# Patient Record
Sex: MALE | Race: BLACK OR AFRICAN AMERICAN | Employment: OTHER | URBAN - METROPOLITAN AREA
[De-identification: names, ages, dates, MRNs, and addresses within clinical notes are randomized per-mention and may not be internally consistent; named-entity substitution may affect disease eponyms.]

---

## 2017-07-18 ENCOUNTER — APPOINTMENT (OUTPATIENT)
Dept: ULTRASOUND IMAGING | Age: 82
DRG: 690 | End: 2017-07-18
Attending: PHYSICIAN ASSISTANT
Payer: MEDICARE

## 2017-07-18 ENCOUNTER — APPOINTMENT (OUTPATIENT)
Dept: CT IMAGING | Age: 82
DRG: 690 | End: 2017-07-18
Attending: PHYSICIAN ASSISTANT
Payer: MEDICARE

## 2017-07-18 ENCOUNTER — HOSPITAL ENCOUNTER (INPATIENT)
Age: 82
LOS: 3 days | Discharge: HOME | DRG: 690 | End: 2017-07-21
Attending: EMERGENCY MEDICINE | Admitting: HOSPITALIST
Payer: MEDICARE

## 2017-07-18 DIAGNOSIS — N10 ACUTE PYELONEPHRITIS: Primary | ICD-10-CM

## 2017-07-18 DIAGNOSIS — E27.8 ADRENAL MASS (HCC): ICD-10-CM

## 2017-07-18 LAB
ALBUMIN SERPL BCP-MCNC: 3 G/DL (ref 3.5–5)
ALBUMIN/GLOB SERPL: 0.6 {RATIO} (ref 1.1–2.2)
ALP SERPL-CCNC: 73 U/L (ref 45–117)
ALT SERPL-CCNC: 20 U/L (ref 12–78)
ANION GAP BLD CALC-SCNC: 5 MMOL/L (ref 5–15)
APPEARANCE UR: ABNORMAL
AST SERPL W P-5'-P-CCNC: 15 U/L (ref 15–37)
BACTERIA URNS QL MICRO: ABNORMAL /HPF
BASOPHILS # BLD AUTO: 0 K/UL (ref 0–0.1)
BASOPHILS # BLD: 0 % (ref 0–1)
BILIRUB SERPL-MCNC: 0.6 MG/DL (ref 0.2–1)
BILIRUB UR QL: NEGATIVE
BUN SERPL-MCNC: 26 MG/DL (ref 6–20)
BUN/CREAT SERPL: 13 (ref 12–20)
CALCIUM SERPL-MCNC: 9.5 MG/DL (ref 8.5–10.1)
CHLORIDE SERPL-SCNC: 98 MMOL/L (ref 97–108)
CO2 SERPL-SCNC: 32 MMOL/L (ref 21–32)
COLOR UR: ABNORMAL
CREAT SERPL-MCNC: 2.06 MG/DL (ref 0.7–1.3)
EOSINOPHIL # BLD: 0 K/UL (ref 0–0.4)
EOSINOPHIL NFR BLD: 0 % (ref 0–7)
EPITH CASTS URNS QL MICRO: ABNORMAL /LPF
ERYTHROCYTE [DISTWIDTH] IN BLOOD BY AUTOMATED COUNT: 13.5 % (ref 11.5–14.5)
GLOBULIN SER CALC-MCNC: 4.8 G/DL (ref 2–4)
GLUCOSE SERPL-MCNC: 116 MG/DL (ref 65–100)
GLUCOSE UR STRIP.AUTO-MCNC: NEGATIVE MG/DL
HCT VFR BLD AUTO: 42.1 % (ref 36.6–50.3)
HGB BLD-MCNC: 15 G/DL (ref 12.1–17)
HGB UR QL STRIP: ABNORMAL
KETONES UR QL STRIP.AUTO: NEGATIVE MG/DL
LEUKOCYTE ESTERASE UR QL STRIP.AUTO: ABNORMAL
LYMPHOCYTES # BLD AUTO: 14 % (ref 12–49)
LYMPHOCYTES # BLD: 1.9 K/UL (ref 0.8–3.5)
MCH RBC QN AUTO: 32.9 PG (ref 26–34)
MCHC RBC AUTO-ENTMCNC: 35.6 G/DL (ref 30–36.5)
MCV RBC AUTO: 92.3 FL (ref 80–99)
MONOCYTES # BLD: 1.7 K/UL (ref 0–1)
MONOCYTES NFR BLD AUTO: 13 % (ref 5–13)
NEUTS SEG # BLD: 9.6 K/UL (ref 1.8–8)
NEUTS SEG NFR BLD AUTO: 73 % (ref 32–75)
NITRITE UR QL STRIP.AUTO: POSITIVE
PH UR STRIP: 6.5 [PH] (ref 5–8)
PLATELET # BLD AUTO: 171 K/UL (ref 150–400)
POTASSIUM SERPL-SCNC: 3.6 MMOL/L (ref 3.5–5.1)
PROT SERPL-MCNC: 7.8 G/DL (ref 6.4–8.2)
PROT UR STRIP-MCNC: 100 MG/DL
RBC # BLD AUTO: 4.56 M/UL (ref 4.1–5.7)
RBC #/AREA URNS HPF: ABNORMAL /HPF (ref 0–5)
SODIUM SERPL-SCNC: 135 MMOL/L (ref 136–145)
SP GR UR REFRACTOMETRY: 1.02 (ref 1–1.03)
UROBILINOGEN UR QL STRIP.AUTO: 1 EU/DL (ref 0.2–1)
WBC # BLD AUTO: 13.2 K/UL (ref 4.1–11.1)
WBC URNS QL MICRO: >100 /HPF (ref 0–4)

## 2017-07-18 PROCEDURE — 74176 CT ABD & PELVIS W/O CONTRAST: CPT

## 2017-07-18 PROCEDURE — 87077 CULTURE AEROBIC IDENTIFY: CPT | Performed by: PHYSICIAN ASSISTANT

## 2017-07-18 PROCEDURE — 65270000032 HC RM SEMIPRIVATE

## 2017-07-18 PROCEDURE — 77030027138 HC INCENT SPIROMETER -A

## 2017-07-18 PROCEDURE — 85025 COMPLETE CBC W/AUTO DIFF WBC: CPT | Performed by: EMERGENCY MEDICINE

## 2017-07-18 PROCEDURE — 74011000258 HC RX REV CODE- 258: Performed by: PHYSICIAN ASSISTANT

## 2017-07-18 PROCEDURE — 93005 ELECTROCARDIOGRAM TRACING: CPT

## 2017-07-18 PROCEDURE — 96360 HYDRATION IV INFUSION INIT: CPT

## 2017-07-18 PROCEDURE — 74011250637 HC RX REV CODE- 250/637: Performed by: HOSPITALIST

## 2017-07-18 PROCEDURE — 81001 URINALYSIS AUTO W/SCOPE: CPT | Performed by: PHYSICIAN ASSISTANT

## 2017-07-18 PROCEDURE — 87186 SC STD MICRODIL/AGAR DIL: CPT | Performed by: PHYSICIAN ASSISTANT

## 2017-07-18 PROCEDURE — 74011250636 HC RX REV CODE- 250/636: Performed by: HOSPITALIST

## 2017-07-18 PROCEDURE — 80053 COMPREHEN METABOLIC PANEL: CPT | Performed by: EMERGENCY MEDICINE

## 2017-07-18 PROCEDURE — 87086 URINE CULTURE/COLONY COUNT: CPT | Performed by: PHYSICIAN ASSISTANT

## 2017-07-18 PROCEDURE — 36415 COLL VENOUS BLD VENIPUNCTURE: CPT | Performed by: EMERGENCY MEDICINE

## 2017-07-18 PROCEDURE — 99285 EMERGENCY DEPT VISIT HI MDM: CPT

## 2017-07-18 PROCEDURE — 74011250636 HC RX REV CODE- 250/636: Performed by: PHYSICIAN ASSISTANT

## 2017-07-18 PROCEDURE — 76770 US EXAM ABDO BACK WALL COMP: CPT

## 2017-07-18 RX ORDER — DOCUSATE SODIUM 100 MG/1
100 CAPSULE, LIQUID FILLED ORAL 2 TIMES DAILY
Status: DISCONTINUED | OUTPATIENT
Start: 2017-07-18 | End: 2017-07-21 | Stop reason: HOSPADM

## 2017-07-18 RX ORDER — AMLODIPINE BESYLATE 5 MG/1
5 TABLET ORAL DAILY
COMMUNITY
End: 2019-03-19

## 2017-07-18 RX ORDER — LOSARTAN POTASSIUM 100 MG/1
100 TABLET ORAL DAILY
COMMUNITY
End: 2017-07-21

## 2017-07-18 RX ORDER — MELATONIN
1000 DAILY
Status: DISCONTINUED | OUTPATIENT
Start: 2017-07-19 | End: 2017-07-21 | Stop reason: HOSPADM

## 2017-07-18 RX ORDER — CLONIDINE HYDROCHLORIDE 0.2 MG/1
0.2 TABLET ORAL ONCE
Status: COMPLETED | OUTPATIENT
Start: 2017-07-18 | End: 2017-07-18

## 2017-07-18 RX ORDER — SODIUM CHLORIDE 0.9 % (FLUSH) 0.9 %
5-10 SYRINGE (ML) INJECTION AS NEEDED
Status: DISCONTINUED | OUTPATIENT
Start: 2017-07-18 | End: 2017-07-21 | Stop reason: HOSPADM

## 2017-07-18 RX ORDER — SODIUM CHLORIDE 0.9 % (FLUSH) 0.9 %
5-10 SYRINGE (ML) INJECTION EVERY 8 HOURS
Status: DISCONTINUED | OUTPATIENT
Start: 2017-07-18 | End: 2017-07-21 | Stop reason: HOSPADM

## 2017-07-18 RX ORDER — SODIUM CHLORIDE 9 MG/ML
100 INJECTION, SOLUTION INTRAVENOUS CONTINUOUS
Status: DISCONTINUED | OUTPATIENT
Start: 2017-07-18 | End: 2017-07-21 | Stop reason: HOSPADM

## 2017-07-18 RX ORDER — PROBENECID AND COLCHICINE 500; .5 MG/1; MG/1
1 TABLET ORAL 2 TIMES DAILY
COMMUNITY
End: 2018-12-30

## 2017-07-18 RX ORDER — BISACODYL 5 MG
5 TABLET, DELAYED RELEASE (ENTERIC COATED) ORAL
Status: DISCONTINUED | OUTPATIENT
Start: 2017-07-18 | End: 2017-07-21 | Stop reason: HOSPADM

## 2017-07-18 RX ORDER — POTASSIUM CHLORIDE 750 MG/1
20 TABLET, FILM COATED, EXTENDED RELEASE ORAL DAILY
COMMUNITY
End: 2018-12-30

## 2017-07-18 RX ORDER — MELATONIN
1000 DAILY
COMMUNITY
End: 2018-12-30

## 2017-07-18 RX ORDER — HEPARIN SODIUM 5000 [USP'U]/ML
5000 INJECTION, SOLUTION INTRAVENOUS; SUBCUTANEOUS EVERY 8 HOURS
Status: DISCONTINUED | OUTPATIENT
Start: 2017-07-18 | End: 2017-07-21 | Stop reason: HOSPADM

## 2017-07-18 RX ORDER — AMLODIPINE BESYLATE 5 MG/1
5 TABLET ORAL DAILY
Status: DISCONTINUED | OUTPATIENT
Start: 2017-07-19 | End: 2017-07-21 | Stop reason: HOSPADM

## 2017-07-18 RX ORDER — BISACODYL 5 MG
5 TABLET, DELAYED RELEASE (ENTERIC COATED) ORAL
COMMUNITY
End: 2019-03-19

## 2017-07-18 RX ORDER — HYDROCHLOROTHIAZIDE 25 MG/1
25 TABLET ORAL DAILY
COMMUNITY
End: 2017-07-21

## 2017-07-18 RX ORDER — ACETAMINOPHEN 325 MG/1
650 TABLET ORAL
Status: DISCONTINUED | OUTPATIENT
Start: 2017-07-18 | End: 2017-07-21 | Stop reason: HOSPADM

## 2017-07-18 RX ORDER — ONDANSETRON 2 MG/ML
4 INJECTION INTRAMUSCULAR; INTRAVENOUS
Status: DISCONTINUED | OUTPATIENT
Start: 2017-07-18 | End: 2017-07-21 | Stop reason: HOSPADM

## 2017-07-18 RX ORDER — METOPROLOL TARTRATE 25 MG/1
25 TABLET, FILM COATED ORAL 2 TIMES DAILY
Status: DISCONTINUED | OUTPATIENT
Start: 2017-07-18 | End: 2017-07-21 | Stop reason: HOSPADM

## 2017-07-18 RX ADMIN — BISACODYL 5 MG: 5 TABLET, COATED ORAL at 18:55

## 2017-07-18 RX ADMIN — SODIUM CHLORIDE 1000 ML: 900 INJECTION, SOLUTION INTRAVENOUS at 14:59

## 2017-07-18 RX ADMIN — SODIUM CHLORIDE 100 ML/HR: 900 INJECTION, SOLUTION INTRAVENOUS at 18:56

## 2017-07-18 RX ADMIN — Medication 10 ML: at 19:00

## 2017-07-18 RX ADMIN — CEFTRIAXONE 1 G: 1 INJECTION, POWDER, FOR SOLUTION INTRAMUSCULAR; INTRAVENOUS at 16:10

## 2017-07-18 RX ADMIN — Medication 10 ML: at 22:37

## 2017-07-18 RX ADMIN — METOPROLOL TARTRATE 25 MG: 25 TABLET ORAL at 19:09

## 2017-07-18 RX ADMIN — HEPARIN SODIUM 5000 UNITS: 5000 INJECTION, SOLUTION INTRAVENOUS; SUBCUTANEOUS at 18:55

## 2017-07-18 RX ADMIN — DOCUSATE SODIUM 100 MG: 100 CAPSULE, LIQUID FILLED ORAL at 19:10

## 2017-07-18 RX ADMIN — CLONIDINE HYDROCHLORIDE 0.2 MG: 0.2 TABLET ORAL at 19:10

## 2017-07-18 NOTE — ED PROVIDER NOTES
HPI Comments: 80year old male presenting for multiple complaints. Pt reports \"I wanted to get my heart checked because I wasn't sure if anything was wrong with it or not. Betha Lute \"  Also reports right sided low back/flank pain for a couple of weeks that has been very intermittent, unable to characterize further. Pt notes that his father has heart problems and would like to get it checked. Pt states that he never has chest pain he just wanted a check up. No SOB or leg swelling. Denies fever. Pt had an operation for prostate CA in 1964 and notes that recently he has had issues with urinary incontinence and leakage for a few months.  + dysuria. No hematuria. No abdominal pain, vomiting, or diarrhea. Pt notes that he has not seen his primary care for a couple of months. PMHx: HTN, high cholesterol, \"kidney problems,\" prostate CA in the 1960's  PSx: prostatectomy  Social: non-smoker    Pt somewhat poor historian related to age. Cannot articulate where he has received medical care in the past.  Notes that he has not been to a hospital since the 1960's. Full hx, PE, ROS difficult to obtain    Patient is a 80 y.o. male presenting with chest pain and frequency. The history is provided by the patient. Chest Pain (Angina)    Pertinent negatives include no fever, no shortness of breath and no vomiting. Urinary Frequency    Associated symptoms include frequency and flank pain. Pertinent negatives include no vomiting. Past Medical History:   Diagnosis Date    Hyperlipemia     Hypertension        Past Surgical History:   Procedure Laterality Date    HX UROLOGICAL           No family history on file. Social History     Social History    Marital status:      Spouse name: N/A    Number of children: N/A    Years of education: N/A     Occupational History    Not on file.      Social History Main Topics    Smoking status: Never Smoker    Smokeless tobacco: Never Used    Alcohol use No    Drug use: No    Sexual activity: Not on file     Other Topics Concern    Not on file     Social History Narrative    No narrative on file         ALLERGIES: Review of patient's allergies indicates no known allergies. Review of Systems   Unable to perform ROS: Age   Constitutional: Negative for fever. HENT: Negative for congestion. Eyes: Negative for discharge. Respiratory: Negative for shortness of breath. Cardiovascular: Negative for chest pain and leg swelling. Gastrointestinal: Negative for vomiting. Genitourinary: Positive for dysuria, flank pain and frequency. Vitals:    07/18/17 1217   BP: 165/73   Pulse: 79   Resp: 16   Temp: 98.2 °F (36.8 °C)   SpO2: 98%   Weight: 80 kg (176 lb 4.8 oz)   Height: 5' 10\" (1.778 m)            Physical Exam   Constitutional: He appears well-developed and well-nourished. No distress. Talkative, elderly, pleasant AA male   HENT:   Head: Normocephalic and atraumatic. Right Ear: External ear normal.   Left Ear: External ear normal.   Eyes: Conjunctivae are normal. No scleral icterus. Neck: Neck supple. No tracheal deviation present. Cardiovascular: Normal rate, regular rhythm and normal heart sounds. Exam reveals no gallop and no friction rub. No murmur heard. Pulmonary/Chest: Effort normal and breath sounds normal. No stridor. No respiratory distress. He has no wheezes. Abdominal: Soft. He exhibits no distension. + right CVAT   Musculoskeletal: Normal range of motion. Neurological: He is alert. Skin: Skin is warm and dry. Psychiatric: He has a normal mood and affect. His behavior is normal.   Nursing note and vitals reviewed. MDM  Number of Diagnoses or Management Options  Diagnosis management comments: 80year old male presenting to the ED for a \"check up\" of his heart (asymptomatic) and right flank pain with urinary symptoms. + CVAT, WBC 13.2, UA c/f cystitis (cx pending).   Initial US c/f renal mass, CT showing adrenal mass with stranding c/f pyelo. Also with creatinine 2.06 of unclear acuity. Pt admitted to medicine for pyelo, new diagnosis of adrenal mass. Amount and/or Complexity of Data Reviewed  Clinical lab tests: ordered and reviewed  Tests in the radiology section of CPT®: ordered and reviewed  Discuss the patient with other providers: yes (Dr. aKde Padilla, ED attending. Dr. Jacqueline Lyles, hospitalist.)  Independent visualization of images, tracings, or specimens: yes (US)      ED Course       Procedures         Pt in decon for bed bugs. Unable to assess at this time. BECKI Edwards  12:47 PM    Discussed with Dr. Jacqueline Lyles, will admit.   BECKI Edwards  3:46 PM

## 2017-07-18 NOTE — IP AVS SNAPSHOT
2700 [facility] [address] [place] [address] 
[phone] Patient: Manish Stevens MRN: [id number] You are allergic to the following No active allergies Recent Documentation Height Weight BMI Smoking Status 1.778 m 80 kg 25.3 kg/m2 Never Smoker Unresulted Labs Order Current Status ALDOSTERONE In process CORTISOL, URINE FREE 24 HR In process METANEPHRINES FRACTIONATED, URINE 24 HR In process Emergency Contacts Name Discharge Info Relation Home Work Mobile Orlin Gee  Daughter [21]   [phone] About your hospitalization You were admitted on:  July 18, 2017 You last received care in the:  [id number] You were discharged on:  July 21, 2017 Unit phone number:  [phone] Why you were hospitalized Your primary diagnosis was:  Not on File Your diagnoses also included:  Acute Pyelonephritis, Adrenal Mass (Hcc) Providers Seen During Your Hospitalizations Provider Role Specialty Primary office phone Arnold Davis MD Attending Provider Emergency Medicine [phone] Dewain Simmonds, MD Attending Provider Internal Medicine [phone] Melanie Lee MD Attending Provider Internal Medicine [phone] Your Primary Care Physician (PCP) Primary Care Physician Office Phone Office Fax UNKNOWN, PROVIDER ** None ** ** None ** Follow-up Information Follow up With Details Comments Contact Info Felix Oconnell MD In 2 weeks (General Surgery) [address] [place] [address] Nam  [address] [place] [address] 
[phone] Dale Singletary MD In 2 weeks (Medical Oncology) [address] [place] [address] [address] [place] [address] 
[phone] [facility] Area Office on [address] In 1 day [address] [facility] [facility] Yefri Frances MD On 7/24/2017 Hospital Follow Up Appointment: Monday at 1:30PM for lab work (BNP check). [address] [place] [address] 
[phone] Current Discharge Medication List  
  
START taking these medications Dose & Instructions Dispensing Information Comments Morning Noon Evening Bedtime  
 cefUROXime 250 mg tablet Commonly known as:  CEFTIN Your last dose was: Your next dose is:    
   
   
 Dose:  250 mg Take 1 Tab by mouth two (2) times a day for 5 days. Quantity:  10 Tab Refills:  0 CONTINUE these medications which have NOT CHANGED Dose & Instructions Dispensing Information Comments Morning Noon Evening Bedtime  
 amLODIPine 5 mg tablet Commonly known as:  Sampson Hatfield Your last dose was: Your next dose is:    
   
   
 Dose:  5 mg Take 5 mg by mouth daily. Refills:  0 DULCOLAX (BISACODYL) 5 mg EC tablet Generic drug:  bisacodyl Your last dose was: Your next dose is:    
   
   
 Dose:  5 mg Take 5 mg by mouth every fourty-eight (48) hours. Refills:  0  
     
   
   
   
  
 OTHER(NON-FORMULARY) Your last dose was: Your next dose is:    
   
   
 Dose:  1 Tab Take 1 Tab by mouth daily. Patient states he takes an additional supplement daily. Name unknown. Refills:  0  
     
   
   
   
  
 potassium chloride SR 10 mEq tablet Commonly known as:  KLOR-CON 10 Your last dose was: Your next dose is:    
   
   
 Dose:  20 mEq Take 20 mEq by mouth daily. Last fill per Rx Query 4/25/17 for #30 tablets. Refills:  0  
     
   
   
   
  
 probenecid-colchicine 500-0.5 mg per tablet Commonly known as:  ColBenemid Your last dose was: Your next dose is:    
   
   
 Dose:  1 Tab Take 1 Tab by mouth two (2) times a day. Refills:  0 VITAMIN D3 1,000 unit tablet Generic drug:  cholecalciferol Your last dose was: Your next dose is:    
   
   
 Dose:  1000 Units Take 1,000 Units by mouth daily. Patient believes he takes 1000 units daily. Refills:  0 STOP taking these medications   
 hydroCHLOROthiazide 25 mg tablet Commonly known as:  HYDRODIURIL  
   
  
 losartan 100 mg tablet Commonly known as:  COZAAR Where to Get Your Medications Information on where to get these meds will be given to you by the nurse or doctor. ! Ask your nurse or doctor about these medications  
  cefUROXime 250 mg tablet Discharge Instructions Discharge Instructions PATIENT ID: Ashlee Hicks MRN: [id number] YOB: 1933 DATE OF ADMISSION: 7/18/2017 12:24 PM   
DATE OF DISCHARGE: 7/21/2017 PRIMARY CARE PROVIDER: PROVIDER UNKNOWN  
 
ATTENDING PHYSICIAN: Geetha Cm MD 
DISCHARGING PROVIDER: Geetha Cm MD   
To contact this individual call [phone] and ask the  to page. If unavailable ask to be transferred the Adult Hospitalist Department. DISCHARGE DIAGNOSES ADRENAL MASS W/U DONE MAY BE BENIGN 
 
CONSULTATIONS: IP CONSULT TO ONCOLOGY 
IP CONSULT TO GENERAL SURGERY 
 
PROCEDURES/SURGERIES: * No surgery found * PENDING TEST RESULTS:  
At the time of discharge the following test results are still pending: w/u for adrenal tumor FOLLOW UP APPOINTMENTS:  
Follow-up Information Follow up With Details Comments Contact Info Provider Unknown  need follow up with PCP Patient not available to ask Shahnaz Singh MD In 2 weeks  [address] [facility] Nam  [address] [place] [address] 
[phone] Hilton Moraes MD In 2 weeks  [address] [facility] [address] [place] [address] 
[phone] ADDITIONAL CARE RECOMMENDATIONS:  
 
DIET: Regular Diet and Cardiac Diet ACTIVITY: Activity as tolerated WOUND CARE:  
 EQUIPMENT needed:  
 
 
  
 SNF/Inpatient Rehab/LTAC Independent/assisted living Hospice Other: CDMP Checked:  
Yes x PROBLEM LIST Updated: 
Yes x Signed:  
Lionel Sheth MD 
7/21/2017 
8:59 AM 
 
Discharge Orders None MyUnfold Announcement We are excited to announce that we are making your provider's discharge notes available to you in MyUnfold. You will see these notes when they are completed and signed by the physician that discharged you from your recent hospital stay. If you have any questions or concerns about any information you see in MyUnfold, please call the Health Information Department where you were seen or reach out to your Primary Care Provider for more information about your plan of care. Introducing [facility]! Mary Up introduces MyUnfold patient portal. Now you can access parts of your medical record, email your doctor's office, and request medication refills online. 1. In your internet browser, go to [email or web address] 2. Click on the First Time User? Click Here link in the Sign In box. You will see the New Member Sign Up page. 3. Enter your [employer] Access Code exactly as it appears below. You will not need to use this code after youve completed the sign-up process. If you do not sign up before the expiration date, you must request a new code. · [employer]o Access Code: [email or web address] Expires: 10/17/2017 10:20 AM 
 
4. Enter the last four digits of your Social Security Number (xxxx) and Date of Birth (mm/dd/yyyy) as indicated and click Submit. You will be taken to the next sign-up page. 5. Create a Humouno ID. This will be your Humouno login ID and cannot be changed, so think of one that is secure and easy to remember. 6. Create a Humouno password. You can change your password at any time. 7. Enter your Password Reset Question and Answer. This can be used at a later time if you forget your password. 8. Enter your e-mail address. You will receive e-mail notification when new information is available in [address] 9. Click Sign Up. You can now view and download portions of your medical record. 10. Click the Download Summary menu link to download a portable copy of your medical information. If you have questions, please visit the Frequently Asked Questions section of the [employer]o website. Remember, [employer]o is NOT to be used for urgent needs. For medical emergencies, dial 911. Now available from your iPhone and Android! General Information Please provide this summary of care documentation to your next provider. Patient Signature:  ____________________________________________________________ Date:  ____________________________________________________________  
  
Mallorie Macamento Provider Signature:  ____________________________________________________________ Date:  ____________________________________________________________

## 2017-07-18 NOTE — IP AVS SNAPSHOT
2700 [facility] [address] [place] [address] 
[phone] Patient: Shabnam Cortes MRN: [id number] VZO:5/2/9001 Current Discharge Medication List  
  
START taking these medications Dose & Instructions Dispensing Information Comments Morning Noon Evening Bedtime  
 cefUROXime 250 mg tablet Commonly known as:  CEFTIN Your last dose was: Your next dose is:    
   
   
 Dose:  250 mg Take 1 Tab by mouth two (2) times a day for 5 days. Quantity:  10 Tab Refills:  0 CONTINUE these medications which have NOT CHANGED Dose & Instructions Dispensing Information Comments Morning Noon Evening Bedtime  
 amLODIPine 5 mg tablet Commonly known as:  Sampson Alysia Your last dose was: Your next dose is:    
   
   
 Dose:  5 mg Take 5 mg by mouth daily. Refills:  0 DULCOLAX (BISACODYL) 5 mg EC tablet Generic drug:  bisacodyl Your last dose was: Your next dose is:    
   
   
 Dose:  5 mg Take 5 mg by mouth every fourty-eight (48) hours. Refills:  0  
     
   
   
   
  
 OTHER(NON-FORMULARY) Your last dose was: Your next dose is:    
   
   
 Dose:  1 Tab Take 1 Tab by mouth daily. Patient states he takes an additional supplement daily. Name unknown. Refills:  0  
     
   
   
   
  
 potassium chloride SR 10 mEq tablet Commonly known as:  KLOR-CON 10 Your last dose was: Your next dose is:    
   
   
 Dose:  20 mEq Take 20 mEq by mouth daily. Last fill per Rx Query 4/25/17 for #30 tablets. Refills:  0  
     
   
   
   
  
 probenecid-colchicine 500-0.5 mg per tablet Commonly known as:  ColBenemid Your last dose was: Your next dose is:    
   
   
 Dose:  1 Tab Take 1 Tab by mouth two (2) times a day. Refills:  0  
     
   
   
   
  
 VITAMIN D3 1,000 unit tablet Generic drug:  cholecalciferol Your last dose was: Your next dose is:    
   
   
 Dose:  1000 Units Take 1,000 Units by mouth daily. Patient believes he takes 1000 units daily. Refills:  0 STOP taking these medications   
 hydroCHLOROthiazide 25 mg tablet Commonly known as:  HYDRODIURIL  
   
  
 losartan 100 mg tablet Commonly known as:  COZAAR Where to Get Your Medications Information on where to get these meds will be given to you by the nurse or doctor. ! Ask your nurse or doctor about these medications  
  cefUROXime 250 mg tablet

## 2017-07-18 NOTE — H&P
History & Physical    Date of admission: 2017    Patient name: Breann Awad  MRN: [id number]  YOB: 1933  Age: 80 y.o. Primary care provider:  PROVIDER UNKNOWN     Source of Information: patient, medical records                              Chief complain: Rt Flank pain    History of present illness  Breann Awad is a 80 y.o. male who presents with PMHx Prostate ca s/p prostatectomy, HTN, HLD, Gout, admitted for above complain. Pt initially states that he just came to check his heart. When asking why he felt something was wrong with it, patient states: \"Just wanted to check it out\". Pt has PCP who was referring him to a cardiologist but states he never heard from them. On Additional questioning pt also c/o on/off rt flank pain. Pt also complains of increased urinary frequency but no discomfort  Or hematuria. Pt denies any chest pain, sob, n/v, diarrhea, fever, chills, hx of MI, CVA. Pt states his father had heart condition and . Pt unsure about his mother's history. IN ER, UA and US done. UA + for infection. US showed b/l renal masses which prompted CT abd which showed B/l Adrenal pass and perinephritic stranding R>L. Pt also found to have Bedbugs in ER and was decontaminated. Past Medical History:   Diagnosis Date    Hyperlipemia     Hypertension       Past Surgical History:   Procedure Laterality Date    HX UROLOGICAL       Prior to Admission medications    Medication Sig Start Date End Date Taking? Authorizing Provider   amLODIPine (NORVASC) 5 mg tablet Take 5 mg by mouth daily. Yes Historical Provider   hydroCHLOROthiazide (HYDRODIURIL) 25 mg tablet Take 25 mg by mouth daily. Yes Historical Provider   potassium chloride SR (KLOR-CON 10) 10 mEq tablet Take 20 mEq by mouth daily. Last fill per Rx Query 17 for #30 tablets.    Yes Historical Provider   probenecid-colchicine ([address]) 500-0.5 mg per tablet Take 1 Tab by mouth two (2) times a day. Yes Historical Provider   losartan (COZAAR) 100 mg tablet Take 100 mg by mouth daily. Last fill per Rx Query 2/13/17 for #90 tablets. Yes Historical Provider   cholecalciferol (VITAMIN D3) 1,000 unit tablet Take 1,000 Units by mouth daily. Patient believes he takes 1000 units daily. Yes Historical Provider   OTHER,NON-FORMULARY, Take 1 Tab by mouth daily. Patient states he takes an additional supplement daily. Name unknown. Yes Historical Provider   bisacodyl (DULCOLAX, BISACODYL,) 5 mg EC tablet Take 5 mg by mouth every fourty-eight (48) hours. Yes Historical Provider     No Known Allergies   No family history on file. Family history reviewed and non-contributory. Social history  Patient resides  X  Independently      With family care      Assisted living      SNF    Ambulates  X  Independently      With cane       Assisted walker         Alcohol history   X  None     Social     Chronic   Smoking history  X  None     Former smoker     Current smoker     History   Smoking Status    Never Smoker   Smokeless Tobacco    Never Used       Code status  X  Full code     DNR/DNI        Code status discussed with the patient/caregivers. No Order    Review of systems  The patient denies any fever, chills, chest pain, cough, congestion, recent illness, palpitations, or dysuria. A comprehensive review of systems was negative except for that written in the History of Present Illness. The remainder of the review of systems was reviewed and is noncontributory. Physical Examination   Visit Vitals    /79    Pulse 83    Temp 98.2 °F (36.8 °C)    Resp 27    Ht 5' 10\" (1.778 m)    Wt 80 kg (176 lb 4.8 oz)    SpO2 97%    BMI 25.3 kg/m2          O2 Device: Room air    General:  Alert, cooperative, no distress   Head:  Normocephalic, without obvious abnormality, atraumatic   Eyes:  Conjunctivae/corneas clear.  PERRL, EOMs intact E/N/M/T: Nares normal. Septum midline. No nasal drainage or sinus tenderness  Lips, mucosa, and tongue normal   Teeth and gums normal  Clear oropharynx   Neck: Normal appearance and movements, symmetrical, trachea midline  No palpable adenopathy  No thyroid enlargement, tenderness or nodules  No carotid bruit   Normal JVP   Lungs:   Symmetrical chest expansion and respiratory effort  Clear to auscultation bilaterally   Heart:  Regular rhythm   Sounds normal; no murmur, click, rub or gallop   Abdomen:   Soft, no tenderness, + Rt flank discomfort   Bowel sounds normal     Extremities: Extremities normal, atraumatic  No cyanosis or edema  No DVT signs   Pulses 2+ and symmetric all extremities   Skin: No rashes or ulcers   Psych: Alert, oriented x3  Normal affect, judgement and insight   Geniturinary: deferred     Data Review      24 Hour Results:  Recent Results (from the past 24 hour(s))   EKG, 12 LEAD, INITIAL    Collection Time: 07/18/17 12:24 PM   Result Value Ref Range    Ventricular Rate 79 BPM    Atrial Rate 79 BPM    P-R Interval 176 ms    QRS Duration 94 ms    Q-T Interval 348 ms    QTC Calculation (Bezet) 399 ms    Calculated P Axis 49 degrees    Calculated R Axis 24 degrees    Calculated T Axis -16 degrees    Diagnosis       Normal sinus rhythm  Left ventricular hypertrophy with repolarization abnormality  Anterior infarct , age undetermined  No previous ECGs available     CBC WITH AUTOMATED DIFF    Collection Time: 07/18/17  1:30 PM   Result Value Ref Range    WBC 13.2 (H) 4.1 - 11.1 K/uL    RBC 4.56 4.10 - 5.70 M/uL    HGB 15.0 12.1 - 17.0 g/dL    HCT 42.1 36.6 - 50.3 %    MCV 92.3 80.0 - 99.0 FL    MCH 32.9 26.0 - 34.0 PG    MCHC 35.6 30.0 - 36.5 g/dL    RDW 13.5 11.5 - 14.5 %    PLATELET 608 570 - 976 K/uL    NEUTROPHILS 73 32 - 75 %    LYMPHOCYTES 14 12 - 49 %    MONOCYTES 13 5 - 13 %    EOSINOPHILS 0 0 - 7 %    BASOPHILS 0 0 - 1 %    ABS. NEUTROPHILS 9.6 (H) 1.8 - 8.0 K/UL    ABS.  LYMPHOCYTES 1.9 0.8 - 3.5 K/UL    ABS. MONOCYTES 1.7 (H) 0.0 - 1.0 K/UL    ABS. EOSINOPHILS 0.0 0.0 - 0.4 K/UL    ABS. BASOPHILS 0.0 0.0 - 0.1 K/UL   METABOLIC PANEL, COMPREHENSIVE    Collection Time: 07/18/17  1:30 PM   Result Value Ref Range    Sodium 135 (L) 136 - 145 mmol/L    Potassium 3.6 3.5 - 5.1 mmol/L    Chloride 98 97 - 108 mmol/L    CO2 32 21 - 32 mmol/L    Anion gap 5 5 - 15 mmol/L    Glucose 116 (H) 65 - 100 mg/dL    BUN 26 (H) 6 - 20 MG/DL    Creatinine 2.06 (H) 0.70 - 1.30 MG/DL    BUN/Creatinine ratio 13 12 - 20      GFR est AA 37 (L) >60 ml/min/1.73m2    GFR est non-AA 31 (L) >60 ml/min/1.73m2    Calcium 9.5 8.5 - 10.1 MG/DL    Bilirubin, total 0.6 0.2 - 1.0 MG/DL    ALT (SGPT) 20 12 - 78 U/L    AST (SGOT) 15 15 - 37 U/L    Alk. phosphatase 73 45 - 117 U/L    Protein, total 7.8 6.4 - 8.2 g/dL    Albumin 3.0 (L) 3.5 - 5.0 g/dL    Globulin 4.8 (H) 2.0 - 4.0 g/dL    A-G Ratio 0.6 (L) 1.1 - 2.2     URINALYSIS W/MICROSCOPIC    Collection Time: 07/18/17  2:56 PM   Result Value Ref Range    Color YELLOW/STRAW      Appearance CLOUDY (A) CLEAR      Specific gravity 1.017 1.003 - 1.030      pH (UA) 6.5 5.0 - 8.0      Protein 100 (A) NEG mg/dL    Glucose NEGATIVE  NEG mg/dL    Ketone NEGATIVE  NEG mg/dL    Bilirubin NEGATIVE  NEG      Blood LARGE (A) NEG      Urobilinogen 1.0 0.2 - 1.0 EU/dL    Nitrites POSITIVE (A) NEG      Leukocyte Esterase LARGE (A) NEG      WBC >100 (H) 0 - 4 /hpf    RBC  0 - 5 /hpf    Epithelial cells MODERATE (A) FEW /lpf    Bacteria 3+ (A) NEG /hpf     Recent Labs      07/18/17   1330   WBC  13.2*   HGB  15.0   HCT  42.1   PLT  171     Recent Labs      07/18/17   1330   NA  135*   K  3.6   CL  98   CO2  32   GLU  116*   BUN  26*   CREA  2.06*   CA  9.5   ALB  3.0*   TBILI  0.6   SGOT  15   ALT  20       Imaging  CT abd/pel:  MPRESSION  IMPRESSION:   1. No definite solid renal mass is identified within the limits of noncontrast  examination.  A right renal mass on recent ultrasound is actually a right adrenal  mass. This is a completely characterize but suspicious for malignancy given  atypical features such is heterogeneity and size of 4.9 cm.     2. A smaller right adrenal nodule is too small to characterize. There is also  diffuse nodularity of the left adrenal gland.      3. A left renal mass on recent ultrasound demonstrates fluid density compatible  with a simple cyst.      4. There is bilateral perinephric fluid and stranding, right greater than left,  which is nonspecific but can be seen with infection. Correlation with urinalysis is recommended. Assessment and Plan   Active Problems:    Acute pyelonephritis (7/18/2017)      Adrenal mass ([address] (7/18/2017)    1. Acute Pyelonephritis  - start Rocephin  - IVF  - Follow cultures    2. Adrenal Mass   - onc eval  - CT abd/pel: possibly primary vs mets    3. SOUELYMANE vs CKD 3, no previous baseline  - will give IVF  - monitor Cr  - US retroperitoneum done    4. HTN   - c/w Amlodipine  - hold ARBs and Hydrochlorothiazide      Diet: cardiac  Activity: as tolerated  DVT prophylaxis: hep sq  Isolation precautions: none  Consultations:  Onc  Anticipated disposition: 2 days       Signed by: Melody Casas MD    July 18, 2017 at 4:38 PM

## 2017-07-18 NOTE — PROGRESS NOTES
Admission Medication Reconciliation:    Information obtained from: Patient and Rx Query    Significant PMH/Disease States:   Past Medical History:   Diagnosis Date    Hyperlipemia     Hypertension        Chief Complaint for this Admission:    Chief Complaint   Patient presents with    Chest Pain    Urinary Frequency       Allergies:  Review of patient's allergies indicates no known allergies. Prior to Admission Medications:   Prior to Admission Medications   Prescriptions Last Dose Informant Patient Reported? Taking? OTHER,NON-FORMULARY,   Yes Yes   Sig: Take 1 Tab by mouth daily. Patient states he takes an additional supplement daily. Name unknown. amLODIPine (NORVASC) 5 mg tablet 7/17/2017 at Unknown time  Yes Yes   Sig: Take 5 mg by mouth daily. bisacodyl (DULCOLAX, BISACODYL,) 5 mg EC tablet   Yes Yes   Sig: Take 5 mg by mouth every fourty-eight (48) hours. cholecalciferol (VITAMIN D3) 1,000 unit tablet 7/17/2017 at Unknown time  Yes Yes   Sig: Take 1,000 Units by mouth daily. Patient believes he takes 1000 units daily. hydroCHLOROthiazide (HYDRODIURIL) 25 mg tablet 7/17/2017 at Unknown time  Yes Yes   Sig: Take 25 mg by mouth daily. losartan (COZAAR) 100 mg tablet 7/17/2017 at Unknown time  Yes Yes   Sig: Take 100 mg by mouth daily. Last fill per Rx Query 2/13/17 for #90 tablets. potassium chloride SR (KLOR-CON 10) 10 mEq tablet 7/17/2017 at Unknown time  Yes Yes   Sig: Take 20 mEq by mouth daily. Last fill per Rx Query 4/25/17 for #30 tablets. probenecid-colchicine (COLBENEMID) 500-0.5 mg per tablet 7/17/2017 at 1 dose  Yes Yes   Sig: Take 1 Tab by mouth two (2) times a day. Facility-Administered Medications: None         Comments/Recommendations:     Spoke with patient regarding allergies and PTA medications. Patient appeared to be an ok historian. Supplemented information from interview with information listed in Rx Query. 1) Confirmed NKDA.     2) Updated PTA medication list. Added medications listed above. Regarding losartan and potassium, last fills per Rx Query suggest patient may have ran out of medications. Both medications confirmed as taking during interview. Last fill for losartan was 2/13/17 for #90, and last fill for potassium was 4/25/17 for #30. In addition to Rx and OTC medications, the patient reports he takes an additional supplement that he does not recall the name of. Listed above as \"other non formulary. \"    Last doses listed above. The patient reports he has not taken his Dulcolax in a few days.       Cristela Phalen, PharmD

## 2017-07-18 NOTE — ED NOTES
1649 Attempted report, RN will call back  1720 Attempted report, RN dealing with patient situation, will call back  1730 TRANSFER - OUT REPORT:    Verbal report given to Hays(name) on Ambrosio Powell  being transferred to [facility](unit) for routine progression of care       Report consisted of patients Situation, Background, Assessment and   Recommendations(SBAR). Information from the following report(s) SBAR, Kardex, ED Summary, [facility] and Recent Results was reviewed with the receiving nurse. Lines:   Peripheral IV 07/18/17 Antecubital (Active)   Site Assessment Clean, dry, & intact 7/18/2017  1:31 PM   Phlebitis Assessment 0 7/18/2017  1:31 PM   Infiltration Assessment 0 7/18/2017  1:31 PM   Dressing Status Clean, dry, & intact 7/18/2017  1:31 PM        Opportunity for questions and clarification was provided.       Patient transported with:

## 2017-07-19 ENCOUNTER — APPOINTMENT (OUTPATIENT)
Dept: CT IMAGING | Age: 82
DRG: 690 | End: 2017-07-19
Attending: NURSE PRACTITIONER
Payer: MEDICARE

## 2017-07-19 LAB
ANION GAP BLD CALC-SCNC: 6 MMOL/L (ref 5–15)
ATRIAL RATE: 79 BPM
BASOPHILS # BLD AUTO: 0 K/UL (ref 0–0.1)
BASOPHILS # BLD: 0 % (ref 0–1)
BUN SERPL-MCNC: 29 MG/DL (ref 6–20)
BUN/CREAT SERPL: 16 (ref 12–20)
CALCIUM SERPL-MCNC: 8.8 MG/DL (ref 8.5–10.1)
CALCULATED P AXIS, ECG09: 49 DEGREES
CALCULATED R AXIS, ECG10: 24 DEGREES
CALCULATED T AXIS, ECG11: -16 DEGREES
CHLORIDE SERPL-SCNC: 101 MMOL/L (ref 97–108)
CO2 SERPL-SCNC: 29 MMOL/L (ref 21–32)
CREAT SERPL-MCNC: 1.84 MG/DL (ref 0.7–1.3)
DIAGNOSIS, 93000: NORMAL
EOSINOPHIL # BLD: 0 K/UL (ref 0–0.4)
EOSINOPHIL NFR BLD: 0 % (ref 0–7)
ERYTHROCYTE [DISTWIDTH] IN BLOOD BY AUTOMATED COUNT: 13.5 % (ref 11.5–14.5)
GLUCOSE SERPL-MCNC: 136 MG/DL (ref 65–100)
HCT VFR BLD AUTO: 37 % (ref 36.6–50.3)
HGB BLD-MCNC: 12.8 G/DL (ref 12.1–17)
LYMPHOCYTES # BLD AUTO: 16 % (ref 12–49)
LYMPHOCYTES # BLD: 1.7 K/UL (ref 0.8–3.5)
MCH RBC QN AUTO: 32.2 PG (ref 26–34)
MCHC RBC AUTO-ENTMCNC: 34.6 G/DL (ref 30–36.5)
MCV RBC AUTO: 93.2 FL (ref 80–99)
MONOCYTES # BLD: 1.6 K/UL (ref 0–1)
MONOCYTES NFR BLD AUTO: 15 % (ref 5–13)
NEUTS SEG # BLD: 7.5 K/UL (ref 1.8–8)
NEUTS SEG NFR BLD AUTO: 69 % (ref 32–75)
P-R INTERVAL, ECG05: 176 MS
PLATELET # BLD AUTO: 157 K/UL (ref 150–400)
POTASSIUM SERPL-SCNC: 3.3 MMOL/L (ref 3.5–5.1)
Q-T INTERVAL, ECG07: 348 MS
QRS DURATION, ECG06: 94 MS
QTC CALCULATION (BEZET), ECG08: 399 MS
RBC # BLD AUTO: 3.97 M/UL (ref 4.1–5.7)
SODIUM SERPL-SCNC: 136 MMOL/L (ref 136–145)
VENTRICULAR RATE, ECG03: 79 BPM
WBC # BLD AUTO: 10.8 K/UL (ref 4.1–11.1)

## 2017-07-19 PROCEDURE — 83835 ASSAY OF METANEPHRINES: CPT | Performed by: INTERNAL MEDICINE

## 2017-07-19 PROCEDURE — 82530 CORTISOL FREE: CPT | Performed by: INTERNAL MEDICINE

## 2017-07-19 PROCEDURE — 71250 CT THORAX DX C-: CPT

## 2017-07-19 PROCEDURE — 65270000032 HC RM SEMIPRIVATE

## 2017-07-19 PROCEDURE — 82088 ASSAY OF ALDOSTERONE: CPT | Performed by: INTERNAL MEDICINE

## 2017-07-19 PROCEDURE — 85025 COMPLETE CBC W/AUTO DIFF WBC: CPT | Performed by: HOSPITALIST

## 2017-07-19 PROCEDURE — 74011000258 HC RX REV CODE- 258: Performed by: HOSPITALIST

## 2017-07-19 PROCEDURE — 74011250636 HC RX REV CODE- 250/636: Performed by: HOSPITALIST

## 2017-07-19 PROCEDURE — 36415 COLL VENOUS BLD VENIPUNCTURE: CPT | Performed by: HOSPITALIST

## 2017-07-19 PROCEDURE — 80048 BASIC METABOLIC PNL TOTAL CA: CPT | Performed by: HOSPITALIST

## 2017-07-19 PROCEDURE — 74011250637 HC RX REV CODE- 250/637: Performed by: HOSPITALIST

## 2017-07-19 RX ADMIN — AMLODIPINE BESYLATE 5 MG: 5 TABLET ORAL at 10:05

## 2017-07-19 RX ADMIN — Medication 10 ML: at 21:30

## 2017-07-19 RX ADMIN — VITAMIN D, TAB 1000IU (100/BT) 1000 UNITS: 25 TAB at 10:05

## 2017-07-19 RX ADMIN — METOPROLOL TARTRATE 25 MG: 25 TABLET ORAL at 10:05

## 2017-07-19 RX ADMIN — SODIUM CHLORIDE 100 ML/HR: 900 INJECTION, SOLUTION INTRAVENOUS at 16:02

## 2017-07-19 RX ADMIN — CEFTRIAXONE 1 G: 1 INJECTION, POWDER, FOR SOLUTION INTRAMUSCULAR; INTRAVENOUS at 15:45

## 2017-07-19 RX ADMIN — DOCUSATE SODIUM 100 MG: 100 CAPSULE, LIQUID FILLED ORAL at 10:05

## 2017-07-19 RX ADMIN — HEPARIN SODIUM 5000 UNITS: 5000 INJECTION, SOLUTION INTRAVENOUS; SUBCUTANEOUS at 02:30

## 2017-07-19 RX ADMIN — METOPROLOL TARTRATE 25 MG: 25 TABLET ORAL at 18:16

## 2017-07-19 RX ADMIN — DOCUSATE SODIUM 100 MG: 100 CAPSULE, LIQUID FILLED ORAL at 18:16

## 2017-07-19 RX ADMIN — HEPARIN SODIUM 5000 UNITS: 5000 INJECTION, SOLUTION INTRAVENOUS; SUBCUTANEOUS at 10:07

## 2017-07-19 RX ADMIN — HEPARIN SODIUM 5000 UNITS: 5000 INJECTION, SOLUTION INTRAVENOUS; SUBCUTANEOUS at 18:15

## 2017-07-19 RX ADMIN — SODIUM CHLORIDE 100 ML/HR: 900 INJECTION, SOLUTION INTRAVENOUS at 05:24

## 2017-07-19 RX ADMIN — Medication 10 ML: at 05:25

## 2017-07-19 NOTE — PROGRESS NOTES
Spiritual Care Partner Volunteer visited patient in [address] [place] [address] on 7/19/17. Documented by:  Cora Marti M.Div.    [facility] [phone])

## 2017-07-19 NOTE — PROGRESS NOTES
Hospitalist Progress Note  Capri Vasquez MD  Office: [phone]        Date of Service:  2017  NAME:  Shabnam Cortes  :  3/9/1933  MRN:  [id number]      Admission Summary:   Shabnam Cortes is a 80 y.o. male who presents with PMHx Prostate ca s/p prostatectomy, HTN, HLD, Gout, admitted for above complain. Pt initially states that he just came to check his heart. When asking why he felt something was wrong with it, patient states: \"Just wanted to check it out\". Pt has PCP who was referring him to a cardiologist but states he never heard from them. On Additional questioning pt also c/o on/off rt flank pain. Pt also complains of increased urinary frequency but no discomfort  Or hematuria. Pt denies any chest pain, sob, n/v, diarrhea, fever, chills, hx of MI, CVA. Pt states his father had heart condition and . Pt unsure about his mother's history. Interval history / Subjective:   No complains      Assessment & Plan: Active Problems:    Acute pyelonephritis (2017)       Adrenal mass ([facility] [address] (2017)     1. Acute Pyelonephritis  - start Rocephin  - IVF  - Follow cultures NGTD     2. Adrenal Mass   - Oncology seen pt , need Onc / surgery evaluation awaiting   - CT abd/pel: possibly primary vs mets     3. SOULEYMANE vs CKD 3, no previous baseline  - will give IVF  - monitor Cr  - US retroperitoneum - Solid bilateral renal masses or suspicious for neoplasms. no hydro     4.  HTN   - c/w Amlodipine  - hold ARBs and Hydrochlorothiazide    Code status: Full  DVT prophylaxis: [address] [place] [address] discussed with: Patient/Family and Nurse  Disposition: Home w/Family and TBD     Hospital Problems  Never Reviewed          Codes Class Noted POA    Acute pyelonephritis ICD-10-CM: N10  ICD-9-CM: 590.10  2017 Unknown        Adrenal mass ([facility] [address] ICD-10-CM: E27.9  ICD-9-CM: 255.9  2017 Unknown                Review of Systems:   A comprehensive review of systems was negative. Vital Signs:    Last 24hrs VS reviewed since prior progress note. Most recent are:  Visit Vitals    /70 (BP 1 Location: Left arm, BP Patient Position: At rest)    Pulse 76    Temp 98.9 °F (37.2 °C)    Resp 16    Ht 5' 10\" (1.778 m)    Wt 80 kg (176 lb 4.8 oz)    SpO2 96%    BMI 25.3 kg/m2         Intake/Output Summary (Last 24 hours) at 07/19/17 0936  Last data filed at 07/19/17 0524   Gross per 24 hour   Intake                0 ml   Output              950 ml   Net             -950 ml        Physical Examination:             Constitutional:  No acute distress, cooperative, pleasant    ENT:  Oral mucous moist, oropharynx benign. Neck supple,    Resp:  CTA bilaterally. No wheezing/rhonchi/rales. No accessory muscle use   CV:  Regular rhythm, normal rate, no murmurs, gallops, rubs    GI:  Soft, non distended, non tender. normoactive bowel sounds, no hepatosplenomegaly     Musculoskeletal:  No edema, warm, 2+ pulses throughout    Neurologic:  Moves all extremities. AAOx3, CN II-XII reviewed     Psych:  Good insight, Not anxious nor agitated. Data Review:    I personally reviewed  Image and LABS      Labs:     Recent Labs      07/19/17   0248  07/18/17   1330   WBC  10.8  13.2*   HGB  12.8  15.0   HCT  37.0  42.1   PLT  157  171     Recent Labs      07/19/17   0248  07/18/17   1330   NA  136  135*   K  3.3*  3.6   CL  101  98   CO2  29  32   BUN  29*  26*   CREA  1.84*  2.06*   GLU  136*  116*   CA  8.8  9.5     Recent Labs      07/18/17   1330   SGOT  15   ALT  20   AP  73   TBILI  0.6   TP  7.8   ALB  3.0*   GLOB  4.8*     No results for input(s): INR, PTP, APTT in the last 72 hours. No lab exists for component: INREXT   No results for input(s): FE, TIBC, PSAT, FERR in the last 72 hours. No results found for: FOL, RBCF   No results for input(s): PH, PCO2, PO2 in the last 72 hours. No results for input(s): CPK, CKNDX, TROIQ in the last 72 hours.     No lab exists for component: CPKMB  No results found for: CHOL, CHOLX, CHLST, CHOLV, HDL, LDL, LDLC, DLDLP, TGLX, TRIGL, TRIGP, CHHD, CHHDX  No results found for: [facility]  Lab Results   Component Value Date/Time    Color YELLOW/STRAW 07/18/2017 02:56 PM    Appearance CLOUDY 07/18/2017 02:56 PM    Specific gravity 1.017 07/18/2017 02:56 PM    pH (UA) 6.5 07/18/2017 02:56 PM    Protein 100 07/18/2017 02:56 PM    Glucose NEGATIVE  07/18/2017 02:56 PM    Ketone NEGATIVE  07/18/2017 02:56 PM    Bilirubin NEGATIVE  07/18/2017 02:56 PM    Urobilinogen 1.0 07/18/2017 02:56 PM    Nitrites POSITIVE 07/18/2017 02:56 PM    Leukocyte Esterase LARGE 07/18/2017 02:56 PM    Epithelial cells MODERATE 07/18/2017 02:56 PM    Bacteria 3+ 07/18/2017 02:56 PM    WBC >100 07/18/2017 02:56 PM    RBC  07/18/2017 02:56 PM         Medications Reviewed:     Current Facility-Administered Medications   Medication Dose Route Frequency    amLODIPine (NORVASC) tablet 5 mg  5 mg Oral DAILY    bisacodyl (DULCOLAX) tablet 5 mg  5 mg Oral Q48H    cholecalciferol (VITAMIN D3) tablet 1,000 Units  1,000 Units Oral DAILY    sodium chloride (NS) flush 5-10 mL  5-10 mL IntraVENous Q8H    sodium chloride (NS) flush 5-10 mL  5-10 mL IntraVENous PRN    0.9% sodium chloride infusion  100 mL/hr IntraVENous CONTINUOUS    cefTRIAXone (ROCEPHIN) 1 g in 0.9% sodium chloride (MBP/ADV) 50 mL  1 g IntraVENous Q24H    acetaminophen (TYLENOL) tablet 650 mg  650 mg Oral Q4H PRN    ondansetron (ZOFRAN) injection 4 mg  4 mg IntraVENous Q4H PRN    docusate sodium (COLACE) capsule 100 mg  100 mg Oral BID    heparin (porcine) injection 5,000 Units  5,000 Units SubCUTAneous Q8H    metoprolol tartrate (LOPRESSOR) tablet 25 mg  25 mg Oral BID     ______________________________________________________________________  EXPECTED LENGTH OF STAY: 3d 2h  ACTUAL LENGTH OF STAY:          1                 Omer Amaya MD

## 2017-07-19 NOTE — PROGRESS NOTES
Consult received     Metabolic work up added to labs for adrenal mass evaluation    Please consult Surgical Oncology-Dr Jones in am , may need MRI for further evaluation as deemed appropriate by surgery.     Full consult to follow

## 2017-07-19 NOTE — CONSULTS
General Surgery In-Patient Consultation    Admit Date: 7/18/2017  Reason for Consultation: adrenal mass - R    HPI:  Glen Jane is a 80 y.o. male w/ hx HTN, prostate Ca s/p prostatectomy whom we are asked to see in consultation by Dr. Zacarias Willingham for the above complaint. Pt presented to the ED yesterday b/c he wanted someone to check out his heart and has been having R lower back/flank pain. The pain has been going on for about 6 weeks. Intermittent. He has been having some urinary urgency but denies dark or odorous urine; no hematuria. No c/o cp or SOB. Worried about his heart b/c of his age and his father has heart dz. U/s done, then CT- results below. Also found to have elevated Cr and has no baseline. Hematology has seen pt and has ordered urine and blood studies    CT scan  1. No definite solid renal mass is identified within the limits of noncontrast  examination. A right renal mass on recent ultrasound is actually a right adrenal  mass. This is a completely characterize but suspicious for malignancy given  atypical features such is heterogeneity and size of 4.9 cm.     2. A smaller right adrenal nodule is too small to characterize. There is also  diffuse nodularity of the left adrenal gland.      3. A left renal mass on recent ultrasound demonstrates fluid density compatible  with a simple cyst.      4. There is bilateral perinephric fluid and stranding, right greater than left,  which is nonspecific but can be seen with infection.  Correlation with urinalysis  is recommended.          Patient Active Problem List    Diagnosis Date Noted    Acute pyelonephritis 07/18/2017    Adrenal mass ([address] 07/18/2017     Past Medical History:   Diagnosis Date    Hyperlipemia     Hypertension       Past Surgical History:   Procedure Laterality Date    HX UROLOGICAL        Social History   Substance Use Topics    Smoking status: Never Smoker    Smokeless tobacco: Never Used    Alcohol use No      No family history on file. Prior to Admission medications    Medication Sig Start Date End Date Taking? Authorizing Provider   amLODIPine (NORVASC) 5 mg tablet Take 5 mg by mouth daily. Yes Historical Provider   hydroCHLOROthiazide (HYDRODIURIL) 25 mg tablet Take 25 mg by mouth daily. Yes Historical Provider   potassium chloride SR (KLOR-CON 10) 10 mEq tablet Take 20 mEq by mouth daily. Last fill per Rx Query 4/25/17 for #30 tablets. Yes Historical Provider   probenecid-colchicine (COLBENEMID) 500-0.5 mg per tablet Take 1 Tab by mouth two (2) times a day. Yes Historical Provider   losartan (COZAAR) 100 mg tablet Take 100 mg by mouth daily. Last fill per Rx Query 2/13/17 for #90 tablets. Yes Historical Provider   cholecalciferol (VITAMIN D3) 1,000 unit tablet Take 1,000 Units by mouth daily. Patient believes he takes 1000 units daily. Yes Historical Provider   OTHER,NON-FORMULARY, Take 1 Tab by mouth daily. Patient states he takes an additional supplement daily. Name unknown. Yes Historical Provider   bisacodyl (DULCOLAX, BISACODYL,) 5 mg EC tablet Take 5 mg by mouth every fourty-eight (48) hours.    Yes Historical Provider     Current Facility-Administered Medications   Medication Dose Route Frequency    amLODIPine (NORVASC) tablet 5 mg  5 mg Oral DAILY    bisacodyl (DULCOLAX) tablet 5 mg  5 mg Oral Q48H    cholecalciferol (VITAMIN D3) tablet 1,000 Units  1,000 Units Oral DAILY    sodium chloride (NS) flush 5-10 mL  5-10 mL IntraVENous Q8H    sodium chloride (NS) flush 5-10 mL  5-10 mL IntraVENous PRN    0.9% sodium chloride infusion  100 mL/hr IntraVENous CONTINUOUS    cefTRIAXone (ROCEPHIN) 1 g in 0.9% sodium chloride (MBP/ADV) 50 mL  1 g IntraVENous Q24H    acetaminophen (TYLENOL) tablet 650 mg  650 mg Oral Q4H PRN    ondansetron (ZOFRAN) injection 4 mg  4 mg IntraVENous Q4H PRN    docusate sodium (COLACE) capsule 100 mg  100 mg Oral BID    heparin (porcine) injection 5,000 Units  5,000 Units SubCUTAneous Q8H    metoprolol tartrate (LOPRESSOR) tablet 25 mg  25 mg Oral BID     No Known Allergies       Subjective:     Review of Systems:    A comprehensive review of systems was negative except for that written in the History of Present Illness. Objective:     Blood pressure 122/70, pulse 76, temperature 98.9 °F (37.2 °C), resp. rate 16, height 5' 10\" (1.778 m), weight 176 lb 4.8 oz (80 kg), SpO2 96 %. Temp (24hrs), Av °F (36.7 °C), Min:96.2 °F (35.7 °C), Max:98.9 °F (37.2 °C)      Recent Labs      17   0248  17   1330   WBC  10.8  13.2*   HGB  12.8  15.0   HCT  37.0  42.1   PLT  157  171     Recent Labs      17   0248  17   1330   NA  136  135*   K  3.3*  3.6   CL  101  98   CO2  29  32   GLU  136*  116*   BUN  29*  26*   CREA  1.84*  2.06*   CA  8.8  9.5   ALB   --   3.0*   TBILI   --   0.6   SGOT   --   15   ALT   --   20     No results for input(s): AML, LPSE in the last 72 hours. Intake/Output Summary (Last 24 hours) at 17 0988  Last data filed at 17 0524   Gross per 24 hour   Intake                0 ml   Output              950 ml   Net             -950 ml        _____________________  Physical Exam:     General:  Alert, cooperative, no distress, appears stated age. Eyes:   Sclera clear. Throat: Lips, mucosa, and tongue normal.   Neck: Supple, symmetrical, trachea midline. Lungs:   Clear to auscultation bilaterally. Minimal R CVAT   Heart:  Regular rate and rhythm w/ 3/6 garcia   Abdomen:   Normal BS, mildly obese, Soft, non-tender. No masses,  No organomegaly. Extremities: Extremities normal, atraumatic, no cyanosis or edema. Skin: Skin color, texture, turgor normal. No rashes or lesions. Assessment:   Active Problems:    Acute pyelonephritis (2017)      Adrenal mass ([address] (2017)            Plan:   Cont w/u  Dr Emilio Ovalle to see    Thank you for allowing us to participate in the care of this patient.      Total time spent with patient: 25 minutes. Signed By: Amelie Gillette NP     July 19, 2017            I have independently examined the patient and have reviewed the chart. I agree with the above plan. Patient with several week history of vague, intermittent, aching abdominal pain in the RUQ. No association with foods or bowel function. He has HTN but denies palpitations, severe HTN or recent increase in BP meds. No unintentional weight loss or changes in appetite. Abd soft, no palpable masses. CT (non-contrast) imaging shows a suspicious appearing 4.9 cm adrenal mass on the right as well as some smaller left adrenal nodularity. I agree with plans by Dr. Maryellen Denis of Oncology to start with functional workup to rule out a functional adrenal tumor. I will discuss with Radiology but he should also either have MRI or 4 phase CT to better characterize this adrenal mass. Chest imaging will also need to be included for staging. He has elevation in his Cr but this is improving with hydration. Will wait to see what this is tomorrow before ordering additional scans. He is also getting treatment for a UTI with cultures pending. This tumor will likely require surgical resection as biopsy is not recommended for adrenal tumors, but I would anticipate doing this after his UTI is treated and the previously mentioned workup is completed.       Aidan Graham MD  7/19/2017  11:30 AM

## 2017-07-19 NOTE — CONSULTS
Hematology/Oncology Consult    REASON FOR CONSULT: Adrenal Mass  REQUESTED BY: Dr. Kira Perales: Mr. Salvador Duarte is a 80 y.o. male who presented to the Emergency Department for a \"routine heart check up\". He denies that he was having any chest pain, chest tightness, or heart palpitations. Once in the ED, he endorsed some flank pain. UA/UC positive for gram negative rods. He also had US completed which revealed solid bilateral renal massess and CT abdomen/pelvis then performed which revealed renal mass on US was actually a 4.9cm adrenal mass. Concern for malignancy for which we are consulted. History significant for hyperlipidemia, hypertension, and prostate cancer s/p prostatectomy. Mr. Salvador Duarte states he feels he is in good health. Denies headaches, dizziness, vision changes. Denies excessive sweating, progressive weakness, panic attacks, heart palpitations, chest pain. Denies facial swelling or new swelling. Denies bruising easily. Denies any bleeding to include nose bleeds, hematochezia, hematuria, or melena. Denies abdominal pain or flank pain today during my visit. He lives alone in [place]. He lost his wife about two years ago. He has three adult children that live in the area.       Past Medical History:   Diagnosis Date    Hyperlipemia     Hypertension        Past Surgical History:   Procedure Laterality Date    HX UROLOGICAL         No Known Allergies    Current Facility-Administered Medications   Medication Dose Route Frequency Provider Last Rate Last Dose    amLODIPine (NORVASC) tablet 5 mg  5 mg Oral DAILY Roberto Brandt MD   5 mg at 07/19/17 1005    bisacodyl (DULCOLAX) tablet 5 mg  5 mg Oral Q48H Roberto Brandt MD   5 mg at 07/18/17 1855    cholecalciferol (VITAMIN D3) tablet 1,000 Units  1,000 Units Oral DAILY Roberto Brandt MD   1,000 Units at 07/19/17 1005    sodium chloride (NS) flush 5-10 mL  5-10 mL IntraVENous Holly Lantigua MD 10 mL at 07/19/17 2130    sodium chloride (NS) flush 5-10 mL  5-10 mL IntraVENous PRN Jozef Jacques MD        0.9% sodium chloride infusion  100 mL/hr IntraVENous CONTINUOUS Jozef Jacques  mL/hr at 07/19/17 1602 100 mL/hr at 07/19/17 1602    cefTRIAXone (ROCEPHIN) 1 g in 0.9% sodium chloride (MBP/ADV) 50 mL  1 g IntraVENous Q24H Jozef Jacques  mL/hr at 07/19/17 1545 1 g at 07/19/17 1545    acetaminophen (TYLENOL) tablet 650 mg  650 mg Oral Q4H PRN Jozef Jacques MD        ondansetron (ZOFRAN) injection 4 mg  4 mg IntraVENous Q4H PRN Jozef Jacques MD        docusate sodium (COLACE) capsule 100 mg  100 mg Oral BID Jozef Jacques MD   100 mg at 07/19/17 1816    heparin (porcine) injection 5,000 Units  5,000 Units SubCUTAneous Winston Meredith MD   5,000 Units at 07/19/17 1815    metoprolol tartrate (LOPRESSOR) tablet 25 mg  25 mg Oral BID Jozef Jacques MD   25 mg at 07/19/17 1816       Social History     Social History    Marital status:      Spouse name: N/A    Number of children: N/A    Years of education: N/A     Social History Main Topics    Smoking status: Never Smoker    Smokeless tobacco: Never Used    Alcohol use No    Drug use: No    Sexual activity: Not Asked     Other Topics Concern    None     Social History Narrative       History reviewed. No pertinent family history. ROS  As per the HPI, otherwise a comprehensive ROS is negative.     Physical Examination:   Visit Vitals    /75 (BP 1 Location: Left arm, BP Patient Position: At rest)    Pulse 64    Temp 98.1 °F (36.7 °C)    Resp 18    Ht 5' 10\" (1.778 m)    Wt 176 lb 4.8 oz (80 kg)    SpO2 98%    BMI 25.3 kg/m2     General appearance - alert, elderly male, no distress  Mental status - oriented to person, place, and time  Mouth - mucous membranes moist  Neck - supple  Lymphatics - no palpable lymphadenopathy, no hepatosplenomegaly  Chest - clear to auscultation, no wheezes, rales or rhonchi, symmetric air entry  Heart - normal rate, regular rhythm, normal S1, S2, no murmurs, rubs, clicks or gallops  Abdomen - soft, nontender, nondistended, no masses or organomegaly, bowel sounds present  Neurological - normal speech, unable to assess gait, no focal findings or movement disorder noted  Extremities - peripheral pulses normal, no pedal edema  Skin - warm, dry, intact    LABS  Lab Results   Component Value Date/Time    WBC 10.8 07/19/2017 02:48 AM    HGB 12.8 07/19/2017 02:48 AM    HCT 37.0 07/19/2017 02:48 AM    PLATELET 719 50/09/3561 02:48 AM    MCV 93.2 07/19/2017 02:48 AM    ABS. NEUTROPHILS 7.5 07/19/2017 02:48 AM     Lab Results   Component Value Date/Time    Sodium 136 07/19/2017 02:48 AM    Potassium 3.3 07/19/2017 02:48 AM    Chloride 101 07/19/2017 02:48 AM    CO2 29 07/19/2017 02:48 AM    Glucose 136 07/19/2017 02:48 AM    BUN 29 07/19/2017 02:48 AM    Creatinine 1.84 07/19/2017 02:48 AM    GFR est AA 43 07/19/2017 02:48 AM    GFR est non-AA 35 07/19/2017 02:48 AM    Calcium 8.8 07/19/2017 02:48 AM     Lab Results   Component Value Date/Time    AST (SGOT) 15 07/18/2017 01:30 PM    Alk. phosphatase 73 07/18/2017 01:30 PM    Protein, total 7.8 07/18/2017 01:30 PM    Albumin 3.0 07/18/2017 01:30 PM    Globulin 4.8 07/18/2017 01:30 PM    A-G Ratio 0.6 07/18/2017 01:30 PM       IMAGING  CT Abdomen/Pelvis 7/18/17  1. No definite solid renal mass is identified within the limits of noncontrast  examination. A right renal mass on recent ultrasound is actually a right adrenal  mass. This is a completely characterize but suspicious for malignancy given  atypical features such is heterogeneity and size of 4.9 cm.     2. A smaller right adrenal nodule is too small to characterize. There is also  diffuse nodularity of the left adrenal gland.      3. A left renal mass on recent ultrasound demonstrates fluid density compatible  with a simple cyst.      4.  There is bilateral perinephric fluid and stranding, right greater than left,  which is nonspecific but can be seen with infection. Correlation with urinalysis  is recommended. Retroperitoneal US 7/18/17  1. Solid bilateral renal masses or suspicious for neoplasms.     2. No hydronephrosis. Normal urinary bladder.     ASSESSMENT  Mr. Rashaun Lira is a 80 y.o. male admitted to the hospital for further evaluation of adrenal mass noted on imaging obtained during ED work up. He has been asymptomatic. DISCUSSION/PLAN  1. Adrenal mass. Large mass noted on CT. Per ROS, he is asymptomatic as above. Discussed results of imaging with him today and concern for malignancy. Lab work up initiated overnight per Dr. Elsi Sherman which includes serum aldosterone, 24 hr urine free cortisol, and 24 hr urine metanephrines fractionated. All of these are still pending. Will need CT chest to complete staging. Would prefer CT with contrast, however, creatinine today 1.84. Will need to improve prior to imaging. Dr. Jessie Pendleton with Surgery has been consulted and pending. Appreciate input. 2. Pyelonephritis. IV antibiotics on board. Appreciate Hospitalist management. 3. SOULEYMANE. Trending down with IVF. Monitor. Appreciate Hospitalist management. Plan  -Await lab results  -Surgical Oncology consultation  -CT chest    Appreciate consultation and care of Mr. Rashaun Lira. Please call with any questions. Seen in conjunction with Bob Mosley NP.     Devang Limon MD

## 2017-07-19 NOTE — PROGRESS NOTES
Reviewed medical chart; met with the patient at the bedside. Note that the patient had a decontamination shower in the ER after \"a bug\" was found on his belongings. Patient seemed to have some difficulty recalling information. Patient lives alone in a two story home. He states that he does not use any DME to ambulate. He drives himself on errands. Patient cannot recall the name of his primary care physician. He [facility] his prescriptions [facility]. Patient states that he has three adult children who live in the [facility] area. This  asked the patient for an emergency contact number, as he did not have anyone listed. Patient had this  look up his daughter's phone number in his cell phone. Called his daughter, LUCIANO Fountain#[phone], and left a voicemail message; awaiting a call back. Note that the patient had a decontamination shower in the ER after \"a bug\" was found on his belongings. Care Management will continue to follow his disposition. MICHELET Crabtree     Care Management Interventions  PCP Verified by CM:  Yes  Palliative Care Consult (Criteria: CHF and RRAT>21): No  Mode of Transport at Discharge:  (Patient will travel via car at discharge.  )  MyChart Signup: No  Discharge Durable Medical Equipment: No  Physical Therapy Consult: No  Occupational Therapy Consult: No  Speech Therapy Consult: No  Current Support Network: Lives Alone, Family Lives Nearby  Confirm Follow Up Transport:  (Patient will travel via car at discharge.  )  Plan discussed with Pt/Family/Caregiver: Yes  Freedom of Choice Offered: Yes  Discharge Location  Discharge Placement: Home

## 2017-07-20 ENCOUNTER — APPOINTMENT (OUTPATIENT)
Dept: MRI IMAGING | Age: 82
DRG: 690 | End: 2017-07-20
Attending: SURGERY
Payer: MEDICARE

## 2017-07-20 LAB
ANION GAP BLD CALC-SCNC: 9 MMOL/L (ref 5–15)
BACTERIA SPEC CULT: ABNORMAL
BUN SERPL-MCNC: 29 MG/DL (ref 6–20)
BUN/CREAT SERPL: 17 (ref 12–20)
CALCIUM SERPL-MCNC: 8.6 MG/DL (ref 8.5–10.1)
CC UR VC: ABNORMAL
CHLORIDE SERPL-SCNC: 102 MMOL/L (ref 97–108)
CO2 SERPL-SCNC: 26 MMOL/L (ref 21–32)
CREAT SERPL-MCNC: 1.71 MG/DL (ref 0.7–1.3)
GLUCOSE SERPL-MCNC: 85 MG/DL (ref 65–100)
POTASSIUM SERPL-SCNC: 3.8 MMOL/L (ref 3.5–5.1)
SERVICE CMNT-IMP: ABNORMAL
SODIUM SERPL-SCNC: 137 MMOL/L (ref 136–145)

## 2017-07-20 PROCEDURE — 65270000032 HC RM SEMIPRIVATE

## 2017-07-20 PROCEDURE — A9585 GADOBUTROL INJECTION: HCPCS | Performed by: HOSPITALIST

## 2017-07-20 PROCEDURE — 74011250636 HC RX REV CODE- 250/636: Performed by: HOSPITALIST

## 2017-07-20 PROCEDURE — 74011000258 HC RX REV CODE- 258: Performed by: HOSPITALIST

## 2017-07-20 PROCEDURE — 74011250637 HC RX REV CODE- 250/637: Performed by: HOSPITALIST

## 2017-07-20 PROCEDURE — 36415 COLL VENOUS BLD VENIPUNCTURE: CPT | Performed by: HOSPITALIST

## 2017-07-20 PROCEDURE — 77030021566 MRI ABD W WO CONT

## 2017-07-20 PROCEDURE — 80048 BASIC METABOLIC PNL TOTAL CA: CPT | Performed by: HOSPITALIST

## 2017-07-20 RX ORDER — SODIUM CHLORIDE 9 MG/ML
100 INJECTION, SOLUTION INTRAVENOUS
Status: COMPLETED | OUTPATIENT
Start: 2017-07-20 | End: 2017-07-20

## 2017-07-20 RX ADMIN — DOCUSATE SODIUM 100 MG: 100 CAPSULE, LIQUID FILLED ORAL at 08:57

## 2017-07-20 RX ADMIN — HEPARIN SODIUM 5000 UNITS: 5000 INJECTION, SOLUTION INTRAVENOUS; SUBCUTANEOUS at 17:57

## 2017-07-20 RX ADMIN — CEFTRIAXONE 1 G: 1 INJECTION, POWDER, FOR SOLUTION INTRAMUSCULAR; INTRAVENOUS at 16:03

## 2017-07-20 RX ADMIN — Medication 10 ML: at 05:35

## 2017-07-20 RX ADMIN — VITAMIN D, TAB 1000IU (100/BT) 1000 UNITS: 25 TAB at 08:57

## 2017-07-20 RX ADMIN — HEPARIN SODIUM 5000 UNITS: 5000 INJECTION, SOLUTION INTRAVENOUS; SUBCUTANEOUS at 01:47

## 2017-07-20 RX ADMIN — METOPROLOL TARTRATE 25 MG: 25 TABLET ORAL at 08:57

## 2017-07-20 RX ADMIN — AMLODIPINE BESYLATE 5 MG: 5 TABLET ORAL at 08:57

## 2017-07-20 RX ADMIN — SODIUM CHLORIDE 100 ML/HR: 900 INJECTION, SOLUTION INTRAVENOUS at 05:34

## 2017-07-20 RX ADMIN — BISACODYL 5 MG: 5 TABLET, COATED ORAL at 17:56

## 2017-07-20 RX ADMIN — Medication 10 ML: at 22:00

## 2017-07-20 RX ADMIN — SODIUM CHLORIDE 100 ML/HR: 900 INJECTION, SOLUTION INTRAVENOUS at 11:48

## 2017-07-20 RX ADMIN — HEPARIN SODIUM 5000 UNITS: 5000 INJECTION, SOLUTION INTRAVENOUS; SUBCUTANEOUS at 09:00

## 2017-07-20 RX ADMIN — METOPROLOL TARTRATE 25 MG: 25 TABLET ORAL at 17:57

## 2017-07-20 RX ADMIN — DOCUSATE SODIUM 100 MG: 100 CAPSULE, LIQUID FILLED ORAL at 17:56

## 2017-07-20 RX ADMIN — GADOBUTROL 7.5 ML: 604.72 INJECTION INTRAVENOUS at 11:47

## 2017-07-20 NOTE — PROGRESS NOTES
Bedside shift change report given to Timi Michelle RN (oncoming nurse) by Miranda Knott RN (offgoing nurse). Report included the following information SBAR, Kardex, Intake/Output, MAR and Recent Results.

## 2017-07-20 NOTE — PROGRESS NOTES
Hematology/Oncology follow up    REASON FOR CONSULT: Adrenal Mass  REQUESTED BY: Dr. Zoila Card: Mr. Shimon Callejas is a 80 y.o. male who presented to the Emergency Department with flank pain. Found to have adrenal masses. Interval history  He has minor flank pain. MRI done today. No fevers, chills, hematuria, HA, rashes, has controlled HTN, no dizziness.  Creatinine improving    Past Medical History:   Diagnosis Date    Hyperlipemia     Hypertension        Past Surgical History:   Procedure Laterality Date    HX UROLOGICAL         No Known Allergies    Current Facility-Administered Medications   Medication Dose Route Frequency Provider Last Rate Last Dose    amLODIPine (NORVASC) tablet 5 mg  5 mg Oral DAILY Annalee Rodriguez MD   5 mg at 07/20/17 0857    bisacodyl (DULCOLAX) tablet 5 mg  5 mg Oral Q48H Annlaee Rodriguez MD   5 mg at 07/18/17 1855    cholecalciferol (VITAMIN D3) tablet 1,000 Units  1,000 Units Oral DAILY Annalee Rodriguez MD   1,000 Units at 07/20/17 0857    sodium chloride (NS) flush 5-10 mL  5-10 mL IntraVENous Q8H Annalee Rodriguez MD   10 mL at 07/20/17 0535    sodium chloride (NS) flush 5-10 mL  5-10 mL IntraVENous PRN Annalee Rodriguez MD        0.9% sodium chloride infusion  100 mL/hr IntraVENous CONTINUOUS Annalee Rodriguez  mL/hr at 07/20/17 0534 100 mL/hr at 07/20/17 0534    cefTRIAXone (ROCEPHIN) 1 g in 0.9% sodium chloride (MBP/ADV) 50 mL  1 g IntraVENous Q24H Annalee Rodriguez  mL/hr at 07/20/17 1603 1 g at 07/20/17 1603    acetaminophen (TYLENOL) tablet 650 mg  650 mg Oral Q4H PRN Annalee Rodriguez MD        ondansetron (ZOFRAN) injection 4 mg  4 mg IntraVENous Q4H PRN Annalee Rodriguez MD        docusate sodium (COLACE) capsule 100 mg  100 mg Oral BID Annalee Rodriguez MD   100 mg at 07/20/17 0857    heparin (porcine) injection 5,000 Units  5,000 Units SubCUTAneous Lizbeth Patel MD   5,000 Units at 07/20/17 0900    metoprolol tartrate (LOPRESSOR) tablet 25 mg  25 mg Oral BID Andrew Price MD   25 mg at 07/20/17 0211       Social History     Social History    Marital status:      Spouse name: N/A    Number of children: N/A    Years of education: N/A     Social History Main Topics    Smoking status: Never Smoker    Smokeless tobacco: Never Used    Alcohol use No    Drug use: No    Sexual activity: Not Asked     Other Topics Concern    None     Social History Narrative       History reviewed. No pertinent family history. ROS  As per the HPI, otherwise a comprehensive ROS is negative. Physical Examination:   Visit Vitals    /71 (BP 1 Location: Right arm, BP Patient Position: At rest)    Pulse 63    Temp 98.2 °F (36.8 °C)    Resp 18    Ht 5' 10\" (1.778 m)    Wt 176 lb 4.8 oz (80 kg)    SpO2 93%    BMI 25.3 kg/m2     General appearance - alert, elderly male, no distress  Mental status - oriented to person, place, and time  Mouth - mucous membranes moist  Neck - supple  Lymphatics - no palpable lymphadenopathy, no hepatosplenomegaly  Chest - clear to auscultation, no wheezes, rales or rhonchi, symmetric air entry  Heart - normal rate, regular rhythm, normal S1, S2, no murmurs, rubs, clicks or gallops  Abdomen - soft, nontender, nondistended, no masses or organomegaly, bowel sounds present    LABS  Lab Results   Component Value Date/Time    WBC 10.8 07/19/2017 02:48 AM    HGB 12.8 07/19/2017 02:48 AM    HCT 37.0 07/19/2017 02:48 AM    PLATELET 660 19/65/0164 02:48 AM    MCV 93.2 07/19/2017 02:48 AM    ABS.  NEUTROPHILS 7.5 07/19/2017 02:48 AM     Lab Results   Component Value Date/Time    Sodium 137 07/20/2017 05:14 AM    Potassium 3.8 07/20/2017 05:14 AM    Chloride 102 07/20/2017 05:14 AM    CO2 26 07/20/2017 05:14 AM    Glucose 85 07/20/2017 05:14 AM    BUN 29 07/20/2017 05:14 AM    Creatinine 1.71 07/20/2017 05:14 AM    GFR est AA 46 07/20/2017 05:14 AM    GFR est non-AA 38 07/20/2017 05:14 AM    Calcium 8.6 07/20/2017 05:14 AM     Lab Results   Component Value Date/Time    AST (SGOT) 15 07/18/2017 01:30 PM    Alk. phosphatase 73 07/18/2017 01:30 PM    Protein, total 7.8 07/18/2017 01:30 PM    Albumin 3.0 07/18/2017 01:30 PM    Globulin 4.8 07/18/2017 01:30 PM    A-G Ratio 0.6 07/18/2017 01:30 PM       IMAGING  CT Abdomen/Pelvis 7/18/17  1. No definite solid renal mass is identified within the limits of noncontrast  examination. A right renal mass on recent ultrasound is actually a right adrenal  mass. This is a completely characterize but suspicious for malignancy given  atypical features such is heterogeneity and size of 4.9 cm.     2. A smaller right adrenal nodule is too small to characterize. There is also  diffuse nodularity of the left adrenal gland.      3. A left renal mass on recent ultrasound demonstrates fluid density compatible  with a simple cyst.      4. There is bilateral perinephric fluid and stranding, right greater than left,  which is nonspecific but can be seen with infection. Correlation with urinalysis  is recommended. Retroperitoneal US 7/18/17  1. Solid bilateral renal masses or suspicious for neoplasms.     2. No hydronephrosis. Normal urinary bladder.     ASSESSMENT  Mr. Maribel Adame is a 80 y.o. male admitted to the hospital for further evaluation of adrenal mass noted on imaging obtained during ED work up. He has been asymptomatic. DISCUSSION/PLAN  1. Adrenal mass. 4.9 cm R adrenal mass. Discussed results of imaging with him today and concern for malignancy. Workup to r/o a functional tumor is pending: serum aldosterone, 24 hr urine free cortisol, and 24 hr urine metanephrines fractionated. CT Chest unremarkable and unlikely to represent metastasis  Appreciate Dr. Delfina Ashraf input, MRI today to further characterize the adrenal mass    He will see Dr. Alvarado Conrad as outpatient with regards to plan for surgery.  We will set up a follow up depending on results of MRI and surgery    2. Pyelonephritis. IV antibiotics on board. Appreciate Hospitalist management. 3. SOULEYMANE. Trending down with IVF. Monitor. Appreciate Hospitalist management. Appreciate consultation and care of Mr. Ilan Willis. Please call with any questions.       Kim Hayes MD

## 2017-07-20 NOTE — PROGRESS NOTES
Bedside shift change report given to [address] [place] [address] [address] (oncoming nurse) by Nay Olguin (offgoing nurse). Report included the following information SBAR and Kardex.

## 2017-07-20 NOTE — PROGRESS NOTES
[facility] General Surgery    Subjective     No acute changes overnight. Denies pain today. Tolerating breakfast.      Objective     Patient Vitals for the past 24 hrs:   Temp Pulse Resp BP SpO2   07/20/17 0503 98 °F (36.7 °C) (!) 59 18 143/82 98 %   07/20/17 0032 98.3 °F (36.8 °C) (!) 59 18 135/80 98 %   07/19/17 2146 98.1 °F (36.7 °C) 64 18 140/75 98 %   07/19/17 1814 - 63 - 143/73 -   07/19/17 1530 98.4 °F (36.9 °C) 61 18 115/66 98 %   07/19/17 1146 97.6 °F (36.4 °C) (!) 58 17 130/64 99 %   07/19/17 1003 98.4 °F (36.9 °C) 68 18 140/68 99 %         Date 07/19/17 0700 - 07/20/17 0659 07/20/17 0700 - 07/21/17 0659   Shift 3786-7536 5639-0922 24 Hour Total 5043-8256 5564-7411 24 Hour Total   I  N  T  A  K  E   I.V.  (mL/kg/hr) 1961.7  (2)  1961.7  (1)         Volume (0.9% sodium chloride infusion) 1961.7  1961.7       Shift Total  (mL/kg) 1961.7  (24.5)  1961.7  (24.5)      O  U  T  P  U  T   Urine  (mL/kg/hr) 650  (0.7) 1125  (1.2) 1775  (0.9)         Urine Voided 650 1125 1775         Urine Occurrence(s)  1 x 1 x       Shift Total  (mL/kg) 650  (8.1) 1125  (14.1) 1775  (22.2)      NET 1311.7 -1125 186.7      Weight (kg) 80 80 80 80 80 80       PE  GEN - Awake, alert, communicating appropriately. NAD    Labs  Recent Results (from the past 24 hour(s))   METABOLIC PANEL, BASIC    Collection Time: 07/20/17  5:14 AM   Result Value Ref Range    Sodium 137 136 - 145 mmol/L    Potassium 3.8 3.5 - 5.1 mmol/L    Chloride 102 97 - 108 mmol/L    CO2 26 21 - 32 mmol/L    Anion gap 9 5 - 15 mmol/L    Glucose 85 65 - 100 mg/dL    BUN 29 (H) 6 - 20 MG/DL    Creatinine 1.71 (H) 0.70 - 1.30 MG/DL    BUN/Creatinine ratio 17 12 - 20      GFR est AA 46 (L) >60 ml/min/1.73m2    GFR est non-AA 38 (L) >60 ml/min/1.73m2    Calcium 8.6 8.5 - 10.1 MG/DL       Assessment     Zehra Goodwin is a 80 y. o.yr old male with a large right adrenal mass worrisome for malignancy.   Also with some nodularity to the left gland. Plan     -Chest CT without evidence of metastatic disease or primary tumor.    -Will get MRI abd today as Cr is continuing to improve to better assess bilateral adrenals and evaluate for evidence of metastatic disease.    -Functional workup for adrenal tumor still pending.    -Will discuss for surgical resection of right adrenal as outpatient once UTI resolved.       Negra Figueredo MD  7/20/2017  8:50 AM

## 2017-07-20 NOTE — PROGRESS NOTES
Hospitalist Progress Note  Roberto Ortega MD  Office: [phone]        Date of Service:  2017  NAME:  Keyon Sanz  :  3/9/1933  MRN:  [id number]      Admission Summary:   Keyno Sanz is a 80 y.o. male who presents with PMHx Prostate ca s/p prostatectomy, HTN, HLD, Gout, admitted for above complain. Pt initially states that he just came to check his heart. When asking why he felt something was wrong with it, patient states: \"Just wanted to check it out\". Pt has PCP who was referring him to a cardiologist but states he never heard from them. On Additional questioning pt also c/o on/off rt flank pain. Pt also complains of increased urinary frequency but no discomfort  Or hematuria. Pt denies any chest pain, sob, n/v, diarrhea, fever, chills, hx of MI, CVA. Pt states his father had heart condition and . Pt unsure about his mother's history. Interval history / Subjective:   No complains , for MRI today , verbalized understanding the process of investigations     Assessment & Plan: Active Problems:    Acute pyelonephritis (2017)       Adrenal mass ([address] (2017)     1. Acute Pyelonephritis  - start Rocephin GNR on cultures  - IVF  - Follow cultures NGTD     2. Adrenal Mass   - Oncology seen pt , need Onc / surgery evaluation awaiting   - CT abd/pel: possibly primary vs mets  - renin / jesu metanephrine send   - MRI for better visualization of mass today     3. SOULEYMANE vs CKD 3, no previous baseline  - will give IVF  - monitor Cr improving   - US retroperitoneum - Solid bilateral renal masses or suspicious for neoplasms. no hydro     4.  HTN   - c/w Amlodipine  - hold ARBs and Hydrochlorothiazide    Code status: Full  DVT prophylaxis: [address] discussed with: Patient/Family and Nurse  Disposition: Home w/Family and TBD     Hospital Problems  Never Reviewed          Codes Class Noted POA    Acute pyelonephritis ICD-10-CM: N10  ICD-9-CM: 590.10  7/18/2017 Unknown        Adrenal mass ([facility] [address] ICD-10-CM: E27.9  ICD-9-CM: 255.9  7/18/2017 Unknown                Review of Systems:   A comprehensive review of systems was negative. Vital Signs:    Last 24hrs VS reviewed since prior progress note. Most recent are:  Visit Vitals    /75 (BP 1 Location: Left arm, BP Patient Position: At rest)    Pulse 66    Temp 98.5 °F (36.9 °C)    Resp 18    Ht 5' 10\" (1.778 m)    Wt 80 kg (176 lb 4.8 oz)    SpO2 100%    BMI 25.3 kg/m2         Intake/Output Summary (Last 24 hours) at 07/20/17 1015  Last data filed at 07/20/17 0504   Gross per 24 hour   Intake          1961.67 ml   Output             1675 ml   Net           286.67 ml        Physical Examination:             Constitutional:  No acute distress, cooperative, pleasant    ENT:  Oral mucous moist, oropharynx benign. Neck supple,    Resp:  CTA bilaterally. No wheezing/rhonchi/rales. No accessory muscle use   CV:  Regular rhythm, normal rate, no murmurs, gallops, rubs    GI:  Soft, non distended, non tender. normoactive bowel sounds, no hepatosplenomegaly     Musculoskeletal:  No edema, warm, 2+ pulses throughout    Neurologic:  Moves all extremities. AAOx3, CN II-XII reviewed     Psych:  Good insight, Not anxious nor agitated. Data Review:    I personally reviewed  Image and LABS      Labs:     Recent Labs      07/19/17   0248  07/18/17   1330   WBC  10.8  13.2*   HGB  12.8  15.0   HCT  37.0  42.1   PLT  157  171     Recent Labs      07/20/17   0514  07/19/17   0248  07/18/17   1330   NA  137  136  135*   K  3.8  3.3*  3.6   CL  102  101  98   CO2  26  29  32   BUN  29*  29*  26*   CREA  1.71*  1.84*  2.06*   GLU  85  136*  116*   CA  8.6  8.8  9.5     Recent Labs      07/18/17   1330   SGOT  15   ALT  20   AP  73   TBILI  0.6   TP  7.8   ALB  3.0*   GLOB  4.8*     No results for input(s): INR, PTP, APTT in the last 72 hours.     No lab exists for component: INREXT, INREXT   No results for input(s): FE, TIBC, PSAT, FERR in the last 72 hours. No results found for: FOL, RBCF   No results for input(s): PH, PCO2, PO2 in the last 72 hours. No results for input(s): CPK, CKNDX, TROIQ in the last 72 hours.     No lab exists for component: CPKMB  No results found for: CHOL, CHOLX, CHLST, CHOLV, HDL, LDL, LDLC, DLDLP, TGLX, TRIGL, TRIGP, CHHD, CHHDX  No results found for: [facility]  Lab Results   Component Value Date/Time    Color YELLOW/STRAW 07/18/2017 02:56 PM    Appearance CLOUDY 07/18/2017 02:56 PM    Specific gravity 1.017 07/18/2017 02:56 PM    pH (UA) 6.5 07/18/2017 02:56 PM    Protein 100 07/18/2017 02:56 PM    Glucose NEGATIVE  07/18/2017 02:56 PM    Ketone NEGATIVE  07/18/2017 02:56 PM    Bilirubin NEGATIVE  07/18/2017 02:56 PM    Urobilinogen 1.0 07/18/2017 02:56 PM    Nitrites POSITIVE 07/18/2017 02:56 PM    Leukocyte Esterase LARGE 07/18/2017 02:56 PM    Epithelial cells MODERATE 07/18/2017 02:56 PM    Bacteria 3+ 07/18/2017 02:56 PM    WBC >100 07/18/2017 02:56 PM    RBC  07/18/2017 02:56 PM         Medications Reviewed:     Current Facility-Administered Medications   Medication Dose Route Frequency    amLODIPine (NORVASC) tablet 5 mg  5 mg Oral DAILY    bisacodyl (DULCOLAX) tablet 5 mg  5 mg Oral Q48H    cholecalciferol (VITAMIN D3) tablet 1,000 Units  1,000 Units Oral DAILY    sodium chloride (NS) flush 5-10 mL  5-10 mL IntraVENous Q8H    sodium chloride (NS) flush 5-10 mL  5-10 mL IntraVENous PRN    0.9% sodium chloride infusion  100 mL/hr IntraVENous CONTINUOUS    cefTRIAXone (ROCEPHIN) 1 g in 0.9% sodium chloride (MBP/ADV) 50 mL  1 g IntraVENous Q24H    acetaminophen (TYLENOL) tablet 650 mg  650 mg Oral Q4H PRN    ondansetron (ZOFRAN) injection 4 mg  4 mg IntraVENous Q4H PRN    docusate sodium (COLACE) capsule 100 mg  100 mg Oral BID    heparin (porcine) injection 5,000 Units  5,000 Units SubCUTAneous Q8H    metoprolol tartrate (LOPRESSOR) tablet 25 mg  25 mg Oral BID     ______________________________________________________________________  EXPECTED LENGTH OF STAY: 3d 2h  ACTUAL LENGTH OF STAY:          2                 Stormy Mruphy MD

## 2017-07-21 VITALS
HEART RATE: 74 BPM | TEMPERATURE: 98.1 F | DIASTOLIC BLOOD PRESSURE: 74 MMHG | OXYGEN SATURATION: 97 % | WEIGHT: 176.3 LBS | SYSTOLIC BLOOD PRESSURE: 163 MMHG | HEIGHT: 70 IN | RESPIRATION RATE: 18 BRPM | BODY MASS INDEX: 25.24 KG/M2

## 2017-07-21 PROBLEM — N10 ACUTE PYELONEPHRITIS: Status: RESOLVED | Noted: 2017-07-18 | Resolved: 2017-07-21

## 2017-07-21 LAB
ANION GAP BLD CALC-SCNC: 5 MMOL/L (ref 5–15)
BUN SERPL-MCNC: 26 MG/DL (ref 6–20)
BUN/CREAT SERPL: 16 (ref 12–20)
CALCIUM SERPL-MCNC: 8.6 MG/DL (ref 8.5–10.1)
CHLORIDE SERPL-SCNC: 104 MMOL/L (ref 97–108)
CO2 SERPL-SCNC: 30 MMOL/L (ref 21–32)
CREAT SERPL-MCNC: 1.61 MG/DL (ref 0.7–1.3)
GLUCOSE SERPL-MCNC: 89 MG/DL (ref 65–100)
POTASSIUM SERPL-SCNC: 3.5 MMOL/L (ref 3.5–5.1)
SODIUM SERPL-SCNC: 139 MMOL/L (ref 136–145)

## 2017-07-21 PROCEDURE — 80048 BASIC METABOLIC PNL TOTAL CA: CPT | Performed by: HOSPITALIST

## 2017-07-21 PROCEDURE — 74011250637 HC RX REV CODE- 250/637: Performed by: HOSPITALIST

## 2017-07-21 PROCEDURE — 74011250636 HC RX REV CODE- 250/636: Performed by: HOSPITALIST

## 2017-07-21 PROCEDURE — 36415 COLL VENOUS BLD VENIPUNCTURE: CPT | Performed by: HOSPITALIST

## 2017-07-21 RX ORDER — CEFUROXIME AXETIL 250 MG/1
250 TABLET ORAL 2 TIMES DAILY
Qty: 10 TAB | Refills: 0 | Status: SHIPPED | OUTPATIENT
Start: 2017-07-21 | End: 2017-07-26

## 2017-07-21 RX ADMIN — VITAMIN D, TAB 1000IU (100/BT) 1000 UNITS: 25 TAB at 11:25

## 2017-07-21 RX ADMIN — HEPARIN SODIUM 5000 UNITS: 5000 INJECTION, SOLUTION INTRAVENOUS; SUBCUTANEOUS at 11:29

## 2017-07-21 RX ADMIN — AMLODIPINE BESYLATE 5 MG: 5 TABLET ORAL at 11:25

## 2017-07-21 RX ADMIN — METOPROLOL TARTRATE 25 MG: 25 TABLET ORAL at 11:31

## 2017-07-21 RX ADMIN — HEPARIN SODIUM 5000 UNITS: 5000 INJECTION, SOLUTION INTRAVENOUS; SUBCUTANEOUS at 02:08

## 2017-07-21 NOTE — PROGRESS NOTES
[facility] General Surgery    Subjective     No acute changes overnight. Denies pain today. Objective     Patient Vitals for the past 24 hrs:   Temp Pulse Resp BP SpO2   07/20/17 2326 98.1 °F (36.7 °C) 61 18 160/77 93 %   07/20/17 1456 98.2 °F (36.8 °C) 63 18 136/71 93 %         Date 07/20/17 0700 - 07/21/17 0659 07/21/17 0700 - 07/22/17 0659   Shift 6506-5063 4890-4279 24 Hour Total 8936-9388 8107-2116 24 Hour Total   I  N  T  A  K  E   P.O. 260  260         P.O. 260  260       Shift Total  (mL/kg) 260  (3.3)  260  (3.3)      O  U  T  P  U  T   Urine  (mL/kg/hr)  1600  (1.7) 1600  (0.8)         Urine Voided  1600 1600       Shift Total  (mL/kg)  1600  (20) 1600  (20)       -1600 -1340      Weight (kg) 80 80 80 80 80 80       PE  GEN - Awake, alert, communicating appropriately. NAD  Abd - soft, NT, ND. Labs  Recent Results (from the past 24 hour(s))   METABOLIC PANEL, BASIC    Collection Time: 07/21/17  2:14 AM   Result Value Ref Range    Sodium 139 136 - 145 mmol/L    Potassium 3.5 3.5 - 5.1 mmol/L    Chloride 104 97 - 108 mmol/L    CO2 30 21 - 32 mmol/L    Anion gap 5 5 - 15 mmol/L    Glucose 89 65 - 100 mg/dL    BUN 26 (H) 6 - 20 MG/DL    Creatinine 1.61 (H) 0.70 - 1.30 MG/DL    BUN/Creatinine ratio 16 12 - 20      GFR est AA 50 (L) >60 ml/min/1.73m2    GFR est non-AA 41 (L) >60 ml/min/1.73m2    Calcium 8.6 8.5 - 10.1 MG/DL       Holden Sanz is a 80 y. o.yr old male with a large right adrenal mass. Also with some nodularity to the left gland. Plan     -MRI performed yesterday. I discussed findings with Radiology who felt this had a benign appearance and was extremely unlikely to be a malignant process.   He recommended interval surveillance imaging in 3-6 months unless functional workup ends up being positive.    -Chest CT without evidence of metastatic disease or primary tumor.    -Functional workup for adrenal tumor still pending.    -Will plan to see as an outpatient to discuss management of this lesion with the patient.   I have added him on for discussion at tumor board as well.    -Dispo today as per primary team.     Felix Oconnell MD  7/21/2017  9:09 AM

## 2017-07-21 NOTE — PROGRESS NOTES
Bedside shift change report given to Megan Nguyễn RN (oncoming nurse) by Joceline Lee RN (offgoing nurse). Report included the following information SBAR, Kardex, Intake/Output, MAR and Recent Results.

## 2017-07-21 NOTE — PROGRESS NOTES
Bedside shift change report given to Laurent Lewis RN  (oncoming nurse) by Kaia Cao RN  (offgoing nurse). Report included the following information SBAR and MAR.

## 2017-07-21 NOTE — PROGRESS NOTES
MRI findings noted      Benign mass suspected. Discussed with Dr. Aura Muñoz who would kindly see him in clinic with a follow up scan. We will get involved if there are additional concerns.     Will sign off    Ellyn Vazquez MD, [address] [facility]

## 2017-07-21 NOTE — DISCHARGE INSTRUCTIONS
Discharge Instructions       PATIENT ID: Breann Awad  MRN: [id number]   YOB: 1933    DATE OF ADMISSION: 7/18/2017 12:24 PM    DATE OF DISCHARGE: 7/21/2017    PRIMARY CARE PROVIDER: PROVIDER UNKNOWN     ATTENDING PHYSICIAN: Lyn Robert MD  DISCHARGING PROVIDER: Lyn Robert MD    To contact this individual call [phone] and ask the  to page. If unavailable ask to be transferred the Adult Hospitalist Department. DISCHARGE DIAGNOSES ADRENAL MASS W/U DONE MAY BE BENIGN    CONSULTATIONS: IP CONSULT TO ONCOLOGY  IP CONSULT TO GENERAL SURGERY    PROCEDURES/SURGERIES: * No surgery found *    PENDING TEST RESULTS:   At the time of discharge the following test results are still pending: w/u for adrenal tumor     FOLLOW UP APPOINTMENTS:   Follow-up Information     Follow up With Details Comments Contact Info    Provider Unknown  need follow up with PCP Patient not available to ask      Negra Figueredo MD In 2 weeks  [address] [place] [address]      May Brooks MD In 2 weeks  [address] [place] [address]  [phone]             ADDITIONAL CARE RECOMMENDATIONS:     DIET: Regular Diet and Cardiac Diet      ACTIVITY: Activity as tolerated    WOUND CARE:     EQUIPMENT needed:       DISCHARGE MEDICATIONS:   See Medication Reconciliation Form    · It is important that you take the medication exactly as they are prescribed. · Keep your medication in the bottles provided by the pharmacist and keep a list of the medication names, dosages, and times to be taken in your wallet. · Do not take other medications without consulting your doctor. NOTIFY YOUR PHYSICIAN FOR ANY OF THE FOLLOWING:   Fever over 101 degrees for 24 hours. Chest pain, shortness of breath, fever, chills, nausea, vomiting, diarrhea, change in mentation, falling, weakness, bleeding. Severe pain or pain not relieved by medications.   Or, any other signs or symptoms that you may have questions about.       DISPOSITION:   x Home With:   OT  PT  HH  RN       SNF/Inpatient Rehab/LTAC    Independent/assisted living    Hospice    Other:     CDMP Checked:   Yes x     PROBLEM LIST Updated:  Yes x       Signed:   Sarah Llanes MD  7/21/2017  8:59 AM

## 2017-07-21 NOTE — DISCHARGE SUMMARY
Discharge Summary       PATIENT ID: Willy Jimenez  MRN: [id number]   YOB: 1933    DATE OF ADMISSION: 2017 12:24 PM    DATE OF DISCHARGE: 17   PRIMARY CARE PROVIDER: PROVIDER UNKNOWN     ATTENDING PHYSICIAN: Cruzito Dickerson  DISCHARGING PROVIDER: Taryn Sharma MD    To contact this individual call [phone] and ask the  to page. If unavailable ask to be transferred the Adult Hospitalist Department. CONSULTATIONS: IP CONSULT TO ONCOLOGY  IP CONSULT TO GENERAL SURGERY    PROCEDURES/SURGERIES: * No surgery found *    ADMITTING DIAGNOSES & HOSPITAL COURSE:   Marya Gastelum a 80 y. o. male who presents with PMHx Prostate ca s/p prostatectomy, HTN, HLD, Gout, admitted for above complain. Pt initially states that he just came to check his heart. When asking why he felt something was wrong with it, patient states: \"Just wanted to check it out\". Pt has PCP who was referring him to a cardiologist but states he never heard from them. On Additional questioning pt also c/o on/off rt flank pain. Pt also complains of increased urinary frequency but no discomfort  Or hematuria. Pt denies any chest pain, sob, n/v, diarrhea, fever, chills, hx of MI, CVA. Pt states his father had heart condition and . Pt unsure about his mother's history.          Assessment & Plan:      Active Problems:    Acute pyelonephritis (2017)        Adrenal mass ([address] (2017)      1. Acute Pyelonephritis  - start Rocephin GNR on cultures   - d/c on Ceftin per c/s for 5 more days         2. Adrenal Mass   - Oncology seen pt , need Onc / surgery evaluated  - CT abd/pel: possibly primary vs mets  - renin / jesu metanephrine send   - MRI - d/w Dr. Márquez Narrow may be benign   - plan for out pt follow up at office with all labs and images when available       3.  SOULEYMANE vs CKD 3, no previous baseline  - will give IVF  - monitor Cr improving   - US retroperitoneum - Solid bilateral renal masses or suspicious for neoplasms. no hydro      4. HTN   - c/w Amlodipine  - hold ACE/ARB  Need follow up with PCP for BMP in next week and decide if able to restart       PENDING TEST RESULTS:   At the time of discharge the following test results are still pending:     FOLLOW UP APPOINTMENTS:    Follow-up Information     Follow up With Details Comments Contact Info    Provider Unknown  need follow up with PCP Patient not available to ask      Shahnaz Tsang MD In 2 weeks  [address] [address] [facility]      Dino Sommer MD In 2 weeks  [address]  [phone]             ADDITIONAL CARE RECOMMENDATIONS:     DIET: Regular Diet and Cardiac Diet    ACTIVITY: Activity as tolerated    WOUND CARE:     EQUIPMENT needed:       DISCHARGE MEDICATIONS:  Current Discharge Medication List      START taking these medications    Details   cefUROXime (CEFTIN) 250 mg tablet Take 1 Tab by mouth two (2) times a day for 5 days. Qty: 10 Tab, Refills: 0         CONTINUE these medications which have NOT CHANGED    Details   amLODIPine (NORVASC) 5 mg tablet Take 5 mg by mouth daily. hydroCHLOROthiazide (HYDRODIURIL) 25 mg tablet Take 25 mg by mouth daily. ( on hold for ARF)      potassium chloride SR (KLOR-CON 10) 10 mEq tablet Take 20 mEq by mouth daily. Last fill per Rx Query 4/25/17 for #30 tablets. probenecid-colchicine (COLBENEMID) 500-0.5 mg per tablet Take 1 Tab by mouth two (2) times a day. losartan (COZAAR) 100 mg tablet Take 100 mg by mouth daily. Last fill per Rx Query 2/13/17 for #90 tablets. Hold for ARF      cholecalciferol (VITAMIN D3) 1,000 unit tablet Take 1,000 Units by mouth daily. Patient believes he takes 1000 units daily. OTHER,NON-FORMULARY, Take 1 Tab by mouth daily. Patient states he takes an additional supplement daily. Name unknown. bisacodyl (DULCOLAX, BISACODYL,) 5 mg EC tablet Take 5 mg by mouth every fourty-eight (48) hours. NOTIFY YOUR PHYSICIAN FOR ANY OF THE FOLLOWING:   Fever over 101 degrees for 24 hours. Chest pain, shortness of breath, fever, chills, nausea, vomiting, diarrhea, change in mentation, falling, weakness, bleeding. Severe pain or pain not relieved by medications. Or, any other signs or symptoms that you may have questions about. DISPOSITION:   x Home With:   OT  PT x HH  RN       Long term SNF/Inpatient Rehab    Independent/assisted living    Hospice    Other:       PATIENT CONDITION AT DISCHARGE:     Functional status    Poor    x Deconditioned     Independent      Cognition    x Lucid     Forgetful     Dementia      Catheters/lines (plus indication)    Avalos     PICC     PEG    x None      Code status   x  Full code     DNR      PHYSICAL EXAMINATION AT DISCHARGE:     Constitutional:  No acute distress, cooperative, pleasant    ENT:  Oral mucous moist, oropharynx benign. Neck supple,    Resp:  CTA bilaterally. No wheezing/rhonchi/rales. No accessory muscle use   CV:  Regular rhythm, normal rate, no murmurs, gallops, rubs    GI:  Soft, non distended, non tender. normoactive bowel sounds, no hepatosplenomegaly     Musculoskeletal:  No edema, warm, 2+ pulses throughout    Neurologic:  Moves all extremities.   AAOx3, CN II-XII reviewed                                                   CHRONIC MEDICAL DIAGNOSES:  Problem List as of 7/21/2017  Date Reviewed: 7/21/2017          Codes Class Noted - Resolved    Adrenal mass ([facility] ICD-10-CM: E27.9  ICD-9-CM: 255.9  7/18/2017 - Present        RESOLVED: Acute pyelonephritis ICD-10-CM: N10  ICD-9-CM: 590.10  7/18/2017 - 7/21/2017              Greater than 30  minutes were spent with the patient on counseling and coordination of care    Signed:   Micheal Morales MD  7/21/2017  9:00 AM

## 2017-07-21 NOTE — PROGRESS NOTES
Discharge instructions discussed with patient and son. Verbalized understanding. New prescribed medications discussed. Opportunity to ask questions given. IV removed. Signature obtained. Copies given.

## 2017-07-21 NOTE — PROGRESS NOTES
Reviewed medical chart; met with the patient at the bedside. Patient will discharge home today. Spoke with his daughter, Shanika Chun, R#[phone]. Informed her of the \"bug\" found on his belongings in the ER and explained that they may want to check his home for bed bugs. She plans to have her brother (the patient's son) drive him home. She confirmed that he does have a PCP - Dr. Piero Art. Called and spoke with [address] [place] [address] at this office to obtain a hospital follow up appointment for Monday, 7/24/17 at 1:30PM for BNP check (lab work) per the MD's request.  Referral was sent to [facility] so that he can receive a home visit to assess eligibility for further programs and to ensure that he understands his discharge instructions. No other discharge needs identified at this time.    MICHELET Beatty

## 2017-07-23 LAB
COLLECT DURATION TIME UR: 24 HR
METANEPH 24H UR-MRATE: 144 UG/24 HR (ref 45–290)
METANEPHS 24H UR-MCNC: 78 UG/L
NORMETANEPHRINE 24H UR-MCNC: 169 UG/L
NORMETANEPHRINE 24H UR-MRATE: 313 UG/24 HR (ref 82–500)
SPECIMEN VOL ?TM UR: 1850 ML

## 2017-07-24 LAB — ALDOST SERPL-MCNC: 3.1 NG/DL (ref 0–30)

## 2017-07-25 LAB
COLLECT DURATION TIME UR: 24 HR
CORTIS F 24H UR-MRATE: 28 UG/24 HR (ref 0–50)
CORTIS F UR-MCNC: 15 UG/L
SPECIMEN VOL ?TM UR: 1850 ML

## 2017-08-08 ENCOUNTER — OFFICE VISIT (OUTPATIENT)
Dept: SURGERY | Age: 82
End: 2017-08-08

## 2017-08-08 VITALS
SYSTOLIC BLOOD PRESSURE: 140 MMHG | TEMPERATURE: 97.9 F | HEIGHT: 70 IN | HEART RATE: 68 BPM | OXYGEN SATURATION: 98 % | DIASTOLIC BLOOD PRESSURE: 78 MMHG | WEIGHT: 170 LBS | BODY MASS INDEX: 24.34 KG/M2 | RESPIRATION RATE: 18 BRPM

## 2017-08-08 DIAGNOSIS — R30.0 DYSURIA: ICD-10-CM

## 2017-08-08 DIAGNOSIS — E27.8 ADRENAL MASS, RIGHT (HCC): Primary | ICD-10-CM

## 2017-08-08 DIAGNOSIS — R63.4 WEIGHT LOSS: ICD-10-CM

## 2017-08-08 NOTE — PROGRESS NOTES
[facility] History and Physical    Chief Complaint: right adrenal mass    History of Present Illness:      Joe Bishop is a 80 y.o. male who was seen in follow up after initial consultation in the hospital.  The patient had an incidentally identified right adrenal mass noted on CT scan while undergoing workup for a UTI and some flank pain. The patient had a functional workup ordered while in the hospital to rule out a functional adrenal mass as well as an MRI to further characterize the tumor. The MRI was suggestive of a benign process without malignant features and functional workup was negative. The patient states he has lost a lot of weight so far this year but after making some dietary changes and decreasing his intake intentionally. He is unsure if this is significant or not. He denies any abdominal pain currently. He reports having a normal colonoscopy about 5 yrs ago and was told he didn't need any future colonoscopies based upon his age. He does still describe some dysuria or burning with urination. He has completed a course of cefuroxime for a UTI. Past Medical History:   Diagnosis Date    Hyperlipemia     Hypertension        Past Surgical History:   Procedure Laterality Date    HX UROLOGICAL         Social History     Social History    Marital status:      Spouse name: N/A    Number of children: N/A    Years of education: N/A     Occupational History    Not on file. Social History Main Topics    Smoking status: Never Smoker    Smokeless tobacco: Never Used    Alcohol use No    Drug use: No    Sexual activity: Not on file     Other Topics Concern    Not on file     Social History Narrative       No family history on file. Current Outpatient Prescriptions:     potassium chloride SR (KLOR-CON 10) 10 mEq tablet, Take 20 mEq by mouth daily. Last fill per Rx Query 4/25/17 for #30 tablets. , Disp: , Rfl:     probenecid-colchicine (COLBENEMID) 500-0.5 mg per tablet, Take 1 Tab by mouth two (2) times a day., Disp: , Rfl:     cholecalciferol (VITAMIN D3) 1,000 unit tablet, Take 1,000 Units by mouth daily. Patient believes he takes 1000 units daily. , Disp: , Rfl:     bisacodyl (DULCOLAX, BISACODYL,) 5 mg EC tablet, Take 5 mg by mouth every fourty-eight (48) hours. , Disp: , Rfl:     amLODIPine (NORVASC) 5 mg tablet, Take 5 mg by mouth daily. , Disp: , Rfl:     OTHER,NON-FORMULARY,, Take 1 Tab by mouth daily. Patient states he takes an additional supplement daily. Name unknown., Disp: , Rfl:     No Known Allergies    ROS   Constitutional: + weight loss (somewhat intentional)  Ears, Nose, Mouth, Throat, and Face: negative  Respiratory: negative  Cardiovascular: negative  Gastrointestinal: See HPI  Genitourinary:dysuria, recent UTI, ? pyelonephritis  Integument/Breast: negative  Hematologic/Lymphatic: negative  Behavioral/Psychiatric: negative  Allergic/Immunologic: negative      Physical Exam:     Visit Vitals    /78 (BP 1 Location: Left arm, BP Patient Position: Sitting)    Pulse 68    Temp 97.9 °F (36.6 °C) (Oral)    Resp 18    Ht 5' 10\" (1.778 m)    Wt 170 lb (77.1 kg)    SpO2 98%    BMI 24.39 kg/m2       General - alert and oriented, no apparent distress  HEENT - NC/AT. No scleral icterus  Pulm - CTAB, normal inspiratory effort  CV - RRR, no M/R/G  Abd - soft, NT, ND. No palpable masses or organomegaly. No CVA angle tenderness. Ext - warm, well perfused, no edema  Skin - supple, no rashes  Psychiatric - normal affect, good mood    Labs  24 hr urine cortisol: 28  24 hr urine metanephrines: 144  24 hr urine normetanephrines: 313  Aldosterone: 3.1    Imaging  7/20/17 MRI abd: IMPRESSION: 1. Right adrenal mass: A heterogeneous lipid rich adenoma is favored  over a lipid poor myelolipoma. Microscopic fat and morphology make metastases,  pheochromocytoma, paraganglioma, and adrenal cortical carcinoma much less  likely.  Follow-up adrenal protocol CT or MRI should be considered in 3-6 months. 2. Bilateral adrenal gland thickening and nodularity, suggesting hyperplasia and  small adrenal adenomas. I have reviewed and agree with all of the pertinent images    Assessment:     Amos Denney is a 80 y.o. male with a right adrenal mass that is non-functional and appears benign by imaging. He has dysuria. Recommendations:     1. I will plan to have him follow up with a repeat MRI in 6 months to assess interval change. 2.   I will send him for a repeat UA/Culture due to his continue dysuria to ensure his UTI has cleared. I will plan to call him with the results of this. 3.   I have advised him to continue to track his weight and if he continues to have weight loss, he may require additional workup to ensure there is not an underlying malignant process. 30 mins of time was spent with the patient of which > 50% of the time involved face-to-face counseling of the patient regarding the proposed treatment plan.       Johnathan Cordova MD  8/8/2017    CC: Jimenez Cates MD

## 2017-08-08 NOTE — PROGRESS NOTES
1. Have you been to the ER, urgent care clinic since your last visit? Hospitalized since your last visit? Yes, [facility] 7/18/17    2. Have you seen or consulted any other health care providers outside of the [address] [place] [address] since your last visit? Include any pap smears or colon screening.  No

## 2017-08-08 NOTE — MR AVS SNAPSHOT
Visit Information Date & Time Provider Department Dept. Phone Encounter #  
 8/8/2017  1:45 PM Aidan Graham MD [address] [phone] [id number] Follow-up Instructions Return in about 6 months (around 2/8/2018). Upcoming Health Maintenance Date Due DTaP/Tdap/Td series (1 - Tdap) 3/9/1954 ZOSTER VACCINE AGE 60> 1/9/1993 GLAUCOMA SCREENING Q2Y 3/9/1998 Pneumococcal 65+ Low/Medium Risk (1 of 2 - PCV13) 3/9/1998 MEDICARE YEARLY EXAM 3/9/1998 INFLUENZA AGE 9 TO ADULT 8/1/2017 Allergies as of 8/8/2017  Review Complete On: 8/8/2017 By: Aidan Graham MD  
 No Known Allergies Current Immunizations  Never Reviewed No immunizations on file. Not reviewed this visit You Were Diagnosed With   
  
 Codes Comments Adrenal mass, right ([address]    -  Primary ICD-10-CM: E27.9 ICD-9-CM: 255.9 Dysuria     ICD-10-CM: R30.0 ICD-9-CM: 172. 1 Weight loss     ICD-10-CM: R63.4 ICD-9-CM: 783.21 Vitals BP Pulse Temp Resp Height(growth percentile) Weight(growth percentile) 140/78 (BP 1 Location: Left arm, BP Patient Position: Sitting) 68 97.9 °F (36.6 °C) (Oral) 18 5' 10\" (1.778 m) 170 lb (77.1 kg) SpO2 BMI Smoking Status 98% 24.39 kg/m2 Never Smoker Vitals History BMI and BSA Data Body Mass Index Body Surface Area  
 24.39 kg/m 2 1.95 m 2 Preferred Pharmacy Pharmacy Name Phone [facility], [address] [place] [address] AT [facility] [phone] Your Updated Medication List  
  
   
This list is accurate as of: 8/8/17  6:08 PM.  Always use your most recent med list. amLODIPine 5 mg tablet Commonly known as:  Jose Lute Take 5 mg by mouth daily. DULCOLAX (BISACODYL) 5 mg EC tablet Generic drug:  bisacodyl Take 5 mg by mouth every fourty-eight (48) hours. OTHER(NON-FORMULARY) Take 1 Tab by mouth daily. Patient states he takes an additional supplement daily. Name unknown. potassium chloride SR 10 mEq tablet Commonly known as:  KLOR-CON 10 Take 20 mEq by mouth daily. Last fill per Rx Query 4/25/17 for #30 tablets. probenecid-colchicine 500-0.5 mg per tablet Commonly known as:  ColBenemid Take 1 Tab by mouth two (2) times a day. VITAMIN D3 1,000 unit tablet Generic drug:  cholecalciferol Take 1,000 Units by mouth daily. Patient believes he takes 1000 units daily. We Performed the Following CULTURE, URINE [id number] CPT(R)] URINALYSIS W/ RFLX MICROSCOPIC [23424 CPT(R)] Follow-up Instructions Return in about 6 months (around 2/8/2018). To-Do List   
 02/08/2018 Imaging:  MRI ABD W CONT Introducing [facility] SERVICES! Dear Meagan Serra: 
Thank you for requesting a [employer] account. Our records indicate that you already have an active [employer] account. You can access your account anytime at [email or web address] Did you know that you can access your hospital and ER discharge instructions at any time in [employer]? You can also review all of your test results from your hospital stay or ER visit. Additional Information If you have questions, please visit the Frequently Asked Questions section of the [employer] website at [email or web address] Remember, [employer] is NOT to be used for urgent needs. For medical emergencies, dial 911. Now available from your iPhone and Android! Please provide this summary of care documentation to your next provider. Your primary care clinician is listed as Donna Sams. If you have any questions after today's visit, please call [phone].

## 2017-08-10 LAB
APPEARANCE UR: CLEAR
BACTERIA #/AREA URNS HPF: ABNORMAL /[HPF]
BILIRUB UR QL STRIP: NEGATIVE
CASTS URNS MICRO: ABNORMAL
CASTS URNS QL MICRO: PRESENT /LPF
COLOR UR: YELLOW
EPI CELLS #/AREA URNS HPF: ABNORMAL /HPF
GLUCOSE UR QL: NEGATIVE
HGB UR QL STRIP: ABNORMAL
KETONES UR QL STRIP: NEGATIVE
LEUKOCYTE ESTERASE UR QL STRIP: ABNORMAL
MICRO URNS: ABNORMAL
MUCOUS THREADS URNS QL MICRO: PRESENT
NITRITE UR QL STRIP: POSITIVE
PH UR STRIP: 6.5 [PH] (ref 5–7.5)
PROT UR QL STRIP: NEGATIVE
RBC #/AREA URNS HPF: ABNORMAL /HPF
SP GR UR: 1.01 (ref 1–1.03)
UROBILINOGEN UR STRIP-MCNC: 0.2 MG/DL (ref 0.2–1)
WBC #/AREA URNS HPF: ABNORMAL /HPF

## 2017-08-11 DIAGNOSIS — N30.00 ACUTE CYSTITIS WITHOUT HEMATURIA: Primary | ICD-10-CM

## 2017-08-11 RX ORDER — SULFAMETHOXAZOLE AND TRIMETHOPRIM 800; 160 MG/1; MG/1
1 TABLET ORAL 2 TIMES DAILY
Qty: 20 TAB | Refills: 0 | Status: SHIPPED | OUTPATIENT
Start: 2017-08-11 | End: 2017-08-21

## 2017-08-12 LAB — BACTERIA UR CULT: ABNORMAL

## 2017-09-21 ENCOUNTER — HOSPITAL ENCOUNTER (EMERGENCY)
Age: 82
Discharge: STILL ADMITTED OR ADMINISTRATIVE | End: 2017-09-21
Attending: EMERGENCY MEDICINE

## 2018-02-01 ENCOUNTER — TELEPHONE (OUTPATIENT)
Dept: SURGERY | Age: 83
End: 2018-02-01

## 2018-02-01 NOTE — TELEPHONE ENCOUNTER
Called pt 2 identifiers used. Advised pt Dr. Robin Chappell has an order set up for him to have a CT scan done and someone from our office will give him a call to set that up as well as an appointment to see Dr. Robin Chappell. Pt expressed understanding and had no further questions or concerns.

## 2018-07-07 ENCOUNTER — HOSPITAL ENCOUNTER (EMERGENCY)
Age: 83
Discharge: HOME | End: 2018-07-07
Attending: EMERGENCY MEDICINE
Payer: MEDICARE

## 2018-07-07 VITALS
SYSTOLIC BLOOD PRESSURE: 138 MMHG | DIASTOLIC BLOOD PRESSURE: 77 MMHG | BODY MASS INDEX: 26.54 KG/M2 | HEART RATE: 89 BPM | WEIGHT: 185.4 LBS | TEMPERATURE: 99 F | HEIGHT: 70 IN | RESPIRATION RATE: 20 BRPM | OXYGEN SATURATION: 96 %

## 2018-07-07 DIAGNOSIS — E27.8 ADRENAL MASS (HCC): Primary | ICD-10-CM

## 2018-07-07 DIAGNOSIS — M10.9 ACUTE GOUT OF LEFT KNEE, UNSPECIFIED CAUSE: ICD-10-CM

## 2018-07-07 PROCEDURE — 99283 EMERGENCY DEPT VISIT LOW MDM: CPT

## 2018-07-07 PROCEDURE — A9270 NON-COVERED ITEM OR SERVICE: HCPCS | Performed by: EMERGENCY MEDICINE

## 2018-07-07 PROCEDURE — 74011636637 HC RX REV CODE- 636/637: Performed by: EMERGENCY MEDICINE

## 2018-07-07 PROCEDURE — 74011250637 HC RX REV CODE- 250/637: Performed by: EMERGENCY MEDICINE

## 2018-07-07 RX ORDER — ACETAMINOPHEN 325 MG/1
975 TABLET ORAL
Status: COMPLETED | OUTPATIENT
Start: 2018-07-07 | End: 2018-07-07

## 2018-07-07 RX ORDER — PREDNISONE 20 MG/1
60 TABLET ORAL
Status: COMPLETED | OUTPATIENT
Start: 2018-07-07 | End: 2018-07-07

## 2018-07-07 RX ORDER — PREDNISONE 50 MG/1
50 TABLET ORAL DAILY
Qty: 4 TAB | Refills: 0 | Status: SHIPPED | OUTPATIENT
Start: 2018-07-07 | End: 2018-07-11

## 2018-07-07 RX ADMIN — ACETAMINOPHEN 975 MG: 325 TABLET ORAL at 17:41

## 2018-07-07 RX ADMIN — PREDNISONE 60 MG: 20 TABLET ORAL at 17:42

## 2018-07-07 NOTE — ED TRIAGE NOTES
Pt states that he got his Losartan refilled but is concerned because it looks different. Per family member pt is here for complaints of neck pain, lower back pain and L knee/leg pain. Pt thinks there is a connection.

## 2018-07-07 NOTE — DISCHARGE INSTRUCTIONS
Gout: Care Instructions  Your Care Instructions    Gout is a form of arthritis caused by a buildup of uric acid crystals in a joint. It causes sudden attacks of pain, swelling, redness, and stiffness, usually in one joint, especially the big toe. Gout usually comes on without a cause. But it can be brought on by drinking alcohol (especially beer) or eating seafood and red meat. Taking certain medicines, such as diuretics or aspirin, also can bring on an attack of gout. Taking your medicines as prescribed and following up with your doctor regularly can help you avoid gout attacks in the future. Follow-up care is a key part of your treatment and safety. Be sure to make and go to all appointments, and call your doctor if you are having problems. It's also a good idea to know your test results and keep a list of the medicines you take. How can you care for yourself at home? · If the joint is swollen, put ice or a cold pack on the area for 10 to 20 minutes at a time. Put a thin cloth between the ice and your skin. · Prop up the sore limb on a pillow when you ice it or anytime you sit or lie down during the next 3 days. Try to keep it above the level of your heart. This will help reduce swelling. · Rest sore joints. Avoid activities that put weight or strain on the joints for a few days. Take short rest breaks from your regular activities during the day. · Take your medicines exactly as prescribed. Call your doctor if you think you are having a problem with your medicine. · Take pain medicines exactly as directed. ¨ If the doctor gave you a prescription medicine for pain, take it as prescribed. ¨ If you are not taking a prescription pain medicine, ask your doctor if you can take an over-the-counter medicine. · Eat less seafood and red meat. · Check with your doctor before drinking alcohol. · Losing weight, if you are overweight, may help reduce attacks of gout. But do not go on a [employer]. \" Losing a lot of weight in a short amount of time can cause a gout attack. When should you call for help? Call your doctor now or seek immediate medical care if:  ? · You have a fever. ? · The joint is so painful you cannot use it. ? · You have sudden, unexplained swelling, redness, warmth, or severe pain in one or more joints. ? Watch closely for changes in your health, and be sure to contact your doctor if:  ? · You have joint pain. ? · Your symptoms get worse or are not improving after 2 or 3 days. Where can you learn more? Go to http://erin-nikole.info/. Enter N413 in the search box to learn more about \"Gout: Care Instructions. \"  Current as of: October 31, 2016  Content Version: 11.4  © 7258-4906 [employer]. Care instructions adapted under license by [employer] (which disclaims liability or warranty for this information). If you have questions about a medical condition or this instruction, always ask your healthcare professional. [place] [address] any warranty or liability for your use of this information. Purine-Restricted Diet: Care Instructions  Your Care Instructions    Purines are substances that are found in some foods. Your body turns purines into uric acid. High levels of uric acid can cause gout, which is a form of arthritis that causes pain and inflammation in joints. You may be able to help control the amount of uric acid in your body by limiting high-purine foods in your diet. Follow-up care is a key part of your treatment and safety. Be sure to make and go to all appointments, and call your doctor if you are having problems. It's also a good idea to know your test results and keep a list of the medicines you take. How can you care for yourself at home? · Plan your meals and snacks around foods that are low in purines and are safe for you to eat.  These foods include:  ¨ Green vegetables and tomatoes. ¨ Fruits. ¨ Whole-grain breads, rice, and cereals. ¨ Eggs, peanut butter, and nuts. ¨ Low-fat milk, cheese, and other milk products. ¨ Popcorn. ¨ Gelatin desserts, chocolate, cocoa, and cakes and sweets, in small amounts. · You can eat certain foods that are medium-high in purines, but eat them only once in a while. These foods include:  ¨ Legumes, such as dried beans and dried peas. You can have 1 cup cooked legumes each day. ¨ Asparagus, cauliflower, spinach, mushrooms, and green peas. ¨ Fish and seafood (other than very high-purine seafood). ¨ Oatmeal, wheat bran, and wheat germ. · Limit very high-purine foods, including:  ¨ Organ meats, such as liver, kidneys, sweetbreads, and brains. ¨ Meats, including john, beef, pork, and lamb. ¨ Game meats and any other meats in large amounts. ¨ Anchovies, sardines, herring, mackerel, and scallops. ¨ Gravy. ¨ Beer. Where can you learn more? Go to http://erin-nikole.info/. Enter F448 in the search box to learn more about \"Purine-Restricted Diet: Care Instructions. \"  Current as of: May 12, 2017  Content Version: 11.4  © 9813-3701 [employer]. Care instructions adapted under license by [employer] (which disclaims liability or warranty for this information). If you have questions about a medical condition or this instruction, always ask your healthcare professional. [place] [address] any warranty or liability for your use of this information.

## 2018-07-07 NOTE — ED PROVIDER NOTES
HPI Comments: This patient comes in with bilateral neck pain, acute on chronic, as well as bilateral knee pain, left more than right. The neck pain has been chronic over the past couple days has gotten worse. The right knee started hurting a few days ago, then it got better, and now the left knee is hurting. Both knees are swollen. No injury. No fever or chills. Appetite is normal. No chest or abdominal pain. No shortness of breath. No other leg swelling. He has a history of gout in the past. Within the past week or 2, he ran out of his gout medicine but got it refilled 2 days ago. During this time that he was out, his joint pain flared up. He is a nonsmoker. He also is unsure whether he is getting the right losartan pill. He had the most recent prescription filled 4 days ago. His daughter tells me that the  of the pill changed. He is hesitant to take the new losartan pill because of this reason. It looks different to him. Also he believes that he is supposed to be on losartan twice a day and not once. He also wants to know about his hospitalization last year. He had something enlarged in his abdomen and he is unsure about following up. No one ever called him and he did not call for follow up either. Old chart reviewed - pt admitted for 3 days last July for pyelo and adrenal mass. MRI showed likely benign process of adrenal.  Had an appt with Dr Luisito Abdi from surgery last August.  Plan was another MRI in February. Patient is a 80 y.o. male presenting with neck pain, back pain, leg pain, and other event. Neck Pain Associated symptoms include leg pain. Back Pain Associated symptoms include leg pain. Leg Pain Associated symptoms include back pain and neck pain. Other Past Medical History:  
Diagnosis Date  Cancer [facility])   
 prostate  Constipation  Gout  Hyperlipemia  Hypertension Past Surgical History:  
Procedure Laterality Date  HX UROLOGICAL prostate removed History reviewed. No pertinent family history. Social History Social History  Marital status:  Spouse name: N/A  
 Number of children: N/A  
 Years of education: N/A Occupational History  Not on file. Social History Main Topics  Smoking status: Never Smoker  Smokeless tobacco: Never Used  Alcohol use No  
 Drug use: No  
 Sexual activity: Not on file Other Topics Concern  Not on file Social History Narrative ALLERGIES: Review of patient's allergies indicates no known allergies. Review of Systems Musculoskeletal: Positive for back pain and neck pain. All other systems reviewed and are negative. Vitals:  
 07/07/18 1625 BP: 138/77 Pulse: 89 Resp: 20 Temp: 99 °F (37.2 °C) SpO2: 96% Weight: 84.1 kg (185 lb 6.4 oz) Height: 5' 10\" (1.778 m) Physical Exam  
Constitutional: He appears well-developed and well-nourished. No distress. HENT:  
Head: Normocephalic. Nose: Nose normal.  
Mouth/Throat: Oropharynx is clear and moist.  
Eyes: Conjunctivae are normal. Pupils are equal, round, and reactive to light. Neck: No tracheal deviation present. Cardiovascular: Normal rate, regular rhythm, normal heart sounds and intact distal pulses. Pulmonary/Chest: Effort normal and breath sounds normal.  
Abdominal: Soft. He exhibits no distension. There is no tenderness. There is no rebound and no guarding. Musculoskeletal:  
bilat knee effusions. Warm. No cellulitis. Good ROM of left knee with some pain. Not a lot. Neurological: He is alert. Skin: Skin is warm and dry. He is not diaphoretic. Psychiatric: He has a normal mood and affect. MDM 
 
 
ED Course Procedures 
 
 
 
 likely gout flare Prednisone burst 
 
Pt and daughter will go to pharmacy today to talk with them about losartan, frequency of administration and where pt's gout med is.   It's not with him here today. Pt to f/u with Dr Linda Dominguez in surgical clinic. I have emailed him about this visit.

## 2018-07-07 NOTE — ED NOTES
Discharge instructions given. Pt able to restate dc instructions. All questions answered. Pt stable for discharge. Pt escorted off unit in wheelchair.

## 2018-07-09 NOTE — CALL BACK NOTE
[facility] Emergency Department Follow Up Call Record    Discharged to : Home/Family Home/Home Health/Skilled Facility/Rehab/Assisted Living/Other_Home______  1) Did you receive your discharge instructions? Patient unsure if he received his Discharge Instructions, he will check on it. [email or web address]  2) Do you understand them? Reviewed the  Discharge Instructions with Adilene Jones. 3) Are you able to follow them? NO. Patient states he received dose of prednisone in the ED, but has not filled any prescription since he left ED. Mr. Alpesh Dunaway states his knee pain improved . If NO, what can I clarify for you? 4) Do you understand your diagnosis? Yes         5) Do you know which symptoms should prompt you to call the doctor? Yes     6) Were you able to fill and  any medications that were prescribed? No     7) You were prescribed ___________for ____________________. Common side effects of this medication are____________________. This is not a complete list so please review the forms given from the pharmacy for a complete list.      8) Are there any questions about your medications? Yes            Have you scheduled any recommended doctors appointments (specialty, PCP) Encouraged patient to follow up with his PCP. If NO, what barriers are you encountering (transportation/lost contact info/cost/  didnt think necessary/no PCP  9) If discharged with Home Health, has the agency contacted you to schedule visit? Not applicable . Is there anyone available to help you at home (meals, errands, transportation    monitoring) (adult children, neighbors, private duty companions) Daughter   8) Are you on a special diet? No         If YES, do you understand the requirements for this diet? Education provided? 11) If presented with cough, bronchitis, COPD, asthma, is it ok to ask that the   respiratory disease management educator call you?  Not applicable      12)  A) If presented with fall, were you issued an assistive device in the ED    Are you using? Not applicable  B) If given RX for device, have you obtained? Not applicable       If NO, barriers? C) Therapist recommended:   Are you able to implement the suggestions? Not applicable        If NO, barriers to implementation? D) Are you having any difficulties with mobility inside your home?     (steps, bed, tub)No   If YES, ask if the [facility] PT can contact patient and good time and number?  13)  At the end of your discharge instructions, there is information about accessing [facility], have you had a chance to review those? No         Do you have any questions about signing up for this service? We encourage our patients to be active participants in their healthcare and this site is one of the ways to do that. It will allow you to access parts of your medical record, email your doctors office, schedule appointments, and request medications refills . 14) Are there any other questions that I can answer for you regarding    your Emergency department visit?  NO             Estimated Call Time:___2:45 PM  ________________ Date/Time:_______________

## 2018-07-26 ENCOUNTER — OFFICE VISIT (OUTPATIENT)
Dept: SURGERY | Age: 83
End: 2018-07-26

## 2018-07-26 VITALS
BODY MASS INDEX: 23.48 KG/M2 | DIASTOLIC BLOOD PRESSURE: 76 MMHG | WEIGHT: 164 LBS | TEMPERATURE: 98.6 F | SYSTOLIC BLOOD PRESSURE: 130 MMHG | RESPIRATION RATE: 16 BRPM | HEIGHT: 70 IN | OXYGEN SATURATION: 98 % | HEART RATE: 72 BPM

## 2018-07-26 DIAGNOSIS — E27.8 ADRENAL MASS, RIGHT (HCC): Primary | ICD-10-CM

## 2018-07-26 DIAGNOSIS — R93.5 ABNORMAL FINDINGS ON DIAGNOSTIC IMAGING OF OTHER ABDOMINAL REGIONS, INCLUDING RETROPERITONEUM: ICD-10-CM

## 2018-07-26 NOTE — MR AVS SNAPSHOT
[address] [facility] [address] [id number] [phone] Patient: Sheree Pedro MRN: [id number] Visit Information Date & Time Provider Department Dept. Phone Encounter #  
 7/26/2018 10:40 AM Yoselyn Coronado, [address] [facility] [phone] [id number] Follow-up Instructions Return if symptoms worsen or fail to improve. Follow-up and Disposition History Upcoming Health Maintenance Date Due DTaP/Tdap/Td series (1 - Tdap) 3/9/1954 ZOSTER VACCINE AGE 60> 1/9/1993 GLAUCOMA SCREENING Q2Y 3/9/1998 Pneumococcal 65+ Low/Medium Risk (1 of 2 - PCV13) 3/9/1998 Influenza Age 5 to Adult 8/1/2018 Allergies as of 7/26/2018  Review Complete On: 7/26/2018 By: Benjamin Kauffman LPN No Known Allergies Current Immunizations  Never Reviewed No immunizations on file. Not reviewed this visit You Were Diagnosed With   
  
 Codes Comments Adrenal mass, right ([facility] [address]    -  Primary ICD-10-CM: E27.9 ICD-9-CM: 255.9 Abnormal findings on diagnostic imaging of other abdominal regions, including retroperitoneum     ICD-10-CM: R93.5 ICD-9-CM: 793.6 Vitals BP Pulse Temp Resp Height(growth percentile) Weight(growth percentile) 130/76 (BP 1 Location: Right arm, BP Patient Position: Sitting) 72 98.6 °F (37 °C) (Oral) 16 5' 10\" (1.778 m) 164 lb (74.4 kg) SpO2 BMI Smoking Status 98% 23.53 kg/m2 Never Smoker Vitals History BMI and BSA Data Body Mass Index Body Surface Area  
 23.53 kg/m 2 1.92 m 2 Preferred Pharmacy Pharmacy Name Phone [facility], [address] [place] [address] AT [address] [place] [address] Drive [phone] Your Updated Medication List  
  
   
This list is accurate as of 7/26/18 11:59 PM.  Always use your most recent med list. amLODIPine 5 mg tablet Commonly known as:  Sam Alstrom Take 5 mg by mouth daily. DULCOLAX (BISACODYL) 5 mg EC tablet Generic drug:  bisacodyl Take 5 mg by mouth every fourty-eight (48) hours. OTHER(NON-FORMULARY) Take 1 Tab by mouth daily. Patient states he takes an additional supplement daily. Name unknown. potassium chloride SR 10 mEq tablet Commonly known as:  KLOR-CON 10 Take 20 mEq by mouth daily. Last fill per Rx Query 4/25/17 for #30 tablets. probenecid-colchicine 500-0.5 mg per tablet Commonly known as:  ColBenemid Take 1 Tab by mouth two (2) times a day. VITAMIN D3 1,000 unit tablet Generic drug:  cholecalciferol Take 1,000 Units by mouth daily. Patient believes he takes 1000 units daily. Follow-up Instructions Return if symptoms worsen or fail to improve. Introducing [facility]! Dear Juancarlos: 
Thank you for requesting a Sitefly account. Our records indicate that you already have an active Sitefly account. You can access your account anytime at [email or web address] Did you know that you can access your hospital and ER discharge instructions at any time in Sitefly? You can also review all of your test results from your hospital stay or ER visit. Additional Information If you have questions, please visit the Frequently Asked Questions section of the Sitefly website at [email or web address] Remember, Sitefly is NOT to be used for urgent needs. For medical emergencies, dial 911. Now available from your iPhone and Android! Please provide this summary of care documentation to your next provider. Your primary care clinician is listed as Max Valladares. If you have any questions after today's visit, please call [phone].

## 2018-07-31 ENCOUNTER — HOSPITAL ENCOUNTER (OUTPATIENT)
Dept: CT IMAGING | Age: 83
Discharge: HOME | End: 2018-07-31
Attending: SURGERY
Payer: MEDICARE

## 2018-07-31 DIAGNOSIS — R93.5 ABNORMAL FINDINGS ON DIAGNOSTIC IMAGING OF OTHER ABDOMINAL REGIONS, INCLUDING RETROPERITONEUM: ICD-10-CM

## 2018-07-31 DIAGNOSIS — E27.8 ADRENAL MASS, RIGHT (HCC): ICD-10-CM

## 2018-07-31 LAB — CREAT BLD-MCNC: 1.7 MG/DL (ref 0.6–1.3)

## 2018-07-31 PROCEDURE — 82565 ASSAY OF CREATININE: CPT

## 2018-07-31 PROCEDURE — 74150 CT ABDOMEN W/O CONTRAST: CPT

## 2018-07-31 RX ORDER — SODIUM CHLORIDE 0.9 % (FLUSH) 0.9 %
10 SYRINGE (ML) INJECTION
Status: DISCONTINUED | OUTPATIENT
Start: 2018-07-31 | End: 2018-07-31

## 2018-08-09 NOTE — PROGRESS NOTES
[address] [facility]      Clinic Note - Follow up    Subjective     Leandro Samuel returns for follow up today. He is known to me for a large right adrenal mass. 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 Additionally, he had a functional work-up for this that was negative. I had recommended a follow up CT due to the size of this lesion to evaluate for interval growth of the lesion at 6 months, however the patient was lost to follow up. He presented to the ER for complaints related to his gout recently and was referred back to me at that time. He states he is doing well today with no complaints of abdominal pain. He is eating well and has appropriate energy levels. He has lost some weight, approximately 15-20 lbs he thinks in the past year. He does have issues with constipation that are chronic but unchanged. He has no new medical problems or surgeries since he was last seen by me. Objective     Visit Vitals    /76 (BP 1 Location: Right arm, BP Patient Position: Sitting)    Pulse 72    Temp 98.6 °F (37 °C) (Oral)    Resp 16    Ht 5' 10\" (1.778 m)    Wt 164 lb (74.4 kg)    SpO2 98%    BMI 23.53 kg/m2         PE  GEN - Awake, alert, communicating appropriately. NAD  Pulm - CTAB  CV - RRR  Abd - soft, NT, ND. No palpable masses or organomegaly. Ext - warm, well perfused. Labs  None    Assessment     Leandro Samuel is a 80 y. o.yr old male with a large right adrenal mass that appears consistent with an adenoma. This is non-functional.     Plan     I will order a CT A/P with adrenal protocol to follow up on his adrenal mass. If this shows no change, I do not think he needs any further workup or follow up of this. It has had benign appearing imaging characteristics. I will plan to call him after the CT results.       20 mins of time was spent with the patient of which > 50% of the time involved face-to-face counseling of the patient regarding the proposed treatment plan.         Chapito Rushing MD  7/26/18     CC: Max Valladares MD

## 2018-10-15 ENCOUNTER — HOSPITAL ENCOUNTER (EMERGENCY)
Age: 83
Discharge: HOME | End: 2018-10-15
Attending: EMERGENCY MEDICINE | Admitting: EMERGENCY MEDICINE
Payer: MEDICARE

## 2018-10-15 ENCOUNTER — APPOINTMENT (OUTPATIENT)
Dept: GENERAL RADIOLOGY | Age: 83
End: 2018-10-15
Attending: EMERGENCY MEDICINE
Payer: MEDICARE

## 2018-10-15 ENCOUNTER — APPOINTMENT (OUTPATIENT)
Dept: CT IMAGING | Age: 83
End: 2018-10-15
Attending: EMERGENCY MEDICINE
Payer: MEDICARE

## 2018-10-15 VITALS
HEART RATE: 68 BPM | OXYGEN SATURATION: 96 % | BODY MASS INDEX: 27.94 KG/M2 | DIASTOLIC BLOOD PRESSURE: 106 MMHG | WEIGHT: 195.2 LBS | HEIGHT: 70 IN | TEMPERATURE: 97.5 F | SYSTOLIC BLOOD PRESSURE: 218 MMHG | RESPIRATION RATE: 18 BRPM

## 2018-10-15 DIAGNOSIS — M25.512 LEFT SHOULDER PAIN, UNSPECIFIED CHRONICITY: Primary | ICD-10-CM

## 2018-10-15 DIAGNOSIS — M54.2 NECK PAIN: ICD-10-CM

## 2018-10-15 PROCEDURE — 73030 X-RAY EXAM OF SHOULDER: CPT

## 2018-10-15 PROCEDURE — 74011636637 HC RX REV CODE- 636/637: Performed by: EMERGENCY MEDICINE

## 2018-10-15 PROCEDURE — 72125 CT NECK SPINE W/O DYE: CPT

## 2018-10-15 PROCEDURE — 99283 EMERGENCY DEPT VISIT LOW MDM: CPT

## 2018-10-15 PROCEDURE — A9270 NON-COVERED ITEM OR SERVICE: HCPCS | Performed by: EMERGENCY MEDICINE

## 2018-10-15 RX ORDER — PREDNISONE 20 MG/1
60 TABLET ORAL DAILY
Qty: 12 TAB | Refills: 0 | Status: SHIPPED | OUTPATIENT
Start: 2018-10-15 | End: 2018-10-19

## 2018-10-15 RX ORDER — PREDNISONE 20 MG/1
60 TABLET ORAL
Status: COMPLETED | OUTPATIENT
Start: 2018-10-15 | End: 2018-10-15

## 2018-10-15 RX ADMIN — PREDNISONE 60 MG: 20 TABLET ORAL at 22:52

## 2018-10-16 NOTE — ED TRIAGE NOTES
Patient c/o LEFT shoulder pain starting Thursday, denies injury, states there has been \"a bump or something there for a few years\". Pain is worse with movement. This am also crossed over back into RIGHT shoulder. Denies CP/SOB.

## 2018-10-16 NOTE — ED NOTES
Dr. Marvin Recio notified of pt's elevated BP. Pt to follow up with PCP in the morning. Pt has no symptoms.

## 2018-10-16 NOTE — ED PROVIDER NOTES
HPI  
 
  80y M here with L shoulder pain. Has been going on for weeks with a knot he can feel but worse in the past few days. No new injury or trauma. Pain is not present when at rest. If he moves he experiences pain from the neck down to the shoulder. Also with pain that shoots across to the right side. No weakness or numbness in the arms. No meds taken for pain prior to arrival. No fever. No chest pain. No trouble breathing. No weakness in the legs. No gait problems. No speech problems. Past Medical History:  
Diagnosis Date  Cancer [facility])   
 prostate  Constipation  Gout  Hyperlipemia  Hypertension Past Surgical History:  
Procedure Laterality Date  HX UROLOGICAL    
 prostate removed History reviewed. No pertinent family history. Social History Social History  Marital status:  Spouse name: N/A  
 Number of children: N/A  
 Years of education: N/A Occupational History  Not on file. Social History Main Topics  Smoking status: Never Smoker  Smokeless tobacco: Never Used  Alcohol use No  
 Drug use: No  
 Sexual activity: Not on file Other Topics Concern  Not on file Social History Narrative ALLERGIES: Review of patient's allergies indicates no known allergies. Review of Systems Review of Systems Constitutional: (-) weight loss. HEENT: (-) stiff neck Eyes: (-) discharge. Respiratory: (-) cough. Cardiovascular: (-) syncope. Gastrointestinal: (-) blood in stool. Genitourinary: (-) hematuria. Musculoskeletal: (-) myalgias. Neurological: (-) seizure. Skin: (-) petechiae Lymph/Immunologic: (-) enlarged lymph nodes All other systems reviewed and are negative. Vitals:  
 10/15/18 2014 BP: 175/86 Pulse: 76 Resp: 18 Temp: 98.6 °F (37 °C) SpO2: 100% Weight: 88.5 kg (195 lb 3.2 oz) Height: 5' 10\" (1.778 m) Physical Exam Nursing note and vitals reviewed. Constitutional: oriented to person, place, and time. appears well-developed and well-nourished. No distress. Head: Normocephalic and atraumatic. Sclera anicteric Nose: No rhinorrhea Mouth/Throat: Oropharynx is clear and moist. Pharynx normal 
Eyes: Conjunctivae are normal. Pupils are equal, round, and reactive to light. Right eye exhibits no discharge. Left eye exhibits no discharge. Neck: Painless normal range of motion. Neck supple. No LAD. Pain to palpation in the region of the L trapezius. Cardiovascular: Normal rate, regular rhythm, normal heart sounds and intact distal pulses. Exam reveals no gallop and no friction rub. No murmur heard. Pulmonary/Chest:  No respiratory distress. No wheezes. No rales. No rhonchi. No increased work of breathing. No accessory muscle use. Good air exchange throughout. Abdominal: soft, non-tender, no rebound or guarding. No hepatosplenomegaly. Normal bowel sounds throughout. Back: no tenderness to palpation, no deformities, no CVA tenderness Extremities/Musculoskeletal: Normal range of motion. no tenderness. No edema. Distal extremities are neurovasc intact. Lymphadenopathy:   No adenopathy. Neurological:  Alert and oriented to person, place, and time. Coordination normal. CN 2-12 intact. Motor and sensory function intact. Skin: Skin is warm and dry. No rash noted. No pallor. MDM 80y M here with L shoulder pain. Sounds like it is coming from the neck and he is also having pain in the trapezius. Will check imaging. If ok, will try a short course of steroids. ED Course Procedures 11:07 PM 
Bad arthritis on xrays. Pt not taking his BP meds \"for a while\" because his BP machine broke at home and he didn't want to take meds without being able to check BP. Advised to start taking again and will need to check BP tomorrow (at pharmacy, clinic, etc) and call his PMD to arrange close follow-up. Return precautions discussed.

## 2018-12-17 PROBLEM — K59.09 CHRONIC CONSTIPATION: Status: ACTIVE | Noted: 2018-12-17

## 2018-12-17 PROBLEM — E78.00 PURE HYPERCHOLESTEROLEMIA: Status: ACTIVE | Noted: 2018-12-17

## 2018-12-17 PROBLEM — I10 ESSENTIAL HYPERTENSION: Status: ACTIVE | Noted: 2018-12-17

## 2018-12-17 PROBLEM — M10.9 GOUT: Status: ACTIVE | Noted: 2018-12-17

## 2018-12-17 PROBLEM — Z85.46 HISTORY OF PROSTATE CANCER: Status: ACTIVE | Noted: 2018-12-17

## 2018-12-18 ENCOUNTER — OFFICE VISIT (OUTPATIENT)
Dept: FAMILY MEDICINE CLINIC | Age: 83
End: 2018-12-18

## 2018-12-18 VITALS
SYSTOLIC BLOOD PRESSURE: 136 MMHG | HEART RATE: 87 BPM | TEMPERATURE: 98 F | BODY MASS INDEX: 27.77 KG/M2 | HEIGHT: 70 IN | RESPIRATION RATE: 18 BRPM | OXYGEN SATURATION: 98 % | WEIGHT: 194 LBS | DIASTOLIC BLOOD PRESSURE: 85 MMHG

## 2018-12-18 DIAGNOSIS — K59.09 CHRONIC CONSTIPATION: ICD-10-CM

## 2018-12-18 DIAGNOSIS — R93.5 ABNORMAL ABDOMINAL X-RAY: ICD-10-CM

## 2018-12-18 DIAGNOSIS — M19.91 PRIMARY OSTEOARTHRITIS, UNSPECIFIED SITE: ICD-10-CM

## 2018-12-18 DIAGNOSIS — M54.2 NECK PAIN: ICD-10-CM

## 2018-12-18 DIAGNOSIS — E66.3 OVERWEIGHT (BMI 25.0-29.9): ICD-10-CM

## 2018-12-18 DIAGNOSIS — Z23 NEED FOR SHINGLES VACCINE: ICD-10-CM

## 2018-12-18 DIAGNOSIS — Z76.89 ESTABLISHING CARE WITH NEW DOCTOR, ENCOUNTER FOR: Primary | ICD-10-CM

## 2018-12-18 DIAGNOSIS — M10.9 GOUT, UNSPECIFIED CAUSE, UNSPECIFIED CHRONICITY, UNSPECIFIED SITE: ICD-10-CM

## 2018-12-18 DIAGNOSIS — R01.1 SYSTOLIC MURMUR: ICD-10-CM

## 2018-12-18 DIAGNOSIS — I10 ESSENTIAL HYPERTENSION: ICD-10-CM

## 2018-12-18 DIAGNOSIS — K59.00 CONSTIPATION, UNSPECIFIED CONSTIPATION TYPE: ICD-10-CM

## 2018-12-18 RX ORDER — LOSARTAN POTASSIUM 100 MG/1
TABLET ORAL
Refills: 1 | COMMUNITY
Start: 2018-10-16 | End: 2019-03-19

## 2018-12-18 RX ORDER — IBUPROFEN 600 MG/1
600 TABLET ORAL
Qty: 90 TAB | Refills: 0 | Status: SHIPPED | OUTPATIENT
Start: 2018-12-18 | End: 2019-03-15 | Stop reason: SINTOL

## 2018-12-18 RX ORDER — ALLOPURINOL 100 MG/1
TABLET ORAL
Refills: 0 | COMMUNITY
Start: 2018-12-07 | End: 2019-03-11 | Stop reason: SDUPTHER

## 2018-12-18 RX ORDER — SIMVASTATIN 20 MG/1
20 TABLET, FILM COATED ORAL DAILY
COMMUNITY
Start: 2018-10-03 | End: 2020-06-04 | Stop reason: SDUPTHER

## 2018-12-18 RX ORDER — METOPROLOL SUCCINATE 25 MG/1
TABLET, EXTENDED RELEASE ORAL
Refills: 0 | COMMUNITY
Start: 2018-11-30 | End: 2019-03-11 | Stop reason: SDUPTHER

## 2018-12-18 RX ORDER — ASPIRIN 81 MG/1
TABLET ORAL
Refills: 1 | COMMUNITY
Start: 2018-10-03 | End: 2019-03-19

## 2018-12-18 NOTE — PROGRESS NOTES
Chief Complaint   Patient presents with   [facility] Patient     Patient here to establish care with provider. 1. Have you been to the ER, urgent care clinic since your last visit? Hospitalized since your last visit? NO    2. Have you seen or consulted any other health care providers outside of the [address] [place] [address] since your last visit? Include any pap smears or colon screening.  No

## 2018-12-18 NOTE — PATIENT INSTRUCTIONS
DASH Diet: Care Instructions  Your Care Instructions    The DASH diet is an eating plan that can help lower your blood pressure. DASH stands for Dietary Approaches to Stop Hypertension. Hypertension is high blood pressure. The DASH diet focuses on eating foods that are high in calcium, potassium, and magnesium. These nutrients can lower blood pressure. The foods that are highest in these nutrients are fruits, vegetables, low-fat dairy products, nuts, seeds, and legumes. But taking calcium, potassium, and magnesium supplements instead of eating foods that are high in those nutrients does not have the same effect. The DASH diet also includes whole grains, fish, and poultry. The DASH diet is one of several lifestyle changes your doctor may recommend to lower your high blood pressure. Your doctor may also want you to decrease the amount of sodium in your diet. Lowering sodium while following the DASH diet can lower blood pressure even further than just the DASH diet alone. Follow-up care is a key part of your treatment and safety. Be sure to make and go to all appointments, and call your doctor if you are having problems. It's also a good idea to know your test results and keep a list of the medicines you take. How can you care for yourself at home? Following the DASH diet  · Eat 4 to 5 servings of fruit each day. A serving is 1 medium-sized piece of fruit, ½ cup chopped or canned fruit, 1/4 cup dried fruit, or 4 ounces (½ cup) of fruit juice. Choose fruit more often than fruit juice. · Eat 4 to 5 servings of vegetables each day. A serving is 1 cup of lettuce or raw leafy vegetables, ½ cup of chopped or cooked vegetables, or 4 ounces (½ cup) of vegetable juice. Choose vegetables more often than vegetable juice. · Get 2 to 3 servings of low-fat and fat-free dairy each day. A serving is 8 ounces of milk, 1 cup of yogurt, or 1 ½ ounces of cheese. · Eat 6 to 8 servings of grains each day.  A serving is 1 slice of bread, 1 ounce of dry cereal, or ½ cup of cooked rice, pasta, or cooked cereal. Try to choose whole-grain products as much as possible. · Limit lean meat, poultry, and fish to 2 servings each day. A serving is 3 ounces, about the size of a deck of cards. · Eat 4 to 5 servings of nuts, seeds, and legumes (cooked dried beans, lentils, and split peas) each week. A serving is 1/3 cup of nuts, 2 tablespoons of seeds, or ½ cup of cooked beans or peas. · Limit fats and oils to 2 to 3 servings each day. A serving is 1 teaspoon of vegetable oil or 2 tablespoons of salad dressing. · Limit sweets and added sugars to 5 servings or less a week. A serving is 1 tablespoon jelly or jam, ½ cup sorbet, or 1 cup of lemonade. · Eat less than 2,300 milligrams (mg) of sodium a day. If you limit your sodium to 1,500 mg a day, you can lower your blood pressure even more. Tips for success  · Start small. Do not try to make dramatic changes to your diet all at once. You might feel that you are missing out on your favorite foods and then be more likely to not follow the plan. Make small changes, and stick with them. Once those changes become habit, add a few more changes. · Try some of the following:  ? Make it a goal to eat a fruit or vegetable at every meal and at snacks. This will make it easy to get the recommended amount of fruits and vegetables each day. ? Try yogurt topped with fruit and nuts for a snack or healthy dessert. ? Add lettuce, tomato, cucumber, and onion to sandwiches. ? Combine a ready-made pizza crust with low-fat mozzarella cheese and lots of vegetable toppings. Try using tomatoes, squash, spinach, broccoli, carrots, cauliflower, and onions. ? Have a variety of cut-up vegetables with a low-fat dip as an appetizer instead of chips and dip. ? Sprinkle sunflower seeds or chopped almonds over salads. Or try adding chopped walnuts or almonds to cooked vegetables.   ? Try some vegetarian meals using beans and peas. Add garbanzo or kidney beans to salads. Make burritos and tacos with mashed murillo beans or black beans. Where can you learn more? Go to http://erin-nikole.info/. Enter M717 in the search box to learn more about \"DASH Diet: Care Instructions. \"  Current as of: December 6, 2017  Content Version: 11.8  © 8042-9384 [employer]. Care instructions adapted under license by [employer] (which disclaims liability or warranty for this information). If you have questions about a medical condition or this instruction, always ask your healthcare professional. [address] any warranty or liability for your use of this information. Neck Arthritis: Exercises  Your Care Instructions  Here are some examples of typical rehabilitation exercises for your condition. Start each exercise slowly. Ease off the exercise if you start to have pain. Your doctor or physical therapist will tell you when you can start these exercises and which ones will work best for you. How to do the exercises  Neck stretches to the side    1. This stretch works best if you keep your shoulder down as you lean away from it. To help you remember to do this, start by relaxing your shoulders and lightly holding on to your thighs or your chair. 2. Tilt your head toward your shoulder and hold for 15 to 30 seconds. Let the weight of your head stretch your muscles. 3. Repeat 2 to 4 times toward each shoulder. Chin tuck    1. Lie on the floor with a rolled-up towel under your neck. Your head should be touching the floor. 2. Slowly bring your chin toward your chest.  3. Hold for a count of 6, and then relax for up to 10 seconds. 4. Repeat 8 to 12 times. Active cervical rotation    1. Sit in a firm chair, or stand up straight. 2. Keeping your chin level, turn your head to the right, and hold for 15 to 30 seconds.   3. Turn your head to the left and hold for 15 to 30 seconds. 4. Repeat 2 to 4 times to each side. Shoulder blade squeeze    1. While standing, squeeze your shoulder blades together. 2. Do not raise your shoulders up as you are squeezing. 3. Hold for 6 seconds. 4. Repeat 8 to 12 times. Shoulder rolls    1. Sit comfortably with your feet shoulder-width apart. You can also do this exercise standing up. 2. Roll your shoulders up, then back, and then down in a smooth, circular motion. 3. Repeat 2 to 4 times. Follow-up care is a key part of your treatment and safety. Be sure to make and go to all appointments, and call your doctor if you are having problems. It's also a good idea to know your test results and keep a list of the medicines you take. Where can you learn more? Go to http://erin-nikole.info/. Enter O475 in the search box to learn more about \"Neck Arthritis: Exercises. \"  Current as of: November 29, 2017  Content Version: 11.8  © 9526-8542 [employer]. Care instructions adapted under license by [employer] (which disclaims liability or warranty for this information). If you have questions about a medical condition or this instruction, always ask your healthcare professional. [address] any warranty or liability for your use of this information.

## 2018-12-18 NOTE — PROGRESS NOTES
[facility] Note      Subjective:     Chief Complaint   Patient presents with    New Patient     Patient here to establish care with provider. Houston Cabral is a 80y.o. year old male who presents for evaluation of the following:    Establishment of Care:  Previous PCP: [address]. Team:   Dentist- Name Unknown  Optho- Name Unknown      PMH:   Hypertension: Tx: metoprolol XL  25 + losartan 100mg + ASA 81  Previous Tx: amlodipine, losartan  Denies headache, dizziness    Hyperlipidemia:   No cholesterol value on file  Tx: simvastatin 30mg    Gout:   Tx: allopurinol  Previous Tx: probenecid  Unsure of where this has affected him in the past    Constipation:  Last BM last night  Tx: dulcolax    Prostate Cancer:   Charted in [address] but patient denies history of this    Previous Vitamin D supplementation, Not currently taking    Acute Concerns:  Neck Pain:   Onset 2-3 months ago  Cannot lift arms overhead, chronic  No radiation of pain, fever, fall, weakness      Social:   Works, [employer]  Lives alone. Wife  2 year ag          Review of Systems   Pertinent positives and negative per HPI. All other systems  reviewed are negative for a Comprehensive ROS (10+).        Past Medical History:   Diagnosis Date    Cancer [facility])     prostate    Constipation     Gout     Hyperlipemia     Hypertension         Social History     Socioeconomic History    Marital status:      Spouse name: Not on file    Number of children: Not on file    Years of education: Not on file    Highest education level: Not on file   Social Needs    Financial resource strain: Not on file    Food insecurity - worry: Not on file    Food insecurity - inability: Not on file   [employer] needs - medical: Not on file   [employer] needs - non-medical: Not on file   Occupational History    Not on file   Tobacco Use    Smoking status: Never Smoker    Smokeless tobacco: Never Used Substance and Sexual Activity    Alcohol use: No    Drug use: No    Sexual activity: Not on file   Other Topics Concern    Not on file   Social History Narrative    Not on file       Current Outpatient Medications   Medication Sig    simvastatin (ZOCOR) 20 mg tablet     losartan (COZAAR) 100 mg tablet TK 1 T PO D    metoprolol succinate (TOPROL-XL) 25 mg XL tablet TK 1 T PO QPM    allopurinol (ZYLOPRIM) 100 mg tablet TK 1 T PO D    aspirin delayed-release 81 mg tablet TK 1 T PO QD    varicella-zoster recombinant, PF, (SHINGRIX, PF,) 50 mcg/0.5 mL susr injection 0.5 mL by IntraMUSCular route once for 1 dose.  OTHER,NON-FORMULARY, Take 1 Tab by mouth daily. Patient states he takes an additional supplement daily. Name unknown.  amLODIPine (NORVASC) 5 mg tablet Take 5 mg by mouth daily.  potassium chloride SR (KLOR-CON 10) 10 mEq tablet Take 20 mEq by mouth daily. Last fill per Rx Query 4/25/17 for #30 tablets.  probenecid-colchicine (COLBENEMID) 500-0.5 mg per tablet Take 1 Tab by mouth two (2) times a day.  cholecalciferol (VITAMIN D3) 1,000 unit tablet Take 1,000 Units by mouth daily. Patient believes he takes 1000 units daily.  bisacodyl (DULCOLAX, BISACODYL,) 5 mg EC tablet Take 5 mg by mouth every fourty-eight (48) hours. No current facility-administered medications for this visit. Objective:     Vitals:    12/18/18 1426   BP: 136/85   Pulse: 87   Resp: 18   Temp: 98 °F (36.7 °C)   TempSrc: Oral   SpO2: 98%   Weight: 194 lb (88 kg)   Height: 5' 10\" (1.778 m)       Physical Examination:  General: Alert, cooperative, no distress, appears stated age. Obese  Eyes: Conjunctivae clear. PERRL, EOMs intact. Ears: Normal external ear canals both ears. Nose: Nares normal. Septum midline. Mucosa normal. No drainage or sinus tenderness. Mouth/Throat: Lips, mucosa, and tongue normal.   Neck: Supple, symmetrical, trachea midline, no adenopathy.  No thyroid enlargement/tenderness/nodules  - Limited flexion and rotation  Back: Symmetric, no curvature. Lungs: Clear to auscultation bilaterally. Normal inspiratory and expiratory ratio. Heart: Regular rate and rhythm, III/VI systolic murmur  Abdomen: Soft, non-tender. Bowel sounds normal. No masses or organomegaly. Extremities: Extremities normal, atraumatic, no cyanosis or edema. MSK: Bitaleral frozen shoulder, unable to abduct beyond 90 degrees  Pulses: 2+ and symmetric all extremities. Skin: Skin color, texture, turgor normal. No rashes or lesions on exposed skin. Lymph nodes: Cervical, supraclavicular nodes normal.  Neurologic: CNII-XII intact. No visits with results within 3 Month(s) from this visit. Latest known visit with results is:   Hospital Outpatient Visit on 07/31/2018   Component Date Value Ref Range Status    Creatinine (POC) 07/31/2018 1.7* 0.6 - 1.3 mg/dL Final    GFRAA, POC 07/31/2018 47* >60 ml/min/1.73m2 Final    GFRNA, POC 07/31/2018 39* >60 ml/min/1.73m2 Final    Comment: Estimated GFR is calculated using the IDMS-traceable Modification of Diet in Renal Disease (MDRD) Study equation, reported for both  Americans (GFRAA) and non- Americans (GFRNA), and normalized to 1.73m2 body surface area. The physician must decide which value applies to the patient. The MDRD study equation should only be used in individuals age 25 or older. It has not been validated for the following: pregnant women, patients with serious comorbid conditions, or on certain medications, or persons with extremes of body size, muscle mass, or nutritional status. Assessment/ Plan:   Diagnoses and all orders for this visit:    1. Establishing care with new doctor, encounter for  -     METABOLIC PANEL, COMPREHENSIVE  -     CBC WITH AUTOMATED DIFF  -     LIPID PANEL    2.  Essential hypertension  -     METABOLIC PANEL, COMPREHENSIVE  -     CBC WITH AUTOMATED DIFF  -     LIPID PANEL    3. Primary osteoarthritis, unspecified site  -     REFERRAL TO ORTHOPEDIC SURGERY    4. Gout, unspecified cause, unspecified chronicity, unspecified site    5. Constipation, unspecified constipation type  -     polyethylene glycol (MIRALAX) 17 gram packet; Take 1 Packet by mouth daily. 6. Chronic constipation  -     XR ABD (KUB); Future    7. Neck pain  -     ibuprofen (MOTRIN) 600 mg tablet; Take 1 Tab by mouth every eight (8) hours as needed for Pain.  -     REFERRAL TO ORTHOPEDIC SURGERY    8. Need for shingles vaccine  -     varicella-zoster recombinant, PF, (SHINGRIX, PF,) 50 mcg/0.5 mL susr injection; 0.5 mL by IntraMUSCular route once for 1 dose. 9. Overweight (BMI 25.0-29.9)    10. Abnormal abdominal x-ray    11. Systolic murmur      Establishing care regarding. Past medical history reviewed. Patient is poor historian with poor recollection of medical history. Need previous PCP records to verify, records requested. Blood pressure metoprolol + losartan. Neck and shoulder pain likely from primary osteoarthritis. Noted history of gout on allopurinol. X-ray to evaluate. Start ibuprofen for pain. Referred to orthopedic surgeon. Chronic constipation. Continue add MiraLAX. Shingles vaccine prescription provided. Diet and lifestyle modification encouraged for weight loss. Systolic murmur noted. No current symptoms of CHF. Need MWV. I have discussed the diagnosis with the patient and the intended plan as seen in the above orders. The patient has been offered or received an after-visit summary and questions were answered concerning future plans. I have discussed medication side effects and warnings with the patient as well. Follow-up Disposition:  Return for Physical exam, MWV.       Signed,    Caroline Love MD  12/18/2018

## 2018-12-21 RX ORDER — POLYETHYLENE GLYCOL 3350 17 G/17G
17 POWDER, FOR SOLUTION ORAL DAILY
Qty: 90 PACKET | Refills: 2 | Status: SHIPPED | OUTPATIENT
Start: 2018-12-21 | End: 2020-06-30

## 2018-12-30 PROBLEM — R01.1 SYSTOLIC MURMUR: Status: ACTIVE | Noted: 2018-12-30

## 2019-01-07 ENCOUNTER — OFFICE VISIT (OUTPATIENT)
Dept: FAMILY MEDICINE CLINIC | Age: 84
End: 2019-01-07

## 2019-01-07 ENCOUNTER — HOSPITAL ENCOUNTER (OUTPATIENT)
Dept: LAB | Age: 84
Discharge: HOME | End: 2019-01-07
Payer: MEDICARE

## 2019-01-07 VITALS
RESPIRATION RATE: 18 BRPM | TEMPERATURE: 98.1 F | BODY MASS INDEX: 27.92 KG/M2 | HEART RATE: 76 BPM | SYSTOLIC BLOOD PRESSURE: 135 MMHG | HEIGHT: 70 IN | DIASTOLIC BLOOD PRESSURE: 86 MMHG | OXYGEN SATURATION: 98 % | WEIGHT: 195 LBS

## 2019-01-07 DIAGNOSIS — R30.0 DYSURIA: ICD-10-CM

## 2019-01-07 DIAGNOSIS — Z85.46 HISTORY OF PROSTATE CANCER: ICD-10-CM

## 2019-01-07 DIAGNOSIS — Z00.00 MEDICARE ANNUAL WELLNESS VISIT, SUBSEQUENT: Primary | ICD-10-CM

## 2019-01-07 DIAGNOSIS — R41.3 MEMORY DEFICIT: ICD-10-CM

## 2019-01-07 DIAGNOSIS — R41.89 COGNITIVE IMPAIRMENT: ICD-10-CM

## 2019-01-07 DIAGNOSIS — E27.8 ADRENAL MASS, RIGHT (HCC): ICD-10-CM

## 2019-01-07 LAB
BILIRUB UR QL STRIP: NEGATIVE
GLUCOSE UR-MCNC: NEGATIVE MG/DL
KETONES P FAST UR STRIP-MCNC: NEGATIVE MG/DL
PH UR STRIP: 7 [PH] (ref 4.6–8)
PROT UR QL STRIP: NORMAL
SP GR UR STRIP: 1.01 (ref 1–1.03)
UA UROBILINOGEN AMB POC: NORMAL (ref 0.2–1)
URINALYSIS CLARITY POC: CLEAR
URINALYSIS COLOR POC: YELLOW
URINE BLOOD POC: NORMAL
URINE LEUKOCYTES POC: NEGATIVE
URINE NITRITES POC: NEGATIVE

## 2019-01-07 PROCEDURE — 87086 URINE CULTURE/COLONY COUNT: CPT

## 2019-01-07 PROCEDURE — 82607 VITAMIN B-12: CPT

## 2019-01-07 RX ORDER — ACETAMINOPHEN 500 MG
TABLET ORAL
COMMUNITY
End: 2020-06-30

## 2019-01-07 NOTE — PATIENT INSTRUCTIONS
Well Visit, Over 72: Care Instructions Your Care Instructions Physical exams can help you stay healthy. Your doctor has checked your overall health and may have suggested ways to take good care of yourself. He or she also may have recommended tests. At home, you can help prevent illness with healthy eating, regular exercise, and other steps. Follow-up care is a key part of your treatment and safety. Be sure to make and go to all appointments, and call your doctor if you are having problems. It's also a good idea to know your test results and keep a list of the medicines you take. How can you care for yourself at home? · Reach and stay at a healthy weight. This will lower your risk for many problems, such as obesity, diabetes, heart disease, and high blood pressure. · Get at least 30 minutes of exercise on most days of the week. Walking is a good choice. You also may want to do other activities, such as running, swimming, cycling, or playing tennis or team sports. · Do not smoke. Smoking can make health problems worse. If you need help quitting, talk to your doctor about stop-smoking programs and medicines. These can increase your chances of quitting for good. · Protect your skin from too much sun. When you're outdoors from 10 a.m. to 4 p.m., stay in the shade or cover up with clothing and a hat with a wide brim. Wear sunglasses that block UV rays. Even when it's cloudy, put broad-spectrum sunscreen (SPF 30 or higher) on any exposed skin. · See a dentist one or two times a year for checkups and to have your teeth cleaned. · Wear a seat belt in the car. · Limit alcohol to 2 drinks a day for men and 1 drink a day for women. Too much alcohol can cause health problems. Follow your doctor's advice about when to have certain tests. These tests can spot problems early. For men and women · Cholesterol.  Your doctor will tell you how often to have this done based on your overall health and other things that can increase your risk for heart attack and stroke. · Blood pressure. Have your blood pressure checked during a routine doctor visit. Your doctor will tell you how often to check your blood pressure based on your age, your blood pressure results, and other factors. · Diabetes. Ask your doctor whether you should have tests for diabetes. · Vision. Experts recommend that you have yearly exams for glaucoma and other age-related eye problems. · Hearing. Tell your doctor if you notice any change in your hearing. You can have tests to find out how well you hear. · Colon cancer tests. Keep having colon cancer tests as your doctor recommends. You can have one of several types of tests. · Heart attack and stroke risk. At least every 4 to 6 years, you should have your risk for heart attack and stroke assessed. Your doctor uses factors such as your age, blood pressure, cholesterol, and whether you smoke or have diabetes to show what your risk for a heart attack or stroke is over the next 10 years. · Osteoporosis. Talk to your doctor about whether you should have a bone density test to find out whether you have thinning bones. Also ask your doctor about whether you should take calcium and vitamin D supplements. For women · Pap test and pelvic exam. You may no longer need a Pap test. Talk with your doctor about whether to stop or continue to have Pap tests. · Breast exam and mammogram. Ask how often you should have a mammogram, which is an X-ray of your breasts. A mammogram can spot breast cancer before it can be felt and when it is easiest to treat. · Thyroid disease. Talk to your doctor about whether to have your thyroid checked as part of a regular physical exam. Women have an increased chance of a thyroid problem. For men · Prostate exam. Talk to your doctor about whether you should have a blood test (called a PSA test) for prostate cancer.  Experts disagree on whether men should have this test. Some experts recommend that you discuss the benefits and risks of the test with your doctor. · Abdominal aortic aneurysm. Ask your doctor whether you should have a test to check for an aneurysm. You may need a test if you ever smoked or if your parent, brother, sister, or child has had an aneurysm. When should you call for help? Watch closely for changes in your health, and be sure to contact your doctor if you have any problems or symptoms that concern you. Where can you learn more? Go to http://erin-nikole.info/. Enter B006 in the search box to learn more about \"Well Visit, Over 65: Care Instructions. \" Current as of: March 29, 2018 Content Version: 11.8 © 3460-5990 [employer]. Care instructions adapted under license by [employer] (which disclaims liability or warranty for this information). If you have questions about a medical condition or this instruction, always ask your healthcare professional. [place] [address] any warranty or liability for your use of this information.

## 2019-01-07 NOTE — PROGRESS NOTES
Chief Complaint Patient presents with [facility] Annual Wellness Visit 1. Have you been to the ER, urgent care clinic since your last visit? Hospitalized since your last visit? No 
 
2. Have you seen or consulted any other health care providers outside of the [facility] since your last visit? Include any pap smears or colon screening.  No

## 2019-01-07 NOTE — PROGRESS NOTES
[facility] Note This is the Subsequent Medicare Annual Wellness Exam, performed 12 months or more after the Initial AWV or the last Subsequent AWV Subjective: Chief Complaint Patient presents with Kael Gandhi Annual Wellness Visit Francisco Johnson is a 80y.o. year old male who presents for evaluation of the following: 
 
Acute Concerns:  
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 No mets mentionine read of on CT scan spine in 10/2018 [facility] OF THE [facility] 8/30 PMH: 
Adrenal mass Normal metanephrines and aldosteron in 2017 No hotflashes, sweats, BP change, weight change, headache Dysuira: Onset for past counple weeks, intermittend No hematuria Has had prostatectomy in the past for prostate cancer Health Maintenance:  
Diet: unrestricted Activity: None TDaP: Advised patient request from pharmacy Influenza: Not due Pneumovax: Not due Prevnar @65: Advised patient request from pharmacy Shingles @60: Advised patient request from pharmacy. Smoker? No 
 
 
Review of Systems Pertinent positives and negative per HPI. All other systems  reviewed are negative for a Comprehensive ROS (10+). Past Medical History:  
Diagnosis Date  Cancer [facility])   
 prostate  Constipation  Gout  Hyperlipemia  Hypertension Social History Socioeconomic History  Marital status:  Spouse name: Not on file  Number of children: Not on file  Years of education: Not on file  Highest education level: Not on file Social Needs  Financial resource strain: Not on file  Food insecurity - worry: Not on file  Food insecurity - inability: Not on file  Transportation needs - medical: Not on file  Transportation needs - non-medical: Not on file Occupational History  Not on file Tobacco Use  Smoking status: Never Smoker  Smokeless tobacco: Never Used Substance and Sexual Activity  Alcohol use: No  
 Drug use: No  
 Sexual activity: Not on file Other Topics Concern  Not on file Social History Narrative  Not on file No family history on file. Current Outpatient Medications Medication Sig  
 acetaminophen (TYLENOL EXTRA STRENGTH) 500 mg tablet Take  by mouth every six (6) hours as needed for Pain.  simvastatin (ZOCOR) 20 mg tablet  losartan (COZAAR) 100 mg tablet TK 1 T PO D  
 metoprolol succinate (TOPROL-XL) 25 mg XL tablet TK 1 T PO QPM  
 allopurinol (ZYLOPRIM) 100 mg tablet TK 1 T PO D  
 aspirin delayed-release 81 mg tablet TK 1 T PO QD  
 ibuprofen (MOTRIN) 600 mg tablet Take 1 Tab by mouth every eight (8) hours as needed for Pain.  bisacodyl (DULCOLAX, BISACODYL,) 5 mg EC tablet Take 5 mg by mouth every fourty-eight (48) hours.  polyethylene glycol (MIRALAX) 17 gram packet Take 1 Packet by mouth daily.  amLODIPine (NORVASC) 5 mg tablet Take 5 mg by mouth daily.  OTHER,NON-FORMULARY, Take 1 Tab by mouth daily. Patient states he takes an additional supplement daily. Name unknown. No current facility-administered medications for this visit. Depression Risk Factor Screening: PHQ over the last two weeks 12/18/2018 Little interest or pleasure in doing things Not at all Feeling down, depressed, irritable, or hopeless Not at all Total Score PHQ 2 0 Alcohol Risk Factor Screening: You do not drink alcohol or very rarely. Functional Ability and Level of Safety:  
Hearing Loss The patient needs further evaluation. Has been tested but has not decided if he want hearing aid yetr Activities of Daily Living Lives alone in a 2 story house The home contains: grab bars  In shower, haindrails on stairs ADL Assessment 1/7/2019 Feeding yourself No Help Needed Getting from bed to chair No Help Needed Getting dressed No Help Needed Bathing or showering No Help Needed Walk across the room (includes cane/walker) No Help Needed Using the telphone No Help Needed Taking your medications No Help Needed Preparing meals No Help Needed Managing money (expenses/bills) No Help Needed Moderately strenuous housework (laundry) No Help Needed Shopping for personal items (toiletries/medicines) No Help Needed Shopping for groceries No Help Needed Driving No Help Needed Climbing a flight of stairs No Help Needed Getting to places beyond walking distances No Help Needed Fall Risk Fall Risk Assessment, last 12 mths 12/18/2018 Able to walk? Yes Fall in past 12 months? No  
 
 
Abuse Screen Patient is not abused Cognitive Screening Evaluation of Cognitive Function: 
Has your family/caregiver stated any concerns about your memory: yes Normal, Abnormal  
MOCA 8/30 Advanced Care Planning Patient was offered the opportunity to discuss advance care planning:  yes Does patient have an Advance Directive:  no If no, did you provide information on [address] with Nurse Navigators? yes DaughterKaylin. [phone] Patient gave verbal consent to discuss care with his daughter Ana Calvo Spoke with becky via telephone following visit She has noticed some problems with patient;s long term memory. States he does well with short term memory. Children visit his home a couple times per week Daughter agreed with agreed with ADL assessment of total independence She noted he is not going to [employer] as much Patient Care Team  
Patient Care Team: 
Marlen Brooks MD as PCP - [facility]) Objective:  
 
Vitals:  
 01/07/19 0340 BP: 135/86 Pulse: 76 Resp: 18 Temp: 98.1 °F (36.7 °C) TempSrc: Oral  
SpO2: 98% Weight: 195 lb (88.5 kg) Height: 5' 10\" (1.778 m) Physical Examination: 
General: Alert, cooperative, no distress, appears stated age. Obese Eyes: Conjunctivae clear. PERRL, EOMs intact. Ears: Normal external ear canals both ears. Nose: Nares normal. Septum midline. Mucosa normal. No drainage or sinus tenderness. Mouth/Throat: Lips, mucosa, and tongue normal.  
Neck: Supple, symmetrical, trachea midline, no adenopathy. No thyroid enlargement/tenderness/nodules - Limited flexion and rotation Back: Symmetric, no curvature. Lungs: Clear to auscultation bilaterally. Normal inspiratory and expiratory ratio. Heart: Regular rate and rhythm, III/VI systolic murmur Abdomen: Soft, non-tender. Bowel sounds normal. No masses or organomegaly. Extremities: Extremities normal, atraumatic, no cyanosis or edema. MSK: Bitaleral frozen shoulder, unable to abduct beyond 90 degrees Pulses: 2+ and symmetric all extremities. Skin: Skin color, texture, turgor normal. No rashes or lesions on exposed skin. Lymph nodes: Cervical, supraclavicular nodes normal. 
Neurologic: CNII-XII intact. Gait steady and unassisted. Strength and sensation intact grossly. No visits with results within 3 Month(s) from this visit. Latest known visit with results is:  
Hospital Outpatient Visit on 07/31/2018 Component Date Value Ref Range Status  Creatinine (POC) 07/31/2018 1.7* 0.6 - 1.3 mg/dL Final  
 GFRAA, POC 07/31/2018 47* >60 ml/min/1.73m2 Final  
 GFRNA, POC 07/31/2018 39* >60 ml/min/1.73m2 Final  
 Comment: Estimated GFR is calculated using the IDMS-traceable Modification of Diet in Renal Disease (MDRD) Study equation, reported for both  Americans (GFRAA) and non- Americans (GFRNA), and normalized to 1.73m2 body surface area. The physician must decide which value applies to the patient.  
The MDRD study equation should only be used in individuals age 25 or older. It has not been validated for the following: pregnant women, patients with serious comorbid conditions, or on certain medications, or persons with extremes of body size, muscle mass, or nutritional status. Assessment/ Plan:  
Diagnoses and all orders for this visit: 
 
1. Medicare annual wellness visit, subsequent 2. Cognitive impairment 3. Memory deficit 
-     REFERRAL TO NEUROLOGY -     VITAMIN B12 & FOLATE 4. Dysuria -     AMB POC URINALYSIS DIP STICK AUTO W/O MICRO 
-     CULTURE, URINE 5. Adrenal mass, right ([address] 6. History of prostate cancer -     PSA, DIAGNOSTIC (PROSTATE SPECIFIC AG) Medicare wellness visit completed. Physical function intact but cognitive impairment apparent. Referral to neurologist for confirmation of diagnosis and discussion of treatment options. Labs to rule out secondary causes. Education and counseling provided: 
Are appropriate based on today's review and evaluation Need Shingles, TDaP and PCV from pharmacy. Dysuria present. Subacute. Urine dip equivocal. Urine culture to confirm before treatment given may be chronic complication of prostatectomy. Noted history of asymptomatic adrenal mass. Annual PSA to monitor after prostatectomy for prostate cancer. Health Maintenance Due Topic Date Due  
 DTaP/Tdap/Td series (1 - Tdap) 03/09/1954  Shingrix Vaccine Age 50> (1 of 2) 03/09/1983  GLAUCOMA SCREENING Q2Y  03/09/1998  Pneumococcal 65+ Low/Medium Risk (1 of 2 - PCV13) 03/09/1998  MEDICARE YEARLY EXAM  10/29/2018 I have discussed the diagnosis with the patient and the intended plan as seen in the above orders. The patient has received an after-visit summary and questions were answered concerning future plans. I have discussed medication side effects and warnings with the patient as well. Follow-up Disposition: 
Return in about 3 months (around 4/7/2019) for Follow Up.  
 
 
Signed, 
 
 Jae Jeffers MD 
1/7/2019

## 2019-01-08 PROBLEM — R41.89 COGNITIVE IMPAIRMENT: Status: ACTIVE | Noted: 2019-01-08

## 2019-01-08 LAB
BACTERIA UR CULT: NO GROWTH
FOLATE SERPL-MCNC: 12.2 NG/ML
VIT B12 SERPL-MCNC: 865 PG/ML (ref 232–1245)

## 2019-01-08 NOTE — ACP (ADVANCE CARE PLANNING)
Advance Care Planning (ACP) Provider Conversation Snapshot    Date of ACP Conversation: 01/08/19  Persons included in Conversation:  patient  Length of ACP Conversation in minutes:  <16 minutes (Non-Billable)    Authorized Decision Maker (if patient is incapable of making informed decisions): This person is: Other Legally Authorized Decision Maker (e.g. Next of Kin)  Evan Yoon. [phone]        For Patients with Decision Making Capacity:   Patient declined conversation of specific goals today.      Conversation Outcomes / Follow-Up Plan:   Advised to discuss further with daughter and nurse navigators.     -Michelle San MD

## 2019-01-31 ENCOUNTER — OFFICE VISIT (OUTPATIENT)
Dept: NEUROLOGY | Age: 84
End: 2019-01-31

## 2019-01-31 VITALS
RESPIRATION RATE: 16 BRPM | OXYGEN SATURATION: 97 % | SYSTOLIC BLOOD PRESSURE: 150 MMHG | HEART RATE: 90 BPM | WEIGHT: 188 LBS | DIASTOLIC BLOOD PRESSURE: 100 MMHG | BODY MASS INDEX: 26.92 KG/M2 | HEIGHT: 70 IN

## 2019-01-31 DIAGNOSIS — F03.90 DEMENTIA WITHOUT BEHAVIORAL DISTURBANCE, UNSPECIFIED DEMENTIA TYPE: ICD-10-CM

## 2019-01-31 DIAGNOSIS — R41.3 MEMORY LOSS: Primary | ICD-10-CM

## 2019-01-31 RX ORDER — DONEPEZIL HYDROCHLORIDE 5 MG/1
5 TABLET, FILM COATED ORAL
Qty: 30 TAB | Refills: 0 | Status: SHIPPED | OUTPATIENT
Start: 2019-01-31 | End: 2019-03-11

## 2019-01-31 NOTE — PATIENT INSTRUCTIONS
A Healthy Lifestyle: Care Instructions Your Care Instructions A healthy lifestyle can help you feel good, stay at a healthy weight, and have plenty of energy for both work and play. A healthy lifestyle is something you can share with your whole family. A healthy lifestyle also can lower your risk for serious health problems, such as high blood pressure, heart disease, and diabetes. You can follow a few steps listed below to improve your health and the health of your family. Follow-up care is a key part of your treatment and safety. Be sure to make and go to all appointments, and call your doctor if you are having problems. It's also a good idea to know your test results and keep a list of the medicines you take. How can you care for yourself at home? · Do not eat too much sugar, fat, or fast foods. You can still have dessert and treats now and then. The goal is moderation. · Start small to improve your eating habits. Pay attention to portion sizes, drink less juice and soda pop, and eat more fruits and vegetables. ? Eat a healthy amount of food. A 3-ounce serving of meat, for example, is about the size of a deck of cards. Fill the rest of your plate with vegetables and whole grains. ? Limit the amount of soda and sports drinks you have every day. Drink more water when you are thirsty. ? Eat at least 5 servings of fruits and vegetables every day. It may seem like a lot, but it is not hard to reach this goal. A serving or helping is 1 piece of fruit, 1 cup of vegetables, or 2 cups of leafy, raw vegetables. Have an apple or some carrot sticks as an afternoon snack instead of a candy bar. Try to have fruits and/or vegetables at every meal. 
· Make exercise part of your daily routine. You may want to start with simple activities, such as walking, bicycling, or slow swimming. Try to be active 30 to 60 minutes every day.  You do not need to do all 30 to 60 minutes all at once. For example, you can exercise 3 times a day for 10 or 20 minutes. Moderate exercise is safe for most people, but it is always a good idea to talk to your doctor before starting an exercise program. 
· Keep moving. Evita Francisco the lawn, work in the garden, or [employer]. Take the stairs instead of the elevator at work. · If you smoke, quit. People who smoke have an increased risk for heart attack, stroke, cancer, and other lung illnesses. Quitting is hard, but there are ways to boost your chance of quitting tobacco for good. ? Use nicotine gum, patches, or lozenges. ? Ask your doctor about stop-smoking programs and medicines. ? Keep trying. In addition to reducing your risk of diseases in the future, you will notice some benefits soon after you stop using tobacco. If you have shortness of breath or asthma symptoms, they will likely get better within a few weeks after you quit. · Limit how much alcohol you drink. Moderate amounts of alcohol (up to 2 drinks a day for men, 1 drink a day for women) are okay. But drinking too much can lead to liver problems, high blood pressure, and other health problems. Family health If you have a family, there are many things you can do together to improve your health. · Eat meals together as a family as often as possible. · Eat healthy foods. This includes fruits, vegetables, lean meats and dairy, and whole grains. · Include your family in your fitness plan. Most people think of activities such as jogging or tennis as the way to fitness, but there are many ways you and your family can be more active. Anything that makes you breathe hard and gets your heart pumping is exercise. Here are some tips: 
? Walk to do errands or to take your child to school or the bus. 
? Go for a family bike ride after dinner instead of watching TV. Where can you learn more? Go to http://erin-nikole.info/. Enter M623 in the search box to learn more about \"A Healthy Lifestyle: Care Instructions. \" Current as of: September 11, 2018 Content Version: 11.9 © 8096-2283 [employer]. Care instructions adapted under license by [employer] (which disclaims liability or warranty for this information). If you have questions about a medical condition or this instruction, always ask your healthcare professional. [address] any warranty or liability for your use of this information. PRESCRIPTION REFILL POLICY [facility] Statement to Patients April 1, 2014 In an effort to ensure the large volume of patient prescription refills is processed in the most efficient and expeditious manner, we are asking our patients to assist us by calling your Pharmacy for all prescription refills, this will include also your  Mail Order Pharmacy. The pharmacy will contact our office electronically to continue the refill process. Please do not wait until the last minute to call your pharmacy. We need at least 48 hours (2days) to fill prescriptions. We also encourage you to call your pharmacy before going to  your prescription to make sure it is ready. With regard to controlled substance prescription refill requests (narcotic refills) that need to be picked up at our office, we ask your cooperation by providing us with at least 72 hours (3days) notice that you will need a refill. We will not refill narcotic prescription refill requests after 4:00pm on any weekday, Monday through Thursday, or after 2:00pm on Fridays, or on the weekends. We encourage everyone to explore another way of getting your prescription refill request processed using [employer], our patient web portal through our electronic medical record system.  iPositiont is an efficient and effective way to communicate your medication request directly to the office and downloadable as an aristeo on your smart phone . [employer] also features a review functionality that allows you to view your medication list as well as leave messages for your physician. Are you ready to get connected? If so please review the attatched instructions or speak to any of our staff to get you set up right away! Thank you so much for your cooperation. Should you have any questions please contact our Practice Administrator. The Physicians and Staff,  [facility]

## 2019-01-31 NOTE — PROGRESS NOTES
Chief Complaint Patient presents with  Memory Loss HISTORY OF PRESENT ILLNESS Prema Nuñez is a 80 y.o. male who came in for neurological consultation requested by Dr. Rayshawn Tejeda. He came along with his daughter. He has been having memory issues and limitations with his daily activities for the past year or so per daughter. He lives independently in his own home. She has noticed that he has no interest in doing activities that he has always done and mostly sits around during the day. He still drives and takes care of himself. His wife passed away about 2 years ago after which he started having more difficulties. 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 He and his daughter only report difficulties with long-term memory and that he appears confused at times about how to do things. He does not interact and socialize much. His daughter helps with paying bills etc. but he can do simple money transactions. Denies any difficulties with vision, swallowing, gait. No recent falls. No changes in bladder or bowel control. Past Medical History:  
Diagnosis Date  Cancer [facility])   
 prostate  Constipation  Gout  Hyperlipemia  Hypertension Current Outpatient Medications Medication Sig  
 donepezil (ARICEPT) 5 mg tablet Take 1 Tab by mouth nightly.  polyethylene glycol (MIRALAX) 17 gram packet Take 1 Packet by mouth daily.  simvastatin (ZOCOR) 20 mg tablet  losartan (COZAAR) 100 mg tablet TK 1 T PO D  
 metoprolol succinate (TOPROL-XL) 25 mg XL tablet TK 1 T PO QPM  
 allopurinol (ZYLOPRIM) 100 mg tablet TK 1 T PO D  
 aspirin delayed-release 81 mg tablet TK 1 T PO QD  
 ibuprofen (MOTRIN) 600 mg tablet Take 1 Tab by mouth every eight (8) hours as needed for Pain.  amLODIPine (NORVASC) 5 mg tablet Take 5 mg by mouth daily.  acetaminophen (TYLENOL EXTRA STRENGTH) 500 mg tablet Take  by mouth every six (6) hours as needed for Pain.  OTHER,NON-FORMULARY, Take 1 Tab by mouth daily. Patient states he takes an additional supplement daily. Name unknown.  bisacodyl (DULCOLAX, BISACODYL,) 5 mg EC tablet Take 5 mg by mouth every fourty-eight (48) hours. No current facility-administered medications for this visit. No Known Allergies History reviewed. No pertinent family history. Social History Tobacco Use  Smoking status: Never Smoker  Smokeless tobacco: Never Used Substance Use Topics  Alcohol use: No  
 Drug use: No  
 
Past Surgical History:  
Procedure Laterality Date  HX PROSTATECTOMY  HX UROLOGICAL    
 prostate removed REVIEW OF SYSTEMS Review of Systems - History obtained from the patient Psychological ROS: positive for - sadness ENT ROS: negative Hematological and Lymphatic ROS: negative Endocrine ROS: negative Respiratory ROS: no cough, shortness of breath, or wheezing Cardiovascular ROS: no chest pain or dyspnea on exertion Gastrointestinal ROS: no abdominal pain, change in bowel habits, or black or bloody stools Genito-Urinary ROS: no dysuria, trouble voiding, or hematuria Musculoskeletal ROS: negative Dermatological ROS: negative PHYSICAL EXAMINATION:   
Visit Vitals BP (!) 150/100 Pulse 90 Resp 16 Ht 5' 10\" (1.778 m) Wt 85.3 kg (188 lb) SpO2 97% BMI 26.98 kg/m² General:  Well defined, nourished, and groomed individual in no acute distress. Neck: Supple, nontender, no bruits, no pain with resistance to active range of motion. Heart: Regular rate and rhythm, no murmurs, rub, or gallop. Normal S1S2. Lungs:  Clear to auscultation bilaterally with equal chest expansion, no cough, no wheeze Musculoskeletal:  Extremities revealed no edema and had full range of motion of joints. Psych:  Good mood and bright affect NEUROLOGICAL EXAMINATION:    
Mental Status:   Alert and oriented to person only.   He scored 9/30 on Chris cognitive assessment. He has significant difficulties with visuospatial, executive function, naming, attention, calculations, abstraction and short-term/delayed recall Cranial Nerves:   
II, III, IV, VI:  Visual acuity grossly intact. Visual fields are normal.   
Pupils are equal, round, and reactive to light and accommodation. Extra-ocular movements are full and fluid. Fundoscopic exam was benign, no ptosis or nystagmus. V-XII: Hearing is grossly intact. Facial features are symmetric, with normal sensation and strength. The palate rises symmetrically and the tongue protrudes midline. Sternocleidomastoids 5/5. Motor Examination: Normal tone, bulk, and strength. 5/5 muscle strength throughout. No cogwheel rigidity or clonus present. Sensory exam:  Normal throughout to pinprick, temperature, and vibration sense. Normal proprioception. Coordination:  Heel-to-shin was smooth and symmetrical bilaterally. Finger to nose and rapid arm movement testing was normal.   No resting or intention tremor Gait and Station:  Steady while walking on toes, heels, and with tandem walking. Normal arm swing. No Rhomberg or pronator drift. No muscle wasting or fasiculations noted. Reflexes:  DTRs 2+ throughout. Toes downgoing. Jerral Hones / IMAGING Lab Results Component Value Date/Time Vitamin B12 865 01/07/2019 11:29 AM  
 Folate 12.2 01/07/2019 11:29 AM  
 
Lab Results Component Value Date/Time WBC 10.8 07/19/2017 02:48 AM  
 HGB 12.8 07/19/2017 02:48 AM  
 HCT 37.0 07/19/2017 02:48 AM  
 PLATELET 625 29/44/7171 02:48 AM  
 MCV 93.2 07/19/2017 02:48 AM  
 
Lab Results Component Value Date/Time  Sodium 139 07/21/2017 02:14 AM  
 Potassium 3.5 07/21/2017 02:14 AM  
 Chloride 104 07/21/2017 02:14 AM  
 CO2 30 07/21/2017 02:14 AM  
 Anion gap 5 07/21/2017 02:14 AM  
 Glucose 89 07/21/2017 02:14 AM  
 BUN 26 (H) 07/21/2017 02:14 AM  
 Creatinine 1.61 (H) 07/21/2017 02:14 AM  
 BUN/Creatinine ratio 16 07/21/2017 02:14 AM  
 GFR est AA 50 (L) 07/21/2017 02:14 AM  
 GFR est non-AA 41 (L) 07/21/2017 02:14 AM  
 Calcium 8.6 07/21/2017 02:14 AM  
 Bilirubin, total 0.6 07/18/2017 01:30 PM  
 AST (SGOT) 15 07/18/2017 01:30 PM  
 Alk. phosphatase 73 07/18/2017 01:30 PM  
 Protein, total 7.8 07/18/2017 01:30 PM  
 Albumin 3.0 (L) 07/18/2017 01:30 PM  
 Globulin 4.8 (H) 07/18/2017 01:30 PM  
 A-G Ratio 0.6 (L) 07/18/2017 01:30 PM  
 ALT (SGPT) 20 07/18/2017 01:30 PM  
 
 
ASSESSMENT 
  ICD-10-CM ICD-9-CM 1. Memory loss R41.3 780.93 CT HEAD WO CONT  
   TSH 3RD GENERATION  
   donepezil (ARICEPT) 5 mg tablet 2. Dementia without behavioral disturbance, unspecified dementia type F03.90 294.20 CT HEAD WO CONT  
   TSH 3RD GENERATION  
   donepezil (ARICEPT) 5 mg tablet DISCUSSION Mr. 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  
He has significant limitation in several cognitive domains. He is currently living independently by himself and even drives. I had a lengthy discussion with the daughter that it would be unsafe for him to live alone and he should quit driving. Daughter will discuss with her to brothers in this regard. Will check CT brain and TSH Start donepezil 5 mg daily and if he tolerates well will increase it to 10 mg daily in 4 weeks He should be encouraged to increase physical activity, mental activity and social interactions Will arrange for a counseling session with local [facility] [employer] Will follow him periodically Thank you for allowing me to participate in the care of Mr. Bennie Lei. Please feel free to contact me if you have any questions. I will be happy to follow to follow him along with you. I spent greater than 60 minutes with the patient and more than 50% of the time was spent in counseling and coordination of care. Amie Burgos MD 
Diplomate, American Board of Psychiatry & Neurology (Neurology) [place] Board of Psychiatry & Neurology (Clinical Neurophysiology) Diplomate, [facility] Board of Electrodiagnostic Medicine This note will not be viewable in [address]

## 2019-02-01 LAB — TSH SERPL DL<=0.005 MIU/L-ACNC: 1.52 UIU/ML (ref 0.45–4.5)

## 2019-02-06 ENCOUNTER — HOSPITAL ENCOUNTER (OUTPATIENT)
Dept: CT IMAGING | Age: 84
Discharge: HOME | End: 2019-02-06
Attending: PSYCHIATRY & NEUROLOGY
Payer: MEDICARE

## 2019-02-06 DIAGNOSIS — R41.3 MEMORY LOSS: ICD-10-CM

## 2019-02-06 DIAGNOSIS — F03.90 DEMENTIA WITHOUT BEHAVIORAL DISTURBANCE, UNSPECIFIED DEMENTIA TYPE: ICD-10-CM

## 2019-02-06 PROCEDURE — 70450 CT HEAD/BRAIN W/O DYE: CPT

## 2019-03-11 ENCOUNTER — HOSPITAL ENCOUNTER (OUTPATIENT)
Dept: LAB | Age: 84
Discharge: HOME | End: 2019-03-11
Payer: MEDICARE

## 2019-03-11 ENCOUNTER — OFFICE VISIT (OUTPATIENT)
Dept: FAMILY MEDICINE CLINIC | Age: 84
End: 2019-03-11

## 2019-03-11 VITALS
TEMPERATURE: 97.9 F | WEIGHT: 202.2 LBS | HEART RATE: 40 BPM | RESPIRATION RATE: 17 BRPM | OXYGEN SATURATION: 95 % | HEIGHT: 70 IN | DIASTOLIC BLOOD PRESSURE: 81 MMHG | BODY MASS INDEX: 28.95 KG/M2 | SYSTOLIC BLOOD PRESSURE: 189 MMHG

## 2019-03-11 DIAGNOSIS — I10 ESSENTIAL HYPERTENSION: Primary | ICD-10-CM

## 2019-03-11 DIAGNOSIS — K59.09 CHRONIC CONSTIPATION: ICD-10-CM

## 2019-03-11 DIAGNOSIS — R01.1 SYSTOLIC MURMUR: ICD-10-CM

## 2019-03-11 DIAGNOSIS — N18.30 STAGE 3 CHRONIC KIDNEY DISEASE (HCC): ICD-10-CM

## 2019-03-11 DIAGNOSIS — M12.811 ROTATOR CUFF ARTHROPATHY OF BOTH SHOULDERS: ICD-10-CM

## 2019-03-11 DIAGNOSIS — R39.9 LOWER URINARY TRACT SYMPTOMS (LUTS): ICD-10-CM

## 2019-03-11 DIAGNOSIS — Z79.899 MEDICATION MANAGEMENT: ICD-10-CM

## 2019-03-11 DIAGNOSIS — E78.00 PURE HYPERCHOLESTEROLEMIA: ICD-10-CM

## 2019-03-11 DIAGNOSIS — M10.9 GOUT, UNSPECIFIED CAUSE, UNSPECIFIED CHRONICITY, UNSPECIFIED SITE: ICD-10-CM

## 2019-03-11 DIAGNOSIS — M12.812 ROTATOR CUFF ARTHROPATHY OF BOTH SHOULDERS: ICD-10-CM

## 2019-03-11 DIAGNOSIS — Z85.46 HISTORY OF PROSTATE CANCER: ICD-10-CM

## 2019-03-11 PROCEDURE — 83036 HEMOGLOBIN GLYCOSYLATED A1C: CPT

## 2019-03-11 PROCEDURE — 84550 ASSAY OF BLOOD/URIC ACID: CPT

## 2019-03-11 PROCEDURE — 80053 COMPREHEN METABOLIC PANEL: CPT

## 2019-03-11 PROCEDURE — 84153 ASSAY OF PSA TOTAL: CPT

## 2019-03-11 PROCEDURE — 85025 COMPLETE CBC W/AUTO DIFF WBC: CPT

## 2019-03-11 PROCEDURE — 36415 COLL VENOUS BLD VENIPUNCTURE: CPT

## 2019-03-11 PROCEDURE — 80061 LIPID PANEL: CPT

## 2019-03-11 RX ORDER — PROBENECID AND COLCHICINE 500; .5 MG/1; MG/1
1 TABLET ORAL 2 TIMES DAILY
COMMUNITY
End: 2019-03-19

## 2019-03-11 RX ORDER — METOPROLOL SUCCINATE 25 MG/1
25 TABLET, EXTENDED RELEASE ORAL DAILY
Qty: 90 TAB | Refills: 1 | Status: SHIPPED | OUTPATIENT
Start: 2019-03-11 | End: 2019-07-23

## 2019-03-11 RX ORDER — ALLOPURINOL 100 MG/1
100 TABLET ORAL DAILY
Qty: 90 TAB | Refills: 1 | Status: SHIPPED | OUTPATIENT
Start: 2019-03-11 | End: 2020-10-01 | Stop reason: SDUPTHER

## 2019-03-11 NOTE — PATIENT INSTRUCTIONS
DASH Diet: Care Instructions  Your Care Instructions    The DASH diet is an eating plan that can help lower your blood pressure. DASH stands for Dietary Approaches to Stop Hypertension. Hypertension is high blood pressure. The DASH diet focuses on eating foods that are high in calcium, potassium, and magnesium. These nutrients can lower blood pressure. The foods that are highest in these nutrients are fruits, vegetables, low-fat dairy products, nuts, seeds, and legumes. But taking calcium, potassium, and magnesium supplements instead of eating foods that are high in those nutrients does not have the same effect. The DASH diet also includes whole grains, fish, and poultry. The DASH diet is one of several lifestyle changes your doctor may recommend to lower your high blood pressure. Your doctor may also want you to decrease the amount of sodium in your diet. Lowering sodium while following the DASH diet can lower blood pressure even further than just the DASH diet alone. Follow-up care is a key part of your treatment and safety. Be sure to make and go to all appointments, and call your doctor if you are having problems. It's also a good idea to know your test results and keep a list of the medicines you take. How can you care for yourself at home? Following the DASH diet  · Eat 4 to 5 servings of fruit each day. A serving is 1 medium-sized piece of fruit, ½ cup chopped or canned fruit, 1/4 cup dried fruit, or 4 ounces (½ cup) of fruit juice. Choose fruit more often than fruit juice. · Eat 4 to 5 servings of vegetables each day. A serving is 1 cup of lettuce or raw leafy vegetables, ½ cup of chopped or cooked vegetables, or 4 ounces (½ cup) of vegetable juice. Choose vegetables more often than vegetable juice. · Get 2 to 3 servings of low-fat and fat-free dairy each day. A serving is 8 ounces of milk, 1 cup of yogurt, or 1 ½ ounces of cheese. · Eat 6 to 8 servings of grains each day.  A serving is 1 slice of bread, 1 ounce of dry cereal, or ½ cup of cooked rice, pasta, or cooked cereal. Try to choose whole-grain products as much as possible. · Limit lean meat, poultry, and fish to 2 servings each day. A serving is 3 ounces, about the size of a deck of cards. · Eat 4 to 5 servings of nuts, seeds, and legumes (cooked dried beans, lentils, and split peas) each week. A serving is 1/3 cup of nuts, 2 tablespoons of seeds, or ½ cup of cooked beans or peas. · Limit fats and oils to 2 to 3 servings each day. A serving is 1 teaspoon of vegetable oil or 2 tablespoons of salad dressing. · Limit sweets and added sugars to 5 servings or less a week. A serving is 1 tablespoon jelly or jam, ½ cup sorbet, or 1 cup of lemonade. · Eat less than 2,300 milligrams (mg) of sodium a day. If you limit your sodium to 1,500 mg a day, you can lower your blood pressure even more. Tips for success  · Start small. Do not try to make dramatic changes to your diet all at once. You might feel that you are missing out on your favorite foods and then be more likely to not follow the plan. Make small changes, and stick with them. Once those changes become habit, add a few more changes. · Try some of the following:  ? Make it a goal to eat a fruit or vegetable at every meal and at snacks. This will make it easy to get the recommended amount of fruits and vegetables each day. ? Try yogurt topped with fruit and nuts for a snack or healthy dessert. ? Add lettuce, tomato, cucumber, and onion to sandwiches. ? Combine a ready-made pizza crust with low-fat mozzarella cheese and lots of vegetable toppings. Try using tomatoes, squash, spinach, broccoli, carrots, cauliflower, and onions. ? Have a variety of cut-up vegetables with a low-fat dip as an appetizer instead of chips and dip. ? Sprinkle sunflower seeds or chopped almonds over salads. Or try adding chopped walnuts or almonds to cooked vegetables.   ? Try some vegetarian meals using beans and peas. Add garbanzo or kidney beans to salads. Make burritos and tacos with mashed murillo beans or black beans. Where can you learn more? Go to http://erin-nikole.info/. Enter V310 in the search box to learn more about \"DASH Diet: Care Instructions. \"  Current as of: July 22, 2018  Content Version: 11.9  © 6711-5973 [employer]. Care instructions adapted under license by [employer] (which disclaims liability or warranty for this information). If you have questions about a medical condition or this instruction, always ask your healthcare professional. [address] any warranty or liability for your use of this information. Gout: Care Instructions  Your Care Instructions    Gout is a form of arthritis caused by a buildup of uric acid crystals in a joint. It causes sudden attacks of pain, swelling, redness, and stiffness, usually in one joint, especially the big toe. Gout usually comes on without a cause. But it can be brought on by drinking alcohol (especially beer) or eating seafood and red meat. Taking certain medicines, such as diuretics or aspirin, also can bring on an attack of gout. Taking your medicines as prescribed and following up with your doctor regularly can help you avoid gout attacks in the future. Follow-up care is a key part of your treatment and safety. Be sure to make and go to all appointments, and call your doctor if you are having problems. It's also a good idea to know your test results and keep a list of the medicines you take. How can you care for yourself at home? · If the joint is swollen, put ice or a cold pack on the area for 10 to 20 minutes at a time. Put a thin cloth between the ice and your skin. · Prop up the sore limb on a pillow when you ice it or anytime you sit or lie down during the next 3 days. Try to keep it above the level of your heart. This will help reduce swelling. · Rest sore joints.  Avoid activities that put weight or strain on the joints for a few days. Take short rest breaks from your regular activities during the day. · Take your medicines exactly as prescribed. Call your doctor if you think you are having a problem with your medicine. · Take pain medicines exactly as directed. ? If the doctor gave you a prescription medicine for pain, take it as prescribed. ? If you are not taking a prescription pain medicine, ask your doctor if you can take an over-the-counter medicine. · Eat less seafood and red meat. · Check with your doctor before drinking alcohol. · Losing weight, if you are overweight, may help reduce attacks of gout. But do not go on a [employer]. \" Losing a lot of weight in a short amount of time can cause a gout attack. When should you call for help? Call your doctor now or seek immediate medical care if:    · You have a fever.     · The joint is so painful you cannot use it.     · You have sudden, unexplained swelling, redness, warmth, or severe pain in one or more joints.    Watch closely for changes in your health, and be sure to contact your doctor if:    · You have joint pain.     · Your symptoms get worse or are not improving after 2 or 3 days. Where can you learn more? Go to http://erin-nikole.info/. Enter Z812 in the search box to learn more about \"Gout: Care Instructions. \"  Current as of: Myrna 10, 2018  Content Version: 11.9  © 0103-2679 [employer]. Care instructions adapted under license by [employer] (which disclaims liability or warranty for this information). If you have questions about a medical condition or this instruction, always ask your healthcare professional. [address] any warranty or liability for your use of this information.

## 2019-03-11 NOTE — PROGRESS NOTES
Chief Complaint   Patient presents with    Blood Pressure Check     follow up, medication need     1. Have you been to the ER, urgent care clinic since your last visit? Hospitalized since your last visit? [facility]    2. Have you seen or consulted any other health care providers outside of the [address] [place] [address] since your last visit? Include any pap smears or colon screening.  No

## 2019-03-11 NOTE — PROGRESS NOTES
[facility] Note      Subjective:     Chief Complaint   Patient presents with    Blood Pressure Check     follow up, medication need     Bob Manzano is a 80y.o. year old male who presents for evaluation of the following:      Urinary Concerns:   Sx: urne dribbling for past 2-3 months. History of prostate cancer with reported prostatectomy  Feels his last physical exam was incomplete since no prostate exam was performed. - Noted patient did not recall history of prostate cancer when originally asked at [facility] visit, rather found on chart review and patient could not confirm. PSA ordered previous visit but not collected or resulted. Denies dysuria, hematuria, fever, back pain, abdominal pain    Dementia:   Daughter noticed problems with slow to word finding and slower actions \"recently\" but when asked does not given time frame. Diagnosed and managed by neurology  Tx: None  - completed provided 30 day supply of aricept  - daughter does not want him to continue since she has not seen any improvement  Lives at home alone- daughter states she is developing a plan to provide him more in person support but no plan in place yet. Hypertension: Tx: metoprolol XL  25 + losartan 100mg + ASA 81  - requests refill of metoprolol  Previous Tx: amlodipine, losartan  Denies headache, dizziness    Hyperlipidemia:   No cholesterol value on file  Tx: simvastatin 30mg    Gout:   Tx: allopurinol  - requests refill  Previous Tx: probenecid colchicine  No uric acid on file    CKD III  Tx: none  Comorbid HTN    Rotator Cuff Tendinopathy:   Seen by orthopedist with plan to start therapy    Constipation:  Chronic intermittent   Tx: dulcolax and other otc agents prn    Overweight:   Diet: unrestricted  Patient with previous concern of weight loss.    Wt up from last visit  Vitals 3/11/2019 1/31/2019 1/7/2019 12/18/2018 10/15/2018   Weight 202 lb 3.2 oz 188 lb 195 lb 194 lb      Vitals 10/15/2018 2018 2018 2017   Weight 195 lb 3.2 oz 164 lb 185 lb 6.4 oz 170 lb         Medication Reconciliation   Patient is managing his own medicine with some assistance form his daughter  Daughter states some of him medicine have been changed but she is not sure when. She did not meet his previous PCP. 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 Wife  2 year ago      Care Team:   Dentist- Name Unknown  Optho- Name Unknown    In office with daughter Romie Severe, preferred , phone [phone]       Review of Systems   Pertinent positives and negative per HPI. All other systems  reviewed are negative for a Comprehensive ROS (10+). Past Medical History:   Diagnosis Date    Cancer [facility])     prostate    Constipation     Gout     Hyperlipemia     Hypertension         Social History     Socioeconomic History    Marital status:      Spouse name: Not on file    Number of children: Not on file    Years of education: Not on file    Highest education level: Not on file   Social Needs    Financial resource strain: Not on file    Food insecurity - worry: Not on file    Food insecurity - inability: Not on file   [employer] needs - medical: Not on file   [employer] needs - non-medical: Not on file   Occupational History    Not on file   Tobacco Use    Smoking status: Never Smoker    Smokeless tobacco: Never Used   Substance and Sexual Activity    Alcohol use: No    Drug use: No    Sexual activity: No   Other Topics Concern    Not on file   Social History Narrative    Not on file       Current Outpatient Medications   Medication Sig    probenecid-colchicine (COLBENEMID) 500-0.5 mg per tablet Take 1 Tab by mouth two (2) times a day.  acetaminophen (TYLENOL EXTRA STRENGTH) 500 mg tablet Take  by mouth every six (6) hours as needed for Pain.     polyethylene glycol (MIRALAX) 17 gram packet Take 1 Packet by mouth daily.    simvastatin (ZOCOR) 20 mg tablet     losartan (COZAAR) 100 mg tablet TK 1 T PO D    metoprolol succinate (TOPROL-XL) 25 mg XL tablet TK 1 T PO QPM    allopurinol (ZYLOPRIM) 100 mg tablet TK 1 T PO D    aspirin delayed-release 81 mg tablet TK 1 T PO QD    ibuprofen (MOTRIN) 600 mg tablet Take 1 Tab by mouth every eight (8) hours as needed for Pain.  amLODIPine (NORVASC) 5 mg tablet Take 5 mg by mouth daily.  donepezil (ARICEPT) 5 mg tablet Take 1 Tab by mouth nightly.  OTHER,NON-FORMULARY, Take 1 Tab by mouth daily. Patient states he takes an additional supplement daily. Name unknown.  bisacodyl (DULCOLAX, BISACODYL,) 5 mg EC tablet Take 5 mg by mouth every fourty-eight (48) hours. No current facility-administered medications for this visit. Objective:     Vitals:    03/11/19 1552   BP: 189/81   Pulse: (!) 40   Resp: 17   Temp: 97.9 °F (36.6 °C)   TempSrc: Oral   SpO2: 95%   Weight: 202 lb 3.2 oz (91.7 kg)   Height: 5' 10\" (1.778 m)       Physical Examination:  General: Alert, cooperative, no distress, appears stated age. Overweight  Eyes: Conjunctivae clear. PERRL, EOMs intact. Ears: Normal external ear canals both ears. Nose: Nares normal. Septum midline. Mucosa normal. No drainage or sinus tenderness. Mouth/Throat: Lips, mucosa, and tongue normal.   Neck: Supple, symmetrical, trachea midline, no adenopathy. No thyroid enlargement/tenderness/nodules  - Limited flexion and rotation  Back: Symmetric, no curvature. Lungs: Clear to auscultation bilaterally. Normal inspiratory and expiratory ratio. Heart: Regular rate and rhythm, III/VI systolic murmur  Abdomen: Soft, non-tender. Bowel sounds normal. No masses or organomegaly. Extremities: Extremities normal, atraumatic, no cyanosis or edema. MSK: Bilateral frozen shoulder, unable to abduct beyond 90 degrees  Pulses: 2+ and symmetric all extremities.   Skin: Skin color, texture, turgor normal. No rashes or lesions on exposed skin. Lymph nodes: Cervical, supraclavicular nodes normal.  Neurologic: CNII-XII intact. Office Visit on 01/31/2019   Component Date Value Ref Range Status    TSH 01/31/2019 1.520  0.450 - 4.500 uIU/mL Final   Office Visit on 01/07/2019   Component Date Value Ref Range Status    Color (UA POC) 01/07/2019 Yellow   Final    Clarity (UA POC) 01/07/2019 Clear   Final    Glucose (UA POC) 01/07/2019 Negative  Negative Final    Bilirubin (UA POC) 01/07/2019 Negative  Negative Final    Ketones (UA POC) 01/07/2019 Negative  Negative Final    Specific gravity (UA POC) 01/07/2019 1.015  1.001 - 1.035 Final    Blood (UA POC) 01/07/2019 1+  Negative Final    pH (UA POC) 01/07/2019 7.0  4.6 - 8.0 Final    Protein (UA POC) 01/07/2019 2+  Negative Final    Urobilinogen (UA POC) 01/07/2019 0.2 mg/dL  0.2 - 1 Final    Nitrites (UA POC) 01/07/2019 Negative  Negative Final    Leukocyte esterase (UA POC) 01/07/2019 Negative  Negative Final    Urine Culture, Routine 01/07/2019 No growth   Final    Vitamin B12 01/07/2019 865  232 - 1,245 pg/mL Final    Folate 01/07/2019 12.2  >3.0 ng/mL Final    Comment: A serum folate concentration of less than 3.1 ng/mL is  considered to represent clinical deficiency. Assessment/ Plan:   Diagnoses and all orders for this visit:    1. Essential hypertension  -     metoprolol succinate (TOPROL-XL) 25 mg XL tablet; Take 1 Tab by mouth daily.  -     CBC WITH AUTOMATED DIFF  -     METABOLIC PANEL, COMPREHENSIVE  -     HEMOGLOBIN A1C WITH EAG  -     LIPID PANEL    2. History of prostate cancer  -     REFERRAL TO UROLOGY    3. Lower urinary tract symptoms (LUTS)  -     REFERRAL TO UROLOGY    4. Gout, unspecified cause, unspecified chronicity, unspecified site  -     allopurinol (ZYLOPRIM) 100 mg tablet; Take 1 Tab by mouth daily.  -     URIC ACID    5. Medication management    6. Chronic constipation    7. Pure hypercholesterolemia    8.  Systolic murmur    Other orders  -     CVD REPORT  -     CKD REPORT  -     SPECIMEN STATUS REPORT       Medications Discontinued During This Encounter   Medication Reason    donepezil (ARICEPT) 5 mg tablet Not A Current Medication    OTHER,NON-FORMULARY, Not A Current Medication    allopurinol (ZYLOPRIM) 100 mg tablet Reorder    metoprolol succinate (TOPROL-XL) 25 mg XL tablet Reorder       Select PMH reviewed. Meds refilled for chronic conditions per orders. Labs to eval end organ function and etiology of chronic/acute concerns. BP uncontrolled. Metoprolol increased. Suspect medication non compliance/ poor medication management. Advised daughter be advocate for [facility] health as he is unable to do so in dementia. Provided contact information for nurse navigators for assistance securing plan for in home care for patient. In home aid during awake hours to assist with medication management and ADLs would be appropriate for now vs 2 hour assistance via group home or live in with family members. Need discussion with neurology about stage of dementia and ability to continue driving. Noted patient's daughter's concerns about labs not yet resulted. Labs order and status active/collected but not resulted. Reordered labs today and offered an apology for breakdown lab result/ communication of this. She appeared reassured by end of visit. Total encounter time was 55 minutes; > 50 of time spent counseling and coordinating care regarding prostate cancer with current urinary issues, medication management, and dementia. I have discussed the diagnosis with the patient and the intended plan as seen in the above orders. The patient has been offered or received an after-visit summary and questions were answered concerning future plans. I have discussed medication side effects and warnings with the patient as well. Follow-up Disposition:  Return in about 3 months (around 6/11/2019) for Follow Up.       Azul Huang Funmilayo Cunha MD  3/11/2019

## 2019-03-12 LAB
ALBUMIN SERPL-MCNC: 3.8 G/DL (ref 3.5–4.7)
ALBUMIN/GLOB SERPL: 1.3 {RATIO} (ref 1.2–2.2)
ALP SERPL-CCNC: 118 IU/L (ref 39–117)
ALT SERPL-CCNC: 31 IU/L (ref 0–44)
AST SERPL-CCNC: 30 IU/L (ref 0–40)
BASOPHILS # BLD AUTO: 0 X10E3/UL (ref 0–0.2)
BASOPHILS NFR BLD AUTO: 0 %
BILIRUB SERPL-MCNC: 0.6 MG/DL (ref 0–1.2)
BUN SERPL-MCNC: 22 MG/DL (ref 8–27)
BUN/CREAT SERPL: 10 (ref 10–24)
CALCIUM SERPL-MCNC: 9.5 MG/DL (ref 8.6–10.2)
CHLORIDE SERPL-SCNC: 95 MMOL/L (ref 96–106)
CHOLEST SERPL-MCNC: 122 MG/DL (ref 100–199)
CO2 SERPL-SCNC: 25 MMOL/L (ref 20–29)
CREAT SERPL-MCNC: 2.3 MG/DL (ref 0.76–1.27)
EOSINOPHIL # BLD AUTO: 0.1 X10E3/UL (ref 0–0.4)
EOSINOPHIL NFR BLD AUTO: 2 %
ERYTHROCYTE [DISTWIDTH] IN BLOOD BY AUTOMATED COUNT: 15.5 % (ref 12.3–15.4)
EST. AVERAGE GLUCOSE BLD GHB EST-MCNC: 134 MG/DL
GLOBULIN SER CALC-MCNC: 2.9 G/DL (ref 1.5–4.5)
GLUCOSE SERPL-MCNC: 91 MG/DL (ref 65–99)
HBA1C MFR BLD: 6.3 % (ref 4.8–5.6)
HCT VFR BLD AUTO: 38.6 % (ref 37.5–51)
HDLC SERPL-MCNC: 51 MG/DL
HGB BLD-MCNC: 13.1 G/DL (ref 13–17.7)
IMM GRANULOCYTES # BLD AUTO: 0 X10E3/UL (ref 0–0.1)
IMM GRANULOCYTES NFR BLD AUTO: 0 %
INTERPRETATION, 910389: NORMAL
INTERPRETATION: NORMAL
LDLC SERPL CALC-MCNC: 60 MG/DL (ref 0–99)
LYMPHOCYTES # BLD AUTO: 1.8 X10E3/UL (ref 0.7–3.1)
LYMPHOCYTES NFR BLD AUTO: 34 %
MCH RBC QN AUTO: 29.8 PG (ref 26.6–33)
MCHC RBC AUTO-ENTMCNC: 33.9 G/DL (ref 31.5–35.7)
MCV RBC AUTO: 88 FL (ref 79–97)
MONOCYTES # BLD AUTO: 0.6 X10E3/UL (ref 0.1–0.9)
MONOCYTES NFR BLD AUTO: 11 %
NEUTROPHILS # BLD AUTO: 2.9 X10E3/UL (ref 1.4–7)
NEUTROPHILS NFR BLD AUTO: 53 %
PDF IMAGE, 910387: NORMAL
PLATELET # BLD AUTO: 106 X10E3/UL (ref 150–379)
POTASSIUM SERPL-SCNC: 3.6 MMOL/L (ref 3.5–5.2)
PROT SERPL-MCNC: 6.7 G/DL (ref 6–8.5)
RBC # BLD AUTO: 4.39 X10E6/UL (ref 4.14–5.8)
SODIUM SERPL-SCNC: 135 MMOL/L (ref 134–144)
SPECIMEN STATUS REPORT, ROLRST: NORMAL
TRIGL SERPL-MCNC: 54 MG/DL (ref 0–149)
URATE SERPL-MCNC: 6 MG/DL (ref 3.7–8.6)
VLDLC SERPL CALC-MCNC: 11 MG/DL (ref 5–40)
WBC # BLD AUTO: 5.5 X10E3/UL (ref 3.4–10.8)

## 2019-03-13 LAB
PSA SERPL-MCNC: <0.1 NG/ML (ref 0–4)
SPECIMEN STATUS REPORT, ROLRST: NORMAL

## 2019-03-14 NOTE — PROGRESS NOTES
Called daughter to given test results. Most results normal.   CKD worsening. Stop ibuprofen due to CKD. Will switch to Lidocaine patches. She requests letter  Expressed concern to daughter about medication management and poor medication compliance. She reports he does not use pill box since he did not want to use it. Daughter willing to start using pillbox. He currently lives alone and has three adult children who alternate checking on him daily. States urinary symptoms not resolved but still taking antibiotic from urgent care. Advised complete full course and may need extension.

## 2019-03-15 PROBLEM — M12.811 ROTATOR CUFF ARTHROPATHY OF BOTH SHOULDERS: Status: ACTIVE | Noted: 2019-03-15

## 2019-03-15 PROBLEM — M12.812 ROTATOR CUFF ARTHROPATHY OF BOTH SHOULDERS: Status: ACTIVE | Noted: 2019-03-15

## 2019-03-15 PROBLEM — N18.30 STAGE 3 CHRONIC KIDNEY DISEASE (HCC): Status: ACTIVE | Noted: 2019-03-15

## 2019-03-15 RX ORDER — LIDOCAINE 50 MG/G
PATCH TOPICAL
Qty: 30 EACH | Refills: 0 | Status: SHIPPED | OUTPATIENT
Start: 2019-03-15 | End: 2019-03-19

## 2019-03-19 ENCOUNTER — HOSPITAL ENCOUNTER (INPATIENT)
Age: 84
LOS: 7 days | Discharge: NURSING, REHAB OR LONG-TERM CARE | DRG: 243 | End: 2019-03-26
Attending: EMERGENCY MEDICINE | Admitting: FAMILY MEDICINE
Payer: MEDICARE

## 2019-03-19 ENCOUNTER — APPOINTMENT (OUTPATIENT)
Dept: GENERAL RADIOLOGY | Age: 84
DRG: 243 | End: 2019-03-19
Attending: EMERGENCY MEDICINE
Payer: MEDICARE

## 2019-03-19 ENCOUNTER — DOCUMENTATION ONLY (OUTPATIENT)
Dept: FAMILY MEDICINE CLINIC | Age: 84
End: 2019-03-19

## 2019-03-19 DIAGNOSIS — I44.1 SECOND DEGREE HEART BLOCK: Primary | ICD-10-CM

## 2019-03-19 DIAGNOSIS — I10 ESSENTIAL HYPERTENSION: ICD-10-CM

## 2019-03-19 DIAGNOSIS — N17.9 ACUTE RENAL FAILURE, UNSPECIFIED ACUTE RENAL FAILURE TYPE (HCC): ICD-10-CM

## 2019-03-19 DIAGNOSIS — I44.1 SECOND DEGREE AV BLOCK, MOBITZ TYPE I: ICD-10-CM

## 2019-03-19 DIAGNOSIS — R00.1 BRADYCARDIA: ICD-10-CM

## 2019-03-19 PROBLEM — R77.8 ELEVATED TROPONIN: Status: ACTIVE | Noted: 2019-03-19

## 2019-03-19 PROBLEM — I16.0 HYPERTENSIVE URGENCY: Status: ACTIVE | Noted: 2019-03-19

## 2019-03-19 PROBLEM — D69.6 THROMBOCYTOPENIA (HCC): Status: ACTIVE | Noted: 2019-03-19

## 2019-03-19 LAB
ALBUMIN SERPL-MCNC: 3.4 G/DL (ref 3.5–5)
ALBUMIN/GLOB SERPL: 0.9 {RATIO} (ref 1.1–2.2)
ALP SERPL-CCNC: 128 U/L (ref 45–117)
ALT SERPL-CCNC: 40 U/L (ref 12–78)
ANION GAP SERPL CALC-SCNC: 5 MMOL/L (ref 5–15)
AST SERPL-CCNC: 36 U/L (ref 15–37)
BILIRUB SERPL-MCNC: 0.5 MG/DL (ref 0.2–1)
BUN SERPL-MCNC: 24 MG/DL (ref 6–20)
BUN/CREAT SERPL: 10 (ref 12–20)
CALCIUM SERPL-MCNC: 9.1 MG/DL (ref 8.5–10.1)
CHLORIDE SERPL-SCNC: 99 MMOL/L (ref 97–108)
CO2 SERPL-SCNC: 29 MMOL/L (ref 21–32)
COMMENT, HOLDF: NORMAL
CREAT SERPL-MCNC: 2.49 MG/DL (ref 0.7–1.3)
GLOBULIN SER CALC-MCNC: 3.9 G/DL (ref 2–4)
GLUCOSE SERPL-MCNC: 87 MG/DL (ref 65–100)
MAGNESIUM SERPL-MCNC: 2.5 MG/DL (ref 1.6–2.4)
POTASSIUM SERPL-SCNC: 4.3 MMOL/L (ref 3.5–5.1)
PROT SERPL-MCNC: 7.3 G/DL (ref 6.4–8.2)
SAMPLES BEING HELD,HOLD: NORMAL
SODIUM SERPL-SCNC: 133 MMOL/L (ref 136–145)
TROPONIN I SERPL-MCNC: 0.08 NG/ML
TSH SERPL DL<=0.05 MIU/L-ACNC: 2.83 UIU/ML (ref 0.36–3.74)

## 2019-03-19 PROCEDURE — 84443 ASSAY THYROID STIM HORMONE: CPT

## 2019-03-19 PROCEDURE — 80053 COMPREHEN METABOLIC PANEL: CPT

## 2019-03-19 PROCEDURE — 74011250637 HC RX REV CODE- 250/637: Performed by: EMERGENCY MEDICINE

## 2019-03-19 PROCEDURE — 85025 COMPLETE CBC W/AUTO DIFF WBC: CPT

## 2019-03-19 PROCEDURE — 36415 COLL VENOUS BLD VENIPUNCTURE: CPT

## 2019-03-19 PROCEDURE — 71045 X-RAY EXAM CHEST 1 VIEW: CPT

## 2019-03-19 PROCEDURE — 83735 ASSAY OF MAGNESIUM: CPT

## 2019-03-19 PROCEDURE — 74011250637 HC RX REV CODE- 250/637: Performed by: FAMILY MEDICINE

## 2019-03-19 PROCEDURE — 93005 ELECTROCARDIOGRAM TRACING: CPT

## 2019-03-19 PROCEDURE — 65620000000 HC RM CCU GENERAL

## 2019-03-19 PROCEDURE — 84484 ASSAY OF TROPONIN QUANT: CPT

## 2019-03-19 PROCEDURE — 74011250636 HC RX REV CODE- 250/636: Performed by: INTERNAL MEDICINE

## 2019-03-19 PROCEDURE — 99284 EMERGENCY DEPT VISIT MOD MDM: CPT

## 2019-03-19 RX ORDER — DOPAMINE HYDROCHLORIDE 320 MG/100ML
0-20 INJECTION, SOLUTION INTRAVENOUS CONTINUOUS
Status: DISCONTINUED | OUTPATIENT
Start: 2019-03-20 | End: 2019-03-20

## 2019-03-19 RX ORDER — ASPIRIN 81 MG/1
81 TABLET ORAL DAILY
COMMUNITY
End: 2020-07-13 | Stop reason: SDUPTHER

## 2019-03-19 RX ORDER — DICLOFENAC SODIUM 50 MG/1
50 TABLET, DELAYED RELEASE ORAL 2 TIMES DAILY
COMMUNITY
End: 2019-03-26

## 2019-03-19 RX ORDER — POLYETHYLENE GLYCOL 3350 17 G/17G
17 POWDER, FOR SOLUTION ORAL DAILY
Status: DISCONTINUED | OUTPATIENT
Start: 2019-03-20 | End: 2019-03-26 | Stop reason: HOSPADM

## 2019-03-19 RX ORDER — LOSARTAN POTASSIUM 50 MG/1
100 TABLET ORAL DAILY
Status: DISCONTINUED | OUTPATIENT
Start: 2019-03-20 | End: 2019-03-20

## 2019-03-19 RX ORDER — HYDRALAZINE HYDROCHLORIDE 20 MG/ML
10 INJECTION INTRAMUSCULAR; INTRAVENOUS EVERY 6 HOURS
Status: DISCONTINUED | OUTPATIENT
Start: 2019-03-20 | End: 2019-03-20

## 2019-03-19 RX ORDER — SIMVASTATIN 20 MG/1
20 TABLET, FILM COATED ORAL
Status: DISCONTINUED | OUTPATIENT
Start: 2019-03-20 | End: 2019-03-26 | Stop reason: HOSPADM

## 2019-03-19 RX ORDER — SODIUM CHLORIDE 0.9 % (FLUSH) 0.9 %
5-40 SYRINGE (ML) INJECTION EVERY 8 HOURS
Status: DISCONTINUED | OUTPATIENT
Start: 2019-03-19 | End: 2019-03-26 | Stop reason: HOSPADM

## 2019-03-19 RX ORDER — LOSARTAN POTASSIUM 100 MG/1
100 TABLET ORAL DAILY
COMMUNITY
End: 2019-03-26

## 2019-03-19 RX ORDER — SODIUM CHLORIDE 0.9 % (FLUSH) 0.9 %
5-40 SYRINGE (ML) INJECTION AS NEEDED
Status: DISCONTINUED | OUTPATIENT
Start: 2019-03-19 | End: 2019-03-26 | Stop reason: HOSPADM

## 2019-03-19 RX ADMIN — SIMVASTATIN 20 MG: 20 TABLET, FILM COATED ORAL at 23:52

## 2019-03-19 RX ADMIN — Medication 10 ML: at 23:52

## 2019-03-19 RX ADMIN — HYDRALAZINE HYDROCHLORIDE 10 MG: 20 INJECTION INTRAMUSCULAR; INTRAVENOUS at 23:51

## 2019-03-19 RX ADMIN — NITROGLYCERIN 0.5 INCH: 20 OINTMENT TOPICAL at 21:36

## 2019-03-19 NOTE — Clinical Note
A Bovie was used. Blend setting: blend. Mode: bipolar. Coagulation Settin.  Cut Settin. Site (pad location): upper leg. Laterality: left.

## 2019-03-19 NOTE — Clinical Note
Patient transported with a Registered Nurse. Patient transported to: Holding area.   
Report to be given at bedside in the holding area

## 2019-03-20 ENCOUNTER — APPOINTMENT (OUTPATIENT)
Dept: NON INVASIVE DIAGNOSTICS | Age: 84
DRG: 243 | End: 2019-03-20
Attending: INTERNAL MEDICINE
Payer: MEDICARE

## 2019-03-20 LAB
ANION GAP SERPL CALC-SCNC: 6 MMOL/L (ref 5–15)
ATRIAL RATE: 39 BPM
BASOPHILS # BLD: 0.1 K/UL (ref 0–0.1)
BASOPHILS # BLD: 0.1 K/UL (ref 0–0.1)
BASOPHILS NFR BLD: 1 % (ref 0–1)
BASOPHILS NFR BLD: 1 % (ref 0–1)
BNP SERPL-MCNC: 5876 PG/ML
BUN SERPL-MCNC: 22 MG/DL (ref 6–20)
BUN/CREAT SERPL: 10 (ref 12–20)
CALCIUM SERPL-MCNC: 8.5 MG/DL (ref 8.5–10.1)
CALCULATED P AXIS, ECG09: 38 DEGREES
CALCULATED R AXIS, ECG10: 17 DEGREES
CALCULATED T AXIS, ECG11: 105 DEGREES
CHLORIDE SERPL-SCNC: 104 MMOL/L (ref 97–108)
CHOLEST SERPL-MCNC: 98 MG/DL
CO2 SERPL-SCNC: 24 MMOL/L (ref 21–32)
CREAT SERPL-MCNC: 2.17 MG/DL (ref 0.7–1.3)
DIAGNOSIS, 93000: NORMAL
DIFFERENTIAL METHOD BLD: ABNORMAL
DIFFERENTIAL METHOD BLD: ABNORMAL
EOSINOPHIL # BLD: 0.2 K/UL (ref 0–0.4)
EOSINOPHIL # BLD: 0.2 K/UL (ref 0–0.4)
EOSINOPHIL NFR BLD: 4 % (ref 0–7)
EOSINOPHIL NFR BLD: 4 % (ref 0–7)
ERYTHROCYTE [DISTWIDTH] IN BLOOD BY AUTOMATED COUNT: 14.8 % (ref 11.5–14.5)
ERYTHROCYTE [DISTWIDTH] IN BLOOD BY AUTOMATED COUNT: 15.1 % (ref 11.5–14.5)
GLUCOSE SERPL-MCNC: 111 MG/DL (ref 65–100)
HCT VFR BLD AUTO: 38.4 % (ref 36.6–50.3)
HCT VFR BLD AUTO: 40.8 % (ref 36.6–50.3)
HDLC SERPL-MCNC: 48 MG/DL
HDLC SERPL: 2 {RATIO} (ref 0–5)
HGB BLD-MCNC: 12.6 G/DL (ref 12.1–17)
HGB BLD-MCNC: 12.6 G/DL (ref 12.1–17)
IMM GRANULOCYTES # BLD AUTO: 0 K/UL (ref 0–0.04)
IMM GRANULOCYTES # BLD AUTO: 0.1 K/UL (ref 0–0.04)
IMM GRANULOCYTES NFR BLD AUTO: 0 % (ref 0–0.5)
IMM GRANULOCYTES NFR BLD AUTO: 1 % (ref 0–0.5)
LDLC SERPL CALC-MCNC: 39.6 MG/DL (ref 0–100)
LIPID PROFILE,FLP: NORMAL
LYMPHOCYTES # BLD: 1.7 K/UL (ref 0.8–3.5)
LYMPHOCYTES # BLD: 1.9 K/UL (ref 0.8–3.5)
LYMPHOCYTES NFR BLD: 35 % (ref 12–49)
LYMPHOCYTES NFR BLD: 36 % (ref 12–49)
MCH RBC QN AUTO: 30.1 PG (ref 26–34)
MCH RBC QN AUTO: 30.4 PG (ref 26–34)
MCHC RBC AUTO-ENTMCNC: 30.9 G/DL (ref 30–36.5)
MCHC RBC AUTO-ENTMCNC: 32.8 G/DL (ref 30–36.5)
MCV RBC AUTO: 91.9 FL (ref 80–99)
MCV RBC AUTO: 98.6 FL (ref 80–99)
MONOCYTES # BLD: 0.5 K/UL (ref 0–1)
MONOCYTES # BLD: 0.6 K/UL (ref 0–1)
MONOCYTES NFR BLD: 11 % (ref 5–13)
MONOCYTES NFR BLD: 11 % (ref 5–13)
NEUTS SEG # BLD: 2.3 K/UL (ref 1.8–8)
NEUTS SEG # BLD: 2.5 K/UL (ref 1.8–8)
NEUTS SEG NFR BLD: 48 % (ref 32–75)
NEUTS SEG NFR BLD: 48 % (ref 32–75)
NRBC # BLD: 0 K/UL (ref 0–0.01)
NRBC # BLD: 0 K/UL (ref 0–0.01)
NRBC BLD-RTO: 0 PER 100 WBC
NRBC BLD-RTO: 0 PER 100 WBC
P-R INTERVAL, ECG05: 300 MS
PLATELET # BLD AUTO: 43 K/UL (ref 150–400)
PLATELET # BLD AUTO: 75 K/UL (ref 150–400)
PMV BLD AUTO: 12.8 FL (ref 8.9–12.9)
PMV BLD AUTO: 13.7 FL (ref 8.9–12.9)
POTASSIUM SERPL-SCNC: 4 MMOL/L (ref 3.5–5.1)
Q-T INTERVAL, ECG07: 470 MS
QRS DURATION, ECG06: 98 MS
QTC CALCULATION (BEZET), ECG08: 378 MS
RBC # BLD AUTO: 4.14 M/UL (ref 4.1–5.7)
RBC # BLD AUTO: 4.18 M/UL (ref 4.1–5.7)
RBC MORPH BLD: ABNORMAL
SODIUM SERPL-SCNC: 134 MMOL/L (ref 136–145)
TRIGL SERPL-MCNC: 52 MG/DL (ref ?–150)
TROPONIN I SERPL-MCNC: 0.06 NG/ML
TROPONIN I SERPL-MCNC: 0.09 NG/ML
VENTRICULAR RATE, ECG03: 39 BPM
VLDLC SERPL CALC-MCNC: 10.4 MG/DL
WBC # BLD AUTO: 4.9 K/UL (ref 4.1–11.1)
WBC # BLD AUTO: 5.3 K/UL (ref 4.1–11.1)

## 2019-03-20 PROCEDURE — 85025 COMPLETE CBC W/AUTO DIFF WBC: CPT

## 2019-03-20 PROCEDURE — 65620000000 HC RM CCU GENERAL

## 2019-03-20 PROCEDURE — 36415 COLL VENOUS BLD VENIPUNCTURE: CPT

## 2019-03-20 PROCEDURE — 93306 TTE W/DOPPLER COMPLETE: CPT

## 2019-03-20 PROCEDURE — 74011250637 HC RX REV CODE- 250/637: Performed by: FAMILY MEDICINE

## 2019-03-20 PROCEDURE — 84484 ASSAY OF TROPONIN QUANT: CPT

## 2019-03-20 PROCEDURE — 83880 ASSAY OF NATRIURETIC PEPTIDE: CPT

## 2019-03-20 PROCEDURE — 80061 LIPID PANEL: CPT

## 2019-03-20 PROCEDURE — 74011250636 HC RX REV CODE- 250/636: Performed by: INTERNAL MEDICINE

## 2019-03-20 PROCEDURE — 74011250637 HC RX REV CODE- 250/637: Performed by: NURSE PRACTITIONER

## 2019-03-20 PROCEDURE — 80048 BASIC METABOLIC PNL TOTAL CA: CPT

## 2019-03-20 RX ORDER — DOPAMINE HYDROCHLORIDE 320 MG/100ML
2.5 INJECTION, SOLUTION INTRAVENOUS CONTINUOUS
Status: DISCONTINUED | OUTPATIENT
Start: 2019-03-20 | End: 2019-03-23

## 2019-03-20 RX ORDER — AMLODIPINE BESYLATE 5 MG/1
5 TABLET ORAL DAILY
Status: DISCONTINUED | OUTPATIENT
Start: 2019-03-20 | End: 2019-03-26 | Stop reason: HOSPADM

## 2019-03-20 RX ORDER — HYDRALAZINE HYDROCHLORIDE 20 MG/ML
10 INJECTION INTRAMUSCULAR; INTRAVENOUS
Status: DISCONTINUED | OUTPATIENT
Start: 2019-03-20 | End: 2019-03-26 | Stop reason: HOSPADM

## 2019-03-20 RX ADMIN — SIMVASTATIN 20 MG: 20 TABLET, FILM COATED ORAL at 21:21

## 2019-03-20 RX ADMIN — Medication 10 ML: at 05:22

## 2019-03-20 RX ADMIN — POLYETHYLENE GLYCOL 3350 17 G: 17 POWDER, FOR SOLUTION ORAL at 08:45

## 2019-03-20 RX ADMIN — HYDRALAZINE HYDROCHLORIDE 10 MG: 20 INJECTION INTRAMUSCULAR; INTRAVENOUS at 05:22

## 2019-03-20 RX ADMIN — HYDRALAZINE HYDROCHLORIDE 10 MG: 20 INJECTION INTRAMUSCULAR; INTRAVENOUS at 16:52

## 2019-03-20 RX ADMIN — Medication 10 ML: at 15:52

## 2019-03-20 RX ADMIN — AMLODIPINE BESYLATE 5 MG: 5 TABLET ORAL at 10:24

## 2019-03-20 RX ADMIN — Medication 10 ML: at 21:21

## 2019-03-20 NOTE — ED PROVIDER NOTES
80 y.o. male with past medical history significant for HTN, Hyperlipidemia, Gout, Constipation, and Prostate CA who presents from home via private vehicle for further evaluation of elevated blood pressure. Pt's family member reports pt's BP was \"high\" this evening and states, \"it was 192/100 and when I rechecked it, it was 221/108\". Pt's family member reports he takes Metoprolol, Amlodipine, and losartan. Pt was started on Metoprolol on 3/11/19. When asked about current symptoms, pt continues to ramble about his prostate. Pt has history of dementia. Pt denies any chest pain, syncope, fever, vomiting or HA. Currently, pt denies any pain. There are no other acute medical concerns at this time. Social hx: Non smoker; No EtOH 
 
PCP: Marisabel Harirngton MD 
 
Note written by Aneudy Barrow, as dictated by Ning Preciado MD 8:55 PM 
 
 
 
The history is provided by the patient and a relative. No  was used. Past Medical History:  
Diagnosis Date  Cancer [facility])   
 prostate  Constipation  Gout  Hyperlipemia  Hypertension Past Surgical History:  
Procedure Laterality Date  HX PROSTATECTOMY  HX UROLOGICAL    
 prostate removed Family History:  
Problem Relation Age of Onset  No Known Problems Mother  No Known Problems Father Social History Socioeconomic History  Marital status:  Spouse name: Not on file  Number of children: Not on file  Years of education: Not on file  Highest education level: Not on file Social Needs  Financial resource strain: Not on file  Food insecurity - worry: Not on file  Food insecurity - inability: Not on file  Transportation needs - medical: Not on file  Transportation needs - non-medical: Not on file Occupational History  Not on file Tobacco Use  Smoking status: Never Smoker  Smokeless tobacco: Never Used Substance and Sexual Activity  Alcohol use: No  
 Drug use: No  
 Sexual activity: No  
Other Topics Concern  Not on file Social History Narrative  Not on file ALLERGIES: Patient has no known allergies. Review of Systems Constitutional: Negative for fever. Eyes: Negative for visual disturbance. Respiratory: Negative for cough, shortness of breath and wheezing. Cardiovascular: Negative for chest pain and leg swelling. Gastrointestinal: Negative for abdominal pain, diarrhea, nausea and vomiting. Genitourinary: Negative for dysuria. Musculoskeletal: Negative. Negative for back pain, myalgias and neck stiffness. Skin: Negative for rash. Neurological: Negative. Negative for syncope and headaches. Psychiatric/Behavioral: Negative for confusion. All other systems reviewed and are negative. Vitals:  
 03/19/19 2025 Pulse: (!) 44 SpO2: 96% Physical Exam  
Constitutional: He appears well-developed and well-nourished. No distress. HENT:  
Head: Normocephalic. Eyes: Pupils are equal, round, and reactive to light. Neck: Normal range of motion. Cardiovascular: Bradycardia present. Murmur heard. Systolic murmur is present with a grade of 2/6. Pulmonary/Chest: Effort normal and breath sounds normal.  
Abdominal: Soft. There is no tenderness. Musculoskeletal: Normal range of motion. Neurological: He is alert. Skin: Skin is warm and dry. Capillary refill takes less than 2 seconds. Psychiatric: He has a normal mood and affect. His behavior is normal.  
Nursing note and vitals reviewed. Note written by Aneudy Collier, as dictated by Stacey English MD 8:55 PM 
 
MDM Procedures ED EKG interpretation: 2101 Rhythm: 2nd degree AV block type 2 with 2 to 1 conduction and a ventricular rate of 39. Note written by Aneudy Collier, as dictated by Stacey English MD 9:01 PM 
 
CONSULT NOTE: 
9:21 PM Stacey English MD spoke with Dr. oLc Mckeon, Consult for Cardiology. Discussed available diagnostic tests and clinical findings.   Dr. Fletcher Lopez recommends giving nitroglycerin and admitting pt to hospitalist.

## 2019-03-20 NOTE — PROGRESS NOTES
[place]: Pippa Lennon 
[phone] Requesting/referring provider: Dr. Jayme Marcus Reason for Consult: heart block HPI: Eryn Booker, a 80y.o. year-old who presents for evaluation of 2:1 heart block He give 3 weeks history of increasing LE edema fatigue and CARREON with exertion. Also had a fall about 3 weeks ago related to being weak and off balance. He has had recurrent UTI and maybe prostatectomy and wants to talk about that. He denies chest pain or palpitations. In the bed he denies headache or dizziness. Hr is 38-40 BP uncontrolled. IN ARF with elevated crt, thought related to ibuprofen but not improving with cessation. New murmur. Assessment/Plan: 1. AS- presumed, echo to eval 
2. 2:1 heart block hold metoprolol, pacer eval 
3. Hyperlipidemia- on zocor 4. Malignant htn- hold losartan and metoprolol for arf and naveed 
-start ntp topically Soc no tob remote etoh Fhx no early cad, no pacer no naveed no valve diseae He  has a past medical history of Cancer ([address] Constipation, Gout, Hyperlipemia, and Hypertension. Cardiovascular ROS: positive for - dyspnea on exertion and edema Respiratory ROS: +CARREON Neurological ROS: no TIA or stroke symptoms All other systems negative except as above. PE 
Vitals:  
 03/19/19 2025 03/19/19 2055 03/19/19 2100 03/19/19 2200 BP:   (!) 203/78 194/89 Pulse: (!) 44 (!) 39 (!) 39 (!) 38 Resp:   25 24 Temp:  98.1 °F (36.7 °C) SpO2: 96%  95% 98% There is no height or weight on file to calculate BMI. General appearance -elderly thin nad Mental status - affect appropriate to mood Eyes - sclera anicteric, moist mucous membranes Neck - supple, no significant adenopathy Lymphatics - no  lymphadenopathy Chest - clear to auscultation, no wheezes, rales or rhonchi Heart - naveed, regular rhythm, normal S1, S2, 3/6 LUCIE Abdomen - soft, nontender, nondistended, no masses or organomegaly Back exam - full range of motion, no tenderness Neurological - cranial nerves II through XII grossly intact, no focal deficit Musculoskeletal - no muscular tenderness noted, normal strength Extremities - peripheral pulses normal, 2+ edema ble Skin - normal coloration  no rashes Recent Labs: 
Lab Results Component Value Date/Time Cholesterol, total 122 03/11/2019 05:08 PM  
 HDL Cholesterol 51 03/11/2019 05:08 PM  
 LDL, calculated 60 03/11/2019 05:08 PM  
 Triglyceride 54 03/11/2019 05:08 PM  
 
Lab Results Component Value Date/Time Creatinine (POC) 1.7 (H) 07/31/2018 02:19 PM  
 Creatinine 2.49 (H) 03/19/2019 09:11 PM  
 
Lab Results Component Value Date/Time BUN 24 (H) 03/19/2019 09:11 PM  
 
Lab Results Component Value Date/Time Potassium 4.3 03/19/2019 09:11 PM  
 
Lab Results Component Value Date/Time Hemoglobin A1c 6.3 (H) 03/11/2019 05:08 PM  
 
Lab Results Component Value Date/Time HGB 12.6 03/19/2019 09:11 PM  
 
Lab Results Component Value Date/Time PLATELET 43 (LL) 22/23/1475 09:11 PM  
 
 
Reviewed: 
Past Medical History:  
Diagnosis Date  Cancer [facility])   
 prostate  Constipation  Gout  Hyperlipemia  Hypertension Social History Tobacco Use Smoking Status Never Smoker Smokeless Tobacco Never Used Social History Substance and Sexual Activity Alcohol Use No  
 
No Known Allergies Current Facility-Administered Medications Medication Dose Route Frequency  sodium chloride (NS) flush 5-40 mL  5-40 mL IntraVENous Q8H  
 sodium chloride (NS) flush 5-40 mL  5-40 mL IntraVENous PRN Current Outpatient Medications Medication Sig  
 aspirin delayed-release 81 mg tablet Take 81 mg by mouth daily.  losartan (COZAAR) 100 mg tablet Take 100 mg by mouth daily.  diclofenac EC (VOLTAREN) 50 mg EC tablet Take 50 mg by mouth two (2) times a day.  allopurinol (ZYLOPRIM) 100 mg tablet Take 1 Tab by mouth daily.  metoprolol succinate (TOPROL-XL) 25 mg XL tablet Take 1 Tab by mouth daily.  acetaminophen (TYLENOL EXTRA STRENGTH) 500 mg tablet Take  by mouth every six (6) hours as needed for Pain.  polyethylene glycol (MIRALAX) 17 gram packet Take 1 Packet by mouth daily.  simvastatin (ZOCOR) 20 mg tablet Take 20 mg by mouth daily. Nomi Dsouza MD 
[facility] [place] [address], [address] [facility], [address] [place] [address]

## 2019-03-20 NOTE — PROGRESS NOTES
Patient is seen Full note to follow Second degree av block Mobitz I Hold beta blocker Discussed with him possible pacemaker if not resolving Will reach out to children later but does not need pacer today

## 2019-03-20 NOTE — PROGRESS NOTES
Admission Medication Reconciliation: 
 
Information obtained from: Daughter provided pill bottles Significant PMH/Disease States:  
Past Medical History:  
Diagnosis Date  Cancer [facility])   
 prostate  Constipation  Gout  Hyperlipemia  Hypertension Chief Complaint for this Admission:  Hypertension Allergies:  Patient has no known allergies. Prior to Admission Medications:  
Prior to Admission Medications Prescriptions Last Dose Informant Patient Reported? Taking?  
acetaminophen (TYLENOL EXTRA STRENGTH) 500 mg tablet   Yes Yes Sig: Take  by mouth every six (6) hours as needed for Pain. allopurinol (ZYLOPRIM) 100 mg tablet 3/19/2019 at Unknown time  No Yes Sig: Take 1 Tab by mouth daily. aspirin delayed-release 81 mg tablet 3/19/2019 at Unknown time  Yes Yes Sig: Take 81 mg by mouth daily. diclofenac EC (VOLTAREN) 50 mg EC tablet 3/19/2019 at Unknown time  Yes Yes Sig: Take 50 mg by mouth two (2) times a day. losartan (COZAAR) 100 mg tablet 3/19/2019 at Unknown time  Yes Yes Sig: Take 100 mg by mouth daily. metoprolol succinate (TOPROL-XL) 25 mg XL tablet 3/19/2019 at Unknown time  No Yes Sig: Take 1 Tab by mouth daily. polyethylene glycol (MIRALAX) 17 gram packet 3/19/2019 at Unknown time  No Yes Sig: Take 1 Packet by mouth daily. simvastatin (ZOCOR) 20 mg tablet 3/19/2019 at Unknown time  Yes Yes Sig: Take 20 mg by mouth daily. Facility-Administered Medications: None Comments/Recommendations: Daughter provided history as patient was taken NIK to procedure. Note that patient HR 39-45 on monitor, recommend discontinuation of beta blocker. Note: 1. Antibiotics: completed 10 day course of SMZ/-160 mg yesterday, treated for UTI 2. Pill box: implemented today Added: 1. Diclofenac Thank you for allowing me to participate in the care of your patient. Mac Leyden PharmD, RN #[id number]

## 2019-03-20 NOTE — PROGRESS NOTES
Hospitalist Progress Note Karen Montilla MD 
Answering service: [phone] OR [phone] from in house phone Date of Service:  3/20/2019 NAME:  Andrew Stephenson :  3/9/1933 MRN:  [id number] Admission Summary:  
59-year-old -American male, past medical history of hypertension, hyperlipidemia, gout, constipation, prostate cancer, presented to the emergency department from home via private transport with reported elevated blood pressure despite being on meds,increasing Lext edema,SOB. In the ER noted with /78, bradycardia, HR 44,EKG with HR 39 2nd degree AV block,Labs with SOULEYMANE Interval history / Subjective:  
BP now 176/72, HR 40s, now 65, overnight started on dopamine gtt Assessment & Plan:  
 
Bradycardia with second-degree AV block. -cardiology consulted, has 2:1 heart block,pacer eval 
-held PTA metoprolol,avoid alpha blockers 
-off dopamine gtt, HR stable Hypertensive urgency. -startedon nitroglycerin 2% ointment ,prn hydralazine,BP remained elevated 
-overnight dopamine gtt, now on norvasc, prn hydralazine 
-held PTA losartan d/t SOULEYMANE,metoprolol d/t bradycardia SOULEYMANE on CKD 3,baseline Cr 2.3 
-held PTA arb,volataren  
-Cr. improved Thrombocytopenia. -Repeat platelet count. improved 
-Hold on antiplatelets and anticoagulants Bilateral lower extremity edema.   
-Concern for congestive heart failure and chronic venous insufficiency.   
-Order 2D echo 
-strict inputs and outputs and daily weights Murmur ,presumed AS,per card 
-f/u echo,card  recc's Elevated troponin. -repeat serial troponin levels. Hyponatremia. Improving Hyperlipidemia. LDL 39 
-  Continue statin therapy Code status:FULL 
DVT prophylaxis: SCD Care Plan discussed with: pt/nurse Disposition: TBD Hospital Problems  Date Reviewed: 3/19/2019 Codes Class Noted POA * (Principal) Bradycardia ICD-10-CM: R00.1 ICD-9-CM: 427.89  3/19/2019 Unknown Thrombocytopenia ([address] ICD-10-CM: D69.6 ICD-9-CM: 287.5  3/19/2019 Unknown Elevated troponin ICD-10-CM: R74.8 ICD-9-CM: 790.6  3/19/2019 Unknown Hypertensive urgency ICD-10-CM: I16.0 ICD-9-CM: 401.9  3/19/2019 Unknown Review of Systems: A comprehensive review of systems was negative except for that written in the HPI. Vital Signs:  
 Last 24hrs VS reviewed since prior progress note. Most recent are: 
Visit Vitals /72 Pulse 65 Temp 97.7 °F (36.5 °C) Resp 19 Wt 89.5 kg (197 lb 5 oz) SpO2 97% BMI 28.31 kg/m² Intake/Output Summary (Last 24 hours) at 3/20/2019 2720 Last data filed at 3/20/2019 0700 Gross per 24 hour Intake  Output 150 ml Net -150 ml Physical Examination:  
 
 
     
Constitutional:  No acute distress, cooperative, pleasant   
ENT:  Oral mucous moist, oropharynx benign. Neck supple, Resp:  CTA bilaterally. No wheezing/rhonchi/rales. No accessory muscle use CV:  Regular rhythm, normal rate,+LUCIE , gallops, rubs GI:  Soft, non distended, non tender. normoactive bowel sounds, no hepatosplenomegaly Musculoskeletal:  No edema, warm, 2+ pulses throughout Neurologic:  Moves all extremities. occasionaly talks,follows davis GRETCHEN II-XII reviewed Psych:  AA oreinted x2 person & place only Skin:  warm & dry Data Review:  
 Review and/or order of clinical lab test 
 
 
Labs:  
 
Recent Labs  
  03/20/19 0303 03/19/19 2111 WBC 4.9 5.3 HGB 12.6 12.6 HCT 40.8 38.4 PLT 75* 43* Recent Labs  
  03/20/19 0303 03/19/19 2111 * 133* K 4.0 4.3  99 CO2 24 29 BUN 22* 24* CREA 2.17* 2.49* * 87  
CA 8.5 9.1 MG  --  2.5* Recent Labs  
  03/19/19 
2111 SGOT 36 ALT 40  
* TBILI 0.5 TP 7.3 ALB 3.4*  
GLOB 3.9 No results for input(s): INR, PTP, APTT in the last 72 hours. No lab exists for component: INREXT No results for input(s): FE, TIBC, PSAT, FERR in the last 72 hours. Lab Results Component Value Date/Time Folate 12.2 01/07/2019 11:29 AM  
  
No results for input(s): PH, PCO2, PO2 in the last 72 hours. Recent Labs  
  03/19/19 
2111 TROIQ 0.08* Lab Results Component Value Date/Time Cholesterol, total 98 03/20/2019 03:03 AM  
 HDL Cholesterol 48 03/20/2019 03:03 AM  
 LDL, calculated 39.6 03/20/2019 03:03 AM  
 Triglyceride 52 03/20/2019 03:03 AM  
 CHOL/HDL Ratio 2.0 03/20/2019 03:03 AM  
 
No results found for: Yadi Ceja Lab Results Component Value Date/Time Color Yellow 08/08/2017 04:09 PM  
 Appearance Clear 08/08/2017 04:09 PM  
 Specific gravity 1.017 07/18/2017 02:56 PM  
 pH (UA) 6.5 08/08/2017 04:09 PM  
 Protein 100 (A) 07/18/2017 02:56 PM  
 Glucose NEGATIVE  07/18/2017 02:56 PM  
 Ketone Negative 08/08/2017 04:09 PM  
 Bilirubin Negative 08/08/2017 04:09 PM  
 Urobilinogen 1.0 07/18/2017 02:56 PM  
 Nitrites Positive (A) 08/08/2017 04:09 PM  
 Leukocyte Esterase 1+ (A) 08/08/2017 04:09 PM  
 Epithelial cells MODERATE (A) 07/18/2017 02:56 PM  
 Bacteria Few 08/08/2017 04:09 PM  
 WBC 11-30 (A) 08/08/2017 04:09 PM  
 RBC 0-2 08/08/2017 04:09 PM  
 
 
 
Medications Reviewed:  
 
Current Facility-Administered Medications Medication Dose Route Frequency  DOPamine (INTROPIN) 800 mg in dextrose 5% 250 mL infusion  2.5 mcg/kg/min IntraVENous CONTINUOUS  
 hydrALAZINE (APRESOLINE) 20 mg/mL injection 10 mg  10 mg IntraVENous Q6H PRN  polyethylene glycol (MIRALAX) packet 17 g  17 g Oral DAILY  simvastatin (ZOCOR) tablet 20 mg  20 mg Oral QHS  losartan (COZAAR) tablet 100 mg  100 mg Oral DAILY  sodium chloride (NS) flush 5-40 mL  5-40 mL IntraVENous Q8H  
 sodium chloride (NS) flush 5-40 mL  5-40 mL IntraVENous PRN  
 
 ______________________________________________________________________ EXPECTED LENGTH OF STAY: - - - 
ACTUAL LENGTH OF STAY:          1 Aleksandr Church MD

## 2019-03-20 NOTE — PROGRESS NOTES
Problem: Falls - Risk of 
Goal: *Absence of Falls Document Jennifer Shows Fall Risk and appropriate interventions in the flowsheet. Outcome: Progressing Towards Goal 
Fall Risk Interventions: 
Mobility Interventions: Communicate number of staff needed for ambulation/transfer, Patient to call before getting OOB Medication Interventions: Evaluate medications/consider consulting pharmacy History of Falls Interventions: Evaluate medications/consider consulting pharmacy Problem: Impaired Skin Integrity/Pressure Injury Treatment Goal: *Prevention of pressure injury Document Torito Scale and appropriate interventions in the flowsheet. Outcome: Progressing Towards Goal 
Pressure Injury Interventions: 
Sensory Interventions: Keep linens dry and wrinkle-free, Maintain/enhance activity level, Monitor skin under medical devices, Minimize linen layers Activity Interventions: Increase time out of bed, PT/OT evaluation, Pressure redistribution bed/mattress(bed type) Nutrition Interventions: Document food/fluid/supplement intake

## 2019-03-20 NOTE — PROGRESS NOTES
[facility] [place]: Glendy Tran 
[phone] Requesting/referring provider: Dr. Darrall Kocher Reason for Consult: heart block HPI: Suzan Espinoza, a 80y.o. year-old who presents for evaluation of 2:1 heart block He give 3 weeks history of increasing LE edema fatigue and CARREON with exertion. Also had a fall about 3 weeks ago related to being weak and off balance. He has had recurrent UTI and maybe prostatectomy and wants to talk about that. He denies chest pain or palpitations. In the bed he denies headache or dizziness. Hr is 38-40 BP uncontrolled. IN ARF with elevated crt, thought related to ibuprofen but not improving with cessation. New murmur. Stable overnight, HR this am up to 60 just in the last few minutes. Before that continue 2:1 with rate 30s. Will proceed with Ep evaluation this am.  
 
Assessment/Plan: 1. AS- presumed, echo to eval 
2. 2:1 heart block hold metoprolol, pacer eval 
3. Hyperlipidemia- on zocor 4. Malignant htn- hold losartan and metoprolol for arf and naveed 
-start ntp topically 
-hydralazine prn 
5. ARF- crt is slightly improved this am 
6. Elevated troponin- mild, nonspecific 7. DM- A1C 6.3 no prior history of DM Soc no tob remote etoh Fhx no early cad, no pacer no naveed no valve diseae He  has a past medical history of Cancer ([facility] [address] Constipation, Gout, Hyperlipemia, and Hypertension. Cardiovascular ROS: positive for - dyspnea on exertion and edema Respiratory ROS: +CARREON Neurological ROS: no TIA or stroke symptoms All other systems negative except as above. PE 
Vitals:  
 03/20/19 0200 03/20/19 0300 03/20/19 0400 03/20/19 0500 BP: 147/54 177/65 175/59 184/69 Pulse: (!) 39 (!) 37 (!) 39 60 Resp: 23 19 24 20 Temp:   97.7 °F (36.5 °C) SpO2: 95% 96% 95% 96% Weight:   197 lb 5 oz (89.5 kg) Body mass index is 28.31 kg/m². General appearance -elderly thin nad Mental status - affect appropriate to mood Eyes - sclera anicteric, moist mucous membranes Neck - supple, no significant adenopathy Lymphatics - no  lymphadenopathy Chest - clear to auscultation, no wheezes, rales or rhonchi Heart - naveed, regular rhythm, normal S1, S2, 3/6 LUCIE Abdomen - soft, nontender, nondistended, no masses or organomegaly Back exam - full range of motion, no tenderness Neurological - cranial nerves II through XII grossly intact, no focal deficit Musculoskeletal - no muscular tenderness noted, normal strength Extremities - peripheral pulses normal, 2+ edema ble Skin - normal coloration  no rashes Recent Labs: 
Lab Results Component Value Date/Time Cholesterol, total 98 03/20/2019 03:03 AM  
 HDL Cholesterol 48 03/20/2019 03:03 AM  
 LDL, calculated 39.6 03/20/2019 03:03 AM  
 Triglyceride 52 03/20/2019 03:03 AM  
 CHOL/HDL Ratio 2.0 03/20/2019 03:03 AM  
 
Lab Results Component Value Date/Time Creatinine (POC) 1.7 (H) 07/31/2018 02:19 PM  
 Creatinine 2.17 (H) 03/20/2019 03:03 AM  
 
Lab Results Component Value Date/Time BUN 22 (H) 03/20/2019 03:03 AM  
 
Lab Results Component Value Date/Time Potassium 4.0 03/20/2019 03:03 AM  
 
Lab Results Component Value Date/Time Hemoglobin A1c 6.3 (H) 03/11/2019 05:08 PM  
 
Lab Results Component Value Date/Time HGB 12.6 03/20/2019 03:03 AM  
 
Lab Results Component Value Date/Time PLATELET 75 (L) 14/94/3458 03:03 AM  
 
 
Reviewed: 
Past Medical History:  
Diagnosis Date  Cancer [facility])   
 prostate  Constipation  Gout  Hyperlipemia  Hypertension Social History Tobacco Use Smoking Status Never Smoker Smokeless Tobacco Never Used Social History Substance and Sexual Activity Alcohol Use No  
 
No Known Allergies Current Facility-Administered Medications Medication Dose Route Frequency  DOPamine (INTROPIN) 800 mg in dextrose 5% 250 mL infusion  2.5 mcg/kg/min IntraVENous CONTINUOUS  
  hydrALAZINE (APRESOLINE) 20 mg/mL injection 10 mg  10 mg IntraVENous Q6H PRN  polyethylene glycol (MIRALAX) packet 17 g  17 g Oral DAILY  simvastatin (ZOCOR) tablet 20 mg  20 mg Oral QHS  losartan (COZAAR) tablet 100 mg  100 mg Oral DAILY  sodium chloride (NS) flush 5-40 mL  5-40 mL IntraVENous Q8H  
 sodium chloride (NS) flush 5-40 mL  5-40 mL IntraVENous PRN Justino Beatty MD 
[facility] [address], [address] [facility], [address]

## 2019-03-20 NOTE — INTERDISCIPLINARY ROUNDS
IDR/SLIDR Summary Patient: Matthew Bob MRN: [id number]    Age: 80 y.o. YOB: 1933 Room/Bed: [address] [place] [address] Admit Diagnosis: Bradycardia [R00.1] Hypertensive urgency [I16.0] Thrombocytopenia ([address] [D69.6] Elevated troponin [R74.8]  Principal Diagnosis: Bradycardia Goals: VSS, evaluate for PM 
Readmission: NO  Quality Measure: Not applicable VTE Prophylaxis: Mechanical 
Influenza Vaccine screening completed? YES Pneumococcal Vaccine screening completed? YES Mobility needs: Yes   Nutrition plan:Yes 
Consults:Case Management Financial concerns:Yes  Escalated to CM? YES 
RRAT Score: 14   Interventions:Home Health and [facility] Testing due for pt today? YES 
LOS: 1 days Expected length of stay ? days Discharge plan: tbd   PCP: Tahmina Mccord MD 
Transportation needs: Yes Days before discharge:two or more days before discharge Discharge disposition: tbd Signed:  
 
Chanel Richardson RN 
3/20/2019 
6:31 AM

## 2019-03-20 NOTE — H&P
[address] [facility] HISTORY AND PHYSICAL Name:  David Lomeli 
MR#:  [id number] :  1933 ACCOUNT #:  [de-identified] ADMIT DATE:  2019 CHIEF COMPLAINT:  The patient does not provide. HISTORY OF PRESENT ILLNESS:  An 80-year-old -American male, past medical history of hypertension, hyperlipidemia, gout, constipation, prostate cancer, presented to the emergency department from home via private transport with reported elevated blood pressure. The patient is a limited historian. He is accompanied by his daughter who reportedly had checked the patient's blood pressure at home and recorded a BP equals 221/108. Otherwise, the patient was asymptomatic. Notes today was a normal day. He notably has been on antihypertensive medications including amlodipine, metoprolol, losartan. Notably had been started on metoprolol on 2019. On arrival to the emergency department, initial recorded vitals signs were heart rate of 44, the blood pressure was not recorded. O2 saturations were 96% on room air. The patient has no reports of chest pain, shortness of breath, dizziness, lightheadedness, focal weakness, numbness, paresthesias, slurred speech, facial droop, headache, neck pain, back pain, palpitations, abdominal pain, nausea, vomiting, diarrhea, melena, dysuria, hematuria, calf pain, swelling, edema, fever, chills, or rash. He had elevated troponin at 0.08. He had elevated BUN at 24, creatinine 2.49 (compared to last creatinine of 2.30 on 2019). His daughter notes the patient had been evaluated for the same and is due for followup. Platelets equal 43. Note, he is on aspirin 81 mg daily at home. A 12-lead EKG reportedly had shown second degree AV block, 39 beats a minute. Cardiologist was consulted per ED MD with recommendations to administer nitroglycerin with noted elevated blood pressure.   The patient is now seen for admission to the hospitalist service for continued evaluation and treatments. PAST MEDICAL HISTORY: 1.  Prostate cancer. 2.  Constipation. 3.  Gout. 4.  Hypertension. 5.  Hyperlipidemia. PAST SURGICAL HISTORY:  Prostatectomy. ALLERGIES:  NO KNOWN DRUG ALLERGIES. CURRENT MEDICATIONS:  Medication list reviewed and noted on chart records. SOCIAL HISTORY:  No reports of smoking, alcohol or illicit drugs. Ambulates unassisted. . Lives alone. FAMILY HISTORY:  Unknown with regards to heart attack or strokes. REVIEW OF SYSTEMS:  Pertinent positives were as noted in HPI. All other systems are reviewed and negative. PHYSICAL EXAMINATION: 
VITAL SIGNS:  Temperature is 98.1 degrees Fahrenheit, blood pressure 194/89, heart rate of 39, respiratory rate of 20, O2 saturation of 96% on room air. GENERAL:  Elderly male, in no acute respiratory distress. PSYCH:  The patient is awake, alert, and oriented x2 to person and place only. Flat affect. NEUROLOGIC:  GCS of 14 (E4, V4, M6) best response, spontaneous opening, occasional inappropriate verbal response, confused to year, disoriented, follows commands. Moves extremities x4 with generalized weakness. Sensation is grossly decreased in the plantar surfaces of both feet without slurred speech or facial droop. HEENT:  Normocephalic, atraumatic. PERRLA, EOMs intact. Sclerae anicteric. Conjunctivae clear. Nares are patent. Oropharynx is clear. Tongue is midline, not edematous. NECK:  Supple without lymphadenopathy, JVD, carotid bruits, or thyromegaly. LYMPH:  Negative for cervical or supraclavicular adenopathy. RESPIRATORY:  Lungs are clear to auscultation bilaterally. CVS/HEART:  Bradycardic, regular rhythm without murmurs, rubs or gallops. GI:  Abdomen is soft, nontender, and nondistended. Normoactive bowel sounds. No rebound, guarding, or rigidity. No auscultated abdominal bruits. No palpable abdominal mass. BACK:  No CVA tenderness. No step-off deformity. MUSCULOSKELETAL:  There is an old scar present on the lateral aspect of the right distal arm and proximal forearm, partially healed. Negative for calf tenderness. VASCULAR:  A 2+ radial, 1+ dorsalis pedis pulses without cyanosis or clubbing. The patient has 3+ pitting edema bilateral lower extremities with chronic venous stasis discoloration of both legs. SKIN:  Warm and dry. LABORATORY DATA:  Reviewed as follows:  Sodium 133, potassium 4.3, chloride 99, CO2 of 29, BUN of 24, creatinine 2.49, glucose of 87, anion gap of 5, calcium is 9.1, magnesium 2.5, GFR 30, total bilirubin 0.5, total protein 7.3, albumin is 3.4, ALT 40, AST 36, and alkaline phosphatase 128. Troponin I of 0.08. WBC 5.3, hemoglobin 12.6, hematocrit 38.4, platelets of 43, neutrophils of 48%. Chest x-ray portable showed bilateral pleural effusions in the left greater than right with associated atelectasis. IMPRESSION AND PLAN: 
1. Bradycardia with second-degree AV block. Case was discussed with cardiologist, Dr. Elaina Cope. Place the patient on telemetry monitoring unit. Avoid all beta and alpha blockers. 2.  Hypertensive urgency. The patient has been started on nitroglycerin 2% ointment 1 inch per emergency department. If unresolved or worsens, then consider for Cardene titrated IV infusion, albeit with caution because I suspect the patient has had chronically elevated blood pressure readings. 3.  Thrombocytopenia. Repeat platelet count. Hold on antiplatelets and anticoagulant therapies to prevent worsening of the same. Repeat the CBC in a.m. 
4.  Acute superimposed on chronic kidney disease stage III. Repeat the renal panel in the a.m. 
5.  Bilateral lower extremity edema. Concern for congestive heart failure and chronic venous insufficiency. Order 2D echocardiogram in a.m. Place on strict inputs and outputs and daily weights. Monitor fluid status closely. 6.  Elevated troponin. Rule out acute myocardial infarction, repeat serial troponin levels. 7.  Hyponatremia. Repeat sodium levels. 8.  Hyperlipidemia. Continue statin therapy. 9.  Venous thromboembolic prophylaxis. Sequential compression devices to lower extremities. Vito Rivera MD 
 
 
[id number] 
D:  03/19/2019 22:38 
T:  03/20/2019 0:19 
JOB #:  [id number]

## 2019-03-20 NOTE — PROGRESS NOTES
Uncontrolled HTN Second degree heart block Acute renal failure(last weeks labs) Dementia/AMS. Admit for mgmt. Stop bblocker. Labs pending. No indication for temp pacer given high BP Troponin, echo. Eval for pacer pending tests

## 2019-03-20 NOTE — CONSULTS
Cardiac Electrophysiology Hospital Consultation Note     Subjective:      Amos Denney is a 80 y.o. patient who is seen for evaluation of 2:1 AV block & bradycardia. EP consult was requested by Dr. Elaina Cope. He was admitted on 03/19/2019 with progressive lower extremity edema, fatigue, & CARREON x 3 weeks per family. Also reported fall 3 weeks ago due to weakness & poor balance. Noted to have 2:1 AV block, sinus bradycardia 30s-40s. Toprol XL held. BP has not been well controlled. ARF; Cr 2.17 today, losartan held. ARF thought to be due to ibuprofen. Nitropaste & prn hydralazine ordered. New murmur noted as well, suspect AS. Echo pending. No prior echo noted in chart. NT pro-BNP 5876. Borderline hyponatremia. Plt 75, Hgb WNL. TSH WNL. Troponin 0.08. Continues with intermittent sinus bradycardia (HR 30s-60s), intermittent 2:1 AV block, rare PVCs. /63 this morning. Patient is poor historian. He states he wanted to be admitted to have his \"whole body checked out\", does not report any recent specific symptoms at time of exam.      Previous:  History HTN, on losartan 100 mg po daily & Toprol XL 25 mg po daily PTA. Denies family history of early CAD, arrhythmia, or valve disease. Recent UTI. Hx prostate CA. Some medication noncompliance. Diagnosed with Alzheimer's dementia January 2019. Currently living independently, was advised by Dr. Candy Smith (neuro) that this is unsafe.       Problem List  Date Reviewed: 3/19/2019          Codes Class Noted    * (Principal) Bradycardia ICD-10-CM: R00.1  ICD-9-CM: 427.89  3/19/2019        Thrombocytopenia (HCC) ICD-10-CM: D69.6  ICD-9-CM: 287.5  3/19/2019        Elevated troponin ICD-10-CM: R74.8  ICD-9-CM: 790.6  3/19/2019        Hypertensive urgency ICD-10-CM: I16.0  ICD-9-CM: 401.9  3/19/2019        Stage 3 chronic kidney disease ([facility] [address] ICD-10-CM: N18.3  ICD-9-CM: 585.3  3/15/2019        Rotator cuff arthropathy of both shoulders ICD-10-CM: M12.811, M12.812  ICD-9-CM: 716.81  3/15/2019        Cognitive impairment ICD-10-CM: R41.89  ICD-9-CM: 294.9  7/5/6530        Systolic murmur STEVAN-61-GR: R01.1  ICD-9-CM: 785.2  12/30/2018        Essential hypertension ICD-10-CM: I10  ICD-9-CM: 401.9  12/17/2018        Gout ICD-10-CM: M10.9  ICD-9-CM: 274.9  12/17/2018        Pure hypercholesterolemia ICD-10-CM: E78.00  ICD-9-CM: 272.0  12/17/2018        History of prostate cancer ICD-10-CM: Z85.46  ICD-9-CM: V10.46  12/17/2018        Chronic constipation ICD-10-CM: K59.09  ICD-9-CM: 564.00  12/17/2018        Dysuria ICD-10-CM: R30.0  ICD-9-CM: 788.1  8/8/2017        Weight loss ICD-10-CM: R63.4  ICD-9-CM: 783.21  8/8/2017        Adrenal mass ([facility] [address] ICD-10-CM: E27.9  ICD-9-CM: 255.9  7/18/2017    Overview Addendum 12/17/2018  9:57 AM by Emily Devlin MD     Stable/non-functioning adenoma on imaging                   Current Facility-Administered Medications   Medication Dose Route Frequency Provider Last Rate Last Dose    DOPamine (INTROPIN) 800 mg in dextrose 5% 250 mL infusion  2.5 mcg/kg/min IntraVENous CONTINUOUS Moose Prado MD   Stopped at 03/20/19 0100    hydrALAZINE (APRESOLINE) 20 mg/mL injection 10 mg  10 mg IntraVENous Q6H PRN Moose Prado MD   10 mg at 03/20/19 0522    amLODIPine (NORVASC) tablet 5 mg  5 mg Oral DAILY Cassandra Brown NP        polyethylene glycol (MIRALAX) packet 17 g  17 g Oral DAILY Melissa Nascimento MD   17 g at 03/20/19 0845    simvastatin (ZOCOR) tablet 20 mg  20 mg Oral QHS Melissa Nascimento MD   20 mg at 03/19/19 2352    sodium chloride (NS) flush 5-40 mL  5-40 mL IntraVENous Q8H Harpal, Kenneth CABRERA MD   10 mL at 03/20/19 0522    sodium chloride (NS) flush 5-40 mL  5-40 mL IntraVENous PRN Melissa Nascimento MD         No Known Allergies  Past Medical History:   Diagnosis Date    Cancer ([facility] [address]     prostate    Constipation     Gout     Hyperlipemia     Hypertension Past Surgical History:   Procedure Laterality Date    HX PROSTATECTOMY      HX UROLOGICAL      prostate removed     Family History   Problem Relation Age of Onset    No Known Problems Mother     No Known Problems Father      Social History     Tobacco Use    Smoking status: Never Smoker    Smokeless tobacco: Never Used   Substance Use Topics    Alcohol use: No        Review of Systems:   Constitutional: Negative for fever, chills, weight loss, + malaise/fatigue, + fall 3 weeks ago. HEENT: Negative for nosebleeds, vision changes. Respiratory: Negative for cough, hemoptysis  Cardiovascular: Negative for chest pain, palpitations, orthopnea, claudication, + leg swelling, no syncope, and PND. + CARREON  Gastrointestinal: Negative for nausea, vomiting, diarrhea, blood in stool and melena. Genitourinary: Negative for dysuria, and hematuria. Musculoskeletal: Negative for myalgias, arthralgia. Skin: Negative for rash. Heme: Does not bleed or bruise easily. Neurological: Negative for speech change and focal weakness     Objective:     Visit Vitals  /63 (BP 1 Location: Left arm, BP Patient Position: At rest)   Pulse (!) 55   Temp 97.5 °F (36.4 °C)   Resp 23   Wt 197 lb 5 oz (89.5 kg)   SpO2 96%   BMI 28.31 kg/m²      Physical Exam:   Constitutional: Thin, elderly male. No respiratory distress. Head: Normocephalic and atraumatic. Eyes: Pupils are equal, round  ENT: Hearing grossly normal  Neck: supple. No JVD present. Cardiovascular: Regular rate at time of exam, irregular rhythm. Exam reveals no gallop and no friction rub. 3/6 LUCIE. Pulmonary/Chest: Effort normal and breath sounds normal. No wheezes. Abdominal: Soft, no tenderness. Musculoskeletal: 2+ edema bilateral lower extremities. No tenderness to palpation. Neurological: Alert,oriented. Skin: Skin is warm and dry. Psychiatric: Normal mood and affect. Has difficulty verbalizing why he's in the hospital.    EKG (03/19/2019):  Sinus bradycardia (39 bpm). 2:1 AV block. Assessment/Plan:     Mr. Amee Xiong has bradycardia into the 30s, continues with intermittent 2:1 AV block despite holding Toprol XL. Recently symptomatic with fatigue, CARREON, & lower extremity edema per family. Patient is poor historian. Bradycardia & AV block now only intermittent during exam.  Toprol XL held since time of admission. At this point, unsure why patient had been prescribed Toprol XL PTA. New murmur, suspect AS. Echo pending for further evaluation of valves & LVEF. If there is no reason for Toprol XL other than BP control & AV block continues to improve, patient would not need pacemaker. However, if beta blocker is indicated for problem such as CHF, would then consider pacemaker. Will wait for results of echo for further plan. SHANNON Hurtado  [facility] [place]  03/20/19    Addendum from EP attending:   I have seen, examined patient, and discussed with nurse practitioner, registered nurse, reviewed, updated note and agree with the assessment and plan    I have talked to him this am. He could not tell me what is going on  Echo was pending this am  Vital signs are stable  HR and BP were normal but he had intermittent bradycardia with second degree av block Mobitz I  Exam shows irregular rhythm and 2/6 systolic murmur  Lungs clear  Legs edema  Assessment and Plan:  Continue to hold beta blocker and check echo LVEF  I will talk to his children if he needs pacemaker    Thank you for involving me in this patient's care and please call with further concerns or questions. Mary Noe M.D.   Electrophysiology/Cardiology  [facility] and [facility] [place]  [address] [facility] [address]  [address]  [phone]

## 2019-03-20 NOTE — PROGRESS NOTES
9801 TRANSFER - IN REPORT: 
 
Verbal report received from [facility] RN(name) on Aissatou Carlson  being received from ED(unit) for routine progression of care Report consisted of patients Situation, Background, Assessment and  
Recommendations(SBAR). Information from the following report(s) SBAR was reviewed with the receiving nurse. Opportunity for questions and clarification was provided. Assessment completed upon patients arrival to unit and care assumed. Patient: Aissatou Carlson MRN: [id number]    Age: 80 y.o. YOB: 1933 Room/Bed: [address] [place] [address] Consent for video monitoring obtained after the below has been explained to the patient/family on 3/20/2019: 
1. They are being monitored continuously in an effort to promote their safety. 2. That there may be times when the camera will be discontinued to provide care to me to ensure my dignity, such as during bathing or any activity that risks me being exposed. 3. They have the right to opt out of having this surveillance monitoring at any time. 4. It has been explained to the patient/family and they understand that the hospital does not maintain any recording of this surveillance monitoring. Boris Olson, RN 
 
 
3136 Negra Brain MD paged about pt elevated BP. Orders obtained for PRn hydralazine 10mg q6hr and for a prn dopamine gtt for hr in the 20s/3 second pauses. 0300 Labs drawn Rosendo Telles MD at bedside. Echo ordered. 0730 Bedside and Verbal shift change report given to Xuan RN (oncoming nurse) by Ashley RN (offgoing nurse). Report included the following information SBAR.

## 2019-03-20 NOTE — PROGRESS NOTES
0730 Bedside and Verbal shift change report given to AUGUSTO Govea (oncoming nurse) by AUGUSTO Ramirez (offgoing nurse). Report included the following information SBAR, Kardex, Intake/Output, MAR, Recent Results and Cardiac Rhythm 2nd degree Type II AVB. 1000 Unable to keep a condom cath on pt - will try urinal & frequent incontinence checks 5001 N Juanita w/ Meng Reed, echo tech, asked when she they planned to be by for pts echo that was put in this morning at 0530 - states she will be by soon 1930 Bedside and Verbal shift change report given to Ashley RN (oncoming nurse) by Kavon Lou RN (offgoing nurse). Report included the following information SBAR, Kardex, Intake/Output, MAR, Recent Results and Cardiac Rhythm 2nd degree Type II AVB.

## 2019-03-20 NOTE — PROGRESS NOTES
Problem: Falls - Risk of 
Goal: *Absence of Falls Description Document Jose Muniz Fall Risk and appropriate interventions in the flowsheet. 3/20/2019 1639 by Pepper Schuster Outcome: Progressing Towards Goal 
Note:  
Fall Risk Interventions: 
Mobility Interventions: Communicate number of staff needed for ambulation/transfer Medication Interventions: Evaluate medications/consider consulting pharmacy History of Falls Interventions: Evaluate medications/consider consulting pharmacy 3/20/2019 1635 by Pepper Schuster Outcome: Progressing Towards Goal 
  
Problem: Patient Education: Go to Patient Education Activity Goal: Patient/Family Education 3/20/2019 1639 by Arabellar Landen Outcome: Progressing Towards Goal 
3/20/2019 1635 by Arabellar Landen Outcome: Progressing Towards Goal 
  
Problem: Hypertension Goal: *Blood pressure within specified parameters 3/20/2019 1639 by Arabellar Landen Outcome: Progressing Towards Goal 
3/20/2019 1635 by Arabellar Landen Outcome: Progressing Towards Goal 
Goal: *Fluid volume balance 3/20/2019 1639 by Arabellar Landen Outcome: Progressing Towards Goal 
3/20/2019 1635 by Arabellar Landen Outcome: Progressing Towards Goal 
Goal: *Labs within defined limits 3/20/2019 1639 by Arabellar Landen Outcome: Progressing Towards Goal 
3/20/2019 1635 by Alver Landen Outcome: Progressing Towards Goal 
  
Problem: Impaired Skin Integrity/Pressure Injury Treatment Goal: *Improvement of Existing Pressure Injury 3/20/2019 1639 by Arabellar Landen Outcome: Progressing Towards Goal 
3/20/2019 1635 by Arabellar Landen Outcome: Progressing Towards Goal 
Goal: *Prevention of pressure injury Description Document Torito Scale and appropriate interventions in the flowsheet. 3/20/2019 1639 by Pepper Shcuster Outcome: Progressing Towards Goal 
3/20/2019 1635 by Arabellar Landen Outcome: Progressing Towards Goal 
  
Problem: Pain - Acute Goal: *Control of acute pain 3/20/2019 1639 by Pepper Schuster Outcome: Progressing Towards Goal 
3/20/2019 1635 by Palmira Jimenez Outcome: Progressing Towards Goal 
  
Problem: Pressure Injury - Risk of 
Goal: *Prevention of pressure injury Description Document Torito Scale and appropriate interventions in the flowsheet. 3/20/2019 1639 by Palmira Jimenez Outcome: Progressing Towards Goal 
Note:  
Pressure Injury Interventions: 
Sensory Interventions: Keep linens dry and wrinkle-free Moisture Interventions: Absorbent underpads Activity Interventions: Increase time out of bed Mobility Interventions: PT/OT evaluation Nutrition Interventions: Document food/fluid/supplement intake Friction and Shear Interventions: Minimize layers 3/20/2019 1635 by Palmira Jimenez Outcome: Progressing Towards Goal 
  
Problem: Patient Education: Go to Patient Education Activity Goal: Patient/Family Education 3/20/2019 1639 by Palmira Jimenez Outcome: Progressing Towards Goal 
3/20/2019 1635 by Palmira Jimenez Outcome: Progressing Towards Goal

## 2019-03-20 NOTE — PROGRESS NOTES
1930 Bedside and Verbal shift change report given to Ashley RN (oncoming nurse) by Guillermo Urbano RN (offgoing nurse). Report included the following information SBAR.  
 
2300 Complete CHG bath and linen change performed. Pt tolerated well 
 
0421 Labs drawn 0730 Bedside and Verbal shift change report given to Xuan RN (oncoming nurse) by Ashley RN (offgoing nurse). Report included the following information SBAR.

## 2019-03-21 PROBLEM — I44.1 SECOND DEGREE AV BLOCK, MOBITZ TYPE I: Status: ACTIVE | Noted: 2019-03-19

## 2019-03-21 LAB
ANION GAP SERPL CALC-SCNC: 7 MMOL/L (ref 5–15)
BUN SERPL-MCNC: 20 MG/DL (ref 6–20)
BUN/CREAT SERPL: 10 (ref 12–20)
CALCIUM SERPL-MCNC: 9.4 MG/DL (ref 8.5–10.1)
CHLORIDE SERPL-SCNC: 107 MMOL/L (ref 97–108)
CO2 SERPL-SCNC: 24 MMOL/L (ref 21–32)
CREAT SERPL-MCNC: 1.95 MG/DL (ref 0.7–1.3)
ECHO PULMONARY ARTERY SYSTOLIC PRESSURE (PASP): 70 MMHG
ERYTHROCYTE [DISTWIDTH] IN BLOOD BY AUTOMATED COUNT: 14.6 % (ref 11.5–14.5)
GLUCOSE SERPL-MCNC: 117 MG/DL (ref 65–100)
HCT VFR BLD AUTO: 41.9 % (ref 36.6–50.3)
HGB BLD-MCNC: 14.1 G/DL (ref 12.1–17)
MAGNESIUM SERPL-MCNC: 2.2 MG/DL (ref 1.6–2.4)
MCH RBC QN AUTO: 30.6 PG (ref 26–34)
MCHC RBC AUTO-ENTMCNC: 33.7 G/DL (ref 30–36.5)
MCV RBC AUTO: 90.9 FL (ref 80–99)
NRBC # BLD: 0 K/UL (ref 0–0.01)
NRBC BLD-RTO: 0 PER 100 WBC
PLATELET # BLD AUTO: 75 K/UL (ref 150–400)
PMV BLD AUTO: 11.8 FL (ref 8.9–12.9)
POTASSIUM SERPL-SCNC: 3.7 MMOL/L (ref 3.5–5.1)
RBC # BLD AUTO: 4.61 M/UL (ref 4.1–5.7)
SODIUM SERPL-SCNC: 138 MMOL/L (ref 136–145)
WBC # BLD AUTO: 6.1 K/UL (ref 4.1–11.1)

## 2019-03-21 PROCEDURE — 85027 COMPLETE CBC AUTOMATED: CPT

## 2019-03-21 PROCEDURE — 74011250637 HC RX REV CODE- 250/637: Performed by: FAMILY MEDICINE

## 2019-03-21 PROCEDURE — 80048 BASIC METABOLIC PNL TOTAL CA: CPT

## 2019-03-21 PROCEDURE — 36415 COLL VENOUS BLD VENIPUNCTURE: CPT

## 2019-03-21 PROCEDURE — 74011250637 HC RX REV CODE- 250/637: Performed by: NURSE PRACTITIONER

## 2019-03-21 PROCEDURE — 74011250636 HC RX REV CODE- 250/636: Performed by: INTERNAL MEDICINE

## 2019-03-21 PROCEDURE — 65660000000 HC RM CCU STEPDOWN

## 2019-03-21 PROCEDURE — 83735 ASSAY OF MAGNESIUM: CPT

## 2019-03-21 RX ORDER — SODIUM CHLORIDE 0.9 % (FLUSH) 0.9 %
5-40 SYRINGE (ML) INJECTION EVERY 8 HOURS
Status: DISCONTINUED | OUTPATIENT
Start: 2019-03-21 | End: 2019-03-22 | Stop reason: HOSPADM

## 2019-03-21 RX ORDER — CEFAZOLIN SODIUM/WATER 2 G/20 ML
2 SYRINGE (ML) INTRAVENOUS ONCE
Status: COMPLETED | OUTPATIENT
Start: 2019-03-22 | End: 2019-03-22

## 2019-03-21 RX ORDER — SODIUM CHLORIDE 0.9 % (FLUSH) 0.9 %
5-40 SYRINGE (ML) INJECTION AS NEEDED
Status: DISCONTINUED | OUTPATIENT
Start: 2019-03-21 | End: 2019-03-22 | Stop reason: HOSPADM

## 2019-03-21 RX ADMIN — Medication 10 ML: at 05:21

## 2019-03-21 RX ADMIN — AMLODIPINE BESYLATE 5 MG: 5 TABLET ORAL at 09:11

## 2019-03-21 RX ADMIN — Medication 10 ML: at 22:31

## 2019-03-21 RX ADMIN — POLYETHYLENE GLYCOL 3350 17 G: 17 POWDER, FOR SOLUTION ORAL at 09:11

## 2019-03-21 RX ADMIN — HYDRALAZINE HYDROCHLORIDE 10 MG: 20 INJECTION INTRAMUSCULAR; INTRAVENOUS at 09:37

## 2019-03-21 RX ADMIN — SIMVASTATIN 20 MG: 20 TABLET, FILM COATED ORAL at 22:22

## 2019-03-21 RX ADMIN — Medication 10 ML: at 17:50

## 2019-03-21 RX ADMIN — HYDRALAZINE HYDROCHLORIDE 10 MG: 20 INJECTION INTRAMUSCULAR; INTRAVENOUS at 17:54

## 2019-03-21 NOTE — PROGRESS NOTES
Cardiac Electrophysiology Hospital Progress Note Subjective:  
  
Andrew Stephenson is a 80 y.o. patient who is seen for evaluation of 2:1 AV block & bradycardia. EP consult was requested by Dr. Manolo Sands. He was admitted on 03/19/2019 with progressive lower extremity edema, fatigue, & CARREON x 3 weeks per family. Also reported fall 3 weeks ago due to weakness & poor balance. Noted to have 2:1 AV block, sinus bradycardia 30s-40s. Toprol XL held. BP has not been well controlled. ARF; Cr 2.17 today, losartan held. ARF thought to be due to ibuprofen. Nitropaste & prn hydralazine ordered. New murmur noted as well. Preliminary echo findings show LVEF 56-60%, mild to mod AS, MR, & TR.   
 
NT pro-BNP 5876 on 03/20/2019. Plt 75, Hgb WNL. TSH WNL. Troponin 0.09, has been relatively flat. Continues with intermittent sinus bradycardia (HR 40s-70s), intermittent 2:1 AV block & Mobitz 1 AVB, rare PVCs. BP improved, 154/90 this morning. Patient is poor historian. He has not reported specific symptoms at time of exams. He does not recall our conversation yesterday regarding possible pacemaker. Echo (03/20/2019): LVEF 56-60%, no RWMA. Mild to mod MR. Mild to mod AS (mean grad 20.7 mmHg, peak grad 47.33 mmHg), trace AR. Mild to mod TR. Large left pleural effusion. CXR (03/19/2019): Pleural effusion, L>R. Previous: 
History HTN, on losartan 100 mg po daily & Toprol XL 25 mg po daily PTA. Denies family history of early CAD, arrhythmia, or valve disease. Recent UTI. Hx prostate CA. Some medication noncompliance. Diagnosed with Alzheimer's dementia January 2019. Currently living independently, was advised by Dr. Jose Gastelum (neuro) that this is unsafe. Problem List  Date Reviewed: 3/19/2019 Codes Class Noted * (Principal) Bradycardia ICD-10-CM: R00.1 ICD-9-CM: 427.89  3/19/2019 Thrombocytopenia ([facility] [address] ICD-10-CM: D69.6 ICD-9-CM: 287.5  3/19/2019 Elevated troponin ICD-10-CM: R74.8 ICD-9-CM: 790.6  3/19/2019 Hypertensive urgency ICD-10-CM: I16.0 ICD-9-CM: 401.9  3/19/2019 Stage 3 chronic kidney disease (HCC) ICD-10-CM: N18.3 ICD-9-CM: 585.3  3/15/2019 Rotator cuff arthropathy of both shoulders ICD-10-CM: M12.811, N71.479 ICD-9-CM: 716.81  3/15/2019 Cognitive impairment ICD-10-CM: R41.89 ICD-9-CM: 294.9  1/8/2019 Systolic murmur ZZG-04-JA: R01.1 ICD-9-CM: 785.2  12/30/2018 Essential hypertension ICD-10-CM: I10 
ICD-9-CM: 401.9  12/17/2018 Gout ICD-10-CM: M10.9 ICD-9-CM: 274.9  12/17/2018 Pure hypercholesterolemia ICD-10-CM: E78.00 ICD-9-CM: 272.0  12/17/2018 History of prostate cancer ICD-10-CM: Z85.46 
ICD-9-CM: V10.46  12/17/2018 Chronic constipation ICD-10-CM: K59.09 
ICD-9-CM: 564.00  12/17/2018 Dysuria ICD-10-CM: R30.0 ICD-9-CM: 788.1  8/8/2017 Weight loss ICD-10-CM: R63.4 ICD-9-CM: 783.21  8/8/2017 Adrenal mass ([facility] [address] ICD-10-CM: E27.9 ICD-9-CM: 255.9  7/18/2017 Overview Addendum 12/17/2018  9:57 AM by Candelario Martinez MD  
  Stable/non-functioning adenoma on imaging Current Facility-Administered Medications Medication Dose Route Frequency Provider Last Rate Last Dose  DOPamine (INTROPIN) 800 mg in dextrose 5% 250 mL infusion  2.5 mcg/kg/min IntraVENous CONTINUOUS Chon Mueller MD   Stopped at 03/20/19 0100  hydrALAZINE (APRESOLINE) 20 mg/mL injection 10 mg  10 mg IntraVENous Q6H PRN Chon Mueller MD   10 mg at 03/20/19 1652  amLODIPine (NORVASC) tablet 5 mg  5 mg Oral DAILY Nehal Brown NP   5 mg at 03/20/19 1024  polyethylene glycol (MIRALAX) packet 17 g  17 g Oral DAILY Elvia Avila MD   17 g at 03/20/19 0845  simvastatin (ZOCOR) tablet 20 mg  20 mg Oral QHS Elvia Avila MD   20 mg at 03/20/19 2121  
 sodium chloride (NS) flush 5-40 mL  5-40 mL IntraVENous Q8H Mazama, Enrique Barnes MD   10 mL at 03/21/19 0521  
 sodium chloride (NS) flush 5-40 mL  5-40 mL IntraVENous PRN Chad Valencia MD      
 
No Known Allergies Past Medical History:  
Diagnosis Date  Cancer [facility])   
 prostate  Constipation  Gout  Hyperlipemia  Hypertension Past Surgical History:  
Procedure Laterality Date  HX PROSTATECTOMY  HX UROLOGICAL    
 prostate removed Family History Problem Relation Age of Onset  No Known Problems Mother  No Known Problems Father Social History Tobacco Use  Smoking status: Never Smoker  Smokeless tobacco: Never Used Substance Use Topics  Alcohol use: No  
  
 
Review of Systems:  
Constitutional: Negative for fever, chills, weight loss, + malaise/fatigue, + fall 3 weeks ago. HEENT: Negative for nosebleeds, vision changes. Respiratory: Negative for cough, hemoptysis Cardiovascular: Negative for chest pain, palpitations, orthopnea, claudication, + leg swelling, no syncope, and PND. + CARREON Gastrointestinal: Negative for nausea, vomiting, diarrhea, blood in stool and melena. Genitourinary: Negative for dysuria, and hematuria. Musculoskeletal: Negative for myalgias, arthralgia. Skin: Negative for rash. Heme: Does not bleed or bruise easily. Neurological: Negative for speech change and focal weakness Objective:  
 
Visit Vitals /90 Pulse (!) 41 Temp 98.7 °F (37.1 °C) Resp 18 Ht 5' 10\" (1.778 m) Wt 184 lb 8.4 oz (83.7 kg) SpO2 94% BMI 26.48 kg/m² Physical Exam:  
Constitutional: Thin, elderly male. No respiratory distress. Head: Normocephalic and atraumatic. Eyes: Pupils are equal, round ENT: Hearing grossly normal 
Neck: supple. No JVD present. Cardiovascular:  irregular rhythm. Exam reveals no gallop and no friction rub. Systolic ejection murmur. Pulmonary/Chest: Effort normal and breath sounds normal. No wheezes. Abdominal: Soft, no tenderness. Musculoskeletal: 2+ edema bilateral lower extremities. No tenderness to palpation. Neurological: Alert,oriented. Skin: Skin is warm and dry. Psychiatric: Normal mood and affect. Has difficulty verbalizing why he's in the hospital, does not recall content of yesterday's conversation regarding pacemaker. EKG (03/19/2019) bradycardia (39 bpm). 2:1 AV block. Assessment/Plan:  
 
Mr. Matthew Bob has bradycardia into the 30s, continues with intermittent 2:1 AV block & Wenckebach despite holding Toprol XL. Recently symptomatic with fatigue, CARREON, & lower extremity edema per family. Patient is poor historian, does not report any symptoms. Bradycardia & AV block now only intermittent. Toprol XL held since time of admission. Will continue to monitor, hold beta blocker. If he continues with bradycardia & AV block tomorrow, would call patient's family member to discuss pacemaker. LVEF 55-60% on echo, has mild to mod AS, MR, & TR. LALI OrtaP-C 9 [facility] 
03/21/19 Addendum from EP attending: 
 I have seen, examined patient, and discussed with nurse practitioner, registered nurse, reviewed, updated note and agree with the assessment and plan I have talked to him but he did not recall our conversation details His nurse in CCU said he tends to be forgetful at night Vital signs are stable with ongoing bradycardia Exam shows regular rhythm 2/6 systolic murmur 
pleasant Assessment and Plan: He likely will need dual chamber pacemaker tomorrow I will discuss with his children since I am not sure he is capable to consent today Thank you for involving me in this patient's care and please call with further concerns or questions. Steve Viera M.D. Electrophysiology/Cardiology [facility] and [facility] [place] [address] [place] [address] [place], [address] [place] [address] [facility] 
[phone]

## 2019-03-21 NOTE — PROGRESS NOTES
I spoke to his daughter Elena Slaughter about pacemaker She wants to talk to her siblings If they agree, I plan to implant 10 am tomorrow She can sign consent for him and nurse can be witnesses

## 2019-03-21 NOTE — INTERDISCIPLINARY ROUNDS
IDR/SLIDR Summary Patient: Andrew Stephenson MRN: [id number]    Age: 80 y.o. YOB: 1933 Room/Bed: [address] [place] [address] Admit Diagnosis: Bradycardia [R00.1] Hypertensive urgency [I16.0] Thrombocytopenia ([address] [D69.6] Elevated troponin [R74.8]  Principal Diagnosis: Bradycardia Goals: control BP, monitor HR and rhythm Readmission: YES  Quality Measure: Not applicable VTE Prophylaxis: Mechanical 
Influenza Vaccine screening completed? YES Pneumococcal Vaccine screening completed? YES Mobility needs: Yes   Nutrition plan:Yes 
Consults:P.T, O.T. and Case Management Financial concerns:Yes  Escalated to CM? YES 
RRAT Score: 14   Interventions:H2H Testing due for pt today? YES 
LOS: 1 days Expected length of stay ? days Discharge plan: tbd   PCP: Chris Strong MD 
Transportation needs: Yes Days before discharge:two or more days before discharge Discharge disposition: TBD Signed:  
 
Nathalie Parsons RN 
3/20/2019 
10:39 PM

## 2019-03-21 NOTE — PROGRESS NOTES
Hospitalist Progress Note Martin Marlow MD 
Answering service: [phone] OR [phone] from in house phone Date of Service:  3/21/2019 NAME:  Kassidy Hu :  3/9/1933 MRN:  [id number] Admission Summary:  
72-year-old -American male, past medical history of hypertension, hyperlipidemia, gout, constipation, prostate cancer, presented to the emergency department from home via private transport with reported elevated blood pressure despite being on meds,increasing Lext edema,SOB. In the ER noted with /78, bradycardia, HR 44,EKG with HR 39 2nd degree AV block,Labs with SOULEYMANE Interval history / Subjective:  
  
Confused ,forgetful (at baseline per daughter)continues with bradycardia, BP control improving Assessment & Plan:  
 
Sinus Bradycardia with second-degree AV block.   
-Prior to admit toprol xl held 
-echo with LVEF 55-60%,mild to mod AS,MR &TR 
-TSH normal  
-pt continues to have intermittent bradycardia 
-cardiology & EP following, has 2:1 heart block 
-EP advice pacemaker:  if continues to have naveed & AV block by tomorrow, 
-called daughter and informed  Reg tentative pacemaker placement Hypertensive urgency. 
-Bp improving now   
- nitroglycerin 2% ointment ,prn hydralazine,BP remained elevated 
-s/p dopamine gtt 
-currently on norvasc, prn hydralazine Cardiomyopathy, 
-b/l lext edema,elevated proBNP,cxr with pleural effusion 
-mildly elevated trop,in setting of SOULEYMANE 
-strict ins 7 outs 
-no ace/arb or diuretics d/t SOULEYMANE SOULEYMANE on CKD 3,baseline Cr 2.3 
-held PTA losartan,volataren  
-Cr. improved 1.9 today Thrombocytopenia. -Repeat platelet count. improved 
-Hold on antiplatelets and anticoagulants Murmur ,presumed AS,per card 
-echo with mild to mod AS,MR,TR  EF55-60% Hyponatremia. resolved Hyperlipidemia. LDL 39 
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 
-supportive care Functional status: lives alone functionally independent per daughter Code status:FULL 
DVT prophylaxis: SCD Care Plan discussed with: pt/nurse, daughter Mandy Waldrop # [phone] 
-called daughter and informed  Reg tentative pacemaker placement tomorrow, she  
 will come in the morning to discuss it further Disposition: down grade to [facility] Problems  Date Reviewed: 3/19/2019 Codes Class Noted POA * (Principal) Bradycardia ICD-10-CM: R00.1 ICD-9-CM: 427.89  3/19/2019 Unknown Thrombocytopenia ([address] ICD-10-CM: D69.6 ICD-9-CM: 287.5  3/19/2019 Unknown Elevated troponin ICD-10-CM: R74.8 ICD-9-CM: 790.6  3/19/2019 Unknown Hypertensive urgency ICD-10-CM: I16.0 ICD-9-CM: 401.9  3/19/2019 Unknown Review of Systems: A comprehensive review of systems was negative except for that written in the HPI. Vital Signs:  
 Last 24hrs VS reviewed since prior progress note. Most recent are: 
Visit Vitals /67 Pulse (!) 47 Temp 97.8 °F (36.6 °C) Resp 21 Ht 5' 10\" (1.778 m) Wt 83.7 kg (184 lb 8.4 oz) SpO2 96% BMI 26.48 kg/m² Intake/Output Summary (Last 24 hours) at 3/21/2019 1443 Last data filed at 3/21/2019 1200 Gross per 24 hour Intake  Output 2275 ml Net -2275 ml Physical Examination:  
 
 
     
Constitutional:  No acute distress, cooperative, pleasant   
ENT:  Oral mucous moist, oropharynx benign. Neck supple, Resp:  CTA bilaterally. No wheezing/rhonchi/rales. No accessory muscle use CV:  Regular rhythm, normal rate,+LUCIE , gallops, rubs GI:  Soft, non distended, non tender. normoactive bowel sounds, no hepatosplenomegaly Musculoskeletal:  No edema, warm, 2+ pulses throughout Neurologic:  Moves all extremities. occasionaly talks,follows commnands CN II-XII reviewed Psych:  AA oreinted x2 person & place only Skin:  warm & dry Data Review:  
 Review and/or order of clinical lab test 
 
 
Labs:  
 
Recent Labs  
  03/21/19 
0421 03/20/19 
0303 WBC 6.1 4.9 HGB 14.1 12.6 HCT 41.9 40.8 PLT 75* 75* Recent Labs  
  03/21/19 
0421 03/20/19 
0303 03/19/19 2111  134* 133* K 3.7 4.0 4.3  104 99 CO2 24 24 29 BUN 20 22* 24* CREA 1.95* 2.17* 2.49* * 111* 87  
CA 9.4 8.5 9.1 MG 2.2  --  2.5* Recent Labs  
  03/19/19 2111 SGOT 36 ALT 40  
* TBILI 0.5 TP 7.3 ALB 3.4*  
GLOB 3.9 No results for input(s): INR, PTP, APTT in the last 72 hours. No lab exists for component: INREXT, INREXT No results for input(s): FE, TIBC, PSAT, FERR in the last 72 hours. Lab Results Component Value Date/Time Folate 12.2 01/07/2019 11:29 AM  
  
No results for input(s): PH, PCO2, PO2 in the last 72 hours. Recent Labs  
  03/20/19 
1650 03/20/19 
0854 03/19/19 2111 TROIQ 0.09* 0.06* 0.08* Lab Results Component Value Date/Time Cholesterol, total 98 03/20/2019 03:03 AM  
 HDL Cholesterol 48 03/20/2019 03:03 AM  
 LDL, calculated 39.6 03/20/2019 03:03 AM  
 Triglyceride 52 03/20/2019 03:03 AM  
 CHOL/HDL Ratio 2.0 03/20/2019 03:03 AM  
 
No results found for: Barbiemaria eugenia Hope Lab Results Component Value Date/Time Color Yellow 08/08/2017 04:09 PM  
 Appearance Clear 08/08/2017 04:09 PM  
 Specific gravity 1.017 07/18/2017 02:56 PM  
 pH (UA) 6.5 08/08/2017 04:09 PM  
 Protein 100 (A) 07/18/2017 02:56 PM  
 Glucose NEGATIVE  07/18/2017 02:56 PM  
 Ketone Negative 08/08/2017 04:09 PM  
 Bilirubin Negative 08/08/2017 04:09 PM  
 Urobilinogen 1.0 07/18/2017 02:56 PM  
 Nitrites Positive (A) 08/08/2017 04:09 PM  
 Leukocyte Esterase 1+ (A) 08/08/2017 04:09 PM  
 Epithelial cells MODERATE (A) 07/18/2017 02:56 PM  
 Bacteria Few 08/08/2017 04:09 PM  
 WBC 11-30 (A) 08/08/2017 04:09 PM  
 RBC 0-2 08/08/2017 04:09 PM  
 
 
 
Medications Reviewed: Current Facility-Administered Medications Medication Dose Route Frequency  DOPamine (INTROPIN) 800 mg in dextrose 5% 250 mL infusion  2.5 mcg/kg/min IntraVENous CONTINUOUS  
 hydrALAZINE (APRESOLINE) 20 mg/mL injection 10 mg  10 mg IntraVENous Q6H PRN  
 amLODIPine (NORVASC) tablet 5 mg  5 mg Oral DAILY  polyethylene glycol (MIRALAX) packet 17 g  17 g Oral DAILY  simvastatin (ZOCOR) tablet 20 mg  20 mg Oral QHS  sodium chloride (NS) flush 5-40 mL  5-40 mL IntraVENous Q8H  
 sodium chloride (NS) flush 5-40 mL  5-40 mL IntraVENous PRN  
 
______________________________________________________________________ EXPECTED LENGTH OF STAY: 2d 12h ACTUAL LENGTH OF STAY:          2 Kirk Burgess MD

## 2019-03-21 NOTE — PROGRESS NOTES
[place]: Alexa Claros 
[phone] Requesting/referring provider: Dr. Sonam Krause Reason for Consult: heart block HPI: Bob Manzano, a 80y.o. year-old who presents for evaluation of 2:1 heart block He give 3 weeks history of increasing LE edema fatigue and CARREON with exertion. Also had a fall about 3 weeks ago related to being weak and off balance. He has had recurrent UTI and maybe prostatectomy and wants to talk about that. He denies chest pain or palpitations. In the bed he denies headache or dizziness. Hr is 38-40 BP uncontrolled. IN ARF with elevated crt, thought related to ibuprofen but not improving with cessation. New murmur. Continues to have bradycardia with 2:1 block today He is tired today but no specific complaints. Final decision on pacemaker in  am.  
 
Assessment/Plan: 1. AS- presumed, echo to eval 
2. 2:1 heart block holding metoprolol, pacer eval 
3. Hyperlipidemia- on zocor 4. Malignant htn- better today running 150s on amlodipine 
-hydralazine prn 
5. ARF- crt improving 6. Elevated troponin- mild, nonspecific 7. DM- A1C 6.3 no prior history of DM Soc no tob remote etoh Fhx no early cad, no pacer no naveed no valve diseae He  has a past medical history of Cancer ([address] Constipation, Gout, Hyperlipemia, and Hypertension. Cardiovascular ROS: positive for - dyspnea on exertion and edema Respiratory ROS: +CARREON Neurological ROS: no TIA or stroke symptoms All other systems negative except as above. PE 
Vitals:  
 03/21/19 1300 03/21/19 1400 03/21/19 1500 03/21/19 1600 BP: 141/72 126/67 146/59 156/59 Pulse: (!) 59 (!) 47 62 61 Resp: 22 21 26 18 Temp:    97.9 °F (36.6 °C) SpO2: 93% 96% 96% 95% Weight:      
Height:      
 Body mass index is 26.48 kg/m². General appearance -elderly thin nad Mental status - affect appropriate to mood Eyes - sclera anicteric, moist mucous membranes Neck - supple, no significant adenopathy Lymphatics - no  lymphadenopathy Chest - clear to auscultation, no wheezes, rales or rhonchi Heart - naveed, regular rhythm, normal S1, S2, 3/6 LUCIE Abdomen - soft, nontender, nondistended, no masses or organomegaly Back exam - full range of motion, no tenderness Neurological - cranial nerves II through XII grossly intact, no focal deficit Musculoskeletal - no muscular tenderness noted, normal strength Extremities - peripheral pulses normal, 2+ edema ble Skin - normal coloration  no rashes Recent Labs: 
Lab Results Component Value Date/Time Cholesterol, total 98 03/20/2019 03:03 AM  
 HDL Cholesterol 48 03/20/2019 03:03 AM  
 LDL, calculated 39.6 03/20/2019 03:03 AM  
 Triglyceride 52 03/20/2019 03:03 AM  
 CHOL/HDL Ratio 2.0 03/20/2019 03:03 AM  
 
Lab Results Component Value Date/Time Creatinine (POC) 1.7 (H) 07/31/2018 02:19 PM  
 Creatinine 1.95 (H) 03/21/2019 04:21 AM  
 
Lab Results Component Value Date/Time BUN 20 03/21/2019 04:21 AM  
 
Lab Results Component Value Date/Time Potassium 3.7 03/21/2019 04:21 AM  
 
Lab Results Component Value Date/Time Hemoglobin A1c 6.3 (H) 03/11/2019 05:08 PM  
 
Lab Results Component Value Date/Time HGB 14.1 03/21/2019 04:21 AM  
 
Lab Results Component Value Date/Time PLATELET 75 (L) 64/69/7057 04:21 AM  
 
 
Reviewed: 
Past Medical History:  
Diagnosis Date  Cancer [facility])   
 prostate  Constipation  Gout  Hyperlipemia  Hypertension Social History Tobacco Use Smoking Status Never Smoker Smokeless Tobacco Never Used Social History Substance and Sexual Activity Alcohol Use No  
 
No Known Allergies Current Facility-Administered Medications Medication Dose Route Frequency  DOPamine (INTROPIN) 800 mg in dextrose 5% 250 mL infusion  2.5 mcg/kg/min IntraVENous CONTINUOUS  
 hydrALAZINE (APRESOLINE) 20 mg/mL injection 10 mg  10 mg IntraVENous Q6H PRN  
  amLODIPine (NORVASC) tablet 5 mg  5 mg Oral DAILY  polyethylene glycol (MIRALAX) packet 17 g  17 g Oral DAILY  simvastatin (ZOCOR) tablet 20 mg  20 mg Oral QHS  sodium chloride (NS) flush 5-40 mL  5-40 mL IntraVENous Q8H  
 sodium chloride (NS) flush 5-40 mL  5-40 mL IntraVENous PRN Linnea Chaidez MD 
[facility] [address], [address]

## 2019-03-21 NOTE — PROGRESS NOTES
0730 Bedside and Verbal shift change report given to Xuan RN (oncoming nurse) by Ashley RN (offgoing nurse). Report included the following information SBAR, Kardex, Procedure Summary, Intake/Output, MAR, Recent Results and Cardiac Rhythm 2AVB Type II.  
1700 TRANSFER - OUT REPORT: 
 
Verbal report given to [facility] RN(name) on Nury Harris  being transferred to CVSU(unit) for routine progression of care Report consisted of patients Situation, Background, Assessment and  
Recommendations(SBAR). Information from the following report(s) SBAR, Kardex, Intake/Output, MAR, Recent Results and Cardiac Rhythm 2nd AVB Type II was reviewed with the receiving nurse. Lines:  
Peripheral IV 03/19/19 Left Antecubital (Active) Site Assessment Clean, dry, & intact 3/21/2019  4:00 PM  
Phlebitis Assessment 0 3/21/2019  4:00 PM  
Infiltration Assessment 0 3/21/2019  4:00 PM  
Dressing Status Clean, dry, & intact 3/21/2019  4:00 PM  
Dressing Type Transparent 3/21/2019  4:00 PM  
Hub Color/Line Status Pink;Flushed;Capped 3/21/2019  4:00 PM  
Action Taken Open ports on tubing capped 3/21/2019  4:00 PM  
Alcohol Cap Used Yes 3/21/2019  4:00 PM  
  
 
Opportunity for questions and clarification was provided. Patient transported with: 
 Monitor Registered Nurse Tech

## 2019-03-22 ENCOUNTER — APPOINTMENT (OUTPATIENT)
Dept: GENERAL RADIOLOGY | Age: 84
DRG: 243 | End: 2019-03-22
Attending: NURSE PRACTITIONER
Payer: MEDICARE

## 2019-03-22 PROBLEM — Z95.0 PACEMAKER: Status: ACTIVE | Noted: 2019-03-22

## 2019-03-22 LAB
ANION GAP SERPL CALC-SCNC: 4 MMOL/L (ref 5–15)
BUN SERPL-MCNC: 23 MG/DL (ref 6–20)
BUN/CREAT SERPL: 12 (ref 12–20)
CALCIUM SERPL-MCNC: 9.5 MG/DL (ref 8.5–10.1)
CHLORIDE SERPL-SCNC: 106 MMOL/L (ref 97–108)
CO2 SERPL-SCNC: 25 MMOL/L (ref 21–32)
CREAT SERPL-MCNC: 2 MG/DL (ref 0.7–1.3)
GLUCOSE SERPL-MCNC: 110 MG/DL (ref 65–100)
MAGNESIUM SERPL-MCNC: 2.2 MG/DL (ref 1.6–2.4)
POTASSIUM SERPL-SCNC: 3.3 MMOL/L (ref 3.5–5.1)
POTASSIUM SERPL-SCNC: 5.7 MMOL/L (ref 3.5–5.1)
SODIUM SERPL-SCNC: 135 MMOL/L (ref 136–145)

## 2019-03-22 PROCEDURE — 74011250636 HC RX REV CODE- 250/636: Performed by: INTERNAL MEDICINE

## 2019-03-22 PROCEDURE — C1898 LEAD, PMKR, OTHER THAN TRANS: HCPCS | Performed by: INTERNAL MEDICINE

## 2019-03-22 PROCEDURE — 80048 BASIC METABOLIC PNL TOTAL CA: CPT

## 2019-03-22 PROCEDURE — 74011250637 HC RX REV CODE- 250/637: Performed by: INTERNAL MEDICINE

## 2019-03-22 PROCEDURE — 02H63JZ INSERTION OF PACEMAKER LEAD INTO RIGHT ATRIUM, PERCUTANEOUS APPROACH: ICD-10-PCS | Performed by: INTERNAL MEDICINE

## 2019-03-22 PROCEDURE — 77030018547 HC SUT ETHBND1 J&J -B: Performed by: INTERNAL MEDICINE

## 2019-03-22 PROCEDURE — 99153 MOD SED SAME PHYS/QHP EA: CPT | Performed by: INTERNAL MEDICINE

## 2019-03-22 PROCEDURE — 83735 ASSAY OF MAGNESIUM: CPT

## 2019-03-22 PROCEDURE — 71045 X-RAY EXAM CHEST 1 VIEW: CPT

## 2019-03-22 PROCEDURE — 99152 MOD SED SAME PHYS/QHP 5/>YRS: CPT | Performed by: INTERNAL MEDICINE

## 2019-03-22 PROCEDURE — 02HK3JZ INSERTION OF PACEMAKER LEAD INTO RIGHT VENTRICLE, PERCUTANEOUS APPROACH: ICD-10-PCS | Performed by: INTERNAL MEDICINE

## 2019-03-22 PROCEDURE — C1785 PMKR, DUAL, RATE-RESP: HCPCS | Performed by: INTERNAL MEDICINE

## 2019-03-22 PROCEDURE — C1893 INTRO/SHEATH, FIXED,NON-PEEL: HCPCS | Performed by: INTERNAL MEDICINE

## 2019-03-22 PROCEDURE — A4565 SLINGS: HCPCS | Performed by: INTERNAL MEDICINE

## 2019-03-22 PROCEDURE — 74011250637 HC RX REV CODE- 250/637: Performed by: NURSE PRACTITIONER

## 2019-03-22 PROCEDURE — 77030031139 HC SUT VCRL2 J&J -A: Performed by: INTERNAL MEDICINE

## 2019-03-22 PROCEDURE — 65660000000 HC RM CCU STEPDOWN

## 2019-03-22 PROCEDURE — 74011000250 HC RX REV CODE- 250: Performed by: INTERNAL MEDICINE

## 2019-03-22 PROCEDURE — 74011250637 HC RX REV CODE- 250/637: Performed by: FAMILY MEDICINE

## 2019-03-22 PROCEDURE — 51798 US URINE CAPACITY MEASURE: CPT

## 2019-03-22 PROCEDURE — 84132 ASSAY OF SERUM POTASSIUM: CPT

## 2019-03-22 PROCEDURE — 74011250636 HC RX REV CODE- 250/636

## 2019-03-22 PROCEDURE — 77030018673: Performed by: INTERNAL MEDICINE

## 2019-03-22 PROCEDURE — 33208 INSRT HEART PM ATRIAL & VENT: CPT | Performed by: INTERNAL MEDICINE

## 2019-03-22 PROCEDURE — 74011636320 HC RX REV CODE- 636/320: Performed by: INTERNAL MEDICINE

## 2019-03-22 PROCEDURE — 0JH606Z INSERTION OF PACEMAKER, DUAL CHAMBER INTO CHEST SUBCUTANEOUS TISSUE AND FASCIA, OPEN APPROACH: ICD-10-PCS | Performed by: INTERNAL MEDICINE

## 2019-03-22 PROCEDURE — 77030019580 HC CBL PACE MEDT -B: Performed by: INTERNAL MEDICINE

## 2019-03-22 PROCEDURE — 77030039046 HC PAD DEFIB RADIOTRNSPNT CNMD -B: Performed by: INTERNAL MEDICINE

## 2019-03-22 PROCEDURE — 36415 COLL VENOUS BLD VENIPUNCTURE: CPT

## 2019-03-22 PROCEDURE — 74011000272 HC RX REV CODE- 272: Performed by: INTERNAL MEDICINE

## 2019-03-22 PROCEDURE — 77030010545

## 2019-03-22 DEVICE — LEAD PACE BPLR 6 FRX45 CM STR TIP IS-1 CAPSUR FIX NOVUS
Type: IMPLANTABLE DEVICE | Site: HEART | Status: NON-FUNCTIONAL
Removed: 2019-10-18

## 2019-03-22 DEVICE — LEAD PCMKR CAPSUR FIX NOVUS 52 --: Type: IMPLANTABLE DEVICE | Site: HEART | Status: FUNCTIONAL

## 2019-03-22 DEVICE — IPG W3DR01 AZURE S DR MRI WL USA
Type: IMPLANTABLE DEVICE | Site: HEART | Status: FUNCTIONAL
Brand: AZURE™ S DR MRI SURESCAN™

## 2019-03-22 RX ORDER — CEFAZOLIN SODIUM/WATER 2 G/20 ML
2 SYRINGE (ML) INTRAVENOUS ONCE
Status: COMPLETED | OUTPATIENT
Start: 2019-03-22 | End: 2019-03-22

## 2019-03-22 RX ORDER — HYDROCODONE BITARTRATE AND ACETAMINOPHEN 5; 325 MG/1; MG/1
1 TABLET ORAL
Status: DISCONTINUED | OUTPATIENT
Start: 2019-03-22 | End: 2019-03-26 | Stop reason: HOSPADM

## 2019-03-22 RX ORDER — VANCOMYCIN HYDROCHLORIDE 1 G/20ML
1 INJECTION, POWDER, LYOPHILIZED, FOR SOLUTION INTRAVENOUS ONCE
Status: ACTIVE | OUTPATIENT
Start: 2019-03-22 | End: 2019-03-22

## 2019-03-22 RX ORDER — METOPROLOL SUCCINATE 50 MG/1
50 TABLET, EXTENDED RELEASE ORAL DAILY
Status: DISCONTINUED | OUTPATIENT
Start: 2019-03-22 | End: 2019-03-26 | Stop reason: HOSPADM

## 2019-03-22 RX ORDER — LIDOCAINE HYDROCHLORIDE 10 MG/ML
INJECTION INFILTRATION; PERINEURAL AS NEEDED
Status: DISCONTINUED | OUTPATIENT
Start: 2019-03-22 | End: 2019-03-22 | Stop reason: HOSPADM

## 2019-03-22 RX ORDER — SODIUM CHLORIDE 0.9 % (FLUSH) 0.9 %
5-40 SYRINGE (ML) INJECTION AS NEEDED
Status: DISCONTINUED | OUTPATIENT
Start: 2019-03-22 | End: 2019-03-26 | Stop reason: HOSPADM

## 2019-03-22 RX ORDER — ONDANSETRON 2 MG/ML
4 INJECTION INTRAMUSCULAR; INTRAVENOUS
Status: DISCONTINUED | OUTPATIENT
Start: 2019-03-22 | End: 2019-03-26 | Stop reason: HOSPADM

## 2019-03-22 RX ORDER — CEFAZOLIN SODIUM/WATER 2 G/20 ML
2 SYRINGE (ML) INTRAVENOUS
Status: ACTIVE | OUTPATIENT
Start: 2019-03-22 | End: 2019-03-23

## 2019-03-22 RX ORDER — CEPHALEXIN 250 MG/1
250 CAPSULE ORAL 3 TIMES DAILY
Status: DISCONTINUED | OUTPATIENT
Start: 2019-03-23 | End: 2019-03-26 | Stop reason: HOSPADM

## 2019-03-22 RX ORDER — ACETAMINOPHEN 325 MG/1
650 TABLET ORAL
Status: DISCONTINUED | OUTPATIENT
Start: 2019-03-22 | End: 2019-03-26 | Stop reason: HOSPADM

## 2019-03-22 RX ORDER — MIDAZOLAM HYDROCHLORIDE 1 MG/ML
INJECTION, SOLUTION INTRAMUSCULAR; INTRAVENOUS AS NEEDED
Status: DISCONTINUED | OUTPATIENT
Start: 2019-03-22 | End: 2019-03-22 | Stop reason: HOSPADM

## 2019-03-22 RX ORDER — FENTANYL CITRATE 50 UG/ML
INJECTION, SOLUTION INTRAMUSCULAR; INTRAVENOUS AS NEEDED
Status: DISCONTINUED | OUTPATIENT
Start: 2019-03-22 | End: 2019-03-22 | Stop reason: HOSPADM

## 2019-03-22 RX ORDER — HYDRALAZINE HYDROCHLORIDE 25 MG/1
25 TABLET, FILM COATED ORAL 2 TIMES DAILY
Status: DISCONTINUED | OUTPATIENT
Start: 2019-03-22 | End: 2019-03-24

## 2019-03-22 RX ORDER — SODIUM CHLORIDE 0.9 % (FLUSH) 0.9 %
5-40 SYRINGE (ML) INJECTION EVERY 8 HOURS
Status: DISCONTINUED | OUTPATIENT
Start: 2019-03-22 | End: 2019-03-26 | Stop reason: HOSPADM

## 2019-03-22 RX ADMIN — Medication 10 ML: at 14:00

## 2019-03-22 RX ADMIN — NITROGLYCERIN 1 INCH: 20 OINTMENT TOPICAL at 22:01

## 2019-03-22 RX ADMIN — Medication 10 ML: at 20:07

## 2019-03-22 RX ADMIN — SIMVASTATIN 20 MG: 20 TABLET, FILM COATED ORAL at 20:07

## 2019-03-22 RX ADMIN — HYDRALAZINE HYDROCHLORIDE 10 MG: 20 INJECTION INTRAMUSCULAR; INTRAVENOUS at 18:29

## 2019-03-22 RX ADMIN — Medication 10 ML: at 07:08

## 2019-03-22 RX ADMIN — HYDRALAZINE HYDROCHLORIDE 25 MG: 25 TABLET, FILM COATED ORAL at 13:14

## 2019-03-22 RX ADMIN — POLYETHYLENE GLYCOL 3350 17 G: 17 POWDER, FOR SOLUTION ORAL at 13:14

## 2019-03-22 RX ADMIN — Medication 10 ML: at 07:07

## 2019-03-22 RX ADMIN — AMLODIPINE BESYLATE 5 MG: 5 TABLET ORAL at 09:06

## 2019-03-22 RX ADMIN — HYDRALAZINE HYDROCHLORIDE 25 MG: 25 TABLET, FILM COATED ORAL at 20:07

## 2019-03-22 RX ADMIN — Medication 2 G: at 10:30

## 2019-03-22 RX ADMIN — Medication 2 G: at 09:06

## 2019-03-22 RX ADMIN — METOPROLOL SUCCINATE 50 MG: 50 TABLET, EXTENDED RELEASE ORAL at 18:26

## 2019-03-22 NOTE — PROGRESS NOTES
TRANSFER - OUT REPORT: 
 
Verbal report given to Juliann Cardona RN on Stefanie Dyer being transferred to cath lab recovery for routine progression of care Report consisted of patients Situation, Background, Assessment and  
Recommendations(SBAR). Information from the following report(s) Procedure Summary and MAR was reviewed with the receiving nurse. Opportunity for questions and clarification was provided. Cardiac Cath Lab Procedure Area Arrival Note: 
 
Stefanie Dyer arrived to Cardiac Cath Lab, Procedure Area. Patient identifiers verified with NAME and DATE OF BIRTH. Procedure verified with patient. Consent forms verified. Allergies verified. Patient informed of procedure and plan of care. Questions answered with review. Patient voiced understanding of procedure and plan of care. Patient on cardiac monitor, non-invasive blood pressure, SPO2 monitor. On  or O2 @ 2 lpm via nasal canula. IV of 0.9% NS on pump at 20 ml/hr. Patient status doing well without problems. Patient is A&Ox 4. Patient reports no pian. Patient medicated during procedure with orders obtained and verified by Dr. Noe Frausto. Refer to patients Cardiac Cath Lab PROCEDURE REPORT for vital signs, assessment, status, and response during procedure, printed at end of case. Printed report on chart or scanned into chart.

## 2019-03-22 NOTE — PROGRESS NOTES
Patient is seen with daughter and son in the room Consent obtained for pacemaker Daughter said he is functional and independent at home Wants right sided pacer Full note to follow

## 2019-03-22 NOTE — PROGRESS NOTES
Spiritual Care Partner Volunteer visited patient in Rm [phone] on 3/22/19. Documented by: Chaplain Edwards MDiv, MACE 
[phone])

## 2019-03-22 NOTE — PROGRESS NOTES
CM reviewed chart and participated in IDR's - patient admitted and will be getting a pacemaker today - has supportive son and daughter - CM attempted to see patient in room and he is currently in the cath lab at this time - will await orders from MD for any case management/transitional care needs. MICHELET Chino CM spoke with daughter at bedside - patient lives alone and daughter and son work - patient has some dementia and daughter concerned about patient going home and is asking for help with placement - discussed IPR and SNF - Dr. Armenta Shown reccommends SNF rehab- daughter has listing of SNF to review and asked to provide CM staff with top 3 choices once family decides - if patient is ready for discharge over the weekend please page weekend CM staff for SNF placement [phone]. MICHELET Chino Patient coordination rounds with provider, , and nurse performed in patient's room. Reason for Admission:   Bradycardia and Pacemaker placement RRAT Score:     14 Do you (patient/family) have any concerns for transition/discharge? Daughter wants SNF short term rehab placement Plan for utilizing home health:     Plan is SNF Likelihood of readmission?   mod Transition of Care Plan:      SNF with PCP/cardiology follow-up

## 2019-03-22 NOTE — PROGRESS NOTES
TRANSFER - IN REPORT: 
 
Verbal report received from [facility] on Amos Denney  being received from cath lab procedure area  for routine progression of care. Report consisted of patients Situation, Background, Assessment and Recommendations(SBAR). Information from the following report(s) Kardex and Procedure Summary was reviewed with the receiving clinician. Opportunity for questions and clarification was provided. Assessment completed upon patients arrival to [address] [place] [address] and care assumed. Cardiac Cath Lab Recovery Arrival Note: 
 
Amos Denney arrived to [facility] recovery area. Patient procedure= PPI. Patient on cardiac monitor, non-invasive blood pressure, SPO2 monitor. On RA or O2 @ 2 lpm via NC. Patient status doing well without problems. Patient is A&Ox 3. Patient reports no pain. PROCEDURE SITE CHECK: 
 
Procedure site:without any bleeding and no hematoma, No pain/discomfort reported at procedure site. No change in patient status. Continue to monitor patient and status.

## 2019-03-22 NOTE — PROGRESS NOTES
Cardiac Electrophysiology Hospital Progress Note Subjective:  
  
Uli Peterson is a 80 y.o. patient who is seen for evaluation of 2:1 AV block & bradycardia. EP consult was requested by Dr. Marvin Llamas. He was admitted on 03/19/2019 with progressive lower extremity edema, fatigue, & CARREON x 3 weeks per family. Also reported fall 3 weeks ago due to weakness & poor balance. Noted to have 2:1 AV block, sinus bradycardia 30s-40s. Toprol XL held. BP has not been well controlled. ARF; Cr 2 today, losartan held. ARF thought to be due to ibuprofen. Nitropaste & prn hydralazine ordered. New murmur noted as well this admission. Preliminary echo findings show LVEF 56-60%, mild to mod AS, MR, & TR.   
 
NT pro-BNP 5876 on 03/20/2019. Plt 75, Hgb WNL. TSH WNL. Troponin 0.09, has been relatively flat. Continues with more frequent 2:1 AVB, bradycardia (40s). BP elevated, 164/85 this morning. Patient is poor historian. He has not reported specific symptoms at time of exams. He does not recall our previous conversations regarding possible pacemaker. Echo (03/20/2019): LVEF 56-60%, no RWMA. Mild to mod MR. Mild to mod AS (mean grad 20.7 mmHg, peak grad 47.33 mmHg), trace AR. Mild to mod TR. Large left pleural effusion. CXR (03/19/2019): Pleural effusion, L>R. Previous: 
History HTN, on losartan 100 mg po daily & Toprol XL 25 mg po daily PTA. Denies family history of early CAD, arrhythmia, or valve disease. Recent UTI. Hx prostate CA. Some medication noncompliance. Diagnosed with Alzheimer's dementia January 2019. Currently living independently, was advised by Dr. Darek Cohen (neuro) that this is unsafe. Problem List  Date Reviewed: 3/19/2019 Codes Class Noted * (Principal) Bradycardia ICD-10-CM: R00.1 ICD-9-CM: 427.89  3/19/2019 Thrombocytopenia ([facility] [address] ICD-10-CM: D69.6 ICD-9-CM: 287.5  3/19/2019 Elevated troponin ICD-10-CM: R74.8 ICD-9-CM: 790.6  3/19/2019 Hypertensive urgency ICD-10-CM: I16.0 ICD-9-CM: 401.9  3/19/2019 Second degree AV block, Mobitz type I ICD-10-CM: I44.1 ICD-9-CM: 426.13  3/19/2019 Overview Signed 3/21/2019  6:14 PM by Jai Noble MD  
  Added automatically from request for surgery [id number] Stage 3 chronic kidney disease (HCC) ICD-10-CM: N18.3 ICD-9-CM: 585.3  3/15/2019 Rotator cuff arthropathy of both shoulders ICD-10-CM: M12.811, K79.315 ICD-9-CM: 716.81  3/15/2019 Cognitive impairment ICD-10-CM: R41.89 ICD-9-CM: 294.9  1/8/2019 Systolic murmur [facility]-87-PE: R01.1 ICD-9-CM: 785.2  12/30/2018 Essential hypertension ICD-10-CM: I10 
ICD-9-CM: 401.9  12/17/2018 Gout ICD-10-CM: M10.9 ICD-9-CM: 274.9  12/17/2018 Pure hypercholesterolemia ICD-10-CM: E78.00 ICD-9-CM: 272.0  12/17/2018 History of prostate cancer ICD-10-CM: Z85.46 
ICD-9-CM: V10.46  12/17/2018 Chronic constipation ICD-10-CM: K59.09 
ICD-9-CM: 564.00  12/17/2018 Dysuria ICD-10-CM: R30.0 ICD-9-CM: 788.1  8/8/2017 Weight loss ICD-10-CM: R63.4 ICD-9-CM: 783.21  8/8/2017 Adrenal mass ([address] ICD-10-CM: E27.9 ICD-9-CM: 255.9  7/18/2017 Overview Addendum 12/17/2018  9:57 AM by Jeffrey Espinosa MD  
  Stable/non-functioning adenoma on imaging Current Facility-Administered Medications Medication Dose Route Frequency Provider Last Rate Last Dose  hydrALAZINE (APRESOLINE) tablet 25 mg  25 mg Oral BID Maira Davidson NP   25 mg at 03/22/19 1314  
 vancomycin (VANCOCIN) injection 1,000 mg  1 g Topical Wing Simon MD      
 sodium chloride (NS) flush 5-40 mL  5-40 mL IntraVENous Q8H Cecy PIZANO NP      
 sodium chloride (NS) flush 5-40 mL  5-40 mL IntraVENous PRN Gabriele Ruiz NP      
 acetaminophen (TYLENOL) tablet 650 mg  650 mg Oral Q4H PRN Gabriele Ruiz NP      
  HYDROcodone-acetaminophen (NORCO) 5-325 mg per tablet 1 Tab  1 Tab Oral Q4H PRN Rodney Cleveland NP      
 ondansetron [facility]) injection 4 mg  4 mg IntraVENous Q4H PRN Rodney Cleveland NP      
 ceFAZolin (ANCEF) 2 g/20 mL in sterile water IV syringe  2 g IntraVENous ON CALL TO OR Rodney Cleveland NP      
 [START ON 3/23/2019] cephALEXin (KEFLEX) capsule 250 mg  250 mg Oral TID Rodney Cleveland NP      
 DOPamine (INTROPIN) 800 mg in dextrose 5% 250 mL infusion  2.5 mcg/kg/min IntraVENous CONTINUOUS Kinza Ramon MD   Stopped at 03/20/19 0100  hydrALAZINE (APRESOLINE) 20 mg/mL injection 10 mg  10 mg IntraVENous Q6H PRN Kinza Ramon MD   10 mg at 03/21/19 1754  amLODIPine (NORVASC) tablet 5 mg  5 mg Oral DAILY [facility], AMANDA   5 mg at 03/22/19 1269  polyethylene glycol (MIRALAX) packet 17 g  17 g Oral DAILY Sindi Cervantes MD   17 g at 03/22/19 1314  simvastatin (ZOCOR) tablet 20 mg  20 mg Oral QHS Sindi Cervantes MD   20 mg at 03/21/19 2222  sodium chloride (NS) flush 5-40 mL  5-40 mL IntraVENous Q8H Sindi Cervantes MD   10 mL at 03/22/19 4373  sodium chloride (NS) flush 5-40 mL  5-40 mL IntraVENous PRN Harpalram, Cay Spurling, MD      
 
No Known Allergies Past Medical History:  
Diagnosis Date  Cancer [facility])   
 prostate  Constipation  Gout  Hyperlipemia  Hypertension Past Surgical History:  
Procedure Laterality Date  HX PROSTATECTOMY  HX UROLOGICAL    
 prostate removed Family History Problem Relation Age of Onset  No Known Problems Mother  No Known Problems Father Social History Tobacco Use  Smoking status: Never Smoker  Smokeless tobacco: Never Used Substance Use Topics  Alcohol use: No  
  
 
Review of Systems:  
Constitutional: Negative for fever, chills, weight loss, + malaise/fatigue, + fall 3 weeks ago. HEENT: Negative for nosebleeds, vision changes. Respiratory: Negative for cough, hemoptysis. Cardiovascular: Negative for chest pain, palpitations, orthopnea, claudication, + leg swelling, no syncope, and PND. + CARREON Gastrointestinal: Negative for nausea, vomiting, diarrhea, blood in stool and melena. Genitourinary: Negative for dysuria, and hematuria. Musculoskeletal: Negative for myalgias, arthralgia. Skin: Negative for rash. Heme: Does not bleed or bruise easily. Neurological: Negative for speech change and focal weakness Objective:  
 
Visit Vitals BP (!) 194/95 (BP Patient Position: At rest) Pulse 88 Temp 98.1 °F (36.7 °C) Resp 16 Ht 5' 10\" (1.778 m) Wt 186 lb 1.1 oz (84.4 kg) SpO2 97% BMI 26.70 kg/m² Physical Exam:  
Constitutional: Thin, elderly male. No respiratory distress. Head: Normocephalic and atraumatic. Eyes: Pupils are equal, round ENT: Hearing grossly normal 
Neck: supple. No JVD present. Cardiovascular:  irregular rhythm. Exam reveals no gallop and no friction rub. Systolic ejection murmur. Pulmonary/Chest: Effort normal and breath sounds normal. No wheezes. Abdominal: Soft, no tenderness. Musculoskeletal: 2+ edema bilateral lower extremities. No tenderness to palpation. Neurological: Alert,oriented. Skin: Skin is warm and dry. Psychiatric: Normal mood and affect. Has difficulty verbalizing why he's in the hospital, does not recall content of yesterday's conversation regarding pacemaker. EKG (03/19/2019) bradycardia (39 bpm). 2:1 AV block. Assessment/Plan:  
 
Mr. Nury Harris has bradycardia into the 30s, continues with intermittent 2:1 AV block & Wenckebach despite holding Toprol XL. Recently symptomatic with fatigue, CARREON, & lower extremity edema per family. Patient is poor historian, does not report any symptoms. LVEF 55-60% on echo, has mild to mod AS, MR, & TR.   
 
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 Discussed risks/benefits of dual chamber pacemaker implant with patient & family members. They agreed to proceed with dual chamber pacemaker implant today. He tolerated implant of [employer]onic dual chamber pacemaker well, programmed at DDDR, LRL 60 bpm, upper tracking rate 130 bpm.  Device will be rechecked again tomorrow morning. Follow up in EP clinic as noted below for wound & device check. Future Appointments Date Time Provider Jorje Ceron 3/28/2019  2:40 PM Reji Yanez MD [facility] [place] SCHED  
3/29/2019 10:15 AM PACEMAKER3, [address]  
3/29/2019 10:30 AM Wound check, [address]  
4/16/2019  3:30 PM Radha Kennedy MD [address] [place], [place] [address] LALI MontoyaP-BEE [address] [place] [address] 
03/22/19 Addendum from EP attending: 
 I have seen, examined patient, and discussed with nurse practitioner, registered nurse, reviewed, updated note and agree with the assessment and plan I have talked to him this am.  
I talked to his daughter as well as son about indication for pacemaker Vital signs are stable Exam shows regular rhythm but slow rate Lungs clear Assessment and Plan: 
dual chamber pacemaker and resume toprol They agree to proceed this am 
 
 
Thank you for involving me in this patient's care and please call with further concerns or questions. Ayden Zuñiga M.D. Electrophysiology/Cardiology [address] [place] [address] Vascular [place] [facility], [facility] [address] [place] [address] 
[phone]

## 2019-03-22 NOTE — DISCHARGE INSTRUCTIONS
PATIENT INSTRUCTIONS POST-PACEMAKER IMPLANT    1. No heavy lifting or exercises with the right arm for 4 weeks. This includes the following:  Do not raise arm above the shoulder level to comb hair, pull on clothes, etc... Do not use the affected arm to pull up or push up from a seated or laying  down position. Do not allow anyone else to pull on the affected arm. 2.  Dressing should remain in place x approx 1 week. It is typically removed in clinic at follow up. Keep site clean & dry. No showers until after follow up. 3.  Do not drive for 3 days    4. Call Dr. Dianna Ashley at [phone] if you experience any of the following symptoms:  1. Redness at the pacemaker site  2. Swelling at or around the pacemaker or in the right arm  3. Pain around the pacemaker  4. Dizziness, lightheadedness, fainting spells  5. Lack of energy  6. Shortness of breath  7. Rapid heart rate  8. Chest or muscle twitches    5. Follow-up with Dr. Chilo Mock office as scheduled below  Future Appointments   Date Time Provider Jorje Ceron   3/28/2019  2:40 PM Renee Licona MD [address]   3/29/2019 10:15 AM PACEMAKER3, [address]   3/29/2019 10:30 AM Wound check, [address]   4/16/2019  3:30 PM Gabriele Naqvi MD [address] [place] [address]     6. You may use pain medication and ice pack for pain relief as needed. You may wear the sling as a reminder to keep your arm below the your shoulder. Daniela Roach M.D.  [facility]  Electrophysiology/Cardiology  [facility] and Vascular [place]  [address], [address]  [facility], [address]                             [address] [place] [address]  [phone]

## 2019-03-22 NOTE — PROGRESS NOTES
[place]: Colette Willis 
[phone] Requesting/referring provider:  [facility] OF [facility] Reason for Consult: heart block HPI: Yisel Schultz, a 80y.o. year-old who presented for evaluation of 2:1 heart block He gave a 3 week history of increasing LE edema, fatigue and CARREON with exertion. Also had a fall about 3 weeks ago related to being weak and off balance. He has had recurrent UTIs and maybe prostatectomy. He continues with 2:1 AV block today, family considering pacemaker today with Dr. Reynaldo Robertson He denies chest pain or palpitations, no shortness of breath No dizziness or syncope BP remains uncontrolled, K 5.7 this AM and ordered a recheck, SOULEYMANE persists - initially thought this was related to ibuprofen use but not improving with cessation. Assessment/Plan: 1. Mild to mod AS by TTE  
2. 2:1 heart block - holding metoprolol, EP recommending pacemaker, family to decide today 3. Hyperlipidemia- on zocor, LDL 39 
4. Malignant HTN - elevated, continue amlodipine and will begin hydralazine 25mg BID today, has IV hydralazine ordered PRN 5. ARF- creatinine 2.3 on admission, creatinine 2.0 today, off ibuprofen, losartan, management per primary team  
6. Elevated troponin - peaked at 0.09, non-specific in the setting of SOULEYMANE, heart block, HTN, TTE ok, no further testing needed at this time 7. DM- A1C 6.3%, no prior history of DM, management per primary team 
8. Hyperkalemia - K 5.7, ordered stat repeat K to reassess Echo 3/19 - LVEF 55-60%, mild cLVH, mild to mod AS, mild to mod MR, mild to mod TR, severe pulmonary HTN (70mmHg), large left pleural effusion Soc no tob remote etoh Fhx no early cad, no pacer no naveed no valve diseae He  has a past medical history of Cancer ([address] Constipation, Gout, Hyperlipemia, and Hypertension. Cardiovascular ROS: no chest pain or dyspnea, positive for edema Respiratory ROS: no cough Neurological ROS: no TIA or stroke symptoms All other systems negative except as above. PE 
Vitals:  
 03/22/19 9089 03/22/19 1577 03/22/19 0482 03/22/19 0901 BP: 164/78  189/56 Pulse:   (!) 51 (!) 47 Resp:   16 Temp:   99.2 °F (37.3 °C) SpO2:   97% Weight:  186 lb 1.1 oz (84.4 kg) Height:      
 Body mass index is 26.7 kg/m². General appearance -elderly, thin, NAD Mental status - affect appropriate to mood Eyes - sclera anicteric, moist mucous membranes Neck - supple, no carotid bruits Lymphatics - not assessed Chest - clear to auscultation, no wheezes, rales or rhonchi Heart - bradycardic, regular rhythm, normal S1, S2, 3/6 LUCIE Abdomen - soft, nontender, nondistended Back exam - full range of motion, no tenderness Neurological - cranial nerves II through XII grossly intact, no focal deficit Musculoskeletal - no muscular tenderness noted, normal strength Extremities - peripheral pulses normal, 2+ LE edema Skin - normal coloration  no rashes Recent Labs: 
Lab Results Component Value Date/Time Cholesterol, total 98 03/20/2019 03:03 AM  
 HDL Cholesterol 48 03/20/2019 03:03 AM  
 LDL, calculated 39.6 03/20/2019 03:03 AM  
 Triglyceride 52 03/20/2019 03:03 AM  
 CHOL/HDL Ratio 2.0 03/20/2019 03:03 AM  
 
Lab Results Component Value Date/Time Creatinine (POC) 1.7 (H) 07/31/2018 02:19 PM  
 Creatinine 2.00 (H) 03/22/2019 04:16 AM  
 
Lab Results Component Value Date/Time BUN 23 (H) 03/22/2019 04:16 AM  
 
Lab Results Component Value Date/Time Potassium 5.7 (H) 03/22/2019 04:16 AM  
 
Lab Results Component Value Date/Time Hemoglobin A1c 6.3 (H) 03/11/2019 05:08 PM  
 
Lab Results Component Value Date/Time HGB 14.1 03/21/2019 04:21 AM  
 
Lab Results Component Value Date/Time PLATELET 75 (L) 77/16/4919 04:21 AM  
 
 
Reviewed: 
Past Medical History:  
Diagnosis Date  Cancer [facility])   
 prostate  Constipation  Gout  Hyperlipemia  Hypertension Social History Tobacco Use Smoking Status Never Smoker Smokeless Tobacco Never Used Social History Substance and Sexual Activity Alcohol Use No  
 
No Known Allergies Current Facility-Administered Medications Medication Dose Route Frequency  hydrALAZINE (APRESOLINE) tablet 25 mg  25 mg Oral BID  
 OTHER(NON-FORMULARY) 1 g  1 g Topical ONCE  
 bacitracin 50,000 Units in sodium chloride irrigation 0.9 % 500 mL irrigation solution  50,000 Units Irrigation ONCE  
 sodium chloride (NS) flush 5-40 mL  5-40 mL IntraVENous Q8H  
 sodium chloride (NS) flush 5-40 mL  5-40 mL IntraVENous PRN  
 DOPamine (INTROPIN) 800 mg in dextrose 5% 250 mL infusion  2.5 mcg/kg/min IntraVENous CONTINUOUS  
 hydrALAZINE (APRESOLINE) 20 mg/mL injection 10 mg  10 mg IntraVENous Q6H PRN  
 amLODIPine (NORVASC) tablet 5 mg  5 mg Oral DAILY  polyethylene glycol (MIRALAX) packet 17 g  17 g Oral DAILY  simvastatin (ZOCOR) tablet 20 mg  20 mg Oral QHS  sodium chloride (NS) flush 5-40 mL  5-40 mL IntraVENous Q8H  
 sodium chloride (NS) flush 5-40 mL  5-40 mL IntraVENous PRN Linda Koenig MD 
[facility] heart and Vascular [place] [address], [address] Cross[place]: Charly Leung 
[phone] Requesting/referring provider: Dr. Tamera Hui Reason for Consult: heart block HPI: teofilo Raymundo 80y.o. year-old who presents for evaluation of 2:1 heart block He give 3 weeks history of increasing LE edema fatigue and CARREON with exertion. Also had a fall about 3 weeks ago related to being weak and off balance. He has had recurrent UTI and maybe prostatectomy and wants to talk about that. He denies chest pain or palpitations. In the bed he denies headache or dizziness. Hr is 38-40 BP uncontrolled. IN ARF with elevated crt, thought related to ibuprofen but not improving with cessation. New murmur. Continues to have bradycardia with 2:1 block today He is tired today but no specific complaints. Final decision on pacemaker in  am.  
 
Assessment/Plan: 1. AS- presumed, echo to eval 
2. 2:1 heart block holding metoprolol, pacer eval 
3. Hyperlipidemia- on zocor 4. Malignant htn- better today running 150s on amlodipine 
-hydralazine prn 
5. ARF- crt improving 6. Elevated troponin- mild, nonspecific 7. DM- A1C 6.3 no prior history of DM Soc no tob remote etoh Fhx no early cad, no pacer no naveed no valve diseae He  has a past medical history of Cancer ([facility] [address] Constipation, Gout, Hyperlipemia, and Hypertension. Cardiovascular ROS: positive for - dyspnea on exertion and edema Respiratory ROS: +CARREON Neurological ROS: no TIA or stroke symptoms All other systems negative except as above. PE 
Vitals:  
 03/22/19 9066 03/22/19 0740 03/22/19 8183 03/22/19 0901 BP: 164/78  189/56 Pulse:   (!) 51 (!) 47 Resp:   16 Temp:   99.2 °F (37.3 °C) SpO2:   97% Weight:  186 lb 1.1 oz (84.4 kg) Height:      
 Body mass index is 26.7 kg/m². General appearance -elderly thin nad Mental status - affect appropriate to mood Eyes - sclera anicteric, moist mucous membranes Neck - supple, no significant adenopathy Lymphatics - no  lymphadenopathy Chest - clear to auscultation, no wheezes, rales or rhonchi Heart - naveed, regular rhythm, normal S1, S2, 3/6 LUCIE Abdomen - soft, nontender, nondistended, no masses or organomegaly Back exam - full range of motion, no tenderness Neurological - cranial nerves II through XII grossly intact, no focal deficit Musculoskeletal - no muscular tenderness noted, normal strength Extremities - peripheral pulses normal, 2+ edema ble Skin - normal coloration  no rashes Recent Labs: 
Lab Results Component Value Date/Time  Cholesterol, total 98 03/20/2019 03:03 AM  
 HDL Cholesterol 48 03/20/2019 03:03 AM  
 LDL, calculated 39.6 03/20/2019 03:03 AM  
 Triglyceride 52 03/20/2019 03:03 AM  
 CHOL/HDL Ratio 2.0 03/20/2019 03:03 AM  
 
Lab Results Component Value Date/Time Creatinine (POC) 1.7 (H) 07/31/2018 02:19 PM  
 Creatinine 2.00 (H) 03/22/2019 04:16 AM  
 
Lab Results Component Value Date/Time BUN 23 (H) 03/22/2019 04:16 AM  
 
Lab Results Component Value Date/Time Potassium 5.7 (H) 03/22/2019 04:16 AM  
 
Lab Results Component Value Date/Time Hemoglobin A1c 6.3 (H) 03/11/2019 05:08 PM  
 
Lab Results Component Value Date/Time HGB 14.1 03/21/2019 04:21 AM  
 
Lab Results Component Value Date/Time PLATELET 75 (L) 69/35/4655 04:21 AM  
 
 
Reviewed: 
Past Medical History:  
Diagnosis Date  Cancer [facility])   
 prostate  Constipation  Gout  Hyperlipemia  Hypertension Social History Tobacco Use Smoking Status Never Smoker Smokeless Tobacco Never Used Social History Substance and Sexual Activity Alcohol Use No  
 
No Known Allergies Current Facility-Administered Medications Medication Dose Route Frequency  hydrALAZINE (APRESOLINE) tablet 25 mg  25 mg Oral BID  
 OTHER(NON-FORMULARY) 1 g  1 g Topical ONCE  
 bacitracin 50,000 Units in sodium chloride irrigation 0.9 % 500 mL irrigation solution  50,000 Units Irrigation ONCE  
 sodium chloride (NS) flush 5-40 mL  5-40 mL IntraVENous Q8H  
 sodium chloride (NS) flush 5-40 mL  5-40 mL IntraVENous PRN  
 DOPamine (INTROPIN) 800 mg in dextrose 5% 250 mL infusion  2.5 mcg/kg/min IntraVENous CONTINUOUS  
 hydrALAZINE (APRESOLINE) 20 mg/mL injection 10 mg  10 mg IntraVENous Q6H PRN  
 amLODIPine (NORVASC) tablet 5 mg  5 mg Oral DAILY  polyethylene glycol (MIRALAX) packet 17 g  17 g Oral DAILY  simvastatin (ZOCOR) tablet 20 mg  20 mg Oral QHS  sodium chloride (NS) flush 5-40 mL  5-40 mL IntraVENous Q8H  
 sodium chloride (NS) flush 5-40 mL  5-40 mL IntraVENous PRN Piotr Harvey MD 
[facility] heart and Vascular [place] [address] [place], [address] [facility] [address]

## 2019-03-22 NOTE — PROGRESS NOTES
Physical Therapy 3/22/2019 Order received, chart reviewed. Patient to go for pacemaker this date. F/u tomorrow for evaluation. Thank you.  
 
Nilsa Rosales, PT, DPT

## 2019-03-22 NOTE — PROGRESS NOTES
TRANSFER - OUT REPORT: 
 
Verbal report given to [address] on Aissatou Carlson being transferred to [facility] for routine progression of care Report consisted of patients Situation, Background, Assessment and  
Recommendations(SBAR). Information from the following report(s) Kardex and Procedure Summary was reviewed with the receiving nurse. Opportunity for questions and clarification was provided.

## 2019-03-22 NOTE — PROGRESS NOTES
09:17 Dr. John Zhu paged for K of 5.7. SCD pump requested for patient. 09:32 TRANSFER - OUT REPORT: 
 
Verbal report given to [facility] RN(name) on Prema Nuñez  being transferred to cath lab(unit) for ordered procedure Report consisted of patients Situation, Background, Assessment and  
Recommendations(SBAR). Information from the following report(s) SBAR was reviewed with the receiving nurse. Lines:  
Peripheral IV 03/21/19 Anterior;Right Forearm (Active) Site Assessment Clean, dry, & intact 3/22/2019  4:08 AM  
Phlebitis Assessment 0 3/22/2019  4:08 AM  
Infiltration Assessment 0 3/22/2019  4:08 AM  
Dressing Status Clean, dry, & intact 3/22/2019  4:08 AM  
Dressing Type Transparent;Tape 3/22/2019  4:08 AM  
Hub Color/Line Status Pink;Capped 3/22/2019  4:08 AM  
Action Taken Open ports on tubing capped 3/22/2019  4:08 AM  
Alcohol Cap Used Yes 3/22/2019  4:08 AM  
  
 
Opportunity for questions and clarification was provided. Patient transported with: 
 Registered Nurse 09:50 Dr. Ramon Jay at bedside discussing pacemaker insertion procedure with patient and family. Pt and family communicate understanding. Consent signed by pt and placed on chart. Cath lab RN picking up pt now; states he will draw stat potassium and send. Will follow up on this. 11:20 Spoke with [facility] RN in cath lab; Potassium was drawn and sent to lab. 
 
12:33 TRANSFER - IN REPORT: 
 
Verbal report received from [facility] on Prema Nuñez  being received from Cath Lab(unit) for routine progression of care Report consisted of patients Situation, Background, Assessment and  
Recommendations(SBAR). Information from the following report(s) SBAR was reviewed with the receiving nurse. Opportunity for questions and clarification was provided. Assessment to be completed upon patients arrival to unit and care assumed. 13:05 Pt arrived back to unit with cath lab RN. VS taken and pt assessed. Pacer site is clean, dry and intact, located in R upper chest. 
 
13:30 Requested order for home med allopurinol from Dr. Kris Baldwin. 14:03 External catheter reapplied after coming off in cath lab. Pt had unmeasurable output in bed. 20:10 Paged hospitalist for persistently high blood pressures and pt trembling. Scheduled PO hydralazine given.

## 2019-03-22 NOTE — PROGRESS NOTES
1930: Bedside and Verbal shift change report given to [facility], RN (oncoming nurse) by [facility], RN (offgoing nurse). Report included the following information SBAR, Kardex, ED Summary, Intake/Output, MAR, Recent Results and Cardiac Rhythm Sinus naveed & Second degree heart block.

## 2019-03-22 NOTE — PROGRESS NOTES
Hospitalist Progress Note Kirk Burgess MD 
Answering service: [phone] OR [phone] from in house phone Date of Service:  3/22/2019 NAME:  Sheila Roach :  3/9/1933 MRN:  [id number] Admission Summary:  
29-year-old -American male, past medical history of hypertension, hyperlipidemia, gout, constipation, prostate cancer, presented to the emergency department from home via private transport with reported elevated blood pressure despite being on meds,increasing Lext edema,SOB. In the ER noted with /78, bradycardia, HR 44,EKG with HR 39 2nd degree AV block,Labs with SOULEYMANE Interval history / Subjective: S/p pacemaker, HR improved,siting up Confused ,forgetful (at baseline per daughter) Assessment & Plan:  
 
Sinus Bradycardia with second-degree AV block. - toprol xl held 
-echo with LVEF 55-60%,mild to mod AS,MR &TR 
-TSH normal  
-pt continued to have persistent  bradycardia ,off BB 
-s/p Dual chamber Pacemaker 
-card /EP following Hypertensive urgency. - nitroglycerin 2% ointment ,prn hydralazine,BP remained elevated 
-s/p dopamine gtt 
-currently on norvasc, prn hydralazine, added po hydralazine per card Cardiomyopathy, 
-b/l lext edema,elevated proBNP,cxr with pleural effusion 
-mildly elevated trop,in setting of SOULEYMANE 
-strict ins 7 outs 
-no ace/arb or diuretics d/t SOULEYMANE SOULEYMANE on CKD 3,baseline Cr 2.3 
-held PTA losartan,volataren  
-Cr. improved before, to 1.9   ,uptrend to 2 
-monitor kidney fucntions 
-avoid renotoxic meds Hyperkalemia 
-resolved Thrombocytopenia. -Repeat platelet count. improved 
-Hold on antiplatelets and anticoagulants Murmur ,presumed AS,per card 
-echo with mild to mod AS,MR,TR  EF55-60% Hyponatremia. resolved Hyperlipidemia. LDL 39 
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 
-supportive care  
-PT/OT Functional status: lives alone functionally independent per daughter Code status:FULL 
DVT prophylaxis: SCD Care Plan discussed with: pt/nurse, 
 daughter Suezanne Hamman , recommend SNF Disposition: PT eval awaited, f/u card recc's  Prior to discharge Hospital Problems  Date Reviewed: 3/19/2019 Codes Class Noted POA * (Principal) Bradycardia ICD-10-CM: R00.1 ICD-9-CM: 427.89  3/19/2019 Unknown Thrombocytopenia ([address] ICD-10-CM: D69.6 ICD-9-CM: 287.5  3/19/2019 Unknown Elevated troponin ICD-10-CM: R74.8 ICD-9-CM: 790.6  3/19/2019 Unknown Hypertensive urgency ICD-10-CM: I16.0 ICD-9-CM: 401.9  3/19/2019 Unknown Second degree AV block, Mobitz type I ICD-10-CM: I44.1 ICD-9-CM: 426.13  3/19/2019 Overview Signed 3/21/2019  6:14 PM by Tere Espinoza MD  
  Added automatically from request for surgery [id number] Review of Systems: A comprehensive review of systems was negative except for that written in the HPI. Vital Signs:  
 Last 24hrs VS reviewed since prior progress note. Most recent are: 
Visit Vitals /85 (BP Patient Position: Sitting) Pulse 93 Temp 98.1 °F (36.7 °C) Resp 16 Ht 5' 10\" (1.778 m) Wt 84.4 kg (186 lb 1.1 oz) SpO2 97% BMI 26.70 kg/m² Intake/Output Summary (Last 24 hours) at 3/22/2019 1410 Last data filed at 3/22/2019 8622 Gross per 24 hour Intake 260 ml Output 700 ml Net -440 ml Physical Examination:  
 
 
     
Constitutional:  No acute distress, cooperative, pleasant   
ENT:  Oral mucous moist, oropharynx benign. Neck supple, Resp:  CTA bilaterally. No wheezing/rhonchi/rales. No accessory muscle use CV:  Regular rhythm, normal rate,+LUCIE , gallops, rubs GI:  Soft, non distended, non tender. normoactive bowel sounds, no hepatosplenomegaly Musculoskeletal:  No edema, warm, 2+ pulses throughout Neurologic:  Moves all extremities. occasionaly talks,follows commnands CN II-XII reviewed Psych:  AA oreinted x2 person & place only Skin:  warm & dry SKIN;s/p PP,Lt upper chest dressing in place Data Review:  
 Review and/or order of clinical lab test 
 
 
Labs:  
 
Recent Labs  
  03/21/19 0421 03/20/19 
0303 WBC 6.1 4.9 HGB 14.1 12.6 HCT 41.9 40.8 PLT 75* 75* Recent Labs  
  03/22/19 
1100 03/22/19 
0416 03/21/19 
0421 03/20/19 
0303 03/19/19 2111 NA  --  135* 138 134* 133* K 3.3* 5.7* 3.7 4.0 4.3 CL  --  106 107 104 99 CO2  --  25 24 24 29 BUN  --  23* 20 22* 24* CREA  --  2.00* 1.95* 2.17* 2.49* GLU  --  110* 117* 111* 87  
CA  --  9.5 9.4 8.5 9.1 MG  --  2.2 2.2  --  2.5* Recent Labs  
  03/19/19 2111 SGOT 36 ALT 40  
* TBILI 0.5 TP 7.3 ALB 3.4*  
GLOB 3.9 No results for input(s): INR, PTP, APTT in the last 72 hours. No lab exists for component: INREXT, INREXT No results for input(s): FE, TIBC, PSAT, FERR in the last 72 hours. Lab Results Component Value Date/Time Folate 12.2 01/07/2019 11:29 AM  
  
No results for input(s): PH, PCO2, PO2 in the last 72 hours. Recent Labs  
  03/20/19 
1650 03/20/19 
0854 03/19/19 2111 TROIQ 0.09* 0.06* 0.08* Lab Results Component Value Date/Time Cholesterol, total 98 03/20/2019 03:03 AM  
 HDL Cholesterol 48 03/20/2019 03:03 AM  
 LDL, calculated 39.6 03/20/2019 03:03 AM  
 Triglyceride 52 03/20/2019 03:03 AM  
 CHOL/HDL Ratio 2.0 03/20/2019 03:03 AM  
 
No results found for: [place] Lab Results Component Value Date/Time  Color Yellow 08/08/2017 04:09 PM  
 Appearance Clear 08/08/2017 04:09 PM  
 Specific gravity 1.017 07/18/2017 02:56 PM  
 pH (UA) 6.5 08/08/2017 04:09 PM  
 Protein 100 (A) 07/18/2017 02:56 PM  
 Glucose NEGATIVE  07/18/2017 02:56 PM  
 Ketone Negative 08/08/2017 04:09 PM  
 Bilirubin Negative 08/08/2017 04:09 PM  
 Urobilinogen 1.0 07/18/2017 02:56 PM  
 Nitrites Positive (A) 08/08/2017 04:09 PM  
 Leukocyte Esterase 1+ (A) 08/08/2017 04:09 PM  
 Epithelial cells MODERATE (A) 07/18/2017 02:56 PM  
 Bacteria Few 08/08/2017 04:09 PM  
 WBC 11-30 (A) 08/08/2017 04:09 PM  
 RBC 0-2 08/08/2017 04:09 PM  
 
 
 
Medications Reviewed:  
 
Current Facility-Administered Medications Medication Dose Route Frequency  hydrALAZINE (APRESOLINE) tablet 25 mg  25 mg Oral BID  vancomycin (VANCOCIN) injection 1,000 mg  1 g Topical ONCE  
 sodium chloride (NS) flush 5-40 mL  5-40 mL IntraVENous Q8H  
 sodium chloride (NS) flush 5-40 mL  5-40 mL IntraVENous PRN  
 acetaminophen (TYLENOL) tablet 650 mg  650 mg Oral Q4H PRN  
 HYDROcodone-acetaminophen (NORCO) 5-325 mg per tablet 1 Tab  1 Tab Oral Q4H PRN  
 ondansetron (ZOFRAN) injection 4 mg  4 mg IntraVENous Q4H PRN  
 ceFAZolin (ANCEF) 2 g/20 mL in sterile water IV syringe  2 g IntraVENous ON CALL TO OR  
 [START ON 3/23/2019] cephALEXin (KEFLEX) capsule 250 mg  250 mg Oral TID  DOPamine (INTROPIN) 800 mg in dextrose 5% 250 mL infusion  2.5 mcg/kg/min IntraVENous CONTINUOUS  
 hydrALAZINE (APRESOLINE) 20 mg/mL injection 10 mg  10 mg IntraVENous Q6H PRN  
 amLODIPine (NORVASC) tablet 5 mg  5 mg Oral DAILY  polyethylene glycol (MIRALAX) packet 17 g  17 g Oral DAILY  simvastatin (ZOCOR) tablet 20 mg  20 mg Oral QHS  sodium chloride (NS) flush 5-40 mL  5-40 mL IntraVENous Q8H  
 sodium chloride (NS) flush 5-40 mL  5-40 mL IntraVENous PRN  
 
______________________________________________________________________ EXPECTED LENGTH OF STAY: 2d 12h ACTUAL LENGTH OF STAY:          3 Lorie Weller MD

## 2019-03-23 LAB
ANION GAP SERPL CALC-SCNC: 5 MMOL/L (ref 5–15)
BUN SERPL-MCNC: 28 MG/DL (ref 6–20)
BUN/CREAT SERPL: 16 (ref 12–20)
CALCIUM SERPL-MCNC: 9.2 MG/DL (ref 8.5–10.1)
CHLORIDE SERPL-SCNC: 105 MMOL/L (ref 97–108)
CO2 SERPL-SCNC: 25 MMOL/L (ref 21–32)
CREAT SERPL-MCNC: 1.77 MG/DL (ref 0.7–1.3)
ERYTHROCYTE [DISTWIDTH] IN BLOOD BY AUTOMATED COUNT: 15.1 % (ref 11.5–14.5)
GLUCOSE SERPL-MCNC: 119 MG/DL (ref 65–100)
HCT VFR BLD AUTO: 37.8 % (ref 36.6–50.3)
HGB BLD-MCNC: 12.4 G/DL (ref 12.1–17)
MAGNESIUM SERPL-MCNC: 2 MG/DL (ref 1.6–2.4)
MCH RBC QN AUTO: 29.7 PG (ref 26–34)
MCHC RBC AUTO-ENTMCNC: 32.8 G/DL (ref 30–36.5)
MCV RBC AUTO: 90.6 FL (ref 80–99)
NRBC # BLD: 0 K/UL (ref 0–0.01)
NRBC BLD-RTO: 0 PER 100 WBC
PLATELET # BLD AUTO: 132 K/UL (ref 150–400)
PMV BLD AUTO: 10.6 FL (ref 8.9–12.9)
POTASSIUM SERPL-SCNC: 4.1 MMOL/L (ref 3.5–5.1)
RBC # BLD AUTO: 4.17 M/UL (ref 4.1–5.7)
SODIUM SERPL-SCNC: 135 MMOL/L (ref 136–145)
WBC # BLD AUTO: 9 K/UL (ref 4.1–11.1)

## 2019-03-23 PROCEDURE — 74011250636 HC RX REV CODE- 250/636: Performed by: HOSPITALIST

## 2019-03-23 PROCEDURE — 85027 COMPLETE CBC AUTOMATED: CPT

## 2019-03-23 PROCEDURE — 74011250637 HC RX REV CODE- 250/637: Performed by: NURSE PRACTITIONER

## 2019-03-23 PROCEDURE — 36415 COLL VENOUS BLD VENIPUNCTURE: CPT

## 2019-03-23 PROCEDURE — 65660000000 HC RM CCU STEPDOWN

## 2019-03-23 PROCEDURE — 74011250637 HC RX REV CODE- 250/637: Performed by: INTERNAL MEDICINE

## 2019-03-23 PROCEDURE — 83735 ASSAY OF MAGNESIUM: CPT

## 2019-03-23 PROCEDURE — 80048 BASIC METABOLIC PNL TOTAL CA: CPT

## 2019-03-23 PROCEDURE — 97530 THERAPEUTIC ACTIVITIES: CPT

## 2019-03-23 PROCEDURE — 97161 PT EVAL LOW COMPLEX 20 MIN: CPT

## 2019-03-23 PROCEDURE — 74011250637 HC RX REV CODE- 250/637: Performed by: FAMILY MEDICINE

## 2019-03-23 RX ORDER — FUROSEMIDE 10 MG/ML
40 INJECTION INTRAMUSCULAR; INTRAVENOUS DAILY
Status: DISCONTINUED | OUTPATIENT
Start: 2019-03-23 | End: 2019-03-25

## 2019-03-23 RX ADMIN — METOPROLOL SUCCINATE 50 MG: 50 TABLET, EXTENDED RELEASE ORAL at 08:00

## 2019-03-23 RX ADMIN — HYDRALAZINE HYDROCHLORIDE 25 MG: 25 TABLET, FILM COATED ORAL at 08:00

## 2019-03-23 RX ADMIN — AMLODIPINE BESYLATE 5 MG: 5 TABLET ORAL at 08:00

## 2019-03-23 RX ADMIN — Medication 10 ML: at 20:47

## 2019-03-23 RX ADMIN — Medication 10 ML: at 05:29

## 2019-03-23 RX ADMIN — CEPHALEXIN 250 MG: 250 CAPSULE ORAL at 08:00

## 2019-03-23 RX ADMIN — Medication 10 ML: at 20:48

## 2019-03-23 RX ADMIN — CEPHALEXIN 250 MG: 250 CAPSULE ORAL at 20:47

## 2019-03-23 RX ADMIN — SIMVASTATIN 20 MG: 20 TABLET, FILM COATED ORAL at 20:47

## 2019-03-23 RX ADMIN — HYDRALAZINE HYDROCHLORIDE 25 MG: 25 TABLET, FILM COATED ORAL at 17:13

## 2019-03-23 RX ADMIN — FUROSEMIDE 40 MG: 10 INJECTION, SOLUTION INTRAMUSCULAR; INTRAVENOUS at 11:13

## 2019-03-23 RX ADMIN — CEPHALEXIN 250 MG: 250 CAPSULE ORAL at 17:14

## 2019-03-23 RX ADMIN — POLYETHYLENE GLYCOL 3350 17 G: 17 POWDER, FOR SOLUTION ORAL at 08:00

## 2019-03-23 RX ADMIN — Medication 10 ML: at 08:02

## 2019-03-23 RX ADMIN — Medication 10 ML: at 08:03

## 2019-03-23 NOTE — PROGRESS NOTES
Hospitalist Progress Note Mike Villavicencio MD 
Answering service: [phone] from in house phone Date of Service:  3/23/2019 NAME:  Amee Xiong :  3/9/1933 MRN:  [id number] Admission Summary:  
68-year-old -American male, past medical history of hypertension, hyperlipidemia, gout, constipation, prostate cancer, presented to the emergency department from home via private transport with reported elevated blood pressure despite being on meds,increasing Lext edema,SOB. In the ER noted with /78, bradycardia, HR 44,EKG with HR 39 2nd degree AV block,Labs with SOULEYMANE Interval history / Subjective: S/p pacemaker, HR improved,since then Confused ,forgetful (at baseline per daughter) Patient has leg edema and abdominal distention not clear if this is new. Add Lasix. Assessment & Plan:  
 
Sinus Bradycardia with second-degree AV block. Resolved with PM 
-echo with LVEF 55-60%,mild to mod AS,MR &TR 
-TSH normal  
-s/p Dual chamber Pacemaker 
-card /EP following Hypertensive urgency. - nitroglycerin 2% ointment ,prn hydralazine,BP remained elevated 
-Add Lasix 
-currently on norvasc, prn hydralazine, added po hydralazine per card Cardiomyopathy, 
-b/l lext edema,elevated proBNP,cxr with pleural effusion 
-mildly elevated trop,in setting of SOULEYMANE 
-strict ins 7 outs 
-no ace/arb or diuretics d/t SOULEYMANE SOULEYMANE on CKD 3,baseline Cr 2.3 
-held PTA losartan,volataren  
-Cr. improved before, to 1.9   ,uptrend to 2 
-monitor kidney fucntions 
-avoid renotoxic meds Hyperkalemia 
-resolved Thrombocytopenia. -Repeat platelet count. improved 
-Hold on antiplatelets and anticoagulants Murmur ,presumed AS,per card 
-echo with mild to mod AS,MR,TR  EF55-60% Hyponatremia. resolved Hyperlipidemia. LDL 39 
-  Continue statin therapy Dementia,early stages by pcp undergoing evaluation per daughter -supportive care  
-PT/OT Functional status: lives alone functionally independent per daughter Code status:FULL 
DVT prophylaxis: SCD Care Plan discussed with: pt/nurse, 
 daughter Monik Smoke , recommend SNF Disposition: PT eval awaited, f/u card recc's  Prior to discharge Hospital Problems  Date Reviewed: 3/19/2019 Codes Class Noted POA Pacemaker ICD-10-CM: Z95.0 ICD-9-CM: V45.01  3/22/2019 Unknown Overview Signed 3/22/2019  5:08 PM by Benjamín Ochoa MD  
  3/22/2019 dual chamber Medtronic pacer * (Principal) Bradycardia ICD-10-CM: R00.1 ICD-9-CM: 427.89  3/19/2019 Unknown Thrombocytopenia ([address] ICD-10-CM: D69.6 ICD-9-CM: 287.5  3/19/2019 Unknown Elevated troponin ICD-10-CM: R74.8 ICD-9-CM: 790.6  3/19/2019 Unknown Hypertensive urgency ICD-10-CM: I16.0 ICD-9-CM: 401.9  3/19/2019 Unknown Second degree AV block, Mobitz type I ICD-10-CM: I44.1 ICD-9-CM: 426.13  3/19/2019 Overview Signed 3/21/2019  6:14 PM by Benjamín Ochoa MD  
  Added automatically from request for surgery [id number] Review of Systems: A comprehensive review of systems was negative except for that written in the HPI. Vital Signs:  
 Last 24hrs VS reviewed since prior progress note. Most recent are: 
Visit Vitals /72 Pulse 84 Temp 98.4 °F (36.9 °C) Resp 17 Ht 5' 10\" (1.778 m) Wt 84.6 kg (186 lb 8.2 oz) SpO2 98% BMI 26.76 kg/m² Intake/Output Summary (Last 24 hours) at 3/23/2019 1028 Last data filed at 3/23/2019 0800 Gross per 24 hour Intake 1140 ml Output 475 ml Net 665 ml Physical Examination:  
 
 
     
Constitutional:  No acute distress, cooperative, pleasant   
ENT:  Oral mucous moist, oropharynx benign. Neck supple, Resp:  CTA bilaterally. No wheezing/rhonchi/rales. No accessory muscle use CV:  Regular rhythm, normal rate,+LUCIE , gallops, rubs GI:  Soft, non distended, non tender. normoactive bowel sounds, no hepatosplenomegaly Musculoskeletal:  No edema, warm, 2+ pulses throughout Neurologic:  Moves all extremities. occasionaly talks,follows az GODINEZ II-XII reviewed Psych:  AA oreinted x2 person & place only Skin:  warm & dry SKIN;s/p PP,Lt upper chest dressing in place Data Review:  
 Review and/or order of clinical lab test 
 
 
Labs:  
 
Recent Labs  
  03/23/19 
0310 03/21/19 
0421 WBC 9.0 6.1 HGB 12.4 14.1 HCT 37.8 41.9 * 75* Recent Labs  
  03/23/19 
0310 03/22/19 
1100 03/22/19 
0416 03/21/19 
0421 *  --  135* 138  
K 4.1 3.3* 5.7* 3.7   --  106 107 CO2 25  --  25 24 BUN 28*  --  23* 20  
CREA 1.77*  --  2.00* 1.95* *  --  110* 117* CA 9.2  --  9.5 9.4 MG 2.0  --  2.2 2.2 No results for input(s): SGOT, GPT, ALT, AP, TBIL, TBILI, TP, ALB, GLOB, GGT, AML, LPSE in the last 72 hours. No lab exists for component: AMYP, HLPSE No results for input(s): INR, PTP, APTT in the last 72 hours. No lab exists for component: INREXT, INREXT No results for input(s): FE, TIBC, PSAT, FERR in the last 72 hours. Lab Results Component Value Date/Time Folate 12.2 01/07/2019 11:29 AM  
  
No results for input(s): PH, PCO2, PO2 in the last 72 hours. Recent Labs  
  03/20/19 
1650 TROIQ 0.09* Lab Results Component Value Date/Time Cholesterol, total 98 03/20/2019 03:03 AM  
 HDL Cholesterol 48 03/20/2019 03:03 AM  
 LDL, calculated 39.6 03/20/2019 03:03 AM  
 Triglyceride 52 03/20/2019 03:03 AM  
 CHOL/HDL Ratio 2.0 03/20/2019 03:03 AM  
 
No results found for: 4295  [facility] Lab Results Component Value Date/Time  Color Yellow 08/08/2017 04:09 PM  
 Appearance Clear 08/08/2017 04:09 PM  
 Specific gravity 1.017 07/18/2017 02:56 PM  
 pH (UA) 6.5 08/08/2017 04:09 PM  
 Protein 100 (A) 07/18/2017 02:56 PM  
 Glucose NEGATIVE  07/18/2017 02:56 PM  
 Ketone Negative 08/08/2017 04:09 PM  
 Bilirubin Negative 08/08/2017 04:09 PM  
 Urobilinogen 1.0 07/18/2017 02:56 PM  
 Nitrites Positive (A) 08/08/2017 04:09 PM  
 Leukocyte Esterase 1+ (A) 08/08/2017 04:09 PM  
 Epithelial cells MODERATE (A) 07/18/2017 02:56 PM  
 Bacteria Few 08/08/2017 04:09 PM  
 WBC 11-30 (A) 08/08/2017 04:09 PM  
 RBC 0-2 08/08/2017 04:09 PM  
 
 
 
Medications Reviewed:  
 
Current Facility-Administered Medications Medication Dose Route Frequency  furosemide (LASIX) injection 40 mg  40 mg IntraVENous DAILY  hydrALAZINE (APRESOLINE) tablet 25 mg  25 mg Oral BID  sodium chloride (NS) flush 5-40 mL  5-40 mL IntraVENous Q8H  
 sodium chloride (NS) flush 5-40 mL  5-40 mL IntraVENous PRN  
 acetaminophen (TYLENOL) tablet 650 mg  650 mg Oral Q4H PRN  
 HYDROcodone-acetaminophen (NORCO) 5-325 mg per tablet 1 Tab  1 Tab Oral Q4H PRN  
 ondansetron (ZOFRAN) injection 4 mg  4 mg IntraVENous Q4H PRN  
 ceFAZolin (ANCEF) 2 g/20 mL in sterile water IV syringe  2 g IntraVENous ON CALL TO OR  
 cephALEXin (KEFLEX) capsule 250 mg  250 mg Oral TID  metoprolol succinate (TOPROL-XL) XL tablet 50 mg  50 mg Oral DAILY  DOPamine (INTROPIN) 800 mg in dextrose 5% 250 mL infusion  2.5 mcg/kg/min IntraVENous CONTINUOUS  
 hydrALAZINE (APRESOLINE) 20 mg/mL injection 10 mg  10 mg IntraVENous Q6H PRN  
 amLODIPine (NORVASC) tablet 5 mg  5 mg Oral DAILY  polyethylene glycol (MIRALAX) packet 17 g  17 g Oral DAILY  simvastatin (ZOCOR) tablet 20 mg  20 mg Oral QHS  sodium chloride (NS) flush 5-40 mL  5-40 mL IntraVENous Q8H  
 sodium chloride (NS) flush 5-40 mL  5-40 mL IntraVENous PRN  
 
______________________________________________________________________ EXPECTED LENGTH OF STAY: 2d 12h ACTUAL LENGTH OF STAY:          4 Joe Pina MD

## 2019-03-23 NOTE — PROGRESS NOTES
PHYSICAL THERAPY EVALUATION Patient: Liz Christina (82 y.o. male) Date: 3/23/2019 Primary Diagnosis: Bradycardia [R00.1] Hypertensive urgency [I16.0] Thrombocytopenia ([facility] [address] [D69.6] Elevated troponin [R74.8] Procedure(s) (LRB): 
INSERT PPM DUAL (Right) 1 Day Post-Op Precautions: pacemaker insertion ASSESSMENT : 
Based on the objective data described below, the patient presents with overall decreased mobility post pacemaker insertion. He presents with some confusion (oriented to self ) decreased mobility and having difficulty following precautions pacemaker. Requires minimal assist to contact guard for mobility. . 
Patient primarily limited by decreased cognitive status and concern for following precautions for pacemaker and could benefit from SNF to address mobility and reinforcement of precautions. Patient will benefit from skilled intervention to address the above impairments. Patient?s rehabilitation potential is considered to be Good Factors which may influence rehabilitation potential include:  
? None noted ? Mental ability/status ? Medical condition ? Home/family situation and support systems ? Safety awareness 
? Pain tolerance/management 
? Other: PLAN : 
Recommendations and Planned Interventions: 
?           Bed Mobility Training             ? Neuromuscular Re-Education ? Transfer Training                   ? Orthotic/Prosthetic Training 
? Gait Training                         ? Modalities ? Therapeutic Exercises           ? Edema Management/Control ? Therapeutic Activities            ? Patient and Family Training/Education ? Other (comment): Frequency/Duration: Patient will be followed by physical therapy  5x/week to address goals. Discharge Recommendations: Scooby Kirk and To Be Determined Further Equipment Recommendations for Discharge: none SUBJECTIVE:  
Patient stated ? I'm good. ? OBJECTIVE DATA SUMMARY:  
HISTORY:   
Past Medical History:  
Diagnosis Date Cancer [facility])   
 prostate Constipation Gout Hyperlipemia Hypertension Past Surgical History:  
Procedure Laterality Date HX PROSTATECTOMY HX UROLOGICAL    
 prostate removed IN INS NEW/RPLCMT PRM PM W/TRANSV ELTRD ATRIAL&VENT Right 3/22/2019 INSERT PPM DUAL performed by Carmencita Alvarado MD at [address] CATH LAB Prior Level of Function/Home Situation: independent Personal factors and/or comorbidities impacting plan of care:  
 
Home Situation Home Environment: Private residence # Steps to Enter: 3 Rails to Enter: Yes One/Two Story Residence: Two story Living Alone: Yes Support Systems: Child(kin), Family member(s) Patient Expects to be Discharged to[de-identified] Private residence Current DME Used/Available at Home: None EXAMINATION/PRESENTATION/DECISION MAKING:  
Critical Behavior: 
Neurologic State: Alert, Eyes open spontaneously, Confused Orientation Level: Oriented to person, Oriented to place, Disoriented to situation, Disoriented to time Cognition: Memory loss, Appropriate decision making, Follows commands Hearing: Auditory Auditory Impairment: None Range Of Motion: 
AROM: Within functional limits PROM: Within functional limits Strength:   
Strength: Within functional limits Tone & Sensation:  
Tone: Normal 
  
  
  
  
Sensation: Intact Coordination: 
Coordination: Within functional limits Vision:  
  
Functional Mobility: 
Bed Mobility: 
  
Supine to Sit: Stand-by assistance Scooting: Modified independent Transfers: 
Sit to Stand: Contact guard assistance;Minimum assistance Stand to Sit: Stand-by assistance Balance:  
Sitting: Intact Standing: Impaired; Without support Standing - Static: Good Standing - Dynamic : Fair Ambulation/Gait Training: 
Distance (ft): 120 Feet (ft) Assistive Device: Gait belt Ambulation - Level of Assistance: Contact guard assistance Gait Description (WDL): Exceptions to [facility] Gait Abnormalities: Decreased step clearance;Trunk sway increased Base of Support: Widened Speed/Linh: Slow Functional Measure: 
Timed up and go:unable without assist 
   
  
Activity Tolerance:  
Please refer to the flowsheet for vital signs taken during this treatment. After treatment:  
?         Patient left in no apparent distress sitting up in chair ? Patient left in no apparent distress in bed 
? Call bell left within reach ? Nursing notified ? Caregiver present ? Bed alarm activated COMMUNICATION/EDUCATION:  
The patient?s plan of care was discussed with: Registered Nurse. ?         Fall prevention education was provided and the patient/caregiver indicated understanding. ? Patient/family have participated as able in goal setting and plan of care. ?         Patient/family agree to work toward stated goals and plan of care. ?         Patient understands intent and goals of therapy, but is neutral about his/her participation. ? Patient is unable to participate in goal setting and plan of care. Thank you for this referral. 
Florencia Gallego, PT Time Calculation: 18 mins

## 2019-03-23 NOTE — PROGRESS NOTES
2000: Bedside shift change report received by Maura Thapa (offgoing nurse). Report included the following information SBAR, Kardex, Procedure Summary, Intake/Output, MAR, Recent Results and Cardiac Rhythm Paced . Pt's daughter verbalizing concerns over BP. Pt medicated with scheduled po hydralazine per MAR. Maura Thapa paged hospitalist.  
 
2042: MD responded to page. Updated MD to above situation. MD verbalized understanding. States that he will enter orders in the computer for nitro paste. Will medicate per [facility] once verified by pharmacy. 2053: SBP now 156. Pt's daughter wanting to hold off on nitro \"for another hour\". 0730: Bedside shift change report given to [facility] (oncoming nurse). Report included the following information SBAR, Kardex, Procedure Summary, Intake/Output, MAR, Recent Results and Cardiac Rhythm Paced. Care Plan 2034 Problem: Falls - Risk of 
Goal: *Absence of Falls Description Document Fozia Soto Fall Risk and appropriate interventions in the flowsheet. Outcome: Progressing Towards Goal 
  
Problem: Hypertension Goal: *Blood pressure within specified parameters Outcome: Progressing Towards Goal 
  
Problem: Pressure Injury - Risk of 
Goal: *Prevention of pressure injury Description Document Torito Scale and appropriate interventions in the flowsheet.  
Outcome: Progressing Towards Goal

## 2019-03-23 NOTE — PROGRESS NOTES
Physical Therapy Orders received, chart reviewed and patient evaluated by physical therapy. Recommend patient to discharge to SNF pending progress with skilled acute care physical therapy. Recommend with nursing patient to complete as able in order to maintain strength, endurance and independence: OOB to chair 3x/day with supervision  and ambulating with contact guard. Thank you for your assistance. Full evaluation to follow.

## 2019-03-23 NOTE — PROGRESS NOTES
Bedside and Verbal shift change report given to [facility] (oncoming nurse) by Celsa Jeffers RN (offgoing nurse). Report included the following information SBAR, Kardex, ED Summary, Procedure Summary, Intake/Output, MAR, Recent Results and Cardiac Rhythm PACED. Bedside and Verbal shift change report given to [facility] Fiona Chavis (oncoming nurse) by Kunal Mcwilliams RN (offgoing nurse). Report included the following information SBAR, Kardex, Procedure Summary, Intake/Output, Recent Results and Cardiac Rhythm PACED. Problem: Falls - Risk of 
Goal: *Absence of Falls Description Document Lester Dye Fall Risk and appropriate interventions in the flowsheet. Outcome: Progressing Towards Goal 
  
Problem: Hypertension Goal: *Blood pressure within specified parameters Outcome: Progressing Towards Goal 
  
Problem: Impaired Skin Integrity/Pressure Injury Treatment Goal: *Improvement of Existing Pressure Injury Outcome: Progressing Towards Goal 
  
Problem: Pressure Injury - Risk of 
Goal: *Prevention of pressure injury Description Document Torito Scale and appropriate interventions in the flowsheet.  
Outcome: Progressing Towards Goal

## 2019-03-24 LAB
ANION GAP SERPL CALC-SCNC: 6 MMOL/L (ref 5–15)
BUN SERPL-MCNC: 30 MG/DL (ref 6–20)
BUN/CREAT SERPL: 17 (ref 12–20)
CALCIUM SERPL-MCNC: 9.1 MG/DL (ref 8.5–10.1)
CHLORIDE SERPL-SCNC: 101 MMOL/L (ref 97–108)
CO2 SERPL-SCNC: 26 MMOL/L (ref 21–32)
CREAT SERPL-MCNC: 1.76 MG/DL (ref 0.7–1.3)
ERYTHROCYTE [DISTWIDTH] IN BLOOD BY AUTOMATED COUNT: 14.8 % (ref 11.5–14.5)
GLUCOSE SERPL-MCNC: 98 MG/DL (ref 65–100)
HCT VFR BLD AUTO: 40.4 % (ref 36.6–50.3)
HGB BLD-MCNC: 13.4 G/DL (ref 12.1–17)
MCH RBC QN AUTO: 30.2 PG (ref 26–34)
MCHC RBC AUTO-ENTMCNC: 33.2 G/DL (ref 30–36.5)
MCV RBC AUTO: 91 FL (ref 80–99)
NRBC # BLD: 0 K/UL (ref 0–0.01)
NRBC BLD-RTO: 0 PER 100 WBC
PLATELET # BLD AUTO: 143 K/UL (ref 150–400)
PMV BLD AUTO: 10.1 FL (ref 8.9–12.9)
POTASSIUM SERPL-SCNC: 3.7 MMOL/L (ref 3.5–5.1)
RBC # BLD AUTO: 4.44 M/UL (ref 4.1–5.7)
SODIUM SERPL-SCNC: 133 MMOL/L (ref 136–145)
WBC # BLD AUTO: 9.1 K/UL (ref 4.1–11.1)

## 2019-03-24 PROCEDURE — 80048 BASIC METABOLIC PNL TOTAL CA: CPT

## 2019-03-24 PROCEDURE — 85027 COMPLETE CBC AUTOMATED: CPT

## 2019-03-24 PROCEDURE — 74011250637 HC RX REV CODE- 250/637: Performed by: NURSE PRACTITIONER

## 2019-03-24 PROCEDURE — 74011250637 HC RX REV CODE- 250/637: Performed by: INTERNAL MEDICINE

## 2019-03-24 PROCEDURE — 74011250636 HC RX REV CODE- 250/636: Performed by: HOSPITALIST

## 2019-03-24 PROCEDURE — 97535 SELF CARE MNGMENT TRAINING: CPT

## 2019-03-24 PROCEDURE — 74011250637 HC RX REV CODE- 250/637: Performed by: FAMILY MEDICINE

## 2019-03-24 PROCEDURE — 65660000000 HC RM CCU STEPDOWN

## 2019-03-24 PROCEDURE — 36415 COLL VENOUS BLD VENIPUNCTURE: CPT

## 2019-03-24 PROCEDURE — 97165 OT EVAL LOW COMPLEX 30 MIN: CPT

## 2019-03-24 PROCEDURE — 74011250637 HC RX REV CODE- 250/637: Performed by: HOSPITALIST

## 2019-03-24 RX ORDER — HYDRALAZINE HYDROCHLORIDE 25 MG/1
25 TABLET, FILM COATED ORAL 3 TIMES DAILY
Status: DISCONTINUED | OUTPATIENT
Start: 2019-03-24 | End: 2019-03-26 | Stop reason: HOSPADM

## 2019-03-24 RX ADMIN — SIMVASTATIN 20 MG: 20 TABLET, FILM COATED ORAL at 21:12

## 2019-03-24 RX ADMIN — Medication 10 ML: at 08:09

## 2019-03-24 RX ADMIN — Medication 10 ML: at 21:12

## 2019-03-24 RX ADMIN — CEPHALEXIN 250 MG: 250 CAPSULE ORAL at 21:12

## 2019-03-24 RX ADMIN — AMLODIPINE BESYLATE 5 MG: 5 TABLET ORAL at 08:08

## 2019-03-24 RX ADMIN — POLYETHYLENE GLYCOL 3350 17 G: 17 POWDER, FOR SOLUTION ORAL at 08:08

## 2019-03-24 RX ADMIN — FUROSEMIDE 40 MG: 10 INJECTION, SOLUTION INTRAMUSCULAR; INTRAVENOUS at 08:08

## 2019-03-24 RX ADMIN — CEPHALEXIN 250 MG: 250 CAPSULE ORAL at 08:08

## 2019-03-24 RX ADMIN — HYDRALAZINE HYDROCHLORIDE 25 MG: 25 TABLET, FILM COATED ORAL at 08:08

## 2019-03-24 RX ADMIN — CEPHALEXIN 250 MG: 250 CAPSULE ORAL at 16:47

## 2019-03-24 RX ADMIN — Medication 10 ML: at 08:10

## 2019-03-24 RX ADMIN — METOPROLOL SUCCINATE 50 MG: 50 TABLET, EXTENDED RELEASE ORAL at 08:08

## 2019-03-24 RX ADMIN — HYDRALAZINE HYDROCHLORIDE 25 MG: 25 TABLET, FILM COATED ORAL at 16:47

## 2019-03-24 RX ADMIN — HYDRALAZINE HYDROCHLORIDE 25 MG: 25 TABLET, FILM COATED ORAL at 21:12

## 2019-03-24 NOTE — PROGRESS NOTES
Hospitalist Progress Note Chato Acosta MD 
Answering service: [phone] from in house phone Date of Service:  3/24/2019 NAME:  Nury Harris :  3/9/1933 MRN:  [id number] Admission Summary:  
71-year-old -American male, past medical history of hypertension, hyperlipidemia, gout, constipation, prostate cancer, presented to the emergency department from home via private transport with reported elevated blood pressure despite being on meds,increasing Lext edema,SOB. In the ER noted with /78, bradycardia, HR 44,EKG with HR 39 2nd degree AV block,Labs with SOULEYMANE Interval history / Subjective: S/p pacemaker, HR improved,since then Confused ,forgetful (at baseline per daughter) Patient has leg edema and abdominal distention not clear if this is new. Add Lasix. 3/24: 
Resting in chair. Heart rate stable. BP is still on higher side will increase Hydralazine dose to TID. Lasix was added yesterday. Waiting for SNF placement. Assessment & Plan:  
 
Sinus Bradycardia with second-degree AV block. Resolved with PM 
-echo with LVEF 55-60%,mild to mod AS,MR &TR 
-TSH normal  
-s/p Dual chamber Pacemaker 
-card /EP following Hypertensive urgency. - nitroglycerin 2% ointment ,prn hydralazine,BP remained elevated 
-Add Lasix 
-currently on norvasc, prn hydralazine, added po hydralazine per card Cardiomyopathy, 
-b/l lext edema,elevated proBNP,cxr with pleural effusion 
-mildly elevated trop,in setting of SOULEYMANE 
-strict ins 7 outs 
-no ace/arb or diuretics d/t SOULEYMANE SOULEYMANE on CKD 3,baseline Cr 2.3 
-held PTA losartan,volataren  
-Cr. improved before, to 1.9   ,uptrend to 2 
-monitor kidney fucntions 
-avoid renotoxic meds Hyperkalemia 
-resolved Thrombocytopenia. -Repeat platelet count. improved 
-Hold on antiplatelets and anticoagulants Murmur ,presumed AS,per card -echo with mild to mod AS,MR,TR  EF55-60% Hyponatremia. resolved Hyperlipidemia. LDL 39 
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 
-supportive care  
-PT/OT Functional status: lives alone functionally independent per daughter Code status:FULL 
DVT prophylaxis: SCD Care Plan discussed with: pt/nurse, 
 daughter Suezanne Hamman , recommend SNF Disposition: PT eval awaited, f/u card recc's  Prior to discharge Hospital Problems  Date Reviewed: 3/19/2019 Codes Class Noted POA Pacemaker ICD-10-CM: Z95.0 ICD-9-CM: V45.01  3/22/2019 Unknown Overview Signed 3/22/2019  5:08 PM by Tere Espinoza MD  
  3/22/2019 dual chamber Medtronic pacer * (Principal) Bradycardia ICD-10-CM: R00.1 ICD-9-CM: 427.89  3/19/2019 Unknown Thrombocytopenia ([address] ICD-10-CM: D69.6 ICD-9-CM: 287.5  3/19/2019 Unknown Elevated troponin ICD-10-CM: R74.8 ICD-9-CM: 790.6  3/19/2019 Unknown Hypertensive urgency ICD-10-CM: I16.0 ICD-9-CM: 401.9  3/19/2019 Unknown Second degree AV block, Mobitz type I ICD-10-CM: I44.1 ICD-9-CM: 426.13  3/19/2019 Overview Signed 3/21/2019  6:14 PM by Teer Espinoza MD  
  Added automatically from request for surgery [id number] Review of Systems: A comprehensive review of systems was negative except for that written in the HPI. Vital Signs:  
 Last 24hrs VS reviewed since prior progress note. Most recent are: 
Visit Vitals /89 (BP 1 Location: Right arm, BP Patient Position: Sitting) Pulse 80 Temp 98.1 °F (36.7 °C) Resp 16 Ht 5' 10\" (1.778 m) Wt 83.1 kg (183 lb 3.2 oz) SpO2 96% BMI 26.29 kg/m² Intake/Output Summary (Last 24 hours) at 3/24/2019 1053 Last data filed at 3/24/2019 7445 Gross per 24 hour Intake 600 ml Output 1775 ml Net -1175 ml Physical Examination: Constitutional:  No acute distress, cooperative, pleasant   
ENT:  Oral mucous moist, oropharynx benign. Neck supple, Resp:  CTA bilaterally. No wheezing/rhonchi/rales. No accessory muscle use CV:  Regular rhythm, normal rate,+LUCIE , gallops, rubs GI:  Soft, non distended, non tender. normoactive bowel sounds, no hepatosplenomegaly Musculoskeletal:  No edema, warm, 2+ pulses throughout Neurologic:  Moves all extremities. occasionaly talks,follows az GODINEZ II-XII reviewed Psych:  AA oreinted x2 person & place only Skin:  warm & dry SKIN;s/p PP,Lt upper chest dressing in place Data Review:  
 Review and/or order of clinical lab test 
 
 
Labs:  
 
Recent Labs  
  03/24/19 
0346 03/23/19 
0310 WBC 9.1 9.0 HGB 13.4 12.4 HCT 40.4 37.8 * 132* Recent Labs  
  03/24/19 
0346 03/23/19 
0310 03/22/19 
1100 03/22/19 
0416 * 135*  --  135* K 3.7 4.1 3.3* 5.7*  
 105  --  106 CO2 26 25  --  25 BUN 30* 28*  --  23* CREA 1.76* 1.77*  --  2.00* GLU 98 119*  --  110* CA 9.1 9.2  --  9.5 MG  --  2.0  --  2.2 No results for input(s): SGOT, GPT, ALT, AP, TBIL, TBILI, TP, ALB, GLOB, GGT, AML, LPSE in the last 72 hours. No lab exists for component: AMYP, HLPSE No results for input(s): INR, PTP, APTT in the last 72 hours. No lab exists for component: INREXT, INREXT No results for input(s): FE, TIBC, PSAT, FERR in the last 72 hours. Lab Results Component Value Date/Time Folate 12.2 01/07/2019 11:29 AM  
  
No results for input(s): PH, PCO2, PO2 in the last 72 hours. No results for input(s): CPK, CKNDX, TROIQ in the last 72 hours. No lab exists for component: CPKMB Lab Results Component Value Date/Time  Cholesterol, total 98 03/20/2019 03:03 AM  
 HDL Cholesterol 48 03/20/2019 03:03 AM  
 LDL, calculated 39.6 03/20/2019 03:03 AM  
 Triglyceride 52 03/20/2019 03:03 AM  
 CHOL/HDL Ratio 2.0 03/20/2019 03:03 AM  
 
 No results found for: Volodymyr Neff Lab Results Component Value Date/Time Color Yellow 08/08/2017 04:09 PM  
 Appearance Clear 08/08/2017 04:09 PM  
 Specific gravity 1.017 07/18/2017 02:56 PM  
 pH (UA) 6.5 08/08/2017 04:09 PM  
 Protein 100 (A) 07/18/2017 02:56 PM  
 Glucose NEGATIVE  07/18/2017 02:56 PM  
 Ketone Negative 08/08/2017 04:09 PM  
 Bilirubin Negative 08/08/2017 04:09 PM  
 Urobilinogen 1.0 07/18/2017 02:56 PM  
 Nitrites Positive (A) 08/08/2017 04:09 PM  
 Leukocyte Esterase 1+ (A) 08/08/2017 04:09 PM  
 Epithelial cells MODERATE (A) 07/18/2017 02:56 PM  
 Bacteria Few 08/08/2017 04:09 PM  
 WBC 11-30 (A) 08/08/2017 04:09 PM  
 RBC 0-2 08/08/2017 04:09 PM  
 
 
 
Medications Reviewed:  
 
Current Facility-Administered Medications Medication Dose Route Frequency  hydrALAZINE (APRESOLINE) tablet 25 mg  25 mg Oral TID  furosemide (LASIX) injection 40 mg  40 mg IntraVENous DAILY  sodium chloride (NS) flush 5-40 mL  5-40 mL IntraVENous Q8H  
 sodium chloride (NS) flush 5-40 mL  5-40 mL IntraVENous PRN  
 acetaminophen (TYLENOL) tablet 650 mg  650 mg Oral Q4H PRN  
 HYDROcodone-acetaminophen (NORCO) 5-325 mg per tablet 1 Tab  1 Tab Oral Q4H PRN  
 ondansetron (ZOFRAN) injection 4 mg  4 mg IntraVENous Q4H PRN  
 cephALEXin (KEFLEX) capsule 250 mg  250 mg Oral TID  metoprolol succinate (TOPROL-XL) XL tablet 50 mg  50 mg Oral DAILY  hydrALAZINE (APRESOLINE) 20 mg/mL injection 10 mg  10 mg IntraVENous Q6H PRN  
 amLODIPine (NORVASC) tablet 5 mg  5 mg Oral DAILY  polyethylene glycol (MIRALAX) packet 17 g  17 g Oral DAILY  simvastatin (ZOCOR) tablet 20 mg  20 mg Oral QHS  sodium chloride (NS) flush 5-40 mL  5-40 mL IntraVENous Q8H  
 sodium chloride (NS) flush 5-40 mL  5-40 mL IntraVENous PRN  
 
______________________________________________________________________ EXPECTED LENGTH OF STAY: 2d 12h ACTUAL LENGTH OF STAY:          5 Zoila Still MD

## 2019-03-24 NOTE — PROGRESS NOTES
0000: Bedside and Verbal shift change report given to Tiffany Norton RN (oncoming nurse) by Jennifer Dumas RN (offgoing nurse). Report included the following information SBAR, Kardex, ED Summary, Intake/Output, MAR, Recent Results and Med Rec Status. 0730: Bedside and Verbal shift change report given to AUGUSTO Corado (oncoming nurse) by Tiffany Norton RN (offgoing nurse). Report included the following information SBAR, Kardex, Procedure Summary, Intake/Output, Recent Results and Med Rec Status.

## 2019-03-24 NOTE — PROGRESS NOTES
Bedside and Verbal shift change report given to [facility] (oncoming nurse) by Octavio Vasquez RN (offgoing nurse). Report included the following information SBAR, Kardex, Intake/Output, MAR, Recent Results and Cardiac Rhythm PACED. Bedside and Verbal shift change report given to [address] [place] [address] [place] [employer] (oncoming nurse) by Yee Barnett RN (offgoing nurse). Report included the following information SBAR, Kardex, Intake/Output, MAR, Recent Results and Cardiac Rhythm PACED Problem: Falls - Risk of 
Goal: *Absence of Falls Description Document Koreen Masters Fall Risk and appropriate interventions in the flowsheet. Outcome: Progressing Towards Goal 
  
Problem: Hypertension Goal: *Blood pressure within specified parameters Outcome: Progressing Towards Goal 
  
Problem: Impaired Skin Integrity/Pressure Injury Treatment Goal: *Improvement of Existing Pressure Injury Outcome: Progressing Towards Goal 
  
Problem: Pressure Injury - Risk of 
Goal: *Prevention of pressure injury Description Document Torito Scale and appropriate interventions in the flowsheet.  
Outcome: Progressing Towards Goal

## 2019-03-24 NOTE — PROGRESS NOTES
OCCUPATIONAL THERAPY EVALUATION Patient: Kallie Bob (89 y.o. male) Date: 3/24/2019 Primary Diagnosis: Bradycardia [R00.1] Hypertensive urgency [I16.0] Thrombocytopenia ([facility] [address] [D69.6] Elevated troponin [R74.8] Procedure(s) (LRB): 
INSERT PPM DUAL (Right) 2 Days Post-Op Precautions: RUE PPM, fall ASSESSMENT : 
Based on the objective data described below, the patient presents with Setup to Moderate Assistance upper body ADLs, Contact guard assistance to Moderate Assistance lower body ADLs, and Contact guard assistance assist functional mobility. The following are barriers to independence while in acute care:  
- Cognitive and/or behavioral: Poor retention and compliance with PPM precautions, orientation, attention to task, safety awareness, insight into deficits and short term memory loss. Patient's son present and reports patient has had progressive cognitive decline for at least 2 months, demonstrated by short-term memory loss, confusion, and difficulty reading.  
- Medical condition: ROM, strength, functional reach and standing balance Patient will benefit from skilled acute intervention to address the above impairments. Patient?s rehabilitation potential is considered to be Fair Discharge recommendations: Rehab at skilled nursing facility (SNF) If above is not an option then recommend: Home health (to increase independence and safety) 24 supervision 
physical assist during all functional mobility Barriers to discharging home, in addition to above listed impairments: lives alone 
family availability to assist 
history of falls. Patient's son reports family has had concerns about patient living alone and family cannot provide adequate supervision/ assistance.   
   
 
PLAN : 
Recommendations and Planned Interventions: self care training, functional mobility training, therapeutic exercise, balance training, therapeutic activities, endurance activities, patient education, home safety training and family training/education Frequency/Duration: Patient will be followed by occupational therapy 3 times a week to address goals. SUBJECTIVE:  
Patient stated ? I've been having trouble with my thinking. ? OBJECTIVE DATA SUMMARY:  
HISTORY:  
Past Medical History:  
Diagnosis Date Cancer [facility])   
 prostate Constipation Gout Hyperlipemia Hypertension Past Surgical History:  
Procedure Laterality Date HX PROSTATECTOMY HX UROLOGICAL    
 prostate removed MA INS NEW/RPLCMT PRM PM W/TRANSV ELTRD ATRIAL&VENT Right 3/22/2019 INSERT PPM DUAL performed by Chao Mosley MD at [place] [address], [facility]  CATH LAB Prior Level of Function/Environment/Context: Patient was living alone in [address] [place] [address] (confirmed by son). Patient reports he was independent with ADLs and mobility and ambulatory with no AD. Endorses 1 fall ~5 days PTA while ascending stairs, reports no injuries. Expanded or extensive additional review of patient history:  
 
Home Situation Home Environment: Private residence # Steps to Enter: 3 Rails to Enter: Yes One/Two Story Residence: Two story Living Alone: Yes Support Systems: Child(kin), Family member(s) Patient Expects to be Discharged to[de-identified] Private residence Current DME Used/Available at Home: None Hand dominance: Left EXAMINATION OF PERFORMANCE DEFICITS: 
Cognitive/Behavioral Status: 
Neurologic State: Alert;Confused Orientation Level: Oriented to person;Disoriented to place; Disoriented to situation;Disoriented to time(corrected/ reoriented, still disoriented after ~10 min) Cognition: Decreased attention/concentration; Follows commands;Poor safety awareness;Memory loss Perception: Appears intact Perseveration: No perseveration noted Safety/Judgement: Decreased awareness of environment;Decreased insight into deficits; Decreased awareness of need for safety;Decreased awareness of need for assistance Skin: visible skin appears intact Edema: none noted Hearing: Auditory Auditory Impairment: None Vision/Perceptual:   
Tracking: Able to track stimulus in all quadrants w/o difficulty; Requires cues, head turns, or add eye shifts to track Visual Fields: (able to detect stimuli in all fields) Acuity: (reports actuity intact but difficulty reading clock and text) Range of Motion: 
AROM: (RUE limited to PPM, LUE generally decreased/ functional). Patient demonstrates very little L shoulder AROM in all planes. Patient confused at to LUE deficits/ history. Son reports patient has history of L rotator cuff injury Strength: 
Strength: Generally decreased, functional 
  
  
  
  
Coordination: 
Coordination: Generally decreased, functional 
Fine Motor Skills-Upper: Left Intact; Right Intact Gross Motor Skills-Upper: Left Intact; Right Intact Tone & Sensation: 
Tone: Normal 
Sensation: Intact Balance: 
Sitting: Intact Standing: Impaired Standing - Static: Good Standing - Dynamic : Fair Functional Mobility and Transfers for ADLs: 
Bed Mobility: 
Supine to Sit: Stand-by assistance Transfers: 
Sit to Stand: Contact guard assistance Toilet Transfer : Contact guard assistance(inferred) ADL Assessment: 
Feeding: Independent(inferred) Oral Facial Hygiene/Grooming: Contact guard assistance(brushed teeth standing at sink) Bathing: Minimum assistance(inferred for impaired LB access) Upper Body Dressing: Moderate assistance(inferred due to R PPM and impaired L shoulder AROM) Lower Body Dressing: Moderate assistance(impaired LB access, can doff socks, unable to don) Toileting: Contact guard assistance(inferred) ADL Intervention and task modifications: 
  
Cognitive Retraining Safety/Judgement: Decreased awareness of environment;Decreased insight into deficits; Decreased awareness of need for safety;Decreased awareness of need for assistance Functional Measure: 
Barthel Index: 
Bathin Bladder: 5 Bowels: 5 Groomin Dressin Feeding: 10 Mobility: 0 Stairs: 0 Toilet Use: 5 Transfer (Bed to Chair and Back): 10 Total: 40/100 Percentage of impairment  
0% 1-19% 20-39% 40-59% 60-79% 80-99% 100% Barthel Score 0-100 100 99-80 79-60 59-40 20-39 1-19 
 0 The Barthel ADL Index: Guidelines 1. The index should be used as a record of what a patient does, not as a record of what a patient could do. 2. The main aim is to establish degree of independence from any help, physical or verbal, however minor and for whatever reason. 3. The need for supervision renders the patient not independent. 4. A patient's performance should be established using the best available evidence. Asking the patient, friends/relatives and nurses are the usual sources, but direct observation and common sense are also important. However direct testing is not needed. 5. Usually the patient's performance over the preceding 24-48 hours is important, but occasionally longer periods will be relevant. 6. Middle categories imply that the patient supplies over 50 per cent of the effort. 7. Use of aids to be independent is allowed. Kira Esquivel, Barthel, D.W. (6730). Functional evaluation: the Barthel Index. [address] [facility] (14)2. GRECIA Ty, Vinny Davis, [address] (). Measuring the change indisability after inpatient rehabilitation; comparison of the responsiveness of the Barthel Index and Functional [place] Measure. Journal of Neurology, Neurosurgery, and Psychiatry, 66(4), 321-885. Jeni Wynn, N.J.A, IKER Contreras, & Jennifer Miller M.A. (2004.) Assessment of post-stroke quality of life in cost-effectiveness studies: The usefulness of the Barthel Index and the EuroQoL-5D.  Quality of Life Research, 13, 858-58 Occupational Therapy Evaluation Charge Determination History Examination Decision-Making LOW Complexity : Brief history review  MEDIUM Complexity : 3-5 performance deficits relating to physical, cognitive , or psychosocial skils that result in activity limitations and / or participation restrictions MEDIUM Complexity : Patient may present with comorbidities that affect occupational performnce. Miniml to moderate modification of tasks or assistance (eg, physical or verbal ) with assesment(s) is necessary to enable patient to complete evaluation Based on the above components, the patient evaluation is determined to be of the following complexity level: LOW Pain: 
Patient does not report pain Activity Tolerance:  
VSS, HR 77 After treatment patient left:  
Supine in bed Bed alarm/tab alert on Call light within reach RN notified Family at bedside COMMUNICATION/EDUCATION:  
The patient?s plan of care was discussed with: Registered Nurse. Home safety education was provided and the patient/caregiver indicated understanding., Patient/family have participated as able in goal setting and plan of care. and Patient/family agree to work toward stated goals and plan of care. This patient?s plan of care is appropriate for delegation to [facility]. Thank you for this referral. 
Chivo Masters OT Time Calculation: 37 mins

## 2019-03-24 NOTE — PROGRESS NOTES
1930: Bedside shift change report received by [facility] (offgoing nurse). Report included the following information SBAR, Kardex, Procedure Summary, Intake/Output, MAR, Recent Results and Cardiac Rhythm Paced . 0030: Bedside shift change report given to Tamra (oncoming nurse). Report included the following information SBAR, Kardex, Procedure Summary, Intake/Output, MAR, Recent Results and Cardiac Rhythm Paced. Care Plan 2020 Problem: Falls - Risk of 
Goal: *Absence of Falls Description Document Anu Sharma Fall Risk and appropriate interventions in the flowsheet. Outcome: Progressing Towards Goal 
Note:  
Fall Risk Interventions: 
Mobility Interventions: Communicate number of staff needed for ambulation/transfer, Patient to call before getting OOB, OT consult for ADLs, Bed/chair exit alarm Mentation Interventions: Bed/chair exit alarm, Door open when patient unattended, Family/sitter at bedside, Increase mobility, More frequent rounding, Reorient patient, Room close to nurse's station, Update white board, Adequate sleep, hydration, pain control Medication Interventions: Evaluate medications/consider consulting pharmacy, Teach patient to arise slowly, Patient to call before getting OOB, Utilize gait belt for transfers/ambulation, Bed/chair exit alarm Elimination Interventions: Call light in reach, Bed/chair exit alarm, Patient to call for help with toileting needs History of Falls Interventions: Bed/chair exit alarm, Door open when patient unattended, Consult care management for discharge planning, Evaluate medications/consider consulting pharmacy, Room close to nurse's station, Utilize gait belt for transfer/ambulation Problem: Pressure Injury - Risk of 
Goal: *Prevention of pressure injury Description Document Torito Scale and appropriate interventions in the flowsheet. Outcome: Progressing Towards Goal 
Note:  
Pressure Injury Interventions: Sensory Interventions: Assess changes in LOC Moisture Interventions: Absorbent underpads Activity Interventions: Increase time out of bed, Pressure redistribution bed/mattress(bed type), PT/OT evaluation Mobility Interventions: HOB 30 degrees or less, Pressure redistribution bed/mattress (bed type), Turn and reposition approx. every two hours(pillow and wedges), PT/OT evaluation Nutrition Interventions: Document food/fluid/supplement intake Friction and Shear Interventions: Lift sheet

## 2019-03-25 PROCEDURE — 74011250637 HC RX REV CODE- 250/637: Performed by: NURSE PRACTITIONER

## 2019-03-25 PROCEDURE — 65660000000 HC RM CCU STEPDOWN

## 2019-03-25 PROCEDURE — 74011250636 HC RX REV CODE- 250/636: Performed by: NURSE PRACTITIONER

## 2019-03-25 PROCEDURE — 74011250637 HC RX REV CODE- 250/637: Performed by: INTERNAL MEDICINE

## 2019-03-25 PROCEDURE — 74011250637 HC RX REV CODE- 250/637: Performed by: FAMILY MEDICINE

## 2019-03-25 PROCEDURE — 74011250637 HC RX REV CODE- 250/637: Performed by: HOSPITALIST

## 2019-03-25 RX ORDER — FUROSEMIDE 40 MG/1
40 TABLET ORAL DAILY
Status: DISCONTINUED | OUTPATIENT
Start: 2019-03-25 | End: 2019-03-25

## 2019-03-25 RX ORDER — FUROSEMIDE 10 MG/ML
40 INJECTION INTRAMUSCULAR; INTRAVENOUS DAILY
Status: DISCONTINUED | OUTPATIENT
Start: 2019-03-25 | End: 2019-03-26 | Stop reason: HOSPADM

## 2019-03-25 RX ADMIN — CEPHALEXIN 250 MG: 250 CAPSULE ORAL at 17:57

## 2019-03-25 RX ADMIN — HYDRALAZINE HYDROCHLORIDE 25 MG: 25 TABLET, FILM COATED ORAL at 17:57

## 2019-03-25 RX ADMIN — CEPHALEXIN 250 MG: 250 CAPSULE ORAL at 08:49

## 2019-03-25 RX ADMIN — CEPHALEXIN 250 MG: 250 CAPSULE ORAL at 22:50

## 2019-03-25 RX ADMIN — Medication 10 ML: at 17:58

## 2019-03-25 RX ADMIN — FUROSEMIDE 40 MG: 10 INJECTION, SOLUTION INTRAMUSCULAR; INTRAVENOUS at 08:49

## 2019-03-25 RX ADMIN — AMLODIPINE BESYLATE 5 MG: 5 TABLET ORAL at 08:50

## 2019-03-25 RX ADMIN — Medication 10 ML: at 07:26

## 2019-03-25 RX ADMIN — METOPROLOL SUCCINATE 50 MG: 50 TABLET, EXTENDED RELEASE ORAL at 08:49

## 2019-03-25 RX ADMIN — SIMVASTATIN 20 MG: 20 TABLET, FILM COATED ORAL at 22:50

## 2019-03-25 RX ADMIN — Medication 10 ML: at 22:50

## 2019-03-25 RX ADMIN — Medication 10 ML: at 08:49

## 2019-03-25 RX ADMIN — HYDRALAZINE HYDROCHLORIDE 25 MG: 25 TABLET, FILM COATED ORAL at 08:50

## 2019-03-25 RX ADMIN — Medication 10 ML: at 22:51

## 2019-03-25 RX ADMIN — POLYETHYLENE GLYCOL 3350 17 G: 17 POWDER, FOR SOLUTION ORAL at 08:49

## 2019-03-25 NOTE — PROGRESS NOTES
1930: Bedside shift change report received by Mak (offgoing nurse). Report included the following information SBAR, Kardex, Intake/Output, MAR, Recent Results and Cardiac Rhythm Paced . 0000: Bedside shift change report given to Kit Lua (oncoming nurse). Report included the following information SBAR, Kardex, Procedure Summary, Intake/Output, MAR, Recent Results and Cardiac Rhythm Paced. Problem: Falls - Risk of 
Goal: *Absence of Falls Description Document Jennifer Shows Fall Risk and appropriate interventions in the flowsheet. Outcome: Progressing Towards Goal 
Note:  
Fall Risk Interventions: 
Mobility Interventions: Communicate number of staff needed for ambulation/transfer, OT consult for ADLs, Patient to call before getting OOB, Bed/chair exit alarm Mentation Interventions: Adequate sleep, hydration, pain control, Bed/chair exit alarm, Door open when patient unattended, Family/sitter at bedside, Increase mobility, More frequent rounding, Reorient patient, Room close to nurse's station, Toileting rounds, Update white board Medication Interventions: Evaluate medications/consider consulting pharmacy, Patient to call before getting OOB, Teach patient to arise slowly, Utilize gait belt for transfers/ambulation, Bed/chair exit alarm Elimination Interventions: Bed/chair exit alarm, Call light in reach, Patient to call for help with toileting needs, Toileting schedule/hourly rounds, Urinal in reach History of Falls Interventions: Bed/chair exit alarm, Consult care management for discharge planning, Door open when patient unattended, Room close to nurse's station, Utilize gait belt for transfer/ambulation, Evaluate medications/consider consulting pharmacy Problem: Pressure Injury - Risk of 
Goal: *Prevention of pressure injury Description Document Torito Scale and appropriate interventions in the flowsheet. Outcome: Progressing Towards Goal 
Note:  
Pressure Injury Interventions: Sensory Interventions: Assess changes in LOC Moisture Interventions: Minimize layers, Moisture barrier Activity Interventions: Increase time out of bed, Pressure redistribution bed/mattress(bed type), PT/OT evaluation Mobility Interventions: HOB 30 degrees or less, Pressure redistribution bed/mattress (bed type), PT/OT evaluation, Turn and reposition approx. every two hours(pillow and wedges) Nutrition Interventions: Document food/fluid/supplement intake Friction and Shear Interventions: Lift sheet, HOB 30 degrees or less

## 2019-03-25 NOTE — PROGRESS NOTES
Bedside shift change report given to SUE Valle (oncoming nurse) by ALMAZ Redding (offgoing nurse). Report included the following information SBAR, Procedure Summary, Intake/Output, MAR and Recent Results.

## 2019-03-25 NOTE — PROGRESS NOTES
[place]: Sammy Araya 
[phone] HPI: Joe Bishop, a 80y.o. year-old who presented for evaluation of 2:1 heart block He gave a 3 week history of increasing LE edema, fatigue and CARREON with exertion. Also had a fall about 3 weeks ago related to being weak and off balance. He has had recurrent UTIs and maybe prostatectomy. He underwent placement of medtronic dual chamber pacemaker with Dr. Adrian Montano on 3/22/19 He denies chest pain or palpitations, no shortness of breath No dizziness or syncope No pain at pacemaker site BP labile Assessment/Plan: 1. Mild to mod AS, MR, TR and severe pulmonary HTN by TTE - will arrange follow up in our office for a 6 month follow up 2. 2:1 heart block - s/p placement of medtronic dual chamber pacemaker with Dr. Adrian Montano on 3/22/19, follow up with Dr. Adrian Montano scheduled as OP   
-awaiting SNF placement for discharge 3. Hyperlipidemia- on zocor, LDL 39 
4. Malignant HTN - elevated, continue amlodipine, hydralazine 25mg TID, Toprol XL 
-has IV hydralazine ordered PRN 5. Acute on chronic renal insufficiency - creatinine 1.6 in 2017, creatinine 2.3 on admission, creatinine down to 1.7 yesterday, off ibuprofen, losartan, management per primary team  
6. Elevated troponin - peaked at 0.09, non-specific in the setting of SOULEYMANE, heart block, HTN, TTE ok, no further testing needed at this time 7. DM- A1C 6.3%, no prior history of DM, management per primary team 
8. LE edema - improved, will transition IV lasix to PO lasix tomorrow, will check pro-BNP in AM if still admitted S/p placement of Medtronic dual chamber pacemaker 3/22/19 Echo 3/19 - LVEF 55-60%, mild cLVH, mild to mod AS, mild to mod MR, mild to mod TR, severe pulmonary HTN (70mmHg), large left pleural effusion Soc no tob remote etoh Fhx no early cad, no pacer no naveed no valve diseae He  has a past medical history of Cancer ([address] Constipation, Gout, Hyperlipemia, and Hypertension. Cardiovascular ROS: no chest pain or dyspnea at rest  
Respiratory ROS: no cough Neurological ROS: no TIA or stroke symptoms All other systems negative except as above. PE 
Vitals:  
 03/24/19 2109 03/25/19 0018 03/25/19 0500 03/25/19 8638 BP: 124/60 148/85 159/75 169/81 Pulse: 71 79 75 74 Resp: 15 18 18 18 Temp: 98.5 °F (36.9 °C) 98.5 °F (36.9 °C) 97.7 °F (36.5 °C) 98.3 °F (36.8 °C) SpO2: 96% 99% 100% 99% Weight:   185 lb 13.6 oz (84.3 kg) Height:      
 Body mass index is 26.67 kg/m². General appearance -elderly, thin, NAD Mental status - affect appropriate to mood Eyes - sclera anicteric, moist mucous membranes Neck - supple, no carotid bruits Lymphatics - not assessed Chest - clear to auscultation, no wheezes, rales or rhonchi Heart - regular reate and rhythm, normal S1, S2, 2/6 LUCIE Abdomen - soft, nontender, nondistended Back exam - full range of motion, no tenderness Neurological - cranial nerves II through XII grossly intact, no focal deficit Musculoskeletal - no muscular tenderness noted, normal strength Extremities - peripheral pulses normal, trace LE edema Skin - normal coloration  no rashes Telemetry: Vpaced Recent Labs: 
Lab Results Component Value Date/Time Cholesterol, total 98 03/20/2019 03:03 AM  
 HDL Cholesterol 48 03/20/2019 03:03 AM  
 LDL, calculated 39.6 03/20/2019 03:03 AM  
 Triglyceride 52 03/20/2019 03:03 AM  
 CHOL/HDL Ratio 2.0 03/20/2019 03:03 AM  
 
Lab Results Component Value Date/Time Creatinine (POC) 1.7 (H) 07/31/2018 02:19 PM  
 Creatinine 1.76 (H) 03/24/2019 03:46 AM  
 
Lab Results Component Value Date/Time BUN 30 (H) 03/24/2019 03:46 AM  
 
Lab Results Component Value Date/Time Potassium 3.7 03/24/2019 03:46 AM  
 
Lab Results Component Value Date/Time Hemoglobin A1c 6.3 (H) 03/11/2019 05:08 PM  
 
Lab Results Component Value Date/Time HGB 13.4 03/24/2019 03:46 AM  
 
Lab Results Component Value Date/Time PLATELET 696 (L) 79/05/7705 03:46 AM  
 
 
Reviewed: 
Past Medical History:  
Diagnosis Date  Cancer [facility])   
 prostate  Constipation  Gout  Hyperlipemia  Hypertension Social History Tobacco Use Smoking Status Never Smoker Smokeless Tobacco Never Used Social History Substance and Sexual Activity Alcohol Use No  
 
No Known Allergies Current Facility-Administered Medications Medication Dose Route Frequency  furosemide (LASIX) injection 40 mg  40 mg IntraVENous DAILY  hydrALAZINE (APRESOLINE) tablet 25 mg  25 mg Oral TID  sodium chloride (NS) flush 5-40 mL  5-40 mL IntraVENous Q8H  
 sodium chloride (NS) flush 5-40 mL  5-40 mL IntraVENous PRN  
 acetaminophen (TYLENOL) tablet 650 mg  650 mg Oral Q4H PRN  
 HYDROcodone-acetaminophen (NORCO) 5-325 mg per tablet 1 Tab  1 Tab Oral Q4H PRN  
 ondansetron (ZOFRAN) injection 4 mg  4 mg IntraVENous Q4H PRN  
 cephALEXin (KEFLEX) capsule 250 mg  250 mg Oral TID  metoprolol succinate (TOPROL-XL) XL tablet 50 mg  50 mg Oral DAILY  hydrALAZINE (APRESOLINE) 20 mg/mL injection 10 mg  10 mg IntraVENous Q6H PRN  
 amLODIPine (NORVASC) tablet 5 mg  5 mg Oral DAILY  polyethylene glycol (MIRALAX) packet 17 g  17 g Oral DAILY  simvastatin (ZOCOR) tablet 20 mg  20 mg Oral QHS  sodium chloride (NS) flush 5-40 mL  5-40 mL IntraVENous Q8H  
 sodium chloride (NS) flush 5-40 mL  5-40 mL IntraVENous PRN Mariaa Pimentel MD 
[facility] heart and Vascular [place] [facility] Crossing: Eboni Metzger 
[phone] Requesting/referring provider: Dr. Devon Ramirez Reason for Consult: heart block HPI: Jalil Molly, a 80y.o. year-old who presents for evaluation of 2:1 heart block He give 3 weeks history of increasing LE edema fatigue and CARREON with exertion.  Also had a fall about 3 weeks ago related to being weak and off balance. He has had recurrent UTI and maybe prostatectomy and wants to talk about that. He denies chest pain or palpitations. In the bed he denies headache or dizziness. Hr is 38-40 BP uncontrolled. IN ARF with elevated crt, thought related to ibuprofen but not improving with cessation. New murmur. Continues to have bradycardia with 2:1 block today He is tired today but no specific complaints. Final decision on pacemaker in  am.  
 
Assessment/Plan: 1. AS- presumed, echo to eval 
2. 2:1 heart block holding metoprolol, pacer eval 
3. Hyperlipidemia- on zocor 4. Malignant htn- better today running 150s on amlodipine 
-hydralazine prn 
5. ARF- crt improving 6. Elevated troponin- mild, nonspecific 7. DM- A1C 6.3 no prior history of DM Soc no tob remote etoh Fhx no early cad, no pacer no naveed no valve diseae He  has a past medical history of Cancer ([address] Constipation, Gout, Hyperlipemia, and Hypertension. Cardiovascular ROS: positive for - dyspnea on exertion and edema Respiratory ROS: +CARREON Neurological ROS: no TIA or stroke symptoms All other systems negative except as above. PE 
Vitals:  
 03/24/19 2109 03/25/19 0018 03/25/19 0500 03/25/19 7290 BP: 124/60 148/85 159/75 169/81 Pulse: 71 79 75 74 Resp: 15 18 18 18 Temp: 98.5 °F (36.9 °C) 98.5 °F (36.9 °C) 97.7 °F (36.5 °C) 98.3 °F (36.8 °C) SpO2: 96% 99% 100% 99% Weight:   185 lb 13.6 oz (84.3 kg) Height:      
 Body mass index is 26.67 kg/m². General appearance -elderly thin nad Mental status - affect appropriate to mood Eyes - sclera anicteric, moist mucous membranes Neck - supple, no significant adenopathy Lymphatics - no  lymphadenopathy Chest - clear to auscultation, no wheezes, rales or rhonchi Heart - naveed, regular rhythm, normal S1, S2, 3/6 LUCIE Abdomen - soft, nontender, nondistended, no masses or organomegaly Back exam - full range of motion, no tenderness Neurological - cranial nerves II through XII grossly intact, no focal deficit Musculoskeletal - no muscular tenderness noted, normal strength Extremities - peripheral pulses normal, 2+ edema ble Skin - normal coloration  no rashes Recent Labs: 
Lab Results Component Value Date/Time Cholesterol, total 98 03/20/2019 03:03 AM  
 HDL Cholesterol 48 03/20/2019 03:03 AM  
 LDL, calculated 39.6 03/20/2019 03:03 AM  
 Triglyceride 52 03/20/2019 03:03 AM  
 CHOL/HDL Ratio 2.0 03/20/2019 03:03 AM  
 
Lab Results Component Value Date/Time Creatinine (POC) 1.7 (H) 07/31/2018 02:19 PM  
 Creatinine 1.76 (H) 03/24/2019 03:46 AM  
 
Lab Results Component Value Date/Time BUN 30 (H) 03/24/2019 03:46 AM  
 
Lab Results Component Value Date/Time Potassium 3.7 03/24/2019 03:46 AM  
 
Lab Results Component Value Date/Time Hemoglobin A1c 6.3 (H) 03/11/2019 05:08 PM  
 
Lab Results Component Value Date/Time HGB 13.4 03/24/2019 03:46 AM  
 
Lab Results Component Value Date/Time PLATELET 544 (L) 39/03/3919 03:46 AM  
 
 
Reviewed: 
Past Medical History:  
Diagnosis Date  Cancer [facility])   
 prostate  Constipation  Gout  Hyperlipemia  Hypertension Social History Tobacco Use Smoking Status Never Smoker Smokeless Tobacco Never Used Social History Substance and Sexual Activity Alcohol Use No  
 
No Known Allergies Current Facility-Administered Medications Medication Dose Route Frequency  furosemide (LASIX) injection 40 mg  40 mg IntraVENous DAILY  hydrALAZINE (APRESOLINE) tablet 25 mg  25 mg Oral TID  sodium chloride (NS) flush 5-40 mL  5-40 mL IntraVENous Q8H  
 sodium chloride (NS) flush 5-40 mL  5-40 mL IntraVENous PRN  
 acetaminophen (TYLENOL) tablet 650 mg  650 mg Oral Q4H PRN  
 HYDROcodone-acetaminophen (NORCO) 5-325 mg per tablet 1 Tab  1 Tab Oral Q4H PRN  
 ondansetron (ZOFRAN) injection 4 mg  4 mg IntraVENous Q4H PRN  
  cephALEXin (KEFLEX) capsule 250 mg  250 mg Oral TID  metoprolol succinate (TOPROL-XL) XL tablet 50 mg  50 mg Oral DAILY  hydrALAZINE (APRESOLINE) 20 mg/mL injection 10 mg  10 mg IntraVENous Q6H PRN  
 amLODIPine (NORVASC) tablet 5 mg  5 mg Oral DAILY  polyethylene glycol (MIRALAX) packet 17 g  17 g Oral DAILY  simvastatin (ZOCOR) tablet 20 mg  20 mg Oral QHS  sodium chloride (NS) flush 5-40 mL  5-40 mL IntraVENous Q8H  
 sodium chloride (NS) flush 5-40 mL  5-40 mL IntraVENous PRN Angeline De Santiago MD 
[facility] heart and Vascular [place] [address]

## 2019-03-25 NOTE — PROGRESS NOTES
Hospitalist Progress Note Zoila Card MD 
Answering service: [phone] OR [phone] from in house phone Date of Service:  3/25/2019 NAME:  Uli Peterson :  3/9/1933 MRN:  [id number] Admission Summary:  
66-year-old -American male, past medical history of hypertension, hyperlipidemia, gout, constipation, prostate cancer, presented to the emergency department from home via private transport with reported elevated blood pressure despite being on meds,increasing Lext edema,SOB. In the ER noted with /78, bradycardia, HR 44,EKG with HR 39 2nd degree AV block,Labs with SOULEYMANE Interval history / Subjective: S/p pacemaker, HR improved,since then Confused ,forgetful (at baseline per daughter) Patient has leg edema and abdominal distention not clear if this is new. Add Lasix. 3/24: 
Resting in chair. Heart rate stable. BP is still on higher side will increase Hydralazine dose to TID. Lasix was added yesterday. Waiting for SNF placement. Assessment & Plan:  
 
Sinus Bradycardia with second-degree AV block. Resolved with PM 
-echo with LVEF 55-60%,mild to mod AS,MR &TR 
-TSH normal  
-s/p Dual chamber Pacemaker 
-card /EP following;cleared patient for discharge Hypertensive urgency. - nitroglycerin 2% ointment ,prn hydralazine,BP remained elevated 
-Add Lasix 
-currently on norvasc, prn hydralazine, added po hydralazine per card 
-BP improved Cardiomyopathy, 
-b/l lext edema,elevated proBNP,cxr with pleural effusion 
-mildly elevated trop,in setting of SOULEYMANE 
-strict ins 7 outs 
-no ace/arb or diuretics d/t SOULEYMANE SOULEYMANE on CKD 3,baseline Cr 2.3 
-held PTA losartan,volataren  
-Cr. improved before, to 1.9   ,uptrend to 2 
-monitor kidney fucntions 
-avoid renotoxic meds Hyperkalemia 
-resolved Thrombocytopenia. -Repeat platelet count. improved 
-Hold on antiplatelets and anticoagulants Murmur ,presumed AS,per card 
-echo with mild to mod AS,MR,TR  EF55-60% Hyponatremia. resolved Hyperlipidemia. LDL 39 
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 
-supportive care  
-PT/OT Functional status: lives alone functionally independent per daughter Code status:FULL 
DVT prophylaxis: SCD Care Plan discussed with: pt/nurse, 
 daughter Quintin Butterfield , recommend SNF Disposition:Waiting for SNF placement Hospital Problems  Date Reviewed: 3/19/2019 Codes Class Noted POA Pacemaker ICD-10-CM: Z95.0 ICD-9-CM: V45.01  3/22/2019 Unknown Overview Signed 3/22/2019  5:08 PM by Radha Lim MD  
  3/22/2019 dual chamber Medtronic pacer * (Principal) Bradycardia ICD-10-CM: R00.1 ICD-9-CM: 427.89  3/19/2019 Unknown Thrombocytopenia ([facility] [address] ICD-10-CM: D69.6 ICD-9-CM: 287.5  3/19/2019 Unknown Elevated troponin ICD-10-CM: R74.8 ICD-9-CM: 790.6  3/19/2019 Unknown Hypertensive urgency ICD-10-CM: I16.0 ICD-9-CM: 401.9  3/19/2019 Unknown Second degree AV block, Mobitz type I ICD-10-CM: I44.1 ICD-9-CM: 426.13  3/19/2019 Overview Signed 3/21/2019  6:14 PM by Radha Lim MD  
  Added automatically from request for surgery [id number] Review of Systems: A comprehensive review of systems was negative except for that written in the HPI. Vital Signs:  
 Last 24hrs VS reviewed since prior progress note. Most recent are: 
Visit Vitals /62 (BP 1 Location: Left arm, BP Patient Position: At rest) Pulse 60 Temp 98.2 °F (36.8 °C) Resp 18 Ht 5' 10\" (1.778 m) Wt 84.3 kg (185 lb 13.6 oz) SpO2 94% BMI 26.67 kg/m² Intake/Output Summary (Last 24 hours) at 3/25/2019 1327 Last data filed at 3/25/2019 7534 Gross per 24 hour Intake 400 ml Output 600 ml Net -200 ml Physical Examination: Constitutional:  No acute distress, cooperative, pleasant. Eating lunch. ENT:  Oral mucous moist, oropharynx benign. Neck supple, Resp:  CTA bilaterally. No wheezing/rhonchi/rales. No accessory muscle use CV:  Regular rhythm, normal rate,+LUCIE , gallops, rubs GI:  Soft, non distended, non tender. normoactive bowel sounds, no hepatosplenomegaly Musculoskeletal:  No edema, warm, 2+ pulses throughout Neurologic:  Moves all extremities. occasionaly talks,follows sherylnands GRETCHEN II-XII reviewed Psych:  AA oreinted x2 person & place only Skin:  warm & dry SKIN;s/p PP,Lt upper chest dressing in place Data Review:  
 Review and/or order of clinical lab test 
 
 
Labs:  
 
Recent Labs  
  03/24/19 0346 03/23/19 0310 WBC 9.1 9.0 HGB 13.4 12.4 HCT 40.4 37.8 * 132* Recent Labs  
  03/24/19 0346 03/23/19 0310 * 135* K 3.7 4.1  105 CO2 26 25 BUN 30* 28* CREA 1.76* 1.77* GLU 98 119* CA 9.1 9.2 MG  --  2.0 No results for input(s): SGOT, GPT, ALT, AP, TBIL, TBILI, TP, ALB, GLOB, GGT, AML, LPSE in the last 72 hours. No lab exists for component: AMYP, HLPSE No results for input(s): INR, PTP, APTT in the last 72 hours. No lab exists for component: INREXT, INREXT No results for input(s): FE, TIBC, PSAT, FERR in the last 72 hours. Lab Results Component Value Date/Time Folate 12.2 01/07/2019 11:29 AM  
  
No results for input(s): PH, PCO2, PO2 in the last 72 hours. No results for input(s): CPK, CKNDX, TROIQ in the last 72 hours. No lab exists for component: CPKMB Lab Results Component Value Date/Time Cholesterol, total 98 03/20/2019 03:03 AM  
 HDL Cholesterol 48 03/20/2019 03:03 AM  
 LDL, calculated 39.6 03/20/2019 03:03 AM  
 Triglyceride 52 03/20/2019 03:03 AM  
 CHOL/HDL Ratio 2.0 03/20/2019 03:03 AM  
 
No results found for: [facility] Lab Results Component Value Date/Time  Color Yellow 08/08/2017 04:09 PM  
 Appearance Clear 08/08/2017 04:09 PM  
 Specific gravity 1.017 07/18/2017 02:56 PM  
 pH (UA) 6.5 08/08/2017 04:09 PM  
 Protein 100 (A) 07/18/2017 02:56 PM  
 Glucose NEGATIVE  07/18/2017 02:56 PM  
 Ketone Negative 08/08/2017 04:09 PM  
 Bilirubin Negative 08/08/2017 04:09 PM  
 Urobilinogen 1.0 07/18/2017 02:56 PM  
 Nitrites Positive (A) 08/08/2017 04:09 PM  
 Leukocyte Esterase 1+ (A) 08/08/2017 04:09 PM  
 Epithelial cells MODERATE (A) 07/18/2017 02:56 PM  
 Bacteria Few 08/08/2017 04:09 PM  
 WBC 11-30 (A) 08/08/2017 04:09 PM  
 RBC 0-2 08/08/2017 04:09 PM  
 
 
 
Medications Reviewed:  
 
Current Facility-Administered Medications Medication Dose Route Frequency  furosemide (LASIX) injection 40 mg  40 mg IntraVENous DAILY  hydrALAZINE (APRESOLINE) tablet 25 mg  25 mg Oral TID  sodium chloride (NS) flush 5-40 mL  5-40 mL IntraVENous Q8H  
 sodium chloride (NS) flush 5-40 mL  5-40 mL IntraVENous PRN  
 acetaminophen (TYLENOL) tablet 650 mg  650 mg Oral Q4H PRN  
 HYDROcodone-acetaminophen (NORCO) 5-325 mg per tablet 1 Tab  1 Tab Oral Q4H PRN  
 ondansetron (ZOFRAN) injection 4 mg  4 mg IntraVENous Q4H PRN  
 cephALEXin (KEFLEX) capsule 250 mg  250 mg Oral TID  metoprolol succinate (TOPROL-XL) XL tablet 50 mg  50 mg Oral DAILY  hydrALAZINE (APRESOLINE) 20 mg/mL injection 10 mg  10 mg IntraVENous Q6H PRN  
 amLODIPine (NORVASC) tablet 5 mg  5 mg Oral DAILY  polyethylene glycol (MIRALAX) packet 17 g  17 g Oral DAILY  simvastatin (ZOCOR) tablet 20 mg  20 mg Oral QHS  sodium chloride (NS) flush 5-40 mL  5-40 mL IntraVENous Q8H  
 sodium chloride (NS) flush 5-40 mL  5-40 mL IntraVENous PRN  
 
______________________________________________________________________ EXPECTED LENGTH OF STAY: 2d 12h ACTUAL LENGTH OF STAY:          6 Toña Oconnell MD

## 2019-03-25 NOTE — PROGRESS NOTES
CM reviewed chart and noted SNF consult. Spoke with patients daughter Jian Darin [phone] discussed SNF options per she was given a SNF list to make a selection. Daughter Alejandro Trujillo advised [facility]st option is [facility] and 2nd choice is [facility]. CM sent referral to both facilities via cclink. Called [facility] left message for Camila Jiménez about referral. Awaiting a response. CM to follow patient for updates. Iveth Li, CAMMY/CRM

## 2019-03-26 VITALS
WEIGHT: 181.22 LBS | DIASTOLIC BLOOD PRESSURE: 79 MMHG | BODY MASS INDEX: 25.94 KG/M2 | HEART RATE: 75 BPM | HEIGHT: 70 IN | TEMPERATURE: 98.1 F | OXYGEN SATURATION: 95 % | RESPIRATION RATE: 18 BRPM | SYSTOLIC BLOOD PRESSURE: 148 MMHG

## 2019-03-26 LAB
ANION GAP SERPL CALC-SCNC: 6 MMOL/L (ref 5–15)
BNP SERPL-MCNC: 5406 PG/ML
BUN SERPL-MCNC: 37 MG/DL (ref 6–20)
BUN/CREAT SERPL: 22 (ref 12–20)
CALCIUM SERPL-MCNC: 9.3 MG/DL (ref 8.5–10.1)
CHLORIDE SERPL-SCNC: 100 MMOL/L (ref 97–108)
CO2 SERPL-SCNC: 27 MMOL/L (ref 21–32)
CREAT SERPL-MCNC: 1.68 MG/DL (ref 0.7–1.3)
GLUCOSE SERPL-MCNC: 108 MG/DL (ref 65–100)
MAGNESIUM SERPL-MCNC: 2.2 MG/DL (ref 1.6–2.4)
POTASSIUM SERPL-SCNC: 3.9 MMOL/L (ref 3.5–5.1)
SODIUM SERPL-SCNC: 133 MMOL/L (ref 136–145)

## 2019-03-26 PROCEDURE — 74011250637 HC RX REV CODE- 250/637: Performed by: NURSE PRACTITIONER

## 2019-03-26 PROCEDURE — 74011250636 HC RX REV CODE- 250/636: Performed by: NURSE PRACTITIONER

## 2019-03-26 PROCEDURE — 74011250637 HC RX REV CODE- 250/637: Performed by: INTERNAL MEDICINE

## 2019-03-26 PROCEDURE — 83735 ASSAY OF MAGNESIUM: CPT

## 2019-03-26 PROCEDURE — 97116 GAIT TRAINING THERAPY: CPT

## 2019-03-26 PROCEDURE — 97530 THERAPEUTIC ACTIVITIES: CPT

## 2019-03-26 PROCEDURE — 83880 ASSAY OF NATRIURETIC PEPTIDE: CPT

## 2019-03-26 PROCEDURE — 80048 BASIC METABOLIC PNL TOTAL CA: CPT

## 2019-03-26 PROCEDURE — 36415 COLL VENOUS BLD VENIPUNCTURE: CPT

## 2019-03-26 PROCEDURE — 51798 US URINE CAPACITY MEASURE: CPT

## 2019-03-26 PROCEDURE — 74011250637 HC RX REV CODE- 250/637: Performed by: HOSPITALIST

## 2019-03-26 RX ORDER — FUROSEMIDE 20 MG/1
20 TABLET ORAL DAILY
Qty: 60 TAB | Refills: 1 | Status: SHIPPED | OUTPATIENT
Start: 2019-03-26 | End: 2019-05-06 | Stop reason: SDUPTHER

## 2019-03-26 RX ORDER — HYDRALAZINE HYDROCHLORIDE 25 MG/1
25 TABLET, FILM COATED ORAL 3 TIMES DAILY
Qty: 90 TAB | Refills: 1 | Status: SHIPPED | OUTPATIENT
Start: 2019-03-26 | End: 2019-08-14 | Stop reason: SDUPTHER

## 2019-03-26 RX ORDER — AMLODIPINE BESYLATE 5 MG/1
5 TABLET ORAL DAILY
Qty: 30 TAB | Refills: 1 | Status: SHIPPED | OUTPATIENT
Start: 2019-03-27 | End: 2020-10-01 | Stop reason: SDUPTHER

## 2019-03-26 RX ORDER — CEPHALEXIN 250 MG/1
250 CAPSULE ORAL 3 TIMES DAILY
Qty: 3 CAP | Refills: 0 | Status: SHIPPED | OUTPATIENT
Start: 2019-03-26 | End: 2019-03-27

## 2019-03-26 RX ADMIN — HYDRALAZINE HYDROCHLORIDE 25 MG: 25 TABLET, FILM COATED ORAL at 15:35

## 2019-03-26 RX ADMIN — METOPROLOL SUCCINATE 50 MG: 50 TABLET, EXTENDED RELEASE ORAL at 08:27

## 2019-03-26 RX ADMIN — Medication 10 ML: at 07:15

## 2019-03-26 RX ADMIN — CEPHALEXIN 250 MG: 250 CAPSULE ORAL at 15:35

## 2019-03-26 RX ADMIN — HYDRALAZINE HYDROCHLORIDE 25 MG: 25 TABLET, FILM COATED ORAL at 00:46

## 2019-03-26 RX ADMIN — HYDRALAZINE HYDROCHLORIDE 25 MG: 25 TABLET, FILM COATED ORAL at 08:27

## 2019-03-26 RX ADMIN — AMLODIPINE BESYLATE 5 MG: 5 TABLET ORAL at 08:27

## 2019-03-26 RX ADMIN — CEPHALEXIN 250 MG: 250 CAPSULE ORAL at 08:27

## 2019-03-26 RX ADMIN — FUROSEMIDE 40 MG: 10 INJECTION, SOLUTION INTRAMUSCULAR; INTRAVENOUS at 08:27

## 2019-03-26 NOTE — PROGRESS NOTES
0730: Bedside shift change report given to Alfred Clemons (oncoming nurse) by Cheryle Fried (offgoing nurse). Report included the following information SBAR and Kardex. 1500: patient being discharged to SNF. Report given to nurse at [facility]. Going to room 205 on wing 2. [facility] will arrive at 4pm to transport patient. 1630: [facility] arrived to transport patient to [facility]. I have reviewed discharge instructions with the patient and caregiver. The patient and caregiver verbalized understanding. Current Discharge Medication List  
  
START taking these medications Details  
amLODIPine (NORVASC) 5 mg tablet Take 1 Tab by mouth daily. Qty: 30 Tab, Refills: 1  
  
cephALEXin (KEFLEX) 250 mg capsule Take 1 Cap by mouth three (3) times daily for 1 day. Qty: 3 Cap, Refills: 0  
  
hydrALAZINE (APRESOLINE) 25 mg tablet Take 1 Tab by mouth three (3) times daily. Qty: 90 Tab, Refills: 1  
  
furosemide (LASIX) 20 mg tablet Take 1 Tab by mouth daily. Qty: 60 Tab, Refills: 1 CONTINUE these medications which have NOT CHANGED Details  
aspirin delayed-release 81 mg tablet Take 81 mg by mouth daily. allopurinol (ZYLOPRIM) 100 mg tablet Take 1 Tab by mouth daily. Qty: 90 Tab, Refills: 1 Associated Diagnoses: Gout, unspecified cause, unspecified chronicity, unspecified site  
  
metoprolol succinate (TOPROL-XL) 25 mg XL tablet Take 1 Tab by mouth daily. Qty: 90 Tab, Refills: 1 Associated Diagnoses: Essential hypertension  
  
acetaminophen (TYLENOL EXTRA STRENGTH) 500 mg tablet Take  by mouth every six (6) hours as needed for Pain.  
  
polyethylene glycol (MIRALAX) 17 gram packet Take 1 Packet by mouth daily. Qty: 90 Packet, Refills: 2 Associated Diagnoses: Constipation, unspecified constipation type  
  
simvastatin (ZOCOR) 20 mg tablet Take 20 mg by mouth daily. STOP taking these medications  
  
 losartan (COZAAR) 100 mg tablet Comments:  
Reason for Stopping: diclofenac EC (VOLTAREN) 50 mg EC tablet Comments:  
Reason for Stopping:   
   
  
 
  
Problem: Falls - Risk of 
Goal: *Absence of Falls Description Document Pink Rank Fall Risk and appropriate interventions in the flowsheet. Outcome: Progressing Towards Goal 
  
Problem: Pressure Injury - Risk of 
Goal: *Prevention of pressure injury Description Document Torito Scale and appropriate interventions in the flowsheet.  
Outcome: Progressing Towards Goal

## 2019-03-26 NOTE — PROGRESS NOTES
Problem: Mobility Impaired (Adult and Pediatric) Goal: *Acute Goals and Plan of Care (Insert Text) Description Physical Therapy Goals Initiated 3/23/2019 1. Patient will move from supine to sit and sit to supine  and roll side to side in bed with modified independence within 7 day(s). 2.  Patient will transfer from bed to chair and chair to bed with modified independence using the least restrictive device within 7 day(s). 3.  Patient will perform sit to stand with modified independence within 7 day(s). 4.  Patient will ambulate with modified independence for 300 feet with the least restrictive device within 7 day(s). 5.  Patient will ascend/descend 14 stairs with 1 handrail(s) with supervision/set-up within 7 day(s). Outcome: Progressing Towards Goal 
  
PHYSICAL THERAPY TREATMENT Patient: Kassidy Hu (77 y.o. male) Date: 3/26/2019 Diagnosis: Bradycardia [R00.1] Hypertensive urgency [I16.0] Thrombocytopenia ([facility] [address] [D69.6] Elevated troponin [R74.8] Bradycardia Procedure(s) (LRB): 
INSERT PPM DUAL (Right) 4 Days Post-Op Precautions:   
Chart, physical therapy assessment, plan of care and goals were reviewed. ASSESSMENT: 
Pt is agreeable to therapy. Pt was able to increase gait tolerance requiring CGA to Min A due to decrease balance. Pt has decrease processing and sequencing requiring increase time. Pt has decrease adherence to pacer precautions. Pt may benefit from SNF to improve mobility prior to returning home alone. Progression toward goals: 
?    Improving appropriately and progressing toward goals ? Improving slowly and progressing toward goals ? Not making progress toward goals and plan of care will be adjusted PLAN: 
Patient continues to benefit from skilled intervention to address the above impairments. Continue treatment per established plan of care. Discharge Recommendations:  [facility] Kirk If unable then HHPT with 24/7 assistance Further Equipment Recommendations for Discharge:  TBD SUBJECTIVE:  
Patient stated ? I can go further . ? OBJECTIVE DATA SUMMARY:  
Critical Behavior: 
Neurologic State: Alert Orientation Level: Oriented to person, Oriented to place Cognition: Follows commands, Memory loss Safety/Judgement: Decreased awareness of environment, Decreased insight into deficits, Decreased awareness of need for safety, Decreased awareness of need for assistance Functional Mobility Training: 
Bed Mobility: 
  
Supine to Sit: Stand-by assistance Transfers: 
Sit to Stand: Minimum assistance Stand to Sit: Minimum assistance Assisted  pt to the bathroom. Pt was able to assist with self care but required v.c for sequencing and processing Balance: 
Sitting: Intact Standing: Impaired Standing - Static: Good Standing - Dynamic : Fair Ambulation/Gait Training: 
Distance (ft): 350 Feet (ft) Assistive Device: Gait belt Ambulation - Level of Assistance: Contact guard assistance;Minimal assistance Gait Abnormalities: Decreased step clearance; Path deviations Base of Support: Widened Speed/Linh: Pace decreased (<100 feet/min); Slow Stairs: Therapeutic Exercises:  
 
Pain: 
  
  
  
  
  
  
Activity Tolerance:  
Please refer to the flowsheet for vital signs taken during this treatment. After treatment:  
?    Patient left in no apparent distress sitting up in chair ? Patient left in no apparent distress in bed 
? Call bell left within reach ? Nursing notified ? Caregiver present ? Bed alarm activated COMMUNICATION/COLLABORATION:  
The patient?s plan of care was discussed with: Registered Nurse Flaquita Flores PTA Time Calculation: 26 mins

## 2019-03-26 NOTE — PROGRESS NOTES
CM received discharge orders. Patient is ready for discharge today. [facility] [place] set up on will call. Patient's daughter is not in agreement with patient discharging today. CM paged attending hospitalist for assistance. Hospitalist advised she will talk to the daughter. CM to follow patient for updates. Andres Prabhakarr, BS/CRM

## 2019-03-26 NOTE — DISCHARGE SUMMARY
Discharge Summary       PATIENT ID: Prema Nuñez  MRN: [id number]   YOB: 1933    DATE OF ADMISSION: 3/19/2019  8:28 PM    DATE OF DISCHARGE: 03/26/19  PRIMARY CARE PROVIDER: Edin Lima MD       DISCHARGING PROVIDER: Stefano Dao MD    To contact this individual call [phone] and ask the  to page. If unavailable ask to be transferred the Adult Hospitalist Department. CONSULTATIONS: IP CONSULT TO CARDIOLOGY  IP CONSULT TO HOSPITALIST  IP CONSULT TO ELECTROPHYSIOLOGY      PROCEDURES/SURGERIES: Procedure(s):  INSERT PPM DUAL    IMAGING  Xr Chest Port    Result Date: 3/22/2019  IMPRESSION: No evidence of pneumothorax status post right-sided pacemaker placement. Xr Chest Port    Result Date: 3/19/2019  IMPRESSION: Pleural effusions. [address] COURSE:   # Sinus Bradycardia with second-degree AV block s/p Dual chamber Pacemaker placement   - Resolved with PM  - echo with LVEF 55-60%,mild to mod AS,MR &TR  -TSH normal   - card /EP following;cleared patient for discharge     # Hypertensive urgency   - nitroglycerin 2% ointment   -currently on norvasc, po hydralazine and Lasix per card with IV PRN hydralazine   -BP improved     # Cardiomyopathy  -b/l lext edema,elevated proBNP,cxr with pleural effusion  -mildly elevated trop,in setting of SOULEYMANE  -strict ins 7 outs  -no ace/arb or diuretics d/t SOULEYMANE     # SOULEYMANE on CKD 3,baseline Cr 2.3  -held PTA losartan,volataren   -Cr. improved before, to 1.9   ,uptrend to 2  -monitor kidney fucntions  -avoid renotoxic meds     # Hyperkalemia  -resolved      # Thrombocytopenia   -Repeat platelet count. improved  -Hold on antiplatelets and anticoagulants     Murmur ,presumed AS,per card  -echo with mild to mod AS,MR,TR  EF55-60%     # Hyponatremia. resolved      # Hyperlipidemia. 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   # Cardiomyopathy  # SOULEYMANE on CKD 3,baseline Cr 2.3  # Hyperkalemia  # Thrombocytopenia.    # Hyponatremia. resolved   # Hyperlipidemia  # Dementia,early stages     Patient Active Problem List   Diagnosis Code    Adrenal mass ([facility] [facility] E27.9    Dysuria R30.0    Weight loss R63.4    Essential hypertension I10    Gout M10.9    Pure hypercholesterolemia E78.00    History of prostate cancer Z85.46    Chronic constipation Q72.22    Systolic murmur Y19.8    Cognitive impairment R41.89    Stage 3 chronic kidney disease (HCC) N18.3    Rotator cuff arthropathy of both shoulders M12.811, M12.812    Bradycardia R00.1    Thrombocytopenia (HCC) D69.6    Elevated troponin R74.8    Hypertensive urgency I16.0    Second degree AV block, Mobitz type I I44.1    Pacemaker Z95.0               PENDING TEST RESULTS:   At the time of discharge the following test results are still pending: None     FOLLOW UP APPOINTMENTS:    Follow-up Information     Follow up With Specialties Details Why Contact Info    Hemal Troy MD Cardiology On 9/25/2019 10:40 AM archie   [address]      Meghan Butler MD Family Practice Schedule an appointment as soon as possible for a visit in 1 week Post hospital discharge follow up  [facility] [address] [place] [address]      Bakari Love MD Cardiology Schedule an appointment as soon as possible for a visit in 2 weeks Follow up  archie   [facility]  [phone]             ADDITIONAL CARE RECOMMENDATIONS: Per discharge instruction     DIET: Healthy cardiac diet     ACTIVITY: as tolerated WOUND CARE: None     EQUIPMENT needed: None       DISCHARGE MEDICATIONS:  Current Discharge Medication List      START taking these medications    Details   amLODIPine (NORVASC) 5 mg tablet Take 1 Tab by mouth daily. Qty: 30 Tab, Refills: 1      cephALEXin (KEFLEX) 250 mg capsule Take 1 Cap by mouth three (3) times daily for 1 day. Qty: 3 Cap, Refills: 0      hydrALAZINE (APRESOLINE) 25 mg tablet Take 1 Tab by mouth three (3) times daily. Qty: 90 Tab, Refills: 1      furosemide (LASIX) 20 mg tablet Take 1 Tab by mouth daily. Qty: 60 Tab, Refills: 1         CONTINUE these medications which have NOT CHANGED    Details   aspirin delayed-release 81 mg tablet Take 81 mg by mouth daily. allopurinol (ZYLOPRIM) 100 mg tablet Take 1 Tab by mouth daily. Qty: 90 Tab, Refills: 1    Associated Diagnoses: Gout, unspecified cause, unspecified chronicity, unspecified site      metoprolol succinate (TOPROL-XL) 25 mg XL tablet Take 1 Tab by mouth daily. Qty: 90 Tab, Refills: 1    Associated Diagnoses: Essential hypertension      acetaminophen (TYLENOL EXTRA STRENGTH) 500 mg tablet Take  by mouth every six (6) hours as needed for Pain.      polyethylene glycol (MIRALAX) 17 gram packet Take 1 Packet by mouth daily. Qty: 90 Packet, Refills: 2    Associated Diagnoses: Constipation, unspecified constipation type      simvastatin (ZOCOR) 20 mg tablet Take 20 mg by mouth daily.          STOP taking these medications       losartan (COZAAR) 100 mg tablet Comments:   Reason for Stopping:         diclofenac EC (VOLTAREN) 50 mg EC tablet Comments:   Reason for Stopping:               All Micro Results     Procedure Component Value Units Date/Time    CULTURE, URINE [[id number]] Collected:  03/19/19 2145    Order Status:  Canceled Specimen:  Urine from [facility] Specimen           Recent Results (from the past 24 hour(s))   METABOLIC PANEL, BASIC    Collection Time: 03/26/19  3:31 AM   Result Value Ref Range    Sodium 133 (L) 136 - 145 mmol/L    Potassium 3.9 3.5 - 5.1 mmol/L    Chloride 100 97 - 108 mmol/L    CO2 27 21 - 32 mmol/L    Anion gap 6 5 - 15 mmol/L    Glucose 108 (H) 65 - 100 mg/dL    BUN 37 (H) 6 - 20 MG/DL    Creatinine 1.68 (H) 0.70 - 1.30 MG/DL    BUN/Creatinine ratio 22 (H) 12 - 20      GFR est AA 47 (L) >60 ml/min/1.73m2    GFR est non-AA 39 (L) >60 ml/min/1.73m2    Calcium 9.3 8.5 - 10.1 MG/DL   MAGNESIUM    Collection Time: 03/26/19  3:31 AM   Result Value Ref Range    Magnesium 2.2 1.6 - 2.4 mg/dL   NT-PRO BNP    Collection Time: 03/26/19  3:31 AM   Result Value Ref Range    NT pro-BNP 5,406 (H) <450 PG/ML           NOTIFY YOUR PHYSICIAN FOR ANY OF THE FOLLOWING:   Fever over 101 degrees for 24 hours. Chest pain, shortness of breath, fever, chills, nausea, vomiting, diarrhea, change in mentation, falling, weakness, bleeding. Severe pain or pain not relieved by medications. Or, any other signs or symptoms that you may have questions about. DISPOSITION:    Home With:   OT  PT  HH  RN      x Long term SNF/Inpatient Rehab    Independent/assisted living    Hospice    Other:       PATIENT CONDITION AT DISCHARGE:     Functional status    Poor    x Deconditioned     Independent      Cognition     Lucid     Forgetful    x Dementia      Catheters/lines (plus indication)    Avalos     PICC     PEG    x None      Code status   x  Full code     DNR      PHYSICAL EXAMINATION AT DISCHARGE:  Constitutional:  No acute distress, cooperative, pleasant. Eating lunch. ENT:  Oral mucous moist, oropharynx benign. Neck supple,    Resp:  CTA bilaterally. No wheezing/rhonchi/rales. No accessory muscle use   CV:  Regular rhythm, normal rate,+LUCIE , gallops, rubs    GI:  Soft, non distended, non tender. normoactive bowel sounds, no hepatosplenomegaly     Musculoskeletal:  right sided +2 pitting edema up to mid shin     Neurologic:  Moves all extremities. occasionaly talks,follows az GODINEZ II-XII reviewed                         Psych:  SAVANNAH oreinted x2 person & place only  Skin:  warm & dry s/p PP,Lt upper chest dressing in place                CHRONIC MEDICAL DIAGNOSES:  Problem List as of 3/26/2019 Date Reviewed: 3/19/2019          Codes Class Noted - Resolved    Pacemaker ICD-10-CM: Z95.0  ICD-9-CM: V45.01  3/22/2019 - Present    Overview Signed 3/22/2019  5:08 PM by Manuel Parisi MD     3/22/2019 dual chamber Medtronic pacer             * (Principal) Bradycardia ICD-10-CM: R00.1  ICD-9-CM: 427.89  3/19/2019 - Present        Thrombocytopenia (HCC) ICD-10-CM: D69.6  ICD-9-CM: 287.5  3/19/2019 - Present        Elevated troponin ICD-10-CM: R74.8  ICD-9-CM: 790.6  3/19/2019 - Present        Hypertensive urgency ICD-10-CM: I16.0  ICD-9-CM: 401.9  3/19/2019 - Present        Second degree AV block, Mobitz type I ICD-10-CM: I44.1  ICD-9-CM: 426.13  3/19/2019 - Present    Overview Signed 3/21/2019  6:14 PM by Manuel Parisi MD     Added automatically from request for surgery [id number]             Stage 3 chronic kidney disease ([facility] ICD-10-CM: N18.3  ICD-9-CM: 585.3  3/15/2019 - Present        Rotator cuff arthropathy of both shoulders ICD-10-CM: M12.811, M12.812  ICD-9-CM: 716.81  3/15/2019 - Present        Cognitive impairment ICD-10-CM: R41.89  ICD-9-CM: 294.9  1/8/2019 - Present        Systolic murmur ZCL-59-IE: R01.1  ICD-9-CM: 785.2  12/30/2018 - Present        Essential hypertension ICD-10-CM: I10  ICD-9-CM: 401.9  12/17/2018 - Present        Gout ICD-10-CM: M10.9  ICD-9-CM: 274.9  12/17/2018 - Present        Pure hypercholesterolemia ICD-10-CM: E78.00  ICD-9-CM: 272.0  12/17/2018 - Present        History of prostate cancer ICD-10-CM: Z85.46  ICD-9-CM: V10.46  12/17/2018 - Present        Chronic constipation ICD-10-CM: K59.09  ICD-9-CM: 564.00  12/17/2018 - Present        Dysuria ICD-10-CM: R30.0  ICD-9-CM: 788.1  8/8/2017 - Present        Weight loss ICD-10-CM: R63.4  ICD-9-CM: 783.21  8/8/2017 - Present        Adrenal mass ([facility] [address] ICD-10-CM: E27.9  ICD-9-CM: 255.9  7/18/2017 - Present    Overview Addendum 12/17/2018  9:57 AM by Candelario Martinez MD     Stable/non-functioning adenoma on imaging             RESOLVED: Acute pyelonephritis ICD-10-CM: N10  ICD-9-CM: 590.10  7/18/2017 - 7/21/2017                Discussed with patient and family. Explained the importance of following up, Compliance with medications and recommendations on discharge,Side effect profile of medications were explained. Safety precautions at home and while taking pain medications also explained. All questions answered to the satisfaction of the patient/family. Discussed with consultant(s) who are agreeable to the discharge. Verbal and written instructions on discharge given. Explained about Discharge medications and side effect profile. Advised patient/family to followup with their pcp for medication refills and preauthorization of medications, Home health orders. checkups,screenign programs as appropriate for age.        Thank you Amie Fraire MD for taking care of your patient, Please call with any questions.       Greater than 35 minutes were spent with the patient on counseling and coordination of care    Signed:   Rea Plunkett MD  3/26/2019  9:53 AM

## 2019-03-27 ENCOUNTER — OFFICE VISIT (OUTPATIENT)
Dept: CARDIOLOGY CLINIC | Age: 84
End: 2019-03-27

## 2019-03-27 ENCOUNTER — PATIENT OUTREACH (OUTPATIENT)
Dept: FAMILY MEDICINE CLINIC | Age: 84
End: 2019-03-27

## 2019-03-27 DIAGNOSIS — Z95.0 CARDIAC PACEMAKER IN SITU: Primary | ICD-10-CM

## 2019-03-27 NOTE — PROGRESS NOTES
. 
Transition of Care Coordination/Hospital to Post Acute Facility: 
  
Date/Time:  3/27/2019 9:11 AM 
 
Patient was admitted to [address] [place] [address]  on 3/19/19 for treatment of Bradycardia. Patient was discharged 3/26/19 to [facility] for continuation of care. Inpatient RRAT score: 17 Top Challenges reviewed [address] [place] [address] admit for Bradycardia 3/19-3/26/19-with second-degree AV Block s/p Dual chamber pacemaker placement · No ace/arb or diuretics d/t SOULEYMANE while admitted-monitor kidney functions · Repeat platelet count-hold on antiplatelets and anticoagulants · PT/OT Lab:Results for Michelle Damico (MRN [id number]) as of 3/27/2019 09:26 
  3/22/2019 04:16 3/22/2019 11:00 3/23/2019 03:10 3/24/2019 03:46 3/26/2019 03:31 Sodium  135 (L)  135 (L) 133 (L) 133 (L) Potassium  5.7 (H) 3.3 (L) 4.1 3.7 3.9 Chloride  106  105 101 100 CO2  25  25 26 27 Anion gap  4 (L)  5 6 6 Glucose  110 (H)  119 (H) 98 108 (H) BUN  23 (H)  28 (H) 30 (H) 37 (H) Creatinine  2.00 (H)  1.77 (H) 1.76 (H) 1.68 (H) BUN/Creatinine ratio  12  16 17 22 (H) Calcium  9.5  9.2 9.1 9.3 Magnesium  2.2  2.0  2.2 GFR est non-AA  32 (L)  37 (L) 37 (L) 39 (L) GFR est AA  39 (L)  44 (L) 45 (L) 47 (L)  
NT pro-BNP      5,406 (H) Method of communication with care team :staff message, phone Nurse Navigator(NN) spoke with Laisha (staff nurse) to provide introduction to self and explanation of the Nurse Navigator Role. Verified name and  as patient identifiers. Discussed and reviewed  Discharge instructions and medications ACP:  
Does the patient have a current ACP (including DDNR):  no 
Does the post acute facility have a copy of the patients ACP:  no 
 
Medication(s):  
New Medications at Discharge: Norvasc, Keflex, Apresoline, lasix Changed Medications at Discharge: none Discontinued Medications at Discharge: Cozaar, Voltaren Current Outpatient Medications:   amLODIPine (NORVASC) 5 mg tablet, Take 1 Tab by mouth daily. , Disp: 30 Tab, Rfl: 1   cephALEXin (KEFLEX) 250 mg capsule, Take 1 Cap by mouth three (3) times daily for 1 day., Disp: 3 Cap, Rfl: 0 
  hydrALAZINE (APRESOLINE) 25 mg tablet, Take 1 Tab by mouth three (3) times daily. , Disp: 90 Tab, Rfl: 1 
  furosemide (LASIX) 20 mg tablet, Take 1 Tab by mouth daily. , Disp: 60 Tab, Rfl: 1 
  aspirin delayed-release 81 mg tablet, Take 81 mg by mouth daily. , Disp: , Rfl:  
  allopurinol (ZYLOPRIM) 100 mg tablet, Take 1 Tab by mouth daily. , Disp: 90 Tab, Rfl: 1 
  metoprolol succinate (TOPROL-XL) 25 mg XL tablet, Take 1 Tab by mouth daily. , Disp: 90 Tab, Rfl: 1 
  acetaminophen (TYLENOL EXTRA STRENGTH) 500 mg tablet, Take  by mouth every six (6) hours as needed for Pain., Disp: , Rfl:  
  polyethylene glycol (MIRALAX) 17 gram packet, Take 1 Packet by mouth daily. , Disp: 90 Packet, Rfl: 2 
  simvastatin (ZOCOR) 20 mg tablet, Take 20 mg by mouth daily. , Disp: , Rfl:  
No current facility-administered medications for this visit. PCP/Specialist follow up:  
Future Appointments Date Time Provider Jorje Jie 3/29/2019 10:30 AM [facility] [address] [facility]  
4/16/2019  3:30 PM Chris Strong  Jackie Bullock  
7/19/2019  3:40 PM Yoel Yanez MD C/ Puneet Fish  
9/25/2019 10:40 AM Krupa Melendez  [address] Opportunity to ask questions was provided. Contact information was provided for future reference or further questions. Will continue to monitor.

## 2019-03-28 ENCOUNTER — DOCUMENTATION ONLY (OUTPATIENT)
Dept: FAMILY MEDICINE CLINIC | Age: 84
End: 2019-03-28

## 2019-03-29 ENCOUNTER — HOSPITAL ENCOUNTER (OUTPATIENT)
Dept: ULTRASOUND IMAGING | Age: 84
Discharge: HOME | End: 2019-03-29
Attending: NURSE PRACTITIONER
Payer: MEDICARE

## 2019-03-29 ENCOUNTER — CLINICAL SUPPORT (OUTPATIENT)
Dept: CARDIOLOGY CLINIC | Age: 84
End: 2019-03-29

## 2019-03-29 ENCOUNTER — DOCUMENTATION ONLY (OUTPATIENT)
Dept: CARDIOLOGY CLINIC | Age: 84
End: 2019-03-29

## 2019-03-29 DIAGNOSIS — Z51.89 VISIT FOR WOUND CHECK: Primary | ICD-10-CM

## 2019-03-29 DIAGNOSIS — R60.9 EDEMA, UNSPECIFIED TYPE: ICD-10-CM

## 2019-03-29 DIAGNOSIS — R60.9 EDEMA: ICD-10-CM

## 2019-03-29 DIAGNOSIS — Z95.0 CARDIAC PACEMAKER IN SITU: ICD-10-CM

## 2019-03-29 PROCEDURE — 93971 EXTREMITY STUDY: CPT

## 2019-03-29 NOTE — PROGRESS NOTES
Cardiac Electrophysiology Wound Check Note      Wound Check   Patient is here for wound check, is s/p Medtronic dual chamber pacemaker implant on 03/22/2019 for irreversible symptomatic bradycardia from 2nd degree AVB. No fever or drainage noted. Daughter does state that she started to note right upper extremity edema approx 3 days ago. She states it has decreased distally, but still present. Physical Exam   Constitutional: well-developed and well-nourished. Skin: Right side pocket without drainage, erythema, or hematoma. Small 0.25 cm area near lateral end of incision not completely approximated. Extremities: Right upper extremity with edema from shoulder to hand. Radial pulses 2+ bilaterally, similar temperatures of bilateral upper extremities. ASSESSMENT and PLAN     ICD-10-CM ICD-9-CM    1. Visit for wound check Z51.89 V58.89    2. Edema, unspecified type R60.9 782.3 DUPLEX UPPER EXT ARTERY RIGHT   3. Cardiac pacemaker in situ Z95.0 V45.01      Mr. Andrew Stephenson is s/p Medtronic dual chamber pacemaker implant on 03/22/2019. Remote device check on 03/26/2019, which showed proper lead & generator function. >11 years generator longevity. RA 26% paced & RV 98% paced. Site today is without swelling or drainage. Small 0.25 cm area that has not completely closed near lateral end of incision. Right upper extremity diffusely edematous. Similar temperature bilaterally, radial pulses 2+ bilaterally. Will obtain venous doppler to evaluate for possible DVT. Reports compliance with arm ROM restrictions, will continue x total 4 weeks post implant. Antibiotic therapy is complete. Follow up for wound recheck in 1 week. Further plan pending results of venous doppler.       Future Appointments   Date Time Provider Jorje Ceron   3/29/2019  2:30 PM LYNN US [address] [place] SLOAN   4/5/2019 10:00 AM HOLTER, [address] [facility]   4/16/2019  3:30 PM Chris Strong MD [address] Darnell   7/19/2019  3:40 PM María Yanez  [facility]   9/25/2019 10:40 AM Chon Mueller MD [address] [place] [address] [facility] [place]  03/29/19

## 2019-03-29 NOTE — PROGRESS NOTES
Preliminary review of today's upper extremity venous doppler shows no DVT. No change in therapy at this time. Please call if right arm swelling worsens or if hand becomes cold compared to left hand. Wear sling as provided. Follow up as previously scheduled. Please call to inform patient and/or his daughter.

## 2019-04-01 NOTE — PROGRESS NOTES
Called patient daughter and informed her of doppler results. She verbalized understanding and denies any further questions at this time.

## 2019-04-02 ENCOUNTER — PATIENT OUTREACH (OUTPATIENT)
Dept: CASE MANAGEMENT | Age: 84
End: 2019-04-02

## 2019-04-02 NOTE — PROGRESS NOTES
Community Care Team documentation for patient in [facility] Initial Follow Up Patient was discharged to [facility], [facility]. Information included in this progress note has been provided to SNF. Hospital Admission and Diagnosis: [facility] 3/19-3/26 Sinus Bradycardia with second-degree AV block s/p Dual chamber pacemaker placement RRAT Score:  17 Advance Care Planning:  Not  on file PCP : Yuliet Harris MD 
Nurse Navigator in PCP office: Lance Lugo Note routed to Nurse Navigator team. 
 
Spoke with SNF team. Ensured patient arrived to SNF safely with admission packet in order. Provided needed hospital follow up appointments:  Everett Valadez MD Cardiology in 2 weeks; Talisha Arias MD Cardiology On 9/25/2019 10:40 AM. PT/OT providing skilled therapy. Currently ambulating 200ft with RW. No new orders following ortho follow up 3/29. L arm and hand swelling, dopler negative, xray results pending. 24hr supervision recommended. PTA pt lived alone. Pt's daughter exploring alternative options. Community Care Team will follow up weekly with [facility] until discharge. Medications were not reconciled and general patient assessment was not completed during this [facility] outreach.

## 2019-04-05 ENCOUNTER — CLINICAL SUPPORT (OUTPATIENT)
Dept: CARDIOLOGY CLINIC | Age: 84
End: 2019-04-05

## 2019-04-05 DIAGNOSIS — Z51.89 VISIT FOR WOUND CHECK: Primary | ICD-10-CM

## 2019-04-05 DIAGNOSIS — Z95.0 CARDIAC PACEMAKER IN SITU: ICD-10-CM

## 2019-04-05 NOTE — PROGRESS NOTES
Cardiac Electrophysiology Wound Check Note      Wound Check   Patient is here for wound check from MDT pacemaker. No fever, drainage   Physical Exam   Constitutional: well-developed and well-nourished. Skin: Right side pocket is healing without redness, drainage, hematoma. The wound is intact and edges approximated. Dressing and steri strips removed. ASSESSMENT and PLAN   The incision is healing without redness, drainage, hematoma. Site intact. The patient has finished their anti-biotic and been compliant with arm restrictions. They will continue arms restrictions for 3 more weeks. current treatment plan is effective, no change in therapy   Device check shows proper lead and generator functions    Follow-up Disposition:  Return 3 months I will check via device clinic or remote monitoring in the future    Pt stated his arm is much better. No swelling in right arm or pain.

## 2019-04-09 ENCOUNTER — PATIENT OUTREACH (OUTPATIENT)
Dept: CASE MANAGEMENT | Age: 84
End: 2019-04-09

## 2019-04-09 NOTE — PROGRESS NOTES
[facility] Follow up [facility] See previous [facility] Team notes. PCP : Cherry Lei MD 
Nurse Navigator in PCP office:Saarh Caldwell Spoke with SNF team.PT/OT continue. Currently ambulating 120ft with platform walker and mod assist. Barrier: Mild confusion. Weight 184lbs. Plan to discharge home alone with hired 24hr caregivers. Daughter involved. Agency unknown. HH TBD. Plan to discharge 4/24. Medications were not reconciled and general patient assessment was not completed during this skilled nursing facility outreach.

## 2019-04-13 ENCOUNTER — HOSPITAL ENCOUNTER (EMERGENCY)
Age: 84
Discharge: NURSING, REHAB OR LONG-TERM CARE | End: 2019-04-14
Attending: EMERGENCY MEDICINE | Admitting: EMERGENCY MEDICINE
Payer: MEDICARE

## 2019-04-13 VITALS
DIASTOLIC BLOOD PRESSURE: 71 MMHG | TEMPERATURE: 98.7 F | OXYGEN SATURATION: 99 % | RESPIRATION RATE: 21 BRPM | SYSTOLIC BLOOD PRESSURE: 150 MMHG | HEART RATE: 76 BPM

## 2019-04-13 DIAGNOSIS — R60.1 GENERALIZED EDEMA: ICD-10-CM

## 2019-04-13 DIAGNOSIS — R10.84 ABDOMINAL PAIN, GENERALIZED: Primary | ICD-10-CM

## 2019-04-13 LAB
ALBUMIN SERPL-MCNC: 2.3 G/DL (ref 3.5–5)
ALBUMIN/GLOB SERPL: 0.5 {RATIO} (ref 1.1–2.2)
ALP SERPL-CCNC: 108 U/L (ref 45–117)
ALT SERPL-CCNC: 56 U/L (ref 12–78)
ANION GAP SERPL CALC-SCNC: 8 MMOL/L (ref 5–15)
AST SERPL-CCNC: 52 U/L (ref 15–37)
BASOPHILS # BLD: 0 K/UL (ref 0–0.1)
BASOPHILS NFR BLD: 0 % (ref 0–1)
BILIRUB SERPL-MCNC: 0.3 MG/DL (ref 0.2–1)
BUN SERPL-MCNC: 24 MG/DL (ref 6–20)
BUN/CREAT SERPL: 17 (ref 12–20)
CALCIUM SERPL-MCNC: 9.5 MG/DL (ref 8.5–10.1)
CHLORIDE SERPL-SCNC: 102 MMOL/L (ref 97–108)
CO2 SERPL-SCNC: 26 MMOL/L (ref 21–32)
COMMENT, HOLDF: NORMAL
CREAT SERPL-MCNC: 1.42 MG/DL (ref 0.7–1.3)
DIFFERENTIAL METHOD BLD: ABNORMAL
EOSINOPHIL # BLD: 0.3 K/UL (ref 0–0.4)
EOSINOPHIL NFR BLD: 3 % (ref 0–7)
ERYTHROCYTE [DISTWIDTH] IN BLOOD BY AUTOMATED COUNT: 14.4 % (ref 11.5–14.5)
GLOBULIN SER CALC-MCNC: 4.8 G/DL (ref 2–4)
GLUCOSE SERPL-MCNC: 140 MG/DL (ref 65–100)
HCT VFR BLD AUTO: 37 % (ref 36.6–50.3)
HEMOCCULT STL QL: NEGATIVE
HGB BLD-MCNC: 12.1 G/DL (ref 12.1–17)
IMM GRANULOCYTES # BLD AUTO: 0.1 K/UL (ref 0–0.04)
IMM GRANULOCYTES NFR BLD AUTO: 1 % (ref 0–0.5)
LYMPHOCYTES # BLD: 2.4 K/UL (ref 0.8–3.5)
LYMPHOCYTES NFR BLD: 23 % (ref 12–49)
MCH RBC QN AUTO: 29.5 PG (ref 26–34)
MCHC RBC AUTO-ENTMCNC: 32.7 G/DL (ref 30–36.5)
MCV RBC AUTO: 90.2 FL (ref 80–99)
MONOCYTES # BLD: 1 K/UL (ref 0–1)
MONOCYTES NFR BLD: 10 % (ref 5–13)
NEUTS SEG # BLD: 6.5 K/UL (ref 1.8–8)
NEUTS SEG NFR BLD: 63 % (ref 32–75)
NRBC # BLD: 0 K/UL (ref 0–0.01)
NRBC BLD-RTO: 0 PER 100 WBC
PLATELET # BLD AUTO: 247 K/UL (ref 150–400)
PMV BLD AUTO: 9.9 FL (ref 8.9–12.9)
POTASSIUM SERPL-SCNC: 3.7 MMOL/L (ref 3.5–5.1)
PROT SERPL-MCNC: 7.1 G/DL (ref 6.4–8.2)
RBC # BLD AUTO: 4.1 M/UL (ref 4.1–5.7)
SAMPLES BEING HELD,HOLD: NORMAL
SODIUM SERPL-SCNC: 136 MMOL/L (ref 136–145)
WBC # BLD AUTO: 10.1 K/UL (ref 4.1–11.1)

## 2019-04-13 PROCEDURE — 99284 EMERGENCY DEPT VISIT MOD MDM: CPT

## 2019-04-13 PROCEDURE — 85025 COMPLETE CBC W/AUTO DIFF WBC: CPT

## 2019-04-13 PROCEDURE — 80053 COMPREHEN METABOLIC PANEL: CPT

## 2019-04-13 PROCEDURE — 82272 OCCULT BLD FECES 1-3 TESTS: CPT

## 2019-04-13 NOTE — ED PROVIDER NOTES
80 y.o. male with past medical history significant for HTN, hyperlipidemia, gout, constipation, and prostate cancer who presents from [facility] via EMS with chief complaint of pain. EMS reports Pt's facility sent Pt to the ED today d/t concern about Pt's complaints of occasional generalized pain for 1-2 days. Per EMS, Pt's pain is worsened w/ passing BM's, but he hasn't been able to describe where his pain is located. Pt denies current pain. Per Pt's family member, Pt had a pacemaker placed 3 weeks ago and he has an indwelling epps catheter d/t hx of fluid retention. Per family, Pt's leg edema has improved since his last admission, but his hands have been swelling for 1-2 weeks. There are no other acute medical concerns at this time. PCP: Gómez Kc MD 
 
Note written by Aneudy Caro, as dictated by Jordan Ortega MD 6:42 PM 
 
 
  
 
Past Medical History:  
Diagnosis Date  Cancer [facility])   
 prostate  Constipation  Gout  Hyperlipemia  Hypertension Past Surgical History:  
Procedure Laterality Date  HX PROSTATECTOMY  HX UROLOGICAL    
 prostate removed  CA INS NEW/RPLCMT PRM PM W/TRANSV ELTRD ATRIAL&VENT Right 3/22/2019 INSERT PPM DUAL performed by Shay Boyd MD at [address] CATH LAB Family History:  
Problem Relation Age of Onset  No Known Problems Mother  No Known Problems Father Social History Socioeconomic History  Marital status:  Spouse name: Not on file  Number of children: Not on file  Years of education: Not on file  Highest education level: Not on file Occupational History  Not on file Social Needs  Financial resource strain: Not on file  Food insecurity:  
  Worry: Not on file Inability: Not on file  Transportation needs:  
  Medical: Not on file Non-medical: Not on file Tobacco Use  Smoking status: Never Smoker  Smokeless tobacco: Never Used Substance and Sexual Activity  Alcohol use: No  
 Drug use: No  
 Sexual activity: Never Lifestyle  Physical activity:  
  Days per week: Not on file Minutes per session: Not on file  Stress: Not on file Relationships  Social connections:  
  Talks on phone: Not on file Gets together: Not on file Attends Mandaeism service: Not on file Active member of club or organization: Not on file Attends meetings of clubs or organizations: Not on file Relationship status: Not on file  Intimate partner violence:  
  Fear of current or ex partner: Not on file Emotionally abused: Not on file Physically abused: Not on file Forced sexual activity: Not on file Other Topics Concern  Not on file Social History Narrative  Not on file ALLERGIES: Patient has no known allergies. Review of Systems Constitutional: Negative for appetite change, chills and fever. HENT: Negative for rhinorrhea, sore throat and trouble swallowing. Eyes: Negative for photophobia. Respiratory: Negative for cough and shortness of breath. Cardiovascular: Negative for chest pain and palpitations. Gastrointestinal: Negative for abdominal pain, nausea and vomiting. Genitourinary: Negative for dysuria, frequency and hematuria. Musculoskeletal: Positive for myalgias. Negative for arthralgias. Neurological: Negative for dizziness, syncope and weakness. Psychiatric/Behavioral: Negative for behavioral problems. The patient is not nervous/anxious. All other systems reviewed and are negative. Vitals:  
 04/13/19 1845 Temp: 97.9 °F (36.6 °C) Physical Exam  
Constitutional: He appears well-developed and well-nourished. HENT:  
Head: Normocephalic and atraumatic. Mouth/Throat: Oropharynx is clear and moist.  
Eyes: Pupils are equal, round, and reactive to light. EOM are normal.  
Neck: Normal range of motion. Neck supple. Cardiovascular: Normal rate, regular rhythm, normal heart sounds and intact distal pulses. Exam reveals no gallop and no friction rub. No murmur heard. Pulmonary/Chest: Effort normal. No respiratory distress. He has no wheezes. He has no rales. Pacemaker in place. Abdominal: Soft. There is no tenderness. There is no rebound. Genitourinary:  
Genitourinary Comments: Rectal Exam: Pt is incontinent of soft stool. No fecal impaction. Musculoskeletal: Normal range of motion. He exhibits edema. He exhibits no tenderness. 2/4 pitting edema over LE's. Neurological: He is alert. No cranial nerve deficit. Motor; symmetric Skin: No erythema. Psychiatric: He has a normal mood and affect. His behavior is normal.  
Nursing note and vitals reviewed. Note written by Aneudy Sanchez, as dictated by Joceline Reeves MD 6:42 PM 
 
MDM Procedures

## 2019-04-13 NOTE — ED TRIAGE NOTES
Arrived via EMS from home for complaint of generalised abdominal pain sometimes accompanied by bowel incontinence. Patient reports the pain is intermittent. Was recently seen at [facility] for HTN and had a pacemaker placed, and a epps placed. Unable to explain why the catheter was placed.

## 2019-04-13 NOTE — ED NOTES
Patient incontinent of bowel, brief changed. Patient educated on need for stat lock for epps, noted slight bleeding and tension on epps resulting in enlarged meatus. Stat lock placed on epps.

## 2019-04-14 NOTE — DISCHARGE INSTRUCTIONS
Patient Education      Hold Miralax and  stool softeners  Abdominal Pain: Care Instructions  Your Care Instructions    Abdominal pain has many possible causes. Some aren't serious and get better on their own in a few days. Others need more testing and treatment. If your pain continues or gets worse, you need to be rechecked and may need more tests to find out what is wrong. You may need surgery to correct the problem. Don't ignore new symptoms, such as fever, nausea and vomiting, urination problems, pain that gets worse, and dizziness. These may be signs of a more serious problem. Your doctor may have recommended a follow-up visit in the next 8 to 12 hours. If you are not getting better, you may need more tests or treatment. The doctor has checked you carefully, but problems can develop later. If you notice any problems or new symptoms, get medical treatment right away. Follow-up care is a key part of your treatment and safety. Be sure to make and go to all appointments, and call your doctor if you are having problems. It's also a good idea to know your test results and keep a list of the medicines you take. How can you care for yourself at home? · Rest until you feel better. · To prevent dehydration, drink plenty of fluids, enough so that your urine is light yellow or clear like water. Choose water and other caffeine-free clear liquids until you feel better. If you have kidney, heart, or liver disease and have to limit fluids, talk with your doctor before you increase the amount of fluids you drink. · If your stomach is upset, eat mild foods, such as rice, dry toast or crackers, bananas, and applesauce. Try eating several small meals instead of two or three large ones. · Wait until 48 hours after all symptoms have gone away before you have spicy foods, alcohol, and drinks that contain caffeine. · Do not eat foods that are high in fat.   · Avoid anti-inflammatory medicines such as aspirin, ibuprofen (Advil, Motrin), and naproxen (Aleve). These can cause stomach upset. Talk to your doctor if you take daily aspirin for another health problem. When should you call for help? Call 911 anytime you think you may need emergency care. For example, call if:    · You passed out (lost consciousness).     · You pass maroon or very bloody stools.     · You vomit blood or what looks like coffee grounds.     · You have new, severe belly pain.    Call your doctor now or seek immediate medical care if:    · Your pain gets worse, especially if it becomes focused in one area of your belly.     · You have a new or higher fever.     · Your stools are black and look like tar, or they have streaks of blood.     · You have unexpected vaginal bleeding.     · You have symptoms of a urinary tract infection. These may include:  ? Pain when you urinate. ? Urinating more often than usual.  ? Blood in your urine.     · You are dizzy or lightheaded, or you feel like you may faint.    Watch closely for changes in your health, and be sure to contact your doctor if:    · You are not getting better after 1 day (24 hours). Where can you learn more? Go to http://erin-nikole.info/. Enter Z105 in the search box to learn more about \"Abdominal Pain: Care Instructions. \"  Current as of: September 23, 2018  Content Version: 11.9  © 2064-3255 [employer]. Care instructions adapted under license by [employer] (which disclaims liability or warranty for this information). If you have questions about a medical condition or this instruction, always ask your healthcare professional. [place] [address] any warranty or liability for your use of this information.        Hold Miralax and any other stool softeners

## 2019-04-16 ENCOUNTER — PATIENT OUTREACH (OUTPATIENT)
Dept: CASE MANAGEMENT | Age: 84
End: 2019-04-16

## 2019-04-16 NOTE — PROGRESS NOTES
Community Care Team Documentation for Patient in [facility] Subsequent Follow up Patient remains at [facility] ([facility]). See previous [facility] Team notes. PCP : Kasandra De Oliveira MD 
Nurse Navigator in PCP office: Karla Garland Spoke with SNF team. PT/OT continue. Currently extensive assist with transfers. Min assist with sit to stand. Ambulating 200ft with SPC. Increased unsteadiness reported. . Therapy to continue through 4/22 with a potential discharge date of 4/23. Anticipate an extended LOS to continue to work on balance. Plan to discharge home alone. Daughter involved and hiring 24hr personal caregivers. SNF SW [facility]. PCP FU scheduled for 4/26 at 2:00 PM. Medications were not reconciled and general patient assessment was not completed during this skilled nursing facility outreach.

## 2019-04-22 ENCOUNTER — HOSPITAL ENCOUNTER (OUTPATIENT)
Dept: CT IMAGING | Age: 84
Discharge: HOME | End: 2019-04-22
Attending: NURSE PRACTITIONER
Payer: MEDICARE

## 2019-04-22 DIAGNOSIS — R26.89 DECREASED FUNCTIONAL MOBILITY: ICD-10-CM

## 2019-04-22 PROCEDURE — 70450 CT HEAD/BRAIN W/O DYE: CPT

## 2019-04-23 ENCOUNTER — PATIENT OUTREACH (OUTPATIENT)
Dept: CASE MANAGEMENT | Age: 84
End: 2019-04-23

## 2019-04-23 NOTE — PROGRESS NOTES
Community Care Team Documentation for Patient in [facility] Subsequent Follow up Patient remains at [facility] ([facility]). See previous [facility] Team notes. PCP : Ulices Trent MD 
Nurse Navigator in PCP office: Trentviolettacheryl Pena Spoke with SNF team.  PT discharging 4/29. OT continues. Currently ambulating 120 ft with single point cane. Mod assist with ADLs. KUB done due to mucous from rectum. Patient with constipation. Bowel regimin started. Plan to discharge home alone. Daughter involved and hiring 24hr personal caregivers. SN[facility] SW [facility]. PCP follow up rescheduled for 4/30 at 10:15 AM. Medications were not reconciled and general patient assessment was not completed during this skilled nursing facility outreach. Alfredo Cheung, MSN, RN, ACNS-BC, [facility] Nurse Navigator, [address] [place] [address] [phone]

## 2019-04-30 ENCOUNTER — PATIENT OUTREACH (OUTPATIENT)
Dept: CASE MANAGEMENT | Age: 84
End: 2019-04-30

## 2019-04-30 ENCOUNTER — PATIENT OUTREACH (OUTPATIENT)
Dept: FAMILY MEDICINE CLINIC | Age: 84
End: 2019-04-30

## 2019-04-30 NOTE — PROGRESS NOTES
Community Care Team Documentation for Patient in [facility] Subsequent Follow up Patient remains at [facility] ([facility]). See previous [facility] Team notes. PCP : Amie Habermann, MD 
Nurse Navigator in PCP office: Lorena Todd Spoke with SNF team.  PT/OT last therapy treat day today. Currently min assist with ADLs and cues for technique. Ambulating 240ft with SPC. Plan to discharge tomorrow 5/1 to [facility] with [facility]. Medications were not reconciled and general patient assessment was not completed during this skilled nursing facility outreach.

## 2019-04-30 NOTE — PROGRESS NOTES
ARIELLE Follow-up assessment: 
Outbound call made to patient today to complete ARIELLE follow up assessment. Patient verified by 3 identifiers. Call to [facility] to complete follow up. Verified patient as still being at facility wit plan to discharge on tomorrow. NN asked if discharge summary could be sent to PCP and follow-up scheduled. Follow-up appointments reviewed, and medication reconciliation completed. NN contact information given for questions and concerns. Case management Plan: NN will continue to attempt contacts with patient by telephone or during office visit within next 7-10 days. Will continue to follow as necessary for the remaining 30 days post visit and will reassess for needs before discharge Goals Addressed None Pt has nurse navigator's contact information for any further questions, concerns, or needs. Patients upcoming visits:   
Future Appointments Date Time Provider Jorje Ceron 5/6/2019  2:30 PM [facility]  
7/19/2019  3:40 PM Dayton Oretga MD C/ Puneet Fish  
7/23/2019  1:45 PM [address] [place] [address], [address] [facility]  
7/23/2019  2:00 PM Rommel Acevedo  [address]  
9/25/2019 10:40 AM Robert Chou  [address]

## 2019-04-30 NOTE — Clinical Note
PT/OT last therapy treat day today. Currently min assist with ADLs and cues for technique. Ambulating 240ft with SPC. Plan to discharge tomorrow 5/1 to [facility] with [facility].

## 2019-05-01 ENCOUNTER — TELEPHONE (OUTPATIENT)
Dept: FAMILY MEDICINE CLINIC | Age: 84
End: 2019-05-01

## 2019-05-01 NOTE — TELEPHONE ENCOUNTER
Call placed to [facility] at [facility].  Advised that provider will sign off of home health papers

## 2019-05-01 NOTE — TELEPHONE ENCOUNTER
----- Message from Nahed Patel sent at 5/1/2019 10:02 AM EDT -----  Regarding: Dr. Shania Oh from [facility] would like to know if the provider will sign form for home health services ? Pt was release from a rehab today and assigned home health services.   Callback: [phone]

## 2019-05-03 ENCOUNTER — PATIENT OUTREACH (OUTPATIENT)
Dept: FAMILY MEDICINE CLINIC | Age: 84
End: 2019-05-03

## 2019-05-06 ENCOUNTER — OFFICE VISIT (OUTPATIENT)
Dept: FAMILY MEDICINE CLINIC | Age: 84
End: 2019-05-06

## 2019-05-06 ENCOUNTER — HOSPITAL ENCOUNTER (OUTPATIENT)
Dept: LAB | Age: 84
Discharge: HOME | End: 2019-05-06
Payer: MEDICARE

## 2019-05-06 VITALS
RESPIRATION RATE: 17 BRPM | OXYGEN SATURATION: 98 % | DIASTOLIC BLOOD PRESSURE: 68 MMHG | HEART RATE: 60 BPM | BODY MASS INDEX: 26 KG/M2 | TEMPERATURE: 98.1 F | HEIGHT: 70 IN | SYSTOLIC BLOOD PRESSURE: 110 MMHG

## 2019-05-06 DIAGNOSIS — I44.1 SECOND DEGREE AV BLOCK, MOBITZ TYPE I: ICD-10-CM

## 2019-05-06 DIAGNOSIS — F03.90 DEMENTIA WITHOUT BEHAVIORAL DISTURBANCE, UNSPECIFIED DEMENTIA TYPE: ICD-10-CM

## 2019-05-06 DIAGNOSIS — R41.89 COGNITIVE IMPAIRMENT: ICD-10-CM

## 2019-05-06 DIAGNOSIS — R60.0 EXTREMITY EDEMA: ICD-10-CM

## 2019-05-06 DIAGNOSIS — N18.30 STAGE 3 CHRONIC KIDNEY DISEASE (HCC): ICD-10-CM

## 2019-05-06 DIAGNOSIS — I16.0 HYPERTENSIVE URGENCY: ICD-10-CM

## 2019-05-06 DIAGNOSIS — R00.1 BRADYCARDIA: ICD-10-CM

## 2019-05-06 DIAGNOSIS — Z09 HOSPITAL DISCHARGE FOLLOW-UP: Primary | ICD-10-CM

## 2019-05-06 DIAGNOSIS — I10 ESSENTIAL HYPERTENSION: ICD-10-CM

## 2019-05-06 DIAGNOSIS — Z95.0 PACEMAKER: ICD-10-CM

## 2019-05-06 DIAGNOSIS — R01.1 SYSTOLIC MURMUR: ICD-10-CM

## 2019-05-06 DIAGNOSIS — L84 CALLUS OF FOOT: ICD-10-CM

## 2019-05-06 PROCEDURE — 80048 BASIC METABOLIC PNL TOTAL CA: CPT

## 2019-05-06 PROCEDURE — 36415 COLL VENOUS BLD VENIPUNCTURE: CPT

## 2019-05-06 RX ORDER — CEFUROXIME AXETIL 250 MG/1
250 TABLET ORAL 2 TIMES DAILY
COMMUNITY
End: 2019-10-11

## 2019-05-06 RX ORDER — FUROSEMIDE 20 MG/1
TABLET ORAL
Qty: 1 TAB | Refills: 0 | Status: SHIPPED | OUTPATIENT
Start: 2019-05-06 | End: 2020-07-09

## 2019-05-06 RX ORDER — FUROSEMIDE 20 MG/1
TABLET ORAL
Qty: 1 TAB | Refills: 0 | Status: SHIPPED | OUTPATIENT
Start: 2019-05-06 | End: 2019-05-06 | Stop reason: SDUPTHER

## 2019-05-06 NOTE — PATIENT INSTRUCTIONS
DASH Diet: Care Instructions Your Care Instructions The DASH diet is an eating plan that can help lower your blood pressure. DASH stands for Dietary Approaches to Stop Hypertension. Hypertension is high blood pressure. The DASH diet focuses on eating foods that are high in calcium, potassium, and magnesium. These nutrients can lower blood pressure. The foods that are highest in these nutrients are fruits, vegetables, low-fat dairy products, nuts, seeds, and legumes. But taking calcium, potassium, and magnesium supplements instead of eating foods that are high in those nutrients does not have the same effect. The DASH diet also includes whole grains, fish, and poultry. The DASH diet is one of several lifestyle changes your doctor may recommend to lower your high blood pressure. Your doctor may also want you to decrease the amount of sodium in your diet. Lowering sodium while following the DASH diet can lower blood pressure even further than just the DASH diet alone. Follow-up care is a key part of your treatment and safety. Be sure to make and go to all appointments, and call your doctor if you are having problems. It's also a good idea to know your test results and keep a list of the medicines you take. How can you care for yourself at home? Following the DASH diet · Eat 4 to 5 servings of fruit each day. A serving is 1 medium-sized piece of fruit, ½ cup chopped or canned fruit, 1/4 cup dried fruit, or 4 ounces (½ cup) of fruit juice. Choose fruit more often than fruit juice. · Eat 4 to 5 servings of vegetables each day. A serving is 1 cup of lettuce or raw leafy vegetables, ½ cup of chopped or cooked vegetables, or 4 ounces (½ cup) of vegetable juice. Choose vegetables more often than vegetable juice. · Get 2 to 3 servings of low-fat and fat-free dairy each day. A serving is 8 ounces of milk, 1 cup of yogurt, or 1 ½ ounces of cheese. · Eat 6 to 8 servings of grains each day. A serving is 1 slice of bread, 1 ounce of dry cereal, or ½ cup of cooked rice, pasta, or cooked cereal. Try to choose whole-grain products as much as possible. · Limit lean meat, poultry, and fish to 2 servings each day. A serving is 3 ounces, about the size of a deck of cards. · Eat 4 to 5 servings of nuts, seeds, and legumes (cooked dried beans, lentils, and split peas) each week. A serving is 1/3 cup of nuts, 2 tablespoons of seeds, or ½ cup of cooked beans or peas. · Limit fats and oils to 2 to 3 servings each day. A serving is 1 teaspoon of vegetable oil or 2 tablespoons of salad dressing. · Limit sweets and added sugars to 5 servings or less a week. A serving is 1 tablespoon jelly or jam, ½ cup sorbet, or 1 cup of lemonade. · Eat less than 2,300 milligrams (mg) of sodium a day. If you limit your sodium to 1,500 mg a day, you can lower your blood pressure even more. Tips for success · Start small. Do not try to make dramatic changes to your diet all at once. You might feel that you are missing out on your favorite foods and then be more likely to not follow the plan. Make small changes, and stick with them. Once those changes become habit, add a few more changes. · Try some of the following: ? Make it a goal to eat a fruit or vegetable at every meal and at snacks. This will make it easy to get the recommended amount of fruits and vegetables each day. ? Try yogurt topped with fruit and nuts for a snack or healthy dessert. ? Add lettuce, tomato, cucumber, and onion to sandwiches. ? Combine a ready-made pizza crust with low-fat mozzarella cheese and lots of vegetable toppings. Try using tomatoes, squash, spinach, broccoli, carrots, cauliflower, and onions. ? Have a variety of cut-up vegetables with a low-fat dip as an appetizer instead of chips and dip. ? Sprinkle sunflower seeds or chopped almonds over salads.  Or try adding chopped walnuts or almonds to cooked vegetables. ? Try some vegetarian meals using beans and peas. Add garbanzo or kidney beans to salads. Make burritos and tacos with mashed murillo beans or black beans. Where can you learn more? Go to http://erin-nikole.info/. Enter E268 in the search box to learn more about \"DASH Diet: Care Instructions. \" Current as of: July 22, 2018 Content Version: 11.9 © 3388-0970 [employer]. Care instructions adapted under license by [employer] (which disclaims liability or warranty for this information). If you have questions about a medical condition or this instruction, always ask your healthcare professional. [address] any warranty or liability for your use of this information.

## 2019-05-06 NOTE — PROGRESS NOTES
Chief Complaint Patient presents with  
[facility] Follow Up [facility] march [facility]th for high blood pressure and low heart rate. 1. Have you been to the ER, urgent care clinic since your last visit? Hospitalized since your last visit? [facility] march 18th for high blood pressure and low heart rate. Then transported to [facility] for 5 weeks 
currenlty staying at [facility]. (admission date may 1st) 2. Have you seen or consulted any other health care providers outside of the [address] [place] [address] since your last visit? Include any pap smears or colon screening.  No

## 2019-05-06 NOTE — PROGRESS NOTES
[facility] Note Subjective: Chief Complaint Patient presents with  
[facility] Up [facility] march 18th for high blood pressure and low heart rate. Devyn Sauceda is a 80y.o. year old male who presents for evaluation of the following: 
 
 
Hospital follow-up: 
[facility] 3/19-3/26/19 Reason for admission: Sinus Bradycardia, Hypertensive Urgency Imaging, labs, provider notes, discharge summary reviewed. Pertinent findings: 
Sinus Bradycardia with second-degree AV block s/p Dual chamber Pacemaker placement - Resolved with PM 
- echo with LVEF 55-60%,mild to mod AS,MR &TR 
-TSH normal  
- card /EP following;cleared patient for discharge 
  
Hypertensive urgency  
- nitroglycerin 2% ointment  
-currently on norvasc, po hydralazine and Lasix per card with IV PRN hydralazine  
-BP improved 
  
# Cardiomyopathy 
-b/l lext edema,elevated proBNP,cxr with pleural effusion 
-mildly elevated trop,in setting of SOULEYMANE 
-strict ins 7 outs 
-no ace/arb or diuretics d/t SOULEYMANE 
  
# SOULEYMANE on CKD 3,baseline Cr 2.3 
-held PTA losartan,volataren  
-Cr. improved before, to 1.9   ,uptrend to 2 
-monitor kidney fucntions 
-avoid renotoxic meds 
  
# Hyperkalemia 
-resolved  
  
# Thrombocytopenia  
-Repeat platelet count. improved 
-Hold on antiplatelets and anticoagulants 
  
Murmur ,presumed AS,per card 
-echo with mild to mod AS,MR,TR  EF55-60% 
  
# Hyponatremia. resolved  
  
# Hyperlipidemia. LDL 39 -  Continue statin therapy 
  
# Dementia,early stages by pcp undergoing evaluation per daughter 
- supportive care  
- PT/OT 
- Patient will be discharged to SNF for rehab, he lives alone and per family his functional status has declined Interval Update: 
Sinus Bradycardia with second-degree AV block s/p Dual chamber Pacemaker placement - Resolved with [facility] maker - Still taking beta blocker on discharge due to HTN as below - card /EP following, has had normal pacemaker check since discharge Denies chest pain 
  
Hypertensive Urgency Per daughter at [facility] SBP 150s Compliant with home metoprolol 25 + amlodipine 5 + hydralazine 25 
  
Cardiomyopathy Mild to mod AS, MR, TR and severe pulmonary HTN, EF55-60% by TTE Follow up with cards in 7/2019 Had some arm and leg swelling since discharge and daughter reports was seen by cardiology (no provider note, exam or vitals seen on chart review) with negative arm venous doppler Tx: lasix + BB  
  
SOULEYMANE on CKD 3, baseline Cr 2.3 Cr Trend 1.68 (discharge) > 1.42 (4/2019) 
  
Hyperkalemia 
-resolved on discharge 
  Thrombocytopenia  
Improved Platelet Trend 666 > 43 > 143 (discharge) > 247 (4/2019) 
  
Hyponatremia 
-resolved on discharge  
  
Hyperlipidemia. LDL 39 
- Compliant with statin therapy 
  
Dementia Neurology following Living at [facility] now Stable per daughter. Lives at [facility]. Daughter is planning to get a family member to live with ADL Assessment 1/7/2019 Feeding yourself No Help Needed Getting from bed to chair No Help Needed Getting dressed No Help Needed Bathing or showering No Help Needed Walk across the room (includes cane/walker) No Help Needed Using the telphone No Help Needed Taking your medications No Help Needed Preparing meals No Help Needed Managing money (expenses/bills) No Help Needed Moderately strenuous housework (laundry) No Help Needed Shopping for personal items (toiletries/medicines) No Help Needed Shopping for groceries No Help Needed Driving No Help Needed Climbing a flight of stairs No Help Needed Getting to places beyond walking distances No Help Needed Constipation: 
Has constipation that ED provider assessed was 2/2 medication - stool softener and miralax Was getting stool regimen at [facility] and daughter does not know the name of the medication. Urinary Retention:  
Daughter reports he had bladder scan Seen by urologist, Dr. Andrew Paget, with a mucle relaxant, name unknown Chronic OA Multiple Joints/ Gout. [facility] and PT Ambulates with a walker with stand by assist currently. Daughter requests podiatry referral for general footcare. Review of Systems Pertinent positives and negative per HPI. All other systems  reviewed are negative for a Comprehensive ROS (10+). Past Medical History:  
Diagnosis Date  Cancer [facility])   
 prostate  Constipation  Gout  Hyperlipemia  Hypertension  Pacemaker 2019 Social History Socioeconomic History  Marital status:  Spouse name: Not on file  Number of children: Not on file  Years of education: Not on file  Highest education level: Not on file Occupational History  Not on file Social Needs  Financial resource strain: Not on file  Food insecurity:  
  Worry: Not on file Inability: Not on file  Transportation needs:  
  Medical: Not on file Non-medical: Not on file Tobacco Use  Smoking status: Never Smoker  Smokeless tobacco: Never Used Substance and Sexual Activity  Alcohol use: No  
 Drug use: No  
 Sexual activity: Never Lifestyle  Physical activity:  
  Days per week: Not on file Minutes per session: Not on file  Stress: Not on file Relationships  Social connections:  
  Talks on phone: Not on file Gets together: Not on file Attends Faith service: Not on file Active member of club or organization: Not on file Attends meetings of clubs or organizations: Not on file Relationship status: Not on file  Intimate partner violence:  
  Fear of current or ex partner: Not on file Emotionally abused: Not on file Physically abused: Not on file Forced sexual activity: Not on file Other Topics Concern  Not on file Social History Narrative  Not on file Family History Problem Relation Age of Onset  No Known Problems Mother  No Known Problems Father Current Outpatient Medications Medication Sig  cefUROXime (CEFTIN) 250 mg tablet Take 250 mg by mouth two (2) times a day.  amLODIPine (NORVASC) 5 mg tablet Take 1 Tab by mouth daily.  hydrALAZINE (APRESOLINE) 25 mg tablet Take 1 Tab by mouth three (3) times daily.  furosemide (LASIX) 20 mg tablet Take 1 Tab by mouth daily.  aspirin delayed-release 81 mg tablet Take 81 mg by mouth daily.  allopurinol (ZYLOPRIM) 100 mg tablet Take 1 Tab by mouth daily.  metoprolol succinate (TOPROL-XL) 25 mg XL tablet Take 1 Tab by mouth daily.  acetaminophen (TYLENOL EXTRA STRENGTH) 500 mg tablet Take  by mouth every six (6) hours as needed for Pain.  polyethylene glycol (MIRALAX) 17 gram packet Take 1 Packet by mouth daily.  simvastatin (ZOCOR) 20 mg tablet Take 20 mg by mouth daily. No current facility-administered medications for this visit. Objective:  
 
Vitals:  
 05/06/19 1456 BP: 110/68 Pulse: 60 Resp: 17 Temp: 98.1 °F (36.7 °C) TempSrc: Oral  
SpO2: 98% Height: 5' 10\" (1.778 m) Physical Examination: 
General: Alert, cooperative, no distress, appears stated age. Eyes: Conjunctivae clear. PERRL, EOMs intact. Ears: Normal external ear canals both ears. Nose: Nares normal.  
Mouth/Throat: Lips, mucosa, and tongue normal.  
Neck: Supple, symmetrical, trachea midline, no adenopathy. No thyroid enlargement/tenderness/nodules Respiratory: Breathing comfortably, in no acute respiratory distress. Clear to auscultation bilaterally. Normal inspiratory and expiratory ratio. Cardiovascular: Regular rate and rhythm, systolic murmur, click, rub or gallop. Pulses 2+ and symmetric radial and DP 
1+ pitting edema of bilateral foot and leg to knees. Trace pitting edema of blater dorsal hands. Abdomen: Soft, non-tender, non distended. Bowel sounds normal. No masses or organomegaly. MSK: Extremities normal, atraumatic, no effusion. Gait steady and unassisted. Limited bilateral shoulder ROM Skin: Dry skin of feel, Long thickened toenails. Callus of bilateral plantar surfaces Lymph nodes: Cervical, supraclavicular nodes normal. 
Neurologic: CNII-XII intact. Sensation and reflexes intact Strength - Biceps 4./5 Triceps 5/5 Knee flexion 5/5 Knee extension 5/5 Orientated to person only. Date \"I don't know, Location \"hospital\", Year \"34 something\" In wheelchair Psychiatric: Affect blunted. Mood euthymic. Thoughts logical. Paucity of speech. Smiles appropriately. Admission on 04/13/2019, Discharged on 04/14/2019 Component Date Value Ref Range Status  SAMPLES BEING HELD 04/13/2019 1PST, 1RED, 1LAV, VLADIMIR   Final  
 COMMENT 04/13/2019 Add-on orders for these samples will be processed based on acceptable specimen integrity and analyte stability, which may vary by analyte. Final  
 Occult blood, stool 04/13/2019 NEGATIVE   NEG   Final  
 WBC 04/13/2019 10.1  4.1 - 11.1 K/uL Final  
 RBC 04/13/2019 4.10  4. 10 - 5.70 M/uL Final  
 HGB 04/13/2019 12.1  12.1 - 17.0 g/dL Final  
 HCT 04/13/2019 37.0  36.6 - 50.3 % Final  
 MCV 04/13/2019 90.2  80.0 - 99.0 FL Final  
 MCH 04/13/2019 29.5  26.0 - 34.0 PG Final  
 MCHC 04/13/2019 32.7  30.0 - 36.5 g/dL Final  
 RDW 04/13/2019 14.4  11.5 - 14.5 % Final  
 PLATELET 09/88/1855 342  150 - 400 K/uL Final  
 MPV 04/13/2019 9.9  8.9 - 12.9 FL Final  
 NRBC 04/13/2019 0.0  0  WBC Final  
 ABSOLUTE NRBC 04/13/2019 0.00  0.00 - 0.01 K/uL Final  
 NEUTROPHILS 04/13/2019 63  32 - 75 % Final  
 LYMPHOCYTES 04/13/2019 23  12 - 49 % Final  
 MONOCYTES 04/13/2019 10  5 - 13 % Final  
 EOSINOPHILS 04/13/2019 3  0 - 7 % Final  
 BASOPHILS 04/13/2019 0  0 - 1 % Final  
 IMMATURE GRANULOCYTES 04/13/2019 1* 0.0 - 0.5 % Final  
 ABS.  NEUTROPHILS 04/13/2019 6.5  1.8 - 8.0 K/UL Final  
  ABS. LYMPHOCYTES 04/13/2019 2.4  0.8 - 3.5 K/UL Final  
 ABS. MONOCYTES 04/13/2019 1.0  0.0 - 1.0 K/UL Final  
 ABS. EOSINOPHILS 04/13/2019 0.3  0.0 - 0.4 K/UL Final  
 ABS. BASOPHILS 04/13/2019 0.0  0.0 - 0.1 K/UL Final  
 ABS. IMM. GRANS. 04/13/2019 0.1* 0.00 - 0.04 K/UL Final  
 DF 04/13/2019 AUTOMATED    Final  
 Sodium 04/13/2019 136  136 - 145 mmol/L Final  
 Potassium 04/13/2019 3.7  3.5 - 5.1 mmol/L Final  
 Chloride 04/13/2019 102  97 - 108 mmol/L Final  
 CO2 04/13/2019 26  21 - 32 mmol/L Final  
 Anion gap 04/13/2019 8  5 - 15 mmol/L Final  
 Glucose 04/13/2019 140* 65 - 100 mg/dL Final  
 BUN 04/13/2019 24* 6 - 20 MG/DL Final  
 Creatinine 04/13/2019 1.42* 0.70 - 1.30 MG/DL Final  
 BUN/Creatinine ratio 04/13/2019 17  12 - 20   Final  
 GFR est AA 04/13/2019 57* >60 ml/min/1.73m2 Final  
 GFR est non-AA 04/13/2019 47* >60 ml/min/1.73m2 Final  
 Comment: Estimated GFR is calculated using the IDMS-traceable Modification of Diet in Renal Disease (MDRD) Study equation, reported for both  Americans (GFRAA) and non- Americans (GFRNA), and normalized to 1.73m2 body surface area. The physician must decide which value applies to the patient. The MDRD study equation should only be used in individuals age 25 or older. It has not been validated for the following: pregnant women, patients with serious comorbid conditions, or on certain medications, or persons with extremes of body size, muscle mass, or nutritional status.  Calcium 04/13/2019 9.5  8.5 - 10.1 MG/DL Final  
 Bilirubin, total 04/13/2019 0.3  0.2 - 1.0 MG/DL Final  
 ALT (SGPT) 04/13/2019 56  12 - 78 U/L Final  
 AST (SGOT) 04/13/2019 52* 15 - 37 U/L Final  
 Alk.  phosphatase 04/13/2019 108  45 - 117 U/L Final  
 Protein, total 04/13/2019 7.1  6.4 - 8.2 g/dL Final  
 Albumin 04/13/2019 2.3* 3.5 - 5.0 g/dL Final  
 Globulin 04/13/2019 4.8* 2.0 - 4.0 g/dL Final  
 A-G Ratio 04/13/2019 0.5* 1.1 - 2.2   Final  
 Admission on 03/19/2019, Discharged on 03/26/2019 No results displayed because visit has over 200 results. Office Visit on 03/11/2019 Component Date Value Ref Range Status  WBC 03/11/2019 5.5  3.4 - 10.8 x10E3/uL Final  
 RBC 03/11/2019 4.39  4.14 - 5.80 x10E6/uL Final  
 HGB 03/11/2019 13.1  13.0 - 17.7 g/dL Final  
 HCT 03/11/2019 38.6  37.5 - 51.0 % Final  
 MCV 03/11/2019 88  79 - 97 fL Final  
 MCH 03/11/2019 29.8  26.6 - 33.0 pg Final  
 MCHC 03/11/2019 33.9  31.5 - 35.7 g/dL Final  
 RDW 03/11/2019 15.5* 12.3 - 15.4 % Final  
 PLATELET 40/04/5491 491* 150 - 379 x10E3/uL Final  
 NEUTROPHILS 03/11/2019 53  Not Estab. % Final  
 Lymphocytes 03/11/2019 34  Not Estab. % Final  
 MONOCYTES 03/11/2019 11  Not Estab. % Final  
 EOSINOPHILS 03/11/2019 2  Not Estab. % Final  
 BASOPHILS 03/11/2019 0  Not Estab. % Final  
 ABS. NEUTROPHILS 03/11/2019 2.9  1.4 - 7.0 x10E3/uL Final  
 Abs Lymphocytes 03/11/2019 1.8  0.7 - 3.1 x10E3/uL Final  
 ABS. MONOCYTES 03/11/2019 0.6  0.1 - 0.9 x10E3/uL Final  
 ABS. EOSINOPHILS 03/11/2019 0.1  0.0 - 0.4 x10E3/uL Final  
 ABS. BASOPHILS 03/11/2019 0.0  0.0 - 0.2 x10E3/uL Final  
 IMMATURE GRANULOCYTES 03/11/2019 0  Not Estab. % Final  
 ABS. IMM.  GRANS. 03/11/2019 0.0  0.0 - 0.1 x10E3/uL Final  
 Glucose 03/11/2019 91  65 - 99 mg/dL Final  
 BUN 03/11/2019 22  8 - 27 mg/dL Final  
 Creatinine 03/11/2019 2.30* 0.76 - 1.27 mg/dL Final  
 GFR est non-AA 03/11/2019 25* >59 mL/min/1.73 Final  
 GFR est AA 03/11/2019 29* >59 mL/min/1.73 Final  
 BUN/Creatinine ratio 03/11/2019 10  10 - 24 Final  
 Sodium 03/11/2019 135  134 - 144 mmol/L Final  
 Potassium 03/11/2019 3.6  3.5 - 5.2 mmol/L Final  
 Chloride 03/11/2019 95* 96 - 106 mmol/L Final  
 CO2 03/11/2019 25  20 - 29 mmol/L Final  
 Calcium 03/11/2019 9.5  8.6 - 10.2 mg/dL Final  
 Protein, total 03/11/2019 6.7  6.0 - 8.5 g/dL Final  
 Albumin 03/11/2019 3.8  3.5 - 4.7 g/dL Final  
  GLOBULIN, TOTAL 03/11/2019 2.9  1.5 - 4.5 g/dL Final  
 A-G Ratio 03/11/2019 1.3  1.2 - 2.2 Final  
 Bilirubin, total 03/11/2019 0.6  0.0 - 1.2 mg/dL Final  
 Alk. phosphatase 03/11/2019 118* 39 - 117 IU/L Final  
 AST (SGOT) 03/11/2019 30  0 - 40 IU/L Final  
 ALT (SGPT) 03/11/2019 31  0 - 44 IU/L Final  
 Hemoglobin A1c 03/11/2019 6.3* 4.8 - 5.6 % Final  
 Comment:          Prediabetes: 5.7 - 6.4 Diabetes: >6.4 Glycemic control for adults with diabetes: <7.0  Estimated average glucose 03/11/2019 134  mg/dL Final  
 Cholesterol, total 03/11/2019 122  100 - 199 mg/dL Final  
 Triglyceride 03/11/2019 54  0 - 149 mg/dL Final  
 HDL Cholesterol 03/11/2019 51  >39 mg/dL Final  
 VLDL, calculated 03/11/2019 11  5 - 40 mg/dL Final  
 LDL, calculated 03/11/2019 60  0 - 99 mg/dL Final  
 Uric acid 03/11/2019 6.0  3.7 - 8.6 mg/dL Final  
            Therapeutic target for gout patients: <6.0  
 INTERPRETATION 03/11/2019 Note   Final  
 Supplemental report is available.  PDF IMAGE 03/39/2642 Not applicable   Final  
 Interpretation 03/11/2019 Note   Final  
 Comment: ------------------------------- 
CHRONIC KIDNEY DISEASE: 
EGFR, BLOOD PRESSURE, AND PROTEINURIA ASSESSMENT Estimated GFR is falling significantly with time; annualized 
rate of decline (all values) is -6.3 mL/min/year, p = 0.02. Rapid progression of CKD (greater than 5 
mL/min/1.73m 
and mortality. Evaluate for reversible causes and consider 
nephrology referral. Current eGFR is 25 mL/min/1.73mE2 
corresponding to CKD stage 4. Multiply eGFR by 1.159 if 
patient is . Estimated interval to CKD 5 
(using most recent eGFR values) is 27.2 months (67% CI = 
14.7 - 49.2 months). Consider referral to a nephrologist and 
appropriate patient education concerning renal replacement 
therapies. Suggest hepatitis B vaccination and confirmation 
of immunity with serologic testing.  Potassium is within goal 
 and has risen, was 3.4 and now is 3.6 mmol/L. Hemoglobin A1C 
is 6.3 %; diabetic status is unknown. Refer to ADA 
guidelines for clinical practice recommendations. EGFR, BLOOD [place]  
                        ESSURE, AND PROTEINURIA TREATMENT SUGGESTIONS 
- The current eGFR has fallen by more than 30 percent since 
last measurement and should be followed closely. Renal 
artery stenosis, obstruction, volume depletion, NSAIDs, ACEI 
or ARB, or other medications and contrast agents are common 
causes of a fall in eGFR. The current eGFR is significantly 
below the expected value and has decreased, was 38 and now 
is 25 mL/min/1.73mE2. Consider decreasing ACEI or ARB, if in 
use. Given patient's advanced age, we are unable to provide 
specific blood pressure treatment suggestions. Individualize 
blood pressure goals based on the patient's comorbidities 
and to avoid orthostatic hypotension and other medication 
side effects. Assessment of albuminuria (urine 
albumin:creatinine ratio or urine protein:creatinine ratio 
preferred) is recommended at least annually in CKD patients 
for staging and disease prognosis. EGFR, BLOOD PRESSURE, AND PROTEINURIA FOLLOW-UP 
- 
fasting Renal Panel within 1 we  
                        ek; Spot Urine Panel is 
recommended by KDOQI guidelines, at least yearly; 
- 
BONE and MINERAL ASSESSMENT Calcium is within goal and has decreased, was 10.0 and now 
is 9.5 mg/dL. Carbon Dioxide is within goal and has 
decreased, was 29 and now is 25 mmol/L. Guidelines recommend 
the measurement of 25-hydroxy vitamin D in patients with 
CKD. BONE and MINERAL TREATMENT SUGGESTIONS 
- Interpretations require simultaneous measurements of serum 
calcium and phosphorus.  
BONE and MINERAL FOLLOW-UP 
- 
fasting PTH with Renal Panel and 25-Hydroxy Vitamin D are 
recommended by KDOQI guidelines, at least yearly; 
- 
LIPIDS ASSESSMENT 
LDL-C is optimal and has decreased, was 100 and now is 60 
 mg/dL. Triglyceride is normal and has not changed 
significantly, was 59 and now is 54 mg/dL. Non-HDL Cholesterol is optimal and has decreased, was 112 and now is 
71 mg/dL. HDL-C is normal and has not changed significantly, 
was 47 and now is 51 mg/dL. LIPIDS TREATMENT SUGGESTIONS 
- Therapeutic li  
                        festyle changes are always valuable to 
maintain optimal blood lipid status (diet, exercise, weight 
management). Continue statin if in use. Consider measurement 
of LDL particle number or Apo B to adjudicate need for 
further LDL lowering therapy. If statin cannot be tolerated 
or increased, alternatives include use of an intestinal 
agent (ezetimibe or bile acid sequestrant) or niacin. LIPIDS FOLLOW-UP 
- 
fasting Lipid Panel within 12 months; 
- 
ANEMIA ASSESSMENT Hemoglobin is normal and has decreased, was 14.3 and now is 
13.1 g/dL. Hemoglobin target assumes ELI is not in use. ANEMIA TREATMENT SUGGESTIONS 
- No specific change of treatment is indicated at this time. ANEMIA FOLLOW-UP 
- 
CBC within 6 months; 
------------------------------- DISCLAIMER These assessments and treatment suggestions are provided as 
a convenience in support of the physician-patient 
relationship and are not intended to replace the physician's 
clinical judgment. They are derived from Sejal Landon 
in addition to other evidence and expert opinion. The 
clinician should consider this information within the 
context of clinical opinion and the individual patient. SEE GUIDANCE FOR CHRONIC KIDNEY DISEASE PROGRAM: Kidney Disease Improving Global Outcomes (KDIGO) clinical practice 
guidelines are at http://kdigo. org/home/guidelines/. 
[facility] Kidney Disease Outcomes Quality Initiative (KDOQI (TM)), with its limitations and 
disclaimers, are at www.kidney. org/professionals/KDOQI. This 
program is intended for patients who have been diagnosed with stages 3, 4, or pre-dialysis 5 CKD. It is not intended 
for children, pregnant patients, or transplant patients.  SPECIMEN STATUS REPORT 03/11/2019 COMMENT   Final  
 Comment: Verbal Order See below: 
Comment: 
Please provide requested information and fax to [phone] or 
[phone]. The [place] Code of [employer] requires a written and 
signed request be forwarded to a laboratory following a verbal order 
of a laboratory test.  Please assist us to meet this requirement and 
to complete our records. Date:______________________________ ICD-9/10 Diagnosis Code(s):___________________________________________ Physician or Authorized Designee:_____________________________________ Please Print Physician or Authorized Designee Signature: 
______________________________________________________________________ Your Signature Confirms Your Order Of The Test(s) Listed Additional Test(s) Requested Comment: 
Test(s) added per DR Patricia Hargrove at account 03- Logged by Shabbir Altman Test# [id number] Prostate-Specific Ag, Serum Diagnosis Codes Provided Z85.46     I10        M10.9  Prostate Specific Ag 03/11/2019 <0.1  0.0 - 4.0 ng/mL Final  
 Comment: Roche ECLIA methodology. According to the American Urological Association, Serum PSA should 
decrease and remain at undetectable levels after radical 
prostatectomy. The AUA defines biochemical recurrence as an initial 
PSA value 0.2 ng/mL or greater followed by a subsequent confirmatory PSA value 0.2 ng/mL or greater. Values obtained with different assay methods or kits cannot be used 
interchangeably. Results cannot be interpreted as absolute evidence 
of the presence or absence of malignant disease.  SPECIMEN STATUS REPORT 03/11/2019 COMMENT   Final  
 Comment: Written Authorization Written Authorization Written Authorization Received. Authorization received from [facility] 03- Logged by Newton Carrasco Assessment/ Plan:  
Diagnoses and all orders for this visit: 1. Hospital discharge follow-up 2. Second degree AV block, Mobitz type I 
 
3. Hypertensive urgency 4. Bradycardia 5. Pacemaker 6. Systolic murmur 7. Essential hypertension 
-     BASIC METABOLIC PANEL (7) 
-     OTHER; Check blood pressure level daily for 2 weeks and send resullt by phone or mail to Dr. Jay Rhodes 8. Stage 3 chronic kidney disease ([facility] [address] 9. Callus of foot 
-     REFERRAL TO PODIATRY 10. Extremity edema 
-     furosemide (LASIX) 20 mg tablet; Given one 20mg tab today as single additional dose to furosemide 20mg daily. -     Comp Stocking,Knee,Regular,Med misc; Wear daily for leg swelling. 11. Cognitive impairment 12. Dementia without behavioral disturbance, unspecified dementia type Improved overall since discharge. BP and HTN technically WNL. Goal BP <130/80 and HR . Continue current regimen but needs close monitoring at home. Called daughter to recommend decrease metoprolol after further chart review with significant change in BP since ER visit in 4/2019. Daughter asked to keep at current dose due to elevated values at [facility] recently (not on file). Plan for [facility] to check daily and send log to me in 2 weeks. Will determine if follow up visit needed sooner based on this. Monitor CKD with lab. Extremity edema, likely chronic related to vascular insufficieny vs cardiomyopathy vs CKD. Trial 1 additional dose of Lasix today and add compression stocking. Educated daughter this may be chronic or slow to resolve given inactivity. Dementia is more clearly established this visit. Need asstance with most ADLs and all iADLs. I recommend continued living in [facility] vs other 24 hour care arrangment. Callus and toenail abnormality. Referred to podiatry as requested by daughter. Educated patient on red flag symptoms to warrant return to clinic or emergency room visit. I have discussed the diagnosis with the patient and the intended plan as seen in the above orders. The patient has been offered or received an after-visit summary and questions were answered concerning future plans. I have discussed medication side effects and warnings with the patient as well. Total face to face encounter time 60 minutes; >50 of time spent counseling and coordinating care regarding hospital follow up, AV block, HTN, bradycardia, dementia, CKD. Follow-up and Dispositions · Return in about 3 months (around 8/6/2019) for Follow Up chronic diseases. Signed, Maggie Bonilla MD 
5/6/2019

## 2019-05-07 ENCOUNTER — PATIENT OUTREACH (OUTPATIENT)
Dept: CASE MANAGEMENT | Age: 84
End: 2019-05-07

## 2019-05-07 LAB
BUN SERPL-MCNC: 20 MG/DL (ref 8–27)
BUN/CREAT SERPL: 16 (ref 10–24)
CHLORIDE SERPL-SCNC: 94 MMOL/L (ref 96–106)
CO2 SERPL-SCNC: 27 MMOL/L (ref 20–29)
CREAT SERPL-MCNC: 1.28 MG/DL (ref 0.76–1.27)
GLUCOSE SERPL-MCNC: 114 MG/DL (ref 65–99)
INTERPRETATION: NORMAL
POTASSIUM SERPL-SCNC: 4 MMOL/L (ref 3.5–5.2)
SODIUM SERPL-SCNC: 134 MMOL/L (ref 134–144)

## 2019-05-07 NOTE — PROGRESS NOTES
Community Care Team Documentation for Patient in [facility] Patient was discharged from [facility] ([facility]). See previous [facility] Team notes. PCP : Caty Ruvalcaba MD 
Nurse Navigator in PCP office: Joseph Russell Spoke with SNF team.  Confirmed patient was discharged 5/1 to [employer] [facility] with [facility]. PCP follow up scheduled for 5/6 at 2:30 PM. CCT will sign off at this time. Medications were not reconciled and general patient assessment was not completed during this skilled nursing facility outreach. June Cheung, MSN, RN, ACNS-BC, [facility] Nurse Navigator, [address] [place] [address] [phone]

## 2019-05-13 NOTE — PROGRESS NOTES
Call placed to patients daughter who is on PHI. Patients name and  verified. Reviewed recent lbs with daughter she verbalized understanding.

## 2019-05-15 ENCOUNTER — TELEPHONE (OUTPATIENT)
Dept: CARDIOLOGY CLINIC | Age: 84
End: 2019-05-15

## 2019-05-15 NOTE — TELEPHONE ENCOUNTER
Patient's daughter would like a call regarding the patient's pacemaker. She would like to know if everything is going ok with it. Please advise.     Phone #: [phone]  Thanks

## 2019-05-15 NOTE — TELEPHONE ENCOUNTER
Returned call; confirmed [facility] was connected but I was not able to see any data on her dad since his last remote check done on 3-26-19. She thought I was receiving data every night. I told her once dad comes into clinic in July, I will program \"alerts\" on for his device. She will be sending a remote transmission once she is with her father to make her feel more comfortable with the process. His initial visit was a remote, not seen by the device clinic. I told Mario Alberto Case we would talk more in July.

## 2019-05-28 ENCOUNTER — TELEPHONE (OUTPATIENT)
Dept: FAMILY MEDICINE CLINIC | Age: 84
End: 2019-05-28

## 2019-05-28 NOTE — TELEPHONE ENCOUNTER
Call placed to patients daughter Juliann Wells at [phone]. Patients name and  verified.  Advised forms were completed and placed for

## 2019-07-23 ENCOUNTER — OFFICE VISIT (OUTPATIENT)
Dept: CARDIOLOGY CLINIC | Age: 84
End: 2019-07-23

## 2019-07-23 ENCOUNTER — HOSPITAL ENCOUNTER (OUTPATIENT)
Dept: GENERAL RADIOLOGY | Age: 84
Discharge: HOME | End: 2019-07-23
Attending: INTERNAL MEDICINE
Payer: MEDICARE

## 2019-07-23 ENCOUNTER — CLINICAL SUPPORT (OUTPATIENT)
Dept: CARDIOLOGY CLINIC | Age: 84
End: 2019-07-23

## 2019-07-23 VITALS
DIASTOLIC BLOOD PRESSURE: 72 MMHG | BODY MASS INDEX: 25.77 KG/M2 | HEIGHT: 70 IN | HEART RATE: 61 BPM | SYSTOLIC BLOOD PRESSURE: 160 MMHG | WEIGHT: 180 LBS

## 2019-07-23 DIAGNOSIS — I10 ESSENTIAL HYPERTENSION: ICD-10-CM

## 2019-07-23 DIAGNOSIS — Z01.812 PRE-PROCEDURE LAB EXAM: ICD-10-CM

## 2019-07-23 DIAGNOSIS — Z95.0 CARDIAC PACEMAKER IN SITU: ICD-10-CM

## 2019-07-23 DIAGNOSIS — I44.1 SECOND DEGREE AV BLOCK: ICD-10-CM

## 2019-07-23 DIAGNOSIS — R00.1 BRADYCARDIA: ICD-10-CM

## 2019-07-23 DIAGNOSIS — Z95.0 CARDIAC PACEMAKER IN SITU: Primary | ICD-10-CM

## 2019-07-23 PROCEDURE — 71046 X-RAY EXAM CHEST 2 VIEWS: CPT

## 2019-07-23 RX ORDER — CHLORHEXIDINE GLUCONATE 4 G/100ML
SOLUTION TOPICAL
Qty: 1 BOTTLE | Refills: 0 | Status: SHIPPED | OUTPATIENT
Start: 2019-07-23 | End: 2019-10-11

## 2019-07-23 RX ORDER — METOPROLOL SUCCINATE 50 MG/1
50 TABLET, EXTENDED RELEASE ORAL DAILY
Qty: 90 TAB | Refills: 1 | Status: SHIPPED | OUTPATIENT
Start: 2019-07-23 | End: 2019-08-14 | Stop reason: SDUPTHER

## 2019-07-23 RX ORDER — TAMSULOSIN HYDROCHLORIDE 0.4 MG/1
0.4 CAPSULE ORAL DAILY
COMMUNITY
End: 2020-06-30

## 2019-07-23 NOTE — PROGRESS NOTES
Cardiac Electrophysiology OFFICE Note     Subjective:      Tori Deras is a 80 y.o. patient who is seen for evaluation of  Pacemaker  His RA lead appears to be fallen down to RV  Marlon Becker is a 80 y.o. patient who had been seen for evaluation of 2:1 AV block & bradycardia. He was admitted on 03/19/2019 with progressive lower extremity edema, fatigue, & CARREON x 3 weeks per family. Also reported fall 3 weeks ago due to weakness & poor balance.     Noted to have 2:1 AV block, sinus bradycardia 30s-40s. Toprol XL held. .     Patient is poor historian. His daughter is with him        Previous:  History HTN, on losartan 100 mg po daily & Toprol XL 25 mg po daily PTA.     Denies family history of early CAD, arrhythmia, or valve disease.     Recent UTI.     Hx prostate CA.     Some medication noncompliance.     Diagnosed with Alzheimer's dementia January 2019. Currently living independently, was advised by Dr. Yas Rodrigues (neuro) that this is unsafe. Patient Active Problem List   Diagnosis Code    Adrenal mass ([facility] [address] E27.9    Dysuria R30.0    Weight loss R63.4    Essential hypertension I10    Gout M10.9    Pure hypercholesterolemia E78.00    History of prostate cancer Z85.46    Chronic constipation X61.41    Systolic murmur B47.9    Cognitive impairment R41.89    Stage 3 chronic kidney disease (HCC) N18.3    Rotator cuff arthropathy of both shoulders M12.811, M12.812    Bradycardia R00.1    Thrombocytopenia (HCC) D69.6    Elevated troponin R74.8    Hypertensive urgency I16.0    Second degree AV block, Mobitz type I I44.1    Pacemaker Z95.0     Current Outpatient Medications   Medication Sig Dispense Refill    tamsulosin (FLOMAX) 0.4 mg capsule Take 0.4 mg by mouth daily.  metoprolol succinate (TOPROL-XL) 50 mg XL tablet Take 1 Tab by mouth daily.  90 Tab 1    chlorhexidine (HIBICLENS) 4 % liquid Apply to the upper chest area from shoulder/neck to mid line of chest and to below the nipple every day, 5 days prior to the procedure. 1 Bottle 0    cefUROXime (CEFTIN) 250 mg tablet Take 250 mg by mouth two (2) times a day.  OTHER Check blood pressure level daily for 2 weeks and send resullt by phone or mail to Dr. August Alaniz 1 Each 0    amLODIPine (NORVASC) 5 mg tablet Take 1 Tab by mouth daily. 30 Tab 1    hydrALAZINE (APRESOLINE) 25 mg tablet Take 1 Tab by mouth three (3) times daily. 90 Tab 1    allopurinol (ZYLOPRIM) 100 mg tablet Take 1 Tab by mouth daily. 90 Tab 1    acetaminophen (TYLENOL EXTRA STRENGTH) 500 mg tablet Take  by mouth every six (6) hours as needed for Pain.  polyethylene glycol (MIRALAX) 17 gram packet Take 1 Packet by mouth daily. 90 Packet 2    simvastatin (ZOCOR) 20 mg tablet Take 20 mg by mouth daily.  furosemide (LASIX) 20 mg tablet Given one 20mg tab today as single additional dose to furosemide 20mg daily. 1 Tab 0    Comp Stocking,Knee,Regular,Med misc Wear daily for leg swelling. 2 Each 1    aspirin delayed-release 81 mg tablet Take 81 mg by mouth daily. No Known Allergies  Past Medical History:   Diagnosis Date    Cancer ([address]     prostate    Constipation     Gout     Hyperlipemia     Hypertension     Pacemaker 2019     Past Surgical History:   Procedure Laterality Date    HX PROSTATECTOMY      HX UROLOGICAL      prostate removed    IA INS NEW/RPLCMT PRM PM W/TRANSV ELTRD ATRIAL&VENT Right 3/22/2019    INSERT PPM DUAL performed by Daniela Duggan MD at [address]     Family History   Problem Relation Age of Onset    No Known Problems Mother     No Known Problems Father      Social History     Tobacco Use    Smoking status: Never Smoker    Smokeless tobacco: Never Used   Substance Use Topics    Alcohol use: No        Review of Systems:   Constitutional: Negative for fever, chills, weight loss, malaise/fatigue. HEENT: Negative for nosebleeds, vision changes.    Respiratory: Negative for cough, hemoptysis  Cardiovascular: Negative for chest pain, palpitations, orthopnea, claudication, leg swelling, syncope, and PND. Gastrointestinal: Negative for nausea, vomiting, diarrhea, blood in stool and melena. Genitourinary: Negative for dysuria, and hematuria. Musculoskeletal: Negative for myalgias, arthralgia. Skin: Negative for rash. Heme: Does not bleed or bruise easily. Neurological: Negative for speech change and focal weakness walk with a walker     Objective:     Visit Vitals  /72   Pulse 61   Ht 5' 10\" (1.778 m)   Wt 180 lb (81.6 kg)   BMI 25.83 kg/m²      Physical Exam:   Constitutional: well-developed and well-nourished. No respiratory distress. Head: Normocephalic and atraumatic. Eyes: Pupils are equal, round  ENT: hearing normal  Neck: supple. No JVD present. Cardiovascular: Normal rate, regular rhythm. Exam reveals no gallop and no friction rub. No murmur heard. Pulmonary/Chest: Effort normal and breath sounds normal. No wheezes. Abdominal: Soft, no tenderness. Musculoskeletal: no edema. Neurological: alert,oriented. Skin: Skin is warm and dry healed left sided pacer  Psychiatric: normal mood and affect. Behavior is normal. Judgment and thought content normal.         Assessment/Plan:       ICD-10-CM ICD-9-CM    1. Cardiac pacemaker in situ Z95.0 V45.01 XR CHEST PA LAT      metoprolol succinate (TOPROL-XL) 50 mg XL tablet      CBC WITH AUTOMATED DIFF      METABOLIC PANEL, BASIC      chlorhexidine (HIBICLENS) 4 % liquid   2. Essential hypertension I10 401.9 XR CHEST PA LAT      metoprolol succinate (TOPROL-XL) 50 mg XL tablet      CBC WITH AUTOMATED DIFF      METABOLIC PANEL, BASIC      chlorhexidine (HIBICLENS) 4 % liquid   3. Bradycardia R00.1 427.89 XR CHEST PA LAT      metoprolol succinate (TOPROL-XL) 50 mg XL tablet      CBC WITH AUTOMATED DIFF      METABOLIC PANEL, BASIC      chlorhexidine (HIBICLENS) 4 % liquid   4.  Second degree AV block I44.1 426.13 XR CHEST PA LAT      metoprolol succinate (TOPROL-XL) 50 mg XL tablet      CBC WITH AUTOMATED DIFF      METABOLIC PANEL, BASIC      chlorhexidine (HIBICLENS) 4 % liquid   5. Pre-procedure lab exam Z01.812 V72.63 metoprolol succinate (TOPROL-XL) 50 mg XL tablet      CBC WITH AUTOMATED DIFF      METABOLIC PANEL, BASIC      chlorhexidine (HIBICLENS) 4 % liquid     reviewed diet, exercise and weight control  reviewed medications and side effects in detail   Increase toprol to 50 mg every day  He has difficulty with getting back 1 week for BP check  I gave him xray order to confirm RA lead displacement  NSVT vs PAT on pacer, A lead has been displaced  81% RA pacing and 87% RV pacing  His daughter agreed with scheduling for RA lead reposition and if they change their mind they will cancel    Thank you for involving me in this patient's care and please call with further concerns or questions. Jame Joy M.D.   Electrophysiology/Cardiology  [facility]  [address] [place] [address]                             [phone]

## 2019-07-30 NOTE — PROGRESS NOTES
Atrial lead fell down to tricuspid valve area on my review  Lead reposition is planned and discussed in office with him and daughter    Future Appointments  8/14/2019  3:30 PM    Candelario Davidson MD      [address]  9/25/2019  10:40 AM   MD [address]  9/27/2019  9:45 AM    [address], [address] [place]  9/27/2019  10:00 AM   [place], [email or web address]  11/20/2019 1:15 PM    REMOTE1, [email or web address]  2/26/2020  10:00 AM   [facility]  6/3/2020   8:15 AM    REMOTE1, [email or web address]  9/8/2020   1:45 PM    [address], [email or web address]  9/8/2020   2:00 PM    Katheen Pallas, MD [address]

## 2019-08-14 ENCOUNTER — OFFICE VISIT (OUTPATIENT)
Dept: FAMILY MEDICINE CLINIC | Age: 84
End: 2019-08-14

## 2019-08-14 ENCOUNTER — HOSPITAL ENCOUNTER (OUTPATIENT)
Dept: LAB | Age: 84
Discharge: HOME | End: 2019-08-14
Payer: MEDICARE

## 2019-08-14 VITALS
HEIGHT: 70 IN | WEIGHT: 183.2 LBS | DIASTOLIC BLOOD PRESSURE: 70 MMHG | SYSTOLIC BLOOD PRESSURE: 146 MMHG | RESPIRATION RATE: 17 BRPM | TEMPERATURE: 97.7 F | OXYGEN SATURATION: 97 % | HEART RATE: 61 BPM | BODY MASS INDEX: 26.23 KG/M2

## 2019-08-14 DIAGNOSIS — L85.3 DRY SKIN: ICD-10-CM

## 2019-08-14 DIAGNOSIS — R53.82 CHRONIC FATIGUE: ICD-10-CM

## 2019-08-14 DIAGNOSIS — N18.30 STAGE 3 CHRONIC KIDNEY DISEASE (HCC): ICD-10-CM

## 2019-08-14 DIAGNOSIS — F03.90 DEMENTIA WITHOUT BEHAVIORAL DISTURBANCE, UNSPECIFIED DEMENTIA TYPE: ICD-10-CM

## 2019-08-14 DIAGNOSIS — I44.1 SECOND DEGREE AV BLOCK: ICD-10-CM

## 2019-08-14 DIAGNOSIS — R68.89 COLD INTOLERANCE: ICD-10-CM

## 2019-08-14 DIAGNOSIS — R00.1 BRADYCARDIA: ICD-10-CM

## 2019-08-14 DIAGNOSIS — R68.89 COLD SENSITIVITY: ICD-10-CM

## 2019-08-14 DIAGNOSIS — I10 ESSENTIAL HYPERTENSION: Primary | ICD-10-CM

## 2019-08-14 DIAGNOSIS — R20.9 SENSATION OF COLD IN LOWER EXTREMITY: ICD-10-CM

## 2019-08-14 DIAGNOSIS — K59.09 CHRONIC CONSTIPATION: ICD-10-CM

## 2019-08-14 DIAGNOSIS — Z95.0 CARDIAC PACEMAKER IN SITU: ICD-10-CM

## 2019-08-14 PROCEDURE — 82728 ASSAY OF FERRITIN: CPT

## 2019-08-14 PROCEDURE — 36415 COLL VENOUS BLD VENIPUNCTURE: CPT

## 2019-08-14 PROCEDURE — 80053 COMPREHEN METABOLIC PANEL: CPT

## 2019-08-14 PROCEDURE — 83550 IRON BINDING TEST: CPT

## 2019-08-14 RX ORDER — HYDRALAZINE HYDROCHLORIDE 50 MG/1
50 TABLET, FILM COATED ORAL 3 TIMES DAILY
Qty: 270 TAB | Refills: 1 | Status: SHIPPED | OUTPATIENT
Start: 2019-08-14 | End: 2019-10-11 | Stop reason: SDUPTHER

## 2019-08-14 RX ORDER — METOPROLOL SUCCINATE 25 MG/1
25 TABLET, EXTENDED RELEASE ORAL DAILY
Qty: 90 TAB | Refills: 1 | Status: SHIPPED | OUTPATIENT
Start: 2019-08-14 | End: 2020-10-01 | Stop reason: SDUPTHER

## 2019-08-14 NOTE — PROGRESS NOTES
[facility] Note      Subjective:     Chief Complaint   Patient presents with    Hypertension     follow up      Myriam Junior is a 80y.o. year old male who presents for evaluation of the following:      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 Told pacemake wire is unattached. Daughter states she is considering getting them surgery versus not but has not decided.     Hypertenson  Home -180s/70-90s  Compliant with home metoprolol 25 + amlodipine 5 + hydralazine 25 tid  -Daughter states cardiologist recently increased metoprolol to 50 mg for treatment of hypertension but patient has not received this dose or started taking it. BP Readings from Last 3 Encounters:   08/14/19 146/70   07/23/19 160/72   05/06/19 110/68        Cardiomyopathy  Mild to mod AS, MR, TR and severe pulmonary HTN, EF55-60% by TTE   Tx: lasix + BB   Managed by cardiologist     CKD 3, baseline Cr 2.3  Cr Trend 1.68 (discharge) > 1.42 (4/2019)  Lab Results   Component Value Date/Time    Creatinine (POC) 1.7 (H) 07/31/2018 02:19 PM    Creatinine 1.28 (H) 05/06/2019 04:16 PM         Hyperlipidemia. Tx: simvastatin  Lab Results   Component Value Date/Time    Cholesterol, total 98 03/20/2019 03:03 AM    HDL Cholesterol 48 03/20/2019 03:03 AM    LDL, calculated 39.6 03/20/2019 03:03 AM    VLDL, calculated 10.4 03/20/2019 03:03 AM    Triglyceride 52 03/20/2019 03:03 AM    CHOL/HDL Ratio 2.0 03/20/2019 03:03 AM       Chronic OA Multiple Joints/ Gout. Ambulates with a walker with stand by assist currently. No acute concerns regarding this.     Dementia  Neurology following  Living at [facility] now  Associated fatigue/low movements. Stable per daughter. Lives at [facility].   ADL Assessment 5/6/2019   Feeding yourself No Help Needed   Getting from bed to chair Help Needed   Getting dressed Help Needed   Bathing or showering Help Needed   Walk across the room (includes cane/walker) Help Needed   Using the telphone Help Needed   Taking your medications Help Needed   Preparing meals Help Needed   Managing money (expenses/bills) Help Needed   Moderately strenuous housework (laundry) Help Needed   Shopping for personal items (toiletries/medicines) Help Needed   Shopping for groceries Help Needed   Driving Help Needed   Climbing a flight of stairs Help Needed   Getting to places beyond walking distances Help Needed       Data requests testing for iron level. Review of Systems   Pertinent positives and negative per HPI. All other systems  reviewed are negative for a Comprehensive ROS (10+).        Past Medical History:   Diagnosis Date    Cancer [facility])     prostate    Constipation     Gout     Hyperlipemia     Hypertension     Pacemaker 2019        Social History     Socioeconomic History    Marital status:      Spouse name: Not on file    Number of children: Not on file    Years of education: Not on file    Highest education level: Not on file   Occupational History    Not on file   Social Needs    Financial resource strain: Not on file    Food insecurity:     Worry: Not on file     Inability: Not on file    Transportation needs:     Medical: Not on file     Non-medical: Not on file   Tobacco Use    Smoking status: Never Smoker    Smokeless tobacco: Never Used   Substance and Sexual Activity    Alcohol use: No    Drug use: No    Sexual activity: Never   Lifestyle    Physical activity:     Days per week: Not on file     Minutes per session: Not on file    Stress: Not on file   Relationships    Social connections:     Talks on phone: Not on file     Gets together: Not on file     Attends Gnosticism service: Not on file     Active member of club or organization: Not on file     Attends meetings of clubs or organizations: Not on file     Relationship status: Not on file    Intimate partner violence:     Fear of current or ex partner: Not on file     Emotionally abused: Not on file     Physically abused: Not on file     Forced sexual activity: Not on file   Other Topics Concern    Not on file   Social History Narrative    Not on file       Family History   Problem Relation Age of Onset    No Known Problems Mother     No Known Problems Father        Current Outpatient Medications   Medication Sig    tamsulosin (FLOMAX) 0.4 mg capsule Take 0.4 mg by mouth daily.  metoprolol succinate (TOPROL-XL) 50 mg XL tablet Take 1 Tab by mouth daily.  OTHER Check blood pressure level daily for 2 weeks and send resullt by phone or mail to Dr. Laurie Booth furosemide (LASIX) 20 mg tablet Given one 20mg tab today as single additional dose to furosemide 20mg daily.  amLODIPine (NORVASC) 5 mg tablet Take 1 Tab by mouth daily.  hydrALAZINE (APRESOLINE) 25 mg tablet Take 1 Tab by mouth three (3) times daily.  aspirin delayed-release 81 mg tablet Take 81 mg by mouth daily.  allopurinol (ZYLOPRIM) 100 mg tablet Take 1 Tab by mouth daily.  acetaminophen (TYLENOL EXTRA STRENGTH) 500 mg tablet Take  by mouth every six (6) hours as needed for Pain.  polyethylene glycol (MIRALAX) 17 gram packet Take 1 Packet by mouth daily.  simvastatin (ZOCOR) 20 mg tablet Take 20 mg by mouth daily.  chlorhexidine (HIBICLENS) 4 % liquid Apply to the upper chest area from shoulder/neck to mid line of chest and to below the nipple every day, 5 days prior to the procedure.  cefUROXime (CEFTIN) 250 mg tablet Take 250 mg by mouth two (2) times a day.  Comp Stocking,Knee,Regular,Med misc Wear daily for leg swelling. No current facility-administered medications for this visit.               Objective:     Vitals:    08/14/19 1601 08/14/19 1640   BP: 147/74 146/70   Pulse: 61    Resp: 17    Temp: 97.7 °F (36.5 °C)    TempSrc: Oral    SpO2: 97%    Weight: 183 lb 3.2 oz (83.1 kg)    Height: 5' 10\" (1.778 m)        Physical Examination:  General: Alert, cooperative, no distress, appears stated age. Eyes: Conjunctivae clear. PERRL, EOMs intact. Ears: Normal external ear canals both ears. Nose: Nares normal.   Mouth/Throat: Lips, mucosa, and tongue normal.   Neck: Supple, symmetrical, trachea midline, no adenopathy. No thyroid enlargement/tenderness/nodules  Respiratory: Breathing comfortably, in no acute respiratory distress. Clear to auscultation bilaterally. Normal inspiratory and expiratory ratio. Cardiovascular: Regular rate and rhythm, systolic murmur, click, rub or gallop. Pulses 2+ and symmetric radial and DP  Trace pitting edema of bilateral foot and leg  Abdomen: Soft, non-tender, non distended. Bowel sounds normal. No masses or organomegaly. MSK: Extremities normal, atraumatic, no effusion. Gait steady and unassisted. Limited bilateral shoulder ROM  Skin: Dry thickened skin of bilateral shins. Lymph nodes: Cervical, supraclavicular nodes normal.  Neurologic: CNII-XII intact. Sensation and reflexes intact  Strength  - Biceps 4./5  Triceps 5/5  Knee flexion 5/5  Knee extension 5/5  [place] to person only. Psychiatric: Affect blunted. Mood euthymic. Paucity of speech. Smiles appropriately. No visits with results within 3 Month(s) from this visit.    Latest known visit with results is:   Office Visit on 05/06/2019   Component Date Value Ref Range Status    Glucose 05/06/2019 114* 65 - 99 mg/dL Final    BUN 05/06/2019 20  8 - 27 mg/dL Final    Creatinine 05/06/2019 1.28* 0.76 - 1.27 mg/dL Final    GFR est non-AA 05/06/2019 50* >59 mL/min/1.73 Final    GFR est AA 05/06/2019 58* >59 mL/min/1.73 Final    BUN/Creatinine ratio 05/06/2019 16  10 - 24 Final    Sodium 05/06/2019 134  134 - 144 mmol/L Final    Potassium 05/06/2019 4.0  3.5 - 5.2 mmol/L Final    Chloride 05/06/2019 94* 96 - 106 mmol/L Final    CO2 05/06/2019 27  20 - 29 mmol/L Final    Interpretation 05/06/2019 Note   Final    Comment: -------------------------------  CHRONIC KIDNEY DISEASE:  EGFR, BLOOD PRESSURE, AND PROTEINURIA ASSESSMENT  Potassium is within goal and has risen, was 3.6 and now is  4.0 mmol/L. Hemoglobin A1C is 6.3 % (measured on 3/11/2019);  diabetic status is unknown. Refer to ADA guidelines for  clinical practice recommendations. EGFR, BLOOD PRESSURE, AND PROTEINURIA TREATMENT SUGGESTIONS  -  eGFR is no longer below the expected value and has risen,  was 25 and now is 50 mL/min/1.73mE2. Given patient's  advanced age, we are unable to provide specific blood  pressure treatment suggestions. Individualize blood pressure  goals based on the patient's comorbidities and to avoid  orthostatic hypotension and other medication side effects. Assessment of albuminuria (urine albumin:creatinine ratio or  urine protein:creatinine ratio preferred) is recommended at  least annually in CKD patients for staging and disease  prognosis. EGFR, BLOOD PRESSURE, AND PROTEINURIA FOLLOW-UP  -  fasting Renal Panel is                            due; Spot Urine Panel is recommended  by KDOQI guidelines, at least yearly;  -  BONE and MINERAL ASSESSMENT  Carbon Dioxide is within goal and has not changed  significantly, was 25 and now is 27 mmol/L. Guidelines  recommend the measurement of 25-hydroxy vitamin D in  patients with CKD. BONE and MINERAL TREATMENT SUGGESTIONS  -  Interpretations require simultaneous measurements of serum  calcium and phosphorus. BONE and MINERAL FOLLOW-UP  -  fasting PTH with Renal Panel and 25-Hydroxy Vitamin D are  recommended by KDOQI guidelines, at least yearly;  -  LIPIDS ASSESSMENT  Most recent order does not include a fasting Lipid Panel. LIPIDS FOLLOW-UP  -  fasting Lipid Panel within 10 months;  -  ANEMIA ASSESSMENT  Most recent order does not include a CBC Panel or iron  studies.   ANEMIA FOLLOW-UP  -  CBC within 4 months;  -------------------------------  DISCLAIMER  These assessments and treatment suggestions are provided as  a convenience in support of the physician-patient  relationship                            and are not intended to replace the physician's  clinical judgment. They are derived from national guidelines  in addition to other evidence and expert opinion. The  clinician should consider this information within the  context of clinical opinion and the individual patient. SEE GUIDANCE FOR CHRONIC KIDNEY DISEASE PROGRAM: Kidney  Disease Improving Global Outcomes (KDIGO) clinical practice  guidelines are at http://kdigo. org/home/guidelines/.  [address] [facility] Kidney Disease Outcomes Quality  Initiative (KDOQI (TM)), with its limitations and  disclaimers, are at www.kidney. org/professionals/KDOQI. This  program is intended for patients who have been diagnosed  with stages 3, 4, or pre-dialysis 5 CKD. It is not intended  for children, pregnant patients, or transplant patients. Assessment/ Plan:   Diagnoses and all orders for this visit:    1. Essential hypertension  -     metoprolol succinate (TOPROL-XL) 25 mg XL tablet; Take 1 Tab by mouth daily. -     hydrALAZINE (APRESOLINE) 50 mg tablet; Take 1 Tab by mouth three (3) times daily.  -     METABOLIC PANEL, COMPREHENSIVE    2. Cardiac pacemaker in situ  -     metoprolol succinate (TOPROL-XL) 25 mg XL tablet; Take 1 Tab by mouth daily. 3. Bradycardia  -     metoprolol succinate (TOPROL-XL) 25 mg XL tablet; Take 1 Tab by mouth daily. 4. Second degree AV block  -     metoprolol succinate (TOPROL-XL) 25 mg XL tablet; Take 1 Tab by mouth daily. 5. Cold sensitivity  -     METABOLIC PANEL, COMPREHENSIVE    6. Cold intolerance    7. Stage 3 chronic kidney disease ([address]    8. Dry skin    9. Sensation of cold in lower extremity  -     FERRITIN  -     IRON PROFILE    10. Chronic constipation  -     FERRITIN  -     IRON PROFILE    11.  Dementia without behavioral disturbance, unspecified dementia type  -     FERRITIN  -     IRON PROFILE    12. Chronic fatigue  -     FERRITIN  -     IRON PROFILE    Other orders  -     CKD REPORT      Past medical history and medications refilled per orders. Blood pressure uncontrolled. Advised patient continue current metoprolol dose 25 mg and increase hydralazine to 50 mg 3 times daily for blood pressure management given mild bradycardia and risk for worsening bradycardia in setting of pacemaker abnormality. Goal BP <130/80 and HR . Continue home monitoring and call with blood pressure in 2 weeks and return to clinic office visit and recheck in 4 weeks. -LPN called to pharmacy and BRENDEN to clarify new blood pressure orders and need for new medication sent to patient's home. Dry skin likely reaction from recent edema after hospitalization. Edema improved. Advised topical emollient to improve skin. Iron level ordered for dry skin chronic constipation, and CKD. Educated patient on red flag symptoms to warrant return to clinic or emergency room visit. I have discussed the diagnosis with the patient and the intended plan as seen in the above orders. The patient has been offered or received an after-visit summary and questions were answered concerning future plans. I have discussed medication side effects and warnings with the patient as well. Total face to face encounter time 60 minutes; >50 of time spent counseling and coordinating care regarding hospital follow up, AV block, HTN, bradycardia, dementia, CKD. Follow-up and Dispositions    · Return in about 1 month (around 9/11/2019) for Follow Up blood pressure.            Signed,    Yosvany Jin MD  8/14/2019

## 2019-08-14 NOTE — PROGRESS NOTES
Chief Complaint   Patient presents with    Hypertension     follow up      1. Have you been to the ER, urgent care clinic since your last visit? Hospitalized since your last visit? No    2. Have you seen or consulted any other health care providers outside of the [address] [place] [address] since your last visit? Include any pap smears or colon screening.  No

## 2019-08-14 NOTE — PATIENT INSTRUCTIONS
Continue metoprolol 25mg daily Increase to hydralazine 50mg three times daily DASH Diet: Care Instructions Your Care Instructions The DASH diet is an eating plan that can help lower your blood pressure. DASH stands for Dietary Approaches to Stop Hypertension. Hypertension is high blood pressure. The DASH diet focuses on eating foods that are high in calcium, potassium, and magnesium. These nutrients can lower blood pressure. The foods that are highest in these nutrients are fruits, vegetables, low-fat dairy products, nuts, seeds, and legumes. But taking calcium, potassium, and magnesium supplements instead of eating foods that are high in those nutrients does not have the same effect. The DASH diet also includes whole grains, fish, and poultry. The DASH diet is one of several lifestyle changes your doctor may recommend to lower your high blood pressure. Your doctor may also want you to decrease the amount of sodium in your diet. Lowering sodium while following the DASH diet can lower blood pressure even further than just the DASH diet alone. Follow-up care is a key part of your treatment and safety. Be sure to make and go to all appointments, and call your doctor if you are having problems. It's also a good idea to know your test results and keep a list of the medicines you take. How can you care for yourself at home? Following the DASH diet · Eat 4 to 5 servings of fruit each day. A serving is 1 medium-sized piece of fruit, ½ cup chopped or canned fruit, 1/4 cup dried fruit, or 4 ounces (½ cup) of fruit juice. Choose fruit more often than fruit juice. · Eat 4 to 5 servings of vegetables each day. A serving is 1 cup of lettuce or raw leafy vegetables, ½ cup of chopped or cooked vegetables, or 4 ounces (½ cup) of vegetable juice. Choose vegetables more often than vegetable juice. · Get 2 to 3 servings of low-fat and fat-free dairy each day.  A serving is 8 ounces of milk, 1 cup of yogurt, or 1 ½ ounces of cheese. · Eat 6 to 8 servings of grains each day. A serving is 1 slice of bread, 1 ounce of dry cereal, or ½ cup of cooked rice, pasta, or cooked cereal. Try to choose whole-grain products as much as possible. · Limit lean meat, poultry, and fish to 2 servings each day. A serving is 3 ounces, about the size of a deck of cards. · Eat 4 to 5 servings of nuts, seeds, and legumes (cooked dried beans, lentils, and split peas) each week. A serving is 1/3 cup of nuts, 2 tablespoons of seeds, or ½ cup of cooked beans or peas. · Limit fats and oils to 2 to 3 servings each day. A serving is 1 teaspoon of vegetable oil or 2 tablespoons of salad dressing. · Limit sweets and added sugars to 5 servings or less a week. A serving is 1 tablespoon jelly or jam, ½ cup sorbet, or 1 cup of lemonade. · Eat less than 2,300 milligrams (mg) of sodium a day. If you limit your sodium to 1,500 mg a day, you can lower your blood pressure even more. Tips for success · Start small. Do not try to make dramatic changes to your diet all at once. You might feel that you are missing out on your favorite foods and then be more likely to not follow the plan. Make small changes, and stick with them. Once those changes become habit, add a few more changes. · Try some of the following: ? Make it a goal to eat a fruit or vegetable at every meal and at snacks. This will make it easy to get the recommended amount of fruits and vegetables each day. ? Try yogurt topped with fruit and nuts for a snack or healthy dessert. ? Add lettuce, tomato, cucumber, and onion to sandwiches. ? Combine a ready-made pizza crust with low-fat mozzarella cheese and lots of vegetable toppings. Try using tomatoes, squash, spinach, broccoli, carrots, cauliflower, and onions. ? Have a variety of cut-up vegetables with a low-fat dip as an appetizer instead of chips and dip. ? Sprinkle sunflower seeds or chopped almonds over salads. Or try adding chopped walnuts or almonds to cooked vegetables. ? Try some vegetarian meals using beans and peas. Add garbanzo or kidney beans to salads. Make burritos and tacos with mashed murillo beans or black beans. Where can you learn more? Go to http://erin-nikole.info/. Enter R123 in the search box to learn more about \"DASH Diet: Care Instructions. \" Current as of: July 22, 2018 Content Version: 12.1 © 4430-0883 Mobile System 7. Care instructions adapted under license by [employer] (which disclaims liability or warranty for this information). If you have questions about a medical condition or this instruction, always ask your healthcare professional. [address] any warranty or liability for your use of this information.

## 2019-08-15 LAB
ALBUMIN SERPL-MCNC: 3.5 G/DL (ref 3.5–4.7)
ALBUMIN/GLOB SERPL: 0.9 {RATIO} (ref 1.2–2.2)
ALP SERPL-CCNC: 98 IU/L (ref 39–117)
ALT SERPL-CCNC: 25 IU/L (ref 0–44)
AST SERPL-CCNC: 17 IU/L (ref 0–40)
BILIRUB SERPL-MCNC: 0.2 MG/DL (ref 0–1.2)
BUN SERPL-MCNC: 22 MG/DL (ref 8–27)
BUN/CREAT SERPL: 16 (ref 10–24)
CALCIUM SERPL-MCNC: 9.3 MG/DL (ref 8.6–10.2)
CHLORIDE SERPL-SCNC: 97 MMOL/L (ref 96–106)
CO2 SERPL-SCNC: 26 MMOL/L (ref 20–29)
CREAT SERPL-MCNC: 1.38 MG/DL (ref 0.76–1.27)
FERRITIN SERPL-MCNC: 212 NG/ML (ref 30–400)
GLOBULIN SER CALC-MCNC: 3.9 G/DL (ref 1.5–4.5)
GLUCOSE SERPL-MCNC: 109 MG/DL (ref 65–99)
INTERPRETATION: NORMAL
IRON SATN MFR SERPL: 13 % (ref 15–55)
IRON SERPL-MCNC: 24 UG/DL (ref 38–169)
POTASSIUM SERPL-SCNC: 3.8 MMOL/L (ref 3.5–5.2)
PROT SERPL-MCNC: 7.4 G/DL (ref 6–8.5)
SODIUM SERPL-SCNC: 137 MMOL/L (ref 134–144)
TIBC SERPL-MCNC: 189 UG/DL (ref 250–450)
UIBC SERPL-MCNC: 165 UG/DL (ref 111–343)

## 2019-08-19 NOTE — PROGRESS NOTES
Notify Patient:  Kidney function remains in chronic kidney disease stage III range. Your circulating iron level is low but your iron stores are normal.  No further medication is needed at this time. If you wanted to trial an over-the-counter multivitamin with iron in effort to improve energy, that would be fine but use with a stool softener to avoid constipation.

## 2019-08-21 NOTE — PROGRESS NOTES
Outbound call placed to patients daughter Mel Levine (who is on patients PHI). Patients name and  verified. Patients daughter reported understand and was appreciative of call. Letter sent of most recent lab results.

## 2019-08-30 ENCOUNTER — TELEPHONE (OUTPATIENT)
Dept: CARDIOLOGY CLINIC | Age: 84
End: 2019-08-30

## 2019-08-30 NOTE — TELEPHONE ENCOUNTER
Sooner date available for patient procedure. Verified patient with two types of identifiers. Spoke with patient's daughter verified on HIPAA. Daughter states she was actually going to call to move his procedure back. Patient rescheduled for 10/18/19 at 1:00 pm. Notified daughter I will send updated dates and times. Verified address. Patient verbalized understanding and will call with any other questions.

## 2019-08-30 NOTE — LETTER
8/30/2019 9:46 AM 
 
Mr. Arcelia Barraza [place] [id number] Your Right [facility] Lead Reposition procedure has been re-scheduled for 10/18/19 at 1:00 pm, at [facility]. 
 
Please report to Admitting Department by 11:00 am, or 2 hours prior to your scheduled procedure. Please bring a list of your current medications and medication bottles, if able, to the hospital on this day. You will be unable to drive after your procedure so please make sure to bring someone with you to your procedure. You will need to have nothing to eat or drink after 8:00 am the morning of your procedure. You may have small sips of water, if needed, to take with your medication. You will need labs drawn prior to your procedure. Please go to [facility] to have this done no sooner than 9/18/19 and no later than 10/12/19. You should not stop your medication prior to your scheduled procedure. After your procedure, you will need to follow up with Dr. Joey Abarca nurse for a wound and device check. Your follow-up appointment has been scheduled for 10/25/19 at 9:15 am.  
 
Hibiclens 4% topical solution has been ordered and sent into your pharmacy Patient it start Hibiclens application 5 days prior to procedure date Directions Hibiclens 4%: Start cleanse 5 days prior to procedure 1. Rinse area (upper chest and upper arms) with water. 2. Apply minimum amount necessary to scrub the upper chest area from shoulder/neck to mid line of chest and to below the nipple each of  5 nights before the day of the procedure 3. Let solution dry.   
 
 
 
 
Sincerely, 
 
 
James Merritt MD

## 2019-10-11 ENCOUNTER — HOSPITAL ENCOUNTER (OUTPATIENT)
Dept: LAB | Age: 84
Discharge: HOME | End: 2019-10-11
Payer: MEDICARE

## 2019-10-11 ENCOUNTER — OFFICE VISIT (OUTPATIENT)
Dept: FAMILY MEDICINE CLINIC | Age: 84
End: 2019-10-11

## 2019-10-11 VITALS
OXYGEN SATURATION: 100 % | HEART RATE: 60 BPM | RESPIRATION RATE: 16 BRPM | SYSTOLIC BLOOD PRESSURE: 133 MMHG | WEIGHT: 186.4 LBS | BODY MASS INDEX: 26.69 KG/M2 | DIASTOLIC BLOOD PRESSURE: 65 MMHG | HEIGHT: 70 IN | TEMPERATURE: 98.3 F

## 2019-10-11 DIAGNOSIS — Z95.0 PACEMAKER: ICD-10-CM

## 2019-10-11 DIAGNOSIS — R00.1 BRADYCARDIA: ICD-10-CM

## 2019-10-11 DIAGNOSIS — F03.90 DEMENTIA WITHOUT BEHAVIORAL DISTURBANCE, UNSPECIFIED DEMENTIA TYPE: ICD-10-CM

## 2019-10-11 DIAGNOSIS — I10 ESSENTIAL HYPERTENSION: Primary | ICD-10-CM

## 2019-10-11 DIAGNOSIS — I42.9 CARDIOMYOPATHY, UNSPECIFIED TYPE (HCC): ICD-10-CM

## 2019-10-11 DIAGNOSIS — I10 ESSENTIAL HYPERTENSION: ICD-10-CM

## 2019-10-11 DIAGNOSIS — R41.89 COGNITIVE IMPAIRMENT: ICD-10-CM

## 2019-10-11 PROCEDURE — 80048 BASIC METABOLIC PNL TOTAL CA: CPT

## 2019-10-11 PROCEDURE — 36415 COLL VENOUS BLD VENIPUNCTURE: CPT

## 2019-10-11 PROCEDURE — 85025 COMPLETE CBC W/AUTO DIFF WBC: CPT

## 2019-10-11 RX ORDER — HYDRALAZINE HYDROCHLORIDE 25 MG/1
25 TABLET, FILM COATED ORAL 3 TIMES DAILY
Qty: 90 TAB | Refills: 0
Start: 2019-10-11 | End: 2019-10-11 | Stop reason: SDUPTHER

## 2019-10-11 RX ORDER — SODIUM CHLORIDE 0.9 % (FLUSH) 0.9 %
5-40 SYRINGE (ML) INJECTION AS NEEDED
Status: CANCELLED | OUTPATIENT
Start: 2019-10-11

## 2019-10-11 RX ORDER — SODIUM CHLORIDE 0.9 % (FLUSH) 0.9 %
5-40 SYRINGE (ML) INJECTION EVERY 8 HOURS
Status: CANCELLED | OUTPATIENT
Start: 2019-10-11

## 2019-10-11 RX ORDER — CEFAZOLIN SODIUM/WATER 2 G/20 ML
2 SYRINGE (ML) INTRAVENOUS ONCE
Status: CANCELLED | OUTPATIENT
Start: 2019-10-11 | End: 2019-10-12

## 2019-10-11 NOTE — PROGRESS NOTES
[address] [facility] Note      Subjective:     Chief Complaint   Patient presents with    Hypertension     follow up     Kaylin Hernandez is a 80y.o. 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 Plan for pacemaker wire repair surgery  Pulse Readings from Last 3 Encounters:   10/11/19 60   08/14/19 61   07/23/19 61       Hypertenson  Home -160s/70-90s  Compliant with home metoprolol 25 + amlodipine 5 + hydralazine 25 tid  - last visit prescibed increase to hydralazine 50 tid but Rx not received. BP Readings from Last 3 Encounters:   10/11/19 133/65   08/14/19 146/70   07/23/19 160/72        Cardiomyopathy  Mild to mod AS, MR, TR and severe pulmonary HTN, EF 55-60% by TTE   Tx: lasix + BB   Managed by cardiologist  Leg swelling improved. Not using compression stockings  Denies chest pain     CKD 3, baseline Cr 2.3  Cr Trend 1.68 (discharge) > 1.42 (4/2019)  Lab Results   Component Value Date/Time    Creatinine (POC) 1.7 (H) 07/31/2018 02:19 PM    Creatinine 1.38 (H) 08/14/2019 05:05 PM         Hyperlipidemia. Tx: simvastatin  Lab Results   Component Value Date/Time    Cholesterol, total 98 03/20/2019 03:03 AM    HDL Cholesterol 48 03/20/2019 03:03 AM    LDL, calculated 39.6 03/20/2019 03:03 AM    VLDL, calculated 10.4 03/20/2019 03:03 AM    Triglyceride 52 03/20/2019 03:03 AM    CHOL/HDL Ratio 2.0 03/20/2019 03:03 AM        Dementia  Neurology following  Living at [facility] now  Associated fatigue/low movements. Stable per daughter.    ADL Assessment 5/6/2019   Feeding yourself No Help Needed   Getting from bed to chair Help Needed   Getting dressed Help Needed   Bathing or showering Help Needed   Walk across the room (includes cane/walker) Help Needed   Using the telphone Help Needed   Taking your medications Help Needed   Preparing meals Help Needed   Managing money (expenses/bills) Help Needed   Moderately strenuous housework (laundry) Help Needed   Shopping for personal items (toiletries/medicines) Help Needed   Shopping for groceries Help Needed   Driving Help Needed   Climbing a flight of stairs Help Needed   Getting to places beyond walking distances Help Needed       Review of Systems   Pertinent positives and negative per HPI. All other systems  reviewed are negative for a Comprehensive ROS (10+).        Past Medical History:   Diagnosis Date    Cancer [facility])     prostate    Constipation     Gout     Hyperlipemia     Hypertension     Pacemaker 2019        Social History     Socioeconomic History    Marital status:      Spouse name: Not on file    Number of children: Not on file    Years of education: Not on file    Highest education level: Not on file   Occupational History    Not on file   Social Needs    Financial resource strain: Not on file    Food insecurity:     Worry: Not on file     Inability: Not on file    Transportation needs:     Medical: Not on file     Non-medical: Not on file   Tobacco Use    Smoking status: Never Smoker    Smokeless tobacco: Never Used   Substance and Sexual Activity    Alcohol use: No    Drug use: No    Sexual activity: Never   Lifestyle    Physical activity:     Days per week: Not on file     Minutes per session: Not on file    Stress: Not on file   Relationships    Social connections:     Talks on phone: Not on file     Gets together: Not on file     Attends Holiness service: Not on file     Active member of club or organization: Not on file     Attends meetings of clubs or organizations: Not on file     Relationship status: Not on file    Intimate partner violence:     Fear of current or ex partner: Not on file     Emotionally abused: Not on file     Physically abused: Not on file     Forced sexual activity: Not on file   Other Topics Concern    Not on file   Social History Narrative    Not on file       Family History   Problem Relation Age of Onset    No Known Problems Mother     No Known Problems Father        Current Outpatient Medications   Medication Sig    metoprolol succinate (TOPROL-XL) 25 mg XL tablet Take 1 Tab by mouth daily.  hydrALAZINE (APRESOLINE) 50 mg tablet Take 1 Tab by mouth three (3) times daily.  tamsulosin (FLOMAX) 0.4 mg capsule Take 0.4 mg by mouth daily.  OTHER Check blood pressure level daily for 2 weeks and send resullt by phone or mail to Dr. Monico Langley furosemide (LASIX) 20 mg tablet Given one 20mg tab today as single additional dose to furosemide 20mg daily.  amLODIPine (NORVASC) 5 mg tablet Take 1 Tab by mouth daily.  aspirin delayed-release 81 mg tablet Take 81 mg by mouth daily.  allopurinol (ZYLOPRIM) 100 mg tablet Take 1 Tab by mouth daily.  acetaminophen (TYLENOL EXTRA STRENGTH) 500 mg tablet Take  by mouth every six (6) hours as needed for Pain.  polyethylene glycol (MIRALAX) 17 gram packet Take 1 Packet by mouth daily.  simvastatin (ZOCOR) 20 mg tablet Take 20 mg by mouth daily.  chlorhexidine (HIBICLENS) 4 % liquid Apply to the upper chest area from shoulder/neck to mid line of chest and to below the nipple every day, 5 days prior to the procedure.  cefUROXime (CEFTIN) 250 mg tablet Take 250 mg by mouth two (2) times a day.  Comp Stocking,Knee,Regular,Med misc Wear daily for leg swelling. No current facility-administered medications for this visit. Objective:     Vitals:    10/11/19 1540   BP: 133/65   Pulse: 60   Resp: 16   Temp: 98.3 °F (36.8 °C)   TempSrc: Oral   SpO2: 100%   Weight: 186 lb 6.4 oz (84.6 kg)   Height: 5' 10\" (1.778 m)       Physical Examination:  General: Alert, cooperative, no distress, appears stated age. Eyes: Conjunctivae clear. PERRL, EOMs intact. Ears: Normal external ear canals both ears. Nose: Nares normal.   Mouth/Throat: Lips, mucosa, and tongue normal.   Neck: Supple, symmetrical, trachea midline, no adenopathy. No thyroid enlargement/tenderness/nodules  Respiratory: Breathing comfortably, in no acute respiratory distress. Clear to auscultation bilaterally. Normal inspiratory and expiratory ratio. Cardiovascular: Regular rate and rhythm, systolic murmur, click, rub or gallop. Pulses 2+ and symmetric radial and DP  Trace pitting edema of bilateral foot and leg  Abdomen: Soft, non-tender, non distended. Bowel sounds normal. No masses or organomegaly. MSK: Extremities normal, atraumatic, no effusion. Gait steady and unassisted. Limited bilateral shoulder ROM  Skin: Dry thickened skin of bilateral shins. Lymph nodes: Cervical, supraclavicular nodes normal.  Neurologic: CNII-XII intact. Sensation and reflexes intact  Strength  - Biceps 4./5  Triceps 5/5  Knee flexion 5/5  Knee extension 5/5  Oriented to person only. Year \"the 7\"  Psychiatric: Affect blunted. Mood euthymic. Fluent speech, perseverating on an accident he had that \"blew his brains out\". Smiles appropriately.        Office Visit on 08/14/2019   Component Date Value Ref Range Status    Glucose 08/14/2019 109* 65 - 99 mg/dL Final    BUN 08/14/2019 22  8 - 27 mg/dL Final    Creatinine 08/14/2019 1.38* 0.76 - 1.27 mg/dL Final    GFR est non-AA 08/14/2019 46* >59 mL/min/1.73 Final    GFR est AA 08/14/2019 53* >59 mL/min/1.73 Final    BUN/Creatinine ratio 08/14/2019 16  10 - 24 Final    Sodium 08/14/2019 137  134 - 144 mmol/L Final    Potassium 08/14/2019 3.8  3.5 - 5.2 mmol/L Final    Chloride 08/14/2019 97  96 - 106 mmol/L Final    CO2 08/14/2019 26  20 - 29 mmol/L Final    Calcium 08/14/2019 9.3  8.6 - 10.2 mg/dL Final    Protein, total 08/14/2019 7.4  6.0 - 8.5 g/dL Final    Albumin 08/14/2019 3.5  3.5 - 4.7 g/dL Final    GLOBULIN, TOTAL 08/14/2019 3.9  1.5 - 4.5 g/dL Final    A-G Ratio 08/14/2019 0.9* 1.2 - 2.2 Final  Bilirubin, total 08/14/2019 0.2  0.0 - 1.2 mg/dL Final    Alk. phosphatase 08/14/2019 98  39 - 117 IU/L Final    AST (SGOT) 08/14/2019 17  0 - 40 IU/L Final    ALT (SGPT) 08/14/2019 25  0 - 44 IU/L Final    Ferritin 08/14/2019 212  30 - 400 ng/mL Final    TIBC 08/14/2019 189* 250 - 450 ug/dL Final    UIBC 08/14/2019 165  111 - 343 ug/dL Final    Iron 08/14/2019 24* 38 - 169 ug/dL Final    Iron % saturation 08/14/2019 13* 15 - 55 % Final    Interpretation 08/14/2019 Note   Final    Comment: -------------------------------  CHRONIC KIDNEY DISEASE:  EGFR, BLOOD PRESSURE, AND PROTEINURIA ASSESSMENT  The overall regression of eGFR with time is not  statistically significant. Current eGFR is 46 mL/min/1.73mE2  corresponding to CKD stage 3a. Multiply eGFR by 1.159 if  patient is . Potassium is within goal and  has not changed significantly, was 4.0 and now is 3.8  mmol/L. EGFR, BLOOD PRESSURE, AND PROTEINURIA TREATMENT SUGGESTIONS  -  Given patient's advanced age, we are unable to provide  specific blood pressure treatment suggestions. Individualize  blood pressure goals based on the patient's comorbidities  and to avoid orthostatic hypotension and other medication  side effects. Assessment of albuminuria (urine  albumin:creatinine ratio or urine protein:creatinine ratio  preferred) is recommended at least annually in CKD patients  for staging and disease prognosis. EGFR, BLOOD PRESSURE, AND PROTEINURIA FOLLOW-UP  -  fasting Renal Panel within 12 months; Spot                            Urine Panel is  recommended by KDOQI guidelines, at least yearly;  -  BONE and MINERAL ASSESSMENT  Calcium is within goal and has not changed significantly,  was 9.5 and now is 9.3 mg/dL. Carbon Dioxide is within goal  and has not changed significantly, was 27 and now is 26  mmol/L. Guidelines recommend the measurement of 25-hydroxy  vitamin D in patients with CKD.   BONE and MINERAL TREATMENT SUGGESTIONS  -  Interpretations require simultaneous measurements of serum  calcium and phosphorus. BONE and MINERAL FOLLOW-UP  -  fasting PTH with Renal Panel and 25-Hydroxy Vitamin D are  recommended by KDOQI guidelines, at least yearly;  -  LIPIDS ASSESSMENT  Most recent order does not include a fasting Lipid Panel. LIPIDS FOLLOW-UP  -  fasting Lipid Panel within 7 months;  -  ANEMIA ASSESSMENT  TSAT is below goal, 13.0 %. Ferritin is within goal, 212  ng/mL. ANEMIA TREATMENT SUGGESTIONS  -  Iron deficiency may be present, evaluate clinically. In  absence of a current Hemoglobin, we are unab                           le to provide  current treatment suggestions. ANEMIA FOLLOW-UP  -  CBC within 3 weeks;  -------------------------------  DISCLAIMER  These assessments and treatment suggestions are provided as  a convenience in support of the physician-patient  relationship and are not intended to replace the physician's  clinical judgment. They are derived from national guidelines  in addition to other evidence and expert opinion. The  clinician should consider this information within the  context of clinical opinion and the individual patient. SEE GUIDANCE FOR CHRONIC KIDNEY DISEASE PROGRAM: Kidney  Disease Improving Global Outcomes (KDIGO) clinical practice  guidelines are at http://kdigo. org/home/guidelines/.  [address] [facility] Kidney Disease Outcomes Quality  Initiative (KDOQI (TM)), with its limitations and  disclaimers, are at www.kidney. org/professionals/KDOQI. This  program is intended for patients who have been diagnosed  with stages 3, 4, or pre-dialysis 5 CKD. It is not intended                             for children, pregnant patients, or transplant patients. Assessment/ Plan:   Diagnoses and all orders for this visit:    1. Essential hypertension  -     OTHER; Check blood pressure daily in the morning 20-60 minutes after blood pressure medications are taken.  Fax, mail or call in results to PCP, Dr. Sana Beatty. [phone]    2. Bradycardia    3. Pacemaker    4. Cardiomyopathy, unspecified type ([facility] [address]    5. Dementia without behavioral disturbance, unspecified dementia type ([facility] [address]    6. Cognitive impairment      Past medical history reviewed and medications refilled per orders. Blood pressure well controlled in office but not at home. Advised patient continue current metoprolol dose 25 mg and hydralazine to 23 mg 3 times daily for blood pressure management. Heart rate well controlled on current regimen. Dry skin likely reaction from recent edema after hospitalization. Start emollients 1-3 times daily as needed. Edema improved. Advised topical emollient to improve skin. Dementia stable. Reviewed prognosis with patient and daughter. Follow up with specialists per routine- cardiologist, neurologist    Educated patient on red flag symptoms to warrant return to clinic or emergency room visit. I have discussed the diagnosis with the patient and the intended plan as seen in the above orders. The patient has been offered or received an after-visit summary and questions were answered concerning future plans. I have discussed medication side effects and warnings with the patient as well. Total face to face encounter time 45 minutes; >50 of time spent counseling and coordinating care regarding management of HTN, bradycardia, dementia. Follow-up and Dispositions    · Return in about 3 months (around 1/11/2020) for Follow Up blood check.          Signed,    Moraima Rangel MD  10/11/2019

## 2019-10-11 NOTE — PATIENT INSTRUCTIONS
DASH Diet: Care Instructions Your Care Instructions The DASH diet is an eating plan that can help lower your blood pressure. DASH stands for Dietary Approaches to Stop Hypertension. Hypertension is high blood pressure. The DASH diet focuses on eating foods that are high in calcium, potassium, and magnesium. These nutrients can lower blood pressure. The foods that are highest in these nutrients are fruits, vegetables, low-fat dairy products, nuts, seeds, and legumes. But taking calcium, potassium, and magnesium supplements instead of eating foods that are high in those nutrients does not have the same effect. The DASH diet also includes whole grains, fish, and poultry. The DASH diet is one of several lifestyle changes your doctor may recommend to lower your high blood pressure. Your doctor may also want you to decrease the amount of sodium in your diet. Lowering sodium while following the DASH diet can lower blood pressure even further than just the DASH diet alone. Follow-up care is a key part of your treatment and safety. Be sure to make and go to all appointments, and call your doctor if you are having problems. It's also a good idea to know your test results and keep a list of the medicines you take. How can you care for yourself at home? Following the DASH diet · Eat 4 to 5 servings of fruit each day. A serving is 1 medium-sized piece of fruit, ½ cup chopped or canned fruit, 1/4 cup dried fruit, or 4 ounces (½ cup) of fruit juice. Choose fruit more often than fruit juice. · Eat 4 to 5 servings of vegetables each day. A serving is 1 cup of lettuce or raw leafy vegetables, ½ cup of chopped or cooked vegetables, or 4 ounces (½ cup) of vegetable juice. Choose vegetables more often than vegetable juice. · Get 2 to 3 servings of low-fat and fat-free dairy each day. A serving is 8 ounces of milk, 1 cup of yogurt, or 1 ½ ounces of cheese. · Eat 6 to 8 servings of grains each day. A serving is 1 slice of bread, 1 ounce of dry cereal, or ½ cup of cooked rice, pasta, or cooked cereal. Try to choose whole-grain products as much as possible. · Limit lean meat, poultry, and fish to 2 servings each day. A serving is 3 ounces, about the size of a deck of cards. · Eat 4 to 5 servings of nuts, seeds, and legumes (cooked dried beans, lentils, and split peas) each week. A serving is 1/3 cup of nuts, 2 tablespoons of seeds, or ½ cup of cooked beans or peas. · Limit fats and oils to 2 to 3 servings each day. A serving is 1 teaspoon of vegetable oil or 2 tablespoons of salad dressing. · Limit sweets and added sugars to 5 servings or less a week. A serving is 1 tablespoon jelly or jam, ½ cup sorbet, or 1 cup of lemonade. · Eat less than 2,300 milligrams (mg) of sodium a day. If you limit your sodium to 1,500 mg a day, you can lower your blood pressure even more. Tips for success · Start small. Do not try to make dramatic changes to your diet all at once. You might feel that you are missing out on your favorite foods and then be more likely to not follow the plan. Make small changes, and stick with them. Once those changes become habit, add a few more changes. · Try some of the following: ? Make it a goal to eat a fruit or vegetable at every meal and at snacks. This will make it easy to get the recommended amount of fruits and vegetables each day. ? Try yogurt topped with fruit and nuts for a snack or healthy dessert. ? Add lettuce, tomato, cucumber, and onion to sandwiches. ? Combine a ready-made pizza crust with low-fat mozzarella cheese and lots of vegetable toppings. Try using tomatoes, squash, spinach, broccoli, carrots, cauliflower, and onions. ? Have a variety of cut-up vegetables with a low-fat dip as an appetizer instead of chips and dip. ? Sprinkle sunflower seeds or chopped almonds over salads.  Or try adding chopped walnuts or almonds to cooked vegetables. ? Try some vegetarian meals using beans and peas. Add garbanzo or kidney beans to salads. Make burritos and tacos with mashed murillo beans or black beans. Where can you learn more? Go to http://erin-nikole.info/. Enter F934 in the search box to learn more about \"DASH Diet: Care Instructions. \" Current as of: April 9, 2019 Content Version: 12.2 © 0007-5939 [employer]. Care instructions adapted under license by [employer] (which disclaims liability or warranty for this information). If you have questions about a medical condition or this instruction, always ask your healthcare professional. [address] any warranty or liability for your use of this information.

## 2019-10-11 NOTE — PROGRESS NOTES
Chief Complaint   Patient presents with    Hypertension     follow up     1. Have you been to the ER, urgent care clinic since your last visit? Hospitalized since your last visit? No    2. Have you seen or consulted any other health care providers outside of the [address] [place] [address] since your last visit? Include any pap smears or colon screening.  No

## 2019-10-11 NOTE — TELEPHONE ENCOUNTER
Called Marva owner of house that patient lives. They use mail order pharmacy which is updated in chart. Which is why original RX was not picked up. Can you please send the script to updated pharmacy medication is pended.

## 2019-10-12 LAB
BASOPHILS # BLD AUTO: 0 X10E3/UL (ref 0–0.2)
BASOPHILS NFR BLD AUTO: 1 %
BUN SERPL-MCNC: 19 MG/DL (ref 8–27)
BUN/CREAT SERPL: 14 (ref 10–24)
CALCIUM SERPL-MCNC: 9.2 MG/DL (ref 8.6–10.2)
CHLORIDE SERPL-SCNC: 93 MMOL/L (ref 96–106)
CO2 SERPL-SCNC: 26 MMOL/L (ref 20–29)
CREAT SERPL-MCNC: 1.37 MG/DL (ref 0.76–1.27)
EOSINOPHIL # BLD AUTO: 0.3 X10E3/UL (ref 0–0.4)
EOSINOPHIL NFR BLD AUTO: 4 %
ERYTHROCYTE [DISTWIDTH] IN BLOOD BY AUTOMATED COUNT: 15 % (ref 12.3–15.4)
GLUCOSE SERPL-MCNC: 135 MG/DL (ref 65–99)
HCT VFR BLD AUTO: 32.2 % (ref 37.5–51)
HGB BLD-MCNC: 11 G/DL (ref 13–17.7)
IMM GRANULOCYTES # BLD AUTO: 0 X10E3/UL (ref 0–0.1)
IMM GRANULOCYTES NFR BLD AUTO: 0 %
INTERPRETATION: NORMAL
LYMPHOCYTES # BLD AUTO: 2.1 X10E3/UL (ref 0.7–3.1)
LYMPHOCYTES NFR BLD AUTO: 31 %
MCH RBC QN AUTO: 28.9 PG (ref 26.6–33)
MCHC RBC AUTO-ENTMCNC: 34.2 G/DL (ref 31.5–35.7)
MCV RBC AUTO: 85 FL (ref 79–97)
MONOCYTES # BLD AUTO: 0.7 X10E3/UL (ref 0.1–0.9)
MONOCYTES NFR BLD AUTO: 10 %
NEUTROPHILS # BLD AUTO: 3.7 X10E3/UL (ref 1.4–7)
NEUTROPHILS NFR BLD AUTO: 54 %
PLATELET # BLD AUTO: 205 X10E3/UL (ref 150–450)
POTASSIUM SERPL-SCNC: 3.9 MMOL/L (ref 3.5–5.2)
RBC # BLD AUTO: 3.81 X10E6/UL (ref 4.14–5.8)
SODIUM SERPL-SCNC: 134 MMOL/L (ref 134–144)
WBC # BLD AUTO: 6.7 X10E3/UL (ref 3.4–10.8)

## 2019-10-13 RX ORDER — HYDRALAZINE HYDROCHLORIDE 25 MG/1
25 TABLET, FILM COATED ORAL 3 TIMES DAILY
Qty: 270 TAB | Refills: 0 | Status: SHIPPED | OUTPATIENT
Start: 2019-10-13 | End: 2020-10-01 | Stop reason: SDUPTHER

## 2019-10-15 ENCOUNTER — TELEPHONE (OUTPATIENT)
Dept: CARDIOLOGY CLINIC | Age: 84
End: 2019-10-15

## 2019-10-15 NOTE — TELEPHONE ENCOUNTER
Due to Dr. Asif Estimable procedure schedule patient procedure time for 10/18/19 needs to be pushed back to 3:00 pm start time. Patient may arrive around 1:30/2:00 pm to admitting department. Attempted to reach patient by telephone. A message was left for return call.

## 2019-10-15 NOTE — TELEPHONE ENCOUNTER
Verified patient with two types of identifiers. Spoke with patient's daughter and notified of change in timing. Patient's daughter verbalized understanding and will call with any other questions.

## 2019-10-15 NOTE — PROGRESS NOTES
Mild anemia, slightly decreased compared to 6 months ago. Cr elevated, but at or below baseline. Glucose 135. OK to proceed with upcoming procedure as planned.

## 2019-10-18 ENCOUNTER — HOSPITAL ENCOUNTER (OUTPATIENT)
Age: 84
Setting detail: OUTPATIENT SURGERY
Discharge: HOME | End: 2019-10-18
Attending: INTERNAL MEDICINE | Admitting: INTERNAL MEDICINE
Payer: MEDICARE

## 2019-10-18 ENCOUNTER — APPOINTMENT (OUTPATIENT)
Dept: GENERAL RADIOLOGY | Age: 84
End: 2019-10-18
Attending: NURSE PRACTITIONER
Payer: MEDICARE

## 2019-10-18 VITALS
TEMPERATURE: 97.7 F | HEIGHT: 66 IN | DIASTOLIC BLOOD PRESSURE: 78 MMHG | OXYGEN SATURATION: 99 % | HEART RATE: 72 BPM | RESPIRATION RATE: 16 BRPM | SYSTOLIC BLOOD PRESSURE: 179 MMHG | WEIGHT: 182 LBS | BODY MASS INDEX: 29.25 KG/M2

## 2019-10-18 DIAGNOSIS — Z95.0 S/P CARDIAC PACEMAKER PROCEDURE: Primary | ICD-10-CM

## 2019-10-18 DIAGNOSIS — I44.1 MOBITZ TYPE II ATRIOVENTRICULAR BLOCK: ICD-10-CM

## 2019-10-18 PROBLEM — T82.120A ATRIAL PACEMAKER LEAD DISPLACEMENT: Status: ACTIVE | Noted: 2019-10-18

## 2019-10-18 PROCEDURE — C1894 INTRO/SHEATH, NON-LASER: HCPCS | Performed by: INTERNAL MEDICINE

## 2019-10-18 PROCEDURE — 33235 REMOVAL PACEMAKER ELECTRODE: CPT | Performed by: INTERNAL MEDICINE

## 2019-10-18 PROCEDURE — 99152 MOD SED SAME PHYS/QHP 5/>YRS: CPT | Performed by: INTERNAL MEDICINE

## 2019-10-18 PROCEDURE — 33234 REMOVAL OF PACEMAKER SYSTEM: CPT | Performed by: INTERNAL MEDICINE

## 2019-10-18 PROCEDURE — C1893 INTRO/SHEATH, FIXED,NON-PEEL: HCPCS | Performed by: INTERNAL MEDICINE

## 2019-10-18 PROCEDURE — 77030031139 HC SUT VCRL2 J&J -A: Performed by: INTERNAL MEDICINE

## 2019-10-18 PROCEDURE — 99153 MOD SED SAME PHYS/QHP EA: CPT | Performed by: INTERNAL MEDICINE

## 2019-10-18 PROCEDURE — C1892 INTRO/SHEATH,FIXED,PEEL-AWAY: HCPCS | Performed by: INTERNAL MEDICINE

## 2019-10-18 PROCEDURE — 74011636320 HC RX REV CODE- 636/320: Performed by: INTERNAL MEDICINE

## 2019-10-18 PROCEDURE — C1769 GUIDE WIRE: HCPCS | Performed by: INTERNAL MEDICINE

## 2019-10-18 PROCEDURE — C1898 LEAD, PMKR, OTHER THAN TRANS: HCPCS | Performed by: INTERNAL MEDICINE

## 2019-10-18 PROCEDURE — L0628 LSO FLEX NO RI STAYS PRE OTS: HCPCS | Performed by: INTERNAL MEDICINE

## 2019-10-18 PROCEDURE — 33206 INSERT HEART PM ATRIAL: CPT | Performed by: INTERNAL MEDICINE

## 2019-10-18 PROCEDURE — 77030018673: Performed by: INTERNAL MEDICINE

## 2019-10-18 PROCEDURE — 77030039046 HC PAD DEFIB RADIOTRNSPNT CNMD -B: Performed by: INTERNAL MEDICINE

## 2019-10-18 PROCEDURE — 74011250636 HC RX REV CODE- 250/636: Performed by: INTERNAL MEDICINE

## 2019-10-18 PROCEDURE — 71045 X-RAY EXAM CHEST 1 VIEW: CPT

## 2019-10-18 PROCEDURE — 33216 INSERT 1 ELECTRODE PM-DEFIB: CPT | Performed by: INTERNAL MEDICINE

## 2019-10-18 PROCEDURE — 77030028700 HC BLD TISS PLSM MEDT -E: Performed by: INTERNAL MEDICINE

## 2019-10-18 PROCEDURE — 77030032060 HC PWDR HEMSTAT ARISTA ASRB 3GM BARD -C: Performed by: INTERNAL MEDICINE

## 2019-10-18 PROCEDURE — 74011000250 HC RX REV CODE- 250: Performed by: INTERNAL MEDICINE

## 2019-10-18 PROCEDURE — 77030018547 HC SUT ETHBND1 J&J -B: Performed by: INTERNAL MEDICINE

## 2019-10-18 DEVICE — LEAD PACE BPLR 6 FRX45 CM STR TIP IS-1 CAPSUR FIX NOVUS: Type: IMPLANTABLE DEVICE | Status: FUNCTIONAL

## 2019-10-18 RX ORDER — CEFAZOLIN SODIUM/WATER 2 G/20 ML
2 SYRINGE (ML) INTRAVENOUS ONCE
Status: COMPLETED | OUTPATIENT
Start: 2019-10-18 | End: 2019-10-18

## 2019-10-18 RX ORDER — SODIUM CHLORIDE 0.9 % (FLUSH) 0.9 %
5-40 SYRINGE (ML) INJECTION AS NEEDED
Status: DISCONTINUED | OUTPATIENT
Start: 2019-10-18 | End: 2019-10-18 | Stop reason: HOSPADM

## 2019-10-18 RX ORDER — LIDOCAINE HYDROCHLORIDE AND EPINEPHRINE 10; 10 MG/ML; UG/ML
INJECTION, SOLUTION INFILTRATION; PERINEURAL AS NEEDED
Status: DISCONTINUED | OUTPATIENT
Start: 2019-10-18 | End: 2019-10-18 | Stop reason: HOSPADM

## 2019-10-18 RX ORDER — FENTANYL CITRATE 50 UG/ML
INJECTION, SOLUTION INTRAMUSCULAR; INTRAVENOUS AS NEEDED
Status: DISCONTINUED | OUTPATIENT
Start: 2019-10-18 | End: 2019-10-18 | Stop reason: HOSPADM

## 2019-10-18 RX ORDER — ONDANSETRON 2 MG/ML
4 INJECTION INTRAMUSCULAR; INTRAVENOUS
Status: DISCONTINUED | OUTPATIENT
Start: 2019-10-18 | End: 2019-10-18 | Stop reason: HOSPADM

## 2019-10-18 RX ORDER — SODIUM CHLORIDE 0.9 % (FLUSH) 0.9 %
5-40 SYRINGE (ML) INJECTION EVERY 8 HOURS
Status: DISCONTINUED | OUTPATIENT
Start: 2019-10-18 | End: 2019-10-18 | Stop reason: HOSPADM

## 2019-10-18 RX ORDER — VANCOMYCIN HYDROCHLORIDE 1 G/20ML
1 INJECTION, POWDER, LYOPHILIZED, FOR SOLUTION INTRAVENOUS ONCE
Status: COMPLETED | OUTPATIENT
Start: 2019-10-18 | End: 2019-10-18

## 2019-10-18 RX ORDER — CEFAZOLIN SODIUM/WATER 2 G/20 ML
2 SYRINGE (ML) INTRAVENOUS ONCE
Status: DISCONTINUED | OUTPATIENT
Start: 2019-10-18 | End: 2019-10-18 | Stop reason: SDUPTHER

## 2019-10-18 RX ORDER — MIDAZOLAM HYDROCHLORIDE 1 MG/ML
INJECTION, SOLUTION INTRAMUSCULAR; INTRAVENOUS AS NEEDED
Status: DISCONTINUED | OUTPATIENT
Start: 2019-10-18 | End: 2019-10-18 | Stop reason: HOSPADM

## 2019-10-18 RX ORDER — CEPHALEXIN 250 MG/1
250 CAPSULE ORAL 3 TIMES DAILY
Qty: 15 CAP | Refills: 0 | Status: SHIPPED | OUTPATIENT
Start: 2019-10-18 | End: 2019-10-23

## 2019-10-18 RX ORDER — ACETAMINOPHEN 325 MG/1
650 TABLET ORAL
Status: DISCONTINUED | OUTPATIENT
Start: 2019-10-18 | End: 2019-10-18 | Stop reason: HOSPADM

## 2019-10-18 RX ORDER — LIDOCAINE HYDROCHLORIDE 10 MG/ML
INJECTION INFILTRATION; PERINEURAL AS NEEDED
Status: DISCONTINUED | OUTPATIENT
Start: 2019-10-18 | End: 2019-10-18 | Stop reason: HOSPADM

## 2019-10-18 RX ORDER — HYDROCODONE BITARTRATE AND ACETAMINOPHEN 5; 325 MG/1; MG/1
1 TABLET ORAL
Status: DISCONTINUED | OUTPATIENT
Start: 2019-10-18 | End: 2019-10-18 | Stop reason: HOSPADM

## 2019-10-18 RX ADMIN — VANCOMYCIN HYDROCHLORIDE 1000 MG: 1 INJECTION, POWDER, LYOPHILIZED, FOR SOLUTION INTRAVENOUS at 15:49

## 2019-10-18 NOTE — Clinical Note
Dr Benson Fall ordered to only have steristrips placed over incision. No telfa or tegeaderm.  Per MD

## 2019-10-18 NOTE — PROCEDURES
Cardiac Procedure Note   Patient: Camryn Cope  MRN: [id number]  SSN: [id number]   YOB: 1933 Age: 80 y.o.   Sex: male    Date of Procedure: 10/18/2019   Pre-procedure Diagnosis: RA lead dislodgement, dual chamber pacer  Post-procedure Diagnosis:  same  Procedure: dislodged RA lead removal  Implantation of a new pacemaker RA lead  Venogram showed partial right subclavian vein stenosis  :  Dr. Meagan Echeverria MD  Assistant(s):  None  Anesthesia: Moderate Sedation   Estimated Blood Loss: Less than 30 mL   Specimens Removed: None  Findings: RA old lead does not stay despite several positioning  New RA lead placed in RA lateral wall  Complications: None   Implants new RA pacing lead  Signed by:  Meagan Echeverria MD  10/18/2019  4:18 PM

## 2019-10-18 NOTE — Clinical Note
A Bovie was used. Mode: bipolar. Coagulation Settin. Cut Settin. Site (pad location): lateral thigh. Laterality: left.

## 2019-10-18 NOTE — DISCHARGE INSTRUCTIONS
PATIENT INSTRUCTIONS POST-PACEMAKER LEAD REPOSITION    1. No heavy lifting or exercises with the left arm for 4 weeks. This includes the following:  Do not raise arm above the shoulder level to comb hair, pull on clothes, etc..  Do not use the affected arm to pull up or push up from a seated or laying  down position. Do not allow anyone else to pull on the affected arm. 2.  Steri strips should remain in place for approximately 1 week. They are typically removed in clinic at follow up. Keep site clean & dry. No showers until after follow up. 3.  Do not drive for 3 days. 4.  Call Dr. Mabel Dooley at [phone] if you experience any of the following symptoms:  1. Redness at the pacemaker site  2. Swelling at or around the pacemaker or in the left arm  3. Pain around the pacemaker  4. Dizziness, lightheadedness, fainting spells  5. Lack of energy  6. Shortness of breath  7. Rapid heart rate  8. Chest or muscle twitches    5. Follow-up with Dr. Mabel Dooley as scheduled. Future Appointments   Date Time Provider Jorje Ceron   10/25/2019  9:15 AM PACEMAKER3, [address]   10/25/2019  9:30 AM WOUND CHECK, [place] CAVLYNN GRAYSON SCHED   11/20/2019  1:15 PM REMOTE1, [address]   1/20/2020  3:30 PM Stef Thomson  [facility]   2/26/2020 10:00 AM CAT [place] ANDIE GRAYSON SCHED   6/3/2020  8:15 AM REMOTE1, [place] CAVREY GRAYSON SCHED   9/8/2020  1:45 PM PACEMAKER3, [address]   9/8/2020  2:00 PM Suzanna Morton  [address]     6. You may use pain medication and ice pack for pain relief as needed. You may wear the sling as a reminder to keep your arm below the your shoulder. 7.  A prescription for antibiotics was sent electronically to your pharmacy on record. Please pick it up & take as directed. Praveen Ashley M.D.  [facility]  Electrophysiology/Cardiology  [facility] [place]  [address], [place] [facility] [place] [facility] [address] [facility] Veronika Singh, [address] [place] [address]  [phone]

## 2019-10-18 NOTE — PROGRESS NOTES
TRANSFER - OUT REPORT:    Verbal report given to Hardik Ramirez RN on Samuel Kaplan being transferred to cath lab recovery for routine progression of care       Report consisted of patients Situation, Background, Assessment and   Recommendations(SBAR). Information from the following report(s) Procedure Summary was reviewed with the receiving nurse. Opportunity for questions and clarification was provided. Cardiac Cath Lab Procedure Area Arrival Note:    Samuel Showmahsa arrived to Cardiac Cath Lab, Procedure Area. Patient identifiers verified with NAME and DATE OF BIRTH. Procedure verified with patient. Consent forms verified. Allergies verified. Patient informed of procedure and plan of care. Questions answered with review. Patient voiced understanding of procedure and plan of care. Patient on cardiac monitor, non-invasive blood pressure, SPO2 monitor. On  or O2 @ 2 lpm via nasal canula. IV of 0.9% NS on pump at 25 ml/hr. Patient status doing well without problems. Patient is A&Ox 4. Patient reports no pain. Patient medicated during procedure with orders obtained and verified by Dr. Sanya Beckham. Refer to patients Cardiac Cath Lab PROCEDURE REPORT for vital signs, assessment, status, and response during procedure, printed at end of case. Printed report on chart or scanned into chart.

## 2019-10-18 NOTE — PROGRESS NOTES
Cardiac Cath Lab Recovery Arrival Note:      Conchis Madison arrived to Cardiac Cath Lab, Recovery Area. Staff introduced to patient. Patient identifiers verified with NAME and DATE OF BIRTH. Procedure verified with patient. Consent forms reviewed and signed by patient or authorized representative and verified. Allergies verified. Patient and family oriented to department. Patient and family informed of procedure and plan of care. Questions answered with review. Patient prepped for procedure, per orders from physician, prior to arrival.    Patient on cardiac monitor, non-invasive blood pressure, SPO2 monitor. On Room Air. Patient is A&Ox 1. Patient reports No pain. Patient in stretcher, in low position, with side rails up, call bell within reach, patient instructed to call if assistance as needed. Patient prep in: [address], [place] [address]. Family in: Waiting Room.    Prep by: Kate Hammond RN

## 2019-10-18 NOTE — Clinical Note
A venogram was performed on the right subclavian. Injected with single hand injection. Injection volume  = 10 mL.

## 2019-10-18 NOTE — H&P
Cardiac Electrophysiology H&P Note     Subjective:      Candy Bose is a 80 y.o. patient who presents today for planned atrial lead reposition of Medtronic dual chamber pacemaker (DOI 03/22/2019). He was last seen in clinic on 07/23/2019, RA lead impedance was high. AP 80.9%, RVP 87.1%. Estimated 3.6 years generator longevity. 2 episodes NSVT vs PAT, longest 1 second. CXR showed that atrial lead had fallen to the tricuspid area. He denies significant changes in medical history or hospitalizations since his visit on 07/23/2019.       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 Admitted on 03/19/2019 with progressive lower extremity edema, fatigue, & CARREON x 3 weeks per family. Hira Newsome reported fall 3 weeks prior to weakness & poor balance. History HTN, on losartan 100 mg po daily & Toprol XL 25 mg po daily PTA.     Denies family history of early CAD, arrhythmia, or valve disease.     Recent UTI.     Hx prostate CA.     Some medication noncompliance.     Diagnosed with Alzheimer's dementia January 2019.  Currently living independently, was advised by Dr. Cristela Coburn (neuro) that this is unsafe.           Problem List  Date Reviewed: 10/11/2019          Codes Class Noted    Pacemaker ICD-10-CM: Z95.0  ICD-9-CM: V45.01  3/22/2019    Overview Signed 3/22/2019  5:08 PM by Jacquie Arzate MD     3/22/2019 dual chamber Medtronic pacer             Bradycardia ICD-10-CM: R00.1  ICD-9-CM: 427.89  3/19/2019        Thrombocytopenia (HCC) ICD-10-CM: D69.6  ICD-9-CM: 287.5  3/19/2019        Elevated troponin ICD-10-CM: R79.89  ICD-9-CM: 790.6  3/19/2019        Hypertensive urgency ICD-10-CM: I16.0  ICD-9-CM: 401.9  3/19/2019        Second degree AV block, Mobitz type I ICD-10-CM: I44.1  ICD-9-CM: 426.13  3/19/2019    Overview Signed 3/21/2019  6:14 PM by Jacquie Arzate MD     Added automatically from request for surgery [id number]             Stage 3 chronic kidney disease [facility]) ICD-10-CM: N18.3  ICD-9-CM: 585.3  3/15/2019        Rotator cuff arthropathy of both shoulders ICD-10-CM: M12.811, M12.812  ICD-9-CM: 716.81  3/15/2019        Cognitive impairment ICD-10-CM: R41.89  ICD-9-CM: 294.9  7/7/7819        Systolic murmur [address]: R01.1  ICD-9-CM: 785.2  12/30/2018        Essential hypertension ICD-10-CM: I10  ICD-9-CM: 401.9  12/17/2018        Gout ICD-10-CM: M10.9  ICD-9-CM: 274.9  12/17/2018        Pure hypercholesterolemia ICD-10-CM: E78.00  ICD-9-CM: 272.0  12/17/2018        History of prostate cancer ICD-10-CM: Z85.46  ICD-9-CM: V10.46  12/17/2018        Chronic constipation ICD-10-CM: K59.09  ICD-9-CM: 564.00  12/17/2018        Dysuria ICD-10-CM: R30.0  ICD-9-CM: 788.1  8/8/2017        Weight loss ICD-10-CM: R63.4  ICD-9-CM: 783.21  8/8/2017        Adrenal mass ([facility] [address] ICD-10-CM: E27.8  ICD-9-CM: 255.8  7/18/2017    Overview Addendum 12/17/2018  9:57 AM by Erna Aleman MD     Stable/non-functioning adenoma on imaging                     Current Facility-Administered Medications   Medication Dose Route Frequency Provider Last Rate Last Dose    bacitracin 50,000 Units in sodium chloride irrigation 0.9 % 500 mL irrigation solution  50,000 Units Irrigation Alli Tavarez MD        ceFAZolin (ANCEF) 2 g/20 mL in sterile water IV syringe  2 g IntraVENous Alli Tavarez MD         No Known Allergies  Past Medical History:   Diagnosis Date    Cancer ([facility] [address]     prostate    Constipation     Gout     Hyperlipemia     Hypertension     Pacemaker 2019     Past Surgical History:   Procedure Laterality Date    HX PROSTATECTOMY      HX UROLOGICAL      prostate removed    ND INS NEW/RPLCMT PRM PM W/TRANSV ELTRD ATRIAL&VENT Right 3/22/2019    [facility]  CATH LAB     Family History   Problem Relation Age of Onset    No Known Problems Mother     No Known Problems Father      Social History     Tobacco Use    Smoking status: Never Smoker    Smokeless tobacco: Never Used   Substance Use Topics    Alcohol use: No        Review of Systems:   Constitutional: Negative for fever, chills, weight loss, malaise/fatigue. HEENT: Negative for nosebleeds, vision changes. Respiratory: Negative for cough, hemoptysis  Cardiovascular: Negative for chest pain, palpitations, orthopnea, claudication, leg swelling, syncope, and PND. Gastrointestinal: Negative for nausea, vomiting, diarrhea, blood in stool and melena. Genitourinary: Negative for dysuria, and hematuria. Musculoskeletal: Negative for myalgias, arthralgia. Skin: Negative for rash. Heme: Does not bleed or bruise easily. Neurological: Negative for speech change and focal weakness. Ambulates with walker. Psychological: Mild dementia. Objective:     Visit Vitals  Ht 5' 6\" (1.676 m)   Wt 182 lb (82.6 kg)   BMI 29.38 kg/m²      Physical Exam:   Constitutional: Well-developed and well-nourished. No respiratory distress. Head: Normocephalic and atraumatic. Eyes: Pupils are equal, round  ENT: Hearing normal  Neck: Supple. No JVD present. Cardiovascular: Normal rate, regular rhythm. Exam reveals no gallop and no friction rub. No murmur heard. Pulmonary/Chest: Effort normal and breath sounds normal. No wheezes. Abdominal: Soft, no tenderness. Musculoskeletal: No edema. Neurological: Alert,oriented. Skin: Skin is warm and dry. Left chest pacer site well healed. Psychiatric: Normal mood and affect. Behavior is normal. Judgment and thought content normal.        Assessment/Plan:   Mr. Sofy Sanchez has Medtronic dual chamber pacemaker; CXR confirmed RA lead displacement. Risks/benefits of atrial lead repositioning reviewed, & patient would like to proceed as scheduled.     SHANNON Hebert [address] [facility] [place]  10/18/19    Addendum from EP attending:   I have seen, examined patient, and discussed with nurse practitioner, registered nurse, reviewed, updated note and agree with the assessment and plan    I have talked to him and his daughter  No new symptoms  RA lead is known to be dislodged  Vital signs are stable  Exam shows regular rhythm and no rub  Chest wall without swelling in pacer pocket. There is no redness  Assessment and Plan:  Dual chamber pacer in March 2019 and RA lead is dislodged  He agrees to reposition or new RA lead implant if the active helix no longer works      Thank you for involving me in this patient's care and please call with further concerns or questions. Zeina Lemus M.D.   Electrophysiology/Cardiology  [facility] and [facility] [place]  [address], [address] [place] [address] 91  [address] [facility], [address] [place] [address]  [phone]

## 2019-10-25 ENCOUNTER — CLINICAL SUPPORT (OUTPATIENT)
Dept: CARDIOLOGY CLINIC | Age: 84
End: 2019-10-25

## 2019-10-25 DIAGNOSIS — Z95.0 CARDIAC PACEMAKER IN SITU: Primary | ICD-10-CM

## 2019-10-28 ENCOUNTER — CLINICAL SUPPORT (OUTPATIENT)
Dept: CARDIOLOGY CLINIC | Age: 84
End: 2019-10-28

## 2019-10-28 DIAGNOSIS — Z95.0 CARDIAC PACEMAKER IN SITU: Primary | ICD-10-CM

## 2019-10-28 NOTE — PROGRESS NOTES
Cardiac Electrophysiology Wound Check Note      Wound Check   Patient is here for wound check from . No fever, drainage   Physical Exam   Constitutional: well-developed and well-nourished. Skin: Left side pocket is healing without redness, drainage, hematoma. The wound is intact and edges approximated. Dressing and steri strips removed. ASSESSMENT and PLAN   The incision is healing without redness, drainage, hematoma. Site intact. The patient has finished their anti-biotic and been compliant with arm restrictions. They will continue arms restrictions for 3 more weeks.   current treatment plan is effective, no change in therapy   Device check shows proper lead and generator functions    Follow-up Disposition:  Return 3 months I will check via device clinic or remote monitoring in the future

## 2019-10-29 ENCOUNTER — OFFICE VISIT (OUTPATIENT)
Dept: CARDIOLOGY CLINIC | Age: 84
End: 2019-10-29

## 2019-10-29 ENCOUNTER — TELEPHONE (OUTPATIENT)
Dept: CARDIOLOGY CLINIC | Age: 84
End: 2019-10-29

## 2019-10-29 DIAGNOSIS — Z95.0 CARDIAC PACEMAKER IN SITU: Primary | ICD-10-CM

## 2019-10-29 NOTE — TELEPHONE ENCOUNTER
Patient's daughter called back again per Nohemy Mar. Notified Keron Contreras to inform daughter that if site is still draining to please have patient come into the office this morning for MD assessment. Carlita to convey message to daughter.

## 2019-10-29 NOTE — TELEPHONE ENCOUNTER
Patient's daughter stated that she got a call from her father's facility that there is blood on the patient's site. Please advise.     Phone #: [phone]  Thanks

## 2019-12-27 ENCOUNTER — TELEPHONE (OUTPATIENT)
Dept: FAMILY MEDICINE CLINIC | Age: 84
End: 2019-12-27

## 2019-12-27 NOTE — TELEPHONE ENCOUNTER
----- Message from Galo Carlton sent at 12/27/2019  2:35 PM EST -----  Regarding: Dr. Lopez Lobe: [phone]  Caller's first and last name: Erick Reyes, daughter   Reason for call: Mandy Quang required yes/no and why: Yes, Agnes Graham wants to know if Dr. Shilpi Byrd nurse is able to assist with filling out paperwork for long term care for the pt.   Best contact number(s): [phone]  Details to clarify the request:

## 2019-12-30 NOTE — TELEPHONE ENCOUNTER
----- Message from Sanjana Kim sent at 12/30/2019  2:34 PM EST -----  Regarding: Dr. Iron Wallace telephone  Patient return call    Caller's first and last name and relationship (if not the patient): Elaina Pantoja daughter       Best contact number(s):[phone]      Whose call is being returned: Chao Bianchi,       Details to clarify the request:      Sanjana Kim

## 2019-12-30 NOTE — TELEPHONE ENCOUNTER
Call placed to patients daughter. Patients name and  verified. She stated that she needs forms completed for father to go to LTC. Forms are basic with provider information. She stated she would drop them off at the office. Notified that an appt may be needed.

## 2020-01-30 ENCOUNTER — OFFICE VISIT (OUTPATIENT)
Dept: CARDIOLOGY CLINIC | Age: 85
End: 2020-01-30

## 2020-01-30 ENCOUNTER — CLINICAL SUPPORT (OUTPATIENT)
Dept: CARDIOLOGY CLINIC | Age: 85
End: 2020-01-30

## 2020-01-30 VITALS
BODY MASS INDEX: 27.8 KG/M2 | SYSTOLIC BLOOD PRESSURE: 120 MMHG | DIASTOLIC BLOOD PRESSURE: 76 MMHG | WEIGHT: 173 LBS | HEIGHT: 66 IN | HEART RATE: 83 BPM

## 2020-01-30 DIAGNOSIS — I44.1 MOBITZ TYPE II ATRIOVENTRICULAR BLOCK: ICD-10-CM

## 2020-01-30 DIAGNOSIS — Z95.0 CARDIAC PACEMAKER IN SITU: Primary | ICD-10-CM

## 2020-01-30 DIAGNOSIS — N18.30 STAGE 3 CHRONIC KIDNEY DISEASE (HCC): ICD-10-CM

## 2020-01-30 DIAGNOSIS — I10 ESSENTIAL HYPERTENSION: ICD-10-CM

## 2020-01-31 NOTE — PROGRESS NOTES
Cardiac Electrophysiology H&P Note     Subjective:      Mattie Mortensen is a 80 y.o. patient who presents today for follow up on dual chamber pacemaker with his daughter  7/2019 he had planned atrial lead replacement of Medtronic dual chamber pacemaker (DOI 03/22/2019) due to lead dislodgement. CXR showed that atrial lead had fallen to the tricuspid area. He denies significant changes in symptoms  No chest pain, orthopnea, PND, dizziness or syncope.       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 Admitted on 03/19/2019 with progressive lower extremity edema, fatigue, & CARREON x 3 weeks per family. Anoop Cornell reported fall 3 weeks prior to weakness & poor balance. History HTN, on losartan 100 mg po daily & Toprol XL 25 mg po daily PTA.     Denies family history of early CAD, arrhythmia, or valve disease.     Recent UTI.     Hx prostate CA.     Some medication noncompliance.     Diagnosed with Alzheimer's dementia January 2019.  Currently living independently, was advised by Dr. Flori Toure (neuro) that this is unsafe. 03/19/19   ECHO ADULT COMPLETE 03/21/2019 3/21/2019    Narrative · Estimated left ventricular ejection fraction is 56 - 60%. Left   ventricular mild concentric hypertrophy. · Mild to moderate aortic valve stenosis is present. . Aortic valve leaflet   calcification present. · Mild to moderate mitral valve regurgitation. · Mild to moderate tricuspid valve regurgitation is present. Severe   pulmonary hypertension is present. · There is a large left pleural effusion.      YOAN 1.4 cm2 by VTI   Mean gradient 20 mm Hg       Signed by: Nelsy Lyles MD         Problem List  Date Reviewed: 1/30/2020          Codes Class Noted    Atrial pacemaker lead displacement ICD-10-CM: T82.120A  ICD-9-CM: 996.01  10/18/2019        Pacemaker ICD-10-CM: Z95.0  ICD-9-CM: V45.01  3/22/2019    Overview Signed 3/22/2019  5:08 PM by Gisele Grover MD     3/22/2019 dual chamber Medtronic pacer             Bradycardia ICD-10-CM: R00.1  ICD-9-CM: 427.89  3/19/2019        Thrombocytopenia (HCC) ICD-10-CM: D69.6  ICD-9-CM: 287.5  3/19/2019        Elevated troponin ICD-10-CM: R79.89  ICD-9-CM: 790.6  3/19/2019        Hypertensive urgency ICD-10-CM: I16.0  ICD-9-CM: 401.9  3/19/2019        Second degree AV block, Mobitz type I ICD-10-CM: I44.1  ICD-9-CM: 426.13  3/19/2019    Overview Signed 3/21/2019  6:14 PM by Migdalia Jaffe MD     Added automatically from request for surgery [id number]             Stage 3 chronic kidney disease ([facility] [address] ICD-10-CM: N18.3  ICD-9-CM: 585.3  3/15/2019        Rotator cuff arthropathy of both shoulders ICD-10-CM: M12.811, M12.812  ICD-9-CM: 716.81  3/15/2019        Cognitive impairment ICD-10-CM: R41.89  ICD-9-CM: 294.9  3/7/9981        Systolic murmur [facility]-17-JT: R01.1  ICD-9-CM: 785.2  12/30/2018        Essential hypertension ICD-10-CM: I10  ICD-9-CM: 401.9  12/17/2018        Gout ICD-10-CM: M10.9  ICD-9-CM: 274.9  12/17/2018        Pure hypercholesterolemia ICD-10-CM: E78.00  ICD-9-CM: 272.0  12/17/2018        History of prostate cancer ICD-10-CM: Z85.46  ICD-9-CM: V10.46  12/17/2018        Chronic constipation ICD-10-CM: K59.09  ICD-9-CM: 564.00  12/17/2018        Dysuria ICD-10-CM: R30.0  ICD-9-CM: 788.1  8/8/2017        Weight loss ICD-10-CM: R63.4  ICD-9-CM: 783.21  8/8/2017        Adrenal mass ([facility] ICD-10-CM: E27.8  ICD-9-CM: 255.8  7/18/2017    Overview Addendum 12/17/2018  9:57 AM by Aden Hanson MD     Stable/non-functioning adenoma on imaging                     Current Outpatient Medications   Medication Sig Dispense Refill    hydrALAZINE (APRESOLINE) 25 mg tablet Take 1 Tab by mouth three (3) times daily. 270 Tab 0    OTHER Check blood pressure daily in the morning 20-60 minutes after blood pressure medications are taken. Fax, mail or call in results to PCP, Dr. Betina Yung.  [phone] 1 Each 0    metoprolol succinate (TOPROL-XL) 25 mg XL tablet Take 1 Tab by mouth daily. 90 Tab 1    tamsulosin (FLOMAX) 0.4 mg capsule Take 0.4 mg by mouth daily.  OTHER Check blood pressure level daily for 2 weeks and send resullt by phone or mail to Dr. Marcelina Gross 1 Each 0    furosemide (LASIX) 20 mg tablet Given one 20mg tab today as single additional dose to furosemide 20mg daily. 1 Tab 0    amLODIPine (NORVASC) 5 mg tablet Take 1 Tab by mouth daily. 30 Tab 1    aspirin delayed-release 81 mg tablet Take 81 mg by mouth daily.  allopurinol (ZYLOPRIM) 100 mg tablet Take 1 Tab by mouth daily. 90 Tab 1    acetaminophen (TYLENOL EXTRA STRENGTH) 500 mg tablet Take  by mouth every six (6) hours as needed for Pain.  polyethylene glycol (MIRALAX) 17 gram packet Take 1 Packet by mouth daily. 90 Packet 2    simvastatin (ZOCOR) 20 mg tablet Take 20 mg by mouth daily. No Known Allergies  Past Medical History:   Diagnosis Date    Cancer ([facility] [address]     prostate    Constipation     Gout     Hyperlipemia     Hypertension     Pacemaker 2019     Past Surgical History:   Procedure Laterality Date    HX PROSTATECTOMY      HX UROLOGICAL      prostate removed    RI INS NEW/RPLCMT PRM PACEMAKR W/TRANS ELTRD ATRIAL N/A 10/18/2019    Insert Ppm Single Atrial performed by Jason Schuler MD at [address] CATH LAB    RI INS NEW/RPLCMT PRM PM W/TRANSV ELTRD ATRIAL&VENT Right 3/22/2019    INSERT PPM DUAL performed by Jason Schuler MD at [address]  CATH LAB     Family History   Problem Relation Age of Onset    No Known Problems Mother     No Known Problems Father      Social History     Tobacco Use    Smoking status: Never Smoker    Smokeless tobacco: Never Used   Substance Use Topics    Alcohol use: No        Review of Systems:   Constitutional: Negative for fever, chills, weight loss, malaise/fatigue. HEENT: Negative for nosebleeds, vision changes.    Respiratory: Negative for cough, hemoptysis  Cardiovascular: Negative for chest pain, palpitations, orthopnea, claudication, leg swelling, syncope, and PND. Gastrointestinal: Negative for nausea, vomiting, diarrhea, blood in stool and melena. Genitourinary: Negative for dysuria, and hematuria. Musculoskeletal: Negative for myalgias, arthralgia. Skin: Negative for rash. Heme: Does not bleed or bruise easily. Neurological: Negative for speech change and focal weakness. Ambulates with walker. Psychological: Mild dementia. Objective:     Visit Vitals  /76 (BP 1 Location: Left arm, BP Patient Position: Sitting)   Pulse 83   Ht 5' 6\" (1.676 m)   Wt 173 lb (78.5 kg)   BMI 27.92 kg/m²      Physical Exam:   Constitutional: Well-developed and well-nourished. No respiratory distress. Head: Normocephalic and atraumatic. Eyes: Pupils are equal, round  ENT: Hearing normal  Neck: Supple. No JVD present. Cardiovascular: Normal rate, regular rhythm. Exam reveals no gallop and no friction rub. No murmur heard. Pulmonary/Chest: Effort normal and breath sounds normal. No wheezes. Abdominal: Soft, no tenderness. Musculoskeletal: No edema. Neurological: Alert,oriented. Skin: Skin is warm and dry. Left chest pacer site well healed. Psychiatric: Normal mood and affect. Behavior is normal. Judgment and thought content normal.        Assessment/Plan:       ICD-10-CM ICD-9-CM    1. Cardiac pacemaker in situ Z95.0 V45.01    2. Mobitz type II atrioventricular block I44.1 426.12    3. Essential hypertension I10 401.9    4.  Stage 3 chronic kidney disease (HCC) N18.3 585.3      I have talked to him and his daughter  No new symptoms  BP is controlled with current medications  Pacemaker is checked and showed proper functions:  11 year battery  99% RV pacing   15% RA pacing  7-11 beat NSVT, 5 episodes  Echo with normal LVEF  Continue to monitor AS MR TR  If longer VT or chest pain then consider nuclear stress test    Future Appointments   Date Time Provider Jorje Ceron   2/17/2020  3:00 PM Emelina Castro Juancarlos Saab  Jackie Bishop Marzakiyatahira   5/6/2020  3:15 PM REMOTE1, [address]   8/10/2020  1:45 PM REMOTE1, [address]   11/11/2020  8:45 AM REMOTE1, [address]   2/18/2021  3:30 PM PACEMAKER3, [address]   2/18/2021  4:00 PM Trey Greene  [address]                Thank you for involving me in this patient's care and please call with further concerns or questions. Desmond Duarte M.D.   Electrophysiology/Cardiology  [facility]  [facility] [address] , [address]  [phone]

## 2020-03-01 NOTE — DISCHARGE INSTRUCTIONS
"INPATIENT PSYCHIATRIC PROGRESS NOTE    Name:  Jose Taylor  :  1963  MRN:  [id number]  Visit Number:  [id number]  Length of stay:  5    Behavioral Health Treatment Plan and Problem List: I have reviewed and approved the Behavioral Health Treatment Plan and Problem list.    SUBJECTIVE    CC/ Focus of exam: Paranoid schizophrenia    Patient's subjective status: \"Good, no voices or violent thoughts\"    INTERVAL HISTORY:   Patient having significant anxiety today.  He continues to pace his room and goes off on a tangent about being followed through multiple states by people from [place] that he upset.  He also exhibits significant paranoia about the night staff waking him up repeatedly throughout the night by slamming doors.    Previous history:  It seems that the patient probably will be compliant with oral medications alone, will shift to relying on Zyprexa as opposed to the Depo format aripiprazole.  Metabolic testing performed and reviewed.  Awaiting testosterone level.    Cardiology following due to patient's abnormal EKG.  He had an echo today which I reviewed and am awaiting cardiology recommendations.     Depression rating 3/10  Anxiety rating 5/10  Sleep: \"bad, got woke up a lot\"    Hepatitis panel negative, free T4 mildly decreased, TSH normal     Review of Systems   Constitutional: Negative.    Respiratory: Negative.    Cardiovascular: Negative.    Gastrointestinal: Negative.    Musculoskeletal: Negative.    Neurological: Negative.    Psychiatric/Behavioral: Positive for hallucinations. Negative for dysphoric mood and sleep disturbance. The patient is nervous/anxious.          OBJECTIVE    Temp:  [97 °F (36.1 °C)-97.6 °F (36.4 °C)] 97.6 °F (36.4 °C)  Heart Rate:  [87-93] 87  Resp:  [18] 18  BP: ()/(70-78) 112/72    MENTAL STATUS EXAM:      Appearance:Casually dressed, good hygeine.   Cooperation:Cooperative  Psychomotor: No psychomotor agitation/retardation, No EPS, No motor tics  Speech-normal " Neck Pain: Care Instructions  Your Care Instructions    You can have neck pain anywhere from the bottom of your head to the top of your shoulders. It can spread to the upper back or arms. Injuries, painting a ceiling, sleeping with your neck twisted, staying in one position for too long, and many other activities can cause neck pain. Most neck pain gets better with home care. Your doctor may recommend medicine to relieve pain or relax your muscles. He or she may suggest exercise and physical therapy to increase flexibility and relieve stress. You may need to wear a special (cervical) collar to support your neck for a day or two. Follow-up care is a key part of your treatment and safety. Be sure to make and go to all appointments, and call your doctor if you are having problems. It's also a good idea to know your test results and keep a list of the medicines you take. How can you care for yourself at home? · Try using a heating pad on a low or medium setting for 15 to 20 minutes every 2 or 3 hours. Try a warm shower in place of one session with the heating pad. · You can also try an ice pack for 10 to 15 minutes every 2 to 3 hours. Put a thin cloth between the ice and your skin. · Take pain medicines exactly as directed. ¨ If the doctor gave you a prescription medicine for pain, take it as prescribed. ¨ If you are not taking a prescription pain medicine, ask your doctor if you can take an over-the-counter medicine. · If your doctor recommends a cervical collar, wear it exactly as directed. When should you call for help? Call your doctor now or seek immediate medical care if:    · You have new or worsening numbness in your arms, buttocks or legs.     · You have new or worsening weakness in your arms or legs.  (This could make it hard to stand up.)     · You lose control of your bladder or bowels.    Watch closely for changes in your health, and be sure to contact your doctor if:    · Your neck pain is getting worse.     · You are not getting better after 1 week.     · You do not get better as expected. Where can you learn more? Go to http://erin-nikole.info/. Enter [id number] in the search box to learn more about \"Neck Pain: Care Instructions. \"  Current as of: November 29, 2017  Content Version: 11.8  © 8107-9553 [employer]. Care instructions adapted under license by [employer] (which disclaims liability or warranty for this information). If you have questions about a medical condition or this instruction, always ask your healthcare professional. [place] [address] any warranty or liability for your use of this information. rate, amount.   Mood/Affect:  Restricted  Thought Processes:  loose associations and disorganized  Thought Content:  paranoid, delusional   Hallucination(s): auditory but reports none for two days  Hopelessness: No  Optimistic:minimally  Suicidal Thoughts:   No  Suicidal Plan/Intent:  No  Homicidal Thoughts:  absent  Orientation: oriented x 3  Memory: Immediate, recent, recent remote, remote intact    Lab Results (last 24 hours)     ** No results found for the last 24 hours. **           Imaging Results (Last 24 Hours)     ** No results found for the last 24 hours. **           ECG/EMG Results (most recent)     Procedure Component Value Units Date/Time    ECG 12 Lead [[id number]] Collected:  02/26/20 1723     Updated:  02/28/20 0947    Narrative:       Test Reason : Baseline study  Blood Pressure : **/** mmHG  Vent. Rate : 093 BPM     Atrial Rate : 093 BPM     P-R Int : 220 ms          QRS Dur : 108 ms      QT Int : 364 ms       P-R-T Axes : 068 -26 043 degrees     QTc Int : 452 ms    Sinus rhythm with 1st degree AV block  Incomplete right bundle branch block  Cannot rule out Anterior infarct , age undetermined  Abnormal ECG  No previous ECGs available  Confirmed by Celina Lemos ([id number]) on 2/28/2020 9:47:08 AM    Referred By:  RADHA           Confirmed By:Celina Lemos    Adult Transthoracic Echo Complete W/ Cont if Necessary Per Protocol [[id number]] Collected:  02/29/20 0853     Updated:  02/29/20 1229     BSA 2.1 m^2      IVSd 1.6 cm      LVIDd 4.3 cm      LVIDs 3.2 cm      LVPWd 1.5 cm      IVS/LVPW 1.1     FS 26.0 %      EDV(Teich) 81.7 ml      ESV(Teich) 39.7 ml      EF(Teich) 51.4 %      EDV(cubed) 77.9 ml      ESV(cubed) 31.6 ml      EF(cubed) 59.5 %      LV mass(C)d 277.1 grams      LV mass(C)dI 130.1 grams/m^2      SV(Teich) 42.0 ml      SI(Teich) 19.7 ml/m^2      SV(cubed) 46.3 ml      SI(cubed) 21.7 ml/m^2      Ao root diam 4.1 cm      Ao root area 12.9 cm^2      ACS 2.5 cm      LA dimension 3.7  cm      LA/Ao 0.91     LVOT diam 2.6 cm      LVOT area 5.3 cm^2      LVOT area(traced) 5.3 cm^2      LVLd ap4 7.7 cm      EDV(MOD-sp4) 37.0 ml      LVLs ap4 7.7 cm      ESV(MOD-sp4) 15.3 ml      EF(MOD-sp4) 58.6 %      SV(MOD-sp4) 21.7 ml      SI(MOD-sp4) 10.2 ml/m^2      Ao root area (BSA corrected) 1.9     LV Gaston Vol (BSA corrected) 17.4 ml/m^2      LV Sys Vol (BSA corrected) 7.2 ml/m^2      MV E max madonna 57.8 cm/sec      MV A max madnona 66.0 cm/sec      MV E/A 0.88     Ao pk madonna 99.0 cm/sec      Ao max PG 3.9 mmHg      Ao V2 mean 76.5 cm/sec      Ao mean PG 2.5 mmHg      Ao V2 VTI 18.7 cm      SV(Ao) 240.3 ml      SI(Ao) 112.8 ml/m^2      PA acc time 0.11 sec      TR max madonna 182.0 cm/sec      RVSP(TR) 23.2 mmHg      RAP systole 10.0 mmHg      PA pr(Accel) 30.0 mmHg       CV ECHO TARAH - BZI_BMI 35.4 kilograms/m^2       CV ECHO TARAH - BSA(HAYCOCK) 2.2 m^2       CV ECHO TARAH - BZI_METRIC_WEIGHT 102.5 kg       CV ECHO TARAH - BZI_METRIC_HEIGHT 170.2 cm      Target HR (85%) 139 bpm      Max. Pred. HR (100%) 164 bpm     Narrative:       · Left ventricle not well visualized. Left ventricular systolic function   is normal. Estimated EF appears to be in the range of 56 - 60%  · Left ventricular diastolic dysfunction (grade I) consistent with   impaired relaxation.  · Left ventricular wall thickness is consistent with mild concentric   hypertrophy.  · There is no evidence of pericardial effusion.              ALLERGIES: Invega [paliperidone er] and Risperidone and related      Current Facility-Administered Medications:   •  aluminum-magnesium hydroxide-simethicone (MAALOX MAX) 400-400-40 MG/5ML suspension 15 mL, 15 mL, Oral, Q6H PRN, Bal Garcia MD  •  benzonatate (TESSALON) capsule 100 mg, 100 mg, Oral, TID PRN, Bal Garcia MD  •  famotidine (PEPCID) tablet 20 mg, 20 mg, Oral, BID PRN, Bal Garcia MD  •  ibuprofen (ADVIL,MOTRIN) tablet 400 mg, 400 mg, Oral, Q6H PRN, Bal Garcia MD  •   lisinopril (PRINIVIL,ZESTRIL) tablet 20 mg, 20 mg, Oral, Daily, Bal Garcia MD, 20 mg at 03/01/20 0839  •  loperamide (IMODIUM) capsule 2 mg, 2 mg, Oral, Q2H PRN, Bal Garcia MD  •  magnesium hydroxide (MILK OF MAGNESIA) suspension 2400 mg/10mL 10 mL, 10 mL, Oral, Daily PRN, Bal Garcia MD  •  metoprolol succinate XL (TOPROL-XL) 24 hr tablet 25 mg, 25 mg, Oral, Q24H, Subramaniyam, Wero MD Elder, 25 mg at 03/01/20 0839  •  nicotine (NICODERM CQ) 21 MG/24HR patch 1 patch, 1 patch, Transdermal, Q24H, Bal Garcia MD  •  OLANZapine zydis (zyPREXA) disintegrating tablet 15 mg, 15 mg, Oral, Nightly, Colin Matta MD, 15 mg at 02/29/20 2107  •  OLANZapine zydis (zyPREXA) disintegrating tablet 5 mg, 5 mg, Oral, TID PRN, Tye Garcia MD  •  ondansetron (ZOFRAN) tablet 4 mg, 4 mg, Oral, Q6H PRN, Bal Garcia MD  •  sodium chloride nasal spray 2 spray, 2 spray, Each Nare, PRN, Bal Garcia MD  •  traZODone (DESYREL) tablet 50 mg, 50 mg, Oral, Nightly PRN, Bal Garcia MD, 50 mg at 02/29/20 2107    First time seeing patient.  Chart, notes, vitals, labs and EKG personally reviewed.    ASSESSMENT & PLAN    • Schizophrenia Paranoid type,  (CMS/HCC)  Zyprexa , to provide for supportive psychotherapeutic effort. See HPI   F20.0       Schizophrenia  -Continue olanzapine to 15 mg nightly, may need increased dosing  -Metabolic labs reviewed.  Triglycerides and LDL mildly elevated.  Transaminases mildly elevated chronically  -Hepatitis panel negative  -TSH within normal limits, free T4 mildly low  -Patient continues to report resolution of auditory hallucinations though he exhibits significantly more anxiety today than yesterday.  He continues to be paranoid and delusional about various topics.    Hypertension  -Continue lisinopril    Abnormal EKG  -EKG showed sinus rhythm with first-degree AV block and incomplete right bundle branch block with possible anterior  infarct  -Cardiology consulted  -Echo performed and reviewed  -Cardiology recommends maintaining current medication regimen and following up in 4 weeks as outpatient      Special precautions: Special Precautions Level 3 (q15 min checks)     Behavioral Health Treatment Plan and Problem List: I have reviewed and approved the Behavioral Health Treatment Plan and Problem list.    Clinician:  Yobani Bah MD  03/01/20  1:28 PM    Dictated utilizing Dragon dictation

## 2020-03-23 ENCOUNTER — TELEPHONE (OUTPATIENT)
Dept: FAMILY MEDICINE CLINIC | Age: 85
End: 2020-03-23

## 2020-03-23 NOTE — TELEPHONE ENCOUNTER
Outbound call placed to patients daughter Apple Pedro who is on patients PHI. Patients name and  verified. Call placed to reschedule patients appointment due to the current COVID-19 virus. Patients daughter reported understanding.  Patient rescheduled for Friday, May 22, 2020 02:15 PM

## 2020-05-11 ENCOUNTER — TELEPHONE (OUTPATIENT)
Dept: FAMILY MEDICINE CLINIC | Age: 85
End: 2020-05-11

## 2020-05-11 NOTE — TELEPHONE ENCOUNTER
Outbound call placed to patients PHI Agnes. name and  verified.  Call placed to reschedule patients appointment due to COVID-19 pandemic. patient scheduled for 2020 03:00 PM

## 2020-06-04 NOTE — TELEPHONE ENCOUNTER
Last Visit: 10/11/19  MD Faisal Ray  Next Appointment: 7/8/20  MD Faisal Ray   Previous Refill Encounter(s): 10/3/18  Historical provider  (long-term care pharmacy)    Requested Prescriptions     Pending Prescriptions Disp Refills    simvastatin (ZOCOR) 20 mg tablet 31 Tab 5     Sig: Take 1 Tab by mouth daily.

## 2020-06-05 RX ORDER — SIMVASTATIN 20 MG/1
20 TABLET, FILM COATED ORAL DAILY
Qty: 31 TAB | Refills: 5 | Status: SHIPPED | OUTPATIENT
Start: 2020-06-05 | End: 2020-07-09

## 2020-06-29 ENCOUNTER — OFFICE VISIT (OUTPATIENT)
Dept: CARDIOLOGY CLINIC | Age: 85
End: 2020-06-29

## 2020-06-29 ENCOUNTER — TELEPHONE (OUTPATIENT)
Dept: CARDIOLOGY CLINIC | Age: 85
End: 2020-06-29

## 2020-06-29 DIAGNOSIS — Z95.0 CARDIAC PACEMAKER IN SITU: Primary | ICD-10-CM

## 2020-06-29 NOTE — TELEPHONE ENCOUNTER
Pt's daughter stated she sent a manual remote on yesterday, inquiring if the signal had been received.  Please advise      [facility]:[phone]

## 2020-06-30 ENCOUNTER — APPOINTMENT (OUTPATIENT)
Dept: CT IMAGING | Age: 85
DRG: 064 | End: 2020-06-30
Attending: EMERGENCY MEDICINE
Payer: MEDICARE

## 2020-06-30 ENCOUNTER — HOSPITAL ENCOUNTER (INPATIENT)
Age: 85
LOS: 9 days | Discharge: NURSING, REHAB OR LONG-TERM CARE | DRG: 064 | End: 2020-07-09
Attending: EMERGENCY MEDICINE | Admitting: FAMILY MEDICINE
Payer: MEDICARE

## 2020-06-30 DIAGNOSIS — I63.9 CEREBROVASCULAR ACCIDENT (CVA), UNSPECIFIED MECHANISM (HCC): Primary | ICD-10-CM

## 2020-06-30 DIAGNOSIS — I67.9 INTRACRANIAL VASCULAR STENOSIS: ICD-10-CM

## 2020-06-30 DIAGNOSIS — I63.532 ACUTE ISCHEMIC LEFT POSTERIOR CEREBRAL ARTERY (PCA) STROKE (HCC): ICD-10-CM

## 2020-06-30 LAB
ALBUMIN SERPL-MCNC: 3.5 G/DL (ref 3.5–5)
ALBUMIN/GLOB SERPL: 0.8 {RATIO} (ref 1.1–2.2)
ALP SERPL-CCNC: 106 U/L (ref 45–117)
ALT SERPL-CCNC: 26 U/L (ref 12–78)
ANION GAP SERPL CALC-SCNC: 7 MMOL/L (ref 5–15)
APTT PPP: 30.1 SEC (ref 22.1–32)
AST SERPL-CCNC: 23 U/L (ref 15–37)
ATRIAL RATE: 75 BPM
BASOPHILS # BLD: 0 K/UL (ref 0–0.1)
BASOPHILS NFR BLD: 1 % (ref 0–1)
BILIRUB SERPL-MCNC: 0.5 MG/DL (ref 0.2–1)
BUN SERPL-MCNC: 18 MG/DL (ref 6–20)
BUN/CREAT SERPL: 12 (ref 12–20)
CALCIUM SERPL-MCNC: 9.1 MG/DL (ref 8.5–10.1)
CALCULATED P AXIS, ECG09: 14 DEGREES
CALCULATED R AXIS, ECG10: -88 DEGREES
CALCULATED T AXIS, ECG11: 89 DEGREES
CHLORIDE SERPL-SCNC: 102 MMOL/L (ref 97–108)
CO2 SERPL-SCNC: 27 MMOL/L (ref 21–32)
COMMENT, HOLDF: NORMAL
CREAT SERPL-MCNC: 1.5 MG/DL (ref 0.7–1.3)
DIAGNOSIS, 93000: NORMAL
DIFFERENTIAL METHOD BLD: ABNORMAL
EOSINOPHIL # BLD: 0.2 K/UL (ref 0–0.4)
EOSINOPHIL NFR BLD: 2 % (ref 0–7)
ERYTHROCYTE [DISTWIDTH] IN BLOOD BY AUTOMATED COUNT: 14.9 % (ref 11.5–14.5)
GLOBULIN SER CALC-MCNC: 4.5 G/DL (ref 2–4)
GLUCOSE BLD STRIP.AUTO-MCNC: 109 MG/DL (ref 65–100)
GLUCOSE BLD STRIP.AUTO-MCNC: 113 MG/DL (ref 65–100)
GLUCOSE SERPL-MCNC: 116 MG/DL (ref 65–100)
HCT VFR BLD AUTO: 41.3 % (ref 36.6–50.3)
HGB BLD-MCNC: 13.4 G/DL (ref 12.1–17)
IMM GRANULOCYTES # BLD AUTO: 0 K/UL (ref 0–0.04)
IMM GRANULOCYTES NFR BLD AUTO: 0 % (ref 0–0.5)
INR PPP: 1 (ref 0.9–1.1)
LYMPHOCYTES # BLD: 2.2 K/UL (ref 0.8–3.5)
LYMPHOCYTES NFR BLD: 26 % (ref 12–49)
MAGNESIUM SERPL-MCNC: 2.3 MG/DL (ref 1.6–2.4)
MCH RBC QN AUTO: 30.3 PG (ref 26–34)
MCHC RBC AUTO-ENTMCNC: 32.4 G/DL (ref 30–36.5)
MCV RBC AUTO: 93.4 FL (ref 80–99)
MONOCYTES # BLD: 0.7 K/UL (ref 0–1)
MONOCYTES NFR BLD: 9 % (ref 5–13)
NEUTS SEG # BLD: 5.3 K/UL (ref 1.8–8)
NEUTS SEG NFR BLD: 62 % (ref 32–75)
NRBC # BLD: 0 K/UL (ref 0–0.01)
NRBC BLD-RTO: 0 PER 100 WBC
P-R INTERVAL, ECG05: 166 MS
PLATELET # BLD AUTO: 184 K/UL (ref 150–400)
PMV BLD AUTO: 10.4 FL (ref 8.9–12.9)
POTASSIUM SERPL-SCNC: 3.7 MMOL/L (ref 3.5–5.1)
PROT SERPL-MCNC: 8 G/DL (ref 6.4–8.2)
PROTHROMBIN TIME: 10.8 SEC (ref 9–11.1)
Q-T INTERVAL, ECG07: 460 MS
QRS DURATION, ECG06: 202 MS
QTC CALCULATION (BEZET), ECG08: 513 MS
RBC # BLD AUTO: 4.42 M/UL (ref 4.1–5.7)
SAMPLES BEING HELD,HOLD: NORMAL
SERVICE CMNT-IMP: ABNORMAL
SERVICE CMNT-IMP: ABNORMAL
SODIUM SERPL-SCNC: 136 MMOL/L (ref 136–145)
THERAPEUTIC RANGE,PTTT: NORMAL SECS (ref 58–77)
TROPONIN I SERPL-MCNC: 0.06 NG/ML
VENTRICULAR RATE, ECG03: 75 BPM
WBC # BLD AUTO: 8.4 K/UL (ref 4.1–11.1)

## 2020-06-30 PROCEDURE — 82962 GLUCOSE BLOOD TEST: CPT

## 2020-06-30 PROCEDURE — 74011636320 HC RX REV CODE- 636/320: Performed by: RADIOLOGY

## 2020-06-30 PROCEDURE — 74011000258 HC RX REV CODE- 258: Performed by: RADIOLOGY

## 2020-06-30 PROCEDURE — 74011250637 HC RX REV CODE- 250/637: Performed by: EMERGENCY MEDICINE

## 2020-06-30 PROCEDURE — 74011000250 HC RX REV CODE- 250: Performed by: FAMILY MEDICINE

## 2020-06-30 PROCEDURE — 85730 THROMBOPLASTIN TIME PARTIAL: CPT

## 2020-06-30 PROCEDURE — 74011250636 HC RX REV CODE- 250/636: Performed by: FAMILY MEDICINE

## 2020-06-30 PROCEDURE — 85610 PROTHROMBIN TIME: CPT

## 2020-06-30 PROCEDURE — 70496 CT ANGIOGRAPHY HEAD: CPT

## 2020-06-30 PROCEDURE — 83735 ASSAY OF MAGNESIUM: CPT

## 2020-06-30 PROCEDURE — 65660000001 HC RM ICU INTERMED STEPDOWN

## 2020-06-30 PROCEDURE — 93005 ELECTROCARDIOGRAM TRACING: CPT

## 2020-06-30 PROCEDURE — 80053 COMPREHEN METABOLIC PANEL: CPT

## 2020-06-30 PROCEDURE — 74011250636 HC RX REV CODE- 250/636: Performed by: EMERGENCY MEDICINE

## 2020-06-30 PROCEDURE — 99285 EMERGENCY DEPT VISIT HI MDM: CPT

## 2020-06-30 PROCEDURE — 87635 SARS-COV-2 COVID-19 AMP PRB: CPT

## 2020-06-30 PROCEDURE — 0042T CT CODE NEURO PERF W CBF: CPT

## 2020-06-30 PROCEDURE — 74011250637 HC RX REV CODE- 250/637: Performed by: FAMILY MEDICINE

## 2020-06-30 PROCEDURE — 70450 CT HEAD/BRAIN W/O DYE: CPT

## 2020-06-30 PROCEDURE — 84484 ASSAY OF TROPONIN QUANT: CPT

## 2020-06-30 PROCEDURE — 85025 COMPLETE CBC W/AUTO DIFF WBC: CPT

## 2020-06-30 RX ORDER — ACETAMINOPHEN 325 MG/1
650 TABLET ORAL
Status: DISCONTINUED | OUTPATIENT
Start: 2020-06-30 | End: 2020-07-09 | Stop reason: HOSPADM

## 2020-06-30 RX ORDER — ATORVASTATIN CALCIUM 40 MG/1
40 TABLET, FILM COATED ORAL
Status: DISCONTINUED | OUTPATIENT
Start: 2020-06-30 | End: 2020-07-09 | Stop reason: HOSPADM

## 2020-06-30 RX ORDER — AMLODIPINE BESYLATE 5 MG/1
5 TABLET ORAL DAILY
Status: CANCELLED | OUTPATIENT
Start: 2020-07-01

## 2020-06-30 RX ORDER — ACETAMINOPHEN 325 MG/1
650 TABLET ORAL
Status: ON HOLD | COMMUNITY
End: 2021-09-08

## 2020-06-30 RX ORDER — HALOPERIDOL 5 MG/ML
2 INJECTION INTRAMUSCULAR ONCE
Status: COMPLETED | OUTPATIENT
Start: 2020-06-30 | End: 2020-06-30

## 2020-06-30 RX ORDER — SODIUM CHLORIDE 0.9 % (FLUSH) 0.9 %
10 SYRINGE (ML) INJECTION
Status: DISCONTINUED | OUTPATIENT
Start: 2020-06-30 | End: 2020-06-30 | Stop reason: SDUPTHER

## 2020-06-30 RX ORDER — ALLOPURINOL 100 MG/1
100 TABLET ORAL DAILY
Status: DISCONTINUED | OUTPATIENT
Start: 2020-07-01 | End: 2020-07-09 | Stop reason: HOSPADM

## 2020-06-30 RX ORDER — SODIUM CHLORIDE 0.9 % (FLUSH) 0.9 %
10 SYRINGE (ML) INJECTION
Status: COMPLETED | OUTPATIENT
Start: 2020-06-30 | End: 2020-06-30

## 2020-06-30 RX ORDER — METOPROLOL SUCCINATE 50 MG/1
25 TABLET, EXTENDED RELEASE ORAL DAILY
Status: CANCELLED | OUTPATIENT
Start: 2020-07-01

## 2020-06-30 RX ORDER — ACETAMINOPHEN 650 MG/1
650 SUPPOSITORY RECTAL
Status: DISCONTINUED | OUTPATIENT
Start: 2020-06-30 | End: 2020-07-09 | Stop reason: HOSPADM

## 2020-06-30 RX ORDER — SODIUM CHLORIDE 9 MG/ML
100 INJECTION, SOLUTION INTRAVENOUS CONTINUOUS
Status: DISPENSED | OUTPATIENT
Start: 2020-06-30 | End: 2020-07-01

## 2020-06-30 RX ORDER — ASPIRIN 300 MG/1
300 SUPPOSITORY RECTAL DAILY
Status: DISCONTINUED | OUTPATIENT
Start: 2020-07-01 | End: 2020-07-01

## 2020-06-30 RX ORDER — GUAIFENESIN 100 MG/5ML
243 LIQUID (ML) ORAL
Status: COMPLETED | OUTPATIENT
Start: 2020-06-30 | End: 2020-06-30

## 2020-06-30 RX ADMIN — ATORVASTATIN CALCIUM 40 MG: 40 TABLET, FILM COATED ORAL at 21:01

## 2020-06-30 RX ADMIN — FAMOTIDINE 20 MG: 10 INJECTION, SOLUTION INTRAVENOUS at 18:21

## 2020-06-30 RX ADMIN — IOPAMIDOL 20 ML: 755 INJECTION, SOLUTION INTRAVENOUS at 12:31

## 2020-06-30 RX ADMIN — IOPAMIDOL 100 ML: 755 INJECTION, SOLUTION INTRAVENOUS at 12:31

## 2020-06-30 RX ADMIN — Medication 10 ML: at 12:31

## 2020-06-30 RX ADMIN — SODIUM CHLORIDE 1000 ML: 900 INJECTION, SOLUTION INTRAVENOUS at 13:37

## 2020-06-30 RX ADMIN — ASPIRIN 81 MG 243 MG: 81 TABLET ORAL at 14:06

## 2020-06-30 RX ADMIN — SODIUM CHLORIDE 100 ML/HR: 900 INJECTION, SOLUTION INTRAVENOUS at 18:20

## 2020-06-30 RX ADMIN — SODIUM CHLORIDE 100 ML: 900 INJECTION, SOLUTION INTRAVENOUS at 12:31

## 2020-06-30 RX ADMIN — HALOPERIDOL LACTATE 2 MG: 5 INJECTION, SOLUTION INTRAMUSCULAR at 18:51

## 2020-06-30 NOTE — ED PROVIDER NOTES
HPI     Pt is a 80 y.o. M with PMH of HTN, HLD, prostate CA, TIA here with c/o altered mental status and ataxia. Pt was last seen by his sister on Sunday and was normal at that time. However, per care facility he was last normal last night. He became disoriented initially and then developed issues walking this AM and fell hitting his head but no LOC. No other known complaints at this time.       Past Medical History:   Diagnosis Date    Cancer [facility])     prostate    Constipation     Gout     Hyperlipemia     Hypertension     Pacemaker 2019    Stroke [facility])     TIA (transient ischemic attack) 2013       Past Surgical History:   Procedure Laterality Date    HX PROSTATECTOMY      HX UROLOGICAL      prostate removed    SC INS NEW/RPLCMT PRM PACEMAKR W/TRANS ELTRD ATRIAL N/A 10/18/2019    Insert Ppm Single Atrial performed by Gee Mckeon MD at [address], [facility] CATH LAB    SC INS NEW/RPLCMT PRM PM W/TRANSV ELTRD ATRIAL&VENT Right 3/22/2019    INSERT PPM DUAL performed by Gee Mckeon MD at [address] CATH LAB         Family History:   Problem Relation Age of Onset    No Known Problems Mother     No Known Problems Father        Social History     Socioeconomic History    Marital status:      Spouse name: Not on file    Number of children: Not on file    Years of education: Not on file    Highest education level: Not on file   Occupational History    Not on file   Social Needs    Financial resource strain: Not on file    Food insecurity     Worry: Not on file     Inability: Not on file    Transportation needs     Medical: Not on file     Non-medical: Not on file   Tobacco Use    Smoking status: Never Smoker    Smokeless tobacco: Never Used   Substance and Sexual Activity    Alcohol use: No    Drug use: No    Sexual activity: Never   Lifestyle    Physical activity     Days per week: Not on file     Minutes per session: Not on file    Stress: Not on file   Relationships    Social connections     Talks on phone: Not on file     Gets together: Not on file     Attends Bahai service: Not on file     Active member of club or organization: Not on file     Attends meetings of clubs or organizations: Not on file     Relationship status: Not on file    Intimate partner violence     Fear of current or ex partner: Not on file     Emotionally abused: Not on file     Physically abused: Not on file     Forced sexual activity: Not on file   Other Topics Concern    Not on file   Social History Narrative    Not on file         ALLERGIES: Patient has no known allergies. Review of Systems   Unable to perform ROS: Mental status change       Vitals:    06/30/20 1152   BP: 166/66   Pulse: 67   Resp: 16   Temp: 99.3 °F (37.4 °C)   SpO2: 98%   Weight: 77.1 kg (170 lb)   Height: 5' 7\" (1.702 m)            Physical Exam  Vitals signs and nursing note reviewed. HENT:      Head: Normocephalic. Contusion present. Eyes:      General: Visual field deficit present. Neck:      Musculoskeletal: Normal range of motion. Cardiovascular:      Rate and Rhythm: Normal rate and regular rhythm. Heart sounds: Normal heart sounds. Pulmonary:      Effort: Pulmonary effort is normal.      Breath sounds: Normal breath sounds. Abdominal:      Palpations: Abdomen is soft. Hernia: A hernia is present. Hernia is present in the ventral area (reducible). Skin:     Comments: Contusion to forehead   Neurological:      Mental Status: He is alert. Motor: Weakness present. Gait: Gait abnormal.      Comments: Oriented to self only  Visual field deficit  Arm drift  Unable to walk           MDM       Procedures    CONSULT NOTE:  12:16 PM Anat Cortés MD spoke with Dr. Bibi Dumas, Consult for tele neuro. Discussed available diagnostic tests and clinical findings. Dr. Chaitanya Hudson advises ASA, IVF, permissive HTN, admit, MRI.     Pt has pacemaker will hold on MRI at this time as unsure if it can be obtained with this.     ED EKG interpretation:  Atrial sensed ventricular paced rhtyhm. Rate 75, regular. No ST or T wave elevation or depression. EKG documented by Seymour Martin MD, scribe, as interpreted by Martin Briceno MD, ED MD.    Hospitalist Lucia Serve for Admission  1:52 PM    ED Room Number: ER09/09  Patient Name and age:  Willie Duarte 80 y.o.  male  Working Diagnosis:   1. Cerebrovascular accident (CVA), unspecified mechanism ([address]        COVID-19 Suspicion:  no    Code Status:  Full Code  Readmission: no  Isolation Requirements:  no  Recommended Level of Care:  telemetry neuro  Department:[facility] Adult ED - 21   Other:  PCA CVA, unable to ambulate and disoriented.  Tele neuro recs: ASA, IVF, permissive HTN, MRI, if possible (pt with pacemaker)    Seymour Martin MD

## 2020-06-30 NOTE — ED TRIAGE NOTES
Pt arrives from [facility] with daughter. The daughter reports that staff noticed that patient seemed disoriented this AM, first noticed late last night with conversation, he was making odd comments, this AM he wasn't walking and normally ambulates with cane. He fell while trying to get out of bed, hit his forehead. Unsure of LOC. Daughter saw him last on Sunday and he was normal.   He is oriented to self, is talkative, carries a conversation, and walks with a cane at baseline.

## 2020-06-30 NOTE — PROGRESS NOTES
Admission Medication Reconciliation:        PTA med list compiled from [place] [id number] facility transfer documents which were scanned into media. Medication changes (since last review):  Deleted:  Tamsulosin  Miralax    Thank you for allowing me to participate in the care of your patient. Chrissy Cedeno PharmD, RN # [phone]       [facility] pharmacy benefit data reflects medications filled and processed through the patient's insurance, however   this data does NOT capture whether the medication was picked up or is currently being taken by the patient. Allergies:  Patient has no known allergies. Significant PMH/Disease States:   Past Medical History:   Diagnosis Date    Cancer ([facility] [address]     prostate    Constipation     Gout     Hyperlipemia     Hypertension     Pacemaker 2019    Stroke [facility])     TIA (transient ischemic attack) 2013     Chief Complaint for this Admission:    Chief Complaint   Patient presents with    Altered mental status    Fall     Prior to Admission Medications:   Prior to Admission Medications   Prescriptions Last Dose Informant Taking?   acetaminophen (TYLENOL) 325 mg tablet   Yes   Sig: Take 650 mg by mouth every six (6) hours as needed for Pain. allopurinol (ZYLOPRIM) 100 mg tablet   Yes   Sig: Take 1 Tab by mouth daily. amLODIPine (NORVASC) 5 mg tablet   Yes   Sig: Take 1 Tab by mouth daily. aspirin delayed-release 81 mg tablet   Yes   Sig: Take 81 mg by mouth daily. furosemide (LASIX) 20 mg tablet   Yes   Sig: Given one 20mg tab today as single additional dose to furosemide 20mg daily. hydrALAZINE (APRESOLINE) 25 mg tablet   Yes   Sig: Take 1 Tab by mouth three (3) times daily. metoprolol succinate (TOPROL-XL) 25 mg XL tablet   Yes   Sig: Take 1 Tab by mouth daily. simvastatin (ZOCOR) 20 mg tablet   Yes   Sig: Take 1 Tab by mouth daily.       Facility-Administered Medications: None       Please contact the main inpatient pharmacy with any questions or concerns at [phone] and we will direct you to the clinical pharmacist covering this patient's care while in-house.    BRENTON Burris

## 2020-06-30 NOTE — ROUTINE PROCESS
TRANSFER - OUT REPORT: 
 
Verbal report given to Enrico Jimenez RN (name) on Cyndie Hurley  being transferred to [facility] (unit) for routine progression of care Report consisted of patients Situation, Background, Assessment and  
Recommendations(SBAR). Information from the following report(s) SBAR, ED Summary, Intake/Output, MAR, Recent Results and Cardiac Rhythm Paced was reviewed with the receiving nurse. Lines:  
Peripheral IV 06/30/20 Left Antecubital (Active) Site Assessment Clean, dry, & intact 6/30/2020 12:15 PM  
Phlebitis Assessment 0 6/30/2020 12:15 PM  
Infiltration Assessment 0 6/30/2020 12:15 PM  
Dressing Status Clean, dry, & intact 6/30/2020 12:15 PM  
  
 
Opportunity for questions and clarification was provided. Patient transported with: 
 Belongings Family member

## 2020-06-30 NOTE — ROUTINE PROCESS
TRANSFER - OUT REPORT: 
 
Verbal report given to Margaret Bernal RN (name) on Jassi Chaney  being transferred to [facility] (unit) for routine progression of care Report consisted of patients Situation, Background, Assessment and  
Recommendations(SBAR). Information from the following report(s) SBAR, ED Summary, MAR, Recent Results and Cardiac Rhythm Paced was reviewed with the receiving nurse. Lines:  
Peripheral IV 06/30/20 Left Antecubital (Active) Site Assessment Clean, dry, & intact 6/30/2020 12:15 PM  
Phlebitis Assessment 0 6/30/2020 12:15 PM  
Infiltration Assessment 0 6/30/2020 12:15 PM  
Dressing Status Clean, dry, & intact 6/30/2020 12:15 PM  
  
 
Opportunity for questions and clarification was provided. Patient transported with: 
Belongings Family member

## 2020-06-30 NOTE — ADVANCED PRACTICE NURSE
Neurocritical Care Code Stroke Documentation    Symptoms:   AMS, Fall with unknown LOC, Vision Loss    Last Known Well: 2020 in Evening    Medical hx: Active Problems:    * No active hospital problems. *     Anticoagulation: Aspirin    VAN:   Positive   NIHSS:   1a-LOC:0    1b-Month/Age:2    1c-Open/Close Hand:0    2-Best Gaze:0    3-Visual Fields:2    4-Facial Palsy:0    5a-Left Arm:0    5b-Right Arm:1    6a-Left Le    6b-Right Le    7-Limb Ataxia:2    8-Sensory:0    9-Best Language:0    10-Dysarthria:0    11-Extinction/Inattention:0  TOTAL SCORE:8   Imaging:   CT- Bilateral acute occipital infarcts without evidence of hemorrhage.     CTA    CTP   Plan:   TPA Candidate: NO    Mechanical thrombectomy Candidate: YES/NO     Discussed with:     Dr. Batsheva Sanchez - ED physician, ED RN     Total time spent - 26 minutes    Parvin Gil NP  Neurocritical Care Nurse Practitioner  [phone]

## 2020-06-30 NOTE — H&P
[address]  HISTORY AND PHYSICAL    Name:  Sally Hartmann  MR#:  [id number]  :  1933  ACCOUNT #:  [de-identified]  ADMIT DATE:  2020    CHIEF COMPLAINT:  Ataxia. HISTORY OF PRESENT ILLNESS:  The patient is an 40-year-old gentleman with past medical history of hypertension, prostate cancer, constipation, gout, hyperlipidemia, stroke, and TIA, who presents to the hospital with the above-mentioned symptom. History was primarily obtained from the patient's daughter, Mami Newell. The patient is confused and has some right-sided weakness with right-sided neglect. She reports that the patient was last seen normal 2 days back on . Per care facility, the patient was normal last night. He became disoriented initially and then developed issues with walking and fell hitting his head, but there was no loss of consciousness. She reports the patient lives at  and the patient was brought over here as a code stroke, was found to have occipital stroke. Neurointerventional Surgery was consulted and they felt that the stroke has been completed and the patient does not need any acute surgical intervention. The patient was requested to be admitted under the hospitalist service. Currently, the patient is resting in bed, does not appear to be distressed. No further history could be obtained from the patient or his daughter. PAST MEDICAL HISTORY:  See above. HOME MEDICATIONS:  Per chart. Currently, the patient is on Tylenol, simvastatin 20 mg daily, Toprol 25 mg daily, amlodipine 5 mg daily, aspirin 81 mg daily, allopurinol 100 mg daily, hydralazine 25 mg t.i.d., Lasix 20 mg as needed. SOCIAL HISTORY:  Denies tobacco abuse, alcohol use, or IV drug abuse. ALLERGIES:  NO KNOWN DRUG ALLERGIES. FAMILY HISTORY:  Mother and father had no known medical problems. REVIEW OF SYMPTOMS:  Could not be obtained from the patient due to his mental status.     PHYSICAL EXAMINATION:  VITAL SIGNS:  Temperature 99.3, pulse 72, respiratory rate 21, blood pressure 167/85, pulse oximetry 97% on room air. GENERAL:  Alert x0, awake, confused, right-sided hemineglect, elderly male, appears to be stated age. HEENT:  Pupils are equal and reactive to light. Dry mucous membranes. Tympanic membranes are clear. NECK:  Supple. No JVD. CHEST:  Decreased basilar breath sounds. CORONARY:  S1 and S2 are heard. ABDOMEN:  Soft, nontender, and nondistended. Bowel sounds are physiological.  EXTREMITIES:  No clubbing, no cyanosis, no edema. NEURO/PSYCH:  Limited exam.  DTRs 1+ x4.  3/5 strength in right upper and right lower extremity. 5/5 strength in left upper and left lower extremity. Cranial nerves II through XII could not be tested. Sensory appears to be grossly within normal limits. SKIN:  Warm. LABORATORY DATA:  White count 8.4, hemoglobin 13.4, hematocrit 41.3, platelets 968. INR 1. Sodium 136, potassium 3.7, chloride 102, bicarbonate 37, anion gap 7, glucose 116, BUN 18, creatinine 1.50, calcium 9.1. Bilirubin total 0.5, ALT 26, AST 23, alkaline phosphatase 106. Troponin 0.06. CT of the head and neck shows no evidence of large vessel occlusion; atherosclerotic disease; acute bilateral PCA territory infarction, left larger than right. CT perfusion shows acute ischemia on the left parieto-occipital lobe with quantitative decreased cerebral blood flow and blood volume in the region representing  volume of 4 mL. EKG shows ventricular paced rhythm . ASSESSMENT AND PLAN:  1. Acute parieto-occipital cerebrovascular accident:  The patient will be admitted on a telemetry bed. We will continue the patient on aspirin, statin, neurovascular checks, MRI of the brain, Neurology consult, physical therapy/occupational therapy/speech consult. Any changes in neurological status will mandate emergent intervention.   Echocardiogram.  Allow permissive hypertension and we will not treat blood pressure less than 220 for the next 24 hours. Further intervention per hospital course. Continue to closely monitor. Reassess as needed. 2.  Hypertension: We will allow permissive hypertension. Hold beta-blockers and Norvasc for now. Hydralazine p.r.n. Continue to monitor. Further intervention per hospital course. Reassess as needed. We will not treat blood pressure less than 220 to allow permissive hypertension for the next 24 hours. 3.  Hyperlipidemia:  Continue statin. 4.  Acute kidney injury on chronic kidney disease, likely prerenal:  Provide gentle IV hydration. Repeat labs in the morning. Continue to monitor. Avoid nephrotoxic medications and renally dose all other medications. 5.  Elevated troponin, unclear etiology:  Cycle troponins, telemetry monitoring, aspirin, echocardiogram.  May consider getting a Cardiology consult if elevation noted. Further intervention per hospital course. Continue to monitor. 6.  Gastrointestinal/deep venous thrombosis prophylaxis:  The patient will be on sequential compression devices.       Ernestina Sol MD    [id number]  D:  06/30/2020 15:28  T:  06/30/2020 18:49  JOB #:  [id number]

## 2020-07-01 LAB
CHOLEST SERPL-MCNC: 118 MG/DL
ERYTHROCYTE [DISTWIDTH] IN BLOOD BY AUTOMATED COUNT: 15.1 % (ref 11.5–14.5)
EST. AVERAGE GLUCOSE BLD GHB EST-MCNC: 117 MG/DL
GLUCOSE BLD STRIP.AUTO-MCNC: 72 MG/DL (ref 65–100)
HBA1C MFR BLD: 5.7 % (ref 4–5.6)
HCT VFR BLD AUTO: 48.7 % (ref 36.6–50.3)
HDLC SERPL-MCNC: 51 MG/DL
HDLC SERPL: 2.3 {RATIO} (ref 0–5)
HGB BLD-MCNC: 15.7 G/DL (ref 12.1–17)
LDLC SERPL CALC-MCNC: 56.6 MG/DL (ref 0–100)
LIPID PROFILE,FLP: NORMAL
MCH RBC QN AUTO: 31 PG (ref 26–34)
MCHC RBC AUTO-ENTMCNC: 32.2 G/DL (ref 30–36.5)
MCV RBC AUTO: 96.1 FL (ref 80–99)
NRBC # BLD: 0 K/UL (ref 0–0.01)
NRBC BLD-RTO: 0 PER 100 WBC
PLATELET # BLD AUTO: 144 K/UL (ref 150–400)
PMV BLD AUTO: 10.8 FL (ref 8.9–12.9)
RBC # BLD AUTO: 5.07 M/UL (ref 4.1–5.7)
SARS-COV-2, COV2: NOT DETECTED
SERVICE CMNT-IMP: NORMAL
SOURCE, COVRS: NORMAL
SPECIMEN SOURCE, FCOV2M: NORMAL
TRIGL SERPL-MCNC: 52 MG/DL (ref ?–150)
VLDLC SERPL CALC-MCNC: 10.4 MG/DL
WBC # BLD AUTO: 8.9 K/UL (ref 4.1–11.1)

## 2020-07-01 PROCEDURE — 74011250637 HC RX REV CODE- 250/637: Performed by: FAMILY MEDICINE

## 2020-07-01 PROCEDURE — 80061 LIPID PANEL: CPT

## 2020-07-01 PROCEDURE — 82962 GLUCOSE BLOOD TEST: CPT

## 2020-07-01 PROCEDURE — 36415 COLL VENOUS BLD VENIPUNCTURE: CPT

## 2020-07-01 PROCEDURE — 97535 SELF CARE MNGMENT TRAINING: CPT

## 2020-07-01 PROCEDURE — 97166 OT EVAL MOD COMPLEX 45 MIN: CPT

## 2020-07-01 PROCEDURE — 74011250637 HC RX REV CODE- 250/637: Performed by: HOSPITALIST

## 2020-07-01 PROCEDURE — 65660000000 HC RM CCU STEPDOWN

## 2020-07-01 PROCEDURE — 74011000250 HC RX REV CODE- 250: Performed by: FAMILY MEDICINE

## 2020-07-01 PROCEDURE — 74011250636 HC RX REV CODE- 250/636: Performed by: FAMILY MEDICINE

## 2020-07-01 PROCEDURE — 74011250636 HC RX REV CODE- 250/636: Performed by: HOSPITALIST

## 2020-07-01 PROCEDURE — 83036 HEMOGLOBIN GLYCOSYLATED A1C: CPT

## 2020-07-01 PROCEDURE — 85027 COMPLETE CBC AUTOMATED: CPT

## 2020-07-01 RX ORDER — SODIUM CHLORIDE 0.9 % (FLUSH) 0.9 %
SYRINGE (ML) INJECTION
Status: DISPENSED
Start: 2020-07-01 | End: 2020-07-02

## 2020-07-01 RX ORDER — CLOPIDOGREL BISULFATE 75 MG/1
75 TABLET ORAL DAILY
Status: DISCONTINUED | OUTPATIENT
Start: 2020-07-01 | End: 2020-07-09 | Stop reason: HOSPADM

## 2020-07-01 RX ORDER — GUAIFENESIN 100 MG/5ML
81 LIQUID (ML) ORAL DAILY
Status: DISCONTINUED | OUTPATIENT
Start: 2020-07-01 | End: 2020-07-09 | Stop reason: HOSPADM

## 2020-07-01 RX ORDER — BALSAM PERU/CASTOR OIL
OINTMENT (GRAM) TOPICAL 3 TIMES DAILY
Status: DISCONTINUED | OUTPATIENT
Start: 2020-07-01 | End: 2020-07-09 | Stop reason: HOSPADM

## 2020-07-01 RX ORDER — HYDRALAZINE HYDROCHLORIDE 20 MG/ML
20 INJECTION INTRAMUSCULAR; INTRAVENOUS
Status: DISCONTINUED | OUTPATIENT
Start: 2020-07-01 | End: 2020-07-09 | Stop reason: HOSPADM

## 2020-07-01 RX ADMIN — FAMOTIDINE 20 MG: 10 INJECTION, SOLUTION INTRAVENOUS at 18:20

## 2020-07-01 RX ADMIN — CASTOR OIL AND BALSAM, PERU: 788; 87 OINTMENT TOPICAL at 17:29

## 2020-07-01 RX ADMIN — CLOPIDOGREL BISULFATE 75 MG: 75 TABLET ORAL at 10:02

## 2020-07-01 RX ADMIN — ASPIRIN 81 MG CHEWABLE TABLET 81 MG: 81 TABLET CHEWABLE at 10:02

## 2020-07-01 RX ADMIN — CASTOR OIL AND BALSAM, PERU: 788; 87 OINTMENT TOPICAL at 22:43

## 2020-07-01 RX ADMIN — ALLOPURINOL 100 MG: 100 TABLET ORAL at 09:51

## 2020-07-01 RX ADMIN — CASTOR OIL AND BALSAM, PERU: 788; 87 OINTMENT TOPICAL at 10:15

## 2020-07-01 NOTE — ACP (ADVANCE CARE PLANNING)
[address] [facility] Adult  Hospitalist Group                                      Advance Care Planning Note    Name: Willie Duarte  YOB: 1933  MRN: [id number]  Admission Date: 6/30/2020 12:05 PM    Date of discussion: 7/1/2020    Active Diagnoses:    Hospital Problems  Date Reviewed: 1/30/2020          Codes Class Noted POA    Acute CVA (cerebrovascular accident) [facility]) ICD-10-CM: I63.9  ICD-9-CM: 434.91  6/30/2020 Unknown              These active diagnoses are of sufficient risk that focused discussion on advance care planning is indicated in order to allow the patient to thoughtfully consider personal goals of care, and if situations arise that prevent the ability to personally give input, to ensure appropriate representation of their personal desires for different levels and aggressiveness of care. Persons present and participating in discussion: Frannie Healy MD, patient daughter Dylan Bhatia    Discussion: I called patient daughter updated patient's condition and requested her as per any advanced directive of the patient she said if we are asked not expecting any deterioration she does not want to discuss this at this point remains at full code I further explained her that a stroke can be conversed into a hemorrhagic stroke and may require intubation CPR etc. and case of cardiac arrest and also if not passed speech and swallow may need a feeding tube at at some point patient remains in full code    Time Spent:     Total time spent face-to-face in education and discussion: 16 minutes.      Glenice Cushing, MD  Date of Service:  7/1/2020  9:58 AM

## 2020-07-01 NOTE — PROGRESS NOTES
1425: Bedside and Verbal shift change report given to [place] RN  (oncoming nurse) by Cristopher Gonzalez RN  (offgoing nurse). Report included the following information SBAR and Kardex. 1430: RN verified that MD wants to keep SBP less than 200. Patient has no IV. 3 RN's have tried. Charge RN to go in to start new IV.     1520: PICC team said they will try to get IV access on patient.

## 2020-07-01 NOTE — PROGRESS NOTES
Problem: Self Care Deficits Care Plan (Adult)  Goal: *Acute Goals and Plan of Care (Insert Text)  Description:   FUNCTIONAL STATUS PRIOR TO ADMISSION: Patient was modified independent using a RW for functional mobility. Patient was min A? for basic and total A instrumental ADLs. Noted flexor tone L hand and wrist, unclear prior level of function and deficits. HOME SUPPORT: The patient lived with nursing support. Occupational Therapy Goals  Initiated 7/1/2020  1. Patient will perform lower body dressing with moderate assistance  within 7 day(s). 2.  Patient will perform opening and closing ADL containers 5/5 with minimal assistance/contact guard assist within 7 day(s). 3.  Patient will perform self-feeding (once cleared from being NPO) with min A within 7 day(s). 4.  Patient will perform toilet transfers with minimal assistance/contact guard assist within 7 day(s). 5.  Patient will perform all aspects of toileting with moderate assistance  within 7 day(s). Outcome: Not Met  OCCUPATIONAL THERAPY EVALUATION  Patient: Nate Ireland (18 y.o. male)  Date: 7/1/2020  Primary Diagnosis: Acute CVA (cerebrovascular accident) [facility]) [I63.9]       Precautions: bed alarm  Bed Alarm, Fall    ASSESSMENT  Based on the objective data described below, the patient presents with decreased dysmetria, coordination, cardiac tolerance (HTN), ROM, strength, standing balance with right lateral lean, motor planning, cognition (disoriented x4, 50% basic command following without additional cues), R peripheral inattention (vision testing limited due to command following, midline to right not tracking during testing), word finding versus object recognition, perseverating during ADLs.      Current Level of Function Impacting Discharge (ADLs/self-care): moderate A upper body ADLs, max A lower body ADLs, modified independence bed mobility, moderate A functional steps requiring A due to LOB    Functional Outcome Measure: The patient scored Total A-D  Total A-D (Motor Function): 42/66 on the Fugl-Ahumada Assessment which is indicative of moderate impairment in upper extremity functional status. Other factors to consider for discharge: nursing staff at [address] [place] [address]     Patient will benefit from skilled therapy intervention to address the above noted impairments. PLAN :  Recommendations and Planned Interventions: self care training, functional mobility training, therapeutic exercise, balance training, visual/perceptual training, therapeutic activities, cognitive retraining, endurance activities, neuromuscular re-education, patient education, home safety training, and family training/education    Frequency/Duration: Patient will be followed by occupational therapy 5 times a week to address goals. Recommendation for discharge: (in order for the patient to meet his/her long term goals)  Therapy 3 hours per day 5-7 days per week. If this is not an option and patient desires to discharge to AL patient will need HHOT and physical A during all ADLs and functional mobility. This discharge recommendation:  Has not yet been discussed the attending provider and/or case management    IF patient discharges home will need the following DME: bedside commode, walker: rolling, and wheelchair       SUBJECTIVE:   Patient stated I am at home.     OBJECTIVE DATA SUMMARY:   HISTORY:   Past Medical History:   Diagnosis Date    Cancer ([facility] [address]     prostate    Constipation     Gout     Hyperlipemia     Hypertension     Pacemaker 2019    Stroke [facility])     TIA (transient ischemic attack) 2013     Past Surgical History:   Procedure Laterality Date    HX PROSTATECTOMY      HX UROLOGICAL      prostate removed    NC INS NEW/RPLCMT PRM PACEMAKR W/TRANS ELTRD ATRIAL N/A 10/18/2019    Insert Ppm Single Atrial performed by Cherri Kang MD at [place] [address], [facility] CATH LAB    NC INS NEW/RPLCMT PRM PM W/TRANSV ELTRD ATRIAL&VENT Right 3/22/2019    INSERT PPM DUAL performed by Radha Suh MD at [address], [facility] LEARY LAB     Expanded or extensive additional review of patient history:     Home Situation  [facility] [address] Name: Giuseppe Landon  One/Two Story Residence: One story  Current DME Used/Available at Home: Linda Border, rolling, Grab bars  Tub or Shower Type: Shower    Hand dominance: Right    EXAMINATION OF PERFORMANCE DEFICITS:  Cognitive/Behavioral Status:  Neurologic State: Confused  Orientation Level: Disoriented X4  Cognition: Impaired decision making;Decreased command following;Decreased attention/concentration;Memory loss  Perception: Appears intact  Perseveration: Perseverates during ADLS       Skin: intact    Edema: intact    Hearing:       Vision/Perceptual:    Tracking: Unable to hold eye position out of midline; Unable to track right of midline    Saccades: Additional eye shifts occurred during testing                 Acuity: Impaired near vision; Impaired far vision    Corrective Lenses: Reading glasses    Range of Motion:  Shoulder flexion 90* - states baseline  Shoulder IR slightly decreased L UE  Elbows WDL  L wrist and digits decreased flexion, increased time due to flexor tone  R wrist - digits WDL  AROM: Generally decreased, functional                         Strength:  -3/5 shoulder flexion +3/5 elbows  Wrist L -3/5  Wrist R 3/5  Digits L -3/5  Digits R 3/5  Strength: Generally decreased, functional                Coordination:  Coordination: Within functional limits  Fine Motor Skills-Upper: Left Intact; Right Intact    Gross Motor Skills-Upper: Left Intact; Right Intact    Tone & Sensation:    Tone: Normal  Sensation: Intact(per report during testing)                      Balance:  Sitting: Intact; Without support    Functional Mobility and Transfers for ADLs:  Bed Mobility:  Supine to Sit: Modified independent  Sit to Supine: Modified independent  Scooting: Modified independent    Transfers:  Sit to Stand: Minimum assistance  Stand to Sit: Minimum assistance  Bathroom Mobility: Moderate assistance(R lateral LOB)  Toilet Transfer : Moderate assistance(BSC, gait belt, stand pivot)  Shower Transfer: Total assistance(not safe at this time)    ADL Assessment:  Feeding: Total assistance(NPO; infer moderate A)    Oral Facial Hygiene/Grooming: Maximum assistance increased time to open and close item,     Bathing: Total assistance    Upper Body Dressing: Maximum assistance infer from basic ADL demo    Lower Body Dressing: Maximum assistance functional reach to knees, poor dynamic standing balance    Toileting: Total assistance poor balance and cognitive ability         Sitting EOB 10 mins supervision       ADL Intervention and task modifications:                                          Therapeutic Exercise:     Functional Measure:  Fugl-Ahumada Assessment of Motor Recovery after Stroke: L also limiting wrist. Both poor timed sequencing knee to nose. Patient first poor command following verbal cue, then visual and then tactile. Once touching knee to nose then perseverating on touching both knees and forehead. Reflex Activity  Flexors/Biceps/Fingers: Can be elicited  Extensors/Triceps: Can be elicited  Reflex Subtotal: 4    Volitional Movement Within Synergies  Shoulder Retraction: Full  Shoulder Elevation: Full  Shoulder Abduction (90 degrees): Full  Shoulder External Rotation: Full  Elbow Flexion: Full  Forearm Supination: Partial  Shoulder Adduction/Internal Rotation: Partial  Elbow Extension: Full  Forearm Pronation: Partial  Subtotal: 15    Volitional Movement Mixing Synergies  Hand to Lumbar Spine: Full  Shoulder Flexion (0-90 degrees): Full  Pronation-Supination: Full  Subtotal: 6    Volitional Movement With Little or No Synergy  Shoulder Abduction (0-90 degrees): Full  Shoulder Flexion ( degrees): None  Pronation/Supination: Full  Subtotal : 4    Normal Reflex Activity  Biceps, Triceps, Finger Flexors:  Full  Subtotal : 2    Upper Extremity Total   Upper Extremity Total: 31    Wrist  Stability at 15 Degree Dorsiflexion: None  Repeated Dorsiflexion/ Volar Flexion: None  Stability at 15 Degree Dorsiflexion: None  Repeated Dorsiflexion/ Volar Flexion: None  Circumduction: None  Wrist Total: 0    Hand  Mass Flexion: Full  Mass Extension: Full  Grasp A: Partial  Grasp B: Partial  Grasp C: Partial  Grasp D: Partial  Grasp E: Partial  Hand Total: 9    Coordination/Speed  Tremor: None  Dysmetria: Marked  Time: >5s  Coordination/Speed Total : 2    Total A-D  Total A-D (Motor Function): 42/66     This is a reliable/valid measure of arm function after a neurological event. It has established value to characterize functional status and for measuring spontaneous and therapy-induced recovery; tests proximal and distal motor functions. Fugl-Ahumada Assessment  UE scores recorded between five and 30 days post neurologic event can be used to predict UE recovery at six months post neurologic event. Severe = 0-21 points   Moderately Severe = 22-33 points   Moderate = 34-47 points   Mild = 48-66 points  MARYLIN Wilson, BRO Garcia, & NOMI Barcenas (1992). Measurement of motor recovery after stroke: Outcome assessment and sample size requirements.  Stroke, 23, pp. 4276-1626.   ------------------------------------------------------------------------------------------------------------------------------------------------------------------  MCID:  Stroke:   Srinivas Marti al, 2001; n = 171; mean age 79 (5) years; assessed within 16 (12) days of stroke, Acute Stroke)  FMA Motor Scores from Admission to Discharge    10 point increase in FMA Upper Extremity = 1.5 change in discharge FIM    10 point increase in FMA Lower Extremity = 1.9 change in discharge FIM  MDC:   Stroke:   [place] Schuster et al, 2008, n = 14, mean age = 59.9 (14.6) years, assessed on average 14 (6.5) months post stroke, Chronic Stroke)    FMA = 5.2 points for the Upper Extremity portion of the assessment     Pain Rating:  No pain    Activity Tolerance:   Poor and requires rest breaks  Vitals:    07/01/20 1104 07/01/20 1113 07/01/20 1121 07/01/20 1131   BP: (!) 190/92 (!) 187/95 (!) 201/96 (!) 204/90   BP 1 Location:       BP Patient Position: Supine Sitting Standing Supine   Pulse: 76 74 68 63   Resp: 18 18 16 19   Temp:       SpO2: 98% 99% 98% 100%   Weight:       Height:           Please refer to the flowsheet for vital signs taken during this treatment. After treatment patient left in no apparent distress:    Supine in bed, Call bell within reach, Bed / chair alarm activated, and Side rails x 3    COMMUNICATION/EDUCATION:   The patients plan of care was discussed with: Physical therapist and Registered nurse. Patient was educated regarding his deficit(s) of L parietal occipital as this relates to his diagnosis of CVA. He demonstrated Guarded understanding as evidenced by inability to repeat back. Home safety education was provided and the patient/caregiver indicated understanding., Patient/family have participated as able in goal setting and plan of care. , and Patient/family agree to work toward stated goals and plan of care. This patients plan of care is appropriate for delegation to [facility].     Thank you for this referral.  Alissa Shoulders  Time Calculation: 35 minsTotal time 35 mins

## 2020-07-01 NOTE — CONSULTS
INPATIENT NEUROLOGY CONSULTATION  7/1/2020     Consulted by: Jony Olguin MD        Patient ID:  Candy Bose  [id number]  80 y.o.  3/9/1933    CC: Stroke    HPI    Mr. Aga Quintana is an 44-year-old gentleman who was brought to the hospital he began to demonstrate right-sided weakness and confusion leading to a fall. Sounds like he was his baseline the night before presentation. It sounds like he lives in a nursing home or some type of assisted living facility. He is not a good historian. He cannot tell me why he is here. All he wants to do is leave. Initial imaging showing bilateral PCA infarct more on the left. Evidence of the left M1 stenosis and bilateral PCA stenosis. He is currently COVID suspect on isolation.       Review of Systems   Unable to perform ROS: Mental status change       Past Medical History:   Diagnosis Date    Cancer ([facility] [address]     prostate    Constipation     Gout     Hyperlipemia     Hypertension     Pacemaker 2019    Stroke ([facility] [address]     TIA (transient ischemic attack) 2013     Family History   Problem Relation Age of Onset    No Known Problems Mother     No Known Problems Father      Social History     Socioeconomic History    Marital status:      Spouse name: Not on file    Number of children: Not on file    Years of education: Not on file    Highest education level: Not on file   Occupational History    Not on file   Social Needs    Financial resource strain: Not on file    Food insecurity     Worry: Not on file     Inability: Not on file    Transportation needs     Medical: Not on file     Non-medical: Not on file   Tobacco Use    Smoking status: Never Smoker    Smokeless tobacco: Never Used   Substance and Sexual Activity    Alcohol use: No    Drug use: No    Sexual activity: Never   Lifestyle    Physical activity     Days per week: Not on file     Minutes per session: Not on file    Stress: Not on file   Relationships    Social connections     Talks on phone: Not on file     Gets together: Not on file     Attends Jain service: Not on file     Active member of club or organization: Not on file     Attends meetings of clubs or organizations: Not on file     Relationship status: Not on file    Intimate partner violence     Fear of current or ex partner: Not on file     Emotionally abused: Not on file     Physically abused: Not on file     Forced sexual activity: Not on file   Other Topics Concern    Not on file   Social History Narrative    Not on file     Current Facility-Administered Medications   Medication Dose Route Frequency    balsam peru-castor oiL (VENELEX) ointment   Topical TID    allopurinoL (ZYLOPRIM) tablet 100 mg  100 mg Oral DAILY    atorvastatin (LIPITOR) tablet 40 mg  40 mg Oral QHS    acetaminophen (TYLENOL) tablet 650 mg  650 mg Oral Q4H PRN    Or    acetaminophen (TYLENOL) solution 650 mg  650 mg Per NG tube Q4H PRN    Or    acetaminophen (TYLENOL) suppository 650 mg  650 mg Rectal Q4H PRN    0.9% sodium chloride infusion  100 mL/hr IntraVENous CONTINUOUS    aspirin (ASA) suppository 300 mg  300 mg Rectal DAILY    famotidine (PF) (PEPCID) 20 mg in 0.9% sodium chloride 10 mL injection  20 mg IntraVENous Q24H     No Known Allergies    Visit Vitals  BP (!) 179/102 (BP 1 Location: Right arm, BP Patient Position: Lying right side; At rest)   Pulse 68   Temp 98.7 °F (37.1 °C)   Resp 21   Ht 5' 7\" (1.702 m)   Wt 77 kg (169 lb 11.2 oz)   SpO2 100%   BMI 26.58 kg/m²     Physical Exam   Constitutional: He appears well-developed and well-nourished. Cardiovascular: Normal rate. Pulmonary/Chest: Effort normal.   Skin: Skin is warm and dry. Psychiatric:   Disinhibited   Vitals reviewed. Neurologic Exam     Mental Status   I asked him where he is he can only say he is\" 407\". He cannot tell me if he is not hospitalized at home. He cannot tell me why he is here. He has completely disrobed himself and removed his condom catheter.   Exam is limited  Cannot assess pupils however he is looking around the room in all directions with preference to the left. Speech is slurred, language not assessed  Face appears grossly symmetric during conversation  He is moving all extremities spontaneously arms and legs with some purpose  Gait def 2/2 condition            Lab Results   Component Value Date/Time    WBC 8.9 07/01/2020 04:04 AM    HGB 15.7 07/01/2020 04:04 AM    HCT 48.7 07/01/2020 04:04 AM    PLATELET 267 (L) 49/28/1897 04:04 AM    MCV 96.1 07/01/2020 04:04 AM     Lab Results   Component Value Date/Time    Hemoglobin A1c 5.7 (H) 07/01/2020 04:04 AM    Hemoglobin A1c 6.3 (H) 03/11/2019 05:08 PM    Glucose 116 (H) 06/30/2020 12:18 PM    Glucose (POC) 113 (H) 06/30/2020 08:58 PM    LDL, calculated 56.6 07/01/2020 04:04 AM    Creatinine (POC) 1.7 (H) 07/31/2018 02:19 PM    Creatinine 1.50 (H) 06/30/2020 12:18 PM      Lab Results   Component Value Date/Time    Cholesterol, total 118 07/01/2020 04:04 AM    HDL Cholesterol 51 07/01/2020 04:04 AM    LDL, calculated 56.6 07/01/2020 04:04 AM    Triglyceride 52 07/01/2020 04:04 AM    CHOL/HDL Ratio 2.3 07/01/2020 04:04 AM     Lab Results   Component Value Date/Time    ALT (SGPT) 26 06/30/2020 12:18 PM    Alk. phosphatase 106 06/30/2020 12:18 PM    Bilirubin, total 0.5 06/30/2020 12:18 PM    Albumin 3.5 06/30/2020 12:18 PM    Protein, total 8.0 06/30/2020 12:18 PM    INR 1.0 06/30/2020 12:18 PM    Prothrombin time 10.8 06/30/2020 12:18 PM    PLATELET 867 (L) 83/85/4150 04:04 AM        CT Results (maximum last 3): Results from [place] encounter on 06/30/20   CTA CODE NEURO HEAD AND NECK W CONT    Narrative EXAM:  CTA CODE NEURO HEAD AND NECK W CONT, CT CODE NEURO PERF W CBF    INDICATION:   ams difficulty walking    COMPARISON:  CT head 6/30/2020. CONTRAST:  120 mL of Isovue-370. TECHNIQUE:  Unenhanced  images were obtained to localize the volume for  acquisition.   Multislice helical axial CT angiography was performed from the  aortic arch to the top of the head during uneventful rapid bolus intravenous  contrast administration. Coronal and sagittal reformations and 3D post  processing was performed. CT dose reduction was achieved through use of a  standardized protocol tailored for this examination and automatic exposure  control for dose modulation. CT brain perfusion was performed with generation of hemodynamic maps of multiple  parameters, including cerebral blood flow, cerebral blood volume, and MTT (mean  transit time). CT dose reduction was achieved through use of a standardized  protocol tailored for this examination and automatic exposure control for dose  modulation. FINDINGS:    CTA Head:  There is no evidence of large vessel occlusion or flow-limiting stenosis of the  intracranial internal carotid, anterior cerebral, and middle cerebral arteries. Calcification the bilateral carotid siphons with mild stenosis. Moderate focal  stenosis of the left M1 segment, and mild stenosis of the right M1 segment. The  anterior communicating artery is diminutive but patent. There is no evidence of large vessel occlusion or flow-limiting stenosis of the  intracranial vertebral arteries, basilar artery, or posterior cerebral arteries. Mild to moderate stenoses of the bilateral P2 segments. The right vertebral  artery terminates in PICA. The posterior communicating arteries are not seen. There is no evidence of aneurysm or vascular malformation. The dural venous  sinuses and deep cerebral venous system are patent. No evidence of abnormal  enhancement on delayed phase images. Acute left PCA territory infarct involving  the left parieto-occipital lobe and medial temporal lobe, as well as an acute  infarct in the right occipital lobe. CTA NECK:  NASCET method was utilized for calculating stenosis. Moderate calcific atherosclerosis of the aortic arch.  The common carotid  arteries demonstrate no significant stenosis. Calcific atherosclerosis at the  right carotid bifurcation without significant stenosis. Calcific atherosclerosis  of the left carotid bifurcation without significant stenosis. There is a left dominant vertebrobasilar arterial system. Mild stenosis at the  origin of the left vertebral artery. There is enlargement of the left thyroid lobe with a 1.8 cm nodule. Remaining  visualized soft tissues of the neck are unremarkable. Partially visualized small  left pleural effusion. Right chest pacemaker noted. No acute fracture or  aggressive osseous lesion. Reversal of cervical lordosis with severe  degenerative disc disease throughout the cervical spine and multilevel severe  facet arthropathy. CT Brain Perfusion:  There is a regional area of elevated Tmax noted in the left parieto-occipital  lobe, with qualitatively decreased cerebral blood flow and blood volume in this  region. rCBF < 30% = 0 cc. Tmax > 6 seconds = 4 cc. Impression IMPRESSION:   CTA Head:  1. No evidence of large vessel occlusion or flow-limiting stenosis. 2. Atherosclerotic disease as above, including moderate stenosis of the left M1  segment, and mild to moderate stenoses of the bilateral posterior cerebral  arteries. 3. Acute bilateral PCA territory infarcts, left larger than right. CTA Neck:  1. No evidence of flow-limiting stenosis. 2. Partially visualized small left pleural effusion. CT Brain Perfusion:  1. Acute ischemia in the left parieto-occipital lobe, with qualitatively  decreased cerebral blood flow and blood volume in this region representing  completed infarct. Mismatch volume of 4 cc. The findings were called to ER on 6/30/2020 at 12:55 PM by Dr. Zelda Peabody. [id number]     CT CODE NEURO PERF W CBF    Narrative EXAM:  CTA CODE NEURO HEAD AND NECK W CONT, CT CODE NEURO PERF W CBF    INDICATION:   ams difficulty walking    COMPARISON:  CT head 6/30/2020.     CONTRAST:  120 mL of Isovue-370. TECHNIQUE:  Unenhanced  images were obtained to localize the volume for  acquisition. Multislice helical axial CT angiography was performed from the  aortic arch to the top of the head during uneventful rapid bolus intravenous  contrast administration. Coronal and sagittal reformations and 3D post  processing was performed. CT dose reduction was achieved through use of a  standardized protocol tailored for this examination and automatic exposure  control for dose modulation. CT brain perfusion was performed with generation of hemodynamic maps of multiple  parameters, including cerebral blood flow, cerebral blood volume, and MTT (mean  transit time). CT dose reduction was achieved through use of a standardized  protocol tailored for this examination and automatic exposure control for dose  modulation. FINDINGS:    CTA Head:  There is no evidence of large vessel occlusion or flow-limiting stenosis of the  intracranial internal carotid, anterior cerebral, and middle cerebral arteries. Calcification the bilateral carotid siphons with mild stenosis. Moderate focal  stenosis of the left M1 segment, and mild stenosis of the right M1 segment. The  anterior communicating artery is diminutive but patent. There is no evidence of large vessel occlusion or flow-limiting stenosis of the  intracranial vertebral arteries, basilar artery, or posterior cerebral arteries. Mild to moderate stenoses of the bilateral P2 segments. The right vertebral  artery terminates in PICA. The posterior communicating arteries are not seen. There is no evidence of aneurysm or vascular malformation. The dural venous  sinuses and deep cerebral venous system are patent. No evidence of abnormal  enhancement on delayed phase images. Acute left PCA territory infarct involving  the left parieto-occipital lobe and medial temporal lobe, as well as an acute  infarct in the right occipital lobe.     CTA NECK:  NASCET method was utilized for calculating stenosis. Moderate calcific atherosclerosis of the aortic arch. The common carotid  arteries demonstrate no significant stenosis. Calcific atherosclerosis at the  right carotid bifurcation without significant stenosis. Calcific atherosclerosis  of the left carotid bifurcation without significant stenosis. There is a left dominant vertebrobasilar arterial system. Mild stenosis at the  origin of the left vertebral artery. There is enlargement of the left thyroid lobe with a 1.8 cm nodule. Remaining  visualized soft tissues of the neck are unremarkable. Partially visualized small  left pleural effusion. Right chest pacemaker noted. No acute fracture or  aggressive osseous lesion. Reversal of cervical lordosis with severe  degenerative disc disease throughout the cervical spine and multilevel severe  facet arthropathy. CT Brain Perfusion:  There is a regional area of elevated Tmax noted in the left parieto-occipital  lobe, with qualitatively decreased cerebral blood flow and blood volume in this  region. rCBF < 30% = 0 cc. Tmax > 6 seconds = 4 cc. Impression IMPRESSION:   CTA Head:  1. No evidence of large vessel occlusion or flow-limiting stenosis. 2. Atherosclerotic disease as above, including moderate stenosis of the left M1  segment, and mild to moderate stenoses of the bilateral posterior cerebral  arteries. 3. Acute bilateral PCA territory infarcts, left larger than right. CTA Neck:  1. No evidence of flow-limiting stenosis. 2. Partially visualized small left pleural effusion. CT Brain Perfusion:  1. Acute ischemia in the left parieto-occipital lobe, with qualitatively  decreased cerebral blood flow and blood volume in this region representing  completed infarct. Mismatch volume of 4 cc. The findings were called to ER on 6/30/2020 at 12:55 PM by Dr. Marianne Lemus. [phone]         MRI Results (maximum last 3):   Results from [place] encounter on 07/18/17   MRI ABD W WO CONT    Narrative MRI ABDOMEN AND MRCP WITH AND WITHOUT CONTRAST. 7/20/2017 11:49 AM    INDICATION: Adrenal masses. COMPARISON: CT abdomen pelvis 7/18/2017, renal ultrasound 7/18/2017. TECHNIQUE: Multisequence and multiplanar MRI of the abdomen was performed after  the administration of 7.5 mL of Gadavist (gadobutrol)  IV contrast. Images were  obtained without contrast; and in early arterial/angiographic, portal venous,  and delayed phases after the administration of contrast. Subtraction images were  reconstructed. Heavily T2-weighted thick slab and thin slice images were obtained in the  oblique coronal plane through the biliary tree (MRCP). FINDINGS:   A circumscribed right adrenal mass measures approximately 40 x 66 x 38 mm. It is  isointense to liver on T2-weighted images and hypointense to liver on  T1-weighted images. On out of phase images, there is loss of signal, consistent  with microscopic fat content. There is no definite macroscopic fat content on  fat saturation images or on prior CT to confirm myelolipoma. Fluid density on CT  and microscopic fat on MRI are consistent with a lipid rich adenoma, though  there is more heterogeneity than typical for an adenoma. Lobulated,  circumscribed margins, with well-defined surrounding fat planes, is a  nonaggressive morphology. Microscopic fat and circumscribed margins would be  extremely atypical for an adrenal cortical carcinoma. Microscopic fat would also  be atypical for metastases. T2 isointensity and microscopic fat make  pheochromocytoma or paraganglioma less likely. On subtraction weighted imaging,  there are some enhancing components, with predominantly progressive kinetics  through the 3 minute delayed phase. On 10 minute delayed phase, there is  probably some washout. The enhancement kinetics do not add further  discrimination to the differential described above.     There is bilateral adrenal gland thickening, with additional, subcentimeter,  oval, circumscribed, bilateral adrenal nodules. The other nodules are too small  to characterize, though are statistically likely to represent adenomas. Bilateral thickening suggests adrenal hyperplasia. Incidental note is made of small bilateral renal cysts and subcentimeter T2  hyperintense hepatic lesions are too small to characterize, but likely also  represent cysts. The gallbladder is decompressed. There are trace bilateral  pleural effusions and trace right paracolic ascites. No pancreatic or biliary  ductal dilation. The visualized portions of the distal esophagus, stomach,  duodenum, liver, gallbladder, pancreas, spleen, kidneys, and small and large  bowel, are otherwise normal.      Impression IMPRESSION: 1. Right adrenal mass: A heterogeneous lipid rich adenoma is favored  over a lipid poor myelolipoma. Microscopic fat and morphology make metastases,  pheochromocytoma, paraganglioma, and adrenal cortical carcinoma much less  likely. Follow-up adrenal protocol CT or MRI should be considered in 3-6 months. 2. Bilateral adrenal gland thickening and nodularity, suggesting hyperplasia and  small adrenal adenomas. The findings were called to Dr. Aquiles Darden on 7/21/2017 8:25 AM by Dr. Lizette Holt. [phone]         VAS/US/Carotid Doppler Results (maximum last 3): No results found for this or any previous visit. PET Results (maximum last 3): No results found for this or any previous visit. Assessment and Plan        60-year-old gentleman who has had bilateral posterior infarcts based on CT imaging. Exam is very difficult to obtain as he is agitated and uncooperative. He should be on dual antiplatelets aspirin and Plavix. Check lipid panel. Stay on IV fluids to allow for good perfusion. He will need an MRI brain if cooperative. Given intracranial vascular stenosis. We will follow-up. Elderly gentleman in bed awake and alert.   A little agitated at home repetitively. This clinical note was dictated with an electronic dictation software that can make unintentional errors. If there are any questions, please contact me directly for clarification.       [address] [facility], DO  NEUROLOGIST  Diplomate CRYSTAL  7/1/2020

## 2020-07-01 NOTE — PROGRESS NOTES
[facility] Adult  Hospitalist Group                                                                                          Hospitalist Progress Note  Justine Cai MD  Answering service: [phone] from in house phone        Date of Service:  2020  NAME:  Camryn Cope  :  3/9/1933  MRN:  [id number]      Admission Summary:   The patient is an 78-year-old gentleman with past medical history of hypertension, prostate cancer, constipation, gout, hyperlipidemia, stroke, and TIA, who presents to the hospital with the above-mentioned symptom. History was primarily obtained from the patient's daughter, hBarathi Price. The patient is confused and has some right-sided weakness with right-sided neglect. She reports that the patient was last seen normal 2 days back on . Per care facility, the patient was normal last night. He became disoriented initially and then developed issues with walking and fell hitting his head, but there was no loss of consciousness. She reports the patient lives at -- and the patient was brought over here as a code stroke, was found to have occipital stroke. Neurointerventional Surgery was consulted and they felt that the stroke has been completed and the patient does not need any acute surgical intervention. The patient was requested to be admitted under the hospitalist service. Currently, the patient is resting in bed, does not appear to be distressed. No further history could be obtained from the patient or his daughter. Interval history / Subjective:   Patient sometimes get agitated and confused but he passed the speech and swallow restarted his medication and as per neurology started on aspirin Plavix advance his diet called her daughter and updated and discussed about the CODE STATUS     Assessment & Plan:     1.   Acute parieto-occipital cerebrovascular accident:    -Started patient on aspirin, Plavix statin, neurovascular checks,   - MRI of the brain, Neurology consult,   - physical therapy/occupational therapy/speech consult.    -Echocardiogram  -SARS-CoV-2 pending     2. Hypertension:    - We will allow permissive hypertension. Hold beta-blockers and Norvasc for now. Hydralazine p.r.n. Continue to monitor. 3.  Hyperlipidemia:  Continue statin. 4.  Acute kidney injury on chronic kidney disease, likely prerenal:    -Provide gentle IV hydration. Repeat labs in the morning. Continue to monitor. Avoid nephrotoxic medications and renally dose all other medications. 5.  Elevated troponin, unclear etiology:  Cycle troponins, telemetry monitoring, aspirin, echocardiogram.  May consider getting a Cardiology consult if continue to trend up      Code status: Full  DVT prophylaxis: Abraham Dove 73 discussed with: Patient/Family and Nurse  Anticipated Disposition: Home w/Family and SNF/LTC  Anticipated Discharge: Greater than 48 hours   Patient daughter and updated 7/1     Hospital Problems  Date Reviewed: 1/30/2020          Codes Class Noted POA    Acute CVA (cerebrovascular accident) [facility]) ICD-10-CM: I63.9  ICD-9-CM: 434.91  6/30/2020 Unknown                Review of Systems:   Review of systems not obtained due to patient factors. Vital Signs:    Last 24hrs VS reviewed since prior progress note. Most recent are:  Visit Vitals  BP (!) 187/96 (BP 1 Location: Right arm, BP Patient Position: At rest)   Pulse 67   Temp 98.4 °F (36.9 °C)   Resp 19   Ht 5' 7\" (1.702 m)   Wt 77 kg (169 lb 11.2 oz)   SpO2 97%   BMI 26.58 kg/m²         Intake/Output Summary (Last 24 hours) at 7/1/2020 1224  Last data filed at 7/1/2020 0359  Gross per 24 hour   Intake 1100 ml   Output 1400 ml   Net -300 ml        Physical Examination:             Constitutional:  No acute distress, cooperative, pleasant    ENT:  Oral mucosa moist, oropharynx benign. Resp:  CTA bilaterally. No wheezing/rhonchi/rales.  No accessory muscle use   CV:  Regular rhythm, normal rate, no murmurs, gallops, rubs    GI:  Soft, non distended, non tender. normoactive bowel sounds, no hepatosplenomegaly     Musculoskeletal:  No edema, warm, 2+ pulses throughout    Neurologic:  Moves all extremities. AAOx3, CN II-XII reviewed     Psych:  Good insight, Not anxious nor agitated. Data Review:    I personally reviewed  Image and labs      Labs:     Recent Labs     07/01/20  0404 06/30/20  1218   WBC 8.9 8.4   HGB 15.7 13.4   HCT 48.7 41.3   * 184     Recent Labs     06/30/20  1218      K 3.7      CO2 27   BUN 18   CREA 1.50*   *   CA 9.1   MG 2.3     Recent Labs     06/30/20  1218   ALT 26      TBILI 0.5   TP 8.0   ALB 3.5   GLOB 4.5*     Recent Labs     06/30/20  1218   INR 1.0   PTP 10.8   APTT 30.1      No results for input(s): FE, TIBC, PSAT, FERR in the last 72 hours. Lab Results   Component Value Date/Time    Folate 12.2 01/07/2019 11:29 AM      No results for input(s): PH, PCO2, PO2 in the last 72 hours.   Recent Labs     06/30/20  1218   TROIQ 0.06*     Lab Results   Component Value Date/Time    Cholesterol, total 118 07/01/2020 04:04 AM    HDL Cholesterol 51 07/01/2020 04:04 AM    LDL, calculated 56.6 07/01/2020 04:04 AM    Triglyceride 52 07/01/2020 04:04 AM    CHOL/HDL Ratio 2.3 07/01/2020 04:04 AM     Lab Results   Component Value Date/Time    Glucose (POC) 113 (H) 06/30/2020 08:58 PM    Glucose (POC) 109 (H) 06/30/2020 11:56 AM     Lab Results   Component Value Date/Time    Color Yellow 08/08/2017 04:09 PM    Appearance Clear 08/08/2017 04:09 PM    Specific gravity 1.017 07/18/2017 02:56 PM    pH (UA) 6.5 08/08/2017 04:09 PM    Protein 100 (A) 07/18/2017 02:56 PM    Glucose NEGATIVE  07/18/2017 02:56 PM    Ketone Negative 08/08/2017 04:09 PM    Bilirubin Negative 08/08/2017 04:09 PM    Urobilinogen 1.0 07/18/2017 02:56 PM    Nitrites Positive (A) 08/08/2017 04:09 PM    Leukocyte Esterase 1+ (A) 08/08/2017 04:09 PM    Epithelial cells MODERATE (A) 07/18/2017 02:56 PM    Bacteria Few 08/08/2017 04:09 PM    WBC 11-30 (A) 08/08/2017 04:09 PM    RBC 0-2 08/08/2017 04:09 PM         Medications Reviewed:     Current Facility-Administered Medications   Medication Dose Route Frequency    balsam peru-castor oiL (VENELEX) ointment   Topical TID    aspirin chewable tablet 81 mg  81 mg Oral DAILY    clopidogreL (PLAVIX) tablet 75 mg  75 mg Oral DAILY    hydrALAZINE (APRESOLINE) 20 mg/mL injection 20 mg  20 mg IntraVENous Q6H PRN    allopurinoL (ZYLOPRIM) tablet 100 mg  100 mg Oral DAILY    atorvastatin (LIPITOR) tablet 40 mg  40 mg Oral QHS    acetaminophen (TYLENOL) tablet 650 mg  650 mg Oral Q4H PRN    Or    acetaminophen (TYLENOL) solution 650 mg  650 mg Per NG tube Q4H PRN    Or    acetaminophen (TYLENOL) suppository 650 mg  650 mg Rectal Q4H PRN    0.9% sodium chloride infusion  100 mL/hr IntraVENous CONTINUOUS    famotidine (PF) (PEPCID) 20 mg in 0.9% sodium chloride 10 mL injection  20 mg IntraVENous Q24H     ______________________________________________________________________  EXPECTED LENGTH OF STAY: 3d 0h  ACTUAL LENGTH OF STAY:          1                 Rony Jones MD

## 2020-07-01 NOTE — PROGRESS NOTES
Problem: TIA/CVA Stroke: 0-24 hours  Goal: Nutrition/Diet  Outcome: Progressing Towards Goal  Goal: Treatments/Interventions/Procedures  Outcome: Progressing Towards Goal  Frequent neuro assessments and blood pressure checks. Pt remains NPO. Goal: *Dysphagia screen performed(Stroke Metric)  Outcome: Progressing Towards Goal  Pt tolerated swallowing applesauce, then water with a straw. Oral medication was given. No s/s of aspiration, coughing, choking or respiratory distress. Tolerated taking oral medication well with water. Problem: Falls - Risk of  Goal: *Absence of Falls  Description: Document Gutierrez Kauffman Fall Risk and appropriate interventions in the flowsheet. Outcome: Progressing Towards Goal  Note: Fall Risk Interventions:  Pt remains free of falls during admission. Call bell and frequently used items within reach. Bedside table within reach. Pt provided nonskid socks and instructed to call out for nurse when in need of assistance. Pt remains on bed rest.     Problem: Pressure Injury - Risk of  Goal: *Prevention of pressure injury  Description: Document Torito Scale and appropriate interventions in the flowsheet. Outcome: Progressing Towards Goal  Note: Pressure Injury Interventions:  Patient turned every two hours, moisture barrier applied, heels elevated, pillows and blankets used, pressure redistributing mattress , linens dry. Wound consult was ordered. Bedside shift change report given to Wayne(oncoming nurse) by Neymar Jenkins (offgoing nurse). Report included the following information SBAR, Intake/Output, MAR, Recent Results, and Cardiac Rhythm NSR .

## 2020-07-01 NOTE — PROGRESS NOTES
Orders received, chart reviewed and patient is currently under investigation for COVID-19. In attempts to have only essential personnel enter the room and conserve PPE, we will confer with nursing and/or the referring provider to determine the most appropriate timing of our therapy intervention. Until this time, we will follow the patient peripherally to support nursing staff on an appropriate care plan. Of note, patient passed STAND. Thank you for your assistance.    YVON Lackey.Ed, [id number] [facility]  Speech-Language Pathologist

## 2020-07-01 NOTE — WOUND CARE
WOCN Note:  
 
New consult placed for assessment of sacrum. Patient is on isolation to rule out for Covid. Did not go into room to reduce exposure and conserve PPE. Discussed with Wayne CASAS. Chart reviewed. Admitted DX:  Acute CVA Past Medical History: 
 
Assessment:  
Wayne CASAS reports blanching pink area to sacrum. Wound, Pressure Prevention & Skin Care Recommendations: 1. Minimize layers of linen/pads under patient to optimize support surface. 2.  Turn/reposition approximately every 2 hours and offload heels. 3.  Manage incontinence and moisture. 4.  Sacral/coccyx:  Venelex TID. Discussed above plan with Inocente Sales RN. Transition of Care: Plan to follow as needed while admitted to hospital. 
 
CAMMY RiojasN RN [facility] [place] [facility] Available on Perfect Serve Pager [phone] Office [phone]

## 2020-07-01 NOTE — PROGRESS NOTES
Orders received, chart reviewed and patient is currently under investigation for COVID-19. In attempts to have only essential personnel enter the room and conserve PPE, we will confer with nursing and/or the referring provider to determine the most appropriate timing of our therapy intervention. Pt has just participated with OT, currently with high BP and fatigued post intervention. Will defer until tomorrow. Please bring in a chair, chair alarm and RW on next session. Until this time, we will follow the patient peripherally to support nursing staff on an appropriate care plan. Thank you for your assistance.      Jeannette Denney, DPT, PT

## 2020-07-01 NOTE — PROGRESS NOTES
Bedside and Verbal shift change report given to [address] (oncoming nurse) by Kandi Marcelo (offgoing nurse). Report included the following information SBAR, Kardex, ED Summary, MAR, Accordion and Cardiac Rhythm A-Paced. 1830: Code [place] called. Patient became very agitated/confused and began shouting and claiming that he was not being cared for by anyone. Became aggressive physically when I attempted to calm him. He then threatened violence if I did not leave him alone,insisted on getting out of bed and attempted to rip out his IV line. Paged hospitalist, orders received for IM haldol. Also paged hospitalist about patient's elevated /89. No orders received. Primary Nurse Tomas Mendoza and Sajan Pendleton RN performed a dual skin assessment on this patient Impairment noted- see wound doc flow sheet  Torito score is 13      1800: TRANSFER - IN REPORT:    Verbal report received from Madi Urias RN(name) on Maribel Vyas  being received from ED(unit) for routine progression of care      Report consisted of patients Situation, Background, Assessment and   Recommendations(SBAR). Information from the following report(s) SBAR, Kardex, ED Summary, MAR, Accordion and Cardiac Rhythm A-Paced was reviewed with the receiving nurse. Opportunity for questions and clarification was provided. Assessment completed upon patients arrival to unit and care assumed.

## 2020-07-01 NOTE — PROGRESS NOTES
Problem: TIA/CVA Stroke: 0-24 hours  Goal: Nutrition/Diet  Outcome: Progressing Towards Goal  Pt started on Dysphagia/Soft diet, passed dysphagia screening, and tolerates meals well. No s/s of aspiration noted during feeding. Goal: Medications  Outcome: Progressing Towards Goal  Pt treated with aspirin. Goal: Treatments/Interventions/Procedures  Outcome: Progressing Towards Goal   Pt up with assistance during bath and repositioning. Plan for PT tomorrow. Verbal shift change report given to Cris Smith RN (oncoming nurse) by Rojelio Cogan, RN (offgoing nurse). Report included the following information SBAR, Kardex, ED Summary, Procedure Summary, Intake/Output, MAR, Accordion, Recent Results, Med Rec Status, Cardiac Rhythm AV-Paced, and Alarm Parameters .

## 2020-07-01 NOTE — PROGRESS NOTES
TRANSFER - IN REPORT:    Verbal report received from Meng Nuñez RN(name) on Betty Rodriguez  being received from [facility]) for routine progression of care      Report consisted of patients Situation, Background, Assessment and   Recommendations(SBAR). Information from the following report(s) SBAR, Kardex, MAR, Accordion, Recent Results, Cardiac Rhythm paced and Dual Neuro Assessment was reviewed with the receiving nurse. Opportunity for questions and clarification was provided. Assessment completed upon patients arrival to unit and care assumed. Bedside shift change report given to [address], RN (oncoming nurse) by Marianela Ivey RN (offgoing nurse). Report included the following information SBAR, Kardex, MAR, Accordion, Recent Results and Cardiac Rhythm Paced.

## 2020-07-02 ENCOUNTER — APPOINTMENT (OUTPATIENT)
Dept: NON INVASIVE DIAGNOSTICS | Age: 85
DRG: 064 | End: 2020-07-02
Attending: FAMILY MEDICINE
Payer: MEDICARE

## 2020-07-02 ENCOUNTER — TELEPHONE (OUTPATIENT)
Dept: CARDIOLOGY CLINIC | Age: 85
End: 2020-07-02

## 2020-07-02 LAB
ANION GAP SERPL CALC-SCNC: 7 MMOL/L (ref 5–15)
BUN SERPL-MCNC: 17 MG/DL (ref 6–20)
BUN/CREAT SERPL: 12 (ref 12–20)
CALCIUM SERPL-MCNC: 9.5 MG/DL (ref 8.5–10.1)
CHLORIDE SERPL-SCNC: 101 MMOL/L (ref 97–108)
CO2 SERPL-SCNC: 27 MMOL/L (ref 21–32)
COMMENT, HOLDF: NORMAL
CREAT SERPL-MCNC: 1.44 MG/DL (ref 0.7–1.3)
GLUCOSE BLD STRIP.AUTO-MCNC: 139 MG/DL (ref 65–100)
GLUCOSE SERPL-MCNC: 106 MG/DL (ref 65–100)
POTASSIUM SERPL-SCNC: 3.6 MMOL/L (ref 3.5–5.1)
SAMPLES BEING HELD,HOLD: NORMAL
SERVICE CMNT-IMP: ABNORMAL
SODIUM SERPL-SCNC: 135 MMOL/L (ref 136–145)
TROPONIN I SERPL-MCNC: 0.11 NG/ML

## 2020-07-02 PROCEDURE — 97112 NEUROMUSCULAR REEDUCATION: CPT | Performed by: OCCUPATIONAL THERAPIST

## 2020-07-02 PROCEDURE — 74011250636 HC RX REV CODE- 250/636: Performed by: FAMILY MEDICINE

## 2020-07-02 PROCEDURE — 74011250637 HC RX REV CODE- 250/637: Performed by: HOSPITALIST

## 2020-07-02 PROCEDURE — 92610 EVALUATE SWALLOWING FUNCTION: CPT

## 2020-07-02 PROCEDURE — 65660000000 HC RM CCU STEPDOWN

## 2020-07-02 PROCEDURE — 80048 BASIC METABOLIC PNL TOTAL CA: CPT

## 2020-07-02 PROCEDURE — 92523 SPEECH SOUND LANG COMPREHEN: CPT

## 2020-07-02 PROCEDURE — 74011250637 HC RX REV CODE- 250/637: Performed by: FAMILY MEDICINE

## 2020-07-02 PROCEDURE — 84484 ASSAY OF TROPONIN QUANT: CPT

## 2020-07-02 PROCEDURE — 74011000250 HC RX REV CODE- 250: Performed by: FAMILY MEDICINE

## 2020-07-02 PROCEDURE — 97530 THERAPEUTIC ACTIVITIES: CPT

## 2020-07-02 PROCEDURE — 36415 COLL VENOUS BLD VENIPUNCTURE: CPT

## 2020-07-02 PROCEDURE — 74011250637 HC RX REV CODE- 250/637: Performed by: INTERNAL MEDICINE

## 2020-07-02 PROCEDURE — 97535 SELF CARE MNGMENT TRAINING: CPT | Performed by: OCCUPATIONAL THERAPIST

## 2020-07-02 PROCEDURE — 97161 PT EVAL LOW COMPLEX 20 MIN: CPT

## 2020-07-02 PROCEDURE — 94760 N-INVAS EAR/PLS OXIMETRY 1: CPT

## 2020-07-02 PROCEDURE — 82962 GLUCOSE BLOOD TEST: CPT

## 2020-07-02 RX ORDER — METOPROLOL SUCCINATE 25 MG/1
25 TABLET, EXTENDED RELEASE ORAL EVERY 24 HOURS
Status: DISCONTINUED | OUTPATIENT
Start: 2020-07-02 | End: 2020-07-09 | Stop reason: HOSPADM

## 2020-07-02 RX ADMIN — CASTOR OIL AND BALSAM, PERU: 788; 87 OINTMENT TOPICAL at 21:56

## 2020-07-02 RX ADMIN — ATORVASTATIN CALCIUM 40 MG: 40 TABLET, FILM COATED ORAL at 21:56

## 2020-07-02 RX ADMIN — METOPROLOL SUCCINATE 25 MG: 25 TABLET, EXTENDED RELEASE ORAL at 21:56

## 2020-07-02 RX ADMIN — CASTOR OIL AND BALSAM, PERU: 788; 87 OINTMENT TOPICAL at 16:54

## 2020-07-02 RX ADMIN — FAMOTIDINE 20 MG: 10 INJECTION, SOLUTION INTRAVENOUS at 18:33

## 2020-07-02 RX ADMIN — ASPIRIN 81 MG CHEWABLE TABLET 81 MG: 81 TABLET CHEWABLE at 09:05

## 2020-07-02 RX ADMIN — CASTOR OIL AND BALSAM, PERU: 788; 87 OINTMENT TOPICAL at 09:06

## 2020-07-02 RX ADMIN — ALLOPURINOL 100 MG: 100 TABLET ORAL at 09:05

## 2020-07-02 RX ADMIN — CLOPIDOGREL BISULFATE 75 MG: 75 TABLET ORAL at 09:05

## 2020-07-02 NOTE — PROGRESS NOTES
Problem: Mobility Impaired (Adult and Pediatric)  Goal: *Acute Goals and Plan of Care (Insert Text)  Description:   FUNCTIONAL STATUS PRIOR TO ADMISSION: Patient was modified independent using a rolling walker for functional mobility. Patient required minimal assistance for basic and total A instrumental ADLs. HOME SUPPORT PRIOR TO ADMISSION: The patient lived snf with nursing care. [facility] Physical Therapy Goals  Initiated 7/2/2020  1. Patient will move from supine to sit and sit to supine , scoot up and down and roll side to side in bed with modified independence within 7 day(s). 2.  Patient will transfer from bed to chair and chair to bed with supervision/set-up using the least restrictive device within 7 day(s). 3.  Patient will perform sit to stand with supervision/set-up within 7 day(s). 4.  Patient will ambulate with supervision/set-up for 300 feet with the least restrictive device within 7 day(s). 5.  Patient will ascend/descend 2 stairs with ONE handrail(s) with supervision/set-up within 7 day(s). 6.  Patient will improve Yan Balance score by 7 points within 7 days. Outcome: Progressing Towards Goal   PHYSICAL THERAPY EVALUATION- NEURO POPULATION  Patient: Nate Ireland (89 y.o. male)  Date: 7/2/2020  Primary Diagnosis: Acute CVA (cerebrovascular accident) [facility]) [I63.9]       Precautions:   Bed Alarm, Fall      ASSESSMENT  Based on the objective data described below, the patient presents with decreased activity tolerance, balance deficits, vision impairments, and weakness. Pt required moderate cues for mobility. Pt prompted to stand from EOB using RW and with difficulty following command to push up from EOB with 1 UE as pt requiring both hands on walker. Pt presented with posterior trunk leaning while standing. Pt began to sidestep to chair requiring max cues as pt with difficulty visualizing chair due to vision impairments and possible R sided ?inattention.  Pt performed additional sit<>stand continuing to require min A. Current Level of Function Impacting Discharge (mobility/balance): min A for transfers    Functional Outcome Measure: The patient scored Total: 5/56 on the [facility] Assessment which is indicative of high fall risk. Other factors to consider for discharge: fall risk, visual deficits, weakness     Patient will benefit from skilled therapy intervention to address the above noted impairments. PLAN :  Recommendations and Planned Interventions: bed mobility training, transfer training, gait training, therapeutic exercises, neuromuscular re-education, patient and family training/education and therapeutic activities      Frequency/Duration: Patient will be followed by physical therapy:  5 times a week to address goals. Recommendation for discharge: (in order for the patient to meet his/her long term goals)  Therapy 3 hours per day 5-7 days per week   If pt were to d/c back to [facility], will require physical assistance for all transfers and HHPT    This discharge recommendation:  Has not yet been discussed the attending provider and/or case management    IF patient discharges home will need the following DME: wheelchair         SUBJECTIVE:   Patient stated What chair?     OBJECTIVE DATA SUMMARY:   HISTORY:    Past Medical History:   Diagnosis Date    Cancer ([facility] [address]     prostate    Constipation     Gout     Hyperlipemia     Hypertension     Pacemaker 2019    Stroke ([facility] [facility]     TIA (transient ischemic attack) 2013     Past Surgical History:   Procedure Laterality Date    HX PROSTATECTOMY      HX UROLOGICAL      prostate removed    NE INS NEW/RPLCMT PRM PACEMAKR W/TRANS ELTRD ATRIAL N/A 10/18/2019    Insert Ppm Single Atrial performed by Michael Elizabeth MD at [address] CATH LAB    NE INS NEW/RPLCMT PRM PM W/TRANSV ELTRD ATRIAL&VENT Right 3/22/2019    INSERT PPM DUAL performed by Michael Elizabeth MD at [address] CATH LAB       Personal factors and/or comorbidities impacting plan of care: [facility]    Home Situation  Home Environment: [address] [place] [address] Road Name: Giuseppe Landon  One/Two Story Residence: One story  Living Alone: No  Support Systems: Assisted living, Child(kin)  Patient Expects to be Discharged to[de-identified] Assisted living  Current DME Used/Available at Home: [employer] [facility] [employer], rolling  Tub or Shower Type: Shower    EXAMINATION/PRESENTATION/DECISION MAKING:   Critical Behavior:  Neurologic State: Alert  Orientation Level: Oriented to person, Disoriented to place, Disoriented to situation, Disoriented to time  Cognition: Decreased attention/concentration, Decreased command following  Safety/Judgement: Decreased awareness of environment, Decreased awareness of need for assistance, Decreased insight into deficits, Decreased awareness of need for safety, Fall prevention  Hearing: Auditory  Auditory Impairment: Hard of hearing, bilateral  Skin:  intact  Edema: none noted  Range Of Motion:  AROM: Generally decreased, functional           PROM: Generally decreased, functional           Strength:    Strength: Generally decreased, functional                    Tone & Sensation:   Tone: Normal              Sensation: Intact               Coordination:  Coordination: Generally decreased, functional  Vision:      Functional Mobility:  Bed Mobility:     Supine to Sit: Supervision  Sit to Supine: Supervision     Transfers:  Sit to Stand: Moderate assistance; Adaptive equipment; Additional time  Stand to Sit: Minimum assistance; Adaptive equipment; Additional time        Bed to Chair: Minimum assistance; Adaptive equipment; Additional time  Balance:   Sitting: Intact; With support  Standing: Impaired; With support  Standing - Static: Fair  Standing - Dynamic : Fair;Constant support      Functional Measure  Yan Balance Test:    Sitting to Standin  Standing Unsupported: 0  Sitting with Back Unsupported: 3  Standing to Sittin  Transfers: 1  Standing Unsupported with Eyes Closed: 0  Standing Unsupported with Feet Together: 0  Reach Forward with Outstretched Arm: 0   Object: 0  Turn to Look Over Shoulders: 0  Turn 360 Degrees: 0  Alternate Foot on Step/Stool: 0  Standing Unsupported One Foot in Front: 0  Stand on One Le  Total: 5/56         56=Maximum possible score;   0-20=High fall risk  21-40=Moderate fall risk   41-56=Low fall risk        Physical Therapy Evaluation Charge Determination   History Examination Presentation Decision-Making   MEDIUM  Complexity : 1-2 comorbidities / personal factors will impact the outcome/ POC  LOW Complexity : 1-2 Standardized tests and measures addressing body structure, function, activity limitation and / or participation in recreation  LOW Complexity : Stable, uncomplicated  LOW Complexity : FOTO score of       Based on the above components, the patient evaluation is determined to be of the following complexity level: LOW       Activity Tolerance:   Fair and requires rest breaks  Please refer to the flowsheet for vital signs taken during this treatment. After treatment patient left in no apparent distress:   Sitting in chair, Call bell within reach and Bed / chair alarm activated    COMMUNICATION/EDUCATION:   The patients plan of care was discussed with: Occupational therapist and Registered nurse. Patient was educated regarding his deficit(s) of balance and weakness as this relates to his diagnosis of CVA. He demonstrated Fair understanding as evidenced by head nodding. Patient and/or family was verbally educated on the BE FAST acronym for signs/symptoms of CVA and TIA. BE FAST was written on patient's communication board  for visual education and reinforcement. All questions answered with patient indicating fair understanding. Fall prevention education was provided and the patient/caregiver indicated understanding., Patient/family have participated as able in goal setting and plan of care.  and Patient/family agree to work toward stated goals and plan of care.     Thank you for this referral.  Fer Scott, PT, DPT   Time Calculation: 17 mins

## 2020-07-02 NOTE — PROGRESS NOTES
Bedside shift change report given to [address], RN (oncoming nurse) by Brady Schuler RN (offgoing nurse). Report included the following information SBAR, Kardex, MAR, Accordion, Recent Results, Cardiac Rhythm PACED and Dual Neuro Assessment.

## 2020-07-02 NOTE — PROGRESS NOTES
Problem: Dysphagia (Adult)  Goal: *Acute Goals and Plan of Care (Insert Text)  Description: Speech Therapy Goals  Initiated 7/2/2020    1. Patient will tolerate dysphagia advanced diet and thin liquids without adverse effects within 7 days. Outcome: Progressing Towards Goal     Problem: Communication Impaired (Adult)  Goal: *Acute Goals and Plan of Care (Insert Text)  Description: Speech Therapy Goals  Initiated 7/2/2020    1. Patient will complete simple sentence formulation tasks with min verbal cues with 70% accuracy within 7 days. 2. Patient will participate in semantic feature analysis tasks with mod verbal cues with 60% accuracy within 7 days. 3. Patient will participate in responsive naming tasks with min verbal cues with 70% accuracy within 7 days. 4. Patient will complete moderate auditory comprehension tasks (mod yes/no questions) with 60% accuracy independently within 7 days. 5. Patient will follow one-step commands with min visual cues with 60% accuracy within 7 days. Outcome: Progressing Towards Goal    SPEECH LANGUAGE PATHOLOGY BEDSIDE SWALLOW AND LANGUAGE EVALUATIONS  Patient: Gen Riding (36 y.o. male)  Date: 7/2/2020  Primary Diagnosis: Acute CVA (cerebrovascular accident) [facility]) [I63.9]        Precautions:   Bed Alarm, Fall    ASSESSMENT :  Based on the objective data described below, the patient presents with mild oral dysphagia characterized by inattention to task causing prolonged mastication with solid consistencies as well as minimal oral residue on lingual surface. Pt without notable pharyngeal deficits on this date, however, did present with a subtle throat clear after belching. This could be indicative of reflux. Given functioning at bedside, recommend pt continue diet as outlined below, with consideration of medical management of reflux. Suspect as neuro status improves, swallow function will also continue to improve.     Pt presents with moderate expressive-receptive aphasia on this date. Expressive deficits characterized by frequent anomia in conversational speech, difficulty with naming/word retrieval, poor sentence formulation particularly at the conversation level, and phonemic and verbal paraphasias throughout. Receptive deficits characterized by difficulty following simple, one-step commands independently, however, this improve with visual cues, as well as difficulty answering moderate yes/no questions. At this juncture, pt language deficits most consistent with a wernicke's aphasia on this date. Given lack of insight into deficits, prognosis for significant improvement in language function may be limited. Patient will benefit from skilled intervention to address the above impairments. Patients rehabilitation potential is considered to be Fair     PLAN :  Recommendations and Planned Interventions:  --dysphagia advanced diet/thin liquids  --upright  --small sips/bites  --alternate liquids/solids  --allow patient ample time to communicate his medical wants, needs, and opinions     Frequency/Duration: Patient will be followed by speech-language pathology 3 times a week to address goals. Discharge Recommendations: Inpatient Rehab     SUBJECTIVE:   Patient stated, \"I don't think I have that  when SLP explained his aphasia.     OBJECTIVE:     Past Medical History:   Diagnosis Date    Cancer ([address]     prostate    Constipation     Gout     Hyperlipemia     Hypertension     Pacemaker 2019    Stroke [facility])     TIA (transient ischemic attack) 2013     Past Surgical History:   Procedure Laterality Date    HX PROSTATECTOMY      HX UROLOGICAL      prostate removed    NM INS NEW/RPLCMT PRM PACEMAKR W/TRANS ELTRD ATRIAL N/A 10/18/2019    Insert Ppm Single Atrial performed by Gee Mckeon MD at [address] CATH LAB    NM INS NEW/RPLCMT PRM PM W/TRANSV ELTRD ATRIAL&VENT Right 3/22/2019    INSERT PPM DUAL performed by Gee Mckeon MD at [address] CATH LAB     Prior Level of Function/Home Situation:   Home Situation  Home Environment: [address] [place] [address] Road Name: Giuseppe Landon  One/Two Story Residence: One story  Current DME Used/Available at Home: Walker, rolling, Grab bars  Tub or Shower Type: Shower  Diet prior to admission: Presumed regular diet/thin liquids  Current Diet:  Dysphagia advanced diet/thin liquids  Cognitive and Communication Status:  Neurologic State: Alert  Orientation Level: Oriented to person, Disoriented to time, Disoriented to situation, Disoriented to place, Other (Comment)(language also could impact)  Cognition: Decreased command following  Perception: Appears intact  Perseveration: Perseverates during ADLS  Safety/Judgement: Lack of insight into deficits, Decreased awareness of need for assistance, Decreased awareness of need for safety  Swallowing Evaluation:   Oral Assessment:  Oral Assessment  Labial: No impairment  Dentition: Upper & lower dentures  Oral Hygiene: oral mucosa moist and clear of secretions  Lingual: No impairment  Velum: No impairment  Mandible: No impairment  P.O. Trials:  Patient Position: upright in bed  Vocal quality prior to P.O.: No impairment  Consistency Presented: Thin liquid;Mechanical soft  How Presented: SLP-fed/presented;Spoon;Straw;Successive swallows     Bolus Acceptance: No impairment  Bolus Formation/Control: Impaired  Type of Impairment: Delayed;Mastication  Propulsion: Delayed (# of seconds)  Oral Residue: Less than 10% of bolus; Lingual  Initiation of Swallow: No impairment  Laryngeal Elevation: Functional  Aspiration Signs/Symptoms: Other (comment)(throat clear following belching)  Pharyngeal Phase Characteristics: No impairment, issues, or problems         Comments: Throat clear after belching.   Could be consistent with reflux    Oral Phase Severity: Mild  Pharyngeal Phase Severity : No impairment    NOMS:   The NOMS functional outcome measure was used to quantify this patient's level of swallowing impairment. Based on the NOMS, the patient was determined to be at level 6 for swallow function       NOMS Swallowing Levels:  Level 1 (CN): NPO  Level 2 (CM): NPO but takes consistency in therapy  Level 3 (CL): Takes less than 50% of nutrition p.o. and continues with nonoral feedings; and/or safe with mod cues; and/or max diet restriction  Level 4 (CK): Safe swallow but needs mod cues; and/or mod diet restriction; and/or still requires some nonoral feeding/supplements  Level 5 (CJ): Safe swallow with min diet restriction; and/or needs min cues  Level 6 (CI): Independent with p.o.; rare cues; usually self cues; may need to avoid some foods or needs extra time  Level 7 ([address] [place] [address]): Independent for all p.o.  ERNESTO. (2003). National Outcomes Measurement System (NOMS): Adult Speech-Language Pathology User's Guide. Speech/Language Evaluation  Motor Speech:  Oral-Motor Structure/Motor Speech  Labial: No impairment  Dentition: Upper & lower dentures  Oral Hygiene: oral mucosa moist and clear of secretions  Lingual: No impairment  Velum: No impairment  Mandible: No impairment  Language Comprehension and Expression:  Auditory Comprehension  Auditory Impairment: Yes  Hearing Aid: None  Response to Basic Yes/No Questions (%): 70 %  Response to Moderately Complex Yes/No Questions (%): (Patient unable to answer in yes/no)  One-Step Basic Commands (%): (required visual cues to complete)  Follows Conversation: Impaired (%)  Interfering Components: Attention - sustained; Environmental distractions;Processing speed; Other (comment)(lack of insight )  Effective Techniques: Extra processing time;Pausing;Repetition; Slowed speech;Stressing words  Verbal Expression  Primary Mode of Expression: Verbal  Initiation: No impairment  Automatic Speech Task: Impaired (comment)  Automatic speech task cueing type: Phonemic;Visual;Multi modality  Automatic speech task cueing amount:  Mod-max  Repetition: No impairment  Naming: Impaired  Responsive (%): 100 %  Objects (%): 50 %  Pictures (%): (visual deficit likely 2/2 occipital lobe infarct)  Sentence Completion: Impaired  Word level (%): (required multimodality cues to complete)  Sentence Formulation: Impaired (%)  Conversation: Fluent  Speech Characteristics: Paraphasias;Perseveration; Word retrieval  Interfering Components: Impaired thought organization;Limited error awareness  Effective Techniques: Provide extra time  Overall Impairment: Moderate    NOMS:   The NOMS functional outcome measure was used to quantify this patient's level of expressive language impairment. Based on the NOMS, the patient was determined to be at level 4 for spoken language expression. NOMS Spoken Language Expression:  Level 1 (CN): Verbalizations not meaningful to anyone. Level 2 (CM): Few attempts accurate/appropriate. Max cues to elicit automatic/imitative words/phrases. Level 3 (CL): Communication partner responsible for communication; Mod cues for words/phrases meaningful in context  Level 4 (CK): Initiate during simple, routine activities w/familiar partner. Mod cues to produce simple sentences  Level 5 (Tor Dec): Initiates communication with familiar and unfamiliar partners. Min cues for complex sentences. Level 6 (CI): Communicates for most activities. Some limitations still present for vocational/social activities. Rarely cued for complex info  Level 7 [facility]): Independent communication. JUDY (2003). National Outcomes Measurement System (NOMS): Adult Speech-Language Pathology User's Guide. Pain:  Pain Scale 1: Numeric (0 - 10)  Pain Intensity 1: 0       After treatment:   Patient left in no apparent distress in bed, Call bell within reach, Nursing notified, and Bed / chair alarm activated    COMMUNICATION/EDUCATION:   Patient was educated regarding his deficit(s) of aphasia as this relates to his diagnosis of stroke. He demonstrated Poor  understanding as evidenced by receptive language deficits.     The patient's plan of care including recommendations, planned interventions, and recommended diet changes were discussed with: Registered nurse. Patient is unable to participate in goal setting and plan of care.     Thank you for this referral.  MOLLY Epps  Time Calculation: 18 mins

## 2020-07-02 NOTE — PROGRESS NOTES
Orders received, chart reviewed and patient evaluated by physical therapy. Pending progression with skilled acute physical therapy, recommend:  Therapy 3 hours per day 5-7 days per week   If pt were to d/c back to [facility], will require supervision and assistance with all mobility as pt is a fall risk and HHPT. Pt owns RW for use, may benefit from w/c for longer distances. Recommend with nursing patient to complete as able in order to maintain strength, endurance and independence: OOB to chair 3x/day with 1 person A. Thank you for your assistance. Full evaluation to follow.

## 2020-07-02 NOTE — TELEPHONE ENCOUNTER
Faxed MRI Clearance Form to [facility]  at fax number [phone]. Fax confirmation received.

## 2020-07-02 NOTE — PROGRESS NOTES
Problem: Falls - Risk of  Goal: *Absence of Falls  Description: Document Elmira Cole Fall Risk and appropriate interventions in the flowsheet. Outcome: Progressing Towards Goal  Note: Fall Risk Interventions:  Mobility Interventions: Bed/chair exit alarm, Communicate number of staff needed for ambulation/transfer    Mentation Interventions: Bed/chair exit alarm, Door open when patient unattended, Increase mobility    Medication Interventions: Bed/chair exit alarm, Teach patient to arise slowly    Elimination Interventions: Call light in reach, Bed/chair exit alarm    History of Falls Interventions: Bed/chair exit alarm, Door open when patient unattended         Problem: Pressure Injury - Risk of  Goal: *Prevention of pressure injury  Description: Document Torito Scale and appropriate interventions in the flowsheet.   Outcome: Progressing Towards Goal  Note: Pressure Injury Interventions:  Sensory Interventions: Float heels, Keep linens dry and wrinkle-free    Moisture Interventions: Absorbent underpads, Limit adult briefs, Minimize layers    Activity Interventions: Increase time out of bed    Mobility Interventions: Float heels, HOB 30 degrees or less    Nutrition Interventions: Offer support with meals,snacks and hydration, Document food/fluid/supplement intake    Friction and Shear Interventions: Lift sheet, HOB 30 degrees or less                Problem: Hypertension  Goal: *Blood pressure within specified parameters  Outcome: Progressing Towards Goal  Goal: *Fluid volume balance  Outcome: Progressing Towards Goal

## 2020-07-02 NOTE — CONSULTS
Neurology Progress Note    Patient ID:  Nain Fu  [id number]  80 y.o.  3/9/1933    Chief Complaint: Stroke    Subjective:     45-year-old gentleman who presented with mental status changes and weakness. Initial CT imaging showing bilateral PCA infarcts more on the left. MRI has not been done yet. Because he is COVID negative now on the stroke floor. He is more appropriate today following commands better than yesterday. Objective:       ROS:  Cannot obtain secondary to patient medical condition      Meds:  Current Facility-Administered Medications   Medication Dose Route Frequency    balsam peru-castor oiL (VENELEX) ointment   Topical TID    aspirin chewable tablet 81 mg  81 mg Oral DAILY    clopidogreL (PLAVIX) tablet 75 mg  75 mg Oral DAILY    hydrALAZINE (APRESOLINE) 20 mg/mL injection 20 mg  20 mg IntraVENous Q6H PRN    allopurinoL (ZYLOPRIM) tablet 100 mg  100 mg Oral DAILY    atorvastatin (LIPITOR) tablet 40 mg  40 mg Oral QHS    acetaminophen (TYLENOL) tablet 650 mg  650 mg Oral Q4H PRN    Or    acetaminophen (TYLENOL) solution 650 mg  650 mg Per NG tube Q4H PRN    Or    acetaminophen (TYLENOL) suppository 650 mg  650 mg Rectal Q4H PRN    famotidine (PF) (PEPCID) 20 mg in 0.9% sodium chloride 10 mL injection  20 mg IntraVENous Q24H       MRI Results (maximum last 3): Results from [place] encounter on 07/18/17   MRI ABD W WO CONT    Narrative MRI ABDOMEN AND MRCP WITH AND WITHOUT CONTRAST. 7/20/2017 11:49 AM    INDICATION: Adrenal masses. COMPARISON: CT abdomen pelvis 7/18/2017, renal ultrasound 7/18/2017. TECHNIQUE: Multisequence and multiplanar MRI of the abdomen was performed after  the administration of 7.5 mL of Gadavist (gadobutrol)  IV contrast. Images were  obtained without contrast; and in early arterial/angiographic, portal venous,  and delayed phases after the administration of contrast. Subtraction images were  reconstructed.     Heavily T2-weighted thick slab and thin slice images were obtained in the  oblique coronal plane through the biliary tree (MRCP). FINDINGS:   A circumscribed right adrenal mass measures approximately 40 x 66 x 38 mm. It is  isointense to liver on T2-weighted images and hypointense to liver on  T1-weighted images. On out of phase images, there is loss of signal, consistent  with microscopic fat content. There is no definite macroscopic fat content on  fat saturation images or on prior CT to confirm myelolipoma. Fluid density on CT  and microscopic fat on MRI are consistent with a lipid rich adenoma, though  there is more heterogeneity than typical for an adenoma. Lobulated,  circumscribed margins, with well-defined surrounding fat planes, is a  nonaggressive morphology. Microscopic fat and circumscribed margins would be  extremely atypical for an adrenal cortical carcinoma. Microscopic fat would also  be atypical for metastases. T2 isointensity and microscopic fat make  pheochromocytoma or paraganglioma less likely. On subtraction weighted imaging,  there are some enhancing components, with predominantly progressive kinetics  through the 3 minute delayed phase. On 10 minute delayed phase, there is  probably some washout. The enhancement kinetics do not add further  discrimination to the differential described above. There is bilateral adrenal gland thickening, with additional, subcentimeter,  oval, circumscribed, bilateral adrenal nodules. The other nodules are too small  to characterize, though are statistically likely to represent adenomas. Bilateral thickening suggests adrenal hyperplasia. Incidental note is made of small bilateral renal cysts and subcentimeter T2  hyperintense hepatic lesions are too small to characterize, but likely also  represent cysts. The gallbladder is decompressed. There are trace bilateral  pleural effusions and trace right paracolic ascites. No pancreatic or biliary  ductal dilation.  The visualized portions of the distal esophagus, stomach,  duodenum, liver, gallbladder, pancreas, spleen, kidneys, and small and large  bowel, are otherwise normal.      Impression IMPRESSION: 1. Right adrenal mass: A heterogeneous lipid rich adenoma is favored  over a lipid poor myelolipoma. Microscopic fat and morphology make metastases,  pheochromocytoma, paraganglioma, and adrenal cortical carcinoma much less  likely. Follow-up adrenal protocol CT or MRI should be considered in 3-6 months. 2. Bilateral adrenal gland thickening and nodularity, suggesting hyperplasia and  small adrenal adenomas. The findings were called to Dr. Stephania Clayton on 7/21/2017 8:25 AM by Dr. Jael Johnson. [phone]         Lab Review   Recent Results (from the past 24 hour(s))   GLUCOSE, POC    Collection Time: 07/01/20 12:36 PM   Result Value Ref Range    Glucose (POC) 72 65 - 100 mg/dL    Performed by Gurmeet Perdomo P    SAMPLES BEING HELD    Collection Time: 07/02/20  3:11 AM   Result Value Ref Range    SAMPLES BEING HELD 1lav,1pst     COMMENT        Add-on orders for these samples will be processed based on acceptable specimen integrity and analyte stability, which may vary by analyte. Additional comments:I reviewed the patient's new clinical lab test results. Patient Vitals for the past 8 hrs:   BP Temp Pulse Resp SpO2   07/02/20 1033 (!) 162/99       07/02/20 1024 (!) 132/118       07/02/20 1017 (!) 150/96 98.7 °F (37.1 °C) 74 13 98 %   07/02/20 0800   79     07/02/20 0600 156/88 99 °F (37.2 °C) 77 13 100 %       No intake/output data recorded.   06/30 1901 - 07/02 0700  In: 240 [P.O.:240]  Out: 1400 [Urine:1400]    Exam:  Visit Vitals  BP (!) 162/99 (BP 1 Location: Right arm, BP Patient Position: Sitting)   Pulse 74   Temp 98.7 °F (37.1 °C)   Resp 13   Ht 5' 7\" (1.702 m)   Wt 76.9 kg (169 lb 8.5 oz)   SpO2 98%   BMI 26.55 kg/m²     Gen: Well developed  CV: RRR  Lungs: non labored breathing  Abd: soft, non distended  Neuro: Awake, he can follow simple commands well. He does not know where he is. He cannot name the hospital.  He does not know the month or the year. He does not know the situation of his hospitalization. He is pleasant. CN II-XII: PERRL, poor visual acuity to finger wave bilaterally, face symmetric, tongue/palate midline  Motor:  4+ strength throughout maybe worse on the right  Sensory: grossly intact to LT  DTRs: symmetric  COOR: no limb/truncal ataxia  Gait: wide with assist    PROBLEM LIST:     Patient Active Problem List   Diagnosis Code    Adrenal mass ([facility]) E27.8    Dysuria R30.0    Weight loss R63.4    Essential hypertension I10    Gout M10.9    Pure hypercholesterolemia E78.00    History of prostate cancer Z85.46    Chronic constipation W88.78    Systolic murmur K74.8    Cognitive impairment R41.89    Stage 3 chronic kidney disease ([facility]) N18.3    Rotator cuff arthropathy of both shoulders M12.811, M12.812    Bradycardia R00.1    Thrombocytopenia ([facility]) D69.6    Elevated troponin R79.89    Hypertensive urgency I16.0    Second degree AV block, Mobitz type I I44.1    Pacemaker Z95.0    Atrial pacemaker lead displacement T82.120A    Acute CVA (cerebrovascular accident) ([facility] [address] [address] I63.9       Assessment/Plan:      70-year-old gentleman presenting with stroke. I suspect he has underlying cognitive impairment at baseline. His vision is severely impaired. Obtain MRI brain when possible to clarify the degree of ischemic burden we are contending with. Stay on aspirin and Plavix. This clinical note was dictated with an electronic dictation software that can make unintentional errors. If there are any questions, please contact me directly for clarification.       Signed:  Celia Griffith DO  7/2/2020  10:40 AM

## 2020-07-02 NOTE — PROGRESS NOTES
[facility] Adult  Hospitalist Group                                                                                          Hospitalist Progress Note  Minh Rojas MD  Answering service: [phone] or [phone] from in house phone        Date of Service:  2020  NAME:  Fredo Noe  :  3/9/1933  MRN:  [id number]      Admission Summary:   The patient is an 70-year-old gentleman with past medical history of hypertension, prostate cancer, constipation, gout, hyperlipidemia, stroke, and TIA, who presents to the hospital with the above-mentioned symptom. History was primarily obtained from the patient's daughter, Claire Ortega. The patient is confused and has some right-sided weakness with right-sided neglect. She reports that the patient was last seen normal 2 days back on . Per care facility, the patient was normal last night. He became disoriented initially and then developed issues with walking and fell hitting his head, but there was no loss of consciousness. She reports the patient lives at -- and the patient was brought over here as a code stroke, was found to have occipital stroke. Neurointerventional Surgery was consulted and they felt that the stroke has been completed and the patient does not need any acute surgical intervention. The patient was requested to be admitted under the hospitalist service. Currently, the patient is resting in bed, does not appear to be distressed. No further history could be obtained from the patient or his daughter. Interval history / Subjective:     Patient is seen and examined at bedside this morning. He is alert, cooperative and following simple commands. Oriented to self    Discussed with nursing- plan for MRI brain    Daughter at bedside in the afternoon - updated patient's status - possible dc tomorrow if MRI and echo done.    Assessment & Plan:     Acute parieto-occipital cerebrovascular accident  -CT head: Bilateral acute occipital infarcts without evidence of hemorrhage.  - CTA head: Acute bilateral PCA territory infarcts, left larger than right.  - CT perf:  Acute ischemia in the left parieto-occipital lobe, with qualitatively decreased cerebral blood flow and blood volume in this region representing completed infarct. - appreciate Neurology input  - LDL 56, A1c 5.7  - MRI brain pending  - Echo pending   - PT/OT/SLP  - c/w aspirin, plavix and statin     Hypertension:    - BP labile. Restarted metoprolol. Amlodipine and hydralazine on hold     Acute kidney injury on chronic kidney disease, likely prerenal:    -  Avoid nephrotoxic medications and renally dose all other medications. 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 Hx prostate ca     Code status: Full  DVT prophylaxis: SCDs  Care Plan discussed with: Patient/Family and Nurse  Anticipated Disposition:  SNF rehab   Anticipated Discharge: 24 hours to 48 hours        Hospital Problems  Date Reviewed: 1/30/2020          Codes Class Noted POA    Acute CVA (cerebrovascular accident) [facility]) ICD-10-CM: I63.9  ICD-9-CM: 434.91  6/30/2020 Unknown                Review of Systems:   Review of systems not obtained due to patient factors. Vital Signs:    Last 24hrs VS reviewed since prior progress note. Most recent are:  Visit Vitals  /84 (BP Patient Position: Sitting)   Pulse 79   Temp 99.1 °F (37.3 °C)   Resp 14   Ht 5' 7\" (1.702 m)   Wt 76.9 kg (169 lb 8.5 oz)   SpO2 97%   BMI 26.55 kg/m²       No intake or output data in the 24 hours ending 07/02/20 7309     Physical Examination:             Constitutional:  No acute distress, cooperative, pleasant, elderly    ENT:  Oral mucosa moist, oropharynx benign. Resp:  CTA bilaterally. No wheezing/rhonchi/rales.  No accessory muscle use   CV:  Regular rhythm, normal rate, no murmurs, gallops, rubs    GI:  Soft, non distended, non tender. normoactive bowel sounds, no hepatosplenomegaly     Musculoskeletal:  No edema, warm, 2+ pulses throughout    Neurologic:  alert, oriented to self. 4+ strength throughout maybe worse on the right            Data Review:    I personally reviewed  Image and labs      Labs:     Recent Labs     07/01/20  0404 06/30/20  1218   WBC 8.9 8.4   HGB 15.7 13.4   HCT 48.7 41.3   * 184     Recent Labs     06/30/20  1218      K 3.7      CO2 27   BUN 18   CREA 1.50*   *   CA 9.1   MG 2.3     Recent Labs     06/30/20  1218   ALT 26      TBILI 0.5   TP 8.0   ALB 3.5   GLOB 4.5*     Recent Labs     06/30/20  1218   INR 1.0   PTP 10.8   APTT 30.1      No results for input(s): FE, TIBC, PSAT, FERR in the last 72 hours. Lab Results   Component Value Date/Time    Folate 12.2 01/07/2019 11:29 AM      No results for input(s): PH, PCO2, PO2 in the last 72 hours.   Recent Labs     06/30/20  1218   TROIQ 0.06*     Lab Results   Component Value Date/Time    Cholesterol, total 118 07/01/2020 04:04 AM    HDL Cholesterol 51 07/01/2020 04:04 AM    LDL, calculated 56.6 07/01/2020 04:04 AM    Triglyceride 52 07/01/2020 04:04 AM    CHOL/HDL Ratio 2.3 07/01/2020 04:04 AM     Lab Results   Component Value Date/Time    Glucose (POC) 72 07/01/2020 12:36 PM    Glucose (POC) 113 (H) 06/30/2020 08:58 PM    Glucose (POC) 109 (H) 06/30/2020 11:56 AM     Lab Results   Component Value Date/Time    Color Yellow 08/08/2017 04:09 PM    Appearance Clear 08/08/2017 04:09 PM    Specific gravity 1.017 07/18/2017 02:56 PM    pH (UA) 6.5 08/08/2017 04:09 PM    Protein 100 (A) 07/18/2017 02:56 PM    Glucose NEGATIVE  07/18/2017 02:56 PM    Ketone Negative 08/08/2017 04:09 PM    Bilirubin Negative 08/08/2017 04:09 PM    Urobilinogen 1.0 07/18/2017 02:56 PM    Nitrites Positive (A) 08/08/2017 04:09 PM    Leukocyte Esterase 1+ (A) 08/08/2017 04:09 PM    Epithelial cells MODERATE (A) 07/18/2017 02:56 PM    Bacteria Few 08/08/2017 04:09 PM    WBC 11-30 (A) 08/08/2017 04:09 PM    RBC 0-2 08/08/2017 04:09 PM         Medications Reviewed:     Current Facility-Administered Medications   Medication Dose Route Frequency    balsam peru-castor oiL (VENELEX) ointment   Topical TID    aspirin chewable tablet 81 mg  81 mg Oral DAILY    clopidogreL (PLAVIX) tablet 75 mg  75 mg Oral DAILY    hydrALAZINE (APRESOLINE) 20 mg/mL injection 20 mg  20 mg IntraVENous Q6H PRN    allopurinoL (ZYLOPRIM) tablet 100 mg  100 mg Oral DAILY    atorvastatin (LIPITOR) tablet 40 mg  40 mg Oral QHS    acetaminophen (TYLENOL) tablet 650 mg  650 mg Oral Q4H PRN    Or    acetaminophen (TYLENOL) solution 650 mg  650 mg Per NG tube Q4H PRN    Or    acetaminophen (TYLENOL) suppository 650 mg  650 mg Rectal Q4H PRN    famotidine (PF) (PEPCID) 20 mg in 0.9% sodium chloride 10 mL injection  20 mg IntraVENous Q24H     ______________________________________________________________________  EXPECTED LENGTH OF STAY: 3d 0h  ACTUAL LENGTH OF STAY:          2                 Kb Love MD

## 2020-07-02 NOTE — PROGRESS NOTES
Problem: Self Care Deficits Care Plan (Adult)  Goal: *Acute Goals and Plan of Care (Insert Text)  Description:   FUNCTIONAL STATUS PRIOR TO ADMISSION: Patient was modified independent using a RW for functional mobility. Patient was min A? for basic and total A instrumental ADLs. HOME SUPPORT: The patient lived with nursing support. Occupational Therapy Goals  Initiated 7/1/2020  1. Patient will perform lower body dressing with moderate assistance  within 7 day(s). 2.  Patient will perform opening and closing ADL containers 5/5 with minimal assistance/contact guard assist within 7 day(s). 3.  Patient will perform self-feeding (once cleared from being NPO) with min A within 7 day(s). 4.  Patient will perform toilet transfers with minimal assistance/contact guard assist within 7 day(s). 5.  Patient will perform all aspects of toileting with moderate assistance  within 7 day(s). Outcome: Progressing Towards Goal     OCCUPATIONAL THERAPY TREATMENT  Patient: Roderick Smith (14 y.o. male)  Date: 7/2/2020  Diagnosis: Acute CVA (cerebrovascular accident) [facility]) [I63.9]   <principal problem not specified>       Precautions: Bed Alarm, Fall  Chart, occupational therapy assessment, plan of care, and goals were reviewed. ASSESSMENT  Patient continues with skilled OT services and is progressing towards goals. He remains limited by overall impaired vision with R side inattention, mild R hemiparesis, decreased coordination, endurance, strength, mobility and safety due to L parieto-occipital infarct. He required mod cues to trach head and eyes past midline to R.  Educated pt on lighthouse strategy and he was able to demonstrate with min cues. He continues to require cognitive and physical assist for ADLs and functional mobility. Recommend IP rehab at discharge.     Current Level of Function Impacting Discharge (ADLs): max cues and min A for LE ADLs, max A for toileting and mod-min A for functional mobility using RW to amb    Other factors to consider for discharge: see above         PLAN :  Patient continues to benefit from skilled intervention to address the above impairments. Continue treatment per established plan of care. to address goals. Recommend with staff: Recommend with nursing patient to complete as able in order to maintain strength, endurance and independence: ADLs with mod A/setup, OOB to chair 3x/day and mobilizing to the bathroom for toileting with mod assist. Thank you for your assistance. Recommend next OT session: attention to R using lighthouse strategy, standing balance and grooming    Recommendation for discharge: (in order for the patient to meet his/her long term goals)  Therapy 3 hours per day 5-7 days per week. If pt were denied and discharged back to [facility], will require supervision and assistance with all ADLs and mobility as pt is a fall risk and will benefit from [facility] OT. This discharge recommendation:  Has been made in collaboration with the attending provider and/or case management    IF patient discharges home will need the following DME: TBD       SUBJECTIVE:   Patient stated This is [place], [place].    OBJECTIVE DATA SUMMARY:   Cognitive/Behavioral Status:  Neurologic State: Alert  Orientation Level: Oriented to person;Disoriented to place; Disoriented to situation;Disoriented to time  Cognition: Decreased attention/concentration;Decreased command following  Perception: Cues to attend right visual field; Tactile;Verbal;Visual  Perseveration: No perseveration noted  Safety/Judgement: Decreased awareness of environment;Decreased awareness of need for assistance;Decreased insight into deficits; Decreased awareness of need for safety; Fall prevention    Functional Mobility and Transfers for ADLs:  Bed Mobility:  Supine to Sit: Supervision  Sit to Supine: Supervision    Transfers:  Sit to Stand: Moderate assistance; Adaptive equipment; Additional time     Bed to Chair: Minimum assistance; Adaptive equipment; Additional time    Balance:  Sitting: Intact; With support  Standing: Impaired; With support  Standing - Static: Fair  Standing - Dynamic : Fair;Constant support    ADL Intervention:  Feeding  Drink to Mouth: Set-up; Supervision  Cues: Verbal cues provided;Visual cues provided(to locate cup)    Lower Body Dressing Assistance  Socks: Stand-by assistance  Leg Crossed Method Used: No  Position Performed: Bending forward method;Seated in chair  Cues: Don;Doff;Verbal cues provided;Visual cues provided; Tactile cues provided(to locate slipper socks and maintain attention to task)    Cognitive Retraining  Orientation Retraining: Reorienting  Problem Solving: Awareness of environment; Identifying the task  Executive Functions: Executing cognitive plans  Organizing/Sequencing: Breaking task down  Attention to Task: Single task  Following Commands: Follows one step commands/directions; Awareness of environment  Safety/Judgement: Decreased awareness of environment;Decreased awareness of need for assistance;Decreased insight into deficits; Decreased awareness of need for safety; Fall prevention  Cues: Tactile cues provided;Verbal cues provided;Visual cues provided    Neuro Re-Education:  He required mod cues to trach head and eyes past midline to R.  Educated pt on lighthouse strategy and he was able to demonstrate with min cues. During ADL performance pt mainly uses LUE. Mod cues for BUE integration during ADL performance. Pain:  No c/o pain    Activity Tolerance:   Fair and requires rest breaks  Please refer to the flowsheet for vital signs taken during this treatment. After treatment patient left in no apparent distress:   Sitting in chair, Call bell within reach, and Bed / chair alarm activated    COMMUNICATION/COLLABORATION:   The patients plan of care was discussed with: Physical therapist and Registered nurse.      Patient was educated regarding His deficit(s) of R  impaired vision with R side inattention, mild R hemiparesis, decreased coordination, endurance, strength, mobility and safety as this relates to His diagnosis of L parieto-occipital infarct. He demonstrated Guarded understanding as evidenced by decreased awareness of situation. Patient and/or family was verbally educated on the BE FAST acronym for signs/symptoms of CVA and TIA. BE FAST was written on patient's communication board  for visual education and reinforcement. All questions answered with patient indicating fair-poor understanding.      Riri Wakefield OT  Time Calculation: 28 mins

## 2020-07-02 NOTE — TELEPHONE ENCOUNTER
Sera harper/ [facility] MRI department stated that she was advised that MRI orders were going to be faxed over to her this morning but she has not yet received them. She stated that she would also re fax the form now. Please advise.     Fax #: [phone]

## 2020-07-02 NOTE — PROGRESS NOTES
Problem: TIA/CVA Stroke: 0-24 hours  Goal: Medications  Outcome: Progressing Towards Goal  Goal: Treatments/Interventions/Procedures  Outcome: Progressing Towards Goal  Goal: Monitor for complications post-thrombolytic infusion  Outcome: Progressing Towards Goal  Goal: Psychosocial  Outcome: Progressing Towards Goal  Goal: *Absence of Signs of Aspiration on Current Diet  Outcome: Progressing Towards Goal

## 2020-07-02 NOTE — PROGRESS NOTES
Transition of Care Plan   RUR- 9 % Low Risks    DISPOSITION: Patient presents from [facility] and is expected to be going to an [address] per therapy recommendation; pending medical progression   IPR list left for POA/daughter Brenna Cartwright [phone]   Transport: [facility] phone [phone]/[facility] TBD   [address] Follow up: PCP/Specialist(s)     I called and spoke with patient's daughter Brenna Cartwright [phone]. I gave her updates and informed that patient has been recommended for inpatient rehab when medically ready. She would like to discuss the therapy recommendation with her Dad residential facility [facility] ([place] [phone]) to see if they can accommodate his needs. I have left a inpatient rehab facility list at bedside table with my contact information. I have advised her to call me back tomorrow once she discusses the plan with [facility]. CM continues to follow for safe disposition planning.      MICHELET Kong

## 2020-07-02 NOTE — PROGRESS NOTES
Spoke with MRI RN this afternoon. She stated that patient's pacemaker is MRI compatible but a [employer] rep & a RN would need to be present while the patient gets MRI.     1900 - Received call from MRI that [employer] rep will not be available until Monday to be present during MRI.

## 2020-07-03 ENCOUNTER — APPOINTMENT (OUTPATIENT)
Dept: NON INVASIVE DIAGNOSTICS | Age: 85
DRG: 064 | End: 2020-07-03
Attending: FAMILY MEDICINE
Payer: MEDICARE

## 2020-07-03 LAB
ANION GAP SERPL CALC-SCNC: 8 MMOL/L (ref 5–15)
BASOPHILS # BLD: 0.1 K/UL (ref 0–0.1)
BASOPHILS NFR BLD: 1 % (ref 0–1)
BUN SERPL-MCNC: 24 MG/DL (ref 6–20)
BUN/CREAT SERPL: 16 (ref 12–20)
CALCIUM SERPL-MCNC: 8.9 MG/DL (ref 8.5–10.1)
CHLORIDE SERPL-SCNC: 100 MMOL/L (ref 97–108)
CO2 SERPL-SCNC: 26 MMOL/L (ref 21–32)
CREAT SERPL-MCNC: 1.54 MG/DL (ref 0.7–1.3)
DIFFERENTIAL METHOD BLD: ABNORMAL
EOSINOPHIL # BLD: 0.3 K/UL (ref 0–0.4)
EOSINOPHIL NFR BLD: 4 % (ref 0–7)
ERYTHROCYTE [DISTWIDTH] IN BLOOD BY AUTOMATED COUNT: 14.6 % (ref 11.5–14.5)
GLUCOSE BLD STRIP.AUTO-MCNC: 116 MG/DL (ref 65–100)
GLUCOSE BLD STRIP.AUTO-MCNC: 82 MG/DL (ref 65–100)
GLUCOSE SERPL-MCNC: 99 MG/DL (ref 65–100)
HCT VFR BLD AUTO: 40.7 % (ref 36.6–50.3)
HGB BLD-MCNC: 13.7 G/DL (ref 12.1–17)
IMM GRANULOCYTES # BLD AUTO: 0 K/UL (ref 0–0.04)
IMM GRANULOCYTES NFR BLD AUTO: 0 % (ref 0–0.5)
LYMPHOCYTES # BLD: 2.6 K/UL (ref 0.8–3.5)
LYMPHOCYTES NFR BLD: 34 % (ref 12–49)
MCH RBC QN AUTO: 30.7 PG (ref 26–34)
MCHC RBC AUTO-ENTMCNC: 33.7 G/DL (ref 30–36.5)
MCV RBC AUTO: 91.3 FL (ref 80–99)
MONOCYTES # BLD: 1 K/UL (ref 0–1)
MONOCYTES NFR BLD: 13 % (ref 5–13)
NEUTS SEG # BLD: 3.7 K/UL (ref 1.8–8)
NEUTS SEG NFR BLD: 48 % (ref 32–75)
NRBC # BLD: 0 K/UL (ref 0–0.01)
NRBC BLD-RTO: 0 PER 100 WBC
PLATELET # BLD AUTO: 165 K/UL (ref 150–400)
PMV BLD AUTO: 10.2 FL (ref 8.9–12.9)
POTASSIUM SERPL-SCNC: 3.3 MMOL/L (ref 3.5–5.1)
RBC # BLD AUTO: 4.46 M/UL (ref 4.1–5.7)
RBC MORPH BLD: ABNORMAL
SERVICE CMNT-IMP: ABNORMAL
SERVICE CMNT-IMP: NORMAL
SODIUM SERPL-SCNC: 134 MMOL/L (ref 136–145)
TROPONIN I SERPL-MCNC: 0.06 NG/ML
TROPONIN I SERPL-MCNC: 0.06 NG/ML
WBC # BLD AUTO: 7.7 K/UL (ref 4.1–11.1)

## 2020-07-03 PROCEDURE — 65660000000 HC RM CCU STEPDOWN

## 2020-07-03 PROCEDURE — 36415 COLL VENOUS BLD VENIPUNCTURE: CPT

## 2020-07-03 PROCEDURE — 92526 ORAL FUNCTION THERAPY: CPT | Performed by: SPEECH-LANGUAGE PATHOLOGIST

## 2020-07-03 PROCEDURE — 80048 BASIC METABOLIC PNL TOTAL CA: CPT

## 2020-07-03 PROCEDURE — 85025 COMPLETE CBC W/AUTO DIFF WBC: CPT

## 2020-07-03 PROCEDURE — 82962 GLUCOSE BLOOD TEST: CPT

## 2020-07-03 PROCEDURE — 74011250637 HC RX REV CODE- 250/637: Performed by: FAMILY MEDICINE

## 2020-07-03 PROCEDURE — 74011250637 HC RX REV CODE- 250/637: Performed by: INTERNAL MEDICINE

## 2020-07-03 PROCEDURE — 74011250637 HC RX REV CODE- 250/637: Performed by: HOSPITALIST

## 2020-07-03 PROCEDURE — 84484 ASSAY OF TROPONIN QUANT: CPT

## 2020-07-03 RX ORDER — AMLODIPINE BESYLATE 5 MG/1
5 TABLET ORAL DAILY
Status: DISCONTINUED | OUTPATIENT
Start: 2020-07-03 | End: 2020-07-07

## 2020-07-03 RX ORDER — FAMOTIDINE 20 MG/1
20 TABLET, FILM COATED ORAL EVERY EVENING
Status: DISCONTINUED | OUTPATIENT
Start: 2020-07-03 | End: 2020-07-09 | Stop reason: HOSPADM

## 2020-07-03 RX ADMIN — METOPROLOL SUCCINATE 25 MG: 25 TABLET, EXTENDED RELEASE ORAL at 21:53

## 2020-07-03 RX ADMIN — CASTOR OIL AND BALSAM, PERU: 788; 87 OINTMENT TOPICAL at 10:51

## 2020-07-03 RX ADMIN — CASTOR OIL AND BALSAM, PERU: 788; 87 OINTMENT TOPICAL at 17:27

## 2020-07-03 RX ADMIN — ATORVASTATIN CALCIUM 40 MG: 40 TABLET, FILM COATED ORAL at 21:53

## 2020-07-03 RX ADMIN — ALLOPURINOL 100 MG: 100 TABLET ORAL at 08:57

## 2020-07-03 RX ADMIN — ASPIRIN 81 MG CHEWABLE TABLET 81 MG: 81 TABLET CHEWABLE at 08:57

## 2020-07-03 RX ADMIN — CASTOR OIL AND BALSAM, PERU: 788; 87 OINTMENT TOPICAL at 21:53

## 2020-07-03 RX ADMIN — POTASSIUM BICARBONATE 40 MEQ: 782 TABLET, EFFERVESCENT ORAL at 08:57

## 2020-07-03 RX ADMIN — AMLODIPINE BESYLATE 5 MG: 5 TABLET ORAL at 08:57

## 2020-07-03 RX ADMIN — CLOPIDOGREL BISULFATE 75 MG: 75 TABLET ORAL at 08:57

## 2020-07-03 RX ADMIN — FAMOTIDINE 20 MG: 20 TABLET ORAL at 17:27

## 2020-07-03 NOTE — PROGRESS NOTES
[facility] Adult  Hospitalist Group                                                                                          Hospitalist Progress Note  Yenni Vaughn MD  Answering service: [phone] from in house phone        Date of Service:  7/3/2020  NAME:  Jesus Whittington  :  3/9/1933  MRN:  [id number]      Admission Summary:   The patient is an 27-year-old gentleman with past medical history of hypertension, prostate cancer, constipation, gout, hyperlipidemia, stroke, and TIA, who presents to the hospital with the above-mentioned symptom. History was primarily obtained from the patient's daughter, Elodia Fontaine. The patient is confused and has some right-sided weakness with right-sided neglect. She reports that the patient was last seen normal 2 days back on . Per care facility, the patient was normal last night. He became disoriented initially and then developed issues with walking and fell hitting his head, but there was no loss of consciousness. She reports the patient lives at -- and the patient was brought over here as a code stroke, was found to have occipital stroke. Neurointerventional Surgery was consulted and they felt that the stroke has been completed and the patient does not need any acute surgical intervention. The patient was requested to be admitted under the hospitalist service. Currently, the patient is resting in bed, does not appear to be distressed. No further history could be obtained from the patient or his daughter. Interval history / Subjective:     Patient is seen and examined at bedside this morning. He is alert, cooperative and following simple commands.     Discussed with nursing- MRI can be done on Monday as [employer] rep needs to be present       Assessment & Plan:     Acute parieto-occipital cerebrovascular accident  -CT head: Bilateral acute occipital infarcts without evidence of hemorrhage.  - CTA head: Acute bilateral PCA territory infarcts, left larger than right.  - CT perf:  Acute ischemia in the left parieto-occipital lobe, with qualitatively decreased cerebral blood flow and blood volume in this region representing completed infarct. - appreciate Neurology input  - LDL 56, A1c 5.7  - MRI brain pending  - Echo pending   - PT/OT/SLP  - c/w aspirin, plavix and statin     Hypertension:    - BP elevated. C/w metoprolol. Restarted Amlodipine. hydralazine on hold     Acute kidney injury on chronic kidney disease stage II, likely prerenal:    -  Avoid nephrotoxic medications and renally dose all other medications. 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 Hx prostate ca     Code status: Full  DVT prophylaxis: SCDs  Care Plan discussed with: Patient/Family and Nurse  Anticipated Disposition:  SNF rehab   Anticipated Discharge: Greater than 48 hours        Hospital Problems  Date Reviewed: 1/30/2020          Codes Class Noted POA    Acute CVA (cerebrovascular accident) [facility]) ICD-10-CM: I63.9  ICD-9-CM: 434.91  6/30/2020 Unknown                Review of Systems:   Review of systems not obtained due to patient factors. Vital Signs:    Last 24hrs VS reviewed since prior progress note. Most recent are:  Visit Vitals  /75 (BP 1 Location: Right arm, BP Patient Position: At rest)   Pulse 66   Temp 98.6 °F (37 °C)   Resp 24   Ht 5' 7\" (1.702 m)   Wt 77 kg (169 lb 12.1 oz)   SpO2 97%   BMI 26.59 kg/m²       No intake or output data in the 24 hours ending 07/03/20 1415     Physical Examination:             Constitutional:  No acute distress, cooperative, pleasant, elderly    ENT:  Oral mucosa moist, oropharynx benign. Resp:  CTA bilaterally. No wheezing/rhonchi/rales. No accessory muscle use   CV:  Regular rhythm, normal rate, no murmurs, gallops, rubs    GI:  Soft, non distended, non tender.  normoactive bowel sounds, no hepatosplenomegaly     Musculoskeletal:  No edema, warm, 2+ pulses throughout    Neurologic:  alert, oriented to self. 4+ strength throughout maybe worse on the right            Data Review:    I personally reviewed  Image and labs      Labs:     Recent Labs     07/03/20  0503 07/01/20  0404   WBC 7.7 8.9   HGB 13.7 15.7   HCT 40.7 48.7    144*     Recent Labs     07/03/20  0503 07/02/20  0311   * 135*   K 3.3* 3.6    101   CO2 26 27   BUN 24* 17   CREA 1.54* 1.44*   GLU 99 106*   CA 8.9 9.5     No results for input(s): ALT, AP, TBIL, TBILI, TP, ALB, GLOB, GGT, AML, LPSE in the last 72 hours. No lab exists for component: SGOT, GPT, AMYP, HLPSE  No results for input(s): INR, PTP, APTT, INREXT, INREXT in the last 72 hours. No results for input(s): FE, TIBC, PSAT, FERR in the last 72 hours. Lab Results   Component Value Date/Time    Folate 12.2 01/07/2019 11:29 AM      No results for input(s): PH, PCO2, PO2 in the last 72 hours.   Recent Labs     07/03/20  0503 07/03/20  0400 07/02/20  0311   TROIQ 0.06* 0.06* 0.11*     Lab Results   Component Value Date/Time    Cholesterol, total 118 07/01/2020 04:04 AM    HDL Cholesterol 51 07/01/2020 04:04 AM    LDL, calculated 56.6 07/01/2020 04:04 AM    Triglyceride 52 07/01/2020 04:04 AM    CHOL/HDL Ratio 2.3 07/01/2020 04:04 AM     Lab Results   Component Value Date/Time    Glucose (POC) 82 07/03/2020 07:08 AM    Glucose (POC) 139 (H) 07/02/2020 09:20 PM    Glucose (POC) 72 07/01/2020 12:36 PM    Glucose (POC) 113 (H) 06/30/2020 08:58 PM    Glucose (POC) 109 (H) 06/30/2020 11:56 AM     Lab Results   Component Value Date/Time    Color Yellow 08/08/2017 04:09 PM    Appearance Clear 08/08/2017 04:09 PM    Specific gravity 1.017 07/18/2017 02:56 PM    pH (UA) 6.5 08/08/2017 04:09 PM    Protein 100 (A) 07/18/2017 02:56 PM    Glucose NEGATIVE  07/18/2017 02:56 PM    Ketone Negative 08/08/2017 04:09 PM    Bilirubin Negative 08/08/2017 04:09 PM    Urobilinogen 1.0 07/18/2017 02:56 PM    Nitrites Positive (A) 08/08/2017 04:09 PM    Leukocyte Esterase 1+ (A) 08/08/2017 04:09 PM    Epithelial cells MODERATE (A) 07/18/2017 02:56 PM    Bacteria Few 08/08/2017 04:09 PM    WBC 11-30 (A) 08/08/2017 04:09 PM    RBC 0-2 08/08/2017 04:09 PM         Medications Reviewed:     Current Facility-Administered Medications   Medication Dose Route Frequency    amLODIPine (NORVASC) tablet 5 mg  5 mg Oral DAILY    famotidine (PEPCID) tablet 20 mg  20 mg Oral QPM    metoprolol succinate (TOPROL-XL) XL tablet 25 mg  25 mg Oral Q24H    balsam peru-castor oiL (VENELEX) ointment   Topical TID    aspirin chewable tablet 81 mg  81 mg Oral DAILY    clopidogreL (PLAVIX) tablet 75 mg  75 mg Oral DAILY    hydrALAZINE (APRESOLINE) 20 mg/mL injection 20 mg  20 mg IntraVENous Q6H PRN    allopurinoL (ZYLOPRIM) tablet 100 mg  100 mg Oral DAILY    atorvastatin (LIPITOR) tablet 40 mg  40 mg Oral QHS    acetaminophen (TYLENOL) tablet 650 mg  650 mg Oral Q4H PRN    Or    acetaminophen (TYLENOL) solution 650 mg  650 mg Per NG tube Q4H PRN    Or    acetaminophen (TYLENOL) suppository 650 mg  650 mg Rectal Q4H PRN     ______________________________________________________________________  EXPECTED LENGTH OF STAY: 3d 0h  ACTUAL LENGTH OF STAY:          3                 Cydney Mojica MD

## 2020-07-03 NOTE — PROGRESS NOTES
Clinical Pharmacy Note: IV to PO Automatic Conversion  Please note: Corinne Huron Harveys medication (famotidine) has been changed from IV to PO based on the following critiera:    Patient is taking scheduled oral medications  Patient is tolerating tube feeds at goal rate or a full liquid, soft or regular diet    This IV to PO conversion is based on the [employer] approved automatic conversion policy for eligible patients. Please call with questions.

## 2020-07-03 NOTE — ROUTINE PROCESS
Bedside and Verbal shift change report given to [address] (oncoming nurse) by [facility], [facility] RN (offgoing nurse). Report included the following information SBAR, Kardex, Intake/Output, MAR, Recent Results, Cardiac Rhythm Paced RN and Dual Neuro Assessment.

## 2020-07-03 NOTE — PROGRESS NOTES
Problem: TIA/CVA Stroke: Day 2 Until Discharge  Goal: Activity/Safety  Outcome: Progressing Towards Goal  Goal: Diagnostic Test/Procedures  Outcome: Progressing Towards Goal  Goal: Nutrition/Diet  Outcome: Progressing Towards Goal  Goal: Discharge Planning  Outcome: Progressing Towards Goal  Goal: Medications  Outcome: Progressing Towards Goal  Goal: Respiratory  Outcome: Progressing Towards Goal  Goal: Treatments/Interventions/Procedures  Outcome: Progressing Towards Goal  Goal: Psychosocial  Outcome: Progressing Towards Goal  Goal: *Verbalizes anxiety and depression are reduced or absent  Outcome: Progressing Towards Goal

## 2020-07-03 NOTE — ROUTINE PROCESS
Bedside shift change report given to [address] [place] [address] (oncoming nurse) by Audery Diaz RN (offgoing nurse). Report included the following information SBAR, Kardex, ED Summary, Intake/Output, MAR, Cardiac Rhythm Paced and Dual Neuro Assessment.

## 2020-07-03 NOTE — PROGRESS NOTES
Problem: TIA/CVA Stroke: Day 2 Until Discharge  Goal: Activity/Safety  Outcome: Progressing Towards Goal  Goal: Diagnostic Test/Procedures  Outcome: Progressing Towards Goal  Goal: Nutrition/Diet  Outcome: Progressing Towards Goal  Goal: Discharge Planning  Outcome: Progressing Towards Goal  Goal: Respiratory  Outcome: Progressing Towards Goal

## 2020-07-03 NOTE — PROGRESS NOTES
Problem: Dysphagia (Adult)  Goal: *Acute Goals and Plan of Care (Insert Text)  Description: Speech Therapy Goals  Initiated 7/2/2020    1. Patient will tolerate dysphagia advanced diet and thin liquids without adverse effects within 7 days. Outcome: Progressing Towards Goal   SPEECH LANGUAGE PATHOLOGY DYSPHAGIA TREATMENT  Patient: Christopher Hutton (17 y.o. male)  Date: 7/3/2020  Diagnosis: Acute CVA (cerebrovascular accident) [facility]) [I63.9]   <principal problem not specified>       Precautions:   Bed Alarm, Fall    ASSESSMENT:  Patient awake and alert, agreeable to PO. Self-feeding and tolerating trials of all consistencies. Noted cough x 1 after completion of trials, though none in between. Per RN, he has been tolerating his diet fairly well. Will continue to follow. PLAN:  Recommendations and Planned Interventions:  Continue current diet  SLP following for tolerance as well as SLP treatment. Patient continues to benefit from skilled intervention to address the above impairments. Continue treatment per established plan of care. Discharge Recommendations: To Be Determined     SUBJECTIVE:   Patient stated I'm ok. OBJECTIVE:   Cognitive and Communication Status:  Neurologic State: Alert  Orientation Level: Oriented to person, Disoriented to time, Disoriented to situation, Disoriented to place  Cognition: Decreased command following, Decreased attention/concentration  Perception: Cues to attend right visual field, Tactile, Verbal, Visual  Perseveration: No perseveration noted  Safety/Judgement: Decreased awareness of environment, Decreased awareness of need for assistance, Decreased insight into deficits, Decreased awareness of need for safety, Fall prevention  Dysphagia Treatment:  Oral Assessment:  Oral Assessment  Labial: No impairment  Dentition: Upper dentures; Lower dentures  Oral Hygiene: moist, clean  Lingual: No impairment  Mandible: No impairment  P.O.  Trials:  Patient Position: up in bed  Vocal quality prior to P.O.: No impairment  Consistency Presented: Puree; Thin liquid; Solid  How Presented: Self-fed/presented;Straw;Spoon     Bolus Acceptance: No impairment  Bolus Formation/Control: No impairment     Propulsion: No impairment        Laryngeal Elevation: Functional  Aspiration Signs/Symptoms: (delayed cough after all trials completed)                                                                                                                                                    Pain:  Pain Scale 1: Numeric (0 - 10)  Pain Intensity 1: 0       After treatment:   Patient left in no apparent distress in bed and Call bell within reach    COMMUNICATION/EDUCATION:   Patient was educated regarding purpose of SLP visit. He agreed to participate. The patient's plan of care including recommendations, planned interventions, and recommended diet changes were discussed with: Registered nurse.      Melissa Grant SLP  Time Calculation: 15 mins

## 2020-07-03 NOTE — PROGRESS NOTES
Transition of Care Plan  · RUR- 9 % Low Risks   · DISPOSITION: Patient presents from [facility] and is expected to be going back there with home health; pending medical progression  · Daughter prefers [facility], CM will need order  · IPR list left for POA/daughter Malaika Brood [phone]  · Transport: [facility] phone [phone]/[place] TBD   · Nae 53   · Follow up: PCP/Specialist(s)      Recevied a call from patient's daughter Malaika Brood [phone]. She said that [facility] is willing to accept back with home health. The facility would prefer to use [facility]. CM will arrange home health when patient is medically ready for discharge.      MICHELET Lebron

## 2020-07-04 ENCOUNTER — APPOINTMENT (OUTPATIENT)
Dept: NON INVASIVE DIAGNOSTICS | Age: 85
DRG: 064 | End: 2020-07-04
Attending: FAMILY MEDICINE
Payer: MEDICARE

## 2020-07-04 LAB
ANION GAP SERPL CALC-SCNC: 5 MMOL/L (ref 5–15)
BUN SERPL-MCNC: 27 MG/DL (ref 6–20)
BUN/CREAT SERPL: 18 (ref 12–20)
CALCIUM SERPL-MCNC: 9 MG/DL (ref 8.5–10.1)
CHLORIDE SERPL-SCNC: 98 MMOL/L (ref 97–108)
CO2 SERPL-SCNC: 29 MMOL/L (ref 21–32)
CREAT SERPL-MCNC: 1.53 MG/DL (ref 0.7–1.3)
GLUCOSE BLD STRIP.AUTO-MCNC: 120 MG/DL (ref 65–100)
GLUCOSE BLD STRIP.AUTO-MCNC: 81 MG/DL (ref 65–100)
GLUCOSE BLD STRIP.AUTO-MCNC: 85 MG/DL (ref 65–100)
GLUCOSE BLD STRIP.AUTO-MCNC: 96 MG/DL (ref 65–100)
GLUCOSE SERPL-MCNC: 117 MG/DL (ref 65–100)
POTASSIUM SERPL-SCNC: 4.1 MMOL/L (ref 3.5–5.1)
SERVICE CMNT-IMP: ABNORMAL
SERVICE CMNT-IMP: NORMAL
SODIUM SERPL-SCNC: 132 MMOL/L (ref 136–145)

## 2020-07-04 PROCEDURE — 74011250637 HC RX REV CODE- 250/637: Performed by: INTERNAL MEDICINE

## 2020-07-04 PROCEDURE — 65660000000 HC RM CCU STEPDOWN

## 2020-07-04 PROCEDURE — 74011250636 HC RX REV CODE- 250/636: Performed by: INTERNAL MEDICINE

## 2020-07-04 PROCEDURE — 36415 COLL VENOUS BLD VENIPUNCTURE: CPT

## 2020-07-04 PROCEDURE — 80048 BASIC METABOLIC PNL TOTAL CA: CPT

## 2020-07-04 PROCEDURE — 82962 GLUCOSE BLOOD TEST: CPT

## 2020-07-04 PROCEDURE — 74011250637 HC RX REV CODE- 250/637: Performed by: HOSPITALIST

## 2020-07-04 PROCEDURE — 74011250637 HC RX REV CODE- 250/637: Performed by: FAMILY MEDICINE

## 2020-07-04 PROCEDURE — 94760 N-INVAS EAR/PLS OXIMETRY 1: CPT

## 2020-07-04 RX ORDER — SODIUM CHLORIDE 9 MG/ML
75 INJECTION, SOLUTION INTRAVENOUS CONTINUOUS
Status: DISPENSED | OUTPATIENT
Start: 2020-07-04 | End: 2020-07-05

## 2020-07-04 RX ADMIN — CASTOR OIL AND BALSAM, PERU: 788; 87 OINTMENT TOPICAL at 21:32

## 2020-07-04 RX ADMIN — FAMOTIDINE 20 MG: 20 TABLET ORAL at 17:45

## 2020-07-04 RX ADMIN — ALLOPURINOL 100 MG: 100 TABLET ORAL at 09:40

## 2020-07-04 RX ADMIN — AMLODIPINE BESYLATE 5 MG: 5 TABLET ORAL at 09:40

## 2020-07-04 RX ADMIN — CLOPIDOGREL BISULFATE 75 MG: 75 TABLET ORAL at 09:40

## 2020-07-04 RX ADMIN — ASPIRIN 81 MG CHEWABLE TABLET 81 MG: 81 TABLET CHEWABLE at 09:40

## 2020-07-04 RX ADMIN — ATORVASTATIN CALCIUM 40 MG: 40 TABLET, FILM COATED ORAL at 21:30

## 2020-07-04 RX ADMIN — METOPROLOL SUCCINATE 25 MG: 25 TABLET, EXTENDED RELEASE ORAL at 21:30

## 2020-07-04 RX ADMIN — SODIUM CHLORIDE 75 ML/HR: 900 INJECTION, SOLUTION INTRAVENOUS at 10:37

## 2020-07-04 RX ADMIN — CASTOR OIL AND BALSAM, PERU: 788; 87 OINTMENT TOPICAL at 16:00

## 2020-07-04 RX ADMIN — CASTOR OIL AND BALSAM, PERU: 788; 87 OINTMENT TOPICAL at 09:42

## 2020-07-04 NOTE — ROUTINE PROCESS
Bedside shift change report given to [address] (oncoming nurse) by Amee Mouse (offgoing nurse). Report included the following information SBAR, Intake/Output and Recent Results.

## 2020-07-04 NOTE — PROGRESS NOTES
Bedside shift change report given to Amanda Koenig RN (oncoming nurse) by Darci Palacios (offgoing nurse). Report included the following information SBAR, Kardex, Intake/Output, MAR, Accordion, Recent Results, Cardiac Rhythm Paced, Alarm Parameters  and Dual Neuro Assessment.

## 2020-07-04 NOTE — ROUTINE PROCESS
Bedside shift change report given to Teofilo Travis 44 (oncoming nurse) by Darius Whitten RN (offgoing nurse). Report included the following information SBAR, Kardex, ED Summary, Intake/Output, MAR, Cardiac Rhythm Paced and Dual Neuro Assessment.

## 2020-07-04 NOTE — PROGRESS NOTES
[facility] Adult  Hospitalist Group                                                                                          Hospitalist Progress Note  Sophia Tyler MD  Answering service: [phone] from in house phone        Date of Service:  2020  NAME:  Darryl Lujan  :  3/9/1933  MRN:  [id number]      Admission Summary:   The patient is an 31-year-old gentleman with past medical history of hypertension, prostate cancer, constipation, gout, hyperlipidemia, stroke, and TIA, who presents to the hospital with the above-mentioned symptom. History was primarily obtained from the patient's daughter, Kris King. The patient is confused and has some right-sided weakness with right-sided neglect. She reports that the patient was last seen normal 2 days back on . Per care facility, the patient was normal last night. He became disoriented initially and then developed issues with walking and fell hitting his head, but there was no loss of consciousness. She reports the patient lives at -- and the patient was brought over here as a code stroke, was found to have occipital stroke. Neurointerventional Surgery was consulted and they felt that the stroke has been completed and the patient does not need any acute surgical intervention. The patient was requested to be admitted under the hospitalist service. Currently, the patient is resting in bed, does not appear to be distressed. No further history could be obtained from the patient or his daughter. Interval history / Subjective:     Patient is seen and examined at bedside this morning. Alert, pleasant. Co=operative. No limitation in extremity movement. Denied any double vision, swallowing trouble, bowel-bladder disturbances. Following simple commands appropriately. Awaiting for MRI and Echo. No other concerns or overnight events.     Assessment & Plan:     Acute parieto-occipital cerebrovascular accident  -CT head: Bilateral acute occipital infarcts without evidence of hemorrhage.  - CTA head: Acute bilateral PCA territory infarcts, left larger than right.  - CT perf:  Acute ischemia in the left parieto-occipital lobe, with qualitatively decreased cerebral blood flow and blood volume in this region representing completed infarct. - appreciate Neurology input  - LDL 56, A1c 5.7  - MRI brain pending - likely Monday as presence of [employer] rep. Required for PPM presence  - Echo pending   - PT/OT/SLP recommendations noted. - c/w aspirin, plavix and statin     Hypertension:  better controlled  - C/w metoprolol, Amlodipine.   - Continue to hold Hydralazine 25mg TID-home med. Would consider to resume later today if BP persistently >150     Acute kidney injury on chronic kidney disease stage II, likely prerenal:    - Avoid nephrotoxic medications and renally dose all other medications. - Creat stable  - Will give gentle hydration for one day at 75ml/hr. Would back off if any SOB, edema, Pulm congestion or rales develop on exam  - Monitor renal function    Hyponatremia:  - Likely related to mild dehydration, less likely intracranial process driven  - IVF as noted above    Elevated troponin - flat; likely demand ischemia      S/p PPM for 2:1 AVB, sinus bradycardia. Hx prostate ca     Code status: Full  DVT prophylaxis: SCDs  Care Plan discussed with: Patient/Family and Nurse  Anticipated Disposition:  SNF rehab   Anticipated Discharge: Greater than 48 hours        Hospital Problems  Date Reviewed: 1/30/2020          Codes Class Noted POA    Acute CVA (cerebrovascular accident) [facility]) ICD-10-CM: I63.9  ICD-9-CM: 434.91  6/30/2020 Unknown                Review of Systems:   Review of systems not obtained due to patient factors. Vital Signs:    Last 24hrs VS reviewed since prior progress note.  Most recent are:  Visit Vitals  /81 (BP 1 Location: Right arm, BP Patient Position: At rest)   Pulse 62   Temp 97.8 °F (36.6 °C)   Resp 16 Ht 5' 7\" (1.702 m)   Wt 78 kg (172 lb)   SpO2 97%   BMI 26.94 kg/m²         Intake/Output Summary (Last 24 hours) at 7/4/2020 0206  Last data filed at 7/3/2020 2358  Gross per 24 hour   Intake 330 ml   Output    Net 330 ml        Physical Examination:             Constitutional:  No acute distress, cooperative, pleasant, elderly    ENT:  Oral mucosa moist, oropharynx benign. Resp:  CTA bilaterally. No wheezing/rhonchi/rales. No accessory muscle use   CV:  Regular rhythm, normal rate, no murmurs, gallops, rubs    GI:  Soft, non distended, non tender. normoactive bowel sounds, no hepatosplenomegaly     Musculoskeletal:  No edema, warm, 2+ pulses throughout    Neurologic:  alert, oriented to self. 4+ strength throughout maybe worse on the right            Data Review:    I personally reviewed  Image and labs    Cta Code Neuro Head And Neck W Cont    Result Date: 6/30/2020  IMPRESSION: CTA Head: 1. No evidence of large vessel occlusion or flow-limiting stenosis. 2. Atherosclerotic disease as above, including moderate stenosis of the left M1 segment, and mild to moderate stenoses of the bilateral posterior cerebral arteries. 3. Acute bilateral PCA territory infarcts, left larger than right. CTA Neck: 1. No evidence of flow-limiting stenosis. 2. Partially visualized small left pleural effusion. CT Brain Perfusion: 1. Acute ischemia in the left parieto-occipital lobe, with qualitatively decreased cerebral blood flow and blood volume in this region representing completed infarct. Mismatch volume of 4 cc. The findings were called to ER on 6/30/2020 at 12:55 PM by Dr. Cindi Golden [id number]     Ct Code Neuro Head Wo Contrast    Result Date: 6/30/2020  IMPRESSION: Bilateral acute occipital infarcts without evidence of hemorrhage. The findings were called to Dr. Janett Rosales on 6/30/2020 at 12:18 PM by myself. Pedro Christiansen 0875 Neuro Perf W Cbf    Result Date: 6/30/2020  IMPRESSION: CTA Head: 1.  No evidence of large vessel occlusion or flow-limiting stenosis. 2. Atherosclerotic disease as above, including moderate stenosis of the left M1 segment, and mild to moderate stenoses of the bilateral posterior cerebral arteries. 3. Acute bilateral PCA territory infarcts, left larger than right. CTA Neck: 1. No evidence of flow-limiting stenosis. 2. Partially visualized small left pleural effusion. CT Brain Perfusion: 1. Acute ischemia in the left parieto-occipital lobe, with qualitatively decreased cerebral blood flow and blood volume in this region representing completed infarct. Mismatch volume of 4 cc. The findings were called to ER on 6/30/2020 at 12:55 PM by Dr. Cruzito Oviedo. [id number]       Labs:     Recent Labs     07/03/20  0503   WBC 7.7   HGB 13.7   HCT 40.7        Recent Labs     07/04/20  0203 07/03/20  0503 07/02/20  0311   * 134* 135*   K 4.1 3.3* 3.6   CL 98 100 101   CO2 29 26 27   BUN 27* 24* 17   CREA 1.53* 1.54* 1.44*   * 99 106*   CA 9.0 8.9 9.5     No results for input(s): ALT, AP, TBIL, TBILI, TP, ALB, GLOB, GGT, AML, LPSE in the last 72 hours. No lab exists for component: SGOT, GPT, AMYP, HLPSE  No results for input(s): INR, PTP, APTT, INREXT, INREXT in the last 72 hours. No results for input(s): FE, TIBC, PSAT, FERR in the last 72 hours. Lab Results   Component Value Date/Time    Folate 12.2 01/07/2019 11:29 AM      No results for input(s): PH, PCO2, PO2 in the last 72 hours.   Recent Labs     07/03/20  0503 07/03/20  0400 07/02/20  0311   TROIQ 0.06* 0.06* 0.11*     Lab Results   Component Value Date/Time    Cholesterol, total 118 07/01/2020 04:04 AM    HDL Cholesterol 51 07/01/2020 04:04 AM    LDL, calculated 56.6 07/01/2020 04:04 AM    Triglyceride 52 07/01/2020 04:04 AM    CHOL/HDL Ratio 2.3 07/01/2020 04:04 AM     Lab Results   Component Value Date/Time    Glucose (POC) 81 07/04/2020 07:39 AM    Glucose (POC) 116 (H) 07/03/2020 05:25 PM    Glucose (POC) 82 07/03/2020 07:08 AM    Glucose (POC) 139 (H) 07/02/2020 09:20 PM    Glucose (POC) 72 07/01/2020 12:36 PM     Lab Results   Component Value Date/Time    Color Yellow 08/08/2017 04:09 PM    Appearance Clear 08/08/2017 04:09 PM    Specific gravity 1.017 07/18/2017 02:56 PM    pH (UA) 6.5 08/08/2017 04:09 PM    Protein 100 (A) 07/18/2017 02:56 PM    Glucose NEGATIVE  07/18/2017 02:56 PM    Ketone Negative 08/08/2017 04:09 PM    Bilirubin Negative 08/08/2017 04:09 PM    Urobilinogen 1.0 07/18/2017 02:56 PM    Nitrites Positive (A) 08/08/2017 04:09 PM    Leukocyte Esterase 1+ (A) 08/08/2017 04:09 PM    Epithelial cells MODERATE (A) 07/18/2017 02:56 PM    Bacteria Few 08/08/2017 04:09 PM    WBC 11-30 (A) 08/08/2017 04:09 PM    RBC 0-2 08/08/2017 04:09 PM         Medications Reviewed:     Current Facility-Administered Medications   Medication Dose Route Frequency    0.9% sodium chloride infusion  75 mL/hr IntraVENous CONTINUOUS    amLODIPine (NORVASC) tablet 5 mg  5 mg Oral DAILY    famotidine (PEPCID) tablet 20 mg  20 mg Oral QPM    metoprolol succinate (TOPROL-XL) XL tablet 25 mg  25 mg Oral Q24H    balsam peru-castor oiL (VENELEX) ointment   Topical TID    aspirin chewable tablet 81 mg  81 mg Oral DAILY    clopidogreL (PLAVIX) tablet 75 mg  75 mg Oral DAILY    hydrALAZINE (APRESOLINE) 20 mg/mL injection 20 mg  20 mg IntraVENous Q6H PRN    allopurinoL (ZYLOPRIM) tablet 100 mg  100 mg Oral DAILY    atorvastatin (LIPITOR) tablet 40 mg  40 mg Oral QHS    acetaminophen (TYLENOL) tablet 650 mg  650 mg Oral Q4H PRN    Or    acetaminophen (TYLENOL) solution 650 mg  650 mg Per NG tube Q4H PRN    Or    acetaminophen (TYLENOL) suppository 650 mg  650 mg Rectal Q4H PRN     ______________________________________________________________________  EXPECTED LENGTH OF STAY: 3d 0h  ACTUAL LENGTH OF STAY:          4                 Creston Cushing, MD

## 2020-07-04 NOTE — PROGRESS NOTES
Problem: Falls - Risk of  Goal: *Absence of Falls  Description: Document Armani Coffey Fall Risk and appropriate interventions in the flowsheet. Outcome: Progressing Towards Goal  Note: Fall Risk Interventions:  Mobility Interventions: Patient to call before getting OOB, OT consult for ADLs, PT Consult for mobility concerns, PT Consult for assist device competence    Mentation Interventions: Door open when patient unattended, Increase mobility, More frequent rounding, Reorient patient, Room close to nurse's station, Bed/chair exit alarm    Medication Interventions: Bed/chair exit alarm, Evaluate medications/consider consulting pharmacy, Patient to call before getting OOB, Teach patient to arise slowly    Elimination Interventions: Call light in reach, Bed/chair exit alarm, Patient to call for help with toileting needs, Stay With Me (per policy), Toilet paper/wipes in reach, Toileting schedule/hourly rounds    History of Falls Interventions: Consult care management for discharge planning, Bed/chair exit alarm, Door open when patient unattended, Evaluate medications/consider consulting pharmacy, Investigate reason for fall, Room close to nurse's station         Problem: Patient Education: Go to Patient Education Activity  Goal: Patient/Family Education  Outcome: Progressing Towards Goal     Problem: Pressure Injury - Risk of  Goal: *Prevention of pressure injury  Description: Document Torito Scale and appropriate interventions in the flowsheet. Outcome: Progressing Towards Goal  Note: Pressure Injury Interventions:  Sensory Interventions: Assess changes in LOC, Discuss PT/OT consult with provider, Float heels, Keep linens dry and wrinkle-free, Maintain/enhance activity level, Minimize linen layers, Turn and reposition approx.  every two hours (pillows and wedges if needed)    Moisture Interventions: Absorbent underpads, Apply protective barrier, creams and emollients, Limit adult briefs, Maintain skin hydration (lotion/cream), Minimize layers, Moisture barrier    Activity Interventions: Increase time out of bed, Pressure redistribution bed/mattress(bed type), PT/OT evaluation    Mobility Interventions: Float heels, HOB 30 degrees or less, Pressure redistribution bed/mattress (bed type), PT/OT evaluation    Nutrition Interventions: Document food/fluid/supplement intake    Friction and Shear Interventions: HOB 30 degrees or less, Lift sheet, Lift team/patient mobility team, Minimize layers                Problem: Patient Education: Go to Patient Education Activity  Goal: Patient/Family Education  Outcome: Progressing Towards Goal     Problem: Hypertension  Goal: *Blood pressure within specified parameters  Outcome: Progressing Towards Goal  Goal: *Fluid volume balance  Outcome: Progressing Towards Goal  Goal: *Labs within defined limits  Outcome: Progressing Towards Goal     Problem: TIA/CVA Stroke: Day 2 Until Discharge  Goal: Activity/Safety  Outcome: Progressing Towards Goal  Goal: Diagnostic Test/Procedures  Outcome: Progressing Towards Goal  Goal: Nutrition/Diet  Outcome: Progressing Towards Goal  Goal: Discharge Planning  Outcome: Progressing Towards Goal  Goal: Medications  Outcome: Progressing Towards Goal  Goal: Respiratory  Outcome: Progressing Towards Goal  Goal: Treatments/Interventions/Procedures  Outcome: Progressing Towards Goal  Goal: Psychosocial  Outcome: Progressing Towards Goal  Goal: *Verbalizes anxiety and depression are reduced or absent  Outcome: Progressing Towards Goal  Goal: *Absence of aspiration  Outcome: Progressing Towards Goal  Goal: *Optimal pain control at patient's stated goal  Outcome: Progressing Towards Goal  Goal: *Tolerating diet  Outcome: Progressing Towards Goal  Goal: *Ability to perform ADLs and demonstrates progressive mobility and function  Outcome: Progressing Towards Goal

## 2020-07-04 NOTE — PROGRESS NOTES
Problem: TIA/CVA Stroke: Day 2 Until Discharge  Goal: Activity/Safety  Outcome: Progressing Towards Goal  Goal: Diagnostic Test/Procedures  Outcome: Progressing Towards Goal  Goal: Nutrition/Diet  Outcome: Progressing Towards Goal  Goal: Discharge Planning  Outcome: Progressing Towards Goal  Goal: Medications  Outcome: Progressing Towards Goal  Goal: Respiratory  Outcome: Progressing Towards Goal  Goal: Treatments/Interventions/Procedures  Outcome: Progressing Towards Goal

## 2020-07-05 ENCOUNTER — APPOINTMENT (OUTPATIENT)
Dept: NON INVASIVE DIAGNOSTICS | Age: 85
DRG: 064 | End: 2020-07-05
Attending: FAMILY MEDICINE
Payer: MEDICARE

## 2020-07-05 LAB
GLUCOSE BLD STRIP.AUTO-MCNC: 83 MG/DL (ref 65–100)
GLUCOSE BLD STRIP.AUTO-MCNC: 98 MG/DL (ref 65–100)
SERVICE CMNT-IMP: NORMAL
SERVICE CMNT-IMP: NORMAL

## 2020-07-05 PROCEDURE — 74011250637 HC RX REV CODE- 250/637: Performed by: HOSPITALIST

## 2020-07-05 PROCEDURE — 65660000000 HC RM CCU STEPDOWN

## 2020-07-05 PROCEDURE — 94760 N-INVAS EAR/PLS OXIMETRY 1: CPT

## 2020-07-05 PROCEDURE — 74011250637 HC RX REV CODE- 250/637: Performed by: INTERNAL MEDICINE

## 2020-07-05 PROCEDURE — 93306 TTE W/DOPPLER COMPLETE: CPT

## 2020-07-05 PROCEDURE — 74011250636 HC RX REV CODE- 250/636: Performed by: HOSPITALIST

## 2020-07-05 PROCEDURE — 74011250637 HC RX REV CODE- 250/637: Performed by: FAMILY MEDICINE

## 2020-07-05 PROCEDURE — 82962 GLUCOSE BLOOD TEST: CPT

## 2020-07-05 RX ORDER — HYDRALAZINE HYDROCHLORIDE 25 MG/1
25 TABLET, FILM COATED ORAL 3 TIMES DAILY
Status: DISCONTINUED | OUTPATIENT
Start: 2020-07-05 | End: 2020-07-09 | Stop reason: HOSPADM

## 2020-07-05 RX ADMIN — HYDRALAZINE HYDROCHLORIDE 20 MG: 20 INJECTION INTRAMUSCULAR; INTRAVENOUS at 17:45

## 2020-07-05 RX ADMIN — CASTOR OIL AND BALSAM, PERU: 788; 87 OINTMENT TOPICAL at 15:45

## 2020-07-05 RX ADMIN — CLOPIDOGREL BISULFATE 75 MG: 75 TABLET ORAL at 08:25

## 2020-07-05 RX ADMIN — CASTOR OIL AND BALSAM, PERU: 788; 87 OINTMENT TOPICAL at 08:25

## 2020-07-05 RX ADMIN — ALLOPURINOL 100 MG: 100 TABLET ORAL at 08:25

## 2020-07-05 RX ADMIN — ATORVASTATIN CALCIUM 40 MG: 40 TABLET, FILM COATED ORAL at 21:52

## 2020-07-05 RX ADMIN — FAMOTIDINE 20 MG: 20 TABLET ORAL at 18:00

## 2020-07-05 RX ADMIN — METOPROLOL SUCCINATE 25 MG: 25 TABLET, EXTENDED RELEASE ORAL at 21:52

## 2020-07-05 RX ADMIN — HYDRALAZINE HYDROCHLORIDE 25 MG: 25 TABLET, FILM COATED ORAL at 15:45

## 2020-07-05 RX ADMIN — AMLODIPINE BESYLATE 5 MG: 5 TABLET ORAL at 08:25

## 2020-07-05 RX ADMIN — CASTOR OIL AND BALSAM, PERU: 788; 87 OINTMENT TOPICAL at 21:53

## 2020-07-05 RX ADMIN — ASPIRIN 81 MG CHEWABLE TABLET 81 MG: 81 TABLET CHEWABLE at 08:25

## 2020-07-05 NOTE — PROGRESS NOTES
Problem: Falls - Risk of  Goal: *Absence of Falls  Description: Document Veto O'Brien Fall Risk and appropriate interventions in the flowsheet. Outcome: Progressing Towards Goal  Note: Fall Risk Interventions:  Mobility Interventions: Communicate number of staff needed for ambulation/transfer, Bed/chair exit alarm    Mentation Interventions: Adequate sleep, hydration, pain control, Bed/chair exit alarm, Door open when patient unattended    Medication Interventions: Assess postural VS orthostatic hypotension, Bed/chair exit alarm    Elimination Interventions: Bed/chair exit alarm, Call light in reach, Toileting schedule/hourly rounds    History of Falls Interventions: Bed/chair exit alarm, Consult care management for discharge planning, Door open when patient unattended         Problem: Pressure Injury - Risk of  Goal: *Prevention of pressure injury  Description: Document Torito Scale and appropriate interventions in the flowsheet.   Outcome: Progressing Towards Goal  Note: Pressure Injury Interventions:  Sensory Interventions: Check visual cues for pain, Discuss PT/OT consult with provider, Float heels, Keep linens dry and wrinkle-free, Maintain/enhance activity level, Minimize linen layers, Monitor skin under medical devices, Pad between skin to skin, Pressure redistribution bed/mattress (bed type)    Moisture Interventions: Absorbent underpads, Apply protective barrier, creams and emollients, Limit adult briefs, Maintain skin hydration (lotion/cream), Minimize layers, Moisture barrier    Activity Interventions: Pressure redistribution bed/mattress(bed type), PT/OT evaluation    Mobility Interventions: Float heels, HOB 30 degrees or less, Pressure redistribution bed/mattress (bed type)    Nutrition Interventions: Document food/fluid/supplement intake    Friction and Shear Interventions: Apply protective barrier, creams and emollients, HOB 30 degrees or less, Lift sheet, Minimize layers                Problem: TIA/CVA Stroke: Day 2 Until Discharge  Goal: Activity/Safety  Outcome: Progressing Towards Goal  Goal: Diagnostic Test/Procedures  Outcome: Progressing Towards Goal  Goal: Nutrition/Diet  Outcome: Progressing Towards Goal  Goal: Discharge Planning  Outcome: Progressing Towards Goal  Goal: Medications  Outcome: Progressing Towards Goal  Goal: Respiratory  Outcome: Progressing Towards Goal  Goal: Treatments/Interventions/Procedures  Outcome: Progressing Towards Goal  Goal: Psychosocial  Outcome: Progressing Towards Goal  Goal: *Verbalizes anxiety and depression are reduced or absent  Outcome: Progressing Towards Goal  Goal: *Absence of aspiration  Outcome: Progressing Towards Goal  Goal: *Absence of deep venous thrombosis signs and symptoms(Stroke Metric)  Outcome: Progressing Towards Goal  Goal: *Optimal pain control at patient's stated goal  Outcome: Progressing Towards Goal  Goal: *Tolerating diet  Outcome: Progressing Towards Goal  Goal: *Ability to perform ADLs and demonstrates progressive mobility and function  Outcome: Progressing Towards Goal  Goal: *Stroke education continued(Stroke Metric)  Outcome: Progressing Towards Goal     Problem: Ischemic Stroke: Discharge Outcomes  Goal: *Verbalizes anxiety and depression are reduced or absent  Outcome: Progressing Towards Goal  Goal: *Verbalize understanding of risk factor modification(Stroke Metric)  Outcome: Progressing Towards Goal  Goal: *Hemodynamically stable  Outcome: Progressing Towards Goal  Goal: *Absence of aspiration pneumonia  Outcome: Progressing Towards Goal  Goal: *Aware of needed dietary changes  Outcome: Progressing Towards Goal  Goal: *Verbalize understanding of prescribed medications including anti-coagulants, anti-lipid, and/or anti-platelets(Stroke Metric)  Outcome: Progressing Towards Goal  Goal: *Tolerating diet  Outcome: Progressing Towards Goal  Goal: *Aware of follow-up diagnostics related to anticoagulants  Outcome: Progressing Towards Goal  Goal: *Ability to perform ADLs and demonstrates progressive mobility and function  Outcome: Progressing Towards Goal  Goal: *Absence of DVT(Stroke Metric)  Outcome: Progressing Towards Goal  Goal: *Absence of aspiration  Outcome: Progressing Towards Goal  Goal: *Optimal pain control at patient's stated goal  Outcome: Progressing Towards Goal  Goal: *Home safety concerns addressed  Outcome: Progressing Towards Goal  Goal: *Describes available resources and support systems  Outcome: Progressing Towards Goal  Goal: *Verbalizes understanding of activation of EMS(911) for stroke symptoms(Stroke Metric)  Outcome: Progressing Towards Goal  Goal: *Understands and describes signs and symptoms to report to providers(Stroke Metric)  Outcome: Progressing Towards Goal  Goal: *Neurolgocially stable (absence of additional neurological deficits)  Outcome: Progressing Towards Goal  Goal: *Verbalizes importance of follow-up with primary care physician(Stroke Metric)  Outcome: Progressing Towards Goal  Goal: *Depression screening completed(Stroke Metric)  Outcome: Progressing Towards Goal

## 2020-07-05 NOTE — PROGRESS NOTES
Problem: Falls - Risk of  Goal: *Absence of Falls  Description: Document Veto [place]ay Fall Risk and appropriate interventions in the flowsheet. Outcome: Progressing Towards Goal  Note: Fall Risk Interventions:  Mobility Interventions: Communicate number of staff needed for ambulation/transfer, OT consult for ADLs, Patient to call before getting OOB, PT Consult for assist device competence, PT Consult for mobility concerns, Strengthening exercises (ROM-active/passive), Utilize walker, cane, or other assistive device    Mentation Interventions: Adequate sleep, hydration, pain control, Bed/chair exit alarm, Door open when patient unattended, Increase mobility, More frequent rounding, Reorient patient, Room close to nurse's station, Toileting rounds, Update white board    Medication Interventions: Bed/chair exit alarm, Evaluate medications/consider consulting pharmacy, Teach patient to arise slowly, Patient to call before getting OOB    Elimination Interventions: Call light in reach, Bed/chair exit alarm, Patient to call for help with toileting needs    History of Falls Interventions: Bed/chair exit alarm, Door open when patient unattended, Investigate reason for fall, Room close to nurse's station, Vital signs minimum Q4HRs X 24 hrs (comment for end date)         Problem: Patient Education: Go to Patient Education Activity  Goal: Patient/Family Education  Outcome: Progressing Towards Goal     Problem: Pressure Injury - Risk of  Goal: *Prevention of pressure injury  Description: Document Torito Scale and appropriate interventions in the flowsheet.   Outcome: Progressing Towards Goal  Note: Pressure Injury Interventions:  Sensory Interventions: Assess changes in LOC, Float heels, Keep linens dry and wrinkle-free, Minimize linen layers, Pressure redistribution bed/mattress (bed type)    Moisture Interventions: Absorbent underpads, Maintain skin hydration (lotion/cream), Apply protective barrier, creams and emollients, Minimize layers    Activity Interventions: Increase time out of bed, Pressure redistribution bed/mattress(bed type), PT/OT evaluation    Mobility Interventions: Float heels, HOB 30 degrees or less, Pressure redistribution bed/mattress (bed type), PT/OT evaluation    Nutrition Interventions: Document food/fluid/supplement intake    Friction and Shear Interventions: Apply protective barrier, creams and emollients, HOB 30 degrees or less, Lift sheet, Minimize layers                Problem: Patient Education: Go to Patient Education Activity  Goal: Patient/Family Education  Outcome: Progressing Towards Goal     Problem: Hypertension  Goal: *Blood pressure within specified parameters  Outcome: Progressing Towards Goal  Goal: *Fluid volume balance  Outcome: Progressing Towards Goal  Goal: *Labs within defined limits  Outcome: Progressing Towards Goal     Problem: TIA/CVA Stroke: Day 2 Until Discharge  Goal: Activity/Safety  Outcome: Progressing Towards Goal  Goal: Diagnostic Test/Procedures  Outcome: Progressing Towards Goal  Goal: Nutrition/Diet  Outcome: Progressing Towards Goal  Goal: Discharge Planning  Outcome: Progressing Towards Goal  Goal: Medications  Outcome: Progressing Towards Goal  Goal: Respiratory  Outcome: Progressing Towards Goal  Goal: Treatments/Interventions/Procedures  Outcome: Progressing Towards Goal  Goal: Psychosocial  Outcome: Progressing Towards Goal  Goal: *Verbalizes anxiety and depression are reduced or absent  Outcome: Progressing Towards Goal  Goal: *Absence of aspiration  Outcome: Progressing Towards Goal  Goal: *Optimal pain control at patient's stated goal  Outcome: Progressing Towards Goal  Goal: *Tolerating diet  Outcome: Progressing Towards Goal  Goal: *Ability to perform ADLs and demonstrates progressive mobility and function  Outcome: Progressing Towards Goal

## 2020-07-05 NOTE — PROGRESS NOTES
[address] [facility] Adult  Hospitalist Group                                                                                          Hospitalist Progress Note  Toshia Mcnally MD  Answering service: [phone] from in house phone        Date of Service:  2020  NAME:  Claria Hatchet  :  3/9/1933  MRN:  [id number]      Admission Summary:   The patient is an 55-year-old gentleman with past medical history of hypertension, prostate cancer, constipation, gout, hyperlipidemia, stroke, and TIA, who presents to the hospital with the above-mentioned symptom. History was primarily obtained from the patient's daughter, Mariusz Gomez. The patient is confused and has some right-sided weakness with right-sided neglect. She reports that the patient was last seen normal 2 days back on . Per care facility, the patient was normal last night. He became disoriented initially and then developed issues with walking and fell hitting his head, but there was no loss of consciousness. She reports the patient lives at -- and the patient was brought over here as a code stroke, was found to have occipital stroke. Neurointerventional Surgery was consulted and they felt that the stroke has been completed and the patient does not need any acute surgical intervention. The patient was requested to be admitted under the hospitalist service. Currently, the patient is resting in bed, does not appear to be distressed. No further history could be obtained from the patient or his daughter. Interval history / Subjective:     Patient is seen and examined at bedside this morning. No new issues. No overnight events. MRI likely tomorrow with [employer] rep presence. Alert, pleasant. Co-operative. No limitation in extremity movement. Denied any double vision, swallowing trouble, bowel-bladder disturbances. Following simple commands appropriately. No other concerns.     Assessment & Plan:     Acute parieto-occipital cerebrovascular accident  -CT head: Bilateral acute occipital infarcts without evidence of hemorrhage.  - CTA head: Acute bilateral PCA territory infarcts, left larger than right.  - CT perf:  Acute ischemia in the left parieto-occipital lobe, with qualitatively decreased cerebral blood flow and blood volume in this region representing completed infarct. - appreciate Neurology input  - LDL 56, A1c 5.7  - MRI brain pending - likely Monday as presence of [employer] rep. Required for PPM presence  - Echo pending   - PT/OT/SLP recommendations noted. - c/w aspirin, plavix and statin     Hypertension:  less adequately controlled. In 150s  - C/w metoprolol, Amlodipine.   - Resume Hydralazine 25mg TID-home med as BP persistently >150  - Monitor closely     Acute kidney injury on chronic kidney disease stage II, likely prerenal:    - Avoid nephrotoxic medications and renally dose all other medications. - Creat stable  - Will give gentle hydration for one day at 75ml/hr. Would back off if any SOB, edema, Pulm congestion or rales develop on exam  - Monitor renal function    Hyponatremia:  - Likely related to mild dehydration, less likely intracranial process driven  - IVF as noted above    Elevated troponin - flat; likely demand ischemia      S/p PPM for 2:1 AVB, sinus bradycardia. Hx prostate ca     Code status: Full  DVT prophylaxis: SCDs  Care Plan discussed with: Patient/Family and Nurse  Anticipated Disposition:  SNF rehab   Anticipated Discharge: Greater than 48 hours        Hospital Problems  Date Reviewed: 1/30/2020          Codes Class Noted POA    Acute CVA (cerebrovascular accident) [facility]) ICD-10-CM: I63.9  ICD-9-CM: 434.91  6/30/2020 Unknown                Review of Systems:   Review of systems not obtained due to patient factors. Vital Signs:    Last 24hrs VS reviewed since prior progress note.  Most recent are:  Visit Vitals  /78 (BP 1 Location: Right arm)   Pulse 63   Temp 97.3 °F (36.3 °C)   Resp 13   Ht 5' 7\" (1.702 m)   Wt 81.3 kg (179 lb 3.2 oz)   SpO2 94%   BMI 28.07 kg/m²       No intake or output data in the 24 hours ending 07/05/20 1027     Physical Examination:             Constitutional:  No acute distress, cooperative, pleasant, elderly    ENT:  Oral mucosa moist, oropharynx benign. Resp:  CTA bilaterally. No wheezing/rhonchi/rales. No accessory muscle use   CV:  Regular rhythm, normal rate, no murmurs, gallops, rubs    GI:  Soft, non distended, non tender. normoactive bowel sounds, no hepatosplenomegaly     Musculoskeletal:  No edema, warm, 2+ pulses throughout    Neurologic:  alert, oriented to self. 4+ strength throughout maybe worse on the right            Data Review:    I personally reviewed  Image and labs    Cta Code Neuro Head And Neck W Cont    Result Date: 6/30/2020  IMPRESSION: CTA Head: 1. No evidence of large vessel occlusion or flow-limiting stenosis. 2. Atherosclerotic disease as above, including moderate stenosis of the left M1 segment, and mild to moderate stenoses of the bilateral posterior cerebral arteries. 3. Acute bilateral PCA territory infarcts, left larger than right. CTA Neck: 1. No evidence of flow-limiting stenosis. 2. Partially visualized small left pleural effusion. CT Brain Perfusion: 1. Acute ischemia in the left parieto-occipital lobe, with qualitatively decreased cerebral blood flow and blood volume in this region representing completed infarct. Mismatch volume of 4 cc. The findings were called to ER on 6/30/2020 at 12:55 PM by Dr. Lisa Skinner. [id number]     Ct Code Neuro Head Wo Contrast    Result Date: 6/30/2020  IMPRESSION: Bilateral acute occipital infarcts without evidence of hemorrhage. The findings were called to Dr. Lobito Hatch on 6/30/2020 at 12:18 PM by myself. Pedro Christiansen [id number] Neuro Perf W Cbf    Result Date: 6/30/2020  IMPRESSION: CTA Head: 1. No evidence of large vessel occlusion or flow-limiting stenosis.  2. Atherosclerotic disease as above, including moderate stenosis of the left M1 segment, and mild to moderate stenoses of the bilateral posterior cerebral arteries. 3. Acute bilateral PCA territory infarcts, left larger than right. CTA Neck: 1. No evidence of flow-limiting stenosis. 2. Partially visualized small left pleural effusion. CT Brain Perfusion: 1. Acute ischemia in the left parieto-occipital lobe, with qualitatively decreased cerebral blood flow and blood volume in this region representing completed infarct. Mismatch volume of 4 cc. The findings were called to ER on 6/30/2020 at 12:55 PM by Dr. Agustina Donahue. [id number]       Labs:     Recent Labs     07/03/20  0503   WBC 7.7   HGB 13.7   HCT 40.7        Recent Labs     07/04/20  0203 07/03/20  0503   * 134*   K 4.1 3.3*   CL 98 100   CO2 29 26   BUN 27* 24*   CREA 1.53* 1.54*   * 99   CA 9.0 8.9     No results for input(s): ALT, AP, TBIL, TBILI, TP, ALB, GLOB, GGT, AML, LPSE in the last 72 hours. No lab exists for component: SGOT, GPT, AMYP, HLPSE  No results for input(s): INR, PTP, APTT, INREXT, INREXT in the last 72 hours. No results for input(s): FE, TIBC, PSAT, FERR in the last 72 hours. Lab Results   Component Value Date/Time    Folate 12.2 01/07/2019 11:29 AM      No results for input(s): PH, PCO2, PO2 in the last 72 hours.   Recent Labs     07/03/20  0503 07/03/20  0400   TROIQ 0.06* 0.06*     Lab Results   Component Value Date/Time    Cholesterol, total 118 07/01/2020 04:04 AM    HDL Cholesterol 51 07/01/2020 04:04 AM    LDL, calculated 56.6 07/01/2020 04:04 AM    Triglyceride 52 07/01/2020 04:04 AM    CHOL/HDL Ratio 2.3 07/01/2020 04:04 AM     Lab Results   Component Value Date/Time    Glucose (POC) 83 07/05/2020 07:54 AM    Glucose (POC) 120 (H) 07/04/2020 08:47 PM    Glucose (POC) 85 07/04/2020 04:38 PM    Glucose (POC) 96 07/04/2020 11:21 AM    Glucose (POC) 81 07/04/2020 07:39 AM     Lab Results   Component Value Date/Time    Color Yellow 08/08/2017 04:09 PM Appearance Clear 08/08/2017 04:09 PM    Specific gravity 1.017 07/18/2017 02:56 PM    pH (UA) 6.5 08/08/2017 04:09 PM    Protein 100 (A) 07/18/2017 02:56 PM    Glucose NEGATIVE  07/18/2017 02:56 PM    Ketone Negative 08/08/2017 04:09 PM    Bilirubin Negative 08/08/2017 04:09 PM    Urobilinogen 1.0 07/18/2017 02:56 PM    Nitrites Positive (A) 08/08/2017 04:09 PM    Leukocyte Esterase 1+ (A) 08/08/2017 04:09 PM    Epithelial cells MODERATE (A) 07/18/2017 02:56 PM    Bacteria Few 08/08/2017 04:09 PM    WBC 11-30 (A) 08/08/2017 04:09 PM    RBC 0-2 08/08/2017 04:09 PM         Medications Reviewed:     Current Facility-Administered Medications   Medication Dose Route Frequency    amLODIPine (NORVASC) tablet 5 mg  5 mg Oral DAILY    famotidine (PEPCID) tablet 20 mg  20 mg Oral QPM    metoprolol succinate (TOPROL-XL) XL tablet 25 mg  25 mg Oral Q24H    balsam peru-castor oiL (VENELEX) ointment   Topical TID    aspirin chewable tablet 81 mg  81 mg Oral DAILY    clopidogreL (PLAVIX) tablet 75 mg  75 mg Oral DAILY    hydrALAZINE (APRESOLINE) 20 mg/mL injection 20 mg  20 mg IntraVENous Q6H PRN    allopurinoL (ZYLOPRIM) tablet 100 mg  100 mg Oral DAILY    atorvastatin (LIPITOR) tablet 40 mg  40 mg Oral QHS    acetaminophen (TYLENOL) tablet 650 mg  650 mg Oral Q4H PRN    Or    acetaminophen (TYLENOL) solution 650 mg  650 mg Per NG tube Q4H PRN    Or    acetaminophen (TYLENOL) suppository 650 mg  650 mg Rectal Q4H PRN     ______________________________________________________________________  EXPECTED LENGTH OF STAY: 3d 0h  ACTUAL LENGTH OF STAY:          5                 Daniela Lomeli MD

## 2020-07-06 LAB
ALBUMIN SERPL-MCNC: 2.6 G/DL (ref 3.5–5)
ALBUMIN/GLOB SERPL: 0.6 {RATIO} (ref 1.1–2.2)
ALP SERPL-CCNC: 100 U/L (ref 45–117)
ALT SERPL-CCNC: 36 U/L (ref 12–78)
ANION GAP SERPL CALC-SCNC: 6 MMOL/L (ref 5–15)
AST SERPL-CCNC: 23 U/L (ref 15–37)
BASOPHILS # BLD: 0.1 K/UL (ref 0–0.1)
BASOPHILS NFR BLD: 1 % (ref 0–1)
BILIRUB SERPL-MCNC: 0.3 MG/DL (ref 0.2–1)
BUN SERPL-MCNC: 25 MG/DL (ref 6–20)
BUN/CREAT SERPL: 19 (ref 12–20)
CALCIUM SERPL-MCNC: 8.8 MG/DL (ref 8.5–10.1)
CHLORIDE SERPL-SCNC: 101 MMOL/L (ref 97–108)
CO2 SERPL-SCNC: 27 MMOL/L (ref 21–32)
CREAT SERPL-MCNC: 1.34 MG/DL (ref 0.7–1.3)
DIFFERENTIAL METHOD BLD: ABNORMAL
ECHO AO ROOT DIAM: 3.19 CM
ECHO AR MAX VEL PISA: 391.42 CM/S
ECHO AV AREA PEAK VELOCITY: 1.24 CM2
ECHO AV AREA PEAK VELOCITY: 1.27 CM2
ECHO AV AREA VTI: 1.17 CM2
ECHO AV AREA/BSA VTI: 0.6 CM2/M2
ECHO AV MEAN GRADIENT: 13.7 MMHG
ECHO AV PEAK GRADIENT: 24.05 MMHG
ECHO AV PEAK GRADIENT: 25 MMHG
ECHO AV REGURGITANT PHT: 0.7 S
ECHO AV VTI: 49.39 CM
ECHO EST RA PRESSURE: 3 MMHG
ECHO LA AREA 4C: 21.62 CM2
ECHO LA MAJOR AXIS: 3.44 CM
ECHO LA MINOR AXIS: 1.78 CM
ECHO LA VOL 2C: 60.68 ML (ref 18–58)
ECHO LA VOL 4C: 62.24 ML (ref 18–58)
ECHO LA VOLUME INDEX A2C: 31.38 ML/M2 (ref 16–28)
ECHO LA VOLUME INDEX A4C: 32.19 ML/M2 (ref 16–28)
ECHO LV EDV A2C: 68.81 ML
ECHO LV EDV A4C: 80.92 ML
ECHO LV EDV BP: 78.64 ML (ref 67–155)
ECHO LV EDV INDEX A4C: 41.9 ML/M2
ECHO LV EDV INDEX BP: 40.7 ML/M2
ECHO LV EDV NDEX A2C: 35.6 ML/M2
ECHO LV EJECTION FRACTION A2C: 36 PERCENT
ECHO LV EJECTION FRACTION A4C: 48 PERCENT
ECHO LV EJECTION FRACTION BIPLANE: 43.7 PERCENT (ref 55–100)
ECHO LV ESV A2C: 43.85 ML
ECHO LV ESV A4C: 42.12 ML
ECHO LV ESV BP: 44.29 ML (ref 22–58)
ECHO LV ESV INDEX A2C: 22.7 ML/M2
ECHO LV ESV INDEX A4C: 21.8 ML/M2
ECHO LV ESV INDEX BP: 22.9 ML/M2
ECHO LV INTERNAL DIMENSION DIASTOLIC: 4.66 CM (ref 4.2–5.9)
ECHO LV INTERNAL DIMENSION SYSTOLIC: 3.34 CM
ECHO LV IVSD: 1.23 CM (ref 0.6–1)
ECHO LV MASS 2D: 261.8 G (ref 88–224)
ECHO LV MASS INDEX 2D: 135.4 G/M2 (ref 49–115)
ECHO LV POSTERIOR WALL DIASTOLIC: 1.57 CM (ref 0.6–1)
ECHO LVOT DIAM: 2 CM
ECHO LVOT PEAK GRADIENT: 3.93 MMHG
ECHO LVOT SV: 58 ML
ECHO LVOT VTI: 18.51 CM
ECHO MV A VELOCITY: 106.18 CM/S
ECHO MV AREA PHT: 2.9 CM2
ECHO MV E DECELERATION TIME (DT): 0.26 S
ECHO MV E VELOCITY: 82.6 CM/S
ECHO MV E/A RATIO: 0.78
ECHO MV PRESSURE HALF TIME (PHT): 0.08 S
ECHO PV PEAK INSTANTANEOUS GRADIENT SYSTOLIC: 2.89 MMHG
ECHO RIGHT VENTRICULAR SYSTOLIC PRESSURE (RVSP): 36.25 MMHG
ECHO RV TAPSE: 1.28 CM (ref 1.5–2)
ECHO TV REGURGITANT PEAK GRADIENT: 33.25 MMHG
EOSINOPHIL # BLD: 0.5 K/UL (ref 0–0.4)
EOSINOPHIL NFR BLD: 7 % (ref 0–7)
ERYTHROCYTE [DISTWIDTH] IN BLOOD BY AUTOMATED COUNT: 14.1 % (ref 11.5–14.5)
GLOBULIN SER CALC-MCNC: 4.6 G/DL (ref 2–4)
GLUCOSE BLD STRIP.AUTO-MCNC: 110 MG/DL (ref 65–100)
GLUCOSE SERPL-MCNC: 105 MG/DL (ref 65–100)
HCT VFR BLD AUTO: 44.1 % (ref 36.6–50.3)
HGB BLD-MCNC: 14.9 G/DL (ref 12.1–17)
IMM GRANULOCYTES # BLD AUTO: 0 K/UL (ref 0–0.04)
IMM GRANULOCYTES NFR BLD AUTO: 0 % (ref 0–0.5)
LYMPHOCYTES # BLD: 2.3 K/UL (ref 0.8–3.5)
LYMPHOCYTES NFR BLD: 31 % (ref 12–49)
MCH RBC QN AUTO: 30.8 PG (ref 26–34)
MCHC RBC AUTO-ENTMCNC: 33.8 G/DL (ref 30–36.5)
MCV RBC AUTO: 91.1 FL (ref 80–99)
MONOCYTES # BLD: 1 K/UL (ref 0–1)
MONOCYTES NFR BLD: 13 % (ref 5–13)
NEUTS SEG # BLD: 3.6 K/UL (ref 1.8–8)
NEUTS SEG NFR BLD: 48 % (ref 32–75)
NRBC # BLD: 0 K/UL (ref 0–0.01)
NRBC BLD-RTO: 0 PER 100 WBC
PLATELET # BLD AUTO: 185 K/UL (ref 150–400)
PMV BLD AUTO: 9.8 FL (ref 8.9–12.9)
POTASSIUM SERPL-SCNC: 3.9 MMOL/L (ref 3.5–5.1)
PROT SERPL-MCNC: 7.2 G/DL (ref 6.4–8.2)
RBC # BLD AUTO: 4.84 M/UL (ref 4.1–5.7)
SERVICE CMNT-IMP: ABNORMAL
SODIUM SERPL-SCNC: 134 MMOL/L (ref 136–145)
WBC # BLD AUTO: 7.5 K/UL (ref 4.1–11.1)

## 2020-07-06 PROCEDURE — 97116 GAIT TRAINING THERAPY: CPT

## 2020-07-06 PROCEDURE — 74011250637 HC RX REV CODE- 250/637: Performed by: INTERNAL MEDICINE

## 2020-07-06 PROCEDURE — 74011250637 HC RX REV CODE- 250/637: Performed by: HOSPITALIST

## 2020-07-06 PROCEDURE — 82962 GLUCOSE BLOOD TEST: CPT

## 2020-07-06 PROCEDURE — 74011250636 HC RX REV CODE- 250/636: Performed by: HOSPITALIST

## 2020-07-06 PROCEDURE — 97530 THERAPEUTIC ACTIVITIES: CPT

## 2020-07-06 PROCEDURE — 80053 COMPREHEN METABOLIC PANEL: CPT

## 2020-07-06 PROCEDURE — 65660000000 HC RM CCU STEPDOWN

## 2020-07-06 PROCEDURE — 85025 COMPLETE CBC W/AUTO DIFF WBC: CPT

## 2020-07-06 PROCEDURE — 36415 COLL VENOUS BLD VENIPUNCTURE: CPT

## 2020-07-06 PROCEDURE — 74011250637 HC RX REV CODE- 250/637: Performed by: FAMILY MEDICINE

## 2020-07-06 PROCEDURE — 94760 N-INVAS EAR/PLS OXIMETRY 1: CPT

## 2020-07-06 RX ADMIN — HYDRALAZINE HYDROCHLORIDE 20 MG: 20 INJECTION INTRAMUSCULAR; INTRAVENOUS at 18:53

## 2020-07-06 RX ADMIN — METOPROLOL SUCCINATE 25 MG: 25 TABLET, EXTENDED RELEASE ORAL at 22:10

## 2020-07-06 RX ADMIN — ASPIRIN 81 MG CHEWABLE TABLET 81 MG: 81 TABLET CHEWABLE at 09:19

## 2020-07-06 RX ADMIN — ATORVASTATIN CALCIUM 40 MG: 40 TABLET, FILM COATED ORAL at 22:11

## 2020-07-06 RX ADMIN — HYDRALAZINE HYDROCHLORIDE 25 MG: 25 TABLET, FILM COATED ORAL at 09:19

## 2020-07-06 RX ADMIN — CLOPIDOGREL BISULFATE 75 MG: 75 TABLET ORAL at 09:19

## 2020-07-06 RX ADMIN — CASTOR OIL AND BALSAM, PERU: 788; 87 OINTMENT TOPICAL at 22:00

## 2020-07-06 RX ADMIN — ALLOPURINOL 100 MG: 100 TABLET ORAL at 09:19

## 2020-07-06 RX ADMIN — HYDRALAZINE HYDROCHLORIDE 25 MG: 25 TABLET, FILM COATED ORAL at 17:52

## 2020-07-06 RX ADMIN — CASTOR OIL AND BALSAM, PERU: 788; 87 OINTMENT TOPICAL at 09:19

## 2020-07-06 RX ADMIN — AMLODIPINE BESYLATE 5 MG: 5 TABLET ORAL at 09:19

## 2020-07-06 RX ADMIN — FAMOTIDINE 20 MG: 20 TABLET ORAL at 17:52

## 2020-07-06 RX ADMIN — CASTOR OIL AND BALSAM, PERU: 788; 87 OINTMENT TOPICAL at 17:52

## 2020-07-06 NOTE — PROGRESS NOTES
[facility] [facility] Adult  Hospitalist Group                                                                                          Hospitalist Progress Note  Lisandra Real MD  Answering service: [phone] from in house phone        Date of Service:  2020  NAME:  Vidhi Soto  :  3/9/1933  MRN:  [id number]      Admission Summary:   The patient is an 59-year-old gentleman with past medical history of hypertension, prostate cancer, constipation, gout, hyperlipidemia, stroke, and TIA, who presents to the hospital with the above-mentioned symptom. History was primarily obtained from the patient's daughter, Mami Newell. The patient is confused and has some right-sided weakness with right-sided neglect. She reports that the patient was last seen normal 2 days back on . Per care facility, the patient was normal last night. He became disoriented initially and then developed issues with walking and fell hitting his head, but there was no loss of consciousness. She reports the patient lives at -- and the patient was brought over here as a code stroke, was found to have occipital stroke. Neurointerventional Surgery was consulted and they felt that the stroke has been completed and the patient does not need any acute surgical intervention. The patient was requested to be admitted under the hospitalist service. Currently, the patient is resting in bed, does not appear to be distressed. No further history could be obtained from the patient or his daughter. Interval history / Subjective:     Patient is seen and examined at bedside this morning. Sleepy. Discussed with nursing. No overnight events.  Plan for MRI today        Assessment & Plan:     Acute parieto-occipital cerebrovascular accident  -CT head: Bilateral acute occipital infarcts without evidence of hemorrhage.  - CTA head: Acute bilateral PCA territory infarcts, left larger than right.  - CT perf:  Acute ischemia in the left parieto-occipital lobe, with qualitatively decreased cerebral blood flow and blood volume in this region representing completed infarct. - appreciate Neurology input  - LDL 56, A1c 5.7  - MRI brain pending - likely today in presence of [employer] rep for PPM   - Echo report pending   - PT/OT/SLP recommendations noted. - c/w aspirin, plavix and statin     Hypertension: BP improving   - C/w metoprolol, Amlodipine, Hydralazine   - Monitor BP closely     Acute kidney injury on chronic kidney disease stage II, likely prerenal:  improving   - Avoid nephrotoxic medications and renally dose all other medications. 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 Hx prostate ca     Code status: Full  DVT prophylaxis: SCDs  Care Plan discussed with: Patient/Family and Nurse  Anticipated Disposition: [facility] with [facility]  Anticipated Discharge: 24 hours to 48 hours. Likely tomorrow. Hospital Problems  Date Reviewed: 1/30/2020          Codes Class Noted POA    Acute CVA (cerebrovascular accident) [facility]) ICD-10-CM: I63.9  ICD-9-CM: 434.91  6/30/2020 Unknown                Review of Systems:   Review of systems not obtained due to patient factors. Vital Signs:    Last 24hrs VS reviewed since prior progress note. Most recent are:  Visit Vitals  /86 (BP 1 Location: Right arm, BP Patient Position: At rest)   Pulse 64   Temp 97 °F (36.1 °C)   Resp 13   Ht 5' 7\" (1.702 m)   Wt 81.6 kg (180 lb)   SpO2 97%   BMI 28.19 kg/m²       No intake or output data in the 24 hours ending 07/06/20 1110     Physical Examination:             Constitutional:  No acute distress, elderly    ENT:  Oral mucosa moist, oropharynx benign. Resp:  CTA bilaterally. No wheezing/rhonchi/rales.  No accessory muscle use   CV:  Regular rhythm, normal rate, no murmurs, gallops, rubs    GI:  Soft, non distended, non tender. normoactive bowel sounds, no hepatosplenomegaly     Musculoskeletal:  No edema, warm, 2+ pulses throughout    Neurologic:  sleepy. 4+ strength, following simple commands             Data Review:    I personally reviewed  Image and labs    Cta Code Neuro Head And Neck W Cont    Result Date: 6/30/2020  IMPRESSION: CTA Head: 1. No evidence of large vessel occlusion or flow-limiting stenosis. 2. Atherosclerotic disease as above, including moderate stenosis of the left M1 segment, and mild to moderate stenoses of the bilateral posterior cerebral arteries. 3. Acute bilateral PCA territory infarcts, left larger than right. CTA Neck: 1. No evidence of flow-limiting stenosis. 2. Partially visualized small left pleural effusion. CT Brain Perfusion: 1. Acute ischemia in the left parieto-occipital lobe, with qualitatively decreased cerebral blood flow and blood volume in this region representing completed infarct. Mismatch volume of 4 cc. The findings were called to ER on 6/30/2020 at 12:55 PM by Dr. Ulysses Smith. [phone]     Ct Code Neuro Head Wo Contrast    Result Date: 6/30/2020  IMPRESSION: Bilateral acute occipital infarcts without evidence of hemorrhage. The findings were called to Dr. Barbosa Found on 6/30/2020 at 12:18 PM by myself. Pedro Christiansen [id number] Neuro Perf W Cbf    Result Date: 6/30/2020  IMPRESSION: CTA Head: 1. No evidence of large vessel occlusion or flow-limiting stenosis. 2. Atherosclerotic disease as above, including moderate stenosis of the left M1 segment, and mild to moderate stenoses of the bilateral posterior cerebral arteries. 3. Acute bilateral PCA territory infarcts, left larger than right. CTA Neck: 1. No evidence of flow-limiting stenosis. 2. Partially visualized small left pleural effusion. CT Brain Perfusion: 1.  Acute ischemia in the left parieto-occipital lobe, with qualitatively decreased cerebral blood flow and blood volume in this region representing completed infarct. Mismatch volume of 4 cc. The findings were called to ER on 6/30/2020 at 12:55 PM by Dr. Jessica Campa. [phone]       Labs:     Recent Labs     07/06/20  0530   WBC 7.5   HGB 14.9   HCT 44.1        Recent Labs     07/06/20  0530 07/04/20  0203   * 132*   K 3.9 4.1    98   CO2 27 29   BUN 25* 27*   CREA 1.34* 1.53*   * 117*   CA 8.8 9.0     Recent Labs     07/06/20  0530   ALT 36      TBILI 0.3   TP 7.2   ALB 2.6*   GLOB 4.6*     No results for input(s): INR, PTP, APTT, INREXT, INREXT in the last 72 hours. No results for input(s): FE, TIBC, PSAT, FERR in the last 72 hours. Lab Results   Component Value Date/Time    Folate 12.2 01/07/2019 11:29 AM      No results for input(s): PH, PCO2, PO2 in the last 72 hours. No results for input(s): CPK, CKNDX, TROIQ in the last 72 hours.     No lab exists for component: CPKMB  Lab Results   Component Value Date/Time    Cholesterol, total 118 07/01/2020 04:04 AM    HDL Cholesterol 51 07/01/2020 04:04 AM    LDL, calculated 56.6 07/01/2020 04:04 AM    Triglyceride 52 07/01/2020 04:04 AM    CHOL/HDL Ratio 2.3 07/01/2020 04:04 AM     Lab Results   Component Value Date/Time    Glucose (POC) 98 07/05/2020 11:56 AM    Glucose (POC) 83 07/05/2020 07:54 AM    Glucose (POC) 120 (H) 07/04/2020 08:47 PM    Glucose (POC) 85 07/04/2020 04:38 PM    Glucose (POC) 96 07/04/2020 11:21 AM     Lab Results   Component Value Date/Time    Color Yellow 08/08/2017 04:09 PM    Appearance Clear 08/08/2017 04:09 PM    Specific gravity 1.017 07/18/2017 02:56 PM    pH (UA) 6.5 08/08/2017 04:09 PM    Protein 100 (A) 07/18/2017 02:56 PM    Glucose NEGATIVE  07/18/2017 02:56 PM    Ketone Negative 08/08/2017 04:09 PM    Bilirubin Negative 08/08/2017 04:09 PM    Urobilinogen 1.0 07/18/2017 02:56 PM    Nitrites Positive (A) 08/08/2017 04:09 PM    Leukocyte Esterase 1+ (A) 08/08/2017 04:09 PM    Epithelial cells MODERATE (A) 07/18/2017 02:56 PM    Bacteria Few 08/08/2017 04:09 PM    WBC 11-30 (A) 08/08/2017 04:09 PM    RBC 0-2 08/08/2017 04:09 PM         Medications Reviewed:     Current Facility-Administered Medications   Medication Dose Route Frequency    hydrALAZINE (APRESOLINE) tablet 25 mg  25 mg Oral TID    amLODIPine (NORVASC) tablet 5 mg  5 mg Oral DAILY    famotidine (PEPCID) tablet 20 mg  20 mg Oral QPM    metoprolol succinate (TOPROL-XL) XL tablet 25 mg  25 mg Oral Q24H    balsam peru-castor oiL (VENELEX) ointment   Topical TID    aspirin chewable tablet 81 mg  81 mg Oral DAILY    clopidogreL (PLAVIX) tablet 75 mg  75 mg Oral DAILY    hydrALAZINE (APRESOLINE) 20 mg/mL injection 20 mg  20 mg IntraVENous Q6H PRN    allopurinoL (ZYLOPRIM) tablet 100 mg  100 mg Oral DAILY    atorvastatin (LIPITOR) tablet 40 mg  40 mg Oral QHS    acetaminophen (TYLENOL) tablet 650 mg  650 mg Oral Q4H PRN    Or    acetaminophen (TYLENOL) solution 650 mg  650 mg Per NG tube Q4H PRN    Or    acetaminophen (TYLENOL) suppository 650 mg  650 mg Rectal Q4H PRN     ______________________________________________________________________  EXPECTED LENGTH OF STAY: 3d 0h  ACTUAL LENGTH OF STAY:          6                 Gio Dean MD

## 2020-07-06 NOTE — PROGRESS NOTES
Problem: Ischemic Stroke: Discharge Outcomes  Goal: *Hemodynamically stable  Outcome: Not Progressing Towards Goal  Goal: *Absence of aspiration pneumonia  Outcome: Progressing Towards Goal  Goal: *Tolerating diet  Outcome: Progressing Towards Goal  Goal: *Ability to perform ADLs and demonstrates progressive mobility and function  Outcome: Progressing Towards Goal  Goal: *Absence of DVT(Stroke Metric)  Outcome: Progressing Towards Goal  Goal: *Absence of aspiration  Outcome: Progressing Towards Goal  Goal: *Optimal pain control at patient's stated goal  Outcome: Progressing Towards Goal

## 2020-07-06 NOTE — PROGRESS NOTES
Inquired attending MD regarding the status of MRI, per MRI staff, they will need the Cardiology information and their RN will call the [address] Per staff, MRI cannot be done after 3 pm.     Attendng RN notified Charge RN to make a follow-up with MRI department tomorrow morning.

## 2020-07-06 NOTE — PROGRESS NOTES
Problem: Mobility Impaired (Adult and Pediatric)  Goal: *Acute Goals and Plan of Care (Insert Text)  Description:   FUNCTIONAL STATUS PRIOR TO ADMISSION: Patient was modified independent using a rolling walker for functional mobility. Patient required minimal assistance for basic and total A instrumental ADLs. HOME SUPPORT PRIOR TO ADMISSION: The patient lived jail with nursing care. [facility] Physical Therapy Goals  Initiated 7/2/2020  1. Patient will move from supine to sit and sit to supine , scoot up and down and roll side to side in bed with modified independence within 7 day(s). 2.  Patient will transfer from bed to chair and chair to bed with supervision/set-up using the least restrictive device within 7 day(s). 3.  Patient will perform sit to stand with supervision/set-up within 7 day(s). 4.  Patient will ambulate with supervision/set-up for 300 feet with the least restrictive device within 7 day(s). 5.  Patient will ascend/descend 2 stairs with ONE handrail(s) with supervision/set-up within 7 day(s). 6.  Patient will improve Yan Balance score by 7 points within 7 days. Outcome: Progressing Towards Goal  PHYSICAL THERAPY TREATMENT  Patient: Samuel Kaplan (4 y.o. male)  Date: 7/6/2020  Diagnosis: Acute CVA (cerebrovascular accident) [facility]) [I63.9]   <principal problem not specified>       Precautions: Bed Alarm, Fall  Chart, physical therapy assessment, plan of care and goals were reviewed. ASSESSMENT  Patient continues with skilled PT services and is progressing towards goals. Continues to present with right side inattention as R side weakness may have improved (put using RUE to push off of bed when scooting to EOB). Demonstrated improved bed mobility requiring only Min Ax1 while using bed rail and HOB elevated. Had LOB x1 when tracking object/finger to Right and required Min A to recover. He was able to stand from bed via pull to stand w/ BUE's despite hand placement (Min Ax1).  He compelted short gait x10 Ft (x2) w/ RW and Min Ax1-2 for RW management and Moderate verbal cuing for R sight awareness d/t R side inattention/neglect. He returned to chair at bedside w/ chair alarm in place. Had drop in BP to 120's/60's from 150's/70's (pre-activity) but denied orthostatic symptoms. Completed chair exercises and pt 's/70's. RN aware of above. Pt positioned to comfort w/ LE's reclined. Instructed use of call bell for NSG assist when ready to return to bed. Current Level of Function Impacting Discharge (mobility/balance): Min Ax1-2 overall for functional transfers and short gait w/ RW. Other factors to consider for discharge: mobility below baseline         PLAN :  Patient continues to benefit from skilled intervention to address the above impairments. Continue treatment per established plan of care. to address goals. Recommendation for discharge: (in order for the patient to meet his/her long term goals)  To be determined: Inpatient Rehab vs Healthcare at [facility] prior to returning to [facility]      This discharge recommendation:  Has been made in collaboration with the attending provider and/or case management    IF patient discharges home will need the following DME: to be determined (TBD)       SUBJECTIVE:   Patient stated Yeah, I can get around fine.     OBJECTIVE DATA SUMMARY:   Critical Behavior:  Neurologic State: Alert, Confused  Orientation Level: Oriented to person  Cognition: Follows commands  Safety/Judgement: Decreased awareness of environment, Decreased awareness of need for assistance, Decreased insight into deficits, Decreased awareness of need for safety, Fall prevention  Functional Mobility Training:  Bed Mobility:     Supine to Sit: Minimum assistance;Assist x1  Sit to Supine: (remained OOB in chair)  Scooting: Modified independent        Transfers:  Sit to Stand: Minimum assistance;Assist x1(verbal cues for hand placement; demo's pull to stand)  Stand to Sit: Minimum assistance;Assist x1(tactile/manual cues for hand placement)        Bed to Chair: Minimum assistance; Adaptive equipment;Assist x1(RW; verbal/manual cuing )                    Balance:  Sitting: Impaired; With support  Sitting - Static: Fair (occasional)  Sitting - Dynamic: Poor (constant support)  Standing: Impaired; With support(RW)  Standing - Static: Constant support; Fair  Standing - Dynamic : Constant support;Poor  Ambulation/Gait Training:  Distance (ft): 10 Feet (ft)(x2)  Assistive Device: Gait belt;Walker, rolling  Ambulation - Level of Assistance: Minimal assistance;Assist x2(Moderate verbal/manual cuing for RW management )        Gait Abnormalities: Ataxic;Decreased step clearance; Path deviations; Step to gait        Base of Support: Shift to right;Widened  Stance: Weight shift  Speed/Linh: Pace decreased (<100 feet/min); Slow           Therapeutic Exercises:   Seated: LAQ's w/ SLR  Pain Rating:  No c/o pain    Activity Tolerance:   Fair; did have drop in BP (120's/60's) post short gait however asymptomatic and BP increased to 160's/70's after chair exercises   Please refer to the flowsheet for vital signs taken during this treatment. After treatment patient left in no apparent distress:   Sitting in chair, Call bell within reach, Bed / chair alarm activated, and LE's reclined     COMMUNICATION/COLLABORATION:   The patients plan of care was discussed with: Registered nurse.      Ritika Valadez PTA   Time Calculation: 29 mins

## 2020-07-06 NOTE — PROGRESS NOTES
Spiritual Care Assessment/Progress Note  [facility]      NAME: Sammy Ronquillo      MRN: [id number]  AGE: 80 y.o. SEX: male  Mosque Affiliation: Latter-day   Language: English     7/6/2020     Total Time (in minutes): 6     Spiritual Assessment begun in [address] through conversation with:         []Patient        [] Family    [] Friend(s)        Reason for Consult: Initial/Spiritual assessment, patient floor     Spiritual beliefs: (Please include comment if needed)     [] Identifies with a jing tradition:         [] Supported by a jing community:            [] Claims no spiritual orientation:           [] Seeking spiritual identity:                [] Adheres to an individual form of spirituality:           [x] Not able to assess:                           Identified resources for coping:      [] Prayer                               [] Music                  [] Guided Imagery     [] Family/friends                 [] Pet visits     [] Devotional reading                         [x] Unknown     [] Other:                                               Interventions offered during this visit: (See comments for more details)                Plan of Care:     [] Support spiritual and/or cultural needs    [] Support AMD and/or advance care planning process      [] Support grieving process   [] Coordinate Rites and/or Rituals    [] Coordination with community clergy   [] No spiritual needs identified at this time   [] Detailed Plan of Care below (See Comments)  [] Make referral to Music Therapy  [] Make referral to Pet Therapy     [] Make referral to Addiction services  [] Make referral to [facility]  [] Make referral to Spiritual Care Partner  [] No future visits requested        [x] Follow up visits as needed     Comments:  visit for initial spiritual assessment. Patient asleep in chair at bedside. Did not awaken or respond to knock at door, verbal greeting x 2, or presence in room. Will continue to follow up as needed and upon request as able. Visited by Rev. Carroll Dougherty MDiv, [facility], [facility] paging service: [phone])

## 2020-07-06 NOTE — PROGRESS NOTES
Neurology Progress Note    Patient ID:  Negra Britt  [id number]  80 y.o.  3/9/1933    Chief Complaint:CVA     Subjective:   26-year-old gentleman who presented with mental status changes and weakness. Initial CT imaging showing bilateral PCA infarcts more on the left. MRI of the brain, pending  Objective: All records in [facility] reviewed and noted    ROS:  Unable  To obtain     Meds:  Current Facility-Administered Medications   Medication Dose Route Frequency    hydrALAZINE (APRESOLINE) tablet 25 mg  25 mg Oral TID    amLODIPine (NORVASC) tablet 5 mg  5 mg Oral DAILY    famotidine (PEPCID) tablet 20 mg  20 mg Oral QPM    metoprolol succinate (TOPROL-XL) XL tablet 25 mg  25 mg Oral Q24H    balsam peru-castor oiL (VENELEX) ointment   Topical TID    aspirin chewable tablet 81 mg  81 mg Oral DAILY    clopidogreL (PLAVIX) tablet 75 mg  75 mg Oral DAILY    hydrALAZINE (APRESOLINE) 20 mg/mL injection 20 mg  20 mg IntraVENous Q6H PRN    allopurinoL (ZYLOPRIM) tablet 100 mg  100 mg Oral DAILY    atorvastatin (LIPITOR) tablet 40 mg  40 mg Oral QHS    acetaminophen (TYLENOL) tablet 650 mg  650 mg Oral Q4H PRN    Or    acetaminophen (TYLENOL) solution 650 mg  650 mg Per NG tube Q4H PRN    Or    acetaminophen (TYLENOL) suppository 650 mg  650 mg Rectal Q4H PRN       Imaging:  CT Results (most recent):  Results from Hospital Encounter encounter on 06/30/20   CTA CODE NEURO HEAD AND NECK W CONT    Narrative EXAM:  CTA CODE NEURO HEAD AND NECK W CONT, CT CODE NEURO PERF W CBF    INDICATION:   ams difficulty walking    COMPARISON:  CT head 6/30/2020. CONTRAST:  120 mL of Isovue-370. TECHNIQUE:  Unenhanced  images were obtained to localize the volume for  acquisition. Multislice helical axial CT angiography was performed from the  aortic arch to the top of the head during uneventful rapid bolus intravenous  contrast administration.    Coronal and sagittal reformations and 3D post  processing was performed. CT dose reduction was achieved through use of a  standardized protocol tailored for this examination and automatic exposure  control for dose modulation. CT brain perfusion was performed with generation of hemodynamic maps of multiple  parameters, including cerebral blood flow, cerebral blood volume, and MTT (mean  transit time). CT dose reduction was achieved through use of a standardized  protocol tailored for this examination and automatic exposure control for dose  modulation. FINDINGS:    CTA Head:  There is no evidence of large vessel occlusion or flow-limiting stenosis of the  intracranial internal carotid, anterior cerebral, and middle cerebral arteries. Calcification the bilateral carotid siphons with mild stenosis. Moderate focal  stenosis of the left M1 segment, and mild stenosis of the right M1 segment. The  anterior communicating artery is diminutive but patent. There is no evidence of large vessel occlusion or flow-limiting stenosis of the  intracranial vertebral arteries, basilar artery, or posterior cerebral arteries. Mild to moderate stenoses of the bilateral P2 segments. The right vertebral  artery terminates in PICA. The posterior communicating arteries are not seen. There is no evidence of aneurysm or vascular malformation. The dural venous  sinuses and deep cerebral venous system are patent. No evidence of abnormal  enhancement on delayed phase images. Acute left PCA territory infarct involving  the left parieto-occipital lobe and medial temporal lobe, as well as an acute  infarct in the right occipital lobe. CTA NECK:  NASCET method was utilized for calculating stenosis. Moderate calcific atherosclerosis of the aortic arch. The common carotid  arteries demonstrate no significant stenosis. Calcific atherosclerosis at the  right carotid bifurcation without significant stenosis.  Calcific atherosclerosis  of the left carotid bifurcation without significant stenosis. There is a left dominant vertebrobasilar arterial system. Mild stenosis at the  origin of the left vertebral artery. There is enlargement of the left thyroid lobe with a 1.8 cm nodule. Remaining  visualized soft tissues of the neck are unremarkable. Partially visualized small  left pleural effusion. Right chest pacemaker noted. No acute fracture or  aggressive osseous lesion. Reversal of cervical lordosis with severe  degenerative disc disease throughout the cervical spine and multilevel severe  facet arthropathy. CT Brain Perfusion:  There is a regional area of elevated Tmax noted in the left parieto-occipital  lobe, with qualitatively decreased cerebral blood flow and blood volume in this  region. rCBF < 30% = 0 cc. Tmax > 6 seconds = 4 cc. Impression IMPRESSION:   CTA Head:  1. No evidence of large vessel occlusion or flow-limiting stenosis. 2. Atherosclerotic disease as above, including moderate stenosis of the left M1  segment, and mild to moderate stenoses of the bilateral posterior cerebral  arteries. 3. Acute bilateral PCA territory infarcts, left larger than right. CTA Neck:  1. No evidence of flow-limiting stenosis. 2. Partially visualized small left pleural effusion. CT Brain Perfusion:  1. Acute ischemia in the left parieto-occipital lobe, with qualitatively  decreased cerebral blood flow and blood volume in this region representing  completed infarct. Mismatch volume of 4 cc. The findings were called to ER on 6/30/2020 at 12:55 PM by Dr. Ray Akers.    789         Lab Review   Recent Results (from the past 24 hour(s))   GLUCOSE, POC    Collection Time: 07/05/20 11:56 AM   Result Value Ref Range    Glucose (POC) 98 65 - 100 mg/dL    Performed by Belinda Oh    ECHO ADULT COMPLETE    Collection Time: 07/05/20  1:46 PM   Result Value Ref Range    IVSd 1.23 (A) 0.6 - 1.0 cm    LVIDd 4.66 4.2 - 5.9 cm    LVIDs 3.34 cm    LVOT d 2.00 cm    LVPWd 1.57 (A) 0.6 - 1.0 cm BP EF 43.7 (A) 55 - 100 percent    LV Ejection Fraction MOD 2C 36 percent    LV Ejection Fraction MOD 4C 48 percent    LV ED Vol A2C 68.81 mL    LV ED Vol A4C 80.92 mL    LV ED Vol BP 78.64 67 - 155 mL    LV ES Vol A2C 43.85 mL    LV ES Vol A4C 42.12 mL    LV ES Vol BP 44.29 22 - 58 mL    LVOT Peak Gradient 3.93 mmHg    LVOT SV 58.0 mL    LVOT VTI 18.51 cm    RVSP 36.25 mmHg    Left Atrium Major Axis 3.44 cm    LA Area 4C 21.62 cm2    LA Vol 2C 60.68 (A) 18 - 58 mL    LA Vol 4C 62.24 (A) 18 - 58 mL    Est. RA Pressure 3.00 mmHg    Aortic Valve Area by Continuity of Peak Velocity 1.24 cm2    Aortic Valve Area by Continuity of Peak Velocity 1.27 cm2    Aortic Valve Area by Continuity of VTI 1.17 cm2    Aortic Regurgitant Pressure Half-time 0.70 s    AR Max Jose 391.42 cm/s    AoV PG 25.00 mmHg    AoV PG 24.05 mmHg    Aortic Valve Systolic Mean Gradient 06.38 mmHg    AoV VTI 49.39 cm    MV A Jose 106.18 cm/s    Mitral Valve E Wave Deceleration Time 0.26 s    MV E Jose 82.60 cm/s    Mitral Valve Pressure Half-time 0.08 s    MVA (PHT) 2.90 cm2    Pulmonic Valve Systolic Peak Instantaneous Gradient 2.89 mmHg    Tapse 1.28 (A) 1.5 - 2.0 cm    Triscuspid Valve Regurgitation Peak Gradient 33.25 mmHg    Ao Root D 3.19 cm    MV E/A 0.78     LV Mass .8 88 - 224 g    LV Mass AL Index 135.9 49 - 115 g/m2    LVES Vol Index BP 23.0 mL/m2    LVED Vol Index BP 40.8 mL/m2    Left Atrium Minor Axis 1.79 cm    LA Vol Index 31.49 16 - 28 ml/m2    LA Vol Index 32.30 16 - 28 ml/m2    LVED Vol Index A4C 42.0 mL/m2    LVED Vol Index A2C 35.7 mL/m2    LVES Vol Index A4C 21.9 mL/m2    LVES Vol Index A2C 22.8 mL/m2    YOAN/BSA VTI 0.6 cm2/m2   CBC WITH AUTOMATED DIFF    Collection Time: 07/06/20  5:30 AM   Result Value Ref Range    WBC 7.5 4.1 - 11.1 K/uL    RBC 4.84 4.10 - 5.70 M/uL    HGB 14.9 12.1 - 17.0 g/dL    HCT 44.1 36.6 - 50.3 %    MCV 91.1 80.0 - 99.0 FL    MCH 30.8 26.0 - 34.0 PG    MCHC 33.8 30.0 - 36.5 g/dL    RDW 14.1 11.5 - 14.5 %    PLATELET 984 744 - 708 K/uL    MPV 9.8 8.9 - 12.9 FL    NRBC 0.0 0  WBC    ABSOLUTE NRBC 0.00 0.00 - 0.01 K/uL    NEUTROPHILS 48 32 - 75 %    LYMPHOCYTES 31 12 - 49 %    MONOCYTES 13 5 - 13 %    EOSINOPHILS 7 0 - 7 %    BASOPHILS 1 0 - 1 %    IMMATURE GRANULOCYTES 0 0.0 - 0.5 %    ABS. NEUTROPHILS 3.6 1.8 - 8.0 K/UL    ABS. LYMPHOCYTES 2.3 0.8 - 3.5 K/UL    ABS. MONOCYTES 1.0 0.0 - 1.0 K/UL    ABS. EOSINOPHILS 0.5 (H) 0.0 - 0.4 K/UL    ABS. BASOPHILS 0.1 0.0 - 0.1 K/UL    ABS. IMM. GRANS. 0.0 0.00 - 0.04 K/UL    DF AUTOMATED     METABOLIC PANEL, COMPREHENSIVE    Collection Time: 07/06/20  5:30 AM   Result Value Ref Range    Sodium 134 (L) 136 - 145 mmol/L    Potassium 3.9 3.5 - 5.1 mmol/L    Chloride 101 97 - 108 mmol/L    CO2 27 21 - 32 mmol/L    Anion gap 6 5 - 15 mmol/L    Glucose 105 (H) 65 - 100 mg/dL    BUN 25 (H) 6 - 20 MG/DL    Creatinine 1.34 (H) 0.70 - 1.30 MG/DL    BUN/Creatinine ratio 19 12 - 20      GFR est AA >60 >60 ml/min/1.73m2    GFR est non-AA 50 (L) >60 ml/min/1.73m2    Calcium 8.8 8.5 - 10.1 MG/DL    Bilirubin, total 0.3 0.2 - 1.0 MG/DL    ALT (SGPT) 36 12 - 78 U/L    AST (SGOT) 23 15 - 37 U/L    Alk. phosphatase 100 45 - 117 U/L    Protein, total 7.2 6.4 - 8.2 g/dL    Albumin 2.6 (L) 3.5 - 5.0 g/dL    Globulin 4.6 (H) 2.0 - 4.0 g/dL    A-G Ratio 0.6 (L) 1.1 - 2.2         Exam:  Visit Vitals  /86 (BP 1 Location: Right arm, BP Patient Position: At rest)   Pulse 64   Temp 97 °F (36.1 °C)   Resp 13   Ht 5' 7\" (1.702 m)   Wt 81.6 kg (180 lb)   SpO2 97%   BMI 28.19 kg/m²     Gen: Well developed  CV: RRR  Lungs: non labored breathing  Abd: non distending  Neuro: Alert to self;  no dysarthria or aphasia  CN II-XII: unable to track finger.   PERRL, face symmetric, tongue/palate midline  Motor:moving all extremities spontaneously    Gait: deferred     Assessment:     Patient Active Problem List   Diagnosis Code    Adrenal mass (HCC) E27.8    Dysuria R30.0    Weight loss R63.4    Essential hypertension I10    Gout M10.9    Pure hypercholesterolemia E78.00    History of prostate cancer Z85.46    Chronic constipation H46.69    Systolic murmur X74.1    Cognitive impairment R41.89    Stage 3 chronic kidney disease (HCC) N18.3    Rotator cuff arthropathy of both shoulders M12.811, M12.812    Bradycardia R00.1    Thrombocytopenia (HCC) D69.6    Elevated troponin R79.89    Hypertensive urgency I16.0    Second degree AV block, Mobitz type I I44.1    Pacemaker Z95.0    Atrial pacemaker lead displacement T82.120A    Acute CVA (cerebrovascular accident) ([facility] [address] I63.9       Plan:   1.) Bilateral PCA infarcts   -MRI of the brain, pending    -LDL 56.6, continue Statin   -a1c 5.7, at goal    -continue ASA 81mg and plavix 75mg    -PT/OT    -Echo, No shunt seen   Stroke education   2.) Cognitive impairment    - follow up outpatient for formal cognitive workup    - support care      Signed:  Iraida Adkins NP  7/6/2020  8:43 AM

## 2020-07-06 NOTE — PROGRESS NOTES
Transition of Care Plan  · RUR- 9 % Low Risks   · DISPOSITION: Patient presents from [facility] is expected to be going back there with home health; pending medical progression- pending MRI  · Daughter prefers [facility], CM will need order  · IPR list left for POA/daughter Brenna Cartwright [phone]  · Transport: [facility] ([facility] phone [phone]/MRLIEP HRV   · Continue Medical Care   · Follow up: PCP/Specialist(s)      Reviewed chart for transitions of care,and discussed in rounds. CM continues to monitor for medical progression. Recevied a call from patient's daughter Brenna Cartwright [phone] last Friday. She said that [facility] is willing to accept back with home health.  The facility would prefer to use [facility].      CM will arrange home health when patient is medically ready for discharge.        MICHELET Kong

## 2020-07-07 LAB
ANION GAP SERPL CALC-SCNC: 5 MMOL/L (ref 5–15)
BUN SERPL-MCNC: 27 MG/DL (ref 6–20)
BUN/CREAT SERPL: 19 (ref 12–20)
CALCIUM SERPL-MCNC: 9 MG/DL (ref 8.5–10.1)
CHLORIDE SERPL-SCNC: 100 MMOL/L (ref 97–108)
CO2 SERPL-SCNC: 27 MMOL/L (ref 21–32)
CREAT SERPL-MCNC: 1.39 MG/DL (ref 0.7–1.3)
GLUCOSE BLD STRIP.AUTO-MCNC: 120 MG/DL (ref 65–100)
GLUCOSE SERPL-MCNC: 116 MG/DL (ref 65–100)
POTASSIUM SERPL-SCNC: 4 MMOL/L (ref 3.5–5.1)
SERVICE CMNT-IMP: ABNORMAL
SODIUM SERPL-SCNC: 132 MMOL/L (ref 136–145)

## 2020-07-07 PROCEDURE — 94760 N-INVAS EAR/PLS OXIMETRY 1: CPT

## 2020-07-07 PROCEDURE — 92507 TX SP LANG VOICE COMM INDIV: CPT

## 2020-07-07 PROCEDURE — 65660000000 HC RM CCU STEPDOWN

## 2020-07-07 PROCEDURE — 92526 ORAL FUNCTION THERAPY: CPT

## 2020-07-07 PROCEDURE — 74011250637 HC RX REV CODE- 250/637: Performed by: FAMILY MEDICINE

## 2020-07-07 PROCEDURE — 74011250637 HC RX REV CODE- 250/637: Performed by: HOSPITALIST

## 2020-07-07 PROCEDURE — 80048 BASIC METABOLIC PNL TOTAL CA: CPT

## 2020-07-07 PROCEDURE — 97116 GAIT TRAINING THERAPY: CPT

## 2020-07-07 PROCEDURE — 82962 GLUCOSE BLOOD TEST: CPT

## 2020-07-07 PROCEDURE — 97535 SELF CARE MNGMENT TRAINING: CPT

## 2020-07-07 PROCEDURE — 74011250637 HC RX REV CODE- 250/637: Performed by: INTERNAL MEDICINE

## 2020-07-07 PROCEDURE — 36415 COLL VENOUS BLD VENIPUNCTURE: CPT

## 2020-07-07 RX ORDER — AMLODIPINE BESYLATE 5 MG/1
10 TABLET ORAL DAILY
Status: DISCONTINUED | OUTPATIENT
Start: 2020-07-07 | End: 2020-07-09 | Stop reason: HOSPADM

## 2020-07-07 RX ADMIN — CASTOR OIL AND BALSAM, PERU: 788; 87 OINTMENT TOPICAL at 21:42

## 2020-07-07 RX ADMIN — ASPIRIN 81 MG CHEWABLE TABLET 81 MG: 81 TABLET CHEWABLE at 08:35

## 2020-07-07 RX ADMIN — METOPROLOL SUCCINATE 25 MG: 25 TABLET, EXTENDED RELEASE ORAL at 21:37

## 2020-07-07 RX ADMIN — ALLOPURINOL 100 MG: 100 TABLET ORAL at 08:35

## 2020-07-07 RX ADMIN — CASTOR OIL AND BALSAM, PERU: 788; 87 OINTMENT TOPICAL at 08:35

## 2020-07-07 RX ADMIN — AMLODIPINE BESYLATE 10 MG: 5 TABLET ORAL at 08:35

## 2020-07-07 RX ADMIN — HYDRALAZINE HYDROCHLORIDE 25 MG: 25 TABLET, FILM COATED ORAL at 21:38

## 2020-07-07 RX ADMIN — FAMOTIDINE 20 MG: 20 TABLET ORAL at 17:09

## 2020-07-07 RX ADMIN — CASTOR OIL AND BALSAM, PERU: 788; 87 OINTMENT TOPICAL at 15:12

## 2020-07-07 RX ADMIN — HYDRALAZINE HYDROCHLORIDE 25 MG: 25 TABLET, FILM COATED ORAL at 15:11

## 2020-07-07 RX ADMIN — CLOPIDOGREL BISULFATE 75 MG: 75 TABLET ORAL at 08:35

## 2020-07-07 RX ADMIN — HYDRALAZINE HYDROCHLORIDE 25 MG: 25 TABLET, FILM COATED ORAL at 08:35

## 2020-07-07 RX ADMIN — ATORVASTATIN CALCIUM 40 MG: 40 TABLET, FILM COATED ORAL at 21:37

## 2020-07-07 NOTE — PROGRESS NOTES
[address] [facility]  Hospitalist Group                                                                                          Hospitalist Progress Note  Fazal Velasco MD  Answering service: [phone] from in house phone        Date of Service:  2020  NAME:  Kennedy Figueredo  :  3/9/1933  MRN:  [id number]      Admission Summary:   The patient is an 60-year-old gentleman with past medical history of hypertension, prostate cancer, constipation, gout, hyperlipidemia, stroke, and TIA, who presents to the hospital with the above-mentioned symptom. History was primarily obtained from the patient's daughter, Clemencia Cevallos. The patient is confused and has some right-sided weakness with right-sided neglect. She reports that the patient was last seen normal 2 days back on . Per care facility, the patient was normal last night. He became disoriented initially and then developed issues with walking and fell hitting his head, but there was no loss of consciousness. She reports the patient lives at -- and the patient was brought over here as a code stroke, was found to have occipital stroke. Neurointerventional Surgery was consulted and they felt that the stroke has been completed and the patient does not need any acute surgical intervention. The patient was requested to be admitted under the hospitalist service. Currently, the patient is resting in bed, does not appear to be distressed. No further history could be obtained from the patient or his daughter. Interval history / Subjective:     Patient is seen and examined at bedside this morning.   No overnight events or new complaints  Discussed with nursing - MRI unable to do today as [employer] rep needs >24 hr notice       Assessment & Plan:     Acute parieto-occipital cerebrovascular accident  -CT head: Bilateral acute occipital infarcts without evidence of hemorrhage.  - CTA head: Acute bilateral PCA territory infarcts, left larger than right.  - CT perf:  Acute ischemia in the left parieto-occipital lobe, with qualitatively decreased cerebral blood flow and blood volume in this region representing completed infarct. - appreciate Neurology input  - LDL 56, A1c 5.7  - MRI brain pending - likely tomorrow in presence of [employer] rep for PPM   - Echo: EF 40 - 45%.   - PT/OT/SLP recommendations noted. - c/w aspirin, plavix and statin     Hypertension: BP elevated  - C/w metoprolol, Hydralazine. Increased Amlodipine  - Monitor BP closely     Acute kidney injury on chronic kidney disease stage II, likely prerenal:  improving   - Avoid nephrotoxic medications and renally dose all other medications. 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 Dr. Mar Alvarez as outpt   Hx prostate ca     Code status: Full  DVT prophylaxis: SCDs  Care Plan discussed with: Patient/Family and Nurse  Anticipated Disposition: [facility] [address] [place] [address]  Anticipated Discharge: Less than 24 hours. Likely tomorrow if MRI brain stable        Hospital Problems  Date Reviewed: 1/30/2020          Codes Class Noted POA    Acute CVA (cerebrovascular accident) [facility]) ICD-10-CM: I63.9  ICD-9-CM: 434.91  6/30/2020 Unknown                Review of Systems:   Review of systems not obtained due to patient factors. Vital Signs:    Last 24hrs VS reviewed since prior progress note.  Most recent are:  Visit Vitals  /89 (BP 1 Location: Right arm, BP Patient Position: At rest;Sitting)   Pulse 90   Temp 97.6 °F (36.4 °C)   Resp 23   Ht 5' 7\" (1.702 m)   Wt 78.2 kg (172 lb 4.8 oz)   SpO2 96%   BMI 26.99 kg/m²         Intake/Output Summary (Last 24 hours) at 7/7/2020 1500  Last data filed at 7/6/2020 1811  Gross per 24 hour   Intake 240 ml   Output    Net 240 ml        Physical Examination:             Constitutional:  No acute distress, elderly    ENT:  Oral mucosa moist, oropharynx benign. Resp:  CTA bilaterally. No wheezing/rhonchi/rales. No accessory muscle use   CV:  Regular rhythm, normal rate, no murmurs, gallops, rubs    GI:  Soft, non distended, non tender. normoactive bowel sounds, no hepatosplenomegaly     Musculoskeletal:  No edema, warm, 2+ pulses throughout    Neurologic:  4+ strength, following simple commands             Data Review:    I personally reviewed  Image and labs    Cta Code Neuro Head And Neck W Cont    Result Date: 6/30/2020  IMPRESSION: CTA Head: 1. No evidence of large vessel occlusion or flow-limiting stenosis. 2. Atherosclerotic disease as above, including moderate stenosis of the left M1 segment, and mild to moderate stenoses of the bilateral posterior cerebral arteries. 3. Acute bilateral PCA territory infarcts, left larger than right. CTA Neck: 1. No evidence of flow-limiting stenosis. 2. Partially visualized small left pleural effusion. CT Brain Perfusion: 1. Acute ischemia in the left parieto-occipital lobe, with qualitatively decreased cerebral blood flow and blood volume in this region representing completed infarct. Mismatch volume of 4 cc. The findings were called to ER on 6/30/2020 at 12:55 PM by Dr. Lisa Skinner. 789     Ct Code Neuro Head Wo Contrast    Result Date: 6/30/2020  IMPRESSION: Bilateral acute occipital infarcts without evidence of hemorrhage. The findings were called to Dr. Lobito Hatch on 6/30/2020 at 12:18 PM by myself. Pedro Christiansen 7287 Neuro Perf W Cbf    Result Date: 6/30/2020  IMPRESSION: CTA Head: 1. No evidence of large vessel occlusion or flow-limiting stenosis. 2. Atherosclerotic disease as above, including moderate stenosis of the left M1 segment, and mild to moderate stenoses of the bilateral posterior cerebral arteries. 3. Acute bilateral PCA territory infarcts, left larger than right. CTA Neck: 1.  No evidence of flow-limiting stenosis. 2. Partially visualized small left pleural effusion. CT Brain Perfusion: 1. Acute ischemia in the left parieto-occipital lobe, with qualitatively decreased cerebral blood flow and blood volume in this region representing completed infarct. Mismatch volume of 4 cc. The findings were called to ER on 6/30/2020 at 12:55 PM by Dr. Eamon Campbell. 789       Labs:     Recent Labs     07/06/20  0530   WBC 7.5   HGB 14.9   HCT 44.1        Recent Labs     07/07/20  0350 07/06/20  0530   * 134*   K 4.0 3.9    101   CO2 27 27   BUN 27* 25*   CREA 1.39* 1.34*   * 105*   CA 9.0 8.8     Recent Labs     07/06/20  0530   ALT 36      TBILI 0.3   TP 7.2   ALB 2.6*   GLOB 4.6*     No results for input(s): INR, PTP, APTT, INREXT, INREXT in the last 72 hours. No results for input(s): FE, TIBC, PSAT, FERR in the last 72 hours. Lab Results   Component Value Date/Time    Folate 12.2 01/07/2019 11:29 AM      No results for input(s): PH, PCO2, PO2 in the last 72 hours. No results for input(s): CPK, CKNDX, TROIQ in the last 72 hours.     No lab exists for component: CPKMB  Lab Results   Component Value Date/Time    Cholesterol, total 118 07/01/2020 04:04 AM    HDL Cholesterol 51 07/01/2020 04:04 AM    LDL, calculated 56.6 07/01/2020 04:04 AM    Triglyceride 52 07/01/2020 04:04 AM    CHOL/HDL Ratio 2.3 07/01/2020 04:04 AM     Lab Results   Component Value Date/Time    Glucose (POC) 110 (H) 07/06/2020 09:23 PM    Glucose (POC) 98 07/05/2020 11:56 AM    Glucose (POC) 83 07/05/2020 07:54 AM    Glucose (POC) 120 (H) 07/04/2020 08:47 PM    Glucose (POC) 85 07/04/2020 04:38 PM     Lab Results   Component Value Date/Time    Color Yellow 08/08/2017 04:09 PM    Appearance Clear 08/08/2017 04:09 PM    Specific gravity 1.017 07/18/2017 02:56 PM    pH (UA) 6.5 08/08/2017 04:09 PM    Protein 100 (A) 07/18/2017 02:56 PM    Glucose NEGATIVE  07/18/2017 02:56 PM    Ketone Negative 08/08/2017 04:09 PM Bilirubin Negative 08/08/2017 04:09 PM    Urobilinogen 1.0 07/18/2017 02:56 PM    Nitrites Positive (A) 08/08/2017 04:09 PM    Leukocyte Esterase 1+ (A) 08/08/2017 04:09 PM    Epithelial cells MODERATE (A) 07/18/2017 02:56 PM    Bacteria Few 08/08/2017 04:09 PM    WBC 11-30 (A) 08/08/2017 04:09 PM    RBC 0-2 08/08/2017 04:09 PM         Medications Reviewed:     Current Facility-Administered Medications   Medication Dose Route Frequency    amLODIPine (NORVASC) tablet 10 mg  10 mg Oral DAILY    hydrALAZINE (APRESOLINE) tablet 25 mg  25 mg Oral TID    famotidine (PEPCID) tablet 20 mg  20 mg Oral QPM    metoprolol succinate (TOPROL-XL) XL tablet 25 mg  25 mg Oral Q24H    balsam peru-castor oiL (VENELEX) ointment   Topical TID    aspirin chewable tablet 81 mg  81 mg Oral DAILY    clopidogreL (PLAVIX) tablet 75 mg  75 mg Oral DAILY    hydrALAZINE (APRESOLINE) 20 mg/mL injection 20 mg  20 mg IntraVENous Q6H PRN    allopurinoL (ZYLOPRIM) tablet 100 mg  100 mg Oral DAILY    atorvastatin (LIPITOR) tablet 40 mg  40 mg Oral QHS    acetaminophen (TYLENOL) tablet 650 mg  650 mg Oral Q4H PRN    Or    acetaminophen (TYLENOL) solution 650 mg  650 mg Per NG tube Q4H PRN    Or    acetaminophen (TYLENOL) suppository 650 mg  650 mg Rectal Q4H PRN     ______________________________________________________________________  EXPECTED LENGTH OF STAY: 3d 0h  ACTUAL LENGTH OF STAY:          7                 Anisha Valencia MD

## 2020-07-07 NOTE — PROGRESS NOTES
Problem: Dysphagia (Adult)  Goal: *Acute Goals and Plan of Care (Insert Text)  Description: Speech Therapy Goals  Initiated 7/7/2020  1. Patient will tolerate regular diet and thin liquids without adverse effects within 7 days. Initiated 7/2/2020    1. Patient will tolerate dysphagia advanced diet and thin liquids without adverse effects within 7 days. MET 7/7/2020   Outcome: Progressing Towards Goal     Problem: Communication Impaired (Adult)  Goal: *Acute Goals and Plan of Care (Insert Text)  Description: Speech Therapy Goals  Initiated 7/2/2020    1. Patient will complete simple sentence formulation tasks with min verbal cues with 70% accuracy within 7 days. 2. Patient will participate in semantic feature analysis tasks with mod verbal cues with 60% accuracy within 7 days. 3. Patient will participate in responsive naming tasks with min verbal cues with 70% accuracy within 7 days. 4. Patient will complete moderate auditory comprehension tasks (mod yes/no questions) with 60% accuracy independently within 7 days. 5. Patient will follow one-step commands with min visual cues with 60% accuracy within 7 days. Outcome: Progressing Towards Goal   SPEECH LANGUAGE PATHOLOGY DYSPHAGIA AND SPEECH TREATMENT  Patient: Shirlene Gregory (16 y.o. male)  Date: 7/7/2020  Diagnosis: Acute CVA (cerebrovascular accident) [facility]) [I63.9]   <principal problem not specified>       Precautions:  Bed Alarm, Fall    ASSESSMENT:  Patient continues to tolerate dysphagia advanced diet and thin liquids without any overt s/s of aspiration. Pt also with improvement in oral transit and mastication of solids with only minimal oral residue. Therefore, feel pt safe to initiate baseline diet of regular and thins. Will follow up for diet tolerance x1. Patient continues to present with moderate-severe expressive-receptive aphasia characterized by difficulty with naming, perseveration, and circumlocutions.  Pt intermittently benefited from phonemic and semantic cues. Pt with lack of insight into deficits making prognosis for improvement somewhat guarded. PLAN:  Recommendations and Planned Interventions:  --Regular diet/thin liquids  --Upright   --Provide tactile cues for command following given aphasia and visual deficits  Patient continues to benefit from skilled intervention to address the above impairments. Continue treatment per established plan of care. Discharge Recommendations:  Home Health and [address] [place] [address]:   Patient was pleasant and cooperative. OBJECTIVE:   Cognitive and Communication Status:  Neurologic State: Alert  Orientation Level: Oriented to person, Oriented to place, Disoriented to situation, Disoriented to time  Cognition: Follows commands  Perception: Tactile, Verbal  Perseveration: No perseveration noted  Safety/Judgement: Decreased awareness of environment, Decreased insight into deficits    Dysphagia Treatment and Interventions:  Oral Assessment:  Oral Assessment  Labial: No impairment  Dentition: Natural;Intact  Oral Hygiene: oral mucosa moist and clear of secretions  Lingual: No impairment  Velum: No impairment  Mandible: No impairment  P.O. Trials:  Patient Position: upright in chair  Vocal quality prior to P.O.: No impairment  Consistency Presented:  Thin liquid;Mechanical soft  How Presented: Self-fed/presented;Spoon;Straw     Bolus Acceptance: No impairment  Bolus Formation/Control: No impairment  Type of Impairment: Other (comment)(mastication normal)  Propulsion: No impairment  Oral Residue: None  Initiation of Swallow: No impairment  Laryngeal Elevation: Functional  Aspiration Signs/Symptoms: None  Pharyngeal Phase Characteristics: No impairment, issues, or problems        Oral Phase Severity: No impairment  Pharyngeal Phase Severity : No impairment    Speech Treatment and Interventions:    Language Comprehension and Expression:  Auditory Comprehension   Hearing Aid: None  Verbal Expression  Verbal Expression  Primary Mode of Expression: Verbal  Initiation: No impairment  Repetition: No impairment  Naming: Impaired  Objects (%): 25 %    Pain:  Pain Scale 1: Numeric (0 - 10)  Pain Intensity 1: 0       After treatment:   Patient left in no apparent distress sitting up in chair, Call bell within reach, and Nursing notified    COMMUNICATION/EDUCATION:     The patient's plan of care including recommendations, planned interventions, and recommended diet changes were discussed with: Occupational therapy assistant and Registered nurse. Cheryl Minaya  Time Calculation: 22 mins     Regarding student involvement in patient care:  A student participated in this treatment session. Per CMS Medicare statements and ERNESTO guidelines I certify that the following was true:  1. I was present and directly observed the entire session. 2. I made all skilled judgments and clinical decisions regarding care. 3. I am the practitioner responsible for assessment, treatment, and documentation.   BRIANNA JimenezEd, [id number] [facility]  Speech-Language Pathologist

## 2020-07-07 NOTE — PROGRESS NOTES
NUTRITION COMPLETE ASSESSMENT    RECOMMENDATIONS:   1. Continue NDD3 per SLP  2. Continue ONS- Ensure enlive BID and Magic cup for dinner  3. Monitor plan of care, weight,      4. Add bowel regimen- no BM since admission (7 days). Interventions/Plan:   Food/Nutrient Delivery:  General/healthful diet Commercial supplement        Nutrition Education:     Coordination of Care:      Assessment:   Reason for Assessment: LOS    Diet: (NDD3)  Supplements: Ensure Enlive BID, Magic cup x 1 per day  Nutritionally Significant Medications: [x] Reviewed & Includes: lipitor, pepcid. Meal Intake:   Patient Vitals for the past 100 hrs:   % Diet Eaten   07/06/20 1811 100 %   07/06/20 1200 80 %   07/06/20 0822 65 %   07/03/20 1200 75 %       Pre-Hospitalization:  Usual Appetite: Good    Current Hospitalization:   Fluid Restriction:  none  Appetite: Good  PO Ability:   Average po intake:%  Average supplements intake:        Subjective: Assessment completed by chart review, Staff Interviewed  Appetite is good. PO is meeting needs. Objective:  80 yr old male admitted for CVA. Pt  has a past medical history of Cancer ([facility] [address] Constipation, Gout, Hyperlipemia, Hypertension, Pacemaker (2019), Stroke [facility]), and TIA (transient ischemic attack) (2013). Pt screened for LOS. Pt eating well. Intake >75% on average. Weight down 10 lbs over last 10 months, but mostly stable recently. Usual body weight ~180 lbs. Currently 172 lbs. On NDD3 diet per SLP. No reported n/v. Pt without BM in 7 days.      Wt Readings from Last 30 Encounters:   07/07/20 78.2 kg (172 lb 4.8 oz)   07/02/20 76.9 kg (169 lb 8.5 oz)   01/30/20 78.5 kg (173 lb)   10/18/19 82.6 kg (182 lb)   10/11/19 84.6 kg (186 lb 6.4 oz)   08/14/19 83.1 kg (183 lb 3.2 oz)   07/23/19 81.6 kg (180 lb)   03/26/19 82.2 kg (181 lb 3.5 oz)   03/11/19 91.7 kg (202 lb 3.2 oz)   01/31/19 85.3 kg (188 lb)   01/07/19 88.5 kg (195 lb)   12/18/18 88 kg (194 lb)   10/15/18 88.5 kg (195 lb 3.2 oz)   07/26/18 74.4 kg (164 lb)   07/07/18 84.1 kg (185 lb 6.4 oz)   08/08/17 77.1 kg (170 lb)   07/18/17 80 kg (176 lb 4.8 oz)   07/20/17 79.8 kg (176 lb)          Estimated Nutrition Needs:   Kcals/day: 4222 Kcals/day(BMR(1416x1.3))  Protein: 78 g  Fluid:  1850 mL  Based On: Donaldson St Jeor  Weight Used: Actual wt(78.2 kg)    Pt expected to meet estimated nutrient needs:  [x]   Yes     []  No [] Unable to predict at this time  Nutrition Diagnosis:   1. Swallowing difficulty related to impaired chew/swallow function as evidenced by SLP recs for soft diet    2.   related to   as evidenced by      3.   related to   as evidenced by      Goals:     Pt will continue to tolerate >50% of meals without issues within 5-7 days     Monitoring & Evaluation:    - Total energy intake   - Weight/weight change   -      Previous Nutrition Goals Met:   N/A  Previous Recommendations:    N/A    Education:   [x] None Identified  [] Identified and addressed    [x] Participated in care plan, discharge planning, and/or interdisciplinary rounds     Nutrition Discharge Plan:   [x] Too soon to determine  [] Other:        Cultural, Judaism and ethnic food preferences identified:      Skin Integrity: [x]Intact  []Other  Edema: [x]None []Other  Last BM: PTA  Food Allergies: [x]None []Other  Diet Restrictions: none    Anthropometrics:    Weight Loss Metrics 7/7/2020 6/30/2020 1/30/2020 10/18/2019 10/11/2019 8/14/2019 7/23/2019   Today's Wt 172 lb 4.8 oz - 173 lb 182 lb 186 lb 6.4 oz 183 lb 3.2 oz 180 lb   BMI - 26.99 kg/m2 27.92 kg/m2 29.38 kg/m2 26.75 kg/m2 26.29 kg/m2 25.83 kg/m2      Weight Source: Bed  Height: 5' 7\" (170.2 cm),    Body mass index is 26.99 kg/m².       ,     ,      Labs:    Lab Results   Component Value Date/Time    Sodium 132 (L) 07/07/2020 03:50 AM    Potassium 4.0 07/07/2020 03:50 AM    Chloride 100 07/07/2020 03:50 AM    CO2 27 07/07/2020 03:50 AM    Glucose 116 (H) 07/07/2020 03:50 AM    BUN 27 (H) 07/07/2020 03:50 AM    Creatinine 1.39 (H) 07/07/2020 03:50 AM    Calcium 9.0 07/07/2020 03:50 AM    Magnesium 2.3 06/30/2020 12:18 PM    Albumin 2.6 (L) 07/06/2020 05:30 AM     Lab Results   Component Value Date/Time    Hemoglobin A1c 5.7 (H) 07/01/2020 04:04 AM       Alethea Chao RD

## 2020-07-07 NOTE — PROGRESS NOTES
Bedside and Verbal shift change report given to Leonela Nash RN (oncoming nurse) by Chaz Valverde RN (offgoing nurse).  Report included the following information SBAR, Kardex, Intake/Output, MAR and Recent Results.

## 2020-07-07 NOTE — PROGRESS NOTES
Problem: Mobility Impaired (Adult and Pediatric)  Goal: *Acute Goals and Plan of Care (Insert Text)  Description:   FUNCTIONAL STATUS PRIOR TO ADMISSION: Patient was modified independent using a rolling walker for functional mobility. Patient required minimal assistance for basic and total A instrumental ADLs. HOME SUPPORT PRIOR TO ADMISSION: The patient lived snf with nursing care. [facility] Physical Therapy Goals  Initiated 7/2/2020  1. Patient will move from supine to sit and sit to supine , scoot up and down and roll side to side in bed with modified independence within 7 day(s). 2.  Patient will transfer from bed to chair and chair to bed with supervision/set-up using the least restrictive device within 7 day(s). 3.  Patient will perform sit to stand with supervision/set-up within 7 day(s). 4.  Patient will ambulate with supervision/set-up for 300 feet with the least restrictive device within 7 day(s). 5.  Patient will ascend/descend 2 stairs with ONE handrail(s) with supervision/set-up within 7 day(s). 6.  Patient will improve Yan Balance score by 7 points within 7 days. Outcome: Progressing Towards Goal  PHYSICAL THERAPY TREATMENT  Patient: Gen Pena (41 y.o. male)  Date: 7/7/2020  Diagnosis: Acute CVA (cerebrovascular accident) [facility]) [I63.9]   <principal problem not specified>       Precautions: Bed Alarm, Fall  Chart, physical therapy assessment, plan of care and goals were reviewed. ASSESSMENT  Patient continues with skilled PT services and is progressing towards goals. Pt received in chair, pleasantly confused, agreeable to work with therapy. Pt required tactile cuing and visual demonstration in order to follow commands. He was able to ambulate 15 ft with modA and assistance for RW management. Ended session seated in a chair with all needs met. Recommending SNF upon discharge.        Current Level of Function Impacting Discharge (mobility/balance): modA for ambulation     Other factors to consider for discharge: fall risk, confusion         PLAN :  Patient continues to benefit from skilled intervention to address the above impairments. Continue treatment per established plan of care. to address goals. Recommendation for discharge: (in order for the patient to meet his/her long term goals)  Therapy up to 5 days/week in SNF setting  If d/c home, will require physical assistance for all mobility     This discharge recommendation:  Has not yet been discussed the attending provider and/or case management    IF patient discharges home will need the following DME: patient owns DME required for discharge       SUBJECTIVE:   Patient stated I'm not sure about the hospital. pt oriented to self only     OBJECTIVE DATA SUMMARY:   Critical Behavior:  Neurologic State: Alert  Orientation Level: Oriented to person, Oriented to place, Disoriented to situation, Disoriented to time  Cognition: Follows commands  Safety/Judgement: Decreased awareness of environment, Decreased insight into deficits  Functional Mobility Training:  Bed Mobility:  Rolling: (received in chair)  Supine to Sit: (ended session in chair)    Transfers:  Sit to Stand: Moderate assistance;Assist x1;Adaptive equipment  Stand to Sit: Minimum assistance;Assist x1;Adaptive equipment  Bed to Chair: Moderate assistance;Assist x1    Balance:  Sitting: Impaired; Without support  Sitting - Static: Fair (occasional)  Sitting - Dynamic: Fair (occasional)  Standing: Impaired; With support  Standing - Static: Fair  Standing - Dynamic : Poor;Constant support    Ambulation/Gait Training:  Distance (ft): 15 Feet (ft)  Assistive Device: Gait belt;Walker, rolling  Ambulation - Level of Assistance: Moderate assistance;Assist x1(+ assistance for walker managment )  Gait Abnormalities: Ataxic;Decreased step clearance; Path deviations; Step to gait  Base of Support: Widened  Stance: Time  Speed/Linh: Slow;Shuffled  Step Length: Right shortened;Left shortened  Swing Pattern: Left asymmetrical;Right asymmetrical    Activity Tolerance:   Fair  Please refer to the flowsheet for vital signs taken during this treatment. After treatment patient left in no apparent distress:   Sitting in chair, Call bell within reach, and Bed / chair alarm activated    COMMUNICATION/COLLABORATION:   The patients plan of care was discussed with: Occupational therapist, Speech therapist, and Registered nurse.      Joey Beatty, PT, DPT   Time Calculation: 12 mins

## 2020-07-07 NOTE — PROGRESS NOTES
Bedside and Verbal shift change report given to AUGUSTO Stuart (oncoming nurse) by Magdalena Cueva RN (offgoing nurse). Report included the following information SBAR, Kardex, Intake/Output, MAR, Recent Results, Med Rec Status, Cardiac Rhythm V-Paced  and Dual Neuro Assessment.

## 2020-07-07 NOTE — PROGRESS NOTES
Problem: Falls - Risk of  Goal: *Absence of Falls  Description: Document Hazel Ruhenstroth Fall Risk and appropriate interventions in the flowsheet. Outcome: Progressing Towards Goal  Note: Fall Risk Interventions:  Mobility Interventions: Bed/chair exit alarm, Communicate number of staff needed for ambulation/transfer    Mentation Interventions: Bed/chair exit alarm, Evaluate medications/consider consulting pharmacy    Medication Interventions: Bed/chair exit alarm, Evaluate medications/consider consulting pharmacy    Elimination Interventions: Call light in reach, Patient to call for help with toileting needs    History of Falls Interventions: Bed/chair exit alarm, Evaluate medications/consider consulting pharmacy         Problem: Pressure Injury - Risk of  Goal: *Prevention of pressure injury  Description: Document Torito Scale and appropriate interventions in the flowsheet.   Outcome: Progressing Towards Goal  Note: Pressure Injury Interventions:  Sensory Interventions: Assess changes in LOC, Avoid rigorous massage over bony prominences, Discuss PT/OT consult with provider, Keep linens dry and wrinkle-free, Float heels, Minimize linen layers, Monitor skin under medical devices    Moisture Interventions: Absorbent underpads, Apply protective barrier, creams and emollients    Activity Interventions: Pressure redistribution bed/mattress(bed type), PT/OT evaluation    Mobility Interventions: Pressure redistribution bed/mattress (bed type), PT/OT evaluation, Float heels    Nutrition Interventions: Document food/fluid/supplement intake    Friction and Shear Interventions: Apply protective barrier, creams and emollients, Minimize layers, Lift team/patient mobility team                Problem: Hypertension  Goal: *Blood pressure within specified parameters  Outcome: Progressing Towards Goal     Problem: TIA/CVA Stroke: Day 2 Until Discharge  Goal: Activity/Safety  Outcome: Progressing Towards Goal  Goal: Diagnostic Test/Procedures  Outcome: Progressing Towards Goal  Goal: Medications  Outcome: Progressing Towards Goal  Goal: Treatments/Interventions/Procedures  Outcome: Progressing Towards Goal  Goal: *Absence of deep venous thrombosis signs and symptoms(Stroke Metric)  Outcome: Progressing Towards Goal  Goal: *Stroke education continued(Stroke Metric)  Outcome: Progressing Towards Goal

## 2020-07-07 NOTE — PROGRESS NOTES
Bedside and Verbal shift change report given to Brandt Tyler RN (oncoming nurse) by AUGUSTO Barclay (offgoing nurse). Report included the following information SBAR, Kardex, ED Summary, Intake/Output, Accordion, Recent Results, Cardiac Rhythm PACED and Dual Neuro Assessment.

## 2020-07-07 NOTE — PROGRESS NOTES
Problem: Self Care Deficits Care Plan (Adult)  Goal: *Acute Goals and Plan of Care (Insert Text)  Description:   FUNCTIONAL STATUS PRIOR TO ADMISSION: Patient was modified independent using a RW for functional mobility. Patient was min A? for basic and total A instrumental ADLs. HOME SUPPORT: The patient lived with nursing support. Occupational Therapy Goals  Initiated 7/1/2020  1. Patient will perform lower body dressing with moderate assistance  within 7 day(s). 2.  Patient will perform opening and closing ADL containers 5/5 with minimal assistance/contact guard assist within 7 day(s). 3.  Patient will perform self-feeding (once cleared from being NPO) with min A within 7 day(s). 4.  Patient will perform toilet transfers with minimal assistance/contact guard assist within 7 day(s). 5.  Patient will perform all aspects of toileting with moderate assistance  within 7 day(s). Outcome: Progressing Towards Goal     Problem: Patient Education: Go to Patient Education Activity  Goal: Patient/Family Education  Outcome: Progressing Towards Goal   OCCUPATIONAL THERAPY TREATMENT  Patient: Darryl Lujan (7 y.o. male)  Date: 7/7/2020  Diagnosis: Acute CVA (cerebrovascular accident) [facility]) [I63.9]   <principal problem not specified>       Precautions: Bed Alarm, Fall  Chart, occupational therapy assessment, plan of care, and goals were reviewed. ASSESSMENT  Patient continues with skilled OT services and is progressing towards goals. Participation impacted by impaired cognition (attention, command following, insight, orientation,safety awareness), impaired vision and visual perception, impaired bilateral UE coordination with right > left, impaired standing balance with anterior lean, impaired position in space, impaired reach to LEs and incontinence. Current Level of Function Impacting Discharge (ADLs):  Total assist for toileting, total assist for LE self care, functional transfers with min to mod assist of 2    Other factors to consider for discharge: Lived in assisted living premorbidly         PLAN :  Patient continues to benefit from skilled intervention to address the above impairments. Continue treatment per established plan of care. to address goals. Recommend with staff: Viera Speaker in chair for meals and self care, Max to total assist for self care, functional transfers with min to mod assist of 2 and RW    Recommend next OT session: self care seated in chair    Recommendation for discharge: (in order for the patient to meet his/her long term goals)  Therapy up to 5 days/week in SNF setting    This discharge recommendation:  Has been made in collaboration with the attending provider and/or case management    IF patient discharges home will need the following DME: walker: rolling       SUBJECTIVE:   Patient stated No. when asked if he knows where he is    OBJECTIVE DATA SUMMARY:   Cognitive/Behavioral Status:  Neurologic State: Alert  Orientation Level: Oriented to person;Disoriented to situation;Disoriented to place; Disoriented to time  Cognition: Decreased attention/concentration;Memory loss;Decreased command following  Perception: Cues to maintain midline in standing;Visual;Verbal;Tactile  Perseveration: No perseveration noted  Safety/Judgement: Decreased awareness of environment;Decreased insight into deficits    Functional Mobility and Transfers for ADLs:  Bed Mobility:  Rolling: Moderate assistance;Assist x1;Adaptive equipment  Supine to Sit: Moderate assistance;Assist x1;Adaptive equipment    Transfers:  Sit to Stand: Moderate assistance;Assist x1;Adaptive equipment     Bed to Chair: Assist x2;Adaptive equipment; Additional time; Moderate assistance;Minimum assistance    Balance:  Sitting: Impaired; Without support  Sitting - Static: Fair (occasional)  Sitting - Dynamic: Fair (occasional)  Standing: Impaired; With support  Standing - Static: Fair  Standing - Dynamic : Fair    ADL Intervention:  Feeding  Container Management: Total assistance (dependent)  Food to Mouth: Stand-by assistance(right hand)  Cues: Verbal cues provided    Lower Body Dressing Assistance  Protective Undergarmet: Total assistance (dependent)  Position Performed: Supine    Toileting  Toileting Assistance: Total assistance(dependent)  Bladder Hygiene: Total assistance (dependent)  Bowel Hygiene: Total assistance (dependent)  Clothing Management: Total assistance (dependent)  Cues: Verbal cues provided;Physical assistance for pants down;Physical assistance for pants up; Tactile cues provided  Adaptive Equipment: (bedrails)    Cognitive Retraining  Orientation Retraining: Reorienting;Place;Situation  Organizing/Sequencing: Breaking task down  Attention to Task: Distractibility  Following Commands: Follows one step commands/directions  Safety/Judgement: Decreased awareness of environment;Decreased insight into deficits  Cues: Verbal cues provided; Tactile cues provided;Visual cues provided    Activity Tolerance:   Fair  Please refer to the flowsheet for vital signs taken during this treatment. After treatment patient left in no apparent distress:   Sitting in chair, Call bell within reach, Bed / chair alarm activated, and Caregiver / family present (SLP)    COMMUNICATION/COLLABORATION:   The patients plan of care was discussed with: Speech therapist and Registered nurse.      DARIAN Hdez  Time Calculation: 45 mins

## 2020-07-07 NOTE — PROGRESS NOTES
1025: Primary RN called MRI department to check on status of MRI of Brain. MRI RN notified me that the order fell off of their list and was not planned for today (7/7). Primary RN asked to re-order MRI. MRI RN stated they would plan for MRI tomorrow (7/8) as Fl[facility] Rep cannot make same-day visitations. Primary RN notified that MRI staff already has screening sheet already completed and faxed to them. Hospitalist paged. 1100: Hospitalist notified. New MRI order and travel orders placed. 1230: Primary RN notified that MRI to be performed 9:45AM 7/8. Daughter, Kaelyn Richardson, notified via telephone of plan.      Problem: Ischemic Stroke: Discharge Outcomes  Goal: *Verbalizes anxiety and depression are reduced or absent  Outcome: Progressing Towards Goal  Goal: *Verbalize understanding of risk factor modification(Stroke Metric)  Outcome: Progressing Towards Goal  Goal: *Hemodynamically stable  Outcome: Progressing Towards Goal  Goal: *Absence of aspiration pneumonia  Outcome: Progressing Towards Goal  Goal: *Aware of needed dietary changes  Outcome: Progressing Towards Goal  Goal: *Verbalize understanding of prescribed medications including anti-coagulants, anti-lipid, and/or anti-platelets(Stroke Metric)  Outcome: Progressing Towards Goal  Goal: *Tolerating diet  Outcome: Progressing Towards Goal  Goal: *Aware of follow-up diagnostics related to anticoagulants  Outcome: Progressing Towards Goal  Goal: *Ability to perform ADLs and demonstrates progressive mobility and function  Outcome: Progressing Towards Goal  Goal: *Absence of DVT(Stroke Metric)  Outcome: Progressing Towards Goal  Goal: *Absence of aspiration  Outcome: Progressing Towards Goal  Goal: *Optimal pain control at patient's stated goal  Outcome: Progressing Towards Goal  Goal: *Home safety concerns addressed  Outcome: Progressing Towards Goal  Goal: *Describes available resources and support systems  Outcome: Progressing Towards Goal  Goal: *Verbalizes understanding of activation of [id number]) for stroke symptoms(Stroke Metric)  Outcome: Progressing Towards Goal  Goal: *Understands and describes signs and symptoms to report to providers(Stroke Metric)  Outcome: Progressing Towards Goal  Goal: *Neurolgocially stable (absence of additional neurological deficits)  Outcome: Progressing Towards Goal  Goal: *Verbalizes importance of follow-up with primary care physician(Stroke Metric)  Outcome: Progressing Towards Goal  Goal: *Smoking cessation discussed,if applicable(Stroke Metric)  Outcome: Progressing Towards Goal  Goal: *Depression screening completed(Stroke Metric)  Outcome: Progressing Towards Goal

## 2020-07-08 ENCOUNTER — APPOINTMENT (OUTPATIENT)
Dept: MRI IMAGING | Age: 85
DRG: 064 | End: 2020-07-08
Attending: INTERNAL MEDICINE
Payer: MEDICARE

## 2020-07-08 ENCOUNTER — APPOINTMENT (OUTPATIENT)
Dept: CT IMAGING | Age: 85
DRG: 064 | End: 2020-07-08
Attending: NURSE PRACTITIONER
Payer: MEDICARE

## 2020-07-08 ENCOUNTER — TELEPHONE (OUTPATIENT)
Dept: FAMILY MEDICINE CLINIC | Age: 85
End: 2020-07-08

## 2020-07-08 ENCOUNTER — VIRTUAL VISIT (OUTPATIENT)
Dept: FAMILY MEDICINE CLINIC | Age: 85
End: 2020-07-08

## 2020-07-08 LAB
ANION GAP SERPL CALC-SCNC: 5 MMOL/L (ref 5–15)
BUN SERPL-MCNC: 40 MG/DL (ref 6–20)
BUN/CREAT SERPL: 24 (ref 12–20)
CALCIUM SERPL-MCNC: 8.9 MG/DL (ref 8.5–10.1)
CHLORIDE SERPL-SCNC: 99 MMOL/L (ref 97–108)
CO2 SERPL-SCNC: 28 MMOL/L (ref 21–32)
COMMENT, HOLDF: NORMAL
CREAT SERPL-MCNC: 1.64 MG/DL (ref 0.7–1.3)
GLUCOSE SERPL-MCNC: 133 MG/DL (ref 65–100)
POTASSIUM SERPL-SCNC: 4.3 MMOL/L (ref 3.5–5.1)
SAMPLES BEING HELD,HOLD: NORMAL
SODIUM SERPL-SCNC: 132 MMOL/L (ref 136–145)

## 2020-07-08 PROCEDURE — 74011250637 HC RX REV CODE- 250/637: Performed by: INTERNAL MEDICINE

## 2020-07-08 PROCEDURE — 80048 BASIC METABOLIC PNL TOTAL CA: CPT

## 2020-07-08 PROCEDURE — 74011250637 HC RX REV CODE- 250/637: Performed by: FAMILY MEDICINE

## 2020-07-08 PROCEDURE — 94760 N-INVAS EAR/PLS OXIMETRY 1: CPT

## 2020-07-08 PROCEDURE — 65660000000 HC RM CCU STEPDOWN

## 2020-07-08 PROCEDURE — 36415 COLL VENOUS BLD VENIPUNCTURE: CPT

## 2020-07-08 PROCEDURE — 70551 MRI BRAIN STEM W/O DYE: CPT

## 2020-07-08 PROCEDURE — 70450 CT HEAD/BRAIN W/O DYE: CPT

## 2020-07-08 PROCEDURE — 74011250637 HC RX REV CODE- 250/637: Performed by: HOSPITALIST

## 2020-07-08 RX ADMIN — HYDRALAZINE HYDROCHLORIDE 25 MG: 25 TABLET, FILM COATED ORAL at 21:52

## 2020-07-08 RX ADMIN — ALLOPURINOL 100 MG: 100 TABLET ORAL at 08:18

## 2020-07-08 RX ADMIN — ASPIRIN 81 MG CHEWABLE TABLET 81 MG: 81 TABLET CHEWABLE at 08:18

## 2020-07-08 RX ADMIN — ATORVASTATIN CALCIUM 40 MG: 40 TABLET, FILM COATED ORAL at 21:54

## 2020-07-08 RX ADMIN — CASTOR OIL AND BALSAM, PERU: 788; 87 OINTMENT TOPICAL at 21:55

## 2020-07-08 RX ADMIN — CASTOR OIL AND BALSAM, PERU: 788; 87 OINTMENT TOPICAL at 08:17

## 2020-07-08 RX ADMIN — CASTOR OIL AND BALSAM, PERU: 788; 87 OINTMENT TOPICAL at 17:08

## 2020-07-08 RX ADMIN — FAMOTIDINE 20 MG: 20 TABLET ORAL at 17:08

## 2020-07-08 RX ADMIN — HYDRALAZINE HYDROCHLORIDE 25 MG: 25 TABLET, FILM COATED ORAL at 08:18

## 2020-07-08 RX ADMIN — CLOPIDOGREL BISULFATE 75 MG: 75 TABLET ORAL at 08:18

## 2020-07-08 RX ADMIN — AMLODIPINE BESYLATE 10 MG: 5 TABLET ORAL at 08:18

## 2020-07-08 RX ADMIN — HYDRALAZINE HYDROCHLORIDE 25 MG: 25 TABLET, FILM COATED ORAL at 17:08

## 2020-07-08 RX ADMIN — METOPROLOL SUCCINATE 25 MG: 25 TABLET, EXTENDED RELEASE ORAL at 21:54

## 2020-07-08 NOTE — CONSULTS
Full consult note to follow  Will check [employer] pacemaker for any new PAF  He did not have AFIB as of 6/28 check from office

## 2020-07-08 NOTE — PROGRESS NOTES
12:49 PM  Planned discharge for today cancelled as MRI indicated Possible CVA, Parieto-occipital infarction is suspected. [facility] on WILL CALL, home health notified of the change of plan.      Transition of Care Plan/Discharge Note   · RUR- 9 % Low Risks   · DISPOSITION: Patient presents from [facility]ather is  going back there with home health today after 3 PM   · Medical records sent in an envolope to [facility] per  Audrey Rushing's (ph # [phone]) request   · [id number]  [facility] to follow after discharge   · POA/daughter Kimberly Bro [phone]  · Transport: [facility]) phone [phone]/[email or web address] @ 3 PM today   · Follow up: PCP/Specialist(s)       Medicare pt has received, reviewed, and signed 2nd IM letter informing them of their right to appeal the discharge. Signed copied has been placed on pt bedside chart. CM met patient at bedside to discuss discahrge planning, agreeable. Spoke with Neena Contreras,  of [facility], at [phone], they can handle the care of Mr. Gala Griffith. She requested medical records to be sent with the patient, made a packet to go with the patient. Scooby Jones accepted the patient and is scheudled to see him tomorrow. Patient is being discharged to [facility] located at [address]. Care Management Interventions  PCP Verified by CM:  Yes  Last Visit to PCP: 10/11/19  Palliative Care Criteria Met (RRAT>21 & CHF Dx)?: No  Mode of Transport at Discharge: BLS([facility] )  Transition of Care Consult (CM Consult): Discharge Planning, [address]: No  [facility]: Patient already serviced by other home care/hospice agency([facility] [address] )  [address] Marques: Yes  Discharge Durable Medical Equipment: No  Health Maintenance Reviewed: Yes  Physical Therapy Consult: Yes  Occupational Therapy Consult: Yes  Speech Therapy Consult: Yes  Current Support Network: Assisted Living([facility] )  Confirm Follow Up Transport: Other (see comment)([facility] )  The Patient and/or Patient Representative was Provided with a Choice of Provider and Agrees with the Discharge Plan?: Yes  Freedom of Choice List was Provided with Basic Dialogue that Supports the Patient's Individualized Plan of Care/Goals, Treatment Preferences and Shares the Quality Data Associated with the Providers?: Yes  The [employer] Information Provided?: No  Discharge Location  Discharge Placement: Assisted Living(Assisted Living with home health @ [facility] [address] )        MICHELET Sanders

## 2020-07-08 NOTE — TELEPHONE ENCOUNTER
----- Message from [employer] sent at 7/8/2020 11:58 AM EDT -----  Regarding: Dr. Parth Reeves first and last name: Leland Dowd  Reason for call: John Bowens wants the  To sign off on home care  Callback required yes/no and why: Yes   Best contact number(s):  [phone]  Details to clarify the request:

## 2020-07-08 NOTE — PROGRESS NOTES
Problem: Falls - Risk of  Goal: *Absence of Falls  Description: Document Jamshid Vega Fall Risk and appropriate interventions in the flowsheet. Outcome: Progressing Towards Goal  Note: Fall Risk Interventions:  Mobility Interventions: Bed/chair exit alarm    Mentation Interventions: Bed/chair exit alarm    Medication Interventions: Patient to call before getting OOB, Bed/chair exit alarm    Elimination Interventions: Bed/chair exit alarm, Call light in reach    History of Falls Interventions: Bed/chair exit alarm         Problem: Pressure Injury - Risk of  Goal: *Prevention of pressure injury  Description: Document Torito Scale and appropriate interventions in the flowsheet.   Outcome: Progressing Towards Goal  Note: Pressure Injury Interventions:  Sensory Interventions: Assess changes in LOC, Assess need for specialty bed, Avoid rigorous massage over bony prominences    Moisture Interventions: Absorbent underpads, Apply protective barrier, creams and emollients, Assess need for specialty bed    Activity Interventions: Assess need for specialty bed    Mobility Interventions: Assess need for specialty bed    Nutrition Interventions: Document food/fluid/supplement intake    Friction and Shear Interventions: Apply protective barrier, creams and emollients                Problem: Hypertension  Goal: *Blood pressure within specified parameters  Outcome: Progressing Towards Goal  Goal: *Fluid volume balance  Outcome: Progressing Towards Goal  Goal: *Labs within defined limits  Outcome: Progressing Towards Goal     Problem: TIA/CVA Stroke: Day 2 Until Discharge  Goal: Activity/Safety  Outcome: Progressing Towards Goal  Goal: Diagnostic Test/Procedures  Outcome: Progressing Towards Goal  Goal: Nutrition/Diet  Outcome: Progressing Towards Goal  Goal: Discharge Planning  Outcome: Progressing Towards Goal  Goal: Medications  Outcome: Progressing Towards Goal  Goal: Respiratory  Outcome: Progressing Towards Goal  Goal: Treatments/Interventions/Procedures  Outcome: Progressing Towards Goal  Goal: Psychosocial  Outcome: Progressing Towards Goal  Goal: *Verbalizes anxiety and depression are reduced or absent  Outcome: Progressing Towards Goal  Goal: *Absence of aspiration  Outcome: Progressing Towards Goal  Goal: *Absence of deep venous thrombosis signs and symptoms(Stroke Metric)  Outcome: Progressing Towards Goal  Goal: *Optimal pain control at patient's stated goal  Outcome: Progressing Towards Goal  Goal: *Tolerating diet  Outcome: Progressing Towards Goal  Goal: *Ability to perform ADLs and demonstrates progressive mobility and function  Outcome: Progressing Towards Goal  Goal: *Stroke education continued(Stroke Metric)  Outcome: Progressing Towards Goal     Problem: Ischemic Stroke: Discharge Outcomes  Goal: *Verbalizes anxiety and depression are reduced or absent  Outcome: Progressing Towards Goal  Goal: *Verbalize understanding of risk factor modification(Stroke Metric)  Outcome: Progressing Towards Goal  Goal: *Hemodynamically stable  Outcome: Progressing Towards Goal  Goal: *Absence of aspiration pneumonia  Outcome: Progressing Towards Goal  Goal: *Aware of needed dietary changes  Outcome: Progressing Towards Goal  Goal: *Verbalize understanding of prescribed medications including anti-coagulants, anti-lipid, and/or anti-platelets(Stroke Metric)  Outcome: Progressing Towards Goal  Goal: *Tolerating diet  Outcome: Progressing Towards Goal  Goal: *Aware of follow-up diagnostics related to anticoagulants  Outcome: Progressing Towards Goal  Goal: *Ability to perform ADLs and demonstrates progressive mobility and function  Outcome: Progressing Towards Goal  Goal: *Absence of DVT(Stroke Metric)  Outcome: Progressing Towards Goal  Goal: *Absence of aspiration  Outcome: Progressing Towards Goal  Goal: *Optimal pain control at patient's stated goal  Outcome: Progressing Towards Goal  Goal: *Home safety concerns addressed  Outcome: Progressing Towards Goal  Goal: *Describes available resources and support systems  Outcome: Progressing Towards Goal  Goal: *Verbalizes understanding of activation of EMS(911) for stroke symptoms(Stroke Metric)  Outcome: Progressing Towards Goal  Goal: *Understands and describes signs and symptoms to report to providers(Stroke Metric)  Outcome: Progressing Towards Goal  Goal: *Neurolgocially stable (absence of additional neurological deficits)  Outcome: Progressing Towards Goal  Goal: *Verbalizes importance of follow-up with primary care physician(Stroke Metric)  Outcome: Progressing Towards Goal  Goal: *Smoking cessation discussed,if applicable(Stroke Metric)  Outcome: Progressing Towards Goal  Goal: *Depression screening completed(Stroke Metric)  Outcome: Progressing Towards Goal

## 2020-07-08 NOTE — PROGRESS NOTES
Occupational Therapy: Patient leaving floor at this time for an MRI. Will follow and see as able and appropriate for weekly reassessment.   DARIAN Sanchez/DEBORAH

## 2020-07-08 NOTE — PROGRESS NOTES
[facility]  Hospitalist Group                                                                                          Hospitalist Progress Note  Kb Love MD  Answering service: [phone] from in house phone        Date of Service:  2020  NAME:  Kaylin Hernandez  :  3/9/1933  MRN:  [id number]      Admission Summary:   The patient is an 26-year-old gentleman with past medical history of hypertension, prostate cancer, constipation, gout, hyperlipidemia, stroke, and TIA, who presents to the hospital with the above-mentioned symptom. History was primarily obtained from the patient's daughter, [place]. The patient is confused and has some right-sided weakness with right-sided neglect. She reports that the patient was last seen normal 2 days back on . Per care facility, the patient was normal last night. He became disoriented initially and then developed issues with walking and fell hitting his head, but there was no loss of consciousness. She reports the patient lives at -- and the patient was brought over here as a code stroke, was found to have occipital stroke. Neurointerventional Surgery was consulted and they felt that the stroke has been completed and the patient does not need any acute surgical intervention. The patient was requested to be admitted under the hospitalist service. Currently, the patient is resting in bed, does not appear to be distressed. No further history could be obtained from the patient or his daughter. Interval history / Subjective:     Patient is seen and examined at bedside this morning. Just had MRI done this morning. No overnight events or new complaints. Discussed the MRI brain report with daughter at bedside.         Assessment & Plan:     Acute parieto-occipital cerebrovascular accident  -CT head: Bilateral acute occipital infarcts without evidence of hemorrhage.  - CTA head: Acute bilateral PCA territory infarcts, left larger than right.  - CT perf:  Acute ischemia in the left parieto-occipital lobe, with qualitatively decreased cerebral blood flow and blood volume in this region representing completed infarct. - appreciate Neurology input  - LDL 56, A1c 5.7  - MRI brain: Moderate to large subacute infarction in the left occipital lobe and left basal  ganglia. Small to moderate subacute infarction in the right occipital lobe. Mild superimposed hemorrhage but no evidence of midline shift or mass effect. Punctate focus of acute infarction in the posterior superior right frontal lobe. - cardiology consulted. - rpt CT head 7/8: Trace parenchymal hemorrhage in the bilateral subacute occipital infarctions. - Echo: EF 40 - 45%.   - PT/OT/SLP recommendations noted. - c/w aspirin and statin. plavix discontinued per neurology recs due to trace hemorrhage. Hypertension:   - C/w metoprolol, Hydralazine, Amlodipine  - Monitor BP closely     Acute kidney injury on chronic kidney disease stage II, likely prerenal:  improving   - Avoid nephrotoxic medications and renally dose all other medications. 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 Hx prostate ca     Code status: Full  DVT prophylaxis: SCDs  Care Plan discussed with: Patient/Family and Nurse  [facility]ated Disposition: [facility] [address]  Anticipated Discharge: Less than 24 hours. Likely tomorrow if cleared by cardiology        Hospital Problems  Date Reviewed: 1/30/2020          Codes Class Noted POA    Acute CVA (cerebrovascular accident) [facility]) ICD-10-CM: I63.9  ICD-9-CM: 434.91  6/30/2020 Unknown                Review of Systems:   Review of systems not obtained due to patient factors. Vital Signs:    Last 24hrs VS reviewed since prior progress note.  Most recent are:  Visit Vitals  /63 (BP 1 Location: Right arm, BP Patient Position: At rest)   Pulse 74   Temp 97.9 °F (36.6 °C)   Resp 23   Ht 5' 7\" (1.702 m)   Wt 80.5 kg (177 lb 7.5 oz)   SpO2 98%   BMI 27.80 kg/m²       No intake or output data in the 24 hours ending 07/08/20 1602     Physical Examination:             Constitutional:  No acute distress, elderly    ENT:  Oral mucosa moist, oropharynx benign. Resp:  CTA bilaterally. No wheezing/rhonchi/rales. No accessory muscle use   CV:  Regular rhythm, normal rate, no murmurs, gallops, rubs    GI:  Soft, non distended, non tender. normoactive bowel sounds, no hepatosplenomegaly     Musculoskeletal:  No edema, warm, 2+ pulses throughout    Neurologic:  4+ strength, following simple commands             Data Review:    I personally reviewed  Image and labs    Mri Brain Wo Cont    Result Date: 7/8/2020  IMPRESSION: Moderate to large subacute infarction in the left occipital lobe and left basal ganglia. Small to moderate subacute infarction in the right occipital lobe. Mild superimposed hemorrhage but no evidence of midline shift or mass effect. Punctate focus of acute infarction in the posterior superior right frontal lobe. Moderate to severe chronic microvascular ischemic change and cerebral atrophy. Ct Head Wo Cont    Result Date: 7/8/2020  IMPRESSION: Trace parenchymal hemorrhage in the bilateral subacute occipital infarctions. Cta Code Neuro Head And Neck W Cont    Result Date: 6/30/2020  IMPRESSION: CTA Head: 1. No evidence of large vessel occlusion or flow-limiting stenosis. 2. Atherosclerotic disease as above, including moderate stenosis of the left M1 segment, and mild to moderate stenoses of the bilateral posterior cerebral arteries. 3. Acute bilateral PCA territory infarcts, left larger than right. CTA Neck: 1. No evidence of flow-limiting stenosis. 2. Partially visualized small left pleural effusion. CT Brain Perfusion: 1.  Acute ischemia in the left parieto-occipital lobe, with qualitatively decreased cerebral blood flow and blood volume in this region representing completed infarct. Mismatch volume of 4 cc. The findings were called to ER on 6/30/2020 at 12:55 PM by Dr. Zack Valdez. [id number]     Ct Code Neuro Head Wo Contrast    Result Date: 6/30/2020  IMPRESSION: Bilateral acute occipital infarcts without evidence of hemorrhage. The findings were called to Dr. Onelia Mckeon on 6/30/2020 at 12:18 PM by myself. Pedro De Sarah [id number] Neuro Perf W Cbf    Result Date: 6/30/2020  IMPRESSION: CTA Head: 1. No evidence of large vessel occlusion or flow-limiting stenosis. 2. Atherosclerotic disease as above, including moderate stenosis of the left M1 segment, and mild to moderate stenoses of the bilateral posterior cerebral arteries. 3. Acute bilateral PCA territory infarcts, left larger than right. CTA Neck: 1. No evidence of flow-limiting stenosis. 2. Partially visualized small left pleural effusion. CT Brain Perfusion: 1. Acute ischemia in the left parieto-occipital lobe, with qualitatively decreased cerebral blood flow and blood volume in this region representing completed infarct. Mismatch volume of 4 cc. The findings were called to ER on 6/30/2020 at 12:55 PM by Dr. Zack Valdez. [id number]       Labs:     Recent Labs     07/06/20  0530   WBC 7.5   HGB 14.9   HCT 44.1        Recent Labs     07/08/20  0059 07/07/20  0350 07/06/20  0530   * 132* 134*   K 4.3 4.0 3.9   CL 99 100 101   CO2 28 27 27   BUN 40* 27* 25*   CREA 1.64* 1.39* 1.34*   * 116* 105*   CA 8.9 9.0 8.8     Recent Labs     07/06/20  0530   ALT 36      TBILI 0.3   TP 7.2   ALB 2.6*   GLOB 4.6*     No results for input(s): INR, PTP, APTT, INREXT, INREXT in the last 72 hours. No results for input(s): FE, TIBC, PSAT, FERR in the last 72 hours. Lab Results   Component Value Date/Time    Folate 12.2 01/07/2019 11:29 AM      No results for input(s): PH, PCO2, PO2 in the last 72 hours.   No results for input(s): CPK, CKNDX, TROIQ in the last 72 hours.     No lab exists for component: CPKMB  Lab Results   Component Value Date/Time    Cholesterol, total 118 07/01/2020 04:04 AM    HDL Cholesterol 51 07/01/2020 04:04 AM    LDL, calculated 56.6 07/01/2020 04:04 AM    Triglyceride 52 07/01/2020 04:04 AM    CHOL/HDL Ratio 2.3 07/01/2020 04:04 AM     Lab Results   Component Value Date/Time    Glucose (POC) 120 (H) 07/07/2020 04:22 PM    Glucose (POC) 110 (H) 07/06/2020 09:23 PM    Glucose (POC) 98 07/05/2020 11:56 AM    Glucose (POC) 83 07/05/2020 07:54 AM    Glucose (POC) 120 (H) 07/04/2020 08:47 PM     Lab Results   Component Value Date/Time    Color Yellow 08/08/2017 04:09 PM    Appearance Clear 08/08/2017 04:09 PM    Specific gravity 1.017 07/18/2017 02:56 PM    pH (UA) 6.5 08/08/2017 04:09 PM    Protein 100 (A) 07/18/2017 02:56 PM    Glucose NEGATIVE  07/18/2017 02:56 PM    Ketone Negative 08/08/2017 04:09 PM    Bilirubin Negative 08/08/2017 04:09 PM    Urobilinogen 1.0 07/18/2017 02:56 PM    Nitrites Positive (A) 08/08/2017 04:09 PM    Leukocyte Esterase 1+ (A) 08/08/2017 04:09 PM    Epithelial cells MODERATE (A) 07/18/2017 02:56 PM    Bacteria Few 08/08/2017 04:09 PM    WBC 11-30 (A) 08/08/2017 04:09 PM    RBC 0-2 08/08/2017 04:09 PM         Medications Reviewed:     Current Facility-Administered Medications   Medication Dose Route Frequency    amLODIPine (NORVASC) tablet 10 mg  10 mg Oral DAILY    hydrALAZINE (APRESOLINE) tablet 25 mg  25 mg Oral TID    famotidine (PEPCID) tablet 20 mg  20 mg Oral QPM    metoprolol succinate (TOPROL-XL) XL tablet 25 mg  25 mg Oral Q24H    balsam peru-castor oiL (VENELEX) ointment   Topical TID    aspirin chewable tablet 81 mg  81 mg Oral DAILY    [Held by provider] clopidogreL (PLAVIX) tablet 75 mg  75 mg Oral DAILY    hydrALAZINE (APRESOLINE) 20 mg/mL injection 20 mg  20 mg IntraVENous Q6H PRN    allopurinoL (ZYLOPRIM) tablet 100 mg  100 mg Oral DAILY    atorvastatin (LIPITOR) tablet 40 mg  40 mg Oral QHS    acetaminophen (TYLENOL) tablet 650 mg  650 mg Oral Q4H PRN    Or    acetaminophen (TYLENOL) solution 650 mg  650 mg Per NG tube Q4H PRN    Or    acetaminophen (TYLENOL) suppository 650 mg  650 mg Rectal Q4H PRN     ______________________________________________________________________  EXPECTED LENGTH OF STAY: 3d 0h  ACTUAL LENGTH OF STAY:          8                 Ladonna Pro MD

## 2020-07-08 NOTE — PROGRESS NOTES
Bedside shift change report given to Dustin Sexton RN (oncoming nurse) by Casi Ruvalcaba RN (offgoing nurse). Report included the following information SBAR, Kardex, Intake/Output, MAR, Med Rec Status, Cardiac Rhythm paced , Quality Measures and Dual Neuro Assessment.

## 2020-07-08 NOTE — TELEPHONE ENCOUNTER
Discussed with daughter via telephone. She states she wanted to discuss chronic toenail issue that patient had prior to admission. He was previously assessed for this by podiatrist.  She states that the toenail is now very loose and was previously bleeding but is no longer bleeding. Advised that any outpatient visits even virtual must occur after his hospitalization. Advised that I would be happy to address this and other concerns in his transition of care appointment after hospitalization.

## 2020-07-08 NOTE — PROGRESS NOTES
Physical Therapy: Defer    Patient leaving floor at this time for an MRI. Will defer and follow up as able and appropriate.      Neyda Troy, PT, DPT

## 2020-07-08 NOTE — CONSULTS
Cardiac Electrophysiology Hospital Consultation Note   Consult requested by Dr Amina Villarreal and Ruthie Rodriguez NP  Subjective:      Vidhi Soto is a 80 y.o. patient who is seen for evaluation of pacemaker and possible PAF as cause stroke  I had seen him in Jan 2020 in office  His last pacemaker was checked 2 weeks ago and did not have PAF  He had hx of known 1-2 second NSVT    He is admitted with subacute stroke L> R      Brain MRI:   Small focus of acute infarction in the posterior superior right frontal lobe  precentral.   Subacute infarction left and right occipital lobes. Moderate to large left occipital infarction with posterior thalamic and pulmonary subacute infarction on the left. Small to moderate subacute infarction on the right. There is mild superimposed hemorrhage, no associated midline shift or mass effect. Chronic foci of hemosiderin deposition are most likely related to severe chronic  microvascular change. The brain architecture is normal. There is no evidence of midline shift or mass-effect    He has MRI Medtronic dual chamber pacemaker (DOI 03/22/2019) for 2:1 AVB, sinus bradycardia.     Admitted on 03/19/2019 with progressive lower extremity edema, fatigue, & CARREON x 3 weeks per family. Althea Luke reported fall 3 weeks prior to weakness & poor balance.     History HTN      Denies family history of early CAD, arrhythmia, or valve disease. His daughter is at bedside       Hx prostate CA.      Hx of abdominal and thoracic aortic atherosclerosis on CTs    Hx of right adrenal gland lipid rich adenoma 6.1 cm     Diagnosed with Alzheimer's dementia January 2019. He had seen Dr. Haris Devries (neuro) before       EKG: Atrial sensing pacing and ventricular pacing   Echocardiogram:   06/30/20   ECHO ADULT COMPLETE 07/06/2020 7/6/2020    Narrative · Saline contrast was given to evaluate for intracardiac shunt. there is   no evidence of right to left shunt  · Normal cavity size. Moderate concentric hypertrophy. Estimated left   ventricular ejection fraction is 40 - 45%. Visually measured ejection   fraction. No regional wall motion abnormality noted. · Moderately dilated left atrium. · Pacer/ICD present. · Aortic valve leaflet calcification present with reduced excursion. Aortic valve mean gradient is 14 mmHg. Mild aortic valve stenosis is   present. Mild aortic valve regurgitation is present. · Mitral valve non-specific thickening. Trace mitral valve regurgitation   is present. · Mild tricuspid valve regurgitation is present.         Signed by: Sarbjit Burden MD                 Patient Active Problem List   Diagnosis Code    Adrenal mass ([facility] [address] E27.8    Dysuria R30.0    Weight loss R63.4    Essential hypertension I10    Gout M10.9    Pure hypercholesterolemia E78.00    History of prostate cancer Z85.46    Chronic constipation Z80.30    Systolic murmur M77.0    Cognitive impairment R41.89    Stage 3 chronic kidney disease (HCC) N18.3    Rotator cuff arthropathy of both shoulders M12.811, M12.812    Bradycardia R00.1    Thrombocytopenia (HCC) D69.6    Elevated troponin R79.89    Hypertensive urgency I16.0    Second degree AV block, Mobitz type I I44.1    Pacemaker Z95.0    Atrial pacemaker lead displacement T82.120A    Acute CVA (cerebrovascular accident) ([facility] [address] I63.9     Current Facility-Administered Medications   Medication Dose Route Frequency Provider Last Rate Last Dose    amLODIPine (NORVASC) tablet 10 mg  10 mg Oral DAILY Madala, Sushma, MD   10 mg at 07/08/20 0818    hydrALAZINE (APRESOLINE) tablet 25 mg  25 mg Oral TID Veronica Garcia MD   25 mg at 07/08/20 0818    famotidine (PEPCID) tablet 20 mg  20 mg Oral QPM Madala, Sushma, MD   20 mg at 07/07/20 1709    metoprolol succinate (TOPROL-XL) XL tablet 25 mg  25 mg Oral Q24H Vi Heredia MD   25 mg at 07/07/20 2137    balsam peru-castor oiL (VENELEX) ointment   Topical TID Pranav Jackson MD        aspirin chewable tablet 81 mg  81 mg Oral DAILY Toney Leyva MD   81 mg at 07/08/20 0818    clopidogreL (PLAVIX) tablet 75 mg  75 mg Oral DAILY Toney Leyva MD   75 mg at 07/08/20 0818    hydrALAZINE (APRESOLINE) 20 mg/mL injection 20 mg  20 mg IntraVENous Q6H PRN Toney Leyva MD   20 mg at 07/06/20 1853    allopurinoL (ZYLOPRIM) tablet 100 mg  100 mg Oral DAILY Feliberto Watt MD   100 mg at 07/08/20 0818    atorvastatin (LIPITOR) tablet 40 mg  40 mg Oral QHS Feliberto Watt MD   40 mg at 07/07/20 2137    acetaminophen (TYLENOL) tablet 650 mg  650 mg Oral Q4H PRN Feliberto Watt MD        Or   Kathia Alarcon acetaminophen (TYLENOL) solution 650 mg  650 mg Per NG tube Q4H PRN Feliberto aWtt MD        Or   Kathia Alarcon acetaminophen (TYLENOL) suppository 650 mg  650 mg Rectal Q4H PRN Feliberto Watt MD         No Known Allergies  Past Medical History:   Diagnosis Date    Cancer ([address]     prostate    Constipation     Gout     Hyperlipemia     Hypertension     Pacemaker 2019    Stroke [facility])     TIA (transient ischemic attack) 2013     Past Surgical History:   Procedure Laterality Date    HX PROSTATECTOMY      HX UROLOGICAL      prostate removed    GA INS NEW/RPLCMT PRM PACEMAKR W/TRANS ELTRD ATRIAL N/A 10/18/2019    Insert Ppm Single Atrial performed by Fermin Bell MD at [facility] [address] CATH LAB    GA INS NEW/RPLCMT PRM PM W/TRANSV ELTRD ATRIAL&VENT Right 3/22/2019    INSERT PPM DUAL performed by Fermin Bell MD at [facility] [address] CATH LAB     Family History   Problem Relation Age of Onset    No Known Problems Mother     No Known Problems Father      Social History     Tobacco Use    Smoking status: Never Smoker    Smokeless tobacco: Never Used   Substance Use Topics    Alcohol use: No        Review of Systems:   Constitutional: Negative for fever, chills, + weight loss, + malaise/fatigue. HEENT: Negative for nosebleeds, + vision changes.    Respiratory: Negative for cough, hemoptysis  Cardiovascular: Negative for chest pain, palpitations, orthopnea, claudication, leg swelling, syncope, and PND. Gastrointestinal: Negative for nausea, vomiting, diarrhea, blood in stool and melena. Genitourinary: Negative for dysuria, and hematuria. Musculoskeletal: Negative for myalgias, arthralgia. Skin: Negative for rash. Heme: Does not bleed or bruise easily. Neurological: Negative for speech change and + focal weakness     Objective:     Visit Vitals  /63 (BP 1 Location: Right arm, BP Patient Position: At rest)   Pulse 74   Temp 97.9 °F (36.6 °C)   Resp 23   Ht 5' 7\" (1.702 m)   Wt 177 lb 7.5 oz (80.5 kg)   SpO2 98%   BMI 27.80 kg/m²      Physical Exam:   Constitutional: well-developed and well-nourished. No respiratory distress. Head: Normocephalic and atraumatic. Eyes: Pupils are equal, round  ENT: hearing normal  Neck: supple. No JVD present. Cardiovascular: Normal rate, regular rhythm. Exam reveals no gallop and no friction rub. 2/6 systolic murmur heard. Pulmonary/Chest: Effort normal and breath sounds normal. No wheezes. Abdominal: Soft, no tenderness. Musculoskeletal: no edema. Neurological: alert    Skin: warm and dry  Psychiatric: normal mood and affect.  Behavior is normal.      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, LVEF 40-45% which has been stable and not worsened  Short 1-2 second NSVTs not correlated with his stroke  Hx of abdominal and thoracic aortic atherosclerosis on CTs  Hx of right adrenal gland lipid rich adenoma 6.1 cm      Pacemaker check today did not show AFIB or atrial flutter  He had 1-2 second NSVT 4 times first 5 months of the year but not related to his recent stroke    There is no known cardiac source of embolism    I discussed 2 D echo result with his daughter, no PFO or ASD present on this study with agitated saline injection  I discussed MICHELE but for aortic arch atherosclerosis, it would not change treatment with antiplatelet and lowering lipid so we are not planning MICHELE for now. He had hx of abdominal and thoracic aortic atherosclerosis on previous CTs and Hx of right adrenal gland lipid rich adenoma 6.1 cm so I think the source of embolism could be fat but not sure what else can be done    Aspirin+/-plavix (plavix held due to small hemorrhage in area of stroke) and he is on lipitor already   His LDL is 56, so already at target. As for LVEF 40-45%, I had discussed upgrade of pacemaker ( chest right sided) to biventricular pacer but given his recent stroke and lack of acute CHF symptoms, this can wait for now    Thank you for involving me in this patient's care and please call with further concerns or questions. Vee Fontaine M.D.   Electrophysiology/Cardiology  [facility] [place]  [address] [place] [address]                                [phone]

## 2020-07-08 NOTE — PROGRESS NOTES
Problem: Falls - Risk of  Goal: *Absence of Falls  Description: Document Devere Alleene Fall Risk and appropriate interventions in the flowsheet. Outcome: Progressing Towards Goal  Note: Fall Risk Interventions:  Mobility Interventions: Bed/chair exit alarm    Mentation Interventions: Bed/chair exit alarm    Medication Interventions: Patient to call before getting OOB, Bed/chair exit alarm    Elimination Interventions: Bed/chair exit alarm, Call light in reach    History of Falls Interventions: Bed/chair exit alarm         Problem: Patient Education: Go to Patient Education Activity  Goal: Patient/Family Education  Outcome: Progressing Towards Goal     Problem: Pressure Injury - Risk of  Goal: *Prevention of pressure injury  Description: Document Torito Scale and appropriate interventions in the flowsheet.   Outcome: Progressing Towards Goal  Note: Pressure Injury Interventions:  Sensory Interventions: Assess changes in LOC, Assess need for specialty bed, Avoid rigorous massage over bony prominences    Moisture Interventions: Absorbent underpads, Apply protective barrier, creams and emollients, Assess need for specialty bed    Activity Interventions: Assess need for specialty bed    Mobility Interventions: Assess need for specialty bed    Nutrition Interventions: Document food/fluid/supplement intake    Friction and Shear Interventions: Apply protective barrier, creams and emollients                Problem: Patient Education: Go to Patient Education Activity  Goal: Patient/Family Education  Outcome: Progressing Towards Goal     Problem: Hypertension  Goal: *Blood pressure within specified parameters  Outcome: Progressing Towards Goal  Goal: *Fluid volume balance  Outcome: Progressing Towards Goal  Goal: *Labs within defined limits  Outcome: Progressing Towards Goal     Problem: TIA/CVA Stroke: Day 2 Until Discharge  Goal: Off Pathway (Use only if patient is Off Pathway)  Outcome: Progressing Towards Goal  Goal: Activity/Safety  Outcome: Progressing Towards Goal  Goal: Diagnostic Test/Procedures  Outcome: Progressing Towards Goal  Goal: Nutrition/Diet  Outcome: Progressing Towards Goal  Goal: Discharge Planning  Outcome: Progressing Towards Goal  Goal: Medications  Outcome: Progressing Towards Goal  Goal: Respiratory  Outcome: Progressing Towards Goal  Goal: Treatments/Interventions/Procedures  Outcome: Progressing Towards Goal  Goal: Psychosocial  Outcome: Progressing Towards Goal  Goal: *Verbalizes anxiety and depression are reduced or absent  Outcome: Progressing Towards Goal  Goal: *Absence of aspiration  Outcome: Progressing Towards Goal  Goal: *Absence of deep venous thrombosis signs and symptoms(Stroke Metric)  Outcome: Progressing Towards Goal  Goal: *Optimal pain control at patient's stated goal  Outcome: Progressing Towards Goal  Goal: *Tolerating diet  Outcome: Progressing Towards Goal  Goal: *Ability to perform ADLs and demonstrates progressive mobility and function  Outcome: Progressing Towards Goal  Goal: *Stroke education continued(Stroke Metric)  Outcome: Progressing Towards Goal     Problem: Patient Education: Go to Patient Education Activity  Goal: Patient/Family Education  Outcome: Progressing Towards Goal     Problem: Risk for Spread of Infection  Goal: Prevent transmission of infectious organism to others  Description: Prevent the transmission of infectious organisms to other patients, staff members, and visitors.   Outcome: Progressing Towards Goal     Problem: Patient Education:  Go to Education Activity  Goal: Patient/Family Education  Outcome: Progressing Towards Goal     Problem: Patient Education: Go to Patient Education Activity  Goal: Patient/Family Education  Outcome: Progressing Towards Goal     Problem: Patient Education: Go to Patient Education Activity  Goal: Patient/Family Education  Outcome: Progressing Towards Goal     Problem: Patient Education: Go to Patient Education Activity  Goal: Patient/Family Education  Outcome: Progressing Towards Goal     Problem: Ischemic Stroke: Discharge Outcomes  Goal: *Verbalizes anxiety and depression are reduced or absent  Outcome: Progressing Towards Goal  Goal: *Verbalize understanding of risk factor modification(Stroke Metric)  Outcome: Progressing Towards Goal  Goal: *Hemodynamically stable  Outcome: Progressing Towards Goal  Goal: *Absence of aspiration pneumonia  Outcome: Progressing Towards Goal  Goal: *Aware of needed dietary changes  Outcome: Progressing Towards Goal  Goal: *Verbalize understanding of prescribed medications including anti-coagulants, anti-lipid, and/or anti-platelets(Stroke Metric)  Outcome: Progressing Towards Goal  Goal: *Tolerating diet  Outcome: Progressing Towards Goal  Goal: *Aware of follow-up diagnostics related to anticoagulants  Outcome: Progressing Towards Goal  Goal: *Ability to perform ADLs and demonstrates progressive mobility and function  Outcome: Progressing Towards Goal  Goal: *Absence of DVT(Stroke Metric)  Outcome: Progressing Towards Goal  Goal: *Absence of aspiration  Outcome: Progressing Towards Goal  Goal: *Optimal pain control at patient's stated goal  Outcome: Progressing Towards Goal  Goal: *Home safety concerns addressed  Outcome: Progressing Towards Goal  Goal: *Describes available resources and support systems  Outcome: Progressing Towards Goal  Goal: *Verbalizes understanding of activation of EMS(911) for stroke symptoms(Stroke Metric)  Outcome: Progressing Towards Goal  Goal: *Understands and describes signs and symptoms to report to providers(Stroke Metric)  Outcome: Progressing Towards Goal  Goal: *Neurolgocially stable (absence of additional neurological deficits)  Outcome: Progressing Towards Goal  Goal: *Verbalizes importance of follow-up with primary care physician(Stroke Metric)  Outcome: Progressing Towards Goal  Goal: *Smoking cessation discussed,if applicable(Stroke Metric)  Outcome: Progressing Towards Goal  Goal: *Depression screening completed(Stroke Metric)  Outcome: Progressing Towards Goal     Problem: Nutrition Deficit  Goal: *Optimize nutritional status  Outcome: Progressing Towards Goal

## 2020-07-08 NOTE — PROGRESS NOTES
Neurology Progress Note    Patient ID:  Yumiko Nino  [id number]  80 y.o.  3/9/1933    Chief Complaint:CVA     Subjective:   19-year-old gentleman who presented with mental status changes and weakness. Initial CT imaging showing bilateral PCA infarcts more on the left. Daughter at the bedside. MRI of the brain small to moderate subacute infarction in the right occipital lobe. Mild superimposed hemorrhage but no evidence of midline shift or mass effect. Punctate focus of acute infarction in the posterior superior right frontal lobe. Moderate to severe chronic microvascular ischemic change and cerebral atrophy  Objective: All records in [facility] reviewed and noted    ROS:  Unable to obtain     Meds:  Current Facility-Administered Medications   Medication Dose Route Frequency    amLODIPine (NORVASC) tablet 10 mg  10 mg Oral DAILY    hydrALAZINE (APRESOLINE) tablet 25 mg  25 mg Oral TID    famotidine (PEPCID) tablet 20 mg  20 mg Oral QPM    metoprolol succinate (TOPROL-XL) XL tablet 25 mg  25 mg Oral Q24H    balsam peru-castor oiL (VENELEX) ointment   Topical TID    aspirin chewable tablet 81 mg  81 mg Oral DAILY    clopidogreL (PLAVIX) tablet 75 mg  75 mg Oral DAILY    hydrALAZINE (APRESOLINE) 20 mg/mL injection 20 mg  20 mg IntraVENous Q6H PRN    allopurinoL (ZYLOPRIM) tablet 100 mg  100 mg Oral DAILY    atorvastatin (LIPITOR) tablet 40 mg  40 mg Oral QHS    acetaminophen (TYLENOL) tablet 650 mg  650 mg Oral Q4H PRN    Or    acetaminophen (TYLENOL) solution 650 mg  650 mg Per NG tube Q4H PRN    Or    acetaminophen (TYLENOL) suppository 650 mg  650 mg Rectal Q4H PRN       Imaging:  CT Results (most recent):  Results from Hospital Encounter encounter on 06/30/20   CT HEAD WO CONT    Narrative HEAD CT WITHOUT CONTRAST: 7/8/2020 1:29 PM    INDICATION: evaluate Mild superimposed hemorrhage    COMPARISON: 6/30/2020, MRI brain 7/8/2020.     PROCEDURE: Axial images of the head were obtained without contrast. Coronal and  sagittal reformats were performed. CT dose reduction was achieved through use of  a standardized protocol tailored for this examination and automatic exposure  control for dose modulation. FINDINGS: There is trace parenchymal hemorrhage in the bilateral occipital  infarctions, probably stable given differences in technique from earlier today. Edema associated with a subacute infarctions causes local mass effect, with  effacement of the sulci. No ventricular mass effect or intracranial shift. There  is an older lacunar infarction in the left basal ganglia. There is bilateral  proptosis. Impression IMPRESSION: Trace parenchymal hemorrhage in the bilateral subacute occipital  infarctions. Lab Review   Recent Results (from the past 24 hour(s))   GLUCOSE, POC    Collection Time: 07/07/20  4:22 PM   Result Value Ref Range    Glucose (POC) 120 (H) 65 - 100 mg/dL    Performed by Belinda Oh    METABOLIC PANEL, BASIC    Collection Time: 07/08/20 12:59 AM   Result Value Ref Range    Sodium 132 (L) 136 - 145 mmol/L    Potassium 4.3 3.5 - 5.1 mmol/L    Chloride 99 97 - 108 mmol/L    CO2 28 21 - 32 mmol/L    Anion gap 5 5 - 15 mmol/L    Glucose 133 (H) 65 - 100 mg/dL    BUN 40 (H) 6 - 20 MG/DL    Creatinine 1.64 (H) 0.70 - 1.30 MG/DL    BUN/Creatinine ratio 24 (H) 12 - 20      GFR est AA 48 (L) >60 ml/min/1.73m2    GFR est non-AA 40 (L) >60 ml/min/1.73m2    Calcium 8.9 8.5 - 10.1 MG/DL   SAMPLES BEING HELD    Collection Time: 07/08/20 12:59 AM   Result Value Ref Range    SAMPLES BEING HELD 1lav     COMMENT        Add-on orders for these samples will be processed based on acceptable specimen integrity and analyte stability, which may vary by analyte.        Exam:  Visit Vitals  /82   Pulse 81   Temp 98.6 °F (37 °C)   Resp 16   Ht 5' 7\" (1.702 m)   Wt 80.5 kg (177 lb 7.5 oz)   SpO2 95%   BMI 27.80 kg/m²     Gen: Well developed  CV: RRR  Neuro: Alert to self;  no dysarthria or aphasia  CN II-XII: unable to track finger. PERRL, face symmetric, tongue/palate midline  Motor:moving all extremities spontaneously    Gait: deferred     Assessment:     Patient Active Problem List   Diagnosis Code    Adrenal mass (HCC) E27.8    Dysuria R30.0    Weight loss R63.4    Essential hypertension I10    Gout M10.9    Pure hypercholesterolemia E78.00    History of prostate cancer Z85.46    Chronic constipation D56.07    Systolic murmur H00.0    Cognitive impairment R41.89    Stage 3 chronic kidney disease (HCC) N18.3    Rotator cuff arthropathy of both shoulders M12.811, M12.812    Bradycardia R00.1    Thrombocytopenia ([facility]) D69.6    Elevated troponin R79.89    Hypertensive urgency I16.0    Second degree AV block, Mobitz type I I44.1    Pacemaker Z95.0    Atrial pacemaker lead displacement T82.120A    Acute CVA (cerebrovascular accident) ([facility] [address] I63.9       Plan:   1.) Small to moderate subacute infarction in the right occipital lobe.     -Embolic nature; consults cardiology    -MRI of the brainSmall to moderate subacute infarction in the right occipital lobe. Mild superimposed hemorrhage but no evidence of midline shift or mass effect. Punctate focus of acute infarction in the posterior superior right frontal lobe. Moderate to severe chronic microvascular ischemic change and cerebral atrophy.   -Will repeat CTH to evaluate hemorrhage   -LDL 56.6, continue Statin   -a1c 5.7, at goal    -continue ASA 81mg and plavix 75mg    -PT/OT    -Echo, No shunt seen   Stroke education    -discuss plan with daughter  2.) Cognitive impairment    - follow up outpatient for formal cognitive workup    - support care      Thank you for allowing the Neurology Service the pleasure of participating in the care of your patient. This patient will be discussed with my collaborating care team physician  and he may have further recommendations regarding this patient's care.       Signed:  Kadi Jennings NP  7/8/2020  8:43 AM

## 2020-07-08 NOTE — TELEPHONE ENCOUNTER
Patient daughter Linda Xiong ) is calling because Mr. Pipo Norris is in the hospital & the physician at the hospital has not observed that his toenail is bleeding & hard on the left foot. What type of treatment do you recommend for the patient.  Mr. Pipo Norris is scheduled to be discharged today from the hospital. Please call Robbie garibay  [phone]

## 2020-07-08 NOTE — WOUND CARE
WOCN Note:  
  
Follow up visit. Patient seen in 65. PPE:  Mask and gloves. 
  
Chart reviewed. Admitted DX:  Acute CVA Assessment:  
Patient is drowsy, communicative and requires assist with repositioning. Bed: foam mattress Patient wearing briefs for incontinence. Heels intact without erythema. 1.  Sacrum and buttocks intact without erythema. Wound, Pressure Prevention & Skin Care Recommendations: 1. Minimize layers of linen/pads under patient to optimize support surface. 2.  Turn/reposition approximately every 2 hours and offload heels. 3.  Manage incontinence and moisture. 4.  Sacral/coccyx:  Venelex TID. 
  
Transition of Care: Plan to follow as needed. 
  
ROD Ramírez RN [facility] [place] [facility] Available on [employer] Pager [phone] Office [phone]

## 2020-07-09 VITALS
DIASTOLIC BLOOD PRESSURE: 74 MMHG | HEIGHT: 67 IN | BODY MASS INDEX: 26.75 KG/M2 | HEART RATE: 71 BPM | RESPIRATION RATE: 22 BRPM | TEMPERATURE: 98 F | OXYGEN SATURATION: 97 % | WEIGHT: 170.42 LBS | SYSTOLIC BLOOD PRESSURE: 140 MMHG

## 2020-07-09 PROBLEM — I63.9 ACUTE CVA (CEREBROVASCULAR ACCIDENT) (HCC): Status: RESOLVED | Noted: 2020-06-30 | Resolved: 2020-07-09

## 2020-07-09 LAB
ANION GAP SERPL CALC-SCNC: 6 MMOL/L (ref 5–15)
BASOPHILS # BLD: 0 K/UL (ref 0–0.1)
BASOPHILS NFR BLD: 1 % (ref 0–1)
BUN SERPL-MCNC: 43 MG/DL (ref 6–20)
BUN/CREAT SERPL: 27 (ref 12–20)
CALCIUM SERPL-MCNC: 8.7 MG/DL (ref 8.5–10.1)
CHLORIDE SERPL-SCNC: 101 MMOL/L (ref 97–108)
CO2 SERPL-SCNC: 27 MMOL/L (ref 21–32)
CREAT SERPL-MCNC: 1.58 MG/DL (ref 0.7–1.3)
DIFFERENTIAL METHOD BLD: ABNORMAL
EOSINOPHIL # BLD: 0.3 K/UL (ref 0–0.4)
EOSINOPHIL NFR BLD: 4 % (ref 0–7)
ERYTHROCYTE [DISTWIDTH] IN BLOOD BY AUTOMATED COUNT: 14.5 % (ref 11.5–14.5)
GLUCOSE SERPL-MCNC: 147 MG/DL (ref 65–100)
HCT VFR BLD AUTO: 37.5 % (ref 36.6–50.3)
HGB BLD-MCNC: 12.5 G/DL (ref 12.1–17)
IMM GRANULOCYTES # BLD AUTO: 0 K/UL (ref 0–0.04)
IMM GRANULOCYTES NFR BLD AUTO: 0 % (ref 0–0.5)
LYMPHOCYTES # BLD: 2.1 K/UL (ref 0.8–3.5)
LYMPHOCYTES NFR BLD: 26 % (ref 12–49)
MCH RBC QN AUTO: 30.6 PG (ref 26–34)
MCHC RBC AUTO-ENTMCNC: 33.3 G/DL (ref 30–36.5)
MCV RBC AUTO: 91.7 FL (ref 80–99)
MONOCYTES # BLD: 1.1 K/UL (ref 0–1)
MONOCYTES NFR BLD: 14 % (ref 5–13)
NEUTS SEG # BLD: 4.5 K/UL (ref 1.8–8)
NEUTS SEG NFR BLD: 55 % (ref 32–75)
NRBC # BLD: 0 K/UL (ref 0–0.01)
NRBC BLD-RTO: 0 PER 100 WBC
PLATELET # BLD AUTO: 197 K/UL (ref 150–400)
PMV BLD AUTO: 9.4 FL (ref 8.9–12.9)
POTASSIUM SERPL-SCNC: 4.1 MMOL/L (ref 3.5–5.1)
RBC # BLD AUTO: 4.09 M/UL (ref 4.1–5.7)
SODIUM SERPL-SCNC: 134 MMOL/L (ref 136–145)
WBC # BLD AUTO: 8.1 K/UL (ref 4.1–11.1)

## 2020-07-09 PROCEDURE — 74011250637 HC RX REV CODE- 250/637: Performed by: INTERNAL MEDICINE

## 2020-07-09 PROCEDURE — 85025 COMPLETE CBC W/AUTO DIFF WBC: CPT

## 2020-07-09 PROCEDURE — 74011250637 HC RX REV CODE- 250/637: Performed by: HOSPITALIST

## 2020-07-09 PROCEDURE — 74011250637 HC RX REV CODE- 250/637: Performed by: FAMILY MEDICINE

## 2020-07-09 PROCEDURE — 80048 BASIC METABOLIC PNL TOTAL CA: CPT

## 2020-07-09 PROCEDURE — 36415 COLL VENOUS BLD VENIPUNCTURE: CPT

## 2020-07-09 RX ORDER — BALSAM PERU/CASTOR OIL
OINTMENT (GRAM) TOPICAL 3 TIMES DAILY
Qty: 1 TUBE | Refills: 0 | Status: SHIPPED
Start: 2020-07-09

## 2020-07-09 RX ORDER — FAMOTIDINE 20 MG/1
20 TABLET, FILM COATED ORAL EVERY EVENING
Qty: 30 TAB | Refills: 0 | Status: ON HOLD
Start: 2020-07-09 | End: 2021-09-08

## 2020-07-09 RX ORDER — ATORVASTATIN CALCIUM 40 MG/1
40 TABLET, FILM COATED ORAL
Qty: 30 TAB | Refills: 0 | Status: SHIPPED
Start: 2020-07-09 | End: 2020-07-13 | Stop reason: SDUPTHER

## 2020-07-09 RX ADMIN — ALLOPURINOL 100 MG: 100 TABLET ORAL at 09:54

## 2020-07-09 RX ADMIN — HYDRALAZINE HYDROCHLORIDE 25 MG: 25 TABLET, FILM COATED ORAL at 09:54

## 2020-07-09 RX ADMIN — AMLODIPINE BESYLATE 10 MG: 5 TABLET ORAL at 09:54

## 2020-07-09 RX ADMIN — ASPIRIN 81 MG CHEWABLE TABLET 81 MG: 81 TABLET CHEWABLE at 09:54

## 2020-07-09 RX ADMIN — CASTOR OIL AND BALSAM, PERU: 788; 87 OINTMENT TOPICAL at 09:54

## 2020-07-09 NOTE — PROGRESS NOTES
Problem: Falls - Risk of  Goal: *Absence of Falls  Description: Document Jamshid Vega Fall Risk and appropriate interventions in the flowsheet. Outcome: Progressing Towards Goal  Note: Fall Risk Interventions:  Mobility Interventions: Bed/chair exit alarm    Mentation Interventions: Bed/chair exit alarm    Medication Interventions: Bed/chair exit alarm    Elimination Interventions: Bed/chair exit alarm    History of Falls Interventions: Bed/chair exit alarm         Problem: Patient Education: Go to Patient Education Activity  Goal: Patient/Family Education  Outcome: Progressing Towards Goal     Problem: Pressure Injury - Risk of  Goal: *Prevention of pressure injury  Description: Document Torito Scale and appropriate interventions in the flowsheet.   Outcome: Progressing Towards Goal  Note: Pressure Injury Interventions:  Sensory Interventions: Assess changes in LOC, Assess need for specialty bed, Avoid rigorous massage over bony prominences    Moisture Interventions: Absorbent underpads, Apply protective barrier, creams and emollients, Assess need for specialty bed    Activity Interventions: Assess need for specialty bed, Increase time out of bed    Mobility Interventions: Assess need for specialty bed    Nutrition Interventions: Document food/fluid/supplement intake    Friction and Shear Interventions: Apply protective barrier, creams and emollients                Problem: Patient Education: Go to Patient Education Activity  Goal: Patient/Family Education  Outcome: Progressing Towards Goal     Problem: Hypertension  Goal: *Blood pressure within specified parameters  Outcome: Progressing Towards Goal  Goal: *Fluid volume balance  Outcome: Progressing Towards Goal  Goal: *Labs within defined limits  Outcome: Progressing Towards Goal     Problem: TIA/CVA Stroke: Day 2 Until Discharge  Goal: Off Pathway (Use only if patient is Off Pathway)  Outcome: Progressing Towards Goal  Goal: Activity/Safety  Outcome: Progressing Towards Goal  Goal: Diagnostic Test/Procedures  Outcome: Progressing Towards Goal  Goal: Nutrition/Diet  Outcome: Progressing Towards Goal  Goal: Discharge Planning  Outcome: Progressing Towards Goal  Goal: Medications  Outcome: Progressing Towards Goal  Goal: Respiratory  Outcome: Progressing Towards Goal  Goal: Treatments/Interventions/Procedures  Outcome: Progressing Towards Goal  Goal: Psychosocial  Outcome: Progressing Towards Goal  Goal: *Verbalizes anxiety and depression are reduced or absent  Outcome: Progressing Towards Goal  Goal: *Absence of aspiration  Outcome: Progressing Towards Goal  Goal: *Absence of deep venous thrombosis signs and symptoms(Stroke Metric)  Outcome: Progressing Towards Goal  Goal: *Optimal pain control at patient's stated goal  Outcome: Progressing Towards Goal  Goal: *Tolerating diet  Outcome: Progressing Towards Goal  Goal: *Ability to perform ADLs and demonstrates progressive mobility and function  Outcome: Progressing Towards Goal  Goal: *Stroke education continued(Stroke Metric)  Outcome: Progressing Towards Goal     Problem: Patient Education: Go to Patient Education Activity  Goal: Patient/Family Education  Outcome: Progressing Towards Goal     Problem: Risk for Spread of Infection  Goal: Prevent transmission of infectious organism to others  Description: Prevent the transmission of infectious organisms to other patients, staff members, and visitors.   Outcome: Progressing Towards Goal     Problem: Patient Education:  Go to Education Activity  Goal: Patient/Family Education  Outcome: Progressing Towards Goal     Problem: Patient Education: Go to Patient Education Activity  Goal: Patient/Family Education  Outcome: Progressing Towards Goal     Problem: Patient Education: Go to Patient Education Activity  Goal: Patient/Family Education  Outcome: Progressing Towards Goal     Problem: Patient Education: Go to Patient Education Activity  Goal: Patient/Family Education  Outcome: Progressing Towards Goal     Problem: Ischemic Stroke: Discharge Outcomes  Goal: *Verbalizes anxiety and depression are reduced or absent  Outcome: Progressing Towards Goal  Goal: *Verbalize understanding of risk factor modification(Stroke Metric)  Outcome: Progressing Towards Goal  Goal: *Hemodynamically stable  Outcome: Progressing Towards Goal  Goal: *Absence of aspiration pneumonia  Outcome: Progressing Towards Goal  Goal: *Aware of needed dietary changes  Outcome: Progressing Towards Goal  Goal: *Verbalize understanding of prescribed medications including anti-coagulants, anti-lipid, and/or anti-platelets(Stroke Metric)  Outcome: Progressing Towards Goal  Goal: *Tolerating diet  Outcome: Progressing Towards Goal  Goal: *Aware of follow-up diagnostics related to anticoagulants  Outcome: Progressing Towards Goal  Goal: *Ability to perform ADLs and demonstrates progressive mobility and function  Outcome: Progressing Towards Goal  Goal: *Absence of DVT(Stroke Metric)  Outcome: Progressing Towards Goal  Goal: *Absence of aspiration  Outcome: Progressing Towards Goal  Goal: *Optimal pain control at patient's stated goal  Outcome: Progressing Towards Goal  Goal: *Home safety concerns addressed  Outcome: Progressing Towards Goal  Goal: *Describes available resources and support systems  Outcome: Progressing Towards Goal  Goal: *Verbalizes understanding of activation of EMS(911) for stroke symptoms(Stroke Metric)  Outcome: Progressing Towards Goal  Goal: *Understands and describes signs and symptoms to report to providers(Stroke Metric)  Outcome: Progressing Towards Goal  Goal: *Neurolgocially stable (absence of additional neurological deficits)  Outcome: Progressing Towards Goal  Goal: *Verbalizes importance of follow-up with primary care physician(Stroke Metric)  Outcome: Progressing Towards Goal  Goal: *Smoking cessation discussed,if applicable(Stroke Metric)  Outcome: Progressing Towards Goal  Goal: *Depression screening completed(Stroke Metric)  Outcome: Progressing Towards Goal     Problem: Nutrition Deficit  Goal: *Optimize nutritional status  Outcome: Progressing Towards Goal

## 2020-07-09 NOTE — PROGRESS NOTES
Problem: Falls - Risk of  Goal: *Absence of Falls  Description: Document Ivar Du Fall Risk and appropriate interventions in the flowsheet.   Outcome: Progressing Towards Goal  Note: Fall Risk Interventions:  Mobility Interventions: Bed/chair exit alarm    Mentation Interventions: Bed/chair exit alarm    Medication Interventions: Bed/chair exit alarm    Elimination Interventions: Bed/chair exit alarm    History of Falls Interventions: Bed/chair exit alarm         Problem: Hypertension  Goal: *Blood pressure within specified parameters  Outcome: Progressing Towards Goal     Problem: Ischemic Stroke: Discharge Outcomes  Goal: *Hemodynamically stable  Outcome: Progressing Towards Goal  Goal: *Absence of aspiration pneumonia  Outcome: Progressing Towards Goal  Goal: *Tolerating diet  Outcome: Progressing Towards Goal

## 2020-07-09 NOTE — PROGRESS NOTES
Bedside shift change report given to Romayne Bickers, RN (oncoming nurse) by Olaf Garcia RN (offgoing nurse). Report included the following information SBAR, Kardex, Intake/Output, MAR, Med Rec Status, Cardiac Rhythm PAced and Dual Neuro Assessment.

## 2020-07-09 NOTE — PROGRESS NOTES
The documentation for this period is being entered following the guidelines as defined in the [facility] policy by Jaci Arguello.       [id number]

## 2020-07-09 NOTE — DISCHARGE SUMMARY
Discharge Summary       PATIENT ID: Fredo Noe  MRN: [id number]   YOB: 1933    DATE OF ADMISSION: 6/30/2020 12:05 PM    DATE OF DISCHARGE: 7/9/2020    PRIMARY CARE PROVIDER: Les Moeller MD     ATTENDING PHYSICIAN: Rea Jimenez MD   DISCHARGING PROVIDER: Rea Jimenez MD    To contact this individual call [phone] and ask the  to page. If unavailable ask to be transferred the Adult Hospitalist Department. CONSULTATIONS: IP CONSULT TO HOSPITALIST  IP CONSULT TO NEUROLOGY  IP CONSULT TO CARDIOLOGY    PROCEDURES/SURGERIES: * No surgery found *    ADMITTING [address] [place] [address] COURSE:        Admission Summary:   The patient is an 59-year-old gentleman with past medical history of hypertension, prostate cancer, constipation, gout, hyperlipidemia, stroke, and TIA, who presents to the hospital with the above-mentioned symptom.  History was primarily obtained from the patient's daughter, Josse Cabello patient is confused and has some right-sided weakness with right-sided neglect.  She reports that the patient was last seen normal 2 days back on Sunday.  Per care facility, the patient was normal last night. Radha Galeas became disoriented initially and then developed issues with walking and fell hitting his head, but there was no loss of consciousness.  She reports the patient lives at -- and the patient was brought over here as a code stroke, was found to have occipital stroke.  Neurointerventional Surgery was consulted and they felt that the stroke has been completed and the patient does not need any acute surgical intervention.  The patient was requested to be admitted under the hospitalist service.  Currently, the patient is resting in bed, does not appear to be distressed.  No further history could be obtained from the patient or his daughter.     Assessment & Plan:      Acute parieto-occipital cerebrovascular accident  -CT head: Bilateral acute occipital infarcts without evidence of hemorrhage.  - CTA head: Acute bilateral PCA territory infarcts, left larger than right.  - CT perf:  Acute ischemia in the left parieto-occipital lobe, with qualitatively decreased cerebral blood flow and blood volume in this region representing completed infarct. - appreciate Neurology input  - LDL 56, A1c 5.7  - MRI brain: Moderate to large subacute infarction in the left occipital lobe and left basal  ganglia. Small to moderate subacute infarction in the right occipital lobe. Mild superimposed hemorrhage but no evidence of midline shift or mass effect. Punctate focus of acute infarction in the posterior superior right frontal lobe. - cardiology consulted. - rpt CT head 7/8: Trace parenchymal hemorrhage in the bilateral subacute occipital infarctions. - Echo: EF 40 - 45%.   - PT/OT/SLP recommendations noted. - c/w aspirin and statin. plavix discontinued per neurology recs due to trace hemorrhage.     Hypertension:   - C/w metoprolol, Hydralazine, Amlodipine  - Monitor BP closely      Acute kidney injury on chronic kidney disease stage II, likely prerenal:  improving   - Avoid nephrotoxic medications and renally dose all other medications. - Creat stable  - Monitor renal function     Hyponatremia: improving   - Likely related to mild dehydration, less likely intracranial process driven     Elevated troponin - flat; likely demand ischemia      S/p PPM for 2:1 AVB, sinus bradycardia. Hx prostate ca     Code status: Full  DVT prophylaxis: SCDs  Care Plan discussed with: Patient/Family and Nurse  Anticipated Disposition: [facility] [address]  Anticipated Discharge: Less than 24 hours.  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, LVEF 40-45% which has been stable and not worsened  Short 1-2 second NSVTs not correlated with his stroke  Hx of abdominal and thoracic aortic atherosclerosis on CTs  Hx of right adrenal gland lipid rich adenoma 6.1 cm        Pacemaker check today did not show AFIB or atrial flutter  He had 1-2 second NSVT 4 times first 5 months of the year but not related to his recent stroke     There is no known cardiac source of embolism     I discussed 2 D echo result with his daughter, no PFO or ASD present on this study with agitated saline injection  I discussed MICHELE but for aortic arch atherosclerosis, it would not change treatment with antiplatelet and lowering lipid so we are not planning MICHELE for now.       He had hx of abdominal and thoracic aortic atherosclerosis on previous CTs and Hx of right adrenal gland lipid rich adenoma 6.1 cm so I think the source of embolism could be fat but not sure what else can be done     Aspirin+/-plavix (plavix held due to small hemorrhage in area of stroke) and he is on lipitor already   His LDL is 56, so already at target.     As for LVEF 40-45%, I had discussed upgrade of pacemaker ( chest right sided) to biventricular pacer but given his recent stroke and lack of acute CHF symptoms, this can wait for now                  DISCHARGE DIAGNOSES / PLAN:      1.  as above      ADDITIONAL CARE RECOMMENDATIONS:         PENDING TEST RESULTS:   At the time of discharge the following test results are still pending:       FOLLOW UP APPOINTMENTS:    Follow-up Information     Follow up With Specialties Details Why [address]   Call today As needed; confirm home  health visits  [phone]    Miguel Forde MD [place] [address]  [phone]               DIET:  Reg, w magic cup dinner and ensure enlive breakfast and lunch   ACTIVITY: Activity as tolerated           DISCHARGE MEDICATIONS:  Current Discharge Medication List      START taking these medications    Details atorvastatin (LIPITOR) 40 mg tablet Take 1 Tab by mouth nightly. Qty: 30 Tab, Refills: 0      balsam peru-castor oiL (VENELEX) ointment Apply  to affected area three (3) times daily. Qty: 1 Tube, Refills: 0      famotidine (PEPCID) 20 mg tablet Take 1 Tab by mouth every evening. Qty: 30 Tab, Refills: 0         CONTINUE these medications which have NOT CHANGED    Details   acetaminophen (TYLENOL) 325 mg tablet Take 650 mg by mouth every six (6) hours as needed for Pain.      hydrALAZINE (APRESOLINE) 25 mg tablet Take 1 Tab by mouth three (3) times daily. Qty: 270 Tab, Refills: 0    Associated Diagnoses: Essential hypertension      metoprolol succinate (TOPROL-XL) 25 mg XL tablet Take 1 Tab by mouth daily. Qty: 90 Tab, Refills: 1    Associated Diagnoses: Essential hypertension; Cardiac pacemaker in situ; Bradycardia; Second degree AV block      amLODIPine (NORVASC) 5 mg tablet Take 1 Tab by mouth daily. Qty: 30 Tab, Refills: 1      aspirin delayed-release 81 mg tablet Take 81 mg by mouth daily. allopurinol (ZYLOPRIM) 100 mg tablet Take 1 Tab by mouth daily. Qty: 90 Tab, Refills: 1    Associated Diagnoses: Gout, unspecified cause, unspecified chronicity, unspecified site         STOP taking these medications       simvastatin (ZOCOR) 20 mg tablet Comments:   Reason for Stopping:         furosemide (LASIX) 20 mg tablet Comments:   Reason for Stopping:                 NOTIFY YOUR PHYSICIAN FOR ANY OF THE FOLLOWING:   Fever over 101 degrees for 24 hours. Chest pain, shortness of breath, fever, chills, nausea, vomiting, diarrhea, change in mentation, falling, weakness, bleeding. Severe pain or pain not relieved by medications. Or, any other signs or symptoms that you may have questions about.     DISPOSITION:    Home With:   OT  PT  HH  RN      x Long term SNF/Inpatient Rehab    Independent/assisted living    Hospice    Other:       PATIENT CONDITION AT DISCHARGE:     Functional status    Poor Deconditioned     Independent      Cognition     Lucid    x Forgetful     Dementia      Catheters/lines (plus indication)    Avalos     PICC     PEG    x None      Code status    x Full code     DNR      PHYSICAL EXAMINATION AT DISCHARGE:  Visit Vitals  /69 (BP 1 Location: Left arm, BP Patient Position: At rest)   Pulse 70   Temp 98.2 °F (36.8 °C)   Resp 21   Ht 5' 7\" (1.702 m)   Wt 77.3 kg (170 lb 6.7 oz)   SpO2 97%   BMI 26.69 kg/m²        CHRONIC MEDICAL DIAGNOSES:  Problem List as of 7/9/2020 Date Reviewed: 7/9/2020          Codes Class Noted - Resolved    Atrial pacemaker lead displacement ICD-10-CM: T82.120A  ICD-9-CM: 996.01  10/18/2019 - Present        Pacemaker ICD-10-CM: Z95.0  ICD-9-CM: V45.01  3/22/2019 - Present    Overview Signed 3/22/2019  5:08 PM by Octavio Hammans, MD     3/22/2019 dual chamber Medtronic pacer             Bradycardia ICD-10-CM: R00.1  ICD-9-CM: 427.89  3/19/2019 - Present        Thrombocytopenia ([facility] ICD-10-CM: D69.6  ICD-9-CM: 287.5  3/19/2019 - Present        Elevated troponin ICD-10-CM: R79.89  ICD-9-CM: 790.6  3/19/2019 - Present        Hypertensive urgency ICD-10-CM: I16.0  ICD-9-CM: 401.9  3/19/2019 - Present        Second degree AV block, Mobitz type I ICD-10-CM: I44.1  ICD-9-CM: 426.13  3/19/2019 - Present    Overview Signed 3/21/2019  6:14 PM by Octavio Hammans, MD     Added automatically from request for surgery [id number]             Stage 3 chronic kidney disease ([facility] ICD-10-CM: N18.3  ICD-9-CM: 585.3  3/15/2019 - Present        Rotator cuff arthropathy of both shoulders ICD-10-CM: M12.811, M12.812  ICD-9-CM: 716.81  3/15/2019 - Present        Cognitive impairment ICD-10-CM: R41.89  ICD-9-CM: 294.9  1/8/2019 - Present        Systolic murmur Y-56-: R01.1  ICD-9-CM: 785.2  12/30/2018 - Present        Essential hypertension ICD-10-CM: I10  ICD-9-CM: 401.9  12/17/2018 - Present        Gout ICD-10-CM: M10.9  ICD-9-CM: 274.9  12/17/2018 - Present        Pure hypercholesterolemia ICD-10-CM: E78.00  ICD-9-CM: 272.0  12/17/2018 - Present        History of prostate cancer ICD-10-CM: Z85.46  ICD-9-CM: V10.46  12/17/2018 - Present        Chronic constipation ICD-10-CM: K59.09  ICD-9-CM: 564.00  12/17/2018 - Present        Dysuria ICD-10-CM: R30.0  ICD-9-CM: 788.1  8/8/2017 - Present        Weight loss ICD-10-CM: R63.4  ICD-9-CM: 783.21  8/8/2017 - Present        Adrenal mass ([facility] ICD-10-CM: E27.8  ICD-9-CM: 255.8  7/18/2017 - Present    Overview Addendum 12/17/2018  9:57 AM by Ivory Vega MD     Stable/non-functioning adenoma on imaging             RESOLVED: Acute CVA (cerebrovascular accident) [facility]) ICD-10-CM: I63.9  ICD-9-CM: 434.91  6/30/2020 - 7/9/2020        RESOLVED: Acute pyelonephritis ICD-10-CM: N10  ICD-9-CM: 590.10  7/18/2017 - 7/21/2017              Greater than 20  minutes were spent with the patient on counseling and coordination of care    Signed:   Ragini Clark MD  7/9/2020  10:14 AM

## 2020-07-09 NOTE — PROGRESS NOTES
Transition of Care Plan/Discharge Note   · RUR- 12 % Low Risks   · DISPOSITION: Patient presents from [facility] [address] is  going back there with home health today after 2 PM   · Medical records sent in an envolope to [facility] per  Audrey Rushing's (ph # [phone]) request   · [id number]  [facility] to follow after discharge   · POA/daughter Erik Harris [phone]  · Transport: [facility] phone [phone]/TAZBYL @ 2 PM today   · Follow up: PCP/Specialist(s)        Care Management Interventions  PCP Verified by CM:  Yes  Last Visit to PCP: 10/11/19  Palliative Care Criteria Met (RRAT>21 & CHF Dx)?: No  Mode of Transport at Discharge: BLS([facility] )  Transition of Care Consult (CM Consult): Discharge Planning, [address]: No  Reason Outside [place]: Patient already serviced by other home care/hospice agency([facility] )  [address] [place] [place]: Yes  Discharge Durable Medical Equipment: No  Health Maintenance Reviewed: Yes  Physical Therapy Consult: Yes  Occupational Therapy Consult: Yes  Speech Therapy Consult: Yes  Current Support Network: Assisted Living([facility] )  Confirm Follow Up Transport: Other (see comment)([facility] )  The Patient and/or Patient Representative was Provided with a Choice of Provider and Agrees with the Discharge Plan?: Yes  Freedom of Choice List was Provided with Basic Dialogue that Supports the Patient's Individualized Plan of Care/Goals, Treatment Preferences and Shares the Quality Data Associated with the Providers?: Yes  The [employer] Information Provided?: No  Discharge Location  Discharge Placement: Assisted Living(Assisted Living with home health @ [facility] )    MICHELET Fisher

## 2020-07-09 NOTE — PROGRESS NOTES
Bedside and Verbal shift change report given to AUGUSTO Stuart (oncoming nurse) by Indio Mccracken RN (offgoing nurse). Report included the following information SBAR, Kardex, Intake/Output, MAR, Recent Results, Med Rec Status, Cardiac Rhythm V-Paced and Dual Neuro Assessment.

## 2020-07-10 NOTE — TELEPHONE ENCOUNTER
Call placed to [facility]. Patients name and  verified. Provided verbal that provider would follow patient for [facility].

## 2020-07-13 ENCOUNTER — OFFICE VISIT (OUTPATIENT)
Dept: FAMILY MEDICINE CLINIC | Age: 85
End: 2020-07-13

## 2020-07-13 VITALS
TEMPERATURE: 99 F | HEIGHT: 66 IN | RESPIRATION RATE: 14 BRPM | HEART RATE: 78 BPM | BODY MASS INDEX: 27.51 KG/M2 | DIASTOLIC BLOOD PRESSURE: 70 MMHG | OXYGEN SATURATION: 97 % | SYSTOLIC BLOOD PRESSURE: 138 MMHG

## 2020-07-13 DIAGNOSIS — Z11.1 TUBERCULOSIS SCREENING: ICD-10-CM

## 2020-07-13 DIAGNOSIS — E27.8 ADRENAL MASS, RIGHT (HCC): ICD-10-CM

## 2020-07-13 DIAGNOSIS — E78.00 PURE HYPERCHOLESTEROLEMIA: ICD-10-CM

## 2020-07-13 DIAGNOSIS — F03.90 DEMENTIA WITHOUT BEHAVIORAL DISTURBANCE, UNSPECIFIED DEMENTIA TYPE: ICD-10-CM

## 2020-07-13 DIAGNOSIS — L60.8 TOENAIL DEFORMITY: ICD-10-CM

## 2020-07-13 DIAGNOSIS — Z09 HOSPITAL DISCHARGE FOLLOW-UP: Primary | ICD-10-CM

## 2020-07-13 DIAGNOSIS — I42.9 CARDIOMYOPATHY, UNSPECIFIED TYPE (HCC): ICD-10-CM

## 2020-07-13 DIAGNOSIS — R53.81 PHYSICAL DECONDITIONING: ICD-10-CM

## 2020-07-13 DIAGNOSIS — H54.7 VISION IMPAIRMENT: ICD-10-CM

## 2020-07-13 DIAGNOSIS — K59.00 CONSTIPATION, UNSPECIFIED CONSTIPATION TYPE: ICD-10-CM

## 2020-07-13 DIAGNOSIS — R63.4 UNINTENDED WEIGHT LOSS: ICD-10-CM

## 2020-07-13 DIAGNOSIS — I10 ESSENTIAL HYPERTENSION: ICD-10-CM

## 2020-07-13 DIAGNOSIS — L89.152 PRESSURE ULCER OF SACRAL REGION, STAGE 2 (HCC): ICD-10-CM

## 2020-07-13 DIAGNOSIS — I63.9 CEREBROVASCULAR ACCIDENT (CVA), UNSPECIFIED MECHANISM (HCC): ICD-10-CM

## 2020-07-13 PROBLEM — D69.6 THROMBOCYTOPENIA (HCC): Status: RESOLVED | Noted: 2019-03-19 | Resolved: 2020-07-13

## 2020-07-13 RX ORDER — ATORVASTATIN CALCIUM 40 MG/1
40 TABLET, FILM COATED ORAL
Qty: 90 TAB | Refills: 3 | Status: SHIPPED | OUTPATIENT
Start: 2020-07-13 | End: 2021-01-12

## 2020-07-13 RX ORDER — ASPIRIN 81 MG/1
81 TABLET ORAL DAILY
Qty: 90 TAB | Refills: 3 | Status: ON HOLD | OUTPATIENT
Start: 2020-07-13 | End: 2021-09-08

## 2020-07-13 RX ORDER — POLYETHYLENE GLYCOL 3350 17 G/17G
17 POWDER, FOR SOLUTION ORAL
Qty: 235 G | Refills: 1 | Status: SHIPPED | OUTPATIENT
Start: 2020-07-13

## 2020-07-13 NOTE — PROGRESS NOTES
[facility] Note      Subjective:     Chief Complaint   Patient presents with   Major Hospital Follow Up     SCARLET BLEVINS from [facility] is a 80y.o. year old male who presents for evaluation of the following:        Hospital follow-up:  [facility] 6/30-7/0/2020  Reason for admission: CVA    Per discharge summary:  The patient is an 15-year-old gentleman with past medical history of hypertension, prostate cancer, constipation, gout, hyperlipidemia, stroke, and TIA, who presents to the hospital with the above-mentioned symptom.  History was primarily obtained from the patient's daughter, Raj Morin patient is confused and has some right-sided weakness with right-sided neglect.  She reports that the patient was last seen normal 2 days back on Sunday.  Per care facility, the patient was normal last night. [facility] became disoriented initially and then developed issues with walking and fell hitting his head, but there was no loss of consciousness.  She reports the patient lives at -- and the patient was brought over here as a code stroke, was found to have occipital stroke.  Neurointerventional Surgery was consulted and they felt that the stroke has been completed and the patient does not need any acute surgical intervention.  The patient was requested to be admitted under the hospitalist service.  Currently, the patient is resting in bed, does not appear to be distressed.  No further history could be obtained from the patient or his daughter. Acute parieto-occipital cerebrovascular accident  -CT head: Bilateral acute occipital infarcts without evidence of hemorrhage.  - CTA head: Acute bilateral PCA territory infarcts, left larger than right.  - CT perf:  Acute ischemia in the left parieto-occipital lobe, with qualitatively decreased cerebral blood flow and blood volume in this region representing completed infarct.   - appreciate Neurology input  - LDL 56, A1c 5.7  - MRI brain: Moderate to large subacute infarction in the left occipital lobe and left basal  ganglia. Small to moderate subacute infarction in the right occipital lobe.  Mild superimposed hemorrhage but no evidence of midline shift or mass effect. Punctate focus of acute infarction in the posterior superior right frontal lobe. - cardiology consulted. - rpt CT head 7/8: Trace parenchymal hemorrhage in the bilateral subacute occipital infarctions. - Echo: EF 40 - 45%.   - PT/OT/SLP recommendations noted. - c/w aspirin and statin. plavix discontinued per neurology recs due to trace hemorrhage. Hypertension:   - C/w metoprolol, Hydralazine, Amlodipine  - Monitor BP closely   Acute kidney injury on chronic kidney disease stage II, likely prerenal:  improving   - Avoid nephrotoxic medications and renally dose all other medications. - Creat stable  - Monitor renal function  Hyponatremia: improving   - Likely related to mild dehydration, less likely intracranial process driven  Elevated troponin - flat; likely demand ischemia   S/p PPM for 2:1 AVB, sinus bradycardia. Hx prostate ca    Imaging, labs, provider notes, discharge summary reviewed. Pertinent findings:  MRI Results (most recent):  Results from [place] encounter on 06/30/20   MRI BRAIN WO CONT    Narrative CLINICAL HISTORY: Possible CVA. INDICATION: Possible CVA. Parieto-occipital infarction is suspected. COMPARISON: CT and CTA 6/30/2020    TECHNIQUE: MR examination of the brain includes axial and sagittal T1, coronal  T2, axial T2, axial FLAIR, axial gradient echo, axial DWI. CONTRAST: None    FINDINGS:   Small focus of acute infarction in the posterior superior right frontal lobe  precentral.   Subacute infarction left and right occipital lobes. Moderate to large left  occipital infarction with posterior thalamic and pulmonary subacute infarction  on the left. Small to moderate subacute infarction on the right.  There is mild  superimposed hemorrhage, no associated midline shift or mass effect. Chronic  foci of hemosiderin deposition are most likely related to severe chronic  microvascular change. The brain architecture is normal. There is no evidence of  midline shift or mass-effect. There are no extra-axial fluid collections. There  is no Chiari or syrinx. The pituitary and infundibulum are grossly unremarkable. There is no skull base mass. Cerebellopontine angles are grossly unremarkable. The major intracranial vascular flow-voids are unremarkable. The cavernous  sinuses are symmetric. Optic chiasm and infundibulum grossly unremarkable. Orbits are grossly symmetric. Dural venous sinuses are grossly patent. The mastoid air cells are well pneumatized and clear. Impression IMPRESSION:  Moderate to large subacute infarction in the left occipital lobe and left basal  ganglia. Small to moderate subacute infarction in the right occipital lobe. Mild superimposed hemorrhage but no evidence of midline shift or mass effect. Punctate focus of acute infarction in the posterior superior right frontal lobe. Moderate to severe chronic microvascular ischemic change and cerebral atrophy. Medication changes: Add atorvastatin, famotidine,     Interval events:  CVA, occipital  Embolic with hemorrhage  Physical deficient: vision impairment, muscle conditioning, generalized wekness  Has started home PT at [facility], plan for OT  Continue ASA ans statin  ASA was listed Prior to admission as a otc medication but daughter could not confirm if taking ASA prior to admission  No cardiologic source found    Unintentional Weight Loss:    Onset  6month ago  Eating all of meals at hostial   [facility] with no report of change in appetite  Endorse chronic constipation  Denies diarrhea, blood in urine  Last Weight Metrics:  Weight Loss Metrics 7/13/2020 7/9/2020 6/30/2020 1/30/2020 10/18/2019 10/11/2019 8/14/2019   Today's Wt - 170 lb 6.7 oz - 173 lb 182 lb 186 lb 6.4 oz 183 lb 3.2 oz   BMI 27.51 kg/m2 - 26.69 kg/m2 27.92 kg/m2 29.38 kg/m2 26.75 kg/m2 26.29 kg/m2     Toenail Issue:   Left Toes  Toenail lifting and bleeding when in hopsital  Managed by podiatrist    Multiple Adrenal Masses:   2017. Stabe on repeat Ct  Manged by surgery Dr. Annie De La Cruz  CT Abdomen 7/2018   IMPRESSION:  1. No significant change in size of dominant right adrenal nodule measuring up  to 6.1 cm, with density consistent with a lipid rich adenoma. Smaller right  adrenal nodules are also unchanged, and likely also represent small adenomas. 2. Unchanged diffuse left adrenal enlargement and nodularity, with density and  previously seen MRI findings consistent with adrenal hyperplasia with adenomas. Urinary Dribbling/History of Prostate adenomacarcinoma:  Tx: tamsulosin  Managed by urologist     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 Plan for pacemaker wire repair surgery  Pulse Readings from Last 3 Encounters:   07/13/20 78   07/09/20 71   01/30/20 83     Hypertenson  Home -160s/70-90s  Compliant with home metoprolol 25 + amlodipine 5 + hydralazine 25 tid  - last visit prescibed increase to hydralazine 50 tid but Rx not received. BP Readings from Last 3 Encounters:   07/13/20 138/70   07/09/20 140/74   01/30/20 120/76     Cardiomyopathy  Mild to mod AS, MR, TR and severe pulmonary HTN, EF 55-60% by TTE   Tx: lasix + BB   Managed by cardiologist  Leg swelling improved. Not using compression stockings  Denies chest pain     CKD 3, baseline Cr 2.3  Tx: none  Lab Results   Component Value Date/Time    Creatinine (POC) 1.7 (H) 07/31/2018 02:19 PM    Creatinine 1.58 (H) 07/09/2020 03:16 AM      Hyperlipidemia.   Tx: simvastatin  Lab Results   Component Value Date/Time    Cholesterol, total 118 07/01/2020 04:04 AM    HDL Cholesterol 51 07/01/2020 04:04 AM    LDL, calculated 56.6 07/01/2020 04:04 AM    VLDL, calculated 10.4 07/01/2020 04:04 AM    Triglyceride 52 07/01/2020 04:04 AM    CHOL/HDL Ratio 2.3 07/01/2020 04:04 AM     Dementia  Neurology following  Living at [facility] now  Associated fatigue/low movements. Stable per daughter. ADL Assessment 5/6/2019   Feeding yourself No Help Needed   Getting from bed to chair Help Needed   Getting dressed Help Needed   Bathing or showering Help Needed   Walk across the room (includes cane/walker) Help Needed   Using the telphone Help Needed   Taking your medications Help Needed   Preparing meals Help Needed   Managing money (expenses/bills) Help Needed   Moderately strenuous housework (laundry) Help Needed   Shopping for personal items (toiletries/medicines) Help Needed   Shopping for groceries Help Needed   Driving Help Needed   Climbing a flight of stairs Help Needed   Getting to places beyond walking distances Help Needed       Review of Systems   Pertinent positives and negative per HPI. All other systems  reviewed are negative for a Comprehensive ROS (10+).        Past Medical History:   Diagnosis Date    Cancer [facility])     prostate    Constipation     Gout     Hyperlipemia     Hypertension     Pacemaker 2019    Stroke [facility])     TIA (transient ischemic attack) 2013        Social History     Socioeconomic History    Marital status:      Spouse name: Not on file    Number of children: Not on file    Years of education: Not on file    Highest education level: Not on file   Occupational History    Not on file   Social Needs    Financial resource strain: Not on file    Food insecurity     Worry: Not on file     Inability: Not on file    Transportation needs     Medical: Not on file     Non-medical: Not on file   Tobacco Use    Smoking status: Never Smoker    Smokeless tobacco: Never Used   Substance and Sexual Activity    Alcohol use: No    Drug use: No    Sexual activity: Never   Lifestyle    Physical activity     Days per week: Not on file     Minutes per session: Not on file    Stress: Not on file   Relationships    Social connections     Talks on phone: Not on file     Gets together: Not on file     Attends Hinduism service: Not on file     Active member of club or organization: Not on file     Attends meetings of clubs or organizations: Not on file     Relationship status: Not on file    Intimate partner violence     Fear of current or ex partner: Not on file     Emotionally abused: Not on file     Physically abused: Not on file     Forced sexual activity: Not on file   Other Topics Concern    Not on file   Social History Narrative    Not on file       Family History   Problem Relation Age of Onset    No Known Problems Mother     No Known Problems Father        Current Outpatient Medications   Medication Sig    atorvastatin (LIPITOR) 40 mg tablet Take 1 Tab by mouth nightly.  balsam peru-castor oiL (VENELEX) ointment Apply  to affected area three (3) times daily.  famotidine (PEPCID) 20 mg tablet Take 1 Tab by mouth every evening.  acetaminophen (TYLENOL) 325 mg tablet Take 650 mg by mouth every six (6) hours as needed for Pain.  hydrALAZINE (APRESOLINE) 25 mg tablet Take 1 Tab by mouth three (3) times daily.  metoprolol succinate (TOPROL-XL) 25 mg XL tablet Take 1 Tab by mouth daily.  amLODIPine (NORVASC) 5 mg tablet Take 1 Tab by mouth daily.  aspirin delayed-release 81 mg tablet Take 81 mg by mouth daily.  allopurinol (ZYLOPRIM) 100 mg tablet Take 1 Tab by mouth daily. No current facility-administered medications for this visit. Objective:     Vitals:    07/13/20 0853   BP: 138/70   Pulse: 78   Resp: 14   Temp: 99 °F (37.2 °C)   TempSrc: Oral   SpO2: 97%   Weight: 169 lb (76.7 kg)   Height: 5' 6\" (1.676 m)       Physical Examination:  General: Alert, cooperative, no distress, appears stated age. Sitting in wheelchiar  Eyes: Conjunctivae clear.  PERRL, EOMs intact. - tracks fingers to left and right  Ears: Normal external ear canals both ears. Nose: Nares normal.   Mouth/Throat: Lips, mucosa, and tongue normal.   Neck: Supple, symmetrical, trachea midline, no adenopathy. No thyroid enlargement/tenderness/nodules  Respiratory: Breathing comfortably, in no acute respiratory distress. Clear to auscultation bilaterally. Normal inspiratory and expiratory ratio. Cardiovascular: Regular rate and rhythm, systolic murmur, click, rub or gallop. Pulses 2+ and symmetric radial and DP  Trace pitting edema of bilateral foot and leg  Abdomen: Soft, non-tender, non distended. Bowel sounds normal. No masses or organomegaly. MSK: Extremities normal, atraumatic, no effusion.   - unable to bear weight. Limited bilateral shoulder ROM  Skin: Less than 1 cm superficial sacral ulcer, dry not bleeding , no discharge   - All toenails thickened. Left 4th toenail s/p removal. Left great toe with dried blood and proximal avulsion when palpated . Lymph nodes: Cervical, supraclavicular nodes normal.  Neurologic: CNII-XII intact. Sensation and reflexes intact  Strength  Biceps 4./5  Triceps 5/5  Knee flexion 5/5  Knee extension 5/5  Psychiatric: Affect blunted. Mood euthymic. Paucity of speech    Lab Results   Component Value Date/Time    Sodium 134 (L) 07/09/2020 03:16 AM    Potassium 4.1 07/09/2020 03:16 AM    Chloride 101 07/09/2020 03:16 AM    CO2 27 07/09/2020 03:16 AM    Anion gap 6 07/09/2020 03:16 AM    Glucose 147 (H) 07/09/2020 03:16 AM    BUN 43 (H) 07/09/2020 03:16 AM    Creatinine 1.58 (H) 07/09/2020 03:16 AM    BUN/Creatinine ratio 27 (H) 07/09/2020 03:16 AM    GFR est AA 51 (L) 07/09/2020 03:16 AM    GFR est non-AA 42 (L) 07/09/2020 03:16 AM    Calcium 8.7 07/09/2020 03:16 AM    Bilirubin, total 0.3 07/06/2020 05:30 AM    Alk.  phosphatase 100 07/06/2020 05:30 AM    Protein, total 7.2 07/06/2020 05:30 AM    Albumin 2.6 (L) 07/06/2020 05:30 AM    Globulin 4.6 (H) 07/06/2020 05:30 AM    A-G Ratio 0.6 (L) 07/06/2020 05:30 AM    ALT (SGPT) 36 07/06/2020 05:30 AM    AST (SGOT) 23 07/06/2020 05:30 AM     Lab Results   Component Value Date/Time    WBC 8.1 07/09/2020 03:16 AM    HGB 12.5 07/09/2020 03:16 AM    HCT 37.5 07/09/2020 03:16 AM    PLATELET 393 62/75/1309 03:16 AM    MCV 91.7 07/09/2020 03:16 AM     Lab Results   Component Value Date/Time    Hemoglobin A1c 5.7 (H) 07/01/2020 04:04 AM     Lab Results   Component Value Date/Time    Cholesterol, total 118 07/01/2020 04:04 AM    HDL Cholesterol 51 07/01/2020 04:04 AM    LDL, calculated 56.6 07/01/2020 04:04 AM    VLDL, calculated 10.4 07/01/2020 04:04 AM    Triglyceride 52 07/01/2020 04:04 AM    CHOL/HDL Ratio 2.3 07/01/2020 04:04 AM           Assessment/ Plan:   Diagnoses and all orders for this visit:    1. Hospital discharge follow-up    2. Cerebrovascular accident (CVA), unspecified mechanism ([address]  -     aspirin delayed-release 81 mg tablet; Take 1 Tab by mouth daily. -     atorvastatin (LIPITOR) 40 mg tablet; Take 1 Tab by mouth nightly. 3. Vision impairment    4. Physical deconditioning  -     CT ABD W CONT AND PELVIS W WO CONT; Future    5. Essential hypertension  -     atorvastatin (LIPITOR) 40 mg tablet; Take 1 Tab by mouth nightly. 6. Pure hypercholesterolemia    7. Cardiomyopathy, unspecified type ([address]    8. Dementia without behavioral disturbance, unspecified dementia type ([address]    9. Adrenal mass, right (HCC)  -     CT ABD W CONT AND PELVIS W WO CONT; Future    10. Toenail deformity    11. Unintended weight loss  -     CT ABD W CONT AND PELVIS W WO CONT; Future  -     URINALYSIS W/ RFLX MICROSCOPIC  -     TSH REFLEX TO T4  -     HCV AB W/RFLX TO PETTY  -     HIV 1/2 AG/AB, 4TH GENERATION,W RFLX CONFIRM  -     REFERRAL TO OPHTHALMOLOGY    12. Pressure ulcer of sacral region, stage 2 ([address]    13.  Constipation, unspecified constipation type  -     CT ABD W CONT AND PELVIS W WO CONT; Future  -     polyethylene glycol (MIRALAX) 17 gram/dose powder; Take 17 g by mouth daily as needed for Constipation. Past medical history reviewed and medications refilled per orders. Doing well overall since discharge fro CVA with residual visional impairment per daughter's report. Patient does not cooperate with visual field or snellen chart testing.   - Continue ASA and atorvastatin. - Continue PT, will benefit form OT for deconditioning   - Opthalmology referral to confirm current vision and degree of impairment.   - Sacral ulcer unclear chronicity. Mild and very superficial. Barrier protection with ointment provided on discharge. Consider add wound care if not improved in 2 weeks. Unintentional weight loss. Unclear etiology but suspect aging and improved edema. Noted history of adrenal mass and recent stroke. Will order labs and repeat CT abdomen and pelvis to evaluate etiology. HTN with HLD well controlled on current regimen, continue. Heart rate well controlled on current regimen. Dementia stable. Thickened toenails, deformity. Follow up with podiatry for toenail shaving or removal. No signs of infection currently. Add miralax for constipation  BRENDEN form completed with requested TB screen test.   Follow up with specialists per routine- cardiologist, neurologist    Educated patient on red flag symptoms to warrant return to clinic or emergency room visit. I have discussed the diagnosis with the patient and the intended plan as seen in the above orders. The patient has been offered or received an after-visit summary and questions were answered concerning future plans. I have discussed medication side effects and warnings with the patient as well. Total face to face encounter time 50 minutes; >50 of time spent counseling and coordinating care regarding management of HTN, bradycardia, dementia. Follow-up and Dispositions    · Return in about 4 weeks (around 8/10/2020) for Follow Up chronic conditions. Signed,    Dina Cazares MD  7/13/2020

## 2020-07-13 NOTE — PROGRESS NOTES
Chief Complaint   Patient presents with   [facility] Follow Up     ARIELLE from [facility]         1. Have you been to the ER, urgent care clinic since your last visit? Hospitalized since your last visit? Yes ER at [facility] , stroke    2. Have you seen or consulted any other health care providers outside of the [address] [place] [address] since your last visit? Include any pap smears or colon screening.   no

## 2020-07-14 NOTE — PATIENT INSTRUCTIONS
Learning About Stroke Rehabilitation  What is stroke rehabilitation? Stroke rehabilitation (rehab) is training and therapy to help you relearn to do everyday things you have not been able to do since your stroke. The focus will depend on how the stroke has affected your ability to do the things you want and need to do. Rehab begins in the hospital. It starts as soon as you are able. You will have a team of doctors, nurses, and therapists to help you relearn daily activities. This can include eating, bathing, and dressing. You also may need help to learn how to walk or talk again. If the stroke damaged your memory, you will learn ways to improve it. You will get better faster if you begin rehab soon after the stroke. But even with rehab, you may not be able to do all the things you could before the stroke. You may recover the most in the first few weeks or months after your stroke. But you can keep getting better for years. It just may happen more slowly. And it may take a long time and a lot of hard work. Don't give up hope. After the hospital, you may go to a rehab facility or a nursing home for a while. Or you may go home. Wherever you go, keep working on your rehab and do a little every day. It's going to be important for you to get the support you need. Let your loved ones help you. Involve them in your treatment. Talk to others who have had a stroke, and find out how they handled ups and downs. How can stroke rehab specialists help? Your stroke rehab team will include doctors and nurses who specialize in stroke rehab, as well as other professionals. Each team member will help you in specific ways. The team may include the following professionals. Rehab doctor    A rehab doctor is a specialist in charge of your rehab program. He or she may also work on special problems, such as muscle cramps and spasms. Rehab nurses    Rehab nurses can help you relearn basic activities of daily life.  For example, they can help you learn how to: Take care of your health, including a schedule for medicine. Get from your bed to a wheelchair. Bathe. Control your bowels or bladder. Physical therapist    A stroke often takes away a person's ability to move in certain ways. A physical therapist helps you get back as much movement, balance, and coordination as possible. Physical therapy usually includes exercises. The exercises can help you get back your ability to walk and move as much as possible. It's important to practice these exercises over and over again. Your therapist may also help you learn to use a wheelchair or walker. And he or she may teach you how to use stairs safely. Occupational therapist    An occupational therapist helps you practice daily tasks like eating, bathing, dressing, and writing. For example, he or she may help you learn how to:  Prepare meals and clean your house. Drive your car. Use tools and devices that can help if you no longer have full use of both hands. For example, velcro can replace buttons on clothing. Get grab bars for your bathroom. Make your home safe if you have strength, balance, or vision problems. Speech therapist    A speech therapist can help you relearn how to talk or find new ways to express yourself. Swallowing is sometimes a problem after a stroke. This therapist can help you improve your ability to swallow. A speech therapist can also help you work on reading and writing skills. Psychologist or counselor    Emotions like fear, anxiety, anger, sadness, frustration, and grief are common after a stroke. A psychologist or counselor can help you deal with your emotions. They can also help you get treatment if you have depression. Vocational counselor    Stroke can leave you with disabilities that make it hard to do your job. A vocational counselor can help you return to your job or find a new one.  He or she can help you:  Identify your current skills and prepare a new resume. Search for a job. Understand the laws that protect disabled workers. Recreational therapist    A recreational therapist helps you return to doing things you enjoy. This may include the arts, hobbies, sports, or leisure activities. Follow-up care is a key part of your treatment and safety. Be sure to make and go to all appointments, and call your doctor if you are having problems. It's also a good idea to know your test results and keep a list of the medicines you take. Where can you learn more? Go to http://www.gray.com/  Enter A834 in the search box to learn more about \"Learning About Stroke Rehabilitation. \"  Current as of: March 4, 2020               Content Version: 12.5  © 2006-2020 Healthwise, [employer]. Care instructions adapted under license by [employer] (which disclaims liability or warranty for this information). If you have questions about a medical condition or this instruction, always ask your healthcare professional. [place] [address] any warranty or liability for your use of this information. Abnormal Weight Loss: Care Instructions  Your Care Instructions     There are two types of weight lossnormal and abnormal. The normal kind happens when you are trying to lose weight by exercising more or eating less. The abnormal kind happens when you are not trying to lose weight. Many medical problems can cause abnormal weight loss. These include problems with your thyroid gland, long-term infections, mouth or throat problems that make it hard to eat, and digestive problems. They also include depression and cancer. Some medicines also may cause you to lose weight. You can work with your doctor to find the cause of your weight loss. You will probably need tests to do this. Follow-up care is a key part of your treatment and safety.  Be sure to make and go to all appointments, and call your doctor if you are having problems. It's also a good idea to know your test results and keep a list of the medicines you take. How can you care for yourself at home? · Weigh yourself at the same time every day. It's best to do it first thing in the morning after you empty your bladder. Be sure to always wear the same amount of clothing. · Write down any changes in your weight and the possible causes. Discuss these with your doctor. · Your doctor may want you to change your diet. Do your best to follow his or her advice. · Ask your doctor if you should see a dietitian. This is a person who can help you plan meals that work best for your lifestyle. · Note any changes in bowel habits. These may include changes in how often you have a bowel movement. Other changes include the color and size of your stools and how solid they are. · If you are prescribed medicines, take them exactly as prescribed. Call your doctor if you think you are having a problem with your medicine. You will get more details on the specific medicines your doctor prescribes. When should you call for help? Watch closely for changes in your health, and be sure to contact your doctor if:  · You do not get better as expected. · You continue to lose weight. Where can you learn more? Go to http://erin-nikole.info/  Enter A790 in the search box to learn more about \"Abnormal Weight Loss: Care Instructions. \"  Current as of: December 11, 2019               Content Version: 12.5  © 9273-6628 Healthwise, [employer]. Care instructions adapted under license by [employer] (which disclaims liability or warranty for this information). If you have questions about a medical condition or this instruction, always ask your healthcare professional. [address] any warranty or liability for your use of this information.          DASH Diet: Care Instructions  Your Care Instructions     The DASH diet is an eating plan that can help lower your blood pressure. DASH stands for Dietary Approaches to Stop Hypertension. Hypertension is high blood pressure. The DASH diet focuses on eating foods that are high in calcium, potassium, and magnesium. These nutrients can lower blood pressure. The foods that are highest in these nutrients are fruits, vegetables, low-fat dairy products, nuts, seeds, and legumes. But taking calcium, potassium, and magnesium supplements instead of eating foods that are high in those nutrients does not have the same effect. The DASH diet also includes whole grains, fish, and poultry. The DASH diet is one of several lifestyle changes your doctor may recommend to lower your high blood pressure. Your doctor may also want you to decrease the amount of sodium in your diet. Lowering sodium while following the DASH diet can lower blood pressure even further than just the DASH diet alone. Follow-up care is a key part of your treatment and safety. Be sure to make and go to all appointments, and call your doctor if you are having problems. It's also a good idea to know your test results and keep a list of the medicines you take. How can you care for yourself at home? Following the DASH diet  · Eat 4 to 5 servings of fruit each day. A serving is 1 medium-sized piece of fruit, ½ cup chopped or canned fruit, 1/4 cup dried fruit, or 4 ounces (½ cup) of fruit juice. Choose fruit more often than fruit juice. · Eat 4 to 5 servings of vegetables each day. A serving is 1 cup of lettuce or raw leafy vegetables, ½ cup of chopped or cooked vegetables, or 4 ounces (½ cup) of vegetable juice. Choose vegetables more often than vegetable juice. · Get 2 to 3 servings of low-fat and fat-free dairy each day. A serving is 8 ounces of milk, 1 cup of yogurt, or 1 ½ ounces of cheese. · Eat 6 to 8 servings of grains each day.  A serving is 1 slice of bread, 1 ounce of dry cereal, or ½ cup of cooked rice, pasta, or cooked cereal. Try to choose whole-grain products as much as possible. · Limit lean meat, poultry, and fish to 2 servings each day. A serving is 3 ounces, about the size of a deck of cards. · Eat 4 to 5 servings of nuts, seeds, and legumes (cooked dried beans, lentils, and split peas) each week. A serving is 1/3 cup of nuts, 2 tablespoons of seeds, or ½ cup of cooked beans or peas. · Limit fats and oils to 2 to 3 servings each day. A serving is 1 teaspoon of vegetable oil or 2 tablespoons of salad dressing. · Limit sweets and added sugars to 5 servings or less a week. A serving is 1 tablespoon jelly or jam, ½ cup sorbet, or 1 cup of lemonade. · Eat less than 2,300 milligrams (mg) of sodium a day. If you limit your sodium to 1,500 mg a day, you can lower your blood pressure even more. Tips for success  · Start small. Do not try to make dramatic changes to your diet all at once. You might feel that you are missing out on your favorite foods and then be more likely to not follow the plan. Make small changes, and stick with them. Once those changes become habit, add a few more changes. · Try some of the following:  ? Make it a goal to eat a fruit or vegetable at every meal and at snacks. This will make it easy to get the recommended amount of fruits and vegetables each day. ? Try yogurt topped with fruit and nuts for a snack or healthy dessert. ? Add lettuce, tomato, cucumber, and onion to sandwiches. ? Combine a ready-made pizza crust with low-fat mozzarella cheese and lots of vegetable toppings. Try using tomatoes, squash, spinach, broccoli, carrots, cauliflower, and onions. ? Have a variety of cut-up vegetables with a low-fat dip as an appetizer instead of chips and dip. ? Sprinkle sunflower seeds or chopped almonds over salads. Or try adding chopped walnuts or almonds to cooked vegetables. ? Try some vegetarian meals using beans and peas. Add garbanzo or kidney beans to salads.  Make burritos and tacos with mashed murillo beans or black beans.  Where can you learn more? Go to http://www.Iddiction.com/  Enter H967 in the search box to learn more about \"DASH Diet: Care Instructions. \"  Current as of: December 16, 2019               Content Version: 12.5  © 4155-5396 Healthwise, Incorporated. Care instructions adapted under license by [employer] (which disclaims liability or warranty for this information). If you have questions about a medical condition or this instruction, always ask your healthcare professional. [address] any warranty or liability for your use of this information.

## 2020-08-12 ENCOUNTER — HOSPITAL ENCOUNTER (OUTPATIENT)
Dept: CT IMAGING | Age: 85
Discharge: HOME | End: 2020-08-12
Payer: MEDICARE

## 2020-08-12 ENCOUNTER — HOSPITAL ENCOUNTER (OUTPATIENT)
Dept: LAB | Age: 85
Discharge: HOME | End: 2020-08-12
Payer: MEDICARE

## 2020-08-12 DIAGNOSIS — R63.4 UNINTENDED WEIGHT LOSS: ICD-10-CM

## 2020-08-12 DIAGNOSIS — E27.8 ADRENAL MASS, RIGHT (HCC): ICD-10-CM

## 2020-08-12 DIAGNOSIS — R53.81 PHYSICAL DECONDITIONING: ICD-10-CM

## 2020-08-12 DIAGNOSIS — K59.00 CONSTIPATION, UNSPECIFIED CONSTIPATION TYPE: ICD-10-CM

## 2020-08-12 PROCEDURE — 74011000258 HC RX REV CODE- 258: Performed by: RADIOLOGY

## 2020-08-12 PROCEDURE — 86803 HEPATITIS C AB TEST: CPT

## 2020-08-12 PROCEDURE — 36415 COLL VENOUS BLD VENIPUNCTURE: CPT

## 2020-08-12 PROCEDURE — 84443 ASSAY THYROID STIM HORMONE: CPT

## 2020-08-12 PROCEDURE — 87389 HIV-1 AG W/HIV-1&-2 AB AG IA: CPT

## 2020-08-12 PROCEDURE — 81003 URINALYSIS AUTO W/O SCOPE: CPT

## 2020-08-12 PROCEDURE — 86480 TB TEST CELL IMMUN MEASURE: CPT

## 2020-08-12 PROCEDURE — 74178 CT ABD&PLV WO CNTR FLWD CNTR: CPT

## 2020-08-12 PROCEDURE — 74011636320 HC RX REV CODE- 636/320: Performed by: RADIOLOGY

## 2020-08-12 RX ORDER — SODIUM CHLORIDE 0.9 % (FLUSH) 0.9 %
10 SYRINGE (ML) INJECTION
Status: COMPLETED | OUTPATIENT
Start: 2020-08-12 | End: 2020-08-12

## 2020-08-12 RX ORDER — SODIUM CHLORIDE 0.9 % (FLUSH) 0.9 %
10 SYRINGE (ML) INJECTION
Status: DISCONTINUED | OUTPATIENT
Start: 2020-08-12 | End: 2020-08-13 | Stop reason: HOSPADM

## 2020-08-12 RX ADMIN — SODIUM CHLORIDE 100 ML: 900 INJECTION, SOLUTION INTRAVENOUS at 15:00

## 2020-08-12 RX ADMIN — IOPAMIDOL 100 ML: 755 INJECTION, SOLUTION INTRAVENOUS at 15:00

## 2020-08-12 RX ADMIN — Medication 10 ML: at 15:00

## 2020-08-15 LAB
GAMMA INTERFERON BACKGROUND BLD IA-ACNC: 1.15 IU/ML
GLUCOSE UR QL: NORMAL
HCV AB S/CO SERPL IA: <0.1 S/CO RATIO (ref 0–0.9)
HCV AB SERPL QL IA: NORMAL
HIV 1+2 AB+HIV1 P24 AG SERPL QL IA: NON REACTIVE
KETONES UR QL STRIP: NORMAL
M TB IFN-G BLD-IMP: POSITIVE
M TB IFN-G CD4+ BCKGRND COR BLD-ACNC: 3.48 IU/ML
MITOGEN IGNF BLD-ACNC: >10 IU/ML
PH UR STRIP: NORMAL [PH]
PROT UR QL STRIP: NORMAL
QUANTIFERON INCUBATION, QF1T: ABNORMAL
QUANTIFERON TB2 AG: 3.14 IU/ML
SERVICE CMNT-IMP: ABNORMAL
SP GR UR: NORMAL
TSH SERPL DL<=0.005 MIU/L-ACNC: 0.71 UIU/ML (ref 0.45–4.5)

## 2020-08-18 DIAGNOSIS — R76.12 POSITIVE QUANTIFERON-TB GOLD TEST: Primary | ICD-10-CM

## 2020-08-18 DIAGNOSIS — Z79.899 ON ISONIAZID THERAPY: ICD-10-CM

## 2020-08-18 DIAGNOSIS — Z22.7 LATENT TUBERCULOSIS: ICD-10-CM

## 2020-08-18 NOTE — PROGRESS NOTES
Your CT scan was reassuring and looks good overall. There are no findings to suggest cancer and the adrenal nodules have not changed in size.

## 2020-08-18 NOTE — PROGRESS NOTES
Attempted call to all numbers on file, no response. Left voicemail for call back. - Positive Quantiferon Gold test likely represents latent TB. Will need review of symptoms and Chest xray to rule out active disease.

## 2020-08-19 ENCOUNTER — HOSPITAL ENCOUNTER (OUTPATIENT)
Dept: GENERAL RADIOLOGY | Age: 85
Discharge: HOME | End: 2020-08-19
Payer: MEDICARE

## 2020-08-19 DIAGNOSIS — R76.12 POSITIVE QUANTIFERON-TB GOLD TEST: ICD-10-CM

## 2020-08-19 PROCEDURE — 71046 X-RAY EXAM CHEST 2 VIEWS: CPT

## 2020-08-19 NOTE — PROGRESS NOTES
As discussed via telephone with patient's daughter Tala Mccord, most results are normal but the tuberculosis test was positive. This suggest latent tuberculosis which can be cleared with antibiotics. We need to confirm is latent and not active by getting a chest xray. If this is clear then we can start antibiotics to clear this infection.

## 2020-08-20 RX ORDER — ISONIAZID 300 MG/1
300 TABLET ORAL DAILY
Qty: 90 TAB | Refills: 2 | Status: SHIPPED | OUTPATIENT
Start: 2020-08-20 | End: 2020-08-20 | Stop reason: SDUPTHER

## 2020-08-20 RX ORDER — ISONIAZID 300 MG/1
300 TABLET ORAL DAILY
Qty: 90 TAB | Refills: 2
Start: 2020-08-20 | End: 2020-12-10

## 2020-08-20 RX ORDER — ISONIAZID 300 MG/1
300 TABLET ORAL DAILY
Qty: 14 TAB | Refills: 0 | Status: SHIPPED | OUTPATIENT
Start: 2020-08-20 | End: 2020-08-20 | Stop reason: SDUPTHER

## 2020-08-20 NOTE — PROGRESS NOTES
As discussed via telephone, the chest xray did not show any signs of active tuberculosis. We will start Isoniazid for 9 months. to treat latent tuberculoses. In 1 month and every month while he is taking this please schedule a lab visit to have liver enzymes tested.

## 2020-10-01 DIAGNOSIS — I44.1 SECOND DEGREE AV BLOCK: ICD-10-CM

## 2020-10-01 DIAGNOSIS — Z95.0 CARDIAC PACEMAKER IN SITU: ICD-10-CM

## 2020-10-01 DIAGNOSIS — M10.9 GOUT, UNSPECIFIED CAUSE, UNSPECIFIED CHRONICITY, UNSPECIFIED SITE: ICD-10-CM

## 2020-10-01 DIAGNOSIS — I10 ESSENTIAL HYPERTENSION: ICD-10-CM

## 2020-10-01 DIAGNOSIS — R00.1 BRADYCARDIA: ICD-10-CM

## 2020-10-01 RX ORDER — AMLODIPINE BESYLATE 5 MG/1
5 TABLET ORAL DAILY
Qty: 90 TAB | Refills: 1 | Status: SHIPPED | OUTPATIENT
Start: 2020-10-01 | End: 2020-12-10

## 2020-10-01 RX ORDER — ALLOPURINOL 100 MG/1
100 TABLET ORAL DAILY
Qty: 90 TAB | Refills: 1 | Status: SHIPPED | OUTPATIENT
Start: 2020-10-01 | End: 2021-01-12

## 2020-10-01 RX ORDER — HYDRALAZINE HYDROCHLORIDE 25 MG/1
25 TABLET, FILM COATED ORAL 3 TIMES DAILY
Qty: 270 TAB | Refills: 1 | Status: SHIPPED | OUTPATIENT
Start: 2020-10-01 | End: 2020-12-10

## 2020-10-01 RX ORDER — METOPROLOL SUCCINATE 25 MG/1
25 TABLET, EXTENDED RELEASE ORAL DAILY
Qty: 90 TAB | Refills: 1 | Status: SHIPPED | OUTPATIENT
Start: 2020-10-01 | End: 2020-12-10

## 2020-10-01 NOTE — TELEPHONE ENCOUNTER
MD Cathy Harding,    Request from [facility] for new 90 d/s Rx's for [id number] [facility]. Thanks, Annmarie Arciniega    Last Visit: 7/13/20  MD Cathy Harding  Next Appointment: Not scheduled  Previous Refill Encounter(s):   Allopurinol 3/11/19  90 + 1  Amlodipine 3/27/19  30 + 1  Hydralazine 10/13/19  270  Metoprolol 8/14/19  90 + 1      Requested Prescriptions     Pending Prescriptions Disp Refills    allopurinoL (ZYLOPRIM) 100 mg tablet 90 Tab 1     Sig: Take 1 Tab by mouth daily.  amLODIPine (NORVASC) 5 mg tablet 90 Tab 1     Sig: Take 1 Tab by mouth daily.  hydrALAZINE (APRESOLINE) 25 mg tablet 270 Tab 1     Sig: Take 1 Tab by mouth three (3) times daily.  metoprolol succinate (TOPROL-XL) 25 mg XL tablet 90 Tab 1     Sig: Take 1 Tab by mouth daily.

## 2020-10-05 ENCOUNTER — OFFICE VISIT (OUTPATIENT)
Dept: CARDIOLOGY CLINIC | Age: 85
End: 2020-10-05
Payer: MEDICARE

## 2020-10-05 DIAGNOSIS — Z95.0 CARDIAC PACEMAKER IN SITU: Primary | ICD-10-CM

## 2020-10-05 PROCEDURE — 93294 REM INTERROG EVL PM/LDLS PM: CPT | Performed by: INTERNAL MEDICINE

## 2020-10-05 PROCEDURE — 93296 REM INTERROG EVL PM/IDS: CPT | Performed by: INTERNAL MEDICINE

## 2020-10-09 ENCOUNTER — DOCUMENTATION ONLY (OUTPATIENT)
Dept: CARDIOLOGY CLINIC | Age: 85
End: 2020-10-09

## 2020-10-09 NOTE — PROGRESS NOTES
Device pacemaker check showed 4 episodes of nonsustained ventricular tachycardia, 6-21 beats    He had a history of stroke and nonsustained ventricular tachycardia in the past    Diagnosed with Alzheimer's dementia January 2019.  He had seen Dr. Stephanie Meng (neuro) before    In the past I had discussed with his daughter about upgrade of the pacemaker because the patient had second-degree AV block and left ventricular ejection fraction on echocardiogram 40 to 45%    He needs to have [address] [place] [address] stress test and office visit  Future Appointments   Date Time Provider Jorje eCron   2/18/2021  3:30 PM Jose GONZALES   2/18/2021  4:00 PM MD ANDIE Reina AMB

## 2020-10-12 ENCOUNTER — TELEPHONE (OUTPATIENT)
Dept: CARDIOLOGY CLINIC | Age: 85
End: 2020-10-12

## 2020-10-12 NOTE — TELEPHONE ENCOUNTER
----- Message from Tristen Hutchinson MD sent at 10/9/2020 12:14 PM EDT -----  NSVT on pacer  He needs to have Lexiscan Cardiolite stress test and office visit before BIV pacer upgrade  His daughter usually with him  I had talked to her before in clinic

## 2020-10-12 NOTE — LETTER
10/12/2020 12:35 PM 
 
Mr. Delmy Tangh [id number] You have been scheduled for nuclear stress testing on 11/6/2020 @12:00 pm 
 
Wear comfortable clothing (shorts or pants with a shirt or blouse- no underwire bras) and walking or athletic shoes. Do not eat or drink anything, except water, for at least 2 hours prior to your appointment. Avoid tobacco products for at least 6 hours prior to your test. 
 
Do not eat or drink anything containing caffeine, including but not limited to the following: chocolate, regular and decaffeinated coffee, soft drinks, or tea for at least 24 hours prior to your test. 
 
Do not hold your scheduled medications prior to your test. If you are a diabetic, please ask your physician how to adjust your food and insulin prior to your test. Please bring all medications you are currently taking. You will need to inform our office if you are pregnant, nursing, or think you may be pregnant. Your test will be performed on a 1 day protocol. This is determined by your height, weight, and other risk factors. For a 2 day test, please allow for 2 hours in the office each day. For a 1 day test, please allow for 4 hours in the office that day. The radioactive isotope used for your testing is different from any of the dyes that are commonly used in x-ray procedures, and is ordered specially for your test. Please call to cancel or reschedule your appointment at least 24 hours prior to your scheduled appointment to avoid being billed for the expensive isotope.  
 
 
 
 
Sincerely, 
 
 
Faith Alexis MD

## 2020-10-12 NOTE — TELEPHONE ENCOUNTER
Called pt daughter Anitha Guzman two patient identifiers confirmed. Notified Anitha Guzman about Pacemaker findings and Dr. Anshul Manrique recommendations. Went over instructions for Lexiscan stress test. Scheduled pt for 11/6/2020 @ 12:00 pm and will mail pt instructions as well. Anitha Guzman verbalized understanding of information discussed w/ no further questions at this time.

## 2020-11-05 ENCOUNTER — TELEPHONE (OUTPATIENT)
Dept: CARDIOLOGY CLINIC | Age: 85
End: 2020-11-05

## 2020-11-05 NOTE — TELEPHONE ENCOUNTER
Called patient to perform telephone COVID prescreening. No answer.  Left a message asking patient to call the office prior to his apt tomorrow if he has recently been sick, running a fever, been around anyone with COVID or if he has recently been tested for COVID

## 2020-11-06 ENCOUNTER — TELEPHONE (OUTPATIENT)
Dept: CARDIOLOGY CLINIC | Age: 85
End: 2020-11-06

## 2020-11-06 NOTE — TELEPHONE ENCOUNTER
Patients daughter would like to schedule an appointment with Dr. Dalila Reich on behalf of the patient. Please advise.      Phone: [phone]

## 2020-11-06 NOTE — TELEPHONE ENCOUNTER
Returned patient daughters call. Verified patient Id [id number]. Patient daughter stated she wanted to reschedule her mothers Philip Client from 11/6/2020 to 11/25/2020 which she stated she already did with PSR when she first called.     Future Appointments   Date Time Provider Jorje Ceron   11/25/2020  9:00 AM NUCLEAR, CAT CH[facility]LA [facility]   2/18/2021  3:30 PM [email or web address], CAT CH[facility]LA [facility]   2/18/2021  4:00 PM MD ANDIE Linn AMB

## 2020-11-17 ENCOUNTER — HOSPITAL ENCOUNTER (EMERGENCY)
Age: 85
Discharge: STILL ADMITTED OR ADMINISTRATIVE | End: 2020-11-17
Attending: EMERGENCY MEDICINE

## 2020-11-17 ENCOUNTER — HOSPITAL ENCOUNTER (INPATIENT)
Age: 85
LOS: 22 days | Discharge: NURSING, REHAB OR LONG-TERM CARE | DRG: 057 | End: 2020-12-10
Attending: EMERGENCY MEDICINE | Admitting: INTERNAL MEDICINE
Payer: MEDICARE

## 2020-11-17 ENCOUNTER — APPOINTMENT (OUTPATIENT)
Dept: CT IMAGING | Age: 85
DRG: 057 | End: 2020-11-17
Attending: EMERGENCY MEDICINE
Payer: MEDICARE

## 2020-11-17 ENCOUNTER — APPOINTMENT (OUTPATIENT)
Dept: GENERAL RADIOLOGY | Age: 85
DRG: 057 | End: 2020-11-17
Attending: EMERGENCY MEDICINE
Payer: MEDICARE

## 2020-11-17 DIAGNOSIS — R41.82 ALTERED MENTAL STATUS, UNSPECIFIED ALTERED MENTAL STATUS TYPE: Primary | ICD-10-CM

## 2020-11-17 DIAGNOSIS — I50.43 ACUTE ON CHRONIC COMBINED SYSTOLIC AND DIASTOLIC CONGESTIVE HEART FAILURE (HCC): ICD-10-CM

## 2020-11-17 DIAGNOSIS — R56.9 POST-ICTAL STATE (HCC): ICD-10-CM

## 2020-11-17 DIAGNOSIS — Z71.89 DNR (DO NOT RESUSCITATE) DISCUSSION: ICD-10-CM

## 2020-11-17 DIAGNOSIS — G93.40 ENCEPHALOPATHY: ICD-10-CM

## 2020-11-17 DIAGNOSIS — Z71.89 GOALS OF CARE, COUNSELING/DISCUSSION: ICD-10-CM

## 2020-11-17 DIAGNOSIS — R56.9 SEIZURE (HCC): ICD-10-CM

## 2020-11-17 DIAGNOSIS — F01.518 VASCULAR DEMENTIA WITH BEHAVIOR DISTURBANCE: ICD-10-CM

## 2020-11-17 LAB
ALBUMIN SERPL-MCNC: 3.3 G/DL (ref 3.5–5)
ALBUMIN SERPL-MCNC: 3.7 G/DL (ref 3.5–5)
ALBUMIN/GLOB SERPL: 0.8 {RATIO} (ref 1.1–2.2)
ALBUMIN/GLOB SERPL: 0.8 {RATIO} (ref 1.1–2.2)
ALP SERPL-CCNC: 124 U/L (ref 45–117)
ALP SERPL-CCNC: 140 U/L (ref 45–117)
ALT SERPL-CCNC: 26 U/L (ref 12–78)
ALT SERPL-CCNC: 28 U/L (ref 12–78)
ANION GAP SERPL CALC-SCNC: 3 MMOL/L (ref 5–15)
ANION GAP SERPL CALC-SCNC: 5 MMOL/L (ref 5–15)
APPEARANCE UR: CLEAR
AST SERPL-CCNC: 19 U/L (ref 15–37)
AST SERPL-CCNC: 21 U/L (ref 15–37)
BACTERIA URNS QL MICRO: NEGATIVE /HPF
BASOPHILS # BLD: 0 K/UL (ref 0–0.1)
BASOPHILS # BLD: 0 K/UL (ref 0–0.1)
BASOPHILS NFR BLD: 0 % (ref 0–1)
BASOPHILS NFR BLD: 1 % (ref 0–1)
BILIRUB SERPL-MCNC: 0.4 MG/DL (ref 0.2–1)
BILIRUB SERPL-MCNC: 0.5 MG/DL (ref 0.2–1)
BILIRUB UR QL: NEGATIVE
BUN SERPL-MCNC: 19 MG/DL (ref 6–20)
BUN SERPL-MCNC: 19 MG/DL (ref 6–20)
BUN/CREAT SERPL: 12 (ref 12–20)
BUN/CREAT SERPL: 13 (ref 12–20)
CALCIUM SERPL-MCNC: 9.1 MG/DL (ref 8.5–10.1)
CALCIUM SERPL-MCNC: 9.3 MG/DL (ref 8.5–10.1)
CHLORIDE SERPL-SCNC: 100 MMOL/L (ref 97–108)
CHLORIDE SERPL-SCNC: 99 MMOL/L (ref 97–108)
CK SERPL-CCNC: 75 U/L (ref 39–308)
CO2 SERPL-SCNC: 32 MMOL/L (ref 21–32)
CO2 SERPL-SCNC: 32 MMOL/L (ref 21–32)
COLOR UR: ABNORMAL
COMMENT, HOLDF: NORMAL
COMMENT, HOLDF: NORMAL
CREAT SERPL-MCNC: 1.43 MG/DL (ref 0.7–1.3)
CREAT SERPL-MCNC: 1.55 MG/DL (ref 0.7–1.3)
DIFFERENTIAL METHOD BLD: ABNORMAL
DIFFERENTIAL METHOD BLD: NORMAL
EOSINOPHIL # BLD: 0.3 K/UL (ref 0–0.4)
EOSINOPHIL # BLD: 0.3 K/UL (ref 0–0.4)
EOSINOPHIL NFR BLD: 3 % (ref 0–7)
EOSINOPHIL NFR BLD: 3 % (ref 0–7)
EPITH CASTS URNS QL MICRO: ABNORMAL /LPF
ERYTHROCYTE [DISTWIDTH] IN BLOOD BY AUTOMATED COUNT: 13.6 % (ref 11.5–14.5)
ERYTHROCYTE [DISTWIDTH] IN BLOOD BY AUTOMATED COUNT: 13.7 % (ref 11.5–14.5)
ETHANOL SERPL-MCNC: <10 MG/DL
GLOBULIN SER CALC-MCNC: 4.4 G/DL (ref 2–4)
GLOBULIN SER CALC-MCNC: 4.9 G/DL (ref 2–4)
GLUCOSE SERPL-MCNC: 106 MG/DL (ref 65–100)
GLUCOSE SERPL-MCNC: 109 MG/DL (ref 65–100)
GLUCOSE UR STRIP.AUTO-MCNC: NEGATIVE MG/DL
HCT VFR BLD AUTO: 36.2 % (ref 36.6–50.3)
HCT VFR BLD AUTO: 39.4 % (ref 36.6–50.3)
HGB BLD-MCNC: 12.4 G/DL (ref 12.1–17)
HGB BLD-MCNC: 13.3 G/DL (ref 12.1–17)
HGB UR QL STRIP: ABNORMAL
HYALINE CASTS URNS QL MICRO: ABNORMAL /LPF (ref 0–5)
IMM GRANULOCYTES # BLD AUTO: 0 K/UL (ref 0–0.04)
IMM GRANULOCYTES # BLD AUTO: 0 K/UL (ref 0–0.04)
IMM GRANULOCYTES NFR BLD AUTO: 0 % (ref 0–0.5)
IMM GRANULOCYTES NFR BLD AUTO: 0 % (ref 0–0.5)
INR PPP: 1 (ref 0.9–1.1)
KETONES UR QL STRIP.AUTO: NEGATIVE MG/DL
LACTATE SERPL-SCNC: 0.9 MMOL/L (ref 0.4–2)
LACTATE SERPL-SCNC: 2.7 MMOL/L (ref 0.4–2)
LEUKOCYTE ESTERASE UR QL STRIP.AUTO: NEGATIVE
LYMPHOCYTES # BLD: 2.8 K/UL (ref 0.8–3.5)
LYMPHOCYTES # BLD: 3.5 K/UL (ref 0.8–3.5)
LYMPHOCYTES NFR BLD: 33 % (ref 12–49)
LYMPHOCYTES NFR BLD: 39 % (ref 12–49)
MAGNESIUM SERPL-MCNC: 1.8 MG/DL (ref 1.6–2.4)
MAGNESIUM SERPL-MCNC: 1.8 MG/DL (ref 1.6–2.4)
MCH RBC QN AUTO: 31 PG (ref 26–34)
MCH RBC QN AUTO: 31 PG (ref 26–34)
MCHC RBC AUTO-ENTMCNC: 33.8 G/DL (ref 30–36.5)
MCHC RBC AUTO-ENTMCNC: 34.3 G/DL (ref 30–36.5)
MCV RBC AUTO: 90.5 FL (ref 80–99)
MCV RBC AUTO: 91.8 FL (ref 80–99)
MONOCYTES # BLD: 0.6 K/UL (ref 0–1)
MONOCYTES # BLD: 0.7 K/UL (ref 0–1)
MONOCYTES NFR BLD: 7 % (ref 5–13)
MONOCYTES NFR BLD: 8 % (ref 5–13)
NEUTS SEG # BLD: 4.4 K/UL (ref 1.8–8)
NEUTS SEG # BLD: 4.9 K/UL (ref 1.8–8)
NEUTS SEG NFR BLD: 50 % (ref 32–75)
NEUTS SEG NFR BLD: 56 % (ref 32–75)
NITRITE UR QL STRIP.AUTO: NEGATIVE
NRBC # BLD: 0 K/UL (ref 0–0.01)
NRBC # BLD: 0 K/UL (ref 0–0.01)
NRBC BLD-RTO: 0 PER 100 WBC
NRBC BLD-RTO: 0 PER 100 WBC
PH UR STRIP: 7.5 [PH] (ref 5–8)
PLATELET # BLD AUTO: 162 K/UL (ref 150–400)
PLATELET # BLD AUTO: 166 K/UL (ref 150–400)
PMV BLD AUTO: 9.8 FL (ref 8.9–12.9)
PMV BLD AUTO: 9.9 FL (ref 8.9–12.9)
POTASSIUM SERPL-SCNC: 3.3 MMOL/L (ref 3.5–5.1)
POTASSIUM SERPL-SCNC: 3.6 MMOL/L (ref 3.5–5.1)
PROT SERPL-MCNC: 7.7 G/DL (ref 6.4–8.2)
PROT SERPL-MCNC: 8.6 G/DL (ref 6.4–8.2)
PROT UR STRIP-MCNC: NEGATIVE MG/DL
PROTHROMBIN TIME: 10.9 SEC (ref 9–11.1)
RBC # BLD AUTO: 4 M/UL (ref 4.1–5.7)
RBC # BLD AUTO: 4.29 M/UL (ref 4.1–5.7)
RBC #/AREA URNS HPF: ABNORMAL /HPF (ref 0–5)
SAMPLES BEING HELD,HOLD: NORMAL
SAMPLES BEING HELD,HOLD: NORMAL
SODIUM SERPL-SCNC: 135 MMOL/L (ref 136–145)
SODIUM SERPL-SCNC: 136 MMOL/L (ref 136–145)
SP GR UR REFRACTOMETRY: 1.01 (ref 1–1.03)
TROPONIN I SERPL-MCNC: 0.06 NG/ML
UROBILINOGEN UR QL STRIP.AUTO: 1 EU/DL (ref 0.2–1)
WBC # BLD AUTO: 8.6 K/UL (ref 4.1–11.1)
WBC # BLD AUTO: 9 K/UL (ref 4.1–11.1)
WBC URNS QL MICRO: ABNORMAL /HPF (ref 0–4)

## 2020-11-17 PROCEDURE — 82550 ASSAY OF CK (CPK): CPT

## 2020-11-17 PROCEDURE — 74011250636 HC RX REV CODE- 250/636: Performed by: EMERGENCY MEDICINE

## 2020-11-17 PROCEDURE — 96375 TX/PRO/DX INJ NEW DRUG ADDON: CPT

## 2020-11-17 PROCEDURE — 36415 COLL VENOUS BLD VENIPUNCTURE: CPT

## 2020-11-17 PROCEDURE — 85610 PROTHROMBIN TIME: CPT

## 2020-11-17 PROCEDURE — 80307 DRUG TEST PRSMV CHEM ANLYZR: CPT

## 2020-11-17 PROCEDURE — 83605 ASSAY OF LACTIC ACID: CPT

## 2020-11-17 PROCEDURE — 86592 SYPHILIS TEST NON-TREP QUAL: CPT

## 2020-11-17 PROCEDURE — 95816 EEG AWAKE AND DROWSY: CPT | Performed by: INTERNAL MEDICINE

## 2020-11-17 PROCEDURE — 81001 URINALYSIS AUTO W/SCOPE: CPT

## 2020-11-17 PROCEDURE — 99285 EMERGENCY DEPT VISIT HI MDM: CPT

## 2020-11-17 PROCEDURE — 83735 ASSAY OF MAGNESIUM: CPT

## 2020-11-17 PROCEDURE — 70450 CT HEAD/BRAIN W/O DYE: CPT

## 2020-11-17 PROCEDURE — 84484 ASSAY OF TROPONIN QUANT: CPT

## 2020-11-17 PROCEDURE — 85025 COMPLETE CBC W/AUTO DIFF WBC: CPT

## 2020-11-17 PROCEDURE — 99218 HC RM OBSERVATION: CPT

## 2020-11-17 PROCEDURE — 80053 COMPREHEN METABOLIC PANEL: CPT

## 2020-11-17 PROCEDURE — 71045 X-RAY EXAM CHEST 1 VIEW: CPT

## 2020-11-17 PROCEDURE — 96365 THER/PROPH/DIAG IV INF INIT: CPT

## 2020-11-17 PROCEDURE — 87040 BLOOD CULTURE FOR BACTERIA: CPT

## 2020-11-17 RX ORDER — ACETAMINOPHEN 325 MG/1
650 TABLET ORAL
Status: DISCONTINUED | OUTPATIENT
Start: 2020-11-17 | End: 2020-12-11 | Stop reason: HOSPADM

## 2020-11-17 RX ORDER — POTASSIUM CHLORIDE 7.45 MG/ML
10 INJECTION INTRAVENOUS
Status: COMPLETED | OUTPATIENT
Start: 2020-11-17 | End: 2020-11-17

## 2020-11-17 RX ORDER — HYDRALAZINE HYDROCHLORIDE 25 MG/1
25 TABLET, FILM COATED ORAL 3 TIMES DAILY
Status: DISCONTINUED | OUTPATIENT
Start: 2020-11-17 | End: 2020-12-01

## 2020-11-17 RX ORDER — LORAZEPAM 2 MG/ML
INJECTION INTRAMUSCULAR
Status: DISPENSED
Start: 2020-11-17 | End: 2020-11-18

## 2020-11-17 RX ORDER — POLYETHYLENE GLYCOL 3350 17 G/17G
17 POWDER, FOR SOLUTION ORAL DAILY PRN
Status: DISCONTINUED | OUTPATIENT
Start: 2020-11-17 | End: 2020-12-11 | Stop reason: HOSPADM

## 2020-11-17 RX ORDER — LEVETIRACETAM 250 MG/1
250 TABLET ORAL 2 TIMES DAILY
Status: DISCONTINUED | OUTPATIENT
Start: 2020-11-17 | End: 2020-11-18

## 2020-11-17 RX ORDER — LORAZEPAM 2 MG/ML
1 INJECTION INTRAMUSCULAR
Status: COMPLETED | OUTPATIENT
Start: 2020-11-17 | End: 2020-11-17

## 2020-11-17 RX ORDER — SODIUM CHLORIDE 0.9 % (FLUSH) 0.9 %
5-40 SYRINGE (ML) INJECTION AS NEEDED
Status: DISCONTINUED | OUTPATIENT
Start: 2020-11-17 | End: 2020-12-11 | Stop reason: HOSPADM

## 2020-11-17 RX ORDER — IBUPROFEN 200 MG
1 TABLET ORAL
Status: DISCONTINUED | OUTPATIENT
Start: 2020-11-17 | End: 2020-12-11 | Stop reason: HOSPADM

## 2020-11-17 RX ORDER — ALLOPURINOL 100 MG/1
100 TABLET ORAL DAILY
Status: DISCONTINUED | OUTPATIENT
Start: 2020-11-18 | End: 2020-12-03

## 2020-11-17 RX ORDER — ATORVASTATIN CALCIUM 40 MG/1
40 TABLET, FILM COATED ORAL
Status: DISCONTINUED | OUTPATIENT
Start: 2020-11-17 | End: 2020-12-11 | Stop reason: HOSPADM

## 2020-11-17 RX ORDER — METOPROLOL SUCCINATE 25 MG/1
25 TABLET, EXTENDED RELEASE ORAL DAILY
Status: DISCONTINUED | OUTPATIENT
Start: 2020-11-18 | End: 2020-12-03

## 2020-11-17 RX ORDER — ACETAMINOPHEN 650 MG/1
650 SUPPOSITORY RECTAL
Status: DISCONTINUED | OUTPATIENT
Start: 2020-11-17 | End: 2020-12-11 | Stop reason: HOSPADM

## 2020-11-17 RX ORDER — AMLODIPINE BESYLATE 5 MG/1
5 TABLET ORAL DAILY
Status: DISCONTINUED | OUTPATIENT
Start: 2020-11-18 | End: 2020-12-02

## 2020-11-17 RX ORDER — LORAZEPAM 2 MG/ML
1 INJECTION INTRAMUSCULAR ONCE
Status: COMPLETED | OUTPATIENT
Start: 2020-11-17 | End: 2020-11-17

## 2020-11-17 RX ORDER — ENOXAPARIN SODIUM 100 MG/ML
40 INJECTION SUBCUTANEOUS DAILY
Status: DISCONTINUED | OUTPATIENT
Start: 2020-11-18 | End: 2020-12-11 | Stop reason: HOSPADM

## 2020-11-17 RX ORDER — FAMOTIDINE 20 MG/1
20 TABLET, FILM COATED ORAL 2 TIMES DAILY
Status: DISCONTINUED | OUTPATIENT
Start: 2020-11-17 | End: 2020-11-29

## 2020-11-17 RX ORDER — ASPIRIN 81 MG/1
81 TABLET ORAL DAILY
Status: DISCONTINUED | OUTPATIENT
Start: 2020-11-18 | End: 2020-11-18

## 2020-11-17 RX ORDER — LORAZEPAM 2 MG/ML
1 INJECTION INTRAMUSCULAR ONCE
Status: DISCONTINUED | OUTPATIENT
Start: 2020-11-17 | End: 2020-11-17 | Stop reason: HOSPADM

## 2020-11-17 RX ORDER — SODIUM CHLORIDE 0.9 % (FLUSH) 0.9 %
5-40 SYRINGE (ML) INJECTION EVERY 8 HOURS
Status: DISCONTINUED | OUTPATIENT
Start: 2020-11-17 | End: 2020-12-11 | Stop reason: HOSPADM

## 2020-11-17 RX ORDER — LEVETIRACETAM 10 MG/ML
1000 INJECTION INTRAVASCULAR ONCE
Status: COMPLETED | OUTPATIENT
Start: 2020-11-17 | End: 2020-11-17

## 2020-11-17 RX ADMIN — Medication 10 ML: at 21:53

## 2020-11-17 RX ADMIN — LEVETIRACETAM 1000 MG: 10 INJECTION INTRAVENOUS at 19:48

## 2020-11-17 RX ADMIN — POTASSIUM CHLORIDE 10 MEQ: 10 INJECTION, SOLUTION INTRAVENOUS at 18:14

## 2020-11-17 RX ADMIN — LORAZEPAM 1 MG: 2 INJECTION INTRAMUSCULAR; INTRAVENOUS at 16:26

## 2020-11-17 RX ADMIN — LORAZEPAM 1 MG: 2 INJECTION INTRAMUSCULAR; INTRAVENOUS at 16:22

## 2020-11-17 NOTE — ED PROVIDER NOTES
EMERGENCY DEPARTMENT HISTORY AND PHYSICAL EXAM      Date: 11/17/2020  Patient Name: Jacek Maradiaga    History of Presenting Illness     Chief Complaint   Patient presents with    Seizure       History Provided By: Patient's Daughter and EMS    HPI: Jacek Maradiaga, 80 y.o. male presents to the ED with cc of seizure. EMS was dispatched to the [facility], where the caregiver stated that the patient had a seizure lasting approximately 5 minutes. Patient was found sitting in a chair without medical complaints. He has a history of prior stroke, so he is unable to ambulate on his own. He denies chest pain or shortness of breath when EMS evaluated him. En route, he developed seizure like activity. He continues to have generalized tonic-clonic seizure in ER, which stopped with 2 mg of Ativan. Patient did not sustain any trauma during the episode. His daughter states he has no prior history of seizures. There are no other complaints, changes, or physical findings at this time. PCP: Ethan Solorzano MD    No current facility-administered medications on file prior to encounter. Current Outpatient Medications on File Prior to Encounter   Medication Sig Dispense Refill    allopurinoL (ZYLOPRIM) 100 mg tablet Take 1 Tab by mouth daily. 90 Tab 1    amLODIPine (NORVASC) 5 mg tablet Take 1 Tab by mouth daily. 90 Tab 1    hydrALAZINE (APRESOLINE) 25 mg tablet Take 1 Tab by mouth three (3) times daily. 270 Tab 1    metoprolol succinate (TOPROL-XL) 25 mg XL tablet Take 1 Tab by mouth daily. 90 Tab 1    isoniazid (NYDRAZID) 300 mg tablet Take 1 Tab by mouth daily. 90 Tab 2    aspirin delayed-release 81 mg tablet Take 1 Tab by mouth daily. 90 Tab 3    atorvastatin (LIPITOR) 40 mg tablet Take 1 Tab by mouth nightly. 90 Tab 3    polyethylene glycol (MIRALAX) 17 gram/dose powder Take 17 g by mouth daily as needed for Constipation.  235 g 1    balsam peru-castor oiL (VENELEX) ointment Apply  to affected area three (3) times daily. 1 Tube 0    famotidine (PEPCID) 20 mg tablet Take 1 Tab by mouth every evening. 30 Tab 0    acetaminophen (TYLENOL) 325 mg tablet Take 650 mg by mouth every six (6) hours as needed for Pain. Past History     Past Medical History:  Past Medical History:   Diagnosis Date    Cancer ([facility] [address]     prostate    Constipation     Gout     Hyperlipemia     Hypertension     Pacemaker 2019    Stroke ([facility] [address]     Thrombocytopenia ([facility] [address] 3/19/2019    TIA (transient ischemic attack) 2013       Past Surgical History:  Past Surgical History:   Procedure Laterality Date    HX PROSTATECTOMY      HX UROLOGICAL      prostate removed    [place] NEW/RPLCMT PRM PACEMAKR W/TRANS ELTRD ATRIAL N/A 10/18/2019    Insert Ppm Single Atrial performed by George Lakhani MD at [address], [facility] CATH LAB    [place] NEW/RPLCM[place] PRM PM W/TRANSV ELTRD ATRIAL&VENT Right 3/22/2019    INSERT PPM DUAL performed by George Lakhani MD at [address] CATH LAB       Family History:  Family History   Problem Relation Age of Onset    No Known Problems Mother     No Known Problems Father        Social History:  Social History     Tobacco Use    Smoking status: Never Smoker    Smokeless tobacco: Never Used   Substance Use Topics    Alcohol use: No    Drug use: No       Allergies:  No Known Allergies      Review of Systems   Review of Systems   Unable to perform ROS: Mental status change   Respiratory: Negative for shortness of breath. Allergic/Immunologic: Negative for immunocompromised state. Psychiatric/Behavioral: Negative. All other systems reviewed and are negative. Physical Exam   Physical Exam  Vitals signs and nursing note reviewed. Constitutional:       General: He is not in acute distress. Appearance: He is well-developed. Comments: Post ictal   HENT:      Head: Normocephalic and atraumatic. Eyes:      Pupils: Pupils are equal, round, and reactive to light.    Neck:      Musculoskeletal: Normal range of motion. Cardiovascular:      Rate and Rhythm: Normal rate and regular rhythm. Pulses: Normal pulses. Heart sounds: Normal heart sounds. Pulmonary:      Effort: Pulmonary effort is normal.      Breath sounds: Normal breath sounds. Abdominal:      General: Bowel sounds are normal.      Palpations: Abdomen is soft. Musculoskeletal: Normal range of motion. Skin:     General: Skin is warm and dry. Neurological:      Coordination: Coordination normal.      Comments:   Right-sided weakness, patient now responding to physical stimuli, continues to be nonverbal after Ativan. Psychiatric:         Mood and Affect: Mood normal.         Behavior: Behavior normal.         Diagnostic Study Results     Labs -     Recent Results (from the past 12 hour(s))   SAMPLES BEING HELD    Collection Time: 11/17/20  4:23 PM   Result Value Ref Range    SAMPLES BEING HELD  DRK GRN, VLADIMIR, SST, RED     COMMENT        Add-on orders for these samples will be processed based on acceptable specimen integrity and analyte stability, which may vary by analyte. ETHYL ALCOHOL    Collection Time: 11/17/20  4:23 PM   Result Value Ref Range    ALCOHOL(ETHYL),SERUM <10 <10 MG/DL   PROTHROMBIN TIME + INR    Collection Time: 11/17/20  4:23 PM   Result Value Ref Range    INR 1.0 0.9 - 1.1      Prothrombin time 10.9 9.0 - 11.1 sec   CBC WITH AUTOMATED DIFF    Collection Time: 11/17/20  4:24 PM   Result Value Ref Range    WBC 9.0 4.1 - 11.1 K/uL    RBC 4.29 4. 10 - 5.70 M/uL    HGB 13.3 12.1 - 17.0 g/dL    HCT 39.4 36.6 - 50.3 %    MCV 91.8 80.0 - 99.0 FL    MCH 31.0 26.0 - 34.0 PG    MCHC 33.8 30.0 - 36.5 g/dL    RDW 13.7 11.5 - 14.5 %    PLATELET 249 718 - 267 K/uL    MPV 9.8 8.9 - 12.9 FL    NRBC 0.0 0  WBC    ABSOLUTE NRBC 0.00 0.00 - 0.01 K/uL    NEUTROPHILS 50 32 - 75 %    LYMPHOCYTES 39 12 - 49 %    MONOCYTES 8 5 - 13 %    EOSINOPHILS 3 0 - 7 %    BASOPHILS 0 0 - 1 %    IMMATURE GRANULOCYTES 0 0.0 - 0.5 %    ABS.  NEUTROPHILS 4. 4 1.8 - 8.0 K/UL    ABS. LYMPHOCYTES 3.5 0.8 - 3.5 K/UL    ABS. MONOCYTES 0.7 0.0 - 1.0 K/UL    ABS. EOSINOPHILS 0.3 0.0 - 0.4 K/UL    ABS. BASOPHILS 0.0 0.0 - 0.1 K/UL    ABS. IMM. GRANS. 0.0 0.00 - 0.04 K/UL    DF AUTOMATED     METABOLIC PANEL, COMPREHENSIVE    Collection Time: 11/17/20  4:24 PM   Result Value Ref Range    Sodium 136 136 - 145 mmol/L    Potassium 3.3 (L) 3.5 - 5.1 mmol/L    Chloride 99 97 - 108 mmol/L    CO2 32 21 - 32 mmol/L    Anion gap 5 5 - 15 mmol/L    Glucose 109 (H) 65 - 100 mg/dL    BUN 19 6 - 20 MG/DL    Creatinine 1.55 (H) 0.70 - 1.30 MG/DL    BUN/Creatinine ratio 12 12 - 20      GFR est AA 52 (L) >60 ml/min/1.73m2    GFR est non-AA 43 (L) >60 ml/min/1.73m2    Calcium 9.3 8.5 - 10.1 MG/DL    Bilirubin, total 0.5 0.2 - 1.0 MG/DL    ALT (SGPT) 28 12 - 78 U/L    AST (SGOT) 21 15 - 37 U/L    Alk. phosphatase 140 (H) 45 - 117 U/L    Protein, total 8.6 (H) 6.4 - 8.2 g/dL    Albumin 3.7 3.5 - 5.0 g/dL    Globulin 4.9 (H) 2.0 - 4.0 g/dL    A-G Ratio 0.8 (L) 1.1 - 2.2     LACTIC ACID    Collection Time: 11/17/20  4:24 PM   Result Value Ref Range    Lactic acid 2.7 (HH) 0.4 - 2.0 MMOL/L   MAGNESIUM    Collection Time: 11/17/20  4:24 PM   Result Value Ref Range    Magnesium 1.8 1.6 - 2.4 mg/dL   TROPONIN I    Collection Time: 11/17/20  4:24 PM   Result Value Ref Range    Troponin-I, Qt. 0.06 (H) <0.05 ng/mL   CK    Collection Time: 11/17/20  4:24 PM   Result Value Ref Range    CK 75 39 - 308 U/L       Radiologic Studies -   CT HEAD WO CONT   Final Result   IMPRESSION:    No acute intracranial abnormality identified. 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 XR CHEST PORT    (Results Pending)   MRI BRAIN WO CONT    (Results Pending)     CT Results  (Last 48 hours)               11/17/20 1802  CT HEAD WO CONT Final result    Impression:  IMPRESSION:    No acute intracranial abnormality identified.  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 Narrative:  EXAM: CT HEAD WO CONT       INDICATION: New onset seizure       COMPARISON: 2020. CONTRAST: None. TECHNIQUE: Unenhanced CT of the head was performed using 5 mm images. Brain and   bone windows were generated. Coronal and sagittal reformats. CT dose reduction   was achieved through use of a standardized protocol tailored for this   examination and automatic exposure control for dose modulation. FINDINGS:   There is prominence of the ventricles and sulci diffusely. There are changes   small vessel disease periventricular white matter. There are old lacunar   infarcts left basal ganglia. There are bilateral occipital lobe infarctions are   noted left greater than right. No acute hemorrhage or mass or infarct identified       The bone windows demonstrate no abnormalities. The visualized portions of the   paranasal sinuses and mastoid air cells are clear. CXR Results  (Last 48 hours)    None          Medical Decision Making   I am the first provider for this patient. I reviewed the vital signs, available nursing notes, past medical history, past surgical history, family history and social history. Vital Signs-Reviewed the patient's vital signs. Patient Vitals for the past 12 hrs:   Pulse Resp BP SpO2   11/17/20 1730 80 17 117/74 100 %   11/17/20 1700 97 20 (!) 140/89 95 %   11/17/20 1645 92 23 (!) 161/98 99 %   11/17/20 1631 (!) 101 25 (!) 169/102    11/17/20 1623 (!) 107 16           Records Reviewed: Nursing Notes, Old Medical Records and Ambulance Run Sheet    Provider Notes (Medical Decision Making):   New onset seizure, tumor, intracranial hemorrhage, electrolyte abnormality, dehydration    ED Course:   Initial assessment performed.  The patients presenting problems have been discussed, and they are in agreement with the care plan formulated and outlined with them. I have encouraged them to ask questions as they arise throughout their visit. Consult note:      [place] case with Dr. Gregg Gutierrez, teleneurology. He recommends 1000 mg of Keppra now then, 250 mg twice a day, MRI, EEG, and rule out infectious causes via a chest x-ray and a urinalysis. Consult note:    Patient is being admitted by Dr. Mine Tolbert, hospitalist        Critical Care Time:   0    Disposition:  admit    DISCHARGE PLAN:  1. Current Discharge Medication List        2. Follow-up Information    None       3. Return to ED if worse     Diagnosis     Clinical Impression:   1. Altered mental status, unspecified altered mental status type    2. Seizure ([facility] [address]    3. Post-ictal state [facility])        Attestations:    Roberto Edwards MD    Please note that this dictation was completed with [employer], the computer voice recognition software. Quite often unanticipated grammatical, syntax, homophones, and other interpretive errors are inadvertently transcribed by the computer software. Please disregard these errors. Please excuse any errors that have escaped final proofreading. Thank you.

## 2020-11-17 NOTE — ED NOTES
Pt's daughter has arrived. She states pt's baseline is to be up walking, feeding himself, and he has dementia - which is why he lives at the group home. Dr. Ewing Cap notified of daughter's report.

## 2020-11-17 NOTE — ED NOTES
26 - Pt comes to ED via EMS from [facility] for reported seizure. Pt presents with full body shaking, eyes are with a left gaze. Pt presents non-verbal. Per EMS, pt does not have a hx of seizures. 46- Dr Alberta Alcala to bedside. 1622 - 1mg IV ativan given and shaking/seizure activity stopped. Verbal orders to repeat 1mg Ativan IV if pt starts to seize again. 5 - Pt with repeat seizing and ativan administered as ordered. Seizure like activity stopped. [address] with Audrey [phone])at pt's facility. She states pt does not have a hx of seizures, but has a hx of strokes. She also states she notified pt's daughter ( [phone]) of pt transport to ED and she is on her way here.    26- Dr. Alberta Alcala at bedside and updated on daughter's expected arrival.

## 2020-11-18 ENCOUNTER — DOCUMENTATION ONLY (OUTPATIENT)
Dept: CARDIOLOGY CLINIC | Age: 85
End: 2020-11-18

## 2020-11-18 ENCOUNTER — APPOINTMENT (OUTPATIENT)
Dept: GENERAL RADIOLOGY | Age: 85
DRG: 057 | End: 2020-11-18
Attending: INTERNAL MEDICINE
Payer: MEDICARE

## 2020-11-18 PROBLEM — R41.82 ALTERED MENTAL STATUS: Status: ACTIVE | Noted: 2020-11-18

## 2020-11-18 PROBLEM — R56.9 POST-ICTAL STATE (HCC): Status: ACTIVE | Noted: 2020-11-18

## 2020-11-18 LAB
ALBUMIN SERPL-MCNC: 3.2 G/DL (ref 3.5–5)
ALBUMIN/GLOB SERPL: 0.7 {RATIO} (ref 1.1–2.2)
ALP SERPL-CCNC: 125 U/L (ref 45–117)
ALT SERPL-CCNC: 24 U/L (ref 12–78)
ANION GAP SERPL CALC-SCNC: 3 MMOL/L (ref 5–15)
APPEARANCE UR: ABNORMAL
AST SERPL-CCNC: 19 U/L (ref 15–37)
BACTERIA URNS QL MICRO: NEGATIVE /HPF
BASOPHILS # BLD: 0 K/UL (ref 0–0.1)
BASOPHILS NFR BLD: 1 % (ref 0–1)
BILIRUB SERPL-MCNC: 0.7 MG/DL (ref 0.2–1)
BILIRUB UR QL: NEGATIVE
BUN SERPL-MCNC: 16 MG/DL (ref 6–20)
BUN/CREAT SERPL: 12 (ref 12–20)
CALCIUM SERPL-MCNC: 9.3 MG/DL (ref 8.5–10.1)
CHLORIDE SERPL-SCNC: 102 MMOL/L (ref 97–108)
CO2 SERPL-SCNC: 33 MMOL/L (ref 21–32)
COLOR UR: ABNORMAL
CREAT SERPL-MCNC: 1.33 MG/DL (ref 0.7–1.3)
DIFFERENTIAL METHOD BLD: NORMAL
EOSINOPHIL # BLD: 0.3 K/UL (ref 0–0.4)
EOSINOPHIL NFR BLD: 4 % (ref 0–7)
EPITH CASTS URNS QL MICRO: ABNORMAL /LPF
ERYTHROCYTE [DISTWIDTH] IN BLOOD BY AUTOMATED COUNT: 13.4 % (ref 11.5–14.5)
GLOBULIN SER CALC-MCNC: 4.5 G/DL (ref 2–4)
GLUCOSE SERPL-MCNC: 93 MG/DL (ref 65–100)
GLUCOSE UR STRIP.AUTO-MCNC: NEGATIVE MG/DL
HCT VFR BLD AUTO: 40.4 % (ref 36.6–50.3)
HGB BLD-MCNC: 13.4 G/DL (ref 12.1–17)
HGB UR QL STRIP: ABNORMAL
HYALINE CASTS URNS QL MICRO: ABNORMAL /LPF (ref 0–5)
IMM GRANULOCYTES # BLD AUTO: 0 K/UL (ref 0–0.04)
IMM GRANULOCYTES NFR BLD AUTO: 0 % (ref 0–0.5)
KETONES UR QL STRIP.AUTO: ABNORMAL MG/DL
LEUKOCYTE ESTERASE UR QL STRIP.AUTO: ABNORMAL
LYMPHOCYTES # BLD: 2.7 K/UL (ref 0.8–3.5)
LYMPHOCYTES NFR BLD: 36 % (ref 12–49)
MAGNESIUM SERPL-MCNC: 1.9 MG/DL (ref 1.6–2.4)
MCH RBC QN AUTO: 30.3 PG (ref 26–34)
MCHC RBC AUTO-ENTMCNC: 33.2 G/DL (ref 30–36.5)
MCV RBC AUTO: 91.4 FL (ref 80–99)
MONOCYTES # BLD: 0.8 K/UL (ref 0–1)
MONOCYTES NFR BLD: 10 % (ref 5–13)
NEUTS SEG # BLD: 3.7 K/UL (ref 1.8–8)
NEUTS SEG NFR BLD: 49 % (ref 32–75)
NITRITE UR QL STRIP.AUTO: NEGATIVE
NRBC # BLD: 0 K/UL (ref 0–0.01)
NRBC BLD-RTO: 0 PER 100 WBC
PH UR STRIP: 7 [PH] (ref 5–8)
PLATELET # BLD AUTO: 154 K/UL (ref 150–400)
PMV BLD AUTO: 9.4 FL (ref 8.9–12.9)
POTASSIUM SERPL-SCNC: 3.9 MMOL/L (ref 3.5–5.1)
PROT SERPL-MCNC: 7.7 G/DL (ref 6.4–8.2)
PROT UR STRIP-MCNC: 30 MG/DL
RBC # BLD AUTO: 4.42 M/UL (ref 4.1–5.7)
RBC #/AREA URNS HPF: >100 /HPF (ref 0–5)
RPR SER QL: NONREACTIVE
SODIUM SERPL-SCNC: 138 MMOL/L (ref 136–145)
SP GR UR REFRACTOMETRY: 1.01 (ref 1–1.03)
UA: UC IF INDICATED,UAUC: ABNORMAL
UROBILINOGEN UR QL STRIP.AUTO: 1 EU/DL (ref 0.2–1)
WBC # BLD AUTO: 7.6 K/UL (ref 4.1–11.1)
WBC URNS QL MICRO: ABNORMAL /HPF (ref 0–4)

## 2020-11-18 PROCEDURE — 96375 TX/PRO/DX INJ NEW DRUG ADDON: CPT

## 2020-11-18 PROCEDURE — 99218 HC RM OBSERVATION: CPT

## 2020-11-18 PROCEDURE — 74011000258 HC RX REV CODE- 258: Performed by: INTERNAL MEDICINE

## 2020-11-18 PROCEDURE — 96372 THER/PROPH/DIAG INJ SC/IM: CPT

## 2020-11-18 PROCEDURE — 74011250637 HC RX REV CODE- 250/637: Performed by: INTERNAL MEDICINE

## 2020-11-18 PROCEDURE — 51798 US URINE CAPACITY MEASURE: CPT

## 2020-11-18 PROCEDURE — 83735 ASSAY OF MAGNESIUM: CPT

## 2020-11-18 PROCEDURE — 80053 COMPREHEN METABOLIC PANEL: CPT

## 2020-11-18 PROCEDURE — 85025 COMPLETE CBC W/AUTO DIFF WBC: CPT

## 2020-11-18 PROCEDURE — 74011000250 HC RX REV CODE- 250: Performed by: INTERNAL MEDICINE

## 2020-11-18 PROCEDURE — 87086 URINE CULTURE/COLONY COUNT: CPT

## 2020-11-18 PROCEDURE — 81001 URINALYSIS AUTO W/SCOPE: CPT

## 2020-11-18 PROCEDURE — 74018 RADEX ABDOMEN 1 VIEW: CPT

## 2020-11-18 PROCEDURE — 99223 1ST HOSP IP/OBS HIGH 75: CPT | Performed by: PSYCHIATRY & NEUROLOGY

## 2020-11-18 PROCEDURE — 74011250636 HC RX REV CODE- 250/636: Performed by: INTERNAL MEDICINE

## 2020-11-18 PROCEDURE — 36415 COLL VENOUS BLD VENIPUNCTURE: CPT

## 2020-11-18 PROCEDURE — 95816 EEG AWAKE AND DROWSY: CPT | Performed by: PSYCHIATRY & NEUROLOGY

## 2020-11-18 PROCEDURE — 65660000000 HC RM CCU STEPDOWN

## 2020-11-18 PROCEDURE — 96376 TX/PRO/DX INJ SAME DRUG ADON: CPT

## 2020-11-18 RX ORDER — HYDRALAZINE HYDROCHLORIDE 20 MG/ML
5 INJECTION INTRAMUSCULAR; INTRAVENOUS
Status: DISCONTINUED | OUTPATIENT
Start: 2020-11-18 | End: 2020-12-11 | Stop reason: HOSPADM

## 2020-11-18 RX ORDER — LEVETIRACETAM 250 MG/1
250 TABLET ORAL ONCE
Status: ACTIVE | OUTPATIENT
Start: 2020-11-18 | End: 2020-11-18

## 2020-11-18 RX ORDER — FACIAL-BODY WIPES
10 EACH TOPICAL DAILY PRN
Status: DISCONTINUED | OUTPATIENT
Start: 2020-11-18 | End: 2020-12-11 | Stop reason: HOSPADM

## 2020-11-18 RX ORDER — DEXTROSE MONOHYDRATE AND SODIUM CHLORIDE 5; .9 G/100ML; G/100ML
75 INJECTION, SOLUTION INTRAVENOUS CONTINUOUS
Status: DISCONTINUED | OUTPATIENT
Start: 2020-11-18 | End: 2020-11-19

## 2020-11-18 RX ORDER — LEVETIRACETAM 5 MG/ML
500 INJECTION INTRAVASCULAR EVERY 12 HOURS
Status: DISCONTINUED | OUTPATIENT
Start: 2020-11-18 | End: 2020-11-23

## 2020-11-18 RX ORDER — SODIUM CHLORIDE 9 MG/ML
75 INJECTION, SOLUTION INTRAVENOUS CONTINUOUS
Status: DISCONTINUED | OUTPATIENT
Start: 2020-11-18 | End: 2020-11-18

## 2020-11-18 RX ORDER — ASPIRIN 300 MG/1
150 SUPPOSITORY RECTAL DAILY
Status: DISCONTINUED | OUTPATIENT
Start: 2020-11-18 | End: 2020-12-06

## 2020-11-18 RX ORDER — METOPROLOL TARTRATE 5 MG/5ML
2.5 INJECTION INTRAVENOUS EVERY 6 HOURS
Status: DISCONTINUED | OUTPATIENT
Start: 2020-11-18 | End: 2020-12-01

## 2020-11-18 RX ORDER — LEVETIRACETAM 500 MG/1
500 TABLET ORAL 2 TIMES DAILY
Status: DISCONTINUED | OUTPATIENT
Start: 2020-11-18 | End: 2020-12-01

## 2020-11-18 RX ADMIN — DEXTROSE MONOHYDRATE AND SODIUM CHLORIDE 75 ML/HR: 5; .9 INJECTION, SOLUTION INTRAVENOUS at 17:32

## 2020-11-18 RX ADMIN — METOPROLOL TARTRATE 2.5 MG: 1 INJECTION, SOLUTION INTRAVENOUS at 17:34

## 2020-11-18 RX ADMIN — LEVETIRACETAM 500 MG: 5 INJECTION INTRAVENOUS at 13:58

## 2020-11-18 RX ADMIN — Medication 10 ML: at 17:37

## 2020-11-18 RX ADMIN — ASPIRIN 150 MG: 300 SUPPOSITORY RECTAL at 13:30

## 2020-11-18 RX ADMIN — ACETAMINOPHEN 650 MG: 650 SUPPOSITORY RECTAL at 15:57

## 2020-11-18 RX ADMIN — METOPROLOL TARTRATE 2.5 MG: 1 INJECTION, SOLUTION INTRAVENOUS at 13:35

## 2020-11-18 RX ADMIN — Medication 10 ML: at 06:26

## 2020-11-18 RX ADMIN — ENOXAPARIN SODIUM 40 MG: 40 INJECTION SUBCUTANEOUS at 10:28

## 2020-11-18 NOTE — ED NOTES
Bedside and Verbal shift change report given to Gavi Gaona RN (oncoming nurse) by Sarah Ramirez RN (offgoing nurse). Report included the following information SBAR, ED Summary, MAR, Recent Results and Cardiac Rhythm paced.

## 2020-11-18 NOTE — PROGRESS NOTES
Speech pathology  Orders received, chart reviewed. RN reports patient too lethargic for evaluation at this time. Patient also getting EEG done. Will follow up later today to determine appropriateness for eval.    Thanks,    Patrick Interiano M.S., CCC-SLP    13:50 re-attempted to see patient. RN cleared to attempt; however, patient drowsy, just pulled IV and made no attempt to accept applesauce via spoon or water via straw when brought to lips. Patient failed STAND on admission but a regular diet was initiated. Diet was just discontinued as patient too lethargic and confused for safe PO intake at this time. Will continue to follow and see when appropriate.   Thanks, Patrick Interiano M.S., CCC-SLP

## 2020-11-18 NOTE — PROGRESS NOTES
Faxed Device Management Form to [facility] MRI department  at fax number [phone]. Fax confirmation received.

## 2020-11-18 NOTE — PROGRESS NOTES
Pt not following commands, altered mental status, unable to complete swallow eval to administer PO medications. MD notified awaiting call back.

## 2020-11-18 NOTE — ED NOTES
TRANSFER - OUT REPORT:    Verbal report given to [facility] AT ISMAEL WALKER RN(name) on Cecy Vaughn  being transferred to [facility](unit) for routine progression of care       Report consisted of patients Situation, Background, Assessment and   Recommendations(SBAR). Information from the following report(s) SBAR, ED Summary, Procedure Summary, MAR, Recent Results and Cardiac Rhythm paced was reviewed with the receiving nurse. Lines:   Peripheral IV 11/17/20 Left Forearm (Active)   Site Assessment Clean, dry, & intact 11/17/20 1630   Phlebitis Assessment 0 11/17/20 1630   Infiltration Assessment 0 11/17/20 1630   Dressing Status Clean, dry, & intact 11/17/20 1630   Dressing Type Transparent 11/17/20 1630   Hub Color/Line Status Pink;Patent; Flushed 11/17/20 1630   Alcohol Cap Used No 11/17/20 1630       Peripheral IV 11/17/20 Left Antecubital (Active)   Site Assessment Clean, dry, & intact 11/17/20 1624   Phlebitis Assessment 0 11/17/20 1624   Infiltration Assessment 0 11/17/20 1624   Dressing Status Clean, dry, & intact 11/17/20 1624   Dressing Type Transparent 11/17/20 1624   Hub Color/Line Status Pink;Patent; Flushed 11/17/20 1624   Alcohol Cap Used No 11/17/20 1624        Opportunity for questions and clarification was provided.       Patient transported with:   to be transported by oncoming ED staff

## 2020-11-18 NOTE — PROCEDURES
Patient Name: Jayshree Sutherland  : 3/9/1933  Age: 80 y.o. Ordering physician: No ref. provider found  Date of EE2020  EEG procedure number: [id number]  Diagnosis: seizure  Interpreting physician: Dhruv Chaparro MD    ELECTROENCEPHALOGRAM REPORT     PROCEDURE: EEG. CLINICAL INDICATION: The patient is a 80 y.o. male with a history of possible seizures. EEG to rule out seizures, rule out stroke, rule out cortical abnormality. EEG CLASSIFICATION: Abnormal     DESCRIPTION OF THE RECORD:   The background of this recording contains a posteriorly-located occipital alpha rhythm of 5 Hz that attenuates with eye opening. Throughout the recording, there were no clear areas of focal slowing nor spike or spike-and-wave discharges seen. Hyperventilation was not performed. Photic stimulation produced no response. During the recording the patient did not achieve stage II sleep    INTERPRETATION:   Abnormal. Moderate diffuse cerebral dysfunction which is non specific for etiology but can be seen in toxic/metabolic states. No seizures. Clinical correlation recommended.       Dhruv Chaparro MD  Neurologist

## 2020-11-18 NOTE — CONSULTS
NEUROLOGY NOTE     Chief Complaint   Patient presents with    Seizure       Reason for Consult  I have been asked by Darrius Quijano MD to see the patient in neurological consultation to render advice and opinion regarding seizure    HPI  The patient is an 71-year-old man who presents to the hospital because of new onset seizure. He does have history of stroke and in July 2020 had bilateral occipital infarcts. The patient was sitting when he had an episode of unresponsiveness along with shaking. After that he was very tired and fatigued. He was brought to the hospital for evaluation of possible seizures. Mild in the ER, he had another episode for which Ativan was given which stopped the seizure. Loading dose of Keppra was given. After the episode was postictal and was not able to answer questions. Patient had CT scan of the head which showed bilateral occipital infarcts which are chronic left greater than right.     ROS  A ten system review of constitutional, cardiovascular, respiratory, musculoskeletal, endocrine, skin, SHEENT, genitourinary, psychiatric and neurologic systems was obtained and is unremarkable except as stated in HPI     PMH  Past Medical History:   Diagnosis Date    Cancer ([facility] [address]     prostate    Constipation     Gout     Hyperlipemia     Hypertension     Pacemaker 2019    Stroke ([facility] [address]     Thrombocytopenia ([facility] [address] 3/19/2019    TIA (transient ischemic attack) 2013         Family History   Problem Relation Age of Onset    No Known Problems Mother     No Known Problems Father        SH  Social History     Socioeconomic History    Marital status:      Spouse name: Not on file    Number of children: Not on file    Years of education: Not on file    Highest education level: Not on file   Tobacco Use    Smoking status: Never Smoker    Smokeless tobacco: Never Used   Substance and Sexual Activity    Alcohol use: No    Drug use: No    Sexual activity: Never ALLERGIES  No Known Allergies    PHYSICAL EXAMINATION:   Patient Vitals for the past 24 hrs:   Temp Pulse Resp BP SpO2   11/18/20 0758 97.9 °F (36.6 °C) 72 18 (!) 140/76 92 %   11/18/20 0345 97.6 °F (36.4 °C) 74 17 (!) 149/75 92 %   11/18/20 0209    (!) 140/83    11/17/20 2350 98.3 °F (36.8 °C) 72 20 (!) 174/83 95 %   11/17/20 2130 98 °F (36.7 °C) 69 18 135/78 100 %   11/17/20 1730  80 17 117/74 100 %   11/17/20 1700  97 20 (!) 140/89 95 %   11/17/20 1645  92 23 (!) 161/98 99 %   11/17/20 1631  (!) 101 25 (!) 169/102    11/17/20 1623  (!) 107 16          General:   General appearance: Pt is in no acute distress   Distal pulses are preserved  Fundoscopic exam: attempted    Neurological Examination:   Mental Status: Patient is awake but does not following any commands and is nonverbal to me. Cranial Nerves:  PERRL, Extraocular movements are full. Facial movement intact. Motor: Strength is 5/5 except left lower extremity 0 out of 5. Normal tone. No atrophy. Sensation: Responds to pain in all 4 extremities except left lower extremity    Coordination/Cerebellar: Unable to perform    Gait: Deferred secondary to fall risk    Skin: No significant bruising or lacerations. LAB DATA REVIEWED:    Recent Results (from the past 24 hour(s))   SAMPLES BEING HELD    Collection Time: 11/17/20  4:23 PM   Result Value Ref Range    SAMPLES BEING HELD  DRK GRN, VLADIMIR, SST, RED     COMMENT        Add-on orders for these samples will be processed based on acceptable specimen integrity and analyte stability, which may vary by analyte.    ETHYL ALCOHOL    Collection Time: 11/17/20  4:23 PM   Result Value Ref Range    ALCOHOL(ETHYL),SERUM <10 <10 MG/DL   PROTHROMBIN TIME + INR    Collection Time: 11/17/20  4:23 PM   Result Value Ref Range    INR 1.0 0.9 - 1.1      Prothrombin time 10.9 9.0 - 11.1 sec   CBC WITH AUTOMATED DIFF    Collection Time: 11/17/20  4:24 PM   Result Value Ref Range    WBC 9.0 4.1 - 11.1 K/uL    RBC 4.29 4. 10 - 5.70 M/uL    HGB 13.3 12.1 - 17.0 g/dL    HCT 39.4 36.6 - 50.3 %    MCV 91.8 80.0 - 99.0 FL    MCH 31.0 26.0 - 34.0 PG    MCHC 33.8 30.0 - 36.5 g/dL    RDW 13.7 11.5 - 14.5 %    PLATELET 957 551 - 813 K/uL    MPV 9.8 8.9 - 12.9 FL    NRBC 0.0 0  WBC    ABSOLUTE NRBC 0.00 0.00 - 0.01 K/uL    NEUTROPHILS 50 32 - 75 %    LYMPHOCYTES 39 12 - 49 %    MONOCYTES 8 5 - 13 %    EOSINOPHILS 3 0 - 7 %    BASOPHILS 0 0 - 1 %    IMMATURE GRANULOCYTES 0 0.0 - 0.5 %    ABS. NEUTROPHILS 4.4 1.8 - 8.0 K/UL    ABS. LYMPHOCYTES 3.5 0.8 - 3.5 K/UL    ABS. MONOCYTES 0.7 0.0 - 1.0 K/UL    ABS. EOSINOPHILS 0.3 0.0 - 0.4 K/UL    ABS. BASOPHILS 0.0 0.0 - 0.1 K/UL    ABS. IMM. GRANS. 0.0 0.00 - 0.04 K/UL    DF AUTOMATED     METABOLIC PANEL, COMPREHENSIVE    Collection Time: 11/17/20  4:24 PM   Result Value Ref Range    Sodium 136 136 - 145 mmol/L    Potassium 3.3 (L) 3.5 - 5.1 mmol/L    Chloride 99 97 - 108 mmol/L    CO2 32 21 - 32 mmol/L    Anion gap 5 5 - 15 mmol/L    Glucose 109 (H) 65 - 100 mg/dL    BUN 19 6 - 20 MG/DL    Creatinine 1.55 (H) 0.70 - 1.30 MG/DL    BUN/Creatinine ratio 12 12 - 20      GFR est AA 52 (L) >60 ml/min/1.73m2    GFR est non-AA 43 (L) >60 ml/min/1.73m2    Calcium 9.3 8.5 - 10.1 MG/DL    Bilirubin, total 0.5 0.2 - 1.0 MG/DL    ALT (SGPT) 28 12 - 78 U/L    AST (SGOT) 21 15 - 37 U/L    Alk.  phosphatase 140 (H) 45 - 117 U/L    Protein, total 8.6 (H) 6.4 - 8.2 g/dL    Albumin 3.7 3.5 - 5.0 g/dL    Globulin 4.9 (H) 2.0 - 4.0 g/dL    A-G Ratio 0.8 (L) 1.1 - 2.2     LACTIC ACID    Collection Time: 11/17/20  4:24 PM   Result Value Ref Range    Lactic acid 2.7 (HH) 0.4 - 2.0 MMOL/L   MAGNESIUM    Collection Time: 11/17/20  4:24 PM   Result Value Ref Range    Magnesium 1.8 1.6 - 2.4 mg/dL   TROPONIN I    Collection Time: 11/17/20  4:24 PM   Result Value Ref Range    Troponin-I, Qt. 0.06 (H) <0.05 ng/mL   CK    Collection Time: 11/17/20  4:24 PM   Result Value Ref Range    CK 75 39 - 308 U/L URINALYSIS W/ RFLX MICROSCOPIC    Collection Time: 11/17/20  7:47 PM   Result Value Ref Range    Color YELLOW/STRAW      Appearance CLEAR CLEAR      Specific gravity 1.008 1.003 - 1.030      pH (UA) 7.5 5.0 - 8.0      Protein Negative NEG mg/dL    Glucose Negative NEG mg/dL    Ketone Negative NEG mg/dL    Bilirubin Negative NEG      Blood SMALL (A) NEG      Urobilinogen 1.0 0.2 - 1.0 EU/dL    Nitrites Negative NEG      Leukocyte Esterase Negative NEG      WBC 0-4 0 - 4 /hpf    RBC 5-10 0 - 5 /hpf    Epithelial cells FEW FEW /lpf    Bacteria Negative NEG /hpf    Hyaline cast 0-2 0 - 5 /lpf   METABOLIC PANEL, COMPREHENSIVE    Collection Time: 11/17/20  7:47 PM   Result Value Ref Range    Sodium 135 (L) 136 - 145 mmol/L    Potassium 3.6 3.5 - 5.1 mmol/L    Chloride 100 97 - 108 mmol/L    CO2 32 21 - 32 mmol/L    Anion gap 3 (L) 5 - 15 mmol/L    Glucose 106 (H) 65 - 100 mg/dL    BUN 19 6 - 20 MG/DL    Creatinine 1.43 (H) 0.70 - 1.30 MG/DL    BUN/Creatinine ratio 13 12 - 20      GFR est AA 57 (L) >60 ml/min/1.73m2    GFR est non-AA 47 (L) >60 ml/min/1.73m2    Calcium 9.1 8.5 - 10.1 MG/DL    Bilirubin, total 0.4 0.2 - 1.0 MG/DL    ALT (SGPT) 26 12 - 78 U/L    AST (SGOT) 19 15 - 37 U/L    Alk.  phosphatase 124 (H) 45 - 117 U/L    Protein, total 7.7 6.4 - 8.2 g/dL    Albumin 3.3 (L) 3.5 - 5.0 g/dL    Globulin 4.4 (H) 2.0 - 4.0 g/dL    A-G Ratio 0.8 (L) 1.1 - 2.2     MAGNESIUM    Collection Time: 11/17/20  7:47 PM   Result Value Ref Range    Magnesium 1.8 1.6 - 2.4 mg/dL   CBC WITH AUTOMATED DIFF    Collection Time: 11/17/20  7:47 PM   Result Value Ref Range    WBC 8.6 4.1 - 11.1 K/uL    RBC 4.00 (L) 4.10 - 5.70 M/uL    HGB 12.4 12.1 - 17.0 g/dL    HCT 36.2 (L) 36.6 - 50.3 %    MCV 90.5 80.0 - 99.0 FL    MCH 31.0 26.0 - 34.0 PG    MCHC 34.3 30.0 - 36.5 g/dL    RDW 13.6 11.5 - 14.5 %    PLATELET 781 410 - 252 K/uL    MPV 9.9 8.9 - 12.9 FL    NRBC 0.0 0  WBC    ABSOLUTE NRBC 0.00 0.00 - 0.01 K/uL    NEUTROPHILS 56 32 - 75 %    LYMPHOCYTES 33 12 - 49 %    MONOCYTES 7 5 - 13 %    EOSINOPHILS 3 0 - 7 %    BASOPHILS 1 0 - 1 %    IMMATURE GRANULOCYTES 0 0.0 - 0.5 %    ABS. NEUTROPHILS 4.9 1.8 - 8.0 K/UL    ABS. LYMPHOCYTES 2.8 0.8 - 3.5 K/UL    ABS. MONOCYTES 0.6 0.0 - 1.0 K/UL    ABS. EOSINOPHILS 0.3 0.0 - 0.4 K/UL    ABS. BASOPHILS 0.0 0.0 - 0.1 K/UL    ABS. IMM. GRANS. 0.0 0.00 - 0.04 K/UL    DF AUTOMATED     CULTURE, BLOOD, PAIRED    Collection Time: 11/17/20  7:47 PM    Specimen: Blood   Result Value Ref Range    Special Requests: NO SPECIAL REQUESTS      Culture result: NO GROWTH AFTER 11 HOURS     LACTIC ACID    Collection Time: 11/17/20  7:47 PM   Result Value Ref Range    Lactic acid 0.9 0.4 - 2.0 MMOL/L   SAMPLES BEING HELD    Collection Time: 11/17/20  7:47 PM   Result Value Ref Range    SAMPLES BEING HELD RED     COMMENT        Add-on orders for these samples will be processed based on acceptable specimen integrity and analyte stability, which may vary by analyte. METABOLIC PANEL, COMPREHENSIVE    Collection Time: 11/18/20  3:38 AM   Result Value Ref Range    Sodium 138 136 - 145 mmol/L    Potassium 3.9 3.5 - 5.1 mmol/L    Chloride 102 97 - 108 mmol/L    CO2 33 (H) 21 - 32 mmol/L    Anion gap 3 (L) 5 - 15 mmol/L    Glucose 93 65 - 100 mg/dL    BUN 16 6 - 20 MG/DL    Creatinine 1.33 (H) 0.70 - 1.30 MG/DL    BUN/Creatinine ratio 12 12 - 20      GFR est AA >60 >60 ml/min/1.73m2    GFR est non-AA 51 (L) >60 ml/min/1.73m2    Calcium 9.3 8.5 - 10.1 MG/DL    Bilirubin, total 0.7 0.2 - 1.0 MG/DL    ALT (SGPT) 24 12 - 78 U/L    AST (SGOT) 19 15 - 37 U/L    Alk.  phosphatase 125 (H) 45 - 117 U/L    Protein, total 7.7 6.4 - 8.2 g/dL    Albumin 3.2 (L) 3.5 - 5.0 g/dL    Globulin 4.5 (H) 2.0 - 4.0 g/dL    A-G Ratio 0.7 (L) 1.1 - 2.2     MAGNESIUM    Collection Time: 11/18/20  3:38 AM   Result Value Ref Range    Magnesium 1.9 1.6 - 2.4 mg/dL   CBC WITH AUTOMATED DIFF    Collection Time: 11/18/20  3:38 AM   Result Value Ref Range    WBC 7.6 4.1 - 11.1 K/uL    RBC 4.42 4.10 - 5.70 M/uL    HGB 13.4 12.1 - 17.0 g/dL    HCT 40.4 36.6 - 50.3 %    MCV 91.4 80.0 - 99.0 FL    MCH 30.3 26.0 - 34.0 PG    MCHC 33.2 30.0 - 36.5 g/dL    RDW 13.4 11.5 - 14.5 %    PLATELET 345 295 - 054 K/uL    MPV 9.4 8.9 - 12.9 FL    NRBC 0.0 0  WBC    ABSOLUTE NRBC 0.00 0.00 - 0.01 K/uL    NEUTROPHILS 49 32 - 75 %    LYMPHOCYTES 36 12 - 49 %    MONOCYTES 10 5 - 13 %    EOSINOPHILS 4 0 - 7 %    BASOPHILS 1 0 - 1 %    IMMATURE GRANULOCYTES 0 0.0 - 0.5 %    ABS. NEUTROPHILS 3.7 1.8 - 8.0 K/UL    ABS. LYMPHOCYTES 2.7 0.8 - 3.5 K/UL    ABS. MONOCYTES 0.8 0.0 - 1.0 K/UL    ABS. EOSINOPHILS 0.3 0.0 - 0.4 K/UL    ABS. BASOPHILS 0.0 0.0 - 0.1 K/UL    ABS. IMM. GRANS. 0.0 0.00 - 0.04 K/UL    DF AUTOMATED          Imaging review:  CT head  No acute intracranial abnormality identified. There bilateral occipital infarctions which are chronic left greater than right and an old left basal   ganglia infarct. MRI brain  Pending    EEG  Pending      HOME MEDS  Prior to Admission Medications   Prescriptions Last Dose Informant Patient Reported? Taking?   acetaminophen (TYLENOL) 325 mg tablet   Yes No   Sig: Take 650 mg by mouth every six (6) hours as needed for Pain. allopurinoL (ZYLOPRIM) 100 mg tablet   No No   Sig: Take 1 Tab by mouth daily. amLODIPine (NORVASC) 5 mg tablet   No No   Sig: Take 1 Tab by mouth daily. aspirin delayed-release 81 mg tablet   No No   Sig: Take 1 Tab by mouth daily. atorvastatin (LIPITOR) 40 mg tablet   No No   Sig: Take 1 Tab by mouth nightly. balsam peru-castor oiL (VENELEX) ointment   No No   Sig: Apply  to affected area three (3) times daily. famotidine (PEPCID) 20 mg tablet   No No   Sig: Take 1 Tab by mouth every evening. hydrALAZINE (APRESOLINE) 25 mg tablet   No No   Sig: Take 1 Tab by mouth three (3) times daily. isoniazid (NYDRAZID) 300 mg tablet   No No   Sig: Take 1 Tab by mouth daily.    metoprolol succinate (TOPROL-XL) 25 mg XL tablet   No No   Sig: Take 1 Tab by mouth daily. polyethylene glycol (MIRALAX) 17 gram/dose powder   No No   Sig: Take 17 g by mouth daily as needed for Constipation. Facility-Administered Medications: None       CURRENT MEDS  Current Facility-Administered Medications   Medication Dose Route Frequency    sodium chloride (NS) flush 5-40 mL  5-40 mL IntraVENous Q8H    famotidine (PEPCID) tablet 20 mg  20 mg Oral BID    enoxaparin (LOVENOX) injection 40 mg  40 mg SubCUTAneous DAILY    levETIRAcetam (KEPPRA) tablet 250 mg  250 mg Oral BID    allopurinoL (ZYLOPRIM) tablet 100 mg  100 mg Oral DAILY    amLODIPine (NORVASC) tablet 5 mg  5 mg Oral DAILY    aspirin delayed-release tablet 81 mg  81 mg Oral DAILY    atorvastatin (LIPITOR) tablet 40 mg  40 mg Oral QHS    hydrALAZINE (APRESOLINE) tablet 25 mg  25 mg Oral TID    metoprolol succinate (TOPROL-XL) XL tablet 25 mg  25 mg Oral DAILY       IMPRESSION/RECOMMENDATIONS:  Janalee Angelucci is a 80 y.o. male who presents with new onset seizure with a prior history of bilateral occipital infarct. Since he has seizures and the prior history of stroke, there is high risk of recurrence. He will need to be on antiepileptic medication. MRI brain  EEG  Increase Keppra to 500 mg IV/p.o. twice daily  Seizure precautions      Thank you very much for this consultation.      Paula Avila MD  Neurologist

## 2020-11-18 NOTE — PROGRESS NOTES
Hospitalist Progress Note    NAME: Aldon Angelucci   :  3/9/1933   MRN:  [id number]       Assessment / Plan:  New onset seizures, POA  Acute encephalopathy, likely postictal, POA  History of multiple strokes, POA  Patient had a 5-minute seizure at nursing home, seized again in the ER and was given Ativan.  status post Keppra loading dose  C/w IV keprra 500mg bid     EEG ordered: no seizure   MRI ordered, nursing miscellaneous order placed for [employer] rep, pending   Seizure precautions  Npo as pt lethargic , switch meds to IV   Ativan as needed for seizures  TSH, B12, folate, RPR, CBC, CMP, and blood cultures ordered  Neurology consulted, we appreciate their assistance     History of strokes, hypertension, POA  Continue statin  Continue antihypertensives     SOULEYMANE on CKD  Also had in July  Repeat renal panel in morning  Renally dose medications  Creat 1.3-1.4     Sinus bradycardia with 2-1 block: POA  Status post pacemaker  Was able to have MRI with [employer] rep present and MRI in July at [facility] debility  Patient is able to walk with a walker  Has moderate dementia, but is able to hold conversation with family  Resides in a nursing home       updated daughter josselyn Forrester   Baseline : verbal but need help with adl   Code status: Full  Prophylaxis: Lovenox  Recommended Disposition: SNF      Subjective:     Chief Complaint / Reason for Physician Visit  FU seizures/ams   Discussed with RN events overnight. Review of Systems:  Symptom Y/N Comments  Symptom Y/N Comments   Fever/Chills    Chest Pain     Poor Appetite    Edema     Cough    Abdominal Pain     Sputum    Joint Pain     SOB/CARREON    Pruritis/Rash     Nausea/vomit    Tolerating PT/OT     Diarrhea    Tolerating Diet     Constipation    Other       Could NOT obtain due to: Non verbal      Objective:     VITALS:   Last 24hrs VS reviewed since prior progress note.  Most recent are:  Patient Vitals for the past 24 hrs:   Temp Pulse Resp BP SpO2   11/18/20 0758 97.9 °F (36.6 °C) 72 18 (!) 140/76 92 %   11/18/20 0345 97.6 °F (36.4 °C) 74 17 (!) 149/75 92 %   11/18/20 0209    (!) 140/83    11/17/20 2350 98.3 °F (36.8 °C) 72 20 (!) 174/83 95 %   11/17/20 2130 98 °F (36.7 °C) 69 18 135/78 100 %   11/17/20 1730  80 17 117/74 100 %   11/17/20 1700  97 20 (!) 140/89 95 %   11/17/20 1645  92 23 (!) 161/98 99 %   11/17/20 1631  (!) 101 25 (!) 169/102    11/17/20 1623  (!) 107 16         Intake/Output Summary (Last 24 hours) at 11/18/2020 0841  Last data filed at 11/17/2020 1914  Gross per 24 hour   Intake 100 ml   Output    Net 100 ml        I had a face to face encounter with this patient and independently examined this patient on 11/18/2020 as outlined below:  PHYSICAL EXAM:  General: WD, WN. Alertt , non verbal   EENT:  EOMI. Anicteric sclerae. MMM  Resp:  CTA bilaterally, no wheezing or rales. No accessory muscle use  CV:  Regular  rhythm,  No edema  GI:  Soft, Non distended, Non tender. +Bowel sounds  Neurologic:  Alert and oriented X 0, non verbal   Psych:   Unable to assess  Skin:  No rashes. No jaundice    Reviewed most current lab test results and cultures  YES  Reviewed most current radiology test results   YES  Review and summation of old records today    NO  Reviewed patient's current orders and MAR    YES  PMH/SH reviewed - no change compared to H&P  ________________________________________________________________________  Care Plan discussed with:    Comments   Patient     Family      RN x    Care Manager     Consultant                        Multidiciplinary team rounds were held today with , nursing, pharmacist and clinical coordinator. Patient's plan of care was discussed; medications were reviewed and discharge planning was addressed.      ________________________________________________________________________  Total NON critical care TIME: 25   Minutes    Total CRITICAL CARE TIME Spent:   Minutes non procedure based      Comments   >50% of visit spent in counseling and coordination of care     ________________________________________________________________________  Jordin Garcia MD     Procedures: see electronic medical records for all procedures/Xrays and details which were not copied into this note but were reviewed prior to creation of Plan. LABS:  I reviewed today's most current labs and imaging studies.   Pertinent labs include:  Recent Labs     11/18/20 0338 11/17/20 1947 11/17/20  1624   WBC 7.6 8.6 9.0   HGB 13.4 12.4 13.3   HCT 40.4 36.2* 39.4    162 166     Recent Labs     11/18/20 0338 11/17/20 1947 11/17/20  1624 11/17/20  1623    135* 136  --    K 3.9 3.6 3.3*  --     100 99  --    CO2 33* 32 32  --    GLU 93 106* 109*  --    BUN 16 19 19  --    CREA 1.33* 1.43* 1.55*  --    CA 9.3 9.1 9.3  --    MG 1.9 1.8 1.8  --    ALB 3.2* 3.3* 3.7  --    TBILI 0.7 0.4 0.5  --    ALT 24 26 28  --    INR  --   --   --  1.0       Signed: Jordin Garcia MD

## 2020-11-18 NOTE — PROGRESS NOTES
Talked with Whit Galdamez pt's RN. MRI with the pacemaker rep present is scheduled for 1300 on Thurs, 11/19. Patient to be in MRI @ 1245. Please call MRI if anyone has any questions.  katarzyna

## 2020-11-18 NOTE — PROGRESS NOTES
pt had blood in his urine, MD notified awaiting call back     @[id number] MD ordered UA and bladder scan    @[phone] sent down UA

## 2020-11-18 NOTE — PROGRESS NOTES
Reason for Admission:   Seizure                    RUR Score:  Observation Status                    Plan for utilizing home health:    Pt will work with therapist to determine his baseline       PCP: First and Last name:  Sheela Herron    Name of Practice:    Are you a current patient: Yes/No: Yes    Approximate date of last visit: Aug 2020   Can you participate in a virtual visit with your PCP: Yes, with assistance from family                     Current Advanced Directive/Advance Care Plan: FULL-Daughter: Agnes                          Transition of Care Plan:                      CM completed phone assessment with pt's daughter: Alessia Ley, via telephone. Pt is current resident at [facility] ([phone]). Pt is known to be resident at facility for over a year. Pt is known to be active with PCP: seen Aug 20th, and uses [employer]. Pt is known to need assistance with ADLs, and does not drive. Pt family can assist with transport, if pt is able to be transport by car, if not pt will need medical transport arrange. Pt is known to use DME at facility: walker. Pt has had HHC in the past and SNF. Pt's daughter: Alessia Ley is known to be medical decision maker when discussing ACP. Alessia Ley is aware that pt is currently in observation status with possibility of being upgraded to inpatient status. CM informed Agnes of observation and moon letter (verbal consent). Pt is currently working with therapy to assist with determining his baseline. CM will continue to follow    Care Management Interventions  PCP Verified by CM: Yes  Mode of Transport at Discharge:  Other (see comment)(family can assist with transport)  Transition of Care Consult (CM Consult): Assisted Living([facility] )  Discharge Durable Medical Equipment: No  Physical Therapy Consult: Yes  Occupational Therapy Consult: Yes  Speech Therapy Consult: Yes  Current Support Network: Assisted Living([facility] [facility] )  Confirm Follow Up Transport: Family  Discharge Location  Discharge Placement: Assisted Living([facility] )    [facility] [place], [address] [place] [address]

## 2020-11-18 NOTE — PROGRESS NOTES
End of Shift Note    Bedside shift change report given to  RN (oncoming nurse) by Richmond Cardenas (offgoing nurse).   Report included the following information SBAR, Kardex, MAR and Quality Measures    Shift worked: days   Shift summary and any significant changes:     pt lethargic, speech consult saw pt and stated pt is not safe for PO intake at this time, blood in urine UA ordered, pt pulled out IV and tele, wrist restraints were ordered       Concerns for physician to address:  switch PO meds to different route of administration    Zone phone for oncoming shift:   [phone]     Patient Information  Srinivasa Hope  80 y.o.  11/17/2020  4:20 PM by Emily Nance MD. Srinivasa Hope was admitted from [facility]    Problem List  Patient Active Problem List    Diagnosis Date Noted    Seizure [facility]) 11/17/2020    Atrial pacemaker lead displacement 10/18/2019    Pacemaker 03/22/2019    Bradycardia 03/19/2019    Elevated troponin 03/19/2019    Hypertensive urgency 03/19/2019    Second degree AV block, Mobitz type I 03/19/2019    Stage 3 chronic kidney disease 03/15/2019    Rotator cuff arthropathy of both shoulders 03/15/2019    Cognitive impairment 92/75/2735    Systolic murmur 85/51/3074    Essential hypertension 12/17/2018    Gout 12/17/2018    Pure hypercholesterolemia 12/17/2018    History of prostate cancer 12/17/2018    Chronic constipation 12/17/2018    Dysuria 08/08/2017    Weight loss 08/08/2017    Adrenal [facility] [address] 07/18/2017     Past Medical History:   Diagnosis Date    Cancer [facility])     prostate    Constipation     Gout     Hyperlipemia     Hypertension     Pacemaker 2019    Stroke ([address]     Thrombocytopenia ([address] 3/19/2019    TIA (transient ischemic attack) 2013       Core Measures:  CVA: No No  CHF:No No  PNA:No No    Activity:  Activity Level: Bed Rest  Number times ambulated in hallways past shift: 0  Number of times OOB to chair past shift: 0    Cardiac:   Cardiac Monitoring: Yes      Cardiac Rhythm: Paced    Access:   Current line(s): PIV   Central Line? No Placement date 0 Reason Medically Necessary 0    PICC LINE? No Placement date 0Reason Medically Necessary 0    Genitourinary:   Urinary status: incontinent  Urinary Catheter? No Placement Date 0 Reason Medically Necessary 0      Respiratory:      Chronic home O2 use?: NO  Incentive spirometer at bedside: NO       GI:  Last Bowel Movement Date: (unknown)  Current diet:  No diet orders on file  Passing flatus: YES  Tolerating current diet: Unknown       Pain Management:   Patient states pain is manageable on current regimen: N/A    Skin:  Torito Score: 14  Interventions: turn team and float heels    Patient Safety:  Fall Score:  Total Score: 3  Interventions: bed/chair alarm and gripper socks  High Fall Risk: Yes    DVT prophylaxis:  DVT prophylaxis Med-Yes  DVT prophylaxis SCD or BEV- No     Wounds: (If Applicable)  Wounds- No  Location 0    Active Consults:  IP CONSULT TO NEUROLOGY    Length of Stay:  Expected LOS: - - -  Actual LOS: 0  Discharge Plan: No TBD      Eufemia Page

## 2020-11-18 NOTE — PROGRESS NOTES
Patient pulled out IV and tele leads. MD notified and ordered non-violent soft restraints. Wrist restraints started and applied to the pt. Family at bedside educated about the use of restraints and pt family member agreed.

## 2020-11-18 NOTE — PROGRESS NOTES
End of Shift Note    Bedside shift change report given to Ton Pettit RN (oncoming nurse) by Catalina Cerda RN (offgoing nurse). Report included the following information SBAR, Kardex, MAR and Quality Measures    Shift worked:  7PM - 7AM   Shift summary and any significant changes:     New admit with new onset of Seizure       Concerns for physician to address:  Roel Dooley phone for oncoming shift:   [phone]     Patient Information  Jacek Maradiaga  80 y.o.  11/17/2020  4:20 PM by Jocelin Rubin MD. Jacek Maradiaga was admitted from SNF L[facility]    Problem List  Patient Active Problem List    Diagnosis Date Noted    Seizure [facility]) 11/17/2020    Atrial pacemaker lead displacement 10/18/2019    Pacemaker 03/22/2019    Bradycardia 03/19/2019    Elevated troponin 03/19/2019    Hypertensive urgency 03/19/2019    Second degree AV block, Mobitz type I 03/19/2019    Stage 3 chronic kidney disease 03/15/2019    Rotator cuff arthropathy of both shoulders 03/15/2019    Cognitive impairment 41/18/7700    Systolic murmur 92/01/8028    Essential hypertension 12/17/2018    Gout 12/17/2018    Pure hypercholesterolemia 12/17/2018    History of prostate cancer 12/17/2018    Chronic constipation 12/17/2018    Dysuria 08/08/2017    Weight loss 08/08/2017    Adrenal mass ([facility] [address] 07/18/2017     Past Medical History:   Diagnosis Date    Cancer [facility])     prostate    Constipation     Gout     Hyperlipemia     Hypertension     Pacemaker 2019    Stroke ([facility] [address]     Thrombocytopenia ([facility] [address] 3/19/2019    TIA (transient ischemic attack) 2013       Core Measures:  CVA: No No  CHF:No No  PNA:No No    Activity:  Activity Level: Bed Rest  Number times ambulated in hallways past shift: 0  Number of times OOB to chair past shift: 0    Cardiac:   Cardiac Monitoring: Yes      Cardiac Rhythm: Paced    Access:   Current line(s): PIV   Central Line? No Placement date 0 Reason Medically Necessary 0    PICC LINE?  No Placement date 0Reason Medically Necessary 0    Genitourinary:   Urinary status: incontinent  Urinary Catheter? No Placement Date 0 Reason Medically Necessary 0      Respiratory:      Chronic home O2 use?: NO  Incentive spirometer at bedside: NO       GI:  Last Bowel Movement Date: (unknown)  Current diet:  DIET REGULAR  Passing flatus: YES  Tolerating current diet: Unknown       Pain Management:   Patient states pain is manageable on current regimen: N/A    Skin:  Torito Score: 16  Interventions: turn team and float heels    Patient Safety:  Fall Score:  Total Score: 3  Interventions: bed/chair alarm and gripper socks  High Fall Risk: Yes    DVT prophylaxis:  DVT prophylaxis Med-Yes  DVT prophylaxis SCD or BEV- No     Wounds: (If Applicable)  Wounds- No  Location 0    Active Consults:  IP CONSULT TO NEUROLOGY    Length of Stay:  Expected LOS: - - -  Actual LOS: 0  Discharge Plan: No TBD      Lauren Hamm RN

## 2020-11-18 NOTE — ACP (ADVANCE CARE PLANNING)
Advance Care Planning     Advance Care Planning (ACP) Physician/NP/PA Conversation      Date of Conversation: 11/17/2020  Conducted with: Patient with Decision Making Capacity and Healthcare Decision Maker: Next of Kin by law (only applies in absence of a Healthcare Power of  or Legal Guardian) Shauna Monroy (Named POA)    Healthcare Decision Maker:   Patient's daughter:  Shauna Monroy, [phone]      Care Preferences:    Hospitalization: \"If your health worsens and it becomes clear that your chance of recovery is unlikely, what would be your preference regarding hospitalization? \"  The patient is unsure. Ms. Emilie Mcburney would like to discuss further at that time. She believes he would likely be made comfort, but it would be based of the specifics of the circumstances. At this point they want full treatment. Ventilation: \"If you were unable to breathe on your own and your chance of recovery was unlikely, what would be your preference about the use of a ventilator (breathing machine) if it was available to you? \"   The patient would desire the use of a ventilator. Resuscitation: \"In the event your heart stopped as a result of an underlying serious health condition, would you want attempts to be made to restart your heart, or would you prefer a natural death? \"   Yes, attempt to resuscitate. Additional topics discussed: treatment goals Had a discussion with patient's daughter regarding resuscitation. At this point she feels he is good enough health she would want all measures done. If his health declines, or he has a significant change, she would like to re-address his code status at that time. She is very involved in his care and actively visits/participates in his treatment at the nursing home.      Conversation Outcomes / Follow-Up Plan:   ACP complete - no further action today  Reviewed DNR/DNI and patient elects Full Code (Attempt Resuscitation)     Length of Voluntary ACP Conversation in minutes:  21 minutes    Radha Oneil MD

## 2020-11-18 NOTE — PROGRESS NOTES
TRANSFER - IN REPORT:    Verbal report received from Marium(name) on Yumiko Washburn  being received from ED(unit) for routine progression of care      Report consisted of patients Situation, Background, Assessment and   Recommendations(SBAR). Information from the following report(s) SBAR was reviewed with the receiving nurse. Opportunity for questions and clarification was provided. Assessment completed upon patients arrival to unit and care assumed.

## 2020-11-18 NOTE — PROGRESS NOTES
Problem: Falls - Risk of  Goal: *Absence of Falls  Description: Document Veatrice Marker Fall Risk and appropriate interventions in the flowsheet. 11/18/2020 0027 by Gary Bermudez RN  Outcome: Progressing Towards Goal  Note: Fall Risk Interventions:       Mentation Interventions: Bed/chair exit alarm, Adequate sleep, hydration, pain control    Medication Interventions: Bed/chair exit alarm    Elimination Interventions: Bed/chair exit alarm, Call light in reach           11/18/2020 0026 by Gary Bermudez RN  Note: Fall Risk Interventions:       Mentation Interventions: Bed/chair exit alarm, Adequate sleep, hydration, pain control    Medication Interventions: Bed/chair exit alarm    Elimination Interventions: Bed/chair exit alarm, Call light in reach              Problem: Patient Education: Go to Patient Education Activity  Goal: Patient/Family Education  Outcome: Progressing Towards Goal     Problem: Falls - Risk of  Goal: *Absence of Falls  Description: Document Veatrice Marker Fall Risk and appropriate interventions in the flowsheet.   11/18/2020 0027 by Gary Bermudez RN  Outcome: Progressing Towards Goal  Note: Fall Risk Interventions:       Mentation Interventions: Bed/chair exit alarm, Adequate sleep, hydration, pain control    Medication Interventions: Bed/chair exit alarm    Elimination Interventions: Bed/chair exit alarm, Call light in reach           11/18/2020 0026 by Gary Bermudez RN  Note: Fall Risk Interventions:       Mentation Interventions: Bed/chair exit alarm, Adequate sleep, hydration, pain control    Medication Interventions: Bed/chair exit alarm    Elimination Interventions: Bed/chair exit alarm, Call light in reach              Problem: Patient Education: Go to Patient Education Activity  Goal: Patient/Family Education  Outcome: Progressing Towards Goal     Problem: Pressure Injury - Risk of  Goal: *Prevention of pressure injury  Description: Document Torito Scale and appropriate interventions in the flowsheet.   Outcome: Progressing Towards Goal  Note: Pressure Injury Interventions:  Sensory Interventions: Assess changes in LOC    Moisture Interventions: Absorbent underpads, Apply protective barrier, creams and emollients    Activity Interventions: Pressure redistribution bed/mattress(bed type)    Mobility Interventions: Pressure redistribution bed/mattress (bed type)    Nutrition Interventions: Document food/fluid/supplement intake, Offer support with meals,snacks and hydration    Friction and Shear Interventions: Apply protective barrier, creams and emollients                Problem: Patient Education: Go to Patient Education Activity  Goal: Patient/Family Education  Outcome: Progressing Towards Goal     Problem: Seizure Disorder (Adult)  Goal: *STG: Remains free of seizure activity  Outcome: Progressing Towards Goal  Goal: *STG: Maintains lab values within therapeutic range  Outcome: Progressing Towards Goal  Goal: *STG/LTG: Complies with medication therapy  Outcome: Progressing Towards Goal  Goal: *STG: Remains free of injury during seizure activity  Outcome: Progressing Towards Goal  Goal: *STG: Remains safe in hospital  Outcome: Progressing Towards Goal  Goal: Interventions  Outcome: Progressing Towards Goal     Problem: Patient Education: Go to Patient Education Activity  Goal: Patient/Family Education  Outcome: Progressing Towards Goal

## 2020-11-18 NOTE — H&P
Hospitalist Admission Note    NAME: Jaspreet Griffith   :  3/9/1933   MRN:  [id number]     Date/Time:  2020 7:10 PM    Patient PCP: Maribel Braun MD  ______________________________________________________________________  Given the patient's current clinical presentation, I have a high level of concern for decompensation if discharged from the emergency department. Complex decision making was performed, which includes reviewing the patient's available past medical records, laboratory results, and x-ray films. My assessment of this patient's clinical condition and my plan of care is as follows. Assessment / Plan:  Patient is a very pleasant 66-year-old -American gentleman with a past medical history of stroke, status post admission for bilateral acute occipital infarcts in July at [facility], heart failure (EF 40-45%), hypertension, CKD 3, 2: 1 heart block status post PPM (able to have MRI at [facility] with [employer] rep in July) who is being admitted for new onset seizures    New onset seizures, POA  Acute encephalopathy, likely postictal, POA  History of multiple strokes, POA  Patient had a 5-minute seizure at nursing home, seized again in the ER and was given Ativan.   Now status post Keppra loading dose  Neurology agrees with Keppra, continue 250 mg twice daily  Admit to neuro telemetry  EEG ordered  MRI ordered, nursing miscellaneous order placed for [employer] rep  Seizure precautions  Advance diet as tolerated  Ativan as needed for seizures  TSH, B12, folate, RPR, CBC, CMP, and blood cultures ordered  Neurology consulted, we appreciate their assistance    History of strokes, hypertension, POA  Continue statin  Continue antihypertensives    SOULEYMANE on CKD  Also had in July  Repeat renal panel in morning  Renally dose medications    Sinus bradycardia with 2-1 block: POA  Status post pacemaker  Was able to have MRI with [employer] rep present and MRI in July at [facility]    Baseline debility  Patient is able to walk with a walker  Has moderate dementia, but is able to hold conversation with family  Resides in a nursing home        Code Status: Full  Surrogate Decision Maker:    DVT Prophylaxis: Lovenox  GI Prophylaxis: not indicated    Baseline: Resides in nursing home, walks with walker      Subjective:   CHIEF COMPLAINT: Seizure    HISTORY OF PRESENT ILLNESS:     Katie Bar is a 80 y.o. -American gentleman with a past medical history of stroke, status post admission for bilateral acute occipital infarcts in July at [facility], heart failure (EF 40-45%), hypertension, CKD 3, 2: 1 heart block status post PPM (able to have MRI at [facility] with [employer] rep in July) who is being admitted for new onset seizures    Patient was sitting in his nursing home when he had an episode of unresponsiveness with shaking. Subsequently he seemed very tired and slept. EMS was called and brought him to [facility] for evaluation of possible seizure. While in the ER at [facility] patient had another episode, and was given Ativan which caused it to stop. He was provided a loading dose of Keppra and neurology was contacted. Neurology agrees with continuing [address] and admitting for further evaluation. Patient has remained postictal and is unable to answer review of systems or any other questions. Patient daughter is present at bedside. We were asked to admit for work up and evaluation of the above problems.      Past Medical History:   Diagnosis Date    Cancer [facility])     prostate    Constipation     Gout     Hyperlipemia     Hypertension     Pacemaker 2019    Stroke ([facility] [address]     Thrombocytopenia ([facility] [address] 3/19/2019    TIA (transient ischemic attack) 2013        Past Surgical History:   Procedure Laterality Date    HX PROSTATECTOMY      HX UROLOGICAL      prostate removed    [place] INS NEW/RPLCMT PRM PACEMAKR W/TRANS ELTRD ATRIAL N/A 10/18/2019    Insert Ppm Single Atrial performed by Mariana Finch MD at [address] CATH LAB    [place] NEW/RPLCMT PRM PM W/TRANSV ELTRD ATRIAL&VENT Right 3/22/2019    INSERT PPM DUAL performed by Mariana Finch MD at [address] CATH LAB       Social History     Tobacco Use    Smoking status: Never Smoker    Smokeless tobacco: Never Used   Substance Use Topics    Alcohol use: No        Family History   Problem Relation Age of Onset    No Known Problems Mother     No Known Problems Father    None  No Known Allergies     Prior to Admission medications    Medication Sig Start Date End Date Taking? Authorizing Provider   allopurinoL (ZYLOPRIM) 100 mg tablet Take 1 Tab by mouth daily. 10/1/20   Kim Johnson MD   amLODIPine (NORVASC) 5 mg tablet Take 1 Tab by mouth daily. 10/1/20   Kim Johnson MD   hydrALAZINE (APRESOLINE) 25 mg tablet Take 1 Tab by mouth three (3) times daily. 10/1/20   Kim Johnson MD   metoprolol succinate (TOPROL-XL) 25 mg XL tablet Take 1 Tab by mouth daily. 10/1/20   Kim Johnson MD   isoniazid (NYDRAZID) 300 mg tablet Take 1 Tab by mouth daily. 8/20/20   Kim Johnson MD   aspirin delayed-release 81 mg tablet Take 1 Tab by mouth daily. 7/13/20   Kim Johnson MD   atorvastatin (LIPITOR) 40 mg tablet Take 1 Tab by mouth nightly. 7/13/20   Kim Johnson MD   polyethylene glycol [facility]) 17 gram/dose powder Take 17 g by mouth daily as needed for Constipation. 7/13/20   Alvarez Notice, MD   balsam peru-castor oiL (VENELEX) ointment Apply  to affected area three (3) times daily. 7/9/20   Jessie Emmanuel MD   famotidine (PEPCID) 20 mg tablet Take 1 Tab by mouth every evening. 7/9/20   Jessie Emmanuel MD   acetaminophen (TYLENOL) 325 mg tablet Take 650 mg by mouth every six (6) hours as needed for Pain. Provider, Historical       REVIEW OF SYSTEMS:     I am not able to complete the review of systems because:    The patient is intubated and sedated   x The patient has altered mental status due to his acute medical problems    The patient has baseline aphasia from prior stroke(s)   x The patient has baseline dementia and is not reliable historian    The patient is in acute medical distress and unable to provide information           Total of 12 systems reviewed as follows: Not able to obtain due to patient condition    Objective:   VITALS:    Visit Vitals  /74   Pulse 80   Resp 17   SpO2 100%       PHYSICAL EXAM:    General:    Alert, drowsy, does not follow directions, no distress, appears stated age. HEENT: Atraumatic, anicteric sclerae, pink conjunctivae     No oral ulcers, mucosa moist, throat clear, dentition fair  Neck:  Supple, symmetrical,  thyroid: non tender  Lungs:   Clear to auscultation bilaterally. No Wheezing or Rhonchi. No rales. Chest wall:  PPM palpated no tenderness  No Accessory muscle use. Heart:   Regular  rhythm,  No  murmur   No edema  Abdomen:   Soft, non-tender. Not distended. Bowel sounds normal  Extremities: No cyanosis. No clubbing,      Skin turgor normal, Capillary refill normal, Radial dial pulse 2+  Skin:     Not pale. Not Jaundiced  No rashes   Psych:  Not anxious or agitated. Neurologic: EOMs intact. No facial asymmetry. No aphasia or slurred speech. Symmetrical strength, Sensation grossly intact.  Alert and oriented X 4.     _______________________________________________________________________  Care Plan discussed with:    Comments   Patient     Family  x    RN     Care Manager                    Consultant:  x    _______________________________________________________________________  Expected  Disposition:   Home with Family    HH/PT/OT/RN    SNF/LTC x   HERMELINDA    ________________________________________________________________________  TOTAL TIME: 61 Minutes    Critical Care Provided     Minutes non procedure based      Comments    x Reviewed previous records   >50% of visit spent in counseling and coordination of care x Discussion with patient and/or family and questions answered       ________________________________________________________________________  Signed: Melinda Doherty MD    Procedures: see electronic medical records for all procedures/Xrays and details which were not copied into this note but were reviewed prior to creation of Plan. LAB DATA REVIEWED:    Recent Results (from the past 24 hour(s))   SAMPLES BEING HELD    Collection Time: 11/17/20  4:23 PM   Result Value Ref Range    SAMPLES BEING HELD  DRK GRN, VLADIMIR, SST, RED     COMMENT        Add-on orders for these samples will be processed based on acceptable specimen integrity and analyte stability, which may vary by analyte. ETHYL ALCOHOL    Collection Time: 11/17/20  4:23 PM   Result Value Ref Range    ALCOHOL(ETHYL),SERUM <10 <10 MG/DL   PROTHROMBIN TIME + INR    Collection Time: 11/17/20  4:23 PM   Result Value Ref Range    INR 1.0 0.9 - 1.1      Prothrombin time 10.9 9.0 - 11.1 sec   CBC WITH AUTOMATED DIFF    Collection Time: 11/17/20  4:24 PM   Result Value Ref Range    WBC 9.0 4.1 - 11.1 K/uL    RBC 4.29 4. 10 - 5.70 M/uL    HGB 13.3 12.1 - 17.0 g/dL    HCT 39.4 36.6 - 50.3 %    MCV 91.8 80.0 - 99.0 FL    MCH 31.0 26.0 - 34.0 PG    MCHC 33.8 30.0 - 36.5 g/dL    RDW 13.7 11.5 - 14.5 %    PLATELET 920 074 - 885 K/uL    MPV 9.8 8.9 - 12.9 FL    NRBC 0.0 0  WBC    ABSOLUTE NRBC 0.00 0.00 - 0.01 K/uL    NEUTROPHILS 50 32 - 75 %    LYMPHOCYTES 39 12 - 49 %    MONOCYTES 8 5 - 13 %    EOSINOPHILS 3 0 - 7 %    BASOPHILS 0 0 - 1 %    IMMATURE GRANULOCYTES 0 0.0 - 0.5 %    ABS. NEUTROPHILS 4.4 1.8 - 8.0 K/UL    ABS. LYMPHOCYTES 3.5 0.8 - 3.5 K/UL    ABS. MONOCYTES 0.7 0.0 - 1.0 K/UL    ABS. EOSINOPHILS 0.3 0.0 - 0.4 K/UL    ABS. BASOPHILS 0.0 0.0 - 0.1 K/UL    ABS. IMM.  GRANS. 0.0 0.00 - 0.04 K/UL    DF AUTOMATED     METABOLIC PANEL, COMPREHENSIVE    Collection Time: 11/17/20  4:24 PM   Result Value Ref Range    Sodium 136 136 - 145 mmol/L Potassium 3.3 (L) 3.5 - 5.1 mmol/L    Chloride 99 97 - 108 mmol/L    CO2 32 21 - 32 mmol/L    Anion gap 5 5 - 15 mmol/L    Glucose 109 (H) 65 - 100 mg/dL    BUN 19 6 - 20 MG/DL    Creatinine 1.55 (H) 0.70 - 1.30 MG/DL    BUN/Creatinine ratio 12 12 - 20      GFR est AA 52 (L) >60 ml/min/1.73m2    GFR est non-AA 43 (L) >60 ml/min/1.73m2    Calcium 9.3 8.5 - 10.1 MG/DL    Bilirubin, total 0.5 0.2 - 1.0 MG/DL    ALT (SGPT) 28 12 - 78 U/L    AST (SGOT) 21 15 - 37 U/L    Alk.  phosphatase 140 (H) 45 - 117 U/L    Protein, total 8.6 (H) 6.4 - 8.2 g/dL    Albumin 3.7 3.5 - 5.0 g/dL    Globulin 4.9 (H) 2.0 - 4.0 g/dL    A-G Ratio 0.8 (L) 1.1 - 2.2     LACTIC ACID    Collection Time: 11/17/20  4:24 PM   Result Value Ref Range    Lactic acid 2.7 (HH) 0.4 - 2.0 MMOL/L   MAGNESIUM    Collection Time: 11/17/20  4:24 PM   Result Value Ref Range    Magnesium 1.8 1.6 - 2.4 mg/dL   TROPONIN I    Collection Time: 11/17/20  4:24 PM   Result Value Ref Range    Troponin-I, Qt. 0.06 (H) <0.05 ng/mL   CK    Collection Time: 11/17/20  4:24 PM   Result Value Ref Range    CK 75 39 - 308 U/L

## 2020-11-18 NOTE — PROGRESS NOTES
Faxed down pacemaker information sent by family to MRI, called MRI and they received it, awaiting on cardiology.

## 2020-11-18 NOTE — PROGRESS NOTES
Occupational Therapy note:    Order acknowledged and chart reviewed. When speaking with RN, it was reported patient is not following commands and not appropriate for OT eval at this time. Will defer and continue to follow when appropriate.     Mirza Snow OTR/L

## 2020-11-18 NOTE — PROGRESS NOTES
Pt lethargic and drowsy, speech saw pt and informed me pt is not safe for PO intake at this time.  Informed MD

## 2020-11-18 NOTE — PROGRESS NOTES
Chart reviewed and dicussed with nursing. Pt not following commands and not appropriate for PT eval. Will defer and continue to follow.

## 2020-11-19 ENCOUNTER — APPOINTMENT (OUTPATIENT)
Dept: MRI IMAGING | Age: 85
DRG: 057 | End: 2020-11-19
Attending: INTERNAL MEDICINE
Payer: MEDICARE

## 2020-11-19 ENCOUNTER — TELEPHONE (OUTPATIENT)
Dept: FAMILY MEDICINE CLINIC | Age: 85
End: 2020-11-19

## 2020-11-19 ENCOUNTER — TELEPHONE (OUTPATIENT)
Dept: CARDIOLOGY CLINIC | Age: 85
End: 2020-11-19

## 2020-11-19 LAB
ALBUMIN SERPL-MCNC: 2.9 G/DL (ref 3.5–5)
ALBUMIN/GLOB SERPL: 0.8 {RATIO} (ref 1.1–2.2)
ALP SERPL-CCNC: 110 U/L (ref 45–117)
ALT SERPL-CCNC: 19 U/L (ref 12–78)
ANION GAP SERPL CALC-SCNC: 2 MMOL/L (ref 5–15)
AST SERPL-CCNC: 17 U/L (ref 15–37)
BACTERIA SPEC CULT: NORMAL
BASOPHILS # BLD: 0 K/UL (ref 0–0.1)
BASOPHILS NFR BLD: 1 % (ref 0–1)
BILIRUB SERPL-MCNC: 0.9 MG/DL (ref 0.2–1)
BUN SERPL-MCNC: 16 MG/DL (ref 6–20)
BUN/CREAT SERPL: 11 (ref 12–20)
CALCIUM SERPL-MCNC: 9 MG/DL (ref 8.5–10.1)
CHLORIDE SERPL-SCNC: 107 MMOL/L (ref 97–108)
CO2 SERPL-SCNC: 30 MMOL/L (ref 21–32)
CREAT SERPL-MCNC: 1.43 MG/DL (ref 0.7–1.3)
DIFFERENTIAL METHOD BLD: ABNORMAL
EOSINOPHIL # BLD: 0.2 K/UL (ref 0–0.4)
EOSINOPHIL NFR BLD: 3 % (ref 0–7)
ERYTHROCYTE [DISTWIDTH] IN BLOOD BY AUTOMATED COUNT: 13.6 % (ref 11.5–14.5)
FOLATE SERPL-MCNC: 15.9 NG/ML (ref 5–21)
GLOBULIN SER CALC-MCNC: 3.8 G/DL (ref 2–4)
GLUCOSE BLD STRIP.AUTO-MCNC: 107 MG/DL (ref 65–100)
GLUCOSE SERPL-MCNC: 87 MG/DL (ref 65–100)
HCT VFR BLD AUTO: 35.7 % (ref 36.6–50.3)
HGB BLD-MCNC: 11.9 G/DL (ref 12.1–17)
IMM GRANULOCYTES # BLD AUTO: 0 K/UL (ref 0–0.04)
IMM GRANULOCYTES NFR BLD AUTO: 0 % (ref 0–0.5)
LYMPHOCYTES # BLD: 2 K/UL (ref 0.8–3.5)
LYMPHOCYTES NFR BLD: 30 % (ref 12–49)
MAGNESIUM SERPL-MCNC: 2 MG/DL (ref 1.6–2.4)
MCH RBC QN AUTO: 30.4 PG (ref 26–34)
MCHC RBC AUTO-ENTMCNC: 33.3 G/DL (ref 30–36.5)
MCV RBC AUTO: 91.3 FL (ref 80–99)
MONOCYTES # BLD: 0.6 K/UL (ref 0–1)
MONOCYTES NFR BLD: 9 % (ref 5–13)
NEUTS SEG # BLD: 3.8 K/UL (ref 1.8–8)
NEUTS SEG NFR BLD: 57 % (ref 32–75)
NRBC # BLD: 0 K/UL (ref 0–0.01)
NRBC BLD-RTO: 0 PER 100 WBC
PLATELET # BLD AUTO: 147 K/UL (ref 150–400)
PMV BLD AUTO: 10.2 FL (ref 8.9–12.9)
POTASSIUM SERPL-SCNC: 3.3 MMOL/L (ref 3.5–5.1)
PROT SERPL-MCNC: 6.7 G/DL (ref 6.4–8.2)
RBC # BLD AUTO: 3.91 M/UL (ref 4.1–5.7)
SERVICE CMNT-IMP: ABNORMAL
SERVICE CMNT-IMP: NORMAL
SODIUM SERPL-SCNC: 139 MMOL/L (ref 136–145)
TSH SERPL DL<=0.05 MIU/L-ACNC: 1.2 UIU/ML (ref 0.36–3.74)
VIT B12 SERPL-MCNC: 298 PG/ML (ref 193–986)
WBC # BLD AUTO: 6.6 K/UL (ref 4.1–11.1)

## 2020-11-19 PROCEDURE — 82962 GLUCOSE BLOOD TEST: CPT

## 2020-11-19 PROCEDURE — 2709999900 HC NON-CHARGEABLE SUPPLY

## 2020-11-19 PROCEDURE — 36415 COLL VENOUS BLD VENIPUNCTURE: CPT

## 2020-11-19 PROCEDURE — 80053 COMPREHEN METABOLIC PANEL: CPT

## 2020-11-19 PROCEDURE — 74011250637 HC RX REV CODE- 250/637: Performed by: INTERNAL MEDICINE

## 2020-11-19 PROCEDURE — 65660000000 HC RM CCU STEPDOWN

## 2020-11-19 PROCEDURE — 74011250636 HC RX REV CODE- 250/636: Performed by: INTERNAL MEDICINE

## 2020-11-19 PROCEDURE — 99233 SBSQ HOSP IP/OBS HIGH 50: CPT | Performed by: PSYCHIATRY & NEUROLOGY

## 2020-11-19 PROCEDURE — 92610 EVALUATE SWALLOWING FUNCTION: CPT

## 2020-11-19 PROCEDURE — 84443 ASSAY THYROID STIM HORMONE: CPT

## 2020-11-19 PROCEDURE — 85025 COMPLETE CBC W/AUTO DIFF WBC: CPT

## 2020-11-19 PROCEDURE — 74011000250 HC RX REV CODE- 250: Performed by: INTERNAL MEDICINE

## 2020-11-19 PROCEDURE — 74011250636 HC RX REV CODE- 250/636: Performed by: NURSE PRACTITIONER

## 2020-11-19 PROCEDURE — 82746 ASSAY OF FOLIC ACID SERUM: CPT

## 2020-11-19 PROCEDURE — 82607 VITAMIN B-12: CPT

## 2020-11-19 PROCEDURE — 74011000258 HC RX REV CODE- 258: Performed by: INTERNAL MEDICINE

## 2020-11-19 PROCEDURE — 83735 ASSAY OF MAGNESIUM: CPT

## 2020-11-19 RX ORDER — LORAZEPAM 2 MG/ML
1 INJECTION INTRAMUSCULAR ONCE
Status: DISPENSED | OUTPATIENT
Start: 2020-11-20 | End: 2020-11-21

## 2020-11-19 RX ORDER — HYDRALAZINE HYDROCHLORIDE 20 MG/ML
5 INJECTION INTRAMUSCULAR; INTRAVENOUS EVERY 6 HOURS
Status: DISCONTINUED | OUTPATIENT
Start: 2020-11-19 | End: 2020-11-22

## 2020-11-19 RX ORDER — LORAZEPAM 2 MG/ML
1 INJECTION INTRAMUSCULAR
Status: DISCONTINUED | OUTPATIENT
Start: 2020-11-19 | End: 2020-12-11 | Stop reason: HOSPADM

## 2020-11-19 RX ORDER — LORAZEPAM 2 MG/ML
1 INJECTION INTRAMUSCULAR ONCE
Status: DISCONTINUED | OUTPATIENT
Start: 2020-11-19 | End: 2020-11-19

## 2020-11-19 RX ORDER — DEXTROSE, SODIUM CHLORIDE, AND POTASSIUM CHLORIDE 5; .9; .15 G/100ML; G/100ML; G/100ML
75 INJECTION INTRAVENOUS CONTINUOUS
Status: DISCONTINUED | OUTPATIENT
Start: 2020-11-19 | End: 2020-11-22

## 2020-11-19 RX ADMIN — LEVETIRACETAM 500 MG: 5 INJECTION INTRAVENOUS at 11:13

## 2020-11-19 RX ADMIN — HYDRALAZINE HYDROCHLORIDE 5 MG: 20 INJECTION INTRAMUSCULAR; INTRAVENOUS at 17:13

## 2020-11-19 RX ADMIN — ENOXAPARIN SODIUM 40 MG: 40 INJECTION SUBCUTANEOUS at 11:14

## 2020-11-19 RX ADMIN — METOPROLOL TARTRATE 2.5 MG: 1 INJECTION, SOLUTION INTRAVENOUS at 23:54

## 2020-11-19 RX ADMIN — LEVETIRACETAM 500 MG: 5 INJECTION INTRAVENOUS at 23:54

## 2020-11-19 RX ADMIN — ASPIRIN 150 MG: 300 SUPPOSITORY RECTAL at 11:14

## 2020-11-19 RX ADMIN — METOPROLOL TARTRATE 2.5 MG: 1 INJECTION, SOLUTION INTRAVENOUS at 17:12

## 2020-11-19 RX ADMIN — Medication 10 ML: at 23:55

## 2020-11-19 RX ADMIN — LORAZEPAM 1 MG: 2 INJECTION INTRAMUSCULAR; INTRAVENOUS at 04:33

## 2020-11-19 RX ADMIN — METOPROLOL TARTRATE 2.5 MG: 1 INJECTION, SOLUTION INTRAVENOUS at 05:22

## 2020-11-19 RX ADMIN — LORAZEPAM 1 MG: 2 INJECTION INTRAMUSCULAR; INTRAVENOUS at 11:12

## 2020-11-19 RX ADMIN — METOPROLOL TARTRATE 2.5 MG: 1 INJECTION, SOLUTION INTRAVENOUS at 11:13

## 2020-11-19 RX ADMIN — LEVETIRACETAM 500 MG: 5 INJECTION INTRAVENOUS at 00:27

## 2020-11-19 RX ADMIN — Medication 10 ML: at 00:27

## 2020-11-19 RX ADMIN — DEXTROSE MONOHYDRATE AND SODIUM CHLORIDE 75 ML/HR: 5; .9 INJECTION, SOLUTION INTRAVENOUS at 05:22

## 2020-11-19 RX ADMIN — METOPROLOL TARTRATE 2.5 MG: 1 INJECTION, SOLUTION INTRAVENOUS at 00:26

## 2020-11-19 NOTE — PROGRESS NOTES
Speech pathology  Chart reviewed, spoke with RN who reports patient remains highly inappropriate for PO at this time. He is agitated, hollering out repeatedly, not following commands. slp will continue to follow and evaluate swallowing when appropriate. Thanks,  Marcell Clifford M.S., CCC-SLP    14:18 Attempted to see but patient repeatedly hollering out and was not at all receptive to my presence in room and offering of water or applesauce. Patient remains inappropriate for PO. will follow up tomorrow.    Marcell Clifford M.S., CCC-SLP

## 2020-11-19 NOTE — ROUTINE PROCESS
1950-  [facility] I will assume care of pt. 
 
0005-  Pt yelling out and gaze focusing to the left . Does not follow commands. No reaction to pupils or occular movement. Pt then exhibited a \"jerking\" response intermittently. Padding placed between pt and bed railings. Will continue to monitor.

## 2020-11-19 NOTE — TELEPHONE ENCOUNTER
Pt's daughter states the pt is in the hosp b/c he had a seizure and inquiring if the stress test appt could be related to the pt having a seizure.  Please advise      [facility][phone]

## 2020-11-19 NOTE — TELEPHONE ENCOUNTER
Called pt daughter Nini Arnold two patient identifiers confirmed. Niin Arnold stated that pt had a very bad Seizure on 11/17/2020 around 3:30 pm. Nini Arnold stated that she would like to know if the the seizures would be coming from his heart and the stress test would have shown that. Notified Orest Hard   the seizures would not be coming from the  Patient heart. Notified Nini Arnold that the seizures are not likely coming from the heart and having stress test done would not have prevented anything. Nini Arnold would like to know if the should move forward with Stress test next week if they get out of the hospital by then.  Will ask MD/NP

## 2020-11-19 NOTE — PROGRESS NOTES
Physical Therapy  Chart reviewed for updates and noted patient remains inappropriate for PT evaluation at this time. Per RN, patient continues to follow commands and has been agitated. Will again defer evaluation and continue to follow.    Jhonatan Guillen, PT, DPT

## 2020-11-19 NOTE — PROGRESS NOTES
Hospitalist Progress Note    NAME: Ashish Adams   :  3/9/1933   MRN:  [id number]       Assessment / Plan:  New onset seizures, POA  Acute encephalopathy, likely postictal, POA  History of multiple strokes, POA  Agitation   Patient had a 5-minute seizure at nursing home, seized again in the ER and was given Ativan.  status post Keppra loading dose  C/w IV keprra 500mg bid     EEG ordered: no seizure   MRI ordered pending   Seizure precautions  Pt not following commands , yelling , more awake but agitated  , c/w  meds via IV   Ativan as needed for seizures and agitation  TSH wnl , B12 wnl , folate wnl , RPR neg , CBC, CMP wnl , and blood cultures NTD  Neurology consulted, we appreciate their assistance     History of strokes, hypertension, POA  Continue statin  Continue antihypertensives     SOULEYMANE on CKD  Hypokalemia   Also had in July  Renally dose medications  Creat 1.3-1.4  On IVF witth D5NS + Kcl      Sinus bradycardia with 2-1 block: POA  Status post pacemaker  Was able to have MRI with [employer] rep present and MRI in July at [facility]     Baseline debility  Patient is able to walk with a walker  Has moderate dementia, but is able to hold conversation with family  Resides in a nursing home       updated daughter josselyn Whitley on   Baseline : verbal but need help with adl   Code status: Full  Prophylaxis: Lovenox  Recommended Disposition: SNF      Subjective:     Chief Complaint / Reason for Physician Visit  FU seizures/ams , agitated this am , yelling . Discussed with RN events overnight.      Review of Systems:  Symptom Y/N Comments  Symptom Y/N Comments   Fever/Chills    Chest Pain     Poor Appetite    Edema     Cough    Abdominal Pain     Sputum    Joint Pain     SOB/CARREON    Pruritis/Rash     Nausea/vomit    Tolerating PT/OT     Diarrhea    Tolerating Diet     Constipation    Other       Could NOT obtain due to: Confused       Objective:     VITALS:   Last 24hrs VS reviewed since prior progress note. Most recent are:  Patient Vitals for the past 24 hrs:   Temp Pulse Resp BP SpO2   11/19/20 1450 98.4 °F (36.9 °C) 82 18 (!) 188/95 99 %   11/19/20 1155  87      11/19/20 1128 98.5 °F (36.9 °C) 87 20 (!) 180/76 99 %   11/19/20 0810 98.4 °F (36.9 °C) 62 18 (!) 187/84 98 %   11/19/20 0749  79      11/19/20 0400 98.9 °F (37.2 °C) 79 18 (!) 196/79 97 %   11/19/20 0005    (!) 153/68    11/18/20 2340 98.2 °F (36.8 °C) 99 16  97 %   11/18/20 1959 98.4 °F (36.9 °C) 75 16 (!) 154/76 95 %   11/18/20 1733  66  (!) 148/71    11/18/20 1600  70      11/18/20 1536 98 °F (36.7 °C) 74 17 (!) 141/80 95 %       Intake/Output Summary (Last 24 hours) at 11/19/2020 1507  Last data filed at 11/18/2020 1904  Gross per 24 hour   Intake 150 ml   Output 100 ml   Net 50 ml        I had a face to face encounter with this patient and independently examined this patient on 11/19/2020 as outlined below:  PHYSICAL EXAM:  General: WD, WN. Alertt , non verbal   EENT:  EOMI. Anicteric sclerae. MMM  Resp:  CTA bilaterally, no wheezing or rales. No accessory muscle use  CV:  Regular  rhythm,  No edema  GI:  Soft, Non distended, Non tender. +Bowel sounds  Neurologic:  Alert and oriented X 0, agitated  Psych:   Unable to assess  Skin:  No rashes. No jaundice    Reviewed most current lab test results and cultures  YES  Reviewed most current radiology test results   YES  Review and summation of old records today    NO  Reviewed patient's current orders and MAR    YES  PMH/SH reviewed - no change compared to H&P  ________________________________________________________________________  Care Plan discussed with:    Comments   Patient     Family      RN x    Care Manager     Consultant                       x Multidiciplinary team rounds were held today with , nursing, pharmacist and clinical coordinator.   Patient's plan of care was discussed; medications were reviewed and discharge planning was addressed. ________________________________________________________________________  Total NON critical care TIME: 25   Minutes    Total CRITICAL CARE TIME Spent:   Minutes non procedure based      Comments   >50% of visit spent in counseling and coordination of care     ________________________________________________________________________  Geoffrey Sen MD     Procedures: see electronic medical records for all procedures/Xrays and details which were not copied into this note but were reviewed prior to creation of Plan. LABS:  I reviewed today's most current labs and imaging studies. Pertinent labs include:  Recent Labs     11/19/20 0455 11/18/20 0338 11/17/20 1947   WBC 6.6 7.6 8.6   HGB 11.9* 13.4 12.4   HCT 35.7* 40.4 36.2*   * 154 162     Recent Labs     11/19/20 0455 11/18/20 0338 11/17/20 1947 11/17/20  1623    138 135*   < >  --    K 3.3* 3.9 3.6   < >  --     102 100   < >  --    CO2 30 33* 32   < >  --    GLU 87 93 106*   < >  --    BUN 16 16 19   < >  --    CREA 1.43* 1.33* 1.43*   < >  --    CA 9.0 9.3 9.1   < >  --    MG 2.0 1.9 1.8   < >  --    ALB 2.9* 3.2* 3.3*   < >  --    TBILI 0.9 0.7 0.4   < >  --    ALT 19 24 26   < >  --    INR  --   --   --   --  1.0    < > = values in this interval not displayed.        Signed: eGoffrey Sen MD

## 2020-11-19 NOTE — PROGRESS NOTES
Received message from patient's nurse Linnette Evans stating :    Patient is yelling and is not redirectable. Can a prn order for ativan be placed for patient? Discussion / orders:    Ordered ativan 1 mg iv q6h prn agitation, restlessness, anxiety. Please note that this note was dictated using Dragon computer voice recognition software. Quite often unanticipated grammatical, syntax, homophones, and other interpretive errors are inadvertently transcribed by the computer software. Please disregard these errors. Please excuse any errors that have escaped final proofreading.

## 2020-11-19 NOTE — PROGRESS NOTES
End of Shift Note    Bedside shift change report given to Tiffanie Shaver RN (oncoming nurse) by Reggie Feliz (offgoing nurse).   Report included the following information SBAR, Kardex, MAR and Quality Measures    Shift worked: days   Shift summary and any significant changes:     pt lethargic, speech consult saw pt and stated pt is not safe for PO intake at this time, pt yells all day     Concerns for physician to address:  switch PO meds to different route of administration    Zone phone for oncoming shift:   [id number]     Patient Information  Neeraj Uriarte  80 y.o.  11/17/2020  4:20 PM by Dontae Comer MD. Neeraj Uriarte was admitted from [facility]    Problem List  Patient Active Problem List    Diagnosis Date Noted    Altered mental status 11/18/2020    Post-ictal state ([address] 11/18/2020    Seizure ([address] 11/17/2020    Atrial pacemaker lead displacement 10/18/2019    Pacemaker 03/22/2019    Bradycardia 03/19/2019    Elevated troponin 03/19/2019    Hypertensive urgency 03/19/2019    Second degree AV block, Mobitz type I 03/19/2019    Stage 3 chronic kidney disease 03/15/2019    Rotator cuff arthropathy of both shoulders 03/15/2019    Cognitive impairment 67/10/7383    Systolic murmur 64/44/6630    Essential hypertension 12/17/2018    Gout 12/17/2018    Pure hypercholesterolemia 12/17/2018    History of prostate cancer 12/17/2018    Chronic constipation 12/17/2018    Dysuria 08/08/2017    Weight loss 08/08/2017    Adrenal mass ([address] 07/18/2017     Past Medical History:   Diagnosis Date    Cancer [facility])     prostate    Constipation     Gout     Hyperlipemia     Hypertension     Pacemaker 2019    Stroke ([address]     Thrombocytopenia ([address] 3/19/2019    TIA (transient ischemic attack) 2013       Core Measures:  CVA: No No  CHF:No No  PNA:No No    Activity:  Activity Level: Bed Rest  Number times ambulated in hallways past shift: 0  Number of times OOB to chair past shift: 0    Cardiac:   Cardiac Monitoring: Yes      Cardiac Rhythm: Paced    Access:   Current line(s): PIV   Central Line? No Placement date 0 Reason Medically Necessary 0    PICC LINE? No Placement date 0Reason Medically Necessary 0    Genitourinary:   Urinary status: incontinent  Urinary Catheter? No Placement Date 0 Reason Medically Necessary 0      Respiratory:   O2 Device: Room air  Chronic home O2 use?: NO  Incentive spirometer at bedside: NO       GI:  Last Bowel Movement Date: (unknown)  Current diet:  DIET NPO  Passing flatus: YES  Tolerating current diet: Unknown       Pain Management:   Patient states pain is manageable on current regimen: N/A    Skin:  Torito Score: 14  Interventions: turn team and float heels    Patient Safety:  Fall Score:  Total Score: 3  Interventions: bed/chair alarm and gripper socks  High Fall Risk: Yes    DVT prophylaxis:  DVT prophylaxis Med-Yes  DVT prophylaxis SCD or BEV- No     Wounds: (If Applicable)  Wounds- No  Location 0    Active Consults:  IP CONSULT TO NEUROLOGY    Length of Stay:  Expected LOS: 2d 16h  Actual LOS: 1  Discharge Plan: No TBD      Racquel Godwin

## 2020-11-19 NOTE — PROGRESS NOTES
Alananna Duran was unable to do the MRI due to severe agitation. MRI had a very limited window to get the MRI completed before the pacemaker rep had to leave. [facility], the patient's RN, attempted to get medication for Mr. Carla Harrison but it would have been outside the window we had to work with.  Please call MRI (@ [phone]) when Mr. Carla Harrison is able to comply with holding still for the MRI. [email or web address]

## 2020-11-19 NOTE — PROGRESS NOTES
Occupational Therapy note:    Chart reviewed and spoke with nursing. Per RN, patient continues to not follow commands and is not appropriate for OT eval at this time. Will defer and continue to follow when appropriate.     Markell Rater, OTR/L

## 2020-11-19 NOTE — TELEPHONE ENCOUNTER
Pt's daughter calling stating the pt is in the hospital and has some questions for Dr. Kaleb Booth    Pt's daughter would like a call back      [facility]# [phone]

## 2020-11-19 NOTE — PROGRESS NOTES
Spoke to pt daughter, gave general updates and completed MRI form.  Calling back now to discus flu shot and PTA med list

## 2020-11-19 NOTE — PROGRESS NOTES
NEUROLOGY NOTE     Chief Complaint   Patient presents with    Seizure       SUBJECTIVE:  Patient keeps on shouting \"hey\". Patient is awake and agitated. HPI  The patient is an 55-year-old man who presents to the hospital because of new onset seizure. He does have history of stroke and in July 2020 had bilateral occipital infarcts. The patient was sitting when he had an episode of unresponsiveness along with shaking. After that he was very tired and fatigued. He was brought to the hospital for evaluation of possible seizures. Mild in the ER, he had another episode for which Ativan was given which stopped the seizure. Loading dose of Keppra was given. After the episode was postictal and was not able to answer questions. Patient had CT scan of the head which showed bilateral occipital infarcts which are chronic left greater than right.     ROS  A ten system review of constitutional, cardiovascular, respiratory, musculoskeletal, endocrine, skin, SHEENT, genitourinary, psychiatric and neurologic systems was obtained and is unremarkable except as stated in HPI     PMH  Past Medical History:   Diagnosis Date    Cancer [facility]     prostate    Constipation     Gout     Hyperlipemia     Hypertension     Pacemaker 2019    Stroke [facility]     Thrombocytopenia ([facility] 3/19/2019    TIA (transient ischemic attack) 2013       FH  Family History   Problem Relation Age of Onset    No Known Problems Mother     No Known Problems Father        SH  Social History     Socioeconomic History    Marital status:      Spouse name: Not on file    Number of children: Not on file    Years of education: Not on file    Highest education level: Not on file   Tobacco Use    Smoking status: Never Smoker    Smokeless tobacco: Never Used   Substance and Sexual Activity    Alcohol use: No    Drug use: No    Sexual activity: Never       ALLERGIES  No Known Allergies    PHYSICAL EXAMINATION:   Patient Vitals for the past 24 hrs:   Temp Pulse Resp BP SpO2   11/19/20 0749  79      11/19/20 0400 98.9 °F (37.2 °C) 79 18 (!) 196/79 97 %   11/19/20 0005    (!) 153/68    11/18/20 2340 98.2 °F (36.8 °C) 99 16  97 %   11/18/20 1959 98.4 °F (36.9 °C) 75 16 (!) 154/76 95 %   11/18/20 1733  66  (!) 148/71    11/18/20 1600  70      11/18/20 1536 98 °F (36.7 °C) 74 17 (!) 141/80 95 %   11/18/20 1129 97.9 °F (36.6 °C) 74 18 (!) 149/83 95 %        General:   General appearance: Pt is in no acute distress   Distal pulses are preserved    Neurological Examination:   Mental Status: Patient is awake and agitated. Keeps on shouting \"hey\"  but does not following any commands     Cranial Nerves:  PERRL, Extraocular movements are full. Facial movement intact. Motor: Strength is 5/5 except left lower extremity 0 out of 5. Normal tone. No atrophy. Sensation: Responds to pain in all 4 extremities except left lower extremity    Coordination/Cerebellar: Unable to perform    Gait: Deferred secondary to fall risk    Skin: No significant bruising or lacerations.     LAB DATA REVIEWED:    Recent Results (from the past 24 hour(s))   URINALYSIS W/ REFLEX CULTURE    Collection Time: 11/18/20  6:48 PM    Specimen: Urine   Result Value Ref Range    Color RED      Appearance CLOUDY (A) CLEAR      Specific gravity 1.013 1.003 - 1.030      pH (UA) 7.0 5.0 - 8.0      Protein 30 (A) NEG mg/dL    Glucose Negative NEG mg/dL    Ketone TRACE (A) NEG mg/dL    Bilirubin Negative NEG      Blood LARGE (A) NEG      Urobilinogen 1.0 0.2 - 1.0 EU/dL    Nitrites Negative NEG      Leukocyte Esterase SMALL (A) NEG      UA:UC IF INDICATED URINE CULTURE ORDERED (A) CNI      WBC 10-20 0 - 4 /hpf    RBC >100 (H) 0 - 5 /hpf    Epithelial cells FEW FEW /lpf    Bacteria Negative NEG /hpf    Hyaline cast 2-5 0 - 5 /lpf   GLUCOSE, POC    Collection Time: 11/19/20 12:12 AM   Result Value Ref Range    Glucose (POC) 107 (H) 65 - 100 mg/dL    Performed by Chuyita Conklin METABOLIC PANEL, COMPREHENSIVE    Collection Time: 11/19/20  4:55 AM   Result Value Ref Range    Sodium 139 136 - 145 mmol/L    Potassium 3.3 (L) 3.5 - 5.1 mmol/L    Chloride 107 97 - 108 mmol/L    CO2 30 21 - 32 mmol/L    Anion gap 2 (L) 5 - 15 mmol/L    Glucose 87 65 - 100 mg/dL    BUN 16 6 - 20 MG/DL    Creatinine 1.43 (H) 0.70 - 1.30 MG/DL    BUN/Creatinine ratio 11 (L) 12 - 20      GFR est AA 57 (L) >60 ml/min/1.73m2    GFR est non-AA 47 (L) >60 ml/min/1.73m2    Calcium 9.0 8.5 - 10.1 MG/DL    Bilirubin, total 0.9 0.2 - 1.0 MG/DL    ALT (SGPT) 19 12 - 78 U/L    AST (SGOT) 17 15 - 37 U/L    Alk. phosphatase 110 45 - 117 U/L    Protein, total 6.7 6.4 - 8.2 g/dL    Albumin 2.9 (L) 3.5 - 5.0 g/dL    Globulin 3.8 2.0 - 4.0 g/dL    A-G Ratio 0.8 (L) 1.1 - 2.2     MAGNESIUM    Collection Time: 11/19/20  4:55 AM   Result Value Ref Range    Magnesium 2.0 1.6 - 2.4 mg/dL   CBC WITH AUTOMATED DIFF    Collection Time: 11/19/20  4:55 AM   Result Value Ref Range    WBC 6.6 4.1 - 11.1 K/uL    RBC 3.91 (L) 4.10 - 5.70 M/uL    HGB 11.9 (L) 12.1 - 17.0 g/dL    HCT 35.7 (L) 36.6 - 50.3 %    MCV 91.3 80.0 - 99.0 FL    MCH 30.4 26.0 - 34.0 PG    MCHC 33.3 30.0 - 36.5 g/dL    RDW 13.6 11.5 - 14.5 %    PLATELET 370 (L) 032 - 400 K/uL    MPV 10.2 8.9 - 12.9 FL    NRBC 0.0 0  WBC    ABSOLUTE NRBC 0.00 0.00 - 0.01 K/uL    NEUTROPHILS 57 32 - 75 %    LYMPHOCYTES 30 12 - 49 %    MONOCYTES 9 5 - 13 %    EOSINOPHILS 3 0 - 7 %    BASOPHILS 1 0 - 1 %    IMMATURE GRANULOCYTES 0 0.0 - 0.5 %    ABS. NEUTROPHILS 3.8 1.8 - 8.0 K/UL    ABS. LYMPHOCYTES 2.0 0.8 - 3.5 K/UL    ABS. MONOCYTES 0.6 0.0 - 1.0 K/UL    ABS. EOSINOPHILS 0.2 0.0 - 0.4 K/UL    ABS. BASOPHILS 0.0 0.0 - 0.1 K/UL    ABS. IMM.  GRANS. 0.0 0.00 - 0.04 K/UL    DF AUTOMATED     VITAMIN B12    Collection Time: 11/19/20  4:55 AM   Result Value Ref Range    Vitamin B12 298 193 - 986 pg/mL   FOLATE    Collection Time: 11/19/20  4:55 AM   Result Value Ref Range    Folate 15.9 5.0 - 21.0 ng/mL   TSH 3RD GENERATION    Collection Time: 11/19/20  4:55 AM   Result Value Ref Range    TSH 1.20 0.36 - 3.74 uIU/mL        Imaging review:  CT head  No acute intracranial abnormality identified. There bilateral occipital infarctions which are chronic left greater than right and an old left basal   ganglia infarct. EEG  Moderate diffuse encephalopathy    MRI brain  Pending      HOME MEDS  Prior to Admission Medications   Prescriptions Last Dose Informant Patient Reported? Taking?   acetaminophen (TYLENOL) 325 mg tablet   Yes No   Sig: Take 650 mg by mouth every six (6) hours as needed for Pain. allopurinoL (ZYLOPRIM) 100 mg tablet   No No   Sig: Take 1 Tab by mouth daily. amLODIPine (NORVASC) 5 mg tablet   No No   Sig: Take 1 Tab by mouth daily. aspirin delayed-release 81 mg tablet   No No   Sig: Take 1 Tab by mouth daily. atorvastatin (LIPITOR) 40 mg tablet   No No   Sig: Take 1 Tab by mouth nightly. balsam peru-castor oiL (VENELEX) ointment   No No   Sig: Apply  to affected area three (3) times daily. famotidine (PEPCID) 20 mg tablet   No No   Sig: Take 1 Tab by mouth every evening. hydrALAZINE (APRESOLINE) 25 mg tablet   No No   Sig: Take 1 Tab by mouth three (3) times daily. isoniazid (NYDRAZID) 300 mg tablet   No No   Sig: Take 1 Tab by mouth daily. metoprolol succinate (TOPROL-XL) 25 mg XL tablet   No No   Sig: Take 1 Tab by mouth daily. polyethylene glycol (MIRALAX) 17 gram/dose powder   No No   Sig: Take 17 g by mouth daily as needed for Constipation.       Facility-Administered Medications: None       CURRENT MEDS  Current Facility-Administered Medications   Medication Dose Route Frequency    [Held by provider] levETIRAcetam (KEPPRA) tablet 500 mg  500 mg Oral BID    levETIRAcetam (KEPPRA) 500 mg in saline (iso-osm) 100 mL IVPB (premix)  500 mg IntraVENous Q12H    metoprolol (LOPRESSOR) injection 2.5 mg  2.5 mg IntraVENous Q6H    aspirin (ASA) suppository 150 mg  150 mg Rectal DAILY    dextrose 5% and 0.9% NaCl infusion  75 mL/hr IntraVENous CONTINUOUS    sodium chloride (NS) flush 5-40 mL  5-40 mL IntraVENous Q8H    famotidine (PEPCID) tablet 20 mg  20 mg Oral BID    enoxaparin (LOVENOX) injection 40 mg  40 mg SubCUTAneous DAILY    allopurinoL (ZYLOPRIM) tablet 100 mg  100 mg Oral DAILY    amLODIPine (NORVASC) tablet 5 mg  5 mg Oral DAILY    atorvastatin (LIPITOR) tablet 40 mg  40 mg Oral QHS    [Held by provider] hydrALAZINE (APRESOLINE) tablet 25 mg  25 mg Oral TID    [Held by provider] metoprolol succinate (TOPROL-XL) XL tablet 25 mg  25 mg Oral DAILY       IMPRESSION/RECOMMENDATIONS:  Bertha Lockwood is a 80 y.o. male who presents with new onset seizure with a prior history of bilateral occipital infarct. Since he has seizures and the prior history of stroke, there is high risk of recurrence. Continue antiepileptic medication. MRI brain is pending.    - MRI brainpending  - Continue Keppra to 500 mg IV/p.o. twice daily  - Haldol as needed for agitation  - Seizure precautions      Thank you very much for this consultation.      Jessie López MD  Neurologist

## 2020-11-20 LAB
GLUCOSE BLD STRIP.AUTO-MCNC: 94 MG/DL (ref 65–100)
MAGNESIUM SERPL-MCNC: 1.7 MG/DL (ref 1.6–2.4)
SERVICE CMNT-IMP: NORMAL

## 2020-11-20 PROCEDURE — 82962 GLUCOSE BLOOD TEST: CPT

## 2020-11-20 PROCEDURE — 74011250636 HC RX REV CODE- 250/636: Performed by: INTERNAL MEDICINE

## 2020-11-20 PROCEDURE — 99233 SBSQ HOSP IP/OBS HIGH 50: CPT | Performed by: PSYCHIATRY & NEUROLOGY

## 2020-11-20 PROCEDURE — 65660000000 HC RM CCU STEPDOWN

## 2020-11-20 PROCEDURE — 83735 ASSAY OF MAGNESIUM: CPT

## 2020-11-20 PROCEDURE — 36415 COLL VENOUS BLD VENIPUNCTURE: CPT

## 2020-11-20 PROCEDURE — 74011250636 HC RX REV CODE- 250/636: Performed by: NURSE PRACTITIONER

## 2020-11-20 PROCEDURE — 74011250637 HC RX REV CODE- 250/637: Performed by: INTERNAL MEDICINE

## 2020-11-20 PROCEDURE — 74011000250 HC RX REV CODE- 250: Performed by: INTERNAL MEDICINE

## 2020-11-20 RX ORDER — LORAZEPAM 2 MG/ML
1 INJECTION INTRAMUSCULAR ONCE
Status: COMPLETED | OUTPATIENT
Start: 2020-11-20 | End: 2020-11-20

## 2020-11-20 RX ORDER — CYANOCOBALAMIN 1000 UG/ML
1000 INJECTION, SOLUTION INTRAMUSCULAR; SUBCUTANEOUS DAILY
Status: COMPLETED | OUTPATIENT
Start: 2020-11-21 | End: 2020-11-27

## 2020-11-20 RX ADMIN — POTASSIUM CHLORIDE, DEXTROSE MONOHYDRATE AND SODIUM CHLORIDE 75 ML/HR: 150; 5; 900 INJECTION, SOLUTION INTRAVENOUS at 00:50

## 2020-11-20 RX ADMIN — Medication 10 ML: at 21:36

## 2020-11-20 RX ADMIN — LEVETIRACETAM 500 MG: 5 INJECTION INTRAVENOUS at 12:32

## 2020-11-20 RX ADMIN — METOPROLOL TARTRATE 2.5 MG: 1 INJECTION, SOLUTION INTRAVENOUS at 07:22

## 2020-11-20 RX ADMIN — METOPROLOL TARTRATE 2.5 MG: 1 INJECTION, SOLUTION INTRAVENOUS at 23:20

## 2020-11-20 RX ADMIN — HYDRALAZINE HYDROCHLORIDE 5 MG: 20 INJECTION INTRAMUSCULAR; INTRAVENOUS at 12:12

## 2020-11-20 RX ADMIN — POTASSIUM CHLORIDE, DEXTROSE MONOHYDRATE AND SODIUM CHLORIDE 75 ML/HR: 150; 5; 900 INJECTION, SOLUTION INTRAVENOUS at 14:31

## 2020-11-20 RX ADMIN — HYDRALAZINE HYDROCHLORIDE 5 MG: 20 INJECTION INTRAMUSCULAR; INTRAVENOUS at 23:21

## 2020-11-20 RX ADMIN — LORAZEPAM 1 MG: 2 INJECTION INTRAMUSCULAR; INTRAVENOUS at 04:29

## 2020-11-20 RX ADMIN — LORAZEPAM 1 MG: 2 INJECTION INTRAMUSCULAR; INTRAVENOUS at 01:15

## 2020-11-20 RX ADMIN — HYDRALAZINE HYDROCHLORIDE 5 MG: 20 INJECTION INTRAMUSCULAR; INTRAVENOUS at 17:40

## 2020-11-20 RX ADMIN — LORAZEPAM 1 MG: 2 INJECTION INTRAMUSCULAR; INTRAVENOUS at 22:03

## 2020-11-20 RX ADMIN — LORAZEPAM 1 MG: 2 INJECTION INTRAMUSCULAR; INTRAVENOUS at 12:12

## 2020-11-20 RX ADMIN — POTASSIUM CHLORIDE, DEXTROSE MONOHYDRATE AND SODIUM CHLORIDE 75 ML/HR: 150; 5; 900 INJECTION, SOLUTION INTRAVENOUS at 23:57

## 2020-11-20 RX ADMIN — HYDRALAZINE HYDROCHLORIDE 5 MG: 20 INJECTION INTRAMUSCULAR; INTRAVENOUS at 10:33

## 2020-11-20 RX ADMIN — Medication 10 ML: at 07:02

## 2020-11-20 RX ADMIN — LEVETIRACETAM 500 MG: 5 INJECTION INTRAVENOUS at 23:22

## 2020-11-20 RX ADMIN — HYDRALAZINE HYDROCHLORIDE 5 MG: 20 INJECTION INTRAMUSCULAR; INTRAVENOUS at 00:41

## 2020-11-20 RX ADMIN — ASPIRIN 150 MG: 300 SUPPOSITORY RECTAL at 10:34

## 2020-11-20 RX ADMIN — METOPROLOL TARTRATE 2.5 MG: 1 INJECTION, SOLUTION INTRAVENOUS at 12:12

## 2020-11-20 RX ADMIN — METOPROLOL TARTRATE 2.5 MG: 1 INJECTION, SOLUTION INTRAVENOUS at 17:38

## 2020-11-20 RX ADMIN — ENOXAPARIN SODIUM 40 MG: 40 INJECTION SUBCUTANEOUS at 10:33

## 2020-11-20 NOTE — TELEPHONE ENCOUNTER
Patient is in the Hospital taken in on Tuesday Evening for seizure. Has been out of it since. Having hallucinations ,agitation, wonders if meds could have caused this ? ? Had CT it was Negative awaiting MRI but not done yet due to his agitation. All Test have come back negative so far.

## 2020-11-20 NOTE — PROGRESS NOTES
Occupational Therapy note:    Chart reviewed and spoke with nursing. Per RN, patient continues to not follow commands and no changes in mental status. Will complete order and sign off at this time. If there is a change in mental status, please re-consult.      Markell Rater, OTR/L

## 2020-11-20 NOTE — PROGRESS NOTES
Problem: Falls - Risk of  Goal: *Absence of Falls  Description: Document Oli Harris Fall Risk and appropriate interventions in the flowsheet. Outcome: Progressing Towards Goal  Note: Fall Risk Interventions:       Mentation Interventions: Adequate sleep, hydration, pain control, Bed/chair exit alarm, Increase mobility, More frequent rounding, Reorient patient, Room close to nurse's station, Toileting rounds, Update white board    Medication Interventions: Bed/chair exit alarm, Evaluate medications/consider consulting pharmacy, Patient to call before getting OOB, Assess postural VS orthostatic hypotension    Elimination Interventions: Bed/chair exit alarm, Call light in reach, Toileting schedule/hourly rounds              Problem: Patient Education: Go to Patient Education Activity  Goal: Patient/Family Education  Outcome: Progressing Towards Goal     Problem: Pressure Injury - Risk of  Goal: *Prevention of pressure injury  Description: Document Torito Scale and appropriate interventions in the flowsheet. Outcome: Progressing Towards Goal  Note: Pressure Injury Interventions:  Sensory Interventions: Assess changes in LOC    Moisture Interventions: Absorbent underpads    Activity Interventions: Assess need for specialty bed    Mobility Interventions: Assess need for specialty bed    Nutrition Interventions: Document food/fluid/supplement intake    Friction and Shear Interventions: Apply protective barrier, creams and emollients                Problem: Patient Education: Go to Patient Education Activity  Goal: Patient/Family Education  Outcome: Progressing Towards Goal     Problem: Falls - Risk of  Goal: *Absence of Falls  Description: Document Oli Harris Fall Risk and appropriate interventions in the flowsheet.   Outcome: Progressing Towards Goal  Note: Fall Risk Interventions:       Mentation Interventions: Adequate sleep, hydration, pain control, Bed/chair exit alarm, Increase mobility, More frequent rounding, Reorient patient, Room close to nurse's station, Toileting rounds, Update white board    Medication Interventions: Bed/chair exit alarm, Evaluate medications/consider consulting pharmacy, Patient to call before getting OOB, Assess postural VS orthostatic hypotension    Elimination Interventions: Bed/chair exit alarm, Call light in reach, Toileting schedule/hourly rounds              Problem: Patient Education: Go to Patient Education Activity  Goal: Patient/Family Education  Outcome: Progressing Towards Goal     Problem: Seizure Disorder (Adult)  Goal: *STG: Remains free of seizure activity  Outcome: Progressing Towards Goal  Goal: *STG: Maintains lab values within therapeutic range  Outcome: Progressing Towards Goal  Goal: *STG/LTG: Complies with medication therapy  Outcome: Progressing Towards Goal  Goal: *STG: Remains free of injury during seizure activity  Outcome: Progressing Towards Goal  Goal: *STG: Remains safe in hospital  Outcome: Progressing Towards Goal  Goal: Interventions  Outcome: Progressing Towards Goal     Problem: Patient Education: Go to Patient Education Activity  Goal: Patient/Family Education  Outcome: Progressing Towards Goal     Problem: Non-Violent Restraints  Goal: *Removal from restraints as soon as assessed to be safe  Outcome: Progressing Towards Goal  Goal: *No harm/injury to patient while restraints in use  Outcome: Progressing Towards Goal  Goal: *Patient's dignity will be maintained  Outcome: Progressing Towards Goal  Goal: *Patient Specific Goal (EDIT GOAL, INSERT TEXT)  Outcome: Progressing Towards Goal  Goal: Non-violent Restaints:Standard Interventions  Outcome: Progressing Towards Goal  Goal: Non-violent Restraints:Patient Interventions  Outcome: Progressing Towards Goal  Goal: Patient/Family Education  Outcome: Progressing Towards Goal

## 2020-11-20 NOTE — PROGRESS NOTES
Problem: Falls - Risk of  Goal: *Absence of Falls  Description: Document Starla Sukhdev Fall Risk and appropriate interventions in the flowsheet. Outcome: Progressing Towards Goal  Note: Fall Risk Interventions:       Mentation Interventions: Adequate sleep, hydration, pain control, Bed/chair exit alarm, Increase mobility, More frequent rounding, Reorient patient, Room close to nurse's station, Toileting rounds, Update white board    Medication Interventions: Bed/chair exit alarm, Evaluate medications/consider consulting pharmacy, Patient to call before getting OOB, Assess postural VS orthostatic hypotension    Elimination Interventions: Bed/chair exit alarm, Call light in reach, Toileting schedule/hourly rounds              Problem: Falls - Risk of  Goal: *Absence of Falls  Description: Document Starla Sukhdev Fall Risk and appropriate interventions in the flowsheet.   Outcome: Progressing Towards Goal  Note: Fall Risk Interventions:       Mentation Interventions: Adequate sleep, hydration, pain control, Bed/chair exit alarm, Increase mobility, More frequent rounding, Reorient patient, Room close to nurse's station, Toileting rounds, Update white board    Medication Interventions: Bed/chair exit alarm, Evaluate medications/consider consulting pharmacy, Patient to call before getting OOB, Assess postural VS orthostatic hypotension    Elimination Interventions: Bed/chair exit alarm, Call light in reach, Toileting schedule/hourly rounds

## 2020-11-20 NOTE — PROGRESS NOTES
Chart reviewed and discussed with nursing. No improvements in mental status and remains unable to follow commands. Will complete order. Please re-consult if there is a change in status.

## 2020-11-20 NOTE — PROGRESS NOTES
Speech path 0176  Patient is continuing to refuse po due to his confusion. When the spoon or straw is touched to his lips he pulls away. His daughter was present and reported he is not at baseline and is \"normal\". Please repeat STAND if he becomes more appropriate for eval over the weekend. Sari Li, SLP      Speech path 3300  Patient is not accepting po. He pulls away from the straw. He also pulled away from wiping his eyes which had yellow discharge from L eye. He is not able to cooperate due to agitation. We will follow up Monday. If he becomes more cooperative over the weekend, complete a STAND and if he passes start a diet.    Sari Li, SLP

## 2020-11-20 NOTE — PROGRESS NOTES
Received message from patient's nurse Kian Lei stating :    Patient is increasingly agitated and aggressive. Patient received 1 mg Ativan at 01 15 which helped him breast but it has worn off completely. But patient is currently shouting, thrashing, hallucinating and is not redirectable. Can you order a one-time med to help the patient calm down? Thank you       Discussion / orders:    Ordered one-time dose of extra Ativan 1 mg IV           Please note that this note was dictated using Dragon computer voice recognition software. Quite often unanticipated grammatical, syntax, homophones, and other interpretive errors are inadvertently transcribed by the computer software. Please disregard these errors. Please excuse any errors that have escaped final proofreading.

## 2020-11-20 NOTE — PROGRESS NOTES
NEUROLOGY NOTE     Chief Complaint   Patient presents with    Seizure       SUBJECTIVE:  No obvious seizure seen overnight. Patient remains agitated but is sleeping at this time. HPI  The patient is an 19-year-old man who presents to the hospital because of new onset seizure. He does have history of stroke and in July 2020 had bilateral occipital infarcts. The patient was sitting when he had an episode of unresponsiveness along with shaking. After that he was very tired and fatigued. He was brought to the hospital for evaluation of possible seizures. Mild in the ER, he had another episode for which Ativan was given which stopped the seizure. Loading dose of Keppra was given. After the episode was postictal and was not able to answer questions. Patient had CT scan of the head which showed bilateral occipital infarcts which are chronic left greater than right.     ROS  A ten system review of constitutional, cardiovascular, respiratory, musculoskeletal, endocrine, skin, SHEENT, genitourinary, psychiatric and neurologic systems was obtained and is unremarkable except as stated in HPI     PMH  Past Medical History:   Diagnosis Date    Cancer ([facility] [address]     prostate    Constipation     Gout     Hyperlipemia     Hypertension     Pacemaker 2019    Stroke ([address]     Thrombocytopenia ([facility] [address] 3/19/2019    TIA (transient ischemic attack) 2013         Family History   Problem Relation Age of Onset    No Known Problems Mother     No Known Problems Father        SH  Social History     Socioeconomic History    Marital status:      Spouse name: Not on file    Number of children: Not on file    Years of education: Not on file    Highest education level: Not on file   Tobacco Use    Smoking status: Never Smoker    Smokeless tobacco: Never Used   Substance and Sexual Activity    Alcohol use: No    Drug use: No    Sexual activity: Never       ALLERGIES  No Known Allergies    PHYSICAL EXAMINATION: Patient Vitals for the past 24 hrs:   Temp Pulse Resp BP SpO2   11/20/20 1216 98.2 °F (36.8 °C) 76 20 (!) 160/107 94 %   11/20/20 1215 98.7 °F (37.1 °C) 74  (!) 170/87 97 %   11/20/20 1200  70      11/20/20 0815 98.7 °F (37.1 °C) 73 20 (!) 142/73 98 %   11/20/20 0800  76      11/20/20 0702    133/83    11/20/20 0300 99.7 °F (37.6 °C) 86 22 (!) 158/70 100 %   11/20/20 0110    (!) 146/73    11/20/20 0045    (!) 150/87    11/20/20 0044    (!) 216/90    11/20/20 0013    (!) 196/88    11/19/20 2321    (!) 196/96    11/19/20 2319 100 °F (37.8 °C) 84 20  98 %   11/19/20 1935 99.4 °F (37.4 °C) 80 20 (!) 170/82 99 %   11/19/20 1547  82      11/19/20 1450 98.4 °F (36.9 °C) 82 18 (!) 188/95 99 %        General:   General appearance: Pt is in no acute distress   Distal pulses are preserved    Neurological Examination:   Mental Status: Patient is sleeping. He is hard to arouse. Cranial Nerves:  PERRL, Extraocular movements are full. Facial movement intact. Motor: Withdraws all 4 extremities to painful stimuli    Sensation: Intact to pain    Coordination/Cerebellar: Unable to perform    Gait: Deferred secondary to fall risk    Skin: No significant bruising or lacerations. LAB DATA REVIEWED:    Recent Results (from the past 24 hour(s))   MAGNESIUM    Collection Time: 11/20/20  4:30 AM   Result Value Ref Range    Magnesium 1.7 1.6 - 2.4 mg/dL        Imaging review:  CT head  No acute intracranial abnormality identified. There bilateral occipital infarctions which are chronic left greater than right and an old left basal   ganglia infarct. EEG  Moderate diffuse encephalopathy    MRI brain  Pending      HOME MEDS  Prior to Admission Medications   Prescriptions Last Dose Informant Patient Reported? Taking?   acetaminophen (TYLENOL) 325 mg tablet   Yes No   Sig: Take 650 mg by mouth every six (6) hours as needed for Pain.    allopurinoL (ZYLOPRIM) 100 mg tablet   No No   Sig: Take 1 Tab by mouth daily. amLODIPine (NORVASC) 5 mg tablet   No No   Sig: Take 1 Tab by mouth daily. aspirin delayed-release 81 mg tablet   No No   Sig: Take 1 Tab by mouth daily. atorvastatin (LIPITOR) 40 mg tablet   No No   Sig: Take 1 Tab by mouth nightly. balsam peru-castor oiL (VENELEX) ointment   No No   Sig: Apply  to affected area three (3) times daily. famotidine (PEPCID) 20 mg tablet   No No   Sig: Take 1 Tab by mouth every evening. hydrALAZINE (APRESOLINE) 25 mg tablet   No No   Sig: Take 1 Tab by mouth three (3) times daily. isoniazid (NYDRAZID) 300 mg tablet   No No   Sig: Take 1 Tab by mouth daily. metoprolol succinate (TOPROL-XL) 25 mg XL tablet   No No   Sig: Take 1 Tab by mouth daily. polyethylene glycol (MIRALAX) 17 gram/dose powder   No No   Sig: Take 17 g by mouth daily as needed for Constipation.       Facility-Administered Medications: None       CURRENT MEDS  Current Facility-Administered Medications   Medication Dose Route Frequency    influenza vaccine 2020-21 (6 mos+)(PF) (FLUARIX/FLULAVAL/FLUZONE QUAD) injection 0.5 mL  0.5 mL IntraMUSCular PRIOR TO DISCHARGE    LORazepam (ATIVAN) injection 1 mg  1 mg IntraVENous ONCE    hydrALAZINE (APRESOLINE) 20 mg/mL injection 5 mg  5 mg IntraVENous Q6H    dextrose 5% - 0.9% NaCl with KCl 20 mEq/L infusion  75 mL/hr IntraVENous CONTINUOUS    [Held by provider] levETIRAcetam (KEPPRA) tablet 500 mg  500 mg Oral BID    levETIRAcetam (KEPPRA) 500 mg in saline (iso-osm) 100 mL IVPB (premix)  500 mg IntraVENous Q12H    metoprolol (LOPRESSOR) injection 2.5 mg  2.5 mg IntraVENous Q6H    aspirin (ASA) suppository 150 mg  150 mg Rectal DAILY    sodium chloride (NS) flush 5-40 mL  5-40 mL IntraVENous Q8H    famotidine (PEPCID) tablet 20 mg  20 mg Oral BID    enoxaparin (LOVENOX) injection 40 mg  40 mg SubCUTAneous DAILY    allopurinoL (ZYLOPRIM) tablet 100 mg  100 mg Oral DAILY    amLODIPine (NORVASC) tablet 5 mg  5 mg Oral DAILY    atorvastatin (LIPITOR) tablet 40 mg  40 mg Oral QHS    [Held by provider] hydrALAZINE (APRESOLINE) tablet 25 mg  25 mg Oral TID    [Held by provider] metoprolol succinate (TOPROL-XL) XL tablet 25 mg  25 mg Oral DAILY       IMPRESSION/RECOMMENDATIONS:  Patient is an 80-year-old man who has history of stroke in July 2020. He did have bilateral occipital infarct. Patient did have a seizure at the nursing home and was brought in here. Patient has been started on Keppra and EEG shows moderate diffuse encephalopathy but no ongoing seizures. We will continue with Keppra 500 mg IV twice daily. Patient does also have baseline moderate dementia but is able to hold conversation with family. He is in the nursing home. Vitamin B12 is low normal.  We will start vitamin B12 supplementation 1000 mcg IM daily. MRI of the brain is pending. Patient is having continued altered mental status and agitation. We will also check ammonia level. Haldol or Seroquel to be used as needed for agitation.           Bhavna Zambrano MD  Neurologist

## 2020-11-20 NOTE — PROGRESS NOTES
1900: Received report from day shift nurse [address] Reviewed SBAR, Kardex, I/O, MAR, Procedural information and Recent results. HTN, Paced (rhythm), RA (oxygenation). A/O X 0  Pt resting in bed, hallucinating. Condom cath in place. Bilateral wrist restraints in place. 2000: Shift assessment complete. Torito score: 14. Pt turned, skin intact. Fall Risk: 3, high fall risk, bed alarm on. Adult nonverbal pain scale: 2. Pt repositioned, resting in bed. Q2hr restraint assessments. 0000: Pt hypertensive. Evening medications given. 0100: BP improved: 146/73. Pt aggrieved, PRN ativan. 0400: Perfect serve message sent to NP, Purveyor. Pt agitated and aggressive. Pt shouting in the room and thrashing in the bed. Pt has Q6 ativan ordered, but may need additional coverage for uncontrollable behavior. 0430: One time dose of Ativan given. 0500: Pt resting comfortably in the bed.     0700: BP: 133/83. Scheduled Metoprolol given. Hydralazine held at this time due to stable BP.     8:05 AM Bedside and Verbal shift change report given to day shift nurse Srinivas Ortega (oncoming nurse) by Sultana Gonzales RN (offgoing nurse). Report included the following information: SBAR, Kardex, Intake/Output, MAR, Procedural information, and Recent Results.

## 2020-11-20 NOTE — PROGRESS NOTES
Physician Progress Note      PATIENTRobyn Precise  CSN #:                  [id number]  :                       3/9/1933  ADMIT DATE:       2020 4:20 PM  [address] [place] DATE:  RESPONDING  PROVIDER #:        Yarelis Perea MD          QUERY TEXT:    Pt admitted with seizures and has CHF documented. If possible, please document in progress notes and discharge summary further specificity regarding the type and acuity of CHF:    The medical record reflects the following:  Risk Factors: htn, ckd hrt block post PPM  Clinical Indicators: H&P-heart failure (EF 40-45%)  Treatment: Ebenezer Lim RN/CDI   Options provided:  -- Chronic Systolic CHF/HFrEF  -- Chronic Diastolic CHF/HFpEF  -- Chronic Systolic and Diastolic CHF  -- Other - I will add my own diagnosis  -- Disagree - Not applicable / Not valid  -- Disagree - Clinically unable to determine / Unknown  -- Refer to Clinical Documentation Reviewer    PROVIDER RESPONSE TEXT:    This patient has chronic systolic CHF/HFrEF.     Query created by: Claudia Torres on 2020 9:22 AM      Electronically signed by:  Yarelis Perea MD 2020 5:46 PM

## 2020-11-20 NOTE — TELEPHONE ENCOUNTER
Pt daughter Nile Bumpers called back two patient identifiers confirmed. Notifeid Nile Bumpers that Dr. Evin Burgess would like to hold off on Stress test until pt gets regulated on medications for seizures. Notified pt to give us a call and when he is better an we will get him rescheduled. Nile Bumpers verbalized understanding of information discussed w/ no further questions at this time.

## 2020-11-20 NOTE — PROGRESS NOTES
Hospitalist Progress Note    NAME: Yasmeen Tabares   :  3/9/1933   MRN:  [id number]       Assessment / Plan:  New onset seizures, POA  Acute encephalopathy, likely postictal, POA vs worsening dementia   History of multiple strokes, POA  Agitation   Patient had a 5-minute seizure at nursing home, seized again in the ER and was given Ativan.  status post Keppra loading dose  C/w IV keprra 500mg bid     EEG ordered: no seizure   MRI ordered pending   Seizure precautions  Pt not following commands , yelling , more awake but agitated  , c/w  meds via IV   Ativan as needed for seizures and agitation  TSH wnl , B12 wnl , folate wnl , RPR neg , CBC, CMP wnl , and blood cultures NTD  Neurology consulted, we appreciate their assistance     History of strokes, hypertension, POA  Continue statin  Continue antihypertensives     SOULEYMANE on CKD  Hypokalemia   Also had in July  Renally dose medications  Creat 1.3-1.4  On IVF witth D5NS + Kcl      Sinus bradycardia 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  Discussed with RN events overnight.      Review of Systems:  Symptom Y/N Comments  Symptom Y/N Comments   Fever/Chills    Chest Pain     Poor Appetite    Edema     Cough    Abdominal Pain     Sputum    Joint Pain     SOB/CARREON    Pruritis/Rash     Nausea/vomit    Tolerating PT/OT     Diarrhea    Tolerating Diet     Constipation    Other       Could NOT obtain due to: Confused       Objective:     VITALS: Last 24hrs VS reviewed since prior progress note. Most recent are:  Patient Vitals for the past 24 hrs:   Temp Pulse Resp BP SpO2   11/20/20 0815 98.7 °F (37.1 °C) 73 20 (!) 142/73 98 %   11/20/20 0702    133/83    11/20/20 0300 99.7 °F (37.6 °C) 86 22 (!) 158/70 100 %   11/20/20 0110    (!) 146/73    11/20/20 0045    (!) 150/87    11/20/20 0044    (!) 216/90    11/20/20 0013    (!) 196/88    11/19/20 2321    (!) 196/96    11/19/20 2319 100 °F (37.8 °C) 84 20  98 %   11/19/20 1935 99.4 °F (37.4 °C) 80 20 (!) 170/82 99 %   11/19/20 1547  82      11/19/20 1450 98.4 °F (36.9 °C) 82 18 (!) 188/95 99 %   11/19/20 1155  87      11/19/20 1128 98.5 °F (36.9 °C) 87 20 (!) 180/76 99 %       Intake/Output Summary (Last 24 hours) at 11/20/2020 1009  Last data filed at 11/20/2020 0300  Gross per 24 hour   Intake    Output 900 ml   Net -900 ml        I had a face to face encounter with this patient and independently examined this patient on 11/20/2020 as outlined below:  PHYSICAL EXAM:  General: WD, WN. Alertt , non verbal   EENT:  EOMI. Anicteric sclerae. MMM  Resp:  CTA bilaterally, no wheezing or rales. No accessory muscle use  CV:  Regular  rhythm,  No edema  GI:  Soft, Non distended, Non tender. +Bowel sounds  Neurologic:  Alert and oriented X 0, agitated  Psych:   Unable to assess  Skin:  No rashes. No jaundice    Reviewed most current lab test results and cultures  YES  Reviewed most current radiology test results   YES  Review and summation of old records today    NO  Reviewed patient's current orders and MAR    YES  PMH/SH reviewed - no change compared to H&P  ________________________________________________________________________  Care Plan discussed with:    Comments   Patient     Family      RN x    Care Manager     Consultant                       x Multidiciplinary team rounds were held today with , nursing, pharmacist and clinical coordinator.   Patient's plan of care was discussed; medications were reviewed and discharge planning was addressed. ________________________________________________________________________  Total NON critical care TIME: 25   Minutes    Total CRITICAL CARE TIME Spent:   Minutes non procedure based      Comments   >50% of visit spent in counseling and coordination of care     ________________________________________________________________________  Annabel Tubbs MD     Procedures: see electronic medical records for all procedures/Xrays and details which were not copied into this note but were reviewed prior to creation of Plan. LABS:  I reviewed today's most current labs and imaging studies. Pertinent labs include:  Recent Labs     11/19/20  0455 11/18/20 0338 11/17/20 1947   WBC 6.6 7.6 8.6   HGB 11.9* 13.4 12.4   HCT 35.7* 40.4 36.2*   * 154 162     Recent Labs     11/20/20  0430 11/19/20  0455 11/18/20  0338 11/17/20 1947 11/17/20  1623   NA  --  139 138 135*   < >  --    K  --  3.3* 3.9 3.6   < >  --    CL  --  107 102 100   < >  --    CO2  --  30 33* 32   < >  --    GLU  --  87 93 106*   < >  --    BUN  --  16 16 19   < >  --    CREA  --  1.43* 1.33* 1.43*   < >  --    CA  --  9.0 9.3 9.1   < >  --    MG 1.7 2.0 1.9 1.8   < >  --    ALB  --  2.9* 3.2* 3.3*   < >  --    TBILI  --  0.9 0.7 0.4   < >  --    ALT  --  19 24 26   < >  --    INR  --   --   --   --   --  1.0    < > = values in this interval not displayed.        Signed: Annabel Tubbs MD

## 2020-11-21 LAB
ANION GAP SERPL CALC-SCNC: 5 MMOL/L (ref 5–15)
ANION GAP SERPL CALC-SCNC: 6 MMOL/L (ref 5–15)
BASOPHILS # BLD: 0.1 K/UL (ref 0–0.1)
BASOPHILS NFR BLD: 1 % (ref 0–1)
BUN SERPL-MCNC: 16 MG/DL (ref 6–20)
BUN SERPL-MCNC: 17 MG/DL (ref 6–20)
BUN/CREAT SERPL: 13 (ref 12–20)
BUN/CREAT SERPL: 14 (ref 12–20)
CALCIUM SERPL-MCNC: 9.3 MG/DL (ref 8.5–10.1)
CALCIUM SERPL-MCNC: 9.5 MG/DL (ref 8.5–10.1)
CHLORIDE SERPL-SCNC: 108 MMOL/L (ref 97–108)
CHLORIDE SERPL-SCNC: 109 MMOL/L (ref 97–108)
CO2 SERPL-SCNC: 27 MMOL/L (ref 21–32)
CO2 SERPL-SCNC: 28 MMOL/L (ref 21–32)
CREAT SERPL-MCNC: 1.25 MG/DL (ref 0.7–1.3)
CREAT SERPL-MCNC: 1.25 MG/DL (ref 0.7–1.3)
DIFFERENTIAL METHOD BLD: ABNORMAL
EOSINOPHIL # BLD: 0.1 K/UL (ref 0–0.4)
EOSINOPHIL NFR BLD: 1 % (ref 0–7)
ERYTHROCYTE [DISTWIDTH] IN BLOOD BY AUTOMATED COUNT: 13.9 % (ref 11.5–14.5)
GLUCOSE BLD STRIP.AUTO-MCNC: 74 MG/DL (ref 65–100)
GLUCOSE BLD STRIP.AUTO-MCNC: 92 MG/DL (ref 65–100)
GLUCOSE SERPL-MCNC: 90 MG/DL (ref 65–100)
GLUCOSE SERPL-MCNC: 91 MG/DL (ref 65–100)
HCT VFR BLD AUTO: 38.8 % (ref 36.6–50.3)
HGB BLD-MCNC: 13.2 G/DL (ref 12.1–17)
IMM GRANULOCYTES # BLD AUTO: 0 K/UL (ref 0–0.04)
IMM GRANULOCYTES NFR BLD AUTO: 0 % (ref 0–0.5)
LYMPHOCYTES # BLD: 2 K/UL (ref 0.8–3.5)
LYMPHOCYTES NFR BLD: 26 % (ref 12–49)
MAGNESIUM SERPL-MCNC: 1.9 MG/DL (ref 1.6–2.4)
MCH RBC QN AUTO: 31 PG (ref 26–34)
MCHC RBC AUTO-ENTMCNC: 34 G/DL (ref 30–36.5)
MCV RBC AUTO: 91.1 FL (ref 80–99)
MONOCYTES # BLD: 0.8 K/UL (ref 0–1)
MONOCYTES NFR BLD: 10 % (ref 5–13)
NEUTS SEG # BLD: 4.8 K/UL (ref 1.8–8)
NEUTS SEG NFR BLD: 62 % (ref 32–75)
NRBC # BLD: 0 K/UL (ref 0–0.01)
NRBC BLD-RTO: 0 PER 100 WBC
PLATELET # BLD AUTO: 147 K/UL (ref 150–400)
PMV BLD AUTO: 9.8 FL (ref 8.9–12.9)
POTASSIUM SERPL-SCNC: 3.2 MMOL/L (ref 3.5–5.1)
POTASSIUM SERPL-SCNC: 3.3 MMOL/L (ref 3.5–5.1)
RBC # BLD AUTO: 4.26 M/UL (ref 4.1–5.7)
SERVICE CMNT-IMP: NORMAL
SERVICE CMNT-IMP: NORMAL
SODIUM SERPL-SCNC: 141 MMOL/L (ref 136–145)
SODIUM SERPL-SCNC: 142 MMOL/L (ref 136–145)
WBC # BLD AUTO: 7.7 K/UL (ref 4.1–11.1)

## 2020-11-21 PROCEDURE — 80048 BASIC METABOLIC PNL TOTAL CA: CPT

## 2020-11-21 PROCEDURE — 85025 COMPLETE CBC W/AUTO DIFF WBC: CPT

## 2020-11-21 PROCEDURE — 74011250636 HC RX REV CODE- 250/636: Performed by: INTERNAL MEDICINE

## 2020-11-21 PROCEDURE — 83735 ASSAY OF MAGNESIUM: CPT

## 2020-11-21 PROCEDURE — 82962 GLUCOSE BLOOD TEST: CPT

## 2020-11-21 PROCEDURE — 36415 COLL VENOUS BLD VENIPUNCTURE: CPT

## 2020-11-21 PROCEDURE — 74011250637 HC RX REV CODE- 250/637: Performed by: INTERNAL MEDICINE

## 2020-11-21 PROCEDURE — 74011250636 HC RX REV CODE- 250/636: Performed by: PSYCHIATRY & NEUROLOGY

## 2020-11-21 PROCEDURE — 74011000250 HC RX REV CODE- 250: Performed by: INTERNAL MEDICINE

## 2020-11-21 PROCEDURE — 99233 SBSQ HOSP IP/OBS HIGH 50: CPT | Performed by: PSYCHIATRY & NEUROLOGY

## 2020-11-21 PROCEDURE — 65660000000 HC RM CCU STEPDOWN

## 2020-11-21 RX ADMIN — METOPROLOL TARTRATE 2.5 MG: 1 INJECTION, SOLUTION INTRAVENOUS at 11:52

## 2020-11-21 RX ADMIN — ASPIRIN 150 MG: 300 SUPPOSITORY RECTAL at 09:13

## 2020-11-21 RX ADMIN — POTASSIUM CHLORIDE, DEXTROSE MONOHYDRATE AND SODIUM CHLORIDE 75 ML/HR: 150; 5; 900 INJECTION, SOLUTION INTRAVENOUS at 22:25

## 2020-11-21 RX ADMIN — HYDRALAZINE HYDROCHLORIDE 5 MG: 20 INJECTION INTRAMUSCULAR; INTRAVENOUS at 17:13

## 2020-11-21 RX ADMIN — Medication 10 ML: at 17:13

## 2020-11-21 RX ADMIN — LEVETIRACETAM 500 MG: 5 INJECTION INTRAVENOUS at 11:53

## 2020-11-21 RX ADMIN — Medication 10 ML: at 22:03

## 2020-11-21 RX ADMIN — CYANOCOBALAMIN 1000 MCG: 1000 INJECTION, SOLUTION INTRAMUSCULAR; SUBCUTANEOUS at 09:12

## 2020-11-21 RX ADMIN — Medication 10 ML: at 06:13

## 2020-11-21 RX ADMIN — HYDRALAZINE HYDROCHLORIDE 5 MG: 20 INJECTION INTRAMUSCULAR; INTRAVENOUS at 11:52

## 2020-11-21 RX ADMIN — METOPROLOL TARTRATE 2.5 MG: 1 INJECTION, SOLUTION INTRAVENOUS at 17:13

## 2020-11-21 RX ADMIN — METOPROLOL TARTRATE 2.5 MG: 1 INJECTION, SOLUTION INTRAVENOUS at 06:13

## 2020-11-21 RX ADMIN — HYDRALAZINE HYDROCHLORIDE 5 MG: 20 INJECTION INTRAMUSCULAR; INTRAVENOUS at 04:15

## 2020-11-21 RX ADMIN — HYDRALAZINE HYDROCHLORIDE 5 MG: 20 INJECTION INTRAMUSCULAR; INTRAVENOUS at 06:13

## 2020-11-21 RX ADMIN — ENOXAPARIN SODIUM 40 MG: 40 INJECTION SUBCUTANEOUS at 09:12

## 2020-11-21 NOTE — PROGRESS NOTES
Neurology Note    Patient ID:  Yasmeen Tabares  [id number]  44 y.o.  3/9/1933      Date of Consultation:  November 21, 2020      Subjective: minimal verbal output       History of Present Illness:   Yasmeen Tabares is a 80 y.o. male who was admitted to the hospital on November 17, 2020 with a new onset seizure. He does have a history of a prior stroke. His EEG revealed diffuse cerebral slowing and no seizures. He has been followed by one of my colleagues, Dr. Lorin Adams since admission. He has been started on Keppra and there has been no reports of additional seizure activity. This a.m., the patient denies any pain. Past Medical History:   Diagnosis Date    Cancer [facility])     prostate    Constipation     Gout     Hyperlipemia     Hypertension     Pacemaker 2019    [facility] [address]     Thrombocytopenia ([facility] [address] 3/19/2019    TIA (transient ischemic attack) 2013        Past Surgical History:   Procedure Laterality Date    HX PROSTATECTOMY      HX UROLOGICAL      prostate removed    UT INS NEW/RPLCMT PRM PACEMAKR W/TRANS ELTRD ATRIAL N/A 10/18/2019    Insert Ppm Single Atrial performed by Julian Bateman MD at [address] [place] [address], [facility] CATH LAB    UT INS NEW/RPLCMT PRM PM W/TRANSV ELTRD ATRIAL&VENT Right 3/22/2019    INSERT PPM DUAL performed by Julian Bateman MD at [place] [address], [facility] CATH LAB        Family History   Problem Relation Age of Onset    No Known Problems Mother     No Known Problems Father         Social History     Tobacco Use    Smoking status: Never Smoker    Smokeless tobacco: Never Used   Substance Use Topics    Alcohol use: No        No Known Allergies     Prior to Admission medications    Medication Sig Start Date End Date Taking? Authorizing Provider   allopurinoL (ZYLOPRIM) 100 mg tablet Take 1 Tab by mouth daily. 10/1/20  Yes Byron Lewis MD   amLODIPine (NORVASC) 5 mg tablet Take 1 Tab by mouth daily.  10/1/20  Yes Byron Lewis MD   hydrALAZINE (APRESOLINE) 25 mg tablet Take 1 Tab by mouth three (3) times daily. 10/1/20  Yes María Gordon MD   metoprolol succinate (TOPROL-XL) 25 mg XL tablet Take 1 Tab by mouth daily. 10/1/20  Yes María Gordon MD   isoniazid (NYDRAZID) 300 mg tablet Take 1 Tab by mouth daily. 8/20/20  Yes María Gordon MD   aspirin delayed-release 81 mg tablet Take 1 Tab by mouth daily. 7/13/20  Yes María Gordon MD   balsam peru-castor oiL (VENELEX) ointment Apply  to affected area three (3) times daily. 7/9/20  Yes Maisha Roberto MD   famotidine (PEPCID) 20 mg tablet Take 1 Tab by mouth every evening. 7/9/20  Yes Maisha Roberto MD   atorvastatin (LIPITOR) 40 mg tablet Take 1 Tab by mouth nightly. 7/13/20   María Gordon MD   polyethylene glycol [facility]) 17 gram/dose powder Take 17 g by mouth daily as needed for Constipation. 7/13/20   María Gordon MD   acetaminophen (TYLENOL) 325 mg tablet Take 650 mg by mouth every six (6) hours as needed for Pain.     Provider, Historical     Current Facility-Administered Medications   Medication Dose Route Frequency    cyanocobalamin (VITAMIN B12) injection 1,000 mcg  1,000 mcg IntraMUSCular DAILY    LORazepam (ATIVAN) injection 1 mg  1 mg IntraVENous Q6H PRN    influenza vaccine 2020-21 (6 mos+)(PF) (FLUARIX/FLULAVAL/FLUZONE QUAD) injection 0.5 mL  0.5 mL IntraMUSCular PRIOR TO DISCHARGE    hydrALAZINE (APRESOLINE) 20 mg/mL injection 5 mg  5 mg IntraVENous Q6H    dextrose 5% - 0.9% NaCl with KCl 20 mEq/L infusion  75 mL/hr IntraVENous CONTINUOUS    [Held by provider] levETIRAcetam (KEPPRA) tablet 500 mg  500 mg Oral BID    levETIRAcetam (KEPPRA) 500 mg in saline (iso-osm) 100 mL IVPB (premix)  500 mg IntraVENous Q12H    metoprolol (LOPRESSOR) injection 2.5 mg  2.5 mg IntraVENous Q6H    hydrALAZINE (APRESOLINE) 20 mg/mL injection 5 mg  5 mg IntraVENous Q6H PRN    aspirin (ASA) suppository 150 mg  150 mg Rectal DAILY    bisacodyL (DULCOLAX) suppository 10 mg  10 mg Rectal DAILY PRN    sodium chloride (NS) flush 5-40 mL  5-40 mL IntraVENous Q8H    sodium chloride (NS) flush 5-40 mL  5-40 mL IntraVENous PRN    acetaminophen (TYLENOL) tablet 650 mg  650 mg Oral Q6H PRN    Or    acetaminophen (TYLENOL) suppository 650 mg  650 mg Rectal Q6H PRN    polyethylene glycol (MIRALAX) packet 17 g  17 g Oral DAILY PRN    famotidine (PEPCID) tablet 20 mg  20 mg Oral BID    nicotine (NICODERM CQ) 14 mg/24 hr patch 1 Patch  1 Patch TransDERmal DAILY PRN    enoxaparin (LOVENOX) injection 40 mg  40 mg SubCUTAneous DAILY    allopurinoL (ZYLOPRIM) tablet 100 mg  100 mg Oral DAILY    amLODIPine (NORVASC) tablet 5 mg  5 mg Oral DAILY    atorvastatin (LIPITOR) tablet 40 mg  40 mg Oral QHS    [Held by provider] hydrALAZINE (APRESOLINE) tablet 25 mg  25 mg Oral TID    [Held by provider] metoprolol succinate (TOPROL-XL) XL tablet 25 mg  25 mg Oral DAILY         Review of Systems:    [x]Unable to obtain  ROS due to  [x]mental status change  []sedated   []intubated    Objective:     Visit Vitals  BP (!) 174/88 (BP 1 Location: Right arm, BP Patient Position: At rest)   Pulse 71   Temp 98.4 °F (36.9 °C)   Resp 20   SpO2 96%       Physical Exam:      General:  appears well nourished in no acute distress  Neck: no carotid bruits  Lungs: clear to auscultation  Heart:  Murmurs noted  Lower extremity: peripheral pulses palpable and no edema  Skin: intact    Neurological exam:    The patient was sleeping but was arousable. He did tell me his name. He was a bit resistant and following commands but would answer simple questions. He did  with his hands bilaterally. He did stick out his tongue. He did not know his location or the year. Cranial nerves:   II-XII were tested    Perrrla  He does blink to visual threat  Eomi, no evidence of nystagmus  Facial sensation:  normal and symmetric  Facial motor: normal and symmetric  Hearing intact  Tongue: midline without fasciculations    Motor:    Tone normal.  He was in restraints in his upper extremity    No evidence of fasciculations    It was difficult to perform formal motor testing but there was no clear obvious asymmetry that was appreciated. Sensory:  Upper extremity: intact to pp,  Lower extremity: intact to pp    Reflexes:    Right Left  Biceps  2 2  Triceps 2 2  Brachiorad. 2 2  Patella  2 2  Achilles - -    Plantar response: Extensor bilaterally      Cerebellar testing:  no tremor apparent    Gait: This was not assessed this a.m. due to patient's mental status    Labs:     Lab Results   Component Value Date/Time    Hemoglobin A1c 5.7 (H) 07/01/2020 04:04 AM    Sodium 141 11/21/2020 12:42 AM    Sodium 142 11/21/2020 12:42 AM    Potassium 3.2 (L) 11/21/2020 12:42 AM    Potassium 3.3 (L) 11/21/2020 12:42 AM    Chloride 108 11/21/2020 12:42 AM    Chloride 109 (H) 11/21/2020 12:42 AM    Glucose 91 11/21/2020 12:42 AM    Glucose 90 11/21/2020 12:42 AM    BUN 16 11/21/2020 12:42 AM    BUN 17 11/21/2020 12:42 AM    Creatinine 1.25 11/21/2020 12:42 AM    Creatinine 1.25 11/21/2020 12:42 AM    Calcium 9.5 11/21/2020 12:42 AM    Calcium 9.3 11/21/2020 12:42 AM    WBC 7.7 11/21/2020 12:42 AM    HCT 38.8 11/21/2020 12:42 AM    HGB 13.2 11/21/2020 12:42 AM    PLATELET 620 (L) 46/18/8935 12:42 AM       Imaging:    Results from Hospital Encounter encounter on 06/30/20   MRI BRAIN WO CONT    Narrative CLINICAL HISTORY: Possible CVA. INDICATION: Possible CVA. Parieto-occipital infarction is suspected. COMPARISON: CT and CTA 6/30/2020    TECHNIQUE: MR examination of the brain includes axial and sagittal T1, coronal  T2, axial T2, axial FLAIR, axial gradient echo, axial DWI. CONTRAST: None    FINDINGS:   Small focus of acute infarction in the posterior superior right frontal lobe  precentral.   Subacute infarction left and right occipital lobes. Moderate to large left  occipital infarction with posterior thalamic and pulmonary subacute infarction  on the left.  Small to moderate subacute infarction on the right. There is mild  superimposed hemorrhage, no associated midline shift or mass effect. Chronic  foci of hemosiderin deposition are most likely related to severe chronic  microvascular change. The brain architecture is normal. There is no evidence of  midline shift or mass-effect. There are no extra-axial fluid collections. There  is no Chiari or syrinx. The pituitary and infundibulum are grossly unremarkable. There is no skull base mass. Cerebellopontine angles are grossly unremarkable. The major intracranial vascular flow-voids are unremarkable. The cavernous  sinuses are symmetric. Optic chiasm and infundibulum grossly unremarkable. Orbits are grossly symmetric. Dural venous sinuses are grossly patent. The mastoid air cells are well pneumatized and clear. Impression IMPRESSION:  Moderate to large subacute infarction in the left occipital lobe and left basal  ganglia. Small to moderate subacute infarction in the right occipital lobe. Mild superimposed hemorrhage but no evidence of midline shift or mass effect. Punctate focus of acute infarction in the posterior superior right frontal lobe. Moderate to severe chronic microvascular ischemic change and cerebral atrophy. Results from [place] encounter on 11/17/20   CT HEAD WO CONT    Narrative EXAM: CT HEAD WO CONT    INDICATION: New onset seizure    COMPARISON: 2020. CONTRAST: None. TECHNIQUE: Unenhanced CT of the head was performed using 5 mm images. Brain and  bone windows were generated. Coronal and sagittal reformats. CT dose reduction  was achieved through use of a standardized protocol tailored for this  examination and automatic exposure control for dose modulation. FINDINGS:  There is prominence of the ventricles and sulci diffusely. There are changes  small vessel disease periventricular white matter. There are old lacunar  infarcts left basal ganglia.  There are bilateral occipital lobe infarctions are  noted left greater than right. No acute hemorrhage or mass or infarct identified    The bone windows demonstrate no abnormalities. The visualized portions of the  paranasal sinuses and mastoid air cells are clear. Impression IMPRESSION:   No acute intracranial abnormality identified. There bilateral occipital  infarctions which are chronic left greater than right and an old left basal  ganglia infarct. I did independently review his head CT from admission on November 17, 2020. There was a significant amount of atrophy and periventricular microischemic changes that were noted. Multiple chronic old strokes were apparent. Assessment and Plan:    The patient is a pleasant 51-year-old gentleman with history of prior stroke and reportedly mild dementia who presents with new onset seizure. His neurological examination reveals a suppressed mental status with no clear focal weakness. New onset seizure:    He does have reasons to have developed seizures. He does have prior strokes and a significant amount of atrophy. I would recommend continuing Keppra at 500 mg twice a day  Given the new onset seizure, I would recommend he obtain a brain MRI. Strokes:  He does have a history of multiple strokes on neuro imaging. His risk factors for stroke do include hypertension and dyslipidemia  Hypertension: Continue aggressive control with goal systolic blood pressure under 140  Dyslipidemia: Continue statin therapy  Continue aspirin     Mental status: This very well may be multifactorial.  He does have an underlying history of a dementia. Given his neuro imaging, there is most likely a component of a vascular dementia and is very well may be a stepwise progression of the dementia. Sundowning may also need to be considered. Continue with nonpharmacological measures as able to prevent and minimize sundowning. He has mild electrolyte abnormalities.   Continue to replete potassium  Vitamin B12 supplementation  Please obtain brain MRI which can be helpful in this situation      Neurology will continue to follow along closely during the hospitalization. After discharge, the patient will need to have close follow-up in the neurology clinic with Dr. Addy Freitas.              Active Problems:    Seizure ([facility] [address] (11/17/2020)      Altered mental status (11/18/2020)      Post-ictal state ([facility] [address] (11/18/2020)                   Signed By:  Kirill Abdullahi DO FAAN    November 21, 2020

## 2020-11-21 NOTE — PROGRESS NOTES
PRN Ativan 1mg given at 2200 for patient because patient was becoming agitated and was yelling out. Attempted to approach patient calmly and ask what was wrong, however; patient continued to yell.    Will continue to monitor patient

## 2020-11-21 NOTE — PROGRESS NOTES
Received message from patient's nurse Brian Be  stating :    Patient needs a new order for bilateral wrist restraints, he continues to try to pull at lines. History of dementia and is in for acute encephalopathy. Discussion / orders:    Reordered bilateral wrist restraints. Please note that this note was dictated using Dragon computer voice recognition software. Quite often unanticipated grammatical, syntax, homophones, and other interpretive errors are inadvertently transcribed by the computer software. Please disregard these errors. Please excuse any errors that have escaped final proofreading.

## 2020-11-21 NOTE — PROGRESS NOTES
Hospitalist Progress Note    NAME: Kathryn Corrales   :  3/9/1933   MRN:  [id number]       Assessment / Plan:  New onset seizures, POA  Acute encephalopathy, likely postictal, POA vs worsening dementia   History of multiple strokes, POA  Agitation   Patient had a 5-minute seizure at nursing home, seized again in the ER and was given Ativan.  status post Keppra loading dose  C/w IV keprra 500mg bid     EEG ordered: no seizure   MRI ordered pending   Seizure precautions  Pt not following commands , yelling , more awake but agitated  , c/w  meds via IV   Ativan as needed for seizures and agitation  TSH wnl , B12 wnl , folate wnl , RPR neg , CBC, CMP wnl , and blood cultures NTD  Neurology consulted, we appreciate their assistance     History of strokes, hypertension, POA  Continue statin  Continue antihypertensives     SOULEYMANE on CKD  Hypokalemia   Also had in July  Renally dose medications  Creat 1.3-1.4  On IVF witth D5NS + Kcl      Sinus bradycardia with 2-1 block: POA  Status post pacemaker  Was able to have MRI with [employer] rep present and MRI in July at [facility]     Baseline debility  Patient is able to walk with a walker  Has moderate dementia, but is able to hold conversation with family  Resides in a nursing home       updated daughter josselyn Barrera on   Baseline : verbal but need help with adl   Code status: Full  Prophylaxis: Lovenox  Recommended Disposition: SNF      Subjective:     Chief Complaint / Reason for Physician Visit  FU seizures/ams , not communicative this am .  Discussed with RN events overnight.      Review of Systems:  Symptom Y/N Comments  Symptom Y/N Comments   Fever/Chills    Chest Pain     Poor Appetite    Edema     Cough    Abdominal Pain     Sputum    Joint Pain     SOB/CARREON    Pruritis/Rash     Nausea/vomit    Tolerating PT/OT     Diarrhea    Tolerating Diet     Constipation    Other       Could NOT obtain due to: Confused       Objective: VITALS:   Last 24hrs VS reviewed since prior progress note. Most recent are:  Patient Vitals for the past 24 hrs:   Temp Pulse Resp BP SpO2   11/21/20 0757 98.4 °F (36.9 °C) 71 20 (!) 174/88 96 %   11/21/20 0612  76  (!) 179/95    11/21/20 0410    (!) 178/92    11/21/20 0349 97.4 °F (36.3 °C) 89 18 (!) 184/85 98 %   11/20/20 2302 99.2 °F (37.3 °C) 84 18 (!) 144/78 98 %   11/20/20 1837 98.6 °F (37 °C) 88 20 (!) 164/84 94 %   11/20/20 1447 98.4 °F (36.9 °C) (!) 101 22 (!) 154/79 96 %   11/20/20 1216 98.2 °F (36.8 °C) 76 20 (!) 160/107 94 %   11/20/20 1215 98.7 °F (37.1 °C) 74  (!) 170/87 97 %   11/20/20 1200  70          Intake/Output Summary (Last 24 hours) at 11/21/2020 5738  Last data filed at 11/20/2020 1232  Gross per 24 hour   Intake 0 ml   Output    Net 0 ml        I had a face to face encounter with this patient and independently examined this patient on 11/21/2020 as outlined below:  PHYSICAL EXAM:  General: WD, WN. Alertt , non verbal   EENT:  EOMI. Anicteric sclerae. MMM  Resp:  CTA bilaterally, no wheezing or rales. No accessory muscle use  CV:  Regular  rhythm,  No edema  GI:  Soft, Non distended, Non tender. +Bowel sounds  Neurologic:  Alert and oriented X 0, agitated  Psych:   Unable to assess  Skin:  No rashes. No jaundice    Reviewed most current lab test results and cultures  YES  Reviewed most current radiology test results   YES  Review and summation of old records today    NO  Reviewed patient's current orders and MAR    YES  PMH/SH reviewed - no change compared to H&P  ________________________________________________________________________  Care Plan discussed with:    Comments   Patient     Family      RN x    Care Manager     Consultant                       x Multidiciplinary team rounds were held today with , nursing, pharmacist and clinical coordinator. Patient's plan of care was discussed; medications were reviewed and discharge planning was addressed. ________________________________________________________________________  Total NON critical care TIME: 25   Minutes    Total CRITICAL CARE TIME Spent:   Minutes non procedure based      Comments   >50% of visit spent in counseling and coordination of care     ________________________________________________________________________  Jazmyne Guadarrama MD     Procedures: see electronic medical records for all procedures/Xrays and details which were not copied into this note but were reviewed prior to creation of Plan. LABS:  I reviewed today's most current labs and imaging studies.   Pertinent labs include:  Recent Labs     11/21/20 0042 11/19/20 0455   WBC 7.7 6.6   HGB 13.2 11.9*   HCT 38.8 35.7*   * 147*     Recent Labs     11/21/20 0042 11/20/20 0430 11/19/20 0455     141  --  139   K 3.3*  3.2*  --  3.3*   *  108  --  107   CO2 27  28  --  30   GLU 90  91  --  87   BUN 17  16  --  16   CREA 1.25  1.25  --  1.43*   CA 9.3  9.5  --  9.0   MG 1.9 1.7 2.0   ALB  --   --  2.9*   TBILI  --   --  0.9   ALT  --   --  19       Signed: Jazmyne Guadarrama MD

## 2020-11-21 NOTE — PROGRESS NOTES
End of Shift Note    Bedside shift change report given to [facility] (oncoming nurse) by Jason Queen (offgoing nurse). Report included the following information SBAR    Shift worked:  1817   Shift summary and any significant changes:      At 2200 1mg PRN Ativan given because patient was yelling; patient still in bilateral wrist restraints; PRN Hydralazine given x1 for high BP      Concerns for physician to address:  None   Zone phone for oncoming shift:   1817     Patient Information  Brenda Perales  80 y.o.  11/17/2020  4:20 PM by Pedrito Tate MD. Brenda Perales was admitted from Assisted Living    Problem List  Patient Active Problem List    Diagnosis Date Noted    Altered mental status 11/18/2020    Post-ictal state ([address] 11/18/2020    Seizure ([address] 11/17/2020    Atrial pacemaker lead displacement 10/18/2019    Pacemaker 03/22/2019    Bradycardia 03/19/2019    Elevated troponin 03/19/2019    Hypertensive urgency 03/19/2019    Second degree AV block, Mobitz type I 03/19/2019    Stage 3 chronic kidney disease 03/15/2019    Rotator cuff arthropathy of both shoulders 03/15/2019    Cognitive impairment 05/45/8492    Systolic murmur 85/54/3356    Essential hypertension 12/17/2018    Gout 12/17/2018    Pure hypercholesterolemia 12/17/2018    History of prostate cancer 12/17/2018    Chronic constipation 12/17/2018    Dysuria 08/08/2017    Weight loss 08/08/2017    Adrenal mass ([address] 07/18/2017     Past Medical History:   Diagnosis Date    Cancer [facility])     prostate    Constipation     Gout     Hyperlipemia     Hypertension     Pacemaker 2019    Stroke ([address]     Thrombocytopenia ([address] 3/19/2019    TIA (transient ischemic attack) 2013       Core Measures:  CVA: No No  CHF:No No  PNA:No No    Activity:  Activity Level: Bed Rest  Number times ambulated in hallways past shift: 0  Number of times OOB to chair past shift: 0    Cardiac:   Cardiac Monitoring: Yes      Cardiac Rhythm: Paced    Access:   Current line(s): no access     Genitourinary:   Urinary status: incontinent  Urinary Catheter? No     Respiratory:   O2 Device: Room air  Chronic home O2 use?: N/A  Incentive spirometer at bedside: N/A       GI:  Last Bowel Movement Date: (unknown)  Current diet:  DIET NPO  Passing flatus: YES  Tolerating current diet: NO       Pain Management:   Patient states pain is manageable on current regimen: YES    Skin:  Torito Score: 13  Interventions: foam dressing    Patient Safety:  Fall Score:  Total Score: 3  Interventions: bed/chair alarm  High Fall Risk: Yes    DVT prophylaxis:  DVT prophylaxis Med- No  DVT prophylaxis SCD or BEV- No     Wounds: (If Applicable)  Wounds- No  Location     Active Consults:  IP CONSULT TO NEUROLOGY    Length of Stay:  Expected LOS: 2d 16h  Actual LOS: 3  Discharge Plan: No; CITLALLI Freeman

## 2020-11-22 LAB
ANION GAP SERPL CALC-SCNC: 3 MMOL/L (ref 5–15)
BACTERIA SPEC CULT: NORMAL
BASOPHILS # BLD: 0.1 K/UL (ref 0–0.1)
BASOPHILS NFR BLD: 1 % (ref 0–1)
BUN SERPL-MCNC: 18 MG/DL (ref 6–20)
BUN/CREAT SERPL: 14 (ref 12–20)
CALCIUM SERPL-MCNC: 9.8 MG/DL (ref 8.5–10.1)
CHLORIDE SERPL-SCNC: 112 MMOL/L (ref 97–108)
CO2 SERPL-SCNC: 28 MMOL/L (ref 21–32)
CREAT SERPL-MCNC: 1.26 MG/DL (ref 0.7–1.3)
DIFFERENTIAL METHOD BLD: ABNORMAL
EOSINOPHIL # BLD: 0.3 K/UL (ref 0–0.4)
EOSINOPHIL NFR BLD: 3 % (ref 0–7)
ERYTHROCYTE [DISTWIDTH] IN BLOOD BY AUTOMATED COUNT: 14.2 % (ref 11.5–14.5)
GLUCOSE BLD STRIP.AUTO-MCNC: 135 MG/DL (ref 65–100)
GLUCOSE BLD STRIP.AUTO-MCNC: 142 MG/DL (ref 65–100)
GLUCOSE BLD STRIP.AUTO-MCNC: 92 MG/DL (ref 65–100)
GLUCOSE SERPL-MCNC: 116 MG/DL (ref 65–100)
HCT VFR BLD AUTO: 44.5 % (ref 36.6–50.3)
HGB BLD-MCNC: 14.5 G/DL (ref 12.1–17)
IMM GRANULOCYTES # BLD AUTO: 0 K/UL (ref 0–0.04)
IMM GRANULOCYTES NFR BLD AUTO: 0 % (ref 0–0.5)
LYMPHOCYTES # BLD: 3.2 K/UL (ref 0.8–3.5)
LYMPHOCYTES NFR BLD: 35 % (ref 12–49)
MAGNESIUM SERPL-MCNC: 2.2 MG/DL (ref 1.6–2.4)
MCH RBC QN AUTO: 30.1 PG (ref 26–34)
MCHC RBC AUTO-ENTMCNC: 32.6 G/DL (ref 30–36.5)
MCV RBC AUTO: 92.5 FL (ref 80–99)
MONOCYTES # BLD: 1.3 K/UL (ref 0–1)
MONOCYTES NFR BLD: 14 % (ref 5–13)
NEUTS SEG # BLD: 4.3 K/UL (ref 1.8–8)
NEUTS SEG NFR BLD: 47 % (ref 32–75)
NRBC # BLD: 0 K/UL (ref 0–0.01)
NRBC BLD-RTO: 0 PER 100 WBC
PLATELET # BLD AUTO: 166 K/UL (ref 150–400)
PMV BLD AUTO: 9.9 FL (ref 8.9–12.9)
POTASSIUM SERPL-SCNC: 4.2 MMOL/L (ref 3.5–5.1)
RBC # BLD AUTO: 4.81 M/UL (ref 4.1–5.7)
SERVICE CMNT-IMP: ABNORMAL
SERVICE CMNT-IMP: ABNORMAL
SERVICE CMNT-IMP: NORMAL
SERVICE CMNT-IMP: NORMAL
SODIUM SERPL-SCNC: 143 MMOL/L (ref 136–145)
WBC # BLD AUTO: 9.1 K/UL (ref 4.1–11.1)

## 2020-11-22 PROCEDURE — 74011250636 HC RX REV CODE- 250/636: Performed by: INTERNAL MEDICINE

## 2020-11-22 PROCEDURE — 36415 COLL VENOUS BLD VENIPUNCTURE: CPT

## 2020-11-22 PROCEDURE — 74011000258 HC RX REV CODE- 258: Performed by: INTERNAL MEDICINE

## 2020-11-22 PROCEDURE — 74011250636 HC RX REV CODE- 250/636: Performed by: PSYCHIATRY & NEUROLOGY

## 2020-11-22 PROCEDURE — 99233 SBSQ HOSP IP/OBS HIGH 50: CPT | Performed by: PSYCHIATRY & NEUROLOGY

## 2020-11-22 PROCEDURE — 85025 COMPLETE CBC W/AUTO DIFF WBC: CPT

## 2020-11-22 PROCEDURE — 83735 ASSAY OF MAGNESIUM: CPT

## 2020-11-22 PROCEDURE — 74011000250 HC RX REV CODE- 250: Performed by: INTERNAL MEDICINE

## 2020-11-22 PROCEDURE — 65660000000 HC RM CCU STEPDOWN

## 2020-11-22 PROCEDURE — 74011250637 HC RX REV CODE- 250/637: Performed by: INTERNAL MEDICINE

## 2020-11-22 PROCEDURE — 82962 GLUCOSE BLOOD TEST: CPT

## 2020-11-22 PROCEDURE — 80048 BASIC METABOLIC PNL TOTAL CA: CPT

## 2020-11-22 PROCEDURE — 2709999900 HC NON-CHARGEABLE SUPPLY

## 2020-11-22 RX ORDER — DEXTROSE MONOHYDRATE AND SODIUM CHLORIDE 5; .9 G/100ML; G/100ML
75 INJECTION, SOLUTION INTRAVENOUS CONTINUOUS
Status: DISCONTINUED | OUTPATIENT
Start: 2020-11-22 | End: 2020-11-23

## 2020-11-22 RX ORDER — HYDRALAZINE HYDROCHLORIDE 20 MG/ML
10 INJECTION INTRAMUSCULAR; INTRAVENOUS EVERY 6 HOURS
Status: DISCONTINUED | OUTPATIENT
Start: 2020-11-22 | End: 2020-12-01

## 2020-11-22 RX ADMIN — METOPROLOL TARTRATE 2.5 MG: 1 INJECTION, SOLUTION INTRAVENOUS at 18:33

## 2020-11-22 RX ADMIN — METOPROLOL TARTRATE 2.5 MG: 1 INJECTION, SOLUTION INTRAVENOUS at 00:19

## 2020-11-22 RX ADMIN — Medication 10 ML: at 22:54

## 2020-11-22 RX ADMIN — ASPIRIN 150 MG: 300 SUPPOSITORY RECTAL at 08:53

## 2020-11-22 RX ADMIN — Medication 10 ML: at 06:08

## 2020-11-22 RX ADMIN — METOPROLOL TARTRATE 2.5 MG: 1 INJECTION, SOLUTION INTRAVENOUS at 12:42

## 2020-11-22 RX ADMIN — LEVETIRACETAM 500 MG: 5 INJECTION INTRAVENOUS at 12:42

## 2020-11-22 RX ADMIN — CYANOCOBALAMIN 1000 MCG: 1000 INJECTION, SOLUTION INTRAMUSCULAR; SUBCUTANEOUS at 08:53

## 2020-11-22 RX ADMIN — METOPROLOL TARTRATE 2.5 MG: 1 INJECTION, SOLUTION INTRAVENOUS at 06:08

## 2020-11-22 RX ADMIN — HYDRALAZINE HYDROCHLORIDE 5 MG: 20 INJECTION INTRAMUSCULAR; INTRAVENOUS at 08:57

## 2020-11-22 RX ADMIN — HYDRALAZINE HYDROCHLORIDE 10 MG: 20 INJECTION INTRAMUSCULAR; INTRAVENOUS at 12:41

## 2020-11-22 RX ADMIN — HYDRALAZINE HYDROCHLORIDE 10 MG: 20 INJECTION INTRAMUSCULAR; INTRAVENOUS at 18:32

## 2020-11-22 RX ADMIN — DEXTROSE MONOHYDRATE AND SODIUM CHLORIDE 75 ML/HR: 5; .9 INJECTION, SOLUTION INTRAVENOUS at 12:42

## 2020-11-22 RX ADMIN — HYDRALAZINE HYDROCHLORIDE 5 MG: 20 INJECTION INTRAMUSCULAR; INTRAVENOUS at 00:19

## 2020-11-22 RX ADMIN — LEVETIRACETAM 500 MG: 5 INJECTION INTRAVENOUS at 00:05

## 2020-11-22 RX ADMIN — ENOXAPARIN SODIUM 40 MG: 40 INJECTION SUBCUTANEOUS at 08:53

## 2020-11-22 RX ADMIN — HYDRALAZINE HYDROCHLORIDE 5 MG: 20 INJECTION INTRAMUSCULAR; INTRAVENOUS at 06:08

## 2020-11-22 NOTE — PROGRESS NOTES
End of Shift Note    Bedside shift change report given to Flaquita King (oncoming nurse) by Racquel Godwin (offgoing nurse). Report included the following information SBAR    Shift worked: days   Shift summary and any significant changes:     retraints renewed    Concerns for physician to address:  None   Zone phone for oncoming shift:   1817     Patient Information  Tiffany Thomas  80 y.o.  11/17/2020  4:20 PM by Malini Zhou MD. Tiffany Thomas was admitted from Assisted Living    Problem List  Patient Active Problem List    Diagnosis Date Noted    Altered mental status 11/18/2020    Post-ictal state ([address] 11/18/2020    Seizure ([address] 11/17/2020    Atrial pacemaker lead displacement 10/18/2019    Pacemaker 03/22/2019    Bradycardia 03/19/2019    Elevated troponin 03/19/2019    Hypertensive urgency 03/19/2019    Second degree AV block, Mobitz type I 03/19/2019    Stage 3 chronic kidney disease 03/15/2019    Rotator cuff arthropathy of both shoulders 03/15/2019    Cognitive impairment 48/03/1664    Systolic murmur 47/21/8006    Essential hypertension 12/17/2018    Gout 12/17/2018    Pure hypercholesterolemia 12/17/2018    History of prostate cancer 12/17/2018    Chronic constipation 12/17/2018    Dysuria 08/08/2017    Weight loss 08/08/2017    Adrenal mass ([address] 07/18/2017     Past Medical History:   Diagnosis Date    Cancer [facility])     prostate    Constipation     Gout     Hyperlipemia     Hypertension     Pacemaker 2019    Stroke ([address]     Thrombocytopenia ([address] 3/19/2019    TIA (transient ischemic attack) 2013       Core Measures:  CVA: No No  CHF:No No  PNA:No No    Activity:  Activity Level: Bed Rest  Number times ambulated in hallways past shift: 0  Number of times OOB to chair past shift: 0    Cardiac:   Cardiac Monitoring: Yes      Cardiac Rhythm: Paced    Access:   Current line(s): no access     Genitourinary:   Urinary status: incontinent  Urinary Catheter?  No     Respiratory: O2 Device: Room air  Chronic home O2 use?: N/A  Incentive spirometer at bedside: N/A       GI:  Last Bowel Movement Date: (unknown)  Current diet:  DIET NPO  Passing flatus: YES  Tolerating current diet: NO       Pain Management:   Patient states pain is manageable on current regimen: YES    Skin:  Torito Score: 13  Interventions: foam dressing    Patient Safety:  Fall Score:  Total Score: 3  Interventions: bed/chair alarm  High Fall Risk: Yes    DVT prophylaxis:  DVT prophylaxis Med- No  DVT prophylaxis SCD or BEV- No     Wounds: (If Applicable)  Wounds- No  Location     Active Consults:  IP CONSULT TO NEUROLOGY    Length of Stay:  Expected LOS: 2d 16h  Actual LOS: 4  Discharge Plan: No; CITLALLI Malone

## 2020-11-22 NOTE — PROGRESS NOTES
End of Shift Note    Bedside shift change report given to [facility] (oncoming nurse) by Elizabeth Ramirez (offgoing nurse). Report included the following information SBAR    Shift worked: [id number]   Shift summary and any significant changes:     retraints   Concerns for physician to address:  None   Zone phone for oncoming shift:   1817     Patient Information  Cele Bailey  80 y.o.  11/17/2020  4:20 PM by Dale Alcala MD. Cele Bailey was admitted from Assisted Living    Problem List  Patient Active Problem List    Diagnosis Date Noted    Altered mental status 11/18/2020    Post-ictal state ([address] 11/18/2020    Seizure ([address] 11/17/2020    Atrial pacemaker lead displacement 10/18/2019    Pacemaker 03/22/2019    Bradycardia 03/19/2019    Elevated troponin 03/19/2019    Hypertensive urgency 03/19/2019    Second degree AV block, Mobitz type I 03/19/2019    Stage 3 chronic kidney disease 03/15/2019    Rotator cuff arthropathy of both shoulders 03/15/2019    Cognitive impairment 05/53/3205    Systolic murmur 31/74/1959    Essential hypertension 12/17/2018    Gout 12/17/2018    Pure hypercholesterolemia 12/17/2018    History of prostate cancer 12/17/2018    Chronic constipation 12/17/2018    Dysuria 08/08/2017    Weight loss 08/08/2017    Adrenal mass ([address] 07/18/2017     Past Medical History:   Diagnosis Date    Cancer [facility])     prostate    Constipation     Gout     Hyperlipemia     Hypertension     Pacemaker 2019    Stroke ([address]     Thrombocytopenia ([address] 3/19/2019    TIA (transient ischemic attack) 2013       Core Measures:  CVA: No No  CHF:No No  PNA:No No    Activity:  Activity Level: Bed Rest  Number times ambulated in hallways past shift: 0  Number of times OOB to chair past shift: 0    Cardiac:   Cardiac Monitoring: Yes      Cardiac Rhythm: Paced    Access:   Current line(s): no access     Genitourinary:   Urinary status: incontinent  Urinary Catheter?  No     Respiratory: O2 Device: Room air  Chronic home O2 use?: N/A  Incentive spirometer at bedside: N/A       GI:  Last Bowel Movement Date: (unknown)  Current diet:  DIET NPO  Passing flatus: YES  Tolerating current diet: NO       Pain Management:   Patient states pain is manageable on current regimen: YES    Skin:  Torito Score: 13  Interventions: foam dressing    Patient Safety:  Fall Score:  Total Score: 3  Interventions: bed/chair alarm  High Fall Risk: Yes    DVT prophylaxis:  DVT prophylaxis Med- No  DVT prophylaxis SCD or BEV- No     Wounds: (If Applicable)  Wounds- No  Location     Active Consults:  IP CONSULT TO NEUROLOGY    Length of Stay:  Expected LOS: 2d 16h  Actual LOS: 4  Discharge Plan: No; CITLALLI Cabello

## 2020-11-22 NOTE — PROGRESS NOTES
Neurology Note    Patient ID:  Cecy Vaughn  [id number]  85 y.o.  3/9/1933      Date of Consultation:  November 22, 2020      Subjective: minimal verbal output       History of Present Illness:   Cecy Vaughn is a 80 y.o. male who was admitted to the hospital on November 17, 2020 with a new onset seizure. He does have a history of a prior stroke. His EEG revealed diffuse cerebral slowing and no seizures. He has been started on Keppra and there has been no reports of additional seizure activity. This a.m., the patient denies any pain. No events overnight. He has still been unable to obtain his MRI. Past Medical History:   Diagnosis Date    Cancer [facility])     prostate    Constipation     Gout     Hyperlipemia     Hypertension     Pacemaker 2019    St[facility]     Thrombocytopenia ([facility] 3/19/2019    TIA (transient ischemic attack) 2013        Past Surgical History:   Procedure Laterality Date    HX PROSTATECTOMY      HX UROLOGICAL      prostate removed    MI INS NEW/RPLCMT PRM PACEMAKR W/TRANS ELTRD ATRIAL N/A 10/18/2019    Insert Ppm Single Atrial performed by Nikko Cleveland MD at [address] [facility] CATH LAB    MI INS NEW/RPLCMT PRM PM W/TRANSV ELTRD ATRIAL&VENT Right 3/22/2019    INSERT PPM DUAL performed by Nikko Cleveland MD at [address] [address] CATH LAB        Family History   Problem Relation Age of Onset    No Known Problems Mother     No Known Problems Father         Social History     Tobacco Use    Smoking status: Never Smoker    Smokeless tobacco: Never Used   Substance Use Topics    Alcohol use: No        No Known Allergies     Prior to Admission medications    Medication Sig Start Date End Date Taking? Authorizing Provider   allopurinoL (ZYLOPRIM) 100 mg tablet Take 1 Tab by mouth daily. 10/1/20  Yes Vince Ambriz MD   amLODIPine (NORVASC) 5 mg tablet Take 1 Tab by mouth daily.  10/1/20  Yes Vince Ambriz MD   hydrALAZINE (APRESOLINE) 25 mg tablet Take 1 Tab by mouth three (3) times daily. 10/1/20  Yes Kim Johnson MD   metoprolol succinate (TOPROL-XL) 25 mg XL tablet Take 1 Tab by mouth daily. 10/1/20  Yes Kim Johnson MD   isoniazid (NYDRAZID) 300 mg tablet Take 1 Tab by mouth daily. 8/20/20  Yes Kim Johnson MD   aspirin delayed-release 81 mg tablet Take 1 Tab by mouth daily. 7/13/20  Yes Alvarez Notice, MD   balsam peru-castor oiL (VENELEX) ointment Apply  to affected area three (3) times daily. 7/9/20  Yes Jessie Emmanuel MD   famotidine (PEPCID) 20 mg tablet Take 1 Tab by mouth every evening. 7/9/20  Yes Jessie Emmanuel MD   atorvastatin (LIPITOR) 40 mg tablet Take 1 Tab by mouth nightly. 7/13/20   Kim Johnson MD   polyethylene glycol [facility]) 17 gram/dose powder Take 17 g by mouth daily as needed for Constipation. 7/13/20   Kim Johnson MD   acetaminophen (TYLENOL) 325 mg tablet Take 650 mg by mouth every six (6) hours as needed for Pain.     Provider, Historical     Current Facility-Administered Medications   Medication Dose Route Frequency    hydrALAZINE (APRESOLINE) 20 mg/mL injection 10 mg  10 mg IntraVENous Q6H    dextrose 5% and 0.9% NaCl infusion  75 mL/hr IntraVENous CONTINUOUS    cyanocobalamin (VITAMIN B12) injection 1,000 mcg  1,000 mcg IntraMUSCular DAILY    LORazepam (ATIVAN) injection 1 mg  1 mg IntraVENous Q6H PRN    influenza vaccine 2020-21 (6 mos+)(PF) (FLUARIX/FLULAVAL/FLUZONE QUAD) injection 0.5 mL  0.5 mL IntraMUSCular PRIOR TO DISCHARGE    [Held by provider] levETIRAcetam (KEPPRA) tablet 500 mg  500 mg Oral BID    levETIRAcetam (KEPPRA) 500 mg in saline (iso-osm) 100 mL IVPB (premix)  500 mg IntraVENous Q12H    metoprolol (LOPRESSOR) injection 2.5 mg  2.5 mg IntraVENous Q6H    hydrALAZINE (APRESOLINE) 20 mg/mL injection 5 mg  5 mg IntraVENous Q6H PRN    aspirin (ASA) suppository 150 mg  150 mg Rectal DAILY    bisacodyL (DULCOLAX) suppository 10 mg  10 mg Rectal DAILY PRN    sodium chloride (NS) flush 5-40 mL 5-40 mL IntraVENous Q8H    sodium chloride (NS) flush 5-40 mL  5-40 mL IntraVENous PRN    acetaminophen (TYLENOL) tablet 650 mg  650 mg Oral Q6H PRN    Or    acetaminophen (TYLENOL) suppository 650 mg  650 mg Rectal Q6H PRN    polyethylene glycol (MIRALAX) packet 17 g  17 g Oral DAILY PRN    famotidine (PEPCID) tablet 20 mg  20 mg Oral BID    nicotine (NICODERM CQ) 14 mg/24 hr patch 1 Patch  1 Patch TransDERmal DAILY PRN    enoxaparin (LOVENOX) injection 40 mg  40 mg SubCUTAneous DAILY    allopurinoL (ZYLOPRIM) tablet 100 mg  100 mg Oral DAILY    amLODIPine (NORVASC) tablet 5 mg  5 mg Oral DAILY    atorvastatin (LIPITOR) tablet 40 mg  40 mg Oral QHS    [Held by provider] hydrALAZINE (APRESOLINE) tablet 25 mg  25 mg Oral TID    [Held by provider] metoprolol succinate (TOPROL-XL) XL tablet 25 mg  25 mg Oral DAILY         Review of Systems:    [x]Unable to obtain  ROS due to  [x]mental status change  []sedated   []intubated    Objective:     Visit Vitals  BP (!) 169/104 (BP 1 Location: Right arm, BP Patient Position: At rest)   Pulse 69   Temp 98.2 °F (36.8 °C)   Resp 20   SpO2 97%       Physical Exam:      General:  appears well nourished in no acute distress  Neck: no carotid bruits  Lungs: clear to auscultation  Heart:  Murmurs noted  Lower extremity: peripheral pulses palpable and no edema  Skin: intact    Neurological exam:    The patient was sleeping but was arousable. He did tell me his name. He needs to be resistant and will only follow simple commands. He did stick out his tongue. He did not know his location or the year. Cranial nerves:   II-XII were tested    Perrrla  He does blink to visual threat  Eomi, no evidence of nystagmus  Facial sensation:  normal and symmetric  Facial motor: normal and symmetric  Hearing intact  Tongue: midline without fasciculations    Motor:    Tone normal.  He was in restraints in his upper extremity    No evidence of fasciculations    It was difficult to perform formal motor testing but there was no clear obvious asymmetry that was appreciated. He does grimace to painful stimulation and does withdrawal      Sensory:  Upper extremity: intact to pp,  Lower extremity: intact to pp    Reflexes:    Right Left  Biceps  2 2  Triceps 2 2  Brachiorad. 2 2  Patella  2 2  Achilles - -    Plantar response: Extensor bilaterally      Cerebellar testing:  no tremor apparent    Gait: This was not assessed this a.m. due to patient's mental status    Labs:     Lab Results   Component Value Date/Time    Hemoglobin A1c 5.7 (H) 07/01/2020 04:04 AM    Sodium 143 11/22/2020 03:45 AM    Potassium 4.2 11/22/2020 03:45 AM    Chloride 112 (H) 11/22/2020 03:45 AM    Glucose 116 (H) 11/22/2020 03:45 AM    BUN 18 11/22/2020 03:45 AM    Creatinine 1.26 11/22/2020 03:45 AM    Calcium 9.8 11/22/2020 03:45 AM    WBC 9.1 11/22/2020 03:45 AM    HCT 44.5 11/22/2020 03:45 AM    HGB 14.5 11/22/2020 03:45 AM    PLATELET 663 69/69/6248 03:45 AM       Imaging:    Results from Hospital Encounter encounter on 06/30/20   MRI BRAIN WO CONT    Narrative CLINICAL HISTORY: Possible CVA. INDICATION: Possible CVA. Parieto-occipital infarction is suspected. COMPARISON: CT and CTA 6/30/2020    TECHNIQUE: MR examination of the brain includes axial and sagittal T1, coronal  T2, axial T2, axial FLAIR, axial gradient echo, axial DWI. CONTRAST: None    FINDINGS:   Small focus of acute infarction in the posterior superior right frontal lobe  precentral.   Subacute infarction left and right occipital lobes. Moderate to large left  occipital infarction with posterior thalamic and pulmonary subacute infarction  on the left. Small to moderate subacute infarction on the right. There is mild  superimposed hemorrhage, no associated midline shift or mass effect. Chronic  foci of hemosiderin deposition are most likely related to severe chronic  microvascular change.  The brain architecture is normal. There is no evidence of  midline shift or mass-effect. There are no extra-axial fluid collections. There  is no Chiari or syrinx. The pituitary and infundibulum are grossly unremarkable. There is no skull base mass. Cerebellopontine angles are grossly unremarkable. The major intracranial vascular flow-voids are unremarkable. The cavernous  sinuses are symmetric. Optic chiasm and infundibulum grossly unremarkable. Orbits are grossly symmetric. Dural venous sinuses are grossly patent. The mastoid air cells are well pneumatized and clear. Impression IMPRESSION:  Moderate to large subacute infarction in the left occipital lobe and left basal  ganglia. Small to moderate subacute infarction in the right occipital lobe. Mild superimposed hemorrhage but no evidence of midline shift or mass effect. Punctate focus of acute infarction in the posterior superior right frontal lobe. Moderate to severe chronic microvascular ischemic change and cerebral atrophy. Results from [place] encounter on 11/17/20   CT HEAD WO CONT    Narrative EXAM: CT HEAD WO CONT    INDICATION: New onset seizure    COMPARISON: 2020. CONTRAST: None. TECHNIQUE: Unenhanced CT of the head was performed using 5 mm images. Brain and  bone windows were generated. Coronal and sagittal reformats. CT dose reduction  was achieved through use of a standardized protocol tailored for this  examination and automatic exposure control for dose modulation. FINDINGS:  There is prominence of the ventricles and sulci diffusely. There are changes  small vessel disease periventricular white matter. There are old lacunar  infarcts left basal ganglia. There are bilateral occipital lobe infarctions are  noted left greater than right. No acute hemorrhage or mass or infarct identified    The bone windows demonstrate no abnormalities. The visualized portions of the  paranasal sinuses and mastoid air cells are clear.       Impression IMPRESSION:   No acute intracranial abnormality identified. There bilateral occipital  infarctions which are chronic left greater than right and an old left basal  ganglia infarct. I did independently review his head CT from admission on November 17, 2020. There was a significant amount of atrophy and periventricular microischemic changes that were noted. Multiple chronic old strokes were apparent. Assessment and Plan:    The patient is a pleasant 59-year-old gentleman with history of prior stroke and reportedly mild dementia who presents with new onset seizure. His neurological examination reveals a suppressed mental status with no clear focal weakness. New onset seizure:    He does have reasons to have developed seizures. He does have prior strokes and a significant amount of atrophy. I would recommend continuing Keppra at 500 mg twice a day  Given the new onset seizure, I would recommend he obtain a brain MRI. Strokes:  He does have a history of multiple strokes on neuro imaging. His risk factors for stroke do include hypertension and dyslipidemia  Hypertension: Continue aggressive control with goal systolic blood pressure under 140  Dyslipidemia: Continue statin therapy  Continue aspirin     Mental status: This very well may be multifactorial.  He does have an underlying history of a dementia. Given his neuro imaging, there is most likely a component of a vascular dementia and very well may be a stepwise progression of the dementia. Sundowning may also need to be considered. Continue with nonpharmacological measures as able to prevent and minimize sundowning. He has mild electrolyte have been corrected          Neurology will continue to follow along closely during the hospitalization. After discharge, the patient will need to have close follow-up in the neurology clinic with Dr. Alpesh Poe.              Active Problems:    Seizure ([address] (11/17/2020)      Altered mental status (11/18/2020) Post[facility]al state ([facility] (11/18/2020)                   Signed By:  Ama Guillen DO FAAN    November 22, 2020

## 2020-11-22 NOTE — PROGRESS NOTES
Hospitalist Progress Note    NAME: Cecy Vaughn   :  3/9/1933   MRN:  [id number]       Assessment / Plan:  New onset seizures, POA  Acute encephalopathy, likely postictal, POA vs worsening dementia   History of multiple strokes, POA  Agitation   Patient had a 5-minute seizure at nursing home, seized again in the ER and was given Ativan.  status post Keppra loading dose  C/w IV keprra 500mg bid     EEG ordered: no seizure   MRI ordered pending , MRI rep not available   Seizure precautions  Pt not following commands , yelling , more awake but agitated  , c/w  meds via IV   Ativan as needed for seizures and agitation  TSH wnl , B12 wnl , folate wnl , RPR neg , CBC, CMP wnl , and blood cultures NTD  Neurology consulted, we appreciate their assistance     History of strokes, hypertension, POA  Continue statin  Continue antihypertensives     SOULEYMANE on CKD resolved   Hypokalemia resolved   Also had in July  Renally dose medications  Creat 1.3-1.4  Change ivf to d5ns w/o kcl      Sinus bradycardia with 2-1 block: POA  Status post pacemaker  Was able to have MRI with [employer] rep present and MRI in July at [facility]     Baseline debility  Patient is able to walk with a walker  Has moderate dementia, but is able to hold conversation with family  Resides in a nursing home       updated daughter josselyn Rajan on   Baseline : verbal but need help with adl   Code status: Full  Prophylaxis: Lovenox  Recommended Disposition: SNF      Subjective:     Chief Complaint / Reason for Physician Visit  FU seizures/ams , awaiting mri  . Discussed with RN events overnight.      Review of Systems:  Symptom Y/N Comments  Symptom Y/N Comments   Fever/Chills    Chest Pain     Poor Appetite    Edema     Cough    Abdominal Pain     Sputum    Joint Pain     SOB/CARREON    Pruritis/Rash     Nausea/vomit    Tolerating PT/OT     Diarrhea    Tolerating Diet     Constipation    Other       Could NOT obtain due to: Confused       Objective:     VITALS:   Last 24hrs VS reviewed since prior progress note. Most recent are:  Patient Vitals for the past 24 hrs:   Temp Pulse Resp BP SpO2   11/22/20 0731 (P) 98.2 °F (36.8 °C) (P) 69 (P) 20 (!) (P) 169/104 (P) 97 %   11/22/20 0622  73 20 (!) 155/88 100 %   11/22/20 0608  73  (!) 155/88    11/22/20 0318 97.7 °F (36.5 °C) 74 20 (!) 171/77 100 %   11/22/20 0017 97.6 °F (36.4 °C) 82 18 (!) 150/91 100 %   11/21/20 1846 98.6 °F (37 °C) 84 18 (!) 154/78 98 %   11/21/20 1538 98.4 °F (36.9 °C) 77 16 (!) 156/83 100 %   11/21/20 1252 98.6 °F (37 °C) 72 18 (!) 160/84 96 %     No intake or output data in the 24 hours ending 11/22/20 0935     I had a face to face encounter with this patient and independently examined this patient on 11/22/2020 as outlined below:  PHYSICAL EXAM:  General: WD, WN. Alertt , non verbal   EENT:  EOMI. Anicteric sclerae. MMM  Resp:  CTA bilaterally, no wheezing or rales. No accessory muscle use  CV:  Regular  rhythm,  No edema  GI:  Soft, Non distended, Non tender. +Bowel sounds  Neurologic:  Alert and oriented X 0, agitated  Psych:   Unable to assess  Skin:  No rashes. No jaundice    Reviewed most current lab test results and cultures  YES  Reviewed most current radiology test results   YES  Review and summation of old records today    NO  Reviewed patient's current orders and MAR    YES  PMH/SH reviewed - no change compared to H&P  ________________________________________________________________________  Care Plan discussed with:    Comments   Patient     Family      RN x    Care Manager     Consultant                       x Lulyiciplinary team rounds were held today with , nursing, pharmacist and clinical coordinator. Patient's plan of care was discussed; medications were reviewed and discharge planning was addressed.      ________________________________________________________________________  Total NON critical care TIME: 25 Minutes    Total CRITICAL CARE TIME Spent:   Minutes non procedure based      Comments   >50% of visit spent in counseling and coordination of care     ________________________________________________________________________  Jazmyne Guadarrama MD     Procedures: see electronic medical records for all procedures/Xrays and details which were not copied into this note but were reviewed prior to creation of Plan. LABS:  I reviewed today's most current labs and imaging studies.   Pertinent labs include:  Recent Labs     11/22/20 0345 11/21/20  0042   WBC 9.1 7.7   HGB 14.5 13.2   HCT 44.5 38.8    147*     Recent Labs     11/22/20 0345 11/21/20 0042 11/20/20  0430    142  141  --    K 4.2 3.3*  3.2*  --    * 109*  108  --    CO2 28 27  28  --    * 90  91  --    BUN 18 17  16  --    CREA 1.26 1.25  1.25  --    CA 9.8 9.3  9.5  --    MG 2.2 1.9 1.7       Signed: Jazmyne Guadarrama MD

## 2020-11-22 NOTE — PROGRESS NOTES
Problem: Falls - Risk of  Goal: *Absence of Falls  Description: Document Edithbenigno Castañeda Fall Risk and appropriate interventions in the flowsheet.   Outcome: Progressing Towards Goal  Note: Fall Risk Interventions:       Mentation Interventions: Adequate sleep, hydration, pain control    Medication Interventions: Bed/chair exit alarm    Elimination Interventions: Call light in reach, Bed/chair exit alarm

## 2020-11-23 ENCOUNTER — APPOINTMENT (OUTPATIENT)
Dept: CT IMAGING | Age: 85
DRG: 057 | End: 2020-11-23
Attending: INTERNAL MEDICINE
Payer: MEDICARE

## 2020-11-23 LAB
ANION GAP SERPL CALC-SCNC: 4 MMOL/L (ref 5–15)
BASOPHILS # BLD: 0 K/UL (ref 0–0.1)
BASOPHILS NFR BLD: 0 % (ref 0–1)
BUN SERPL-MCNC: 18 MG/DL (ref 6–20)
BUN/CREAT SERPL: 15 (ref 12–20)
CALCIUM SERPL-MCNC: 9.3 MG/DL (ref 8.5–10.1)
CHLORIDE SERPL-SCNC: 115 MMOL/L (ref 97–108)
CO2 SERPL-SCNC: 25 MMOL/L (ref 21–32)
CREAT SERPL-MCNC: 1.19 MG/DL (ref 0.7–1.3)
DIFFERENTIAL METHOD BLD: ABNORMAL
EOSINOPHIL # BLD: 0.1 K/UL (ref 0–0.4)
EOSINOPHIL NFR BLD: 1 % (ref 0–7)
ERYTHROCYTE [DISTWIDTH] IN BLOOD BY AUTOMATED COUNT: 14.1 % (ref 11.5–14.5)
GLUCOSE BLD STRIP.AUTO-MCNC: 121 MG/DL (ref 65–100)
GLUCOSE SERPL-MCNC: 131 MG/DL (ref 65–100)
HCT VFR BLD AUTO: 37.8 % (ref 36.6–50.3)
HGB BLD-MCNC: 12.7 G/DL (ref 12.1–17)
IMM GRANULOCYTES # BLD AUTO: 0 K/UL (ref 0–0.04)
IMM GRANULOCYTES NFR BLD AUTO: 1 % (ref 0–0.5)
LYMPHOCYTES # BLD: 1.4 K/UL (ref 0.8–3.5)
LYMPHOCYTES NFR BLD: 22 % (ref 12–49)
MAGNESIUM SERPL-MCNC: 1.6 MG/DL (ref 1.6–2.4)
MCH RBC QN AUTO: 30.8 PG (ref 26–34)
MCHC RBC AUTO-ENTMCNC: 33.6 G/DL (ref 30–36.5)
MCV RBC AUTO: 91.7 FL (ref 80–99)
MONOCYTES # BLD: 0.7 K/UL (ref 0–1)
MONOCYTES NFR BLD: 10 % (ref 5–13)
NEUTS SEG # BLD: 4.2 K/UL (ref 1.8–8)
NEUTS SEG NFR BLD: 66 % (ref 32–75)
NRBC # BLD: 0 K/UL (ref 0–0.01)
NRBC BLD-RTO: 0 PER 100 WBC
PLATELET # BLD AUTO: 157 K/UL (ref 150–400)
PMV BLD AUTO: 9.9 FL (ref 8.9–12.9)
POTASSIUM SERPL-SCNC: 3.4 MMOL/L (ref 3.5–5.1)
RBC # BLD AUTO: 4.12 M/UL (ref 4.1–5.7)
SERVICE CMNT-IMP: ABNORMAL
SODIUM SERPL-SCNC: 144 MMOL/L (ref 136–145)
WBC # BLD AUTO: 6.4 K/UL (ref 4.1–11.1)

## 2020-11-23 PROCEDURE — 80048 BASIC METABOLIC PNL TOTAL CA: CPT

## 2020-11-23 PROCEDURE — 74011250636 HC RX REV CODE- 250/636: Performed by: INTERNAL MEDICINE

## 2020-11-23 PROCEDURE — 74011250636 HC RX REV CODE- 250/636: Performed by: PSYCHIATRY & NEUROLOGY

## 2020-11-23 PROCEDURE — 85025 COMPLETE CBC W/AUTO DIFF WBC: CPT

## 2020-11-23 PROCEDURE — 36415 COLL VENOUS BLD VENIPUNCTURE: CPT

## 2020-11-23 PROCEDURE — 74011000258 HC RX REV CODE- 258: Performed by: INTERNAL MEDICINE

## 2020-11-23 PROCEDURE — 83735 ASSAY OF MAGNESIUM: CPT

## 2020-11-23 PROCEDURE — 70450 CT HEAD/BRAIN W/O DYE: CPT

## 2020-11-23 PROCEDURE — 95816 EEG AWAKE AND DROWSY: CPT | Performed by: PSYCHIATRY & NEUROLOGY

## 2020-11-23 PROCEDURE — 65660000000 HC RM CCU STEPDOWN

## 2020-11-23 PROCEDURE — 74011250637 HC RX REV CODE- 250/637: Performed by: INTERNAL MEDICINE

## 2020-11-23 PROCEDURE — 99233 SBSQ HOSP IP/OBS HIGH 50: CPT | Performed by: PSYCHIATRY & NEUROLOGY

## 2020-11-23 PROCEDURE — 74011000250 HC RX REV CODE- 250: Performed by: INTERNAL MEDICINE

## 2020-11-23 PROCEDURE — 82962 GLUCOSE BLOOD TEST: CPT

## 2020-11-23 RX ORDER — LEVETIRACETAM 10 MG/ML
1000 INJECTION INTRAVASCULAR EVERY 12 HOURS
Status: DISCONTINUED | OUTPATIENT
Start: 2020-11-24 | End: 2020-11-25

## 2020-11-23 RX ORDER — LORAZEPAM 2 MG/ML
2 INJECTION INTRAMUSCULAR ONCE
Status: COMPLETED | OUTPATIENT
Start: 2020-11-23 | End: 2020-11-23

## 2020-11-23 RX ORDER — ENALAPRILAT 1.25 MG/ML
1.25 INJECTION INTRAVENOUS EVERY 6 HOURS
Status: DISCONTINUED | OUTPATIENT
Start: 2020-11-23 | End: 2020-12-01

## 2020-11-23 RX ORDER — LEVETIRACETAM 5 MG/ML
500 INJECTION INTRAVASCULAR ONCE
Status: COMPLETED | OUTPATIENT
Start: 2020-11-23 | End: 2020-11-23

## 2020-11-23 RX ORDER — DEXTROSE, SODIUM CHLORIDE, AND POTASSIUM CHLORIDE 5; .9; .15 G/100ML; G/100ML; G/100ML
50 INJECTION INTRAVENOUS CONTINUOUS
Status: DISCONTINUED | OUTPATIENT
Start: 2020-11-23 | End: 2020-11-25

## 2020-11-23 RX ADMIN — METOPROLOL TARTRATE 2.5 MG: 1 INJECTION, SOLUTION INTRAVENOUS at 01:29

## 2020-11-23 RX ADMIN — Medication 10 ML: at 14:52

## 2020-11-23 RX ADMIN — ENALAPRILAT 1.25 MG: 2.5 INJECTION INTRAVENOUS at 18:23

## 2020-11-23 RX ADMIN — Medication 10 ML: at 05:19

## 2020-11-23 RX ADMIN — ENALAPRILAT 1.25 MG: 2.5 INJECTION INTRAVENOUS at 23:21

## 2020-11-23 RX ADMIN — LEVETIRACETAM 500 MG: 5 INJECTION INTRAVENOUS at 10:21

## 2020-11-23 RX ADMIN — LEVETIRACETAM 1000 MG: 10 INJECTION INTRAVENOUS at 23:28

## 2020-11-23 RX ADMIN — METOPROLOL TARTRATE 2.5 MG: 1 INJECTION, SOLUTION INTRAVENOUS at 23:21

## 2020-11-23 RX ADMIN — HYDRALAZINE HYDROCHLORIDE 10 MG: 20 INJECTION INTRAMUSCULAR; INTRAVENOUS at 23:21

## 2020-11-23 RX ADMIN — LEVETIRACETAM 500 MG: 5 INJECTION INTRAVENOUS at 14:48

## 2020-11-23 RX ADMIN — METOPROLOL TARTRATE 2.5 MG: 1 INJECTION, SOLUTION INTRAVENOUS at 05:19

## 2020-11-23 RX ADMIN — LORAZEPAM 2 MG: 2 INJECTION INTRAMUSCULAR; INTRAVENOUS at 11:00

## 2020-11-23 RX ADMIN — ASPIRIN 150 MG: 300 SUPPOSITORY RECTAL at 10:02

## 2020-11-23 RX ADMIN — METOPROLOL TARTRATE 2.5 MG: 1 INJECTION, SOLUTION INTRAVENOUS at 14:48

## 2020-11-23 RX ADMIN — POTASSIUM CHLORIDE, DEXTROSE MONOHYDRATE AND SODIUM CHLORIDE 75 ML/HR: 150; 5; 900 INJECTION, SOLUTION INTRAVENOUS at 19:15

## 2020-11-23 RX ADMIN — HYDRALAZINE HYDROCHLORIDE 10 MG: 20 INJECTION INTRAMUSCULAR; INTRAVENOUS at 14:48

## 2020-11-23 RX ADMIN — DEXTROSE MONOHYDRATE AND SODIUM CHLORIDE 75 ML/HR: 5; .9 INJECTION, SOLUTION INTRAVENOUS at 05:23

## 2020-11-23 RX ADMIN — LEVETIRACETAM 500 MG: 5 INJECTION INTRAVENOUS at 01:23

## 2020-11-23 RX ADMIN — ENALAPRILAT 1.25 MG: 2.5 INJECTION INTRAVENOUS at 14:48

## 2020-11-23 RX ADMIN — ENOXAPARIN SODIUM 40 MG: 40 INJECTION SUBCUTANEOUS at 10:02

## 2020-11-23 RX ADMIN — METOPROLOL TARTRATE 2.5 MG: 1 INJECTION, SOLUTION INTRAVENOUS at 18:24

## 2020-11-23 RX ADMIN — CYANOCOBALAMIN 1000 MCG: 1000 INJECTION, SOLUTION INTRAMUSCULAR; SUBCUTANEOUS at 10:02

## 2020-11-23 RX ADMIN — Medication 10 ML: at 23:24

## 2020-11-23 RX ADMIN — HYDRALAZINE HYDROCHLORIDE 10 MG: 20 INJECTION INTRAMUSCULAR; INTRAVENOUS at 05:22

## 2020-11-23 RX ADMIN — HYDRALAZINE HYDROCHLORIDE 10 MG: 20 INJECTION INTRAMUSCULAR; INTRAVENOUS at 18:24

## 2020-11-23 RX ADMIN — HYDRALAZINE HYDROCHLORIDE 10 MG: 20 INJECTION INTRAMUSCULAR; INTRAVENOUS at 01:30

## 2020-11-23 NOTE — TELEPHONE ENCOUNTER
Discussed with daughter via telephone that it is unknown if a medication has caused seizure but not likely. Advised follow up with hospitalist for evaluation of cause and if none found will continue investigation at follow up visit.

## 2020-11-23 NOTE — PROGRESS NOTES
End of Shift Note    Bedside shift change report given to [facility] (oncoming nurse) by Sabine Wilson (offgoing nurse). Report included the following information SBAR    Shift worked: night   Shift summary and any significant changes:     retraints renew requested   Concerns for physician to address:  None   Zone phone for oncoming shift:   1817     Patient Information  Jacek Maradiaga  80 y.o.  11/17/2020  4:20 PM by Geoffrey Sen MD. Jacek Maradiaga was admitted from Assisted Living    Problem List  Patient Active Problem List    Diagnosis Date Noted    Altered mental status 11/18/2020    Post-ictal state ([address] 11/18/2020    Seizure ([address] 11/17/2020    Atrial pacemaker lead displacement 10/18/2019    Pacemaker 03/22/2019    Bradycardia 03/19/2019    Elevated troponin 03/19/2019    Hypertensive urgency 03/19/2019    Second degree AV block, Mobitz type I 03/19/2019    Stage 3 chronic kidney disease 03/15/2019    Rotator cuff arthropathy of both shoulders 03/15/2019    Cognitive impairment 67/87/0508    Systolic murmur 66/26/4052    Essential hypertension 12/17/2018    Gout 12/17/2018    Pure hypercholesterolemia 12/17/2018    History of prostate cancer 12/17/2018    Chronic constipation 12/17/2018    Dysuria 08/08/2017    Weight loss 08/08/2017    Adrenal mass ([address] 07/18/2017     Past Medical History:   Diagnosis Date    Cancer [facility])     prostate    Constipation     Gout     Hyperlipemia     Hypertension     Pacemaker 2019    Stroke ([address]     Thrombocytopenia ([address] 3/19/2019    TIA (transient ischemic attack) 2013       Core Measures:  CVA: No No  CHF:No No  PNA:No No    Activity:  Activity Level: Bed Rest  Number times ambulated in hallways past shift: 0  Number of times OOB to chair past shift: 0    Cardiac:   Cardiac Monitoring: Yes      Cardiac Rhythm: Paced    Access:   Current line(s): no access     Genitourinary:   Urinary status: incontinent  Urinary Catheter?  No Respiratory:   O2 Device: Room air  Chronic home O2 use?: N/A  Incentive spirometer at bedside: N/A       GI:  Last Bowel Movement Date: (unknown)  Current diet:  DIET NPO  Passing flatus: YES  Tolerating current diet: NO       Pain Management:   Patient states pain is manageable on current regimen: YES    Skin:  Torito Score: 13  Interventions: foam dressing    Patient Safety:  Fall Score:  Total Score: 3  Interventions: bed/chair alarm  High Fall Risk: Yes    DVT prophylaxis:  DVT prophylaxis Med- No  DVT prophylaxis SCD or BEV- No     Wounds: (If Applicable)  Wounds- No  Location     Active Consults:  IP CONSULT TO NEUROLOGY    Length of Stay:  Expected LOS: 2d 16h  Actual LOS: 5  Discharge Plan: No; CITLALLI Hernandez

## 2020-11-23 NOTE — PROGRESS NOTES
End of Shift Note    Bedside shift change report given to patrizia (oncoming nurse) by Jaxson Thomason (offgoing nurse). Report included the following information SBAR    Shift worked: days   Shift summary and any significant changes:     retraints renewed    Concerns for physician to address:  None   Zone phone for oncoming shift:   1817     Patient Information  Lizzy Taylor  80 y.o.  11/17/2020  4:20 PM by Myra Tavarez MD. Lizzy Taylor was admitted from Assisted Living    Problem List  Patient Active Problem List    Diagnosis Date Noted    Altered mental status 11/18/2020    Post-ictal state ([address] 11/18/2020    Seizure ([address] 11/17/2020    Atrial pacemaker lead displacement 10/18/2019    Pacemaker 03/22/2019    Bradycardia 03/19/2019    Elevated troponin 03/19/2019    Hypertensive urgency 03/19/2019    Second degree AV block, Mobitz type I 03/19/2019    Stage 3 chronic kidney disease 03/15/2019    Rotator cuff arthropathy of both shoulders 03/15/2019    Cognitive impairment 18/08/5709    Systolic murmur 09/07/8851    Essential hypertension 12/17/2018    Gout 12/17/2018    Pure hypercholesterolemia 12/17/2018    History of prostate cancer 12/17/2018    Chronic constipation 12/17/2018    Dysuria 08/08/2017    Weight loss 08/08/2017    Adrenal mass ([address] 07/18/2017     Past Medical History:   Diagnosis Date    Cancer [facility])     prostate    Constipation     Gout     Hyperlipemia     Hypertension     Pacemaker 2019    Stroke ([address]     Thrombocytopenia ([address] 3/19/2019    TIA (transient ischemic attack) 2013       Core Measures:  CVA: No No  CHF:No No  PNA:No No    Activity:  Activity Level: Bed Rest  Number times ambulated in hallways past shift: 0  Number of times OOB to chair past shift: 0    Cardiac:   Cardiac Monitoring: Yes      Cardiac Rhythm: Paced    Access:   Current line(s): no access     Genitourinary:   Urinary status: incontinent  Urinary Catheter?  No Respiratory:   O2 Device: Room air  Chronic home O2 use?: N/A  Incentive spirometer at bedside: N/A       GI:  Last Bowel Movement Date: (unknown )  Current diet:  DIET NPO  Passing flatus: YES  Tolerating current diet: NO       Pain Management:   Patient states pain is manageable on current regimen: YES    Skin:  Torito Score: 13  Interventions: foam dressing    Patient Safety:  Fall Score:  Total Score: 3  Interventions: bed/chair alarm  High Fall Risk: Yes    DVT prophylaxis:  DVT prophylaxis Med- No  DVT prophylaxis SCD or BEV- No     Wounds: (If Applicable)  Wounds- No  Location     Active Consults:  IP CONSULT TO NEUROLOGY  IP CONSULT TO PALLIATIVE CARE - PROVIDER    Length of Stay:  Expected LOS: 2d 16h  Actual LOS: 5  Discharge Plan: No; CITLALLI Castillo

## 2020-11-23 NOTE — PALLIATIVE CARE
Consult received, because of high consult load , we will be able to see tomorrow. Thank you for including Palliative care in the care of this patient .

## 2020-11-23 NOTE — PROGRESS NOTES
Speech path   patient is not alert enough for p.o. at this time. He is seizing today.    Poncho Granger, SLP

## 2020-11-23 NOTE — PROGRESS NOTES
Speech pathology  Patient has not been appropriate for swallow evaluation since admission on 11/18. SLP will complete orders. Please re-consult should patient become appropriate for swallow evaluation.  Thanks, Haja Saenz M.S. CCC-SLP

## 2020-11-23 NOTE — PROGRESS NOTES
ARIELLE Plan:    * TBD - anticipate return to [facility] ([facility])     > Pt will need COVID-19 test completed within 72 hour window of d/c back to [facility]   > Palliative consulted 11/23/2020 to discuss goals of care  > CM to secure medical transport for d/c  > 2nd IM prior to d/c    Initial note: CM reviewed pt's chart and discussed updates in IDR prior to moving forward with d/c planning. CM contacted [facility] ([phone]) to discuss facility's COVID-19 protocol. CM spoke with the facility's owner, Ms. Lisa Henao, who confirmed pt will need a COVID-19 test completed within 72 hour time-frame of d/c. Ms. Lisa Henao requested for pt's COVID-19 test results and d/c summary to be faxed to facility upon d/c (f: [phone]); CM to complete request.    CM will continue to follow & remain accessible for d/c planning.     Loretta Crane MSW  Care Manager, [facility]

## 2020-11-23 NOTE — CONSULTS
Palliative Medicine Consult  [place]: [phone] ([phone])    Patient Name: Jaspreet Griffith  YOB: 1933    Date of Initial Consult: 11/23/20  Reason for Consult: care decisions  Requesting Provider: Lyubov Vergara   Primary Care Physician: Maribel Braun MD     SUMMARY:   Jaspreet Griffith is a 80 y.o. male  with a past history of multiple strokes, recent stroke bilateral acute occipital infarct in July 2020 at [facility] , HTN , CKD 3, heart  Block s/p PPM ,who was admitted on 11/17/2020 from nursing home  with a diagnosis of new onset acute onset seizures and acute encephalopathy and SOULEYMANE on CKD . HPI : Patient was sitting in his nursing home when he had an episode of unresponsiveness with shaking. EMS was called and brought him to [facility] for evaluation of possible seizure. While in the ER at [facility] patient had another episode, and was given Ativan which caused it to stop. He was provided a loading dose of Keppra and neurology was contacted. Neurology evaluated patient , he has prior strokes and significant amount of  atrophy of brain on neuro imaging, impression is there is  component of vascular dementia and may be a step wise progression of dementia. He is unable to have a swallowing evaluation because of altered mental status and seizures. Current medical issues leading to Palliative Medicine involvement include: care decisions       Social : he lives in long term care , able to walk with walker , able to feed himself , at base line able to have a conversation meaningfully and comprehend, he has peripheral visual field defects from his recent stroke. PALLIATIVE DIAGNOSES:   1. Encephalopathy   2. Progression of dementia   3. Seizures   4. DNR discussion       PLAN:   1. Prior to visit , I reviewed chart . 2. Met with patient daughter Marta Loagn . 3. Introduce palliative care and added layer of support .   4. dtr had spoken to neurology and hospitalist, and is well updated on patient current medical issues and treatment. 5. dtr notes she is MPOA , advised to bring a copy of MPOA documents . 6. dtr is very concerned , that the speech therapy are not able to reach him at a time when he is more alert to participate in swallowing evaluation , and he has been without nutrition for many days. 7. We talked about neurology is trying to adjust medication to find a balance between keeping him awake and seizure control. 8. DNR discussion : we talked about protecting him from CPR , if his heart stop or he stops breathing , because it is unlikely he is going to respond due to his advance age and multiple chronic medical issues , worse case scenario , he will be unresponsive on ventilator support . 5. Dtr wants to continue with full restorative care and full code status , and see how he progresses. .  10. dtr will call me tomorrow , when she is at bed side to go over the copy of MPOA documents . 11. Initial consult note routed to primary continuity provider and/or primary health care team members  12. Communicated plan of care with: Palliative Patrizia DUENAS [facility] Team     GOALS OF CARE / TREATMENT PREFERENCES:     GOALS OF CARE:  Patient/Health Care Proxy Stated Goals: Prolong life    TREATMENT PREFERENCES:   Code Status: Full Code    Advance Care Planning:  [x] [facility] Interdisciplinary Team has updated the ACP Navigator with Amilcar and Patient Capacity    Primary Decision Maker (Active): [place] - Daughter - [phone]    Secondary Decision Maker: Jesse Rodriguez, [address] Planning 11/18/2020   Patient's Healthcare Decision Maker is: -   Primary Decision Maker Name -   Confirm Advance Directive None   Patient Would Like to Complete Advance Directive -       Medical Interventions: Full interventions     Other Instructions:          Other:    As far as possible, the palliative care team has discussed with patient / health care proxy about goals of care / treatment preferences for patient. HISTORY:     History obtained from: chart , daughter . CHIEF COMPLAINT: altered mental status . HPI/SUBJECTIVE:    The patient is:     [] Non-participatory due to: due to above   He is not in distress. Clinical Pain Assessment (nonverbal scale for severity on nonverbal patients):   Clinical Pain Assessment  Severity: 0     Activity (Movement): Lying quietly, normal position    Duration: for how long has pt been experiencing pain (e.g., 2 days, 1 month, years)  Frequency: how often pain is an issue (e.g., several times per day, once every few days, constant)     FUNCTIONAL ASSESSMENT:     Palliative Performance Scale (PPS):  PPS: 30       PSYCHOSOCIAL/SPIRITUAL SCREENING:     Palliative IDT has assessed this patient for cultural preferences / practices and a referral made as appropriate to needs (Cultural Services, Patient Advocacy, Ethics, etc.)    Any spiritual / Sikh concerns:  [] Yes /  [x] No    Caregiver Burnout:  [] Yes /  [x] No /  [] No Caregiver Present      Anticipatory grief assessment:   [x] Normal  / [] Maladaptive       ESAS Anxiety:      ESAS Depression:       un   REVIEW OF SYSTEMS:     Positive and pertinent negative findings in ROS are noted above in HPI. The following systems were [] reviewed / [x] unable to be reviewed as noted in HPI  Other findings are noted below. Systems: constitutional, ears/nose/mouth/throat, respiratory, gastrointestinal, genitourinary, musculoskeletal, integumentary, neurologic, psychiatric, endocrine. Positive findings noted below. Modified ESAS Completed by: provider           Pain: 0     Nausea: 0     Dyspnea: 0                    PHYSICAL EXAM:     From RN flowsheet:  Wt Readings from Last 3 Encounters:   11/19/20 170 lb 6.7 oz (77.3 kg)   07/09/20 170 lb 6.7 oz (77.3 kg)   07/02/20 169 lb 8.5 oz (76.9 kg)     Blood pressure 114/85, pulse 88, temperature 98.4 °F (36.9 °C), resp.  rate 20, height 5' 6\" (1.676 m), SpO2 96 %. Pain Scale 1: Adult Nonverbal Pain Scale  Pain Intensity 1: 0              Pain Intervention(s) 1: Medication (see MAR)  Last bowel movement, if known:     Constitutional: confused, alert. Eyes: pupils equal, anicteric  ENMT: no nasal discharge, moist mucous membranes. Cardiovascular: regular rhythm, distal pulses intact  Respiratory: breathing not labored, symmetric  Gastrointestinal: soft non-tender, +bowel sounds  Musculoskeletal: no deformity, no tenderness to palpation  Skin: warm, dry  Neurologic: not following commands . Psychiatric: unable to evaluate because of patient factors. Other:       HISTORY:     Active Problems:    Seizure ([address] (11/17/2020)      Altered mental status (11/18/2020)      Post-ictal state ([address] (11/18/2020)      Past Medical History:   Diagnosis Date    [facility]     prostate    Constipation     Gout     Hyperlipemia     Hypertension     Pacemaker 2019    St[facility]     Thrombocytopenia ([facility] 3/19/2019    TIA (transient ischemic attack) 2013      Past Surgical History:   Procedure Laterality Date    HX PROSTATECTOMY      HX UROLOGICAL      prostate removed    [place] INS NEW/RPLCMT PRM PACEMAKR W/TRANS ELTRD ATRIAL N/A 10/18/2019    Insert Ppm Single Atrial performed by Justin Horta MD at [address], [facility]  CATH LAB    [place] INS NEW/RPLCMT PRM PM W/TRANSV ELTRD ATRIAL&VENT Right 3/22/2019    INSERT PPM DUAL performed by Justin Horta MD at [address]  CATH LAB      Family History   Problem Relation Age of Onset    No Known Problems Mother     No Known Problems Father       History reviewed, no pertinent family history.   Social History     Tobacco Use    Smoking status: Never Smoker    Smokeless tobacco: Never Used   Substance Use Topics    Alcohol use: No     No Known Allergies   Current Facility-Administered Medications   Medication Dose Route Frequency    nitroglycerin (NITROBID) 2 % ointment 1 Inch  1 Inch Topical Q6H PRN    enalaprilat (VASOTEC) injection 1.25 mg  1.25 mg IntraVENous Q6H    dextrose 5% - 0.9% NaCl with KCl 20 mEq/L infusion  75 mL/hr IntraVENous CONTINUOUS    levETIRAcetam in saline (iso-os) (KEPPRA) infusion 1,000 mg  1,000 mg IntraVENous Q12H    hydrALAZINE (APRESOLINE) 20 mg/mL injection 10 mg  10 mg IntraVENous Q6H    cyanocobalamin (VITAMIN B12) injection 1,000 mcg  1,000 mcg IntraMUSCular DAILY    LORazepam (ATIVAN) injection 1 mg  1 mg IntraVENous Q6H PRN    influenza vaccine 2020-21 (6 mos+)(PF) (FLUARIX/FLULAVAL/FLUZONE QUAD) injection 0.5 mL  0.5 mL IntraMUSCular PRIOR TO DISCHARGE    [Held by provider] levETIRAcetam (KEPPRA) tablet 500 mg  500 mg Oral BID    metoprolol (LOPRESSOR) injection 2.5 mg  2.5 mg IntraVENous Q6H    hydrALAZINE (APRESOLINE) 20 mg/mL injection 5 mg  5 mg IntraVENous Q6H PRN    aspirin (ASA) suppository 150 mg  150 mg Rectal DAILY    bisacodyL (DULCOLAX) suppository 10 mg  10 mg Rectal DAILY PRN    sodium chloride (NS) flush 5-40 mL  5-40 mL IntraVENous Q8H    sodium chloride (NS) flush 5-40 mL  5-40 mL IntraVENous PRN    acetaminophen (TYLENOL) tablet 650 mg  650 mg Oral Q6H PRN    Or    acetaminophen (TYLENOL) suppository 650 mg  650 mg Rectal Q6H PRN    polyethylene glycol (MIRALAX) packet 17 g  17 g Oral DAILY PRN    famotidine (PEPCID) tablet 20 mg  20 mg Oral BID    nicotine (NICODERM CQ) 14 mg/24 hr patch 1 Patch  1 Patch TransDERmal DAILY PRN    enoxaparin (LOVENOX) injection 40 mg  40 mg SubCUTAneous DAILY    allopurinoL (ZYLOPRIM) tablet 100 mg  100 mg Oral DAILY    amLODIPine (NORVASC) tablet 5 mg  5 mg Oral DAILY    atorvastatin (LIPITOR) tablet 40 mg  40 mg Oral QHS    [Held by provider] hydrALAZINE (APRESOLINE) tablet 25 mg  25 mg Oral TID    [Held by provider] metoprolol succinate (TOPROL-XL) XL tablet 25 mg  25 mg Oral DAILY          LAB AND IMAGING FINDINGS:     Lab Results   Component Value Date/Time    WBC 6.4 11/23/2020 02:32 AM    HGB 12.7 11/23/2020 02:32 AM    PLATELET 547 16/41/6100 02:32 AM     Lab Results   Component Value Date/Time    Sodium 144 11/23/2020 02:32 AM    Potassium 3.4 (L) 11/23/2020 02:32 AM    Chloride 115 (H) 11/23/2020 02:32 AM    CO2 25 11/23/2020 02:32 AM    BUN 18 11/23/2020 02:32 AM    Creatinine 1.19 11/23/2020 02:32 AM    Calcium 9.3 11/23/2020 02:32 AM    Magnesium 2.1 11/24/2020 04:32 AM      Lab Results   Component Value Date/Time    Alk. phosphatase 110 11/19/2020 04:55 AM    Protein, total 6.7 11/19/2020 04:55 AM    Albumin 2.9 (L) 11/19/2020 04:55 AM    Globulin 3.8 11/19/2020 04:55 AM     Lab Results   Component Value Date/Time    INR 1.0 11/17/2020 04:23 PM    Prothrombin time 10.9 11/17/2020 04:23 PM    aPTT 30.1 06/30/2020 12:18 PM      Lab Results   Component Value Date/Time    Iron 24 (L) 08/14/2019 05:05 PM    TIBC 189 (L) 08/14/2019 05:05 PM    Iron % saturation 13 (L) 08/14/2019 05:05 PM    Ferritin 212 08/14/2019 05:05 PM      No results found for: PH, PCO2, PO2  No components found for: [place]   Lab Results   Component Value Date/Time    CK 75 11/17/2020 04:24 PM                Total time:   Counseling / coordination time, spent as noted above:   > 50% counseling / coordination?:     Prolonged service was provided for  []30 min   []75 min in face to face time in the presence of the patient, spent as noted above. Time Start:   Time End:   Note: this can only be billed with 54921 (initial) or 38443 (follow up). If multiple start / stop times, list each separately.

## 2020-11-23 NOTE — PROGRESS NOTES
Neurology Note    Patient ID:  Lizzy Taylor  [id number]  45 y.o.  3/9/1933      Date of Consultation:  November 23, 2020      Subjective: Intermittent shaking minimal verbal output       History of Present Illness:   Lizzy Taylor is a 80 y.o. male who was admitted to the hospital on November 17, 2020 with a new onset seizure. He does have a history of a prior stroke. His EEG revealed diffuse cerebral slowing and no seizures. He has been started on Keppra and there has been no reports of additional seizure activity. No events overnight. He was unable to obtain the MRI and received a follow-up head CT    This a.m., he was noted to have a bout of shaking and was given a dose of Ativan. This afternoon he had additional shake this afternoon. I did evaluate him during this and he was able to be redirected and answered some questions. Past Medical History:   Diagnosis Date    Cancer [facility])     prostate    Constipation     Gout     Hyperlipemia     Hypertension     Pacemaker 2019    Stroke ([facility] [address]     Thrombocytopenia ([facility] [address] 3/19/2019    TIA (transient ischemic attack) 2013        Past Surgical History:   Procedure Laterality Date    HX PROSTATECTOMY      HX UROLOGICAL      prostate removed    NM INS NEW/RPLCMT PRM PACEMAKR W/TRANS ELTRD ATRIAL N/A 10/18/2019    Insert Ppm Single Atrial performed by Krista Everett MD at [address], [facility]  CATH LAB    NM INS NEW/RPLCMT PRM PM W/TRANSV ELTRD ATRIAL&VENT Right 3/22/2019    INSERT PPM DUAL performed by Krista Everett MD at [address]  CATH LAB        Family History   Problem Relation Age of Onset    No Known Problems Mother     No Known Problems Father         Social History     Tobacco Use    Smoking status: Never Smoker    Smokeless tobacco: Never Used   Substance Use Topics    Alcohol use: No        No Known Allergies     Prior to Admission medications    Medication Sig Start Date End Date Taking?  Authorizing Provider   allopurinoL (ZYLOPRIM) 100 mg tablet Take 1 Tab by mouth daily. 10/1/20  Yes Jennifer Morgan MD   amLODIPine (NORVASC) 5 mg tablet Take 1 Tab by mouth daily. 10/1/20  Yes Jennifer Morgan MD   hydrALAZINE (APRESOLINE) 25 mg tablet Take 1 Tab by mouth three (3) times daily. 10/1/20  Yes Jennifer Morgan MD   metoprolol succinate (TOPROL-XL) 25 mg XL tablet Take 1 Tab by mouth daily. 10/1/20  Yes Jennifer Morgan MD   isoniazid (NYDRAZID) 300 mg tablet Take 1 Tab by mouth daily. 8/20/20  Yes Jennifer Morgan MD   aspirin delayed-release 81 mg tablet Take 1 Tab by mouth daily. 7/13/20  Yes Jennifer Morgan MD   balsam peru-castor oiL (VENELEX) ointment Apply  to affected area three (3) times daily. 7/9/20  Yes Kyle Solis MD   famotidine (PEPCID) 20 mg tablet Take 1 Tab by mouth every evening. 7/9/20  Yes Kyle Solis MD   atorvastatin (LIPITOR) 40 mg tablet Take 1 Tab by mouth nightly. 7/13/20   Jennifer Morgan MD   polyethylene glycol [facility]) 17 gram/dose powder Take 17 g by mouth daily as needed for Constipation. 7/13/20   Jennifer Morgan MD   acetaminophen (TYLENOL) 325 mg tablet Take 650 mg by mouth every six (6) hours as needed for Pain.     Provider, Historical     Current Facility-Administered Medications   Medication Dose Route Frequency    enalaprilat (VASOTEC) injection 1.25 mg  1.25 mg IntraVENous Q6H    dextrose 5% - 0.9% NaCl with KCl 20 mEq/L infusion  75 mL/hr IntraVENous CONTINUOUS    [START ON 11/24/2020] levETIRAcetam in saline (iso-os) (KEPPRA) infusion 1,000 mg  1,000 mg IntraVENous Q12H    hydrALAZINE (APRESOLINE) 20 mg/mL injection 10 mg  10 mg IntraVENous Q6H    cyanocobalamin (VITAMIN B12) injection 1,000 mcg  1,000 mcg IntraMUSCular DAILY    LORazepam (ATIVAN) injection 1 mg  1 mg IntraVENous Q6H PRN    influenza vaccine 2020-21 (6 mos+)(PF) (FLUARIX/FLULAVAL/FLUZONE QUAD) injection 0.5 mL  0.5 mL IntraMUSCular PRIOR TO DISCHARGE    [Held by provider] levETIRAcetam (KEPPRA) tablet 500 mg 500 mg Oral BID    metoprolol (LOPRESSOR) injection 2.5 mg  2.5 mg IntraVENous Q6H    hydrALAZINE (APRESOLINE) 20 mg/mL injection 5 mg  5 mg IntraVENous Q6H PRN    aspirin (ASA) suppository 150 mg  150 mg Rectal DAILY    bisacodyL (DULCOLAX) suppository 10 mg  10 mg Rectal DAILY PRN    sodium chloride (NS) flush 5-40 mL  5-40 mL IntraVENous Q8H    sodium chloride (NS) flush 5-40 mL  5-40 mL IntraVENous PRN    acetaminophen (TYLENOL) tablet 650 mg  650 mg Oral Q6H PRN    Or    acetaminophen (TYLENOL) suppository 650 mg  650 mg Rectal Q6H PRN    polyethylene glycol (MIRALAX) packet 17 g  17 g Oral DAILY PRN    famotidine (PEPCID) tablet 20 mg  20 mg Oral BID    nicotine (NICODERM CQ) 14 mg/24 hr patch 1 Patch  1 Patch TransDERmal DAILY PRN    enoxaparin (LOVENOX) injection 40 mg  40 mg SubCUTAneous DAILY    allopurinoL (ZYLOPRIM) tablet 100 mg  100 mg Oral DAILY    amLODIPine (NORVASC) tablet 5 mg  5 mg Oral DAILY    atorvastatin (LIPITOR) tablet 40 mg  40 mg Oral QHS    [Held by provider] hydrALAZINE (APRESOLINE) tablet 25 mg  25 mg Oral TID    [Held by provider] metoprolol succinate (TOPROL-XL) XL tablet 25 mg  25 mg Oral DAILY         Review of Systems:    [x]Unable to obtain  ROS due to  [x]mental status change  []sedated   []intubated    Objective:     Visit Vitals  BP (!) 160/69   Pulse 96   Temp 99.5 °F (37.5 °C)   Resp 24   SpO2 96%       Physical Exam:      General:  appears well nourished in no acute distress  Neck: no carotid bruits  Lungs: clear to auscultation  Heart:  Murmurs noted  Lower extremity: peripheral pulses palpable and no edema  Skin: intact    Neurological exam:    The patient was sleeping but was arousable. He did tell me his name. He needs to be resistant and will only follow simple commands. He did stick out his tongue. He did not know his location or the year. He did have intermittent body myoclonic twitching.   Per nursing, this was different than what he was doing earlier where there was some shaking which appeared to be more rhythmic    Cranial nerves:   II-XII were tested    Perrrla  He does blink to visual threat  Eomi, no evidence of nystagmus  Facial sensation:  normal and symmetric  Facial motor: normal and symmetric  Hearing intact  Tongue: midline without fasciculations    Motor: Tone normal.  He was in restraints in his upper extremity    No evidence of fasciculations    It was difficult to perform formal motor testing but there was no clear obvious asymmetry that was appreciated. He does grimace to painful stimulation and does withdrawal      Sensory:  Upper extremity: intact to pp,  Lower extremity: intact to pp    Reflexes:    Right Left  Biceps  2 2  Triceps 2 2  Brachiorad. 2 2  Patella  2 2  Achilles - -    Plantar response: Extensor bilaterally      Cerebellar testing:  no tremor apparent    Gait: This was not assessed this a.m. due to patient's mental status    Labs:     Lab Results   Component Value Date/Time    Hemoglobin A1c 5.7 (H) 07/01/2020 04:04 AM    Sodium 144 11/23/2020 02:32 AM    Potassium 3.4 (L) 11/23/2020 02:32 AM    Chloride 115 (H) 11/23/2020 02:32 AM    Glucose 131 (H) 11/23/2020 02:32 AM    BUN 18 11/23/2020 02:32 AM    Creatinine 1.19 11/23/2020 02:32 AM    Calcium 9.3 11/23/2020 02:32 AM    WBC 6.4 11/23/2020 02:32 AM    HCT 37.8 11/23/2020 02:32 AM    HGB 12.7 11/23/2020 02:32 AM    PLATELET 477 61/80/0026 02:32 AM       Imaging:    Results from Hospital Encounter encounter on 06/30/20   MRI BRAIN WO CONT    Narrative CLINICAL HISTORY: Possible CVA. INDICATION: Possible CVA. Parieto-occipital infarction is suspected. COMPARISON: CT and CTA 6/30/2020    TECHNIQUE: MR examination of the brain includes axial and sagittal T1, coronal  T2, axial T2, axial FLAIR, axial gradient echo, axial DWI.   CONTRAST: None    FINDINGS:   Small focus of acute infarction in the posterior superior right frontal lobe  precentral.   Subacute infarction left and right occipital lobes. Moderate to large left  occipital infarction with posterior thalamic and pulmonary subacute infarction  on the left. Small to moderate subacute infarction on the right. There is mild  superimposed hemorrhage, no associated midline shift or mass effect. Chronic  foci of hemosiderin deposition are most likely related to severe chronic  microvascular change. The brain architecture is normal. There is no evidence of  midline shift or mass-effect. There are no extra-axial fluid collections. There  is no Chiari or syrinx. The pituitary and infundibulum are grossly unremarkable. There is no skull base mass. Cerebellopontine angles are grossly unremarkable. The major intracranial vascular flow-voids are unremarkable. The cavernous  sinuses are symmetric. Optic chiasm and infundibulum grossly unremarkable. Orbits are grossly symmetric. Dural venous sinuses are grossly patent. The mastoid air cells are well pneumatized and clear. Impression IMPRESSION:  Moderate to large subacute infarction in the left occipital lobe and left basal  ganglia. Small to moderate subacute infarction in the right occipital lobe. Mild superimposed hemorrhage but no evidence of midline shift or mass effect. Punctate focus of acute infarction in the posterior superior right frontal lobe. Moderate to severe chronic microvascular ischemic change and cerebral atrophy. Results from [place] encounter on 11/17/20   CT HEAD WO CONT    Narrative EXAM: CT HEAD WO CONT    INDICATION: stroke    COMPARISON: November 17, 2020. CONTRAST: None. TECHNIQUE: Unenhanced CT of the head was performed using 5 mm images. Brain and  bone windows were generated. Coronal and sagittal reformats. CT dose reduction  was achieved through use of a standardized protocol tailored for this  examination and automatic exposure control for dose modulation.       FINDINGS:  The ventricles and sulci are stable in size, shape and configuration. . There is  unchanged diffuse periventricular white matter disease. Old lacunar infarct in  the left basal ganglia is unchanged. Old bilateral occipital infarcts are  unchanged. There is no intracranial hemorrhage, extra-axial collection, or mass  effect. The basilar cisterns are open. No CT evidence of acute infarct. The bone windows demonstrate no abnormalities. The visualized portions of the  paranasal sinuses and mastoid air cells are clear. Impression IMPRESSION:   No acute intracranial process. No significant change from the prior study. I did independently review his head CT from admission on November 23, 2020. There was a significant amount of atrophy and periventricular microischemic changes that were noted. Multiple chronic old strokes were apparent. Assessment and Plan:    The patient is a pleasant 61-year-old gentleman with history of prior stroke and reportedly mild dementia who presents with new onset seizure. His neurological examination reveals a suppressed mental status with no clear focal weakness. New onset seizure:    He does have reasons to have developed seizures. He does have prior strokes and a significant amount of atrophy. The events from earlier today, I will increase his Keppra to 1000 mg twice a day. I will obtain a follow-up EEG. Strokes:  He does have a history of multiple strokes on neuro imaging. His risk factors for stroke do include hypertension and dyslipidemia  Hypertension: Continue aggressive control with goal systolic blood pressure under 140  Dyslipidemia: Continue statin therapy  Continue aspirin   It is unclear given the amount of atrophy and vascular disease on imaging whether a new stroke is present. Mental status: This very well may be multifactorial.  He does have an underlying history of a dementia.   Given his neuro imaging, there is most likely a component of a vascular dementia and very well may be a stepwise progression of the dementia. He has mild electrolyte have been corrected          Neurology will continue to follow along closely during the hospitalization. After discharge, the patient will need to have close follow-up in the neurology clinic with Dr. Joe Handy. Active Problems:    Seizure ([facility] [address] (11/17/2020)      Altered mental status (11/18/2020)      Post-ictal state ([facility] [address] (11/18/2020)        I spent  40   minutes providing care to this  acutely ill inpatient with > 50% of the time counseling and assisting in the coordination of care of the patient on the patient's hospital floor/unit.                   Signed By:  Juan Mace DO FAAN    November 23, 2020

## 2020-11-23 NOTE — PROGRESS NOTES
Spiritual Care Assessment/Progress Note  [facility]      NAME: Yari Christianson      MRN: [id number]  AGE: 80 y.o. SEX: male  Episcopal Affiliation: Yazidi   Language: English     11/23/2020     Total Time (in minutes): 9     Spiritual Assessment begun in [facility] through conversation with:         []Patient        [] Family    [] Friend(s)        Reason for Consult: Palliative Care, Initial/Spiritual Assessment     Spiritual beliefs: (Please include comment if needed)     [] Identifies with a jing tradition:         [] Supported by a jing community:            [] Claims no spiritual orientation:           [] Seeking spiritual identity:                [] Adheres to an individual form of spirituality:           [x] Not able to assess:                           Identified resources for coping:      [] Prayer                               [] Music                  [] Guided Imagery     [] Family/friends                 [] Pet visits     [] Devotional reading                         [x] Unknown     [] Other:                                            Interventions offered during this visit: (See comments for more details)    Patient Interventions: Initial visit           Plan of Care:     [x] Support spiritual and/or cultural needs    [] Support AMD and/or advance care planning process      [] Support grieving process   [] Coordinate Rites and/or Rituals    [] Coordination with community clergy   [] No spiritual needs identified at this time   [] Detailed Plan of Care below (See Comments)  [] Make referral to Music Therapy  [] Make referral to Pet Therapy     [] Make referral to Addiction services  [] Make referral to [facility]  [] Make referral to Spiritual Care Partner  [] No future visits requested        [] Follow up visits as needed     Comments:   Attempted Initial Spiritual Assessment in Neuro unit. Unable to assess patients needs.   No family present at this time.   Kevyn Espino will follow as able and/or as needed.          TANO Duncan, [facility], Staff [address]    [facility] Service  [phone]

## 2020-11-23 NOTE — PROGRESS NOTES
Hospitalist Progress Note    NAME: Bettie Guillermo   :  3/9/1933   MRN:  [id number]       Assessment / Plan:  New onset seizures, POA, recurrent episodes   Acute encephalopathy, likely postictal, POA vs worsening dementia   History of multiple strokes, POA  Agitation   Patient had a 5-minute seizure at nursing home, seized again in the ER and was given Ativan.  status post Keppra loading dose  C/w IV keprra 500mg bid , increased to 100mg bid for recurrent seizures in the hospital     EEG  On admission showed no seizure , repeat EEG   MRI ordered unable to be done because of pt being confsued+ pacemaker , unable to verbalize if issues with pacemaker arise while doing MRI   Repeat CT brain unchanged   Seizure precautions  Pt not following commands , c/w  meds via IV   Ativan as needed for seizures and agitation  TSH wnl , B12 wnl , folate wnl , RPR neg , CBC, CMP wnl , and blood cultures NTD  Neurology consulted, we appreciate their assistance     History of strokes, hypertension, POA  Continue statin  Continue antihypertensives     SOULEYMANE on CKD  Hypokalemia   Also had in July  Renally dose medications  Creat 1.3-1.4  On IVF witth D5NS + Kcl      Sinus bradycardia with 2-1 block: POA  Status post pacemaker  Was able to have MRI with [employer] rep present and MRI in July at [address] [place] [address] to get MRI brain here      Baseline debility  Patient is able to walk with a walker  Has moderate dementia, but is able to hold conversation with family  Resides in a nursing home       updated daughter gray Vinetta Dakins on ,   Baseline : verbal but need help with adl   Code status: Full  Prophylaxis: Lovenox  Recommended Disposition: SNF      Subjective:     Chief Complaint / Reason for Physician Visit  FU seizures/ams ,.  Discussed with RN events overnight.      Review of Systems:  Symptom Y/N Comments  Symptom Y/N Comments   Fever/Chills    Chest Pain     Poor Appetite    Edema Cough    Abdominal Pain     Sputum    Joint Pain     SOB/CARREON    Pruritis/Rash     Nausea/vomit    Tolerating PT/OT     Diarrhea    Tolerating Diet     Constipation    Other       Could NOT obtain due to: Confused       Objective:     VITALS:   Last 24hrs VS reviewed since prior progress note. Most recent are:  Patient Vitals for the past 24 hrs:   Temp Pulse Resp BP SpO2   11/23/20 0754 98.8 °F (37.1 °C) 91 20 (!) 173/90 98 %   11/23/20 0519  86  (!) 177/83    11/23/20 0313 98.5 °F (36.9 °C) 82 20 (!) 148/75 99 %   11/23/20 0129  89  (!) 175/73    11/23/20 0033 98 °F (36.7 °C) 91 20 109/60 99 %   11/22/20 1902 98.8 °F (37.1 °C) 96 20 (!) 178/86 96 %   11/22/20 1558 99.3 °F (37.4 °C) 87 18 (!) 173/92 97 %   11/22/20 1145 98.8 °F (37.1 °C) 76 20 (!) 164/88 96 %     No intake or output data in the 24 hours ending 11/23/20 0817     I had a face to face encounter with this patient and independently examined this patient on 11/23/2020 as outlined below:  PHYSICAL EXAM:  General: WD, WN. Alertt , non verbal   EENT:  EOMI. Anicteric sclerae. MMM  Resp:  CTA bilaterally, no wheezing or rales. No accessory muscle use  CV:  Regular  rhythm,  No edema  GI:  Soft, Non distended, Non tender. +Bowel sounds  Neurologic:  Alert and oriented X 0, agitated  Psych:   Unable to assess  Skin:  No rashes. No jaundice    Reviewed most current lab test results and cultures  YES  Reviewed most current radiology test results   YES  Review and summation of old records today    NO  Reviewed patient's current orders and MAR    YES  PMH/SH reviewed - no change compared to H&P  ________________________________________________________________________  Care Plan discussed with:    Comments   Patient     Family  x Daughter    RN x    Care Manager     Consultant                       x Multidiciplinary team rounds were held today with , nursing, pharmacist and clinical coordinator.   Patient's plan of care was discussed; medications were reviewed and discharge planning was addressed. ________________________________________________________________________  Total NON critical care TIME: 35   Minutes    Total CRITICAL CARE TIME Spent:   Minutes non procedure based      Comments   >50% of visit spent in counseling and coordination of care     ________________________________________________________________________  Karen Lewis MD     Procedures: see electronic medical records for all procedures/Xrays and details which were not copied into this note but were reviewed prior to creation of Plan. LABS:  I reviewed today's most current labs and imaging studies.   Pertinent labs include:  Recent Labs     11/23/20  0232 11/22/20  0345 11/21/20  0042   WBC 6.4 9.1 7.7   HGB 12.7 14.5 13.2   HCT 37.8 44.5 38.8    166 147*     Recent Labs     11/23/20  0232 11/22/20  0345 11/21/20  0042    143 142  141   K 3.4* 4.2 3.3*  3.2*   * 112* 109*  108   CO2 25 28 27  28   * 116* 90  91   BUN 18 18 17  16   CREA 1.19 1.26 1.25  1.25   CA 9.3 9.8 9.3  9.5   MG 1.6 2.2 1.9       Signed: Karen Lewis MD

## 2020-11-23 NOTE — PROGRESS NOTES
Pt began demonstrating slight seizure activity, received verbal orders for MD for 2mg of ativan. Jerking of the left leg and shoulder was observed.  Gave ativan and keppra

## 2020-11-23 NOTE — PROGRESS NOTES
Problem: Falls - Risk of  Goal: *Absence of Falls  Description: Document Edmonia Morning Fall Risk and appropriate interventions in the flowsheet. Outcome: Progressing Towards Goal  Note: Fall Risk Interventions:       Mentation Interventions: Adequate sleep, hydration, pain control, Bed/chair exit alarm    Medication Interventions: Bed/chair exit alarm    Elimination Interventions: Call light in reach, Bed/chair exit alarm              Problem: Pressure Injury - Risk of  Goal: *Prevention of pressure injury  Description: Document Torito Scale and appropriate interventions in the flowsheet.   Outcome: Progressing Towards Goal  Note: Pressure Injury Interventions:  Sensory Interventions: Assess changes in LOC    Moisture Interventions: Absorbent underpads    Activity Interventions: Assess need for specialty bed    Mobility Interventions: Assess need for specialty bed    Nutrition Interventions: Document food/fluid/supplement intake    Friction and Shear Interventions: Apply protective barrier, creams and emollients, Minimize layers

## 2020-11-23 NOTE — PROGRESS NOTES
Pt off unit to CT will continue restraint documentation upon arrival back to unit as well as med administration

## 2020-11-24 ENCOUNTER — TELEPHONE (OUTPATIENT)
Dept: CARDIOLOGY CLINIC | Age: 85
End: 2020-11-24

## 2020-11-24 LAB — MAGNESIUM SERPL-MCNC: 2.1 MG/DL (ref 1.6–2.4)

## 2020-11-24 PROCEDURE — 74011250637 HC RX REV CODE- 250/637: Performed by: INTERNAL MEDICINE

## 2020-11-24 PROCEDURE — 74011250636 HC RX REV CODE- 250/636: Performed by: PSYCHIATRY & NEUROLOGY

## 2020-11-24 PROCEDURE — 99223 1ST HOSP IP/OBS HIGH 75: CPT | Performed by: INTERNAL MEDICINE

## 2020-11-24 PROCEDURE — 74011250636 HC RX REV CODE- 250/636: Performed by: INTERNAL MEDICINE

## 2020-11-24 PROCEDURE — 99233 SBSQ HOSP IP/OBS HIGH 50: CPT | Performed by: PSYCHIATRY & NEUROLOGY

## 2020-11-24 PROCEDURE — 74011000250 HC RX REV CODE- 250: Performed by: INTERNAL MEDICINE

## 2020-11-24 PROCEDURE — 36415 COLL VENOUS BLD VENIPUNCTURE: CPT

## 2020-11-24 PROCEDURE — 95816 EEG AWAKE AND DROWSY: CPT | Performed by: PSYCHIATRY & NEUROLOGY

## 2020-11-24 PROCEDURE — 83735 ASSAY OF MAGNESIUM: CPT

## 2020-11-24 PROCEDURE — 65660000000 HC RM CCU STEPDOWN

## 2020-11-24 RX ADMIN — CYANOCOBALAMIN 1000 MCG: 1000 INJECTION, SOLUTION INTRAMUSCULAR; SUBCUTANEOUS at 09:02

## 2020-11-24 RX ADMIN — METOPROLOL TARTRATE 2.5 MG: 1 INJECTION, SOLUTION INTRAVENOUS at 17:53

## 2020-11-24 RX ADMIN — Medication 20 ML: at 22:00

## 2020-11-24 RX ADMIN — ENALAPRILAT 1.25 MG: 2.5 INJECTION INTRAVENOUS at 05:21

## 2020-11-24 RX ADMIN — METOPROLOL TARTRATE 2.5 MG: 1 INJECTION, SOLUTION INTRAVENOUS at 12:50

## 2020-11-24 RX ADMIN — ENALAPRILAT 1.25 MG: 2.5 INJECTION INTRAVENOUS at 12:46

## 2020-11-24 RX ADMIN — HYDRALAZINE HYDROCHLORIDE 10 MG: 20 INJECTION INTRAMUSCULAR; INTRAVENOUS at 05:21

## 2020-11-24 RX ADMIN — Medication 10 ML: at 17:50

## 2020-11-24 RX ADMIN — HYDRALAZINE HYDROCHLORIDE 5 MG: 20 INJECTION INTRAMUSCULAR; INTRAVENOUS at 09:04

## 2020-11-24 RX ADMIN — ENOXAPARIN SODIUM 40 MG: 40 INJECTION SUBCUTANEOUS at 09:00

## 2020-11-24 RX ADMIN — Medication 10 ML: at 05:23

## 2020-11-24 RX ADMIN — LEVETIRACETAM 1000 MG: 10 INJECTION INTRAVENOUS at 13:48

## 2020-11-24 RX ADMIN — ENALAPRILAT 1.25 MG: 2.5 INJECTION INTRAVENOUS at 17:56

## 2020-11-24 RX ADMIN — HYDRALAZINE HYDROCHLORIDE 10 MG: 20 INJECTION INTRAMUSCULAR; INTRAVENOUS at 17:50

## 2020-11-24 RX ADMIN — ASPIRIN 150 MG: 300 SUPPOSITORY RECTAL at 09:07

## 2020-11-24 RX ADMIN — METOPROLOL TARTRATE 2.5 MG: 1 INJECTION, SOLUTION INTRAVENOUS at 05:21

## 2020-11-24 NOTE — PALLIATIVE CARE
Patient is confused , left a message with his daughter Roxana Casillas to call me , to get more information on patient base line , to provide her with update and goals of care discussion .

## 2020-11-24 NOTE — TELEPHONE ENCOUNTER
Called and spoke with patient's daughter, identifiers x2. I called to confirm her father's nuclear stress test apt and to review testing instructions. She said her dad is in the hospital and will not able to come for the test. She will reschedule his stress test once he is better.

## 2020-11-24 NOTE — PROCEDURES
EEG REPORT    Patient Name: Jayshree Sutherland  : 3/9/1933  Age: 80 y.o. Ordering physician: Dr. Paulina Glasgow  Date of EE2020  7:12 - 7:33  Diagnosis: seizure  Interpreting physician: Katia Reeder D.O. FAAN    Procedure: EEG    CLINICAL INDICATION: The patient is a 80 y.o. male who is being evaluated for baseline electro cerebral activities and to rule out seizure focus.       Current Facility-Administered Medications   Medication Dose Route Frequency    enalaprilat (VASOTEC) injection 1.25 mg  1.25 mg IntraVENous Q6H    dextrose 5% - 0.9% NaCl with KCl 20 mEq/L infusion  75 mL/hr IntraVENous CONTINUOUS    levETIRAcetam in saline (iso-os) (KEPPRA) infusion 1,000 mg  1,000 mg IntraVENous Q12H    hydrALAZINE (APRESOLINE) 20 mg/mL injection 10 mg  10 mg IntraVENous Q6H    cyanocobalamin (VITAMIN B12) injection 1,000 mcg  1,000 mcg IntraMUSCular DAILY    LORazepam (ATIVAN) injection 1 mg  1 mg IntraVENous Q6H PRN    influenza vaccine  (6 mos+)(PF) (FLUARIX/FLULAVAL/FLUZONE QUAD) injection 0.5 mL  0.5 mL IntraMUSCular PRIOR TO DISCHARGE    [Held by provider] levETIRAcetam (KEPPRA) tablet 500 mg  500 mg Oral BID    metoprolol (LOPRESSOR) injection 2.5 mg  2.5 mg IntraVENous Q6H    hydrALAZINE (APRESOLINE) 20 mg/mL injection 5 mg  5 mg IntraVENous Q6H PRN    aspirin (ASA) suppository 150 mg  150 mg Rectal DAILY    bisacodyL (DULCOLAX) suppository 10 mg  10 mg Rectal DAILY PRN    sodium chloride (NS) flush 5-40 mL  5-40 mL IntraVENous Q8H    sodium chloride (NS) flush 5-40 mL  5-40 mL IntraVENous PRN    acetaminophen (TYLENOL) tablet 650 mg  650 mg Oral Q6H PRN    Or    acetaminophen (TYLENOL) suppository 650 mg  650 mg Rectal Q6H PRN    polyethylene glycol (MIRALAX) packet 17 g  17 g Oral DAILY PRN    famotidine (PEPCID) tablet 20 mg  20 mg Oral BID    nicotine (NICODERM CQ) 14 mg/24 hr patch 1 Patch  1 Patch TransDERmal DAILY PRN    enoxaparin (LOVENOX) injection 40 mg  40 mg SubCUTAneous DAILY    allopurinoL (ZYLOPRIM) tablet 100 mg  100 mg Oral DAILY    amLODIPine (NORVASC) tablet 5 mg  5 mg Oral DAILY    atorvastatin (LIPITOR) tablet 40 mg  40 mg Oral QHS    [Held by provider] hydrALAZINE (APRESOLINE) tablet 25 mg  25 mg Oral TID    [Held by provider] metoprolol succinate (TOPROL-XL) XL tablet 25 mg  25 mg Oral DAILY           DESCRIPTION OF PROCEDURE:     This is a digitally recorded electroencephalogram  Electrodes were applied in accordance with the international 10-20 system of electrode placement. 18 channels of scalp EEG are recorded  A channel was used for EoG  Another channel was used for ECG   The data is stored digitally and reviewed in reformatted montages for optimal display  EEG  was reviewed in both bipolar and referential montages    Description of Activity: The background of this recording contains no well-formed alpha activity seen. There was a mixtures of diffuse theta and delta activity identified throughout the study. Throughout the recording, there was intermittent spike-and-wave discharges seen in the right hemispheric which appear epileptogenic. There was no buildup into an electrographic seizure. .     Hyperventilation was not performed. Photic stimulation produced no response in the posterior head regions. Clinical Interpretation: This EEG is abnormal. There was mild generalized slowing as seen in encephalopathies with imposed periodic lateralized discharges  seen over the  right hemisphere. This can represent a potential seizure focus. Electrographic seizures recorded on this study. Clinical correlation is recommended             VINICIO Alex

## 2020-11-24 NOTE — PROGRESS NOTES
End of Shift Note    Bedside shift change report given to Ton Pettit (oncoming nurse) by Mark Yoder (offgoing nurse).   Report included the following information SBAR, Kardex and MAR    Shift worked:  7p-7a   Shift summary and any significant changes:     n/a       Concerns for physician to address:  n/a   Zone phone for oncoming shift:   [id number]     Patient Information  Jacek Maradiaga  80 y.o.  11/17/2020  4:20 PM by Geoffrey Sen MD. Jacek Maradiaga was admitted from Home    Problem List  Patient Active Problem List    Diagnosis Date Noted    Altered mental status 11/18/2020    Post-ictal state ([address] 11/18/2020    Seizure ([address] 11/17/2020    Atrial pacemaker lead displacement 10/18/2019    Pacemaker 03/22/2019    Bradycardia 03/19/2019    Elevated troponin 03/19/2019    Hypertensive urgency 03/19/2019    Second degree AV block, Mobitz type I 03/19/2019    Stage 3 chronic kidney disease 03/15/2019    Rotator cuff arthropathy of both shoulders 03/15/2019    Cognitive impairment 97/48/5648    Systolic murmur 19/75/3304    Essential hypertension 12/17/2018    Gout 12/17/2018    Pure hypercholesterolemia 12/17/2018    History of prostate cancer 12/17/2018    Chronic constipation 12/17/2018    Dysuria 08/08/2017    Weight loss 08/08/2017    Adrenal mass ([address] 07/18/2017     Past Medical History:   Diagnosis Date    Cancer [facility])     prostate    Constipation     Gout     Hyperlipemia     Hypertension     Pacemaker 2019    Stroke ([address]     Thrombocytopenia ([address] 3/19/2019    TIA (transient ischemic attack) 2013           Activity:  Activity Level: Bed Rest  Number times ambulated in hallways past shift: 0  Number of times OOB to chair past shift: 0    Cardiac:   Cardiac Monitoring: Yes      Cardiac Rhythm: Paced    Access:   Current line(s): PIV     Genitourinary:   Urinary status: incontinent      Respiratory:   O2 Device: Room air  Chronic home O2 use?: NO  Incentive spirometer at bedside: NO       GI:  Last Bowel Movement Date: (unknown)  Current diet:  DIET NPO  Passing flatus: YES  Tolerating current diet: NO       Pain Management:   Patient states pain is manageable on current regimen: N/A    Skin:  Torito Score: 13  Interventions: speciality bed, increase time out of bed and PT/OT consult    Patient Safety:  Fall Score:  Total Score: 3  Interventions: bed/chair alarm  High Fall Risk: Yes      Wounds: (If Applicable)  Wounds- No  Location n/a    Active Consults:  IP CONSULT TO NEUROLOGY  IP CONSULT TO PALLIATIVE CARE - PROVIDER    Length of Stay:  Expected LOS: 2d 16h  Actual LOS: 6  Discharge Plan: Yes CITLALLI Dent

## 2020-11-24 NOTE — PROGRESS NOTES
Neurology Note    Patient ID:  Lance Garcia  [id number]  87 y.o.  3/9/1933      Date of Consultation:  November 24, 2020      Subjective: no verbal output today       History of Present Illness:   Lance Garcia is a 80 y.o. male who was admitted to the hospital on November 17, 2020 with a new onset seizure. He does have a history of a prior stroke. His EEG revealed diffuse cerebral slowing and no seizures. He has been started on Keppra. He did receive a follow-up EEG earlier today. He had reports of possible seizure activity yesterday morning and given a dose of Ativan. His dose of Keppra was increased and there was no further seizure events. It appears he has not been able to get his brain MRI due to the pacemaker and needing outside vendor to be there for the image.     I spent considerable time at the bedside with the daughter today discussing the patient's care    Past Medical History:   Diagnosis Date    Cancer [facility])     prostate    Constipation     Gout     Hyperlipemia     Hypertension     Pacemaker 2019    [facility]     Thrombocytopenia ([facility] 3/19/2019    TIA (transient ischemic attack) 2013        Past Surgical History:   Procedure Laterality Date    HX PROSTATECTOMY      HX UROLOGICAL      prostate removed    RI INS NEW/RPLCMT PRM PACEMAKR W/TRANS ELTRD ATRIAL N/A 10/18/2019    Insert Ppm Single Atrial performed by Rakel Kirkpatrick MD at [address]  CATH LAB    RI INS NEW/RPLCMT PRM PM W/TRANSV ELTRD ATRIAL&VENT Right 3/22/2019    INSERT PPM DUAL performed by Rakel Kirkpatrick MD at [address]  CATH LAB        Family History   Problem Relation Age of Onset    No Known Problems Mother     No Known Problems Father         Social History     Tobacco Use    Smoking status: Never Smoker    Smokeless tobacco: Never Used   Substance Use Topics    Alcohol use: No        No Known Allergies       Current Facility-Administered Medications   Medication Dose Route Frequency    enalaprilat (VASOTEC) injection 1.25 mg  1.25 mg IntraVENous Q6H    dextrose 5% - 0.9% NaCl with KCl 20 mEq/L infusion  75 mL/hr IntraVENous CONTINUOUS    levETIRAcetam in saline (iso-os) (KEPPRA) infusion 1,000 mg  1,000 mg IntraVENous Q12H    hydrALAZINE (APRESOLINE) 20 mg/mL injection 10 mg  10 mg IntraVENous Q6H    cyanocobalamin (VITAMIN B12) injection 1,000 mcg  1,000 mcg IntraMUSCular DAILY    LORazepam (ATIVAN) injection 1 mg  1 mg IntraVENous Q6H PRN    influenza vaccine 2020-21 (6 mos+)(PF) (FLUARIX/FLULAVAL/FLUZONE QUAD) injection 0.5 mL  0.5 mL IntraMUSCular PRIOR TO DISCHARGE    [Held by provider] levETIRAcetam (KEPPRA) tablet 500 mg  500 mg Oral BID    metoprolol (LOPRESSOR) injection 2.5 mg  2.5 mg IntraVENous Q6H    hydrALAZINE (APRESOLINE) 20 mg/mL injection 5 mg  5 mg IntraVENous Q6H PRN    aspirin (ASA) suppository 150 mg  150 mg Rectal DAILY    bisacodyL (DULCOLAX) suppository 10 mg  10 mg Rectal DAILY PRN    sodium chloride (NS) flush 5-40 mL  5-40 mL IntraVENous Q8H    sodium chloride (NS) flush 5-40 mL  5-40 mL IntraVENous PRN    acetaminophen (TYLENOL) tablet 650 mg  650 mg Oral Q6H PRN    Or    acetaminophen (TYLENOL) suppository 650 mg  650 mg Rectal Q6H PRN    polyethylene glycol (MIRALAX) packet 17 g  17 g Oral DAILY PRN    famotidine (PEPCID) tablet 20 mg  20 mg Oral BID    nicotine (NICODERM CQ) 14 mg/24 hr patch 1 Patch  1 Patch TransDERmal DAILY PRN    enoxaparin (LOVENOX) injection 40 mg  40 mg SubCUTAneous DAILY    allopurinoL (ZYLOPRIM) tablet 100 mg  100 mg Oral DAILY    amLODIPine (NORVASC) tablet 5 mg  5 mg Oral DAILY    atorvastatin (LIPITOR) tablet 40 mg  40 mg Oral QHS    [Held by provider] hydrALAZINE (APRESOLINE) tablet 25 mg  25 mg Oral TID    [Held by provider] metoprolol succinate (TOPROL-XL) XL tablet 25 mg  25 mg Oral DAILY         Review of Systems:    [x]Unable to obtain  ROS due to  [x]mental status change  []sedated []intubated    Objective:     Visit Vitals  BP (!) 165/76   Pulse 77   Temp 98.4 °F (36.9 °C)   Resp 20   Ht 5' 6\" (1.676 m)   SpO2 96%   BMI 27.51 kg/m²       Physical Exam:      General:  appears well nourished in no acute distress  Neck: no carotid bruits  Lungs: clear to auscultation  Heart:  Murmurs noted  Lower extremity: peripheral pulses palpable and no edema  Skin: intact    Neurological exam:    The patient was sleeping but was arousable. He continues to be resistant. He did stick out his tongue for me. He was less interactive than yesterday. There was no myoclonic twitching when I was in the room today. Cranial nerves:   II-XII were tested    Perrrla  He does blink to visual threat  Eomi, no evidence of nystagmus  Facial sensation:  normal and symmetric  Facial motor: normal and symmetric  Hearing intact  Tongue: midline without fasciculations    Motor: Tone normal.      No evidence of fasciculations    It was difficult to perform formal motor testing but there was no clear obvious asymmetry that was appreciated. He does grimace to painful stimulation and does withdrawal      Sensory:  Upper extremity: intact to pp,  Lower extremity: intact to pp    Reflexes:    Right Left  Biceps  2 2  Triceps 2 2  Brachiorad.  2 2  Patella  2 2  Achilles - -    Plantar response: Extensor bilaterally      Cerebellar testing:  no tremor apparent    Gait: This was not assessed this a.m. due to patient's mental status    Labs:     Lab Results   Component Value Date/Time    Hemoglobin A1c 5.7 (H) 07/01/2020 04:04 AM    Sodium 144 11/23/2020 02:32 AM    Potassium 3.4 (L) 11/23/2020 02:32 AM    Chloride 115 (H) 11/23/2020 02:32 AM    Glucose 131 (H) 11/23/2020 02:32 AM    BUN 18 11/23/2020 02:32 AM    Creatinine 1.19 11/23/2020 02:32 AM    Calcium 9.3 11/23/2020 02:32 AM    WBC 6.4 11/23/2020 02:32 AM    HCT 37.8 11/23/2020 02:32 AM    HGB 12.7 11/23/2020 02:32 AM    PLATELET 676 93/56/0418 02:32 AM Imaging:    Results from Hospital Encounter encounter on 06/30/20   MRI BRAIN WO CONT    Narrative CLINICAL HISTORY: Possible CVA. INDICATION: Possible CVA. Parieto-occipital infarction is suspected. COMPARISON: CT and CTA 6/30/2020    TECHNIQUE: MR examination of the brain includes axial and sagittal T1, coronal  T2, axial T2, axial FLAIR, axial gradient echo, axial DWI. CONTRAST: None    FINDINGS:   Small focus of acute infarction in the posterior superior right frontal lobe  precentral.   Subacute infarction left and right occipital lobes. Moderate to large left  occipital infarction with posterior thalamic and pulmonary subacute infarction  on the left. Small to moderate subacute infarction on the right. There is mild  superimposed hemorrhage, no associated midline shift or mass effect. Chronic  foci of hemosiderin deposition are most likely related to severe chronic  microvascular change. The brain architecture is normal. There is no evidence of  midline shift or mass-effect. There are no extra-axial fluid collections. There  is no Chiari or syrinx. The pituitary and infundibulum are grossly unremarkable. There is no skull base mass. Cerebellopontine angles are grossly unremarkable. The major intracranial vascular flow-voids are unremarkable. The cavernous  sinuses are symmetric. Optic chiasm and infundibulum grossly unremarkable. Orbits are grossly symmetric. Dural venous sinuses are grossly patent. The mastoid air cells are well pneumatized and clear. Impression IMPRESSION:  Moderate to large subacute infarction in the left occipital lobe and left basal  ganglia. Small to moderate subacute infarction in the right occipital lobe. Mild superimposed hemorrhage but no evidence of midline shift or mass effect. Punctate focus of acute infarction in the posterior superior right frontal lobe. Moderate to severe chronic microvascular ischemic change and cerebral atrophy. Results from [facility] encounter on 11/17/20   CT HEAD WO CONT    Narrative EXAM: CT HEAD WO CONT    INDICATION: stroke    COMPARISON: November 17, 2020. CONTRAST: None. TECHNIQUE: Unenhanced CT of the head was performed using 5 mm images. Brain and  bone windows were generated. Coronal and sagittal reformats. CT dose reduction  was achieved through use of a standardized protocol tailored for this  examination and automatic exposure control for dose modulation. FINDINGS:  The ventricles and sulci are stable in size, shape and configuration. . There is  unchanged diffuse periventricular white matter disease. Old lacunar infarct in  the left basal ganglia is unchanged. Old bilateral occipital infarcts are  unchanged. There is no intracranial hemorrhage, extra-axial collection, or mass  effect. The basilar cisterns are open. No CT evidence of acute infarct. The bone windows demonstrate no abnormalities. The visualized portions of the  paranasal sinuses and mastoid air cells are clear. Impression IMPRESSION:   No acute intracranial process. No significant change from the prior study. I did independently review his head CT from admission on November 23, 2020. There was a significant amount of atrophy and periventricular microischemic changes that were noted. Multiple chronic old strokes were apparent. Assessment and Plan:    The patient is a pleasant 80-year-old gentleman with history of prior stroke and reportedly mild dementia who presents with new onset seizure. His neurological examination reveals a suppressed mental status with no clear focal weakness. New onset seizure:    He does have reasons to have developed seizures. He does have prior strokes and a significant amount of atrophy  He will continue on Keppra 1000 mg twice a day. His daughter is concerned about sedation.   If still sedated in the a.m., can look to slightly decrease the dose of his Keppra to try to make him more awake. I am concerned about lowering the dose of the medication increasing his seizures as he does have a notable abnormal EEG. Strokes:  He does have a history of multiple strokes on neuro imaging. He very well may have had a new stroke but an MRI cannot be completed and he does have a significant amount of chronic disease on his brain. His risk factors for stroke do include hypertension and dyslipidemia  Hypertension: Continue aggressive control with goal systolic blood pressure under 140  Dyslipidemia: Continue statin therapy      Continue aspirin       Mental status: This very well may be multifactorial.  He does have an underlying history of a dementia. Given his neuro imaging, there is most likely a component of a vascular dementia and a stepwise progression of the dementia is likely. I did discuss all of this with the daughter at the bedside today. Neurology will continue to follow along closely during the hospitalization. After discharge, the patient will need to have close follow-up in the neurology clinic with Dr. Kristina Newton. Active Problems:    Seizure ([facility] [address] (11/17/2020)      Altered mental status (11/18/2020)      Post-ictal state ([facility] (11/18/2020)        I spent  35   minutes providing care to this  acutely ill inpatient with > 50% of the time counseling and assisting in the coordination of care of the patient on the patient's hospital floor/unit.                   Signed By:  Anaya Beck DO FAAN    November 24, 2020

## 2020-11-24 NOTE — PROGRESS NOTES
Speech path   Patient was referred for reeval of swallowing. He has been having seizures and requiring frequent anticonvulsants and being too lethargic for po. Dr. Fransisco Franklin told his daughter that they are pulling back on anticonvulsants so he should be more alert in the future. Daughter wants to meet with us tomorrow at 56 when she is here. We will try to honor that. Page speech when she gets here and we will try to eval the patient then.    Srinath Mueller, [phone] pager

## 2020-11-24 NOTE — PROGRESS NOTES
Comprehensive Nutrition Assessment    Type and Reason for Visit: Initial, RD nutrition re-screen/LOS    Nutrition Recommendations/Plan:   · Further nutrition recommendations and interventions pending SLP evaluation. Hopefully, we can start an oral diet soon. · If unable to resume oral diet within the next 2-4 days, will need to consider enteral nutrition via NGT as pt has been NPO x 5 days. Nutrition Assessment:     11/24: Chart reviewed; med noted for seizure, AMS, hx of CVA. Pt has been NPO x 5 days. SLP continues to complete bedside evaluations to determine if safe to swallow but pt has not been alert enough or has been too agitated to participate. RD spoke with the pt's daughter who reports that prior to admission, the pt was able to tolerate most textures up until current admission. Pt is currently in soft wrist restraints. RD spoke with SLP who plans to re-visit pt today as pt less agitated today. Hopefully, we can start an oral diet, even if pureed. Then we can add in oral nutritional supplements. No data found. Last Weight Metric  Weight Loss Metrics 11/17/2020 7/13/2020 7/9/2020 6/30/2020 1/30/2020 10/18/2019 10/11/2019   Today's Wt - - 170 lb 6.7 oz - 173 lb 182 lb 186 lb 6.4 oz   BMI 27.51 kg/m2 27.51 kg/m2 - 26.69 kg/m2 27.92 kg/m2 29.38 kg/m2 26.75 kg/m2     Estimated Daily Nutrient Needs:  Energy (kcal): 1810 (BMR 1393 x 1. 3AF); Weight Used for Energy Requirements: Current  Protein (g): 93 (1.2 g/kg bw); Weight Used for Protein Requirements: Current  Fluid (ml/day): 1800 ml/day; Method Used for Fluid Requirements: 1 ml/kcal    Nutrition Related Findings:  BM: 11/24; Labs: mostly WNL, POC -142; Meds: Lipitor, B12; soft retraints; NPO x 5 days      Wounds:    (sacral/coccyx)       Current Nutrition Therapies:  DIET NPO    Anthropometric Measures:  · Height:  5' 6\" (167.6 cm)  · Current Body Wt:  77.3 kg (170 lb 6.7 oz)   · Ideal Body Wt:  142 lbs:  120 %   · BMI Category:   Overweight (BMI 25.0-29. 9)       Nutrition Diagnosis:   · Inadequate protein-energy intake related to (AMD, agitation) as evidenced by NPO or clear liquid status due to medical condition(SLP continues to evaluate for safe swallowing but unable to advance diet as pt unable to participate in evaluation)    Nutrition Interventions:   Food and/or Nutrient Delivery: (Advance diet pending SLP eval)  Nutrition Education and Counseling: No recommendations at this time  Coordination of Nutrition Care: Speech therapy, Swallow evaluation    Goals:  Resume oral diet vs TF when safely tolerated next 2-4 days       Nutrition Monitoring and Evaluation:   Behavioral-Environmental Outcomes: None identified  Food/Nutrient Intake Outcomes: Diet advancement/tolerance, Food and nutrient intake, Supplement intake  Physical Signs/Symptoms Outcomes: Biochemical data, Weight, Skin    Discharge Planning:     Too soon to determine     Electronically signed by Felisa Blount RD on 11/24/2020 at 11:38 AM

## 2020-11-24 NOTE — PROGRESS NOTES
Pt alert and resting in bed, pt demonstrating behavior that allows for discontinuation of restraints.  Will continue to monitor

## 2020-11-24 NOTE — PROGRESS NOTES
Hospitalist Progress Note    NAME: Aldon Angelucci   :  3/9/1933   MRN:  [id number]       Assessment / Plan:  New onset seizures, POA, recurrent episodes   Acute encephalopathy, likely postictal, POA vs worsening dementia   History of multiple strokes, POA  Agitation   Patient had a 5-minute seizure at nursing home, seized again in the ER and was given Ativan.  status post Keppra loading dose  C/w IV keprra 500mg bid , increased to 100mg bid for recurrent seizures in the hospital     EEG  On admission showed no seizure , repeat EEG  Showed a potential seizure focus on the R hemisphere   MRI ordered unable to be done because of pt being confsued+ pacemaker , unable to verbalize if issues with pacemaker arise while doing MRI   Repeat CT brain unchanged   Seizure precautions  Pt not following commands , c/w  meds via IV   Ativan as needed for seizures and agitation  TSH wnl , B12 wnl , folate wnl , RPR neg , CBC, CMP wnl , and blood cultures NTD  Neurology consulted, we appreciate their assistance  Discussed with daughter at bedside      History of strokes, hypertension, POA  Continue statin  Continue antihypertensives     SOULEYMANE on CKD  Hypokalemia   Also had in July  Renally dose medications  Creat 1.3-1.4  On IVF witth D5NS + Kcl      Sinus bradycardia with 2-1 block: POA  Status post pacemaker  Was able to have MRI with [employer] rep present and MRI in July at [address] [place] [address] to get MRI brain here      Baseline debility  Patient is able to walk with a walker  Has moderate dementia, but is able to hold conversation with family  Resides in a nursing home       updated daughter Mark Dalton on , ,   Baseline : verbal but need help with adl   Code status: Full  Prophylaxis: Lovenox  Recommended Disposition: SNF      Subjective:     Chief Complaint / Reason for Physician Visit  FU seizures/ams ,.  Discussed with RN events overnight.      Review of Systems:  Symptom Y/N Comments  Symptom Y/N Comments   Fever/Chills    Chest Pain     Poor Appetite    Edema     Cough    Abdominal Pain     Sputum    Joint Pain     SOB/CARREON    Pruritis/Rash     Nausea/vomit    Tolerating PT/OT     Diarrhea    Tolerating Diet     Constipation    Other       Could NOT obtain due to: Lethargic      Objective:     VITALS:   Last 24hrs VS reviewed since prior progress note. Most recent are:  Patient Vitals for the past 24 hrs:   Temp Pulse Resp BP SpO2   11/24/20 1519 98.4 °F (36.9 °C) 77 20 (!) 165/76 96 %   11/24/20 1244  71  (!) 163/75    11/24/20 1132 98.5 °F (36.9 °C) 96 20 (!) 169/81 96 %   11/24/20 0814 98.2 °F (36.8 °C) 87 20 (!) 183/94 98 %   11/24/20 0521  93  132/74    11/24/20 0411 98.9 °F (37.2 °C) 84 20 (!) 183/86 99 %   11/23/20 2319 99.4 °F (37.4 °C) 89 22 (!) 172/78 99 %   11/23/20 1943 99.4 °F (37.4 °C) 97 20 (!) 149/79 96 %       Intake/Output Summary (Last 24 hours) at 11/24/2020 1717  Last data filed at 11/23/2020 2352  Gross per 24 hour   Intake 346.25 ml   Output    Net 346.25 ml        I had a face to face encounter with this patient and independently examined this patient on 11/24/2020 as outlined below:  PHYSICAL EXAM:  General: WD, WN. Alertt , non verbal   EENT:  EOMI. Anicteric sclerae. MMM  Resp:  CTA bilaterally, no wheezing or rales. No accessory muscle use  CV:  Regular  rhythm,  No edema  GI:  Soft, Non distended, Non tender. +Bowel sounds  Neurologic:  Alert and oriented X 0, agitated  Psych:   Unable to assess  Skin:  No rashes.   No jaundice    Reviewed most current lab test results and cultures  YES  Reviewed most current radiology test results   YES  Review and summation of old records today    NO  Reviewed patient's current orders and MAR    YES  PMH/SH reviewed - no change compared to H&P  ________________________________________________________________________  Care Plan discussed with:    Comments   Patient     Family  x Daughter    RN x    Care Manager     Consultant  x neuro                    x Multidiciplinary team rounds were held today with , nursing, pharmacist and clinical coordinator. Patient's plan of care was discussed; medications were reviewed and discharge planning was addressed. ________________________________________________________________________  Total NON critical care TIME: 45   Minutes    Total CRITICAL CARE TIME Spent:   Minutes non procedure based      Comments   >50% of visit spent in counseling and coordination of care     ________________________________________________________________________  Emma Flower MD     Procedures: see electronic medical records for all procedures/Xrays and details which were not copied into this note but were reviewed prior to creation of Plan. LABS:  I reviewed today's most current labs and imaging studies.   Pertinent labs include:  Recent Labs     11/23/20  0232 11/22/20  0345   WBC 6.4 9.1   HGB 12.7 14.5   HCT 37.8 44.5    166     Recent Labs     11/24/20  0432 11/23/20  0232 11/22/20  0345   NA  --  144 143   K  --  3.4* 4.2   CL  --  115* 112*   CO2  --  25 28   GLU  --  131* 116*   BUN  --  18 18   CREA  --  1.19 1.26   CA  --  9.3 9.8   MG 2.1 1.6 2.2       Signed: Emma Flower MD

## 2020-11-25 LAB — MAGNESIUM SERPL-MCNC: 2.1 MG/DL (ref 1.6–2.4)

## 2020-11-25 PROCEDURE — 36415 COLL VENOUS BLD VENIPUNCTURE: CPT

## 2020-11-25 PROCEDURE — 74011250637 HC RX REV CODE- 250/637: Performed by: INTERNAL MEDICINE

## 2020-11-25 PROCEDURE — 74011250636 HC RX REV CODE- 250/636: Performed by: INTERNAL MEDICINE

## 2020-11-25 PROCEDURE — 74011250636 HC RX REV CODE- 250/636: Performed by: PSYCHIATRY & NEUROLOGY

## 2020-11-25 PROCEDURE — 65660000000 HC RM CCU STEPDOWN

## 2020-11-25 PROCEDURE — 99233 SBSQ HOSP IP/OBS HIGH 50: CPT | Performed by: PSYCHIATRY & NEUROLOGY

## 2020-11-25 PROCEDURE — 74011000250 HC RX REV CODE- 250: Performed by: INTERNAL MEDICINE

## 2020-11-25 PROCEDURE — 83735 ASSAY OF MAGNESIUM: CPT

## 2020-11-25 PROCEDURE — 74011000258 HC RX REV CODE- 258: Performed by: PSYCHIATRY & NEUROLOGY

## 2020-11-25 RX ORDER — DEXTROSE MONOHYDRATE 50 MG/ML
50 INJECTION, SOLUTION INTRAVENOUS CONTINUOUS
Status: DISCONTINUED | OUTPATIENT
Start: 2020-11-25 | End: 2020-12-01

## 2020-11-25 RX ADMIN — HYDRALAZINE HYDROCHLORIDE 10 MG: 20 INJECTION INTRAMUSCULAR; INTRAVENOUS at 23:09

## 2020-11-25 RX ADMIN — POTASSIUM CHLORIDE, DEXTROSE MONOHYDRATE AND SODIUM CHLORIDE 75 ML/HR: 150; 5; 900 INJECTION, SOLUTION INTRAVENOUS at 05:35

## 2020-11-25 RX ADMIN — ENALAPRILAT 1.25 MG: 2.5 INJECTION INTRAVENOUS at 00:43

## 2020-11-25 RX ADMIN — METOPROLOL TARTRATE 2.5 MG: 1 INJECTION, SOLUTION INTRAVENOUS at 17:20

## 2020-11-25 RX ADMIN — LEVETIRACETAM 750 MG: 100 INJECTION, SOLUTION INTRAVENOUS at 14:46

## 2020-11-25 RX ADMIN — HYDRALAZINE HYDROCHLORIDE 10 MG: 20 INJECTION INTRAMUSCULAR; INTRAVENOUS at 12:44

## 2020-11-25 RX ADMIN — ENALAPRILAT 1.25 MG: 2.5 INJECTION INTRAVENOUS at 05:29

## 2020-11-25 RX ADMIN — HYDRALAZINE HYDROCHLORIDE 5 MG: 20 INJECTION INTRAMUSCULAR; INTRAVENOUS at 04:27

## 2020-11-25 RX ADMIN — HYDRALAZINE HYDROCHLORIDE 10 MG: 20 INJECTION INTRAMUSCULAR; INTRAVENOUS at 17:25

## 2020-11-25 RX ADMIN — ENALAPRILAT 1.25 MG: 2.5 INJECTION INTRAVENOUS at 23:09

## 2020-11-25 RX ADMIN — Medication 10 ML: at 23:10

## 2020-11-25 RX ADMIN — ASPIRIN 150 MG: 300 SUPPOSITORY RECTAL at 09:50

## 2020-11-25 RX ADMIN — DEXTROSE MONOHYDRATE 50 ML/HR: 5 INJECTION, SOLUTION INTRAVENOUS at 10:40

## 2020-11-25 RX ADMIN — HYDRALAZINE HYDROCHLORIDE 10 MG: 20 INJECTION INTRAMUSCULAR; INTRAVENOUS at 00:42

## 2020-11-25 RX ADMIN — ENALAPRILAT 1.25 MG: 2.5 INJECTION INTRAVENOUS at 12:44

## 2020-11-25 RX ADMIN — Medication 20 ML: at 06:00

## 2020-11-25 RX ADMIN — METOPROLOL TARTRATE 2.5 MG: 1 INJECTION, SOLUTION INTRAVENOUS at 23:09

## 2020-11-25 RX ADMIN — ENOXAPARIN SODIUM 40 MG: 40 INJECTION SUBCUTANEOUS at 09:51

## 2020-11-25 RX ADMIN — ENALAPRILAT 1.25 MG: 2.5 INJECTION INTRAVENOUS at 17:27

## 2020-11-25 RX ADMIN — LEVETIRACETAM 1000 MG: 10 INJECTION INTRAVENOUS at 00:42

## 2020-11-25 RX ADMIN — METOPROLOL TARTRATE 2.5 MG: 1 INJECTION, SOLUTION INTRAVENOUS at 12:44

## 2020-11-25 RX ADMIN — METOPROLOL TARTRATE 2.5 MG: 1 INJECTION, SOLUTION INTRAVENOUS at 00:43

## 2020-11-25 RX ADMIN — METOPROLOL TARTRATE 2.5 MG: 1 INJECTION, SOLUTION INTRAVENOUS at 05:28

## 2020-11-25 RX ADMIN — CYANOCOBALAMIN 1000 MCG: 1000 INJECTION, SOLUTION INTRAMUSCULAR; SUBCUTANEOUS at 09:51

## 2020-11-25 NOTE — PROGRESS NOTES
11/25/20 0407   Vitals   Temp 99.4 °F (37.4 °C)   Temp Source Axillary   Pulse (Heart Rate) 62   Heart Rate Source Monitor   Resp Rate 18   O2 Sat (%) 97 %   Level of Consciousness Alert   BP (!) 173/81   MAP (Calculated) 112   MEWS Score 1       Gave PRN hydralazine for elevated blood pressure.

## 2020-11-25 NOTE — PROGRESS NOTES
End of Shift Note    Bedside shift change report given to Christina Box (oncoming nurse) by Amara Escalera (offgoing nurse).   Report included the following information SBAR, Kardex and MAR    Shift worked:  days   Shift summary and any significant changes:     EEG completed, speech consult        Concerns for physician to address:  n/a   [facility] phone for oncoming shift:   [phone]     Patient Information  Jayshree Sutherland  80 y.o.  11/17/2020  4:20 PM by Tj Denis MD. Jayshree Sutherland was admitted from Home    Problem List  Patient Active Problem List    Diagnosis Date Noted    Altered mental status 11/18/2020    Post-ictal state ([address] 11/18/2020    Seizure ([address] 11/17/2020    Atrial pacemaker lead displacement 10/18/2019    Pacemaker 03/22/2019    Bradycardia 03/19/2019    Elevated troponin 03/19/2019    Hypertensive urgency 03/19/2019    Second degree AV block, Mobitz type I 03/19/2019    Stage 3 chronic kidney disease 03/15/2019    Rotator cuff arthropathy of both shoulders 03/15/2019    Cognitive impairment 33/76/4837    Systolic murmur 30/18/8163    Essential hypertension 12/17/2018    Gout 12/17/2018    Pure hypercholesterolemia 12/17/2018    History of prostate cancer 12/17/2018    Chronic constipation 12/17/2018    Dysuria 08/08/2017    Weight loss 08/08/2017    Adrenal mass ([address] 07/18/2017     Past Medical History:   Diagnosis Date    [facility])     prostate    Constipation     Gout     Hyperlipemia     Hypertension     Pacemaker 2019    Stroke ([address]     Thrombocytopenia ([address] 3/19/2019    TIA (transient ischemic attack) 2013           Activity:  Activity Level: Bed Rest  Number times ambulated in hallways past shift: 0  Number of times OOB to chair past shift: 0    Cardiac:   Cardiac Monitoring: Yes      Cardiac Rhythm: Paced    Access:   Current line(s): PIV     Genitourinary:   Urinary status: incontinent      Respiratory:   O2 Device: Room air  Chronic home O2 use?: NO  Incentive spirometer at bedside: NO       GI:  Last Bowel Movement Date: (unknown)  Current diet:  DIET NPO  Passing flatus: YES  Tolerating current diet: NO       Pain Management:   Patient states pain is manageable on current regimen: N/A    Skin:  Torito Score: 12  Interventions: speciality bed, increase time out of bed and PT/OT consult    Patient Safety:  Fall Score:  Total Score: 3  Interventions: bed/chair alarm  High Fall Risk: Yes      Wounds: (If Applicable)  Wounds- No  Location n/a    Active Consults:  IP CONSULT TO NEUROLOGY  IP CONSULT TO PALLIATIVE CARE - PROVIDER    Length of Stay:  Expected LOS: 2d 16h  Actual LOS: 6  Discharge Plan: Yes [facility]ALLI Hendrickson

## 2020-11-25 NOTE — PROGRESS NOTES
Hospitalist Progress Note    NAME: Brenda Perales   :  3/9/1933   MRN:  [id number]       Assessment / Plan:  New onset seizures, POA, recurrent episodes   Acute encephalopathy, likely postictal, POA vs worsening dementia   History of multiple strokes, POA  Patient had a 5-minute seizure at nursing home, seized again in the ER and was given Ativan.  status post Keppra loading dose  -Was on Keppra 1 g checks twice daily but dose was decreased to 750 mg twice daily due to sedation  EEG  On admission showed no seizure , repeat EEG  Showed a potential seizure focus on the R hemisphere   MRI ordered unable to be done because of pt being confsued+ pacemaker , unable to verbalize if issues with pacemaker arise while doing MRI   Repeat CT brain unchanged   Seizure precautions  Ativan as needed for seizures and agitation  TSH wnl , B12 wnl , folate wnl , RPR neg , CBC, CMP wnl , and blood cultures NTD    -Discussed with neurologist Dr. Juan Jose Harvey who recommended that we will wait 1 to 2 days to see if his mental status would get better with medication adjustment and if not better, he suggested hospice  -Discussed with patient daughter about plan of care     History of CVA  Continue aspirin suppository. Not getting p.o. meds as he is n.p.o. Continue antihypertensives     SOULEYMANE on CKD  -Creatinine on  was 1. 19. Check BMP in a.m.     Hypokalemia   Check BMP in a.m.     Sinus bradycardia with 2-1 block: POA  Status post pacemaker  Was able to have MRI with [employer] rep present and MRI in July at [address] [place] [address] to get MRI brain here      Baseline debility  Patient is able to walk with a walker  Has moderate dementia, but is able to hold conversation with family  Resides in a nursing home     Dysphagia due to acute metabolic encephalopathy  -Discussed feeding options with patient daughter, she will discuss various feeding options including NG and also PEG tube with family and let us know    Code status: Full  Prophylaxis: Lovenox  Recommended Disposition: SNF      Subjective:     Chief Complaint / Reason for Physician Visit  Drowsy and responds minimally to commands  Daughter present at bedside and discussed plan of care    Review of Systems:  Symptom Y/N Comments  Symptom Y/N Comments   Fever/Chills    Chest Pain     Poor Appetite    Edema     Cough    Abdominal Pain     Sputum    Joint Pain     SOB/CARREON    Pruritis/Rash     Nausea/vomit    Tolerating PT/OT     Diarrhea    Tolerating Diet     Constipation    Other       Could NOT obtain due to: Lethargic      Objective:     VITALS:   Last 24hrs VS reviewed since prior progress note. Most recent are:  Patient Vitals for the past 24 hrs:   Temp Pulse Resp BP SpO2   11/25/20 1150 99.4 °F (37.4 °C) 72 17 (!) 165/85 100 %   11/25/20 0802 99.1 °F (37.3 °C) 76 20 (!) 162/99 99 %   11/25/20 0421  72  (!) 147/79    11/25/20 0407 99.4 °F (37.4 °C) 62 18 (!) 173/81 97 %   11/25/20 0106  81  (!) 166/89    11/25/20 0044  83  (!) 189/95    11/24/20 2319 99.8 °F (37.7 °C) 80 20 (!) 170/75 96 %   11/24/20 1944 99.3 °F (37.4 °C) 84 20 (!) 156/89 96 %   11/24/20 1750  88  114/85    11/24/20 1749    114/85    11/24/20 1519 98.4 °F (36.9 °C) 77 20 (!) 165/76 96 %       Intake/Output Summary (Last 24 hours) at 11/25/2020 1440  Last data filed at 11/25/2020 0421  Gross per 24 hour   Intake    Output 850 ml   Net -850 ml        I had a face to face encounter with this patient and independently examined this patient on 11/25/2020 as outlined below:  PHYSICAL EXAM:  General: Drowsy, noncooperative  EENT:  EOMI. Anicteric sclerae. MMM  Resp:  CTA bilaterally, no wheezing or rales. No accessory muscle use  CV:  Regular  rhythm,  No edema  GI:  Soft, Non distended, Non tender. +Bowel sounds  Neurologic:  Drowsy, not able to follow commands  Psych:   Confused assess  Skin:  No rashes.   No jaundice    Reviewed most current lab test results and cultures YES  Reviewed most current radiology test results   YES  Review and summation of old records today    NO  Reviewed patient's current orders and MAR    YES  PMH/SH reviewed - no change compared to H&P  ________________________________________________________________________  Care Plan discussed with:    Comments   Patient     Family  x Daughter    RN x    Care Manager     Consultant  x neuro                    x Multidiciplinary team rounds were held today with , nursing, pharmacist and clinical coordinator. Patient's plan of care was discussed; medications were reviewed and discharge planning was addressed. ________________________________________________________________________  Total NON critical care TIME: 45   Minutes    Total CRITICAL CARE TIME Spent:   Minutes non procedure based      Comments   >50% of visit spent in counseling and coordination of care     ________________________________________________________________________  Yanni Simental MD     Procedures: see electronic medical records for all procedures/Xrays and details which were not copied into this note but were reviewed prior to creation of Plan. LABS:  I reviewed today's most current labs and imaging studies.   Pertinent labs include:  Recent Labs     11/23/20  0232   WBC 6.4   HGB 12.7   HCT 37.8        Recent Labs     11/25/20  0420 11/24/20  0432 11/23/20  0232   NA  --   --  144   K  --   --  3.4*   CL  --   --  115*   CO2  --   --  25   GLU  --   --  131*   BUN  --   --  18   CREA  --   --  1.19   CA  --   --  9.3   MG 2.1 2.1 1.6       Signed: Yanni Simental MD

## 2020-11-25 NOTE — PALLIATIVE CARE
Met with daughter Emperatriz Martínez at bed side today to follow up on Advance directives. Reviewed, documents with her she , Dalila Maradiaga is primary MPOA , Patient son Sharona Thurman is secondary MPOA, Emperatriz Martínez did not wanted me to put the advance directives on the chart , she is not ready for any discussion with Palliative care today , I encouraged her to call our team when she is ready for support around goals of care conversation . We will follow peripherally . Thank you for allowing us to participate in the care of the patient .

## 2020-11-25 NOTE — PROGRESS NOTES
Neurology Note    Patient ID:  Bettie Guillermo  [id number]  43 y.o.  3/9/1933      Date of Consultation:  November 25, 2020      Subjective: minimal verbal output today       History of Present Illness:   Bettie Guillermo is a 80 y.o. male who was admitted to the hospital on November 17, 2020 with a new onset seizure. He does have a history of a prior stroke. His EEG revealed diffuse cerebral slowing and no seizures. He has been started on Keppra. He did receive a follow-up EEG earlier on 11/24/2020 which revealed a seizure focus but no ongoing seizures. His daughter was at the bedside today. The patient continues to be sedated and minimally responsive. There is no new events overnight.       Past Medical History:   Diagnosis Date    Cancer [facility])     prostate    Constipation     Gout     Hyperlipemia     Hypertension     Pacemaker 2019    Stroke [facility]     Thrombocytopenia ([facility] 3/19/2019    TIA (transient ischemic attack) 2013        Past Surgical History:   Procedure Laterality Date    HX PROSTATECTOMY      HX UROLOGICAL      prostate removed    MO INS NEW/RPLCMT PRM PACEMAKR W/TRANS ELTRD ATRIAL N/A 10/18/2019    Insert Ppm Single Atrial performed by Gina Buchanan MD at [facility] [address] CATH LAB    MO INS NEW/RPLCMT PRM PM W/TRANSV ELTRD ATRIAL&VENT Right 3/22/2019    INSERT PPM DUAL performed by Gina Buchanan MD at [place] [id number], [address] CATH LAB        Family History   Problem Relation Age of Onset    No Known Problems Mother     No Known Problems Father         Social History     Tobacco Use    Smoking status: Never Smoker    Smokeless tobacco: Never Used   Substance Use Topics    Alcohol use: No        No Known Allergies       Current Facility-Administered Medications   Medication Dose Route Frequency    dextrose 5% infusion  50 mL/hr IntraVENous CONTINUOUS    levETIRAcetam (KEPPRA) 750 mg in 0.9% sodium chloride 100 mL IVPB  750 mg IntraVENous Q12H    nitroglycerin (NITROBID) 2 % ointment 1 Inch  1 Inch Topical Q6H PRN    enalaprilat (VASOTEC) injection 1.25 mg  1.25 mg IntraVENous Q6H    hydrALAZINE (APRESOLINE) 20 mg/mL injection 10 mg  10 mg IntraVENous Q6H    cyanocobalamin (VITAMIN B12) injection 1,000 mcg  1,000 mcg IntraMUSCular DAILY    LORazepam (ATIVAN) injection 1 mg  1 mg IntraVENous Q6H PRN    influenza vaccine 2020-21 (6 mos+)(PF) (FLUARIX/FLULAVAL/FLUZONE QUAD) injection 0.5 mL  0.5 mL IntraMUSCular PRIOR TO DISCHARGE    [Held by provider] levETIRAcetam (KEPPRA) tablet 500 mg  500 mg Oral BID    metoprolol (LOPRESSOR) injection 2.5 mg  2.5 mg IntraVENous Q6H    hydrALAZINE (APRESOLINE) 20 mg/mL injection 5 mg  5 mg IntraVENous Q6H PRN    aspirin (ASA) suppository 150 mg  150 mg Rectal DAILY    bisacodyL (DULCOLAX) suppository 10 mg  10 mg Rectal DAILY PRN    sodium chloride (NS) flush 5-40 mL  5-40 mL IntraVENous Q8H    sodium chloride (NS) flush 5-40 mL  5-40 mL IntraVENous PRN    acetaminophen (TYLENOL) tablet 650 mg  650 mg Oral Q6H PRN    Or    acetaminophen (TYLENOL) suppository 650 mg  650 mg Rectal Q6H PRN    polyethylene glycol (MIRALAX) packet 17 g  17 g Oral DAILY PRN    famotidine (PEPCID) tablet 20 mg  20 mg Oral BID    nicotine (NICODERM CQ) 14 mg/24 hr patch 1 Patch  1 Patch TransDERmal DAILY PRN    enoxaparin (LOVENOX) injection 40 mg  40 mg SubCUTAneous DAILY    allopurinoL (ZYLOPRIM) tablet 100 mg  100 mg Oral DAILY    amLODIPine (NORVASC) tablet 5 mg  5 mg Oral DAILY    atorvastatin (LIPITOR) tablet 40 mg  40 mg Oral QHS    [Held by provider] hydrALAZINE (APRESOLINE) tablet 25 mg  25 mg Oral TID    [Held by provider] metoprolol succinate (TOPROL-XL) XL tablet 25 mg  25 mg Oral DAILY         Review of Systems:    [x]Unable to obtain  ROS due to  [x]mental status change  []sedated   []intubated    Objective:     Visit Vitals  BP (!) 165/85   Pulse 72   Temp 99.4 °F (37.4 °C)   Resp 17   Ht 5' 6\" (1.676 m)   Wt 163 lb 12.8 oz (74.3 kg)   SpO2 100% BMI 26.44 kg/m²       Physical Exam:      General:  appears well nourished in no acute distress  Neck: no carotid bruits  Lungs: clear to auscultation  Heart:  Murmurs noted  Lower extremity: peripheral pulses palpable and no edema  Skin: intact    Neurological exam:    The patient was sleeping but was arousable. He will speak simple words. He will occasionally follow commands. He does perseverate. There was no shaking noted. Cranial nerves:   II-XII were tested    Perrrla  He does blink to visual threat  Eomi, no evidence of nystagmus  Facial sensation:  normal and symmetric  Facial motor: normal and symmetric  Hearing intact  Tongue: midline without fasciculations    Motor: Tone normal.      No evidence of fasciculations    It was difficult to perform formal motor testing but there was no clear obvious asymmetry that was appreciated. He does grimace to painful stimulation and does withdrawal      Sensory:  Upper extremity: intact to pp,  Lower extremity: intact to pp    Reflexes:    Right Left  Biceps  2 2  Triceps 2 2  Brachiorad. 2 2  Patella  2 2  Achilles - -    Plantar response: Extensor bilaterally    Cerebellar testing:  no tremor apparent    Labs:     Lab Results   Component Value Date/Time    Hemoglobin A1c 5.7 (H) 07/01/2020 04:04 AM    Sodium 144 11/23/2020 02:32 AM    Potassium 3.4 (L) 11/23/2020 02:32 AM    Chloride 115 (H) 11/23/2020 02:32 AM    Glucose 131 (H) 11/23/2020 02:32 AM    BUN 18 11/23/2020 02:32 AM    Creatinine 1.19 11/23/2020 02:32 AM    Calcium 9.3 11/23/2020 02:32 AM    WBC 6.4 11/23/2020 02:32 AM    HCT 37.8 11/23/2020 02:32 AM    HGB 12.7 11/23/2020 02:32 AM    PLATELET 080 92/17/7444 02:32 AM       Imaging:    Results from Hospital Encounter encounter on 06/30/20   MRI BRAIN WO CONT    Narrative CLINICAL HISTORY: Possible CVA. INDICATION: Possible CVA. Parieto-occipital infarction is suspected.     COMPARISON: CT and CTA 6/30/2020    TECHNIQUE: MR examination of the brain includes axial and sagittal T1, coronal  T2, axial T2, axial FLAIR, axial gradient echo, axial DWI. CONTRAST: None    FINDINGS:   Small focus of acute infarction in the posterior superior right frontal lobe  precentral.   Subacute infarction left and right occipital lobes. Moderate to large left  occipital infarction with posterior thalamic and pulmonary subacute infarction  on the left. Small to moderate subacute infarction on the right. There is mild  superimposed hemorrhage, no associated midline shift or mass effect. Chronic  foci of hemosiderin deposition are most likely related to severe chronic  microvascular change. The brain architecture is normal. There is no evidence of  midline shift or mass-effect. There are no extra-axial fluid collections. There  is no Chiari or syrinx. The pituitary and infundibulum are grossly unremarkable. There is no skull base mass. Cerebellopontine angles are grossly unremarkable. The major intracranial vascular flow-voids are unremarkable. The cavernous  sinuses are symmetric. Optic chiasm and infundibulum grossly unremarkable. Orbits are grossly symmetric. Dural venous sinuses are grossly patent. The mastoid air cells are well pneumatized and clear. Impression IMPRESSION:  Moderate to large subacute infarction in the left occipital lobe and left basal  ganglia. Small to moderate subacute infarction in the right occipital lobe. Mild superimposed hemorrhage but no evidence of midline shift or mass effect. Punctate focus of acute infarction in the posterior superior right frontal lobe. Moderate to severe chronic microvascular ischemic change and cerebral atrophy. Results from [place] encounter on 11/17/20   CT HEAD WO CONT    Narrative EXAM: CT HEAD WO CONT    INDICATION: stroke    COMPARISON: November 17, 2020. CONTRAST: None. TECHNIQUE: Unenhanced CT of the head was performed using 5 mm images.  Brain and  bone windows were generated. Coronal and sagittal reformats. CT dose reduction  was achieved through use of a standardized protocol tailored for this  examination and automatic exposure control for dose modulation. FINDINGS:  The ventricles and sulci are stable in size, shape and configuration. . There is  unchanged diffuse periventricular white matter disease. Old lacunar infarct in  the left basal ganglia is unchanged. Old bilateral occipital infarcts are  unchanged. There is no intracranial hemorrhage, extra-axial collection, or mass  effect. The basilar cisterns are open. No CT evidence of acute infarct. The bone windows demonstrate no abnormalities. The visualized portions of the  paranasal sinuses and mastoid air cells are clear. Impression IMPRESSION:   No acute intracranial process. No significant change from the prior study. I did independently review his head CT from admission on November 23, 2020. There was a significant amount of atrophy and periventricular microischemic changes that were noted. Multiple chronic old strokes were apparent. Assessment and Plan:    The patient is a pleasant 59-year-old gentleman with history of prior stroke and reportedly mild dementia who presents with new onset seizure. His neurological examination reveals a suppressed mental status with no clear focal weakness. New onset seizure:    He does have reasons to have developed seizures. He does have prior strokes and a significant amount of atrophy  I will decrease the Keppra to 750 mg twice a day to hopefully continue to provide seizure control and minimize any sedating side effects. I did discuss this at length with the daughter today. If he does have seizures, will need to increase the dose of the medication. Strokes:  He does have a history of multiple strokes on neuro imaging.   He very well may have had a new stroke but an MRI cannot be completed and he does have a significant amount of chronic disease on his brain. His risk factors for stroke do include hypertension and dyslipidemia  Hypertension: Continue aggressive control with goal systolic blood pressure under 140  Dyslipidemia: Continue statin therapy    Continue aspirin       Mental status: This very well may be multifactorial.  He does have an underlying history of a dementia. Given his neuro imaging, there is most likely a component of a vascular dementia and a stepwise progression of the dementia is likely. This may be a new baseline in his cognitive status but will assess as we decrease the dosing of the Keppra. I did discuss all of this with the daughter at the bedside today. Neurology will continue to follow along closely during the hospitalization. After discharge, the patient will need to have close follow-up in the neurology clinic with Dr. Romel Acosta. Active Problems:    Seizure ([facility] [address] (11/17/2020)      Altered mental status (11/18/2020)      Post-ictal state ([address] (11/18/2020)        I spent  40   minutes providing care to this  acutely ill inpatient with > 50% of the time counseling and assisting in the coordination of care of the patient on the patient's hospital floor/unit.                   Signed By:  Bandar Lanier DO FAAN    November 25, 2020

## 2020-11-26 LAB
ANION GAP SERPL CALC-SCNC: 4 MMOL/L (ref 5–15)
BUN SERPL-MCNC: 30 MG/DL (ref 6–20)
BUN/CREAT SERPL: 22 (ref 12–20)
CALCIUM SERPL-MCNC: 9.7 MG/DL (ref 8.5–10.1)
CHLORIDE SERPL-SCNC: 115 MMOL/L (ref 97–108)
CO2 SERPL-SCNC: 27 MMOL/L (ref 21–32)
CREAT SERPL-MCNC: 1.34 MG/DL (ref 0.7–1.3)
ERYTHROCYTE [DISTWIDTH] IN BLOOD BY AUTOMATED COUNT: 14 % (ref 11.5–14.5)
GLUCOSE SERPL-MCNC: 108 MG/DL (ref 65–100)
HCT VFR BLD AUTO: 36.9 % (ref 36.6–50.3)
HGB BLD-MCNC: 12 G/DL (ref 12.1–17)
MCH RBC QN AUTO: 30.3 PG (ref 26–34)
MCHC RBC AUTO-ENTMCNC: 32.5 G/DL (ref 30–36.5)
MCV RBC AUTO: 93.2 FL (ref 80–99)
NRBC # BLD: 0 K/UL (ref 0–0.01)
NRBC BLD-RTO: 0 PER 100 WBC
PLATELET # BLD AUTO: 179 K/UL (ref 150–400)
PMV BLD AUTO: 10 FL (ref 8.9–12.9)
POTASSIUM SERPL-SCNC: 3.5 MMOL/L (ref 3.5–5.1)
RBC # BLD AUTO: 3.96 M/UL (ref 4.1–5.7)
SODIUM SERPL-SCNC: 146 MMOL/L (ref 136–145)
WBC # BLD AUTO: 7.6 K/UL (ref 4.1–11.1)

## 2020-11-26 PROCEDURE — 65660000000 HC RM CCU STEPDOWN

## 2020-11-26 PROCEDURE — 74011250636 HC RX REV CODE- 250/636: Performed by: INTERNAL MEDICINE

## 2020-11-26 PROCEDURE — 74011250636 HC RX REV CODE- 250/636: Performed by: PSYCHIATRY & NEUROLOGY

## 2020-11-26 PROCEDURE — 99233 SBSQ HOSP IP/OBS HIGH 50: CPT | Performed by: PSYCHIATRY & NEUROLOGY

## 2020-11-26 PROCEDURE — 74011250637 HC RX REV CODE- 250/637: Performed by: INTERNAL MEDICINE

## 2020-11-26 PROCEDURE — 74011000258 HC RX REV CODE- 258: Performed by: PSYCHIATRY & NEUROLOGY

## 2020-11-26 PROCEDURE — 74011000250 HC RX REV CODE- 250: Performed by: INTERNAL MEDICINE

## 2020-11-26 PROCEDURE — 36415 COLL VENOUS BLD VENIPUNCTURE: CPT

## 2020-11-26 PROCEDURE — 80048 BASIC METABOLIC PNL TOTAL CA: CPT

## 2020-11-26 PROCEDURE — 85027 COMPLETE CBC AUTOMATED: CPT

## 2020-11-26 RX ADMIN — Medication 10 ML: at 06:16

## 2020-11-26 RX ADMIN — ENALAPRILAT 1.25 MG: 2.5 INJECTION INTRAVENOUS at 23:40

## 2020-11-26 RX ADMIN — Medication 10 ML: at 14:00

## 2020-11-26 RX ADMIN — ENALAPRILAT 1.25 MG: 2.5 INJECTION INTRAVENOUS at 17:15

## 2020-11-26 RX ADMIN — ENOXAPARIN SODIUM 40 MG: 40 INJECTION SUBCUTANEOUS at 10:00

## 2020-11-26 RX ADMIN — ENALAPRILAT 1.25 MG: 2.5 INJECTION INTRAVENOUS at 12:35

## 2020-11-26 RX ADMIN — CYANOCOBALAMIN 1000 MCG: 1000 INJECTION, SOLUTION INTRAMUSCULAR; SUBCUTANEOUS at 09:59

## 2020-11-26 RX ADMIN — HYDRALAZINE HYDROCHLORIDE 10 MG: 20 INJECTION INTRAMUSCULAR; INTRAVENOUS at 17:15

## 2020-11-26 RX ADMIN — HYDRALAZINE HYDROCHLORIDE 10 MG: 20 INJECTION INTRAMUSCULAR; INTRAVENOUS at 12:33

## 2020-11-26 RX ADMIN — HYDRALAZINE HYDROCHLORIDE 10 MG: 20 INJECTION INTRAMUSCULAR; INTRAVENOUS at 23:43

## 2020-11-26 RX ADMIN — METOPROLOL TARTRATE 2.5 MG: 1 INJECTION, SOLUTION INTRAVENOUS at 17:14

## 2020-11-26 RX ADMIN — HYDRALAZINE HYDROCHLORIDE 10 MG: 20 INJECTION INTRAMUSCULAR; INTRAVENOUS at 06:14

## 2020-11-26 RX ADMIN — Medication 10 ML: at 21:08

## 2020-11-26 RX ADMIN — LEVETIRACETAM 750 MG: 100 INJECTION, SOLUTION INTRAVENOUS at 17:28

## 2020-11-26 RX ADMIN — LEVETIRACETAM 750 MG: 100 INJECTION, SOLUTION INTRAVENOUS at 02:57

## 2020-11-26 RX ADMIN — ENALAPRILAT 1.25 MG: 2.5 INJECTION INTRAVENOUS at 06:14

## 2020-11-26 RX ADMIN — HYDRALAZINE HYDROCHLORIDE 5 MG: 20 INJECTION INTRAMUSCULAR; INTRAVENOUS at 21:15

## 2020-11-26 RX ADMIN — METOPROLOL TARTRATE 2.5 MG: 1 INJECTION, SOLUTION INTRAVENOUS at 23:44

## 2020-11-26 RX ADMIN — DEXTROSE MONOHYDRATE 50 ML/HR: 5 INJECTION, SOLUTION INTRAVENOUS at 10:01

## 2020-11-26 RX ADMIN — ASPIRIN 150 MG: 300 SUPPOSITORY RECTAL at 10:00

## 2020-11-26 RX ADMIN — METOPROLOL TARTRATE 2.5 MG: 1 INJECTION, SOLUTION INTRAVENOUS at 06:14

## 2020-11-26 RX ADMIN — METOPROLOL TARTRATE 2.5 MG: 1 INJECTION, SOLUTION INTRAVENOUS at 12:33

## 2020-11-26 NOTE — PROGRESS NOTES
End of Shift Note    Bedside shift change report given to Louis (oncoming nurse) by Jake Diez (offgoing nurse).   Report included the following information SBAR, Kardex and MAR    Shift worked:  7p-7a   Shift summary and any significant changes:     none       Concerns for physician to address:  none   Zone phone for oncoming shift:   [id number]     Patient Information  Yasmeen Tabares  80 y.o.  11/17/2020  4:20 PM by Raheem Bird MD. Yasmeen Tabares was admitted from Home    Problem List  Patient Active Problem List    Diagnosis Date Noted    Altered mental status 11/18/2020    Post-ictal state ([address] 11/18/2020    Seizure ([address] 11/17/2020    Atrial pacemaker lead displacement 10/18/2019    Pacemaker 03/22/2019    Bradycardia 03/19/2019    Elevated troponin 03/19/2019    Hypertensive urgency 03/19/2019    Second degree AV block, Mobitz type I 03/19/2019    Stage 3 chronic kidney disease 03/15/2019    Rotator cuff arthropathy of both shoulders 03/15/2019    Cognitive impairment 81/74/6810    Systolic murmur 64/86/7234    Essential hypertension 12/17/2018    Gout 12/17/2018    Pure hypercholesterolemia 12/17/2018    History of prostate cancer 12/17/2018    Chronic constipation 12/17/2018    Dysuria 08/08/2017    Weight loss 08/08/2017    Adrenal mass ([address] 07/18/2017     Past Medical History:   Diagnosis Date    Cancer [facility])     prostate    Constipation     Gout     Hyperlipemia     Hypertension     Pacemaker 2019    Stroke ([address]     Thrombocytopenia ([address] 3/19/2019    TIA (transient ischemic attack) 2013           Activity:  Activity Level: Bed Rest  Number times ambulated in hallways past shift: 0  Number of times OOB to chair past shift: 0    Cardiac:   Cardiac Monitoring: Yes      Cardiac Rhythm: Paced    Access:   Current line(s): PIV     Genitourinary:   Urinary status: external catheter    Respiratory:   O2 Device: Room air  Chronic home O2 use?: NO  Incentive spirometer at bedside: NO       GI:  Last Bowel Movement Date: (unknown)  Current diet:  DIET NPO  Passing flatus: YES  Tolerating current diet: NO       Pain Management:   Patient states pain is manageable on current regimen: N/A    Skin:  Torito Score: 12  Interventions: speciality bed, increase time out of bed and PT/OT consult    Patient Safety:  Fall Score:  Total Score: 3  Interventions: bed/chair alarm  High Fall Risk: Yes      Wounds: (If Applicable)  Wounds- No  Location n/a    Active Consults:  IP CONSULT TO NEUROLOGY  IP CONSULT TO PALLIATIVE CARE - PROVIDER    Length of Stay:  Expected LOS: 2d 16h  Actual LOS: 8  Discharge Plan: Yes CITLALLI Eddy

## 2020-11-26 NOTE — PROGRESS NOTES
Neurology Note    Patient ID:  Kathryn Corrales  [id number]  39 y.o.  3/9/1933      Date of Consultation:  November 26, 2020      Subjective: Denies pain       History of Present Illness:   Kathryn Corrales is a 80 y.o. male who was admitted to the hospital on November 17, 2020 with a new onset seizure. He does have a history of a prior stroke. His EEG revealed diffuse cerebral slowing and no seizures. He has been started on Keppra. He did receive a follow-up EEG earlier on 11/24/2020 which revealed a seizure focus but no ongoing seizures. There were no events reported overnight last night. There was no further seizure activity.         Past Medical History:   Diagnosis Date    Cancer [facility])     prostate    Constipation     Gout     Hyperlipemia     Hypertension     Pacemaker 2019    Stroke ([facility]     Thrombocytopenia ([facility] 3/19/2019    TIA (transient ischemic attack) 2013        Past Surgical History:   Procedure Laterality Date    HX PROSTATECTOMY      HX UROLOGICAL      prostate removed    TN INS NEW/RPLCMT PRM PACEMAKR W/TRANS ELTRD ATRIAL N/A 10/18/2019    Insert Ppm Single Atrial performed by Stephane Garduno MD at [address] CATH LAB    TN INS NEW/RPLCMT PRM PM W/TRANSV ELTRD ATRIAL&VENT Right 3/22/2019    INSERT PPM DUAL performed by Stephane Garduno MD at [address] [id number] CATH LAB        Family History   Problem Relation Age of Onset    No Known Problems Mother     No Known Problems Father         Social History     Tobacco Use    Smoking status: Never Smoker    Smokeless tobacco: Never Used   Substance Use Topics    Alcohol use: No        No Known Allergies       Current Facility-Administered Medications   Medication Dose Route Frequency    dextrose 5% infusion  50 mL/hr IntraVENous CONTINUOUS    levETIRAcetam (KEPPRA) 750 mg in 0.9% sodium chloride 100 mL IVPB  750 mg IntraVENous Q12H    nitroglycerin (NITROBID) 2 % ointment 1 Inch  1 Inch Topical Q6H PRN    enalaprilat (VASOTEC) injection 1.25 mg  1.25 mg IntraVENous Q6H    hydrALAZINE (APRESOLINE) 20 mg/mL injection 10 mg  10 mg IntraVENous Q6H    cyanocobalamin (VITAMIN B12) injection 1,000 mcg  1,000 mcg IntraMUSCular DAILY    LORazepam (ATIVAN) injection 1 mg  1 mg IntraVENous Q6H PRN    influenza vaccine 2020-21 (6 mos+)(PF) (FLUARIX/FLULAVAL/FLUZONE QUAD) injection 0.5 mL  0.5 mL IntraMUSCular PRIOR TO DISCHARGE    [Held by provider] levETIRAcetam (KEPPRA) tablet 500 mg  500 mg Oral BID    metoprolol (LOPRESSOR) injection 2.5 mg  2.5 mg IntraVENous Q6H    hydrALAZINE (APRESOLINE) 20 mg/mL injection 5 mg  5 mg IntraVENous Q6H PRN    aspirin (ASA) suppository 150 mg  150 mg Rectal DAILY    bisacodyL (DULCOLAX) suppository 10 mg  10 mg Rectal DAILY PRN    sodium chloride (NS) flush 5-40 mL  5-40 mL IntraVENous Q8H    sodium chloride (NS) flush 5-40 mL  5-40 mL IntraVENous PRN    acetaminophen (TYLENOL) tablet 650 mg  650 mg Oral Q6H PRN    Or    acetaminophen (TYLENOL) suppository 650 mg  650 mg Rectal Q6H PRN    polyethylene glycol (MIRALAX) packet 17 g  17 g Oral DAILY PRN    famotidine (PEPCID) tablet 20 mg  20 mg Oral BID    nicotine (NICODERM CQ) 14 mg/24 hr patch 1 Patch  1 Patch TransDERmal DAILY PRN    enoxaparin (LOVENOX) injection 40 mg  40 mg SubCUTAneous DAILY    allopurinoL (ZYLOPRIM) tablet 100 mg  100 mg Oral DAILY    amLODIPine (NORVASC) tablet 5 mg  5 mg Oral DAILY    atorvastatin (LIPITOR) tablet 40 mg  40 mg Oral QHS    [Held by provider] hydrALAZINE (APRESOLINE) tablet 25 mg  25 mg Oral TID    [Held by provider] metoprolol succinate (TOPROL-XL) XL tablet 25 mg  25 mg Oral DAILY         Review of Systems:    [x]Unable to obtain  ROS due to  [x]mental status change  []sedated   []intubated    Objective:     Visit Vitals  BP (!) 150/83 (BP 1 Location: Right arm, BP Patient Position: At rest)   Pulse 88   Temp 98.6 °F (37 °C)   Resp 16   Ht 5' 6\" (1.676 m)   Wt 163 lb 12.8 oz (74.3 kg)   SpO2 100% BMI 26.44 kg/m²       Physical Exam:      General:  appears well nourished in no acute distress  Neck: no carotid bruits  Lungs: clear to auscultation  Heart:  Murmurs noted  Lower extremity: peripheral pulses palpable and no edema  Skin: intact    Neurological exam:    The patient was sleeping but was arousable. He will speak simple words. He was a bit more awake today. He did answer simple questions for me. He would not follow any commands. He was not perseverating like yesterday. Cranial nerves:   II-XII were tested    Perrrla  He does blink to visual threat  Eomi, no evidence of nystagmus  Facial sensation:  normal and symmetric  Facial motor: normal and symmetric  Hearing intact  Tongue: midline without fasciculations  Does lean to the left with a tilting of his neck to the left. Motor: Tone normal.      No evidence of fasciculations    It was difficult to perform formal motor testing. He does respond a bit more on the right and has generalized weakness but withdraws to painful stimulation      Sensory:  Upper extremity: intact to pp,  Lower extremity: intact to pp    Reflexes:    Right Left  Biceps  2 2  Triceps 2 2  Brachiorad. 2 2  Patella  2 2  Achilles - -    Plantar response: Extensor bilaterally    Cerebellar testing:  no tremor apparent    Labs:     Lab Results   Component Value Date/Time    Hemoglobin A1c 5.7 (H) 07/01/2020 04:04 AM    Sodium 146 (H) 11/26/2020 02:47 AM    Potassium 3.5 11/26/2020 02:47 AM    Chloride 115 (H) 11/26/2020 02:47 AM    Glucose 108 (H) 11/26/2020 02:47 AM    BUN 30 (H) 11/26/2020 02:47 AM    Creatinine 1.34 (H) 11/26/2020 02:47 AM    Calcium 9.7 11/26/2020 02:47 AM    WBC 7.6 11/26/2020 02:47 AM    HCT 36.9 11/26/2020 02:47 AM    HGB 12.0 (L) 11/26/2020 02:47 AM    PLATELET 155 85/14/6711 02:47 AM       Imaging:    Results from Hospital Encounter encounter on 06/30/20   MRI BRAIN WO CONT    Narrative CLINICAL HISTORY: Possible CVA.     INDICATION: Possible CVA.  Parieto-occipital infarction is suspected. COMPARISON: CT and CTA 6/30/2020    TECHNIQUE: MR examination of the brain includes axial and sagittal T1, coronal  T2, axial T2, axial FLAIR, axial gradient echo, axial DWI. CONTRAST: None    FINDINGS:   Small focus of acute infarction in the posterior superior right frontal lobe  precentral.   Subacute infarction left and right occipital lobes. Moderate to large left  occipital infarction with posterior thalamic and pulmonary subacute infarction  on the left. Small to moderate subacute infarction on the right. There is mild  superimposed hemorrhage, no associated midline shift or mass effect. Chronic  foci of hemosiderin deposition are most likely related to severe chronic  microvascular change. The brain architecture is normal. There is no evidence of  midline shift or mass-effect. There are no extra-axial fluid collections. There  is no Chiari or syrinx. The pituitary and infundibulum are grossly unremarkable. There is no skull base mass. Cerebellopontine angles are grossly unremarkable. The major intracranial vascular flow-voids are unremarkable. The cavernous  sinuses are symmetric. Optic chiasm and infundibulum grossly unremarkable. Orbits are grossly symmetric. Dural venous sinuses are grossly patent. The mastoid air cells are well pneumatized and clear. Impression IMPRESSION:  Moderate to large subacute infarction in the left occipital lobe and left basal  ganglia. Small to moderate subacute infarction in the right occipital lobe. Mild superimposed hemorrhage but no evidence of midline shift or mass effect. Punctate focus of acute infarction in the posterior superior right frontal lobe. Moderate to severe chronic microvascular ischemic change and cerebral atrophy.              Results from [place] encounter on 11/17/20   CT HEAD WO CONT    Narrative EXAM: CT HEAD WO CONT    INDICATION: stroke    COMPARISON: November 17, 2020.    CONTRAST: None. TECHNIQUE: Unenhanced CT of the head was performed using 5 mm images. Brain and  bone windows were generated. Coronal and sagittal reformats. CT dose reduction  was achieved through use of a standardized protocol tailored for this  examination and automatic exposure control for dose modulation. FINDINGS:  The ventricles and sulci are stable in size, shape and configuration. . There is  unchanged diffuse periventricular white matter disease. Old lacunar infarct in  the left basal ganglia is unchanged. Old bilateral occipital infarcts are  unchanged. There is no intracranial hemorrhage, extra-axial collection, or mass  effect. The basilar cisterns are open. No CT evidence of acute infarct. The bone windows demonstrate no abnormalities. The visualized portions of the  paranasal sinuses and mastoid air cells are clear. Impression IMPRESSION:   No acute intracranial process. No significant change from the prior study. I did independently review his head CT from admission on November 23, 2020. There was a significant amount of atrophy and periventricular microischemic changes that were noted. Multiple chronic old strokes were apparent. Assessment and Plan:    The patient is a pleasant 80-year-old gentleman with history of prior stroke and reportedly mild dementia who presents with new onset seizure. His neurological examination reveals a suppressed mental status with unclear focal weakness. He does withdraw the left but a slight bit less but is more arousable today    New onset seizure:    He does have reasons to have developed seizures. He does have prior strokes and a significant amount of atrophy  Continue Keppra to 750 mg twice a day to hopefully. He is a bit more awake today. Strokes:  He does have a history of multiple strokes on neuro imaging.   He very well may have had a new stroke but an MRI cannot be completed and he does have a significant amount of chronic disease on his brain. There is a slight asymmetry to his exam and the EEG does reveal slowing on the right. His risk factors for stroke do include hypertension and dyslipidemia  Hypertension: Continue aggressive control with goal systolic blood pressure under 140  Dyslipidemia: Continue statin therapy    Continue aspirin       Mental status: This very well may be multifactorial.  He does have an underlying history of a dementia. Given his neuro imaging, there is most likely a component of a vascular dementia and a stepwise progression of the dementia is likely. This may be a new baseline in his cognitive status but will assess as we decrease the dosing of the Keppra. I did discuss all of this with the daughter at the bedside today. Neurology will continue to follow along closely during the hospitalization. After discharge, the patient will need to have close follow-up in the neurology clinic with Dr. Ramin Jo. Active Problems:    Seizure ([facility] (11/17/2020)      Altered mental status (11/18/2020)      Post-ictal state ([facility] [address] (11/18/2020)        I spent  35   minutes providing care to this  acutely ill inpatient with > 50% of the time counseling and assisting in the coordination of care of the patient on the patient's hospital floor/unit.                   Signed By:  Amanda Hector DO FAAN    November 26, 2020

## 2020-11-26 NOTE — PROGRESS NOTES
End of Shift Note    Bedside shift change report given to [facility] (oncoming nurse) by Colleen Hastings (offgoing nurse).   Report included the following information SBAR, Kardex and MAR    Shift worked:  days   Shift summary and any significant changes:     speech consult, palliative consult, Dr Batista Factor decreased keppra dose, R arm swollen MD notified and Duplex ordered        Concerns for physician to address:  pt nutrition    Zone phone for oncoming shift:   [phone]     Patient Information  Yumiko Washburn  80 y.o.  11/17/2020  4:20 PM by Boris Ballard MD. Yumiko Washburn was admitted from Home    Problem List  Patient Active Problem List    Diagnosis Date Noted    Altered mental status 11/18/2020    Post-ictal state ([address] 11/18/2020    Seizure ([address] 11/17/2020    Atrial pacemaker lead displacement 10/18/2019    Pacemaker 03/22/2019    Bradycardia 03/19/2019    Elevated troponin 03/19/2019    Hypertensive urgency 03/19/2019    Second degree AV block, Mobitz type I 03/19/2019    Stage 3 chronic kidney disease 03/15/2019    Rotator cuff arthropathy of both shoulders 03/15/2019    Cognitive impairment 66/11/1511    Systolic murmur 36/61/7931    Essential hypertension 12/17/2018    Gout 12/17/2018    Pure hypercholesterolemia 12/17/2018    History of prostate cancer 12/17/2018    Chronic constipation 12/17/2018    Dysuria 08/08/2017    Weight loss 08/08/2017    Adrenal mass ([address] 07/18/2017     Past Medical History:   Diagnosis Date    Cancer [facility])     prostate    Constipation     Gout     Hyperlipemia     Hypertension     Pacemaker 2019    Stroke ([address]     Thrombocytopenia ([address] 3/19/2019    TIA (transient ischemic attack) 2013           Activity:  Activity Level: Bed Rest  Number times ambulated in hallways past shift: 0  Number of times OOB to chair past shift: 0    Cardiac:   Cardiac Monitoring: Yes      Cardiac Rhythm: Paced    Access:   Current line(s): PIV     Genitourinary: Urinary status: incontinent      Respiratory:   O2 Device: Room air  Chronic home O2 use?: NO  Incentive spirometer at bedside: NO       GI:  Last Bowel Movement Date: (unknown)  Current diet:  DIET NPO  Passing flatus: YES  Tolerating current diet: NO       Pain Management:   Patient states pain is manageable on current regimen: N/A    Skin:  Torito Score: 12  Interventions: speciality bed, increase time out of bed and PT/OT consult    Patient Safety:  Fall Score:  Total Score: 3  Interventions: bed/chair alarm  High Fall Risk: Yes      Wounds: (If Applicable)  Wounds- No  Location n/a    Active Consults:  IP CONSULT TO NEUROLOGY  IP CONSULT TO PALLIATIVE CARE - PROVIDER    Length of Stay:  Expected LOS: 2d 16h  Actual LOS: 7  Discharge Plan: Yes CITLALLI Hernandez

## 2020-11-27 ENCOUNTER — APPOINTMENT (OUTPATIENT)
Dept: VASCULAR SURGERY | Age: 85
DRG: 057 | End: 2020-11-27
Attending: INTERNAL MEDICINE
Payer: MEDICARE

## 2020-11-27 PROCEDURE — 74011250636 HC RX REV CODE- 250/636: Performed by: PSYCHIATRY & NEUROLOGY

## 2020-11-27 PROCEDURE — 74011000258 HC RX REV CODE- 258: Performed by: PSYCHIATRY & NEUROLOGY

## 2020-11-27 PROCEDURE — 92610 EVALUATE SWALLOWING FUNCTION: CPT

## 2020-11-27 PROCEDURE — 93971 EXTREMITY STUDY: CPT | Performed by: SURGERY

## 2020-11-27 PROCEDURE — 93971 EXTREMITY STUDY: CPT

## 2020-11-27 PROCEDURE — 74011250636 HC RX REV CODE- 250/636: Performed by: INTERNAL MEDICINE

## 2020-11-27 PROCEDURE — 74011250637 HC RX REV CODE- 250/637: Performed by: INTERNAL MEDICINE

## 2020-11-27 PROCEDURE — 99232 SBSQ HOSP IP/OBS MODERATE 35: CPT | Performed by: PSYCHIATRY & NEUROLOGY

## 2020-11-27 PROCEDURE — 74011000250 HC RX REV CODE- 250: Performed by: INTERNAL MEDICINE

## 2020-11-27 PROCEDURE — 65660000000 HC RM CCU STEPDOWN

## 2020-11-27 RX ADMIN — Medication 10 ML: at 05:47

## 2020-11-27 RX ADMIN — ENALAPRILAT 1.25 MG: 2.5 INJECTION INTRAVENOUS at 17:54

## 2020-11-27 RX ADMIN — LEVETIRACETAM 750 MG: 100 INJECTION, SOLUTION INTRAVENOUS at 13:48

## 2020-11-27 RX ADMIN — ENALAPRILAT 1.25 MG: 2.5 INJECTION INTRAVENOUS at 05:44

## 2020-11-27 RX ADMIN — DEXTROSE MONOHYDRATE 50 ML/HR: 5 INJECTION, SOLUTION INTRAVENOUS at 08:14

## 2020-11-27 RX ADMIN — METOPROLOL TARTRATE 2.5 MG: 1 INJECTION, SOLUTION INTRAVENOUS at 05:44

## 2020-11-27 RX ADMIN — CYANOCOBALAMIN 1000 MCG: 1000 INJECTION, SOLUTION INTRAMUSCULAR; SUBCUTANEOUS at 09:54

## 2020-11-27 RX ADMIN — HYDRALAZINE HYDROCHLORIDE 10 MG: 20 INJECTION INTRAMUSCULAR; INTRAVENOUS at 11:58

## 2020-11-27 RX ADMIN — ENOXAPARIN SODIUM 40 MG: 40 INJECTION SUBCUTANEOUS at 09:54

## 2020-11-27 RX ADMIN — ASPIRIN 150 MG: 300 SUPPOSITORY RECTAL at 09:54

## 2020-11-27 RX ADMIN — HYDRALAZINE HYDROCHLORIDE 10 MG: 20 INJECTION INTRAMUSCULAR; INTRAVENOUS at 17:46

## 2020-11-27 RX ADMIN — HYDRALAZINE HYDROCHLORIDE 10 MG: 20 INJECTION INTRAMUSCULAR; INTRAVENOUS at 05:44

## 2020-11-27 RX ADMIN — METOPROLOL TARTRATE 2.5 MG: 1 INJECTION, SOLUTION INTRAVENOUS at 17:50

## 2020-11-27 RX ADMIN — Medication 10 ML: at 15:54

## 2020-11-27 RX ADMIN — ENALAPRILAT 1.25 MG: 2.5 INJECTION INTRAVENOUS at 12:02

## 2020-11-27 RX ADMIN — LEVETIRACETAM 750 MG: 100 INJECTION, SOLUTION INTRAVENOUS at 02:54

## 2020-11-27 NOTE — PROGRESS NOTES
Neurology Note    Patient ID:  Anshul Hua  [id number]  13 y.o.  3/9/1933      Date of Consultation:  November 27, 2020      Subjective: Denies pain       History of Present Illness:   Anshul Hua is a 80 y.o. male who was admitted to the hospital on November 17, 2020 with a new onset seizure. He does have a history of a prior stroke. His EEG revealed diffuse cerebral slowing and no seizures. He has been started on Keppra. He did receive a follow-up EEG earlier on 11/24/2020 which revealed a seizure focus but no ongoing seizures. There were no events reported overnight last night. There was no further seizure activity. He is having a waxing and waning mental status.         Past Medical History:   Diagnosis Date    Cancer [facility])     prostate    Constipation     Gout     Hyperlipemia     Hypertension     Pacemaker 2019    St[facility] [address]     Thrombocytopenia ([facility] [address] 3/19/2019    TIA (transient ischemic attack) 2013        Past Surgical History:   Procedure Laterality Date    HX PROSTATECTOMY      HX UROLOGICAL      prostate removed    UT INS NEW/RPLCMT PRM PACEMAKR W/TRANS ELTRD ATRIAL N/A 10/18/2019    Insert Ppm Single Atrial performed by Ebenezer Brand MD at [address] CATH LAB    UT INS NEW/RPLCMT PRM PM W/TRANSV ELTRD ATRIAL&VENT Right 3/22/2019    INSERT PPM DUAL performed by Ebenezer Brand MD at [address] [facility] [address] CATH LAB        Family History   Problem Relation Age of Onset    No Known Problems Mother     No Known Problems Father         Social History     Tobacco Use    Smoking status: Never Smoker    Smokeless tobacco: Never Used   Substance Use Topics    Alcohol use: No        No Known Allergies       Current Facility-Administered Medications   Medication Dose Route Frequency    dextrose 5% infusion  50 mL/hr IntraVENous CONTINUOUS    levETIRAcetam (KEPPRA) 750 mg in 0.9% sodium chloride 100 mL IVPB  750 mg IntraVENous Q12H    nitroglycerin (NITROBID) 2 % ointment 1 Inch  1 Inch Topical Q6H PRN    enalaprilat (VASOTEC) injection 1.25 mg  1.25 mg IntraVENous Q6H    hydrALAZINE (APRESOLINE) 20 mg/mL injection 10 mg  10 mg IntraVENous Q6H    LORazepam (ATIVAN) injection 1 mg  1 mg IntraVENous Q6H PRN    influenza vaccine 2020-21 (6 mos+)(PF) (FLUARIX/FLULAVAL/FLUZONE QUAD) injection 0.5 mL  0.5 mL IntraMUSCular PRIOR TO DISCHARGE    [Held by provider] levETIRAcetam (KEPPRA) tablet 500 mg  500 mg Oral BID    metoprolol (LOPRESSOR) injection 2.5 mg  2.5 mg IntraVENous Q6H    hydrALAZINE (APRESOLINE) 20 mg/mL injection 5 mg  5 mg IntraVENous Q6H PRN    aspirin (ASA) suppository 150 mg  150 mg Rectal DAILY    bisacodyL (DULCOLAX) suppository 10 mg  10 mg Rectal DAILY PRN    sodium chloride (NS) flush 5-40 mL  5-40 mL IntraVENous Q8H    sodium chloride (NS) flush 5-40 mL  5-40 mL IntraVENous PRN    acetaminophen (TYLENOL) tablet 650 mg  650 mg Oral Q6H PRN    Or    acetaminophen (TYLENOL) suppository 650 mg  650 mg Rectal Q6H PRN    polyethylene glycol (MIRALAX) packet 17 g  17 g Oral DAILY PRN    famotidine (PEPCID) tablet 20 mg  20 mg Oral BID    nicotine (NICODERM CQ) 14 mg/24 hr patch 1 Patch  1 Patch TransDERmal DAILY PRN    enoxaparin (LOVENOX) injection 40 mg  40 mg SubCUTAneous DAILY    allopurinoL (ZYLOPRIM) tablet 100 mg  100 mg Oral DAILY    amLODIPine (NORVASC) tablet 5 mg  5 mg Oral DAILY    atorvastatin (LIPITOR) tablet 40 mg  40 mg Oral QHS    [Held by provider] hydrALAZINE (APRESOLINE) tablet 25 mg  25 mg Oral TID    [Held by provider] metoprolol succinate (TOPROL-XL) XL tablet 25 mg  25 mg Oral DAILY         Review of Systems:    [x]Unable to obtain  ROS due to  [x]mental status change  []sedated   []intubated    Objective:     Visit Vitals  /60   Pulse (!) 58   Temp 97.3 °F (36.3 °C)   Resp 20   Ht 5' 6\" (1.676 m)   Wt 163 lb 12.8 oz (74.3 kg)   SpO2 98%   BMI 26.44 kg/m²       Physical Exam:      General:  appears well nourished in no acute distress  Neck: no carotid bruits  Lungs: clear to auscultation  Heart:  Murmurs noted  Lower extremity: peripheral pulses palpable and no edema  Skin: intact    Neurological exam:    The patient was sleeping but was arousable. He will speak simple words. He was a bit more awake today. He would answer simple questions for me today. He denies pain. He did stick out his tongue for me. There was no perseveration. Cranial nerves:   II-XII were tested    Perrrla  He does blink to visual threat  Eomi, no evidence of nystagmus  Facial sensation:  normal and symmetric  Facial motor: normal and symmetric  Hearing intact  Tongue: midline without fasciculations  Does lean to the left with a tilting of his neck to the left. Motor: Tone normal.      No evidence of fasciculations    It was difficult to perform formal motor testing. He does respond a bit more on the right and has generalized weakness but withdraws to painful stimulation. Left upper extremity is clearly weaker. Sensory:  Upper extremity: intact to pp,  Lower extremity: intact to pp    Reflexes:    Right Left  Biceps  2 2  Triceps 2 2  Brachiorad. 2 2  Patella  2 2  Achilles - -    Plantar response: Extensor bilaterally    Cerebellar testing:  no tremor apparent    Labs:     Lab Results   Component Value Date/Time    Hemoglobin A1c 5.7 (H) 07/01/2020 04:04 AM    Sodium 146 (H) 11/26/2020 02:47 AM    Potassium 3.5 11/26/2020 02:47 AM    Chloride 115 (H) 11/26/2020 02:47 AM    Glucose 108 (H) 11/26/2020 02:47 AM    BUN 30 (H) 11/26/2020 02:47 AM    Creatinine 1.34 (H) 11/26/2020 02:47 AM    Calcium 9.7 11/26/2020 02:47 AM    WBC 7.6 11/26/2020 02:47 AM    HCT 36.9 11/26/2020 02:47 AM    HGB 12.0 (L) 11/26/2020 02:47 AM    PLATELET 033 28/33/2399 02:47 AM       Imaging:    Results from Hospital Encounter encounter on 06/30/20   MRI BRAIN WO CONT    Narrative CLINICAL HISTORY: Possible CVA. INDICATION: Possible CVA.   Parieto-occipital infarction is suspected. COMPARISON: CT and CTA 6/30/2020    TECHNIQUE: MR examination of the brain includes axial and sagittal T1, coronal  T2, axial T2, axial FLAIR, axial gradient echo, axial DWI. CONTRAST: None    FINDINGS:   Small focus of acute infarction in the posterior superior right frontal lobe  precentral.   Subacute infarction left and right occipital lobes. Moderate to large left  occipital infarction with posterior thalamic and pulmonary subacute infarction  on the left. Small to moderate subacute infarction on the right. There is mild  superimposed hemorrhage, no associated midline shift or mass effect. Chronic  foci of hemosiderin deposition are most likely related to severe chronic  microvascular change. The brain architecture is normal. There is no evidence of  midline shift or mass-effect. There are no extra-axial fluid collections. There  is no Chiari or syrinx. The pituitary and infundibulum are grossly unremarkable. There is no skull base mass. Cerebellopontine angles are grossly unremarkable. The major intracranial vascular flow-voids are unremarkable. The cavernous  sinuses are symmetric. Optic chiasm and infundibulum grossly unremarkable. Orbits are grossly symmetric. Dural venous sinuses are grossly patent. The mastoid air cells are well pneumatized and clear. Impression IMPRESSION:  Moderate to large subacute infarction in the left occipital lobe and left basal  ganglia. Small to moderate subacute infarction in the right occipital lobe. Mild superimposed hemorrhage but no evidence of midline shift or mass effect. Punctate focus of acute infarction in the posterior superior right frontal lobe. Moderate to severe chronic microvascular ischemic change and cerebral atrophy. Results from [place] encounter on 11/17/20   CT HEAD WO CONT    Narrative EXAM: CT HEAD WO CONT    INDICATION: stroke    COMPARISON: November 17, 2020.     CONTRAST: None.    TECHNIQUE: Unenhanced CT of the head was performed using 5 mm images. Brain and  bone windows were generated. Coronal and sagittal reformats. CT dose reduction  was achieved through use of a standardized protocol tailored for this  examination and automatic exposure control for dose modulation. FINDINGS:  The ventricles and sulci are stable in size, shape and configuration. . There is  unchanged diffuse periventricular white matter disease. Old lacunar infarct in  the left basal ganglia is unchanged. Old bilateral occipital infarcts are  unchanged. There is no intracranial hemorrhage, extra-axial collection, or mass  effect. The basilar cisterns are open. No CT evidence of acute infarct. The bone windows demonstrate no abnormalities. The visualized portions of the  paranasal sinuses and mastoid air cells are clear. Impression IMPRESSION:   No acute intracranial process. No significant change from the prior study. I did independently review his head CT from admission on November 23, 2020. There was a significant amount of atrophy and periventricular microischemic changes that were noted. Multiple chronic old strokes were apparent. Assessment and Plan:    The patient is a pleasant 79-year-old gentleman with history of prior stroke and dementia who presents with new onset seizure. His neurological examination reveals a ongoing dementia with left-sided weakness. New onset seizure:    He does have reasons to have developed seizures. He does have prior strokes and a significant amount of atrophy  Continue Keppra to 750 mg twice a day        Strokes:  He does have a history of multiple strokes on neuro imaging. He very well may have had a new stroke but an MRI cannot be completed and he does have a significant amount of chronic disease on his brain. There is a slight asymmetry to his exam and the EEG does reveal slowing on the right resting a possible stroke on the left.   His risk factors for stroke do include hypertension and dyslipidemia  Hypertension: Continue aggressive control with goal systolic blood pressure under 140  Dyslipidemia: Continue statin therapy    Continue aspirin       Dementia:   Given his neuro imaging, there is most likely a component of a vascular dementia and a stepwise progression of the dementia is likely. He will need to continue on low-dose Keppra to prevent seizures. This very well may be close to his new baseline. Neurology will continue to follow along closely during the hospitalization. After discharge, the patient will need to have close follow-up in the neurology clinic with Dr. Lizandro Epps.        Active Problems:    Seizure ([facility] [address] (11/17/2020)      Altered mental status (11/18/2020)      Post-ictal state ([facility] [address] (11/18/2020)                     Signed By:  Renetta Ng DO FAAN    November 27, 2020

## 2020-11-27 NOTE — PROGRESS NOTES
Problem: Dysphagia (Adult)  Goal: *Acute Goals and Plan of Care (Insert Text)  Description: Speech pathology goals  Initiated 11/27/2020  1. Patient will participate in re-evaluation of swallow function within 7 days  Outcome: Not Met     [address] EVALUATION  Patient: Candy Lawrence (68 y.o. male)  Date: 11/27/2020  Primary Diagnosis: Seizure ([facility] [address] [R56.9]  Altered mental status [R41.82]  Post-ictal state ([address] [R56.9]        Precautions: swallow       ASSESSMENT :  Based on the objective data described below, the patient presents with improved alertness however patient remains too confused for PO intake. SLP and daughter offered thin liquid via spoon, thin liquid via straw, and ice chips. Despite max verbal/tactile cues, patient with absent bolus acceptance. Patient intermittently closed his lips tightly and turned his head away when offered PO. Patient remains inappropriate for PO diet due to absent bolus acceptance related to AMS. Patient has now been NPO x9 days, and recommend alternative means of nutrition if in line with goals of care. Patient will benefit from skilled intervention to address the above impairments. Patients rehabilitation potential is considered to be Guarded     PLAN :  Recommendations and Planned Interventions:  -NPO due to poor alertness and confusion  -Oral care as patient will allows  -If alertness/mental status improve, recommend RN complete the STAND  -SLP to follow for swallowing re-evaluation  -Consider alternative means of nutrition if in line with goals of care as patient has been NPO x9 days  Frequency/Duration: Patient will be followed by speech-language pathology 3 times a week to address goals. Discharge Recommendations: To Be Determined     SUBJECTIVE:   Patient opened eyes and vocalized however no intelligible verbalizations.  .    OBJECTIVE:     Past Medical History:   Diagnosis Date    Cancer [facility])     prostate    Constipation  Gout     Hyperlipemia     Hypertension     Pacemaker 2019    [facility] ([facility] [address]     Thrombocytopenia ([facility] [address] 3/19/2019    TIA (transient ischemic attack) 2013     Past Surgical History:   Procedure Laterality Date    HX PROSTATECTOMY      HX UROLOGICAL      prostate removed    AR INS NEW/RPLCMT PRM PACEMAKR W/TRANS ELTRD ATRIAL N/A 10/18/2019    Insert Ppm Single Atrial performed by Kaylee Garland MD at [address] CATH LAB    AR INS NEW/RPLCMT PRM PM W/TRANSV ELTRD ATRIAL&VENT Right 3/22/2019    INSERT PPM DUAL performed by Kaylee Garland MD at [address] CATH LAB     Prior Level of Function/Home Situation:   Home Situation  Home Environment: Skilled nursing facility  One/Two Story Residence: One story  Living Alone: No  Support Systems: Skilled nursing facility  Patient Expects to be Discharged to[de-identified] Skilled nursing facility  Current DME Used/Available at Home: Walker, rolling  Diet prior to admission:   Current Diet:  NPO   Cognitive and Communication Status:  Neurologic State: Drowsy, Eyes open to voice  Orientation Level: Unable to verbalize  Cognition: Decreased command following, Decreased attention/concentration           Oral Assessment:     P.O. Trials:  Patient Position: upright in bed  Vocal quality prior to P.O.: Low volume  Consistency Presented: Ice chips; Thin liquid  How Presented: SLP-fed/presented; Other (comment); Spoon(daughter-fed)     Bolus Acceptance: Absent(despite max verbal/tactile cues from SLP and dtr)                                     Oral Phase Severity: Severe  Pharyngeal Phase Severity : Other (comment)(unable to assess)    NOMS:   The NOMS functional outcome measure was used to quantify this patient's level of swallowing impairment.   Based on the NOMS, the patient was determined to be at level 1 for swallow function       NOMS Swallowing Levels:  Level 1 (CN): NPO  Level 2 (CM): NPO but takes consistency in therapy  Level 3 (CL): Takes less than 50% of nutrition p.o. and continues with nonoral feedings; and/or safe with mod cues; and/or max diet restriction  Level 4 (CK): Safe swallow but needs mod cues; and/or mod diet restriction; and/or still requires some nonoral feeding/supplements  Level 5 (CJ): Safe swallow with min diet restriction; and/or needs min cues  Level 6 (CI): Independent with p.o.; rare cues; usually self cues; may need to avoid some foods or needs extra time  Level 7 ([address] [place] [address]): Independent for all p.o.  ERNESTO. (2003). National Outcomes Measurement System (NOMS): Adult Speech-Language Pathology User's Guide. Pain:  Pain Scale 1: Numeric (0 - 10)  Pain Intensity 1: 0       After treatment:   Patient left in no apparent distress in bed, Call bell within reach, Nursing notified and Caregiver / family present    COMMUNICATION/EDUCATION:   Patient was educated regarding his deficit(s) of dysphagia as this relates to his diagnosis of AMS. He demonstrated Guarded understanding as evidenced by AMS. Daughter verbalized understanding. The patient's plan of care including recommendations, planned interventions, and recommended diet changes were discussed with: Registered nurse. Patient/family have participated as able in goal setting and plan of care. Patient/family agree to work toward stated goals and plan of care.     Thank you for this referral.  Susan Sepulveda, MOLLY  Time Calculation: 15 mins

## 2020-11-27 NOTE — PROGRESS NOTES
Hospitalist Progress Note    NAME: Prince Comment   :  3/9/1933   MRN:  [id number]       Assessment / Plan:  New onset seizures, POA, recurrent episodes   Acute encephalopathy, likely postictal, POA vs worsening dementia   History of multiple strokes, POA  Patient had a 5-minute seizure at nursing home, seized again in the ER and was given Ativan.  status post Keppra loading dose  -Was on Keppra 1 g checks twice daily but dose was decreased to 750 mg twice daily due to sedation  EEG  On admission showed no seizure , repeat EEG  Showed a potential seizure focus on the R hemisphere   MRI ordered unable to be done because of pt being confsued+ pacemaker , unable to verbalize if issues with pacemaker arise while doing MRI   Repeat CT brain unchanged   Seizure precautions  Ativan as needed for seizures and agitation  TSH wnl , B12 wnl , folate wnl , RPR neg , CBC, CMP wnl , and blood cultures NTD    -Dr. Saima Alford who recommended that we will wait 1 to 2 days to see if his mental status would get better with medication adjustment and if not better, he suggested hospice  -Discussed with patient daughter about plan of care  -Continue Keppra at reduced dose of 750 mg twice daily and continue to monitor for seizures  -Patient's daughter will decide on feeding options in the next 2 days regarding NG tube vs PEG tube      History of CVA  Continue aspirin suppository. Not getting p.o. meds as he is n.p.o.       SOULEYMANE on CKD  -Creatinine on  was 1. 19. Check BMP in a.m.     Hypokalemia   Check BMP in a.m.     Sinus bradycardia with 2-1 block: POA  Status post pacemaker  Was able to have MRI with [employer] rep present and MRI in July at [address] [place] [address] to get MRI brain here      Baseline debility  Patient is able to walk with a walker  Has moderate dementia, but is able to hold conversation with family  Resides in a nursing home     Dysphagia due to acute metabolic encephalopathy  -Discussed feeding options with patient daughter, she will discuss various feeding options including NG and also PEG tube with family and let us know    Right arm swelling  -US doppler shows no DVT. Keep rt arm elevated and apply warm compresses. NSVT  -check K and Mg in am.     Dispo:  Discharged dependent upon families decision regarding feeding options    Code status: Full  Prophylaxis: Lovenox  Recommended Disposition: SNF      Subjective:     Chief Complaint / Reason for Physician Visit  No fever  Still remains drowsy and not able to follow commands    Review of Systems:  Symptom Y/N Comments  Symptom Y/N Comments   Fever/Chills    Chest Pain     Poor Appetite    Edema     Cough    Abdominal Pain     Sputum    Joint Pain     SOB/CARREON    Pruritis/Rash     Nausea/vomit    Tolerating PT/OT     Diarrhea    Tolerating Diet     Constipation    Other       Could NOT obtain due to: Lethargic      Objective:     VITALS:   Last 24hrs VS reviewed since prior progress note. Most recent are:  Patient Vitals for the past 24 hrs:   Temp Pulse Resp BP SpO2   11/27/20 0756 99.2 °F (37.3 °C) 69 20 (!) 156/66 98 %   11/27/20 0400 98.3 °F (36.8 °C) 77 16 (!) 166/74 99 %   11/27/20 0000 98.3 °F (36.8 °C) 77 16 (!) 144/68 100 %   11/26/20 2340  77  (!) 144/68    11/26/20 2110  76 16 (!) 165/74    11/26/20 2000 98.8 °F (37.1 °C) 75 16 (!) 170/82 100 %   11/26/20 1711 98.9 °F (37.2 °C) 75 16 (!) 178/66 96 %   11/26/20 1457 98.9 °F (37.2 °C) 78 16 (!) 165/71 96 %   11/26/20 1212 98.9 °F (37.2 °C) 71 20 (!) 175/81 98 %       Intake/Output Summary (Last 24 hours) at 11/27/2020 2372  Last data filed at 11/26/2020 1800  Gross per 24 hour   Intake    Output 425 ml   Net -425 ml        I had a face to face encounter with this patient and independently examined this patient on 11/27/2020 as outlined below:  PHYSICAL EXAM:  General: Drowsy, noncooperative  EENT:  EOMI. Anicteric sclerae. MMM  Resp:  CTA bilaterally, no wheezing or rales.   No accessory muscle use  CV:  Regular  rhythm,  No edema  GI:  Soft, Non distended, Non tender. +Bowel sounds  Neurologic:  Drowsy, not able to follow commands  Psych:   Confused assess  Skin:  No rashes. No jaundice    Reviewed most current lab test results and cultures  YES  Reviewed most current radiology test results   YES  Review and summation of old records today    NO  Reviewed patient's current orders and MAR    YES  PMH/SH reviewed - no change compared to H&P  ________________________________________________________________________  Care Plan discussed with:    Comments   Patient     Family  x    RN x    Care Manager     Consultant  x                     x Multidiciplinary team rounds were held today with , nursing, pharmacist and clinical coordinator. Patient's plan of care was discussed; medications were reviewed and discharge planning was addressed. ________________________________________________________________________  Total NON critical care TIME: 45   Minutes    Total CRITICAL CARE TIME Spent:   Minutes non procedure based      Comments   >50% of visit spent in counseling and coordination of care     ________________________________________________________________________  Yisel Mantilla MD     Procedures: see electronic medical records for all procedures/Xrays and details which were not copied into this note but were reviewed prior to creation of Plan. LABS:  I reviewed today's most current labs and imaging studies.   Pertinent labs include:  Recent Labs     11/26/20 0247   WBC 7.6   HGB 12.0*   HCT 36.9        Recent Labs     11/26/20 0247 11/25/20  0420   *  --    K 3.5  --    *  --    CO2 27  --    *  --    BUN 30*  --    CREA 1.34*  --    CA 9.7  --    MG  --  2.1       Signed: Yisel Mantilla MD

## 2020-11-27 NOTE — PROGRESS NOTES
Hospitalist Progress Note    NAME: Bertha Richardson   :  3/9/1933   MRN:  [id number]       Assessment / Plan:  New onset seizures, POA, recurrent episodes   Acute encephalopathy, likely postictal, POA vs worsening dementia   History of multiple strokes, POA  Patient had a 5-minute seizure at nursing home, seized again in the ER and was given Ativan.  status post Keppra loading dose  -Was on Keppra 1 g checks twice daily but dose was decreased to 750 mg twice daily due to sedation  EEG  On admission showed no seizure , repeat EEG  Showed a potential seizure focus on the R hemisphere   MRI ordered unable to be done because of pt being confsued+ pacemaker , unable to verbalize if issues with pacemaker arise while doing MRI   Repeat CT brain unchanged   Seizure precautions  Ativan as needed for seizures and agitation  TSH wnl , B12 wnl , folate wnl , RPR neg , CBC, CMP wnl , and blood cultures NTD    -Discussed with neurologist Dr. Oscar Garibay who recommended that we will wait 1 to 2 days to see if his mental status would get better with medication adjustment and if not better, he suggested hospice  -Discussed with patient daughter about plan of care  -Continue Keppra at reduced dose of 750 mg twice daily and continue to monitor for seizures  -Patient's daughter will decide on feeding options in the next 2 days     History of CVA  Continue aspirin suppository. Not getting p.o. meds as he is n.p.o. Continue antihypertensives     SOULEYMANE on CKD  -Creatinine on  was 1. 19. Check BMP in a.m.     Hypokalemia   Check BMP in a.m.     Sinus bradycardia with 2-1 block: POA  Status post pacemaker  Was able to have MRI with [employer] rep present and MRI in July at [address] [place] [address] to get MRI brain here      Baseline debility  Patient is able to walk with a walker  Has moderate dementia, but is able to hold conversation with family  Resides in a nursing home     Dysphagia due to acute metabolic encephalopathy  -Discussed feeding options with patient daughter, she will discuss various feeding options including NG and also PEG tube with family and let us know    Right arm swelling  -Pending ultrasound Doppler    Code status: Full  Prophylaxis: Lovenox  Recommended Disposition: SNF      Subjective:     Chief Complaint / Reason for Physician Visit  He is a bit more awake today  Has right arm swelling    Review of Systems:  Symptom Y/N Comments  Symptom Y/N Comments   Fever/Chills    Chest Pain     Poor Appetite    Edema     Cough    Abdominal Pain     Sputum    Joint Pain     SOB/CARREON    Pruritis/Rash     Nausea/vomit    Tolerating PT/OT     Diarrhea    Tolerating Diet     Constipation    Other       Could NOT obtain due to: Lethargic      Objective:     VITALS:   Last 24hrs VS reviewed since prior progress note. Most recent are:  Patient Vitals for the past 24 hrs:   Temp Pulse Resp BP SpO2   11/26/20 2110  76 16 (!) 165/74    11/26/20 2000 98.8 °F (37.1 °C) 75 16 (!) 170/82 100 %   11/26/20 1711 98.9 °F (37.2 °C) 75 16 (!) 178/66 96 %   11/26/20 1457 98.9 °F (37.2 °C) 78 16 (!) 165/71 96 %   11/26/20 1212 98.9 °F (37.2 °C) 71 20 (!) 175/81 98 %   11/26/20 0829 98.6 °F (37 °C) 88 16 (!) 150/83 100 %   11/26/20 0800     97 %   11/26/20 0614  72  (!) 142/88    11/26/20 0322 99.2 °F (37.3 °C) 68 16 (!) 177/88 98 %       Intake/Output Summary (Last 24 hours) at 11/26/2020 2225  Last data filed at 11/26/2020 1800  Gross per 24 hour   Intake    Output 1125 ml   Net -1125 ml        I had a face to face encounter with this patient and independently examined this patient on 11/26/2020 as outlined below:  PHYSICAL EXAM:  General: Drowsy, noncooperative  EENT:  EOMI. Anicteric sclerae. MMM  Resp:  CTA bilaterally, no wheezing or rales. No accessory muscle use  CV:  Regular  rhythm,  No edema  GI:  Soft, Non distended, Non tender.   +Bowel sounds  Neurologic:  Drowsy, not able to follow commands  Psych: Confused assess  Skin:  No rashes. No jaundice    Reviewed most current lab test results and cultures  YES  Reviewed most current radiology test results   YES  Review and summation of old records today    NO  Reviewed patient's current orders and MAR    YES  PMH/SH reviewed - no change compared to H&P  ________________________________________________________________________  Care Plan discussed with:    Comments   Patient     Family  x Daughter    RN x    Care Manager     Consultant  x neuro                    x Multidiciplinary team rounds were held today with , nursing, pharmacist and clinical coordinator. Patient's plan of care was discussed; medications were reviewed and discharge planning was addressed. ________________________________________________________________________  Total NON critical care TIME: 45   Minutes    Total CRITICAL CARE TIME Spent:   Minutes non procedure based      Comments   >50% of visit spent in counseling and coordination of care     ________________________________________________________________________  Coty Reid MD     Procedures: see electronic medical records for all procedures/Xrays and details which were not copied into this note but were reviewed prior to creation of Plan. LABS:  I reviewed today's most current labs and imaging studies.   Pertinent labs include:  Recent Labs     11/26/20 0247   WBC 7.6   HGB 12.0*   HCT 36.9        Recent Labs     11/26/20 0247 11/25/20  0420 11/24/20  0432   *  --   --    K 3.5  --   --    *  --   --    CO2 27  --   --    *  --   --    BUN 30*  --   --    CREA 1.34*  --   --    CA 9.7  --   --    MG  --  2.1 2.1       Signed: Coty Reid MD

## 2020-11-27 NOTE — PROGRESS NOTES
End of Shift Note    Bedside shift change report given to Ruperto Cortez  (oncoming nurse) by Arnold Phillips (offgoing nurse).   Report included the following information SBAR, Kardex and MAR    Shift worked:  days   Shift summary and any significant changes:     none       Concerns for physician to address: none   Zone phone for oncoming shift:   [phone]     Patient Information  Aldon Angelucci  80 y.o.  11/17/2020  4:20 PM by Candelario Mckeon MD. Aldon Angelucci was admitted from Home    Problem List  Patient Active Problem List    Diagnosis Date Noted    Altered mental status 11/18/2020    Post-ictal state ([address] 11/18/2020    Seizure ([address] 11/17/2020    Atrial pacemaker lead displacement 10/18/2019    Pacemaker 03/22/2019    Bradycardia 03/19/2019    Elevated troponin 03/19/2019    Hypertensive urgency 03/19/2019    Second degree AV block, Mobitz type I 03/19/2019    Stage 3 chronic kidney disease 03/15/2019    Rotator cuff arthropathy of both shoulders 03/15/2019    Cognitive impairment 57/06/3068    Systolic murmur 63/11/2322    Essential hypertension 12/17/2018    Gout 12/17/2018    Pure hypercholesterolemia 12/17/2018    History of prostate cancer 12/17/2018    Chronic constipation 12/17/2018    Dysuria 08/08/2017    Weight loss 08/08/2017    Adrenal mass ([address] 07/18/2017     Past Medical History:   Diagnosis Date    Cancer [facility])     prostate    Constipation     Gout     Hyperlipemia     Hypertension     Pacemaker 2019    Stroke ([address]     Thrombocytopenia ([address] 3/19/2019    TIA (transient ischemic attack) 2013           Activity:  Activity Level: Bed Rest  Number times ambulated in hallways past shift: 0  Number of times OOB to chair past shift: 0    Cardiac:   Cardiac Monitoring: Yes      Cardiac Rhythm: Paced    Access:   Current line(s): PIV     Genitourinary:   Urinary status: external catheter    Respiratory:   O2 Device: Room air  Chronic home O2 use?: NO  Incentive spirometer at bedside: NO       GI:  Last Bowel Movement Date: (unknown)  Current diet:  DIET NPO  Passing flatus: YES  Tolerating current diet: NO       Pain Management:   Patient states pain is manageable on current regimen: N/A    Skin:  Torito Score: 12  Interventions: speciality bed, increase time out of bed and PT/OT consult    Patient Safety:  Fall Score:  Total Score: 3  Interventions: bed/chair alarm  High Fall Risk: Yes      Wounds: (If Applicable)  Wounds- No  Location n/a    Active Consults:  IP CONSULT TO NEUROLOGY  IP CONSULT TO PALLIATIVE CARE - PROVIDER    Length of Stay:  Expected LOS: 2d 16h  Actual LOS: 9  Discharge Plan: Yes [facility] [place]

## 2020-11-27 NOTE — PROGRESS NOTES
Comprehensive Nutrition Assessment    Type and Reason for Visit: Reassess    Nutrition Recommendations/Plan:   · Consider NGT placement for short-term tube feedings as pt has been NPO x 9 days to determine if pt's overall strength and mental status improve. Pending pt's medical goals of care and improvement in mental status, long-term feeding plan (oral vs PEG) to be decided. · If family decides to proceed with NGT feedings, recommend start Jevity 1.5 rachelle at 20 ml/hr x 24 hr continuous infusion. Increase by 10 ml/hr q8h to goal rate of 50 ml/hr. · Flush with 150 ml free water q4h. · TF at goal + free water will provide daily approx. 1800 kcals/76.5 g protein/25.2 g fiber/258  g CHO/1812 ml free water. Nutrition Assessment:      11/27: Chart reviewed; RD visited with pt at bedside. Pt sleeping soundly, no family members present at bedside. Pt continues NPO x 9 days. SLP continues to visit with pt daily to re-assess but pt has not been alert enough to participate. Per MD note, family will decide regarding tube feedings within the next 2 days. Palliative following as well. Last Weight Metric  Weight Loss Metrics 11/25/2020 7/13/2020 7/9/2020 6/30/2020 1/30/2020 10/18/2019 10/11/2019   Today's Wt 163 lb 12.8 oz - 170 lb 6.7 oz - 173 lb 182 lb 186 lb 6.4 oz   BMI 26.44 kg/m2 27.51 kg/m2 - 26.69 kg/m2 27.92 kg/m2 29.38 kg/m2 26.75 kg/m2     11/24: Chart reviewed; med noted for seizure, AMS, hx of CVA. Pt has been NPO x 5 days. SLP continues to complete bedside evaluations to determine if safe to swallow but pt has not been alert enough or has been too agitated to participate. RD spoke with the pt's daughter who reports that prior to admission, the pt was able to tolerate most textures up until current admission. Pt is currently in soft wrist restraints. RD spoke with SLP who plans to re-visit pt today as pt less agitated today. Hopefully, we can start an oral diet, even if pureed.  Then we can add in oral nutritional supplements. Estimated Daily Nutrient Needs:  Energy (kcal): 1810 (BMR 1393 x 1. 3AF); Weight Used for Energy Requirements: Current  Protein (g): 93 (1.2 g/kg bw); Weight Used for Protein Requirements: Current  Fluid (ml/day): 1800 ml/day; Method Used for Fluid Requirements: 1 ml/kcal    Nutrition Related Findings:  BM: 11/27; Labs: Na+ 146; Meds: Lipitor, Lovenox      Wounds:    (sacral/coccyx)       Current Nutrition Therapies:  DIET NPO    Anthropometric Measures:  · Height:  5' 6\" (167.6 cm)  · Current Body Wt:  77.3 kg (170 lb 6.7 oz)   · Ideal Body Wt:  142 lbs:  120 %   · BMI Category: Overweight (BMI 25.0-29. 9)       Nutrition Diagnosis:   · Inadequate protein-energy intake related to (AMD, agitation) as evidenced by NPO or clear liquid status due to medical condition(SLP continues to evaluate for safe swallowing but unable to advance diet as pt unable to participate in evaluation)    Nutrition Interventions:   Food and/or Nutrient Delivery: Start tube feeding  Nutrition Education and Counseling: No recommendations at this time  Coordination of Nutrition Care: Speech therapy, Swallow evaluation    Goals:  Resume TFs vs comfort within 2-4 days       Nutrition Monitoring and Evaluation:   Behavioral-Environmental Outcomes: None identified  Food/Nutrient Intake Outcomes: Diet advancement/tolerance, Food and nutrient intake, Supplement intake  Physical Signs/Symptoms Outcomes: Biochemical data, Weight, Skin    Discharge Planning:     Too soon to determine     Electronically signed by Felisa Blount RD on 11/27/2020 at 11:12 AM

## 2020-11-27 NOTE — PROGRESS NOTES
ARIELLE Plan:     * TBD - anticipate return to long term ([facility])      > Pt will need COVID-19 test completed within 72 hour window of d/c back to [facility]   > Palliative consulted 11/23/2020 to discuss goals of care  > CM to secure medical transport for d/c  > 2nd IM prior to d/c     Initial note: CM reviewed pt's chart and noted updates prior to moving forward with d/c planning. Per chart review, CM noted pt's daughter/primary mPOA (Lora Garibay) reported that she wasn't ready to discuss goals of care with palliative MD on 11/25/2020. As of today (11/27/2020), pt has been NPO for x9 days. Per SLP note from today, recommendations suggest an alternative means of nutrition (if in line with goals of care). Per chart review, pt's daughter is aware of nutrition concerns & is actively considering alternative feeding options. CM entered delay in d/c to reflect current barriers preventing pt from transitioning out of [facility]. CM will continue to follow & remain accessible for d/c planning.      Usha Chou, MSW  Care Manager, [facility]

## 2020-11-28 LAB
ANION GAP SERPL CALC-SCNC: 3 MMOL/L (ref 5–15)
BUN SERPL-MCNC: 34 MG/DL (ref 6–20)
BUN/CREAT SERPL: 30 (ref 12–20)
CALCIUM SERPL-MCNC: 9.3 MG/DL (ref 8.5–10.1)
CHLORIDE SERPL-SCNC: 112 MMOL/L (ref 97–108)
CO2 SERPL-SCNC: 27 MMOL/L (ref 21–32)
CREAT SERPL-MCNC: 1.14 MG/DL (ref 0.7–1.3)
ERYTHROCYTE [DISTWIDTH] IN BLOOD BY AUTOMATED COUNT: 13.7 % (ref 11.5–14.5)
GLUCOSE SERPL-MCNC: 105 MG/DL (ref 65–100)
HCT VFR BLD AUTO: 36 % (ref 36.6–50.3)
HGB BLD-MCNC: 12.1 G/DL (ref 12.1–17)
MAGNESIUM SERPL-MCNC: 2.4 MG/DL (ref 1.6–2.4)
MCH RBC QN AUTO: 30.8 PG (ref 26–34)
MCHC RBC AUTO-ENTMCNC: 33.6 G/DL (ref 30–36.5)
MCV RBC AUTO: 91.6 FL (ref 80–99)
NRBC # BLD: 0 K/UL (ref 0–0.01)
NRBC BLD-RTO: 0 PER 100 WBC
PLATELET # BLD AUTO: 174 K/UL (ref 150–400)
PMV BLD AUTO: 10 FL (ref 8.9–12.9)
POTASSIUM SERPL-SCNC: 3.4 MMOL/L (ref 3.5–5.1)
RBC # BLD AUTO: 3.93 M/UL (ref 4.1–5.7)
SODIUM SERPL-SCNC: 142 MMOL/L (ref 136–145)
WBC # BLD AUTO: 6.2 K/UL (ref 4.1–11.1)

## 2020-11-28 PROCEDURE — 74011250637 HC RX REV CODE- 250/637: Performed by: INTERNAL MEDICINE

## 2020-11-28 PROCEDURE — 74011250636 HC RX REV CODE- 250/636: Performed by: INTERNAL MEDICINE

## 2020-11-28 PROCEDURE — 74011000250 HC RX REV CODE- 250: Performed by: INTERNAL MEDICINE

## 2020-11-28 PROCEDURE — 74011000258 HC RX REV CODE- 258: Performed by: PSYCHIATRY & NEUROLOGY

## 2020-11-28 PROCEDURE — 36415 COLL VENOUS BLD VENIPUNCTURE: CPT

## 2020-11-28 PROCEDURE — 65660000000 HC RM CCU STEPDOWN

## 2020-11-28 PROCEDURE — 74011250636 HC RX REV CODE- 250/636: Performed by: PSYCHIATRY & NEUROLOGY

## 2020-11-28 PROCEDURE — 83735 ASSAY OF MAGNESIUM: CPT

## 2020-11-28 PROCEDURE — 85027 COMPLETE CBC AUTOMATED: CPT

## 2020-11-28 PROCEDURE — 80048 BASIC METABOLIC PNL TOTAL CA: CPT

## 2020-11-28 RX ORDER — POTASSIUM CHLORIDE 7.45 MG/ML
10 INJECTION INTRAVENOUS
Status: COMPLETED | OUTPATIENT
Start: 2020-11-28 | End: 2020-11-28

## 2020-11-28 RX ADMIN — DEXTROSE MONOHYDRATE 50 ML/HR: 5 INJECTION, SOLUTION INTRAVENOUS at 06:19

## 2020-11-28 RX ADMIN — METOPROLOL TARTRATE 2.5 MG: 1 INJECTION, SOLUTION INTRAVENOUS at 00:35

## 2020-11-28 RX ADMIN — HYDRALAZINE HYDROCHLORIDE 10 MG: 20 INJECTION INTRAMUSCULAR; INTRAVENOUS at 17:33

## 2020-11-28 RX ADMIN — METOPROLOL TARTRATE 2.5 MG: 1 INJECTION, SOLUTION INTRAVENOUS at 17:33

## 2020-11-28 RX ADMIN — LEVETIRACETAM 750 MG: 100 INJECTION, SOLUTION INTRAVENOUS at 13:21

## 2020-11-28 RX ADMIN — POTASSIUM CHLORIDE 10 MEQ: 10 INJECTION, SOLUTION INTRAVENOUS at 10:09

## 2020-11-28 RX ADMIN — Medication 10 ML: at 13:23

## 2020-11-28 RX ADMIN — ENALAPRILAT 1.25 MG: 2.5 INJECTION INTRAVENOUS at 23:36

## 2020-11-28 RX ADMIN — HYDRALAZINE HYDROCHLORIDE 10 MG: 20 INJECTION INTRAMUSCULAR; INTRAVENOUS at 06:20

## 2020-11-28 RX ADMIN — METOPROLOL TARTRATE 2.5 MG: 1 INJECTION, SOLUTION INTRAVENOUS at 23:36

## 2020-11-28 RX ADMIN — DEXTROSE MONOHYDRATE 50 ML/HR: 5 INJECTION, SOLUTION INTRAVENOUS at 10:08

## 2020-11-28 RX ADMIN — POTASSIUM CHLORIDE 10 MEQ: 10 INJECTION, SOLUTION INTRAVENOUS at 14:07

## 2020-11-28 RX ADMIN — Medication 10 ML: at 21:51

## 2020-11-28 RX ADMIN — Medication 10 ML: at 00:36

## 2020-11-28 RX ADMIN — LEVETIRACETAM 750 MG: 100 INJECTION, SOLUTION INTRAVENOUS at 02:04

## 2020-11-28 RX ADMIN — Medication 10 ML: at 06:19

## 2020-11-28 RX ADMIN — ENOXAPARIN SODIUM 40 MG: 40 INJECTION SUBCUTANEOUS at 08:41

## 2020-11-28 RX ADMIN — ASPIRIN 150 MG: 300 SUPPOSITORY RECTAL at 08:41

## 2020-11-28 RX ADMIN — METOPROLOL TARTRATE 2.5 MG: 1 INJECTION, SOLUTION INTRAVENOUS at 11:59

## 2020-11-28 RX ADMIN — ENALAPRILAT 1.25 MG: 2.5 INJECTION INTRAVENOUS at 00:35

## 2020-11-28 RX ADMIN — ENALAPRILAT 1.25 MG: 2.5 INJECTION INTRAVENOUS at 17:32

## 2020-11-28 RX ADMIN — POTASSIUM CHLORIDE 10 MEQ: 10 INJECTION, SOLUTION INTRAVENOUS at 17:31

## 2020-11-28 RX ADMIN — HYDRALAZINE HYDROCHLORIDE 10 MG: 20 INJECTION INTRAMUSCULAR; INTRAVENOUS at 12:01

## 2020-11-28 RX ADMIN — ENALAPRILAT 1.25 MG: 2.5 INJECTION INTRAVENOUS at 12:00

## 2020-11-28 RX ADMIN — HYDRALAZINE HYDROCHLORIDE 10 MG: 20 INJECTION INTRAMUSCULAR; INTRAVENOUS at 00:35

## 2020-11-28 RX ADMIN — POTASSIUM CHLORIDE 10 MEQ: 10 INJECTION, SOLUTION INTRAVENOUS at 11:58

## 2020-11-28 RX ADMIN — ENALAPRILAT 1.25 MG: 2.5 INJECTION INTRAVENOUS at 06:20

## 2020-11-28 RX ADMIN — METOPROLOL TARTRATE 2.5 MG: 1 INJECTION, SOLUTION INTRAVENOUS at 06:19

## 2020-11-28 NOTE — PROGRESS NOTES
End of Shift Note    Bedside shift change report given to  (oncoming nurse) by Arnold Phillips (offgoing nurse).   Report included the following information SBAR, Kardex and MAR    Shift worked:  days   Shift summary and any significant changes:     none       Concerns for physician to address: none   Zone phone for oncoming shift:   1817     Patient Information  Aldon Angelucci  80 y.o.  11/17/2020  4:20 PM by Candelario Mckeon MD. Aldon Angelucci was admitted from Home    Problem List  Patient Active Problem List    Diagnosis Date Noted    Altered mental status 11/18/2020    Post-ictal state ([address] 11/18/2020    Seizure ([address] 11/17/2020    Atrial pacemaker lead displacement 10/18/2019    Pacemaker 03/22/2019    Bradycardia 03/19/2019    Elevated troponin 03/19/2019    Hypertensive urgency 03/19/2019    Second degree AV block, Mobitz type I 03/19/2019    Stage 3 chronic kidney disease 03/15/2019    Rotator cuff arthropathy of both shoulders 03/15/2019    Cognitive impairment 44/52/8407    Systolic murmur 98/33/2783    Essential hypertension 12/17/2018    Gout 12/17/2018    Pure hypercholesterolemia 12/17/2018    History of prostate cancer 12/17/2018    Chronic constipation 12/17/2018    Dysuria 08/08/2017    Weight loss 08/08/2017    Adrenal mass ([address] 07/18/2017     Past Medical History:   Diagnosis Date    Cancer [facility])     prostate    Constipation     Gout     Hyperlipemia     Hypertension     Pacemaker 2019    Stroke ([address]     Thrombocytopenia ([address] 3/19/2019    TIA (transient ischemic attack) 2013           Activity:  Activity Level: Bed Rest  Number times ambulated in hallways past shift: 0  Number of times OOB to chair past shift: 0    Cardiac:   Cardiac Monitoring: Yes      Cardiac Rhythm: Paced    Access:   Current line(s): PIV     Genitourinary:   Urinary status: external catheter    Respiratory:   O2 Device: Room air  Chronic home O2 use?: NO  Incentive spirometer at bedside: NO       GI:  Last Bowel Movement Date: (unknown)  Current diet:  DIET NPO  Passing flatus: YES  Tolerating current diet: NO       Pain Management:   Patient states pain is manageable on current regimen: N/A    Skin:  Torito Score: 12  Interventions: speciality bed, increase time out of bed and PT/OT consult    Patient Safety:  Fall Score:  Total Score: 4  Interventions: bed/chair alarm  High Fall Risk: Yes      Wounds: (If Applicable)  Wounds- No  Location n/a    Active Consults:  IP CONSULT TO NEUROLOGY  IP CONSULT TO PALLIATIVE CARE - PROVIDER    Length of Stay:  Expected LOS: 2d 16h  Actual LOS: 10  Discharge Plan: Yes [facility]ardenas

## 2020-11-29 PROCEDURE — 74011000250 HC RX REV CODE- 250: Performed by: INTERNAL MEDICINE

## 2020-11-29 PROCEDURE — 74011250636 HC RX REV CODE- 250/636: Performed by: PSYCHIATRY & NEUROLOGY

## 2020-11-29 PROCEDURE — 74011000258 HC RX REV CODE- 258: Performed by: PSYCHIATRY & NEUROLOGY

## 2020-11-29 PROCEDURE — 74011250636 HC RX REV CODE- 250/636: Performed by: INTERNAL MEDICINE

## 2020-11-29 PROCEDURE — 65660000000 HC RM CCU STEPDOWN

## 2020-11-29 PROCEDURE — 74011250637 HC RX REV CODE- 250/637: Performed by: INTERNAL MEDICINE

## 2020-11-29 RX ORDER — FAMOTIDINE 20 MG/1
20 TABLET, FILM COATED ORAL DAILY
Status: DISCONTINUED | OUTPATIENT
Start: 2020-11-30 | End: 2020-12-11 | Stop reason: HOSPADM

## 2020-11-29 RX ADMIN — Medication 10 ML: at 21:21

## 2020-11-29 RX ADMIN — ENALAPRILAT 1.25 MG: 2.5 INJECTION INTRAVENOUS at 18:24

## 2020-11-29 RX ADMIN — ENALAPRILAT 1.25 MG: 2.5 INJECTION INTRAVENOUS at 06:06

## 2020-11-29 RX ADMIN — LEVETIRACETAM 750 MG: 100 INJECTION, SOLUTION INTRAVENOUS at 02:49

## 2020-11-29 RX ADMIN — METOPROLOL TARTRATE 2.5 MG: 1 INJECTION, SOLUTION INTRAVENOUS at 18:21

## 2020-11-29 RX ADMIN — Medication 10 ML: at 06:11

## 2020-11-29 RX ADMIN — HYDRALAZINE HYDROCHLORIDE 10 MG: 20 INJECTION INTRAMUSCULAR; INTRAVENOUS at 12:27

## 2020-11-29 RX ADMIN — Medication 10 ML: at 14:00

## 2020-11-29 RX ADMIN — ENALAPRILAT 1.25 MG: 2.5 INJECTION INTRAVENOUS at 12:24

## 2020-11-29 RX ADMIN — ASPIRIN 150 MG: 300 SUPPOSITORY RECTAL at 09:56

## 2020-11-29 RX ADMIN — ENOXAPARIN SODIUM 40 MG: 40 INJECTION SUBCUTANEOUS at 09:56

## 2020-11-29 RX ADMIN — LEVETIRACETAM 750 MG: 100 INJECTION, SOLUTION INTRAVENOUS at 13:35

## 2020-11-29 RX ADMIN — HYDRALAZINE HYDROCHLORIDE 10 MG: 20 INJECTION INTRAMUSCULAR; INTRAVENOUS at 18:18

## 2020-11-29 RX ADMIN — DEXTROSE MONOHYDRATE 50 ML/HR: 5 INJECTION, SOLUTION INTRAVENOUS at 06:14

## 2020-11-29 RX ADMIN — METOPROLOL TARTRATE 2.5 MG: 1 INJECTION, SOLUTION INTRAVENOUS at 06:07

## 2020-11-29 NOTE — PROGRESS NOTES
Problem: Falls - Risk of  Goal: *Absence of Falls  Description: Document Starla Sukhdev Fall Risk and appropriate interventions in the flowsheet. Outcome: Progressing Towards Goal  Note: Fall Risk Interventions:       Mentation Interventions: Adequate sleep, hydration, pain control, Bed/chair exit alarm    Medication Interventions: Bed/chair exit alarm    Elimination Interventions: Bed/chair exit alarm, Call light in reach    History of Falls Interventions: Bed/chair exit alarm         Problem: Patient Education: Go to Patient Education Activity  Goal: Patient/Family Education  Outcome: Progressing Towards Goal     Problem: Falls - Risk of  Goal: *Absence of Falls  Description: Document Starla Sukhdev Fall Risk and appropriate interventions in the flowsheet. Outcome: Progressing Towards Goal  Note: Fall Risk Interventions:       Mentation Interventions: Adequate sleep, hydration, pain control, Bed/chair exit alarm    Medication Interventions: Bed/chair exit alarm    Elimination Interventions: Bed/chair exit alarm, Call light in reach    History of Falls Interventions: Bed/chair exit alarm         Problem: Patient Education: Go to Patient Education Activity  Goal: Patient/Family Education  Outcome: Progressing Towards Goal     Problem: Pressure Injury - Risk of  Goal: *Prevention of pressure injury  Description: Document Torito Scale and appropriate interventions in the flowsheet.   Outcome: Progressing Towards Goal  Note: Pressure Injury Interventions:  Sensory Interventions: Assess changes in LOC    Moisture Interventions: Absorbent underpads, Apply protective barrier, creams and emollients    Activity Interventions: Pressure redistribution bed/mattress(bed type)    Mobility Interventions: Pressure redistribution bed/mattress (bed type)    Nutrition Interventions: Document food/fluid/supplement intake, Offer support with meals,snacks and hydration    Friction and Shear Interventions: Apply protective barrier, creams and emollients                Problem: Patient Education: Go to Patient Education Activity  Goal: Patient/Family Education  Outcome: Progressing Towards Goal     Problem: Seizure Disorder (Adult)  Goal: *STG: Remains free of seizure activity  Outcome: Progressing Towards Goal  Goal: *STG: Maintains lab values within therapeutic range  Outcome: Progressing Towards Goal  Goal: *STG/LTG: Complies with medication therapy  Outcome: Progressing Towards Goal  Goal: *STG: Remains free of injury during seizure activity  Outcome: Progressing Towards Goal  Goal: *STG: Remains safe in hospital  Outcome: Progressing Towards Goal  Goal: Interventions  Outcome: Progressing Towards Goal     Problem: Patient Education: Go to Patient Education Activity  Goal: Patient/Family Education  Outcome: Progressing Towards Goal     Problem: Patient Education: Go to Patient Education Activity  Goal: Patient/Family Education  Outcome: Progressing Towards Goal

## 2020-11-29 NOTE — PROGRESS NOTES
Famotidine dose adjusted to 20mg daily for CrCl < 50 per protocol  Estimated Creatinine Clearance: 41.2 mL/min (based on SCr of 1.14 mg/dL).   Estimated Creatinine Clearance (using IBW):41.2 mL/min    Thank you,  Idania Rodrigues, [facility]

## 2020-11-29 NOTE — PROGRESS NOTES
Progress Note      Pt Name  Priyank Augustin   Date of Birth 3/9/1933   Medical Record Number  [id number]      Age  80 y.o. PCP Marisabel Garza MD   Admit date:  11/17/2020    Room Number  3113/01  @ [facility]   Date of Service  11/29/2020     Admission Diagnoses:  Seizure      Admission Summary:  \" Katie Bar is a 80 y.o. -American gentleman with a past medical history of stroke, status post admission for bilateral acute occipital infarcts in July at [facility], heart failure (EF 40-45%), hypertension, CKD 3, 2: 1 heart block status post PPM (able to have MRI at [facility] with [employer] rep in July) who is being admitted for new onset seizures     Patient was sitting in his nursing home when he had an episode of unresponsiveness with shaking. Subsequently he seemed very tired and slept. EMS was called and brought him to [facility] for evaluation of possible seizure. While in the ER at [facility] patient had another episode, and was given Ativan which caused it to stop. He was provided a loading dose of Keppra and neurology was contacted. Neurology agrees with continuing [address] and admitting for further evaluation. Patient has remained postictal and is unable to answer review of systems or any other questions. Patient daughter is present at bedside. \"     Assessment and plan:     New onset seizures, POA, recurrent episodes   Acute encephalopathy, likely postictal, POA vs worsening dementia   History of multiple CVAs   Patient had a 5-minute seizure at nursing home, seized again in the ER and was given Ativan.    status post Keppra loading dose  -Was on Keppra 1 g checks twice daily but dose was decreased to 750 mg twice daily due to sedation  EEG  On admission showed no seizure , repeat EEG  Showed a potential seizure focus on the R hemisphere   MRI ordered unable to be done because of pt being confsued+ pacemaker , unable to verbalize if issues with pacemaker arise while doing MRI   Repeat CT brain unchanged   Seizure precautions  Ativan as needed for seizures and agitation  TSH wnl , B12 wnl , folate wnl , RPR neg , CBC, CMP wnl , and blood cultures NTD    -Dr. Josse Pearson who recommended that we will wait 1 to 2 days to see if his mental status would get better with medication adjustment and if not better, he suggested hospice  -Continue Keppra at reduced dose of 750 mg twice daily and continue to monitor for seizures  -Patient's daughter will decide on feeding options tomorrow.     History of CVA  Continue aspirin suppository. Not getting p.o. meds as he is n.p.o.     SOULEYMANE on CKD  -Creatinine on 11/23 was 1. 19.       Hypokalemia   BMP tomorrow      Sinus bradycardia with 2-1 block: POA  Status post pacemaker  Was able to have MRI with [employer] rep present and MRI in July at [address] [place] [address] to get MRI brain here      Debility  Patient was able to walk with a walker  Has moderate dementia, but was able to hold conversation with family  Resides in a nursing home     Dysphagia due to acute metabolic encephalopathy  -After discussion with pt's son Manjula Guadalupe. (in person) and Agnes (pt's daughter on the phone) -  I learned that they researched option for PEG tube placement and wish to proceed with trial of PEG tube placement and possible meaningful recovery for this pt   -will ask GI doctor to see him tomorrow possible PEG tube      Right arm swelling  -US doppler shows no DVT. Keep rt arm elevated and apply warm compresses.      NSVT  -Potassium is low and replaced. Magnesium is normal.     Dispo:  Discharged dependent upon families decision regarding feeding options     PER DOCTOR VERÓNICA's NOTE from 11/28/20   \"I updated patient's daughter at bedside today. Patient's daughter is supposed to let us know regarding feeding options tomorrow. She is inclining towards a PEG tube at this time.   I informed the patient's daughter that he has been not eating for last 9 days and they will have to make a decision sooner. \"           CODE STATUS   Full     Functional Status  LTC resident     Surrogate decision maker:  Pt's daughter      Prophylaxis   Hep SQ   Discharge Plan:  SNF/LTC,      Misc   Benefit:  Payor: [facility] MEDICARE / Plan: VA MEDICARE PART A & B / Product Type: Medicare /    Isolation :  There are currently no Active Isolations   ADT status:  INPATIENT      Query   None noted today    Prognosis      Social issues  Date  Comment     11/29/20 .4:10 PM - met with pt's son and spoke with daughter over telephone about plan of care                   Subjective Data     \" \"  Review of Systems - History obtained from unobtainable from patient due to mental status    Objective Data       Comments  Encephalopathic elderly man lying in bed, who does respond with mumbling when I call his name   His son is in the room. Patient Vitals for the past 24 hrs:   BP   11/29/20 1513 (!) 152/82   11/29/20 1224 (!) 153/78   11/29/20 1134 (!) 146/83   11/29/20 0804 131/86   11/29/20 0549 (!) 153/69   11/29/20 0313 (!) 149/95   11/28/20 2331 (!) 163/67   11/28/20 2145 (!) 160/94      Patient Vitals for the past 24 hrs:   Pulse   11/29/20 1513 85   11/29/20 1224 (!) 59   11/29/20 1134 66   11/29/20 0804 76   11/29/20 0549 66   11/29/20 0313 70   11/28/20 2331 68   11/28/20 2145 76      Patient Vitals for the past 24 hrs:   Resp   11/29/20 1513 19   11/29/20 1134 18   11/29/20 0804 18   11/29/20 0313 16   11/28/20 2331 16   11/28/20 2145 18      Patient Vitals for the past 24 hrs:   Temp   11/29/20 1513 99.4 °F (37.4 °C)   11/29/20 1134 97.3 °F (36.3 °C)   11/29/20 0804 97.2 °F (36.2 °C)   11/29/20 0313 97.2 °F (36.2 °C)   11/28/20 2331 98.2 °F (36.8 °C)   11/28/20 2145 98.4 °F (36.9 °C)        SpO2 Readings from Last 6 Encounters:   11/29/20 93%   07/13/20 97%   07/09/20 97%   10/18/19 99%   10/11/19 100%   08/14/19 97%          O2 Device: Room air Body mass index is 26.44 kg/m².  -  Wt Readings from Last 10 Encounters:   11/25/20 74.3 kg (163 lb 12.8 oz)   11/19/20 77.3 kg (170 lb 6.7 oz)   07/09/20 77.3 kg (170 lb 6.7 oz)   07/02/20 76.9 kg (169 lb 8.5 oz)   01/30/20 78.5 kg (173 lb)   10/18/19 82.6 kg (182 lb)   10/11/19 84.6 kg (186 lb 6.4 oz)   08/14/19 83.1 kg (183 lb 3.2 oz)   07/23/19 81.6 kg (180 lb)   03/26/19 82.2 kg (181 lb 3.5 oz)        Physical Exam:             General:    well noursished,   well developed,   appears stated age    Ears/Eyes:  Hearing seems intact  Sclera anicteric. Pupils equal   Mouth/Throat:  Could not examine - pt does not follow command    Neck:     Lungs:  Trachea midline  Chest excursion symmetrical   Auscultation B/L Symmetrical with   Vesicular breath sounds          CVS:  Regular rate and rhythm   LLSB systolic  murmur,   No click, rub or gallop  S1 normal   S2 normal    Abdomen:    Soft, non-tender  Bowel sounds normal  No distension   Percussion note tympanitic   Extremities:    No cyanosis, jaundice  No edema noted   No sign of DVT/cord like lesion on palpation  No sign of acute trauma .     Skin:    Skin color, texture, turgor normal. no acute rash or lesions    Lymph nodes:     Musculoskeletal Muscle bulk B/L symmetrical   Neuro Face appears symmetrical   Unable to do neurological assessment    Psych:  Unable to assess           Medications reviewed     Current Facility-Administered Medications   Medication Dose Route Frequency    dextrose 5% infusion  50 mL/hr IntraVENous CONTINUOUS    levETIRAcetam (KEPPRA) 750 mg in 0.9% sodium chloride 100 mL IVPB  750 mg IntraVENous Q12H    nitroglycerin (NITROBID) 2 % ointment 1 Inch  1 Inch Topical Q6H PRN    enalaprilat (VASOTEC) injection 1.25 mg  1.25 mg IntraVENous Q6H    hydrALAZINE (APRESOLINE) 20 mg/mL injection 10 mg  10 mg IntraVENous Q6H    LORazepam (ATIVAN) injection 1 mg  1 mg IntraVENous Q6H PRN    influenza vaccine 2020-21 (6 mos+)(PF) (FLUARIX/FLULAVAL/FLUZONE QUAD) injection 0.5 mL  0.5 mL IntraMUSCular PRIOR TO DISCHARGE    [Held by provider] levETIRAcetam (KEPPRA) tablet 500 mg  500 mg Oral BID    metoprolol (LOPRESSOR) injection 2.5 mg  2.5 mg IntraVENous Q6H    hydrALAZINE (APRESOLINE) 20 mg/mL injection 5 mg  5 mg IntraVENous Q6H PRN    aspirin (ASA) suppository 150 mg  150 mg Rectal DAILY    bisacodyL (DULCOLAX) suppository 10 mg  10 mg Rectal DAILY PRN    sodium chloride (NS) flush 5-40 mL  5-40 mL IntraVENous Q8H    sodium chloride (NS) flush 5-40 mL  5-40 mL IntraVENous PRN    acetaminophen (TYLENOL) tablet 650 mg  650 mg Oral Q6H PRN    Or    acetaminophen (TYLENOL) suppository 650 mg  650 mg Rectal Q6H PRN    polyethylene glycol (MIRALAX) packet 17 g  17 g Oral DAILY PRN    famotidine (PEPCID) tablet 20 mg  20 mg Oral BID    nicotine (NICODERM CQ) 14 mg/24 hr patch 1 Patch  1 Patch TransDERmal DAILY PRN    enoxaparin (LOVENOX) injection 40 mg  40 mg SubCUTAneous DAILY    allopurinoL (ZYLOPRIM) tablet 100 mg  100 mg Oral DAILY    amLODIPine (NORVASC) tablet 5 mg  5 mg Oral DAILY    atorvastatin (LIPITOR) tablet 40 mg  40 mg Oral QHS    [Held by provider] hydrALAZINE (APRESOLINE) tablet 25 mg  25 mg Oral TID    [Held by provider] metoprolol succinate (TOPROL-XL) XL tablet 25 mg  25 mg Oral DAILY       Relevant other informations: Other medical conditions listed in [facility] problem list section; all of these and other pertinent data were taken into consideration when treatment plan is developed and customized to this patient's unique overall circumstances and needs. We have reviewed available old medical records within the constraints of this admission process.         Data Review:   Recent Days:  All Micro Results     Procedure Component Value Units Date/Time    CULTURE, BLOOD, PAIRED [[id number]] Collected:  11/17/20 1947    Order Status:  Completed Specimen:  Blood Updated:  11/22/20 0800     Special Requests: NO SPECIAL REQUESTS        Culture result: NO GROWTH 5 DAYS       CULTURE, URINE [[id number]] Collected:  11/18/20 1848    Order Status:  Completed Specimen:  Urine Updated:  11/19/20 1832     Special Requests: --        NO SPECIAL REQUESTS  Reflexed from [id number]       Culture result: No growth (<1,000 CFU/ML)       CULTURE, URINE [[id number]] Collected:  11/17/20 1947    Order Status:  Canceled Specimen:  Urine from Clean catch           Recent Labs     11/28/20  0157   WBC 6.2   HGB 12.1   HCT 36.0*        Recent Labs     11/28/20  0157      K 3.4*   *   CO2 27   *   BUN 34*   CREA 1.14   CA 9.3   MG 2.4      Lab Results   Component Value Date/Time    TSH 1.20 11/19/2020 04:55 AM            Care Plan discussed with:Patient/Family and Nurse   Other medical conditions are listed in the active hospital problem list section; these and other pertinent data were taken into consideration when the treatment plan was developed and customized to this patient's unique overall circumstances and needs. High complexity decision making was performed for this patient who is at high risk for decompensation with multiple organ involvement. Today total floor/unit time was 35 minutes while caring for this patient and greater than 50% of that time was spent with patient (and/or family) coordinating patients clinical issues; this includes time spent during multidisciplinary rounds.         Keith Nava MD MPH FACP    11/29/2020

## 2020-11-29 NOTE — PROGRESS NOTES
End of Shift Note    Bedside shift change report given to Fer Lewis RN (oncoming nurse) by Hallie Tamayo RN (offgoing nurse). Report included the following information SBAR, Kardex and Quality Measures    Shift worked:  7 PM - 7 AM   Shift summary and any significant changes:     Hydralazine held x 2 per MD order       Concerns for physician to address:  Review scheduled B/P meds   Zone phone for oncoming shift:   1817     Patient Information  Prince Comment  80 y.o.  11/17/2020  4:20 PM by Marybeth Garnett MD. Prince Comment was admitted from [facility]    Problem List  Patient Active Problem List    Diagnosis Date Noted    Altered mental status 11/18/2020    Post-ictal state ([address] 11/18/2020    Seizure ([address] 11/17/2020    Atrial pacemaker lead displacement 10/18/2019    Pacemaker 03/22/2019    Bradycardia 03/19/2019    Elevated troponin 03/19/2019    Hypertensive urgency 03/19/2019    Second degree AV block, Mobitz type I 03/19/2019    Stage 3 chronic kidney disease 03/15/2019    Rotator cuff arthropathy of both shoulders 03/15/2019    Cognitive impairment 04/64/0562    Systolic murmur 57/99/8643    Essential hypertension 12/17/2018    Gout 12/17/2018    Pure hypercholesterolemia 12/17/2018    History of prostate cancer 12/17/2018    Chronic constipation 12/17/2018    Dysuria 08/08/2017    Weight loss 08/08/2017    Adrenal mass ([address] 07/18/2017     Past Medical History:   Diagnosis Date    Cancer [facility])     prostate    Constipation     Gout     Hyperlipemia     Hypertension     Pacemaker 2019    Stroke ([address]     Thrombocytopenia ([address] 3/19/2019    TIA (transient ischemic attack) 2013       Core Measures:  CVA: No No  CHF:No No  PNA:No No    Activity:  Activity Level: Bed Rest  Number times ambulated in hallways past shift: 0  Number of times OOB to chair past shift: 0    Cardiac:   Cardiac Monitoring: Yes      Cardiac Rhythm: Paced    Access:   Current line(s): PIV   Central Line? No Placement date 0 Reason Medically Necessary 0  PICC LINE? No Placement date 0Reason Medically Necessary 0    Genitourinary:   Urinary status: voiding, incontinent and external catheter  Urinary Catheter? No Placement Date 0 Reason Medically Necessary 0    Respiratory:   O2 Device: Room air  Chronic home O2 use?: NO  Incentive spirometer at bedside: NO       GI:  Last Bowel Movement Date: (unknown)  Current diet:  DIET NPO  Passing flatus: YES  Tolerating current diet: YES       Pain Management:   Patient states pain is manageable on current regimen: N/A    Skin:  Torito Score: 12  Interventions: float heels and increase time out of bed    Patient Safety:  Fall Score:  Total Score: 4  Interventions: bed/chair alarm  High Fall Risk: Yes    DVT prophylaxis:  DVT prophylaxis Med- Yes  DVT prophylaxis SCD or BEV- No     Wounds: (If Applicable)  Wounds- No  Location 0    Active Consults:  IP CONSULT TO NEUROLOGY  IP CONSULT TO PALLIATIVE CARE - PROVIDER    Length of Stay:  Expected LOS: 2d 16h  Actual LOS: 11  Discharge Plan: No TBD      Lauren Hamm RN

## 2020-11-29 NOTE — PROGRESS NOTES
Hospitalist Progress Note    NAME: Kathryn Corrales   :  3/9/1933   MRN:  [id number]     This is a 80-year-old male with past medical history of multiple strokes presented with acute encephalopathy and also seizure and was noted to have seizures for which she was started on Keppra but developed acute encephalopathy and dose of Keppra was decreased to 750 mg. He is now having severe dysphagia due to his encephalopathy, strokes and also underlying dementia. Family is considering whether to put a feeding tube or not and have not made a decision. Assessment / Plan:  New onset seizures, POA, recurrent episodes   Acute encephalopathy, likely postictal, POA vs worsening dementia   History of multiple strokes, POA  Patient had a 5-minute seizure at nursing home, seized again in the ER and was given Ativan.  status post Keppra loading dose  -Was on Keppra 1 g checks twice daily but dose was decreased to 750 mg twice daily due to sedation  EEG  On admission showed no seizure , repeat EEG  Showed a potential seizure focus on the R hemisphere   MRI ordered unable to be done because of pt being confsued+ pacemaker , unable to verbalize if issues with pacemaker arise while doing MRI   Repeat CT brain unchanged   Seizure precautions  Ativan as needed for seizures and agitation  TSH wnl , B12 wnl , folate wnl , RPR neg , CBC, CMP wnl , and blood cultures NTD    -Dr. Martin Lou who recommended that we will wait 1 to 2 days to see if his mental status would get better with medication adjustment and if not better, he suggested hospice  -Discussed with patient daughter about plan of care  -Continue Keppra at reduced dose of 750 mg twice daily and continue to monitor for seizures  -Patient's daughter will decide on feeding options tomorrow.     History of CVA  Continue aspirin suppository. Not getting p.o. meds as he is n.p.o.       SOULEYMANE on CKD  -Creatinine on  was 1. 19. Check BMP in a.m.     Hypokalemia Check BMP in a.m.     Sinus bradycardia with 2-1 block: POA  Status post pacemaker  Was able to have MRI with [employer] rep present and MRI in July at [address] [place] [address] to get MRI brain here      Baseline debility  Patient is able to walk with a walker  Has moderate dementia, but is able to hold conversation with family  Resides in a nursing home     Dysphagia due to acute metabolic encephalopathy  -Discussed feeding options with patient daughter, she will discuss various feeding options including NG and also PEG tube with family and let us know    Right arm swelling  -US doppler shows no DVT. Keep rt arm elevated and apply warm compresses. NSVT  -Potassium is low and replaced. Magnesium is normal.    Dispo:  Discharged dependent upon families decision regarding feeding options    I updated patient's daughter at bedside today. Patient's daughter is supposed to let us know regarding feeding options tomorrow. She is inclining towards a PEG tube at this time. I informed the patient's daughter that he has been not eating for last 9 days and they will have to make a decision sooner. Code status: Full  Prophylaxis: Lovenox  Recommended Disposition: SNF      Subjective:     Chief Complaint / Reason for Physician Visit  He is slightly more alert and awake today but still not able to follow commands  No fever  Daughter present at bedside and discussed plan of care    Review of Systems:  Symptom Y/N Comments  Symptom Y/N Comments   Fever/Chills    Chest Pain     Poor Appetite    Edema     Cough    Abdominal Pain     Sputum    Joint Pain     SOB/CARREON    Pruritis/Rash     Nausea/vomit    Tolerating PT/OT     Diarrhea    Tolerating Diet     Constipation    Other       Could NOT obtain due to: Lethargic      Objective:     VITALS:   Last 24hrs VS reviewed since prior progress note.  Most recent are:  Patient Vitals for the past 24 hrs:   Temp Pulse Resp BP SpO2   11/28/20 1551 98.4 °F (36.9 °C) 66 18 (!) 160/98 99 %   11/28/20 1127 98.1 °F (36.7 °C) (!) 110 18 (!) 146/94 95 %   11/28/20 0739 98 °F (36.7 °C) 62 18 121/73 99 %   11/28/20 0409 98.1 °F (36.7 °C) 60 16 129/68 100 %   11/27/20 2358 99 °F (37.2 °C) 68 16 (!) 195/88 98 %       Intake/Output Summary (Last 24 hours) at 11/28/2020 2105  Last data filed at 11/28/2020 1209  Gross per 24 hour   Intake    Output 1800 ml   Net -1800 ml        I had a face to face encounter with this patient and independently examined this patient on 11/28/2020 as outlined below:  PHYSICAL EXAM:  General: Alert, noncooperative  EENT:  EOMI. Anicteric sclerae. MMM  Resp:  CTA bilaterally, no wheezing or rales. No accessory muscle use  CV:  Regular  rhythm,  No edema  GI:  Soft, Non distended, Non tender. +Bowel sounds  Neurologic:  More alert today  Psych:   Confused assess  Skin:  No rashes. No jaundice    Reviewed most current lab test results and cultures  YES  Reviewed most current radiology test results   YES  Review and summation of old records today    NO  Reviewed patient's current orders and MAR    YES  PMH/ reviewed - no change compared to H&P  ________________________________________________________________________  Care Plan discussed with:    Comments   Patient     Family  x    RN x    Care Manager     Consultant  x                     x Multidiciplinary team rounds were held today with , nursing, pharmacist and clinical coordinator. Patient's plan of care was discussed; medications were reviewed and discharge planning was addressed.      ________________________________________________________________________  Total NON critical care TIME: 45   Minutes    Total CRITICAL CARE TIME Spent:   Minutes non procedure based      Comments   >50% of visit spent in counseling and coordination of care     ________________________________________________________________________  Yanni Simental MD     Procedures: see electronic medical records for all procedures/Xrays and details which were not copied into this note but were reviewed prior to creation of Plan. LABS:  I reviewed today's most current labs and imaging studies.   Pertinent labs include:  Recent Labs     11/28/20 0157 11/26/20 0247   WBC 6.2 7.6   HGB 12.1 12.0*   HCT 36.0* 36.9    179     Recent Labs     11/28/20 0157 11/26/20 0247    146*   K 3.4* 3.5   * 115*   CO2 27 27   * 108*   BUN 34* 30*   CREA 1.14 1.34*   CA 9.3 9.7   MG 2.4  --        Signed: Tomas Barnes MD

## 2020-11-29 NOTE — PROGRESS NOTES
Patient has scheduled Lopressor, hydralazine and Vasotec at this time. B/P at this time is 153/69. Patient has PRN hydralazine for b/p greater than 160. Pls advise. Md Ely Lindsay called back with new order to hold hydralazine and give vasotec and lopressor at this time.

## 2020-11-29 NOTE — PROGRESS NOTES
Patient has scheduled Lopressor, hydralazine and Vasotec. B/P at this time is 163/67. Vasotec and Lopressor already administered. Worried about giving hydralazine. Please advise. Awaiting md reply at this time    Dr Jose Alfredo Quevedo called back with order to hold hydralazine at this time.

## 2020-11-30 ENCOUNTER — ANESTHESIA (OUTPATIENT)
Dept: ENDOSCOPY | Age: 85
DRG: 057 | End: 2020-11-30
Payer: MEDICARE

## 2020-11-30 ENCOUNTER — ANESTHESIA EVENT (OUTPATIENT)
Dept: ENDOSCOPY | Age: 85
DRG: 057 | End: 2020-11-30
Payer: MEDICARE

## 2020-11-30 PROCEDURE — 74011250636 HC RX REV CODE- 250/636: Performed by: INTERNAL MEDICINE

## 2020-11-30 PROCEDURE — 74011000250 HC RX REV CODE- 250: Performed by: ANESTHESIOLOGY

## 2020-11-30 PROCEDURE — 74011250636 HC RX REV CODE- 250/636: Performed by: ANESTHESIOLOGY

## 2020-11-30 PROCEDURE — 3E0G76Z INTRODUCTION OF NUTRITIONAL SUBSTANCE INTO UPPER GI, VIA NATURAL OR ARTIFICIAL OPENING: ICD-10-PCS | Performed by: STUDENT IN AN ORGANIZED HEALTH CARE EDUCATION/TRAINING PROGRAM

## 2020-11-30 PROCEDURE — 76040000019: Performed by: INTERNAL MEDICINE

## 2020-11-30 PROCEDURE — 74011250636 HC RX REV CODE- 250/636: Performed by: PSYCHIATRY & NEUROLOGY

## 2020-11-30 PROCEDURE — 65660000000 HC RM CCU STEPDOWN

## 2020-11-30 PROCEDURE — 76060000031 HC ANESTHESIA FIRST 0.5 HR: Performed by: INTERNAL MEDICINE

## 2020-11-30 PROCEDURE — 77030005122 HC CATH GASTMY PEG BSC -B: Performed by: INTERNAL MEDICINE

## 2020-11-30 PROCEDURE — 0DH63UZ INSERTION OF FEEDING DEVICE INTO STOMACH, PERCUTANEOUS APPROACH: ICD-10-PCS | Performed by: ORTHOPAEDIC SURGERY

## 2020-11-30 PROCEDURE — 74011000250 HC RX REV CODE- 250: Performed by: INTERNAL MEDICINE

## 2020-11-30 PROCEDURE — 74011000258 HC RX REV CODE- 258: Performed by: PSYCHIATRY & NEUROLOGY

## 2020-11-30 PROCEDURE — 2709999900 HC NON-CHARGEABLE SUPPLY: Performed by: INTERNAL MEDICINE

## 2020-11-30 RX ORDER — SODIUM CHLORIDE 9 MG/ML
25 INJECTION, SOLUTION INTRAVENOUS CONTINUOUS
Status: DISPENSED | OUTPATIENT
Start: 2020-11-30 | End: 2020-11-30

## 2020-11-30 RX ORDER — SODIUM CHLORIDE 0.9 % (FLUSH) 0.9 %
5-40 SYRINGE (ML) INJECTION EVERY 8 HOURS
Status: DISCONTINUED | OUTPATIENT
Start: 2020-11-30 | End: 2020-12-02 | Stop reason: SDUPTHER

## 2020-11-30 RX ORDER — LIDOCAINE HYDROCHLORIDE 20 MG/ML
INJECTION, SOLUTION EPIDURAL; INFILTRATION; INTRACAUDAL; PERINEURAL AS NEEDED
Status: DISCONTINUED | OUTPATIENT
Start: 2020-11-30 | End: 2020-11-30 | Stop reason: HOSPADM

## 2020-11-30 RX ORDER — EPINEPHRINE 0.1 MG/ML
1 INJECTION INTRACARDIAC; INTRAVENOUS
Status: DISCONTINUED | OUTPATIENT
Start: 2020-11-30 | End: 2020-11-30 | Stop reason: HOSPADM

## 2020-11-30 RX ORDER — FLUMAZENIL 0.1 MG/ML
0.2 INJECTION INTRAVENOUS
Status: DISCONTINUED | OUTPATIENT
Start: 2020-11-30 | End: 2020-11-30 | Stop reason: HOSPADM

## 2020-11-30 RX ORDER — SODIUM CHLORIDE 0.9 % (FLUSH) 0.9 %
5-40 SYRINGE (ML) INJECTION AS NEEDED
Status: DISCONTINUED | OUTPATIENT
Start: 2020-11-30 | End: 2020-12-11 | Stop reason: HOSPADM

## 2020-11-30 RX ORDER — DEXTROMETHORPHAN/PSEUDOEPHED 2.5-7.5/.8
1.2 DROPS ORAL
Status: DISCONTINUED | OUTPATIENT
Start: 2020-11-30 | End: 2020-11-30 | Stop reason: HOSPADM

## 2020-11-30 RX ORDER — NALOXONE HYDROCHLORIDE 0.4 MG/ML
0.4 INJECTION, SOLUTION INTRAMUSCULAR; INTRAVENOUS; SUBCUTANEOUS
Status: DISCONTINUED | OUTPATIENT
Start: 2020-11-30 | End: 2020-11-30 | Stop reason: HOSPADM

## 2020-11-30 RX ORDER — PROPOFOL 10 MG/ML
INJECTION, EMULSION INTRAVENOUS AS NEEDED
Status: DISCONTINUED | OUTPATIENT
Start: 2020-11-30 | End: 2020-11-30 | Stop reason: HOSPADM

## 2020-11-30 RX ORDER — ATROPINE SULFATE 0.1 MG/ML
0.5 INJECTION INTRAVENOUS
Status: DISCONTINUED | OUTPATIENT
Start: 2020-11-30 | End: 2020-11-30 | Stop reason: HOSPADM

## 2020-11-30 RX ADMIN — HYDRALAZINE HYDROCHLORIDE 10 MG: 20 INJECTION INTRAMUSCULAR; INTRAVENOUS at 18:05

## 2020-11-30 RX ADMIN — LEVETIRACETAM 750 MG: 100 INJECTION, SOLUTION INTRAVENOUS at 14:52

## 2020-11-30 RX ADMIN — WATER 2 G: 1 INJECTION INTRAMUSCULAR; INTRAVENOUS; SUBCUTANEOUS at 13:42

## 2020-11-30 RX ADMIN — METOPROLOL TARTRATE 2.5 MG: 1 INJECTION, SOLUTION INTRAVENOUS at 05:38

## 2020-11-30 RX ADMIN — METOPROLOL TARTRATE 2.5 MG: 1 INJECTION, SOLUTION INTRAVENOUS at 18:01

## 2020-11-30 RX ADMIN — Medication 10 ML: at 05:33

## 2020-11-30 RX ADMIN — DEXTROSE MONOHYDRATE 50 ML/HR: 5 INJECTION, SOLUTION INTRAVENOUS at 02:15

## 2020-11-30 RX ADMIN — PROPOFOL 120 MG: 10 INJECTION, EMULSION INTRAVENOUS at 14:04

## 2020-11-30 RX ADMIN — HYDRALAZINE HYDROCHLORIDE 10 MG: 20 INJECTION INTRAMUSCULAR; INTRAVENOUS at 12:07

## 2020-11-30 RX ADMIN — ENALAPRILAT 1.25 MG: 2.5 INJECTION INTRAVENOUS at 00:23

## 2020-11-30 RX ADMIN — Medication 10 ML: at 14:54

## 2020-11-30 RX ADMIN — ENALAPRILAT 1.25 MG: 2.5 INJECTION INTRAVENOUS at 12:08

## 2020-11-30 RX ADMIN — LIDOCAINE HYDROCHLORIDE 100 MG: 20 INJECTION, SOLUTION EPIDURAL; INFILTRATION; INTRACAUDAL; PERINEURAL at 13:51

## 2020-11-30 RX ADMIN — HYDRALAZINE HYDROCHLORIDE 10 MG: 20 INJECTION INTRAMUSCULAR; INTRAVENOUS at 05:38

## 2020-11-30 RX ADMIN — LEVETIRACETAM 750 MG: 100 INJECTION, SOLUTION INTRAVENOUS at 02:15

## 2020-11-30 RX ADMIN — ENALAPRILAT 1.25 MG: 2.5 INJECTION INTRAVENOUS at 05:38

## 2020-11-30 RX ADMIN — SODIUM CHLORIDE 25 ML/HR: 900 INJECTION, SOLUTION INTRAVENOUS at 13:37

## 2020-11-30 RX ADMIN — HYDRALAZINE HYDROCHLORIDE 10 MG: 20 INJECTION INTRAMUSCULAR; INTRAVENOUS at 00:24

## 2020-11-30 RX ADMIN — METOPROLOL TARTRATE 2.5 MG: 1 INJECTION, SOLUTION INTRAVENOUS at 00:24

## 2020-11-30 RX ADMIN — METOPROLOL TARTRATE 2.5 MG: 1 INJECTION, SOLUTION INTRAVENOUS at 12:06

## 2020-11-30 NOTE — H&P
Date of Surgery Update:  Cecy Vaughn was seen and examined. History and physical has been reviewed. The patient has been examined.  There have been no significant clinical changes since the completion of the originally dated History and Physical.    Signed By: Griselda Drought, MD     November 30, 2020 1:45 PM

## 2020-11-30 NOTE — PROGRESS NOTES
Hospitalist Progress Note    NAME: Unique Zaldivar   :  3/9/1933   MRN:  [id number]       Assessment / Plan:  New onset seizures, POA, recurrent episodes   Acute encephalopathy, likely postictal, POA vs worsening dementia   History of multiple CVAs   Patient had a 5-minute seizure at nursing home, seized again in the ER and was given Ativan.  status post Keppra loading dose  -Was on Keppra 1 g checks twice daily but dose was decreased to 750 mg twice daily due to sedation  EEG  On admission showed no seizure , repeat EEG  Showed a potential seizure focus on the R hemisphere   MRI ordered unable to be done because of pt being confsued+ pacemaker , unable to verbalize if issues with pacemaker arise while doing MRI   Repeat CT brain unchanged   Seizure precautions  Ativan as needed for seizures and agitation  TSH wnl , B12 wnl , folate wnl , RPR neg , CBC, CMP wnl , and blood cultures NTD    -Dr. Josse Pearson who recommended that we will wait 1 to 2 days to see if his mental status would get better with medication adjustment and if not better, he suggested hospice  -Continue Keppra at reduced dose of 750 mg twice daily and continue to monitor for seizures       HTN   Will switch IVmeds to PO via peg tomorrow   History of CVA  Continue aspirin suppository.  Not getting p.o. meds as he is n.p.o.     SOULEYMANE on CKD  -Creatinine on  was 1. 14.       Hypokalemia   replete  BMP tomorrow      Sinus bradycardia with 2-1 block: POA  Status post pacemaker  Was able to have MRI with [employer] rep present and MRI in July at [address] [place] [address] to get MRI brain here      Debility  Patient was able to walk with a walker  Has moderate dementia, but was able to hold conversation with family  Resides in a nursing home     Dysphagia due to acute metabolic encephalopathy  -PEG tube to be placed today   Feeding tube to be started ASAP   · Per diet recs : Once PEG placed, recommend Jevity 1.5 @ 20 mL/hr x 24 hours + 150 mL water flushes q 4 hours  · If tolerating TF x 24 hours, advance TF by 10 mL q 12 hours to goal rate of 50 mL/hr + 150 mL water flushes q 4 hours (1800 kcal, 76.5g protein, 1812 mL water)  Monitor K+, Mg, Phos daily and replete as needed. Right arm swelling  -US doppler shows no DVT. Keep rt arm elevated and apply warm compresses.      NSVT  -Potassium is low and replaced.  Magnesium is normal.       25.0 - 29.9 Overweight / Body mass index is 26.44 kg/m². Code status: Full  Prophylaxis: Lovenox  Recommended Disposition: SNF/LTC     Subjective:     Chief Complaint / Reason for Physician Visit  FU AMS/dysphagia  Discussed with RN events overnight. Review of Systems:  Symptom Y/N Comments  Symptom Y/N Comments   Fever/Chills    Chest Pain     Poor Appetite    Edema     Cough    Abdominal Pain     Sputum    Joint Pain     SOB/CARREON    Pruritis/Rash     Nausea/vomit    Tolerating PT/OT     Diarrhea    Tolerating Diet     Constipation    Other       Could NOT obtain due to: Confused      Objective:     VITALS:   Last 24hrs VS reviewed since prior progress note.  Most recent are:  Patient Vitals for the past 24 hrs:   Temp Pulse Resp BP SpO2   11/30/20 1741 98.7 °F (37.1 °C) 86 18 (!) 174/86    11/30/20 1455 97.4 °F (36.3 °C) 73 20 (!) 150/68 98 %   11/30/20 1430  67 15 104/61 97 %   11/30/20 1427  67 15 (!) 97/58 97 %   11/30/20 1424  69 19 (!) 98/59 97 %   11/30/20 1423 97.6 °F (36.4 °C) 62 16 (!) 101/56 97 %   11/30/20 1414  71 18 (!) 130/56 97 %   11/30/20 1410  71 16 114/71 100 %   11/30/20 1405  81 21 (!) 111/52 100 %   11/30/20 1234  70 17 (!) 151/74 100 %   11/30/20 1206 98.4 °F (36.9 °C) 79 20 (!) 152/83 98 %   11/30/20 0846 98.2 °F (36.8 °C) 70 20 (!) 152/64 98 %   11/30/20 0537  70  135/66    11/30/20 0310 98.5 °F (36.9 °C) 66 18 127/67 99 %   11/30/20 0022 98.5 °F (36.9 °C) 72 18 (!) 146/73 100 %   11/29/20 1932 99.1 °F (37.3 °C) 80 17 136/71 100 %   11/29/20 1818  71  (!) 149/101  Intake/Output Summary (Last 24 hours) at 11/30/2020 1746  Last data filed at 11/30/2020 1407  Gross per 24 hour   Intake 500 ml   Output    Net 500 ml        I had a face to face encounter and independently examined this patient on 11/30/2020, as outlined below:  PHYSICAL EXAM:  General: WD, WN. Alert, cooperative, no acute distress    EENT:  EOMI. Anicteric sclerae. MMM  Resp:  CTA bilaterally, no wheezing or rales. No accessory muscle use  CV:  Regular  rhythm,  No edema  GI:  Soft, Non distended, Non tender. +Bowel sounds  Neurologic:  Confused   Psych:   t. Not anxious nor agitated  Skin:  No rashes. No jaundice    Reviewed most current lab test results and cultures  YES  Reviewed most current radiology test results   YES  Review and summation of old records today    NO  Reviewed patient's current orders and MAR    YES  PMH/ reviewed - no change compared to H&P  ________________________________________________________________________  Care Plan discussed with:    Comments   Patient     Family      RN x    Care Manager     Consultant                       x Multidiciplinary team rounds were held today with , nursing, pharmacist and clinical coordinator. Patient's plan of care was discussed; medications were reviewed and discharge planning was addressed. ________________________________________________________________________  Total NON critical care TIME: 35  Minutes    Total CRITICAL CARE TIME Spent:   Minutes non procedure based      Comments   >50% of visit spent in counseling and coordination of care     ________________________________________________________________________  Annabel Tubbs MD     Procedures: see electronic medical records for all procedures/Xrays and details which were not copied into this note but were reviewed prior to creation of Plan. LABS:  I reviewed today's most current labs and imaging studies.   Pertinent labs include:  Recent Labs 11/28/20 0157   WBC 6.2   HGB 12.1   HCT 36.0*        Recent Labs     11/28/20 0157      K 3.4*   *   CO2 27   *   BUN 34*   CREA 1.14   CA 9.3   MG 2.4       Signed: Donnell Cassidy MD

## 2020-11-30 NOTE — CONSULTS
Palliative Medicine  [phone]    See my colleagues note from 11/25  Daughter not amenable to conversation at that time and was encouraged to call us if she wanted to talk  Have not heard from her since then    Will cancel the consult    James Salinas NP

## 2020-11-30 NOTE — PROGRESS NOTES
Speech pathology  Patient NIK for peg placement after being npo for 12 days. He has either been too lethargic and unarousable or alert and too confused without any attempt to accept PO. SLP will continue to follow.   Thanks,    Elena Marcelo M.S. CCC-SLP

## 2020-11-30 NOTE — PROGRESS NOTES
Comprehensive Nutrition Assessment    Type and Reason for Visit: Reassess    Nutrition Recommendations/Plan:  · Once PEG placed, recommend Jevity 1.5 @ 20 mL/hr x 24 hours + 150 mL water flushes q 4 hours  · If tolerating TF x 24 hours, advance TF by 10 mL q 12 hours to goal rate of 50 mL/hr + 150 mL water flushes q 4 hours (1800 kcal, 76.5g protein, 1812 mL water)  · Monitor K+, Mg, Phos daily and replete as needed. Nutrition Assessment:     Chart reviewed; patient NPO x ~12 days. Noted plans for PEG tube today. High risk for refeeding. TF recommendations provided above for slow TF advancement. Goal rate adequate to meet 100% of estimated kcal/protein needs. Monitor lytes daily and replete as needed. Estimated Daily Nutrient Needs:  Energy (kcal): 1765 kcal (BMR 1358 x 1. 3AF); Weight Used for Energy Requirements: Current  Protein (g): 74-89g (1.0-1.2 g/kg bw); Weight Used for Protein Requirements: Current  Fluid (ml/day): 1750 mL; Method Used for Fluid Requirements: 1 ml/kcal    Nutrition Related Findings:    No labs since 11/28  Medications: Norvasc, Atorvastatin, Vasotex, Famotidine, hydralazine, Keppra      Wounds:    None       Current Nutrition Therapies:  DIET NPO    Anthropometric Measures:  · Height:  5' 6\" (167.6 cm)  · Current Body Wt:  74.3 kg (163 lb 12.8 oz)   · BMI Category: Overweight (BMI 25.0-29. 9)       Nutrition Diagnosis:   · Inadequate protein-energy intake related to cognitive or neurological impairment, swallowing difficulty as evidenced by NPO or clear liquid status due to medical condition    Nutrition Interventions:   Food and/or Nutrient Delivery: Start tube feeding  Nutrition Education and Counseling: No recommendations at this time  Coordination of Nutrition Care: Speech therapy, Continue to monitor while inpatient    Goals:  TF started and advanced to goal rate + lytes WNL next 1-3 days       Nutrition Monitoring and Evaluation:   Behavioral-Environmental Outcomes: None identified  Food/Nutrient Intake Outcomes: Enteral nutrition intake/tolerance  Physical Signs/Symptoms Outcomes: Biochemical data, Weight    Discharge Planning:    Enteral nutrition     Electronically signed by Rafael Pineda RD on 11/30/2020 at 12:35 PM    Contact: ext [phone]

## 2020-11-30 NOTE — PROGRESS NOTES
End of Shift Note    Bedside shift change report given to [address] [place] [address] (oncoming nurse) by King Fallon (offgoing nurse). Report included the following information SBAR, Kardex and Quality Measures    Shift worked:  days   Shift summary and any significant changes:    none       Concerns for physician to address:  none   Zone phone for oncoming shift:   [phone]     Patient Information  Mandy Tate  80 y.o.  11/17/2020  4:20 PM by Emilio Quinones MD. Mandy Tate was admitted from [facility] L[facility]    Problem List  Patient Active Problem List    Diagnosis Date Noted    Altered mental status 11/18/2020    Post-ictal state ([address] 11/18/2020    Seizure ([address] 11/17/2020    Atrial pacemaker lead displacement 10/18/2019    Pacemaker 03/22/2019    Bradycardia 03/19/2019    Elevated troponin 03/19/2019    Hypertensive urgency 03/19/2019    Second degree AV block, Mobitz type I 03/19/2019    Stage 3 chronic kidney disease 03/15/2019    Rotator cuff arthropathy of both shoulders 03/15/2019    Cognitive impairment 14/09/4394    Systolic murmur 94/94/9199    Essential hypertension 12/17/2018    Gout 12/17/2018    Pure hypercholesterolemia 12/17/2018    History of prostate cancer 12/17/2018    Chronic constipation 12/17/2018    Dysuria 08/08/2017    Weight loss 08/08/2017    Adrenal mass ([address] 07/18/2017     Past Medical History:   Diagnosis Date    Cancer [facility])     prostate    Constipation     Gout     Hyperlipemia     Hypertension     Pacemaker 2019    Stroke ([address]     Thrombocytopenia ([address] 3/19/2019    TIA (transient ischemic attack) 2013       Core Measures:  CVA: No No  CHF:No No  PNA:No No    Activity:  Activity Level: Bed Rest  Number times ambulated in hallways past shift: 0  Number of times OOB to chair past shift: 0    Cardiac:   Cardiac Monitoring: Yes      Cardiac Rhythm: Paced    Access:   Current line(s): PIV   Central Line? No Placement date 0 Reason Medically Necessary 0  PICC LINE?  No Placement date 0Reason Medically Necessary 0    Genitourinary:   Urinary status: voiding, incontinent and external catheter  Urinary Catheter? No Placement Date 0 Reason Medically Necessary 0    Respiratory:   O2 Device: Room air  Chronic home O2 use?: NO  Incentive spirometer at bedside: NO       GI:  Last Bowel Movement Date: (unknown)  Current diet:  DIET NPO  Passing flatus: YES  Tolerating current diet: YES       Pain Management:   Patient states pain is manageable on current regimen: N/A    Skin:  Torito Score: 12  Interventions: float heels and increase time out of bed    Patient Safety:  Fall Score:  Total Score: 4  Interventions: bed/chair alarm  High Fall Risk: Yes    DVT prophylaxis:  DVT prophylaxis Med- Yes  DVT prophylaxis SCD or BEV- No     Wounds: (If Applicable)  Wounds- No  Location 0    Active Consults:  IP CONSULT TO NEUROLOGY  IP CONSULT TO PALLIATIVE CARE - PROVIDER  IP CONSULT TO GASTROENTEROLOGY    Length of Stay:  Expected LOS: 2d 16h  Actual LOS: 11  Discharge Plan: No TBD      Nasrin Prado

## 2020-11-30 NOTE — PROCEDURES
NAME:  Mohit Tenorio   :   3/9/1933   MRN:   [id number]     Date/Time:  2020 1:45 PM    Esophagogastroduodenoscopy (EGD) Procedure Note    Procedure: Esophagogastroduodenoscopy with placement of a percutaneous endoscopic gastrostomy tube    Indication: feeding difficulty, dysphagia  Pre-operative Diagnosis: see indication above  Post-operative Diagnosis: see findings below  :  Eli Childers MD  Referring Provider:   Luiza Alfaro MD    Exam:  Airway: clear, no airway problems anticipated  Heart: RRR, without gallops or rubs  Lungs: clear bilaterally without wheezes, crackles, or rhonchi  Abdomen: soft, nontender, nondistended, bowel sounds present  Mental Status: awake, alert and oriented to person, place and time     Anethesia/Sedation:  MAC anesthesia Propofol  Procedure Details   After informed consent was obtained for the procedure, with all risks and benefits of procedure explained the patient was taken to the endoscopy suite and placed in the left lateral decubitus position. Following sequential administration of sedation as per above, the [id number] gastroscope was inserted into the mouth and advanced under direct vision to third portion of the duodenum. A careful inspection was made as the gastroscope was withdrawn, including a retroflexed view of the proximal stomach; findings and interventions are described below. Findings:   OROPHARYNX: Cords and pyriform recesses normal.   ESOPHAGUS: The esophagus is normal. The proximal, mid, and distal portions are normal. The Z-Line is intact. STOMACH: The fundus on antegrade and retroflex views is normal. The body, antrum, and pylorus are normal.   DUODENUM: The bulb, second and third portions are normal.    Therapies: Site identified with one to one movement through the abdominal wall and transillumination. The skin was prepped and draped in usual sterile fashion and injected with lidocaine to form a wheel.  The trochar was inserted through the skin and into the gastric lumen under endoscopic visualization. The wire was inserted through the trochar, snared via the endoscope and withdrawn with the endoscope. The PEG tube was thread over the wire and into the gastric lumen, then one-sided pulled through the opening in the abdominal wall. It was secured in place at 5.5 cm at the external bumper,5.0 cm at the skin surface. Re-look endoscopy confirmed placement with the internal bumper within the gastric lumen and the endoscope was withdrawn. Specimens: none    EBL:  None. Complications:   None; patient tolerated the procedure well. Impression:  -- successful PEG tube placement    Recommendations:  -- -The PEG tube may be used for feedings as soon as bowel sounds are confirmed post-procedure. The head of the bed should be kept elevated to 30 degrees during tube feeds, and the tube should be flushed with 30 mL of water every 8 hours and after giving medications through the tube. Monitor the PEG site for bleeding and infection.     Discharge disposition:  Home in the company of  when able to ambulate    Bev Ghosh MD

## 2020-11-30 NOTE — PROGRESS NOTES
Dr. Nicole Contreras discussed PEG placement with Sully King [facility]) in full detail all questions asked. Verbal/ Telephone consent obtained with 2 nurses. Quincy Ku RN and FINN Ramires RN.

## 2020-11-30 NOTE — ANESTHESIA PREPROCEDURE EVALUATION
Relevant Problems   No relevant active problems       Anesthetic History   No history of anesthetic complications            Review of Systems / Medical History  Patient summary reviewed, nursing notes reviewed and pertinent labs reviewed    Pulmonary  Within defined limits                 Neuro/Psych     seizures  CVA  TIA    Comments:  Altered mental status   Hx Cognitive impairment    Cardiovascular    Hypertension        Dysrhythmias   Pacemaker and hyperlipidemia    Exercise tolerance: <4 METS  Comments: Hx Bradycardia   Second degree AV block, Mobitz type I   Pacemaker  Echo 7/5/20: Estimated left ventricular ejection fraction is 40 - 45%       GI/Hepatic/Renal  Within defined limits              Endo/Other        Cancer     Other Findings   Comments: Hx prostate ca  Hx Thrombocytopenia   Hx Gout   Dysuria   Weight loss         Physical Exam    Airway            Comments: Unable to assess airway as patient is uncooperative Cardiovascular  Regular rate and rhythm,  S1 and S2 normal,  no murmur, click, rub, or gallop             Dental      Comments: Unable to assess as patient is uncooperative   Pulmonary  Breath sounds clear to auscultation               Abdominal  GI exam deferred       Other Findings            Anesthetic Plan    ASA: 3  Anesthesia type: total IV anesthesia          Induction: Intravenous  Anesthetic plan and risks discussed with: Patient

## 2020-11-30 NOTE — ANESTHESIA POSTPROCEDURE EVALUATION
Procedure(s):  PERCUTANEOUS ENDOSCOPIC GASTROSTOMY TUBE INSERTION  ESOPHAGOGASTRODUODENOSCOPY (EGD). total IV anesthesia    Anesthesia Post Evaluation        Patient location during evaluation: PACU  Note status: Adequate. Level of consciousness: responsive to verbal stimuli and sleepy but conscious  Pain management: satisfactory to patient  Airway patency: patent  Anesthetic complications: no  Cardiovascular status: acceptable  Respiratory status: acceptable  Hydration status: acceptable  Comments: +Post-Anesthesia Evaluation and Assessment    Patient: Lizzy Taylor MRN: [id number]  SSN: [id number]   YOB: 1933  Age: 80 y.o. Sex: male      Cardiovascular Function/Vital Signs    BP (!) 150/68 (BP 1 Location: Left arm, BP Patient Position: At rest)   Pulse 73   Temp 36.3 °C (97.4 °F)   Resp 20   Ht 5' 6\" (1.676 m)   Wt 74.3 kg (163 lb 12.8 oz)   SpO2 98%   BMI 26.44 kg/m²     Patient is status post Procedure(s):  PERCUTANEOUS ENDOSCOPIC GASTROSTOMY TUBE INSERTION  ESOPHAGOGASTRODUODENOSCOPY (EGD). Nausea/Vomiting: Controlled. Postoperative hydration reviewed and adequate. Pain:  Pain Scale 1: Adult Nonverbal Pain Scale (11/30/20 1455)  Pain Intensity 1: 0 (11/30/20 1455)   Managed. Neurological Status: At baseline. Mental Status and Level of Consciousness: Arousable. Pulmonary Status:   O2 Device: Room air (11/30/20 1455)   Adequate oxygenation and airway patent. Complications related to anesthesia: None    Post-anesthesia assessment completed. No concerns.     Signed By: Rachel Garduno,     11/30/2020  Post anesthesia nausea and vomiting:  controlled      INITIAL Post-op Vital signs:   Vitals Value Taken Time   /52 11/30/2020  2:05 PM   Temp     Pulse 81 11/30/2020  2:05 PM   Resp 21 11/30/2020  2:05 PM   SpO2 100 % 11/30/2020  2:05 PM

## 2020-11-30 NOTE — PROGRESS NOTES
Problem: Falls - Risk of  Goal: *Absence of Falls  Description: Document Ana Tobias Fall Risk and appropriate interventions in the flowsheet. Outcome: Progressing Towards Goal  Note: Fall Risk Interventions:       Mentation Interventions: Adequate sleep, hydration, pain control, Bed/chair exit alarm    Medication Interventions: Bed/chair exit alarm, Evaluate medications/consider consulting pharmacy    Elimination Interventions: Bed/chair exit alarm, Call light in reach    History of Falls Interventions: Bed/chair exit alarm         Problem: Patient Education: Go to Patient Education Activity  Goal: Patient/Family Education  Outcome: Progressing Towards Goal     Problem: Falls - Risk of  Goal: *Absence of Falls  Description: Document Ana Tobias Fall Risk and appropriate interventions in the flowsheet. Outcome: Progressing Towards Goal  Note: Fall Risk Interventions:       Mentation Interventions: Adequate sleep, hydration, pain control, Bed/chair exit alarm    Medication Interventions: Bed/chair exit alarm, Evaluate medications/consider consulting pharmacy    Elimination Interventions: Bed/chair exit alarm, Call light in reach    History of Falls Interventions: Bed/chair exit alarm         Problem: Patient Education: Go to Patient Education Activity  Goal: Patient/Family Education  Outcome: Progressing Towards Goal     Problem: Pressure Injury - Risk of  Goal: *Prevention of pressure injury  Description: Document Torito Scale and appropriate interventions in the flowsheet.   Outcome: Progressing Towards Goal  Note: Pressure Injury Interventions:  Sensory Interventions: Assess changes in LOC    Moisture Interventions: Absorbent underpads, Apply protective barrier, creams and emollients    Activity Interventions: Pressure redistribution bed/mattress(bed type)    Mobility Interventions: Pressure redistribution bed/mattress (bed type)    Nutrition Interventions: Document food/fluid/supplement intake, Offer support with meals,snacks and hydration    Friction and Shear Interventions: Apply protective barrier, creams and emollients                Problem: Patient Education: Go to Patient Education Activity  Goal: Patient/Family Education  Outcome: Progressing Towards Goal     Problem: Seizure Disorder (Adult)  Goal: *STG: Remains free of seizure activity  Outcome: Progressing Towards Goal  Goal: *STG: Maintains lab values within therapeutic range  Outcome: Progressing Towards Goal  Goal: *STG/LTG: Complies with medication therapy  Outcome: Progressing Towards Goal  Goal: *STG: Remains free of injury during seizure activity  Outcome: Progressing Towards Goal  Goal: *STG: Remains safe in hospital  Outcome: Progressing Towards Goal  Goal: Interventions  Outcome: Progressing Towards Goal     Problem: Patient Education: Go to Patient Education Activity  Goal: Patient/Family Education  Outcome: Progressing Towards Goal     Problem: Patient Education: Go to Patient Education Activity  Goal: Patient/Family Education  Outcome: Progressing Towards Goal

## 2020-11-30 NOTE — PROGRESS NOTES
End of Shift Note    Bedside shift change report given to RN (oncoming nurse) by Mik Marks RN (offgoing nurse). Report included the following information SBAR, Kardex, MAR and Quality Measures    Shift worked:  7 PM - 7 AM   Shift summary and any significant changes:     None       Concerns for physician to address:  Bilateral arm swollen   Zone phone for oncoming shift:   [phone]     Patient Information  Bertha Lockwood  80 y.o.  11/17/2020  4:20 PM by Anthony Le MD. Bertha Lockwood was admitted from [facility]    Problem List  Patient Active Problem List    Diagnosis Date Noted    Altered mental status 11/18/2020    Post-ictal state ([address] 11/18/2020    Seizure ([address] 11/17/2020    Atrial pacemaker lead displacement 10/18/2019    Pacemaker 03/22/2019    Bradycardia 03/19/2019    Elevated troponin 03/19/2019    Hypertensive urgency 03/19/2019    Second degree AV block, Mobitz type I 03/19/2019    Stage 3 chronic kidney disease 03/15/2019    Rotator cuff arthropathy of both shoulders 03/15/2019    Cognitive impairment 72/62/5775    Systolic murmur 07/07/2259    Essential hypertension 12/17/2018    Gout 12/17/2018    Pure hypercholesterolemia 12/17/2018    History of prostate cancer 12/17/2018    Chronic constipation 12/17/2018    Dysuria 08/08/2017    Weight loss 08/08/2017    Adrenal mass ([address] 07/18/2017     Past Medical History:   Diagnosis Date    Cancer [facility])     prostate    Constipation     Gout     Hyperlipemia     Hypertension     Pacemaker 2019    Stroke ([address]     Thrombocytopenia ([address] 3/19/2019    TIA (transient ischemic attack) 2013       Core Measures:  CVA: No No  CHF:No No  PNA:No No    Activity:  Activity Level: Bed Rest  Number times ambulated in hallways past shift: 0  Number of times OOB to chair past shift: 0    Cardiac:   Cardiac Monitoring: Yes      Cardiac Rhythm: Paced    Access:   Current line(s): PIV   Central Line?  No Placement date 0 Reason Medically Necessary 0  PICC LINE? No Placement date 0Reason Medically Necessary 0    Genitourinary:   Urinary status: voiding and external catheter  Urinary Catheter? No Placement Date 0 Reason Medically Necessary 0    Respiratory:   O2 Device: Room air  Chronic home O2 use?: NO  Incentive spirometer at bedside: NO       GI:  Last Bowel Movement Date: (unknown)  Current diet:  DIET NPO  Passing flatus: YES  Tolerating current diet: YES       Pain Management:   Patient states pain is manageable on current regimen: N/A    Skin:  Torito Score: 12  Interventions: float heels, PT/OT consult and internal/external urinary devices    Patient Safety:  Fall Score:  Total Score: 4  Interventions: bed/chair alarm, assistive device (walker, cane, etc) and gripper socks  High Fall Risk: Yes    DVT prophylaxis:  DVT prophylaxis Med- Yes  DVT prophylaxis SCD or BEV- No     Wounds: (If Applicable)  Wounds- No  Location 0    Active Consults:  IP CONSULT TO NEUROLOGY  IP CONSULT TO PALLIATIVE CARE - PROVIDER  IP CONSULT TO GASTROENTEROLOGY    Length of Stay:  Expected LOS: 2d 16h  Actual LOS: 12  Discharge Plan: No TBD      Lauren Hamm RN

## 2020-11-30 NOTE — CONSULTS
Gastroenterology Outpatient History and Physical    Patient: [facility]    Physician: Minerva Gonzalez MD    Chief Complaint: feeding difficulty  History of Present Illness: 81 yo M with dypshagia 2/2 CVA, failure to thrive. Discussed with family, as patient is non-verbal, and they'd like to have a feeding tube placed. Reviewed risks, benefits, and alternatives with daughter and she's agreeable to proceed. Significant co-morbidities as noted below.      History:  Past Medical History:   Diagnosis Date    Cancer ([facility]     prostate    Constipation     Gout     Hyperlipemia     Hypertension     Pacemaker 2019    [facility]     Thrombocytopenia ([facility] 3/19/2019    TIA (transient ischemic attack) 2013      Past Surgical History:   Procedure Laterality Date    HX PROSTATECTOMY      HX UROLOGICAL      prostate removed    FL INS NEW/RPLCMT PRM PACEMAKR W/TRANS ELTRD ATRIAL N/A 10/18/2019    Insert Ppm Single Atrial performed by Piter Andrews MD at [address] CATH LAB    FL INS NEW/RPLCMT PRM PM W/TRANSV ELTRD ATRIAL&VENT Right 3/22/2019    INSERT PPM DUAL performed by Piter Andrews MD at [address] CATH LAB      Social History     Socioeconomic History    Marital status:      Spouse name: Not on file    Number of children: Not on file    Years of education: Not on file    Highest education level: Not on file   Tobacco Use    Smoking status: Never Smoker    Smokeless tobacco: Never Used   Substance and Sexual Activity    Alcohol use: No    Drug use: No    Sexual activity: Never      Family History   Problem Relation Age of Onset    No Known Problems Mother     No Known Problems Father       Patient Active Problem List   Diagnosis Code    Adrenal mass ([facility] E27.8    Dysuria R30.0    Weight loss R63.4    Essential hypertension I10    Gout M10.9    Pure hypercholesterolemia E78.00    History of prostate cancer Z85.46    Chronic constipation A28.86    Systolic murmur H35.6    Cognitive impairment R41.89    Stage 3 chronic kidney disease N18.30    Rotator cuff arthropathy of both shoulders M12.811, M12.812    Bradycardia R00.1    Elevated troponin R77.8    Hypertensive urgency I16.0    Second degree AV block, Mobitz type I I44.1    Pacemaker Z95.0    Atrial pacemaker lead displacement T82.120A    Seizure (HCC) R56.9    Altered mental status R41.82    [facility]al [facility] R56.9       Allergies: No Known Allergies  Medications:   Prior to Admission medications    Medication Sig Start Date End Date Taking? Authorizing Provider   allopurinoL (ZYLOPRIM) 100 mg tablet Take 1 Tab by mouth daily. 10/1/20  Yes Kim Johnson, MD   amLODIPine (NORVASC) 5 mg tablet Take 1 Tab by mouth daily. 10/1/20  Yes Kim Johnson MD   hydrALAZINE (APRESOLINE) 25 mg tablet Take 1 Tab by mouth three (3) times daily. 10/1/20  Yes Kim Johnson MD   metoprolol succinate (TOPROL-XL) 25 mg XL tablet Take 1 Tab by mouth daily. 10/1/20  Yes Kim Johnson MD   isoniazid (NYDRAZID) 300 mg tablet Take 1 Tab by mouth daily. 8/20/20  Yes Kim Johnson MD   aspirin delayed-release 81 mg tablet Take 1 Tab by mouth daily. 7/13/20  Yes Alvarez Notice, MD   balsam peru-castor oiL (VENELEX) ointment Apply  to affected area three (3) times daily. 7/9/20  Yes Jessie Emmanuel MD   famotidine (PEPCID) 20 mg tablet Take 1 Tab by mouth every evening. 7/9/20  Yes Jessie Emmanuel MD   atorvastatin (LIPITOR) 40 mg tablet Take 1 Tab by mouth nightly. 7/13/20   Kim Johnson MD   polyethylene glycol [facility]) 17 gram/dose powder Take 17 g by mouth daily as needed for Constipation. 7/13/20   Kim Johnson MD   acetaminophen (TYLENOL) 325 mg tablet Take 650 mg by mouth every six (6) hours as needed for Pain. Provider, Historical     Physical Exam:   Vital Signs: Blood pressure (!) 151/74, pulse 70, temperature 98.4 °F (36.9 °C), resp.  rate 17, height 5' 6\" (1.676 m), weight 74.3 kg (163 lb 12.8 oz), SpO2 100 %.   General: well developed, poorly nourished   HEENT: unremarkable   Heart: S1S2   Lungs: clear   Abdominal:  benign   Neurological: unremarkable   Extremities: no edema     Findings/Diagnosis: dysphagia, feeding difficulty  Plan of Care/Planned Procedure: EGD w/ PEG tube with conscious/deep sedation    Signed:  Nishant Child MD 11/30/2020

## 2020-11-30 NOTE — PROGRESS NOTES
46  Endoscope was pre-cleaned at bedside immediately following procedure by Deleta Doctor. 1410  Received report from anesthesia. Assumed pt care. VSS.

## 2020-11-30 NOTE — PROGRESS NOTES
PEG tube inserted by Dr Lynda Godoy and [facility] NP  [address] catalog # [id number]  Product # [id number]  Lot # [id number]   Expiration date 2021/06/30

## 2020-11-30 NOTE — PROGRESS NOTES
TRANSFER - OUT REPORT:    Verbal report given to Sydni(name) on Germaine Malone  being transferred to neuro(unit) for routine progression of care       Report consisted of patients Situation, Background, Assessment and   Recommendations(SBAR). Information from the following report(s) SBAR, Procedure Summary and Recent Results was reviewed with the receiving nurse. Lines:   Peripheral IV 11/17/20 Left Antecubital (Active)   Site Assessment Clean, dry, & intact 11/30/20 0800   Phlebitis Assessment 0 11/30/20 0800   Infiltration Assessment 0 11/30/20 0800   Dressing Status Clean, dry, & intact 11/30/20 0800   Dressing Type Tape;Transparent 11/30/20 0800   Hub Color/Line Status Pink;Capped 11/30/20 0800   Action Taken Open ports on tubing capped 11/27/20 2000   Alcohol Cap Used Yes 11/30/20 0153       Peripheral IV 11/25/20 Right Forearm (Active)   Site Assessment Clean, dry, & intact 11/30/20 0800   Phlebitis Assessment 0 11/30/20 0800   Infiltration Assessment 0 11/30/20 0800   Dressing Status Clean, dry, & intact 11/30/20 0800   Dressing Type Tape;Transparent 11/30/20 0800   Hub Color/Line Status Pink; Infusing 11/30/20 0800   Action Taken Open ports on tubing capped 11/27/20 2000   Alcohol Cap Used No 11/30/20 0153        Opportunity for questions and clarification was provided.

## 2020-11-30 NOTE — PROGRESS NOTES
Bedside shift change report given to Leah Shook RN by [facility]. Report included the following information SBAR, ED Summary, Intake/Output, MAR, Recent Results and Cardiac Rhythm Paced. 1200 Transport at bedside for patient to go to Endo for PEG placement. Called Endo to ask if they would like all 3 IV BP/HR scheduled meds given prior to transport, RN advised to give it. 1430 TRANSFER - IN REPORT:    Verbal report received from [facility] on Delmymarcelina Mccurdy  being received from Endo for ordered procedure      Report consisted of patients Situation, Background, Assessment and   Recommendations(SBAR). Information from the following report(s) Procedure Summary and Intake/Output was reviewed with the receiving nurse. Opportunity for questions and clarification was provided. Assessment completed upon patients arrival to unit and care assumed.

## 2020-11-30 NOTE — PROGRESS NOTES
TRANSFER - IN REPORT:    Verbal report received from Sommer Rose RN(name) on Srinivasa Hope  being received from Neuro(unit) for ordered procedure      Report consisted of patients Situation, Background, Assessment and   Recommendations(SBAR). Information from the following report(s) SBAR, Kardex, Procedure Summary and Recent Results was reviewed with the receiving nurse. Opportunity for questions and clarification was provided.

## 2020-11-30 NOTE — ROUTINE PROCESS
Lainey Cainyolanda 3/9/1933 
[id number] Situation: 
Verbal report received from: Naresh Stokes RN Procedure: Procedure(s): PERCUTANEOUS ENDOSCOPIC GASTROSTOMY TUBE INSERTION 
ESOPHAGOGASTRODUODENOSCOPY (EGD) Background: 
 
Preoperative diagnosis: PEG placement Postoperative diagnosis: dysphagia :  Dr. Tico Garcia Assistant(s): Endoscopy Technician-1: Sanjiv Reese Endoscopy RN-1: Grazyna Harris RN Specimens: * No specimens in log * H. Pylori  no Assessment: 
Intra-procedure medications Anesthesia gave intra-procedure sedation and medications, see anesthesia flow sheet yes Intravenous fluids: NS@ [facility] Vital signs stable Abdominal assessment: round and soft Recommendation: 
Discharge patient per MD order. Return to [id number] Permission to share finding with family or friend yes

## 2020-12-01 LAB
ANION GAP SERPL CALC-SCNC: 3 MMOL/L (ref 5–15)
BUN SERPL-MCNC: 30 MG/DL (ref 6–20)
BUN/CREAT SERPL: 22 (ref 12–20)
CALCIUM SERPL-MCNC: 8.7 MG/DL (ref 8.5–10.1)
CHLORIDE SERPL-SCNC: 108 MMOL/L (ref 97–108)
CO2 SERPL-SCNC: 25 MMOL/L (ref 21–32)
CREAT SERPL-MCNC: 1.35 MG/DL (ref 0.7–1.3)
GLUCOSE SERPL-MCNC: 99 MG/DL (ref 65–100)
MAGNESIUM SERPL-MCNC: 2.5 MG/DL (ref 1.6–2.4)
POTASSIUM SERPL-SCNC: 3.7 MMOL/L (ref 3.5–5.1)
SODIUM SERPL-SCNC: 136 MMOL/L (ref 136–145)

## 2020-12-01 PROCEDURE — 74011000258 HC RX REV CODE- 258: Performed by: PSYCHIATRY & NEUROLOGY

## 2020-12-01 PROCEDURE — 74011250636 HC RX REV CODE- 250/636: Performed by: INTERNAL MEDICINE

## 2020-12-01 PROCEDURE — 83735 ASSAY OF MAGNESIUM: CPT

## 2020-12-01 PROCEDURE — 80048 BASIC METABOLIC PNL TOTAL CA: CPT

## 2020-12-01 PROCEDURE — 2709999900 HC NON-CHARGEABLE SUPPLY

## 2020-12-01 PROCEDURE — 74011250636 HC RX REV CODE- 250/636: Performed by: PSYCHIATRY & NEUROLOGY

## 2020-12-01 PROCEDURE — 65660000000 HC RM CCU STEPDOWN

## 2020-12-01 PROCEDURE — 74011250637 HC RX REV CODE- 250/637: Performed by: INTERNAL MEDICINE

## 2020-12-01 PROCEDURE — 74011000250 HC RX REV CODE- 250: Performed by: INTERNAL MEDICINE

## 2020-12-01 PROCEDURE — 36415 COLL VENOUS BLD VENIPUNCTURE: CPT

## 2020-12-01 PROCEDURE — 77030018798 HC PMP KT ENTRL FED COVD -A

## 2020-12-01 RX ORDER — SODIUM CHLORIDE 9 MG/ML
75 INJECTION, SOLUTION INTRAVENOUS CONTINUOUS
Status: DISCONTINUED | OUTPATIENT
Start: 2020-12-01 | End: 2020-12-06

## 2020-12-01 RX ORDER — HYDRALAZINE HYDROCHLORIDE 25 MG/1
50 TABLET, FILM COATED ORAL 3 TIMES DAILY
Status: DISCONTINUED | OUTPATIENT
Start: 2020-12-01 | End: 2020-12-03

## 2020-12-01 RX ADMIN — ASPIRIN 150 MG: 300 SUPPOSITORY RECTAL at 10:20

## 2020-12-01 RX ADMIN — ENOXAPARIN SODIUM 40 MG: 40 INJECTION SUBCUTANEOUS at 10:20

## 2020-12-01 RX ADMIN — SODIUM CHLORIDE 75 ML/HR: 900 INJECTION, SOLUTION INTRAVENOUS at 10:20

## 2020-12-01 RX ADMIN — HYDRALAZINE HYDROCHLORIDE 25 MG: 25 TABLET, FILM COATED ORAL at 16:52

## 2020-12-01 RX ADMIN — FAMOTIDINE 20 MG: 20 TABLET, FILM COATED ORAL at 10:20

## 2020-12-01 RX ADMIN — Medication 10 ML: at 00:12

## 2020-12-01 RX ADMIN — ALLOPURINOL 100 MG: 100 TABLET ORAL at 10:20

## 2020-12-01 RX ADMIN — DEXTROSE MONOHYDRATE 50 ML/HR: 5 INJECTION, SOLUTION INTRAVENOUS at 00:20

## 2020-12-01 RX ADMIN — Medication 10 ML: at 22:10

## 2020-12-01 RX ADMIN — Medication 10 ML: at 06:40

## 2020-12-01 RX ADMIN — LEVETIRACETAM 750 MG: 100 INJECTION, SOLUTION INTRAVENOUS at 15:01

## 2020-12-01 RX ADMIN — ATORVASTATIN CALCIUM 40 MG: 40 TABLET, FILM COATED ORAL at 22:10

## 2020-12-01 RX ADMIN — HYDRALAZINE HYDROCHLORIDE 50 MG: 25 TABLET, FILM COATED ORAL at 22:10

## 2020-12-01 RX ADMIN — METOPROLOL TARTRATE 2.5 MG: 1 INJECTION, SOLUTION INTRAVENOUS at 00:14

## 2020-12-01 RX ADMIN — Medication 10 ML: at 15:01

## 2020-12-01 RX ADMIN — LEVETIRACETAM 750 MG: 100 INJECTION, SOLUTION INTRAVENOUS at 03:51

## 2020-12-01 RX ADMIN — METOPROLOL TARTRATE 2.5 MG: 1 INJECTION, SOLUTION INTRAVENOUS at 06:00

## 2020-12-01 RX ADMIN — AMLODIPINE BESYLATE 5 MG: 5 TABLET ORAL at 10:20

## 2020-12-01 NOTE — PROGRESS NOTES
Hospitalist Progress Note    NAME: Anisha Davis   :  3/9/1933   MRN:  [id number]       Assessment / Plan:  New onset seizures, POA, recurrent episodes   Acute encephalopathy, likely postictal, POA vs worsening dementia   History of multiple CVAs   Patient had a 5-minute seizure at nursing home, seized again in the ER and was given Ativan.  status post Keppra loading dose  -Was on Keppra 1 g checks twice daily but dose was decreased to 750 mg twice daily due to sedation  EEG  On admission showed no seizure , repeat EEG  Showed a potential seizure focus on the R hemisphere   MRI ordered unable to be done because of pt being confsued+ pacemaker , unable to verbalize if issues with pacemaker arise while doing MRI   Repeat CT brain unchanged   Seizure precautions  Ativan as needed for seizures and agitation  TSH wnl , B12 wnl , folate wnl , RPR neg , CBC, CMP wnl , and blood cultures NTD    -Dr. Kely Diaz who recommended that we will wait 1 to 2 days to see if his mental status would get better with medication adjustment and if not better, he suggested hospice  -Continue Keppra at reduced dose of 750 mg twice daily and continue to monitor for seizures       HTN   Will switch IVmeds to PO via peg    History of CVA  Continue aspirin suppository.  Not getting p.o. meds as he is n.p.o.     SOULEYMANE on CKD  -Creatinine on  was 1. 14.       Hypokalemia resolved   souleymane   Creat 1.35, IVF started   BMP tomorrow      Sinus bradycardia with 2-1 block: POA  Status post pacemaker  Was able to have MRI with [employer] rep present and MRI in July at [address] [place] [address] to get MRI brain here      Debility  Patient was able to walk with a walker  Has moderate dementia, but was able to hold conversation with family  Resides in a nursing home     Dysphagia due to acute metabolic encephalopathy  S/p PEG tube placement on   Feeding tube  Started today per diet recs   · Per diet recs : Once PEG placed, recommend Jevity 1.5 @ 20 mL/hr x 24 hours + 150 mL water flushes q 4 hours  · If tolerating TF x 24 hours, advance TF by 10 mL q 12 hours to goal rate of 50 mL/hr + 150 mL water flushes q 4 hours (1800 kcal, 76.5g protein, 1812 mL water)  Monitor K+, Mg, Phos daily and replete as needed. Monitor for refeeding sd     Right arm swelling  -US doppler shows no DVT. Keep rt arm elevated and apply warm compresses.      NSVT  -Potassium is low and replaced.  Magnesium is normal.  Updated daughter on 12/01       25.0 - 29.9 Overweight / Body mass index is 26.44 kg/m². Code status: Full  Prophylaxis: Lovenox  Recommended Disposition: SNF/LTC     Subjective:     Chief Complaint / Reason for Physician Visit  FU AMS/dysphagia  Discussed with RN events overnight. Review of Systems:  Symptom Y/N Comments  Symptom Y/N Comments   Fever/Chills    Chest Pain     Poor Appetite    Edema     Cough    Abdominal Pain     Sputum    Joint Pain     SOB/CARREON    Pruritis/Rash     Nausea/vomit    Tolerating PT/OT     Diarrhea    Tolerating Diet     Constipation    Other       Could NOT obtain due to: Confused      Objective:     VITALS:   Last 24hrs VS reviewed since prior progress note.  Most recent are:  Patient Vitals for the past 24 hrs:   Temp Pulse Resp BP SpO2   12/01/20 0835 98.8 °F (37.1 °C) 62 20 (!) 147/55 100 %   12/01/20 0636  64 16 124/60 98 %   12/01/20 0332 97.9 °F (36.6 °C) 72 16 139/70 96 %   12/01/20 0012 99.8 °F (37.7 °C)       11/30/20 2335 100.4 °F (38 °C) 79 18 128/76 99 %   11/30/20 2028 98.8 °F (37.1 °C) 85 17 (!) 150/76 93 %   11/30/20 1805  72  (!) 168/82    11/30/20 1741 98.7 °F (37.1 °C) 86 18 (!) 174/86    11/30/20 1455 97.4 °F (36.3 °C) 73 20 (!) 150/68 98 %   11/30/20 1430  67 15 104/61 97 %   11/30/20 1427  67 15 (!) 97/58 97 %   11/30/20 1424  69 19 (!) 98/59 97 %   11/30/20 1423 97.6 °F (36.4 °C) 62 16 (!) 101/56 97 %   11/30/20 1414  71 18 (!) 130/56 97 %   11/30/20 1410  71 16 114/71 100 %   11/30/20 1405  81 21 (!) 111/52 100 %   11/30/20 1234  70 17 (!) 151/74 100 %   11/30/20 1206 98.4 °F (36.9 °C) 79 20 (!) 152/83 98 %       Intake/Output Summary (Last 24 hours) at 12/1/2020 1021  Last data filed at 11/30/2020 1811  Gross per 24 hour   Intake 1100 ml   Output 300 ml   Net 800 ml        I had a face to face encounter and independently examined this patient on 12/1/2020, as outlined below:  PHYSICAL EXAM:  General: WD, WN. Alert, cooperative, no acute distress    EENT:  EOMI. Anicteric sclerae. MMM  Resp:  CTA bilaterally, no wheezing or rales. No accessory muscle use  CV:  Regular  rhythm,  No edema  GI:  Soft, Non distended, Non tender. +Bowel sounds  Neurologic:  Confused   Psych:    Not anxious nor agitated  Skin:  No rashes. No jaundice    Reviewed most current lab test results and cultures  YES  Reviewed most current radiology test results   YES  Review and summation of old records today    NO  Reviewed patient's current orders and MAR    YES  PMH/SH reviewed - no change compared to H&P  ________________________________________________________________________  Care Plan discussed with:    Comments   Patient     Family      RN x    Care Manager     Consultant                       x Multidiciplinary team rounds were held today with , nursing, pharmacist and clinical coordinator. Patient's plan of care was discussed; medications were reviewed and discharge planning was addressed.      ________________________________________________________________________  Total NON critical care TIME: 35  Minutes    Total CRITICAL CARE TIME Spent:   Minutes non procedure based      Comments   >50% of visit spent in counseling and coordination of care     ________________________________________________________________________  Jaime Ward MD     Procedures: see electronic medical records for all procedures/Xrays and details which were not copied into this note but were reviewed prior to creation of Plan. LABS:  I reviewed today's most current labs and imaging studies. Pertinent labs include:  No results for input(s): WBC, HGB, HCT, PLT, HGBEXT, HCTEXT, PLTEXT, HGBEXT, HCTEXT, PLTEXT in the last 72 hours.   Recent Labs     12/01/20  0331      K 3.7      CO2 25   GLU 99   BUN 30*   CREA 1.35*   CA 8.7   MG 2.5*       Signed: Cathy Favre, MD

## 2020-12-01 NOTE — PROGRESS NOTES
Received message from patient's nurse Ceci Adan stating :    Patient's BP is 128/76, HR is79, Temp. is 100.4. Patient has Metoprolol IV, Hydralazine IV, and Vasotec IV all due at 0000. Please advise. Patient also has a PEG placed earlier today. Should I five tylenol for the temp? Discussion / orders:    Informed patient's nurse to give metoprolol at this time only and continue to monitor BP. If BP elevated in 2 hours, then to administer hydralazine and Vasotec. May give tylenol tabs via peg. Please note that this note was dictated using Dragon computer voice recognition software. Quite often unanticipated grammatical, syntax, homophones, and other interpretive errors are inadvertently transcribed by the computer software. Please disregard these errors. Please excuse any errors that have escaped final proofreading.

## 2020-12-01 NOTE — PROGRESS NOTES
End of Shift Note     Bedside shift change report given to Della Red RN (oncoming nurse) by Izabella Nielsen RN (offgoing nurse). Report included the following information SBAR, Kardex and Quality Measures     Shift worked:  7P-7A   Shift summary and any significant changes:     None   Concerns for physician to address:  none   Zone phone for oncoming shift:   [phone]      Patient Information  Candy Lawrence  80 y.o.  11/17/2020  4:20 PM by Kamryn Graves MD. Candy Lawrence was admitted from [facility]     Problem List       Patient Active Problem List     Diagnosis Date Noted    Altered mental status 11/18/2020    Post-ictal state ([address] 11/18/2020    Seizure ([address] 11/17/2020    Atrial pacemaker lead displacement 10/18/2019    Pacemaker 03/22/2019    Bradycardia 03/19/2019    Elevated troponin 03/19/2019    Hypertensive urgency 03/19/2019    Second degree AV block, Mobitz type I 03/19/2019    Stage 3 chronic kidney disease 03/15/2019    Rotator cuff arthropathy of both shoulders 03/15/2019    Cognitive impairment 45/08/6939    Systolic murmur 95/08/5464    Essential hypertension 12/17/2018    Gout 12/17/2018    Pure hypercholesterolemia 12/17/2018    History of prostate cancer 12/17/2018    Chronic constipation 12/17/2018    Dysuria 08/08/2017    Weight loss 08/08/2017    Adrenal mass ([address] 07/18/2017           Past Medical History:   Diagnosis Date    Cancer [facility])       prostate    Constipation      Gout      Hyperlipemia      Hypertension      Pacemaker 2019    Stroke ([address]      Thrombocytopenia ([address] 3/19/2019    TIA (transient ischemic attack) 2013         Core Measures:  CVA: No No  CHF:No No  PNA:No No     Activity:  Activity Level: Bed Rest  Number times ambulated in hallways past shift: 0  Number of times OOB to chair past shift: 0     Cardiac:   Cardiac Monitoring: Yes      Cardiac Rhythm: Paced     Access:   Current line(s): PIV   Central Line?  No Placement date 0 Reason Medically Necessary 0  PICC LINE? No Placement date 0Reason Medically Necessary 0     Genitourinary:   Urinary status: voiding, incontinent and external catheter  Urinary Catheter? No Placement Date 0 Reason Medically Necessary 0     Respiratory:   O2 Device: Room air  Chronic home O2 use?: NO  Incentive spirometer at bedside: NO     GI:  Last Bowel Movement Date: (unknown)  Current diet:  DIET NPO  Passing flatus: no  Tolerating current diet: n/a     Pain Management:   Patient states pain is manageable on current regimen: N/A     Skin:  Torito Score: 12  Interventions: float heels and increase time out of bed  Dual skin assessment with Chris. Patient has tiny white scars on buttocks and extremties    Patient Safety:  Fall Score: Total Score: 4  Interventions: bed/chair alarm  High Fall Risk:  Yes     DVT prophylaxis:  DVT prophylaxis Med- Yes  DVT prophylaxis SCD or BEV- No      Wounds: (If Applicable)  Wounds- No  Location 0     Active Consults:  IP CONSULT TO NEUROLOGY  IP CONSULT TO PALLIATIVE CARE - PROVIDER  IP CONSULT TO GASTROENTEROLOGY     Length of Stay:  Expected LOS: 2d 16h  Actual LOS: 13  Discharge Plan: No TBD

## 2020-12-01 NOTE — PROGRESS NOTES
Occupational Therapy    Patient chart reviewed in prep for OT evaluation. Note patient with PEG placed on 11/30. Attempted evaluation - patient partially opening eyes with intermittent verbalizations (unclear). No volitional movement or command following noted despite max cues. Total assist required to clean around eyes - patient at times grimacing, but unable to verbalize presence or absence of pain. Recommend patient return to long-term care setting. Patient unable to participate in skilled therapy services at this time. Will follow-up as appropriate if any change in motor or cognitive status. Thank you.   Clarissa Mujica, OTR/L

## 2020-12-01 NOTE — PROGRESS NOTES
End of Shift Note    Bedside shift change report given to Sola Contreras (oncoming nurse) by Heidi Melgar RN (offgoing nurse). Report included the following information SBAR, Kardex and Quality Measures    Shift worked:  3p-7-   Shift summary and any significant changes:    MEWS of 3 at 1530 as pt had just recently returned form Peg placement. Patient more awake as shift went on with MEWS of 1       Concerns for physician to address:  none   Zone phone for oncoming shift:   [phone]     Patient Information  Kathryn Corrales  80 y.o.  11/17/2020  4:20 PM by Tania Lopez MD. Kathryn Corrales was admitted from [facility]TC    Problem List  Patient Active Problem List    Diagnosis Date Noted    Altered mental status 11/18/2020    Post-ictal state ([address] 11/18/2020    Seizure ([address] 11/17/2020    Atrial pacemaker lead displacement 10/18/2019    Pacemaker 03/22/2019    Bradycardia 03/19/2019    Elevated troponin 03/19/2019    Hypertensive urgency 03/19/2019    Second degree AV block, Mobitz type I 03/19/2019    Stage 3 chronic kidney disease 03/15/2019    Rotator cuff arthropathy of both shoulders 03/15/2019    Cognitive impairment 39/29/9092    Systolic murmur 63/28/3884    Essential hypertension 12/17/2018    Gout 12/17/2018    Pure hypercholesterolemia 12/17/2018    History of prostate cancer 12/17/2018    Chronic constipation 12/17/2018    Dysuria 08/08/2017    Weight loss 08/08/2017    Adrenal mass ([address] 07/18/2017     Past Medical History:   Diagnosis Date    Cancer [facility])     prostate    Constipation     Gout     Hyperlipemia     Hypertension     Pacemaker 2019    Stroke ([address]     Thrombocytopenia ([address] 3/19/2019    TIA (transient ischemic attack) 2013       Core Measures:  CVA: No No  CHF:No No  PNA:No No    Activity:  Activity Level: Bed Rest  Number times ambulated in hallways past shift: 0  Number of times OOB to chair past shift: 0    Cardiac:   Cardiac Monitoring:  Yes Cardiac Rhythm: Paced    Access:   Current line(s): PIV   Central Line? No Placement date 0 Reason Medically Necessary 0  PICC LINE? No Placement date 0Reason Medically Necessary 0    Genitourinary:   Urinary status: voiding, incontinent and external catheter  Urinary Catheter? No Placement Date 0 Reason Medically Necessary 0    Respiratory:   O2 Device: Room air  Chronic home O2 use?: NO  Incentive spirometer at bedside: NO       GI:  Last Bowel Movement Date: (unknown)  Current diet:  DIET NPO  Passing flatus: no  Tolerating current diet: n/a       Pain Management:   Patient states pain is manageable on current regimen: N/A    Skin:  Torito Score: 12  Interventions: float heels and increase time out of bed  Dual skin assessment with Chris. Patient has tiny white scars on buttocks and extremties    Patient Safety:  Fall Score:  Total Score: 4  Interventions: bed/chair alarm  High Fall Risk: Yes    DVT prophylaxis:  DVT prophylaxis Med- Yes  DVT prophylaxis SCD or BEV- No     Wounds: (If Applicable)  Wounds- No  Location 0    Active Consults:  IP CONSULT TO NEUROLOGY  IP CONSULT TO PALLIATIVE CARE - PROVIDER  IP CONSULT TO GASTROENTEROLOGY    Length of Stay:  Expected LOS: 2d 16h  Actual LOS: 12  Discharge Plan: No TBD      Minesh Bryant RN

## 2020-12-01 NOTE — PROGRESS NOTES
Order received and chart reviewed. Attempted to evaluate pt. Discussed with nursing and initiated evaluation. Pt continues to be inappropriate for PT services. He does not follow any commands and is only responding to pain. Will complete order again. Recommending LTC.

## 2020-12-01 NOTE — PROGRESS NOTES
Reached out to NP for further instructions on 6 AM medications-- \"Patient is due for all 3 BP medications again at 6 AM, patient's current BP is 139/70 and HR 72. Would you like me to give Metoprolol and hold Hydralazine and Vasotec? \"

## 2020-12-01 NOTE — PROGRESS NOTES
Problem: Falls - Risk of  Goal: *Absence of Falls  Description: Document Augustin Dash Fall Risk and appropriate interventions in the flowsheet. Outcome: Progressing Towards Goal  Note: Fall Risk Interventions:       Mentation Interventions: Bed/chair exit alarm, Adequate sleep, hydration, pain control    Medication Interventions: Bed/chair exit alarm    Elimination Interventions: Bed/chair exit alarm    History of Falls Interventions: Bed/chair exit alarm         Problem: Patient Education: Go to Patient Education Activity  Goal: Patient/Family Education  Outcome: Progressing Towards Goal     Problem: Pressure Injury - Risk of  Goal: *Prevention of pressure injury  Description: Document Torito Scale and appropriate interventions in the flowsheet. Outcome: Progressing Towards Goal  Note: Pressure Injury Interventions:  Sensory Interventions: Keep linens dry and wrinkle-free, Avoid rigorous massage over bony prominences, Assess changes in LOC, Turn and reposition approx. every two hours (pillows and wedges if needed)    Moisture Interventions: Apply protective barrier, creams and emollients, Internal/External urinary devices, Absorbent underpads    Activity Interventions: PT/OT evaluation    Mobility Interventions: PT/OT evaluation, Turn and reposition approx.  every two hours(pillow and wedges)    Nutrition Interventions: Document food/fluid/supplement intake    Friction and Shear Interventions: Apply protective barrier, creams and emollients                Problem: Seizure Disorder (Adult)  Goal: *STG: Remains free of seizure activity  Outcome: Progressing Towards Goal  Goal: *STG: Maintains lab values within therapeutic range  Outcome: Progressing Towards Goal  Goal: *STG/LTG: Complies with medication therapy  Outcome: Progressing Towards Goal  Goal: *STG: Remains free of injury during seizure activity  Outcome: Progressing Towards Goal  Goal: *STG: Remains safe in hospital  Outcome: Progressing Towards Goal  Goal: Interventions  Outcome: Progressing Towards Goal

## 2020-12-01 NOTE — PROGRESS NOTES
Speech pathology note  Attempted to see patient for swallowing re-evaluation. Patient received with eyes open, however no verbalizations or command following noted. Offered thin liquid and ice via spoon, however patient grimaced, looked away, and closed his lips tightly. Family not present to assist during session. Note PEG placed yesterday. Continue to recommend NPO as patient does not accept PO, and SLP will continue to follow for swallowing re-evaluation as medically appropriate. Thank you.     Dorcas Chester Officer., CCC-SLP

## 2020-12-01 NOTE — PROGRESS NOTES
Patient's BP is 128/76, HR is79, Temp. is 100.4. Patient has Metoprolol IV, Hydralazine IV, and Vasotec IV all due at 0000. Reached out to AMANDA Mandujano to make sure it's okay to administer all 3 medications at once. 0008- \"Give metoprolol at this time and monitor bp, if elevated in 2 hours, then give the other 2 .  May give Tylenol tablet per peg\"

## 2020-12-01 NOTE — PROGRESS NOTES
End of Shift Note    Bedside shift change report given to AUGUSTO Raphael  (oncoming nurse) by Adis Navas RN (offgoing nurse). Report included the following information SBAR, Kardex, MAR and Recent Results    Shift worked:  7am -7pm     Shift summary and any significant changes:    Tube Feedings Started     Concerns for physician to address: None     Zone phone for oncoming shift:   [phone]       Activity:  Activity Level: Bed Rest  Number times ambulated in hallways past shift: 0  Number of times OOB to chair past shift: 0    Cardiac:   Cardiac Monitoring: Yes      Cardiac Rhythm: Paced    Access:   Current line(s): PIV     Genitourinary:   Urinary status: external catheter    Respiratory:   O2 Device: Room air  Chronic home O2 use?: NO  Incentive spirometer at bedside: N/A     GI:  Last Bowel Movement Date: (unknown)  Current diet:  DIET NPO  DIET TUBE FEEDING The head of the bed should be kept elevated to 30 degrees during tube feeds, and the tube should be flushed with 30 mL of water every 8 hours and after giving medications through the tube. Monitor the PEG site for bleeding and i... DIET ONE TIME MESSAGE  Passing flatus: YES  Tolerating current diet: YES       Pain Management:   Patient states pain is manageable on current regimen: N/A    Skin:  Torito Score: 11  Interventions: turn team, float heels, foam dressing and internal/external urinary devices    Patient Safety:  Fall Score:  Total Score: 4  Interventions: bed/chair alarm  High Fall Risk: Yes    Length of Stay:  Expected LOS: 4d 7h  Actual LOS: [address], RN

## 2020-12-01 NOTE — PROGRESS NOTES
Problem: Falls - Risk of  Goal: *Absence of Falls  Description: Document Radha Solorio Fall Risk and appropriate interventions in the flowsheet. Outcome: Progressing Towards Goal  Note: Fall Risk Interventions:       Mentation Interventions: Evaluate medications/consider consulting pharmacy    Medication Interventions: Evaluate medications/consider consulting pharmacy    Elimination Interventions: Patient to call for help with toileting needs, Toileting schedule/hourly rounds    History of Falls Interventions: Evaluate medications/consider consulting pharmacy         Problem: Patient Education: Go to Patient Education Activity  Goal: Patient/Family Education  Outcome: Progressing Towards Goal     Problem: Pressure Injury - Risk of  Goal: *Prevention of pressure injury  Description: Document Torito Scale and appropriate interventions in the flowsheet. Outcome: Progressing Towards Goal  Note: Pressure Injury Interventions:  Sensory Interventions: Avoid rigorous massage over bony prominences, Check visual cues for pain    Moisture Interventions: Absorbent underpads    Activity Interventions: Pressure redistribution bed/mattress(bed type)    Mobility Interventions: Pressure redistribution bed/mattress (bed type)    Nutrition Interventions: Document food/fluid/supplement intake    Friction and Shear Interventions: Apply protective barrier, creams and emollients                Problem: Patient Education: Go to Patient Education Activity  Goal: Patient/Family Education  Outcome: Progressing Towards Goal     Problem: Falls - Risk of  Goal: *Absence of Falls  Description: Document Sole Fall Risk and appropriate interventions in the flowsheet.   Outcome: Progressing Towards Goal  Note: Fall Risk Interventions:       Mentation Interventions: Evaluate medications/consider consulting pharmacy    Medication Interventions: Evaluate medications/consider consulting pharmacy    Elimination Interventions: Patient to call for help with toileting needs, Toileting schedule/hourly rounds    History of Falls Interventions: Evaluate medications/consider consulting pharmacy         Problem: Patient Education: Go to Patient Education Activity  Goal: Patient/Family Education  Outcome: Progressing Towards Goal     Problem: Seizure Disorder (Adult)  Goal: *STG: Remains free of seizure activity  Outcome: Progressing Towards Goal  Goal: *STG: Maintains lab values within therapeutic range  Outcome: Progressing Towards Goal  Goal: *STG/LTG: Complies with medication therapy  Outcome: Progressing Towards Goal  Goal: *STG: Remains free of injury during seizure activity  Outcome: Progressing Towards Goal  Goal: *STG: Remains safe in hospital  Outcome: Progressing Towards Goal  Goal: Interventions  Outcome: Progressing Towards Goal     Problem: Patient Education: Go to Patient Education Activity  Goal: Patient/Family Education  Outcome: Progressing Towards Goal     Problem: Non-Violent Restraints  Goal: *Removal from restraints as soon as assessed to be safe  Outcome: Progressing Towards Goal  Goal: *No harm/injury to patient while restraints in use  Outcome: Progressing Towards Goal  Goal: *Patient's dignity will be maintained  Outcome: Progressing Towards Goal  Goal: *Patient Specific Goal (EDIT GOAL, INSERT TEXT)  Outcome: Progressing Towards Goal  Goal: Non-violent Restaints:Standard Interventions  Outcome: Progressing Towards Goal  Goal: Non-violent Restraints:Patient Interventions  Outcome: Progressing Towards Goal  Goal: Patient/Family Education  Outcome: Progressing Towards Goal     Problem: Patient Education: Go to Patient Education Activity  Goal: Patient/Family Education  Outcome: Progressing Towards Goal

## 2020-12-01 NOTE — PROGRESS NOTES
Occupational Therapy    Orders received, chart reviewed and patient evaluated by occupational therapy. Pending progression with skilled acute occupational therapy, recommend: return to LTC    Full evaluation to follow.      Clarissa Mujica OTR/L

## 2020-12-01 NOTE — PROGRESS NOTES
GI PROGRESS NOTE  Precious Alexandre, AMANDA  [phone] NP in-hospital cell phone M-F until 4:30  After 5pm or on weekends, please call  for physician on call    NAME:Randy Nelson :3/9/1933 EYF:[id number]   ATTG: Dr. Kemar Smith  PCP: Antolin Marion MD  Date/Time:  2020 2:03 PM     Primary GI: Dr. Corinne Sep    Reason for following: PEG placement     Assessment:     Dysphagia   · Secondary to CVA and failure to thrive. Patient is non-verbal.  Family requested PEG tube placement   · 2020: S/p PEG tube placement; PEG was secured in place at 5.5 cm at the external bumper and 5.0 cm at the skin surface      Plan:     · Successful PEG tube placement  · The PEG tube may be used for feedings as soon as bowel sounds are confirmed post-procedure. The head of the bed should be kept elevated to 30 degrees during tube feeds, and the tube should be flushed with 30 mL of water every 8 hours and after giving medications through the tube. Monitor the PEG site for bleeding and infection. · Nothing further to add from a GI standpoint. GI signing-off. Please call with any questions. Thank you for this consult. *Plan of care discussed with Dr. Corinne Sep      Subjective:   Discussed with RN events overnight. Review of Systems:  Symptom Y/N Comments  Symptom Y/N Comments   Fever/Chills    Chest Pain     Cough    Headaches     Sputum    Joint Pain     SOB/CARREON    Pruritis/Rash     Tolerating Diet    Other       Could NOT obtain due to: Non-verbal     Objective:   VITALS:   Last 24hrs VS reviewed since prior progress note.  Most recent are:  Visit Vitals  BP (!) 148/52 (BP 1 Location: Left arm, BP Patient Position: At rest)   Pulse 71   Temp 98.4 °F (36.9 °C)   Resp 20   Ht 5' 6\" (1.676 m)   Wt 74.3 kg (163 lb 12.8 oz)   SpO2 100%   BMI 26.44 kg/m²       Intake/Output Summary (Last 24 hours) at 2020 1403  Last data filed at 2020 1251  Gross per 24 hour   Intake 1250 ml   Output 750 ml   Net 500 ml PHYSICAL EXAM:  General: Elderly frail AA male. NAD    HEENT: NC, Atraumatic. Anicteric sclerae. Lungs:  CTA Bilaterally. No Wheezing/Rhonchi/Rales. Heart:  Regular  rhythm,  No murmur, No Rubs, No Gallops  Abdomen: Soft, Non distended, Non tender. +Bowel sounds, no HSM. + PEG tube   with no s/s of infection   Extremities: No c/c/e  Neurologic:  Alert and non-verbal   Psych:   Unable to assess     Lab and Radiology Data Reviewed: (see below)    Medications Reviewed: (see below)  PMH/SH reviewed - no change compared to H&P  ________________________________________________________________________  Total time spent with patient: 25 minutes ________________________________________________________________________  Care Plan discussed with:  Patient x   Family     RN x              Consultant:       Annalise Colorado NP     Procedures: see electronic medical records for all procedures/Xrays and details which were not copied into this note but were reviewed prior to creation of Plan. LABS:  No results for input(s): WBC, HGB, HCT, PLT, HGBEXT, HCTEXT, PLTEXT in the last 72 hours. Recent Labs     12/01/20  0331      K 3.7      CO2 25   BUN 30*   CREA 1.35*   GLU 99   CA 8.7   MG 2.5*     No results for input(s): AP, TBIL, TP, ALB, GLOB, GGT, AML, LPSE in the last 72 hours. No lab exists for component: SGOT, GPT, AMYP, HLPSE  No results for input(s): INR, PTP, APTT, INREXT in the last 72 hours. No results for input(s): FE, TIBC, PSAT, FERR in the last 72 hours. Lab Results   Component Value Date/Time    Folate 15.9 11/19/2020 04:55 AM     No results for input(s): PH, PCO2, PO2 in the last 72 hours. No results for input(s): CPK, CKMB in the last 72 hours.     No lab exists for component: TROPONINI  Lab Results   Component Value Date/Time    Color RED 11/18/2020 06:48 PM    Appearance CLOUDY (A) 11/18/2020 06:48 PM    Specific gravity 1.013 11/18/2020 06:48 PM    pH (UA) 7.0 11/18/2020 06:48 PM    Protein 30 (A) 11/18/2020 06:48 PM    Glucose Negative 11/18/2020 06:48 PM    Ketone TRACE (A) 11/18/2020 06:48 PM    Bilirubin Negative 11/18/2020 06:48 PM    Urobilinogen 1.0 11/18/2020 06:48 PM    Nitrites Negative 11/18/2020 06:48 PM    Leukocyte Esterase SMALL (A) 11/18/2020 06:48 PM    Epithelial cells FEW 11/18/2020 06:48 PM    Bacteria Negative 11/18/2020 06:48 PM    WBC 10-20 11/18/2020 06:48 PM    RBC >100 (H) 11/18/2020 06:48 PM       MEDICATIONS:  Current Facility-Administered Medications   Medication Dose Route Frequency    0.9% sodium chloride infusion  75 mL/hr IntraVENous CONTINUOUS    sodium chloride (NS) flush 5-40 mL  5-40 mL IntraVENous Q8H    sodium chloride (NS) flush 5-40 mL  5-40 mL IntraVENous PRN    famotidine (PEPCID) tablet 20 mg  20 mg Oral DAILY    levETIRAcetam (KEPPRA) 750 mg in 0.9% sodium chloride 100 mL IVPB  750 mg IntraVENous Q12H    nitroglycerin (NITROBID) 2 % ointment 1 Inch  1 Inch Topical Q6H PRN    LORazepam (ATIVAN) injection 1 mg  1 mg IntraVENous Q6H PRN    influenza vaccine 2020-21 (6 mos+)(PF) (FLUARIX/FLULAVAL/FLUZONE QUAD) injection 0.5 mL  0.5 mL IntraMUSCular PRIOR TO DISCHARGE    hydrALAZINE (APRESOLINE) 20 mg/mL injection 5 mg  5 mg IntraVENous Q6H PRN    aspirin (ASA) suppository 150 mg  150 mg Rectal DAILY    bisacodyL (DULCOLAX) suppository 10 mg  10 mg Rectal DAILY PRN    sodium chloride (NS) flush 5-40 mL  5-40 mL IntraVENous Q8H    sodium chloride (NS) flush 5-40 mL  5-40 mL IntraVENous PRN    acetaminophen (TYLENOL) tablet 650 mg  650 mg Oral Q6H PRN    Or    acetaminophen (TYLENOL) suppository 650 mg  650 mg Rectal Q6H PRN    polyethylene glycol (MIRALAX) packet 17 g  17 g Oral DAILY PRN    nicotine (NICODERM CQ) 14 mg/24 hr patch 1 Patch  1 Patch TransDERmal DAILY PRN    enoxaparin (LOVENOX) injection 40 mg  40 mg SubCUTAneous DAILY    allopurinoL (ZYLOPRIM) tablet 100 mg  100 mg Oral DAILY    amLODIPine (NORVASC) tablet 5 mg  5 mg Oral DAILY    atorvastatin (LIPITOR) tablet 40 mg  40 mg Oral QHS    hydrALAZINE (APRESOLINE) tablet 25 mg  25 mg Oral TID    metoprolol succinate (TOPROL-XL) XL tablet 25 mg  25 mg Oral DAILY

## 2020-12-02 LAB
ALBUMIN SERPL-MCNC: 2.2 G/DL (ref 3.5–5)
ALBUMIN/GLOB SERPL: 0.5 {RATIO} (ref 1.1–2.2)
ALP SERPL-CCNC: 83 U/L (ref 45–117)
ALT SERPL-CCNC: 13 U/L (ref 12–78)
ANION GAP SERPL CALC-SCNC: 5 MMOL/L (ref 5–15)
APPEARANCE UR: ABNORMAL
AST SERPL-CCNC: 14 U/L (ref 15–37)
BACTERIA URNS QL MICRO: ABNORMAL /HPF
BILIRUB SERPL-MCNC: 0.4 MG/DL (ref 0.2–1)
BILIRUB UR QL: NEGATIVE
BUN SERPL-MCNC: 32 MG/DL (ref 6–20)
BUN/CREAT SERPL: 27 (ref 12–20)
CALCIUM SERPL-MCNC: 8.9 MG/DL (ref 8.5–10.1)
CHLORIDE SERPL-SCNC: 107 MMOL/L (ref 97–108)
CO2 SERPL-SCNC: 25 MMOL/L (ref 21–32)
COLOR UR: ABNORMAL
CREAT SERPL-MCNC: 1.19 MG/DL (ref 0.7–1.3)
EPITH CASTS URNS QL MICRO: ABNORMAL /LPF
GLOBULIN SER CALC-MCNC: 4.2 G/DL (ref 2–4)
GLUCOSE BLD STRIP.AUTO-MCNC: 118 MG/DL (ref 65–100)
GLUCOSE BLD STRIP.AUTO-MCNC: 92 MG/DL (ref 65–100)
GLUCOSE BLD STRIP.AUTO-MCNC: 97 MG/DL (ref 65–100)
GLUCOSE SERPL-MCNC: 99 MG/DL (ref 65–100)
GLUCOSE UR STRIP.AUTO-MCNC: NEGATIVE MG/DL
HGB UR QL STRIP: NEGATIVE
KETONES UR QL STRIP.AUTO: NEGATIVE MG/DL
LEUKOCYTE ESTERASE UR QL STRIP.AUTO: NEGATIVE
MAGNESIUM SERPL-MCNC: 2.5 MG/DL (ref 1.6–2.4)
NITRITE UR QL STRIP.AUTO: NEGATIVE
PH UR STRIP: 7.5 [PH] (ref 5–8)
PHOSPHATE SERPL-MCNC: 2.7 MG/DL (ref 2.6–4.7)
POTASSIUM SERPL-SCNC: 3.6 MMOL/L (ref 3.5–5.1)
PROT SERPL-MCNC: 6.4 G/DL (ref 6.4–8.2)
PROT UR STRIP-MCNC: ABNORMAL MG/DL
RBC #/AREA URNS HPF: ABNORMAL /HPF (ref 0–5)
SERVICE CMNT-IMP: ABNORMAL
SERVICE CMNT-IMP: NORMAL
SERVICE CMNT-IMP: NORMAL
SODIUM SERPL-SCNC: 137 MMOL/L (ref 136–145)
SP GR UR REFRACTOMETRY: 1.02 (ref 1–1.03)
TRI-PHOS CRY URNS QL MICRO: ABNORMAL
UROBILINOGEN UR QL STRIP.AUTO: 1 EU/DL (ref 0.2–1)
WBC URNS QL MICRO: ABNORMAL /HPF (ref 0–4)

## 2020-12-02 PROCEDURE — 74011250637 HC RX REV CODE- 250/637: Performed by: INTERNAL MEDICINE

## 2020-12-02 PROCEDURE — 87186 SC STD MICRODIL/AGAR DIL: CPT

## 2020-12-02 PROCEDURE — 87077 CULTURE AEROBIC IDENTIFY: CPT

## 2020-12-02 PROCEDURE — 84100 ASSAY OF PHOSPHORUS: CPT

## 2020-12-02 PROCEDURE — 36415 COLL VENOUS BLD VENIPUNCTURE: CPT

## 2020-12-02 PROCEDURE — 74011000258 HC RX REV CODE- 258: Performed by: INTERNAL MEDICINE

## 2020-12-02 PROCEDURE — 82962 GLUCOSE BLOOD TEST: CPT

## 2020-12-02 PROCEDURE — 97165 OT EVAL LOW COMPLEX 30 MIN: CPT

## 2020-12-02 PROCEDURE — 74011250636 HC RX REV CODE- 250/636: Performed by: PSYCHIATRY & NEUROLOGY

## 2020-12-02 PROCEDURE — 74011250636 HC RX REV CODE- 250/636: Performed by: INTERNAL MEDICINE

## 2020-12-02 PROCEDURE — 83735 ASSAY OF MAGNESIUM: CPT

## 2020-12-02 PROCEDURE — 87086 URINE CULTURE/COLONY COUNT: CPT

## 2020-12-02 PROCEDURE — 2709999900 HC NON-CHARGEABLE SUPPLY

## 2020-12-02 PROCEDURE — 80053 COMPREHEN METABOLIC PANEL: CPT

## 2020-12-02 PROCEDURE — 65660000000 HC RM CCU STEPDOWN

## 2020-12-02 PROCEDURE — 81001 URINALYSIS AUTO W/SCOPE: CPT

## 2020-12-02 PROCEDURE — 97166 OT EVAL MOD COMPLEX 45 MIN: CPT

## 2020-12-02 PROCEDURE — 74011000258 HC RX REV CODE- 258: Performed by: PSYCHIATRY & NEUROLOGY

## 2020-12-02 RX ORDER — AMLODIPINE BESYLATE 5 MG/1
10 TABLET ORAL DAILY
Status: DISCONTINUED | OUTPATIENT
Start: 2020-12-03 | End: 2020-12-11 | Stop reason: HOSPADM

## 2020-12-02 RX ORDER — INSULIN LISPRO 100 [IU]/ML
INJECTION, SOLUTION INTRAVENOUS; SUBCUTANEOUS EVERY 6 HOURS
Status: DISCONTINUED | OUTPATIENT
Start: 2020-12-02 | End: 2020-12-11 | Stop reason: HOSPADM

## 2020-12-02 RX ORDER — DEXTROSE 50 % IN WATER (D50W) INTRAVENOUS SYRINGE
12.5-25 AS NEEDED
Status: DISCONTINUED | OUTPATIENT
Start: 2020-12-02 | End: 2020-12-11 | Stop reason: HOSPADM

## 2020-12-02 RX ORDER — MAGNESIUM SULFATE 100 %
4 CRYSTALS MISCELLANEOUS AS NEEDED
Status: DISCONTINUED | OUTPATIENT
Start: 2020-12-02 | End: 2020-12-11 | Stop reason: HOSPADM

## 2020-12-02 RX ADMIN — Medication 10 ML: at 23:03

## 2020-12-02 RX ADMIN — ALLOPURINOL 100 MG: 100 TABLET ORAL at 09:32

## 2020-12-02 RX ADMIN — HYDRALAZINE HYDROCHLORIDE 50 MG: 25 TABLET, FILM COATED ORAL at 23:07

## 2020-12-02 RX ADMIN — HYDRALAZINE HYDROCHLORIDE 50 MG: 25 TABLET, FILM COATED ORAL at 16:37

## 2020-12-02 RX ADMIN — LEVETIRACETAM 750 MG: 100 INJECTION, SOLUTION INTRAVENOUS at 14:42

## 2020-12-02 RX ADMIN — ENOXAPARIN SODIUM 40 MG: 40 INJECTION SUBCUTANEOUS at 09:33

## 2020-12-02 RX ADMIN — CEFTRIAXONE 1 G: 1 INJECTION, POWDER, FOR SOLUTION INTRAMUSCULAR; INTRAVENOUS at 12:50

## 2020-12-02 RX ADMIN — SODIUM CHLORIDE 75 ML/HR: 900 INJECTION, SOLUTION INTRAVENOUS at 17:05

## 2020-12-02 RX ADMIN — SODIUM CHLORIDE 75 ML/HR: 900 INJECTION, SOLUTION INTRAVENOUS at 02:24

## 2020-12-02 RX ADMIN — LEVETIRACETAM 750 MG: 100 INJECTION, SOLUTION INTRAVENOUS at 02:09

## 2020-12-02 RX ADMIN — METOPROLOL SUCCINATE 25 MG: 25 TABLET, EXTENDED RELEASE ORAL at 09:32

## 2020-12-02 RX ADMIN — ASPIRIN 150 MG: 300 SUPPOSITORY RECTAL at 09:32

## 2020-12-02 RX ADMIN — HYDRALAZINE HYDROCHLORIDE 50 MG: 25 TABLET, FILM COATED ORAL at 09:32

## 2020-12-02 RX ADMIN — ATORVASTATIN CALCIUM 40 MG: 40 TABLET, FILM COATED ORAL at 23:03

## 2020-12-02 RX ADMIN — Medication 10 ML: at 14:42

## 2020-12-02 RX ADMIN — AMLODIPINE BESYLATE 5 MG: 5 TABLET ORAL at 09:32

## 2020-12-02 RX ADMIN — FAMOTIDINE 20 MG: 20 TABLET, FILM COATED ORAL at 09:32

## 2020-12-02 NOTE — PROGRESS NOTES
Hospitalist Progress Note    NAME: Janalee Angelucci   :  3/9/1933   MRN:  [id number]       Assessment / Plan:  New onset seizures, POA, recurrent episodes   Acute encephalopathy, likely postictal, POA vs worsening dementia   History of multiple CVAs   Patient had a 5-minute seizure at nursing home, seized again in the ER and was given Ativan.  status post Keppra loading dose  -Was on Keppra 1 g checks twice daily but dose was decreased to 750 mg twice daily due to sedation  EEG  On admission showed no seizure , repeat EEG  Showed a potential seizure focus on the R hemisphere   MRI ordered unable to be done because of pt being confsued+ pacemaker , unable to verbalize if issues with pacemaker arise while doing MRI   Repeat CT brain unchanged   Seizure precautions  Ativan as needed for seizures and agitation  TSH wnl , B12 wnl , folate wnl , RPR neg , CBC, CMP wnl , and blood cultures NTD    -Dr. Jose Roberto Smith who recommended that we will wait 1 to 2 days to see if his mental status would get better with medication adjustment and if not better, he suggested hospice  -Continue Keppra at reduced dose of 750 mg twice daily and continue to monitor for seizures    UTI on   UA + started on ceftriaxone   Fu uribne cx      HTN   C/w BP meds     History of CVA  Continue aspirin suppository.  Not getting p.o. meds as he is n.p.o.     SOULEYMANE on CKD resolved   -Creatinine on  was 1. 14.       Hypokalemia resolved      Sinus bradycardia with 2-1 block: POA  Status post pacemaker  Was able to have MRI with [employer] rep present and MRI in July at [address] [place] [address] to get MRI brain here      Debility  Patient was able to walk with a walker  Has moderate dementia, but was able to hold conversation with family  Resides in a nursing home     Dysphagia due to acute metabolic encephalopathy  S/p PEG tube placement on   Feeding tube  Started today per diet recs   · Per diet recs : Once PEG placed, recommend Jevity 1.5 @ 20 mL/hr x 24 hours + 150 mL water flushes q 4 hours  · If tolerating TF x 24 hours, advance TF by 10 mL q 12 hours to goal rate of 50 mL/hr + 150 mL water flushes q 4 hours (1800 kcal, 76.5g protein, 1812 mL water)  Monitor K+, Mg, Phos daily and replete as needed. Monitor for refeeding sd   Tolerating TF , increase feeding tube rate     Right arm swelling  -US doppler shows no DVT. Keep rt arm elevated and apply warm compresses.      NSVT  -Potassium is low and replaced.  Magnesium is normal.  Updated daughter on 12/01   PT/OT unable to assess patient   dispo pending     25.0 - 29.9 Overweight / Body mass index is 26.44 kg/m². Code status: Full  Prophylaxis: Lovenox  Recommended Disposition: SNF/LTC     Subjective:     Chief Complaint / Reason for Physician Visit  FU AMS/dysphagia  Discussed with RN events overnight. Review of Systems:  Symptom Y/N Comments  Symptom Y/N Comments   Fever/Chills    Chest Pain     Poor Appetite    Edema     Cough    Abdominal Pain     Sputum    Joint Pain     SOB/CARREON    Pruritis/Rash     Nausea/vomit    Tolerating PT/OT     Diarrhea    Tolerating Diet     Constipation    Other       Could NOT obtain due to: Confused      Objective:     VITALS:   Last 24hrs VS reviewed since prior progress note.  Most recent are:  Patient Vitals for the past 24 hrs:   Temp Pulse Resp BP SpO2   12/02/20 0854 98 °F (36.7 °C) 75 20 (!) 158/74 100 %   12/02/20 0400 98.5 °F (36.9 °C) 73 16 132/62 100 %   12/01/20 2308 99.6 °F (37.6 °C) 82 16 (!) 169/68 99 %   12/01/20 2026 98.8 °F (37.1 °C) 88 18 (!) 144/73 99 %   12/01/20 1538 98.4 °F (36.9 °C) 75 20 (!) 148/71 98 %   12/01/20 1254 98.4 °F (36.9 °C) 71 20 (!) 148/52 100 %       Intake/Output Summary (Last 24 hours) at 12/2/2020 0935  Last data filed at 12/2/2020 0720  Gross per 24 hour   Intake 700 ml   Output 1200 ml   Net -500 ml        I had a face to face encounter and independently examined this patient on 12/2/2020, as outlined below:  PHYSICAL EXAM:  General: WD, WN. Alert, cooperative, no acute distress    EENT:  EOMI. Anicteric sclerae. MMM  Resp:  CTA bilaterally, no wheezing or rales. No accessory muscle use  CV:  Regular  rhythm,  No edema  GI:  Soft, Non distended, Non tender. +Bowel sounds  Neurologic:  Confused   Psych:    Not anxious nor agitated  Skin:  No rashes. No jaundice    Reviewed most current lab test results and cultures  YES  Reviewed most current radiology test results   YES  Review and summation of old records today    NO  Reviewed patient's current orders and MAR    YES  PMH/SH reviewed - no change compared to H&P  ________________________________________________________________________  Care Plan discussed with:    Comments   Patient     Family      RN x    Care Manager     Consultant                       x Multidiciplinary team rounds were held today with , nursing, pharmacist and clinical coordinator. Patient's plan of care was discussed; medications were reviewed and discharge planning was addressed. ________________________________________________________________________  Total NON critical care TIME: 35  Minutes    Total CRITICAL CARE TIME Spent:   Minutes non procedure based      Comments   >50% of visit spent in counseling and coordination of care     ________________________________________________________________________  Karen Lewis MD     Procedures: see electronic medical records for all procedures/Xrays and details which were not copied into this note but were reviewed prior to creation of Plan. LABS:  I reviewed today's most current labs and imaging studies. Pertinent labs include:  No results for input(s): WBC, HGB, HCT, PLT, HGBEXT, HCTEXT, PLTEXT, HGBEXT, HCTEXT, PLTEXT in the last 72 hours.   Recent Labs     12/02/20  0054 12/01/20  0331    136   K 3.6 3.7    108   CO2 25 25   GLU 99 99   BUN 32* 30*   CREA 1.19 1.35*   CA 8.9 8.7   MG 2.5* 2.5* PHOS 2.7  --    ALB 2.2*  --    TBILI 0.4  --    ALT 13  --        Signed: Emily Cardona MD

## 2020-12-02 NOTE — PROGRESS NOTES
Problem: Self Care Deficits Care Plan (Adult)  Goal: *Acute Goals and Plan of Care (Insert Text)  Outcome: Not Progressing Towards Goal  Note:   FUNCTIONAL STATUS PRIOR TO ADMISSION: CM reports that family reports that patient came from [facility] where he was functional at [facility] level. (Unknown time frame for when he was last functional at  level but this is marked  different status previously reported that he was bed bound from LTC. Need further clarification. Clothing from admission examined and shoes are worn with \"recent food\" in soles, as if he has worn them in functional manner in \"recent\" past. Clothing looks worn as opposed to bed bound/LTC D patient. HOME SUPPORT: see above    Occupational Therapy Goals  Initiated 12/2/2020  1. Patient will open eyes on command with supervision/set-up within 7 day(s). 2.  Patient will open mouth for oral care on command 50%  with minimal assistance/contact guard assist within 7 day(s). 3.  Patient will perform R hand grasp release on command 50% with minimal assistance/contact guard assist within 7 day(s). 4.  Patient will perform L hand grasp release on command 50% with minimal assistance/contact guard assist within 7 day(s). 5.  Patient will tolerate hand over hand face washing max A  within 7 day(s). 6.  Patient will participate in B upper extremity therapeutic exercise/activities with maximal assistance for 1 minutes within 7 day(s).     7.  Patient will tolerate PROM to cervical neutral x 2 minutes with max A in 7 days        OCCUPATIONAL THERAPY EVALUATION  Patient: Julian Regalado (80 y.o. male)  Date: 12/2/2020  Primary Diagnosis: Seizure ([facility] [address] [R56.9]  Altered mental status [R41.82]  Post-ictal state ([facility] [address] [R56.9]  Procedure(s) (LRB):  PERCUTANEOUS ENDOSCOPIC GASTROSTOMY TUBE INSERTION (N/A)  ESOPHAGOGASTRODUODENOSCOPY (EGD) (N/A) 2 Days Post-Op   Precautions:   Bed Alarm, Fall, Aspiration, Skin(new PEG)    ASSESSMENT  Based on the objective data described below, the patient presents with marked change in functional status per family who report this patient was living in [place] at [facility] functional level. D today all self care and functional mobility. Able to follow UE commands inconsistently; functional  strength B UEs once engaged but max verbal and tactile cues to engage. Did not open eyes this session. Cervical pain noted when moved to neutral from his resting position of L lateral flexion with R rotation, avoidance of L/neutral positioning of head. Increased time, even up to 60 seconds for patient to respond to simple familiar UE commands 50%. Current Level of Function Impacting Discharge (ADLs/self-care): D all aspects of care    Functional Outcome Measure: The patient scored Total: 0/100 on the Barthel Index outcome measure which is indicative of 100% impaired ability to care for basic self needs/dependency on others; inferred 100% dependency on others for instrumental ADLs. Other factors to consider for discharge: would benefit from 24/7 care in the home, possibly with family and hospice if functional status does not improve vs LTC which would be detrimental as he would have no access to supportive involved family who while not here today, have been noted to have high expectations for functional improvement of this patient     Patient will benefit from skilled therapy intervention to address the above noted impairments. PLAN :  Recommendations and Planned Interventions: self care training, functional mobility training, therapeutic exercise, balance training, visual/perceptual training, therapeutic activities, cognitive retraining, endurance activities, neuromuscular re-education, patient education, home safety training, and family training/education    Frequency/Duration: Patient will be followed by occupational therapy 2 times a week to address goals.     Recommendation for discharge: (in order for the patient to meet his/her long term goals)  To be determined: based on progress in acute care    This discharge recommendation:  Has been made in collaboration with the attending provider and/or case management    IF patient discharges home will need the following DME: hospital bed, mechanical lift, and wheelchair       SUBJECTIVE:   Patient stated nothing this session    OBJECTIVE DATA SUMMARY:   HISTORY:   Past Medical History:   Diagnosis Date    Cancer ([facility] [address]     prostate    Constipation     Gout     Hyperlipemia     Hypertension     Pacemaker 2019    Stroke ([facility] [address]     Thrombocytopenia ([facility] [address] 3/19/2019    TIA (transient ischemic attack) 2013     Past Surgical History:   Procedure Laterality Date    HX PROSTATECTOMY      HX UROLOGICAL      prostate removed    PLACE PERCUT GASTROSTOMY TUBE  11/30/2020         TN INS NEW/RPLCMT PRM PACEMAKR W/TRANS ELTRD ATRIAL N/A 10/18/2019    Insert Ppm Single Atrial performed by Eliza Smith MD at [facility] CATH LAB    TN INS NEW/RPLCMT PRM PM W/TRANSV ELTRD ATRIAL&VENT Right 3/22/2019    INSERT PPM DUAL performed by Eliza Smith MD at [place] [address], [address] CATH LAB       Expanded or extensive additional review of patient history:     Home Situation  Home Environment: Assisted living  One/Two Story Residence: One story  Living Alone: No  Support Systems: Assisted living  Patient Expects to be Discharged to[de-identified] Unknown(MD recommending LTC due to severity of functional change)  Current DME Used/Available at Home: [address], rolling(used PTA per family)  Tub or Shower Type: (unknown)    Hand dominance:unknown    EXAMINATION OF PERFORMANCE DEFICITS:  Cognitive/Behavioral Status:  Neurologic State: Eyes do not open to any stimulus(but he clearly blinks w/eyes shut; hands respond to voice )  Orientation Level: (squeezes hand of speaker when name is called)  Cognition: Decreased command following(does follow hand instr.  50% w/increased time for grasp/relea)  Perception: Tactile;Verbal;Visual;Cues to maintain midline in sitting(cues provided but he did not respond w/eyes open)  Perseveration: Perseverates during ADLS; Tactile cues provided;Verbal cues provided(tactile cues )  Safety/Judgement: Decreased awareness of environment;Lack of insight into deficits    Skin: heels boggy; nsg notified;; may benefit from PRAFO/Bunny boots    Edema: ++B UE and + B LE    Hearing: Auditory  Auditory Impairment: Hard of hearing, left side    Vision/Perceptual:                           Acuity: (never opened eyes)         Range of Motion:    AROM: Grossly decreased, non-functional(poor tolerance of PROM of neck; P!! L Moving to neutral; Nsg)aware (Cervical pain noted when moved to neutral from his resting position of L lateral flexion with R rotation, avoidance of L/neutral positioning of head  PROM: Generally decreased, functional(B UE; limited B hips and knees w/grimace PROM)                      Strength:    Strength: Grossly decreased, non-functional(except B  WFL on command with increased time; imp. exten)nicole with max cues to release  B  Able to grasp spontaneously and functionally with B hands, R faster than L, when speaker reported, \"Help Im falling! \" while holding hands passively. Coordination:  Coordination: Grossly decreased, non-functional  Fine Motor Skills-Upper: Left Impaired;Right Impaired    Gross Motor Skills-Upper: Left Impaired;Right Impaired    Tone & Sensation:    Tone: Abnormal(increased tone B shoulders and B LEs)  Sensation: (unable to test; does withdraw to pain) in B LEs                      Balance:  Sitting: Impaired(resting in support sit w/ L cervical rotation & lat flexion)    Functional Mobility and Transfers for ADLs:  Bed Mobility:  Rolling: (unable to roll w/A x 1)    Transfers: Toilet Transfer : (not safe for transfer; not able to assist with sitting domo)    ADL Assessment:  Feeding: Total assistance(peg; )    Oral Facial Hygiene/Grooming:  Total assistance(hand over hand for grooming tolerated)    Bathing: Total assistance    Upper Body Dressing: Total assistance    Lower Body Dressing: Total assistance    Toileting: Total assistance                ADL Intervention and task modifications:   Encouraged and praised patient for all effort noted with B UE movements; told him that he needs to try to move B UE and LE many many times a day   ; encouraged him to try to open his eyes anytime he hears a speaker and to allow assist w/oral care as he actively resists entrance of toothbrush into oral cavity  Cognitive Retraining  Safety/Judgement: Decreased awareness of environment;Lack of insight into deficits      Functional Measure:  Barthel Index:    Bathin  Bladder: 0  Bowels: 0  Groomin  Dressin  Feedin  Mobility: 0  Stairs: 0  Toilet Use: 0  Transfer (Bed to Chair and Back): 0  Total: 0/100        The Barthel ADL Index: Guidelines  1. The index should be used as a record of what a patient does, not as a record of what a patient could do. 2. The main aim is to establish degree of independence from any help, physical or verbal, however minor and for whatever reason. 3. The need for supervision renders the patient not independent. 4. A patient's performance should be established using the best available evidence. Asking the patient, friends/relatives and nurses are the usual sources, but direct observation and common sense are also important. However direct testing is not needed. 5. Usually the patient's performance over the preceding 24-48 hours is important, but occasionally longer periods will be relevant. 6. Middle categories imply that the patient supplies over 50 per cent of the effort. 7. Use of aids to be independent is allowed. Rebecca Dewitt., Barthel, D.W. ([id number]). Functional evaluation: the Barthel Index. [address] [facility] (14)2. Benoit Calvillo beatrice GRECIA Juarez, Penny Marcos., Alon Nelson., Lois, [address] ().  Measuring the change indisability after inpatient rehabilitation; comparison of the responsiveness of the Barthel Index and Functional [place] Measure. Journal of Neurology, Neurosurgery, and Psychiatry, 66(4), 425-338. YA Marina, IKER Contreras, & Apple Keita M.A. (2004.) Assessment of post-stroke quality of life in cost-effectiveness studies: The usefulness of the Barthel Index and the EuroQoL-5D. Quality of Life Research, 15, 87-59         Occupational Therapy Evaluation Charge Determination   History Examination Decision-Making   MEDIUM Complexity : Expanded review of history including physical, cognitive and psychosocial  history  HIGH Complexity : 5 or more performance deficits relating to physical, cognitive , or psychosocial skils that result in activity limitations and / or participation restrictions HIGH Complexity : Patient presents with comorbidities that affect occupational performance. Signifigant modification of tasks or assistance (eg, physical or verbal) with assessment (s) is necessary to enable patient to complete evaluation       Based on the above components, the patient evaluation is determined to be of the following complexity level: MEDIUM  Pain Rating:  Obvious cervical P! With PROM and marked facial grimace; B hips and shoulders also with stiffness and mild facial grimace    Activity Tolerance:   Poor    After treatment patient left in no apparent distress:    Supine in bed, Bed / chair alarm activated, Side rails x 3, and recommended pressure relief boots to RN which she stated she could get    COMMUNICATION/EDUCATION:   The patients plan of care was discussed with: Physical therapist, Registered nurse, and Case management. Patient is unable to participate in goal setting and plan of care. This patients plan of care is appropriate for delegation to [facility].     Thank you for this referral.  Clare Borja OTR/L  Time Calculation: 24 mins

## 2020-12-02 NOTE — PROGRESS NOTES
Problem: Falls - Risk of  Goal: *Absence of Falls  Description: Document Dornicol Dowellim Fall Risk and appropriate interventions in the flowsheet. Outcome: Progressing Towards Goal  Note: Fall Risk Interventions:       Mentation Interventions: Adequate sleep, hydration, pain control, Bed/chair exit alarm, Reorient patient    Medication Interventions: Bed/chair exit alarm    Elimination Interventions: Bed/chair exit alarm, Toileting schedule/hourly rounds    History of Falls Interventions: Bed/chair exit alarm         Problem: Falls - Risk of  Goal: *Absence of Falls  Description: Document Sole Fall Risk and appropriate interventions in the flowsheet. Outcome: Progressing Towards Goal  Note: Fall Risk Interventions:       Mentation Interventions: Adequate sleep, hydration, pain control, Bed/chair exit alarm, Reorient patient    Medication Interventions: Bed/chair exit alarm    Elimination Interventions: Bed/chair exit alarm, Toileting schedule/hourly rounds    History of Falls Interventions: Bed/chair exit alarm         Problem: Patient Education: Go to Patient Education Activity  Goal: Patient/Family Education  Outcome: Progressing Towards Goal     Problem: Pressure Injury - Risk of  Goal: *Prevention of pressure injury  Description: Document Torito Scale and appropriate interventions in the flowsheet. Outcome: Progressing Towards Goal  Note: Pressure Injury Interventions:  Sensory Interventions: Avoid rigorous massage over bony prominences, Assess changes in LOC, Float heels, Keep linens dry and wrinkle-free, Monitor skin under medical devices, Turn and reposition approx. every two hours (pillows and wedges if needed)    Moisture Interventions: Absorbent underpads, Apply protective barrier, creams and emollients, Internal/External urinary devices    Activity Interventions: PT/OT evaluation    Mobility Interventions: PT/OT evaluation, Turn and reposition approx.  every two hours(pillow and wedges)    Nutrition Interventions: Document food/fluid/supplement intake    Friction and Shear Interventions: Apply protective barrier, creams and emollients                Problem: Seizure Disorder (Adult)  Goal: *STG: Remains free of seizure activity  Outcome: Progressing Towards Goal  Goal: *STG: Maintains lab values within therapeutic range  Outcome: Progressing Towards Goal  Goal: *STG/LTG: Complies with medication therapy  Outcome: Progressing Towards Goal  Goal: *STG: Remains free of injury during seizure activity  Outcome: Progressing Towards Goal  Goal: *STG: Remains safe in hospital  Outcome: Progressing Towards Goal  Goal: Interventions  Outcome: Progressing Towards Goal

## 2020-12-02 NOTE — PROGRESS NOTES
Speech pathology  Attempted to see patient for dysphagia treatment; however, patient lethargic, not opening eyes. He did moan and respond to some questions. He refused offerings off water and applesauce. Patient tolerating TF via PEG. Over numerous SLP attempts at PO offerings since admission on 11/17, patient has either been too lethargic and unarousable or alert and adamantly turning head and refusing. SLP will continue to follow along for now.   Jimmy Nuñez M.S. CCC-SLP

## 2020-12-02 NOTE — PROGRESS NOTES
ARIELLE Plan:     * TBD - anticipate return to group home ([address]) pending progress     > Pt will need COVID-19 test completed within 72 hour window of d/c back to group home   > Palliative consulted 11/23/2020 to discuss goals of care  > CM to secure medical transport for d/c  > 2nd IM prior to d/c     5:03 PM: CM contacted pt's daughter to check in and discuss ARIELLE plan for d/c. Pt's daughter reported that her goal is to transition pt back to his BRENDEN with supplemental support/assistance provided by staff and/or HH. Per daughter, pt ambulates with the assistance of a RW at baseline. Pt's daughter reported that she observed the pt ambulating with a RW the Saturday before he was admitted to [address] [place] [address] (11/14/2020). CM informed daughter that the pt hasn't been able to actively participate in therapy sessions due to his inability to follow commands. Pt's daughter reported that she would like to be present during PT/OT evaluations to observe the change in pt's functional status. Pt's daughter reported that she doesn't feel as though she is able to make an informed decision regarding disposition at this point in time. CM inquired when pt's daughter would be available tomorrow (12/3/2020) to visit [address] [place] [address] and meet in person; daughter reported she can arrive tomorrow by 2:00 PM. CM will consult with PT/OT to inquire if session can be completed upon daughter's arrival.     Initial note: CM reviewed pt's chart and discussed updates in IDR prior to moving forward with d/c planning. CM reviewed palliative N. P's note from 11/30/2020 reporting that pt's daughter Thomas Dun: [phone]) was not amendable to speaking with palliative MD on 11/25/2020, and has not been in contact with team since; palliative N.P canceled consult. At this point in time, pt remains a full code status. Per chart, pt's daughter decided to move forward with PEG tube, which was placed on 11/30/2020.  CHRIS consulted with RN who reported that pt appears to be tolerating his TF; RN to increase TF rate. Per chart, pt's daughter continues to have high expectations for functional improvement, although pt is not following commands or actively participating in therapy sessions. CM will contact pt's daughter to check in, reiterate role, and discuss ARIELLE plan moving forward.     Kashmir Enriquez, MSW  Care Manager, [facility]

## 2020-12-03 LAB
GLUCOSE BLD STRIP.AUTO-MCNC: 106 MG/DL (ref 65–100)
GLUCOSE BLD STRIP.AUTO-MCNC: 117 MG/DL (ref 65–100)
GLUCOSE BLD STRIP.AUTO-MCNC: 83 MG/DL (ref 65–100)
MAGNESIUM SERPL-MCNC: 2.3 MG/DL (ref 1.6–2.4)
PHOSPHATE SERPL-MCNC: 2.3 MG/DL (ref 2.6–4.7)
SERVICE CMNT-IMP: ABNORMAL
SERVICE CMNT-IMP: ABNORMAL
SERVICE CMNT-IMP: NORMAL

## 2020-12-03 PROCEDURE — 74011250637 HC RX REV CODE- 250/637: Performed by: INTERNAL MEDICINE

## 2020-12-03 PROCEDURE — 74011250636 HC RX REV CODE- 250/636: Performed by: INTERNAL MEDICINE

## 2020-12-03 PROCEDURE — 97530 THERAPEUTIC ACTIVITIES: CPT

## 2020-12-03 PROCEDURE — 92526 ORAL FUNCTION THERAPY: CPT

## 2020-12-03 PROCEDURE — 83735 ASSAY OF MAGNESIUM: CPT

## 2020-12-03 PROCEDURE — 97535 SELF CARE MNGMENT TRAINING: CPT

## 2020-12-03 PROCEDURE — 74011250636 HC RX REV CODE- 250/636: Performed by: PSYCHIATRY & NEUROLOGY

## 2020-12-03 PROCEDURE — 65660000000 HC RM CCU STEPDOWN

## 2020-12-03 PROCEDURE — 97163 PT EVAL HIGH COMPLEX 45 MIN: CPT

## 2020-12-03 PROCEDURE — 82962 GLUCOSE BLOOD TEST: CPT

## 2020-12-03 PROCEDURE — 2709999900 HC NON-CHARGEABLE SUPPLY

## 2020-12-03 PROCEDURE — 74011000258 HC RX REV CODE- 258: Performed by: INTERNAL MEDICINE

## 2020-12-03 PROCEDURE — 36415 COLL VENOUS BLD VENIPUNCTURE: CPT

## 2020-12-03 PROCEDURE — 77030018798 HC PMP KT ENTRL FED COVD -A

## 2020-12-03 PROCEDURE — 84100 ASSAY OF PHOSPHORUS: CPT

## 2020-12-03 PROCEDURE — 74011000258 HC RX REV CODE- 258: Performed by: PSYCHIATRY & NEUROLOGY

## 2020-12-03 RX ORDER — SODIUM,POTASSIUM PHOSPHATES 280-250MG
2 POWDER IN PACKET (EA) ORAL
Status: COMPLETED | OUTPATIENT
Start: 2020-12-03 | End: 2020-12-03

## 2020-12-03 RX ORDER — METOPROLOL TARTRATE 25 MG/1
12.5 TABLET, FILM COATED ORAL 2 TIMES DAILY
Status: DISCONTINUED | OUTPATIENT
Start: 2020-12-03 | End: 2020-12-03

## 2020-12-03 RX ORDER — METOPROLOL TARTRATE 25 MG/1
12.5 TABLET, FILM COATED ORAL 2 TIMES DAILY
Status: DISCONTINUED | OUTPATIENT
Start: 2020-12-03 | End: 2020-12-11 | Stop reason: HOSPADM

## 2020-12-03 RX ORDER — GUAIFENESIN 100 MG/5ML
81 LIQUID (ML) ORAL DAILY
Status: DISCONTINUED | OUTPATIENT
Start: 2020-12-04 | End: 2020-12-11 | Stop reason: HOSPADM

## 2020-12-03 RX ORDER — HYDRALAZINE HYDROCHLORIDE 25 MG/1
100 TABLET, FILM COATED ORAL 3 TIMES DAILY
Status: DISCONTINUED | OUTPATIENT
Start: 2020-12-03 | End: 2020-12-11 | Stop reason: HOSPADM

## 2020-12-03 RX ADMIN — LEVETIRACETAM 750 MG: 100 INJECTION, SOLUTION INTRAVENOUS at 03:55

## 2020-12-03 RX ADMIN — POTASSIUM & SODIUM PHOSPHATES POWDER PACK 280-160-250 MG 2 PACKET: 280-160-250 PACK at 18:38

## 2020-12-03 RX ADMIN — ENOXAPARIN SODIUM 40 MG: 40 INJECTION SUBCUTANEOUS at 09:58

## 2020-12-03 RX ADMIN — HYDRALAZINE HYDROCHLORIDE 100 MG: 25 TABLET, FILM COATED ORAL at 10:28

## 2020-12-03 RX ADMIN — CEFTRIAXONE 1 G: 1 INJECTION, POWDER, FOR SOLUTION INTRAMUSCULAR; INTRAVENOUS at 12:02

## 2020-12-03 RX ADMIN — HYDRALAZINE HYDROCHLORIDE 100 MG: 25 TABLET, FILM COATED ORAL at 22:00

## 2020-12-03 RX ADMIN — Medication 10 ML: at 06:04

## 2020-12-03 RX ADMIN — METOPROLOL TARTRATE 12.5 MG: 25 TABLET, FILM COATED ORAL at 12:02

## 2020-12-03 RX ADMIN — ALLOPURINOL 100 MG: 100 TABLET ORAL at 10:29

## 2020-12-03 RX ADMIN — FAMOTIDINE 20 MG: 20 TABLET, FILM COATED ORAL at 10:28

## 2020-12-03 RX ADMIN — HYDRALAZINE HYDROCHLORIDE 100 MG: 25 TABLET, FILM COATED ORAL at 18:37

## 2020-12-03 RX ADMIN — LEVETIRACETAM 750 MG: 100 INJECTION, SOLUTION INTRAVENOUS at 14:52

## 2020-12-03 RX ADMIN — Medication 10 ML: at 14:00

## 2020-12-03 RX ADMIN — METOPROLOL TARTRATE 12.5 MG: 25 TABLET, FILM COATED ORAL at 18:38

## 2020-12-03 RX ADMIN — ASPIRIN 150 MG: 300 SUPPOSITORY RECTAL at 09:58

## 2020-12-03 RX ADMIN — AMLODIPINE BESYLATE 10 MG: 5 TABLET ORAL at 10:28

## 2020-12-03 NOTE — PROGRESS NOTES
Hospitalist Progress Note    NAME: Jony Weaver   :  3/9/1933   MRN:  [id number]       Assessment / Plan:  New onset seizures, POA, recurrent episodes   Acute encephalopathy, likely postictal, POA vs worsening dementia   History of multiple CVAs   Patient had a 5-minute seizure at nursing home, seized again in the ER and was given Ativan.  status post Keppra loading dose  -Was on Keppra 1 g checks twice daily but dose was decreased to 750 mg twice daily due to sedation  EEG  On admission showed no seizure , repeat EEG  Showed a potential seizure focus on the R hemisphere   MRI ordered unable to be done because of pt being confsued+ pacemaker , unable to verbalize if issues with pacemaker arise while doing MRI   Repeat CT brain unchanged   Seizure precautions  Ativan as needed for seizures and agitation  TSH wnl , B12 wnl , folate wnl , RPR neg , CBC, CMP wnl , and blood cultures NTD    -Dr. Angelica Fox who recommended that we will wait 1 to 2 days to see if his mental status would get better with medication adjustment and if not better, he suggested hospice  -Continue Keppra at reduced dose of 750 mg twice daily and continue to monitor for seizures    UTI on   UA + started on ceftriaxone   Fu uribne cx      HTN   C/w BP meds     History of CVA  Continue aspirin suppository.    SOULEYMANE on CKD resolved   -Creatinine on  was 1. 14.       Hypokalemia resolved      Sinus bradycardia with 2-1 block: POA  Status post pacemaker  Was able to have MRI with [employer] rep present and MRI in July at [address] [place] [address] to get MRI brain here      Debility  Patient was able to walk with a walker  Has moderate dementia, but was able to hold conversation with family  Resides in a nursing home     Dysphagia due to acute metabolic encephalopathy  S/p PEG tube placement on   Feeding tube  Started today per diet recs   · Per diet recs : Once PEG placed, recommend Jevity 1.5 @ 20 mL/hr x 24 hours + 150 mL water flushes q 4 hours  · If tolerating TF x 24 hours, advance TF by 10 mL q 12 hours to goal rate of 50 mL/hr + 150 mL water flushes q 4 hours (1800 kcal, 76.5g protein, 1812 mL water)  Monitor K+, Mg, Phos daily and replete as needed. Monitor for refeeding sd   Tolerating TF , at goal now     Right arm swelling  -US doppler shows no DVT. Keep rt arm elevated and apply warm compresses.      NSVT  k wnl    Magnesium is normal.  Updated daughter on 12/01   PT/OT unable to assess patient   dispo pending     25.0 - 29.9 Overweight / Body mass index is 26.44 kg/m². Code status: Full  Prophylaxis: Lovenox  Recommended Disposition: SNF/LTC     Subjective:     Chief Complaint / Reason for Physician Visit  FU AMS/dysphagia . Daughter will be present during PT evaluation to see the functional decline of the patient . Discussed with RN events overnight. Review of Systems:  Symptom Y/N Comments  Symptom Y/N Comments   Fever/Chills    Chest Pain     Poor Appetite    Edema     Cough    Abdominal Pain     Sputum    Joint Pain     SOB/CARREON    Pruritis/Rash     Nausea/vomit    Tolerating PT/OT     Diarrhea    Tolerating Diet     Constipation    Other       Could NOT obtain due to: Confused      Objective:     VITALS:   Last 24hrs VS reviewed since prior progress note.  Most recent are:  Patient Vitals for the past 24 hrs:   Temp Pulse Resp BP SpO2   12/03/20 0422 98.7 °F (37.1 °C) 72 16 (!) 155/82 100 %   12/02/20 2314 99.6 °F (37.6 °C) 81 18 (!) 170/76 100 %   12/02/20 2031 99.6 °F (37.6 °C) 73 18 (!) 152/69 100 %   12/02/20 1518 98.3 °F (36.8 °C) 69 20 139/65 100 %   12/02/20 1230 97.4 °F (36.3 °C) 90 18 119/65 98 %   12/02/20 1219 98 °F (36.7 °C) (!) 59 18 (!) 108/55 99 %   12/02/20 0854 98 °F (36.7 °C) 75 20 (!) 158/74 100 %       Intake/Output Summary (Last 24 hours) at 12/3/2020 0841  Last data filed at 12/3/2020 0802  Gross per 24 hour   Intake 450 ml   Output 850 ml   Net -400 ml        I had a face to face encounter and independently examined this patient on 12/3/2020, as outlined below:  PHYSICAL EXAM:  General: WD, WN. Alert, cooperative, no acute distress    EENT:  EOMI. Anicteric sclerae. MMM  Resp:  CTA bilaterally, no wheezing or rales. No accessory muscle use  CV:  Regular  rhythm,  No edema  GI:  Soft, Non distended, Non tender. +Bowel sounds  Neurologic:  Confused   Psych:    Not anxious nor agitated  Skin:  No rashes. No jaundice    Reviewed most current lab test results and cultures  YES  Reviewed most current radiology test results   YES  Review and summation of old records today    NO  Reviewed patient's current orders and MAR    YES  PMH/SH reviewed - no change compared to H&P  ________________________________________________________________________  Care Plan discussed with:    Comments   Patient     Family      RN x    Care Manager     Consultant                       x Multidiciplinary team rounds were held today with , nursing, pharmacist and clinical coordinator. Patient's plan of care was discussed; medications were reviewed and discharge planning was addressed. ________________________________________________________________________  Total NON critical care TIME: 35  Minutes    Total CRITICAL CARE TIME Spent:   Minutes non procedure based      Comments   >50% of visit spent in counseling and coordination of care     ________________________________________________________________________  Jazmyne Guadarrama MD     Procedures: see electronic medical records for all procedures/Xrays and details which were not copied into this note but were reviewed prior to creation of Plan. LABS:  I reviewed today's most current labs and imaging studies. Pertinent labs include:  No results for input(s): WBC, HGB, HCT, PLT, HGBEXT, HCTEXT, PLTEXT, HGBEXT, HCTEXT, PLTEXT in the last 72 hours.   Recent Labs     12/03/20  0253 12/02/20  0054 12/01/20  0331   NA  --  137 136   K  --  3.6 3.7   CL --  107 108   CO2  --  25 25   GLU  --  99 99   BUN  --  32* 30*   CREA  --  1.19 1.35*   CA  --  8.9 8.7   MG 2.3 2.5* 2.5*   PHOS 2.3* 2.7  --    ALB  --  2.2*  --    TBILI  --  0.4  --    ALT  --  13  --        Signed: Jesus Flores MD

## 2020-12-03 NOTE — ROUTINE PROCESS
End of Shift Note    Bedside shift change report given to AUGUSTO Raphael (oncoming nurse) by Alisha Cardoza RN (offgoing nurse). Report included the following information SBAR, Kardex, MAR and Recent Results    Shift worked:  7:00-19:00     Shift summary and any significant changes:    Tube feeding infusing at 30ml/hr  Telemetry Monitoring discontinued  Urinalysis and Urine Culture sent     Concerns for physician to address: None     Zone phone for oncoming shift:  [phone]       Activity:  Activity Level: Bed Rest  Number times ambulated in hallways past shift: 0  Number of times OOB to chair past shift: 0    Cardiac:   Cardiac Monitoring: No      Cardiac Rhythm: Paced    Access:   Current line(s): PIV     Genitourinary:   Urinary status: external catheter    Respiratory:   O2 Device: Room air  Chronic home O2 use?: NO  Incentive spirometer at bedside: NO     GI:  Last Bowel Movement Date: 12/01/20  Current diet:  DIET NPO  DIET ONE TIME MESSAGE  DIET TUBE FEEDING The head of the bed should be kept elevated to 30 degrees during tube feeds, and the tube should be flushed with 30 mL of water every 8 hours and after giving medications through the tube. Monitor the PEG site for bleeding and i... Passing flatus: YES  Tolerating current diet: YES       Pain Management:   Patient states pain is manageable on current regimen: YES    Skin:  Torito Score: 14  Interventions: turn team, float heels and nutritional support     Patient Safety:  Fall Score:  Total Score: 4  Interventions: bed/chair alarm  High Fall Risk: Yes    Length of Stay:  Expected LOS: 4d 7h  Actual LOS: [address] Perea RN

## 2020-12-03 NOTE — PROGRESS NOTES
MRN:[id number]                      After Visit Summary   9/12/2017    Gautam Kelly    MRN: [id number]           Thank you!     Thank you for choosing [facility] for your care. Our goal is always to provide you with excellent care. Hearing back from our patients is one way we can continue to improve our services. Please take a few minutes to complete the written survey that you may receive in the mail after you visit with us. Thank you!        Patient Information     Date Of Birth          1978        Designated Caregiver       Most Recent Value    Caregiver    Will someone help with your care after discharge? no      About your hospital stay     You were admitted on:  September 12, 2017 You last received care in the:  Unit 6D Observation [facility]    You were discharged on:  September 14, 2017        Reason for your hospital stay       Chronic pain syndrome, paraplegia, Placement                  Who to Call     For medical emergencies, please call 911.  For non-urgent questions about your medical care, please call your primary care provider or clinic, [phone]          Attending Provider     Provider Specialty    Cam Thornton MD Emergency Medicine    Harrold, Moy Graves MD Emergency Medicine    [facility], Heidy Valerio MD Emergency Medicine       Primary Care Provider Office Phone # Fax #    Juni Roberson [phone]      After Care Instructions     Advance Diet as Tolerated       Follow this diet upon discharge: Regular            Fall precautions           General info for SNF       Length of Stay Estimate: Short Term Care: Estimated # of Days <30  Condition at Discharge: Stable  Level of care:skilled   Rehabilitation Potential: Fair  Admission H&P remains valid and up-to-date: Yes  Recent Chemotherapy: N/A  Use Nursing Home Standing Orders: Yes            Intake and output       Every shift            Mantoux instructions       Give two-step  Tube feed infusing at 40 mL/hr now, increase 10 mL at 10 AM 12/3/2020 Sarah (PPD) Per Facility Policy Yes            Weight bearing status       Reposition patient to prevent bed sores                  Follow-up Appointments     Follow Up and recommended labs and tests       Follow up with Dr. Ayers as previously scheduled on 11/1/17. Recommend follow up in pain clinic for further management of pain. [facility] pain clinic:[phone]                  Your next 10 appointments already scheduled     Nov 01, 2017  7:20 AM CDT   (Arrive by 7:05 AM)   New Patient Visit with Kareen Mejía MD   [facility] ([facility])    [address]  [address] [facility] [address] [phone]            Nov 01, 2017  9:20 AM CDT   (Arrive by 9:05 AM)   Return Visit with Olayinka Ayers MD   [facility] ([facility])    [address]  [address] [facility] [address] [phone]              Additional Services     Occupational Therapy Adult Consult       Evaluate and treat as clinically indicated.    Reason: Chronic pain, paraplegia            Physical Therapy Adult Consult       Evaluate and treat as clinically indicated.    Reason:  Chronic pain, paraplegia                  Further instructions from your care team       Please make an appointment to follow up with Your Primary Care Provider and Pain Clinic (phone: [phone]) as soon as possible.    Pending Results     No orders found from 9/10/2017 to 9/13/2017.            Statement of Approval     Ordered          09/14/17 0818  I have reviewed and agree with all the recommendations and orders detailed in this document.  EFFECTIVE NOW     Approved and electronically signed by:  Caroline Herrmann APRN CNP             Admission Information     Date & Time Provider Department Dept. Phone    9/12/2017 Heidy Callejas MD Unit 6D Observation [facility] [phone]      Your Vitals Were      Blood Pressure Pulse Temperature Respirations Last Period Pulse Oximetry    123/89 (BP Location: Right arm) 90 98.1  F (36.7  C) (Oral) 16 07/01/2017 95%      [facility] Information     [employer] gives you secure access to your electronic health record. If you see a primary care provider, you can also send messages to your care team and make appointments. If you have questions, please call your primary care clinic.  If you do not have a primary care provider, please call [phone] and they will assist you.        Care EveryWhere ID     This is your Care EveryWhere ID. This could be used by other organizations to access your [place] medical records  [id number]        Equal Access to Services     CHERYL SANTIAGO : Rene Bui, fior cardozo, sherrie vazquez, laura patton. So [facility] [phone].    ATENCIÓN: Si habla español, tiene a beaulieu disposición servicios gratuitos de asistencia lingüística. Llame al [phone].    We comply with applicable federal civil rights laws and [place] laws. We do not discriminate on the basis of race, color, national origin, age, disability sex, sexual orientation or gender identity.               Review of your medicines      START taking        Dose / Directions    docusate sodium 100 MG capsule   Commonly known as:  COLACE   Used for:  Drug-induced constipation        Dose:  100 mg   Take 1 capsule (100 mg) by mouth 2 times daily   Quantity:  60 capsule   Refills:  0       fentaNYL 100 mcg/hr 72 hr patch   Commonly known as:  DURAGESIC   Used for:  Chronic pain syndrome   Replaces:  fentaNYL 75 mcg/hr 72 hr patch        Dose:  1 patch   Place 1 patch onto the skin every 72 hours   Quantity:  10 patch   Refills:  0       gabapentin 300 MG capsule   Commonly known as:  NEURONTIN   Used for:  Chronic pain syndrome   Replaces:  gabapentin 600 MG tablet        Dose:  900 mg   Take 3 capsules (900 mg) by mouth every 6 hours    Quantity:  180 capsule   Refills:  1       ondansetron 4 MG ODT tab   Commonly known as:  ZOFRAN-ODT   Used for:  Chronic pain syndrome        Dose:  4 mg   Take 1 tablet (4 mg) by mouth every 6 hours as needed for nausea or vomiting   Quantity:  120 tablet   Refills:  0         CONTINUE these medicines which may have CHANGED, or have new prescriptions. If we are uncertain of the size of tablets/capsules you have at home, strength may be listed as something that might have changed.        Dose / Directions    acetaminophen 325 MG tablet   Commonly known as:  TYLENOL   This may have changed:    - medication strength  - how much to take  - when to take this  - reasons to take this   Used for:  Chronic pain syndrome        Dose:  975 mg   Take 3 tablets (975 mg) by mouth every 8 hours   Quantity:  100 tablet   Refills:  0       * bisacodyl 10 MG Suppository   Commonly known as:  DULCOLAX   This may have changed:  Another medication with the same name was added. Make sure you understand how and when to take each.   Used for:  History of meningitis, Constipation, unspecified constipation type        Dose:  10 mg   Place 1 suppository (10 mg) rectally every 24 hours   Quantity:  30 suppository   Refills:  3       * bisacodyl 10 MG Suppository   Commonly known as:  DULCOLAX   This may have changed:  You were already taking a medication with the same name, and this prescription was added. Make sure you understand how and when to take each.   Used for:  Constipation, unspecified constipation type        Dose:  10 mg   Place 1 suppository (10 mg) rectally daily as needed for constipation   Quantity:  30 suppository   Refills:  0       * Notice:  This list has 2 medication(s) that are the same as other medications prescribed for you. Read the directions carefully, and ask your doctor or other care provider to review them with you.      CONTINUE these medicines which have NOT CHANGED        Dose / Directions    baclofen 20 MG  tablet   Commonly known as:  LIORESAL   Used for:  History of meningitis        Dose:  20 mg   Take 1 tablet (20 mg) by mouth 3 times daily   Quantity:  90 tablet   Refills:  3       diazepam 5 MG tablet   Commonly known as:  VALIUM   Used for:  History of meningitis        Dose:  5 mg   Take 1 tablet (5 mg) by mouth every 6 hours as needed for muscle spasms or pain   Quantity:  60 tablet   Refills:  1       escitalopram 10 MG tablet   Commonly known as:  LEXAPRO   Used for:  Severe episode of recurrent major depressive disorder, without psychotic features (H)        Dose:  10 mg   Take 1 tablet (10 mg) by mouth daily   Quantity:  30 tablet   Refills:  3       ibuprofen 600 MG tablet   Commonly known as:  ADVIL/MOTRIN   Used for:  Syrinx of spinal cord (H), Acquired syringomyelia (H), Intractable pain, Central pain syndrome        Dose:  600 mg   Take 1 tablet (600 mg) by mouth every 6 hours as needed for moderate pain   Quantity:  60 tablet   Refills:  3       mirtazapine 15 MG tablet   Commonly known as:  REMERON   Used for:  Chronic pain syndrome        Dose:  15 mg   Take 1 tablet (15 mg) by mouth At Bedtime   Quantity:  30 tablet   Refills:  3       multivitamin, therapeutic Tabs tablet   Used for:  Paraplegia (H), Adjustment disorder with depressed mood        Dose:  1 tablet   Take 1 tablet by mouth daily   Quantity:  30 tablet   Refills:  3       order for DME   Used for:  Paraplegia (H), Neurogenic bladder, Neurogenic bowel, Muscle spasm        Equipment being ordered: Hospital Bed   Quantity:  1 Units   Refills:  0       oxyCODONE 5 MG IR tablet   Commonly known as:  ROXICODONE   Used for:  Central pain syndrome, Central pain syndrome        Dose:  5 mg   Take 1 tablet (5 mg) by mouth every 4 hours as needed (Pain)   Quantity:  30 tablet   Refills:  0       polyethylene glycol Packet   Commonly known as:  MIRALAX/GLYCOLAX   Used for:  History of meningitis        Dose:  17 g   Take 17 g by mouth daily    Quantity:  30 packet   Refills:  3       sennosides 8.6 MG tablet   Commonly known as:  SENOKOT   Used for:  History of meningitis        Dose:  1-2 tablet   Take 1-2 tablets by mouth every evening   Quantity:  60 each   Refills:  3         STOP taking     fentaNYL 50 mcg/hr 72 hr patch   Commonly known as:  DURAGESIC           fentaNYL 75 mcg/hr 72 hr patch   Commonly known as:  DURAGESIC   Replaced by:  fentaNYL 100 mcg/hr 72 hr patch           gabapentin 600 MG tablet   Commonly known as:  NEURONTIN   Replaced by:  gabapentin 300 MG capsule                Where to get your medicines      Some of these will need a paper prescription and others can be bought over the counter. Ask your nurse if you have questions.     Bring a paper prescription for each of these medications     acetaminophen 325 MG tablet    baclofen 20 MG tablet    bisacodyl 10 MG Suppository    bisacodyl 10 MG Suppository    diazepam 5 MG tablet    docusate sodium 100 MG capsule    escitalopram 10 MG tablet    fentaNYL 100 mcg/hr 72 hr patch    gabapentin 300 MG capsule    ibuprofen 600 MG tablet    mirtazapine 15 MG tablet    multivitamin, therapeutic Tabs tablet    ondansetron 4 MG ODT tab    oxyCODONE 5 MG IR tablet    polyethylene glycol Packet    sennosides 8.6 MG tablet                Protect others around you: Learn how to safely use, store and throw away your medicines at www.disposemymeds.org.             Medication List: This is a list of all your medications and when to take them. Check marks below indicate your daily home schedule. Keep this list as a reference.      Medications           Morning Afternoon Evening Bedtime As Needed    acetaminophen 325 MG tablet   Commonly known as:  TYLENOL   Take 3 tablets (975 mg) by mouth every 8 hours   Last time this was given:  975 mg on 9/14/2017  3:44 AM                                baclofen 20 MG tablet   Commonly known as:  LIORESAL   Take 1 tablet (20 mg) by mouth 3 times daily   Last  time this was given:  20 mg on 9/14/2017  7:54 AM                                * bisacodyl 10 MG Suppository   Commonly known as:  DULCOLAX   Place 1 suppository (10 mg) rectally every 24 hours   Last time this was given:  10 mg on 9/13/2017 10:57 AM                                * bisacodyl 10 MG Suppository   Commonly known as:  DULCOLAX   Place 1 suppository (10 mg) rectally daily as needed for constipation   Last time this was given:  10 mg on 9/13/2017 10:57 AM                                diazepam 5 MG tablet   Commonly known as:  VALIUM   Take 1 tablet (5 mg) by mouth every 6 hours as needed for muscle spasms or pain   Last time this was given:  5 mg on 9/14/2017  9:10 AM                                docusate sodium 100 MG capsule   Commonly known as:  COLACE   Take 1 capsule (100 mg) by mouth 2 times daily   Last time this was given:  100 mg on 9/14/2017  7:54 AM                                escitalopram 10 MG tablet   Commonly known as:  LEXAPRO   Take 1 tablet (10 mg) by mouth daily   Last time this was given:  10 mg on 9/14/2017  7:54 AM                                fentaNYL 100 mcg/hr 72 hr patch   Commonly known as:  DURAGESIC   Place 1 patch onto the skin every 72 hours   Last time this was given:  1 patch on 9/13/2017 10:26 PM                                gabapentin 300 MG capsule   Commonly known as:  NEURONTIN   Take 3 capsules (900 mg) by mouth every 6 hours   Last time this was given:  900 mg on 9/14/2017  9:10 AM                                ibuprofen 600 MG tablet   Commonly known as:  ADVIL/MOTRIN   Take 1 tablet (600 mg) by mouth every 6 hours as needed for moderate pain                                mirtazapine 15 MG tablet   Commonly known as:  REMERON   Take 1 tablet (15 mg) by mouth At Bedtime   Last time this was given:  15 mg on 9/13/2017 10:07 PM                                multivitamin, therapeutic Tabs tablet   Take 1 tablet by mouth daily                                 ondansetron 4 MG ODT tab   Commonly known as:  ZOFRAN-ODT   Take 1 tablet (4 mg) by mouth every 6 hours as needed for nausea or vomiting   Last time this was given:  4 mg on 9/14/2017  4:16 AM                                order for DME   Equipment being ordered: Hospital Bed                                oxyCODONE 5 MG IR tablet   Commonly known as:  ROXICODONE   Take 1 tablet (5 mg) by mouth every 4 hours as needed (Pain)   Last time this was given:  5 mg on 9/14/2017  7:54 AM                                polyethylene glycol Packet   Commonly known as:  MIRALAX/GLYCOLAX   Take 17 g by mouth daily   Last time this was given:  17 g on 9/14/2017  7:54 AM                                sennosides 8.6 MG tablet   Commonly known as:  SENOKOT   Take 1-2 tablets by mouth every evening                                * Notice:  This list has 2 medication(s) that are the same as other medications prescribed for you. Read the directions carefully, and ask your doctor or other care provider to review them with you.              More Information        Chronic Pain  Pain serves an important role. It lets you know something is wrong that needs your attention. When the body heals, pain normally goes away.  When pain lasts longer than six months, it is called  chronic  pain. This is pain that is present even after the body has healed. Chronic pain can cause mood problems and get in the way of your relationships and your daily life.  A number of conditions can cause chronic pain. Some of the more common include:    Previous surgery    An old injury    Infection    Diseases such as diabetes    Nerve damage    Back injury    Arthritis    Migraine or other headaches    Fibromyalgia    Cancer  Depression and stress can make chronic pain symptoms worse. In some cases, a cause for the pain cannot be found.   Treatment  Treatment can greatly reduce pain. In many cases, pain can become less severe, occur less often, and interfere  less with your daily life. Chronic pain is often treated with a combination of medicines, therapies, and lifestyle changes. You will work closely with your healthcare provider to find a treatment plan that works best for you.    Ask your healthcare provider for a referral to a pain management specialty center. These can provide the most recent and proven pain management strategies, along with emotional support and comprehensive services.    Several different types of medicines may be prescribed for chronic pain. Work with your healthcare provider to develop a medicine plan that helps manage your pain.    Physical therapy can be very effective in helping reduce certain types of chronic pain.    Occupational therapy teaches you how to do routine tasks of daily living in ways that lessen your discomfort.    Psychological therapy can help you cope better with stress and pain.    Other therapies such as meditation, yoga, biofeedback, massage, and acupuncture can also help manage chronic pain.    Changing certain habits can help reduce chronic pain. They include:    Eating healthy    Developing an exercise routine    Getting enough sleep at night    Stopping smoking and limiting alcohol use    Losing excess weight  Follow-up care  Follow up with your healthcare provider as advised. Let your healthcare provider know if your current treatment plan is working or if changes are needed.  Resources  For more information, contact:    [employer] [place] for Head[employer] Society www.achenet.org    American Chronic Pain Association www.theacpa.org 055-741-8396  Date Last Reviewed: 7/26/2015 2000-2017 [employer]. [address] [place] [address], [place] [address]. All rights reserved. This information is not intended as a substitute for professional medical care. Always follow your healthcare professional's instructions.

## 2020-12-03 NOTE — PROGRESS NOTES
Occupational Therapy  Chart reviewed; new orders noted; Patient on caseload; will continue to treat per established tx plan.   Rei [facility] OTR/L

## 2020-12-03 NOTE — PROGRESS NOTES
Problem: Self Care Deficits Care Plan (Adult)  Goal: *Acute Goals and Plan of Care (Insert Text)  Description: Problem: Self Care Deficits Care Plan (Adult)  Goal: *Acute Goals and Plan of Care (Insert Text)  Outcome: Not Progressing Towards Goal  Note:   FUNCTIONAL STATUS PRIOR TO ADMISSION: CM reports that family reports that patient came from [facility] where he was functional at [facility] level. (Unknown time frame for when he was last functional at  level but this is marked  different status previously reported that he was bed bound from LTC. Need further clarification. Clothing from admission examined and shoes are worn with \"recent food\" in soles, as if he has worn them in functional manner in \"recent\" past. Clothing looks worn as opposed to bed bound/LTC D patient. HOME SUPPORT: see above     Occupational Therapy Goals  Initiated 12/2/2020  1. Patient will open eyes on command with supervision/set-up within 7 day(s). 2.  Patient will open mouth for oral care on command 50%  with minimal assistance/contact guard assist within 7 day(s). 3.  Patient will perform R hand grasp release on command 50% with minimal assistance/contact guard assist within 7 day(s). 4.  Patient will perform L hand grasp release on command 50% with minimal assistance/contact guard assist within 7 day(s). 5.  Patient will tolerate hand over hand face washing max A  within 7 day(s). 6.  Patient will participate in B upper extremity therapeutic exercise/activities with maximal assistance for 1 minutes within 7 day(s).     7.  Patient will tolerate PROM to cervical neutral x 2 minutes with max A in 7 days        12/3/2020 1614 by Carmelo Columbia  Outcome: Progressing Towards Goals   OCCUPATIONAL THERAPY TREATMENT  Patient: Germaine Malone (39 y.o. male)  Date: 12/3/2020  Diagnosis: Seizure ([facility] [address] [R56.9]  Altered mental status [R41.82]  Post-ictal state ([address] [R56.9]   <principal problem not specified>  Procedure(s) (LRB):  PERCUTANEOUS ENDOSCOPIC GASTROSTOMY TUBE INSERTION (N/A)  ESOPHAGOGASTRODUODENOSCOPY (EGD) (N/A) 3 Days Post-Op  Precautions: Bed Alarm, Fall, Aspiration, Skin(new PEG)  Chart, occupational therapy assessment, plan of care, and goals were reviewed. ASSESSMENT  Patient continues with skilled OT services and is slowly progressing towards goals. Did speak today which he did not do yesterday. Family training initiated with daughter regarding PROM, sensory stim, position, communication, oral care, cognition, theraputic use of touch and casual conversation with family members as tools to facilitate neuro re-ed in setting of profound dysfunction. Encouraged family to try to have as much 1:1 family daily as frequently as possible to provide stimulation to patient and to work closely with CM for effective discharge planning. Note ++ shiny edema B UEs. Current Level of Function Impacting Discharge (ADLs): D all aspects of care; did speak 1 word, a few times this session    Other factors to consider for discharge: would benefit from discharge to home with 24/7 physical family assist         PLAN :  Patient continues to benefit from skilled intervention to address the above impairments. Continue treatment per established plan of care. to address goals. Recommend with staff: repositioning for pressure relief, edema management B UEs    Recommend next OT session: faciliate positioning, cogntion, sensory stim with music, smells, temp etc.     Recommendation for discharge: (in order for the patient to meet his/her long term goals)  To be determined: 24/7 in the home with family and HH/family training or LTC if family unable to develop plan of 24/7 care/hired caregiver etc.     This discharge recommendation:  Has been made in collaboration with the attending provider and/or case management    IF patient discharges home will need the following DME: hospital bed, mechanical lift, and wheelchair       SUBJECTIVE:   Patient stated yes.  In response to \"can you work with us? \" with daughter present    OBJECTIVE DATA SUMMARY:   Cognitive/Behavioral Status:  Neurologic State: Drowsy; Eyes do not open to any stimulus(does speak occasionally, 25% intact 1 word answer; ~15 total)  Orientation Level: Oriented to person(did state yes; no; several unrecognizable sounds)  Cognition: Decreased command following;Decreased attention/concentration(increased time to initiate and to terminate tasks)  Perception: Cues to maintain midline in sitting; Tactile;Verbal(supported sitting at 40 degrees, loss of balance to L)  Perseveration: Perseverates during ADLS; Tactile cues provided;Verbal cues provided(needs increased time and tactile cues to follow stopcommands)  Safety/Judgement: Decreased awareness of environment;Decreased awareness of need for safety; Lack of insight into deficits(daughter present; training provided to increase sensory stim)    Functional Mobility and Transfers for ADLs:  Bed Mobility:  Rolling: Total assistance  Scooting: Total assistance    Transfers:      Not safe to attempt transfer       Balance:   Poor; loss of seated balance to L; posturing to L    ADL Intervention:  Feeding  Feeding Assistance: Total assistance (dependent)(PEG tube in place; family aware of need for >30 HOB)    Grooming  Grooming Assistance: Total assistance(dependent)(hand over Hand R UE use attempted but resistive in movements)                             Cognitive Retraining  Attention to Task: Single task;Distractibility  Maintains Attention For (Time): (5 seconds? ?? difficult to assess)  Safety/Judgement: Decreased awareness of environment;Decreased awareness of need for safety; Lack of insight into deficits(daughter present; training provided to increase sensory stim)      Pain:  Facial grimace noted with attempted to assist patient into neutral cervical position; repositioned; nsg aware of cervical discomfort    Activity Tolerance:   Poor and consistently poor ability to participate ; did not open eyes    After treatment patient left in no apparent distress:   Supine in bed, Heels elevated for pressure relief, Call bell within reach, Caregiver / family present, Side rails x 3, and head of bed 40 degrees w/tube feed in place    COMMUNICATION/COLLABORATION:   The patients plan of care was discussed with: Physical therapist, Registered nurse, and Case management.      Karina Santiago OTR/L  Time Calculation: 53 mins

## 2020-12-03 NOTE — PROGRESS NOTES
ARIELLE Plan:     * TBD - [facility] ([address]) with IV infusion for TF, HH, & f/u apts      > Pt will need COVID-19 test completed within 72 hour window of d/c back to [facility]   > Palliative consulted 11/23/2020 to discuss goals of care  > CM to secure medical transport for d/c  > 2nd IM prior to d/c    Addendum 3:45 PM: CM met with pt, pt's daughter, PT, & OT at bedside to observe therapy session and discuss disposition moving forward. Pt's daughter observed PT & OT's multiple attempts to engage pt in session with no success. At this point in time, pt is not following commands, nor is he able to functionally participate in skilled interventions. Although pt's daughter verbalized \"he's totally out of it - he's not doing anything,\" she still appears to struggle with the reality of his current functional status. In addition, she expressed her desire for PT/OT/SLP to \"spend more time\" trying to engage pt in therapy sessions, even if he's not able to purposefully participate. Both PT & OT explained the capacity of their roles within the acute care setting and reiterated that they will continue attempting to engage pt in sessions. CM underlined the difference between an acute hospital vs an acute rehab setting; daughter verbalized understanding. CM, PT, and OT explored several different interventions for d/c and the feasibility of each: SNF, LTC, and HH with 24/7 supervision/assistance. Pt's daughter remains hopeful that pt will be able to return to his [facility] with supplemental support/assistance provided by staff. Pt's daughter reported that she's going to talk to the staff at [address] [place] [address] to determine if they have the capacity to manage pt's care at his current level of function. Pt's daughter reported that she will also talk with her brothers to determine if pt is able to d/c to one of their residences with [facility], IV infusion for TF, 24/7 supervision/assistance, and DME.  CM discussed utilizing Medicaid for LTC assistance/support in the home; daughter reported pt will not qualify for Medicaid coverage due to being over-income. Pt's daughter made it very clear she is not ready to discuss hospice intervention at this point in time. Both PT & OT urged pt's daughter to visit as much as possible to assist in providing stimulation at bedside re: encouraging pt to open his eyes, communicate, move his hands/feet, etc.    CM will continue to follow & remain accessible for further coordination of care. Pt's daughter urged to contact CM with any updates, questions, concerns, or needs related to the d/c plan; daughter verbalized understanding. Initial note: CM reviewed pt's chart prior to moving forward with d/c planning. CM consulted with PTLUIS and OT HANNAH Kirkland regarding daughter's (Jared Howard: [phone]) request to observe therapy session today; both PT and OT confirmed they will be available to complete request at 2:00 PM. CM contacted pt's daughter to confirm meeting at 2:00 PM; daughter confirmed and reported she would contact CM upon her arrival to [facility].      Ashley Brennan, MSW  Care Manager, [facility]

## 2020-12-03 NOTE — PROGRESS NOTES
End of Shift Note     Bedside shift change report given to Neil Vega RN  (oncoming nurse) by Kaylie Corbett RN (offgoing nurse). Report included the following information SBAR, Kardex, MAR and Recent Results     Shift worked:  7am -7pm      Shift summary and any significant changes:     Tube Feedings advanced to 40 mL    @ 10 AM 12/3 TF advanced to 50 mL      Concerns for physician to address: None      Zone phone for oncoming shift:            Activity:  Activity Level: Bed Rest  Number times ambulated in hallways past shift: 0  Number of times OOB to chair past shift: 0     Cardiac:   Cardiac Monitoring: Yes      Cardiac Rhythm: Paced     Access:   Current line(s): PIV      Genitourinary:   Urinary status: external catheter     Respiratory:   O2 Device: Room air  Chronic home O2 use?: NO  Incentive spirometer at bedside: N/A  GI:  Last Bowel Movement Date: (unknown)  Current diet:  DIET NPO  DIET TUBE FEEDING The head of the bed should be kept elevated to 30 degrees during tube feeds, and the tube should be flushed with 30 mL of water every 8 hours and after giving medications through the tube. Monitor the PEG site for bleeding and i... DIET ONE TIME MESSAGE  Passing flatus: YES  Tolerating current diet: YES     Pain Management:   Patient states pain is manageable on current regimen: N/A     Skin:  Torito Score: 11  Interventions: turn team, float heels, foam dressing and internal/external urinary devices    Patient Safety:  Fall Score: Total Score: 4  Interventions: bed/chair alarm  High Fall Risk:  Yes     Length of Stay:  Expected LOS: 4d 7h  Actual LOS: 13

## 2020-12-03 NOTE — PROGRESS NOTES
Problem: Mobility Impaired (Adult and Pediatric)  Goal: *Acute Goals and Plan of Care (Insert Text)  Description: FUNCTIONAL STATUS PRIOR TO ADMISSION: Patient was modified independent using a rollator for functional mobility. HOME SUPPORT PRIOR TO ADMISSION: Lived at [facility] (small group home setting). Physical Therapy Goals  Initiated 12/3/2020  1. Patient will move from supine to sit and sit to supine  in bed with maximal assistance within 7 day(s). 2.  Patient will tolerate chair position for 10 minutes and maintain fairly upright position within 7 day(s). 3. Patient will be able to maintain eyes open and wiggle toes on command within 7 day(s). Outcome: Not Met    PHYSICAL THERAPY EVALUATION  Patient: Bertha Lockwood (14 y.o. male)  Date: 12/3/2020  Primary Diagnosis: Seizure ([address] [R56.9]  Altered mental status [R41.82]  Post-ictal state ([address] [R56.9]  Procedure(s) (LRB):  PERCUTANEOUS ENDOSCOPIC GASTROSTOMY TUBE INSERTION (N/A)  ESOPHAGOGASTRODUODENOSCOPY (EGD) (N/A) 3 Days Post-Op   Precautions:   Bed Alarm, Fall, Aspiration, Skin(new PEG)      ASSESSMENT  Based on the objective data described below, the patient presents with no or extremely inconsistent command following, increased rigidity/tone, resistive movement, and overall poor functional mobility. Pt was received in supine and cleared by nursing to mobilize. OT/PT and CM present during session with daughter at bedside. Attempted to educate daughter on the basis of \"skilled therapy\" and the ability of pt in order to be engaged/participatory. Pt maintained eyes closed during entire session. Pt did verbalize at times but often was not toward any verbal responses. Due to command following pt is total A for all mobility and positioning at this time. Extremely unsafe to mobilize to EOB due to poor alertness. Pt with significant lateral cervical flexion to the L and leaning to the L.  Positioned in midline with pillows placed to maintain upright position. Tube feed was held during session. At the end of the session [facility] positioned at 40 degrees of elevated and tube feed restarted. At this point pt is not appropriate for skillable rehab, will continue to reassess for changes/improvements. Current Level of Function Impacting Discharge (mobility/balance): total    Functional Outcome Measure: The patient scored Total: 0/100 on the Barthel Index which is indicative of 100% impaired ability to care for basic self needs/dependency on others. Other factors to consider for discharge: lethargic, no command following     Patient will benefit from skilled therapy intervention to address the above noted impairments. PLAN :  Recommendations and Planned Interventions: therapeutic exercises, patient and family training/education, and therapeutic activities      Frequency/Duration: Patient will be followed by physical therapy:  2 times a week to address goals. Recommendation for discharge: (in order for the patient to meet his/her long term goals)  To be determined: base on progress during acute hospital stay    This discharge recommendation:  Has been made in collaboration with the attending provider and/or case management    IF patient discharges home will need the following DME: hospital bed         SUBJECTIVE:   Patient stated Micah Dance.  perseveratively     OBJECTIVE DATA SUMMARY:   HISTORY:    Past Medical History:   Diagnosis Date    Cancer ([facility] [address]     prostate    Constipation     Gout     Hyperlipemia     Hypertension     Pacemaker 2019    Stroke ([facility] [address]     Thrombocytopenia ([facility] [address] 3/19/2019    TIA (transient ischemic attack) 2013     Past Surgical History:   Procedure Laterality Date    HX PROSTATECTOMY      HX UROLOGICAL      prostate removed    PLACE PERCUT GASTROSTOMY TUBE  11/30/2020         TN INS NEW/RPLCMT PRM PACEMAKR W/TRANS ELTRD ATRIAL N/A 10/18/2019    Insert Ppm Single Atrial performed by Gina Buchanan MD at [place] [address], [facility] [address] CATH LAB    TN INS NEW/RPLCMT PRM PM W/TRANSV ELTRD ATRIAL&VENT Right 3/22/2019    INSERT PPM DUAL performed by Nikko Cleveland MD at [address], [facility]  CATH LAB       Personal factors and/or comorbidities impacting plan of care:     Home Situation  Home Environment: Assisted living  One/Two Story Residence: One story  Living Alone: No  Support Systems: Assisted living  Patient Expects to be Discharged to[de-identified] Unknown(MD recommending LTC due to severity of functional change)  Current DME Used/Available at Home: simon Westfall(used PTA per family)  Tub or Shower Type: (unknown)    EXAMINATION/PRESENTATION/DECISION MAKING:   Critical Behavior:  Neurologic State: Sleeping  Orientation Level: Unable to verbalize  Cognition: Unable to assess (comment)  Safety/Judgement: Decreased awareness of environment, Lack of insight into deficits  Hearing: Auditory  Auditory Impairment: Hard of hearing, left side    Edema: UEs  Functional Mobility:  Bed Mobility:  Rolling: Total assistance        Scooting: Total assistance  Transfers:      Unsafe to attempt                     Functional Measure:  Barthel Index:    Bathin  Bladder: 0  Bowels: 0  Groomin  Dressin  Feedin  Mobility: 0  Stairs: 0  Toilet Use: 0  Transfer (Bed to Chair and Back): 0  Total: 0/100       The Barthel ADL Index: Guidelines  1. The index should be used as a record of what a patient does, not as a record of what a patient could do. 2. The main aim is to establish degree of independence from any help, physical or verbal, however minor and for whatever reason. 3. The need for supervision renders the patient not independent. 4. A patient's performance should be established using the best available evidence. Asking the patient, friends/relatives and nurses are the usual sources, but direct observation and common sense are also important. However direct testing is not needed.   5. Usually the patient's performance over the preceding 24-48 hours is important, but occasionally longer periods will be relevant. 6. Middle categories imply that the patient supplies over 50 per cent of the effort. 7. Use of aids to be independent is allowed. Kimberly Mcclure., Barthel, D.W. (9316). Functional evaluation: the Barthel Index. [facility] (14)2. GRECIA Lovell, Marisol Yoon., Kushal Garcia., Sarahi Paredes, [facility] (1999). Measuring the change indisability after inpatient rehabilitation; comparison of the responsiveness of the Barthel Index and Functional [place] Measure. Journal of Neurology, Neurosurgery, and Psychiatry, 66(4), 267-571. Louann Donnelly, N.J.A, IKER Contreras, & Vinnie Catherine MPANTERA. (2004.) Assessment of post-stroke quality of life in cost-effectiveness studies: The usefulness of the Barthel Index and the EuroQoL-5D. Quality of Life Research, 15, 471-05        Physical Therapy Evaluation Charge Determination   History Examination Presentation Decision-Making   HIGH Complexity :3+ comorbidities / personal factors will impact the outcome/ POC  HIGH Complexity : 4+ Standardized tests and measures addressing body structure, function, activity limitation and / or participation in recreation  HIGH Complexity : Unstable and unpredictable characteristics  Other outcome measures barthel  HIGH       Based on the above components, the patient evaluation is determined to be of the following complexity level: HIGH         Activity Tolerance:   Poor    After treatment patient left in no apparent distress:   Supine in bed and Call bell within reach    COMMUNICATION/EDUCATION:   The patients plan of care was discussed with: Occupational therapist, Registered nurse, and Case management. Patient is unable to participate in goal setting and plan of care.     Thank you for this referral.  Snow Avery, PT, DPT   Time Calculation: 48 mins

## 2020-12-03 NOTE — PROGRESS NOTES
Problem: Dysphagia (Adult)  Goal: *Acute Goals and Plan of Care (Insert Text)  Description: Speech pathology goals  Initiated 11/27/2020  1. Patient will participate in re-evaluation of swallow function within 7 days  12/3/2020 1258 by MOLLY Meza  Outcome: Not Progressing Towards Goal.  SPEECH LANGUAGE PATHOLOGY DYSPHAGIA TREATMENT: WEEKLY REASSESSMENT  Patient: Delmy Mccurdy (80 y.o. male)  Date: 12/3/2020  Diagnosis: Seizure ([facility] [address] [R56.9]  Altered mental status [R41.82]  Post-ictal state ([address] [R56.9]   <principal problem not specified>  Procedure(s) (LRB):  PERCUTANEOUS ENDOSCOPIC GASTROSTOMY TUBE INSERTION (N/A)  ESOPHAGOGASTRODUODENOSCOPY (EGD) (N/A) 3 Days Post-Op  Precautions: Bed Alarm, Fall, Aspiration, Skin(new PEG)    ASSESSMENT:  Patient sleeping on arrival in fetal position. Patient pulled up and repositioned in bed. Patient hollering out but never opened eyes. Attempted oral care on patient but he kept lips pursed and kept turning head away. Patient presented with ice chips and applesauce to lips via spoon. Patient with adamant refusal with purposeful head turning. He grabbed the spoon out of my hand to refuse. He made no attempt to bring it to his mouth himself. Patient's progression toward goals since last assessment: patient with no functional gains since admission on 11/17. SLP has tried numerous times; however, patient too lethargic and unarousable or alert with very purposeful refusal of all PO. Patient tolerating TF via peg. He does not follow commands and is unable to functionally participate. We have not been able to assess oral or pharyngeal dysphagia due to absent acceptance. PLAN:  Goals have been updated based on progression since last assessment. Patient continues to benefit from skilled intervention to address the above impairments. Continue to follow the patient 1 time a week to address goals.   Recommendations and Planned Interventions:  -- npo with tf via peg  Discharge Recommendations: None     SUBJECTIVE:   Patient stated hot dogs! OBJECTIVE:   Cognitive and Communication Status:  Neurologic State: Sleeping  Orientation Level: Unable to verbalize  Cognition: Unable to assess (comment)  Perception: Tactile, Verbal, Visual, Cues to maintain midline in sitting(cues provided but he did not respond w/eyes open)  Perseveration: Perseverates during ADLS, Tactile cues provided, Verbal cues provided(tactile cues )  Safety/Judgement: Decreased awareness of environment, Lack of insight into deficits  Dysphagia Treatment:    P.O. Trials:  Patient Position: (up in bed)  Vocal quality prior to P.O.: No impairment  Consistency Presented: Ice chips;Puree  How Presented: SLP-fed/presented;Spoon     Bolus Acceptance: Absent          Pain:  Pain Scale 1: Adult Nonverbal Pain Scale          After treatment patient left in no apparent distress:   Patient left in no apparent distress in bed, Call bell within reach, Nursing notified, and Bed / chair alarm activated    COMMUNICATION/EDUCATION:     The patients plan of care including recommendations, planned interventions, and recommended diet changes were discussed with: Registered nurse.      Shailesh Linton SLP  Time Calculation: 15 mins

## 2020-12-04 LAB
BACTERIA SPEC CULT: ABNORMAL
CC UR VC: ABNORMAL
GLUCOSE BLD STRIP.AUTO-MCNC: 108 MG/DL (ref 65–100)
GLUCOSE BLD STRIP.AUTO-MCNC: 167 MG/DL (ref 65–100)
GLUCOSE BLD STRIP.AUTO-MCNC: 71 MG/DL (ref 65–100)
GLUCOSE BLD STRIP.AUTO-MCNC: 88 MG/DL (ref 65–100)
MAGNESIUM SERPL-MCNC: 2.2 MG/DL (ref 1.6–2.4)
PHOSPHATE SERPL-MCNC: 2.1 MG/DL (ref 2.6–4.7)
SERVICE CMNT-IMP: ABNORMAL
SERVICE CMNT-IMP: NORMAL
SERVICE CMNT-IMP: NORMAL

## 2020-12-04 PROCEDURE — 84100 ASSAY OF PHOSPHORUS: CPT

## 2020-12-04 PROCEDURE — 36415 COLL VENOUS BLD VENIPUNCTURE: CPT

## 2020-12-04 PROCEDURE — 82962 GLUCOSE BLOOD TEST: CPT

## 2020-12-04 PROCEDURE — 74011000258 HC RX REV CODE- 258: Performed by: INTERNAL MEDICINE

## 2020-12-04 PROCEDURE — 74011250637 HC RX REV CODE- 250/637: Performed by: INTERNAL MEDICINE

## 2020-12-04 PROCEDURE — 65660000000 HC RM CCU STEPDOWN

## 2020-12-04 PROCEDURE — 74011250636 HC RX REV CODE- 250/636: Performed by: INTERNAL MEDICINE

## 2020-12-04 PROCEDURE — 97530 THERAPEUTIC ACTIVITIES: CPT

## 2020-12-04 PROCEDURE — 74011000258 HC RX REV CODE- 258: Performed by: PSYCHIATRY & NEUROLOGY

## 2020-12-04 PROCEDURE — 74011250636 HC RX REV CODE- 250/636: Performed by: PSYCHIATRY & NEUROLOGY

## 2020-12-04 PROCEDURE — 74011636637 HC RX REV CODE- 636/637: Performed by: INTERNAL MEDICINE

## 2020-12-04 PROCEDURE — 83735 ASSAY OF MAGNESIUM: CPT

## 2020-12-04 PROCEDURE — 2709999900 HC NON-CHARGEABLE SUPPLY

## 2020-12-04 RX ADMIN — METOPROLOL TARTRATE 12.5 MG: 25 TABLET, FILM COATED ORAL at 08:30

## 2020-12-04 RX ADMIN — AMLODIPINE BESYLATE 10 MG: 5 TABLET ORAL at 08:29

## 2020-12-04 RX ADMIN — HYDRALAZINE HYDROCHLORIDE 100 MG: 25 TABLET, FILM COATED ORAL at 08:29

## 2020-12-04 RX ADMIN — ENOXAPARIN SODIUM 40 MG: 40 INJECTION SUBCUTANEOUS at 08:29

## 2020-12-04 RX ADMIN — FAMOTIDINE 20 MG: 20 TABLET, FILM COATED ORAL at 08:29

## 2020-12-04 RX ADMIN — SODIUM CHLORIDE 75 ML/HR: 900 INJECTION, SOLUTION INTRAVENOUS at 11:20

## 2020-12-04 RX ADMIN — Medication 10 ML: at 15:45

## 2020-12-04 RX ADMIN — HYDRALAZINE HYDROCHLORIDE 100 MG: 25 TABLET, FILM COATED ORAL at 15:50

## 2020-12-04 RX ADMIN — ATORVASTATIN CALCIUM 40 MG: 40 TABLET, FILM COATED ORAL at 22:26

## 2020-12-04 RX ADMIN — LEVETIRACETAM 750 MG: 100 INJECTION, SOLUTION INTRAVENOUS at 15:44

## 2020-12-04 RX ADMIN — CEFTRIAXONE 1 G: 1 INJECTION, POWDER, FOR SOLUTION INTRAMUSCULAR; INTRAVENOUS at 12:11

## 2020-12-04 RX ADMIN — DIBASIC SODIUM PHOSPHATE, MONOBASIC POTASSIUM PHOSPHATE AND MONOBASIC SODIUM PHOSPHATE 1 TABLET: 852; 155; 130 TABLET ORAL at 22:26

## 2020-12-04 RX ADMIN — METOPROLOL TARTRATE 12.5 MG: 25 TABLET, FILM COATED ORAL at 19:23

## 2020-12-04 RX ADMIN — Medication 10 ML: at 06:10

## 2020-12-04 RX ADMIN — ASPIRIN 81 MG CHEWABLE TABLET 81 MG: 81 TABLET CHEWABLE at 08:29

## 2020-12-04 RX ADMIN — INSULIN LISPRO 2 UNITS: 100 INJECTION, SOLUTION INTRAVENOUS; SUBCUTANEOUS at 06:07

## 2020-12-04 RX ADMIN — LEVETIRACETAM 750 MG: 100 INJECTION, SOLUTION INTRAVENOUS at 06:34

## 2020-12-04 RX ADMIN — HYDRALAZINE HYDROCHLORIDE 100 MG: 25 TABLET, FILM COATED ORAL at 22:26

## 2020-12-04 RX ADMIN — DIBASIC SODIUM PHOSPHATE, MONOBASIC POTASSIUM PHOSPHATE AND MONOBASIC SODIUM PHOSPHATE 1 TABLET: 852; 155; 130 TABLET ORAL at 11:21

## 2020-12-04 NOTE — PROGRESS NOTES
Bedside shift change report given to Leydi Chauhan RN (oncoming nurse) by Angella Bee RN (offgoing nurse). Report included the following information SBAR, Kardex, Intake/Output, MAR and Recent Results.

## 2020-12-04 NOTE — PROGRESS NOTES
Problem: Mobility Impaired (Adult and Pediatric)  Goal: *Acute Goals and Plan of Care (Insert Text)  Description: FUNCTIONAL STATUS PRIOR TO ADMISSION: Patient was modified independent using a rollator for functional mobility. HOME SUPPORT PRIOR TO ADMISSION: Lived at [facility] (small group home setting). Physical Therapy Goals  Initiated 12/3/2020  1. Patient will move from supine to sit and sit to supine  in bed with maximal assistance within 7 day(s). 2.  Patient will tolerate chair position for 10 minutes and maintain fairly upright position within 7 day(s). 3. Patient will be able to maintain eyes open and wiggle toes on command within 7 day(s). Outcome: Progressing Towards Goal    PHYSICAL THERAPY TREATMENT  Patient: Aldon Angelucci (42 y.o. male)  Date: 12/4/2020  Diagnosis: Seizure ([address] [R56.9]  Altered mental status [R41.82]  Post-ictal state ([address] [R56.9]   <principal problem not specified>  Procedure(s) (LRB):  PERCUTANEOUS ENDOSCOPIC GASTROSTOMY TUBE INSERTION (N/A)  ESOPHAGOGASTRODUODENOSCOPY (EGD) (N/A) 4 Days Post-Op  Precautions: Bed Alarm, Fall, Aspiration, Skin(new PEG)  Chart, physical therapy assessment, plan of care and goals were reviewed. ASSESSMENT  Patient continues with skilled PT services and is progressing towards goals. Pt was received in supine and cleared by nursing to mobilize. He continues to be in the same lethargic state. Worked on mobilizing upper and lower extremities. Pt with significant amount of tone in all extremities. Pt grimaced during any type of mobility and not active engagement. He was repositioned and towel rolls were placed for positioning and to prevent skin breakdown. Current Level of Function Impacting Discharge (mobility/balance): total    Other factors to consider for discharge:          PLAN :  Patient continues to benefit from skilled intervention to address the above impairments. Continue treatment per established plan of care.   to address goals. Recommendation for discharge: (in order for the patient to meet his/her long term goals)  Therapy up to 5 days/week in SNF setting with transition to LTC    This discharge recommendation:  Has been made in collaboration with the attending provider and/or case management    IF patient discharges home will need the following DME: hospital bed       SUBJECTIVE:   Patient stated no no no no.  when placing towel roll in hand to stretch fingers and prevent nails from digging into palm    OBJECTIVE DATA SUMMARY:   Critical Behavior:  Neurologic State: Drowsy, Eyes open to stimulus  Orientation Level: Oriented to person  Cognition: Decreased command following, Impaired decision making  Safety/Judgement: Decreased awareness of environment, Decreased awareness of need for safety, Lack of insight into deficits(daughter present; training provided to increase sensory stim)  Functional Mobility Training:  Bed Mobility:  Rolling: Total assistance        Scooting: Total assistance       Activity Tolerance:   Poor    After treatment patient left in no apparent distress:   Supine in bed and Call bell within reach    COMMUNICATION/COLLABORATION:   The patients plan of care was discussed with: Registered nurse.      Delroy Khan PT, DPT   Time Calculation: 23 mins

## 2020-12-04 NOTE — PROGRESS NOTES
Hospitalist Progress Note    NAME: Priyank Augustin   :  3/9/1933   MRN:  [id number]       Assessment / Plan:  New onset seizures, POA, recurrent episodes   Acute encephalopathy, likely postictal, POA vs worsening dementia   History of multiple CVAs   Patient had a 5-minute seizure at nursing home, seized again in the ER and was given Ativan.  status post Keppra loading dose  -Was on Keppra 1 g checks twice daily but dose was decreased to 750 mg twice daily due to sedation  EEG  On admission showed no seizure , repeat EEG  Showed a potential seizure focus on the R hemisphere   MRI ordered unable to be done because of pt being confsued+ pacemaker , unable to verbalize if issues with pacemaker arise while doing MRI   Repeat CT brain unchanged   Seizure precautions  Ativan as needed for seizures and agitation  TSH wnl , B12 wnl , folate wnl , RPR neg , CBC, CMP wnl , and blood cultures NTD    -Dr. James Ayoub who recommended that we will wait 1 to 2 days to see if his mental status would get better with medication adjustment and if not better, he suggested hospice  -Continue Keppra at reduced dose of 750 mg twice daily and continue to monitor for seizures    UTI on : proteus mirabilis   UA + started on ceftriaxone   Fu uribne cx  Showed proteus      HTN   C/w BP meds     History of CVA  Continue aspirin suppository.    SOULEYMANE on CKD resolved   -Creatinine on  was 1. 14.       Hypokalemia resolved      Sinus bradycardia with 2-1 block: POA  Status post pacemaker  Was able to have MRI with [employer] rep present and MRI in July at [address] [place] [address] to get MRI brain here      Debility  Patient was able to walk with a walker  Has moderate dementia, but was able to hold conversation with family  Resides in a nursing home     Dysphagia due to acute metabolic encephalopathy  S/p PEG tube placement on   Feeding tube  Started today per diet recs   · Per diet recs : Once PEG placed, recommend Jevity 1.5 @ 20 mL/hr x 24 hours + 150 mL water flushes q 4 hours  · If tolerating TF x 24 hours, advance TF by 10 mL q 12 hours to goal rate of 50 mL/hr + 150 mL water flushes q 4 hours (1800 kcal, 76.5g protein, 1812 mL water)  Monitor K+, Mg, Phos daily and replete as needed. Monitor for refeeding sd   Tolerating TF , at goal now     Right arm swelling  -US doppler shows no DVT. Keep rt arm elevated and apply warm compresses.      NSVT  k wnl    Magnesium is normal.  Updated daughter on 12/01   PT/OT unable to assess patient   dispo pending , daughter to decide on home with [facility] VS long term VS back to assisted living with hospice     25.0 - 29.9 Overweight / Body mass index is 26.44 kg/m². Code status: Full  Prophylaxis: Lovenox  Recommended Disposition: SNF/LTC     Subjective:     Chief Complaint / Reason for Physician Visit  FU AMS/dysphagia . Discussed with RN events overnight. Review of Systems:  Symptom Y/N Comments  Symptom Y/N Comments   Fever/Chills    Chest Pain     Poor Appetite    Edema     Cough    Abdominal Pain     Sputum    Joint Pain     SOB/CARREON    Pruritis/Rash     Nausea/vomit    Tolerating PT/OT     Diarrhea    Tolerating Diet     Constipation    Other       Could NOT obtain due to: Confused      Objective:     VITALS:   Last 24hrs VS reviewed since prior progress note.  Most recent are:  Patient Vitals for the past 24 hrs:   Temp Pulse Resp BP SpO2   12/04/20 0819 98.7 °F (37.1 °C) 62 16 139/67 99 %   12/04/20 0539 96.8 °F (36 °C) 72 17 (!) 141/47 99 %   12/03/20 2302 98.1 °F (36.7 °C) 71 16 (!) 117/58 100 %   12/03/20 1838  77  (!) 150/68    12/03/20 1644 98.5 °F (36.9 °C) 79 20 (!) 155/72 95 %   12/03/20 1208 98.5 °F (36.9 °C) 70 18 (!) 125/56 100 %       Intake/Output Summary (Last 24 hours) at 12/4/2020 0908  Last data filed at 12/4/2020 6715  Gross per 24 hour   Intake 1954 ml   Output 875 ml   Net 1079 ml        I had a face to face encounter and independently examined this patient on 12/4/2020, as outlined below:  PHYSICAL EXAM:  General: WD, WN. Alert, cooperative, no acute distress    EENT:  EOMI. Anicteric sclerae. MMM  Resp:  CTA bilaterally, no wheezing or rales. No accessory muscle use  CV:  Regular  rhythm,  No edema  GI:  Soft, Non distended, Non tender. +Bowel sounds  Neurologic:  Confused   Psych:    Not anxious nor agitated  Skin:  No rashes. No jaundice    Reviewed most current lab test results and cultures  YES  Reviewed most current radiology test results   YES  Review and summation of old records today    NO  Reviewed patient's current orders and MAR    YES  PMH/SH reviewed - no change compared to H&P  ________________________________________________________________________  Care Plan discussed with:    Comments   Patient     Family      RN x    Care Manager     Consultant                       x Multidiciplinary team rounds were held today with , nursing, pharmacist and clinical coordinator. Patient's plan of care was discussed; medications were reviewed and discharge planning was addressed. ________________________________________________________________________  Total NON critical care TIME: 35  Minutes    Total CRITICAL CARE TIME Spent:   Minutes non procedure based      Comments   >50% of visit spent in counseling and coordination of care     ________________________________________________________________________  Annabel Tubbs MD     Procedures: see electronic medical records for all procedures/Xrays and details which were not copied into this note but were reviewed prior to creation of Plan. LABS:  I reviewed today's most current labs and imaging studies. Pertinent labs include:  No results for input(s): WBC, HGB, HCT, PLT, HGBEXT, HCTEXT, PLTEXT, HGBEXT, HCTEXT, PLTEXT in the last 72 hours.   Recent Labs     12/04/20  0528 12/03/20  0253 12/02/20  0054   NA  --   --  137   K  --   --  3.6   CL  --   --  107   CO2  -- --  25   GLU  --   --  99   BUN  --   --  32*   CREA  --   --  1.19   CA  --   --  8.9   MG 2.2 2.3 2.5*   PHOS 2.1* 2.3* 2.7   ALB  --   --  2.2*   TBILI  --   --  0.4   ALT  --   --  13       Signed: Jordin Garcia MD

## 2020-12-04 NOTE — ROUTINE PROCESS
End of Shift Note    Bedside shift change report given to [address] (oncoming nurse) by Henry Oppenheim (offgoing nurse). Report included the following information SBAR, Kardex, MAR and Recent Results    Shift worked:  days     Shift summary and any significant changes:    none     Concerns for physician to address: None     Zone phone for oncoming shift:  [phone]       Activity:  Activity Level: Bed Rest  Number times ambulated in hallways past shift: 0  Number of times OOB to chair past shift: 0    Cardiac:   Cardiac Monitoring: No      Cardiac Rhythm: Paced    Access:   Current line(s): PIV     Genitourinary:   Urinary status: external catheter    Respiratory:   O2 Device: Room air  Chronic home O2 use?: NO  Incentive spirometer at bedside: NO     GI:  Last Bowel Movement Date: 12/03/20  Current diet:  DIET NPO  DIET TUBE FEEDING  Passing flatus: YES  Tolerating current diet: YES       Pain Management:   Patient states pain is manageable on current regimen: YES    Skin:  Torito Score: 14  Interventions: turn team, float heels and nutritional support     Patient Safety:  Fall Score:  Total Score: 4  Interventions: bed/chair alarm  High Fall Risk: Yes    Length of Stay:  Expected LOS: 4d 7h  Actual LOS: [address] [facility]

## 2020-12-04 NOTE — PROGRESS NOTES
Speech Pathology:  Attempted to see patient for dysphagia treatment. Patient with eyes closed for duration. Patient did not follow commands or respond verbally. Patient pulling away in refusal to oral care, ice chips and thin liquid trials. Dysphagia treatment deferred.      Dell Arias, SLP

## 2020-12-05 LAB
ALBUMIN SERPL-MCNC: 2 G/DL (ref 3.5–5)
ALBUMIN/GLOB SERPL: 0.4 {RATIO} (ref 1.1–2.2)
ALP SERPL-CCNC: 88 U/L (ref 45–117)
ALT SERPL-CCNC: 19 U/L (ref 12–78)
ANION GAP SERPL CALC-SCNC: 3 MMOL/L (ref 5–15)
AST SERPL-CCNC: 19 U/L (ref 15–37)
BILIRUB SERPL-MCNC: 0.7 MG/DL (ref 0.2–1)
BUN SERPL-MCNC: 18 MG/DL (ref 6–20)
BUN/CREAT SERPL: 19 (ref 12–20)
CALCIUM SERPL-MCNC: 8.5 MG/DL (ref 8.5–10.1)
CHLORIDE SERPL-SCNC: 105 MMOL/L (ref 97–108)
CO2 SERPL-SCNC: 27 MMOL/L (ref 21–32)
CREAT SERPL-MCNC: 0.93 MG/DL (ref 0.7–1.3)
GLOBULIN SER CALC-MCNC: 4.5 G/DL (ref 2–4)
GLUCOSE BLD STRIP.AUTO-MCNC: 110 MG/DL (ref 65–100)
GLUCOSE BLD STRIP.AUTO-MCNC: 128 MG/DL (ref 65–100)
GLUCOSE BLD STRIP.AUTO-MCNC: 134 MG/DL (ref 65–100)
GLUCOSE BLD STRIP.AUTO-MCNC: 138 MG/DL (ref 65–100)
GLUCOSE BLD STRIP.AUTO-MCNC: 87 MG/DL (ref 65–100)
GLUCOSE SERPL-MCNC: 129 MG/DL (ref 65–100)
MAGNESIUM SERPL-MCNC: 2.2 MG/DL (ref 1.6–2.4)
PHOSPHATE SERPL-MCNC: 2.3 MG/DL (ref 2.6–4.7)
POTASSIUM SERPL-SCNC: 3.9 MMOL/L (ref 3.5–5.1)
PROT SERPL-MCNC: 6.5 G/DL (ref 6.4–8.2)
SERVICE CMNT-IMP: ABNORMAL
SERVICE CMNT-IMP: NORMAL
SODIUM SERPL-SCNC: 135 MMOL/L (ref 136–145)

## 2020-12-05 PROCEDURE — 36415 COLL VENOUS BLD VENIPUNCTURE: CPT

## 2020-12-05 PROCEDURE — 83735 ASSAY OF MAGNESIUM: CPT

## 2020-12-05 PROCEDURE — 74011250637 HC RX REV CODE- 250/637: Performed by: INTERNAL MEDICINE

## 2020-12-05 PROCEDURE — 77030018798 HC PMP KT ENTRL FED COVD -A

## 2020-12-05 PROCEDURE — 74011250636 HC RX REV CODE- 250/636: Performed by: INTERNAL MEDICINE

## 2020-12-05 PROCEDURE — 74011250636 HC RX REV CODE- 250/636: Performed by: PSYCHIATRY & NEUROLOGY

## 2020-12-05 PROCEDURE — 84100 ASSAY OF PHOSPHORUS: CPT

## 2020-12-05 PROCEDURE — 74011000258 HC RX REV CODE- 258: Performed by: INTERNAL MEDICINE

## 2020-12-05 PROCEDURE — 80053 COMPREHEN METABOLIC PANEL: CPT

## 2020-12-05 PROCEDURE — 82962 GLUCOSE BLOOD TEST: CPT

## 2020-12-05 PROCEDURE — 74011000258 HC RX REV CODE- 258: Performed by: PSYCHIATRY & NEUROLOGY

## 2020-12-05 PROCEDURE — 65660000000 HC RM CCU STEPDOWN

## 2020-12-05 PROCEDURE — 94760 N-INVAS EAR/PLS OXIMETRY 1: CPT

## 2020-12-05 RX ADMIN — METOPROLOL TARTRATE 12.5 MG: 25 TABLET, FILM COATED ORAL at 17:38

## 2020-12-05 RX ADMIN — AMLODIPINE BESYLATE 10 MG: 5 TABLET ORAL at 08:19

## 2020-12-05 RX ADMIN — HYDRALAZINE HYDROCHLORIDE 100 MG: 25 TABLET, FILM COATED ORAL at 17:27

## 2020-12-05 RX ADMIN — METOPROLOL TARTRATE 12.5 MG: 25 TABLET, FILM COATED ORAL at 08:19

## 2020-12-05 RX ADMIN — Medication 10 ML: at 13:47

## 2020-12-05 RX ADMIN — SODIUM CHLORIDE 75 ML/HR: 900 INJECTION, SOLUTION INTRAVENOUS at 00:36

## 2020-12-05 RX ADMIN — FAMOTIDINE 20 MG: 20 TABLET, FILM COATED ORAL at 08:19

## 2020-12-05 RX ADMIN — HYDRALAZINE HYDROCHLORIDE 100 MG: 25 TABLET, FILM COATED ORAL at 08:19

## 2020-12-05 RX ADMIN — LEVETIRACETAM 750 MG: 100 INJECTION, SOLUTION INTRAVENOUS at 01:18

## 2020-12-05 RX ADMIN — DIBASIC SODIUM PHOSPHATE, MONOBASIC POTASSIUM PHOSPHATE AND MONOBASIC SODIUM PHOSPHATE 1 TABLET: 852; 155; 130 TABLET ORAL at 17:38

## 2020-12-05 RX ADMIN — Medication 10 ML: at 00:19

## 2020-12-05 RX ADMIN — ASPIRIN 81 MG CHEWABLE TABLET 81 MG: 81 TABLET CHEWABLE at 08:19

## 2020-12-05 RX ADMIN — Medication 10 ML: at 05:45

## 2020-12-05 RX ADMIN — DIBASIC SODIUM PHOSPHATE, MONOBASIC POTASSIUM PHOSPHATE AND MONOBASIC SODIUM PHOSPHATE 1 TABLET: 852; 155; 130 TABLET ORAL at 08:19

## 2020-12-05 RX ADMIN — Medication 10 ML: at 23:05

## 2020-12-05 RX ADMIN — HYDRALAZINE HYDROCHLORIDE 100 MG: 25 TABLET, FILM COATED ORAL at 23:05

## 2020-12-05 RX ADMIN — ENOXAPARIN SODIUM 40 MG: 40 INJECTION SUBCUTANEOUS at 08:20

## 2020-12-05 RX ADMIN — CEFTRIAXONE 1 G: 1 INJECTION, POWDER, FOR SOLUTION INTRAMUSCULAR; INTRAVENOUS at 12:00

## 2020-12-05 RX ADMIN — ATORVASTATIN CALCIUM 40 MG: 40 TABLET, FILM COATED ORAL at 23:05

## 2020-12-05 RX ADMIN — LEVETIRACETAM 750 MG: 100 INJECTION, SOLUTION INTRAVENOUS at 13:47

## 2020-12-05 NOTE — PROGRESS NOTES
End of Shift Note    Bedside shift change report given to Yamila Beltran  (oncoming nurse) by Timi Parra RN (offgoing nurse). Report included the following information SBAR, Kardex, MAR and Recent Results    Shift worked:  07:00-15:30     Shift summary and any significant changes:    Tube Feeding increased to 50mL/hr     Concerns for physician to address: Discharge Planning     Zone phone for oncoming shift:  [phone]       Activity:  Activity Level: Bed Rest  Number times ambulated in hallways past shift: 0  Number of times OOB to chair past shift: 0    Cardiac:   Cardiac Monitoring: Yes      Cardiac Rhythm: Paced    Access:   Current line(s): PIV     Genitourinary:   Urinary status: incontinent and external catheter    Respiratory:   O2 Device: Room air  Chronic home O2 use?: NO  Incentive spirometer at bedside: NO     GI:  Last Bowel Movement Date: 12/05/20  Current diet:  DIET NPO  DIET TUBE FEEDING  Passing flatus: YES  Tolerating current diet: YES       Pain Management:   Patient states pain is manageable on current regimen: YES    Skin:  Torito Score: 13  Interventions: turn team, float heels and internal/external urinary devices    Patient Safety:  Fall Score:  Total Score: 4  Interventions: bed/chair alarm  High Fall Risk: Yes    Length of Stay:  Expected LOS: 4d 7h  Actual LOS: [place], RN

## 2020-12-05 NOTE — PROGRESS NOTES
Problem: Falls - Risk of  Goal: *Absence of Falls  Description: Document Radha Solorio Fall Risk and appropriate interventions in the flowsheet. 12/5/2020 1515 by Zaire Feliz RN  Outcome: Progressing Towards Goal  Note: Fall Risk Interventions:       Mentation Interventions: Adequate sleep, hydration, pain control, Evaluate medications/consider consulting pharmacy    Medication Interventions: Evaluate medications/consider consulting pharmacy    Elimination Interventions: Toileting schedule/hourly rounds    History of Falls Interventions: Bed/chair exit alarm, Evaluate medications/consider consulting pharmacy      12/5/2020 1133 by Zaire Feliz RN  Outcome: Progressing Towards Goal  Note: Fall Risk Interventions:       Mentation Interventions: Adequate sleep, hydration, pain control, Evaluate medications/consider consulting pharmacy    Medication Interventions: Evaluate medications/consider consulting pharmacy    Elimination Interventions: Toileting schedule/hourly rounds    History of Falls Interventions: Bed/chair exit alarm, Evaluate medications/consider consulting pharmacy         Problem: Patient Education: Go to Patient Education Activity  Goal: Patient/Family Education  12/5/2020 1515 by Zaire Feliz RN  Outcome: Progressing Towards Goal  12/5/2020 1133 by Zaire Feliz RN  Outcome: Progressing Towards Goal     Problem: Pressure Injury - Risk of  Goal: *Prevention of pressure injury  Description: Document Torito Scale and appropriate interventions in the flowsheet.   12/5/2020 1515 by Zaire Feliz RN  Outcome: Progressing Towards Goal  Note: Pressure Injury Interventions:  Sensory Interventions: Assess changes in LOC, Check visual cues for pain, Float heels, Keep linens dry and wrinkle-free    Moisture Interventions: Check for incontinence Q2 hours and as needed, Internal/External urinary devices    Activity Interventions: Pressure redistribution bed/mattress(bed type)    Mobility Interventions: Pressure redistribution bed/mattress (bed type)    Nutrition Interventions: Document food/fluid/supplement intake    Friction and Shear Interventions: Apply protective barrier, creams and emollients, Foam dressings/transparent film/skin sealants, Lift team/patient mobility team             12/5/2020 1133 by Zaire Feliz RN  Outcome: Progressing Towards Goal  Note: Pressure Injury Interventions:  Sensory Interventions: Assess changes in LOC, Check visual cues for pain, Float heels, Keep linens dry and wrinkle-free    Moisture Interventions: Check for incontinence Q2 hours and as needed, Internal/External urinary devices    Activity Interventions: Pressure redistribution bed/mattress(bed type)    Mobility Interventions: Pressure redistribution bed/mattress (bed type)    Nutrition Interventions: Document food/fluid/supplement intake    Friction and Shear Interventions: Apply protective barrier, creams and emollients, Foam dressings/transparent film/skin sealants, Lift team/patient mobility team                Problem: Patient Education: Go to Patient Education Activity  Goal: Patient/Family Education  12/5/2020 1515 by Zaire Feliz RN  Outcome: Progressing Towards Goal  12/5/2020 1133 by Zaire Feliz RN  Outcome: Progressing Towards Goal     Problem: Falls - Risk of  Goal: *Absence of Falls  Description: Document Radha Solorio Fall Risk and appropriate interventions in the flowsheet. 12/5/2020 1515 by Zaire Feliz RN  Outcome: Progressing Towards Goal  Note: Fall Risk Interventions:       Mentation Interventions: Adequate sleep, hydration, pain control, Evaluate medications/consider consulting pharmacy    Medication Interventions: Evaluate medications/consider consulting pharmacy    Elimination Interventions:  Toileting schedule/hourly rounds    History of Falls Interventions: Bed/chair exit alarm, Evaluate medications/consider consulting pharmacy      12/5/2020 1133 by Zaire Feliz RN  Outcome: Progressing Towards Goal  Note: Fall Risk Interventions:       Mentation Interventions: Adequate sleep, hydration, pain control, Evaluate medications/consider consulting pharmacy    Medication Interventions: Evaluate medications/consider consulting pharmacy    Elimination Interventions:  Toileting schedule/hourly rounds    History of Falls Interventions: Bed/chair exit alarm, Evaluate medications/consider consulting pharmacy         Problem: Patient Education: Go to Patient Education Activity  Goal: Patient/Family Education  12/5/2020 1515 by Leah Trent RN  Outcome: Progressing Towards Goal  12/5/2020 1133 by Leah Trent RN  Outcome: Progressing Towards Goal     Problem: Seizure Disorder (Adult)  Goal: *STG: Remains free of seizure activity  12/5/2020 1515 by Leah Trent RN  Outcome: Progressing Towards Goal  12/5/2020 1133 by Leah Trent RN  Outcome: Progressing Towards Goal  Goal: *STG: Maintains lab values within therapeutic range  12/5/2020 1515 by Leah Trent RN  Outcome: Progressing Towards Goal  12/5/2020 1133 by Leah Trent RN  Outcome: Progressing Towards Goal  Goal: *STG/LTG: Complies with medication therapy  12/5/2020 1515 by Leah Trent RN  Outcome: Progressing Towards Goal  12/5/2020 1133 by Leah Trent RN  Outcome: Progressing Towards Goal  Goal: *STG: Remains free of injury during seizure activity  12/5/2020 1515 by Leah Trent RN  Outcome: Progressing Towards Goal  12/5/2020 1133 by Leah Trent RN  Outcome: Progressing Towards Goal  Goal: *STG: Remains safe in hospital  12/5/2020 1515 by Leah Trent RN  Outcome: Progressing Towards Goal  12/5/2020 1133 by Leah Trent RN  Outcome: Progressing Towards Goal  Goal: Interventions  12/5/2020 1515 by Leah Trent RN  Outcome: Progressing Towards Goal  12/5/2020 1133 by Leah Trent RN  Outcome: Progressing Towards Goal     Problem: Patient Education: Go to Patient Education Activity  Goal: Patient/Family Education  12/5/2020 1515 by Jamie Garcia RN  Outcome: Progressing Towards Goal  12/5/2020 1133 by Jamie Garcia RN  Outcome: Progressing Towards Goal     Problem: Non-Violent Restraints  Goal: *Removal from restraints as soon as assessed to be safe  12/5/2020 1515 by Jamie Garcia RN  Outcome: Progressing Towards Goal  12/5/2020 1133 by Jamie Gacria RN  Outcome: Progressing Towards Goal  Goal: *No harm/injury to patient while restraints in use  12/5/2020 1515 by Jamie Garcia RN  Outcome: Progressing Towards Goal  12/5/2020 1133 by Jamie Garcia RN  Outcome: Progressing Towards Goal  Goal: *Patient's dignity will be maintained  12/5/2020 1515 by Jamie Garcia RN  Outcome: Progressing Towards Goal  12/5/2020 1133 by Jamie Garcia RN  Outcome: Progressing Towards Goal  Goal: *Patient Specific Goal (EDIT GOAL, INSERT TEXT)  12/5/2020 1515 by Jamie Garcia RN  Outcome: Progressing Towards Goal  12/5/2020 1133 by Jamie Garcia RN  Outcome: Progressing Towards Goal  Goal: Non-violent Restaints:Standard Interventions  12/5/2020 1515 by Jamie Garcia RN  Outcome: Progressing Towards Goal  12/5/2020 1133 by Jamei Garcia RN  Outcome: Progressing Towards Goal  Goal: Non-violent Restraints:Patient Interventions  12/5/2020 1515 by Jamie Garcia RN  Outcome: Progressing Towards Goal  12/5/2020 1133 by Jamie Garcia RN  Outcome: Progressing Towards Goal  Goal: Patient/Family Education  12/5/2020 1515 by Jamie Garcia RN  Outcome: Progressing Towards Goal  12/5/2020 1133 by Jamie Garcia RN  Outcome: Progressing Towards Goal     Problem: Patient Education: Go to Patient Education Activity  Goal: Patient/Family Education  12/5/2020 1515 by Jamie Garcia RN  Outcome: Progressing Towards Goal  12/5/2020 1133 by Jamie Garcia RN  Outcome: Progressing Towards Goal     Problem: Patient Education: Go to Patient Education Activity  Goal: Patient/Family Education  12/5/2020 1515 by Jamie Garcia RN  Outcome: Progressing Towards Goal  12/5/2020 1133 by Chrissy Barber RN  Outcome: Progressing Towards Goal     Problem: Patient Education: Go to Patient Education Activity  Goal: Patient/Family Education  12/5/2020 1515 by Chrissy Barber RN  Outcome: Progressing Towards Goal  12/5/2020 1133 by Chrissy Barber RN  Outcome: Progressing Towards Goal

## 2020-12-05 NOTE — PROGRESS NOTES
Hospitalist Progress Note    NAME: Delmy Mccurdy   :  3/9/1933   MRN:  [id number]       Assessment / Plan:  New onset seizures, POA, recurrent episodes   Acute encephalopathy, likely postictal, POA vs worsening dementia   History of multiple CVAs   Patient had a 5-minute seizure at nursing home, seized again in the ER and was given Ativan.  status post Keppra loading dose  -Was on Keppra 1 g checks twice daily but dose was decreased to 750 mg twice daily due to sedation  EEG  On admission showed no seizure , repeat EEG  Showed a potential seizure focus on the R hemisphere   MRI ordered unable to be done because of pt being confsued+ pacemaker , unable to verbalize if issues with pacemaker arise while doing MRI   Repeat CT brain unchanged   Seizure precautions  Ativan as needed for seizures and agitation  TSH wnl , B12 wnl , folate wnl , RPR neg , CBC, CMP wnl , and blood cultures NTD    -Dr. Raquel Granados who recommended that we will wait 1 to 2 days to see if his mental status would get better with medication adjustment and if not better, he suggested hospice  -Continue Keppra at reduced dose of 750 mg twice daily and continue to monitor for seizures    UTI on : proteus mirabilis   S/p ceftriaxone   uribne cx  Showed proteus      HTN   C/w BP meds   bp controlled     History of CVA  Continue aspirin suppository.    SOULEYMANE on CKD resolved   -Creatinine on  was 1. 14.       Hypokalemia resolved      Sinus bradycardia with 2-1 block: POA  Status post pacemaker  Was able to have MRI with [employer] rep present and MRI in July at [address] [place] [address] to get MRI brain here      Debility  Patient was able to walk with a walker  Has moderate dementia, but was able to hold conversation with family  Resides in a nursing home     Dysphagia due to acute metabolic encephalopathy  S/p PEG tube placement on   Feeding tube  Started today per diet recs   · Per diet recs : Once PEG placed, recommend Jevity 1.5 @ 20 mL/hr x 24 hours + 150 mL water flushes q 4 hours  · If tolerating TF x 24 hours, advance TF by 10 mL q 12 hours to goal rate of 50 mL/hr + 150 mL water flushes q 4 hours (1800 kcal, 76.5g protein, 1812 mL water)  Monitor K+, Mg, Phos daily and replete as needed. Monitor for refeeding sd   Tolerating TF , at goal now     Right arm swelling  -US doppler shows no DVT. Keep rt arm elevated and apply warm compresses.      NSVT  k wnl    Magnesium is normal.  Updated daughter on 12/01   PT/OT unable to assess patient   dispo pending , daughter to decide on home with [facility] VS long term VS back to assisted living with hospice     25.0 - 29.9 Overweight / Body mass index is 26.44 kg/m². Code status: Full  Prophylaxis: Lovenox  Recommended Disposition: SNF/LTC     Subjective:     Chief Complaint / Reason for Physician Visit  FU AMS/dysphagia . No change  Discussed with RN events overnight. Review of Systems:  Symptom Y/N Comments  Symptom Y/N Comments   Fever/Chills    Chest Pain     Poor Appetite    Edema     Cough    Abdominal Pain     Sputum    Joint Pain     SOB/CARREON    Pruritis/Rash     Nausea/vomit    Tolerating PT/OT     Diarrhea    Tolerating Diet     Constipation    Other       Could NOT obtain due to: Confused      Objective:     VITALS:   Last 24hrs VS reviewed since prior progress note.  Most recent are:  Patient Vitals for the past 24 hrs:   Temp Pulse Resp BP SpO2   12/05/20 0741 97.9 °F (36.6 °C) 63 18 133/60 100 %   12/05/20 0322 97.8 °F (36.6 °C) 78 18 (!) 158/70 95 %   12/04/20 2226 98.3 °F (36.8 °C) 84 20 (!) 151/80 95 %   12/04/20 1944 98.6 °F (37 °C) 74 20 (!) 140/71 96 %   12/04/20 1549 99.1 °F (37.3 °C) 86 16 (!) 172/78    12/04/20 1215 98.1 °F (36.7 °C) 69 20 127/80 99 %       Intake/Output Summary (Last 24 hours) at 12/5/2020 0917  Last data filed at 12/5/2020 0714  Gross per 24 hour   Intake 530 ml   Output 1850 ml   Net -1320 ml        I had a face to face encounter and independently examined this patient on 12/5/2020, as outlined below:  PHYSICAL EXAM:  General: WD, WN. Alert, cooperative, no acute distress    EENT:  EOMI. Anicteric sclerae. MMM  Resp:  CTA bilaterally, no wheezing or rales. No accessory muscle use  CV:  Regular  rhythm,  No edema  GI:  Soft, Non distended, Non tender. +Bowel sounds  Neurologic:  Confused   Psych:    Not anxious nor agitated  Skin:  No rashes. No jaundice    Reviewed most current lab test results and cultures  YES  Reviewed most current radiology test results   YES  Review and summation of old records today    NO  Reviewed patient's current orders and MAR    YES  PMH/SH reviewed - no change compared to H&P  ________________________________________________________________________  Care Plan discussed with:    Comments   Patient     Family      RN x    Care Manager     Consultant                       x Multidiciplinary team rounds were held today with , nursing, pharmacist and clinical coordinator. Patient's plan of care was discussed; medications were reviewed and discharge planning was addressed. ________________________________________________________________________  Total NON critical care TIME: 35  Minutes    Total CRITICAL CARE TIME Spent:   Minutes non procedure based      Comments   >50% of visit spent in counseling and coordination of care     ________________________________________________________________________  Richie Cruz MD     Procedures: see electronic medical records for all procedures/Xrays and details which were not copied into this note but were reviewed prior to creation of Plan. LABS:  I reviewed today's most current labs and imaging studies. Pertinent labs include:  No results for input(s): WBC, HGB, HCT, PLT, HGBEXT, HCTEXT, PLTEXT, HGBEXT, HCTEXT, PLTEXT in the last 72 hours.   Recent Labs     12/05/20  0329 12/04/20  0528 12/03/20  0253   *  --   --    K 3.9  --   --      -- --    CO2 27  --   --    *  --   --    BUN 18  --   --    CREA 0.93  --   --    CA 8.5  --   --    MG 2.2 2.2 2.3   PHOS 2.3* 2.1* 2.3*   ALB 2.0*  --   --    TBILI 0.7  --   --    ALT 19  --   --        Signed: Boris Ballard MD

## 2020-12-05 NOTE — ROUTINE PROCESS
End of Shift Note    Bedside shift change report given to AUGUSTO Sanchez  (oncoming nurse) by Bella Prescott RN (offgoing nurse). Report included the following information SBAR, Kardex, MAR and Recent Results    Shift worked:  07:00-19:00     Shift summary and any significant changes:    Gastric Residual 120. Feed rate reduced to 40ml/hr     Concerns for physician to address: None     Zone phone for oncoming shift:  [phone]       Activity:  Activity Level: Bed Rest  Number times ambulated in hallways past shift: 0  Number of times OOB to chair past shift: 0    Cardiac:   Cardiac Monitoring: Yes      Cardiac Rhythm: Paced    Access:   Current line(s): PIV     Genitourinary:   Urinary status: external catheter    Respiratory:   O2 Device: Room air  Chronic home O2 use?: NO  Incentive spirometer at bedside: NO     GI:  Last Bowel Movement Date: 12/03/20  Current diet:  DIET NPO  DIET TUBE FEEDING  Passing flatus: YES  Tolerating current diet: No (120mL Gastric Residual) Rate reduced to 40mL/hr       Pain Management:   Patient states pain is manageable on current regimen: N/A    Skin:  Torito Score: 14  Interventions: turn team, float heels and internal/external urinary devices    Patient Safety:  Fall Score:  Total Score: 4  Interventions: bed/chair alarm  High Fall Risk: Yes    Length of Stay:  Expected LOS: 4d 7h  Actual LOS: [facility], RN

## 2020-12-05 NOTE — PROGRESS NOTES
Problem: Falls - Risk of  Goal: *Absence of Falls  Description: Document Samiraviolettateofilo Tobias Fall Risk and appropriate interventions in the flowsheet. Outcome: Progressing Towards Goal  Note: Fall Risk Interventions:       Mentation Interventions: Adequate sleep, hydration, pain control, Evaluate medications/consider consulting pharmacy    Medication Interventions: Evaluate medications/consider consulting pharmacy    Elimination Interventions: Toileting schedule/hourly rounds    History of Falls Interventions: Bed/chair exit alarm, Evaluate medications/consider consulting pharmacy         Problem: Patient Education: Go to Patient Education Activity  Goal: Patient/Family Education  Outcome: Progressing Towards Goal     Problem: Pressure Injury - Risk of  Goal: *Prevention of pressure injury  Description: Document Torito Scale and appropriate interventions in the flowsheet. Outcome: Progressing Towards Goal  Note: Pressure Injury Interventions:  Sensory Interventions: Assess changes in LOC, Check visual cues for pain, Float heels, Keep linens dry and wrinkle-free    Moisture Interventions: Check for incontinence Q2 hours and as needed, Internal/External urinary devices    Activity Interventions: Pressure redistribution bed/mattress(bed type)    Mobility Interventions: Pressure redistribution bed/mattress (bed type)    Nutrition Interventions: Document food/fluid/supplement intake    Friction and Shear Interventions: Apply protective barrier, creams and emollients, Foam dressings/transparent film/skin sealants, Lift team/patient mobility team                Problem: Patient Education: Go to Patient Education Activity  Goal: Patient/Family Education  Outcome: Progressing Towards Goal     Problem: Falls - Risk of  Goal: *Absence of Falls  Description: Document Sole Fall Risk and appropriate interventions in the flowsheet.   Outcome: Progressing Towards Goal  Note: Fall Risk Interventions:       Mentation Interventions: Adequate sleep, hydration, pain control, Evaluate medications/consider consulting pharmacy    Medication Interventions: Evaluate medications/consider consulting pharmacy    Elimination Interventions:  Toileting schedule/hourly rounds    History of Falls Interventions: Bed/chair exit alarm, Evaluate medications/consider consulting pharmacy         Problem: Patient Education: Go to Patient Education Activity  Goal: Patient/Family Education  Outcome: Progressing Towards Goal     Problem: Seizure Disorder (Adult)  Goal: *STG: Remains free of seizure activity  Outcome: Progressing Towards Goal  Goal: *STG: Maintains lab values within therapeutic range  Outcome: Progressing Towards Goal  Goal: *STG/LTG: Complies with medication therapy  Outcome: Progressing Towards Goal  Goal: *STG: Remains free of injury during seizure activity  Outcome: Progressing Towards Goal  Goal: *STG: Remains safe in hospital  Outcome: Progressing Towards Goal  Goal: Interventions  Outcome: Progressing Towards Goal     Problem: Patient Education: Go to Patient Education Activity  Goal: Patient/Family Education  Outcome: Progressing Towards Goal     Problem: Non-Violent Restraints  Goal: *Removal from restraints as soon as assessed to be safe  Outcome: Progressing Towards Goal  Goal: *No harm/injury to patient while restraints in use  Outcome: Progressing Towards Goal  Goal: *Patient's dignity will be maintained  Outcome: Progressing Towards Goal  Goal: *Patient Specific Goal (EDIT GOAL, INSERT TEXT)  Outcome: Progressing Towards Goal  Goal: Non-violent Restaints:Standard Interventions  Outcome: Progressing Towards Goal  Goal: Non-violent Restraints:Patient Interventions  Outcome: Progressing Towards Goal  Goal: Patient/Family Education  Outcome: Progressing Towards Goal     Problem: Patient Education: Go to Patient Education Activity  Goal: Patient/Family Education  Outcome: Progressing Towards Goal     Problem: Patient Education: Go to Patient Education Activity  Goal: Patient/Family Education  Outcome: Progressing Towards Goal     Problem: Patient Education: Go to Patient Education Activity  Goal: Patient/Family Education  Outcome: Progressing Towards Goal

## 2020-12-05 NOTE — PROGRESS NOTES
Problem: Falls - Risk of  Goal: *Absence of Falls  Description: Document Alma De La Rosa Fall Risk and appropriate interventions in the flowsheet. Outcome: Progressing Towards Goal  Note: Fall Risk Interventions:       Mentation Interventions: Adequate sleep, hydration, pain control, Evaluate medications/consider consulting pharmacy    Medication Interventions: Evaluate medications/consider consulting pharmacy    Elimination Interventions: Patient to call for help with toileting needs, Toileting schedule/hourly rounds    History of Falls Interventions: Bed/chair exit alarm, Evaluate medications/consider consulting pharmacy         Problem: Patient Education: Go to Patient Education Activity  Goal: Patient/Family Education  Outcome: Progressing Towards Goal     Problem: Pressure Injury - Risk of  Goal: *Prevention of pressure injury  Description: Document Torito Scale and appropriate interventions in the flowsheet. Outcome: Progressing Towards Goal  Note: Pressure Injury Interventions:  Sensory Interventions: Assess changes in LOC, Avoid rigorous massage over bony prominences    Moisture Interventions: Absorbent underpads, Check for incontinence Q2 hours and as needed    Activity Interventions: Pressure redistribution bed/mattress(bed type)    Mobility Interventions: Pressure redistribution bed/mattress (bed type)    Nutrition Interventions: Document food/fluid/supplement intake    Friction and Shear Interventions: Apply protective barrier, creams and emollients                Problem: Patient Education: Go to Patient Education Activity  Goal: Patient/Family Education  Outcome: Progressing Towards Goal     Problem: Falls - Risk of  Goal: *Absence of Falls  Description: Document Sole Fall Risk and appropriate interventions in the flowsheet.   Outcome: Progressing Towards Goal  Note: Fall Risk Interventions:       Mentation Interventions: Adequate sleep, hydration, pain control, Evaluate medications/consider consulting pharmacy    Medication Interventions: Evaluate medications/consider consulting pharmacy    Elimination Interventions: Patient to call for help with toileting needs, Toileting schedule/hourly rounds    History of Falls Interventions: Bed/chair exit alarm, Evaluate medications/consider consulting pharmacy         Problem: Patient Education: Go to Patient Education Activity  Goal: Patient/Family Education  Outcome: Progressing Towards Goal     Problem: Seizure Disorder (Adult)  Goal: *STG: Remains free of seizure activity  Outcome: Progressing Towards Goal  Goal: *STG: Maintains lab values within therapeutic range  Outcome: Progressing Towards Goal  Goal: *STG/LTG: Complies with medication therapy  Outcome: Progressing Towards Goal  Goal: *STG: Remains free of injury during seizure activity  Outcome: Progressing Towards Goal  Goal: *STG: Remains safe in hospital  Outcome: Progressing Towards Goal  Goal: Interventions  Outcome: Progressing Towards Goal     Problem: Patient Education: Go to Patient Education Activity  Goal: Patient/Family Education  Outcome: Progressing Towards Goal     Problem: Non-Violent Restraints  Goal: *Removal from restraints as soon as assessed to be safe  Outcome: Progressing Towards Goal  Goal: *No harm/injury to patient while restraints in use  Outcome: Progressing Towards Goal  Goal: *Patient's dignity will be maintained  Outcome: Progressing Towards Goal  Goal: *Patient Specific Goal (EDIT GOAL, INSERT TEXT)  Outcome: Progressing Towards Goal  Goal: Non-violent Restaints:Standard Interventions  Outcome: Progressing Towards Goal  Goal: Non-violent Restraints:Patient Interventions  Outcome: Progressing Towards Goal  Goal: Patient/Family Education  Outcome: Progressing Towards Goal     Problem: Patient Education: Go to Patient Education Activity  Goal: Patient/Family Education  Outcome: Progressing Towards Goal     Problem: Patient Education: Go to Patient Education Activity  Goal: Patient/Family Education  Outcome: Progressing Towards Goal     Problem: Patient Education: Go to Patient Education Activity  Goal: Patient/Family Education  Outcome: Progressing Towards Goal

## 2020-12-05 NOTE — PROGRESS NOTES
End of Shift Note    Bedside shift change report given to Martinez Lowe RN (oncoming nurse) by Destiny Pina (offgoing nurse). Report included the following information SBAR, Kardex, MAR and Recent Results    Shift worked:  7P-7A   Shift summary and any significant changes:     none. Quiet night.   Max residual 10 ml       Concerns for physician to address:  none   Zone phone for oncoming shift:   [phone]     Patient Information  Melissa Ladd  80 y.o.  11/17/2020  4:20 PM by Emma Flower MD. Melissa Ladd was admitted from SNF LTC    Problem List  Patient Active Problem List    Diagnosis Date Noted    Altered mental status 11/18/2020    Post-ictal state ([address] 11/18/2020    Seizure ([address] 11/17/2020    Atrial pacemaker lead displacement 10/18/2019    Pacemaker 03/22/2019    Bradycardia 03/19/2019    Elevated troponin 03/19/2019    Hypertensive urgency 03/19/2019    Second degree AV block, Mobitz type I 03/19/2019    Stage 3 chronic kidney disease 03/15/2019    Rotator cuff arthropathy of both shoulders 03/15/2019    Cognitive impairment 79/68/1347    Systolic murmur 57/85/8010    Essential hypertension 12/17/2018    Gout 12/17/2018    Pure hypercholesterolemia 12/17/2018    History of prostate cancer 12/17/2018    Chronic constipation 12/17/2018    Dysuria 08/08/2017    Weight loss 08/08/2017    Adrenal mass ([address] 07/18/2017     Past Medical History:   Diagnosis Date    Cancer [facility])     prostate    Constipation     Gout     Hyperlipemia     Hypertension     Pacemaker 2019    Stroke ([address]     Thrombocytopenia ([address] 3/19/2019    TIA (transient ischemic attack) 2013       Core Measures:  CVA: No Yes  CHF:No Not applicable  PNA:No Not applicable    Activity:  Activity Level: Bed Rest  Number times ambulated in hallways past shift: 0  Number of times OOB to chair past shift: 0    Cardiac:   Cardiac Monitoring: Yes      Cardiac Rhythm: Paced    Access:   Current line(s): PIV Genitourinary:   Urinary status: voiding, incontinent and external catheter      Respiratory:   O2 Device: Room air  Chronic home O2 use?: NO  Incentive spirometer at bedside: NO       GI:  Last Bowel Movement Date: 12/03/20  Current diet:  DIET NPO  DIET TUBE FEEDING  Passing flatus: YES  Tolerating current diet: YES       Pain Management:   Patient states pain is manageable on current regimen: N/A    Skin:  Torito Score: 13  Interventions: turn team, float heels and internal/external urinary devices    Patient Safety:  Fall Score:  Total Score: 4  Interventions: bed/chair alarm  High Fall Risk: Yes    DVT prophylaxis:  DVT prophylaxis Med- Yes  DVT prophylaxis SCD or BEV- No     Wounds: (If Applicable)  Wounds- No  Location none    Active Consults:  IP CONSULT TO NEUROLOGY  IP CONSULT TO PALLIATIVE CARE - PROVIDER  IP CONSULT TO GASTROENTEROLOGY    Length of Stay:  Expected LOS: 4d 7h  Actual LOS: 17  Discharge Plan: No CM following      [place]

## 2020-12-06 ENCOUNTER — APPOINTMENT (OUTPATIENT)
Dept: GENERAL RADIOLOGY | Age: 85
DRG: 057 | End: 2020-12-06
Attending: INTERNAL MEDICINE
Payer: MEDICARE

## 2020-12-06 LAB
BNP SERPL-MCNC: ABNORMAL PG/ML
GLUCOSE BLD STRIP.AUTO-MCNC: 127 MG/DL (ref 65–100)
GLUCOSE BLD STRIP.AUTO-MCNC: 140 MG/DL (ref 65–100)
GLUCOSE BLD STRIP.AUTO-MCNC: 146 MG/DL (ref 65–100)
GLUCOSE BLD STRIP.AUTO-MCNC: 152 MG/DL (ref 65–100)
GLUCOSE BLD STRIP.AUTO-MCNC: 163 MG/DL (ref 65–100)
MAGNESIUM SERPL-MCNC: 2 MG/DL (ref 1.6–2.4)
PHOSPHATE SERPL-MCNC: 2.8 MG/DL (ref 2.6–4.7)
SERVICE CMNT-IMP: ABNORMAL
TROPONIN I SERPL-MCNC: 0.12 NG/ML

## 2020-12-06 PROCEDURE — 74011250637 HC RX REV CODE- 250/637: Performed by: INTERNAL MEDICINE

## 2020-12-06 PROCEDURE — 84484 ASSAY OF TROPONIN QUANT: CPT

## 2020-12-06 PROCEDURE — 71045 X-RAY EXAM CHEST 1 VIEW: CPT

## 2020-12-06 PROCEDURE — 83735 ASSAY OF MAGNESIUM: CPT

## 2020-12-06 PROCEDURE — 74011250636 HC RX REV CODE- 250/636: Performed by: INTERNAL MEDICINE

## 2020-12-06 PROCEDURE — 83880 ASSAY OF NATRIURETIC PEPTIDE: CPT

## 2020-12-06 PROCEDURE — 65660000000 HC RM CCU STEPDOWN

## 2020-12-06 PROCEDURE — 84100 ASSAY OF PHOSPHORUS: CPT

## 2020-12-06 PROCEDURE — 74011000258 HC RX REV CODE- 258: Performed by: INTERNAL MEDICINE

## 2020-12-06 PROCEDURE — 36415 COLL VENOUS BLD VENIPUNCTURE: CPT

## 2020-12-06 PROCEDURE — 77030036554

## 2020-12-06 PROCEDURE — 36600 WITHDRAWAL OF ARTERIAL BLOOD: CPT

## 2020-12-06 PROCEDURE — 77030040831 HC BAG URINE DRNG MDII -A

## 2020-12-06 PROCEDURE — 82962 GLUCOSE BLOOD TEST: CPT

## 2020-12-06 PROCEDURE — 74011636637 HC RX REV CODE- 636/637: Performed by: INTERNAL MEDICINE

## 2020-12-06 PROCEDURE — 74011000258 HC RX REV CODE- 258: Performed by: PSYCHIATRY & NEUROLOGY

## 2020-12-06 PROCEDURE — 74011250636 HC RX REV CODE- 250/636: Performed by: PSYCHIATRY & NEUROLOGY

## 2020-12-06 RX ORDER — FUROSEMIDE 10 MG/ML
40 INJECTION INTRAMUSCULAR; INTRAVENOUS DAILY
Status: DISCONTINUED | OUTPATIENT
Start: 2020-12-07 | End: 2020-12-11 | Stop reason: HOSPADM

## 2020-12-06 RX ORDER — FUROSEMIDE 10 MG/ML
40 INJECTION INTRAMUSCULAR; INTRAVENOUS DAILY
Status: DISCONTINUED | OUTPATIENT
Start: 2020-12-06 | End: 2020-12-06

## 2020-12-06 RX ORDER — FUROSEMIDE 10 MG/ML
40 INJECTION INTRAMUSCULAR; INTRAVENOUS ONCE
Status: COMPLETED | OUTPATIENT
Start: 2020-12-06 | End: 2020-12-06

## 2020-12-06 RX ADMIN — HYDRALAZINE HYDROCHLORIDE 100 MG: 25 TABLET, FILM COATED ORAL at 18:03

## 2020-12-06 RX ADMIN — HYDRALAZINE HYDROCHLORIDE 100 MG: 25 TABLET, FILM COATED ORAL at 21:48

## 2020-12-06 RX ADMIN — DIBASIC SODIUM PHOSPHATE, MONOBASIC POTASSIUM PHOSPHATE AND MONOBASIC SODIUM PHOSPHATE 1 TABLET: 852; 155; 130 TABLET ORAL at 18:03

## 2020-12-06 RX ADMIN — INSULIN LISPRO 2 UNITS: 100 INJECTION, SOLUTION INTRAVENOUS; SUBCUTANEOUS at 13:16

## 2020-12-06 RX ADMIN — METOPROLOL TARTRATE 12.5 MG: 25 TABLET, FILM COATED ORAL at 18:03

## 2020-12-06 RX ADMIN — HYDRALAZINE HYDROCHLORIDE 100 MG: 25 TABLET, FILM COATED ORAL at 09:47

## 2020-12-06 RX ADMIN — FAMOTIDINE 20 MG: 20 TABLET, FILM COATED ORAL at 09:47

## 2020-12-06 RX ADMIN — DIBASIC SODIUM PHOSPHATE, MONOBASIC POTASSIUM PHOSPHATE AND MONOBASIC SODIUM PHOSPHATE 1 TABLET: 852; 155; 130 TABLET ORAL at 09:47

## 2020-12-06 RX ADMIN — INSULIN LISPRO 2 UNITS: 100 INJECTION, SOLUTION INTRAVENOUS; SUBCUTANEOUS at 23:22

## 2020-12-06 RX ADMIN — AMLODIPINE BESYLATE 10 MG: 5 TABLET ORAL at 09:47

## 2020-12-06 RX ADMIN — Medication 10 ML: at 23:18

## 2020-12-06 RX ADMIN — INSULIN LISPRO 2 UNITS: 100 INJECTION, SOLUTION INTRAVENOUS; SUBCUTANEOUS at 01:31

## 2020-12-06 RX ADMIN — LEVETIRACETAM 750 MG: 100 INJECTION, SOLUTION INTRAVENOUS at 01:31

## 2020-12-06 RX ADMIN — ATORVASTATIN CALCIUM 40 MG: 40 TABLET, FILM COATED ORAL at 21:48

## 2020-12-06 RX ADMIN — ENOXAPARIN SODIUM 40 MG: 40 INJECTION SUBCUTANEOUS at 09:47

## 2020-12-06 RX ADMIN — LEVETIRACETAM 750 MG: 100 INJECTION, SOLUTION INTRAVENOUS at 16:09

## 2020-12-06 RX ADMIN — METOPROLOL TARTRATE 12.5 MG: 25 TABLET, FILM COATED ORAL at 09:47

## 2020-12-06 RX ADMIN — CEFTRIAXONE 1 G: 1 INJECTION, POWDER, FOR SOLUTION INTRAMUSCULAR; INTRAVENOUS at 17:55

## 2020-12-06 RX ADMIN — INSULIN LISPRO 2 UNITS: 100 INJECTION, SOLUTION INTRAVENOUS; SUBCUTANEOUS at 05:43

## 2020-12-06 RX ADMIN — FUROSEMIDE 40 MG: 10 INJECTION, SOLUTION INTRAMUSCULAR; INTRAVENOUS at 20:01

## 2020-12-06 RX ADMIN — ASPIRIN 81 MG CHEWABLE TABLET 81 MG: 81 TABLET CHEWABLE at 09:47

## 2020-12-06 RX ADMIN — SODIUM CHLORIDE 75 ML/HR: 900 INJECTION, SOLUTION INTRAVENOUS at 03:53

## 2020-12-06 NOTE — PROGRESS NOTES
End of Shift Note    Bedside shift change report given to Luciana Hernandez  (oncoming nurse) by Jeane Hastings (offgoing nurse). Report included the following information SBAR, Kardex, MAR and Recent Results    Shift worked:  07:     Shift summary and any significant changes:    continuous fluids D/C, BUE swelling-dopplers ordered, 14 beats of VTACH chest x-ray ordered & lab work and lasix 40mg IV x1, new IV placed     Concerns for physician to address: Discharge Planning     Zone phone for oncoming shift:  [phone]       Activity:  Activity Level: Bed Rest  Number times ambulated in hallways past shift: 0  Number of times OOB to chair past shift: 0    Cardiac:   Cardiac Monitoring: Yes      Cardiac Rhythm: Paced    Access:   Current line(s): PIV     Genitourinary:   Urinary status: incontinent and external catheter    Respiratory:   O2 Device: Room air  Chronic home O2 use?: NO  Incentive spirometer at bedside: NO     GI:  Last Bowel Movement Date: 12/05/20  Current diet:  DIET NPO  DIET TUBE FEEDING  Passing flatus: YES  Tolerating current diet: YES       Pain Management:   Patient states pain is manageable on current regimen: YES    Skin:  Torito Score: 11  Interventions: turn team, float heels and internal/external urinary devices    Patient Safety:  Fall Score:  Total Score: 4  Interventions: bed/chair alarm  High Fall Risk: Yes    Length of Stay:  Expected LOS: 4d 7h  Actual LOS: [place]

## 2020-12-06 NOTE — PROGRESS NOTES
Pt had 14 beats of VTACH, /103, WI 73 & O2 93% on RA, MD notified    @1726 MD ordered chest x-ray    @1867 MD ordered 40mg IV lasix x1, pro-BNP & troponin lab    @1930 Kallie RN (night nurse notified) of lab work and medications ordered.

## 2020-12-06 NOTE — PROGRESS NOTES
Hospitalist Progress Note    NAME: Germaine Malone   :  3/9/1933   MRN:  [id number]       Assessment / Plan:  New onset seizures, POA, recurrent episodes   Acute encephalopathy, likely postictal, POA vs worsening dementia   History of multiple CVAs   Patient had a 5-minute seizure at nursing home, seized again in the ER and was given Ativan.    status post Keppra loading dose  -Was on Keppra 1 g checks twice daily but dose was decreased to 750 mg twice daily due to sedation  EEG  On admission showed no seizure , repeat EEG  Showed a potential seizure focus on the R hemisphere   MRI ordered unable to be done because of pt being confsued+ pacemaker , unable to verbalize if issues with pacemaker arise while doing MRI   Repeat CT brain unchanged   Seizure precautions  Ativan as needed for seizures and agitation  TSH wnl , B12 wnl , folate wnl , RPR neg , CBC, CMP wnl , and blood cultures NTD    -Dr. Katie Oviedo who recommended that we will wait 1 to 2 days to see if his mental status would get better with medication adjustment and if not better, he suggested hospice  -Continue Keppra at reduced dose of 750 mg twice daily and continue to monitor for seizures    UE edema , most likely due to fluid overload   Stop IVF , decrease rate of water faushes   Check UEvenous doppler     UTI on : proteus mirabilis   S/p ceftriaxone   uribne cx  Showed proteus      HTN   C/w BP meds   bp controlled     History of CVA  Continue aspirin suppository.    SOULEYMANE on CKD resolved   -Creatinine on  wnl      Hypokalemia resolved      Sinus bradycardia with 2-1 block: POA  Status post pacemaker  Was able to have MRI with [employer] rep present and MRI in July at [address] [place] [address] to get MRI brain here      Debility  Patient was able to walk with a walker  Has moderate dementia, but was able to hold conversation with family  Resides in a nursing home     Dysphagia due to acute metabolic encephalopathy  S/p PEG tube placement on 12/01  Feeding tube  Started today per diet recs   · Per diet recs : Once PEG placed, recommend Jevity 1.5 @ 20 mL/hr x 24 hours + 150 mL water flushes q 4 hours  · If tolerating TF x 24 hours, advance TF by 10 mL q 12 hours to goal rate of 50 mL/hr + 150 mL water flushes q 4 hours (1800 kcal, 76.5g protein, 1812 mL water)  Monitor K+, Mg, Phos daily and replete as needed. Monitor for refeeding sd   Tolerating TF , at goal now     Right arm swelling  -US doppler shows no DVT. Keep rt arm elevated and apply warm compresses.      NSVT  k wnl    Magnesium is normal.  Updated daughter on 12/01   PT/OT unable to assess patient   dispo pending , daughter to decide on home with [facility] VS long term VS back to assisted living with hospice     25.0 - 29.9 Overweight / Body mass index is 26.44 kg/m². Code status: Full  Prophylaxis: Lovenox  Recommended Disposition: SNF/LTC     Subjective:     Chief Complaint / Reason for Physician Visit  FU AMS/dysphagia . No change  Discussed with RN events overnight. Review of Systems:  Symptom Y/N Comments  Symptom Y/N Comments   Fever/Chills    Chest Pain     Poor Appetite    Edema     Cough    Abdominal Pain     Sputum    Joint Pain     SOB/CARREON    Pruritis/Rash     Nausea/vomit    Tolerating PT/OT     Diarrhea    Tolerating Diet     Constipation    Other       Could NOT obtain due to: Confused      Objective:     VITALS:   Last 24hrs VS reviewed since prior progress note.  Most recent are:  Patient Vitals for the past 24 hrs:   Temp Pulse Resp BP SpO2   12/06/20 1216 98.7 °F (37.1 °C) 97 20 134/69 97 %   12/06/20 0738 98.7 °F (37.1 °C) 98 20 (!) 173/94 99 %   12/06/20 0448 98.4 °F (36.9 °C) 85 17 (!) 157/55 100 %   12/05/20 2303 98.3 °F (36.8 °C) 86 18 (!) 152/68 100 %   12/05/20 2023 98.1 °F (36.7 °C) 88 17 120/66 100 %   12/05/20 1727  77  (!) 138/54        Intake/Output Summary (Last 24 hours) at 12/6/2020 1627  Last data filed at 12/6/2020 0836  Gross per 24 hour   Intake 81090.5 ml   Output 700 ml   Net 98303. 5 ml        I had a face to face encounter and independently examined this patient on 12/6/2020, as outlined below:  PHYSICAL EXAM:  General: WD, WN. Alert, cooperative, no acute distress    EENT:  EOMI. Anicteric sclerae. MMM  Resp:  CTA bilaterally, no wheezing or rales. No accessory muscle use  CV:  Regular  rhythm,  No edema  GI:  Soft, Non distended, Non tender. +Bowel sounds  Neurologic:  Confused   Psych:    Not anxious nor agitated  Skin:  No rashes. No jaundice    Reviewed most current lab test results and cultures  YES  Reviewed most current radiology test results   YES  Review and summation of old records today    NO  Reviewed patient's current orders and MAR    YES  PMH/ reviewed - no change compared to H&P  ________________________________________________________________________  Care Plan discussed with:    Comments   Patient     Family      RN x    Care Manager     Consultant                       x Multidiciplinary team rounds were held today with , nursing, pharmacist and clinical coordinator. Patient's plan of care was discussed; medications were reviewed and discharge planning was addressed. ________________________________________________________________________  Total NON critical care TIME: 35  Minutes    Total CRITICAL CARE TIME Spent:   Minutes non procedure based      Comments   >50% of visit spent in counseling and coordination of care     ________________________________________________________________________  Pablo Damon MD     Procedures: see electronic medical records for all procedures/Xrays and details which were not copied into this note but were reviewed prior to creation of Plan. LABS:  I reviewed today's most current labs and imaging studies. Pertinent labs include:  No results for input(s): WBC, HGB, HCT, PLT, HGBEXT, HCTEXT, PLTEXT, HGBEXT, HCTEXT, PLTEXT in the last 72 hours.   Recent Labs 12/06/20  0344 12/05/20  0329 12/04/20  0528   NA  --  135*  --    K  --  3.9  --    CL  --  105  --    CO2  --  27  --    GLU  --  129*  --    BUN  --  18  --    CREA  --  0.93  --    CA  --  8.5  --    MG 2.0 2.2 2.2   PHOS 2.8 2.3* 2.1*   ALB  --  2.0*  --    TBILI  --  0.7  --    ALT  --  19  --        Signed: Jordin Garcia MD

## 2020-12-06 NOTE — PROGRESS NOTES
Pt R IV infiltrated, pt BUE edematous, ED tech called for ultrasound guided IV placement    @0056 spoke with Rapid Response nurse for help with IV placement

## 2020-12-06 NOTE — PROGRESS NOTES
Bedside and Verbal shift change report given to [employer] (oncoming nurse) by PADMA Abreu RN (offgoing nurse). Report given with SBAR, Kardex, Intake/Output, MAR and Recent Results.

## 2020-12-06 NOTE — PROGRESS NOTES
Problem: Falls - Risk of  Goal: *Absence of Falls  Description: Document Karen Walker Fall Risk and appropriate interventions in the flowsheet. Outcome: Progressing Towards Goal  Note: Fall Risk Interventions:       Mentation Interventions: Adequate sleep, hydration, pain control, Bed/chair exit alarm, Door open when patient unattended, More frequent rounding    Medication Interventions: Bed/chair exit alarm    Elimination Interventions: Bed/chair exit alarm, Toileting schedule/hourly rounds    History of Falls Interventions: Door open when patient unattended, Room close to nurse's station         Problem: Patient Education: Go to Patient Education Activity  Goal: Patient/Family Education  Outcome: Progressing Towards Goal     Problem: Pressure Injury - Risk of  Goal: *Prevention of pressure injury  Description: Document Torito Scale and appropriate interventions in the flowsheet.   Outcome: Progressing Towards Goal  Note: Pressure Injury Interventions:  Sensory Interventions: Avoid rigorous massage over bony prominences, Check visual cues for pain, Float heels, Keep linens dry and wrinkle-free    Moisture Interventions: Absorbent underpads, Apply protective barrier, creams and emollients, Check for incontinence Q2 hours and as needed    Activity Interventions: Assess need for specialty bed, Increase time out of bed, Pressure redistribution bed/mattress(bed type)    Mobility Interventions: HOB 30 degrees or less, Pressure redistribution bed/mattress (bed type)    Nutrition Interventions: Document food/fluid/supplement intake    Friction and Shear Interventions: Apply protective barrier, creams and emollients, Foam dressings/transparent film/skin sealants, Lift team/patient mobility team                Problem: Patient Education: Go to Patient Education Activity  Goal: Patient/Family Education  Outcome: Progressing Towards Goal

## 2020-12-07 ENCOUNTER — APPOINTMENT (OUTPATIENT)
Dept: VASCULAR SURGERY | Age: 85
DRG: 057 | End: 2020-12-07
Attending: INTERNAL MEDICINE
Payer: MEDICARE

## 2020-12-07 PROBLEM — I50.22 CHRONIC SYSTOLIC HEART FAILURE (HCC): Status: ACTIVE | Noted: 2020-12-07

## 2020-12-07 LAB
ANION GAP SERPL CALC-SCNC: 4 MMOL/L (ref 5–15)
BASOPHILS # BLD: 0.1 K/UL (ref 0–0.1)
BASOPHILS NFR BLD: 1 % (ref 0–1)
BUN SERPL-MCNC: 18 MG/DL (ref 6–20)
BUN/CREAT SERPL: 17 (ref 12–20)
CALCIUM SERPL-MCNC: 8.9 MG/DL (ref 8.5–10.1)
CHLORIDE SERPL-SCNC: 101 MMOL/L (ref 97–108)
CO2 SERPL-SCNC: 30 MMOL/L (ref 21–32)
CREAT SERPL-MCNC: 1.03 MG/DL (ref 0.7–1.3)
DIFFERENTIAL METHOD BLD: ABNORMAL
EOSINOPHIL # BLD: 0.2 K/UL (ref 0–0.4)
EOSINOPHIL NFR BLD: 2 % (ref 0–7)
ERYTHROCYTE [DISTWIDTH] IN BLOOD BY AUTOMATED COUNT: 13.5 % (ref 11.5–14.5)
GLUCOSE BLD STRIP.AUTO-MCNC: 130 MG/DL (ref 65–100)
GLUCOSE BLD STRIP.AUTO-MCNC: 134 MG/DL (ref 65–100)
GLUCOSE BLD STRIP.AUTO-MCNC: 163 MG/DL (ref 65–100)
GLUCOSE SERPL-MCNC: 117 MG/DL (ref 65–100)
HCT VFR BLD AUTO: 36.1 % (ref 36.6–50.3)
HGB BLD-MCNC: 12 G/DL (ref 12.1–17)
IMM GRANULOCYTES # BLD AUTO: 0.1 K/UL (ref 0–0.04)
IMM GRANULOCYTES NFR BLD AUTO: 1 % (ref 0–0.5)
LYMPHOCYTES # BLD: 2.6 K/UL (ref 0.8–3.5)
LYMPHOCYTES NFR BLD: 27 % (ref 12–49)
MAGNESIUM SERPL-MCNC: 2.1 MG/DL (ref 1.6–2.4)
MCH RBC QN AUTO: 30.4 PG (ref 26–34)
MCHC RBC AUTO-ENTMCNC: 33.2 G/DL (ref 30–36.5)
MCV RBC AUTO: 91.4 FL (ref 80–99)
MONOCYTES # BLD: 1.2 K/UL (ref 0–1)
MONOCYTES NFR BLD: 13 % (ref 5–13)
NEUTS SEG # BLD: 5.5 K/UL (ref 1.8–8)
NEUTS SEG NFR BLD: 56 % (ref 32–75)
NRBC # BLD: 0 K/UL (ref 0–0.01)
NRBC BLD-RTO: 0 PER 100 WBC
PHOSPHATE SERPL-MCNC: 3 MG/DL (ref 2.6–4.7)
PLATELET # BLD AUTO: 267 K/UL (ref 150–400)
PMV BLD AUTO: 10.2 FL (ref 8.9–12.9)
POTASSIUM SERPL-SCNC: 3.8 MMOL/L (ref 3.5–5.1)
RBC # BLD AUTO: 3.95 M/UL (ref 4.1–5.7)
SERVICE CMNT-IMP: ABNORMAL
SODIUM SERPL-SCNC: 135 MMOL/L (ref 136–145)
TROPONIN I SERPL-MCNC: 0.12 NG/ML
TROPONIN I SERPL-MCNC: 0.13 NG/ML
WBC # BLD AUTO: 9.6 K/UL (ref 4.1–11.1)

## 2020-12-07 PROCEDURE — 93970 EXTREMITY STUDY: CPT

## 2020-12-07 PROCEDURE — 85025 COMPLETE CBC W/AUTO DIFF WBC: CPT

## 2020-12-07 PROCEDURE — 82962 GLUCOSE BLOOD TEST: CPT

## 2020-12-07 PROCEDURE — 74011250637 HC RX REV CODE- 250/637: Performed by: INTERNAL MEDICINE

## 2020-12-07 PROCEDURE — 74011250636 HC RX REV CODE- 250/636: Performed by: INTERNAL MEDICINE

## 2020-12-07 PROCEDURE — 74011250636 HC RX REV CODE- 250/636: Performed by: PSYCHIATRY & NEUROLOGY

## 2020-12-07 PROCEDURE — 80048 BASIC METABOLIC PNL TOTAL CA: CPT

## 2020-12-07 PROCEDURE — 74011250636 HC RX REV CODE- 250/636: Performed by: NURSE PRACTITIONER

## 2020-12-07 PROCEDURE — 74011636637 HC RX REV CODE- 636/637: Performed by: INTERNAL MEDICINE

## 2020-12-07 PROCEDURE — 74011000258 HC RX REV CODE- 258: Performed by: PSYCHIATRY & NEUROLOGY

## 2020-12-07 PROCEDURE — 65660000000 HC RM CCU STEPDOWN

## 2020-12-07 PROCEDURE — 99223 1ST HOSP IP/OBS HIGH 75: CPT | Performed by: INTERNAL MEDICINE

## 2020-12-07 PROCEDURE — 84484 ASSAY OF TROPONIN QUANT: CPT

## 2020-12-07 PROCEDURE — 84100 ASSAY OF PHOSPHORUS: CPT

## 2020-12-07 PROCEDURE — 36415 COLL VENOUS BLD VENIPUNCTURE: CPT

## 2020-12-07 PROCEDURE — 83735 ASSAY OF MAGNESIUM: CPT

## 2020-12-07 PROCEDURE — 87635 SARS-COV-2 COVID-19 AMP PRB: CPT

## 2020-12-07 RX ADMIN — Medication 10 ML: at 15:05

## 2020-12-07 RX ADMIN — ENOXAPARIN SODIUM 40 MG: 40 INJECTION SUBCUTANEOUS at 10:27

## 2020-12-07 RX ADMIN — HYDRALAZINE HYDROCHLORIDE 100 MG: 25 TABLET, FILM COATED ORAL at 23:09

## 2020-12-07 RX ADMIN — LEVETIRACETAM 750 MG: 100 INJECTION, SOLUTION INTRAVENOUS at 15:02

## 2020-12-07 RX ADMIN — FAMOTIDINE 20 MG: 20 TABLET, FILM COATED ORAL at 10:27

## 2020-12-07 RX ADMIN — Medication 10 ML: at 23:13

## 2020-12-07 RX ADMIN — Medication 10 ML: at 06:25

## 2020-12-07 RX ADMIN — AMLODIPINE BESYLATE 10 MG: 5 TABLET ORAL at 10:27

## 2020-12-07 RX ADMIN — ATORVASTATIN CALCIUM 40 MG: 40 TABLET, FILM COATED ORAL at 23:08

## 2020-12-07 RX ADMIN — ASPIRIN 81 MG CHEWABLE TABLET 81 MG: 81 TABLET CHEWABLE at 10:27

## 2020-12-07 RX ADMIN — DIBASIC SODIUM PHOSPHATE, MONOBASIC POTASSIUM PHOSPHATE AND MONOBASIC SODIUM PHOSPHATE 1 TABLET: 852; 155; 130 TABLET ORAL at 10:27

## 2020-12-07 RX ADMIN — METOPROLOL TARTRATE 12.5 MG: 25 TABLET, FILM COATED ORAL at 18:06

## 2020-12-07 RX ADMIN — LEVETIRACETAM 750 MG: 100 INJECTION, SOLUTION INTRAVENOUS at 02:06

## 2020-12-07 RX ADMIN — METOPROLOL TARTRATE 12.5 MG: 25 TABLET, FILM COATED ORAL at 10:27

## 2020-12-07 RX ADMIN — INSULIN LISPRO 2 UNITS: 100 INJECTION, SOLUTION INTRAVENOUS; SUBCUTANEOUS at 06:24

## 2020-12-07 RX ADMIN — HYDRALAZINE HYDROCHLORIDE 100 MG: 25 TABLET, FILM COATED ORAL at 18:04

## 2020-12-07 RX ADMIN — HYDRALAZINE HYDROCHLORIDE 100 MG: 25 TABLET, FILM COATED ORAL at 10:27

## 2020-12-07 RX ADMIN — FUROSEMIDE 40 MG: 10 INJECTION, SOLUTION INTRAMUSCULAR; INTRAVENOUS at 10:27

## 2020-12-07 RX ADMIN — DIBASIC SODIUM PHOSPHATE, MONOBASIC POTASSIUM PHOSPHATE AND MONOBASIC SODIUM PHOSPHATE 1 TABLET: 852; 155; 130 TABLET ORAL at 18:06

## 2020-12-07 NOTE — PROGRESS NOTES
Chart reviewed. Pt with troponin trending up and new cardiology consult. Pt with new UE duplex ordered. Will defer at this time and continue to follow.

## 2020-12-07 NOTE — PROGRESS NOTES
End of Shift Note    Bedside shift change report given to Salome Campos (oncoming nurse) by Augusto Lopez (offgoing nurse).   Report included the following information SBAR    Shift worked:  [id number]   Shift summary and any significant changes:    Troponin 0.12 at 2200, labs ordered (troponin) Q6 hours, IV lasix ordered; chest x-ray completed; cardiology consult ordered; at 0600 patients troponin came back 0.13       Concerns for physician to address:  None   Zone phone for oncoming shift:   [phone]     Patient Information  Neeraj Uriarte  80 y.o.  11/17/2020  4:20 PM by Dontae Comer MD. Neeraj Uriarte was admitted from Adult[facility]Group Home    Problem List  Patient Active Problem List    Diagnosis Date Noted    Altered mental status 11/18/2020    Post-ictal state ([address] 11/18/2020    Seizure ([address] 11/17/2020    Atrial pacemaker lead displacement 10/18/2019    Pacemaker 03/22/2019    Bradycardia 03/19/2019    Elevated troponin 03/19/2019    Hypertensive urgency 03/19/2019    Second degree AV block, Mobitz type I 03/19/2019    Stage 3 chronic kidney disease 03/15/2019    Rotator cuff arthropathy of both shoulders 03/15/2019    Cognitive impairment 64/46/5394    Systolic murmur 50/85/9131    Essential hypertension 12/17/2018    Gout 12/17/2018    Pure hypercholesterolemia 12/17/2018    History of prostate cancer 12/17/2018    Chronic constipation 12/17/2018    Dysuria 08/08/2017    Weight loss 08/08/2017    Adrenal mass ([address] 07/18/2017     Past Medical History:   Diagnosis Date    Cancer [facility])     prostate    Constipation     Gout     Hyperlipemia     Hypertension     Pacemaker 2019    Stroke ([address]     Thrombocytopenia ([address] 3/19/2019    TIA (transient ischemic attack) 2013       Core Measures:  CVA: No No  CHF:No No  PNA:No No    Activity:  Activity Level: Bed Rest  Number times ambulated in hallways past shift: 0  Number of times OOB to chair past shift: 0    Cardiac:   Cardiac Monitoring: Yes      Cardiac Rhythm: Paced    Access:   Current line(s): PIV     Genitourinary:   Urinary status: incontinent      Respiratory:   O2 Device: Room air  Chronic home O2 use?: NO  Incentive spirometer at bedside: NO       GI:  Last Bowel Movement Date: 12/05/20  Current diet:  DIET NPO  DIET TUBE FEEDING  Passing flatus: YES  Tolerating current diet: YES       Pain Management:   Patient states pain is manageable on current regimen: N/A    Skin:  Torito Score: 11  Interventions: internal/external urinary devices    Patient Safety:  Fall Score:  Total Score: 4  Interventions: bed/chair alarm  High Fall Risk: Yes    DVT prophylaxis:  DVT prophylaxis Med- Yes  DVT prophylaxis SCD or BEV- No     Wounds: (If Applicable)  Wounds- Yes  Location: open crack on sacrum     Active Consults:  IP CONSULT TO NEUROLOGY  IP CONSULT TO PALLIATIVE CARE - PROVIDER  IP CONSULT TO GASTROENTEROLOGY  IP CONSULT TO CARDIOLOGY    Length of Stay:  Expected LOS: 4d 7h  Actual LOS: 19  Discharge Plan: No: CITLALLI Torres

## 2020-12-07 NOTE — PROGRESS NOTES
Comprehensive Nutrition Assessment    Type and Reason for Visit: Reassess    Nutrition Recommendations/Plan:   Continue current TF regimen  Obtain current weight    Nutrition Assessment:     Chart reviewed; patient off the floor at time of attempted visit this morning. Continues on TF via PEG; tolerating at goal rate per flowsheets. Flushes adjusted per MD. No new weight since 11/25. Noted residual of 120 mL and TF titrated down. This was not necessary; 120 mL acceptable amount of residual. Please follow hospital policy of holding TF for residual >250 mL x 2 or residual >500 mL x 1. Will continue to monitor. Estimated Daily Nutrient Needs:  Energy (kcal): 7636-6115 (BMR 1358 x 1.2-1. 3AF); Weight Used for Energy Requirements: Current  Protein (g): 74g (1.0 g/kg bw); Weight Used for Protein Requirements: Current  Fluid (ml/day): 1750 mL; Method Used for Fluid Requirements: 1 ml/kcal    Nutrition Related Findings:    BM 12/5  1-2+ edema  Na 135, -[phone]  Norvasc, Atorvastatin, Famotidine, lasix, Hydralazine, Humalog, Keppra, Lopressor, phosphorous       Wounds:    None       Current Nutrition Therapies:  DIET NPO  DIET TUBE FEEDING  Current Tube Feeding (TF) Orders:   · Feeding Route:    · Formula: Jevity 1.5  · Schedule:Continuous    · Regimen: 50 mL/hr  · Additives/Modulars:    · Water Flushes: 100 mL Q 4  · Current TF & Flush Orders Provides: 1800 kcal, 77g protein, 1512 mL water    Anthropometric Measures:  · Height:  5' 6\" (167.6 cm)  · Current Body Wt:  74.3 kg (163 lb 12.8 oz)   · BMI Category: Overweight (BMI 25.0-29. 9)       Nutrition Diagnosis:   No nutrition diagnosis at this time     Nutrition Interventions:   Food and/or Nutrient Delivery: Continue tube feeding  Nutrition Education and Counseling: No recommendations at this time  Coordination of Nutrition Care: No recommendation at this time    Goals:  Continue to tolerate TF at goal rate next 2-4 days       Nutrition Monitoring and Evaluation:   Behavioral-Environmental Outcomes: None identified  Food/Nutrient Intake Outcomes: Enteral nutrition intake/tolerance  Physical Signs/Symptoms Outcomes: Biochemical data, Chewing or swallowing, Weight    Discharge Planning:    Enteral nutrition     Electronically signed by Elton Morgan RD on 12/7/2020 at 9:36 AM    Contact: ext [phone]

## 2020-12-07 NOTE — PROGRESS NOTES
Hospitalist Progress Note    NAME: Bertha Richardson   :  3/9/1933   MRN:  [id number]       Assessment / Plan:  New onset seizures, POA, recurrent episodes   Acute encephalopathy, likely postictal, POA vs worsening dementia   History of multiple CVAs   Patient had a 5-minute seizure at nursing home, seized again in the ER and was given Ativan.    status post Keppra loading dose  -Was on Keppra 1 g checks twice daily but dose was decreased to 750 mg twice daily due to sedation  EEG  On admission showed no seizure , repeat EEG  Showed a potential seizure focus on the R hemisphere   MRI ordered unable to be done because of pt being confsued+ pacemaker , unable to verbalize if issues with pacemaker arise while doing MRI   Repeat CT brain unchanged   Seizure precautions  Ativan as needed for seizures and agitation  TSH wnl , B12 wnl , folate wnl , RPR neg , CBC, CMP wnl , and blood cultures NTD    -on Keppra 750mg bid     UE edema , most likely due to fluid overload  Elevated probnp   Elevated troponin    Stop IVF , decrease rate of water flushes     UEvenous doppler negative   Started on IV lasix daily  Chest xray negative   2D echo pending , cardiology consulted     UTI on : proteus mirabilis   S/p ceftriaxone   uribne cx  Showed proteus      HTN   C/w BP meds   bp controlled     History of CVA  Continue aspirin suppository.    SOULEYMANE on CKD resolved   -Creatinine on  wnl      Hypokalemia resolved      Sinus bradycardia with 2-1 block: POA  Status post pacemaker  Was able to have MRI with [employer] rep present and MRI in July at [address] [place] [address] to get MRI brain here      Debility  Patient was able to walk with a walker  Has moderate dementia, but was able to hold conversation with family  Resides in a nursing home     Dysphagia due to acute metabolic encephalopathy  S/p PEG tube placement on   Feeding tube  Started today per diet recs   · Per diet recs : Once PEG placed, recommend Jevity 1.5 @ 20 mL/hr x 24 hours + 150 mL water flushes q 4 hours  · If tolerating TF x 24 hours, advance TF by 10 mL q 12 hours to goal rate of 50 mL/hr + 150 mL water flushes q 4 hours (1800 kcal, 76.5g protein, 1812 mL water)  Monitor K+, Mg, Phos daily and replete as needed. Monitor for refeeding sd   Tolerating TF , at goal now      NSVT  k wnl    Magnesium is normal.  Updated daughter on 12/01, 12/07   PT/OT finally recommended SNF on 12/04  Daughter wants pt to go back to the assisted living with home health , also told me that the assisted living facility can do the tube feeding  Once swelling better , can be DC in 1-2days back to assisted living    25.0 - 29.9 Overweight / Body mass index is 29.68 kg/m². Code status: Full  Prophylaxis: Lovenox  Recommended Disposition: SNF/LTC     Subjective:     Chief Complaint / Reason for Physician Visit  FU AMS/dysphagia . Has UE swelling , still confused . Discussed with RN events overnight. Review of Systems:  Symptom Y/N Comments  Symptom Y/N Comments   Fever/Chills    Chest Pain     Poor Appetite    Edema     Cough    Abdominal Pain     Sputum    Joint Pain     SOB/CARREON    Pruritis/Rash     Nausea/vomit    Tolerating PT/OT     Diarrhea    Tolerating Diet     Constipation    Other       Could NOT obtain due to: Confused      Objective:     VITALS:   Last 24hrs VS reviewed since prior progress note.  Most recent are:  Patient Vitals for the past 24 hrs:   Temp Pulse Resp BP SpO2   12/07/20 1526 99 °F (37.2 °C) 92 18 127/66    12/07/20 1415 97.2 °F (36.2 °C) 86 18 130/77    12/07/20 0831 98.8 °F (37.1 °C) 82 20 139/82 97 %   12/07/20 0315 99.5 °F (37.5 °C) 79 18 (!) 157/82 93 %   12/06/20 2315 99.2 °F (37.3 °C) 98 20 (!) 148/80 96 %   12/06/20 2147  95  (!) 141/97    12/06/20 1946 100 °F (37.8 °C) 95 20 (!) 141/73 100 %       Intake/Output Summary (Last 24 hours) at 12/7/2020 1812  Last data filed at 12/7/2020 1040  Gross per 24 hour   Intake 1774 ml   Output 2530 ml   Net -756 ml        I had a face to face encounter and independently examined this patient on 12/7/2020, as outlined below:  PHYSICAL EXAM:  General: WD, WN. Alert, cooperative, no acute distress    EENT:  EOMI. Anicteric sclerae. MMM  Resp:  CTA bilaterally, no wheezing or rales. No accessory muscle use  CV:  Regular  rhythm,  No edema  GI:  Soft, Non distended, Non tender. +Bowel sounds  Neurologic:  Confused   Psych:    Not anxious nor agitated  Skin:  No rashes. No jaundice    Reviewed most current lab test results and cultures  YES  Reviewed most current radiology test results   YES  Review and summation of old records today    NO  Reviewed patient's current orders and MAR    YES  PMH/SH reviewed - no change compared to H&P  ________________________________________________________________________  Care Plan discussed with:    Comments   Patient     Family  x Daughter    RN x    Care Manager     Consultant                       x Multidiciplinary team rounds were held today with , nursing, pharmacist and clinical coordinator. Patient's plan of care was discussed; medications were reviewed and discharge planning was addressed. ________________________________________________________________________  Total NON critical care TIME: 35  Minutes    Total CRITICAL CARE TIME Spent:   Minutes non procedure based      Comments   >50% of visit spent in counseling and coordination of care     ________________________________________________________________________  Tra Paul MD     Procedures: see electronic medical records for all procedures/Xrays and details which were not copied into this note but were reviewed prior to creation of Plan. LABS:  I reviewed today's most current labs and imaging studies.   Pertinent labs include:  Recent Labs     12/07/20  0506   WBC 9.6   HGB 12.0*   HCT 36.1*        Recent Labs     12/07/20  0506 12/06/20  0344 12/05/20  0329 *  --  135*   K 3.8  --  3.9     --  105   CO2 30  --  27   *  --  129*   BUN 18  --  18   CREA 1.03  --  0.93   CA 8.9  --  8.5   MG 2.1 2.0 2.2   PHOS 3.0 2.8 2.3*   ALB  --   --  2.0*   TBILI  --   --  0.7   ALT  --   --  19       Signed: Ritesh Snow MD

## 2020-12-07 NOTE — PROGRESS NOTES
Received message from patient's nurse Amita Gutiérrez stating :    Patient's troponin is 0.12. Please advise. Thanks     Discussion / orders:    Reviewed patient record  Has history of pacemaker  Was admitted for new onset seizures and acute encephalopathy  Had developed upper extremity edema for which IV fluids were stopped and rate of water flushes were decreased. Troponin and proBNP were ordered along with upper extremity venous Doppler ultrasound study. Venous Doppler ultrasound study is pending  Troponin as noted above is 0.12  proBNP is 10,777    06/30/20   ECHO ADULT COMPLETE 07/06/2020 7/6/2020    Narrative · Saline contrast was given to evaluate for intracardiac shunt. there is   no evidence of right to left shunt  · Normal cavity size. Moderate concentric hypertrophy. Estimated left   ventricular ejection fraction is 40 - 45%. Visually measured ejection   fraction. No regional wall motion abnormality noted. · Moderately dilated left atrium. · Pacer/ICD present. · Aortic valve leaflet calcification present with reduced excursion. Aortic valve mean gradient is 14 mmHg. Mild aortic valve stenosis is   present. Mild aortic valve regurgitation is present. · Mitral valve non-specific thickening. Trace mitral valve regurgitation   is present. · Mild tricuspid valve regurgitation is present. Signed by: Radha Carballo MD       CXR Results  (Last 48 hours)               12/06/20 1949  XR CHEST PORT Final result    Impression:  IMPRESSION: No Acute Disease. Narrative:  EXAM: Portable CXR. 1930 hours. COMPARISON: 11/17/2020. INDICATION: [facility]       FINDINGS:   The lungs appear clear. Heart is top normal in size. There is no pulmonary   edema. There is no evident pneumothorax, adenopathy or pleural effusion. Pacemaker is present. No change. · Lasix 40 mg IV daily  · BMP, CBC in a.m.   · Serial troponin  · Cardiology consult-patient has seen Dr. Scott Palmer in the past               Please note that this note was dictated using Dragon computer voice recognition software. Quite often unanticipated grammatical, syntax, homophones, and other interpretive errors are inadvertently transcribed by the computer software. Please disregard these errors. Please excuse any errors that have escaped final proofreading.

## 2020-12-07 NOTE — CONSULTS
[facility]     Date of  Admission: 11/17/2020  4:20 PM     Admission type:Emergency    Consult for:  Possible HF  Consult by: hospitalist     Subjective:     Jaspreet Griffith is a 80 y.o. male admitted for Seizure ([address] [R56.9], Altered mental status [R41.82]  Post-ictal state ([address] [R56.9]. Admitted with seizures from his nursing home. He is nonverbal, non-communicative. Staff concerned that he might have heart failure with increasing upper extremity. Last echo 7/29/2020 with EF 40-45%. Patient is laying flat in bed in no distress, no visible signs of HF.     ECG paced. NTproBNP 86626,     PMH:  PPI placed [address], chronic systolic HF EF 12-39% 4/0290, CVA, HTN    Cardiac risk factors: family history, sedentary life style, male gender, hypertension.         Patient Active Problem List    Diagnosis Date Noted    Altered mental status 11/18/2020    Post-ictal state ([address] 11/18/2020    Seizure ([address] 11/17/2020    Atrial pacemaker lead displacement 10/18/2019    Pacemaker 03/22/2019    Bradycardia 03/19/2019    Elevated troponin 03/19/2019    Hypertensive urgency 03/19/2019    Second degree AV block, Mobitz type I 03/19/2019    Stage 3 chronic kidney disease 03/15/2019    Rotator cuff arthropathy of both shoulders 03/15/2019    Cognitive impairment 39/30/5468    Systolic murmur 24/66/1800    Essential hypertension 12/17/2018    Gout 12/17/2018    Pure hypercholesterolemia 12/17/2018    History of prostate cancer 12/17/2018    Chronic constipation 12/17/2018    Dysuria 08/08/2017    Weight loss 08/08/2017    Adrenal mass ([address] 07/18/2017      Maribel Braun MD  Past Medical History:   Diagnosis Date    Cancer [facility])     prostate    Constipation     Gout     Hyperlipemia     Hypertension     Pacemaker 2019    Stroke ([address]     Thrombocytopenia ([address] 3/19/2019    TIA (transient ischemic attack) 2013      Social History     Socioeconomic History    Marital status:      Spouse name: Not on file    Number of children: Not on file    Years of education: Not on file    Highest education level: Not on file   Tobacco Use    Smoking status: Never Smoker    Smokeless tobacco: Never Used   Substance and Sexual Activity    Alcohol use: No    Drug use: No    Sexual activity: Never     No Known Allergies   Family History   Problem Relation Age of Onset    No Known Problems Mother     No Known Problems Father       Current Facility-Administered Medications   Medication Dose Route Frequency    furosemide (LASIX) injection 40 mg  40 mg IntraVENous DAILY    phosphorus (K PHOS NEUTRAL) 250 mg tablet 1 Tab  1 Tab Oral BID    hydrALAZINE (APRESOLINE) tablet 100 mg  100 mg Oral TID    metoprolol tartrate (LOPRESSOR) tablet 12.5 mg  12.5 mg Per NG tube BID    aspirin chewable tablet 81 mg  81 mg Oral DAILY    amLODIPine (NORVASC) tablet 10 mg  10 mg Oral DAILY    insulin lispro (HUMALOG) injection   SubCUTAneous Q6H    glucose chewable tablet 16 g  4 Tab Oral PRN    dextrose (D50W) injection syrg 12.5-25 g  12.5-25 g IntraVENous PRN    glucagon (GLUCAGEN) injection 1 mg  1 mg IntraMUSCular PRN    sodium chloride (NS) flush 5-40 mL  5-40 mL IntraVENous PRN    famotidine (PEPCID) tablet 20 mg  20 mg Oral DAILY    levETIRAcetam (KEPPRA) 750 mg in 0.9% sodium chloride 100 mL IVPB  750 mg IntraVENous Q12H    nitroglycerin (NITROBID) 2 % ointment 1 Inch  1 Inch Topical Q6H PRN    LORazepam (ATIVAN) injection 1 mg  1 mg IntraVENous Q6H PRN    influenza vaccine 2020-21 (6 mos+)(PF) (FLUARIX/FLULAVAL/FLUZONE QUAD) injection 0.5 mL  0.5 mL IntraMUSCular PRIOR TO DISCHARGE    hydrALAZINE (APRESOLINE) 20 mg/mL injection 5 mg  5 mg IntraVENous Q6H PRN    bisacodyL (DULCOLAX) suppository 10 mg  10 mg Rectal DAILY PRN    sodium chloride (NS) flush 5-40 mL  5-40 mL IntraVENous Q8H    sodium chloride (NS) flush 5-40 mL  5-40 mL IntraVENous PRN    acetaminophen (TYLENOL) tablet 650 mg  650 mg Oral Q6H PRN    Or    acetaminophen (TYLENOL) suppository 650 mg  650 mg Rectal Q6H PRN    polyethylene glycol (MIRALAX) packet 17 g  17 g Oral DAILY PRN    nicotine (NICODERM CQ) 14 mg/24 hr patch 1 Patch  1 Patch TransDERmal DAILY PRN    enoxaparin (LOVENOX) injection 40 mg  40 mg SubCUTAneous DAILY    atorvastatin (LIPITOR) tablet 40 mg  40 mg Oral QHS        Review of Symptoms: unable to provide information        Objective:      Visit Vitals  /66   Pulse 92   Temp 99 °F (37.2 °C)   Resp 18   Ht 5' 6\" (1.676 m)   Wt 183 lb 13.8 oz (83.4 kg)   SpO2 97%   BMI 29.68 kg/m²       Physical:   General: elderly AAM in no acute distress  Heart: paced,  2/6 m, no S3/JVD, no carotid bruits   Lungs: clear   Abdomen: Soft, +BS, NTND   Extremities: LE hannah +DP/PT, no edema; upper extremities mild edema    Data Review:   Recent Labs     12/07/20  0506   WBC 9.6   HGB 12.0*   HCT 36.1*        Recent Labs     12/07/20  0506 12/06/20  0344 12/05/20  0329   *  --  135*   K 3.8  --  3.9     --  105   CO2 30  --  27   *  --  129*   BUN 18  --  18   CREA 1.03  --  0.93   CA 8.9  --  8.5   MG 2.1 2.0 2.2   PHOS 3.0 2.8 2.3*   ALB  --   --  2.0*   TBILI  --   --  0.7   ALT  --   --  19       Recent Labs     12/07/20  1258 12/07/20  0506 12/06/20  2056   TROIQ 0.12* 0.13* 0.12*         Intake/Output Summary (Last 24 hours) at 12/7/2020 1548  Last data filed at 12/7/2020 1040  Gross per 24 hour   Intake 1774 ml   Output 2530 ml   Net -756 ml        Cardiographics    Telemetry: paced  ECG: paced    Echocardiogram: 7/2020  · Normal cavity size. Moderate concentric hypertrophy. Estimated left ventricular ejection fraction is 40 - 45%. Visually measured ejection fraction. No regional wall motion abnormality noted. · Moderately dilated left atrium. · Pacer/ICD present. · Aortic valve leaflet calcification present with reduced excursion.  Aortic valve mean gradient is 14 mmHg. Mild aortic valve stenosis is present. Mild aortic valve regurgitation is present. · Mitral valve non-specific thickening. Trace mitral valve regurgitation is present. · Mild tricuspid valve regurgitation is present. CXRAY: no acute process       Assessment:       Active Problems:    Seizure ([address] (11/17/2020)      Altered mental status (11/18/2020)      Post-ictal state ([address] (11/18/2020)         Plan:     Chronic systolic Heart Failure: (EF 40-45%)  No evidence of heart failure, patient is in no distress  Limited echo to further evaluate EF and valves   Agree with lasix, 2.5L/24 hours since starting  Monitor daily weights, labs, and I/Os.    Consider adding ACEI/ARB  No further cardiology evaluation at this time    Thank you for consulting RAY Rosario NP

## 2020-12-07 NOTE — PROGRESS NOTES
Problem: Falls - Risk of  Goal: *Absence of Falls  Description: Document Alia Hays Fall Risk and appropriate interventions in the flowsheet. Outcome: Progressing Towards Goal  Note: Fall Risk Interventions:       Mentation Interventions: Bed/chair exit alarm    Medication Interventions: Bed/chair exit alarm    Elimination Interventions: Bed/chair exit alarm    History of Falls Interventions: Bed/chair exit alarm         Problem: Falls - Risk of  Goal: *Absence of Falls  Description: Document Sole Fall Risk and appropriate interventions in the flowsheet. Outcome: Progressing Towards Goal  Note: Fall Risk Interventions:       Mentation Interventions: Bed/chair exit alarm    Medication Interventions: Bed/chair exit alarm    Elimination Interventions: Bed/chair exit alarm    History of Falls Interventions: Bed/chair exit alarm         Problem: Pressure Injury - Risk of  Goal: *Prevention of pressure injury  Description: Document Torito Scale and appropriate interventions in the flowsheet.   Outcome: Progressing Towards Goal  Note: Pressure Injury Interventions:  Sensory Interventions: Assess changes in LOC    Moisture Interventions: Minimize layers    Activity Interventions: Assess need for specialty bed    Mobility Interventions: Assess need for specialty bed    Nutrition Interventions: Document food/fluid/supplement intake    Friction and Shear Interventions: Minimize layers                Problem: Seizure Disorder (Adult)  Goal: *STG: Remains free of seizure activity  Outcome: Progressing Towards Goal  Goal: *STG: Maintains lab values within therapeutic range  Outcome: Progressing Towards Goal  Goal: *STG/LTG: Complies with medication therapy  Outcome: Progressing Towards Goal  Goal: *STG: Remains free of injury during seizure activity  Outcome: Progressing Towards Goal  Goal: *STG: Remains safe in hospital  Outcome: Progressing Towards Goal  Goal: Interventions  Outcome: Progressing Towards Goal

## 2020-12-07 NOTE — PROGRESS NOTES
Spiritual Care Assessment/Progress Note  [facility]      NAME: Cecy Vaughn      MRN: [id number]  AGE: 80 y.o. SEX: male  Rastafari Affiliation: Jewish   Language: English     12/7/2020     Total Time (in minutes): 11     Spiritual Assessment begun in [facility] through conversation with:         []Patient        [] Family    [] Friend(s)        Reason for Consult: Initial visit     Spiritual beliefs: (Please include comment if needed)     [] Identifies with a jing tradition:         [] Supported by a jing community:            [] Claims no spiritual orientation:           [] Seeking spiritual identity:                [] Adheres to an individual form of spirituality:           [x] Not able to assess:                           Identified resources for coping:      [] Prayer                               [] Music                  [] Guided Imagery     [] Family/friends                 [] Pet visits     [] Devotional reading                         [x] Unknown     [] Other:                                               Interventions offered during this visit: (See comments for more details)    Patient Interventions: Initial visit           Plan of Care:     [] Support spiritual and/or cultural needs    [] Support AMD and/or advance care planning process      [] Support grieving process   [] Coordinate Rites and/or Rituals    [] Coordination with community clergy   [] No spiritual needs identified at this time   [] Detailed Plan of Care below (See Comments)  [] Make referral to Music Therapy  [] Make referral to Pet Therapy     [] Make referral to Addiction services  [] Make referral to [facility]  [] Make referral to Spiritual Care Partner  [] No future visits requested        [] Follow up upon further referrals     Comments: Attempted Initial Spiritual Assessment for this pt in [place] [address]. Pt was unable to be assessed at this time.   No family/friends present at time of visit. Consulted pt's bedside RN who reported that pt is not oriented to self or surroundings. Pt does receive support from a son and a dtr who alternate visitation every day. Contact Spiritual Care Services for any spiritual or emotional support needs. Maryann Chisholm MDiv.  Staff   Request  Support/Spiritual Care Services via [address] [place] [address]

## 2020-12-07 NOTE — PROGRESS NOTES
OT Note:    Chart reviewed. Pt with troponin trending up and new cardiology consult. Pt with new UE duplex ordered. Will defer at this time and continue to follow.     [facility], OTR/L

## 2020-12-08 ENCOUNTER — TELEPHONE (OUTPATIENT)
Dept: FAMILY MEDICINE CLINIC | Age: 85
End: 2020-12-08

## 2020-12-08 ENCOUNTER — APPOINTMENT (OUTPATIENT)
Dept: NON INVASIVE DIAGNOSTICS | Age: 85
DRG: 057 | End: 2020-12-08
Attending: NURSE PRACTITIONER
Payer: MEDICARE

## 2020-12-08 LAB
ECHO AO ROOT DIAM: 3.3 CM
ECHO AV AREA PEAK VELOCITY: 0.88 CM2
ECHO AV AREA VTI: 0.85 CM2
ECHO AV AREA/BSA PEAK VELOCITY: 0.5 CM2/M2
ECHO AV AREA/BSA VTI: 0.4 CM2/M2
ECHO AV MEAN GRADIENT: 18.97 MMHG
ECHO AV PEAK GRADIENT: 32.83 MMHG
ECHO AV PEAK VELOCITY: 285.28 CM/S
ECHO AV VTI: 54.86 CM
ECHO LA MAJOR AXIS: 2.46 CM
ECHO LA MINOR AXIS: 1.27 CM
ECHO LV EDV A4C: 100.14 ML
ECHO LV EDV INDEX A4C: 51.9 ML/M2
ECHO LV EJECTION FRACTION A4C: 28 PERCENT
ECHO LV ESV A4C: 72.56 ML
ECHO LV ESV INDEX A4C: 37.6 ML/M2
ECHO LV INTERNAL DIMENSION DIASTOLIC: 4.61 CM (ref 4.2–5.9)
ECHO LV INTERNAL DIMENSION SYSTOLIC: 2.88 CM
ECHO LV IVSD: 1.47 CM (ref 0.6–1)
ECHO LV MASS 2D: 275.7 G (ref 88–224)
ECHO LV MASS INDEX 2D: 142.9 G/M2 (ref 49–115)
ECHO LV POSTERIOR WALL DIASTOLIC: 1.46 CM (ref 0.6–1)
ECHO LVOT CARDIAC OUTPUT: 3.44 LITER/MINUTE
ECHO LVOT DIAM: 1.77 CM
ECHO LVOT PEAK GRADIENT: 4.16 MMHG
ECHO LVOT PEAK VELOCITY: 102.02 CM/S
ECHO LVOT SV: 46.5 ML
ECHO LVOT VTI: 18.85 CM
GLUCOSE BLD STRIP.AUTO-MCNC: 132 MG/DL (ref 65–100)
GLUCOSE BLD STRIP.AUTO-MCNC: 137 MG/DL (ref 65–100)
GLUCOSE BLD STRIP.AUTO-MCNC: 137 MG/DL (ref 65–100)
GLUCOSE BLD STRIP.AUTO-MCNC: 138 MG/DL (ref 65–100)
GLUCOSE BLD STRIP.AUTO-MCNC: 149 MG/DL (ref 65–100)
GLUCOSE BLD STRIP.AUTO-MCNC: 158 MG/DL (ref 65–100)
HEALTH STATUS, XMCV2T: NORMAL
LVOT MG: 1.96 MMHG
SARS-COV-2, COV2: NOT DETECTED
SERVICE CMNT-IMP: ABNORMAL
SOURCE, COVRS: NORMAL
SPECIMEN SOURCE, FCOV2M: NORMAL
SPECIMEN TYPE, XMCV1T: NORMAL

## 2020-12-08 PROCEDURE — 74011636637 HC RX REV CODE- 636/637: Performed by: INTERNAL MEDICINE

## 2020-12-08 PROCEDURE — 82962 GLUCOSE BLOOD TEST: CPT

## 2020-12-08 PROCEDURE — 74011250636 HC RX REV CODE- 250/636: Performed by: PSYCHIATRY & NEUROLOGY

## 2020-12-08 PROCEDURE — 74011250636 HC RX REV CODE- 250/636: Performed by: INTERNAL MEDICINE

## 2020-12-08 PROCEDURE — 93308 TTE F-UP OR LMTD: CPT

## 2020-12-08 PROCEDURE — 74011000258 HC RX REV CODE- 258: Performed by: PSYCHIATRY & NEUROLOGY

## 2020-12-08 PROCEDURE — 74011250637 HC RX REV CODE- 250/637: Performed by: INTERNAL MEDICINE

## 2020-12-08 PROCEDURE — 74011250636 HC RX REV CODE- 250/636: Performed by: NURSE PRACTITIONER

## 2020-12-08 PROCEDURE — 74011250637 HC RX REV CODE- 250/637: Performed by: PSYCHIATRY & NEUROLOGY

## 2020-12-08 PROCEDURE — 93308 TTE F-UP OR LMTD: CPT | Performed by: INTERNAL MEDICINE

## 2020-12-08 PROCEDURE — 97535 SELF CARE MNGMENT TRAINING: CPT

## 2020-12-08 PROCEDURE — 65660000000 HC RM CCU STEPDOWN

## 2020-12-08 RX ADMIN — FAMOTIDINE 20 MG: 20 TABLET, FILM COATED ORAL at 09:58

## 2020-12-08 RX ADMIN — ENOXAPARIN SODIUM 40 MG: 40 INJECTION SUBCUTANEOUS at 09:58

## 2020-12-08 RX ADMIN — INSULIN LISPRO 2 UNITS: 100 INJECTION, SOLUTION INTRAVENOUS; SUBCUTANEOUS at 17:48

## 2020-12-08 RX ADMIN — FUROSEMIDE 40 MG: 10 INJECTION, SOLUTION INTRAMUSCULAR; INTRAVENOUS at 09:59

## 2020-12-08 RX ADMIN — LEVETIRACETAM 750 MG: 100 INJECTION, SOLUTION INTRAVENOUS at 01:56

## 2020-12-08 RX ADMIN — DIBASIC SODIUM PHOSPHATE, MONOBASIC POTASSIUM PHOSPHATE AND MONOBASIC SODIUM PHOSPHATE 1 TABLET: 852; 155; 130 TABLET ORAL at 17:17

## 2020-12-08 RX ADMIN — LEVETIRACETAM 750 MG: 500 TABLET ORAL at 13:17

## 2020-12-08 RX ADMIN — METOPROLOL TARTRATE 12.5 MG: 25 TABLET, FILM COATED ORAL at 17:17

## 2020-12-08 RX ADMIN — HYDRALAZINE HYDROCHLORIDE 100 MG: 25 TABLET, FILM COATED ORAL at 21:14

## 2020-12-08 RX ADMIN — HYDRALAZINE HYDROCHLORIDE 100 MG: 25 TABLET, FILM COATED ORAL at 17:17

## 2020-12-08 RX ADMIN — DIBASIC SODIUM PHOSPHATE, MONOBASIC POTASSIUM PHOSPHATE AND MONOBASIC SODIUM PHOSPHATE 1 TABLET: 852; 155; 130 TABLET ORAL at 10:10

## 2020-12-08 RX ADMIN — LEVETIRACETAM 750 MG: 500 TABLET ORAL at 21:14

## 2020-12-08 RX ADMIN — INSULIN LISPRO 2 UNITS: 100 INJECTION, SOLUTION INTRAVENOUS; SUBCUTANEOUS at 06:54

## 2020-12-08 RX ADMIN — AMLODIPINE BESYLATE 10 MG: 5 TABLET ORAL at 09:59

## 2020-12-08 RX ADMIN — ATORVASTATIN CALCIUM 40 MG: 40 TABLET, FILM COATED ORAL at 21:13

## 2020-12-08 RX ADMIN — METOPROLOL TARTRATE 12.5 MG: 25 TABLET, FILM COATED ORAL at 09:59

## 2020-12-08 RX ADMIN — ASPIRIN 81 MG CHEWABLE TABLET 81 MG: 81 TABLET CHEWABLE at 09:59

## 2020-12-08 RX ADMIN — HYDRALAZINE HYDROCHLORIDE 100 MG: 25 TABLET, FILM COATED ORAL at 09:59

## 2020-12-08 RX ADMIN — Medication 10 ML: at 06:28

## 2020-12-08 RX ADMIN — Medication 10 ML: at 13:17

## 2020-12-08 RX ADMIN — Medication 10 ML: at 21:14

## 2020-12-08 NOTE — PROGRESS NOTES
End of Shift Note    Bedside shift change report given to Yani Larios (oncoming nurse) by Carmen Celeste (offgoing nurse).   Report included the following information SBAR, Intake/Output and MAR    Shift worked:  2143-2683   Shift summary and any significant changes:     none       Concerns for physician to address:  none   Zone phone for oncoming shift:   [phone]     Patient Information  Unique Zaldivar  80 y.o.  11/17/2020  4:20 PM by Vannesa Muller MD. Unique Zaldivar was admitted from Home    Problem List  Patient Active Problem List    Diagnosis Date Noted    Chronic systolic heart failure ([address] 12/07/2020    Altered mental status 11/18/2020    Post-ictal state ([address] 11/18/2020    Seizure ([address] 11/17/2020    Atrial pacemaker lead displacement 10/18/2019    Pacemaker 03/22/2019    Bradycardia 03/19/2019    Elevated troponin 03/19/2019    Hypertensive urgency 03/19/2019    Second degree AV block, Mobitz type I 03/19/2019    Stage 3 chronic kidney disease 03/15/2019    Rotator cuff arthropathy of both shoulders 03/15/2019    Cognitive impairment 46/57/5486    Systolic murmur 31/38/4105    Essential hypertension 12/17/2018    Gout 12/17/2018    Pure hypercholesterolemia 12/17/2018    History of prostate cancer 12/17/2018    Chronic constipation 12/17/2018    Dysuria 08/08/2017    Weight loss 08/08/2017    Adrenal mass ([address] 07/18/2017     Past Medical History:   Diagnosis Date    Cancer [facility])     prostate    Chronic systolic heart failure ([address] 12/7/2020    Constipation     Gout     Hyperlipemia     Hypertension     Pacemaker 2019    Stroke ([address]     Thrombocytopenia ([address] 3/19/2019    TIA (transient ischemic attack) 2013       Core Measures:  CVA: No No  CHF:No No  PNA:No No    Activity:  Activity Level: Bed Rest  Number times ambulated in hallways past shift: 0  Number of times OOB to chair past shift: 0    Cardiac:   Cardiac Monitoring: Yes      Cardiac Rhythm: Paced    Access: Current line(s): PIV       Genitourinary:   Urinary status: incontinent and external catheter      Respiratory:   O2 Device: Room air  Chronic home O2 use?: NO  Incentive spirometer at bedside: NO       GI:  Last Bowel Movement Date: 12/05/20  Current diet:  DIET NPO  DIET TUBE FEEDING  Passing flatus: YES  Tolerating current diet: YES       Pain Management:   Patient states pain is manageable on current regimen: N/A    Skin:  Torito Score: 11  Interventions: turn team, float heels, limit briefs and internal/external urinary devices    Patient Safety:  Fall Score:  Total Score: 4  Interventions: bed/chair alarm, assistive device (walker, cane, etc), gripper socks, pt to call before getting OOB, stay with me (per policy) and gait belt  High Fall Risk: Yes    DVT prophylaxis:  DVT prophylaxis Med- Yes  DVT prophylaxis SCD or BEV- No     Wounds: (If Applicable)  Wounds- Yes  Location cracked crack    Active Consults:  IP CONSULT TO NEUROLOGY  IP CONSULT TO PALLIATIVE CARE - PROVIDER  IP CONSULT TO GASTROENTEROLOGY  IP CONSULT TO CARDIOLOGY    Length of Stay:  Expected LOS: 4d 7h  Actual LOS: 20  Discharge Plan: Yes- [facility],[address]

## 2020-12-08 NOTE — PROGRESS NOTES
Problem: Falls - Risk of  Goal: *Absence of Falls  Description: Document Cordelia Manjarrez Fall Risk and appropriate interventions in the flowsheet.   Outcome: Progressing Towards Goal  Note: Fall Risk Interventions:       Mentation Interventions: Bed/chair exit alarm    Medication Interventions: Bed/chair exit alarm    Elimination Interventions: Call light in reach, Bed/chair exit alarm    History of Falls Interventions: Bed/chair exit alarm

## 2020-12-08 NOTE — PROGRESS NOTES
ARIELLE Plan:     * Return to [facility] ([facility]) with home infusion for TF, [facility] (RN/PT/OT/SLP/HH Aid), DME (transport chair) provided by [facility], & f/u apts      > CM sent referral via [employer] to [facility] for TF nutrition; referral pending  > Request sent to CM specialist to secure PCP f/u apt for d/c; request pending  > COVID-19 test completed 12/7/2020; results negative  > CM to secure medical transport for d/c  > 2nd IM prior to d/c    5:19 PM: CM checked on the status of the referral sent to [facility] for TF nutrition; referral currently under review. 3:38 PM: CM received update that [facility] is able to provide requested DME (transport chair) for d/c. CM requested for [facility] to coordinate delivery with Ms. Jimmy Morales. CM placed [facility] in pt's AVS for reference. CM sent request to CM specialist to secure PCP f/u apt for d/c; request pending. 11:56 AM: CM sent referral via [employer] to [facility] for review; referral pending. CM contacted [facility] liaison Adithya Interiano: [phone]) to f/u on referral; CM reached voicemail and requested call back. CM updated delay in d/c to reflect current barriers preventing pt from transitioning out of [facility].    11:29 AM: CM received update that [facility] is able to provide requested services at d/c; CM placed agencies information in pt's AVS for reference. Daughter agreeable to DME (transport chair). CM sent referral via [employer] to [facility] for review; referral pending. CM noted pt's COVID-19 test completed 12/7/2020 resulted negatively. CM to send referral via [employer] to [facility] for TF nutrition/supplies.     Initial note: CM reviewed pt's chart and noted updates prior to moving forward with d/c planning. CM contacted pt's daughter Janay Roberts: [phone]) to check in and discuss [facility] plan for d/c. Daughter reported that she spoke with the owner (Ms. Mallorie Hills) at [facility] regarding pt's return to facility. Per daughter, [facility] is willing to accept pt back at his current level of function. In addition, [facility] informed daughter that they would assist in managing pt's TF upon his return to facility. Daughter provided verbal consent for the [facility] Infusion company to coordinate directly with [facility] regarding TF. Daughter reiterated that she would also like [facility] services secured through [facility] contracted provider. CM offered [facility] & received daughter's verbal consent to send referral to [facility] preferred [facility] provider. Copy of FOC to be placed on chart. CM inquired if pt's daughter had any immediate questions, concerns, or needs related to the d/c plan; daughter declined. CM contacted [address] [place] [address] [facility] owner (Ms. Mallorie Hills [phone]) to check in and discuss pt's transition back to facility. Ms. Mallorie Hills confirmed that pt can return to [facility] at d/c. CM provided insight regarding pt's current level of function (requiring total assistance for rolling, scooting, & general bed mobility, unable to follow commands, unable to complete functional tasks, etc.); Ms. Mallorie Hills verbalized understanding reporting \"we're going to give it a try. \" Ms. Mallorie Hills reported that she is willing to assist in managing pt's TF & requested for any education to take place with her; CM acknowledged request. Ms. Mallorie Hills requested CM's assistance securing [facility] through [address] [place] [address] for d/c; CM to complete request. Ms. Mallorie Hills also requested for CM's assistance securing a transport chair for d/c; CM to consult with pt's daughter regarding DME request prior to sending referrals. CM inquired if Ms. Mallorie Hills had any other DME needs for d/c; Ms. Janet Tracey declined. CM requested for Ms. Janet Tracey to confirm the physical address for [facility]; Ms Janet Tracey confirmed address as [address], [place] [address]. CM will update pt's address in chart to reflect current information. Ms. Janet Tracey reiterated that pt will need a COVID-19 test completed within 72 hours of d/c; CM acknowledged request. Pt had a COVID-19 test completed 12/7/2020 in anticipation for d/c; results pending. [facility] is actively working to solidify [address] for d/c & will report updates as they become available.     Governor Nela, MSW  Care Manager, [facility]

## 2020-12-08 NOTE — PROGRESS NOTES
End of Shift Note    Bedside shift change report given to [facility], RN  (oncoming nurse) by Rolando Andres (offgoing nurse). Report included the following information SBAR    Shift worked:  9111-5127   Shift summary and any significant changes: All meds given.    Concerns for physician to address:  None   Zone phone for oncoming shift:   0601     Patient Information  Neeraj Uriarte  80 y.o.  11/17/2020  4:20 PM by Dontae Comer MD. Neeraj Uriarte [facility]    Problem List  Patient Active Problem List    Diagnosis Date Noted    Chronic systolic heart failure ([address] 12/07/2020    Altered mental status 11/18/2020    Post-ictal state ([address] 11/18/2020    Seizure ([address] 11/17/2020    Atrial pacemaker lead displacement 10/18/2019    Pacemaker 03/22/2019    Bradycardia 03/19/2019    Elevated troponin 03/19/2019    Hypertensive urgency 03/19/2019    Second degree AV block, Mobitz type I 03/19/2019    Stage 3 chronic kidney disease 03/15/2019    Rotator cuff arthropathy of both shoulders 03/15/2019    Cognitive impairment 63/89/6781    Systolic murmur 89/94/2399    Essential hypertension 12/17/2018    Gout 12/17/2018    Pure hypercholesterolemia 12/17/2018    History of prostate cancer 12/17/2018    Chronic constipation 12/17/2018    Dysuria 08/08/2017    Weight loss 08/08/2017    Adrenal mass ([address] 07/18/2017     Past Medical History:   Diagnosis Date    Cancer [facility])     prostate    Chronic systolic heart failure ([address] 12/7/2020    Constipation     Gout     Hyperlipemia     Hypertension     Pacemaker 2019    Stroke ([address]     Thrombocytopenia ([address] 3/19/2019    TIA (transient ischemic attack) 2013       Core Measures:  CVA: No No  CHF:No No  PNA:No No    Activity:  Activity Level: Bed Rest  Number times ambulated in hallways past shift: 0  Number of times OOB to chair past shift: 0    Cardiac:   Cardiac Monitoring: Yes      Cardiac Rhythm: Paced    Access: Current line(s): PIV     Genitourinary:   Urinary status: incontinent      Respiratory:   O2 Device: Room air  Chronic home O2 use?: NO  Incentive spirometer at bedside: NO       GI:  Last Bowel Movement Date: 12/05/20  Current diet:  DIET NPO  DIET TUBE FEEDING  Passing flatus: YES  Tolerating current diet: YES       Pain Management:   Patient states pain is manageable on current regimen: N/A    Skin:  Torito Score: 11  Interventions: internal/external urinary devices    Patient Safety:  Fall Score:  Total Score: 4  Interventions: bed/chair alarm  High Fall Risk: Yes    DVT prophylaxis:  DVT prophylaxis Med- Yes  DVT prophylaxis SCD or BEV- No     Wounds: (If Applicable)  Wounds- Yes  Location: open crack on sacrum     Active Consults:  IP CONSULT TO NEUROLOGY  IP CONSULT TO PALLIATIVE CARE - PROVIDER  IP CONSULT TO GASTROENTEROLOGY  IP CONSULT TO CARDIOLOGY    Length of Stay:  Expected LOS: 4d 7h  Actual LOS: 20  Discharge Plan: No: CITLALLI Doyle

## 2020-12-08 NOTE — PROGRESS NOTES
Speech path discharge note  Patient has been refusing to take po for several weeks. He now has a PEG and is being properly nourished. Will discontinue speech path services. Please only reconsult if he asks for food/liquid and does not pull away from an empty spoon when offered. He pulls away from the spoon now and shows no interest in eating or drinking.    Kelechi Longoria, SLP

## 2020-12-08 NOTE — TELEPHONE ENCOUNTER
Mariusz Chen from [address] [place] [address] wants to know if provider is willing to follow-up with patient when he be discharged from the hospital on tomorrow 12/09/2020. Requesting a call back.       Best call back #[phone]

## 2020-12-08 NOTE — PROGRESS NOTES
Pharmacy IV to PO Conversion Program    Medication: levetiracetam  Indication: seizure      Impression/Plan: IV to PO per protocol, dosing is equivalent     Thanks  Noé Clay PHARMD    [email or web address]

## 2020-12-08 NOTE — PROGRESS NOTES
Hospitalist Progress Note    NAME: Neeraj Uriarte   :  3/9/1933   MRN:  [id number]     I reviewed pertinent labs and imaging, and discussed /agreed on the plan of care with Dr. Yuliet Paul. Assessment / Plan:  New Onset Seizures   Acute Encephalopathy in Setting of Worsening Dementia   History of Multiple CVAs (recent bilateral occiptal infarcts 2020)  · Patient had 5 minute seizure at LTC, seized in ED and was given ativan   · S/p Keppra loading dose, decreased dose to 750mg BID with sedation   · EEG on adm - showed no seizure, repeat EEG showed potential seizure   · MRI unable to be completed due to confusion and pacemaker - unable to verbalize if issues with pacer arise during MRI   · Repeat CT brain remains unchanged  · Appreciate Neurology input   · Continue Seizure precautions   · PRN ativan for seizures and agitation     Debility   Dysphagia d/t Acute Metabolic Encephalopathy and Dementia   S/p PEG tube placement   · Appreciate GI and Nutrition input   · Patient will require internal tube feeding for greater than 90 days. · Tolerating TF goal of 50 mL/hr of Jevity 1.5 with 150 mL water flushes q 4 hours   · Follow electrolytes and for refeeding syndrome   · COVID screening NEGATIVE   · Plan to d/c tomorrow back to facility     Upper Extremity Edema, likely r/t fluid overload - resolving  Chronic Systolic HF - not in exacerbation   Sinus Bradycardia with 2:1 block, POA   S/p Pacemaker   · Unable to complete MRI with pacer here r/t confusion  · ECHO  - EFT 55-60%.  Normal cavity size and systolic function  · Continue amlodipine, ASA, atovastatin, lasix hydralazine, metoprolol  · Continue daily weights and strick I&Os  · Appreciate Cardiology input     UTI on    · Urine Culture PROTEUS MIRABILIS   · Completed course of ceftriaxone      Acute Kidney Injury on CKD - resolved   Hypokalemia - resolved   Gout Allopurinol held on adm with SOULEYMANE     25.0 - 29.9 Overweight / Body mass index is 29.68 kg/m². Code status: Full  Prophylaxis: Lovenox  Recommended Disposition: SNF/LTC     Subjective:     Chief Complaint / Reason for Physician Visit  Patient seen at bedside, no information obtained. Patient is confused and a poor historian. Discussed with RN events overnight. Review of Systems:  Symptom Y/N Comments  Symptom Y/N Comments   Fever/Chills    Chest Pain     Poor Appetite    Edema     Cough    Abdominal Pain     Sputum    Joint Pain     SOB/CARREON    Pruritis/Rash     Nausea/vomit    Tolerating PT/OT     Diarrhea    Tolerating Diet     Constipation    Other       Could NOT obtain due to: dementia     Objective:     VITALS:   Last 24hrs VS reviewed since prior progress note. Most recent are:  Patient Vitals for the past 24 hrs:   Temp Pulse Resp BP SpO2   12/08/20 1553 98.9 °F (37.2 °C) 75 18 120/61 97 %   12/08/20 1210 98.9 °F (37.2 °C) 69 17 (!) 115/59 97 %   12/08/20 0747 98.4 °F (36.9 °C) 73  (!) 128/50 99 %   12/08/20 0324 98.4 °F (36.9 °C) 70 17 (!) 117/59    12/07/20 2309 99.3 °F (37.4 °C) 77 18 (!) 124/53    12/07/20 2027 99.1 °F (37.3 °C) 84 18 114/65        Intake/Output Summary (Last 24 hours) at 12/8/2020 1643  Last data filed at 12/8/2020 1553  Gross per 24 hour   Intake 330 ml   Output 1000 ml   Net -670 ml        I had a face to face encounter and independently examined this patient on 12/8/2020, as outlined below:  PHYSICAL EXAM:  General: WD, WN. Alert and confused AAM lying in bed     EENT:  EOMI. Anicteric sclerae. MMM  Resp:  CTA bilaterally, no wheezing or rales. No accessory muscle use  CV:  Regular  rhythm, BUE edema +1 pitting   GI:  Soft, Non distended, Non tender. +Bowel sounds  Neurologic:  Patient confused, alert and oriented x1   Psych:   Good insight. Not anxious nor agitated  Skin:  No rashes.   No jaundice    Reviewed most current lab test results and cultures  YES  Reviewed most current radiology test results   YES  Review and summation of old records today    NO  Reviewed patient's current orders and MAR    YES  PMH/SH reviewed - no change compared to H&P  ________________________________________________________________________  Care Plan discussed with:    Comments   Patient x    Family      RN x    Care Manager x    Consultant                        Multidiciplinary team rounds were held today with , nursing, pharmacist and clinical coordinator. Patient's plan of care was discussed; medications were reviewed and discharge planning was addressed. ________________________________________________________________________  Total NON critical care TIME:  25   Minutes    Total CRITICAL CARE TIME Spent:   Minutes non procedure based      Comments   >50% of visit spent in counseling and coordination of care x    ________________________________________________________________________  Chely Armstrong NP     Procedures: see electronic medical records for all procedures/Xrays and details which were not copied into this note but were reviewed prior to creation of Plan. LABS:  I reviewed today's most current labs and imaging studies.   Pertinent labs include:  Recent Labs     12/07/20  0506   WBC 9.6   HGB 12.0*   HCT 36.1*        Recent Labs     12/07/20  0506 12/06/20  0344   *  --    K 3.8  --      --    CO2 30  --    *  --    BUN 18  --    CREA 1.03  --    CA 8.9  --    MG 2.1 2.0   PHOS 3.0 2.8       Signed: Chely Armstrong NP

## 2020-12-08 NOTE — PROGRESS NOTES
End of Shift Note    Bedside shift change report given to  (oncoming nurse) by Corby Rendon (offgoing nurse).   Report included the following information SBAR    Shift worked:  0727-8977   Shift summary and any significant changes:    Cardiology consult, vascular imaging done, echo ordered, elevated troponin       Concerns for physician to address:  None   Zone phone for oncoming shift:   [phone]     Patient Information  Francisco Bills  80 y.o.  11/17/2020  4:20 PM by Gladis Woodard MD. Francisco Bills was admitted from Adult[facility]    Problem List  Patient Active Problem List    Diagnosis Date Noted    Chronic systolic heart failure ([address] 12/07/2020    Altered mental status 11/18/2020    Post-ictal state ([address] 11/18/2020    Seizure ([address] 11/17/2020    Atrial pacemaker lead displacement 10/18/2019    Pacemaker 03/22/2019    Bradycardia 03/19/2019    Elevated troponin 03/19/2019    Hypertensive urgency 03/19/2019    Second degree AV block, Mobitz type I 03/19/2019    Stage 3 chronic kidney disease 03/15/2019    Rotator cuff arthropathy of both shoulders 03/15/2019    Cognitive impairment 14/75/7049    Systolic murmur 61/74/0599    Essential hypertension 12/17/2018    Gout 12/17/2018    Pure hypercholesterolemia 12/17/2018    History of prostate cancer 12/17/2018    Chronic constipation 12/17/2018    Dysuria 08/08/2017    Weight loss 08/08/2017    Adrenal mass ([address] 07/18/2017     Past Medical History:   Diagnosis Date    Cancer [facility])     prostate    Chronic systolic heart failure ([address] 12/7/2020    Constipation     Gout     Hyperlipemia     Hypertension     Pacemaker 2019    Stroke ([address]     Thrombocytopenia ([address] 3/19/2019    TIA (transient ischemic attack) 2013       Core Measures:  CVA: No No  CHF:No No  PNA:No No    Activity:  Activity Level: Bed Rest  Number times ambulated in hallways past shift: 0  Number of times OOB to chair past shift: 0    Cardiac:   Cardiac Monitoring: Yes      Cardiac Rhythm: Paced    Access:   Current line(s): PIV     Genitourinary:   Urinary status: incontinent      Respiratory:   O2 Device: Room air  Chronic home O2 use?: NO  Incentive spirometer at bedside: NO       GI:  Last Bowel Movement Date: 12/05/20  Current diet:  DIET NPO  DIET TUBE FEEDING  Passing flatus: YES  Tolerating current diet: YES       Pain Management:   Patient states pain is manageable on current regimen: N/A    Skin:  Torito Score: 11  Interventions: internal/external urinary devices    Patient Safety:  Fall Score:  Total Score: 4  Interventions: bed/chair alarm  High Fall Risk: Yes    DVT prophylaxis:  DVT prophylaxis Med- Yes  DVT prophylaxis SCD or BEV- No     Wounds: (If Applicable)  Wounds- Yes  Location: open crack on sacrum     Active Consults:  IP CONSULT TO NEUROLOGY  IP CONSULT TO PALLIATIVE CARE - PROVIDER  IP CONSULT TO GASTROENTEROLOGY  IP CONSULT TO CARDIOLOGY    Length of Stay:  Expected LOS: 4d 7h  Actual LOS: 19  Discharge Plan: No: CITLALLI Escalera

## 2020-12-08 NOTE — PROGRESS NOTES
Problem: Self Care Deficits Care Plan (Adult)  Goal: *Acute Goals and Plan of Care (Insert Text)  Description: Problem: Self Care Deficits Care Plan (Adult)  Goal: *Acute Goals and Plan of Care (Insert Text)  Outcome: Not Progressing Towards Goal  Note:   FUNCTIONAL STATUS PRIOR TO ADMISSION: CM reports that family reports that patient came from [facility] where he was functional at [facility] level. (Unknown time frame for when he was last functional at  level but this is marked  different status previously reported that he was bed bound from LTC. Need further clarification. Clothing from admission examined and shoes are worn with \"recent food\" in soles, as if he has worn them in functional manner in \"recent\" past. Clothing looks worn as opposed to bed bound/LTC D patient. HOME SUPPORT: see above     Occupational Therapy Goals  Initiated 12/2/2020  1. Patient will open eyes on command with supervision/set-up within 7 day(s). 2.  Patient will open mouth for oral care on command 50%  with minimal assistance/contact guard assist within 7 day(s). 3.  Patient will perform R hand grasp release on command 50% with minimal assistance/contact guard assist within 7 day(s). 4.  Patient will perform L hand grasp release on command 50% with minimal assistance/contact guard assist within 7 day(s). 5.  Patient will tolerate hand over hand face washing max A  within 7 day(s). 6.  Patient will participate in B upper extremity therapeutic exercise/activities with maximal assistance for 1 minutes within 7 day(s).     7.  Patient will tolerate PROM to cervical neutral x 2 minutes with max A in 7 days        Outcome: Not Progressing Towards Goal       OCCUPATIONAL THERAPY TREATMENT  Patient: Reji Olivier (79 y.o. male)  Date: 12/8/2020  Diagnosis: Seizure ([facility] [R56.9]  Altered mental status [R41.82]  Post-ictal state ([facility] [R56.9]   <principal problem not specified>  Procedure(s) (LRB):  PERCUTANEOUS ENDOSCOPIC GASTROSTOMY TUBE INSERTION (N/A)  ESOPHAGOGASTRODUODENOSCOPY (EGD) (N/A) 8 Days Post-Op  Precautions: Bed Alarm, Fall, Aspiration, Skin(new PEG)  Chart, occupational therapy assessment, plan of care, and goals were reviewed. ASSESSMENT  Patient continues with skilled OT services and is not progressing towards goals. This patient O x 1, kept eyes closed during entire OT session, attempted hand over hand assist for face washing and oral care with damp mouth swab once both dependently placed in hand. He has aversion to stimuli and pushed away cloth and closed lips tightly with attempt at oral care. OT had to facilitate lip opening with oral swab for brief D mouth cleaning to exterior teeth only. Patient did not participate with task, did grasp items tightly once placed in hand, unable to get him to release items with verbal and tactile cues, had to manually remove wash cloth and oral swab from hand manually. Patient isn't following commands for functional tasks currently. Will require 24/7 A for ADL tasks, D x 2 for mobility. Will continue to benefit from OT trial at this time. Current Level of Function Impacting Discharge (ADLs): D for ADL    Other factors to consider for discharge:          PLAN :  Patient continues to benefit from skilled intervention to address the above impairments. Continue treatment per established plan of care. to address goals.     Recommend with staff:     Recommend next OT session:     Recommendation for discharge: (in order for the patient to meet his/her long term goals)  Occupational therapy at least 2 days/week in the home AND ensure assist and/or supervision for safety with staff training/DME recs, maybe tilt in space w/c with pressure reducing cushion    This discharge recommendation:  Has been made in collaboration with the attending provider and/or case management    IF patient discharges home will need the following DME: rec mechanical lift, eval for tilt in space w/c with pressure reducing cushion [facility] BRENDEN stated to  that they only needed a transport w/c for him. SUBJECTIVE:   Patient stated . only speaking jibberish during session    OBJECTIVE DATA SUMMARY:   Cognitive/Behavioral Status:  Neurologic State: Alert; Other (Comment)(nonverbal)  Orientation Level: Unable to verbalize                Functional Mobility and Transfers for ADLs:  Bed Mobility:       Transfers:             Balance:       ADL Intervention:       Grooming  Position Performed: (SUPINE)  Washing Face: Total assistance (dependent)(unable to facilitate hand over hand, aversion to stimuli)  Brushing Teeth: Total assistance (dependent)(with oral swab, oral aversion, unable to facilitate [place] A)                                  Therapeutic Exercises:       Pain:      Activity Tolerance:   Poor    After treatment patient left in no apparent distress:   Supine in bed and Side rails x 3    COMMUNICATION/COLLABORATION:   The patients plan of care was discussed with: Physical therapist and Case management.      Chelsea Rasmussen OTR/L  Time Calculation: 9 mins

## 2020-12-09 ENCOUNTER — TELEPHONE (OUTPATIENT)
Dept: FAMILY MEDICINE CLINIC | Age: 85
End: 2020-12-09

## 2020-12-09 LAB
ANION GAP SERPL CALC-SCNC: 4 MMOL/L (ref 5–15)
BASOPHILS # BLD: 0 K/UL (ref 0–0.1)
BASOPHILS NFR BLD: 0 % (ref 0–1)
BUN SERPL-MCNC: 28 MG/DL (ref 6–20)
BUN/CREAT SERPL: 25 (ref 12–20)
CALCIUM SERPL-MCNC: 8.7 MG/DL (ref 8.5–10.1)
CHLORIDE SERPL-SCNC: 98 MMOL/L (ref 97–108)
CO2 SERPL-SCNC: 33 MMOL/L (ref 21–32)
CREAT SERPL-MCNC: 1.1 MG/DL (ref 0.7–1.3)
DIFFERENTIAL METHOD BLD: ABNORMAL
EOSINOPHIL # BLD: 0.2 K/UL (ref 0–0.4)
EOSINOPHIL NFR BLD: 2 % (ref 0–7)
ERYTHROCYTE [DISTWIDTH] IN BLOOD BY AUTOMATED COUNT: 13.6 % (ref 11.5–14.5)
GLUCOSE BLD STRIP.AUTO-MCNC: 104 MG/DL (ref 65–100)
GLUCOSE BLD STRIP.AUTO-MCNC: 119 MG/DL (ref 65–100)
GLUCOSE BLD STRIP.AUTO-MCNC: 147 MG/DL (ref 65–100)
GLUCOSE BLD STRIP.AUTO-MCNC: 159 MG/DL (ref 65–100)
GLUCOSE BLD STRIP.AUTO-MCNC: 167 MG/DL (ref 65–100)
GLUCOSE SERPL-MCNC: 148 MG/DL (ref 65–100)
HCT VFR BLD AUTO: 33.9 % (ref 36.6–50.3)
HGB BLD-MCNC: 11.5 G/DL (ref 12.1–17)
IMM GRANULOCYTES # BLD AUTO: 0 K/UL (ref 0–0.04)
IMM GRANULOCYTES NFR BLD AUTO: 0 % (ref 0–0.5)
LYMPHOCYTES # BLD: 2.8 K/UL (ref 0.8–3.5)
LYMPHOCYTES NFR BLD: 27 % (ref 12–49)
MAGNESIUM SERPL-MCNC: 2.2 MG/DL (ref 1.6–2.4)
MCH RBC QN AUTO: 30.4 PG (ref 26–34)
MCHC RBC AUTO-ENTMCNC: 33.9 G/DL (ref 30–36.5)
MCV RBC AUTO: 89.7 FL (ref 80–99)
MONOCYTES # BLD: 0.9 K/UL (ref 0–1)
MONOCYTES NFR BLD: 9 % (ref 5–13)
NEUTS SEG # BLD: 6.4 K/UL (ref 1.8–8)
NEUTS SEG NFR BLD: 62 % (ref 32–75)
NRBC # BLD: 0 K/UL (ref 0–0.01)
NRBC BLD-RTO: 0 PER 100 WBC
PHOSPHATE SERPL-MCNC: 3.4 MG/DL (ref 2.6–4.7)
PLATELET # BLD AUTO: 254 K/UL (ref 150–400)
PMV BLD AUTO: 9.6 FL (ref 8.9–12.9)
POTASSIUM SERPL-SCNC: 3.9 MMOL/L (ref 3.5–5.1)
RBC # BLD AUTO: 3.78 M/UL (ref 4.1–5.7)
SERVICE CMNT-IMP: ABNORMAL
SODIUM SERPL-SCNC: 135 MMOL/L (ref 136–145)
WBC # BLD AUTO: 10.5 K/UL (ref 4.1–11.1)

## 2020-12-09 PROCEDURE — 82962 GLUCOSE BLOOD TEST: CPT

## 2020-12-09 PROCEDURE — 74011636637 HC RX REV CODE- 636/637: Performed by: INTERNAL MEDICINE

## 2020-12-09 PROCEDURE — 84100 ASSAY OF PHOSPHORUS: CPT

## 2020-12-09 PROCEDURE — 74011250637 HC RX REV CODE- 250/637: Performed by: INTERNAL MEDICINE

## 2020-12-09 PROCEDURE — 83735 ASSAY OF MAGNESIUM: CPT

## 2020-12-09 PROCEDURE — 74011250636 HC RX REV CODE- 250/636: Performed by: NURSE PRACTITIONER

## 2020-12-09 PROCEDURE — 74011250636 HC RX REV CODE- 250/636: Performed by: INTERNAL MEDICINE

## 2020-12-09 PROCEDURE — 74011250637 HC RX REV CODE- 250/637: Performed by: PSYCHIATRY & NEUROLOGY

## 2020-12-09 PROCEDURE — 36415 COLL VENOUS BLD VENIPUNCTURE: CPT

## 2020-12-09 PROCEDURE — 85025 COMPLETE CBC W/AUTO DIFF WBC: CPT

## 2020-12-09 PROCEDURE — 65660000000 HC RM CCU STEPDOWN

## 2020-12-09 PROCEDURE — 80048 BASIC METABOLIC PNL TOTAL CA: CPT

## 2020-12-09 PROCEDURE — 94760 N-INVAS EAR/PLS OXIMETRY 1: CPT

## 2020-12-09 RX ORDER — HYDRALAZINE HYDROCHLORIDE 100 MG/1
100 TABLET, FILM COATED ORAL 3 TIMES DAILY
Qty: 30 TAB | Refills: 0 | Status: SHIPPED
Start: 2020-12-09 | End: 2020-12-11

## 2020-12-09 RX ORDER — METOPROLOL TARTRATE 25 MG/1
12.5 TABLET, FILM COATED ORAL 2 TIMES DAILY
Qty: 30 TAB | Refills: 0 | Status: SHIPPED
Start: 2020-12-09 | End: 2020-12-11

## 2020-12-09 RX ORDER — AMLODIPINE BESYLATE 10 MG/1
10 TABLET ORAL DAILY
Qty: 30 TAB | Refills: 0 | Status: SHIPPED
Start: 2020-12-10 | End: 2020-12-11

## 2020-12-09 RX ORDER — LEVETIRACETAM 750 MG/1
750 TABLET ORAL EVERY 12 HOURS
Qty: 60 TAB | Refills: 0 | Status: SHIPPED
Start: 2020-12-09 | End: 2020-12-11

## 2020-12-09 RX ADMIN — Medication 10 ML: at 05:21

## 2020-12-09 RX ADMIN — METOPROLOL TARTRATE 12.5 MG: 25 TABLET, FILM COATED ORAL at 17:44

## 2020-12-09 RX ADMIN — ASPIRIN 81 MG CHEWABLE TABLET 81 MG: 81 TABLET CHEWABLE at 08:39

## 2020-12-09 RX ADMIN — LEVETIRACETAM 750 MG: 500 TABLET ORAL at 22:35

## 2020-12-09 RX ADMIN — HYDRALAZINE HYDROCHLORIDE 100 MG: 25 TABLET, FILM COATED ORAL at 22:35

## 2020-12-09 RX ADMIN — ATORVASTATIN CALCIUM 40 MG: 40 TABLET, FILM COATED ORAL at 22:35

## 2020-12-09 RX ADMIN — DIBASIC SODIUM PHOSPHATE, MONOBASIC POTASSIUM PHOSPHATE AND MONOBASIC SODIUM PHOSPHATE 1 TABLET: 852; 155; 130 TABLET ORAL at 08:38

## 2020-12-09 RX ADMIN — METOPROLOL TARTRATE 12.5 MG: 25 TABLET, FILM COATED ORAL at 08:38

## 2020-12-09 RX ADMIN — INSULIN LISPRO 2 UNITS: 100 INJECTION, SOLUTION INTRAVENOUS; SUBCUTANEOUS at 05:20

## 2020-12-09 RX ADMIN — DIBASIC SODIUM PHOSPHATE, MONOBASIC POTASSIUM PHOSPHATE AND MONOBASIC SODIUM PHOSPHATE 1 TABLET: 852; 155; 130 TABLET ORAL at 17:44

## 2020-12-09 RX ADMIN — HYDRALAZINE HYDROCHLORIDE 100 MG: 25 TABLET, FILM COATED ORAL at 17:44

## 2020-12-09 RX ADMIN — INSULIN LISPRO 2 UNITS: 100 INJECTION, SOLUTION INTRAVENOUS; SUBCUTANEOUS at 12:19

## 2020-12-09 RX ADMIN — FUROSEMIDE 40 MG: 10 INJECTION, SOLUTION INTRAMUSCULAR; INTRAVENOUS at 08:39

## 2020-12-09 RX ADMIN — AMLODIPINE BESYLATE 10 MG: 5 TABLET ORAL at 08:38

## 2020-12-09 RX ADMIN — ENOXAPARIN SODIUM 40 MG: 40 INJECTION SUBCUTANEOUS at 08:39

## 2020-12-09 RX ADMIN — LEVETIRACETAM 750 MG: 500 TABLET ORAL at 08:37

## 2020-12-09 RX ADMIN — Medication 10 ML: at 22:36

## 2020-12-09 RX ADMIN — Medication 10 ML: at 17:45

## 2020-12-09 RX ADMIN — HYDRALAZINE HYDROCHLORIDE 100 MG: 25 TABLET, FILM COATED ORAL at 08:38

## 2020-12-09 RX ADMIN — INSULIN LISPRO 2 UNITS: 100 INJECTION, SOLUTION INTRAVENOUS; SUBCUTANEOUS at 17:44

## 2020-12-09 RX ADMIN — FAMOTIDINE 20 MG: 20 TABLET, FILM COATED ORAL at 08:38

## 2020-12-09 NOTE — TELEPHONE ENCOUNTER
Luz Ulloa calling from [facility], stated patient is being discharge from the hospital today with tube feeding. Need to confirm that provider will follow patient at home.       Best call back # [phone]

## 2020-12-09 NOTE — DISCHARGE INSTRUCTIONS
Patient Education        Dementia: Care Instructions  Your Care Instructions     Dementia is a loss of mental skills that affects your daily life. It is different than the occasional trouble with memory that is part of aging. You may find it hard to remember things that you feel you should be able to remember. Or you may feel that your mind is just not working as well as usual.  Finding out that you have dementia is a shock. You may be afraid and worried about how the condition will change your life. Although there is no cure at this time, medicine may slow memory loss and improve thinking for a while. Other medicines may be able to help you sleep or cope with depression and behavior changes. Dementia often gets worse slowly. But it can get worse quickly. As dementia gets worse, it may become harder to do common things that take planning, like making a list and going shopping. Over time, the disease may make it hard for you to take care of yourself. Some people with dementia need others to help care for them. Dementia is different for everyone. You may be able to function well for a long time. In the early stage of the condition, you can do things at home to make life easier and safer. You also can keep doing your hobbies and other activities. Many people find comfort in planning now for their future needs. Follow-up care is a key part of your treatment and safety. Be sure to make and go to all appointments, and call your doctor if you are having problems. It's also a good idea to know your test results and keep a list of the medicines you take. How can you care for yourself at home? · Take your medicines exactly as prescribed. Call your doctor if you think you are having a problem with your medicine. · Eat healthy foods. Eat lots of whole grains, fruits, and vegetables every day.  If you are not hungry, try snacks or nutritional drinks such as Boost, Ensure, or Sustacal.  · If you have problems sleeping:  ? Try not to nap too close to your bedtime. ? Exercise regularly. Walking is a good choice. ? Try a glass of warm milk or caffeine-free herbal tea before bed. · Do tasks and activities during the time of day when you feel your best. It may help to develop a daily routine. · Post labels, lists, and sticky notes to help you remember things. Write your activities on a calendar you can easily find. Put your clock where you can easily see it. · Stay active. Take walks in familiar places, or with friends or loved ones. Try to stay active mentally too. Read and work crossword puzzles if you enjoy these activities. · Do not drive unless you can pass an on-road driving test. If you are not sure if you are safe to drive, your state 's license bureau can test you. · Keep a cordless phone and a flashlight with new batteries by your bed. If possible, put a phone in each of the main rooms of your house, or carry a cell phone in case you fall and cannot reach a phone. Or, you can wear a device around your neck or wrist. You push a button that sends a signal for help. Acknowledge your emotions and plan for the future  · Talk openly and honestly with your doctor. · Let yourself grieve. It is common to feel angry, scared, frustrated, anxious, or depressed. · Get emotional support from family, friends, a support group, or a counselor experienced in working with people who have dementia. · Ask for help if you need it. · Tell your doctor how you feel. You may feel upset, angry, or worried at times. Many things can cause this, including poor sleep, medicine side effects, confusion, and pain. Your doctor may be able to help you. · Plan for the future. ? Talk to your family and doctor about preparing a living will and other important papers while you can make decisions. These papers tell your doctors how to care for you at the end of your life.   ? Consider naming a person to make decisions about your care if you are not able to. When should you call for help? Call 911 anytime you think you may need emergency care. For example, call if:    · You are lost and do not know whom to call.     · You are injured and do not know whom to call. Call your doctor now or seek immediate medical care if:    · You are more confused or upset than usual.     · You feel like you could hurt yourself because your mind is not working well. Watch closely for changes in your health, and be sure to contact your doctor if you have any problems. Where can you learn more? Go to http://www.gray.com/  Enter S367 in the search box to learn more about \"Dementia: Care Instructions. \"  Current as of: January 31, 2020               Content Version: 12.6  © 7945-9564 [employer]. Care instructions adapted under license by [employer] (which disclaims liability or warranty for this information). If you have questions about a medical condition or this instruction, always ask your healthcare professional. [place] [address] any warranty or liability for your use of this information. Patient Education        Home Tube Feeding: Care Instructions  Your Care Instructions  Tube feeding is a way of providing nutrition and fluids through a tube into the stomach or intestines. The tube may be inserted through the skin and into the stomach during surgery, or it may go through the mouth or nose, down the throat, and then into the stomach. Tube feeding can nourish people who have a short illness that makes swallowing difficult or people who have a severe illness, and it may prolong life. Follow-up care is a key part of your treatment and safety. Be sure to make and go to all appointments, and call your doctor if you are having problems. It's also a good idea to know your test results and keep a list of the medicines you take. How can you care for yourself at home?   · Follow your doctor's instructions for use and care of the feeding tube. Your doctor will:  ? Tell you what tube feeding formula and fluids to put through the tube. ? Show you how to care for the skin around the tube. Be sure to follow instructions on keeping the area clean. ? Teach you how to watch for infection or blockage of the tube. ? Tell you what activities you can do. · Keep the formula in the refrigerator after opening it. Do not let the formula in the hanging bag sit out at room temperature for more than 8 hours. For the caregiver  · Wash your hands before handling the tube and formula. Wash the top of the can of formula before you open it. · Tube feedings that go into the stomach: The person you are caring for needs to be sitting up or have his or her head up during the feeding and for 30 minutes afterward. These feedings can be given in about 30 minutes, five or six times throughout a day. · Tube feedings that go into the intestine: The person you are caring for will have a pump that slowly pushes the formula into the intestine over several hours. This is often done at night. · If nausea, diarrhea, or stomach cramps happen during feeding, slow the rate that the formula comes through the tube. Then gradually increase the amount as the person can tolerate it. · Flush the tube with plain water after each feeding to keep it clean. Do not put anything other than formula or water through the tube unless your doctor has told you to. · Take care of yourself. ? Do not try to do everything yourself. Ask other family members to help, and find out what other types of help may be available. ? Eat well and get enough rest. Make sure you do not ignore your own health while you are caring for your loved one.  ? Schedule time for yourself. Get out of the house to do things you enjoy, run errands, or go shopping. When should you call for help?    Call your doctor now or seek immediate medical care if:    · You have signs of infection, such as:  ? Increased pain, swelling, warmth, or redness around the tube. ? Red streaks leading from the area where the tube is inserted. ? Pus draining from the tube area. ? A fever.     · The tube comes out or becomes blocked.     · You have nausea, vomiting, or diarrhea. Watch closely for changes in your health, and be sure to contact your doctor if:    · You have any problems with your feeding. Where can you learn more? Go to http://www.gray.com/  Enter N719 in the search box to learn more about \"Home Tube Feeding: Care Instructions. \"  Current as of: 2019               Content Version: 12.6  © 6572-7552 [employer] Care instructions adapted under license by [employer] (which disclaims liability or warranty for this information). If you have questions about a medical condition or this instruction, always ask your healthcare professional. [place] [address] any warranty or liability for your use of this information. HOSPITALIST DISCHARGE INSTRUCTIONS    NAME: Jaspreet Griffith   :  3/9/1933   MRN:  [id number]     Date/Time:  2020 11:20 AM    ADMIT DATE: 2020   DISCHARGE DATE: 2020     Attending Physician: Sekou Sesay NP    DISCHARGE DIAGNOSIS:    New Onset Seizures   Acute Encephalopathy in Setting of Worsening Dementia   History of Multiple CVAs   Debility   Dysphagia d/t Acute Metabolic Encephalopathy and Dementia   S/p PEG tube placement 22/3  Chronic Systolic HF  Sinus Bradycardia with 2:1 block  S/p Pacemaker   UTI  Acute Kidney Injury on CKD  Hypokalemia   Gout    Medications: Per above medication reconciliation. All medications should be crushed and given through PEG Tube.      Pain Management: per above medications    Recommended diet: Jevity 1.5 at 50mL/hr with 150 mL water flush every 4 hours     Recommended activity: Bedrest    Wound care: None    Indwelling devices:  PEG tube     Supplemental Oxygen: None    Required Lab work: Per SNF routine    Glucose management:  None    Code status: Full        Outside physician follow up: Follow-up Information     Follow up With Specialties Details Why Contact Info    Mariam Montalvo MD Family Medicine On 12/14/2020 For hospital follow up appointment at 3:00PM  [address] [id number]      [address], Infusion Therapy  This is your home health agency. If you don't hear from the office within 24-48 hours, please contact them directly. [address] [facility]  23 Located within [facility]  This is the provider of your transport chair. Call the office directly if you have any questions or concerns. [facility]  Hay Cobb MD Family Medicine In 1 week  [address] [place] [id number]      Dieudonne Mccann DO Neurology In 2 weeks  [address]  [phone]                 Skilled nursing facility/ SNF MD responsible for above on discharge. Information obtained by :  I understand that if any problems occur once I am at home I am to contact my physician. I understand and acknowledge receipt of the instructions indicated above.                                                                                                                                            Physician's or R.N.'s Signature                                                                  Date/Time                                                                                                                                              Patient or Repres

## 2020-12-09 NOTE — PROGRESS NOTES
End of Shift Note    Bedside shift change report given to La Mejia (oncoming nurse) by Sangeetha Patterson (offgoing nurse).   Report included the following information SBAR    Shift worked:  9904-7209   Shift summary and any significant changes:     None       Concerns for physician to address:  None   Zone phone for oncoming shift:   [phone]     Patient Information  Bertha Richardson  80 y.o.  11/17/2020  4:20 PM by Annabel Tubbs MD. Bertha Richardson was admitted from Adult[facility]Group Home    Problem List  Patient Active Problem List    Diagnosis Date Noted    Chronic systolic heart failure ([address] 12/07/2020    Altered mental status 11/18/2020    Post-ictal state ([address] 11/18/2020    Seizure ([address] 11/17/2020    Atrial pacemaker lead displacement 10/18/2019    Pacemaker 03/22/2019    Bradycardia 03/19/2019    Elevated troponin 03/19/2019    Hypertensive urgency 03/19/2019    Second degree AV block, Mobitz type I 03/19/2019    Stage 3 chronic kidney disease 03/15/2019    Rotator cuff arthropathy of both shoulders 03/15/2019    Cognitive impairment 61/08/7158    Systolic murmur 25/16/0151    Essential hypertension 12/17/2018    Gout 12/17/2018    Pure hypercholesterolemia 12/17/2018    History of prostate cancer 12/17/2018    Chronic constipation 12/17/2018    Dysuria 08/08/2017    Weight loss 08/08/2017    Adrenal mass ([address] 07/18/2017     Past Medical History:   Diagnosis Date    Cancer [facility])     prostate    Chronic systolic heart failure ([address] 12/7/2020    Constipation     Gout     Hyperlipemia     Hypertension     Pacemaker 2019    Stroke ([address]     Thrombocytopenia ([address] 3/19/2019    TIA (transient ischemic attack) 2013       Core Measures:  CVA: No No  CHF:No No  PNA:No No    Activity:  Activity Level: Bed Rest  Number times ambulated in hallways past shift: 0  Number of times OOB to chair past shift: 0    Cardiac:   Cardiac Monitoring: Yes      Cardiac Rhythm: Paced    Access:   Current line(s): PIV     Genitourinary:   Urinary status: incontinent and external catheter    Respiratory:   O2 Device: Room air  Chronic home O2 use?: N/A  Incentive spirometer at bedside: N/A       GI:  Last Bowel Movement Date: 12/05/20  Current diet:  DIET NPO  DIET TUBE FEEDING  Passing flatus: YES  Tolerating current diet: YES       Pain Management:   Patient states pain is manageable on current regimen: N/A    Skin:  Torito Score: 11  Interventions: float heels and internal/external urinary devices    Patient Safety:  Fall Score:  Total Score: 4  Interventions: bed/chair alarm  High Fall Risk: Yes    DVT prophylaxis:  DVT prophylaxis Med- Yes  DVT prophylaxis SCD or BEV- No     Wounds: (If Applicable)  Wounds- Yes  Location crack in crack; sores on toes       Active Consults:  IP CONSULT TO NEUROLOGY  IP CONSULT TO PALLIATIVE CARE - PROVIDER  IP CONSULT TO GASTROENTEROLOGY  IP CONSULT TO CARDIOLOGY    Length of Stay:  Expected LOS: 4d 7h  Actual LOS: 21  Discharge Plan: Yes; group home       Brian Be

## 2020-12-09 NOTE — PROGRESS NOTES
End of Shift Note    Bedside shift change report given to Kathreen Lefort (oncoming nurse) by Uvaldo Tubbs (offgoing nurse).   Report included the following information SBAR, Intake/Output and MAR    Shift worked:  1602-5182   Shift summary and any significant changes:     none       Concerns for physician to address:  none   Zone phone for oncoming shift:   [phone]     Patient Information  Bertha Richardson  80 y.o.  11/17/2020  4:20 PM by Annabel Tubbs MD. Bertha Richardson was admitted from Home    Problem List  Patient Active Problem List    Diagnosis Date Noted    Chronic systolic heart failure ([address] 12/07/2020    Altered mental status 11/18/2020    Post-ictal state ([address] 11/18/2020    Seizure ([address] 11/17/2020    Atrial pacemaker lead displacement 10/18/2019    Pacemaker 03/22/2019    Bradycardia 03/19/2019    Elevated troponin 03/19/2019    Hypertensive urgency 03/19/2019    Second degree AV block, Mobitz type I 03/19/2019    Stage 3 chronic kidney disease 03/15/2019    Rotator cuff arthropathy of both shoulders 03/15/2019    Cognitive impairment 14/19/0251    Systolic murmur 47/36/8145    Essential hypertension 12/17/2018    Gout 12/17/2018    Pure hypercholesterolemia 12/17/2018    History of prostate cancer 12/17/2018    Chronic constipation 12/17/2018    Dysuria 08/08/2017    Weight loss 08/08/2017    Adrenal mass ([address] 07/18/2017     Past Medical History:   Diagnosis Date    Cancer [facility])     prostate    Chronic systolic heart failure ([address] 12/7/2020    Constipation     Gout     Hyperlipemia     Hypertension     Pacemaker 2019    Stroke ([address]     Thrombocytopenia ([address] 3/19/2019    TIA (transient ischemic attack) 2013       Core Measures:  CVA: No No  CHF:No No  PNA:No No    Activity:  Activity Level: Bed Rest  Number times ambulated in hallways past shift: 0  Number of times OOB to chair past shift: 0    Cardiac:   Cardiac Monitoring: Yes      Cardiac Rhythm: Paced    Access: Current line(s): PIV       Genitourinary:   Urinary status: incontinent and external catheter      Respiratory:   O2 Device: Room air  Chronic home O2 use?: NO  Incentive spirometer at bedside: NO       GI:  Last Bowel Movement Date: 12/05/20  Current diet:  DIET NPO  DIET TUBE FEEDING  Passing flatus: YES  Tolerating current diet: YES       Pain Management:   Patient states pain is manageable on current regimen: N/A    Skin:  Torito Score: 11  Interventions: turn team, float heels, limit briefs and internal/external urinary devices    Patient Safety:  Fall Score:  Total Score: 4  Interventions: bed/chair alarm, assistive device (walker, cane, etc), gripper socks, pt to call before getting OOB, stay with me (per policy) and gait belt  High Fall Risk: Yes    DVT prophylaxis:  DVT prophylaxis Med- Yes  DVT prophylaxis SCD or BEV- No     Wounds: (If Applicable)  Wounds- Yes  Location cracked crack    Active Consults:  IP CONSULT TO NEUROLOGY  IP CONSULT TO PALLIATIVE CARE - PROVIDER  IP CONSULT TO GASTROENTEROLOGY  IP CONSULT TO CARDIOLOGY    Length of Stay:  Expected LOS: 4d 7h  Actual LOS: 21  Discharge Plan: Yes- [facility]

## 2020-12-09 NOTE — PROGRESS NOTES
Problem: Falls - Risk of  Goal: *Absence of Falls  Description: Document Bette Bark Fall Risk and appropriate interventions in the flowsheet. Outcome: Progressing Towards Goal  Note: Fall Risk Interventions:       Mentation Interventions: Bed/chair exit alarm    Medication Interventions: Bed/chair exit alarm    Elimination Interventions: Bed/chair exit alarm    History of Falls Interventions: Bed/chair exit alarm         Problem: Falls - Risk of  Goal: *Absence of Falls  Description: Document Sole Fall Risk and appropriate interventions in the flowsheet. Outcome: Progressing Towards Goal  Note: Fall Risk Interventions:       Mentation Interventions: Bed/chair exit alarm    Medication Interventions: Bed/chair exit alarm    Elimination Interventions: Bed/chair exit alarm    History of Falls Interventions: Bed/chair exit alarm         Problem: Pressure Injury - Risk of  Goal: *Prevention of pressure injury  Description: Document Torito Scale and appropriate interventions in the flowsheet.   Outcome: Progressing Towards Goal  Note: Pressure Injury Interventions:  Sensory Interventions: Minimize linen layers    Moisture Interventions: Minimize layers    Activity Interventions: Assess need for specialty bed    Mobility Interventions: Assess need for specialty bed    Nutrition Interventions: Document food/fluid/supplement intake    Friction and Shear Interventions: Minimize layers                Problem: Seizure Disorder (Adult)  Goal: *STG: Remains free of seizure activity  Outcome: Progressing Towards Goal  Goal: *STG: Maintains lab values within therapeutic range  Outcome: Progressing Towards Goal  Goal: *STG/LTG: Complies with medication therapy  Outcome: Progressing Towards Goal  Goal: *STG: Remains free of injury during seizure activity  Outcome: Progressing Towards Goal  Goal: *STG: Remains safe in hospital  Outcome: Progressing Towards Goal  Goal: Interventions  Outcome: Progressing Towards Goal

## 2020-12-09 NOTE — PROGRESS NOTES
ARIELLE Plan:     * Return to [facility] ([facility]) with home infusion for TF, [facility] HH (RN/PT/OT/SLP/HH Aid), DME (transport chair) provided by [employer], & f/u apts      > CM sent referral via [employer] to [facility] for TF nutrition; referral under review  > CM confirmed pt has PCP f/u apt secured for d/c; details in AVS  > COVID-19 test completed 12/7/2020; results were negative  > Pt on will call with [facility] for anticipated d/c today  > 2nd IM prior to d/c    4:44 PM: CM consulted with attending Sajan VILLANUEVA regarding delay in d/c; N.P verbalized understanding. 4:05 PM: CM received update from [facility] liaison liaison that she has still not heard back from pt's PCP. CM attempted to contact PCP office again; CM left a message with the  detailing need. CM met with pt's daughter at bedside to check in and discuss delay in d/c. Per daughter, she has attempted to contact the PCP office herself with no success. Per daughter, pt is active with his PCP, which is why the lapse in communication is concerning. CM consulted with CM , Henry Oviedo regarding delay in d/c. CM  reiterated that without a provider following the order, services can not be initiated. CM will relay update to attending MD. Mariela Woodard will re-attempt to coordinate d/c plan tomorrow (12/10/2020). CM contacted [facility]'s owner to report delay in d/c; CM reached voicemail & requested call back. 1:05 PM: CM received update from [facility] liaison that she is still waiting to hear back from PCP; no new updates at this point in time. 12:13 PM: CM contacted pt's PCP office ([phone]) in attempt to get in contact with Dr. Spencer Johnson and/or leave a message with office regarding TF order; CM remained on hold for an extended period of time before hanging up.     Initial note: CM acknowledged d/c. CM reviewed pt's chart and noted updates prior to moving forward with d/c planning. CM contacted [facility] liaison Milad Sanabria: [phone]) to f/u on the status of TF nutrition/supplies; Mary Johnson reported that she is still waiting to hear back from pt's PCP (Dr. Johnathan Shirley) regarding whether or not she will follow pt order upon d/c. Per Mary Johnson, she is not able to move forward with the order until she is able to confirm MD is following. Mary Johnson reported that she has already made contact with [employer] owner (Ms. Salvador Brown [phone]) regarding TF for d/c; Mary Johnson reported she will coordinate education session with Ms. Salvador Brown once able to confirm a provider is following the order. CM will attempt to contact PCP office to avoid further delay. CM will update delay in d/c to reflect current barriers preventing pt from transitioning out of [address] CM received call from pt's daughter Marvin nEgles: [phone]) inquiring what time pt will be d/c today. CM informed pt's daughter of current delay; daughter reported she would also reach out to the PCP office in attempt to get in contact with Dr. Donya Hickey. Pt's daughter confirmed being agreeable with d/c plan; no immediate questions or concerns identified. Pt's daughter requested for attending MD to contact her to discuss clinical updates; CM will relay request. Pt's daughter reiterated that pt will need a medical transport secured for d/c; CM acknowledged request. CM will place pt on will call with [facility] in anticipation for d/c later today. CM confirmed pt has HH (RN/PT/OT/SLP/HH Aid) intervention secured for d/c with services to be provided by [facility]. CM confirmed pt has PCP f/u apt secured for d/c; details reflected in AVS. CM also confirmed pt will have access to requested DME (transport chair) provided by [facility]. CM will continue to follow & report updates as they become available.     Marty Burden MSW  Care Manager, [address] [facility]  [phone]

## 2020-12-09 NOTE — PROGRESS NOTES
Updated by CM that patient unable to transfer back to facility. Patient is medically stable for discharge, awaiting nutrition and TF set up through PCP.

## 2020-12-09 NOTE — TELEPHONE ENCOUNTER
Wilbur Reeves (daughter) stated in order for her dad to be discharge the   Karine Pappas needs for the provider to call her. at [phone].       Best call back for daughter [phone]

## 2020-12-10 ENCOUNTER — TELEPHONE (OUTPATIENT)
Dept: FAMILY MEDICINE CLINIC | Age: 85
End: 2020-12-10

## 2020-12-10 VITALS
HEIGHT: 66 IN | OXYGEN SATURATION: 94 % | BODY MASS INDEX: 29.34 KG/M2 | DIASTOLIC BLOOD PRESSURE: 70 MMHG | RESPIRATION RATE: 20 BRPM | HEART RATE: 78 BPM | WEIGHT: 182.54 LBS | SYSTOLIC BLOOD PRESSURE: 115 MMHG | TEMPERATURE: 99.5 F

## 2020-12-10 LAB
GLUCOSE BLD STRIP.AUTO-MCNC: 104 MG/DL (ref 65–100)
GLUCOSE BLD STRIP.AUTO-MCNC: 111 MG/DL (ref 65–100)
GLUCOSE BLD STRIP.AUTO-MCNC: 169 MG/DL (ref 65–100)
MAGNESIUM SERPL-MCNC: 2 MG/DL (ref 1.6–2.4)
PHOSPHATE SERPL-MCNC: 3.5 MG/DL (ref 2.6–4.7)
SERVICE CMNT-IMP: ABNORMAL

## 2020-12-10 PROCEDURE — 82962 GLUCOSE BLOOD TEST: CPT

## 2020-12-10 PROCEDURE — 74011250636 HC RX REV CODE- 250/636: Performed by: INTERNAL MEDICINE

## 2020-12-10 PROCEDURE — 74011250636 HC RX REV CODE- 250/636: Performed by: NURSE PRACTITIONER

## 2020-12-10 PROCEDURE — 65660000000 HC RM CCU STEPDOWN

## 2020-12-10 PROCEDURE — 84100 ASSAY OF PHOSPHORUS: CPT

## 2020-12-10 PROCEDURE — 74011250637 HC RX REV CODE- 250/637: Performed by: PSYCHIATRY & NEUROLOGY

## 2020-12-10 PROCEDURE — 83735 ASSAY OF MAGNESIUM: CPT

## 2020-12-10 PROCEDURE — 36415 COLL VENOUS BLD VENIPUNCTURE: CPT

## 2020-12-10 PROCEDURE — 74011250637 HC RX REV CODE- 250/637: Performed by: INTERNAL MEDICINE

## 2020-12-10 PROCEDURE — 77030018798 HC PMP KT ENTRL FED COVD -A

## 2020-12-10 PROCEDURE — 74011636637 HC RX REV CODE- 636/637: Performed by: INTERNAL MEDICINE

## 2020-12-10 RX ADMIN — METOPROLOL TARTRATE 12.5 MG: 25 TABLET, FILM COATED ORAL at 09:41

## 2020-12-10 RX ADMIN — LEVETIRACETAM 750 MG: 500 TABLET ORAL at 09:41

## 2020-12-10 RX ADMIN — HYDRALAZINE HYDROCHLORIDE 100 MG: 25 TABLET, FILM COATED ORAL at 17:22

## 2020-12-10 RX ADMIN — FAMOTIDINE 20 MG: 20 TABLET, FILM COATED ORAL at 09:41

## 2020-12-10 RX ADMIN — DIBASIC SODIUM PHOSPHATE, MONOBASIC POTASSIUM PHOSPHATE AND MONOBASIC SODIUM PHOSPHATE 1 TABLET: 852; 155; 130 TABLET ORAL at 17:22

## 2020-12-10 RX ADMIN — METOPROLOL TARTRATE 12.5 MG: 25 TABLET, FILM COATED ORAL at 17:22

## 2020-12-10 RX ADMIN — Medication 10 ML: at 14:00

## 2020-12-10 RX ADMIN — HYDRALAZINE HYDROCHLORIDE 100 MG: 25 TABLET, FILM COATED ORAL at 09:41

## 2020-12-10 RX ADMIN — ENOXAPARIN SODIUM 40 MG: 40 INJECTION SUBCUTANEOUS at 09:40

## 2020-12-10 RX ADMIN — Medication 10 ML: at 05:48

## 2020-12-10 RX ADMIN — ACETAMINOPHEN 650 MG: 325 TABLET ORAL at 09:41

## 2020-12-10 RX ADMIN — ASPIRIN 81 MG CHEWABLE TABLET 81 MG: 81 TABLET CHEWABLE at 09:41

## 2020-12-10 RX ADMIN — ACETAMINOPHEN 650 MG: 650 SUPPOSITORY RECTAL at 18:48

## 2020-12-10 RX ADMIN — FUROSEMIDE 40 MG: 10 INJECTION, SOLUTION INTRAMUSCULAR; INTRAVENOUS at 09:42

## 2020-12-10 RX ADMIN — INSULIN LISPRO 2 UNITS: 100 INJECTION, SOLUTION INTRAVENOUS; SUBCUTANEOUS at 13:23

## 2020-12-10 RX ADMIN — AMLODIPINE BESYLATE 10 MG: 5 TABLET ORAL at 09:41

## 2020-12-10 RX ADMIN — DIBASIC SODIUM PHOSPHATE, MONOBASIC POTASSIUM PHOSPHATE AND MONOBASIC SODIUM PHOSPHATE 1 TABLET: 852; 155; 130 TABLET ORAL at 09:41

## 2020-12-10 NOTE — PROGRESS NOTES
End of Shift Note     Bedside shift change report given to Fiona Flores (oncoming nurse) by Arianna Dykes (offgoing nurse).   Report included the following information SBAR, Intake/Output and MAR     Shift worked:  7pm - 7a   Shift summary and any significant changes:      none         Concerns for physician to address:  none   Zone phone for oncoming shift:   1811      Patient Information  Ashish Adams  80 y.o.  11/17/2020  4:20 PM by Kellen Ospina MD. Ashish Adams was admitted from Home     Problem List       Patient Active Problem List     Diagnosis Date Noted    Chronic systolic heart failure ([address] 12/07/2020    Altered mental status 11/18/2020    Post-ictal state ([address] 11/18/2020    Seizure ([address] 11/17/2020    Atrial pacemaker lead displacement 10/18/2019    Pacemaker 03/22/2019    Bradycardia 03/19/2019    Elevated troponin 03/19/2019    Hypertensive urgency 03/19/2019    Second degree AV block, Mobitz type I 03/19/2019    Stage 3 chronic kidney disease 03/15/2019    Rotator cuff arthropathy of both shoulders 03/15/2019    Cognitive impairment 11/26/3370    Systolic murmur 64/92/2406    Essential hypertension 12/17/2018    Gout 12/17/2018    Pure hypercholesterolemia 12/17/2018    History of prostate cancer 12/17/2018    Chronic constipation 12/17/2018    Dysuria 08/08/2017    Weight loss 08/08/2017    Adrenal mass ([address] 07/18/2017           Past Medical History:   Diagnosis Date    Cancer [facility])       prostate    Chronic systolic heart failure ([address] 12/7/2020    Constipation      Gout      Hyperlipemia      Hypertension      Pacemaker 2019    Stroke ([address]      Thrombocytopenia ([address] 3/19/2019    TIA (transient ischemic attack) 2013         Core Measures:  CVA: No No  CHF:No No  PNA:No No     Activity:  Activity Level: Bed Rest  Number times ambulated in hallways past shift: 0  Number of times OOB to chair past shift: 0     Cardiac:   Cardiac Monitoring: Yes      Cardiac Rhythm: Paced     Access:   Current line(s): PIV         Genitourinary:   Urinary status: incontinent and external catheter        Respiratory:   O2 Device: Room air  Chronic home O2 use?: NO  Incentive spirometer at bedside: NO     GI:  Last Bowel Movement Date: 12/05/20  Current diet:  DIET NPO  DIET TUBE FEEDING  Passing flatus: YES  Tolerating current diet: YES     Pain Management:   Patient states pain is manageable on current regimen: N/A     Skin:  Torito Score: 11  Interventions: turn team, float heels, limit briefs and internal/external urinary devices    Patient Safety:  Fall Score: Total Score: 4  Interventions: bed/chair alarm, assistive device (walker, cane, etc), gripper socks, pt to call before getting OOB, stay with me (per policy) and gait belt  High Fall Risk: Yes     DVT prophylaxis:  DVT prophylaxis Med- Yes  DVT prophylaxis SCD or BEV- No      Wounds: (If Applicable)  Wounds- Yes  Location cracked crack     Active Consults:  IP CONSULT TO NEUROLOGY  IP CONSULT TO PALLIATIVE CARE - PROVIDER  IP CONSULT TO GASTROENTEROLOGY  IP CONSULT TO CARDIOLOGY     Length of Stay:  Expected LOS: 4d 7h  Actual LOS: 21  Discharge Plan: Yes- [address] [place] [address]        NOMI CASAS

## 2020-12-10 NOTE — TELEPHONE ENCOUNTER
Darian Mendez called to follow up on pt's tube feed order and agreement to follow after patient has been discharged. This is the last thing for his discharge that he needs. Darian Mendez stated that [facility] is on [place] and they are trying to moved patients who can be. She would like a call back.     [facility]# [phone]

## 2020-12-10 NOTE — TELEPHONE ENCOUNTER
Outbound call to [place]. Abhijit wanted to know if provider will follow patient and sign order for tube feedings. Abhijit made aware per verbal order that provider will follow patient.

## 2020-12-10 NOTE — TELEPHONE ENCOUNTER
Alyssia San (daughter) stated Huber Dyer the  at  the hospital would like to speak to the provider. Before dad is discharge from hospital. Please call [facility]er Precise [phone].       Best call back # [phone]

## 2020-12-10 NOTE — TELEPHONE ENCOUNTER
Dora Cespedes (Nurse) from [facility] needs Dr. Javi Hurley to sign off on the tube feed order and agreement to follow. Pt has been medically stable for discharge since yesterday, and this is the last thing they need to discharge him.          [facility]# [phone]

## 2020-12-10 NOTE — TELEPHONE ENCOUNTER
Caleen Myron called again and wants a nurse call them back ASAP. She said they need the order below signed by Dr. Yolanda Diaz and they have been following for past 2 days. She asked me to escalate this to higher up as [phone] is on Code [place] and cannot keep following us.     [facility]: [phone]

## 2020-12-10 NOTE — PROGRESS NOTES
ARIELLE Plan:     * [facility] with [employer] for [employer], [facility] HH (RN/PT/OT/SLP/HH Aid), DME (transport chair) provided by [employer] [place], & f/u apts       > Call report to [employer] at d/c ([phone]); ask for Ms. Rushing  > CM confirmed pt has PCP f/u apt secured for d/c; details in AVS  > COVID-19 test completed 12/7/2020; results were negative   > [facility] information provided for reference  > [facility] transport secured for 8:00PM; PCS on chart    4:33 PM: CM contacted pt's daughter to discuss updates and review ARIELLE plan for d/c. CM confirmed pt's daughter is agreeable to d/c plan. CM informed pt's daughter of medical transport secured for 8:00 PM; daughter verbalized understanding. Pt's daughter requested for RN to contact her to review d/c medications; CM relayed request. CM inquired if pt's daughter had any questions, concerns, or needs for d/c; daughter declined. CM informed [facility] of pt's d/c this evening via [employer] communication. Medicare pt's daughter has received, reviewed, and provided verbal consent for 2nd IM letter informing them of their right to appeal the discharge. Copy has been placed on pt bedside chart. No further CM needs identified. CM notified pt's nurse of d/c.    4:12 PM: CM received call back from [employer] liaison reporting that she spoke with Ms. Sandie Fallon who confirmed being comfortable with TF education being completed virtually. Alvarez Navdeep reported she will come to [facility] around 7:00PM-7:30PM to hook pt up prior to d/c. Alvarez Sethi requested for CM to secure medical transport for 8:00 PM. CM contacted Ms. Sandie Fallon to discuss pt's transition back to facility this evening. CM inquired if Ms. Sandie Fallon was comfortable with pt returning later this evening around 8:00PM; Ms. Sandie Fallon confirmed reporting she will be present at the group home until 11:00PM. Ms. Sandie Fallon requested for CM to fax pt's d/c summary and COVID-19 test results to facility for review (f: [phone]); CM completed request.     CM secured [facility] transport for 8:00 PM; PCS completed, copy on chart. 3:55 PM: CM received call back from [employer] liaison reporting that [facility] owner (Ms. Asha Acevedo) isn't willing to come to the hospital to complete the TF education due to concerns associated with COVID-19. CM inquired if the TF education could be completed virtually. Sarahi Tony reported she will reach back out to Ms. Asha Acevedo to confirm whether or not she has the capacity to complete virtual education. Sarahi Tony to contact CM with updates as they become available. 3:40 PM: CM received update from [employer] liaison reporting that she has heard back from pt's PCP. Per Sarahi Tony, pt's PCP has agreed to follow the TF order. CM will place [facility]'s information in pt's AVS for reference. CM inquired if the TF nutrition and supplies could be delivered this evening to pt's [facility]; Sarahi Tony confirmed. Sarahi Tony reported she is going to contact [employer] owner (Ms. KEYS [phone]) to determine if she is available to complete TF education this evening. Sarahi Tony reported she will contact CM with updates as they become available. 2:30 PM: CM received a call from pt's daughter requesting an update; CM provided update. Pt's daughter reported that she is going to visit the PCP office in person in attempt to receive an update. Pt's daughter to report updates directly to CM once available. 1:32 PM: CM contacted [email or web address] liaison to receive update on PCP. Sarahi Tony reported that she hasn't heard back from pt's PCP. Sarahi Tony reported she will continue to attempt to get in contact with PCP and report updates to CM as they become available.     10:24 AM: CM received a call back from [employer] liaison reporting that she was able to get through to PCP's nurse. Per Nakita Herron, the nurse reported she would relay the message to pt's PCP to avoid further delay in d/c. Initial note: CM reviewed pt's chart prior to moving forward with d/c planning. CM contacted [employer] liaison Nita Dial: [phone]) to check in and receive update regarding whether or not she has heard back from pt's PCP; Nakita Herron declined, pt's PCP has not returned any of her calls at this point in time. CM contacted pt's daughter Ely Mcwilliams: [phone]) to check in and inquire if she's heard back from pt's PCP; Ms. Gelacio Castañeda declined. CM will continue to follow for updates. Care Management Interventions  PCP Verified by CM:  Yes  Palliative Care Criteria Met (RRAT>21 & CHF Dx)?: Yes  Palliative Consult Recommended?: Yes(daughter declined palliative care meeting)  Mode of Transport at Discharge: [address] [facility]  [address] Time of Discharge: 2000  Transition of Care Consult (CM Consult): NIKKO Ellis [address] [address]: No  Reason Outside [place]: Patient already serviced by other home care/hospice agency  Discharge Durable Medical Equipment: Yes(DME (transport chair) provided/delivered by [facility])  Physical Therapy Consult: Yes  Occupational Therapy Consult: Yes  Speech Therapy Consult: Yes  Current Support Network: Assisted Living([facility])  Confirm Follow Up Transport: Family  The Plan for Transition of Care is Related to the Following Treatment Goals : HH  The Patient and/or Patient Representative was Provided with a Choice of Provider and Agrees with the Discharge Plan?: Yes  Name of the Patient Representative Who was Provided with a Choice of Provider and Agrees with the Discharge Plan: patient's daughter Ely Mcwilliams)  Freedom of Choice List was Provided with Basic Dialogue that Supports the Patient's Individualized Plan of Care/Goals, Treatment Preferences and Shares the Quality Data Associated with the Providers?: Yes  [employer] Information Provided?: No  Discharge Location  Discharge Placement: Assisted Living(Return to [facility] ([address]) with home infusion for TF provided by [facility] (RN/PT/OT/SLP/HH Aid), DME (transport chair) provided by [address], & f/u apts )    [facility], [address] [facility], [address] [place] [address]

## 2020-12-10 NOTE — PROGRESS NOTES
End of Shift Note    Bedside shift change report given to , RN (oncoming nurse) by Alana Smith RN (offgoing nurse). Report included the following information SBAR, Kardex, MAR and Recent Results    Shift worked:  07:00-19:00     Shift summary and any significant changes:    Portable feeding supplies delivered to patient's room by [facility]. [facility] to  patient at 21:30     Concerns for physician to address: None at this time      Zone phone for oncoming shift:   1811       Activity:  Activity Level: Bed Rest  Number times ambulated in hallways past shift: 0  Number of times OOB to chair past shift: 0    Cardiac:   Cardiac Monitoring: No      Cardiac Rhythm: Paced    Access:   Current line(s): PIV     Genitourinary:   Urinary status: incontinent and external catheter    Respiratory:   O2 Device: Room air  Chronic home O2 use?: N/A  Incentive spirometer at bedside: N/A     GI:  Last Bowel Movement Date: 12/10/20  Current diet:  DIET NPO  DIET TUBE FEEDING  DIET ONE TIME MESSAGE  Passing flatus: YES  Tolerating current diet: YES       Pain Management:   Patient states pain is manageable on current regimen: N/A    Skin:  Torito Score: 11  Interventions: turn team, float heels and internal/external urinary devices    Patient Safety:  Fall Score:  Total Score: 4  Interventions: bed/chair alarm  High Fall Risk: Yes    Length of Stay:  Expected LOS: 4d 7h  Actual LOS: [address], RN

## 2020-12-10 NOTE — PROGRESS NOTES
Patient seen today at bedside, no changes to patient status as he remains medically stable. Please see discharge summary from yesterday for further details. Barrier to discharge coordination of tube feedings with PCP to follow after discharge.

## 2020-12-11 ENCOUNTER — PATIENT OUTREACH (OUTPATIENT)
Dept: CASE MANAGEMENT | Age: 85
End: 2020-12-11

## 2020-12-11 RX ORDER — LEVETIRACETAM 750 MG/1
750 TABLET ORAL EVERY 12 HOURS
Qty: 60 TAB | Refills: 0 | Status: ON HOLD | OUTPATIENT
Start: 2020-12-11 | End: 2021-09-08

## 2020-12-11 RX ORDER — AMLODIPINE BESYLATE 10 MG/1
10 TABLET ORAL DAILY
Qty: 30 TAB | Refills: 0 | Status: SHIPPED | OUTPATIENT
Start: 2020-12-11 | End: 2021-01-12

## 2020-12-11 RX ORDER — METOPROLOL TARTRATE 25 MG/1
12.5 TABLET, FILM COATED ORAL 2 TIMES DAILY
Qty: 30 TAB | Refills: 0 | Status: SHIPPED | OUTPATIENT
Start: 2020-12-11 | End: 2021-01-12

## 2020-12-11 RX ORDER — HYDRALAZINE HYDROCHLORIDE 100 MG/1
100 TABLET, FILM COATED ORAL 3 TIMES DAILY
Qty: 90 TAB | Refills: 0 | Status: SHIPPED | OUTPATIENT
Start: 2020-12-11 | End: 2021-01-12

## 2020-12-11 NOTE — PROGRESS NOTES
Received phone call from Vincent Lizarraga, pt's daughter, this am. Pt was transported to [facility] last night. Pt's prescriptions were not with patient and the AVS was not sent. Staff, including supervisor of ED, was not able to print. This morning, I was able to print and fax to group [facility]. I spoke with mgr of group home, Ms. Zevfaby Ballard, to let her know discharge papers were being faxed. Called daughter back and let her know provider Dl davidson sent Rx's electronically, to pharmacy daughter had chosen, and to call pharmacy prior to picking up to make sure they were there,daughter  wanted to  RX's and take them to [place] [address]. Daughter was thankful to have these issues resolved and expressed no further concerns. I provided my direct number if she had any further questions/concerns.

## 2020-12-11 NOTE — PROGRESS NOTES
Anabella from [facility] called. [facility] is taking over from [facility] for transport.  Request that face sheet and PCS be faxed to Lan Crocker at [phone]

## 2020-12-11 NOTE — PROGRESS NOTES
Ms. Jose A Arteaga at [facility] called and notified the Mr. Nena Almodovar  time is now 21:00.

## 2020-12-11 NOTE — PROGRESS NOTES
Received call from daughter, the 2 RX's that were e-sent over this am( 12/11)  by NP Deedee Joby was not the 2 new RX's.  I have called NP and made her aware and assure patient's daughter that I have reached out to provider and I have asked provider to please call daughter as well.     12/11/2020 11:50

## 2020-12-11 NOTE — PROGRESS NOTES
12/11/2020 0915 Notified by nursing of events surrounding discharge last night. Resent new prescriptions to preferred [address]. Daughter is to  medications. 12/11/2020 [address] [facility] [facility] [address] did not receive e-sent prescriptions. NP called prescriptions into pharmacy tech. Updated daughter that prescriptions were called into pharmacy.

## 2020-12-11 NOTE — PROGRESS NOTES
[address] to patient's family member repeatedly [employer]. Family member called at first after patient had been discharged and transported by ambulance (according to CM note, to [facility]. Daughter Anitha Guzman stated that patient had not arrived with discharge papers. She was concerned about meds. I checked patient monique, found no discharge paperwork copies. Tried to print AVS but was unable to. Daughter stated she would come to emergency room for paperwork. I again attempted to print AVS but was unable to, including by  Cutting and pasting. Print button would not activate on AVS d/t open warnings & I was unable to clear warnings. Contacted nursing supervisor or assistance. Nursing supervisor was unable to print, tried to brinng patient back from discharge status to see if that would help but it did not. Patient's daughter called from ED and I advised her I was unable to print the discharge instructions. Transfterred to her nursing supervisor when she asked to escalate. Final call was at [phone] when patient's daughter expressed extreme unhappiness with the situation, said she was in ED wheere discharge instructions were being printed \"like water\" and she didn't understand the problem. I told her I had tried all measures I knew of, and had called for assistance to no avail. Referred her to nursing supervisor again when she asked to excalate the situation. SHe was polite throughout but clearly frustrated.

## 2020-12-11 NOTE — PROGRESS NOTES
Patient was admitted to [facility] on  and discharged on 12/10 for new onset seizures/debility/peg tube placement-feeding . Outreach made within 2 business days of discharge: Yes    Top Discharge Challenges to be reviewed by the provider   Additional needs identified to be addressed with provider yes  P.PADMA huerta in hospital recommended LTC. Patient is currently residing at [facility]. Palliative meeting declined by daughter in hospital.   Discussed COVID-19 related testing which was available at this time. Test results were negative. Patient informed of results, if available? COVID test on 2020 prior to discharge to [facility]. Method of communication with provider : chart       CTN spoke with patient's daughter, Gabriel Newman [facility]) today for hospital follow up. She tells CTN that she did not receive her father's discharge instructions and that the [facility] only got them after calling the hospital back to request document be fax to them. Gabriel Newman would like to speak with someone from the hospital regarding discharge concerns. She is given the patient advocacy phone # and request that CTN have them call her. CTN contact PA and provide patient/daughter/contact info, they agreed to contact Gabriel Newman for follow up on concerns. Advance Care Planning:   Does patient have an Advance Directive:  yes; reviewed and current    [facility] [place], [facility]. decision maker is active. Patient's [facility], Gabriel Newman [facility]    Inpatient Readmission Risk score: 39  Was this a readmission? no   Patient stated reason for the admission: N/A-seizure activity  Patients top risk factors for readmission: functional cognitive ability, functional physical ability, medical condition and utilization of services  Interventions to address risk factors:     Care Transition Nurse (CTN) contacted the family by telephone to perform post hospital discharge assessment. Verified name and  with family as identifiers.  Provided introduction to self, and explanation of the CTN role. CTN reviewed discharge instructions, medical action plan and red flags with family who verbalized understanding. Family given an opportunity to ask questions and does not have any further questions or concerns at this time. The family agrees to contact the PCP office for questions related to their healthcare. Medication reconciliation was not performed with family, who verbalizes understanding of administration of home medications. Nikolay Burrows picked up new medications from pharmacy today. Medications are available to patient at [facility]. Referral to Pharm D needed: no     Home Health/Outpatient orders at discharge: home health care, PT, OT, SN, SLP and personal care aide  [address]: [facility]  Date of initial visit: 12/14/2020-per daughter    [facility] ordered at discharge: transport chair  [facility]: [address] [place] [address] Partha received: to be delivered to [facility]  Information on DME provided to Nikolay Burrows who stated she was not aware of transport chair orders. She is instructed on process of getting other medical suppies/equipment if needed. Covid Risk Education    Patient has following risk factors of: heart failure, chronic kidney disease and dementia, debility. Discussed follow-up appointments. If no appointment was previously scheduled, appointment scheduling offered: appt scheduled  [address] [facility] follow up appointment(s):   Future Appointments   Date Time Provider Jorje Ceron   12/14/2020  3:00 PM MD PAUL Rodas BS AMB   2/18/2021  3:30 PM PACEMAKER3, CAT CHAVES BS AMB   2/18/2021  4:00 PM MD ANDIE Nance BS [facility]     [facility] follow up appointment(s):   Dr. Yosvany Pinon- neuro Needs follow up 2 weeks    Plan for follow-up call in 5-7 days based on severity of symptoms and risk factors.   CTN provided contact information for future needs.    Goals Addressed                 This Visit's Progress     Attends follow-up appointments as directed. 12/11/20  Patient will follow up with PCP on [email or web address]  Needs neuro-Dr. Parminder Carlson follow up in 2 weeks.  Supportive resources in place to maintain patient in the community (ie. Home Health, DME equipment, refer to, medication assistant plan, etc.)        12/11/20  -Patient lives in [facility] ([address] Patient requires assistance with all activities. - thru [address] [place] [address] for SN/PT/OT/SLP/aide  -He has new peg tube which will be maintained by snf staff. TF goal of 50 mL/hr of Jevity 1.5 with 150 mL water flushes q 4 hours.  Understands red flags post discharge.

## 2020-12-16 ENCOUNTER — VIRTUAL VISIT (OUTPATIENT)
Dept: FAMILY MEDICINE CLINIC | Age: 85
End: 2020-12-16
Payer: MEDICARE

## 2020-12-16 DIAGNOSIS — I63.9 CEREBROVASCULAR ACCIDENT (CVA), UNSPECIFIED MECHANISM (HCC): ICD-10-CM

## 2020-12-16 DIAGNOSIS — R56.9 SEIZURE (HCC): ICD-10-CM

## 2020-12-16 DIAGNOSIS — Z09 HOSPITAL DISCHARGE FOLLOW-UP: Primary | ICD-10-CM

## 2020-12-16 DIAGNOSIS — R41.82 ALTERED MENTAL STATUS, UNSPECIFIED ALTERED MENTAL STATUS TYPE: ICD-10-CM

## 2020-12-16 DIAGNOSIS — Z93.1 S/P PERCUTANEOUS ENDOSCOPIC GASTROSTOMY (PEG) TUBE PLACEMENT (HCC): ICD-10-CM

## 2020-12-16 DIAGNOSIS — I50.22 CHRONIC SYSTOLIC HEART FAILURE (HCC): ICD-10-CM

## 2020-12-16 DIAGNOSIS — R10.9 ABDOMINAL DISCOMFORT: ICD-10-CM

## 2020-12-16 DIAGNOSIS — R41.89 COGNITIVE IMPAIRMENT: ICD-10-CM

## 2020-12-16 PROCEDURE — 1111F DSCHRG MED/CURRENT MED MERGE: CPT | Performed by: FAMILY MEDICINE

## 2020-12-16 PROCEDURE — 99215 OFFICE O/P EST HI 40 MIN: CPT | Performed by: FAMILY MEDICINE

## 2020-12-16 PROCEDURE — G8432 DEP SCR NOT DOC, RNG: HCPCS | Performed by: FAMILY MEDICINE

## 2020-12-16 PROCEDURE — G8427 DOCREV CUR MEDS BY ELIG CLIN: HCPCS | Performed by: FAMILY MEDICINE

## 2020-12-16 PROCEDURE — G0463 HOSPITAL OUTPT CLINIC VISIT: HCPCS | Performed by: FAMILY MEDICINE

## 2020-12-16 PROCEDURE — 1101F PT FALLS ASSESS-DOCD LE1/YR: CPT | Performed by: FAMILY MEDICINE

## 2020-12-16 NOTE — PROGRESS NOTES
[id number] [address] Note      Subjective:     Chief Complaint   Patient presents with   [facility] Follow Up     Yari Christianson is a 80y.o. year old male who presents for evaluation of the following:      Hospital follow-up:  [address] [place] [address] 11/1720-12/10/20  Reason for admission: Seizure  Discharge summary, imaging, labs, provider notes, discharge summary reviewed. Interval events:  Seizures/ History of Multiple Strokes/CVA, occipital  Seizure evidenced on EEG  Now requires assistance with all ADLs, bed bound, dysphagia requiring PEG tub, aphasia, generalized weakness  Miralax communication  No convulsions since being home  Tx: keppra- compliant    PEG tube  No dry mouth   Nothing by mouth  PEG in place  Managed by [facility] staff with guidance of [facility]    Irritable  Resisting movement, withdraw from touches  Provided contact information for Neurology    Abdominal Discomfort  Right side  Daughter noticed bulge  Passed at least 3 bowel movements since ldischarge    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 Plan for pacemaker wire repair surgery  Pulse Readings from Last 3 Encounters:   12/10/20 78   07/13/20 78   07/09/20 71     Hypertenson  Home monitoring: no log available   Key CAD CHF Meds             hydrALAZINE (APRESOLINE) 100 mg tablet Take 1 Tab by mouth three (3) times daily. amLODIPine (NORVASC) 10 mg tablet Take 1 Tab by mouth daily. metoprolol tartrate (LOPRESSOR) 25 mg tablet 0.5 Tabs by Per NG tube route two (2) times a day. aspirin delayed-release 81 mg tablet Take 1 Tab by mouth daily. atorvastatin (LIPITOR) 40 mg tablet Take 1 Tab by mouth nightly. - last visit prescibed increase to hydralazine 50 tid but Rx not received.   BP Readings from Last 3 Encounters:   12/10/20 115/70   07/13/20 138/70   07/09/20 140/74     Cardiomyopathy  Mild to mod AS, MR, TR and severe pulmonary HTN, EF 55-60% by TTE   Tx: lasix + BB   Managed by cardiologist  Leg swelling improved. Not using compression stockings  Denies chest pain     Dementia  Neurology following  Living at [facility] now  Stable per daughter. ADL Assessment 5/6/2019   Feeding yourself No Help Needed   Getting from bed to chair Help Needed   Getting dressed Help Needed   Bathing or showering Help Needed   Walk across the room (includes cane/walker) Help Needed   Using the telphone Help Needed   Taking your medications Help Needed   Preparing meals Help Needed   Managing money (expenses/bills) Help Needed   Moderately strenuous housework (laundry) Help Needed   Shopping for personal items (toiletries/medicines) Help Needed   Shopping for groceries Help Needed   Driving Help Needed   Climbing a flight of stairs Help Needed   Getting to places beyond walking distances Help Needed       Review of Systems   Pertinent positives and negative per HPI. All other systems  reviewed are negative for a Comprehensive ROS (10+).        Past Medical History:   Diagnosis Date    Cancer [facility])     prostate    Chronic systolic heart failure ([facility] 12/7/2020    Constipation     Gout     Hyperlipemia     Hypertension     Pacemaker 2019    Stroke ([facility]     Thrombocytopenia ([facility] 3/19/2019    TIA (transient ischemic attack) 2013        Social History     Socioeconomic History    Marital status:      Spouse name: Not on file    Number of children: Not on file    Years of education: Not on file    Highest education level: Not on file   Occupational History    Not on file   Social Needs    Financial resource strain: Not on file    Food insecurity     Worry: Not on file     Inability: Not on file    Transportation needs     Medical: Not on file     Non-medical: Not on file   Tobacco Use    Smoking status: Never Smoker    Smokeless tobacco: Never Used Substance and Sexual Activity    Alcohol use: No    Drug use: No    Sexual activity: Never   Lifestyle    Physical activity     Days per week: Not on file     Minutes per session: Not on file    Stress: Not on file   Relationships    Social connections     Talks on phone: Not on file     Gets together: Not on file     Attends Bahai service: Not on file     Active member of club or organization: Not on file     Attends meetings of clubs or organizations: Not on file     Relationship status: Not on file    Intimate partner violence     Fear of current or ex partner: Not on file     Emotionally abused: Not on file     Physically abused: Not on file     Forced sexual activity: Not on file   Other Topics Concern    Not on file   Social History Narrative    Not on file       Family History   Problem Relation Age of Onset    No Known Problems Mother     No Known Problems Father        Current Outpatient Medications   Medication Sig    hydrALAZINE (APRESOLINE) 100 mg tablet Take 1 Tab by mouth three (3) times daily.  amLODIPine (NORVASC) 10 mg tablet Take 1 Tab by mouth daily.  levETIRAcetam (KEPPRA) 750 mg tablet Take 1 Tab by mouth every twelve (12) hours.  metoprolol tartrate (LOPRESSOR) 25 mg tablet 0.5 Tabs by Per NG tube route two (2) times a day.  allopurinoL (ZYLOPRIM) 100 mg tablet Take 1 Tab by mouth daily.  aspirin delayed-release 81 mg tablet Take 1 Tab by mouth daily.  atorvastatin (LIPITOR) 40 mg tablet Take 1 Tab by mouth nightly.  polyethylene glycol (MIRALAX) 17 gram/dose powder Take 17 g by mouth daily as needed for Constipation.  balsam peru-castor oiL (VENELEX) ointment Apply  to affected area three (3) times daily.  famotidine (PEPCID) 20 mg tablet Take 1 Tab by mouth every evening.  acetaminophen (TYLENOL) 325 mg tablet Take 650 mg by mouth every six (6) hours as needed for Pain. No current facility-administered medications for this visit. Objective:   No flowsheet data found. Vitals measurement not available        Physical Examination:  General: Eyes closed, supine in bed., does respond to voice and touch  Eyes: Does not open eyes  Ears: Normal appearing external left ear   Nose: Nares normal appearing  Mouth/Throat: Unable to examine  Neck: Supple, symmetrical, trachea midline, no neck mass visualized. Respiratory: Breathing comfortably, in no acute respiratory distress. Abdomen: LUQ with peg in place, no crusting or drainage. No mass or rash of abdomen in area indicated by dautghter right side. Patient does wince with daughter palpation of right abdomen  Cardiovascular:  Unable to examine. Skin: No significant erythematous lesions or discoloration noted on facial skin. Neurologic: Withdraws from pain. Answers \"yes\" to all questions. Unintelligible single word responses. No results displayed because visit has over 200 results. Assessment/ Plan:   Diagnoses and all orders for this visit:    1. Hospital discharge follow-up    2. Cerebrovascular accident (CVA), unspecified mechanism ([address]    3. Seizure ([address]    4. S/P percutaneous endoscopic gastrostomy (PEG) tube placement ([address]    5. Abdominal discomfort    6. Chronic systolic heart failure ([address]    7. Cognitive impairment    8. Altered mental status, unspecified altered mental status type      Patient assessed via virtual visit for follow up with recent hospitalization for seizures in settin of multiple CVA. Significant decline of physical and neurological functioning as patient is now dependent all ADLs, bed bound, dysphagia requiring PEG tub, aphasia, generalized weakness. Compliant with seizure medications. Introduced the idea of pallliative care for patient as quality of life is limited with minaml communication with aim to improve and mainatin confort. Would also be an appropriate candidate for hospice.  Daughter declined referral. Patient will follow up with neurology with 1-2 weeks since discharge- provided specialist contact information. Will follow home health rehab but prognosis is guarded for recovery of independent ALls. Abdominal discomfort, unable to assess. Provided daughter contact information for dispatch health to perform in person evaluation. Advised to continue current regimen and request refills via pharmacy when needed. Educated patient on red flag symptoms to warrant return to clinic or emergency room visit. I have discussed the diagnosis with the patient and the intended plan as seen in the above orders. The patient has been offered or received an after-visit summary and questions were answered concerning future plans. I have discussed medication side effects and warnings with the patient as well. Total face to face encounter time 50 minutes; >50 of time spent counseling and coordinating care regarding seizure, neurological decline, palliative care options. , bradycardia, dementia. We discussed the expected course, resolution and complications of the diagnosis(es) in detail. Medication risks, benefits, costs, interactions, and alternatives were discussed as indicated. I advised him to contact the office if his condition worsens, changes or fails to improve as anticipated. He expressed understanding with the diagnosis(es) and plan. Melissa Ladd is a 80 y.o. male being evaluated by a video visit encounter for concerns as above. A caregiver was present when appropriate. Due to this being a TeleHealth encounter (During [facility] public health emergency), evaluation of the following organ systems was limited: Vitals/Constitutional/EENT/Resp/CV/GI//MS/Neuro/Skin/Heme-Lymph-Imm.   Pursuant to the emergency declaration under the [facility], [facility]5 waiver authority and the [employer] and [employer] Act, this Virtual  Visit was conducted, with patient's (and/or legal guardian's) consent, to reduce the patient's risk of exposure to COVID-19 and provide necessary medical care. Services were provided through a video synchronous discussion virtually to substitute for in-person clinic visit. Provider was in the office while conducting this encounter. Patient was at home during encounter. Other persons participating in call: None  Consent:  He and/or his healthcare decision maker is aware that this patient-initiated Telehealth encounter is a billable service, with coverage as determined by his insurance carrier. He is aware that he may receive a bill and has provided verbal consent to proceed: Yes  This virtual visit was conducted via [employer]. Pursuant to the emergency declaration under the [facility], 1135 waiver authority and the [employer] and [employer] Act, this Virtual  Visit was conducted to reduce the patient's risk of exposure to COVID-19 and provide continuity of care for an established patient. Services were provided through a video synchronous discussion virtually to substitute for in-person clinic visit. Due to this being a TeleHealth evaluation, many elements of the physical examination are unable to be assessed. Total Time: minutes: 48.       Educated patient on red flag symptoms to warrant return to clinic or emergency room visit. I have discussed the diagnosis with the patient and the intended plan as seen in the above orders. The patient has been offered or received an after-visit summary and questions were answered concerning future plans. I have discussed medication side effects and warnings with the patient as well. Follow-up and Dispositions    · Return in about 2 weeks (around 12/30/2020) for Follow Up for chronic conditions.  Yanni Wright MD  12/17/2020

## 2020-12-17 NOTE — PATIENT INSTRUCTIONS
Learning About Hospice and Palliative Care What are hospice and palliative care? Palliative (say \"PAL-vince-uh-tiv\") care is an area of medicine that helps give you more good days by providing care for quality-of-life issues. It includes treating symptoms like pain, nausea, or sleep problems. It can also include helping you and your loved ones to: · Understand your illness better. · Talk more openly about your feelings. · Decide what treatments you want or don't want. · Communicate better with your doctors, nurses, and each other. Hospice care is a type of palliative care. But it's for people who are near the end of life. What kinds of care are involved? Palliative care: This treatment helps you feel better physically, emotionally, and spiritually while doctors also treat your illness. Your care may include pain relief, counseling, or nutrition advice. Hospice care: Again, the goal of this type of care is to help you feel better. And it can help you get the most out of the time you have left. But you no longer get treatment to try to cure your illness. When does care happen? Palliative care: This care can happen at any time during a serious illness. You don't have to be near death to get this care. Hospice care: In most cases, you can choose hospice care when your doctor believes that you have no more than about 6 months to live. Where does the care happen? Palliative care: This care often happens in hospitals or long-term care facilities like nursing homes. It can take place wherever you are treated, even in your home. Hospice care: Most hospice care is done in the place the patient calls \"home. \" This is often the person's home. But it could also be a place like a nursing home or care home center. Hospice care may also be given in hospice centers, hospitals, and other places. Who provides the care? Palliative care: There are doctors and nurses who specialize in this field. But your own doctor may also give some of this care. And there are many other experts who may help you. These include social workers, counselors, therapists, and nutrition experts. Hospice care: In hospitals, hospice centers, and other facilities, care is given by doctors, nurses, and others who are trained in hospice care. In the home, a family member is often the main caregiver. But the family member gets help from care experts. They are on call 24 hours a day. Where can you learn more? Go to http://www.gray.com/ Enter [id number] in the search box to learn more about \"Learning About Hospice and Palliative Care. \" Current as of: December 9, 2019               Content Version: 12.6 © 1669-1453 [employer]. Care instructions adapted under license by [employer] (which disclaims liability or warranty for this information). If you have questions about a medical condition or this instruction, always ask your healthcare professional. [address] any warranty or liability for your use of this information.

## 2020-12-22 NOTE — PROGRESS NOTES
Physician Progress Note      Fatuma Joy  CSN #:                  [id number]  :                       3/9/1933  ADMIT DATE:       2020 4:20 PM  [address] DATE:        12/10/2020 10:00 PM  RESPONDING  PROVIDER #:        Aamir SHAIKH NP          QUERY TEXT:    Pt admitted with seizure. Pt noted to have hx bilat acute occipital infarcts. If possible, please document in progress notes and discharge summary if you are evaluating and/or treating any of the following: The medical record reflects the following:  Risk Factors: hannah acute occipital infarcts  Clinical Indicators: neuro--prior strokes and a significant amount of atrophy  Treatment: Keppra, neuro cx, mri could not be done  Claudia Hilton RN/CDI   Options provided:  -- Seizure as a sequela of prior CVA  -- Seizure not a  sequela of prior CVA  -- Other - I will add my own diagnosis  -- Disagree - Not applicable / Not valid  -- Disagree - Clinically unable to determine / Unknown  -- Refer to Clinical Documentation Reviewer    PROVIDER RESPONSE TEXT:    Seizure is a sequela of prior CVA.     Query created by: Lamar Rodriguez on 12/15/2020 1:18 PM      Electronically signed by:  Cory Lanza NP 2020 7:17 AM

## 2020-12-23 ENCOUNTER — TELEPHONE (OUTPATIENT)
Dept: FAMILY MEDICINE CLINIC | Age: 85
End: 2020-12-23

## 2020-12-23 NOTE — TELEPHONE ENCOUNTER
Pt is combative with care, tries to hit the staff. Was hoping they could receive an order medication to calm him down. Medication he was on (seroquel) made him zombie like and the family has requested not for him to be put back on that.        BCB# [phone]

## 2020-12-28 NOTE — PROGRESS NOTES
Physician Progress Note      PATIENT:               Ирина Esquivel  CSN #:                  [id number]  :                       3/9/1933  ADMIT DATE:       2020 4:20 PM  [address] DATE:        12/10/2020 10:00 PM  RESPONDING  PROVIDER #:        Theresa Ennis NP          QUERY TEXT:    Pt admitted with new onset seizures and has encephalopathy documented. If possible, please document in progress notes and discharge summary further specificity regarding the type of encephalopathy:    The medical record reflects the following:  Risk Factors: 80 M admitted with new onset seizures with UTI, dementia and SOULEYMANE  Clinical Indicators: Per documentation \"Acute encephalopathy in setting of worsening dementia\"  and \"Dysphagia d/t acute metabolic encephalopathy and dementia\", UTI on  with urine culture \"proteus mirabilis\" and \"Acute kidney injury on CKD\"  Treatment: Neuro consult, s/p PEG tube placement, completed course of ceftriaxone while inpatient for UTI    Please call with questions. Thank you. [email or web address]  [phone]  Options provided:  -- Metabolic encephalopathy  -- Acute encephalopathy due to worsening dementia  -- Other - I will add my own diagnosis  -- Disagree - Not applicable / Not valid  -- Disagree - Clinically unable to determine / Unknown  -- Refer to Clinical Documentation Reviewer    PROVIDER RESPONSE TEXT:    This patient has acute encephalopathy due to worsening dementia.     Query created by: Ute Dixon on 2020 9:47 AM      Electronically signed by:  Theresa Ennis NP 2020 11:56 AM

## 2020-12-28 NOTE — TELEPHONE ENCOUNTER
----- Message from Grazyna Morales sent at 12/24/2020  7:34 PM EST -----  Regarding: DR Caraballo/Telephone  General Message/Vendor Calls    Caller's first and last name: Yesika Armas Assistant, Danny Andrew      Reason for call: Shan Marquis is reporting that this pt is not in their system, therefore, unable to provide office notes from last visit as requested      Callback required yes/no and why: yes      Best contact number(s):[phone]      Details to clarify the request:      Grazyna Morales

## 2020-12-28 NOTE — TELEPHONE ENCOUNTER
Outbound call to [address] [place] [address]. Left message to return call back to the office regarding patient.

## 2021-01-06 ENCOUNTER — APPOINTMENT (OUTPATIENT)
Dept: GENERAL RADIOLOGY | Age: 86
DRG: 871 | End: 2021-01-06
Attending: STUDENT IN AN ORGANIZED HEALTH CARE EDUCATION/TRAINING PROGRAM
Payer: MEDICARE

## 2021-01-06 ENCOUNTER — APPOINTMENT (OUTPATIENT)
Dept: CT IMAGING | Age: 86
DRG: 871 | End: 2021-01-06
Attending: STUDENT IN AN ORGANIZED HEALTH CARE EDUCATION/TRAINING PROGRAM
Payer: MEDICARE

## 2021-01-06 ENCOUNTER — HOSPITAL ENCOUNTER (INPATIENT)
Age: 86
LOS: 6 days | Discharge: NURSING, REHAB OR LONG-TERM CARE | DRG: 871 | End: 2021-01-12
Attending: STUDENT IN AN ORGANIZED HEALTH CARE EDUCATION/TRAINING PROGRAM | Admitting: INTERNAL MEDICINE
Payer: MEDICARE

## 2021-01-06 ENCOUNTER — APPOINTMENT (OUTPATIENT)
Dept: ULTRASOUND IMAGING | Age: 86
DRG: 871 | End: 2021-01-06
Attending: STUDENT IN AN ORGANIZED HEALTH CARE EDUCATION/TRAINING PROGRAM
Payer: MEDICARE

## 2021-01-06 DIAGNOSIS — R41.82 ALTERED MENTAL STATUS, UNSPECIFIED ALTERED MENTAL STATUS TYPE: Primary | ICD-10-CM

## 2021-01-06 PROBLEM — E86.0 DEHYDRATION: Status: ACTIVE | Noted: 2021-01-06

## 2021-01-06 PROBLEM — E87.20 LACTIC ACIDOSIS: Status: ACTIVE | Noted: 2021-01-06

## 2021-01-06 PROBLEM — A41.9 SEPSIS (HCC): Status: ACTIVE | Noted: 2021-01-06

## 2021-01-06 PROBLEM — N17.9 AKI (ACUTE KIDNEY INJURY) (HCC): Status: ACTIVE | Noted: 2021-01-06

## 2021-01-06 LAB
ALBUMIN SERPL-MCNC: 1.7 G/DL (ref 3.5–5)
ALBUMIN/GLOB SERPL: 0.3 {RATIO} (ref 1.1–2.2)
ALP SERPL-CCNC: 142 U/L (ref 45–117)
ALT SERPL-CCNC: 354 U/L (ref 12–78)
ANION GAP SERPL CALC-SCNC: 5 MMOL/L (ref 5–15)
AST SERPL-CCNC: 242 U/L (ref 15–37)
ATRIAL RATE: 100 BPM
ATRIAL RATE: 104 BPM
BASOPHILS # BLD: 0 K/UL (ref 0–0.1)
BASOPHILS NFR BLD: 0 % (ref 0–1)
BILIRUB SERPL-MCNC: 0.4 MG/DL (ref 0.2–1)
BUN SERPL-MCNC: 84 MG/DL (ref 6–20)
BUN/CREAT SERPL: 38 (ref 12–20)
CALCIUM SERPL-MCNC: 8.5 MG/DL (ref 8.5–10.1)
CALCULATED P AXIS, ECG09: -15 DEGREES
CALCULATED P AXIS, ECG09: -3 DEGREES
CALCULATED R AXIS, ECG10: 145 DEGREES
CALCULATED R AXIS, ECG10: 145 DEGREES
CALCULATED T AXIS, ECG11: -28 DEGREES
CALCULATED T AXIS, ECG11: -29 DEGREES
CHLORIDE SERPL-SCNC: 111 MMOL/L (ref 97–108)
CO2 SERPL-SCNC: 30 MMOL/L (ref 21–32)
CREAT SERPL-MCNC: 2.21 MG/DL (ref 0.7–1.3)
DIAGNOSIS, 93000: NORMAL
DIAGNOSIS, 93000: NORMAL
DIFFERENTIAL METHOD BLD: ABNORMAL
EOSINOPHIL # BLD: 0 K/UL (ref 0–0.4)
EOSINOPHIL NFR BLD: 0 % (ref 0–7)
ERYTHROCYTE [DISTWIDTH] IN BLOOD BY AUTOMATED COUNT: 15.2 % (ref 11.5–14.5)
GLOBULIN SER CALC-MCNC: 4.9 G/DL (ref 2–4)
GLUCOSE SERPL-MCNC: 239 MG/DL (ref 65–100)
HCT VFR BLD AUTO: 31 % (ref 36.6–50.3)
HGB BLD-MCNC: 10.1 G/DL (ref 12.1–17)
IMM GRANULOCYTES # BLD AUTO: 0.1 K/UL (ref 0–0.04)
IMM GRANULOCYTES NFR BLD AUTO: 0 % (ref 0–0.5)
LACTATE SERPL-SCNC: 1.1 MMOL/L (ref 0.4–2)
LACTATE SERPL-SCNC: 2.3 MMOL/L (ref 0.4–2)
LIPASE SERPL-CCNC: 61 U/L (ref 73–393)
LYMPHOCYTES # BLD: 1.4 K/UL (ref 0.8–3.5)
LYMPHOCYTES NFR BLD: 9 % (ref 12–49)
MAGNESIUM SERPL-MCNC: 2.6 MG/DL (ref 1.6–2.4)
MCH RBC QN AUTO: 30.7 PG (ref 26–34)
MCHC RBC AUTO-ENTMCNC: 32.6 G/DL (ref 30–36.5)
MCV RBC AUTO: 94.2 FL (ref 80–99)
MONOCYTES # BLD: 1.5 K/UL (ref 0–1)
MONOCYTES NFR BLD: 10 % (ref 5–13)
NEUTS SEG # BLD: 12.2 K/UL (ref 1.8–8)
NEUTS SEG NFR BLD: 80 % (ref 32–75)
NRBC # BLD: 0 K/UL (ref 0–0.01)
NRBC BLD-RTO: 0 PER 100 WBC
P-R INTERVAL, ECG05: 164 MS
P-R INTERVAL, ECG05: 168 MS
PLATELET # BLD AUTO: 193 K/UL (ref 150–400)
PMV BLD AUTO: 11.6 FL (ref 8.9–12.9)
POTASSIUM SERPL-SCNC: 3.9 MMOL/L (ref 3.5–5.1)
PROT SERPL-MCNC: 6.6 G/DL (ref 6.4–8.2)
Q-T INTERVAL, ECG07: 400 MS
Q-T INTERVAL, ECG07: 408 MS
QRS DURATION, ECG06: 162 MS
QRS DURATION, ECG06: 168 MS
QTC CALCULATION (BEZET), ECG08: 526 MS
QTC CALCULATION (BEZET), ECG08: 526 MS
RBC # BLD AUTO: 3.29 M/UL (ref 4.1–5.7)
SODIUM SERPL-SCNC: 146 MMOL/L (ref 136–145)
TROPONIN I SERPL-MCNC: 0.27 NG/ML
TROPONIN I SERPL-MCNC: 0.34 NG/ML
TROPONIN I SERPL-MCNC: 0.34 NG/ML
VENTRICULAR RATE, ECG03: 100 BPM
VENTRICULAR RATE, ECG03: 104 BPM
WBC # BLD AUTO: 15.1 K/UL (ref 4.1–11.1)

## 2021-01-06 PROCEDURE — 84484 ASSAY OF TROPONIN QUANT: CPT

## 2021-01-06 PROCEDURE — 85025 COMPLETE CBC W/AUTO DIFF WBC: CPT

## 2021-01-06 PROCEDURE — 76705 ECHO EXAM OF ABDOMEN: CPT

## 2021-01-06 PROCEDURE — 83690 ASSAY OF LIPASE: CPT

## 2021-01-06 PROCEDURE — 96361 HYDRATE IV INFUSION ADD-ON: CPT

## 2021-01-06 PROCEDURE — 74011000258 HC RX REV CODE- 258: Performed by: INTERNAL MEDICINE

## 2021-01-06 PROCEDURE — 83605 ASSAY OF LACTIC ACID: CPT

## 2021-01-06 PROCEDURE — 74011250637 HC RX REV CODE- 250/637: Performed by: INTERNAL MEDICINE

## 2021-01-06 PROCEDURE — 93005 ELECTROCARDIOGRAM TRACING: CPT

## 2021-01-06 PROCEDURE — 96360 HYDRATION IV INFUSION INIT: CPT

## 2021-01-06 PROCEDURE — 80053 COMPREHEN METABOLIC PANEL: CPT

## 2021-01-06 PROCEDURE — 99285 EMERGENCY DEPT VISIT HI MDM: CPT

## 2021-01-06 PROCEDURE — 36415 COLL VENOUS BLD VENIPUNCTURE: CPT

## 2021-01-06 PROCEDURE — 87086 URINE CULTURE/COLONY COUNT: CPT

## 2021-01-06 PROCEDURE — 87040 BLOOD CULTURE FOR BACTERIA: CPT

## 2021-01-06 PROCEDURE — 87186 SC STD MICRODIL/AGAR DIL: CPT

## 2021-01-06 PROCEDURE — 74011250636 HC RX REV CODE- 250/636: Performed by: INTERNAL MEDICINE

## 2021-01-06 PROCEDURE — 74011250636 HC RX REV CODE- 250/636: Performed by: STUDENT IN AN ORGANIZED HEALTH CARE EDUCATION/TRAINING PROGRAM

## 2021-01-06 PROCEDURE — 70450 CT HEAD/BRAIN W/O DYE: CPT

## 2021-01-06 PROCEDURE — 83735 ASSAY OF MAGNESIUM: CPT

## 2021-01-06 PROCEDURE — 87077 CULTURE AEROBIC IDENTIFY: CPT

## 2021-01-06 PROCEDURE — 65270000015 HC RM PRIVATE ONCOLOGY

## 2021-01-06 PROCEDURE — 71045 X-RAY EXAM CHEST 1 VIEW: CPT

## 2021-01-06 RX ORDER — SODIUM CHLORIDE 0.9 % (FLUSH) 0.9 %
5-40 SYRINGE (ML) INJECTION AS NEEDED
Status: DISCONTINUED | OUTPATIENT
Start: 2021-01-06 | End: 2021-01-12 | Stop reason: HOSPADM

## 2021-01-06 RX ORDER — ACETAMINOPHEN 325 MG/1
650 TABLET ORAL
Status: DISCONTINUED | OUTPATIENT
Start: 2021-01-06 | End: 2021-01-12 | Stop reason: HOSPADM

## 2021-01-06 RX ORDER — ALLOPURINOL 100 MG/1
100 TABLET ORAL DAILY
Status: DISCONTINUED | OUTPATIENT
Start: 2021-01-07 | End: 2021-01-12 | Stop reason: HOSPADM

## 2021-01-06 RX ORDER — ONDANSETRON 2 MG/ML
4 INJECTION INTRAMUSCULAR; INTRAVENOUS
Status: DISCONTINUED | OUTPATIENT
Start: 2021-01-06 | End: 2021-01-12 | Stop reason: HOSPADM

## 2021-01-06 RX ORDER — ENOXAPARIN SODIUM 100 MG/ML
40 INJECTION SUBCUTANEOUS DAILY
Status: DISCONTINUED | OUTPATIENT
Start: 2021-01-07 | End: 2021-01-06 | Stop reason: DRUGHIGH

## 2021-01-06 RX ORDER — PROMETHAZINE HYDROCHLORIDE 25 MG/1
12.5 TABLET ORAL
Status: DISCONTINUED | OUTPATIENT
Start: 2021-01-06 | End: 2021-01-12 | Stop reason: HOSPADM

## 2021-01-06 RX ORDER — VANCOMYCIN/0.9 % SOD CHLORIDE 1.5G/250ML
1500 PLASTIC BAG, INJECTION (ML) INTRAVENOUS
Status: COMPLETED | OUTPATIENT
Start: 2021-01-06 | End: 2021-01-06

## 2021-01-06 RX ORDER — SODIUM CHLORIDE 9 MG/ML
100 INJECTION, SOLUTION INTRAVENOUS CONTINUOUS
Status: DISCONTINUED | OUTPATIENT
Start: 2021-01-06 | End: 2021-01-07

## 2021-01-06 RX ORDER — ACETAMINOPHEN 650 MG/1
650 SUPPOSITORY RECTAL
Status: DISCONTINUED | OUTPATIENT
Start: 2021-01-06 | End: 2021-01-12 | Stop reason: HOSPADM

## 2021-01-06 RX ORDER — SODIUM CHLORIDE 0.9 % (FLUSH) 0.9 %
5-40 SYRINGE (ML) INJECTION EVERY 8 HOURS
Status: DISCONTINUED | OUTPATIENT
Start: 2021-01-06 | End: 2021-01-12 | Stop reason: HOSPADM

## 2021-01-06 RX ORDER — METOPROLOL TARTRATE 25 MG/1
12.5 TABLET, FILM COATED ORAL 2 TIMES DAILY
Status: DISCONTINUED | OUTPATIENT
Start: 2021-01-06 | End: 2021-01-12 | Stop reason: HOSPADM

## 2021-01-06 RX ORDER — POLYETHYLENE GLYCOL 3350 17 G/17G
17 POWDER, FOR SOLUTION ORAL DAILY PRN
Status: DISCONTINUED | OUTPATIENT
Start: 2021-01-06 | End: 2021-01-12 | Stop reason: HOSPADM

## 2021-01-06 RX ORDER — ASPIRIN 81 MG/1
81 TABLET ORAL DAILY
Status: DISCONTINUED | OUTPATIENT
Start: 2021-01-07 | End: 2021-01-07 | Stop reason: CLARIF

## 2021-01-06 RX ORDER — VANCOMYCIN HYDROCHLORIDE
1250
Status: DISCONTINUED | OUTPATIENT
Start: 2021-01-08 | End: 2021-01-08 | Stop reason: DRUGHIGH

## 2021-01-06 RX ORDER — ATORVASTATIN CALCIUM 40 MG/1
40 TABLET, FILM COATED ORAL
Status: DISCONTINUED | OUTPATIENT
Start: 2021-01-06 | End: 2021-01-07

## 2021-01-06 RX ORDER — ENOXAPARIN SODIUM 100 MG/ML
30 INJECTION SUBCUTANEOUS DAILY
Status: DISCONTINUED | OUTPATIENT
Start: 2021-01-07 | End: 2021-01-12 | Stop reason: HOSPADM

## 2021-01-06 RX ADMIN — METOPROLOL TARTRATE 12.5 MG: 25 TABLET, FILM COATED ORAL at 22:30

## 2021-01-06 RX ADMIN — SODIUM CHLORIDE 100 ML/HR: 9 INJECTION, SOLUTION INTRAVENOUS at 19:08

## 2021-01-06 RX ADMIN — Medication 10 ML: at 22:33

## 2021-01-06 RX ADMIN — SODIUM CHLORIDE 1000 ML: 900 INJECTION, SOLUTION INTRAVENOUS at 12:40

## 2021-01-06 RX ADMIN — SODIUM CHLORIDE 750 ML: 9 INJECTION, SOLUTION INTRAVENOUS at 15:45

## 2021-01-06 RX ADMIN — VANCOMYCIN HYDROCHLORIDE 1500 MG: 10 INJECTION, POWDER, LYOPHILIZED, FOR SOLUTION INTRAVENOUS at 19:07

## 2021-01-06 RX ADMIN — CEFEPIME HYDROCHLORIDE 2 G: 2 INJECTION, POWDER, FOR SOLUTION INTRAVENOUS at 15:45

## 2021-01-06 RX ADMIN — LEVETIRACETAM 750 MG: 100 INJECTION, SOLUTION INTRAVENOUS at 22:34

## 2021-01-06 NOTE — PROGRESS NOTES
CM has reviewed chart. CM attempted to complete initial assessment via phone call with pt's daughter, Janett Ulrich, left  requesting return call.     Raji [place], [address], [address]

## 2021-01-06 NOTE — PROGRESS NOTES
Pharmacy - Enoxaparin (Lovenox®) Monitoring      Indication: DVT prophylaxis  Current Dose: Enoxaparin 30 mg subcutaneously every 24 hours  Creatinine Clearance (mL/min): 21.4 ml/min    Labs:  Recent Labs     01/06/21  1236   CREA 2.21*   HGB 10.1*        Wt Readings from Last 1 Encounters:   01/06/21 82.8 kg (182 lb 8.7 oz)     Ht Readings from Last 1 Encounters:   01/06/21 168 cm (66.14\")       Impression/Plan:   Changed enoxaparin 40mg q 24h to 30mg q 24h to for crcl 20-30ml/min per protocol. Can change back if renal function improves. Thanks,  DAMION [email or web address]      [email or web address]

## 2021-01-06 NOTE — ED PROVIDER NOTES
EMERGENCY DEPARTMENT HISTORY AND PHYSICAL EXAM      Date: 1/6/2021  Patient Name: Rosalind Palafox    History of Presenting Illness     Chief Complaint   Patient presents with    Altered mental status         HPI: Rosalind Palafox, 80 y.o. male presents to the ED with cc of altered mental status. Daughter is at bedside and helps provide history. 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 On EMS arrival, there is no seizure-like activity. Daughter states that he is at his recent neurologic baseline, he has severe dementia and a and O x0 at baseline. She reports no recent fevers, coughing, vomiting or diarrhea. Was previously treated for UTI on prior hospital admission, is not currently on outpatient antibiotics. Does have a history of seizures and takes Keppra. There are no other complaints, changes, or physical findings at this time. PCP: Noris Wadsworth MD    No current facility-administered medications on file prior to encounter. Current Outpatient Medications on File Prior to Encounter   Medication Sig Dispense Refill    hydrALAZINE (APRESOLINE) 100 mg tablet Take 1 Tab by mouth three (3) times daily. 90 Tab 0    amLODIPine (NORVASC) 10 mg tablet Take 1 Tab by mouth daily. 30 Tab 0    levETIRAcetam (KEPPRA) 750 mg tablet Take 1 Tab by mouth every twelve (12) hours. 60 Tab 0    metoprolol tartrate (LOPRESSOR) 25 mg tablet 0.5 Tabs by Per NG tube route two (2) times a day. 30 Tab 0    allopurinoL (ZYLOPRIM) 100 mg tablet Take 1 Tab by mouth daily. 90 Tab 1    aspirin delayed-release 81 mg tablet Take 1 Tab by mouth daily. 90 Tab 3    atorvastatin (LIPITOR) 40 mg tablet Take 1 Tab by mouth nightly. 90 Tab 3    polyethylene glycol (MIRALAX) 17 gram/dose powder Take 17 g by mouth daily as needed for Constipation. 235 g 1    balsam peru-castor oiL (VENELEX) ointment Apply  to affected area three (3) times daily.  1 Tube 0    famotidine (PEPCID) 20 mg tablet Take 1 Tab by mouth every evening. 30 Tab 0    acetaminophen (TYLENOL) 325 mg tablet Take 650 mg by mouth every six (6) hours as needed for Pain. Past History     Past Medical History:  Past Medical History:   Diagnosis Date    Cancer [facility])     prostate    Chronic systolic heart failure ([facility] [address] 12/7/2020    Constipation     Gout     Hyperlipemia     Hypertension     Pacemaker 2019    Stroke ([facility] [address]     Thrombocytopenia ([facility] [address] 3/19/2019    TIA (transient ischemic attack) 2013       Past Surgical History:  Past Surgical History:   Procedure Laterality Date    HX PROSTATECTOMY      HX UROLOGICAL      prostate removed    PLACE PERCUT GASTROSTOMY TUBE  11/30/2020         [place] INS NEW/RPLCMT PRM PACEMAKR W/TRANS ELTRD ATRIAL N/A 10/18/2019    Insert Ppm Single Atrial performed by María Dial MD at [address] CATH LAB    [place] INS NEW/RPLCMT PRM PM W/TRANSV ELTRD ATRIAL&VENT Right 3/22/2019    INSERT PPM DUAL performed by María Dial MD at [address] CATH LAB       Family History:  Family History   Problem Relation Age of Onset    No Known Problems Mother     No Known Problems Father        Social History:  Social History     Tobacco Use    Smoking status: Never Smoker    Smokeless tobacco: Never Used   Substance Use Topics    Alcohol use: No    Drug use: No       Allergies:  No Known Allergies      Review of Systems   Patient is unable to provide history    Physical Exam   Physical Exam  Constitutional:       Comments: Eyes closed, moves all extremities spontaneously, making mumbling sounds, frail and elderly   HENT:      Head: Normocephalic and atraumatic. Neck:      Musculoskeletal: Neck supple. Cardiovascular:      Rate and Rhythm: Regular rhythm. Tachycardia present. Pulmonary:      Effort: Pulmonary effort is normal.      Breath sounds: Normal breath sounds. Abdominal:      Palpations: Abdomen is soft. Tenderness:  There is no abdominal tenderness. Musculoskeletal:         General: No swelling. Skin:     General: Skin is warm and dry. Neurological:      Comments: Does not open eyes, response to questioning with incomprehensible mumbling, full strength in his upper extremities, moves extremities symmetrically         Diagnostic Study Results     Labs -   No results found for this or any previous visit (from the past 24 hour(s)). Radiologic Studies -   XR CHEST PORT    (Results Pending)   CT HEAD WO CONT    (Results Pending)     CT Results  (Last 48 hours)    None        CXR Results  (Last 48 hours)    None            Medical Decision Making   I am the first provider for this patient. I reviewed the vital signs, available nursing notes, past medical history, past surgical history, family history and social history. Vital Signs-Reviewed the patient's vital signs. Patient Vitals for the past 24 hrs:   Temp Pulse Resp SpO2   01/06/21 1235 99.9 °F (37.7 °C) (!) 114 28 97 %         Provider Notes (Medical Decision Making):   49-year-old male presenting with possible seizure versus altered mental status. He is currently at his neurologic baseline per family. Differential includes seizure, infection such as UTI or pneumonia, dehydration, electrolyte/metabolic abnormalities. CT head to assess for any acute intracranial process. Neurologic exam is nonfocal.  IV fluids will be initiated as he is tachycardic with temperature slightly elevated 99.9. ED Course:     Initial assessment performed. The patients presenting problems have been discussed, and they are in agreement with the care plan formulated and outlined with them. I have encouraged them to ask questions as they arise throughout their visit.     EKG is performed at 12: 44, shows a paced rhythm with frequent PVCs at a rate of 100, , , QTc of 526, difficult to assess fully due to frequent PVCs, but no obvious ST segment elevation or depression that would be concerning for ACS in the setting of a paced rhythm. This is interpreted as a ventricularly paced rhythm with frequent PVCs. CBC shows leukocytosis of 15, anemia with hemoglobin of 10.1. Basic metabolic panel shows creatinine elevated at 2.21 and elevated BUN, has a history of CKD however this is a worsening creatinine consistent with SOULEYMANE. Lactic acid is elevated at 2.3. IV fluids and antibiotics will be initiated, blood cultures obtained. Troponin is elevated at 0.27, does have a chronically elevated troponin per chart review, however this is more elevated than prior and will be repeated. Lactic acid will be repeated after IV fluids. On reevaluation, the patient is resting comfortably, continues to be slightly tachycardic, extremities are warm and well-perfused, mental status is unchanged, vitals are reviewed. Critical Care Time:         Disposition:  Admit    PLAN:  1. Current Discharge Medication List        2.    Follow-up Information    None       Return to ED if worse     Diagnosis     Clinical Impression: Acute SIRS criteria with concern for infection, SOULEYMANE on CKD

## 2021-01-06 NOTE — H&P
Hospitalist Admission Note    NAME: Fabián Harmon   :  3/9/1933   MRN:  [id number]     Date/Time:  2021 3:37 PM    Patient PCP: Rosalio Osuna MD  ______________________________________________________________________  Given the patient's current clinical presentation, I have a high level of concern for decompensation if discharged from the emergency department. Complex decision making was performed, which includes reviewing the patient's available past medical records, laboratory results, and x-ray films. My assessment of this patient's clinical condition and my plan of care is as follows. Assessment / Plan:    Severe sepsis, POA  Acute metabolic encephalopathy in the setting of worsening dementia, POA  Lactic acidosis, POA  Concern for urinary tract infection, POA  History of seizure, POA  Presented from nursing home with lactic acidosis, tachycardia, leukocytosis  Most likely source is UTI given recurrent UTIs in the past  Was started on cefepime, will add vancomycin for possible bacteremia  Follow blood cultures and urine cultures  Follow lactic acid every 6 hours results  CT head with no acute abnormality  Follow ultrasound abdomen  Change Keppra to IV 1000 mg twice daily  Neurology consultation  EEG    Severe dehydration, POA  Acute kidney injury, POA  Hypomagnesemia, POA  Clinically looks severely dehydrated  Creatinine 2.21 from baseline around 1  Likely dehydration  IV fluids  Follow BMP  Follow liver ultrasound  Follow magnesium level tomorrow    Elevated troponin  No acute ischemic changes on EKG  Likely secondary to SOULEYMANE and dehydration  Follow troponin every 6 hours and if continue to rise then consult cardiology.     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 History was obtained from EMR and daughter at bedside. It was reported that the patient brought from nursing facility to the ED due to concern of change in mentation and possible seizure episode earlier today. On arrival to the emergency department it was documented otherwise seizure-like activity however daughter reported that he has been always noncommunicative and alert and oriented x0. She reported that he did not complain of any recent fever, chest pain, shortness of breath, abdominal pain, diarrhea, constipation. Patient was treated recently in December for UTI. In the emergency department, he was found to have severe sepsis with lactic acidosis and acute kidney injury. We were asked to admit for work up and evaluation of the above problems.      Past Medical History:   Diagnosis Date    Cancer [facility])     prostate    Chronic systolic heart failure ([address] 12/7/2020    Constipation     Gout     Hyperlipemia     Hypertension     Pacemaker 2019    Stroke ([facility] [address]     Thrombocytopenia ([facility] [address] 3/19/2019    TIA (transient ischemic attack) 2013        Past Surgical History:   Procedure Laterality Date    HX PROSTATECTOMY      HX UROLOGICAL      prostate removed    PLACE PERCUT GASTROSTOMY TUBE  11/30/2020         [place] NEW/RPLCMT PRM PACEMAKR W/TRANS ELTRD ATRIAL N/A 10/18/2019    Insert Ppm Single Atrial performed by Patricia Diaz MD at [address] [facility] [address] CATH LAB    [place] NEW/RPLCMT PRM PM W/TRANSV ELTRD ATRIAL&VENT Right 3/22/2019    INSERT PPM DUAL performed by Patricia Diaz MD at [address] [facility] [address] CATH LAB       Social History     Tobacco Use    Smoking status: Never Smoker    Smokeless tobacco: Never Used   Substance Use Topics    Alcohol use: No        Family History   Problem Relation Age of Onset    No Known Problems Mother     No Known Problems Father      No Known Allergies     Prior to Admission medications    Medication Sig Start Date End Date Taking? Authorizing Provider   hydrALAZINE (APRESOLINE) 100 mg tablet Take 1 Tab by mouth three (3) times daily. 12/11/20   Cristina Mata NP   amLODIPine (NORVASC) 10 mg tablet Take 1 Tab by mouth daily. 12/11/20   Cristina Mata NP   levETIRAcetam (KEPPRA) 750 mg tablet Take 1 Tab by mouth every twelve (12) hours. 12/11/20   Cristina Mata NP   metoprolol tartrate (LOPRESSOR) 25 mg tablet 0.5 Tabs by Per NG tube route two (2) times a day. 12/11/20   Cristina Mata NP   allopurinoL (ZYLOPRIM) 100 mg tablet Take 1 Tab by mouth daily. 10/1/20   Javier Stephens MD   aspirin delayed-release 81 mg tablet Take 1 Tab by mouth daily. 7/13/20   Javier Stephens MD   atorvastatin (LIPITOR) 40 mg tablet Take 1 Tab by mouth nightly. 7/13/20   Javier Stephens MD   polyethylene glycol [facility]) 17 gram/dose powder Take 17 g by mouth daily as needed for Constipation. 7/13/20   Javier Brooklyn, MD   balsam peru-castor oiL (VENELEX) ointment Apply  to affected area three (3) times daily. 7/9/20   Queen Toi MD   famotidine (PEPCID) 20 mg tablet Take 1 Tab by mouth every evening. 7/9/20   Queen Toi MD   acetaminophen (TYLENOL) 325 mg tablet Take 650 mg by mouth every six (6) hours as needed for Pain. Provider, Historical       REVIEW OF SYSTEMS:     I am not able to complete the review of systems because: The patient is intubated and sedated   x The patient has altered mental status due to his acute medical problems    The patient has baseline aphasia from prior stroke(s)    The patient has baseline dementia and is not reliable historian    The patient is in acute medical distress and unable to provide information           Total of 12 systems reviewed as follows:       POSITIVE= underlined text  Negative = text not underlined  General:  fever, chills, sweats, generalized weakness, weight loss/gain,      loss of appetite   Eyes:    blurred vision, eye pain, loss of vision, double vision  ENT:    rhinorrhea, pharyngitis   Respiratory:   cough, sputum production, SOB, CARREON, wheezing, pleuritic pain   Cardiology:   chest pain, palpitations, orthopnea, PND, edema, syncope   Gastrointestinal:  abdominal pain , N/V, diarrhea, dysphagia, constipation, bleeding   Genitourinary:  frequency, urgency, dysuria, hematuria, incontinence   Muskuloskeletal :  arthralgia, myalgia, back pain  Hematology:  easy bruising, nose or gum bleeding, lymphadenopathy   Dermatological: rash, ulceration, pruritis, color change / jaundice  Endocrine:   hot flashes or polydipsia   Neurological:  headache, dizziness, confusion, focal weakness, paresthesia,     Speech difficulties, memory loss, gait difficulty  Psychological: Feelings of anxiety, depression, agitation    Objective:   VITALS:    Visit Vitals  /63   Pulse (!) 110   Temp 99.9 °F (37.7 °C)   Resp 25   SpO2 94%       PHYSICAL EXAM:    General:    Appears age stated, not in distress, not following commands, closing his eyes. HEENT: Atraumatic, anicteric sclerae, pink conjunctivae     No oral ulcers, mucosa moist, throat clear, dentition fair  Neck:  Supple, symmetrical,  thyroid: non tender  Lungs:   Clear to auscultation bilaterally. No Wheezing or Rhonchi.  No rales.  Chest wall:  No tenderness  No Accessory muscle use. Heart:   Regular  rhythm,  No  murmur   No edema  Abdomen:   Soft, non-tender. Not distended. Bowel sounds normal  Extremities: No cyanosis. No clubbing,      Skin turgor normal, Capillary refill normal, Radial dial pulse 2+  Skin:     Not pale. Not Jaundiced  No rashes. Chronic small wounds on the lower extremity bilaterally  Psych:  Poor insight. Not depressed. Not anxious or agitated. Neurologic: EOMs intact. No facial asymmetry. Alert and oriented x0, moving all extremities, not following commands. _______________________________________________________________________  Care Plan discussed with:    Comments   Patient x    Family  x    RN x    Care Manager                    Consultant:      _______________________________________________________________________  Expected  Disposition:   Home with Family    HH/PT/OT/RN    SNF/LTC x   HERMELINDA    ________________________________________________________________________  TOTAL TIME:  61 Minutes    Critical Care Provided     Minutes non procedure based      Comments    x Reviewed previous records   >50% of visit spent in counseling and coordination of care x Discussion with patient and/or family and questions answered       ________________________________________________________________________  Signed: Fadi Erickson MD    Procedures: see electronic medical records for all procedures/Xrays and details which were not copied into this note but were reviewed prior to creation of Plan.     LAB DATA REVIEWED:    Recent Results (from the past 24 hour(s))   CBC WITH AUTOMATED DIFF    Collection Time: 01/06/21 12:36 PM   Result Value Ref Range    WBC 15.1 (H) 4.1 - 11.1 K/uL    RBC 3.29 (L) 4.10 - 5.70 M/uL    HGB 10.1 (L) 12.1 - 17.0 g/dL    HCT 31.0 (L) 36.6 - 50.3 %    MCV 94.2 80.0 - 99.0 FL    MCH 30.7 26.0 - 34.0 PG    MCHC 32.6 30.0 - 36.5 g/dL    RDW 15.2 (H) 11.5 - 14.5 %    PLATELET 159 759 - 840 K/uL    MPV 11.6 8.9 - 12.9 FL    NRBC 0.0 0  WBC    ABSOLUTE NRBC 0.00 0.00 - 0.01 K/uL    NEUTROPHILS 80 (H) 32 - 75 %    LYMPHOCYTES 9 (L) 12 - 49 %    MONOCYTES 10 5 - 13 %    EOSINOPHILS 0 0 - 7 %    BASOPHILS 0 0 - 1 %    IMMATURE GRANULOCYTES 0 0.0 - 0.5 %    ABS. NEUTROPHILS 12.2 (H) 1.8 - 8.0 K/UL    ABS. LYMPHOCYTES 1.4 0.8 - 3.5 K/UL    ABS. MONOCYTES 1.5 (H) 0.0 - 1.0 K/UL    ABS. EOSINOPHILS 0.0 0.0 - 0.4 K/UL    ABS. BASOPHILS 0.0 0.0 - 0.1 K/UL    ABS. IMM. GRANS. 0.1 (H) 0.00 - 0.04 K/UL    DF AUTOMATED     METABOLIC PANEL, COMPREHENSIVE    Collection Time: 01/06/21 12:36 PM   Result Value Ref Range    Sodium 146 (H) 136 - 145 mmol/L    Potassium 3.9 3.5 - 5.1 mmol/L    Chloride 111 (H) 97 - 108 mmol/L    CO2 30 21 - 32 mmol/L    Anion gap 5 5 - 15 mmol/L    Glucose 239 (H) 65 - 100 mg/dL    BUN 84 (H) 6 - 20 MG/DL    Creatinine 2.21 (H) 0.70 - 1.30 MG/DL    BUN/Creatinine ratio 38 (H) 12 - 20      GFR est AA 34 (L) >60 ml/min/1.73m2    GFR est non-AA 28 (L) >60 ml/min/1.73m2    Calcium 8.5 8.5 - 10.1 MG/DL    Bilirubin, total 0.4 0.2 - 1.0 MG/DL    ALT (SGPT) 354 (H) 12 - 78 U/L    AST (SGOT) 242 (H) 15 - 37 U/L    Alk.  phosphatase 142 (H) 45 - 117 U/L    Protein, total 6.6 6.4 - 8.2 g/dL    Albumin 1.7 (L) 3.5 - 5.0 g/dL    Globulin 4.9 (H) 2.0 - 4.0 g/dL    A-G Ratio 0.3 (L) 1.1 - 2.2     TROPONIN I    Collection Time: 01/06/21 12:36 PM   Result Value Ref Range    Troponin-I, Qt. 0.27 (H) <0.05 ng/mL   LIPASE    Collection Time: 01/06/21 12:36 PM   Result Value Ref Range    Lipase 61 (L) 73 - 393 U/L   MAGNESIUM    Collection Time: 01/06/21 12:36 PM   Result Value Ref Range    Magnesium 2.6 (H) 1.6 - 2.4 mg/dL   LACTIC ACID    Collection Time: 01/06/21 12:37 PM   Result Value Ref Range    Lactic acid 2.3 (HH) 0.4 - 2.0 MMOL/L   EKG, 12 LEAD, INITIAL    Collection Time: 01/06/21 12:44 PM   Result Value Ref Range    Ventricular Rate 100 BPM    Atrial Rate 100 BPM    P-R Interval 164 ms    QRS Duration 168 ms    Q-T Interval 408 ms    QTC Calculation (Bezet) 526 ms    Calculated P Axis -15 degrees    Calculated R Axis 145 degrees    Calculated T Axis -28 degrees    Diagnosis       Atrial-sensed ventricular-paced rhythm with frequent premature ventricular   complexes  When compared with ECG of 30-JUN-2020 12:58,  premature ventricular complexes are now present  Vent.  rate has increased BY  25 BPM

## 2021-01-06 NOTE — ED TRIAGE NOTES
Pt arrived to ED from [facility] rehab with c/o AMS. Unknown to EMS what pt's baseline mental status is. Per EMS temp of 102.7 and BP of 101/58    Pt mumbling \"help me\". Does not answer any questions.

## 2021-01-06 NOTE — PROGRESS NOTES
Pharmacy Automatic Renal Dosing Protocol - Antimicrobials    Indication for Antimicrobials: sepsis     Current Regimen of Each Antimicrobial:  Vancomycin pharmacy to dose: 1500mg, then 1250mg q 36h (Start Date:  ; Day # 1)  Cefepime 2g IV q 24h (start: , day 1)    Previous Antimicrobial Therapy:    Goal Level: VANCOMYCIN TROUGH GOAL RANGE    Vancomycin Trough: 15 - 20 mcg/mL  (AUC: 400 - 600 mg/hr/Liter/day)     Date Dose & Interval Measured (mcg/mL) Extrapolated (mcg/mL)                       Date & time of next level: 1/10 if no changes in renal function    Significant Cultures:   : blood - prelim  : urine - prelim    Radiology / Imaging results: (X-ray, CT scan or MRI):   : CT head: IMPRESSION: Chronic generalized brain volume loss without significant change   since the prior exam.    Paralysis, amputations, malnutrition: none    Labs:  Recent Labs     21  1236   CREA 2.21*   BUN 84*   WBC 15.1*     Temp (24hrs), Av.9 °F (37.7 °C), Min:99.9 °F (37.7 °C), Max:99.9 °F (37.7 °C)      Is the Patient on Dialysis? No    Creatinine Clearance (mL/min):   CrCl (Actual Body Weight): 27.6  CrCl (Adjusted Body Weight): 23.8  CrCl (Ideal Body Weight): 21.4    Impression/Plan:   Vancomycin 1500mg, then 1250mg q 36h to yield trough of ~15mcg/mL  Renal dosed cefepime to q 24h  Cbc and bmp ordered for  by provider  Antimicrobial stop date to be determined     Pharmacy will follow daily and adjust medications as appropriate for renal function and/or serum levels. Thank you,  BRENTON Nagel    Recommended duration of therapy  [email or web address]/[facility]/Pharmacy/Clinical%20Companion/[email or web address]. docx    Renal Dosing  [email or web address]/[facility]/Pharmacy/Clinical%20Companion/Renal%[id number]. pdf

## 2021-01-06 NOTE — REMOTE MONITORING
Spoke to primary RN regarding Sepsis bundle.  (Repeat LA)      Fuad Montague RN, [address]  [phone]

## 2021-01-07 LAB
ALBUMIN SERPL-MCNC: 2 G/DL (ref 3.5–5)
ALBUMIN/GLOB SERPL: 0.4 {RATIO} (ref 1.1–2.2)
ALP SERPL-CCNC: 158 U/L (ref 45–117)
ALT SERPL-CCNC: 370 U/L (ref 12–78)
ANION GAP SERPL CALC-SCNC: 5 MMOL/L (ref 5–15)
AST SERPL-CCNC: 187 U/L (ref 15–37)
BACTERIA SPEC CULT: NORMAL
BASOPHILS # BLD: 0 K/UL (ref 0–0.1)
BASOPHILS NFR BLD: 0 % (ref 0–1)
BILIRUB SERPL-MCNC: 1.1 MG/DL (ref 0.2–1)
BUN SERPL-MCNC: 80 MG/DL (ref 6–20)
BUN/CREAT SERPL: 39 (ref 12–20)
CALCIUM SERPL-MCNC: 9.5 MG/DL (ref 8.5–10.1)
CHLORIDE SERPL-SCNC: 112 MMOL/L (ref 97–108)
CO2 SERPL-SCNC: 30 MMOL/L (ref 21–32)
CREAT SERPL-MCNC: 2.05 MG/DL (ref 0.7–1.3)
DIFFERENTIAL METHOD BLD: ABNORMAL
EOSINOPHIL # BLD: 0 K/UL (ref 0–0.4)
EOSINOPHIL NFR BLD: 0 % (ref 0–7)
ERYTHROCYTE [DISTWIDTH] IN BLOOD BY AUTOMATED COUNT: 14.9 % (ref 11.5–14.5)
GLOBULIN SER CALC-MCNC: 5.7 G/DL (ref 2–4)
GLUCOSE SERPL-MCNC: 155 MG/DL (ref 65–100)
HCT VFR BLD AUTO: 34.4 % (ref 36.6–50.3)
HGB BLD-MCNC: 10.8 G/DL (ref 12.1–17)
IMM GRANULOCYTES # BLD AUTO: 0.1 K/UL (ref 0–0.04)
IMM GRANULOCYTES NFR BLD AUTO: 1 % (ref 0–0.5)
LACTATE SERPL-SCNC: 1.3 MMOL/L (ref 0.4–2)
LYMPHOCYTES # BLD: 2.3 K/UL (ref 0.8–3.5)
LYMPHOCYTES NFR BLD: 15 % (ref 12–49)
MAGNESIUM SERPL-MCNC: 2.8 MG/DL (ref 1.6–2.4)
MCH RBC QN AUTO: 29.9 PG (ref 26–34)
MCHC RBC AUTO-ENTMCNC: 31.4 G/DL (ref 30–36.5)
MCV RBC AUTO: 95.3 FL (ref 80–99)
MONOCYTES # BLD: 1.2 K/UL (ref 0–1)
MONOCYTES NFR BLD: 8 % (ref 5–13)
NEUTS SEG # BLD: 11.4 K/UL (ref 1.8–8)
NEUTS SEG NFR BLD: 76 % (ref 32–75)
NRBC # BLD: 0 K/UL (ref 0–0.01)
NRBC BLD-RTO: 0 PER 100 WBC
PLATELET # BLD AUTO: 188 K/UL (ref 150–400)
PMV BLD AUTO: 10.8 FL (ref 8.9–12.9)
POTASSIUM SERPL-SCNC: 3.6 MMOL/L (ref 3.5–5.1)
PROT SERPL-MCNC: 7.7 G/DL (ref 6.4–8.2)
RBC # BLD AUTO: 3.61 M/UL (ref 4.1–5.7)
SERVICE CMNT-IMP: NORMAL
SODIUM SERPL-SCNC: 147 MMOL/L (ref 136–145)
TROPONIN I SERPL-MCNC: 0.33 NG/ML
WBC # BLD AUTO: 15 K/UL (ref 4.1–11.1)

## 2021-01-07 PROCEDURE — 2709999900 HC NON-CHARGEABLE SUPPLY

## 2021-01-07 PROCEDURE — 80053 COMPREHEN METABOLIC PANEL: CPT

## 2021-01-07 PROCEDURE — 77030040718 HC DRSG HYDRGEL SKINTEGRITY MDII -A

## 2021-01-07 PROCEDURE — 74011250637 HC RX REV CODE- 250/637: Performed by: INTERNAL MEDICINE

## 2021-01-07 PROCEDURE — 83735 ASSAY OF MAGNESIUM: CPT

## 2021-01-07 PROCEDURE — 77030018798 HC PMP KT ENTRL FED COVD -A

## 2021-01-07 PROCEDURE — 84484 ASSAY OF TROPONIN QUANT: CPT

## 2021-01-07 PROCEDURE — 65270000015 HC RM PRIVATE ONCOLOGY

## 2021-01-07 PROCEDURE — 74011250636 HC RX REV CODE- 250/636: Performed by: INTERNAL MEDICINE

## 2021-01-07 PROCEDURE — 85025 COMPLETE CBC W/AUTO DIFF WBC: CPT

## 2021-01-07 PROCEDURE — 36415 COLL VENOUS BLD VENIPUNCTURE: CPT

## 2021-01-07 PROCEDURE — 77030040393 HC DRSG OPTIFOAM GENT MDII -B

## 2021-01-07 PROCEDURE — 74011000258 HC RX REV CODE- 258: Performed by: INTERNAL MEDICINE

## 2021-01-07 PROCEDURE — 74011250636 HC RX REV CODE- 250/636: Performed by: NURSE PRACTITIONER

## 2021-01-07 PROCEDURE — 77030041076 HC DRSG AG OPTICELL MDII -A

## 2021-01-07 PROCEDURE — 83605 ASSAY OF LACTIC ACID: CPT

## 2021-01-07 RX ORDER — SODIUM CHLORIDE 9 MG/ML
100 INJECTION, SOLUTION INTRAVENOUS CONTINUOUS
Status: DISCONTINUED | OUTPATIENT
Start: 2021-01-07 | End: 2021-01-07

## 2021-01-07 RX ORDER — GUAIFENESIN 100 MG/5ML
81 LIQUID (ML) ORAL DAILY
Status: DISCONTINUED | OUTPATIENT
Start: 2021-01-07 | End: 2021-01-12 | Stop reason: HOSPADM

## 2021-01-07 RX ORDER — DEXTROSE MONOHYDRATE 50 MG/ML
100 INJECTION, SOLUTION INTRAVENOUS CONTINUOUS
Status: DISCONTINUED | OUTPATIENT
Start: 2021-01-07 | End: 2021-01-10

## 2021-01-07 RX ORDER — MORPHINE SULFATE 2 MG/ML
1 INJECTION, SOLUTION INTRAMUSCULAR; INTRAVENOUS
Status: DISCONTINUED | OUTPATIENT
Start: 2021-01-07 | End: 2021-01-12 | Stop reason: HOSPADM

## 2021-01-07 RX ADMIN — Medication 10 ML: at 06:41

## 2021-01-07 RX ADMIN — LEVETIRACETAM 750 MG: 100 INJECTION, SOLUTION INTRAVENOUS at 22:45

## 2021-01-07 RX ADMIN — DEXTROSE MONOHYDRATE 100 ML/HR: 5 INJECTION, SOLUTION INTRAVENOUS at 14:42

## 2021-01-07 RX ADMIN — METOPROLOL TARTRATE 12.5 MG: 25 TABLET, FILM COATED ORAL at 10:26

## 2021-01-07 RX ADMIN — LEVETIRACETAM 750 MG: 100 INJECTION, SOLUTION INTRAVENOUS at 11:04

## 2021-01-07 RX ADMIN — ENOXAPARIN SODIUM 30 MG: 30 INJECTION SUBCUTANEOUS at 10:30

## 2021-01-07 RX ADMIN — CEFEPIME HYDROCHLORIDE 2 G: 2 INJECTION, POWDER, FOR SOLUTION INTRAVENOUS at 14:44

## 2021-01-07 RX ADMIN — METOPROLOL TARTRATE 12.5 MG: 25 TABLET, FILM COATED ORAL at 22:46

## 2021-01-07 RX ADMIN — Medication 10 ML: at 22:48

## 2021-01-07 RX ADMIN — ALLOPURINOL 100 MG: 100 TABLET ORAL at 10:27

## 2021-01-07 RX ADMIN — SODIUM CHLORIDE 100 ML/HR: 9 INJECTION, SOLUTION INTRAVENOUS at 11:58

## 2021-01-07 RX ADMIN — Medication 10 ML: at 14:47

## 2021-01-07 RX ADMIN — ASPIRIN 81 MG: 81 TABLET, CHEWABLE ORAL at 11:56

## 2021-01-07 RX ADMIN — MORPHINE SULFATE 1 MG: 2 INJECTION, SOLUTION INTRAMUSCULAR; INTRAVENOUS at 10:25

## 2021-01-07 NOTE — PROGRESS NOTES
Hospitalist Progress Note    NAME: Tisha Hassan   :  3/9/1933   MRN:  [id number]     I reviewed pertinent labs and imaging, and discussed /agreed on the plan of care with Dr. Adama Rahman. Assessment / Plan: 1800 - Met with daughter, discussed goals of care and patient condition. She would like to include brothers in this discussion. Plan for family meeting tomorrow at 0. Severe Sepsis r/t to UTI with Bacteremia and Severe Dehydration   Acute Metabolic Encephalopathy in setting of Severe Dementia   Lactic Acidosis - resolved   Failure to Thrive   · Presented from assisted living facility with lactic acidosis, tachycardia, leukocytosis and multiple pressure ulcers   · Suspect source of infection r/t UTI with severe dehydration and history of UTIs  · Follow Urine Culture - pending   · Blood Culture  - GNR in 1 of 4 Bottles drawn from [facility] - preliminary   · Continue Cefepime and Vancomycin - Day 2  · CT Head  - \"Chronic generalized brain volume loss without significant change since prior exam.\"  · At baseline patient has severe dementia and is not able to communicate. Daughter reports that he occasionally opens his eyes to voice and reports this as improvement. · Currently he is only responsive to pain when turning with multiple pressure ulcers   · Abdominal US - unremarkable for acute findings  · Received IVF in ED. Careful IVF hydration with history of CHF.   · Patient remains febrile overnight and this morning      Acute Kidney Injury r/t Severe Dehydration   Hypermagnesemia  Mag 2.6  Hypernatremia Na 126  · Cr 2.21 on admission - trending down Cr 2.05 today   · Follow BMP   · Baseline Cr around 1   · Elevated Mag r/t dehydration - follow  · 2L free water deficit   · Gentle IVF hydration with D5    Elevated Troponin 0.33  · No ischemic changes on EKG  · Likely r/t SOULEYMANE, dehydration and sepsis    Transaminitis    · Appears to be r/t dehydration   · Hold statin   · US of abdomen unremarkable for acute finding   · LFTs trending down - follow CMP    Failure to Thrive   Moderate Protein Malnutrition   Multiple Pressure Ulcers and DTIs Please see wound care note for extensive photo documentation of wounds to sacrum, right flank, right lateral LE and ankle, bilat heels, right hip, left lateral LE and ankle  Debility Bedridden at baseline   Severe Dementia   History of Recurrent CVAs  Dysphagia with PEG placement 12/2020  · Patient has had decline in status since November. Presented to ED from assisted living with status epilepticus. Since then has been mostly non verbal and disoriented. Previously with severe dementia but was able to converse some. · Today patient only responsive to pain associated with multiple pressure ulcers   · PRN morphine for wound care and turning   · Per Neurology notes last admissions 11/27/2020 - \"Component of a vascular dementia and stepwise progression of the dementia is likely. This very well may be close to his new baseline. \"  · Ask Dietician for recommendations on TF  · Poor short term prognosis and grim prognosis in next 6 months  · Spoke to daughter Natalie Mass (mPOA) via phone. Discussed poor prognosis with concerns r/t quality of life and comfort. Patient has a poor chance of survival if coded. Daughter agreed to call brothers and meet with NP to discuss code status and goals of care, tentatively tomorrow. · Patient is hospice appropriate. 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 Normal cavity size and systolic function. Mild concentric hypertrophy.    · Continue metoprolol and ASA   · Statin held with SOULEYMANE    History of Seizure with Recent admission for Status Epilepticus  · History of 5 minute seizure at LT in November 2020  · No current seizure like activity  · Continue IV keppra      History of Gout Hold allopurinol with SOULEYMANE      25.0 - 29.9 Overweight / Body mass index is 29.34 kg/m². Code status: Full  Prophylaxis: Lovenox  Recommended Disposition: SNF/LTC     Subjective:     Chief Complaint / Reason for Physician Visit  Patient seen at bedside, responsive only to pain. Discussed patient condition and plan of care with daughter Avis Smyth. Addressed goals of care and extremely poor prognosis. In agreement for family meeting with brothers tomorrow. Discussed with RN events overnight. Review of Systems:  Symptom Y/N Comments  Symptom Y/N Comments   Fever/Chills    Chest Pain     Poor Appetite    Edema     Cough    Abdominal Pain     Sputum    Joint Pain     SOB/CARREON    Pruritis/Rash     Nausea/vomit    Tolerating PT/OT     Diarrhea    Tolerating Diet     Constipation    Other       Could NOT obtain due to: Unresponsive      Objective:     VITALS:   Last 24hrs VS reviewed since prior progress note. Most recent are:  Patient Vitals for the past 24 hrs:   Temp Pulse Resp BP SpO2   01/07/21 0746 99.2 °F (37.3 °C) 89 20 (!) 128/58 92 %   01/06/21 2328 99 °F (37.2 °C) 81 20 107/66 99 %   01/06/21 2230 99.4 °F (37.4 °C) 84 20 116/67 95 %   01/06/21 1958 99.2 °F (37.3 °C) 83 20 (!) 107/57 96 %   01/06/21 1744 99.8 °F (37.7 °C) (!) 102 26 (!) 160/58 98 %   01/06/21 1235 99.9 °F (37.7 °C) (!) 110 25 116/63 94 %     No intake or output data in the 24 hours ending 01/07/21 1106     I had a face to face encounter and independently examined this patient on 1/7/2021, as outlined below:  PHYSICAL EXAM:  General: Frail, elderly and ill appearing male who only responds to pain     EENT:  EOMI. Anicteric sclerae. MMM  Resp:  CTA bilaterally, no wheezing or rales. No accessory muscle use  CV:  Regular  rhythm,  No edema  GI:  Soft, Non distended, Non tender. +Bowel sounds  Neurologic:  Unresponsive    Psych:    Not anxious nor agitated  Skin:  No rashes.   No jaundice, multiple pressure ulcers and wounds     Reviewed most current lab test results and cultures  YES  Reviewed most current radiology test results   YES  Review and summation of old records today    NO  Reviewed patient's current orders and MAR    YES  PMH/SH reviewed - no change compared to H&P  ________________________________________________________________________  Care Plan discussed with:    Comments   Patient x    Family  x Agnes CASAS x    Care Manager x    Consultant                        Multidiciplinary team rounds were held today with , nursing, pharmacist and clinical coordinator. Patient's plan of care was discussed; medications were reviewed and discharge planning was addressed. ________________________________________________________________________  Total NON critical care TIME:  25   Minutes    Total CRITICAL CARE TIME Spent:   Minutes non procedure based      Comments   >50% of visit spent in counseling and coordination of care x    ________________________________________________________________________  Garry Dixon NP     Procedures: see electronic medical records for all procedures/Xrays and details which were not copied into this note but were reviewed prior to creation of Plan. LABS:  I reviewed today's most current labs and imaging studies.   Pertinent labs include:  Recent Labs     01/07/21 0054 01/06/21  1236   WBC 15.0* 15.1*   HGB 10.8* 10.1*   HCT 34.4* 31.0*    193     Recent Labs     01/07/21 0054 01/06/21  1236   * 146*   K 3.6 3.9   * 111*   CO2 30 30   * 239*   BUN 80* 84*   CREA 2.05* 2.21*   CA 9.5 8.5   MG 2.8* 2.6*   ALB 2.0* 1.7*   TBILI 1.1* 0.4   * 354*       Signed: Garry Dixon NP

## 2021-01-07 NOTE — PROGRESS NOTES
End of Shift Note    Bedside shift change report given to Michelle Gutierrez (oncoming nurse) by Alexandra Pugh (offgoing nurse). Report included the following information SBAR, Kardex and MAR    Shift worked:  7p-7a     Shift summary and any significant changes:     No acute changes. Scheduled medications given, no PRN's given. AM labs completed. IV patent and infusing. Concerns for physician to address:  PEG tube feeds? Zone phone for oncoming shift:   [phone]       Activity:  Activity Level: Bed Rest  Number times ambulated in hallways past shift: 0  Number of times OOB to chair past shift: 0    Cardiac:   Cardiac Monitoring: No      Cardiac Rhythm: Sinus tachycardia, Premature ventricular contraction    Access:   Current line(s): PIV     Genitourinary:   Urinary status: voiding and incontinent    Respiratory:   O2 Device: Room air  Chronic home O2 use?: NO  Incentive spirometer at bedside: NO     GI:     Current diet:  DIET NPO  Passing flatus: YES  Tolerating current diet: NPO       Pain Management:   Patient states pain is manageable on current regimen: N/A    Skin:  Torito Score: 12  Interventions: turn team and float heels    Patient Safety:  Fall Score:  Total Score: 3  Interventions: bed/chair alarm  High Fall Risk: Yes    Length of Stay:  Expected LOS: - - -  Actual LOS: 1      Alexandra Pugh

## 2021-01-07 NOTE — WOUND CARE
Wound Care Nurse Consult: consult from staff nurse stating \" pressure injuries and wounds' Attending MD, Dr Lewis Hart, notified of multiple POA pressure injuries found on patient. Patient is a 81 y/o AAM admitted from a nursing home with chronic debility for dehydration and sepsis. Past Medical History:  
Diagnosis Date  Cancer [facility])   
 prostate  Chronic systolic heart failure ([facility] 12/7/2020  Constipation  Gout  Hyperlipemia  Hypertension  Pacemaker 2019  Stroke ([facility]  Thrombocytopenia ([facility] 3/19/2019  TIA (transient ischemic attack) 2013 Other hx: dementia, multiple strokes, PEG tube, CHF. Patient has multiple PI too: 
 
Sacrum: Stage 3 PI Right flank: Deep Tissue PI (DTPI) Right hip: Deep Tissue PI Right lateral LE: DTPI Right lateral ankle: Unstageable PI Right heel: Unstageable PI Left Lateral LE: Unstageable PI Left Heel: Unstageable PI Left lateral ankle: Unstageable PI Sacrum Right flank Right lateral LE and ankle Right heel Right hip Left heel Left ankle Left lateral-posterior LE 
 
 
Recommend: 
 
Multiple heel, ankle and LE PI's: paint with betadine/povidine and cover with a dry dressing. Right hip PI: daily cleanse with wound cleanser spray and cover with a foam dressing. Avoid right hip laying - supine and left side laying only. Sacrum and right flank PI's: daily clense with wound cleanser and 4x4. Apply small amount of Silvsorbe wound gel to wound base with gloved finger and cover with a piece of Opticell AG and then a foam dressing. Float heels If family decides to do aggressive care please order a specialty bed. If Hospice/comfort care decided please go to PRN dressing changes when drainage appears on outside of dressing.  
 
Abraham Fink RN, [employer]

## 2021-01-07 NOTE — PROGRESS NOTES
End of Shift Note    Bedside shift change report given to [facility] (oncoming nurse) by Chari Gaucher, RN (offgoing nurse). Report included the following information SBAR, Kardex and MAR    Shift worked:  7p-7a     Shift summary and any significant changes:     Pt stable through shift, meds given according to mar, labs drawn, wound documented, wound care done     Concerns for physician to address:    Peg tube feedings? Zone phone for oncoming shift:          Activity:  Activity Level: Bed Rest  Number times ambulated in hallways past shift: 0  Number of times OOB to chair past shift: 0    Cardiac:   Cardiac Monitoring: No      Cardiac Rhythm: Sinus tachycardia, Premature ventricular contraction    Access:   Current line(s): PIV     Genitourinary:   Urinary status: incontinent    Respiratory:   O2 Device: Room air  Chronic home O2 use?: NO  Incentive spirometer at bedside: NO     GI:     Current diet:  DIET NPO  Passing flatus: YES  Tolerating current diet: YES       Pain Management:   Patient states pain is manageable on current regimen: N/A    Skin:  Torito Score: 12  Interventions: turn team, float heels and foam dressing    Patient Safety:  Fall Score:  Total Score: 3  Interventions: bed/chair alarm, gripper socks and pt to call before getting OOB  High Fall Risk: Yes    Length of Stay:  Expected LOS: - - -  Actual LOS: 0      Chari Gaucher, RN

## 2021-01-07 NOTE — PROGRESS NOTES
End of Shift Note    Bedside shift change report given to Kasey Wilson (oncoming nurse) by Keenan Branch RN (offgoing nurse). Report included the following information SBAR, Kardex and MAR    Shift worked:  7a-7p     Shift summary and any significant changes:     Pt stable through shift, meds given according to mar, peg tube feeding set up, prn pain medicine given to pt b/c of pain, pt turned, incontinence care done,      Concerns for physician to address:       Zone phone for oncoming shift:          Activity:  Activity Level: Bed Rest  Number times ambulated in hallways past shift: 0  Number of times OOB to chair past shift: 0    Cardiac:   Cardiac Monitoring: No      Cardiac Rhythm: Sinus tachycardia, Premature ventricular contraction    Access:   Current line(s): PIV     Genitourinary:   Urinary status: incontinent    Respiratory:   O2 Device: Room air  Chronic home O2 use?: NO  Incentive spirometer at bedside: NO     GI:     Current diet:  DIET NPO  DIET TUBE FEEDING  Passing flatus: YES  Tolerating current diet: YES       Pain Management:   Patient states pain is manageable on current regimen: N/A    Skin:  Torito Score: 12  Interventions: turn team, speciality bed, float heels and foam dressing    Patient Safety:  Fall Score:  Total Score: 3  Interventions: bed/chair alarm, gripper socks and pt to call before getting OOB  High Fall Risk: Yes    Length of Stay:  Expected LOS: 4d 19h  Actual LOS: [address] Ary Chavis RN

## 2021-01-07 NOTE — PROGRESS NOTES
Nutrition Note    Consult received for TF recommendations:     Recommend staring Jevity 1.5 @ 25mL/hr, advance 10mL/hr q 6h until goal rate of 45mL/hr.   + 160mL H2O flush q 4hr    Goal rate provides 114g Pro, 1800 kcal, 1780 mL. Goal rate meets 100% protein and kcal needs.     Electronically signed by Malorie Mckeon RD on 1/7/2021 at 3:42 PM    Contact: [phone]  Pager [phone]

## 2021-01-08 LAB
ALBUMIN SERPL-MCNC: 1.8 G/DL (ref 3.5–5)
ALBUMIN/GLOB SERPL: 0.3 {RATIO} (ref 1.1–2.2)
ALP SERPL-CCNC: 187 U/L (ref 45–117)
ALT SERPL-CCNC: 539 U/L (ref 12–78)
ANION GAP SERPL CALC-SCNC: 4 MMOL/L (ref 5–15)
AST SERPL-CCNC: 483 U/L (ref 15–37)
BASOPHILS # BLD: 0 K/UL (ref 0–0.1)
BASOPHILS NFR BLD: 0 % (ref 0–1)
BILIRUB SERPL-MCNC: 0.6 MG/DL (ref 0.2–1)
BUN SERPL-MCNC: 72 MG/DL (ref 6–20)
BUN/CREAT SERPL: 42 (ref 12–20)
CALCIUM SERPL-MCNC: 9.4 MG/DL (ref 8.5–10.1)
CHLORIDE SERPL-SCNC: 117 MMOL/L (ref 97–108)
CO2 SERPL-SCNC: 29 MMOL/L (ref 21–32)
CREAT SERPL-MCNC: 1.73 MG/DL (ref 0.7–1.3)
DIFFERENTIAL METHOD BLD: ABNORMAL
EOSINOPHIL # BLD: 0.1 K/UL (ref 0–0.4)
EOSINOPHIL NFR BLD: 1 % (ref 0–7)
ERYTHROCYTE [DISTWIDTH] IN BLOOD BY AUTOMATED COUNT: 14.7 % (ref 11.5–14.5)
GLOBULIN SER CALC-MCNC: 5.6 G/DL (ref 2–4)
GLUCOSE SERPL-MCNC: 224 MG/DL (ref 65–100)
HCT VFR BLD AUTO: 32.2 % (ref 36.6–50.3)
HGB BLD-MCNC: 9.9 G/DL (ref 12.1–17)
IMM GRANULOCYTES # BLD AUTO: 0.1 K/UL (ref 0–0.04)
IMM GRANULOCYTES NFR BLD AUTO: 1 % (ref 0–0.5)
LYMPHOCYTES # BLD: 2.4 K/UL (ref 0.8–3.5)
LYMPHOCYTES NFR BLD: 20 % (ref 12–49)
MCH RBC QN AUTO: 29.6 PG (ref 26–34)
MCHC RBC AUTO-ENTMCNC: 30.7 G/DL (ref 30–36.5)
MCV RBC AUTO: 96.1 FL (ref 80–99)
MONOCYTES # BLD: 0.8 K/UL (ref 0–1)
MONOCYTES NFR BLD: 6 % (ref 5–13)
NEUTS SEG # BLD: 8.7 K/UL (ref 1.8–8)
NEUTS SEG NFR BLD: 72 % (ref 32–75)
NRBC # BLD: 0 K/UL (ref 0–0.01)
NRBC BLD-RTO: 0 PER 100 WBC
PLATELET # BLD AUTO: 203 K/UL (ref 150–400)
PMV BLD AUTO: 11.1 FL (ref 8.9–12.9)
POTASSIUM SERPL-SCNC: 3.8 MMOL/L (ref 3.5–5.1)
PROT SERPL-MCNC: 7.4 G/DL (ref 6.4–8.2)
RBC # BLD AUTO: 3.35 M/UL (ref 4.1–5.7)
SODIUM SERPL-SCNC: 150 MMOL/L (ref 136–145)
WBC # BLD AUTO: 12 K/UL (ref 4.1–11.1)

## 2021-01-08 PROCEDURE — 74011000258 HC RX REV CODE- 258: Performed by: INTERNAL MEDICINE

## 2021-01-08 PROCEDURE — 85025 COMPLETE CBC W/AUTO DIFF WBC: CPT

## 2021-01-08 PROCEDURE — 74011250637 HC RX REV CODE- 250/637: Performed by: INTERNAL MEDICINE

## 2021-01-08 PROCEDURE — 65270000015 HC RM PRIVATE ONCOLOGY

## 2021-01-08 PROCEDURE — 36415 COLL VENOUS BLD VENIPUNCTURE: CPT

## 2021-01-08 PROCEDURE — 74011250636 HC RX REV CODE- 250/636: Performed by: NURSE PRACTITIONER

## 2021-01-08 PROCEDURE — 80053 COMPREHEN METABOLIC PANEL: CPT

## 2021-01-08 PROCEDURE — 74011250636 HC RX REV CODE- 250/636: Performed by: INTERNAL MEDICINE

## 2021-01-08 RX ADMIN — Medication 10 ML: at 23:08

## 2021-01-08 RX ADMIN — LEVETIRACETAM 750 MG: 100 INJECTION, SOLUTION INTRAVENOUS at 23:11

## 2021-01-08 RX ADMIN — LEVETIRACETAM 750 MG: 100 INJECTION, SOLUTION INTRAVENOUS at 09:54

## 2021-01-08 RX ADMIN — VANCOMYCIN HYDROCHLORIDE 1250 MG: 10 INJECTION, POWDER, LYOPHILIZED, FOR SOLUTION INTRAVENOUS at 06:10

## 2021-01-08 RX ADMIN — CEFEPIME HYDROCHLORIDE 2 G: 2 INJECTION, POWDER, FOR SOLUTION INTRAVENOUS at 14:35

## 2021-01-08 RX ADMIN — ENOXAPARIN SODIUM 30 MG: 30 INJECTION SUBCUTANEOUS at 09:54

## 2021-01-08 RX ADMIN — METOPROLOL TARTRATE 12.5 MG: 25 TABLET, FILM COATED ORAL at 23:08

## 2021-01-08 RX ADMIN — ASPIRIN 81 MG: 81 TABLET, CHEWABLE ORAL at 09:53

## 2021-01-08 RX ADMIN — METOPROLOL TARTRATE 12.5 MG: 25 TABLET, FILM COATED ORAL at 09:53

## 2021-01-08 RX ADMIN — Medication 10 ML: at 06:03

## 2021-01-08 RX ADMIN — DEXTROSE MONOHYDRATE 100 ML/HR: 5 INJECTION, SOLUTION INTRAVENOUS at 14:36

## 2021-01-08 RX ADMIN — Medication 10 ML: at 14:35

## 2021-01-08 NOTE — PROGRESS NOTES
Pharmacy Automatic Renal Dosing Protocol - Antimicrobials    Indication for Antimicrobials: sepsis/ UTI    Current Regimen of Each Antimicrobial:  Vancomycin pharmacy to dose: 1500mg, then 1250mg q 36h (Start Date:  ; Day 3)  Cefepime 2g IV q 24h (start: , day 3)    Previous Antimicrobial Therapy:    Goal Level: VANCOMYCIN TROUGH GOAL RANGE    Vancomycin Trough: 15 - 20 mcg/mL  (AUC: 400 - 600 mg/hr/Liter/day)     Date Dose & Interval Measured (mcg/mL) Extrapolated (mcg/mL)                       Date & time of next level: 1/10, 0930    Significant Cultures:   : blood: one bottle flagged (may be proteus)- pending  : urine: NG. Final.    Radiology / Imaging results: (X-ray, CT scan or MRI):   : CT head: IMPRESSION: Chronic generalized brain volume loss without significant change   since the prior exam.    Paralysis, amputations, malnutrition: none    Labs:  Recent Labs     21  0358 21  0054 21  1236   CREA 1.73* 2.05* 2.21*   BUN 72* 80* 84*   WBC 12.0* 15.0* 15.1*     Temp (24hrs), Av °F (36.7 °C), Min:97.7 °F (36.5 °C), Max:98.4 °F (36.9 °C)      Is the Patient on Dialysis? No    Creatinine Clearance (mL/min):   CrCl (Actual Body Weight): 35.2  CrCl (Adjusted Body Weight): 30.5  CrCl (Ideal Body Weight): 27.3    Impression/Plan:   Day 1: Vancomycin 1500mg, then 1250mg q 36h to yield trough of ~15mcg/mL  Day 2: renal function is improving to SCr 1.73 from 2.21 (Baseline ~1.0). Increase Vancomycin to 750 mg IV q24h and expecting continuous renal improvement. Continue cefepime 2 q IV 24h for now pending further renal improvement  BMP daily. CBC. Planned Vanc Tr 1/10, 0930  Antimicrobial stop date to be determined     Pharmacy will follow daily and adjust medications as appropriate for renal function and/or serum levels.     Thank you,  Riri Jacques, PHARMD    Recommended duration of therapy  [email or web address]/[place]/[facility]/Pharmacy/Clinical%20Companion/[email or web address]. docx    Renal Dosing  [email or web address]/[place]/[facility]/Pharmacy/Clinical%20Companion/Renal%[email or web address]. pdf

## 2021-01-08 NOTE — PROGRESS NOTES
End of Shift Note    Bedside shift change report given to [employer]  (oncoming nurse) by Brenna Joyner (offgoing nurse). Report included the following information SBAR, Kardex, ED Summary, Intake/Output, MAR and Recent Results    Shift worked:  [id number]     Shift summary and any significant changes:     patient turned throughout shift. Peg tube feed increased to max. New iv access obtained. Hourly rounds completed. Family at bedside. External catheter on patient. Concerns for physician to address:  none     Zone phone for oncoming shift:   n/a       Activity:  Activity Level: Bed Rest  Number times ambulated in hallways past shift: 0  Number of times OOB to chair past shift: 0    Cardiac:   Cardiac Monitoring: No      Cardiac Rhythm: Sinus tachycardia, Premature ventricular contraction    Access:   Current line(s): PIV     Genitourinary:   Urinary status: external catheter    Respiratory:   O2 Device: Room air  Chronic home O2 use?: NO  Incentive spirometer at bedside: NO     GI:     Current diet:  DIET NPO  DIET TUBE FEEDING  Passing flatus: YES  Tolerating current diet: YES       Pain Management:   Patient states pain is manageable on current regimen: YES    Skin:  Torito Score: 12  Interventions: turn team    Patient Safety:  Fall Score:  Total Score: 3  Interventions: bed/chair alarm  High Fall Risk: Yes    Length of Stay:  Expected LOS: 4d 19h  Actual LOS: 2      Brenna Joyner

## 2021-01-08 NOTE — PROGRESS NOTES
End of Shift Note    Bedside shift change report given to Priti (oncoming nurse) by Donis Matamoros (offgoing nurse). Report included the following information SBAR, Kardex, Intake/Output, MAR and [employer]ion    Shift worked:  night     Shift summary and any significant changes:    Pt remained stable. Meds given. Labs drawn. Wound care completed. Concerns for physician to address:    Zone phone for oncoming shift:       Activity:  Activity Level: Bed Rest  Number times ambulated in hallways past shift: 0  Number of times OOB to chair past shift: 0    Cardiac:   Cardiac Monitoring: No      Cardiac Rhythm: Sinus tachycardia, Premature ventricular contraction    Access:   Current line(s): PIV     Genitourinary:   Urinary status: incontinent and external catheter    Respiratory:   O2 Device: Room air  Chronic home O2 use?: NO  Incentive spirometer at bedside: NO     GI:     Current diet:  DIET NPO  DIET TUBE FEEDING  Passing flatus: YES  Tolerating current diet: YES       Pain Management:   Patient states pain is manageable on current regimen: N/A    Skin:  Torito Score: 12  Interventions: turn team, speciality bed, float heels and foam dressing    Patient Safety:  Fall Score:  Total Score: 3  Interventions: bed/chair alarm  High Fall Risk: Yes    Length of Stay:  Expected LOS: 4d 19h  Actual LOS: [address]

## 2021-01-08 NOTE — PROGRESS NOTES
Problem: Falls - Risk of  Goal: *Absence of Falls  Description: Document Venkat Hamilton Fall Risk and appropriate interventions in the flowsheet.   Outcome: Progressing Towards Goal  Note: Fall Risk Interventions:       Mentation Interventions: Bed/chair exit alarm    Medication Interventions: Bed/chair exit alarm    Elimination Interventions: Call light in reach

## 2021-01-08 NOTE — PROGRESS NOTES
Problem: Falls - Risk of  Goal: *Absence of Falls  Description: Document Green Salvia Fall Risk and appropriate interventions in the flowsheet.   Outcome: Progressing Towards Goal  Note: Fall Risk Interventions:       Mentation Interventions: Bed/chair exit alarm, Adequate sleep, hydration, pain control    Medication Interventions: Bed/chair exit alarm    Elimination Interventions: Call light in reach, Bed/chair exit alarm

## 2021-01-08 NOTE — PROGRESS NOTES
Hospitalist Progress Note    NAME: So Fuller   :  3/9/1933   MRN:  [id number]     I reviewed pertinent labs and imaging, and discussed /agreed on the plan of care with Dr. Edwin Rascon. Assessment / Plan  Severe Sepsis r/t to UTI with Bacteremia and Severe Dehydration   Acute Metabolic Encephalopathy in setting of Severe Dementia   Lactic Acidosis - resolved   Failure to Thrive   · Presented from assisted living facility with lactic acidosis, tachycardia, leukocytosis and multiple pressure ulcers   · Suspect source of infection r/t UTI with severe dehydration and history of UTIs  · Follow Urine Culture - NG   · Blood Culture  - PROBABLE PROTEUS SPECIES GROWING IN 1 OF 2 BOTTLES - preliminary   · Continue Cefepime and Vancomycin - Day 3  · CT Head  - \"Chronic generalized brain volume loss without significant change since prior exam.\"  · At baseline patient has severe dementia and is without meaningful communication. · Currently he is only responsive to pain when turning with multiple pressure ulcers   · Abdominal US - unremarkable for acute findings  · Received IVF in ED. Careful IVF hydration with history of CHF.   · Patient afebrile overnight      Acute Kidney Injury r/t Severe Dehydration   Hypermagnesemia  Mag 2.6  Hypernatremia Na 150  · Cr 2.21 on admission - trending down Cr 1.73 today   · Follow BMP   · Baseline Cr around 1   · Elevated Mag r/t dehydration - follow  · 2L free water deficit   · Gentle IVF hydration with D5    Elevated Troponin 0.33  · No ischemic changes on EKG  · Likely r/t SOULEYMANE, dehydration and sepsis    Transaminitis    · Appears to be r/t dehydration   · Hold statin   · US of abdomen unremarkable for acute finding   · LFTs trending up - follow CMP    Failure to Thrive   Moderate Protein Malnutrition   Multiple Pressure Ulcers and DTIs Please see wound care note for extensive photo documentation of wounds to sacrum, right flank, right lateral LE and ankle, bilat heels, right hip, left lateral LE and ankle  Debility Bedridden at baseline   Severe Dementia   History of Recurrent CVAs  Dysphagia with PEG placement 12/2020  · Patient has had decline in status since November. Presented to ED from assisted living with status epilepticus. Since then has been mostly non verbal and disoriented. Previously with severe dementia but was able to converse some. · Today patient only responsive to pain associated with multiple pressure ulcers   · PRN morphine for wound care and turning   · Per Neurology notes last admissions 11/27/2020 - \"Component of a vascular dementia and stepwise progression of the dementia is likely. This very well may be close to his new baseline. \"  · Appreciate Dietician for recommendations on TF  · Poor short term prognosis and grim prognosis in next 6 months  · Patient is hospice appropriate. Prognosis in next 6 months is very grim. · Long discussion had with daughter and both sons, dicussed patient status and overall condition. Very poor prognosis. One son did acknowledge that patient is in a great deal of pain and is suffering. All siblings acknowledge that patient is very ill and there is nothing we can do to \"cure him\". They seem to have difficulty withdrawing care, shared that their dad \"is a fighter\" and would want to live. They are discussing how to proceed including hospice and DNR status. Will notify NP when they have reached a decision. 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 Normal cavity size and systolic function. Mild concentric hypertrophy.    · Continue metoprolol and ASA   · Statin held with SOULEYMANE    History of Seizure with Recent admission for Status Epilepticus  · History of 5 minute seizure at [facility] in November 2020  · No current seizure like activity  · Continue IV keppra      History of Gout Hold allopurinol with SOULEYMANE      25.0 - 29.9 Overweight / Body mass index is 29.34 kg/m². Code status: Full  Prophylaxis: Lovenox  Recommended Disposition: SNF/LTC     Subjective:     Chief Complaint / Reason for Physician Visit  Patient seen at bedside, responsive only to pain. Undressed wound to left foot for children to see, patient grimaced and retracted to touch during entire procedure. Met with all children at bedside and in conference room. See above. Discussed with RN events overnight. Review of Systems:  Symptom Y/N Comments  Symptom Y/N Comments   Fever/Chills    Chest Pain     Poor Appetite    Edema     Cough    Abdominal Pain     Sputum    Joint Pain     SOB/CARREON    Pruritis/Rash     Nausea/vomit    Tolerating PT/OT     Diarrhea    Tolerating Diet     Constipation    Other       Could NOT obtain due to: Unresponsive      Objective:     VITALS:   Last 24hrs VS reviewed since prior progress note. Most recent are:  Patient Vitals for the past 24 hrs:   Temp Pulse Resp BP SpO2   01/08/21 1554 98.3 °F (36.8 °C) 84 20 (!) 148/76 100 %   01/08/21 0745 98.4 °F (36.9 °C) 81 18 (!) 144/60 100 %   01/07/21 2300 97.9 °F (36.6 °C) 73 18 (!) 139/58 98 %   01/07/21 1930 98 °F (36.7 °C) 78 18 (!) 155/62 94 %       Intake/Output Summary (Last 24 hours) at 1/8/2021 1658  Last data filed at 1/8/2021 1330  Gross per 24 hour   Intake    Output 800 ml   Net -800 ml        I had a face to face encounter and independently examined this patient on 1/8/2021, as outlined below:  PHYSICAL EXAM:  General: Frail, elderly and ill appearing male who only responds to pain     EENT:  EOMI. Anicteric sclerae. MMM  Resp:  CTA bilaterally, no wheezing or rales. No accessory muscle use  CV:  Regular  rhythm,  No edema  GI:  Soft, Non distended, Non tender. +Bowel sounds  Neurologic:  Unresponsive    Psych:    Not anxious nor agitated  Skin:  No rashes.   No jaundice, multiple pressure ulcers and wounds     Reviewed most current lab test results and cultures  YES  Reviewed most current radiology test results   YES  Review and summation of old records today    NO  Reviewed patient's current orders and MAR    YES  PMH/SH reviewed - no change compared to H&P  ________________________________________________________________________  Care Plan discussed with:    Comments   Patient x    Family  x Agnes and christine   RN x    Care Manager x    Consultant                        Multidiciplinary team rounds were held today with , nursing, pharmacist and clinical coordinator. Patient's plan of care was discussed; medications were reviewed and discharge planning was addressed. ________________________________________________________________________  Total NON critical care TIME:  25   Minutes    Total CRITICAL CARE TIME Spent:   Minutes non procedure based      Comments   >50% of visit spent in counseling and coordination of care x    ________________________________________________________________________  Scot Acevedo NP     Procedures: see electronic medical records for all procedures/Xrays and details which were not copied into this note but were reviewed prior to creation of Plan. LABS:  I reviewed today's most current labs and imaging studies.   Pertinent labs include:  Recent Labs     01/08/21  0358 01/07/21  0054 01/06/21  1236   WBC 12.0* 15.0* 15.1*   HGB 9.9* 10.8* 10.1*   HCT 32.2* 34.4* 31.0*    188 193     Recent Labs     01/08/21  0358 01/07/21  0054 01/06/21  1236   * 147* 146*   K 3.8 3.6 3.9   * 112* 111*   CO2 29 30 30   * 155* 239*   BUN 72* 80* 84*   CREA 1.73* 2.05* 2.21*   CA 9.4 9.5 8.5   MG  --  2.8* 2.6*   ALB 1.8* 2.0* 1.7*   TBILI 0.6 1.1* 0.4   * 370* 354*       Signed: Scot Acevedo NP

## 2021-01-09 PROBLEM — R78.81 BACTEREMIA: Status: ACTIVE | Noted: 2021-01-09

## 2021-01-09 PROBLEM — N39.0 UTI (URINARY TRACT INFECTION): Status: ACTIVE | Noted: 2021-01-09

## 2021-01-09 PROBLEM — R62.51 FAILURE TO THRIVE (0-17): Status: ACTIVE | Noted: 2021-01-09

## 2021-01-09 PROBLEM — Z93.1 S/P PERCUTANEOUS ENDOSCOPIC GASTROSTOMY (PEG) TUBE PLACEMENT (HCC): Status: ACTIVE | Noted: 2021-01-09

## 2021-01-09 PROBLEM — Z74.01 BEDRIDDEN: Status: ACTIVE | Noted: 2021-01-09

## 2021-01-09 PROBLEM — L89.90 PRESSURE ULCER: Status: ACTIVE | Noted: 2021-01-09

## 2021-01-09 PROBLEM — F03.90 DEMENTIA (HCC): Status: ACTIVE | Noted: 2021-01-09

## 2021-01-09 LAB
ALBUMIN SERPL-MCNC: 1.6 G/DL (ref 3.5–5)
ALBUMIN/GLOB SERPL: 0.3 {RATIO} (ref 1.1–2.2)
ALP SERPL-CCNC: 171 U/L (ref 45–117)
ALT SERPL-CCNC: 459 U/L (ref 12–78)
ANION GAP SERPL CALC-SCNC: 4 MMOL/L (ref 5–15)
AST SERPL-CCNC: 243 U/L (ref 15–37)
BASOPHILS # BLD: 0 K/UL (ref 0–0.1)
BASOPHILS NFR BLD: 0 % (ref 0–1)
BILIRUB SERPL-MCNC: 0.4 MG/DL (ref 0.2–1)
BUN SERPL-MCNC: 56 MG/DL (ref 6–20)
BUN/CREAT SERPL: 39 (ref 12–20)
CALCIUM SERPL-MCNC: 9 MG/DL (ref 8.5–10.1)
CHLORIDE SERPL-SCNC: 115 MMOL/L (ref 97–108)
CO2 SERPL-SCNC: 30 MMOL/L (ref 21–32)
CREAT SERPL-MCNC: 1.44 MG/DL (ref 0.7–1.3)
DIFFERENTIAL METHOD BLD: ABNORMAL
EOSINOPHIL # BLD: 0.2 K/UL (ref 0–0.4)
EOSINOPHIL NFR BLD: 2 % (ref 0–7)
ERYTHROCYTE [DISTWIDTH] IN BLOOD BY AUTOMATED COUNT: 14.6 % (ref 11.5–14.5)
GLOBULIN SER CALC-MCNC: 5.6 G/DL (ref 2–4)
GLUCOSE SERPL-MCNC: 202 MG/DL (ref 65–100)
HCT VFR BLD AUTO: 31.1 % (ref 36.6–50.3)
HGB BLD-MCNC: 9.6 G/DL (ref 12.1–17)
IMM GRANULOCYTES # BLD AUTO: 0.1 K/UL (ref 0–0.04)
IMM GRANULOCYTES NFR BLD AUTO: 1 % (ref 0–0.5)
LYMPHOCYTES # BLD: 2 K/UL (ref 0.8–3.5)
LYMPHOCYTES NFR BLD: 18 % (ref 12–49)
MCH RBC QN AUTO: 29.9 PG (ref 26–34)
MCHC RBC AUTO-ENTMCNC: 30.9 G/DL (ref 30–36.5)
MCV RBC AUTO: 96.9 FL (ref 80–99)
MONOCYTES # BLD: 0.7 K/UL (ref 0–1)
MONOCYTES NFR BLD: 6 % (ref 5–13)
NEUTS SEG # BLD: 7.9 K/UL (ref 1.8–8)
NEUTS SEG NFR BLD: 73 % (ref 32–75)
NRBC # BLD: 0 K/UL (ref 0–0.01)
NRBC BLD-RTO: 0 PER 100 WBC
PLATELET # BLD AUTO: 205 K/UL (ref 150–400)
PMV BLD AUTO: 11.8 FL (ref 8.9–12.9)
POTASSIUM SERPL-SCNC: 3.3 MMOL/L (ref 3.5–5.1)
PROT SERPL-MCNC: 7.2 G/DL (ref 6.4–8.2)
RBC # BLD AUTO: 3.21 M/UL (ref 4.1–5.7)
SODIUM SERPL-SCNC: 149 MMOL/L (ref 136–145)
WBC # BLD AUTO: 10.9 K/UL (ref 4.1–11.1)

## 2021-01-09 PROCEDURE — 36415 COLL VENOUS BLD VENIPUNCTURE: CPT

## 2021-01-09 PROCEDURE — 2709999900 HC NON-CHARGEABLE SUPPLY

## 2021-01-09 PROCEDURE — 74011000258 HC RX REV CODE- 258: Performed by: NURSE PRACTITIONER

## 2021-01-09 PROCEDURE — 80053 COMPREHEN METABOLIC PANEL: CPT

## 2021-01-09 PROCEDURE — 77030040392 HC DRSG OPTIFOAM MDII -A

## 2021-01-09 PROCEDURE — 74011250637 HC RX REV CODE- 250/637: Performed by: INTERNAL MEDICINE

## 2021-01-09 PROCEDURE — 74011250636 HC RX REV CODE- 250/636: Performed by: INTERNAL MEDICINE

## 2021-01-09 PROCEDURE — 65270000015 HC RM PRIVATE ONCOLOGY

## 2021-01-09 PROCEDURE — 85025 COMPLETE CBC W/AUTO DIFF WBC: CPT

## 2021-01-09 PROCEDURE — 74011000258 HC RX REV CODE- 258: Performed by: INTERNAL MEDICINE

## 2021-01-09 PROCEDURE — 74011250636 HC RX REV CODE- 250/636: Performed by: NURSE PRACTITIONER

## 2021-01-09 RX ADMIN — ENOXAPARIN SODIUM 30 MG: 30 INJECTION SUBCUTANEOUS at 12:04

## 2021-01-09 RX ADMIN — METOPROLOL TARTRATE 12.5 MG: 25 TABLET, FILM COATED ORAL at 21:23

## 2021-01-09 RX ADMIN — Medication 10 ML: at 22:26

## 2021-01-09 RX ADMIN — LEVETIRACETAM 750 MG: 100 INJECTION, SOLUTION INTRAVENOUS at 22:23

## 2021-01-09 RX ADMIN — LEVETIRACETAM 750 MG: 100 INJECTION, SOLUTION INTRAVENOUS at 12:14

## 2021-01-09 RX ADMIN — Medication 10 ML: at 06:30

## 2021-01-09 RX ADMIN — ASPIRIN 81 MG: 81 TABLET, CHEWABLE ORAL at 12:05

## 2021-01-09 RX ADMIN — METOPROLOL TARTRATE 12.5 MG: 25 TABLET, FILM COATED ORAL at 12:04

## 2021-01-09 RX ADMIN — MORPHINE SULFATE 1 MG: 2 INJECTION, SOLUTION INTRAMUSCULAR; INTRAVENOUS at 12:35

## 2021-01-09 RX ADMIN — CEFEPIME HYDROCHLORIDE 2 G: 2 INJECTION, POWDER, FOR SOLUTION INTRAVENOUS at 14:25

## 2021-01-09 RX ADMIN — Medication 10 ML: at 14:25

## 2021-01-09 RX ADMIN — DEXTROSE MONOHYDRATE 100 ML/HR: 5 INJECTION, SOLUTION INTRAVENOUS at 12:04

## 2021-01-09 NOTE — PROGRESS NOTES
Problem: Falls - Risk of  Goal: *Absence of Falls  Outcome: Progressing Towards Goal  Note: Fall Risk Interventions:       Mentation Interventions: Bed/chair exit alarm    Medication Interventions: Bed/chair exit alarm    Elimination Interventions: Call light in reach              Problem: Falls - Risk of  Goal: *Absence of Falls  Outcome: Progressing Towards Goal  Note: Fall Risk Interventions:       Mentation Interventions: Bed/chair exit alarm    Medication Interventions: Bed/chair exit alarm    Elimination Interventions: Call light in reach              Problem: Patient Education: Go to Patient Education Activity  Goal: Patient/Family Education  Outcome: Progressing Towards Goal     Problem: Patient Education: Go to Patient Education Activity  Goal: Patient/Family Education  Outcome: Progressing Towards Goal     Problem: Pressure Injury - Risk of  Goal: *Prevention of pressure injury  Outcome: Progressing Towards Goal  Note: Pressure Injury Interventions:  Sensory Interventions: Check visual cues for pain    Moisture Interventions: Check for incontinence Q2 hours and as needed, Maintain skin hydration (lotion/cream)    Activity Interventions: Pressure redistribution bed/mattress(bed type)    Mobility Interventions: HOB 30 degrees or less, Pressure redistribution bed/mattress (bed type)    Nutrition Interventions: Document food/fluid/supplement intake    Friction and Shear Interventions: Foam dressings/transparent film/skin sealants, Apply protective barrier, creams and emollients, HOB 30 degrees or less, Lift team/patient mobility team                Problem: Pressure Injury - Risk of  Goal: *Prevention of pressure injury  Outcome: Progressing Towards Goal  Note: Pressure Injury Interventions:  Sensory Interventions: Check visual cues for pain    Moisture Interventions: Check for incontinence Q2 hours and as needed, Maintain skin hydration (lotion/cream)    Activity Interventions: Pressure redistribution bed/mattress(bed type)    Mobility Interventions: HOB 30 degrees or less, Pressure redistribution bed/mattress (bed type)    Nutrition Interventions: Document food/fluid/supplement intake    Friction and Shear Interventions: Foam dressings/transparent film/skin sealants, Apply protective barrier, creams and emollients, HOB 30 degrees or less, Lift team/patient mobility team                Problem: Patient Education: Go to Patient Education Activity  Goal: Patient/Family Education  Outcome: Progressing Towards Goal     Problem: Patient Education: Go to Patient Education Activity  Goal: Patient/Family Education  Outcome: Progressing Towards Goal     Problem: Falls - Risk of  Goal: *Absence of Falls  Outcome: Progressing Towards Goal  Note: Fall Risk Interventions:       Mentation Interventions: Bed/chair exit alarm    Medication Interventions: Bed/chair exit alarm    Elimination Interventions: Call light in reach              Problem: Falls - Risk of  Goal: *Absence of Falls  Outcome: Progressing Towards Goal  Note: Fall Risk Interventions:       Mentation Interventions: Bed/chair exit alarm    Medication Interventions: Bed/chair exit alarm    Elimination Interventions: Call light in reach              Problem: Patient Education: Go to Patient Education Activity  Goal: Patient/Family Education  Outcome: Progressing Towards Goal     Problem: Patient Education: Go to Patient Education Activity  Goal: Patient/Family Education  Outcome: Progressing Towards Goal     Problem: Pressure Injury - Risk of  Goal: *Prevention of pressure injury  Outcome: Progressing Towards Goal  Note: Pressure Injury Interventions:  Sensory Interventions: Check visual cues for pain    Moisture Interventions: Check for incontinence Q2 hours and as needed, Maintain skin hydration (lotion/cream)    Activity Interventions: Pressure redistribution bed/mattress(bed type)    Mobility Interventions: HOB 30 degrees or less, Pressure redistribution bed/mattress (bed type)    Nutrition Interventions: Document food/fluid/supplement intake    Friction and Shear Interventions: Foam dressings/transparent film/skin sealants, Apply protective barrier, creams and emollients, HOB 30 degrees or less, Lift team/patient mobility team                Problem: Pressure Injury - Risk of  Goal: *Prevention of pressure injury  Outcome: Progressing Towards Goal  Note: Pressure Injury Interventions:  Sensory Interventions: Check visual cues for pain    Moisture Interventions: Check for incontinence Q2 hours and as needed, Maintain skin hydration (lotion/cream)    Activity Interventions: Pressure redistribution bed/mattress(bed type)    Mobility Interventions: HOB 30 degrees or less, Pressure redistribution bed/mattress (bed type)    Nutrition Interventions: Document food/fluid/supplement intake    Friction and Shear Interventions: Foam dressings/transparent film/skin sealants, Apply protective barrier, creams and emollients, HOB 30 degrees or less, Lift team/patient mobility team                Problem: Patient Education: Go to Patient Education Activity  Goal: Patient/Family Education  Outcome: Progressing Towards Goal     Problem: Patient Education: Go to Patient Education Activity  Goal: Patient/Family Education  Outcome: Progressing Towards Goal

## 2021-01-09 NOTE — PROGRESS NOTES
Hospitalist Progress Note    NAME: Felicia Bentley   :  3/9/1933   MRN:  [id number]     I reviewed pertinent labs and imaging, and discussed /agreed on the plan of care with Dr. Riya Camara. Assessment / Plan  Severe Sepsis r/t to UTI with Bacteremia and Severe Dehydration   Acute Metabolic Encephalopathy in setting of Severe Dementia   Lactic Acidosis - resolved   Failure to Thrive   · Presented from assisted living facility with lactic acidosis, tachycardia, leukocytosis and multiple pressure ulcers   · Suspect source of infection r/t UTI with severe dehydration and history of UTIs  · Follow Urine Culture - NG   · Blood Culture  - PROTEUS MIRABILIS - preliminary   · Continue Cefepime - Day 4  · D/c Vancomycin with culture results   · CT Head  - \"Chronic generalized brain volume loss without significant change since prior exam.\"  · At baseline patient has severe dementia and is without meaningful communication. · Currently he is only responsive to pain when turning with multiple pressure ulcers   · Abdominal US - unremarkable for acute findings  · Received IVF in ED. Careful IVF hydration with history of CHF.   · Patient afebrile overnight      Acute Kidney Injury r/t Severe Dehydration   Hypermagnesemia  Mag 2.6  Hypernatremia Na 150  · Cr 2.21 on admission - trending down Cr 1.44 today   · Follow BMP   · Baseline Cr around 1   · Elevated Mag r/t dehydration - follow  · 2L free water deficit   · Gentle IVF hydration with D5    Elevated Troponin 0.33  · No ischemic changes on EKG  · Likely r/t SOULEYMANE, dehydration and sepsis    Transaminitis    · Appears to be r/t dehydration   · Hold statin   · US of abdomen unremarkable for acute finding   · LFTs trending down - follow CMP    Failure to Thrive   Moderate Protein Malnutrition   Multiple Pressure Ulcers and DTIs Please see wound care note for extensive photo documentation of wounds to sacrum, right flank, right lateral LE and ankle, bilat heels, right hip, left lateral LE and ankle  Debility Bedridden at baseline   Severe Dementia   History of Recurrent CVAs  Dysphagia with PEG placement 12/2020  · Patient has had decline in status since November. Presented to ED from assisted living with status epilepticus. Since then has been mostly non verbal and disoriented. Previously with severe dementia but was able to converse some. · Today patient only responsive to pain associated with multiple pressure ulcers   · PRN morphine for wound care and turning   · Per Neurology notes last admissions 11/27/2020 - \"Component of a vascular dementia and stepwise progression of the dementia is likely. This very well may be close to his new baseline. \"  · Appreciate Dietician for recommendations on TF  · Poor short term prognosis and grim prognosis in next 6 months  · Patient is hospice appropriate. Prognosis in next 6 months is very grim. · Long discussion had with daughter and both sons, dicussed patient status and overall condition. Very poor prognosis. One son did acknowledge that patient is in a great deal of pain and is suffering. All siblings acknowledge that patient is very ill and there is nothing we can do to \"cure him\". They seem to have difficulty withdrawing care, shared that their dad \"is a fighter\" and would want to live. They are discussing how to proceed including hospice and DNR status. Will notify NP when they have reached a decision. · Family would like to pursue LTC and have patient remain FULL CODE, they would like to see if a different LTC facility could manage his care better. They will pursue hospice \"as a last resort\". · Patient is HOSPICE appropriate - Very grim prognosis in the short term, expect patient to be frequent readmission despite best efforts of treatment. He continues to decline.    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 Normal cavity size and systolic function. Mild concentric hypertrophy. · Continue metoprolol and ASA   · Statin held with SOULEYMANE    History of Seizure with Recent admission for Status Epilepticus  · History of 5 minute seizure at LTC in November 2020  · No current seizure like activity  · Continue IV keppra      History of Gout Hold allopurinol with SOULEYMANE      25.0 - 29.9 Overweight / Body mass index is 29.34 kg/m². Code status: Full  Prophylaxis: Lovenox  Recommended Disposition: SNF/LTC     Subjective:     Chief Complaint / Reason for Physician Visit  Patient seen at bedside, responsive only to pain. [place] [facility] [place] she is still hopeful that her dad can be better managed at a different LTC. Family is not ready for him to be a DNR, they wish for full restorative efforts to be made in hopes that he would survive. Discussed with RN events overnight. Review of Systems:  Symptom Y/N Comments  Symptom Y/N Comments   Fever/Chills    Chest Pain     Poor Appetite    Edema     Cough    Abdominal Pain     Sputum    Joint Pain     SOB/CARREON    Pruritis/Rash     Nausea/vomit    Tolerating PT/OT     Diarrhea    Tolerating Diet     Constipation    Other       Could NOT obtain due to: Unresponsive      Objective:     VITALS:   Last 24hrs VS reviewed since prior progress note.  Most recent are:  Patient Vitals for the past 24 hrs:   Temp Pulse Resp BP SpO2   01/09/21 0733 98.4 °F (36.9 °C) 77 24 (!) 146/70 100 %   01/08/21 2308 98.2 °F (36.8 °C) 77 18 (!) 158/82 97 %   01/08/21 1554 98.3 °F (36.8 °C) 84 20 (!) 148/76 100 %       Intake/Output Summary (Last 24 hours) at 1/9/2021 1203  Last data filed at 1/9/2021 1042  Gross per 24 hour   Intake 160 ml   Output 1150 ml   Net -990 ml        I had a face to face encounter and independently examined this patient on 1/9/2021, as outlined below:  PHYSICAL EXAM:  General: Frail, elderly and ill appearing male who only responds to pain     EENT:  EOMI. Anicteric sclerae. MMM  Resp:  CTA bilaterally, no wheezing or rales. No accessory muscle use  CV:  Regular  rhythm,  No edema  GI:  Soft, Non distended, Non tender. +Bowel sounds  Neurologic:  Unresponsive    Psych:    Not anxious nor agitated  Skin:  No rashes. No jaundice, multiple pressure ulcers and wounds     Reviewed most current lab test results and cultures  YES  Reviewed most current radiology test results   YES  Review and summation of old records today    NO  Reviewed patient's current orders and MAR    YES  PMH/ reviewed - no change compared to H&P  ________________________________________________________________________  Care Plan discussed with:    Comments   Patient x    Family  x Agnes CASAS x    Care Manager     Consultant                        Multidiciplinary team rounds were held today with , nursing, pharmacist and clinical coordinator. Patient's plan of care was discussed; medications were reviewed and discharge planning was addressed. ________________________________________________________________________  Total NON critical care TIME:  25   Minutes    Total CRITICAL CARE TIME Spent:   Minutes non procedure based      Comments   >50% of visit spent in counseling and coordination of care x    ________________________________________________________________________  Deon Goltz, NP     Procedures: see electronic medical records for all procedures/Xrays and details which were not copied into this note but were reviewed prior to creation of Plan. LABS:  I reviewed today's most current labs and imaging studies.   Pertinent labs include:  Recent Labs     01/09/21  0400 01/08/21  0358 01/07/21  0054   WBC 10.9 12.0* 15.0*   HGB 9.6* 9.9* 10.8*   HCT 31.1* 32.2* 34.4*    203 188     Recent Labs     01/09/21  0409 01/08/21  0358 01/07/21  0054 01/06/21  1236   * 150* 147* 146*   K 3.3* 3.8 3.6 3.9   * 117* 112* 111*   CO2 30 29 30 30   * 224* 155* 239*   BUN 56* 72* 80* 84*   CREA 1.44* 1.73* 2.05* 2.21*   CA 9.0 9.4 9.5 8.5   MG  --   --  2.8* 2.6*   ALB 1.6* 1.8* 2.0* 1.7*   TBILI 0.4 0.6 1.1* 0.4   * 539* 370* 354*       Signed: Louie Wolff NP

## 2021-01-09 NOTE — ACP (ADVANCE CARE PLANNING)
.                                           Advance Care Planning Note      NAME: Beata Valdez   :  3/9/1933   MRN:  [id number]     Date/Time:  2021 1:22 PM    Active Diagnoses:  Hospital Problems  Date Reviewed: 2020          Codes Class Noted POA    Failure to thrive (0-17) ICD-10-CM: R62.51  ICD-9-CM: 783.41  2021 Unknown        Bacteremia ICD-10-CM: R78.81  ICD-9-CM: 790.7  2021 Unknown        UTI (urinary tract infection) ICD-10-CM: N39.0  ICD-9-CM: 599.0  2021 Unknown        Dementia ([facility] ICD-10-CM: F03.90  ICD-9-CM: 294.20  2021 Unknown        Bedridden ICD-10-CM: Z74.01  ICD-9-CM: V49.84  2021 Unknown        Pressure ulcer ICD-10-CM: L89.90  ICD-9-CM: 707.00, 707.20  2021 Unknown        S/P percutaneous endoscopic gastrostomy (PEG) tube placement ([facility] ICD-10-CM: Z93.1  ICD-9-CM: V44.1  2021 Unknown        Dehydration ICD-10-CM: E86.0  ICD-9-CM: 276.51  2021 Unknown        Lactic acidosis ICD-10-CM: E87.2  ICD-9-CM: 276.2  2021 Unknown        Sepsis ([facility] ICD-10-CM: A41.9  ICD-9-CM: 038.9, 995.91  2021 Unknown        SOULEYMANE (acute kidney injury) ([facility] ICD-10-CM: N17.9  ICD-9-CM: 584.9  2021 Unknown        Chronic systolic heart failure ([facility] ICD-10-CM: I50.22  ICD-9-CM: 428.22  2020 Yes        Seizure ([facility] ICD-10-CM: R56.9  ICD-9-CM: 780.39  2020 Yes        Gout ICD-10-CM: M10.9  ICD-9-CM: 274.9  2018 Yes        Weight loss ICD-10-CM: R63.4  ICD-9-CM: 783.21  2017 Yes              These active diagnoses are of sufficient risk that focused discussion on advance care planning is indicated in order to allow the patient to thoughtfully consider personal goals of care, and if situations arise that prevent the ability to personally give input, to ensure appropriate representation of their personal desires for different levels and aggressiveness of care. Discussion:   Code status addressed and wants to be a Full Code.  Patient is severely demented and unable to make decisions for himself. Baseline he is mostly non verbal and bed ridden with multiple pressure ulcers and receiving nutrition via PEG tube. Patient has continued to decline in health. Long discussion held with all siblings including Carla Conn. Siblings were made aware of diagnosis of failure to thrive and that patient is at risk for recurrent infection, both UTI and infection of multiple pressure ulcers. Patient has an extremely grim prognosis for next 6 months, family aware expectation is that patient will  in next 6 months. Family unfortunately has unrealistic expectations regarding goals of care. They are aware patient is not expected to survive CPR/intubation. They wish to continue patient as a full code and are hopefull that a different LTC will be able to provide better care for him. Daughter remains hopeful he will have meaningful recovery. Christiano Meadows stated \"We will pursue hospice as a last resort. \". Persons present and participating in discussion: Clara Byrne NP, Antonio Aguirre and her two brothers      Time Spent:   Total time spent face-to-face in education and discussion:   > 60  minutes.          Franco Mancia NP   Hospitalist

## 2021-01-09 NOTE — PROGRESS NOTES
Transition of Care Plan:   - Return to [facility]  - Will need COVID test completed within 72 hour window of d/c back to [facility]  - Awaiting family determination of DNR/Hospice care as per discussion with NP  - If not discharging with hospice, will need home health orders - pt is open with [address]   - Anticipating need for BLS transport at d/c  - 2nd IMM letter    Reason for Readmission:  Severe sepsis, acute metabolic encephalopathy in the setting or worsening dementia, lactic acidosis, concern for urinary tract infection, history of seizure, etc.            RUR Score/Risk Level:  24% moderate risk of readmission      PCP: First and Last name: Eugene Lincoln MD     Name of Practice:    Are you a current patient: Yes/No: Yes   Approximate date of last visit: Approx 1st or 2nd week of December 2020, per daughter. Pt saw PCP following previous hospital d/c. Can you participate in a virtual visit with your PCP: With assistance    Is a Care Conference indicated: No care conference needs currently identified. Did you attend your follow up appointment (s): If not, why not: Yes, pt followed up with PCP following hospital d/c       Resources/supports as identified by patient/family: Daughter, son, [facility] staff        Top Challenges facing patient (as identified by patient/family and CM): Finances/Medication cost?  No concerns voiced, pt uses [facility] or [facility] for medicine delivery. Transportation   Family, medical transport    Support system or lack thereof? Daughter, son, [facility] staff     Living arrangements? Resides at [facility]           Self-care/ADLs/Cognition? Pt has dementia, mostly nonverbal and disoriented. Pt requires assistance with adl's in assisted living. Current Advanced Directive/Advance Care Plan: Pt has POA documents on file, listing daughter Hallie Franco as primary POA, secondary POA of son Martin Roth.   Pt is currently a FULL code. Plan for utilizing home health: Pt open to [facility] for SN/PT/OT/SLP/HH Aide. Uncertain at this time if pt will discharge with home health or hospice. Transition of Care Plan:    Based on readmission, the patient's previous Plan of Care   has been evaluated and/or modified. The current Transition of Care Plan is:  Return to [facility], undetermined if with hospice or home health. CM completed initial assessment via phone with pt's daughter ([address]) Ambrosio Dubose. Pt previously residing in [facility]. Pt's daughter states that staff would help pt will all adl's. She reports in facility staff would alternate between having pt up in transport chair or in the bed. Pt has a transport chair and walker. Pt has a PEG tube, TF supplies ordered at last admission from [facility]. Pt previously open to [facility] for SN/PT/OT/SLP/HH Aide. Uncertain at this time if pt will discharge with home health or hospice. Per chart review, NP has discussed with pt's son and daughter regarding pt's status and hospice/DNR recommendations. Pt's family to discuss their determination on these matters with NP when decision is reached. Anticipating need for BLS transport at d/c. Pt uses [facility] on [address] for medications. Pt will need COVID test within 72 hour window of discharge to [facility]. Care management will continue to follow for transition of care planning needs. Care Management Interventions  PCP Verified by CM:  Yes  Palliative Care Criteria Met (RRAT>21 & CHF Dx)?: Yes  Palliative Consult Recommended?: Yes  Mode of Transport at Discharge: BLS  Transition of Care Consult (CM Consult): Discharge Planning  Discharge Durable Medical Equipment: No(Pt has a walker, transport chair, PEG supplies )  Physical Therapy Consult: No  Occupational Therapy Consult: No  Speech Therapy Consult: No  Current Support Network: Assisted Living([facility], [address] [facility])  Confirm Follow Up Transport: Family  Discharge Location  Discharge Placement: Assisted Living(Awaiting family's decision for possible hospice at discharge)    Readmission Assessment  Number of days since last admission?: 8-30 days  Previous disposition: Long Term Care([facility] with home health through [facility] (RN/PT/OT/SLP/HH Aide))  Who is being interviewed?: Caregiver(Pt's daughter, Chris Lutz)  What was the patient's/caregiver's perception as to why they think they needed to return back to the hospital?: Other (Comment)(Medical necessity to return to hospital)  Did you visit your Primary Care Physician after you left the hospital, before you returned this time?: Yes  Did you see a specialist, such as Cardiac, Pulmonary, Orthopedic Physician, etc. after you left the hospital?: No  Who advised the patient to return to the hospital?: Other (Comment), Caregiver([facility] [place])  Does the patient report anything that got in the way of taking their medications?: No  In our efforts to provide the best possible care to you and others like you, can you think of anything that we could have done to help you after you left the hospital the first time, so that you might not have needed to return so soon?: Other (Comment)(None voiced at this time; pt returned to hospital due to medical necessity)    Jackie Brown [address], [address]

## 2021-01-09 NOTE — PROGRESS NOTES
End of Shift Note    Bedside shift change report given to Danae (oncoming nurse) by Winston Clay (offgoing nurse). Report included the following information SBAR, Kardex and MAR    Shift worked:  7p-7a     Shift summary and any significant changes:     No acute changes. Patient turned throughout shift. Peg tube feeding infusing. IV patent and infusing. Patient seemed to put out blood clots in urine this am at around 0650, this seemed to cause patient discomfort when passing the blood clots. Patient agitated with turning and morning lab draw. Patient now resting comfortably in bed with condom cath in place. Concerns for physician to address:  Large blood clots in urine causing patient discomfort to pass. Zone phone for oncoming shift:          Activity:  Activity Level: Bed Rest  Number times ambulated in hallways past shift: 0  Number of times OOB to chair past shift: 0    Cardiac:   Cardiac Monitoring: No      Cardiac Rhythm: Sinus tachycardia, Premature ventricular contraction    Access:   Current line(s): PIV     Genitourinary:   Urinary status: voiding and external catheter    Respiratory:   O2 Device: Room air  Chronic home O2 use?: NO  Incentive spirometer at bedside: NO     GI:     Current diet:  DIET NPO  DIET TUBE FEEDING  Passing flatus: YES  Tolerating current diet: YES       Pain Management:   Patient states pain is manageable on current regimen: N/A    Skin:  Torito Score: 12  Interventions: turn team and speciality bed    Patient Safety:  Fall Score:  Total Score: 3  Interventions: bed/chair alarm  High Fall Risk: Yes    Length of Stay:  Expected LOS: 4d 19h  Actual LOS: 3      Winston Clay

## 2021-01-10 LAB
ALBUMIN SERPL-MCNC: 1.6 G/DL (ref 3.5–5)
ALBUMIN/GLOB SERPL: 0.3 {RATIO} (ref 1.1–2.2)
ALP SERPL-CCNC: 179 U/L (ref 45–117)
ALT SERPL-CCNC: 586 U/L (ref 12–78)
ANION GAP SERPL CALC-SCNC: 3 MMOL/L (ref 5–15)
AST SERPL-CCNC: 388 U/L (ref 15–37)
BASOPHILS # BLD: 0 K/UL (ref 0–0.1)
BASOPHILS NFR BLD: 0 % (ref 0–1)
BILIRUB SERPL-MCNC: 0.6 MG/DL (ref 0.2–1)
BUN SERPL-MCNC: 45 MG/DL (ref 6–20)
BUN/CREAT SERPL: 36 (ref 12–20)
CALCIUM SERPL-MCNC: 8.8 MG/DL (ref 8.5–10.1)
CHLORIDE SERPL-SCNC: 112 MMOL/L (ref 97–108)
CO2 SERPL-SCNC: 29 MMOL/L (ref 21–32)
CREAT SERPL-MCNC: 1.26 MG/DL (ref 0.7–1.3)
DIFFERENTIAL METHOD BLD: ABNORMAL
EOSINOPHIL # BLD: 0.3 K/UL (ref 0–0.4)
EOSINOPHIL NFR BLD: 3 % (ref 0–7)
ERYTHROCYTE [DISTWIDTH] IN BLOOD BY AUTOMATED COUNT: 14.5 % (ref 11.5–14.5)
GLOBULIN SER CALC-MCNC: 5.5 G/DL (ref 2–4)
GLUCOSE SERPL-MCNC: 261 MG/DL (ref 65–100)
HCT VFR BLD AUTO: 33.9 % (ref 36.6–50.3)
HGB BLD-MCNC: 10.6 G/DL (ref 12.1–17)
IMM GRANULOCYTES # BLD AUTO: 0.2 K/UL (ref 0–0.04)
IMM GRANULOCYTES NFR BLD AUTO: 2 % (ref 0–0.5)
LYMPHOCYTES # BLD: 2.4 K/UL (ref 0.8–3.5)
LYMPHOCYTES NFR BLD: 23 % (ref 12–49)
MCH RBC QN AUTO: 29.9 PG (ref 26–34)
MCHC RBC AUTO-ENTMCNC: 31.3 G/DL (ref 30–36.5)
MCV RBC AUTO: 95.5 FL (ref 80–99)
MONOCYTES # BLD: 0.6 K/UL (ref 0–1)
MONOCYTES NFR BLD: 6 % (ref 5–13)
NEUTS SEG # BLD: 7 K/UL (ref 1.8–8)
NEUTS SEG NFR BLD: 66 % (ref 32–75)
NRBC # BLD: 0.02 K/UL (ref 0–0.01)
NRBC BLD-RTO: 0.2 PER 100 WBC
PLATELET # BLD AUTO: 186 K/UL (ref 150–400)
PMV BLD AUTO: 11 FL (ref 8.9–12.9)
POTASSIUM SERPL-SCNC: 3.5 MMOL/L (ref 3.5–5.1)
PROT SERPL-MCNC: 7.1 G/DL (ref 6.4–8.2)
RBC # BLD AUTO: 3.55 M/UL (ref 4.1–5.7)
SODIUM SERPL-SCNC: 144 MMOL/L (ref 136–145)
WBC # BLD AUTO: 10.4 K/UL (ref 4.1–11.1)

## 2021-01-10 PROCEDURE — 74011000258 HC RX REV CODE- 258: Performed by: STUDENT IN AN ORGANIZED HEALTH CARE EDUCATION/TRAINING PROGRAM

## 2021-01-10 PROCEDURE — 74011000258 HC RX REV CODE- 258: Performed by: INTERNAL MEDICINE

## 2021-01-10 PROCEDURE — 36415 COLL VENOUS BLD VENIPUNCTURE: CPT

## 2021-01-10 PROCEDURE — 87635 SARS-COV-2 COVID-19 AMP PRB: CPT

## 2021-01-10 PROCEDURE — 74011250636 HC RX REV CODE- 250/636: Performed by: INTERNAL MEDICINE

## 2021-01-10 PROCEDURE — 85025 COMPLETE CBC W/AUTO DIFF WBC: CPT

## 2021-01-10 PROCEDURE — 74011250636 HC RX REV CODE- 250/636: Performed by: NURSE PRACTITIONER

## 2021-01-10 PROCEDURE — 74011250636 HC RX REV CODE- 250/636: Performed by: STUDENT IN AN ORGANIZED HEALTH CARE EDUCATION/TRAINING PROGRAM

## 2021-01-10 PROCEDURE — 80053 COMPREHEN METABOLIC PANEL: CPT

## 2021-01-10 PROCEDURE — 65270000015 HC RM PRIVATE ONCOLOGY

## 2021-01-10 PROCEDURE — 74011250637 HC RX REV CODE- 250/637: Performed by: INTERNAL MEDICINE

## 2021-01-10 RX ORDER — ASPIRIN 325 MG/1
200 TABLET, FILM COATED ORAL DAILY
Status: DISCONTINUED | OUTPATIENT
Start: 2021-01-11 | End: 2021-01-12 | Stop reason: HOSPADM

## 2021-01-10 RX ADMIN — Medication 10 ML: at 06:04

## 2021-01-10 RX ADMIN — METOPROLOL TARTRATE 12.5 MG: 25 TABLET, FILM COATED ORAL at 20:25

## 2021-01-10 RX ADMIN — ASPIRIN 81 MG: 81 TABLET, CHEWABLE ORAL at 10:00

## 2021-01-10 RX ADMIN — THIAMINE HYDROCHLORIDE 200 MG: 100 INJECTION, SOLUTION INTRAMUSCULAR; INTRAVENOUS at 18:27

## 2021-01-10 RX ADMIN — THIAMINE HYDROCHLORIDE 200 MG: 100 INJECTION, SOLUTION INTRAMUSCULAR; INTRAVENOUS at 22:06

## 2021-01-10 RX ADMIN — Medication 10 ML: at 22:09

## 2021-01-10 RX ADMIN — CEFEPIME HYDROCHLORIDE 2 G: 2 INJECTION, POWDER, FOR SOLUTION INTRAVENOUS at 16:15

## 2021-01-10 RX ADMIN — Medication 10 ML: at 16:15

## 2021-01-10 RX ADMIN — DEXTROSE MONOHYDRATE 100 ML/HR: 5 INJECTION, SOLUTION INTRAVENOUS at 04:55

## 2021-01-10 RX ADMIN — METOPROLOL TARTRATE 12.5 MG: 25 TABLET, FILM COATED ORAL at 10:00

## 2021-01-10 RX ADMIN — ENOXAPARIN SODIUM 30 MG: 30 INJECTION SUBCUTANEOUS at 10:00

## 2021-01-10 RX ADMIN — LEVETIRACETAM 750 MG: 100 INJECTION, SOLUTION INTRAVENOUS at 22:39

## 2021-01-10 RX ADMIN — LEVETIRACETAM 750 MG: 100 INJECTION, SOLUTION INTRAVENOUS at 10:14

## 2021-01-10 NOTE — PROGRESS NOTES
Problem: Falls - Risk of  Goal: *Absence of Falls  Outcome: Progressing Towards Goal  Note: Fall Risk Interventions:       Mentation Interventions: Bed/chair exit alarm    Medication Interventions: Bed/chair exit alarm    Elimination Interventions: Call light in reach              Problem: Falls - Risk of  Goal: *Absence of Falls  Outcome: Progressing Towards Goal  Note: Fall Risk Interventions:       Mentation Interventions: Bed/chair exit alarm    Medication Interventions: Bed/chair exit alarm    Elimination Interventions: Call light in reach              Problem: Patient Education: Go to Patient Education Activity  Goal: Patient/Family Education  Outcome: Progressing Towards Goal     Problem: Patient Education: Go to Patient Education Activity  Goal: Patient/Family Education  Outcome: Progressing Towards Goal     Problem: Pressure Injury - Risk of  Goal: *Prevention of pressure injury  Outcome: Progressing Towards Goal  Note: Pressure Injury Interventions:  Sensory Interventions: Check visual cues for pain    Moisture Interventions: Check for incontinence Q2 hours and as needed, Internal/External urinary devices, Maintain skin hydration (lotion/cream)    Activity Interventions: Pressure redistribution bed/mattress(bed type)    Mobility Interventions: HOB 30 degrees or less, Pressure redistribution bed/mattress (bed type), Turn and reposition approx.  every two hours(pillow and wedges)    Nutrition Interventions: Document food/fluid/supplement intake    Friction and Shear Interventions: HOB 30 degrees or less, Apply protective barrier, creams and emollients, Feet elevated on foot rest                Problem: Pressure Injury - Risk of  Goal: *Prevention of pressure injury  Outcome: Progressing Towards Goal  Note: Pressure Injury Interventions:  Sensory Interventions: Check visual cues for pain    Moisture Interventions: Check for incontinence Q2 hours and as needed, Internal/External urinary devices, Maintain skin hydration (lotion/cream)    Activity Interventions: Pressure redistribution bed/mattress(bed type)    Mobility Interventions: HOB 30 degrees or less, Pressure redistribution bed/mattress (bed type), Turn and reposition approx.  every two hours(pillow and wedges)    Nutrition Interventions: Document food/fluid/supplement intake    Friction and Shear Interventions: HOB 30 degrees or less, Apply protective barrier, creams and emollients, Feet elevated on foot rest                Problem: Patient Education: Go to Patient Education Activity  Goal: Patient/Family Education  Outcome: Progressing Towards Goal     Problem: Patient Education: Go to Patient Education Activity  Goal: Patient/Family Education  Outcome: Progressing Towards Goal

## 2021-01-10 NOTE — PROGRESS NOTES
End of Shift Note    Bedside shift change report given to Chandler Avendaño  (oncoming nurse) by Jazmyn Silva RN (offgoing nurse). Report included the following information SBAR    Shift worked:  7-7p     Shift summary and any significant changes:          Concerns for physician to address:    Possible hospice care   Zone phone for oncoming shift:          Activity:  Activity Level: Bed Rest  Number times ambulated in hallways past shift: 0  Number of times OOB to chair past shift: 0    Cardiac:   Cardiac Monitoring: No      Cardiac Rhythm: Sinus tachycardia, Premature ventricular contraction    Access:   Current line(s): PIV     Genitourinary:   Urinary status: external catheter    Respiratory:   O2 Device: Room air  Chronic home O2 use?: NO  Incentive spirometer at bedside: NO     GI:     Current diet:  DIET NPO  DIET TUBE FEEDING  Passing flatus: NO  Tolerating current diet: NO       Pain Management:   Patient states pain is manageable on current regimen: N/A    Skin:  Torito Score: 12  Interventions: speciality bed    Patient Safety:  Fall Score:  Total Score: 3  Interventions: bed/chair alarm  High Fall Risk: Yes    Length of Stay:  Expected LOS: 4d 19h  Actual LOS: [address] [facility] [place] Normal rate, regular rhythm.  Heart sounds S1, S2.  No murmurs, rubs or gallops.

## 2021-01-10 NOTE — PROGRESS NOTES
End of Shift Note    Bedside shift change report given to Abhishek Shah (oncoming nurse) by Glen Schmidt (offgoing nurse). Report included the following information SBAR, Kardex, Intake/Output, MAR and Recent Results    Shift worked:  7p-7a     Shift summary and any significant changes:     patient rested well this night sleeping at long intervals, meds given as directed, output 1150     Concerns for physician to address:  none     Zone phone for oncoming shift:   none       Activity:  Activity Level: Bed Rest  Number times ambulated in hallways past shift: 0  Number of times OOB to chair past shift: 0    Cardiac:   Cardiac Monitoring: No      Cardiac Rhythm: Sinus tachycardia, Premature ventricular contraction    Access:   Current line(s): PIV     Genitourinary:   Urinary status: voiding and external catheter    Respiratory:   O2 Device: Room air  Chronic home O2 use?: NO  Incentive spirometer at bedside: NO     GI:     Current diet:  DIET NPO  DIET TUBE FEEDING  Passing flatus: YES  Tolerating current diet: YES       Pain Management:   Patient states pain is manageable on current regimen: YES    Skin:  Torito Score: 12  Interventions: turn team, float heels, foam dressing and internal/external urinary devices    Patient Safety:  Fall Score:  Total Score: 3  Interventions: bed/chair alarm  High Fall Risk: Yes    Length of Stay:  Expected LOS: 4d 19h  Actual LOS: [facility]

## 2021-01-10 NOTE — PROGRESS NOTES
Hospitalist Progress Note    NAME: Maura Amaya   :  3/9/1933   MRN:  [id number]     I reviewed pertinent labs and imaging, and discussed /agreed on the plan of care with Dr. Dell Sargent. Assessment / Plan  Severe Sepsis r/t to UTI with Bacteremia and Severe Dehydration   Acute Metabolic Encephalopathy in setting of Severe Dementia   Lactic Acidosis - resolved   Failure to Thrive   · Presented from assisted living facility with lactic acidosis, tachycardia, leukocytosis and multiple pressure ulcers   · Suspect source of infection r/t UTI with severe dehydration and history of UTIs  · Follow Urine Culture - NG   · Blood Culture  - PROTEUS MIRABILIS - preliminary   · Continue Cefepime - Day 5  · D/c Vancomycin with culture results   · CT Head  - \"Chronic generalized brain volume loss without significant change since prior exam.\"  · At baseline patient has severe dementia and is without much meaningful communication.    · He is now more responsive, repeated his name back to NP and stated he was doing \"OK\"   · Abdominal US - unremarkable for acute findings  · Stop IVF hydration  · Patient afebrile overnight and WBC WNL  · Obtain COVID screening test today   · Spoke with CM - will need different LTC placement - patient is medically stable and ready for discharge      Acute Kidney Injury r/t Severe Dehydration - resolved   Hypermagnesemia  Mag 2.6  Hypernatremia Na 150 - resolved   · Cr 2.21 on admission - trending down Cr 1.26 today   · Follow BMP   · Baseline Cr around 1   · Elevated Mag r/t dehydration - recheck in AM   · Stop IVF hydration today     Elevated Troponin 0.33  · No ischemic changes on EKG  · Likely r/t SOULEYMANE, dehydration and sepsis    Transaminitis    · Appears to be r/t dehydration   · Hold statin   · US of abdomen unremarkable for acute finding   · LFTs trending up and down - follow CMP    Failure to Thrive   Moderate Protein Malnutrition   Multiple Pressure Ulcers and DTIs Please see wound care note for extensive photo documentation of wounds to sacrum, right flank, right lateral LE and ankle, bilat heels, right hip, left lateral LE and ankle  Debility Bedridden at baseline   Severe Dementia   History of Recurrent CVAs  Dysphagia with PEG placement 12/2020  · Patient has had decline in status since November. Presented to ED from assisted living with status epilepticus. Since then has been mostly non verbal and disoriented. Previously with severe dementia but was able to converse some.   · Today patient only responsive to pain associated with multiple pressure ulcers   · PRN morphine for wound care and turning   · Per Neurology notes last admissions 11/27/2020 - \"Component of a vascular dementia and stepwise progression of the dementia is likely. This very well may be close to his new baseline.\"  · Appreciate Dietician for recommendations on TF  · Poor short term prognosis and grim prognosis in next 6 months  · Patient is hospice appropriate. Prognosis in next 6 months is very grim.  · Long discussion had with daughter and both sons, dicussed patient status and overall condition. Very poor prognosis. One son did acknowledge that patient is in a great deal of pain and is suffering. All siblings acknowledge that patient is very ill and there is nothing we can do to \"cure him\". They seem to have difficulty withdrawing care, shared that their dad \"is a fighter\" and would want to live. They are discussing how to proceed including hospice and DNR status. Will notify NP when they have reached a decision.   · Family would like to pursue LTC and have patient remain FULL CODE, they would like to see if a different LTC facility could manage his care better. They will pursue hospice \"as a last resort\".   · Patient is HOSPICE appropriate - Very grim prognosis in the short term, expect patient to be frequent readmission despite best efforts of treatment. He continues to decline.   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 Normal cavity size and systolic function. Mild concentric hypertrophy. · Continue metoprolol and ASA   · Statin held with SOULEYMANE    History of Seizure with Recent admission for Status Epilepticus  · History of 5 minute seizure at LTC in November 2020  · No current seizure like activity  · Continue IV keppra      History of Gout Hold allopurinol with SOULEYMANE      25.0 - 29.9 Overweight / Body mass index is 29.34 kg/m². Code status: Full  Prophylaxis: Lovenox  Recommended Disposition: SNF/LTC     Subjective:     Chief Complaint / Reason for Physician Visit  Patient seen at bedside, did say his name and answer that the was feeling \"ok\". No further meaningful information obtained due to severe dementia. Updated Tonya Jones on plan of care via phone. Discussed with RN events overnight. Review of Systems:  Symptom Y/N Comments  Symptom Y/N Comments   Fever/Chills    Chest Pain     Poor Appetite    Edema     Cough    Abdominal Pain     Sputum    Joint Pain     SOB/CARREON    Pruritis/Rash     Nausea/vomit    Tolerating PT/OT     Diarrhea    Tolerating Diet     Constipation    Other       Could NOT obtain due to: Dementia      Objective:     VITALS:   Last 24hrs VS reviewed since prior progress note.  Most recent are:  Patient Vitals for the past 24 hrs:   Temp Pulse Resp BP SpO2   01/10/21 0828 98.2 °F (36.8 °C) 78 18 (!) 157/82 98 %   01/09/21 2319 97.6 °F (36.4 °C) 75 18 (!) 148/60 100 %   01/09/21 1926 98.3 °F (36.8 °C) 79 20 (!) 151/66 100 %   01/09/21 1700 98.1 °F (36.7 °C) 82 20 (!) 153/72 100 %       Intake/Output Summary (Last 24 hours) at 1/10/2021 0954  Last data filed at 1/10/2021 0603  Gross per 24 hour   Intake 160 ml   Output 2100 ml   Net -1940 ml        I had a face to face encounter and independently examined this patient on 1/10/2021, as outlined below:  PHYSICAL EXAM:  General: Frail, elderly and chronically ill and frail appearing AAM who is alert to voice     EENT:  EOMI. Anicteric sclerae. MMM  Resp:  CTA bilaterally, no wheezing or rales. No accessory muscle use  CV:  Regular  rhythm,  No edema  GI:  Soft, Non distended, Non tender. +Bowel sounds  Neurologic:  Responds to voice, alert and oriented to self   Psych:    Not anxious nor agitated  Skin:  No rashes. No jaundice, multiple pressure ulcers and wounds     Reviewed most current lab test results and cultures  YES  Reviewed most current radiology test results   YES  Review and summation of old records today    NO  Reviewed patient's current orders and MAR    YES  PMH/SH reviewed - no change compared to H&P  ________________________________________________________________________  Care Plan discussed with:    Comments   Patient x    Family  x Agnes CASAS x    Care Manager x    Consultant                        Multidiciplinary team rounds were held today with , nursing, pharmacist and clinical coordinator. Patient's plan of care was discussed; medications were reviewed and discharge planning was addressed. ________________________________________________________________________  Total NON critical care TIME:  25   Minutes    Total CRITICAL CARE TIME Spent:   Minutes non procedure based      Comments   >50% of visit spent in counseling and coordination of care x    ________________________________________________________________________  Bartholomynor Cassidy NP     Procedures: see electronic medical records for all procedures/Xrays and details which were not copied into this note but were reviewed prior to creation of Plan. LABS:  I reviewed today's most current labs and imaging studies.   Pertinent labs include:  Recent Labs     01/10/21  9583 01/09/21  7883 01/08/21 0358   WBC 10.4 10.9 12.0*   HGB 10.6* 9.6* 9.9*   HCT 33.9* 31.1* 32.2*    205 203     Recent Labs     01/10/21  0438 01/09/21  0403 01/08/21 0358    149* 150*   K 3.5 3.3* 3.8   * 115* 117*   CO2 29 30 29   * 202* 224*   BUN 45* 56* 72*   CREA 1.26 1.44* 1.73*   CA 8.8 9.0 9.4   ALB 1.6* 1.6* 1.8*   TBILI 0.6 0.4 0.6   * 459* 539*       Signed: Scot Acevedo NP

## 2021-01-10 NOTE — PROGRESS NOTES
CM acknowledges consult to assist with finding new LTC setting for pt at discharge. CM has reviewed chart and talked with Fady Esparza NP who states that pt's family has declined at this time to pursue hospice services for pt. CM talked with pt's daughter, Gerry Bright, to further discuss d/c placement. They are not certain at this time of where they would like for pt to discharge to, and requested resources. Pt was previously paying privately while residing at [facility]. Pt's daughter states that they have not applied to Medicaid for pt previously. She states that pt has LTC insurance policy through [employer]. Due to pt's care needs, pt will need LTC placement. CM has emailed SNF/LTC list to daughter at email [email or web address]. Pt has previously been to [facility] for rehab, daughter states that she does not want to consider LTC placement there. Pt's family to review list and provide CM with preferences of placement for referrals to be sent to.     Jackie Gupta [facility] [phone] [facility]

## 2021-01-11 LAB
ALBUMIN SERPL-MCNC: 1.5 G/DL (ref 3.5–5)
ALBUMIN/GLOB SERPL: 0.3 {RATIO} (ref 1.1–2.2)
ALP SERPL-CCNC: 170 U/L (ref 45–117)
ALT SERPL-CCNC: 499 U/L (ref 12–78)
ANION GAP SERPL CALC-SCNC: 4 MMOL/L (ref 5–15)
AST SERPL-CCNC: 244 U/L (ref 15–37)
BASOPHILS # BLD: 0 K/UL (ref 0–0.1)
BASOPHILS NFR BLD: 0 % (ref 0–1)
BILIRUB SERPL-MCNC: 0.4 MG/DL (ref 0.2–1)
BUN SERPL-MCNC: 34 MG/DL (ref 6–20)
BUN/CREAT SERPL: 31 (ref 12–20)
CALCIUM SERPL-MCNC: 8.6 MG/DL (ref 8.5–10.1)
CHLORIDE SERPL-SCNC: 107 MMOL/L (ref 97–108)
CO2 SERPL-SCNC: 31 MMOL/L (ref 21–32)
CREAT SERPL-MCNC: 1.11 MG/DL (ref 0.7–1.3)
DIFFERENTIAL METHOD BLD: ABNORMAL
EOSINOPHIL # BLD: 0.4 K/UL (ref 0–0.4)
EOSINOPHIL NFR BLD: 4 % (ref 0–7)
ERYTHROCYTE [DISTWIDTH] IN BLOOD BY AUTOMATED COUNT: 14 % (ref 11.5–14.5)
GLOBULIN SER CALC-MCNC: 5.4 G/DL (ref 2–4)
GLUCOSE SERPL-MCNC: 240 MG/DL (ref 65–100)
HCT VFR BLD AUTO: 30.2 % (ref 36.6–50.3)
HGB BLD-MCNC: 9.6 G/DL (ref 12.1–17)
IMM GRANULOCYTES # BLD AUTO: 0.1 K/UL (ref 0–0.04)
IMM GRANULOCYTES NFR BLD AUTO: 1 % (ref 0–0.5)
LYMPHOCYTES # BLD: 2 K/UL (ref 0.8–3.5)
LYMPHOCYTES NFR BLD: 18 % (ref 12–49)
MAGNESIUM SERPL-MCNC: 2 MG/DL (ref 1.6–2.4)
MCH RBC QN AUTO: 29.6 PG (ref 26–34)
MCHC RBC AUTO-ENTMCNC: 31.8 G/DL (ref 30–36.5)
MCV RBC AUTO: 93.2 FL (ref 80–99)
MONOCYTES # BLD: 0.6 K/UL (ref 0–1)
MONOCYTES NFR BLD: 5 % (ref 5–13)
NEUTS SEG # BLD: 7.6 K/UL (ref 1.8–8)
NEUTS SEG NFR BLD: 72 % (ref 32–75)
NRBC # BLD: 0 K/UL (ref 0–0.01)
NRBC BLD-RTO: 0 PER 100 WBC
PLATELET # BLD AUTO: 191 K/UL (ref 150–400)
PMV BLD AUTO: 10.8 FL (ref 8.9–12.9)
POTASSIUM SERPL-SCNC: 3.3 MMOL/L (ref 3.5–5.1)
PROT SERPL-MCNC: 6.9 G/DL (ref 6.4–8.2)
RBC # BLD AUTO: 3.24 M/UL (ref 4.1–5.7)
SODIUM SERPL-SCNC: 142 MMOL/L (ref 136–145)
WBC # BLD AUTO: 10.7 K/UL (ref 4.1–11.1)

## 2021-01-11 PROCEDURE — 74011000258 HC RX REV CODE- 258: Performed by: STUDENT IN AN ORGANIZED HEALTH CARE EDUCATION/TRAINING PROGRAM

## 2021-01-11 PROCEDURE — 87635 SARS-COV-2 COVID-19 AMP PRB: CPT

## 2021-01-11 PROCEDURE — 36415 COLL VENOUS BLD VENIPUNCTURE: CPT

## 2021-01-11 PROCEDURE — 74011250636 HC RX REV CODE- 250/636: Performed by: INTERNAL MEDICINE

## 2021-01-11 PROCEDURE — 74011250637 HC RX REV CODE- 250/637: Performed by: NURSE PRACTITIONER

## 2021-01-11 PROCEDURE — 74011250636 HC RX REV CODE- 250/636: Performed by: NURSE PRACTITIONER

## 2021-01-11 PROCEDURE — 65270000015 HC RM PRIVATE ONCOLOGY

## 2021-01-11 PROCEDURE — 83735 ASSAY OF MAGNESIUM: CPT

## 2021-01-11 PROCEDURE — 74011250636 HC RX REV CODE- 250/636: Performed by: STUDENT IN AN ORGANIZED HEALTH CARE EDUCATION/TRAINING PROGRAM

## 2021-01-11 PROCEDURE — 74011250637 HC RX REV CODE- 250/637: Performed by: INTERNAL MEDICINE

## 2021-01-11 PROCEDURE — 74011000258 HC RX REV CODE- 258: Performed by: INTERNAL MEDICINE

## 2021-01-11 PROCEDURE — 80053 COMPREHEN METABOLIC PANEL: CPT

## 2021-01-11 PROCEDURE — 74011250637 HC RX REV CODE- 250/637: Performed by: STUDENT IN AN ORGANIZED HEALTH CARE EDUCATION/TRAINING PROGRAM

## 2021-01-11 PROCEDURE — 85025 COMPLETE CBC W/AUTO DIFF WBC: CPT

## 2021-01-11 RX ADMIN — Medication 10 ML: at 05:47

## 2021-01-11 RX ADMIN — METOPROLOL TARTRATE 12.5 MG: 25 TABLET, FILM COATED ORAL at 22:52

## 2021-01-11 RX ADMIN — Medication 10 ML: at 16:01

## 2021-01-11 RX ADMIN — ASPIRIN 81 MG: 81 TABLET, CHEWABLE ORAL at 09:57

## 2021-01-11 RX ADMIN — LEVETIRACETAM 750 MG: 100 INJECTION, SOLUTION INTRAVENOUS at 10:39

## 2021-01-11 RX ADMIN — THIAMINE HYDROCHLORIDE 200 MG: 100 INJECTION, SOLUTION INTRAMUSCULAR; INTRAVENOUS at 10:06

## 2021-01-11 RX ADMIN — Medication 10 ML: at 22:52

## 2021-01-11 RX ADMIN — CEFEPIME HYDROCHLORIDE 2 G: 2 INJECTION, POWDER, FOR SOLUTION INTRAVENOUS at 15:56

## 2021-01-11 RX ADMIN — MORPHINE SULFATE 1 MG: 2 INJECTION, SOLUTION INTRAMUSCULAR; INTRAVENOUS at 18:15

## 2021-01-11 RX ADMIN — METOPROLOL TARTRATE 12.5 MG: 25 TABLET, FILM COATED ORAL at 09:58

## 2021-01-11 RX ADMIN — THIAMINE HCL TAB 100 MG 200 MG: 100 TAB at 09:58

## 2021-01-11 RX ADMIN — CEFEPIME HYDROCHLORIDE 2 G: 2 INJECTION, POWDER, FOR SOLUTION INTRAVENOUS at 03:51

## 2021-01-11 RX ADMIN — LEVETIRACETAM 750 MG: 100 INJECTION, SOLUTION INTRAVENOUS at 22:51

## 2021-01-11 RX ADMIN — POTASSIUM BICARBONATE 40 MEQ: 782 TABLET, EFFERVESCENT ORAL at 09:57

## 2021-01-11 RX ADMIN — ENOXAPARIN SODIUM 30 MG: 30 INJECTION SUBCUTANEOUS at 09:57

## 2021-01-11 NOTE — PROGRESS NOTES
Spiritual Care Assessment/Progress Note  [facility]      NAME: Nini Pollock      MRN: [id number]  AGE: 80 y.o. SEX: male  Yazdanism Affiliation: Pentecostalism   Language: English     1/11/2021     Total Time (in minutes): 8     Spiritual Assessment begun in [facility] through conversation with:         []Patient        [] Family    [] Friend(s)        Reason for Consult: Initial/Spiritual assessment, patient floor     Spiritual beliefs: (Please include comment if needed)     [] Identifies with a jing tradition:         [] Supported by a jing community:            [] Claims no spiritual orientation:           [] Seeking spiritual identity:                [] Adheres to an individual form of spirituality:           [x] Not able to assess:                           Identified resources for coping:      [] Prayer                               [] Music                  [] Guided Imagery     [] Family/friends                 [] Pet visits     [] Devotional reading                         [x] Unknown     [] Other:                                           Interventions offered during this visit: (See comments for more details)    Patient Interventions: Initial visit           Plan of Care:     [] Support spiritual and/or cultural needs    [] Support AMD and/or advance care planning process      [] Support grieving process   [] Coordinate Rites and/or Rituals    [] Coordination with community clergy   [] No spiritual needs identified at this time   [] Detailed Plan of Care below (See Comments)  [] Make referral to Music Therapy  [] Make referral to Pet Therapy     [] Make referral to Addiction services  [] Make referral to [place]  [] Make referral to Spiritual Care Partner  [] No future visits requested        [x] Follow up upon further referrals     Comments:   Attempted initial visit on Oncology unit for spiritual assessment. No family/friends present.  Patient appeared to be sleeping and did not respond when his named was called. Unable to assess patient at this time.     TANO Patton, [facility], Staff [address]    [address] Paging Service  [phone]

## 2021-01-11 NOTE — PROGRESS NOTES
ARIELLE Plan:  TBD- seeking LTC Placement   >referrals sent to [place] [facility]; [facility]   2nd IMM letter  [facility] medical transport at d/c   covid test ordered, required for placement     Chart reviewed. CM received call from pt's daughter, Mono Giraldo, today with placement choice. Mono Giraldo has chosen to pursue [facility] for placement. CM spoke with admissions department there who was interested in referral. Confirmed that pt is a . Referral sent via [employer] this AM and is still pending review as of 3PM today. Per John in admissions, they should have a decision as to whether or not they can accept by tomorrow morning. CM had follow up conversation with Mono  who provided 2nd choice facility. Mono  has chosen [facility]. CM sent referral via [facility] and spoke with Placido Norman in admissions. Placido Norman to contact Mono iGraldo by phone to discuss payor source for LTC placement. They do have beds available. Covid test ordered on 1/11/2021 for placement. Per hospitalist NP, pt is medically stable for discharge once placement is confirmed. CM inquired to daughter as to whether or not pt could return to previous longterm. Per daughter, they are not holding a spot for pt there and she does not want him to return. CM verified that family has not yet moved pt's belongings out of the residence at this point in time. CM unable to identify a working number for [facility]. Per [facility], the facility is \"permanently closed\". CM inquired as to whether or not pt could return to home setting with family. Mono  stated that family cannot provide level of assistance needed. His long term care insurance policy could assist with reimbursement of paid caregivers and she will explore this option, however she would like to continue moving forward with securing placement. Current barrier to d/c: seeking LTC placement. Referrals pending as of 3:14 PM today. Covid test ordered on 1/11/2021. CM will continue to follow.     Yadi Blunt, MSW   Care Manager, [id number] [facility]  [phone]

## 2021-01-11 NOTE — PROGRESS NOTES
End of Shift Note    Bedside shift change report given to Jing Velazquez  (oncoming nurse) by Trena Bernal RN (offgoing nurse). Report included the following information SBAR    Shift worked:    7-7pm   Shift summary and any significant changes:     IV fluids d/c'ed     Concerns for physician to address:       Zone phone for oncoming shift:          Activity:  Activity Level: Bed Rest  Number times ambulated in hallways past shift: 0  Number of times OOB to chair past shift: 0    Cardiac:   Cardiac Monitoring: No      Cardiac Rhythm: Sinus tachycardia, Premature ventricular contraction    Access:   Current line(s): PIV     Genitourinary:   Urinary status: external catheter    Respiratory:   O2 Device: Room air  Chronic home O2 use?: NO  Incentive spirometer at bedside: NO     GI:     Current diet:  DIET NPO  DIET TUBE FEEDING  Passing flatus: NO  Tolerating current diet: NO       Pain Management:   Patient states pain is manageable on current regimen: N/A    Skin:  Torito Score: 12  Interventions: turn team    Patient Safety:  Fall Score:  Total Score: 3  Interventions: bed/chair alarm  High Fall Risk: Yes    Length of Stay:  Expected LOS: 4d 19h  Actual LOS: [address] [place], RN

## 2021-01-11 NOTE — PROGRESS NOTES
Hospitalist Progress Note    NAME: So Fuller   :  3/9/1933   MRN:  [id number]     I reviewed pertinent labs and imaging, and discussed /agreed on the plan of care with Dr. Lennox Staggers. Assessment / Plan  Severe Sepsis r/t to UTI with Bacteremia and Severe Dehydration   Acute Metabolic Encephalopathy in setting of Severe Dementia   Lactic Acidosis - resolved   Failure to Thrive   · Presented from assisted living facility with lactic acidosis, tachycardia, leukocytosis and multiple pressure ulcers   · Suspect source of infection r/t UTI with severe dehydration and history of UTIs  · Follow Urine Culture - NG   · Blood Culture  - PROTEUS MIRABILIS - preliminary   · Continue Cefepime - Day 6  · D/c Vancomycin with culture results   · CT Head  - \"Chronic generalized brain volume loss without significant change since prior exam.\"  · At baseline patient has severe dementia and is without much meaningful communication. · He is now more responsive, repeated his name back to NP and stated he was doing \"OK\"   · Abdominal US - unremarkable for acute findings  · Patient afebrile overnight and WBC WNL  · Obtain COVID screening test today   · Spoke with CM - referral sent to [place] [id number]. who may be able to accept patient Tuesday.  Referral also sent to [facility]     Acute Kidney Injury r/t Severe Dehydration - resolved   Hypermagnesemia  Mag 2.6  Hypernatremia Na 150 - resolved   · Cr 2.21 on admission - trending down Cr 1.11 today   · Follow BMP   · Baseline Cr around 1   · Elevated Mag r/t dehydration - recheck in AM     Hypokalemia   · K 3.3, replete   · Follow BMP     Elevated Troponin 0.33  · No ischemic changes on EKG  · Likely r/t SOULEYMANE, dehydration and sepsis    Transaminitis    · Appears to be r/t dehydration POA  · Hold statin   · US of abdomen unremarkable for acute finding   · LFTs trending up and down - follow CMP    Failure to Thrive   Moderate Protein Malnutrition   Multiple Pressure Ulcers and DTIs Please see wound care note for extensive photo documentation of wounds to sacrum, right flank, right lateral LE and ankle, bilat heels, right hip, left lateral LE and ankle  Debility Bedridden at baseline   Severe Dementia   History of Recurrent CVAs  Dysphagia with PEG placement 12/2020  · Patient has had decline in status since November. Presented to ED from assisted living with status epilepticus. Since then has been mostly non verbal and disoriented. Previously with severe dementia but was able to converse some. · Per Neurology notes last admissions 11/27/2020 - \"Component of a vascular dementia and stepwise progression of the dementia is likely. This very well may be close to his new baseline. \"  · Appreciate Dietician for recommendations on TF  · Poor short term prognosis and grim prognosis in next 6 months  · Patient is hospice appropriate. Prognosis in next 6 months is very grim. · Long discussion had with daughter and both sons, dicussed patient status and overall condition. Very poor prognosis. One son did acknowledge that patient is in a great deal of pain and is suffering. All siblings acknowledge that patient is very ill and there is nothing we can do to \"cure him\". They seem to have difficulty withdrawing care, shared that their dad \"is a fighter\" and would want to live. They are discussing how to proceed including hospice and DNR status. Will notify NP when they have reached a decision. · Family would like to pursue LTC and have patient remain FULL CODE, they would like to see if a different LTC facility could manage his care better. They will pursue hospice \"as a last resort\". · Patient is HOSPICE appropriate - Very grim prognosis in the short term, expect patient to be frequent readmission despite best efforts of treatment. He continues to decline.    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 Normal cavity size and systolic function. Mild concentric hypertrophy. · Continue metoprolol and ASA   · Statin held with SOULEYMANE/Elevated LFTs    History of Seizure with Recent admission for Status Epilepticus  · History of 5 minute seizure at LTC in November 2020  · No current seizure like activity  · Continue IV keppra      History of Gout Hold allopurinol with SOULEYMANE      25.0 - 29.9 Overweight / Body mass index is 29.34 kg/m². Code status: Full  Prophylaxis: Lovenox  Recommended Disposition: SNF/LTC     Subjective:     Chief Complaint / Reason for Physician Visit  Patient seen at bedside, did answer that he was \"Ok\", no further meaningful communication. Discussed with RN events overnight. Review of Systems:  Symptom Y/N Comments  Symptom Y/N Comments   Fever/Chills    Chest Pain     Poor Appetite    Edema     Cough    Abdominal Pain     Sputum    Joint Pain     SOB/CARREON    Pruritis/Rash     Nausea/vomit    Tolerating PT/OT     Diarrhea    Tolerating Diet     Constipation    Other       Could NOT obtain due to: Dementia      Objective:     VITALS:   Last 24hrs VS reviewed since prior progress note.  Most recent are:  Patient Vitals for the past 24 hrs:   Temp Pulse Resp BP SpO2   01/11/21 0820 98.1 °F (36.7 °C)       01/11/21 0800 (!) 96.6 °F (35.9 °C) 76 18 (!) 158/84 99 %   01/10/21 2326 98.6 °F (37 °C) 73 18 (!) 141/61 100 %   01/10/21 1928 98.5 °F (36.9 °C) 79 18 (!) 163/77 98 %   01/10/21 1613 97.7 °F (36.5 °C) 77 18 (!) 166/82 94 %       Intake/Output Summary (Last 24 hours) at 1/11/2021 1143  Last data filed at 1/11/2021 0600  Gross per 24 hour   Intake 160 ml   Output 1950 ml   Net -1790 ml        I had a face to face encounter and independently examined this patient on 1/11/2021, as outlined below:  PHYSICAL EXAM:  General: Frail, elderly and chronically ill and frail appearing AAM who is alert to voice     EENT:  EOMI. Anicteric sclerae. MMM  Resp:  CTA bilaterally, no wheezing or rales.  No accessory muscle use  CV:  Regular  rhythm,  No edema  GI:  Soft, Non distended, Non tender.  +Bowel sounds  Neurologic:  Responds to voice, alert and oriented to self   Psych:    Not anxious nor agitated  Skin:  No rashes.  No jaundice, multiple pressure ulcers and wounds     Reviewed most current lab test results and cultures  YES  Reviewed most current radiology test results   YES  Review and summation of old records today    NO  Reviewed patient's current orders and MAR    YES  PMH/ reviewed - no change compared to H&P  ________________________________________________________________________  Care Plan discussed with:    Comments   Patient x    Family      RN x    Care Manager x    Consultant                        Multidiciplinary team rounds were held today with , nursing, pharmacist and clinical coordinator.  Patient's plan of care was discussed; medications were reviewed and discharge planning was addressed.     ________________________________________________________________________  Total NON critical care TIME:  25   Minutes    Total CRITICAL CARE TIME Spent:   Minutes non procedure based      Comments   >50% of visit spent in counseling and coordination of care x    ________________________________________________________________________  Dl Angeles NP     Procedures: see electronic medical records for all procedures/Xrays and details which were not copied into this note but were reviewed prior to creation of Plan.      LABS:  I reviewed today's most current labs and imaging studies.  Pertinent labs include:  Recent Labs     01/11/21  0332 01/10/21  0438 01/09/21  0400   WBC 10.7 10.4 10.9   HGB 9.6* 10.6* 9.6*   HCT 30.2* 33.9* 31.1*    186 205     Recent Labs     01/11/21  0332 01/10/21  0438  01/09/21  0403    144 149*   K 3.3* 3.5 3.3*    112* 115*   CO2 31 29 30   * 261* 202*   BUN 34* 45* 56*   CREA 1.11 1.26 1.44*   CA 8.6 8.8 9.0   MG 2.0  --   --    ALB 1.5* 1.6* 1.6*   TBILI 0.4 0.6 0.4   * 586* 459*       Signed: Neelam Munguia NP

## 2021-01-11 NOTE — PROGRESS NOTES
End of Shift Note    Bedside shift change report given to [address] Jorge Luis (oncoming nurse) by Lillian Duran (offgoing nurse). Report included the following information SBAR, Kardex, Procedure Summary, Intake/Output, MAR and Recent Results    Shift worked:  7p-7a     Shift summary and any significant changes:     patient rested well this night sleeping at long intervals does not like to be turned    Concerns for physician to address:  none     Zone phone for oncoming shift:   none       Activity:  Activity Level: Bed Rest  Number times ambulated in hallways past shift: 0  Number of times OOB to chair past shift: 0    Cardiac:   Cardiac Monitoring: No      Cardiac Rhythm: Sinus tachycardia, Premature ventricular contraction    Access:   Current line(s): PIV     Genitourinary:   Urinary status: voiding and external catheter    Respiratory:   O2 Device: Room air  Chronic home O2 use?: NO  Incentive spirometer at bedside: NO     GI:     Current diet:  DIET NPO  DIET TUBE FEEDING  Passing flatus: YES  Tolerating current diet: YES       Pain Management:   Patient states pain is manageable on current regimen: YES    Skin:  Torito Score: 12  Interventions: turn team, speciality bed, float heels, foam dressing, internal/external urinary devices and nutritional support     Patient Safety:  Fall Score:  Total Score: 3  Interventions: bed/chair alarm  High Fall Risk: Yes    Length of Stay:  Expected LOS: 4d 19h  Actual LOS: [address]

## 2021-01-12 VITALS
RESPIRATION RATE: 16 BRPM | HEART RATE: 85 BPM | HEIGHT: 66 IN | WEIGHT: 182.54 LBS | TEMPERATURE: 98 F | BODY MASS INDEX: 29.34 KG/M2 | SYSTOLIC BLOOD PRESSURE: 131 MMHG | DIASTOLIC BLOOD PRESSURE: 64 MMHG | OXYGEN SATURATION: 100 %

## 2021-01-12 PROBLEM — E87.6 HYPOKALEMIA: Status: ACTIVE | Noted: 2021-01-12

## 2021-01-12 PROBLEM — E87.5 HYPERKALEMIA: Status: ACTIVE | Noted: 2021-01-12

## 2021-01-12 PROBLEM — E44.0 MODERATE PROTEIN MALNUTRITION (HCC): Status: ACTIVE | Noted: 2021-01-12

## 2021-01-12 PROBLEM — E87.0 HYPERNATREMIA: Status: ACTIVE | Noted: 2021-01-12

## 2021-01-12 PROBLEM — R74.01 TRANSAMINITIS: Status: ACTIVE | Noted: 2019-03-19

## 2021-01-12 LAB
ALBUMIN SERPL-MCNC: 1.5 G/DL (ref 3.5–5)
ALBUMIN/GLOB SERPL: 0.3 {RATIO} (ref 1.1–2.2)
ALP SERPL-CCNC: 162 U/L (ref 45–117)
ALT SERPL-CCNC: 484 U/L (ref 12–78)
ANION GAP SERPL CALC-SCNC: 2 MMOL/L (ref 5–15)
AST SERPL-CCNC: 214 U/L (ref 15–37)
BACTERIA SPEC CULT: ABNORMAL
BACTERIA SPEC CULT: ABNORMAL
BACTERIA SPEC CULT: NORMAL
BASOPHILS # BLD: 0 K/UL (ref 0–0.1)
BASOPHILS NFR BLD: 0 % (ref 0–1)
BILIRUB SERPL-MCNC: 0.5 MG/DL (ref 0.2–1)
BUN SERPL-MCNC: 34 MG/DL (ref 6–20)
BUN/CREAT SERPL: 30 (ref 12–20)
CALCIUM SERPL-MCNC: 8.7 MG/DL (ref 8.5–10.1)
CHLORIDE SERPL-SCNC: 104 MMOL/L (ref 97–108)
CO2 SERPL-SCNC: 33 MMOL/L (ref 21–32)
CREAT SERPL-MCNC: 1.14 MG/DL (ref 0.7–1.3)
DIFFERENTIAL METHOD BLD: ABNORMAL
EOSINOPHIL # BLD: 0.3 K/UL (ref 0–0.4)
EOSINOPHIL NFR BLD: 3 % (ref 0–7)
ERYTHROCYTE [DISTWIDTH] IN BLOOD BY AUTOMATED COUNT: 14 % (ref 11.5–14.5)
GLOBULIN SER CALC-MCNC: 5.2 G/DL (ref 2–4)
GLUCOSE SERPL-MCNC: 222 MG/DL (ref 65–100)
HCT VFR BLD AUTO: 29.1 % (ref 36.6–50.3)
HEALTH STATUS, XMCV2T: NORMAL
HGB BLD-MCNC: 9.3 G/DL (ref 12.1–17)
IMM GRANULOCYTES # BLD AUTO: 0.1 K/UL (ref 0–0.04)
IMM GRANULOCYTES NFR BLD AUTO: 1 % (ref 0–0.5)
LYMPHOCYTES # BLD: 2 K/UL (ref 0.8–3.5)
LYMPHOCYTES NFR BLD: 19 % (ref 12–49)
MCH RBC QN AUTO: 30 PG (ref 26–34)
MCHC RBC AUTO-ENTMCNC: 32 G/DL (ref 30–36.5)
MCV RBC AUTO: 93.9 FL (ref 80–99)
MONOCYTES # BLD: 0.6 K/UL (ref 0–1)
MONOCYTES NFR BLD: 5 % (ref 5–13)
NEUTS SEG # BLD: 7.5 K/UL (ref 1.8–8)
NEUTS SEG NFR BLD: 72 % (ref 32–75)
NRBC # BLD: 0 K/UL (ref 0–0.01)
NRBC BLD-RTO: 0 PER 100 WBC
PLATELET # BLD AUTO: 177 K/UL (ref 150–400)
PMV BLD AUTO: 10.8 FL (ref 8.9–12.9)
POTASSIUM SERPL-SCNC: 4.3 MMOL/L (ref 3.5–5.1)
PROT SERPL-MCNC: 6.7 G/DL (ref 6.4–8.2)
RBC # BLD AUTO: 3.1 M/UL (ref 4.1–5.7)
SARS-COV-2, COV2: NOT DETECTED
SERVICE CMNT-IMP: ABNORMAL
SERVICE CMNT-IMP: NORMAL
SODIUM SERPL-SCNC: 139 MMOL/L (ref 136–145)
SOURCE, COVRS: NORMAL
SPECIMEN SOURCE, FCOV2M: NORMAL
SPECIMEN TYPE, XMCV1T: NORMAL
WBC # BLD AUTO: 10.5 K/UL (ref 4.1–11.1)

## 2021-01-12 PROCEDURE — 80053 COMPREHEN METABOLIC PANEL: CPT

## 2021-01-12 PROCEDURE — 74011000258 HC RX REV CODE- 258: Performed by: STUDENT IN AN ORGANIZED HEALTH CARE EDUCATION/TRAINING PROGRAM

## 2021-01-12 PROCEDURE — 74011250637 HC RX REV CODE- 250/637: Performed by: INTERNAL MEDICINE

## 2021-01-12 PROCEDURE — 74011250636 HC RX REV CODE- 250/636: Performed by: STUDENT IN AN ORGANIZED HEALTH CARE EDUCATION/TRAINING PROGRAM

## 2021-01-12 PROCEDURE — 74011250636 HC RX REV CODE- 250/636: Performed by: INTERNAL MEDICINE

## 2021-01-12 PROCEDURE — 74011000258 HC RX REV CODE- 258: Performed by: INTERNAL MEDICINE

## 2021-01-12 PROCEDURE — 85025 COMPLETE CBC W/AUTO DIFF WBC: CPT

## 2021-01-12 PROCEDURE — 36415 COLL VENOUS BLD VENIPUNCTURE: CPT

## 2021-01-12 PROCEDURE — 74011250637 HC RX REV CODE- 250/637: Performed by: STUDENT IN AN ORGANIZED HEALTH CARE EDUCATION/TRAINING PROGRAM

## 2021-01-12 RX ORDER — ASPIRIN 325 MG/1
200 TABLET, FILM COATED ORAL DAILY
Qty: 60 TAB | Refills: 0 | Status: SHIPPED
Start: 2021-01-13 | End: 2021-02-12

## 2021-01-12 RX ORDER — METOPROLOL TARTRATE 25 MG/1
12.5 TABLET, FILM COATED ORAL 2 TIMES DAILY
Qty: 30 TAB | Refills: 0 | Status: SHIPPED
Start: 2021-01-12 | End: 2021-02-11

## 2021-01-12 RX ADMIN — CEFEPIME HYDROCHLORIDE 2 G: 2 INJECTION, POWDER, FOR SOLUTION INTRAVENOUS at 05:01

## 2021-01-12 RX ADMIN — METOPROLOL TARTRATE 12.5 MG: 25 TABLET, FILM COATED ORAL at 09:37

## 2021-01-12 RX ADMIN — THIAMINE HCL TAB 100 MG 200 MG: 100 TAB at 09:37

## 2021-01-12 RX ADMIN — ASPIRIN 81 MG: 81 TABLET, CHEWABLE ORAL at 09:37

## 2021-01-12 RX ADMIN — ENOXAPARIN SODIUM 30 MG: 30 INJECTION SUBCUTANEOUS at 09:37

## 2021-01-12 RX ADMIN — Medication 10 ML: at 05:38

## 2021-01-12 RX ADMIN — LEVETIRACETAM 750 MG: 100 INJECTION, SOLUTION INTRAVENOUS at 10:17

## 2021-01-12 NOTE — DISCHARGE SUMMARY
Hospitalist Discharge Summary     Patient ID:  Alyssa García  [id number]  14 y.o.  3/9/1933  1/6/2021    PCP on record: James Stratton MD    Admit date: 1/6/2021  Discharge date and time: 1/12/2021    DISCHARGE DIAGNOSIS:    Active Hospital Problems    Diagnosis Date Noted    Hypokalemia 01/12/2021    Hypernatremia 01/12/2021    Moderate protein malnutrition ([address] 01/12/2021    Failure to thrive (0-17) 01/09/2021    Bacteremia 01/09/2021    UTI (urinary tract infection) 01/09/2021    Dementia ([address] 01/09/2021    Bedridden 01/09/2021    Pressure ulcer 01/09/2021    S/P percutaneous endoscopic gastrostomy (PEG) tube placement ([address] 01/09/2021    Dehydration 01/06/2021    Lactic acidosis 01/06/2021    Sepsis ([address] 01/06/2021    SOULEYMANE (acute kidney injury) ([address] 01/06/2021    Chronic systolic heart failure ([address] 12/07/2020    Seizure ([address] 11/17/2020    Transaminitis 03/19/2019    Gout 12/17/2018    Essential hypertension 12/17/2018    Weight loss 08/08/2017       CONSULTATIONS:  IP CONSULT TO HOSPITALIST    Excerpted HPI from H&P of Eleazar Cortes MD:  HISTORY OF PRESENT ILLNESS:        The patient is 80years old man with past medical history significant for recurrent CVAs, dementia, recurrent UTIs, PEG tube, seizure presented emergency department from nursing facility due to change in mentation. History was obtained from EMR and daughter at bedside. It was reported that the patient brought from nursing facility to the ED due to concern of change in mentation and possible seizure episode earlier today. On arrival to the emergency department it was documented otherwise seizure-like activity however daughter reported that he has been always noncommunicative and alert and oriented x0. She reported that he did not complain of any recent fever, chest pain, shortness of breath, abdominal pain, diarrhea, constipation.   Patient was treated recently in December for UTI.     In the emergency department, he was found to have severe sepsis with lactic acidosis and acute kidney injury.        We were asked to admit for work up and evaluation of the above problems. ______________________________________________________________________  DISCHARGE SUMMARY/HOSPITAL COURSE:  for full details see H&P, daily progress notes, labs, consult notes. Eleuterio Oseguera y.o. male following medical complaints:     Severe Sepsis r/t to UTI with Bacteremia and Severe Dehydration - resolved  Acute Metabolic Encephalopathy in setting of Severe Dementia -stable  Lactic Acidosis - resolved   Failure to Thrive   · Presented from assisted living facility with lactic acidosis, tachycardia, leukocytosis and multiple pressure ulcers   · Suspect source of infection r/t UTI with severe dehydration and history of UTIs  · Follow Urine Culture - NG   · Blood Culture 1/6 - PROTEUS MIRABILIS - preliminary   · Continue Cefepime - Day 7 will dc today   · D/c Vancomycin with culture results   · CT Head 1/6 - \"Chronic generalized brain volume loss without significant change since prior exam.\"  · At baseline patient has severe dementia and is without much meaningful communication.    · He is now more responsive, repeated his name back to NP and stated he was doing \"OK\"   · Abdominal US - unremarkable for acute findings  · Patient afebrile overnight and WBC WNL  ·  COVID screening test neagtive  · Spoke with  - referral sent to [address] who may be able to accept patient Tuesday.      Acute Kidney Injury r/t Severe Dehydration - resolved   Hypermagnesemia  Mag 2.6  Hypernatremia Na 150 - resolved   · Cr 2.21 on admission - trending down Cr 1.4 today   · Baseline Cr around 1   · Elevated Mag r/t dehydration - recheck in AM   · Na 139  · Mag 2.0     Hypokalemia - resolved  · K 4.3,   · Follow BMP      Elevated Troponin 0.33  · No ischemic changes on EKG  · Likely r/t SOULEYMANE, dehydration and sepsis     Transaminitis · Appears to be r/t dehydration POA  · Contain to Hold statin    · US of abdomen unremarkable for acute finding   · LFTs trending up and down -currently trending down      Failure to Thrive - stable  Moderate Protein Malnutrition   Multiple Pressure Ulcers and DTIs Please see wound care note for extensive photo documentation of wounds to sacrum, right flank, right lateral LE and ankle, bilat heels, right hip, left lateral LE and ankle  Debility Bedridden at baseline   Severe Dementia   History of Recurrent CVAs  Dysphagia with PEG placement 12/2020  · Patient has had decline in status since November. Presented to ED from assisted living with status epilepticus. Since then has been mostly non verbal and disoriented. Previously with severe dementia but was able to converse some. · Per Neurology notes last admissions 11/27/2020 - \"Component of a vascular dementia and stepwise progression of the dementia is likely. This very well may be close to his new baseline. \"  · Appreciate Dietician for recommendations on TF  · Poor short term prognosis and grim prognosis in next 6 months  · Patient is hospice appropriate. Prognosis in next 6 months is very grim. · Long discussion had with daughter and both sons, dicussed patient status and overall condition. Very poor prognosis. One son did acknowledge that patient is in a great deal of pain and is suffering. All siblings acknowledge that patient is very ill and there is nothing we can do to \"cure him\". They seem to have difficulty withdrawing care, shared that their dad \"is a fighter\" and would want to live. They are discussing how to proceed including hospice and DNR status. Will notify NP when they have reached a decision. · Family would like to pursue LTC and have patient remain FULL CODE, they would like to see if a different LTC facility could manage his care better. They will pursue hospice \"as a last resort\".    · Very grim prognosis in the short term, expect patient to be frequent readmission despite best efforts of treatment. He continues to decline. 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 Normal cavity size and systolic function. Mild concentric hypertrophy. · Continue metoprolol and ASA   · Statin held with SOULEYMANE/Elevated LFTs     History of Seizure with Recent admission for Status Epilepticus  · History of 5 minute seizure at [facility] in November 2020  · No current seizure like activity  · Continue IV keppra  change to PO for DC      History of Gout Hold allopurinol     Pt admitted with sepsis noted to have + BC 1/2 bottles with Proteus Mirabilis   Treated x 7 days with Cefepime and Vanc initially then just Cefepime . WBC 15.1 on admission now at 10.5 . LFT elevated on admission now seems to be trending down , statin still on hold He is afebrile , remains non verbal . required wound care to several sites . He continued on Tube feedings for nutrition supplement . He is afebrile .       _______________________________________________________________________  Patient seen and examined by me on discharge day. Pertinent Findings:  Gen:    Not in distress  Non verbal   Chest: Clear lungs  CVS:   S1S2. No edema  Abd:  Soft, not distended, not tender , Peg in place   Neuro:  Alert, and Oriented to self   _______________________________________________________________________  DISCHARGE MEDICATIONS:   Current Discharge Medication List      START taking these medications    Details   thiamine mononitrate (B-1) 100 mg tablet Take 2 Tabs by mouth daily for 30 days.  Indications: deficiency in thiamine or vitamin B1  Qty: 60 Tab, Refills: 0 CONTINUE these medications which have CHANGED    Details   metoprolol tartrate (LOPRESSOR) 25 mg tablet 0.5 Tabs by Per NG tube route two (2) times a day for 30 days. Indications: high blood pressure  Qty: 30 Tab, Refills: 0         CONTINUE these medications which have NOT CHANGED    Details   levETIRAcetam (KEPPRA) 750 mg tablet Take 1 Tab by mouth every twelve (12) hours. Qty: 60 Tab, Refills: 0      aspirin delayed-release 81 mg tablet Take 1 Tab by mouth daily. Qty: 90 Tab, Refills: 3    Associated Diagnoses: Cerebrovascular accident (CVA), unspecified mechanism ([facility]      polyethylene glycol (MIRALAX) 17 gram/dose powder Take 17 g by mouth daily as needed for Constipation. Qty: 235 g, Refills: 1    Associated Diagnoses: Constipation, unspecified constipation type      balsam peru-castor oiL (VENELEX) ointment Apply  to affected area three (3) times daily. Qty: 1 Tube, Refills: 0      famotidine (PEPCID) 20 mg tablet Take 1 Tab by mouth every evening. Qty: 30 Tab, Refills: 0      acetaminophen (TYLENOL) 325 mg tablet Take 650 mg by mouth every six (6) hours as needed for Pain. STOP taking these medications       hydrALAZINE (APRESOLINE) 100 mg tablet Comments:   Reason for Stopping:         amLODIPine (NORVASC) 10 mg tablet Comments:   Reason for Stopping:         allopurinoL (ZYLOPRIM) 100 mg tablet Comments:   Reason for Stopping:         atorvastatin (LIPITOR) 40 mg tablet Comments:   Reason for Stopping:                 Patient Follow Up Instructions: Activity: Activity as tolerated  Diet: PEG feeding at 35 ml/hr  Wound Care: Multiple heel, ankle and LE PI's: paint with betadine/povidine and cover with a dry dressing. Right hip PI: daily cleanse with wound cleanser spray and cover with a foam dressing. Avoid right hip laying - supine and left side laying only. Sacrum and right flank PI's: daily clense with wound cleanser and 4x4.  Apply small amount of Silvsorbe wound gel to wound base with gloved finger and cover with a piece of Opticell AG and then a foam dressing.       Follow-up with PCP in 2 week. Follow-up tests/labs     Follow-up Information     Follow up With Specialties Details Why Contact Yaakov POLANCO AND [address] Go to nursing home placement  [address] Renetta Fam MD Family Medicine Schedule an appointment as soon as possible for a visit  [address] [phone]          ________________________________________________________________    Risk of deterioration: Moderate    Condition at Discharge:  Stable  __________________________________________________________________    Disposition  SNF/LTC    ____________________________________________________________________    Code Status: Full Code  ___________________________________________________________________      Total time in minutes spent coordinating this discharge (includes going over instructions, follow-up, prescriptions, and preparing report for sign off to her PCP) :  40 minutes    Signed:  Clarissa Cordova NP

## 2021-01-12 NOTE — PROGRESS NOTES
Pt unable to sign d/c, however contacted [facility] and confirmed D/c with her and orders.   Agnes refused flu shot for [place], report given to Graham Arcos

## 2021-01-12 NOTE — INTERDISCIPLINARY ROUNDS
Oncology Interdisciplinary rounds were held today to discuss patient plan of care and outcomes. The following members were present: Nursing, Physician, Case Management, Pharmacy, and PT/OT Actual Length of Stay: 6 DRG GLOS: 4.8 Expected Length of Stay: 4d 19h Plan            [facility] patient. Date: 1/12/2021 Transport [facility] @ 1300.

## 2021-01-12 NOTE — PROGRESS NOTES
Problem: Falls - Risk of  Goal: *Absence of Falls  Description: Document Rukhsana Conley Fall Risk and appropriate interventions in the flowsheet. Outcome: Progressing Towards Goal  Note: Fall Risk Interventions:       Mentation Interventions: Adequate sleep, hydration, pain control, Bed/chair exit alarm, Door open when patient unattended, More frequent rounding, Room close to nurse's station    Medication Interventions: Bed/chair exit alarm, Patient to call before getting OOB    Elimination Interventions: Bed/chair exit alarm, Call light in reach, Toileting schedule/hourly rounds              Problem: Patient Education: Go to Patient Education Activity  Goal: Patient/Family Education  Outcome: Progressing Towards Goal     Problem: Pressure Injury - Risk of  Goal: *Prevention of pressure injury  Description: Document Torito Scale and appropriate interventions in the flowsheet. Outcome: Progressing Towards Goal  Note: Pressure Injury Interventions:  Sensory Interventions: Assess changes in LOC, Avoid rigorous massage over bony prominences, Keep linens dry and wrinkle-free, Maintain/enhance activity level, Minimize linen layers, Turn and reposition approx. every two hours (pillows and wedges if needed)    Moisture Interventions: Absorbent underpads, Assess need for specialty bed    Activity Interventions: Assess need for specialty bed, Increase time out of bed    Mobility Interventions: Assess need for specialty bed, Float heels, HOB 30 degrees or less, Turn and reposition approx.  every two hours(pillow and wedges)    Nutrition Interventions: Document food/fluid/supplement intake    Friction and Shear Interventions: Apply protective barrier, creams and emollients, HOB 30 degrees or less, Transferring/repositioning devices

## 2021-01-12 NOTE — DISCHARGE INSTRUCTIONS
Patient Education        Altered Mental Status: Care Instructions  Your Care Instructions     Altered mental status is a change in how well your brain is working. As a result, you may be confused, be less alert than usual, or act in odd ways. This may include seeing or hearing things that aren't really there (hallucinations). A mental status change has many possible causes. For example, it may be the result of an infection, an imbalance of chemicals in the body, or a chronic disease such as diabetes or COPD. It can also be caused by things such as a head injury, taking certain medicines, or using alcohol or drugs. The doctor may do tests to look for the cause. These tests may include urine tests, blood tests, and imaging tests such as a CT scan. Sometimes a clear cause isn't found. But tests can help the doctor rule out a serious cause of your symptoms. A change in mental status can be scary. But mental status will often return to normal when the cause is treated. So it is important to get any follow-up testing or treatment the doctor has suggested. The doctor has checked you carefully, but problems can develop later. If you notice any problems or new symptoms, get medical treatment right away. Follow-up care is a key part of your treatment and safety. Be sure to make and go to all appointments, and call your doctor if you are having problems. It's also a good idea to know your test results and keep a list of the medicines you take. How can you care for yourself at home? · Be safe with medicines. Take your medicines exactly as prescribed. Call your doctor if you think you are having a problem with your medicine. · Have another adult stay with you until you are better. This can help keep you safe. Ask that person to watch for signs that your mental status is getting worse. When should you call for help? Call 911 anytime you think you may need emergency care.  For example, call if:    · You passed out (lost consciousness). Call your doctor now or seek immediate medical care if:    · Your mental status is getting worse.     · You have new symptoms, such as a fever, chills, or shortness of breath.     · You do not feel safe. Watch closely for changes in your health, and be sure to contact your doctor if:    · You do not get better as expected. Where can you learn more? Go to http://www.arcos.com/  Enter J452 in the search box to learn more about \"Altered Mental Status: Care Instructions. \"  Current as of: November 20, 2019               Content Version: 12.6  © 4450-9962 [employer]. Care instructions adapted under license by [employer] (which disclaims liability or warranty for this information). If you have questions about a medical condition or this instruction, always ask your healthcare professional. [address] any warranty or liability for your use of this information.

## 2021-01-12 NOTE — PROGRESS NOTES
ARIELLE Plan:  TBD- seeking SNF/LTC Placement   >[facility] pt for placement today  2nd IMM letter  [facility] medical transport at d/   covid test collected on 1/12/2021, results are pending (must be within 24 hrs of admission there)    Update 3:07 PM  CM faxed discharge summary to [address] [facility] and [address] [facility]. ([phone]). [facility] ETA is now 1600. Updated assigned RN and daughter at bedside. Update 2:34 PM  Daughter at bedside. [address] letter signed. CM reviewed 2nd IMM letter with pt's daughter. She verbalized understanding and signed document. She was provided with copy of 2nd IMM letter to keep. Awaiting [facility] transportation to arrive on scene. Will continue to follow. Update 1:36 PM  DMAS 95/UAI completed by CM . Covid test has resulted and results were negative. CM has faxed chest x-ray, covid test result, and copy of UAI/DMAS 95 to [facility] ([phone]). Confirmed that information was received. Awaiting d/c summary to be completed and CM will fax that to facility as well. CM has arranged for medical transport at d/ today with ETA of 1425 via [facility]. CM has completed PCS form to include copy of H&P and facesheet which was given to nurse. Pt going to room #435 to the [facility]. Nurse to call report to [phone]. CM will complete SNF transition note to follow. CM has contacted pt's daughter, Felice Kay, who has been updated of d/c plan by phone. She plans to come by the hospital prior to d/c to obtain copy of d/c summary. CM to review 2nd IMM letter with her at that time. Pt is ready for d/c from a CM standpoint. Assigned RN informed. Initial note: CM received update from [address]  and [address] [facility] ([facility]; [phone]) that they can accept pt for placement today. Pt's [facility]. CM to send a copy of covid results, chest x-ray, and discharge summary upon completion.  [facility] [address] [facility] copy DMAS 95/UAI prior to pt's admission there. CM  is assisting this writer with completion of DMAS 95/UAI today. CM has contacted pt's daughter, Rachel Peña, to provide the above update. She verbalized understanding and is agreeable to d/c plan. Rachel Peña is requesting a copy of the d/c summary and wants to come by the hospital prior to transfer to SNF/LTC facility to obtain copy. CM to contact Agnes once medical transport ETA is confirmed to allow her time to come by the hospital prior to that. CM will continue to follow. Current barrier to d/c: need covid test result, DMAS 95/UAI completed today (in process)     Care Management Interventions  PCP Verified by CM:  Yes  Palliative Care Criteria Met (RRAT>21 & CHF Dx)?: No  Palliative Consult Recommended?: Yes  Mode of Transport at Discharge: [facility]  Hospital Transport Time of Discharge: [address] (CM Consult): SNF  Partner SNF: No  Reason Why Partner SNF Not Chosen: Friend/family recommendation  Discharge Durable Medical Equipment: No  Physical Therapy Consult: No  Occupational Therapy Consult: No  Speech Therapy Consult: No  Current Support Network: Assisted Living  Confirm Follow Up Transport: Family  The Plan for Transition of Care is Related to the Following Treatment Goals : SNF/LTC   The Patient and/or Patient Representative was Provided with a Choice of Provider and Agrees with the Discharge Plan?: Yes  Name of the Patient Representative Who was Provided with a Choice of Provider and Agrees with the Discharge Plan: Rachel Peña (daughter)   Freedom of Choice List was Provided with Basic Dialogue that Supports the Patient's Individualized Plan of Care/Goals, Treatment Preferences and Shares the Quality Data Associated with the Providers?: Yes  The [employer] Information Provided?: No  Discharge Location  Discharge Placement: Skilled nursing facility([facility] )      MICHELET Zhang   Care Manager, ED [facility]  [phone]

## 2021-01-12 NOTE — PROGRESS NOTES
Transition of Care Plan to SNF/Rehab    Communication to Patient/Family:  Met with patient and family and they are agreeable to the transition plan. The Plan for Transition of Care is related to the following treatment goals: SNF/LTC placement     The Patient and/or patient representative was provided with a choice of provider and agrees  with the discharge plan. Yes [x] No []    A Freedom of choice list was provided with basic dialogue that supports the patient's individualized plan of care/goals and shares the quality data associated with the providers. Yes [x] No []    SNF/Rehab Transition:  Patient has been accepted to [facility] and meets criteria for admission. Patient will transported by [facility] and expected to leave at 1425. Communication to SNF/Rehab:  Bedside RN, Michelle Gutierrez, has been notified to update the transition plan to the facility and call report ([phone]). Discharge information has been updated on the AVS. And communicated to facility via [employer]. Discharge instructions to be fax'd to facility at [phone]     Nursing Please include all hard scripts for controlled substances, med rec and dc summary, and AVS in packet. Reviewed and confirmed with Luis Enrique Adair in admissions, Denisa and Rosalie can manage the patient care needs for the following:     Maggie English with (X) only those applicable:  Medication:  [x]Medications are available at the facility  []IV Antibiotics    []Controlled Substance  hard copies available sent.   []Weekly Labs    Equipment:  []CPAP/BiPAP  []Wound Vacuum  []Avalos or Urinary Device  []PICC/Central Line  []Nebulizer  []Ventilator    Treatment:  []Isolation (for MRSA, VRE, etc.)  []Surgical Drain Management  []Tracheostomy Care  [x]Dressing Changes  []Dialysis with transportation  [x]PEG Care  []Oxygen  []Daily Weights for Heart Failure    Dietary:  []Any diet limitations  [x]Tube Feedings   []Total Parenteral Management (TPN)    Financial Resources:  []Medicaid Application Completed    []UAI Completed and copy given to pt/family  and copy given to pt/family  []A screening has previously been completed. []Level II Completed    [x] Private pay individual who will not become   financially eligible for Medicaid within 6 months from admission to a [address] [place] [address] facility. [] Individual refused to have screening conducted. []Medicaid Application Completed    []The screening denied because it was determined individual did not need/did not qualify for nursing facility level of care. [] Out of state residents seeking direct admission to a [address] facility. [] Individuals who are inpatients of an out of state hospital, or in state or out of state veterans/ hospital and seek direct admission to a [address] facility  [] Individuals who are pateints or residents of a state owned/operated facility that is licensed by [employer] of [employer] ([employer]) and seek direct admission to [address] [place] [address]  [] A screening not required for enrollment in [address] [address] [address] services as set out in [address] [place] [address] 30-  [] [facility]) staff shall perform screenings of the [facility] clients. Advanced Care Plan:  []Surrogate Decision Maker of Care  [x]POA  [x]Communicated Code Status and copy sent.  FULL code    Other:         MICHELET Ng   Care Manager, [address] [facility]  [phone]

## 2021-01-12 NOTE — PROGRESS NOTES
Pharmacist Discharge Medication Reconciliation    Significant PMH:   Past Medical History:   Diagnosis Date    Cancer [facility])     prostate    Chronic systolic heart failure ([facility] 12/7/2020    Constipation     Gout     Hyperlipemia     Hypertension     Pacemaker 2019    Stroke ([facility]     Thrombocytopenia ([facility] 3/19/2019    TIA (transient ischemic attack) 2013     Chief Complaint for this Admission:   Chief Complaint   Patient presents with    Altered mental status     Allergies: Patient has no known allergies. Discharge Medications:   Current Discharge Medication List        START taking these medications    Details   thiamine mononitrate (B-1) 100 mg tablet Take 2 Tabs by mouth daily for 30 days. Indications: deficiency in thiamine or vitamin B1  Qty: 60 Tab, Refills: 0           CONTINUE these medications which have CHANGED    Details   metoprolol tartrate (LOPRESSOR) 25 mg tablet 0.5 Tabs by Per NG tube route two (2) times a day for 30 days. Indications: high blood pressure  Qty: 30 Tab, Refills: 0           CONTINUE these medications which have NOT CHANGED    Details   levETIRAcetam (KEPPRA) 750 mg tablet Take 1 Tab by mouth every twelve (12) hours. Qty: 60 Tab, Refills: 0      aspirin delayed-release 81 mg tablet Take 1 Tab by mouth daily. Qty: 90 Tab, Refills: 3    Associated Diagnoses: Cerebrovascular accident (CVA), unspecified mechanism ([facility]      atorvastatin (LIPITOR) 40 mg tablet Take 1 Tab by mouth nightly. Qty: 90 Tab, Refills: 3    Associated Diagnoses: Cerebrovascular accident (CVA), unspecified mechanism ([facility] Essential hypertension      polyethylene glycol (MIRALAX) 17 gram/dose powder Take 17 g by mouth daily as needed for Constipation. Qty: 235 g, Refills: 1    Associated Diagnoses: Constipation, unspecified constipation type      balsam peru-castor oiL (VENELEX) ointment Apply  to affected area three (3) times daily.   Qty: 1 Tube, Refills: 0      famotidine (PEPCID) 20 mg tablet Take 1 Tab by mouth every evening. Qty: 30 Tab, Refills: 0      acetaminophen (TYLENOL) 325 mg tablet Take 650 mg by mouth every six (6) hours as needed for Pain. STOP taking these medications       hydrALAZINE (APRESOLINE) 100 mg tablet Comments:   Reason for Stopping:         amLODIPine (NORVASC) 10 mg tablet Comments:   Reason for Stopping:         allopurinoL (ZYLOPRIM) 100 mg tablet Comments:   Reason for Stopping:               The patient's chart, MAR and AVS were reviewed by Fer Keenan RPH. Discharging Provider: Geannie Cogan  (Remove the following comments before filing to note)  Recommendations/Clarifications:   Statins on hold due to Transaminitis ; assume same with allopurinol   Aj Rodrigesarian in 1115   You have just placed a discharge summary on this patient. His statins were on hold due to transaminitis. AST/ALT still high. You have ordered it on discharge. Please clarify on AVS if needed. Thanks, Arlyn [facility]    Other Interventions (patient counseling, changes made to AVS, etc):      Additional Comments:   Abx completed       Time spent: 20 min    Thank You,     MISSY [facility]

## 2021-01-12 NOTE — PROGRESS NOTES
End of Shift Note    Bedside shift change report given to [facility] (oncoming nurse) by Jeanna De La Garza (offgoing nurse). Report included the following information SBAR, Kardex, Intake/Output, MAR, Accordion and Recent Results    Shift worked:  night     Shift summary and any significant changes:    PT remained stable. MEds given, labs drawn, pt turned and repositioned, wound care completed. Concerns for physician to address:    Zone phone for oncoming shift:       Activity:  Activity Level: Bed Rest  Number times ambulated in hallways past shift: 0  Number of times OOB to chair past shift: 0    Cardiac:   Cardiac Monitoring: No      Cardiac Rhythm: Sinus tachycardia, Premature ventricular contraction    Access:   Current line(s): PIV     Genitourinary:   Urinary status: incontinent and straight cath    Respiratory:   O2 Device: Room air  Chronic home O2 use?: NO  Incentive spirometer at bedside: NO     GI:     Current diet:  DIET NPO  DIET TUBE FEEDING  Passing flatus: YES  Tolerating current diet: YES       Pain Management:   Patient states pain is manageable on current regimen: N/A    Skin:  Torito Score: 13  Interventions: turn team and speciality bed    Patient Safety:  Fall Score:  Total Score: 3  Interventions: bed/chair alarm  High Fall Risk: Yes    Length of Stay:  Expected LOS: 4d 19h  Actual LOS: [address]

## 2021-01-12 NOTE — PROGRESS NOTES
End of Shift Note    Bedside shift change report given to Kasey Wilson (oncoming nurse) by Payton Tina RN (offgoing nurse). Report included the following information SBAR, Kardex and MAR    Shift worked:  7a-7p     Shift summary and any significant changes:     Pt stable through shift, meds given according to mar, prn pain medicine given to pt b/c he complained of pain but couldn't rate it, pt turned, incontinence care,    Concerns for physician to address:       Zone phone for oncoming shift:          Activity:  Activity Level: Bed Rest  Number times ambulated in hallways past shift: 0  Number of times OOB to chair past shift: 0    Cardiac:   Cardiac Monitoring: No      Cardiac Rhythm: Sinus tachycardia, Premature ventricular contraction    Access:   Current line(s): PIV     Genitourinary:   Urinary status: incontinent    Respiratory:   O2 Device: Room air  Chronic home O2 use?: NO  Incentive spirometer at bedside: NO     GI:     Current diet:  DIET NPO  DIET TUBE FEEDING  Passing flatus: YES  Tolerating current diet: YES       Pain Management:   Patient states pain is manageable on current regimen: YES    Skin:  Torito Score: 12  Interventions: turn team, speciality bed, increase time out of bed, foam dressing and internal/external urinary devices    Patient Safety:  Fall Score:  Total Score: 3  Interventions: bed/chair alarm, gripper socks and pt to call before getting OOB  High Fall Risk: Yes    Length of Stay:  Expected LOS: 4d 19h  Actual LOS: Sandra Sheppard RN

## 2021-01-12 NOTE — PROGRESS NOTES
End of Shift Note Bedside shift change report given to [facility] (oncoming nurse) by Betina Ramirez RN (offgoing nurse). Report included the following information SBAR, Kardex and [facility] Shift worked:  7a-7p Shift summary and any significant changes:  
  Pt has been stable through shift, meds given according to [facility], pt was supposed to be d/c at 2, then 4 waiting for [facility], removed epps, condom cath, did d/c paperwork and d/c education, Concerns for physician to address:   
  
Zone phone for oncoming shift:

## 2021-01-13 ENCOUNTER — PATIENT OUTREACH (OUTPATIENT)
Dept: CASE MANAGEMENT | Age: 86
End: 2021-01-13

## 2021-02-10 ENCOUNTER — PATIENT OUTREACH (OUTPATIENT)
Dept: CASE MANAGEMENT | Age: 86
End: 2021-02-10

## 2021-03-09 NOTE — PROGRESS NOTES
1630 Patient discharged to [facility] discharge lot with PCT and Son. [facility] AMBULATORY ENCOUNTER  ENT OUTPATIENT CONSULTATION    SUBJECTIVE:    Sarah Cazares is a 59 year old female who presents on request of Dr. Freda Cunha for further evaluation and management of neck mass.  Patient has noticed a mass of the right neck near the angle of mandible for the past 2 months or so.  There has not been any pain or discharge from the area.  There has not been any growth in the area, but the area has been getting harder and harder.  Patient denies exacerbating or relieving factors, denies other associated symptoms.  Patient is concerned that the mass might represent a cancer.  Separately, patient has been having worsening tinnitus in the left ear for the past 6 months.  It is high pitch and nonpulsatile.  Patient is experiencing some hyperacousis.  Patient denies any pain or discharge from the left ear.  She does have a history of ear infections in the right ear, and had a right mastoidectomy done as a child.    PAST HISTORIES:  I have reviewed the past medical history, family history, social history, medications and allergies listed in the medical record as obtained by my nursing staff and support staff and agree with their documentation.     REVIEW OF SYSTEMS:     Comprehensive ROS reviewed with patient.  Pertinent positives are listed in HPI.    PHYSICAL EXAM:    Vital Signs:   temperature is 96.7 °F (35.9 °C). Her blood pressure is 128/86 and her pulse is 84. Her oxygen saturation is 97%.   Constitutional:  Patient is a well-nourished, well-developed 59 year old female in no distress with fluent speech, strong voice, no stridor, and unlabored respirations.  Affect is calm and patient is alert.   Neurologic/Psych:  Alert, appropriate mood and affect.  Respiratory:  No accessory muscle use.  Face/Head:  Normocephalic.  No masses.  Face is strong and symmetric.  Light reflexes are symmetric.  Pupils equal, round.  Extraocular motions intact.  Irides normal.  Other cranial nerves  III-XII grossly intact.      EARS:    Left auricle:  Normally-formed.  No lesions.  Left external canal:  No edema, erythema or exudate.  Left tympanic membrane:  Tympanic membrane translucent, mobile, without retraction or effusion.  Normal landmarks present.   Right auricle:  Normally-formed.  No lesions.  Right external canal:  Patient has had a canaloplasty.  There is also a canal wall down mastoid bowl.  It is quiescent, with no significant cerumen or squamous buildup.  Right tympanic membrane:  Tympanic membrane translucent, mobile, without retraction or effusion.  Normal landmarks present.     NOSE:  External:  No lesion.  Septum:  No gross deformity.  Turbinates:  Inferior turbinates are normal bilaterally.  Mucosa:  No lesions visible.  No unusual discharge.     ORAL CAVITY:     Lips:  Mobile.  No lesions.  Teeth:  Dentition in good repair.  Gingivae:  Without lesions.  Tongue:  Anterior tongue mobile.  Symmetric.  Without lesions.   Floor of mouth:  Normal structures, no mucosal lesions.  Hard palate:  Symmetric without lesions.  Mucosa:  Moist, pink.  Without lesions or exudate.    OROPHARYNX:  Tongue base:  Symmetric.  Mobile.  Without lesion.  Tonsils:  No masses or lesions seen.  Soft palate:  Mobile.  Symmetric.  No lesion.  Normally-formed uvula.  Mucosa:  No exudate.  Moist.     NECK:  External:  There is an oblong skin mass, measuring about 2.2 cm in greatest dimension.  There is a central punctum at the center of the mass.  No ulceration.  Palpation:  Neck supple.  There is no crepitus.  Trachea midline.  No palpable thyroid masses or gross enlargement.  Major salivary glands normal to palpation. The right neck skin mass is mobile over the underlying tissues.  It appears to be attached to the skin, particularly at the central punctum.    Audiology:  Testing was done today.  This shows an inverse U shaped sensorineural hearing loss on the left side with worst thresholds at low and high frequencies  at about 60 dB, and best thresholds at 1000 hertz at 20 dB.  On the right side, there is an inverse U shaped mixed hearing loss with worst thresholds at about 95 dB at low and high frequencies, and best responses at 2000 hertz at 50 dB.  Bone conduction lines more less match bilaterally.  Speech reception threshholds 55 dB on the right, 25 dB on the left.  Discrimination 100% on the right, 92% on the left.        ASSESSMENT:    1. Neck mass    2. Tinnitus of left ear    3. Mixed conductive and sensorineural hearing loss of right ear with restricted hearing of left ear    Patient has a right neck mass near the angle of mandible, that is most consistent with sebaceous cyst on exam.  Tinnitus, related to an inverse U shaped sensorineural hearing loss in the left ear.  She also has an inverse U shaped mixed hearing loss in the right ear, with the right ear having had a history of mastoidectomy.  The mastoid bowl appears to be self cleaning.  Mixed hearing loss of the right ear, with a sensorineural hearing loss of the left ear.  There is enough hearing loss on the right side, that amplification would be indicated for the right, and the patient would be a borderline candidate for hearing amplification on the left side.  However, hearing aid for the left ear would help mask the tinnitus which is the patient's main complaint.    PLAN:    Hearing aid evaluation.  To outpatient department for excision of the right neck skin mass under local anesthesia.  I described the risks, benefits, and alternatives of the procedure with the patient.  Risks include but are not limited to bleeding, infection, failure to resolve symptoms, anesthetic complications, recurrence.  Patient appears to understand, and wishes to go ahead with the procedure.    Instructions provided as documented in the After Visit Summary.    The patient indicates understanding of the diagnosis and agrees with the plan of care.

## 2021-03-22 NOTE — TELEPHONE ENCOUNTER
----- Message from SILVIO Gomez CNP sent at 3/21/2021 12:51 PM EDT -----  Please notify patient that her culture showed no yeast and likely bacterial vaginosis. Have her symptoms resolved? Returned call with no response. Left voicemail for callback. MD need update from [facility] evaluation of pain before providing medication for agitation.  Please request records from [facility]

## 2021-04-30 NOTE — PROGRESS NOTES
Daily Note     Today's date: 2021  Patient name: Nelson Nieves  : 1942  MRN: [id number]  Referring provider: Aries Alvarado MD  Dx:   Encounter Diagnosis     ICD-10-CM    1  S/P TKR (total knee replacement), right  [id number]                   Subjective: Mario reports his knee feels like it is getting stronger but he still needs the cane      Objective: See treatment diary below      Assessment: Tolerated treatment well  Patient exhibited good technique with therapeutic exercises      Plan: Progress treatment as tolerated         Precautions: lumbar fusion      Manuals 3/24 3/26 3/29 4/2 4/5 4/9 4/16 4/19 4/23 4/30   R patellar mobs JE JONE BECERRIL JE   R knee PROM JE JONE BECERRIL JE JE JE JE JE JE                             Neuro Re-Ed                                                                                                        Ther Ex             Quad sets 5" x20 5"x30 5"x30 5"x30 x30 x30 x30 x30 x30 30   SLR 2x10 2x8 w/ assist 2x10 3x10 3x10 3x10 3x10 3x10 3x10 3x10   Ankle pumps 30 HEP           Supine hip abd  20 HEP           Heel slides 20 30 30 30 30 30 30 30 30 30   gastroc stretch 30"x3 30"x3 30"x3 30"x3 30"x3 30"x3 30"x3 30"x3 30"x3 30"x3   HS stretch 30"x2            SAQ 2x10 2x10 3x10 3x10 3x10 3x10 3x10      LAQ 2x10 2x10 w/ assist 3x10 3x10 3x10 3x10 3x10 3x10 3x10 3x10   Heel raises  2x10 3x10 3x10 3x10 3x10 3x10 3x10 3x10 3x10   Stand hip abd  2x10 3x10 3x10 3x10        Stand hip ext  10 20 3x10 3x10 3x10 3x10 3x10 3x10 3x10   Stand HS curl  2x10 3x10 3x10 3x10 3x10 3x10 3x10 3x10 3x10   Side stepping   20'x6 20'x8 20'x10 20'x10 20'x10 20'x10 3x10 3x10   bike   3' 5' 7' 8'       S/L hip abd      3x10 3x10 3x10 3x10 3x10   Step ups     L1 x20 L2 15 L2 15 L2 15 L2 15 20   Lat step ups      L2 15 L2 x15 L2 15 L2 15 20   Ther Activity                                       Gait Training             treadmill       5' 5' 5' 5'                Modalities             ice  10' Problem: Falls - Risk of  Goal: *Absence of Falls  Description: Document Jennifer Huntley Fall Risk and appropriate interventions in the flowsheet. Outcome: Progressing Towards Goal  Note: Fall Risk Interventions:  Mobility Interventions: Communicate number of staff needed for ambulation/transfer    Mentation Interventions: Bed/chair exit alarm, Door open when patient unattended    Medication Interventions: Bed/chair exit alarm    Elimination Interventions: Bed/chair exit alarm    History of Falls Interventions: Consult care management for discharge planning, Bed/chair exit alarm, Door open when patient unattended, Evaluate medications/consider consulting pharmacy, Investigate reason for fall, Room close to nurse's station         Problem: Pressure Injury - Risk of  Goal: *Prevention of pressure injury  Description: Document Torito Scale and appropriate interventions in the flowsheet.   Outcome: Progressing Towards Goal  Note: Pressure Injury Interventions:  Sensory Interventions: Assess changes in LOC    Moisture Interventions: Absorbent underpads    Activity Interventions: Increase time out of bed    Mobility Interventions: Float heels, HOB 30 degrees or less    Nutrition Interventions: Document food/fluid/supplement intake    Friction and Shear Interventions: HOB 30 degrees or less                Problem: Hypertension  Goal: *Blood pressure within specified parameters  Outcome: Progressing Towards Goal

## 2021-07-08 NOTE — DISCHARGE SUMMARY
Hospitalist Discharge Summary     Patient ID:  Yana Rouse  [id number]  20 y.o.  3/9/1933  11/17/2020    PCP on record: Yovany Gallego MD    Admit date: 11/17/2020  Discharge date and time: 12/9/2020    DISCHARGE DIAGNOSIS:    New Onset Seizures   Acute Encephalopathy in Setting of Worsening Dementia   History of Multiple CVAs   Debility   Dysphagia d/t Acute Metabolic Encephalopathy and Dementia   S/p PEG tube placement 17/0  Chronic Systolic HF  Sinus Bradycardia with 2:1 block  S/p Pacemaker   UTI  Acute Kidney Injury on CKD  Hypokalemia   Gout      CONSULTATIONS:  IP CONSULT TO NEUROLOGY  IP CONSULT TO PALLIATIVE CARE - PROVIDER  IP CONSULT TO GASTROENTEROLOGY  IP CONSULT TO CARDIOLOGY    Excerpted HPI from H&P of Richie Cruz MD:  Sharona Thurman is a 80 y.o. -American gentleman with a past medical history of stroke, status post admission for bilateral acute occipital infarcts in July at [facility], heart failure (EF 40-45%), hypertension, CKD 3, 2: 1 heart block status post PPM (able to have MRI at [facility] with [employer] rep in July) who is being admitted for new onset seizures     Patient was sitting in his nursing home when he had an episode of unresponsiveness with shaking. Subsequently he seemed very tired and slept. EMS was called and brought him to [facility] for evaluation of possible seizure. While in the ER at [facility] patient had another episode, and was given Ativan which caused it to stop. He was provided a loading dose of Keppra and neurology was contacted. Neurology agrees with continuing [address] and admitting for further evaluation. Patient has remained postictal and is unable to answer review of systems or any other questions. Patient daughter is present at bedside.     We were asked to admit for work up and evaluation of the above problems.      ______________________________________________________________________  Clarita Branch COURSE:  for full details see H&P, daily progress notes, labs, consult notes. New Onset Seizures   Acute Encephalopathy in Setting of Worsening Dementia   History of Multiple CVAs (recent bilateral occiptal infarcts 7/2020)  · Patient had 5 minute seizure at LTC, seized in ED and was given ativan   · S/p Keppra loading dose, Continue Keppra at decreased dose to 750mg BID with sedation   · EEG on adm - showed no seizure, repeat EEG showed potential seizure   · MRI unable to be completed due to confusion and pacemaker - unable to verbalize if issues with pacer arise during MRI   · Repeat CT brain remains unchanged  · Appreciate Neurology input - follow up OP   Debility   Dysphagia d/t Acute Metabolic Encephalopathy and Dementia   S/p PEG tube placement 12/1  · Appreciate GI and Nutrition input   · Patient will require internal tube feeding for greater than 90 days. · Tolerating TF goal of 50 mL/hr of Jevity 1.5 with 150 mL water flushes q 4 hours   · COVID screening NEGATIVE  · All medications should be given through PEG tube   Upper Extremity Edema, likely r/t fluid overload - resolving  Chronic Systolic HF - not in exacerbation   Sinus Bradycardia with 2:1 block, POA   S/p Pacemaker   · Unable to complete MRI with pacer here r/t confusion  · ECHO 12/7 - EFT 55-60%. Normal cavity size and systolic function  · Continue amlodipine, ASA, atovastatin, lasix hydralazine, metoprolol  · Appreciate Cardiology input   · Upper Extremity edema resolved   UTI on 12/2   · Urine Culture PROTEUS MIRABILIS   · Completed course of ceftriaxone while inpatient   Acute Kidney Injury on CKD - resolved   Hypokalemia - resolved   Gout Allopurinol held on adm with SOULEYMANE     Patient seen at bedside, he is confused at baseline. Spoke with daughter Dalila Maradiaga, updated on discharge plan.  Answered all questions at this time, verbalized understanding.     _______________________________________________________________________  Patient seen and examined by me on discharge day. Pertinent Findings:  Gen:    Not in distress, confused AAM lying in bed  Chest: Clear lungs  CVS:   Regular rhythm. No edema  Abd:  Soft, not distended, not tender  Neuro:  Alert and confused, alert to self only    _______________________________________________________________________  DISCHARGE MEDICATIONS:   Current Discharge Medication List      START taking these medications    Details   levETIRAcetam (KEPPRA) 750 mg tablet Take 1 Tab by mouth every twelve (12) hours. Qty: 60 Tab, Refills: 0      metoprolol tartrate (LOPRESSOR) 25 mg tablet 0.5 Tabs by Per NG tube route two (2) times a day. Qty: 30 Tab, Refills: 0         CONTINUE these medications which have CHANGED    Details   amLODIPine (NORVASC) 10 mg tablet Take 1 Tab by mouth daily. Qty: 30 Tab, Refills: 0      hydrALAZINE (APRESOLINE) 100 mg tablet Take 1 Tab by mouth three (3) times daily. Qty: 30 Tab, Refills: 0         CONTINUE these medications which have NOT CHANGED    Details   allopurinoL (ZYLOPRIM) 100 mg tablet Take 1 Tab by mouth daily. Qty: 90 Tab, Refills: 1    Associated Diagnoses: Gout, unspecified cause, unspecified chronicity, unspecified site      aspirin delayed-release 81 mg tablet Take 1 Tab by mouth daily. Qty: 90 Tab, Refills: 3    Associated Diagnoses: Cerebrovascular accident (CVA), unspecified mechanism ([address]      balsam peru-castor oiL (VENELEX) ointment Apply  to affected area three (3) times daily. Qty: 1 Tube, Refills: 0      atorvastatin (LIPITOR) 40 mg tablet Take 1 Tab by mouth nightly. Qty: 90 Tab, Refills: 3    Associated Diagnoses: Cerebrovascular accident (CVA), unspecified mechanism ([address] Essential hypertension      polyethylene glycol (MIRALAX) 17 gram/dose powder Take 17 g by mouth daily as needed for Constipation.   Qty: 235 g, Refills: 1    Associated Diagnoses: Constipation, unspecified constipation type      acetaminophen (TYLENOL) 325 mg tablet Take 650 mg by mouth every six (6) hours as needed for Pain. STOP taking these medications       metoprolol succinate (TOPROL-XL) 25 mg XL tablet Comments:   Reason for Stopping:         isoniazid (NYDRAZID) 300 mg tablet Comments:   Reason for Stopping:                 Patient Follow Up Instructions: Activity: Bedrest  Diet: tube feeding (Jevity 1.5)  Wound Care: None needed    Follow-up Information     Follow up With Specialties Details Why Contact Info    Carlos Schmidt MD Family Medicine On 12/14/2020 For hospital follow up appointment at 3:00PM  [address] [phone]      [address], Infusion Therapy  This is your home health agency. If you don't hear from the office within 24-48 hours, please contact them directly. [address] Services  This is the provider of your transport chair. Call the office directly if you have any questions or concerns.  [address] [place] [address]  [phone]    Carlos Schmidt MD Family Medicine In 1 week  [address] [phone]      Earle Oakes DO Neurology In 2 weeks  [address] [place] [address].  [phone]          ________________________________________________________________    Risk of deterioration: Moderate    Condition at Discharge:  Stable  __________________________________________________________________    Disposition  SNF/LTC    ____________________________________________________________________    Code Status: Full Code  ___________________________________________________________________      Total time in minutes spent coordinating this discharge (includes going over instructions, follow-up, prescriptions, and preparing report for sign off to her PCP) :  >30 minutes    Signed:  Micha Rosenberg NP [facility] Pregnancy And Lactation Text: This medication is Pregnancy Category X and contraindicated in pregnancy and in women who may become pregnant. It is unknown if this medication is excreted in breast milk.

## 2021-07-23 ENCOUNTER — HOSPITAL ENCOUNTER (INPATIENT)
Age: 86
LOS: 19 days | Discharge: NURSING, REHAB OR LONG-TERM CARE | DRG: 004 | End: 2021-08-11
Attending: EMERGENCY MEDICINE | Admitting: ANESTHESIOLOGY
Payer: MEDICARE

## 2021-07-23 ENCOUNTER — APPOINTMENT (OUTPATIENT)
Dept: GENERAL RADIOLOGY | Age: 86
DRG: 004 | End: 2021-07-23
Attending: EMERGENCY MEDICINE
Payer: MEDICARE

## 2021-07-23 DIAGNOSIS — J96.01 ACUTE RESPIRATORY FAILURE WITH HYPOXIA (HCC): ICD-10-CM

## 2021-07-23 DIAGNOSIS — R41.82 ALTERED MENTAL STATUS, UNSPECIFIED ALTERED MENTAL STATUS TYPE: ICD-10-CM

## 2021-07-23 DIAGNOSIS — Z71.89 GOALS OF CARE, COUNSELING/DISCUSSION: ICD-10-CM

## 2021-07-23 DIAGNOSIS — R56.9 SEIZURE (HCC): ICD-10-CM

## 2021-07-23 DIAGNOSIS — J69.0 ASPIRATION PNEUMONIA, UNSPECIFIED ASPIRATION PNEUMONIA TYPE, UNSPECIFIED LATERALITY, UNSPECIFIED PART OF LUNG (HCC): ICD-10-CM

## 2021-07-23 DIAGNOSIS — J96.01 SEPSIS WITH ACUTE HYPOXIC RESPIRATORY FAILURE WITHOUT SEPTIC SHOCK, DUE TO UNSPECIFIED ORGANISM (HCC): Primary | ICD-10-CM

## 2021-07-23 DIAGNOSIS — R62.51 FAILURE TO THRIVE (0-17): ICD-10-CM

## 2021-07-23 DIAGNOSIS — R41.89 UNRESPONSIVE STATE: ICD-10-CM

## 2021-07-23 DIAGNOSIS — T17.908D ASPIRATION INTO RESPIRATORY TRACT, SUBSEQUENT ENCOUNTER: ICD-10-CM

## 2021-07-23 DIAGNOSIS — R65.20 SEPSIS WITH ACUTE HYPOXIC RESPIRATORY FAILURE WITHOUT SEPTIC SHOCK, DUE TO UNSPECIFIED ORGANISM (HCC): Primary | ICD-10-CM

## 2021-07-23 DIAGNOSIS — I50.23 SYSTOLIC CHF, ACUTE ON CHRONIC (HCC): ICD-10-CM

## 2021-07-23 DIAGNOSIS — Z95.0 PACEMAKER: ICD-10-CM

## 2021-07-23 DIAGNOSIS — I21.4 NSTEMI (NON-ST ELEVATED MYOCARDIAL INFARCTION) (HCC): ICD-10-CM

## 2021-07-23 DIAGNOSIS — R56.9 SEIZURE-LIKE ACTIVITY (HCC): ICD-10-CM

## 2021-07-23 DIAGNOSIS — A41.9 SEPSIS WITH ACUTE HYPOXIC RESPIRATORY FAILURE WITHOUT SEPTIC SHOCK, DUE TO UNSPECIFIED ORGANISM (HCC): Primary | ICD-10-CM

## 2021-07-23 PROBLEM — T17.908A ASPIRATION INTO RESPIRATORY TRACT: Status: ACTIVE | Noted: 2021-07-23

## 2021-07-23 LAB
ALBUMIN SERPL-MCNC: 1.9 G/DL (ref 3.5–5)
ALBUMIN/GLOB SERPL: 0.4 {RATIO} (ref 1.1–2.2)
ALP SERPL-CCNC: 147 U/L (ref 45–117)
ALT SERPL-CCNC: 130 U/L (ref 12–78)
ANION GAP SERPL CALC-SCNC: 7 MMOL/L (ref 5–15)
ARTERIAL PATENCY WRIST A: POSITIVE
AST SERPL-CCNC: 42 U/L (ref 15–37)
BASE DEFICIT BLD-SCNC: 3.3 MMOL/L
BASE EXCESS BLD CALC-SCNC: 3.1 MMOL/L
BASOPHILS # BLD: 0 K/UL (ref 0–0.1)
BASOPHILS NFR BLD: 0 % (ref 0–1)
BDY SITE: ABNORMAL
BILIRUB SERPL-MCNC: 0.4 MG/DL (ref 0.2–1)
BUN SERPL-MCNC: 52 MG/DL (ref 6–20)
BUN/CREAT SERPL: 50 (ref 12–20)
CA-I BLD-MCNC: 1.13 MMOL/L (ref 1.12–1.32)
CALCIUM SERPL-MCNC: 9.2 MG/DL (ref 8.5–10.1)
CHLORIDE BLD-SCNC: 105 MMOL/L (ref 100–108)
CHLORIDE SERPL-SCNC: 96 MMOL/L (ref 97–108)
CO2 BLD-SCNC: 29 MMOL/L (ref 19–24)
CO2 SERPL-SCNC: 31 MMOL/L (ref 21–32)
COVID-19 RAPID TEST, COVR: NOT DETECTED
CREAT SERPL-MCNC: 1.03 MG/DL (ref 0.7–1.3)
CREAT UR-MCNC: 0.8 MG/DL (ref 0.6–1.3)
DIFFERENTIAL METHOD BLD: ABNORMAL
EOSINOPHIL # BLD: 0 K/UL (ref 0–0.4)
EOSINOPHIL NFR BLD: 0 % (ref 0–7)
ERYTHROCYTE [DISTWIDTH] IN BLOOD BY AUTOMATED COUNT: 18.9 % (ref 11.5–14.5)
GAS FLOW.O2 O2 DELIVERY SYS: ABNORMAL L/MIN
GAS FLOW.O2 SETTING OXYMISER: 18 BPM
GLOBULIN SER CALC-MCNC: 5 G/DL (ref 2–4)
GLUCOSE BLD STRIP.AUTO-MCNC: 108 MG/DL (ref 74–106)
GLUCOSE SERPL-MCNC: 131 MG/DL (ref 65–100)
HCO3 BLD-SCNC: 28.4 MMOL/L (ref 22–26)
HCO3 BLDA-SCNC: 27 MMOL/L
HCT VFR BLD AUTO: 26.1 % (ref 36.6–50.3)
HGB BLD-MCNC: 8.3 G/DL (ref 12.1–17)
IMM GRANULOCYTES # BLD AUTO: 0 K/UL
IMM GRANULOCYTES NFR BLD AUTO: 0 %
INSPIRATION.DURATION SETTING TIME VENT: 0.9 SEC
LACTATE BLD-SCNC: 3.92 MMOL/L (ref 0.4–2)
LACTATE SERPL-SCNC: 4.6 MMOL/L (ref 0.4–2)
LYMPHOCYTES # BLD: 0.6 K/UL (ref 0.8–3.5)
LYMPHOCYTES NFR BLD: 5 % (ref 12–49)
MCH RBC QN AUTO: 30.5 PG (ref 26–34)
MCHC RBC AUTO-ENTMCNC: 31.8 G/DL (ref 30–36.5)
MCV RBC AUTO: 96 FL (ref 80–99)
MONOCYTES # BLD: 0.4 K/UL (ref 0–1)
MONOCYTES NFR BLD: 3 % (ref 5–13)
NEUTS SEG # BLD: 10.8 K/UL (ref 1.8–8)
NEUTS SEG NFR BLD: 92 % (ref 32–75)
NRBC # BLD: 0.04 K/UL (ref 0–0.01)
NRBC BLD-RTO: 0.3 PER 100 WBC
O2/TOTAL GAS SETTING VFR VENT: 100 %
PCO2 BLD: 46.4 MMHG (ref 35–45)
PCO2 BLDV: 80.7 MMHG (ref 41–51)
PEEP RESPIRATORY: 10 CMH2O
PH BLD: 7.4 [PH] (ref 7.35–7.45)
PH BLDV: 7.13 [PH] (ref 7.32–7.42)
PIP ISTAT,IPIP: 20
PLATELET # BLD AUTO: 238 K/UL (ref 150–400)
PMV BLD AUTO: 9.5 FL (ref 8.9–12.9)
PO2 BLD: 104 MMHG (ref 80–100)
PO2 BLDV: 17 MMHG (ref 25–40)
POTASSIUM BLD-SCNC: 3.4 MMOL/L (ref 3.5–5.5)
POTASSIUM SERPL-SCNC: 4.4 MMOL/L (ref 3.5–5.1)
PROT SERPL-MCNC: 6.9 G/DL (ref 6.4–8.2)
RBC # BLD AUTO: 2.72 M/UL (ref 4.1–5.7)
RBC MORPH BLD: ABNORMAL
SAO2 % BLD: 97.9 % (ref 92–97)
SODIUM BLD-SCNC: 142 MMOL/L (ref 136–145)
SODIUM SERPL-SCNC: 134 MMOL/L (ref 136–145)
SOURCE, COVRS: NORMAL
SPECIMEN SITE: ABNORMAL
SPECIMEN TYPE: ABNORMAL
TROPONIN I SERPL-MCNC: 0.36 NG/ML
VENTILATION MODE VENT: ABNORMAL
WBC # BLD AUTO: 11.8 K/UL (ref 4.1–11.1)

## 2021-07-23 PROCEDURE — 74011250636 HC RX REV CODE- 250/636: Performed by: EMERGENCY MEDICINE

## 2021-07-23 PROCEDURE — 74011000250 HC RX REV CODE- 250

## 2021-07-23 PROCEDURE — 5A1955Z RESPIRATORY VENTILATION, GREATER THAN 96 CONSECUTIVE HOURS: ICD-10-PCS | Performed by: ANESTHESIOLOGY

## 2021-07-23 PROCEDURE — 0BJ08ZZ INSPECTION OF TRACHEOBRONCHIAL TREE, VIA NATURAL OR ARTIFICIAL OPENING ENDOSCOPIC: ICD-10-PCS | Performed by: THORACIC SURGERY (CARDIOTHORACIC VASCULAR SURGERY)

## 2021-07-23 PROCEDURE — 96375 TX/PRO/DX INJ NEW DRUG ADDON: CPT

## 2021-07-23 PROCEDURE — 82803 BLOOD GASES ANY COMBINATION: CPT

## 2021-07-23 PROCEDURE — 84295 ASSAY OF SERUM SODIUM: CPT

## 2021-07-23 PROCEDURE — 0B113F4 BYPASS TRACHEA TO CUTANEOUS WITH TRACHEOSTOMY DEVICE, PERCUTANEOUS APPROACH: ICD-10-PCS | Performed by: THORACIC SURGERY (CARDIOTHORACIC VASCULAR SURGERY)

## 2021-07-23 PROCEDURE — 74011000258 HC RX REV CODE- 258: Performed by: EMERGENCY MEDICINE

## 2021-07-23 PROCEDURE — 84484 ASSAY OF TROPONIN QUANT: CPT

## 2021-07-23 PROCEDURE — 71045 X-RAY EXAM CHEST 1 VIEW: CPT

## 2021-07-23 PROCEDURE — 94002 VENT MGMT INPAT INIT DAY: CPT

## 2021-07-23 PROCEDURE — 85025 COMPLETE CBC W/AUTO DIFF WBC: CPT

## 2021-07-23 PROCEDURE — 83605 ASSAY OF LACTIC ACID: CPT

## 2021-07-23 PROCEDURE — 87635 SARS-COV-2 COVID-19 AMP PRB: CPT

## 2021-07-23 PROCEDURE — 87040 BLOOD CULTURE FOR BACTERIA: CPT

## 2021-07-23 PROCEDURE — 99285 EMERGENCY DEPT VISIT HI MDM: CPT

## 2021-07-23 PROCEDURE — 65610000006 HC RM INTENSIVE CARE

## 2021-07-23 PROCEDURE — 74011000258 HC RX REV CODE- 258: Performed by: NURSE PRACTITIONER

## 2021-07-23 PROCEDURE — 80053 COMPREHEN METABOLIC PANEL: CPT

## 2021-07-23 PROCEDURE — 96374 THER/PROPH/DIAG INJ IV PUSH: CPT

## 2021-07-23 PROCEDURE — 93005 ELECTROCARDIOGRAM TRACING: CPT

## 2021-07-23 PROCEDURE — 81001 URINALYSIS AUTO W/SCOPE: CPT

## 2021-07-23 PROCEDURE — 36600 WITHDRAWAL OF ARTERIAL BLOOD: CPT

## 2021-07-23 PROCEDURE — 74011000250 HC RX REV CODE- 250: Performed by: NURSE PRACTITIONER

## 2021-07-23 PROCEDURE — 36415 COLL VENOUS BLD VENIPUNCTURE: CPT

## 2021-07-23 RX ORDER — FUROSEMIDE 10 MG/ML
40 INJECTION INTRAMUSCULAR; INTRAVENOUS ONCE
Status: COMPLETED | OUTPATIENT
Start: 2021-07-23 | End: 2021-07-24

## 2021-07-23 RX ORDER — SODIUM CHLORIDE 0.9 % (FLUSH) 0.9 %
5-10 SYRINGE (ML) INJECTION AS NEEDED
Status: DISCONTINUED | OUTPATIENT
Start: 2021-07-23 | End: 2021-08-11 | Stop reason: HOSPADM

## 2021-07-23 RX ORDER — NOREPINEPHRINE BITARTRATE/D5W 8 MG/250ML
.5-2 PLASTIC BAG, INJECTION (ML) INTRAVENOUS
Status: DISCONTINUED | OUTPATIENT
Start: 2021-07-23 | End: 2021-07-24

## 2021-07-23 RX ORDER — KETAMINE HYDROCHLORIDE 50 MG/ML
50 INJECTION, SOLUTION INTRAMUSCULAR; INTRAVENOUS
Status: ACTIVE | OUTPATIENT
Start: 2021-07-23 | End: 2021-07-24

## 2021-07-23 RX ORDER — KETAMINE HYDROCHLORIDE 100 MG/ML
INJECTION, SOLUTION INTRAMUSCULAR; INTRAVENOUS
Status: COMPLETED
Start: 2021-07-23 | End: 2021-07-23

## 2021-07-23 RX ORDER — PROPOFOL 10 MG/ML
0-50 VIAL (ML) INTRAVENOUS
Status: DISCONTINUED | OUTPATIENT
Start: 2021-07-23 | End: 2021-07-25

## 2021-07-23 RX ORDER — NOREPINEPHRINE BITARTRATE/D5W 8 MG/250ML
.5-16 PLASTIC BAG, INJECTION (ML) INTRAVENOUS
Status: DISCONTINUED | OUTPATIENT
Start: 2021-07-23 | End: 2021-07-23

## 2021-07-23 RX ADMIN — KETAMINE HYDROCHLORIDE 50 MG: 100 INJECTION INTRAMUSCULAR; INTRAVENOUS at 20:50

## 2021-07-23 RX ADMIN — NOREPINEPHRINE BITARTRATE 4 MCG/MIN: 1 INJECTION, SOLUTION, CONCENTRATE INTRAVENOUS at 22:53

## 2021-07-23 RX ADMIN — NOREPINEPHRINE BITARTRATE 2 MCG/MIN: 1 INJECTION, SOLUTION, CONCENTRATE INTRAVENOUS at 23:21

## 2021-07-23 RX ADMIN — FENTANYL CITRATE 100 MCG/HR: 50 INJECTION, SOLUTION INTRAMUSCULAR; INTRAVENOUS at 22:43

## 2021-07-23 RX ADMIN — PIPERACILLIN AND TAZOBACTAM 3.38 G: 3; .375 INJECTION, POWDER, LYOPHILIZED, FOR SOLUTION INTRAVENOUS at 22:05

## 2021-07-23 RX ADMIN — FENTANYL CITRATE 50 MCG/HR: 50 INJECTION, SOLUTION INTRAMUSCULAR; INTRAVENOUS at 22:15

## 2021-07-24 ENCOUNTER — APPOINTMENT (OUTPATIENT)
Dept: GENERAL RADIOLOGY | Age: 86
DRG: 004 | End: 2021-07-24
Attending: ANESTHESIOLOGY
Payer: MEDICARE

## 2021-07-24 ENCOUNTER — APPOINTMENT (OUTPATIENT)
Dept: ULTRASOUND IMAGING | Age: 86
DRG: 004 | End: 2021-07-24
Payer: MEDICARE

## 2021-07-24 ENCOUNTER — APPOINTMENT (OUTPATIENT)
Dept: NON INVASIVE DIAGNOSTICS | Age: 86
DRG: 004 | End: 2021-07-24
Payer: MEDICARE

## 2021-07-24 ENCOUNTER — APPOINTMENT (OUTPATIENT)
Dept: GENERAL RADIOLOGY | Age: 86
DRG: 004 | End: 2021-07-24
Payer: MEDICARE

## 2021-07-24 LAB
ALBUMIN SERPL-MCNC: 1.7 G/DL (ref 3.5–5)
ALBUMIN/GLOB SERPL: 0.4 {RATIO} (ref 1.1–2.2)
ALP SERPL-CCNC: 116 U/L (ref 45–117)
ALT SERPL-CCNC: 102 U/L (ref 12–78)
ANION GAP SERPL CALC-SCNC: 7 MMOL/L (ref 5–15)
APPEARANCE UR: ABNORMAL
AST SERPL-CCNC: 38 U/L (ref 15–37)
ATRIAL RATE: 115 BPM
BACTERIA URNS QL MICRO: ABNORMAL /HPF
BILIRUB SERPL-MCNC: 0.8 MG/DL (ref 0.2–1)
BILIRUB UR QL: NEGATIVE
BNP SERPL-MCNC: ABNORMAL PG/ML
BUN SERPL-MCNC: 53 MG/DL (ref 6–20)
BUN/CREAT SERPL: 55 (ref 12–20)
CALCIUM SERPL-MCNC: 9 MG/DL (ref 8.5–10.1)
CALCULATED P AXIS, ECG09: 27 DEGREES
CALCULATED R AXIS, ECG10: -92 DEGREES
CALCULATED T AXIS, ECG11: 76 DEGREES
CHLORIDE SERPL-SCNC: 97 MMOL/L (ref 97–108)
CO2 SERPL-SCNC: 32 MMOL/L (ref 21–32)
COLOR UR: ABNORMAL
CORTIS SERPL-MCNC: 49.7 UG/DL
CREAT SERPL-MCNC: 0.97 MG/DL (ref 0.7–1.3)
DIAGNOSIS, 93000: NORMAL
ECHO AV MEAN GRADIENT: 18.49 MMHG
ECHO AV PEAK GRADIENT: 32.28 MMHG
ECHO AV PEAK VELOCITY: 284.08 CM/S
ECHO AV VTI: 56.57 CM
ECHO EST RA PRESSURE: 15 MMHG
ECHO LA AREA 4C: 27.12 CM2
ECHO LA VOL 2C: 76.21 ML (ref 18–58)
ECHO LA VOL 4C: 80.22 ML (ref 18–58)
ECHO LA VOL BP: 86.9 ML (ref 18–58)
ECHO LA VOL/BSA BIPLANE: 47.23 ML/M2 (ref 16–28)
ECHO LA VOLUME INDEX A2C: 41.42 ML/M2 (ref 16–28)
ECHO LA VOLUME INDEX A4C: 43.6 ML/M2 (ref 16–28)
ECHO LV E' LATERAL VELOCITY: 3.46 CM/S
ECHO LV E' SEPTAL VELOCITY: 4.05 CM/S
ECHO LV EDV A2C: 146.93 ML
ECHO LV EDV A4C: 169.57 ML
ECHO LV EDV BP: 161.69 ML (ref 67–155)
ECHO LV EDV INDEX A4C: 92.2 ML/M2
ECHO LV EDV INDEX BP: 87.9 ML/M2
ECHO LV EDV NDEX A2C: 79.9 ML/M2
ECHO LV EJECTION FRACTION A2C: 42 PERCENT
ECHO LV EJECTION FRACTION A4C: 49 PERCENT
ECHO LV EJECTION FRACTION BIPLANE: 46.6 PERCENT (ref 55–100)
ECHO LV ESV A2C: 85.94 ML
ECHO LV ESV A4C: 86.59 ML
ECHO LV ESV BP: 86.34 ML (ref 22–58)
ECHO LV ESV INDEX A2C: 46.7 ML/M2
ECHO LV ESV INDEX A4C: 47.1 ML/M2
ECHO LV ESV INDEX BP: 46.9 ML/M2
ECHO LVOT PEAK GRADIENT: 2.57 MMHG
ECHO LVOT PEAK VELOCITY: 80.11 CM/S
ECHO LVOT VTI: 13.01 CM
ECHO MV A VELOCITY: 97.52 CM/S
ECHO MV AREA PHT: 4.33 CM2
ECHO MV E DECELERATION TIME (DT): 175.08 MS
ECHO MV E VELOCITY: 86.92 CM/S
ECHO MV E/A RATIO: 0.89
ECHO MV E/E' LATERAL: 25.12
ECHO MV E/E' RATIO (AVERAGED): 23.29
ECHO MV E/E' SEPTAL: 21.46
ECHO MV PRESSURE HALF TIME (PHT): 50.77 MS
ECHO PV MAX VELOCITY: 96.28 CM/S
ECHO PV PEAK INSTANTANEOUS GRADIENT SYSTOLIC: 3.71 MMHG
ECHO RIGHT VENTRICULAR SYSTOLIC PRESSURE (RVSP): 65.11 MMHG
ECHO RV INTERNAL DIMENSION: 4.48 CM
ECHO TV REGURGITANT MAX VELOCITY: 353.94 CM/S
ECHO TV REGURGITANT PEAK GRADIENT: 50.11 MMHG
EPITH CASTS URNS QL MICRO: ABNORMAL /LPF
ERYTHROCYTE [DISTWIDTH] IN BLOOD BY AUTOMATED COUNT: 19 % (ref 11.5–14.5)
GLOBULIN SER CALC-MCNC: 4.3 G/DL (ref 2–4)
GLUCOSE SERPL-MCNC: 85 MG/DL (ref 65–100)
GLUCOSE UR STRIP.AUTO-MCNC: NEGATIVE MG/DL
HCT VFR BLD AUTO: 22.6 % (ref 36.6–50.3)
HGB BLD-MCNC: 7.2 G/DL (ref 12.1–17)
HGB UR QL STRIP: ABNORMAL
INR PPP: 1.1 (ref 0.9–1.1)
KETONES UR QL STRIP.AUTO: NEGATIVE MG/DL
LACTATE SERPL-SCNC: 1.7 MMOL/L (ref 0.4–2)
LACTATE SERPL-SCNC: 2.1 MMOL/L (ref 0.4–2)
LACTATE SERPL-SCNC: 2.6 MMOL/L (ref 0.4–2)
LEUKOCYTE ESTERASE UR QL STRIP.AUTO: NEGATIVE
MAGNESIUM SERPL-MCNC: 2.5 MG/DL (ref 1.6–2.4)
MCH RBC QN AUTO: 30.3 PG (ref 26–34)
MCHC RBC AUTO-ENTMCNC: 31.9 G/DL (ref 30–36.5)
MCV RBC AUTO: 95 FL (ref 80–99)
NITRITE UR QL STRIP.AUTO: NEGATIVE
NRBC # BLD: 0.1 K/UL (ref 0–0.01)
NRBC BLD-RTO: 0.8 PER 100 WBC
PH UR STRIP: 5 [PH] (ref 5–8)
PHOSPHATE SERPL-MCNC: 1.8 MG/DL (ref 2.6–4.7)
PLATELET # BLD AUTO: 223 K/UL (ref 150–400)
PMV BLD AUTO: 10.5 FL (ref 8.9–12.9)
POTASSIUM SERPL-SCNC: 3.8 MMOL/L (ref 3.5–5.1)
PROCALCITONIN SERPL-MCNC: 35.22 NG/ML
PROT SERPL-MCNC: 6 G/DL (ref 6.4–8.2)
PROT UR STRIP-MCNC: 30 MG/DL
PROTHROMBIN TIME: 11.3 SEC (ref 9–11.1)
Q-T INTERVAL, ECG07: 428 MS
QRS DURATION, ECG06: 190 MS
QTC CALCULATION (BEZET), ECG08: 592 MS
RBC # BLD AUTO: 2.38 M/UL (ref 4.1–5.7)
RBC #/AREA URNS HPF: ABNORMAL /HPF (ref 0–5)
SODIUM SERPL-SCNC: 136 MMOL/L (ref 136–145)
SP GR UR REFRACTOMETRY: 1.02 (ref 1–1.03)
TROPONIN I SERPL-MCNC: 0.55 NG/ML
TROPONIN I SERPL-MCNC: 0.57 NG/ML
UA: UC IF INDICATED,UAUC: ABNORMAL
UROBILINOGEN UR QL STRIP.AUTO: 0.2 EU/DL (ref 0.2–1)
VENTRICULAR RATE, ECG03: 115 BPM
WBC # BLD AUTO: 12.7 K/UL (ref 4.1–11.1)
WBC URNS QL MICRO: ABNORMAL /HPF (ref 0–4)

## 2021-07-24 PROCEDURE — 0BH17EZ INSERTION OF ENDOTRACHEAL AIRWAY INTO TRACHEA, VIA NATURAL OR ARTIFICIAL OPENING: ICD-10-PCS | Performed by: ANESTHESIOLOGY

## 2021-07-24 PROCEDURE — 74011250636 HC RX REV CODE- 250/636: Performed by: EMERGENCY MEDICINE

## 2021-07-24 PROCEDURE — 74011000258 HC RX REV CODE- 258: Performed by: NURSE PRACTITIONER

## 2021-07-24 PROCEDURE — 93306 TTE W/DOPPLER COMPLETE: CPT

## 2021-07-24 PROCEDURE — 02HV33Z INSERTION OF INFUSION DEVICE INTO SUPERIOR VENA CAVA, PERCUTANEOUS APPROACH: ICD-10-PCS | Performed by: NURSE PRACTITIONER

## 2021-07-24 PROCEDURE — 83735 ASSAY OF MAGNESIUM: CPT

## 2021-07-24 PROCEDURE — 93306 TTE W/DOPPLER COMPLETE: CPT | Performed by: INTERNAL MEDICINE

## 2021-07-24 PROCEDURE — 86923 COMPATIBILITY TEST ELECTRIC: CPT

## 2021-07-24 PROCEDURE — 32555 ASPIRATE PLEURA W/ IMAGING: CPT

## 2021-07-24 PROCEDURE — 84145 PROCALCITONIN (PCT): CPT

## 2021-07-24 PROCEDURE — 87077 CULTURE AEROBIC IDENTIFY: CPT

## 2021-07-24 PROCEDURE — 83880 ASSAY OF NATRIURETIC PEPTIDE: CPT

## 2021-07-24 PROCEDURE — 87070 CULTURE OTHR SPECIMN AEROBIC: CPT

## 2021-07-24 PROCEDURE — 83605 ASSAY OF LACTIC ACID: CPT

## 2021-07-24 PROCEDURE — 85027 COMPLETE CBC AUTOMATED: CPT

## 2021-07-24 PROCEDURE — 84100 ASSAY OF PHOSPHORUS: CPT

## 2021-07-24 PROCEDURE — 74011250637 HC RX REV CODE- 250/637: Performed by: ANESTHESIOLOGY

## 2021-07-24 PROCEDURE — 74011000258 HC RX REV CODE- 258: Performed by: ANESTHESIOLOGY

## 2021-07-24 PROCEDURE — 85610 PROTHROMBIN TIME: CPT

## 2021-07-24 PROCEDURE — 80053 COMPREHEN METABOLIC PANEL: CPT

## 2021-07-24 PROCEDURE — 86901 BLOOD TYPING SEROLOGIC RH(D): CPT

## 2021-07-24 PROCEDURE — 74011000250 HC RX REV CODE- 250: Performed by: NURSE PRACTITIONER

## 2021-07-24 PROCEDURE — 36415 COLL VENOUS BLD VENIPUNCTURE: CPT

## 2021-07-24 PROCEDURE — 84484 ASSAY OF TROPONIN QUANT: CPT

## 2021-07-24 PROCEDURE — 0W993ZZ DRAINAGE OF RIGHT PLEURAL CAVITY, PERCUTANEOUS APPROACH: ICD-10-PCS | Performed by: STUDENT IN AN ORGANIZED HEALTH CARE EDUCATION/TRAINING PROGRAM

## 2021-07-24 PROCEDURE — 87186 SC STD MICRODIL/AGAR DIL: CPT

## 2021-07-24 PROCEDURE — 71045 X-RAY EXAM CHEST 1 VIEW: CPT

## 2021-07-24 PROCEDURE — 82533 TOTAL CORTISOL: CPT

## 2021-07-24 PROCEDURE — P9047 ALBUMIN (HUMAN), 25%, 50ML: HCPCS | Performed by: ANESTHESIOLOGY

## 2021-07-24 PROCEDURE — 74011250636 HC RX REV CODE- 250/636: Performed by: NURSE PRACTITIONER

## 2021-07-24 PROCEDURE — 65610000006 HC RM INTENSIVE CARE

## 2021-07-24 PROCEDURE — 99223 1ST HOSP IP/OBS HIGH 75: CPT | Performed by: INTERNAL MEDICINE

## 2021-07-24 PROCEDURE — 74011250637 HC RX REV CODE- 250/637: Performed by: NURSE PRACTITIONER

## 2021-07-24 PROCEDURE — 74011250636 HC RX REV CODE- 250/636: Performed by: ANESTHESIOLOGY

## 2021-07-24 RX ORDER — SODIUM CHLORIDE 0.9 % (FLUSH) 0.9 %
5-40 SYRINGE (ML) INJECTION AS NEEDED
Status: DISCONTINUED | OUTPATIENT
Start: 2021-07-24 | End: 2021-08-11 | Stop reason: HOSPADM

## 2021-07-24 RX ORDER — LACOSAMIDE 50 MG/1
100 TABLET ORAL EVERY 12 HOURS
Status: DISCONTINUED | OUTPATIENT
Start: 2021-07-24 | End: 2021-08-11 | Stop reason: HOSPADM

## 2021-07-24 RX ORDER — MIDODRINE HYDROCHLORIDE 5 MG/1
10 TABLET ORAL EVERY 8 HOURS
Status: DISCONTINUED | OUTPATIENT
Start: 2021-07-24 | End: 2021-07-28

## 2021-07-24 RX ORDER — ALBUMIN HUMAN 250 G/1000ML
12.5 SOLUTION INTRAVENOUS EVERY 6 HOURS
Status: DISCONTINUED | OUTPATIENT
Start: 2021-07-24 | End: 2021-07-28

## 2021-07-24 RX ORDER — BUMETANIDE 1 MG/1
1 TABLET ORAL 2 TIMES DAILY
Status: DISCONTINUED | OUTPATIENT
Start: 2021-07-24 | End: 2021-08-11 | Stop reason: HOSPADM

## 2021-07-24 RX ORDER — SODIUM,POTASSIUM PHOSPHATES 280-250MG
2 POWDER IN PACKET (EA) ORAL DAILY
Status: COMPLETED | OUTPATIENT
Start: 2021-07-24 | End: 2021-07-24

## 2021-07-24 RX ORDER — ONDANSETRON 2 MG/ML
4 INJECTION INTRAMUSCULAR; INTRAVENOUS
Status: DISCONTINUED | OUTPATIENT
Start: 2021-07-24 | End: 2021-08-11 | Stop reason: HOSPADM

## 2021-07-24 RX ORDER — VANCOMYCIN 2 GRAM/500 ML IN 0.9 % SODIUM CHLORIDE INTRAVENOUS
2000 ONCE
Status: COMPLETED | OUTPATIENT
Start: 2021-07-24 | End: 2021-07-24

## 2021-07-24 RX ORDER — SODIUM CHLORIDE 0.9 % (FLUSH) 0.9 %
5-40 SYRINGE (ML) INJECTION EVERY 8 HOURS
Status: DISCONTINUED | OUTPATIENT
Start: 2021-07-24 | End: 2021-08-11 | Stop reason: HOSPADM

## 2021-07-24 RX ORDER — ENOXAPARIN SODIUM 100 MG/ML
40 INJECTION SUBCUTANEOUS DAILY
Status: DISCONTINUED | OUTPATIENT
Start: 2021-07-24 | End: 2021-07-27

## 2021-07-24 RX ORDER — NOREPINEPHRINE BITARTRATE/D5W 8 MG/250ML
.5-2 PLASTIC BAG, INJECTION (ML) INTRAVENOUS
Status: DISCONTINUED | OUTPATIENT
Start: 2021-07-24 | End: 2021-07-25

## 2021-07-24 RX ORDER — ONDANSETRON 4 MG/1
4 TABLET, ORALLY DISINTEGRATING ORAL
Status: DISCONTINUED | OUTPATIENT
Start: 2021-07-24 | End: 2021-08-11 | Stop reason: HOSPADM

## 2021-07-24 RX ORDER — ACETAMINOPHEN 325 MG/1
650 TABLET ORAL
Status: DISCONTINUED | OUTPATIENT
Start: 2021-07-24 | End: 2021-08-11 | Stop reason: HOSPADM

## 2021-07-24 RX ORDER — CHLORHEXIDINE GLUCONATE 0.12 MG/ML
15 RINSE ORAL EVERY 12 HOURS
Status: DISCONTINUED | OUTPATIENT
Start: 2021-07-24 | End: 2021-08-11 | Stop reason: HOSPADM

## 2021-07-24 RX ORDER — ALLOPURINOL 100 MG/1
100 TABLET ORAL DAILY
Status: DISCONTINUED | OUTPATIENT
Start: 2021-07-24 | End: 2021-08-11 | Stop reason: HOSPADM

## 2021-07-24 RX ORDER — ACETAMINOPHEN 650 MG/1
650 SUPPOSITORY RECTAL
Status: DISCONTINUED | OUTPATIENT
Start: 2021-07-24 | End: 2021-08-11 | Stop reason: HOSPADM

## 2021-07-24 RX ORDER — SENNOSIDES 8.8 MG/5ML
5 LIQUID ORAL
Status: DISCONTINUED | OUTPATIENT
Start: 2021-07-24 | End: 2021-08-11 | Stop reason: HOSPADM

## 2021-07-24 RX ORDER — GUAIFENESIN 100 MG/5ML
81 LIQUID (ML) ORAL DAILY
Status: DISCONTINUED | OUTPATIENT
Start: 2021-07-24 | End: 2021-08-11 | Stop reason: HOSPADM

## 2021-07-24 RX ADMIN — VANCOMYCIN HYDROCHLORIDE 500 MG: 500 INJECTION, POWDER, LYOPHILIZED, FOR SOLUTION INTRAVENOUS at 18:41

## 2021-07-24 RX ADMIN — FUROSEMIDE 40 MG: 10 INJECTION, SOLUTION INTRAMUSCULAR; INTRAVENOUS at 00:04

## 2021-07-24 RX ADMIN — CHLORHEXIDINE GLUCONATE 15 ML: 0.12 RINSE ORAL at 01:00

## 2021-07-24 RX ADMIN — LEVETIRACETAM 500 MG: 100 SOLUTION ORAL at 08:39

## 2021-07-24 RX ADMIN — BUMETANIDE 1 MG: 1 TABLET ORAL at 08:39

## 2021-07-24 RX ADMIN — LEVETIRACETAM 500 MG: 100 SOLUTION ORAL at 17:48

## 2021-07-24 RX ADMIN — MIDODRINE HYDROCHLORIDE 10 MG: 5 TABLET ORAL at 12:36

## 2021-07-24 RX ADMIN — ALBUMIN (HUMAN) 12.5 G: 0.25 INJECTION, SOLUTION INTRAVENOUS at 12:36

## 2021-07-24 RX ADMIN — Medication 10 ML: at 06:00

## 2021-07-24 RX ADMIN — PIPERACILLIN AND TAZOBACTAM 3.38 G: 3; .375 INJECTION, POWDER, LYOPHILIZED, FOR SOLUTION INTRAVENOUS at 11:10

## 2021-07-24 RX ADMIN — PROPOFOL 20 MCG/KG/MIN: 10 INJECTION, EMULSION INTRAVENOUS at 04:00

## 2021-07-24 RX ADMIN — NOREPINEPHRINE BITARTRATE 15 MCG/MIN: 1 INJECTION, SOLUTION, CONCENTRATE INTRAVENOUS at 08:55

## 2021-07-24 RX ADMIN — ALLOPURINOL 100 MG: 100 TABLET ORAL at 08:39

## 2021-07-24 RX ADMIN — VANCOMYCIN HYDROCHLORIDE 2000 MG: 10 INJECTION, POWDER, LYOPHILIZED, FOR SOLUTION INTRAVENOUS at 04:26

## 2021-07-24 RX ADMIN — ALBUMIN (HUMAN) 12.5 G: 0.25 INJECTION, SOLUTION INTRAVENOUS at 17:49

## 2021-07-24 RX ADMIN — Medication 10 ML: at 13:29

## 2021-07-24 RX ADMIN — ASPIRIN 81 MG: 81 TABLET, CHEWABLE ORAL at 08:39

## 2021-07-24 RX ADMIN — LACOSAMIDE 100 MG: 50 TABLET, FILM COATED ORAL at 20:34

## 2021-07-24 RX ADMIN — MIDODRINE HYDROCHLORIDE 10 MG: 5 TABLET ORAL at 21:30

## 2021-07-24 RX ADMIN — Medication 10 ML: at 02:06

## 2021-07-24 RX ADMIN — Medication 10 ML: at 21:30

## 2021-07-24 RX ADMIN — LACOSAMIDE 100 MG: 50 TABLET, FILM COATED ORAL at 08:39

## 2021-07-24 RX ADMIN — PIPERACILLIN AND TAZOBACTAM 3.38 G: 3; .375 INJECTION, POWDER, LYOPHILIZED, FOR SOLUTION INTRAVENOUS at 04:57

## 2021-07-24 RX ADMIN — PIPERACILLIN AND TAZOBACTAM 3.38 G: 3; .375 INJECTION, POWDER, LYOPHILIZED, FOR SOLUTION INTRAVENOUS at 20:34

## 2021-07-24 RX ADMIN — LANSOPRAZOLE 30 MG: KIT at 08:39

## 2021-07-24 RX ADMIN — PROPOFOL 10 MCG/KG/MIN: 10 INJECTION, EMULSION INTRAVENOUS at 00:06

## 2021-07-24 RX ADMIN — POTASSIUM & SODIUM PHOSPHATES POWDER PACK 280-160-250 MG 2 PACKET: 280-160-250 PACK at 08:39

## 2021-07-24 RX ADMIN — CHLORHEXIDINE GLUCONATE 15 ML: 0.12 RINSE ORAL at 12:37

## 2021-07-24 RX ADMIN — NOREPINEPHRINE BITARTRATE 2 MCG/MIN: 1 INJECTION, SOLUTION, CONCENTRATE INTRAVENOUS at 00:06

## 2021-07-24 NOTE — H&P
[facility]    ICU TEAM History and Physical    Name: Samantha Doherty   : 3/9/1933   MRN: [id number]   Date: 2021      Progress Note: 2021      Reason for ICU Admission: Acute Hypoxic Respiratory Failure    HPI:  Samantha Doherty is a 80 y.o. male with history of prostate cancer, CHF, constipation, hypertension, stroke, TIA, and seizures who presents to the emergency department by EMS as transfer from [facility] for respiratory distress. Patient had initially been a transfer from [facility] after a hospitalization from - [facility]. Was there for altered mental status hypoxia was treated for hypoxic respiratory failure bilateral pleural effusions acute systolic CHF secondary to severe aortic stenosis hyponatremia hypokalemia elevated LFTs malnutrition and possible UTI was treated with ceftriaxone and Vanco.  Patient already had a PEG for dysphagia and protein calorie malnutrition prior to [facility] admission. He was at [facility] from [phone]. While at [facility] today patient had an episode of vomiting and aspirated was initially started on high flow nasal cannula however was still hypoxic thus got intubated by [facility] staff. Patient was then transferred to the ED for further care. Per family patient's decline started last year while he was having seizures/TIA patient became more encephalopathic and started to refuse eating and thus got a PEG for nutritional reasons. was staying at assisted living facility and had to be re-hospitalized after his PEG malfunctioned. During that time he did have an infection, daughter could not exactly say where the infection was, but she thought it was an abdominal infection and since then he started having worsening heart failure and decreased heart function, and failure to thrive. Per records EF was 10 to 15%. Per daughter, patient has never had a tracheostomy.  She states patient is awake at times, will open eyes, but does not track, does not follow commands, and does not speak. They wish patient to remain full code at this time. While in ED patient was placed on mechanical ventilator, ABG was done which was consistent with hypoxic respiratory failure. Patient was also given 1 L of IV fluids for fluid resuscitation due to sepsis. Initially blood pressure was okay but once patient was started on sedation blood pressure dropped. Patient also had episodes of hypoxia due to vent asynchrony. Once PEEP was increased for hypoxia patient's pressure dropped. Started patient on Levophed via peripheral in ED. If hypotension worsens will need central line. Started on Zosyn. Updated the family at bedside, daughter and and son. Consent for central line and blood was  Received. Patient to transfer to ICU for continued care. Subjective:   Overnight Events:   7/24/2021    POD:  * No surgery found *    S/P:     Assessment:     ICU Problems:  1. Acute Hypoxic Respiratory Failure requiring intubation and mechanical ventilation   2. Aspiration into airway leading to #1  3. Acute septic shock likely 2' to aspiration pneumonitis  4. Leukocytosis  5. Lactic Acidosis   6. Elevated Troponin  7. Chronic Systolic HRrEF - Reported EF 10-15% - TTE pending  8. GERD  9. Protein Calorie Malnutrition  10. Dysphagia s/p PEG  11. Gout  12. Hx Prostate CA  13. Hx Seizure D/O  14. Hx CVA/TIA - with Residual Aphasia  15. Hx HTN  16. Hx Severe Aortic Stenosis/ Mod to severe mitral valve regurgitation  17. Hx Severe Pulmonary HTN  18. Hx Pacermaker    ICU Comprehensive Plan of Care:     Plans for this Shift:     1. Admit to ICU  2. Panculture respiratory urine and blood culture follow-up results. 3. ABG in a.m. 4. Consult cardiology in a.m. for heart failure  5. Echocardiogram in a.m.  6. Ultrasound to evaluate for thoracentesis. 7. Rapid Covid negative  8. Send cortisol level in a.m.  9. Trend troponins and lactic acid  10. SBP Goal of: > 90 mmHg  11.  MAP Goal of: > 65 mmHg  12. Norepinephrine - For above SBP/MAP goals  13. IVFs:  None  14. Transfusion Trigger (Hgb): <7 g/dL  15. Respiratory Goals:  a. Chlorhexidine   b. Optimize PEEP/Ventilation/Oxygenation  c. Goal Tidal Volume 6 cc/kg based on IBW  d. Aim for lung protective ventilation  e. Head of bed > 30 degrees  16. Pulmonary toilet: Duo-Nebs   17. SpO2 Goal: > 92%   18. Keep K>4; Mg>2   19. PT/OT: NA   20. Discussed Plan of Care/Code Status: Full Code - discussed with daughter/son  21. Appreciate Consultants Input Cardiology  22. Discussed Care Plan with Bedside RN  23. Documentation of Current Medications  24.  Rest of Plan Below:    F - Feeding:  No may start in am if no vomiting  A - Analgesia: Fentanyl  S - Sedation: Propofol  T - DVT Prophylaxis: Lovenox   H - Head of Bed: > 30 Degrees  U - Ulcer Prophylaxis: prevacid   G - Glycemic Control: Insulin q 6 hrs accu checks SSI   S - Spontaneous Breathing Trial: Pending  B - Bowel Regimen: Senna  I - Indwelling Catheter:   Tubes: ETT and PEG Tube  Lines: Peripheral IV  Drains: Avalos Catheter  D - De-escalation of Antibiotics: Vancomycin  Zosyn    Active Problem List:     Problem List  Date Reviewed: 12/17/2020          Codes Class    Acute respiratory failure with hypoxia (HCC) ICD-10-CM: J96.01  ICD-9-CM: 518.81         Aspiration into respiratory tract ICD-10-CM: T17.908A  ICD-9-CM: 934.9         Hypokalemia ICD-10-CM: E87.6  ICD-9-CM: 276.8         Hypernatremia ICD-10-CM: E87.0  ICD-9-CM: 276.0         Moderate protein malnutrition (HCC) ICD-10-CM: E44.0  ICD-9-CM: 263.0         Failure to thrive (0-17) ICD-10-CM: R62.51  ICD-9-CM: 783.41         Bacteremia ICD-10-CM: R78.81  ICD-9-CM: 790.7         UTI (urinary tract infection) ICD-10-CM: N39.0  ICD-9-CM: 599.0         Dementia (HCC) ICD-10-CM: F03.90  ICD-9-CM: 294.20         Bedridden ICD-10-CM: Z74.01  ICD-9-CM: V49.84         Pressure ulcer ICD-10-CM: L89.90  ICD-9-CM: 707.00, 707.20         S/P percutaneous endoscopic gastrostomy (PEG) tube placement ([facility] ICD-10-CM: Z93.1  ICD-9-CM: V44.1         Dehydration ICD-10-CM: E86.0  ICD-9-CM: 276.51         Lactic acidosis ICD-10-CM: E87.2  ICD-9-CM: 276.2         Sepsis ([facility] ICD-10-CM: A41.9  ICD-9-CM: 038.9, 995.91         SOULEYMANE (acute kidney injury) ([facility] ICD-10-CM: N17.9  ICD-9-CM: 184. 9         Chronic systolic heart failure (HCC) ICD-10-CM: I50.22  ICD-9-CM: 428.22         Altered mental status ICD-10-CM: R41.82  ICD-9-CM: 780.97         Post-ictal state ([facility] ICD-10-CM: R56.9  ICD-9-CM: 780.39         Seizure ([facility] ICD-10-CM: R56.9  ICD-9-CM: 780.39         Atrial pacemaker lead displacement ICD-10-CM: T82.120A  ICD-9-CM: 996.01         Pacemaker ICD-10-CM: Z95.0  ICD-9-CM: V45.01     Overview Signed 3/22/2019  5:08 PM by Addy Lara MD     3/22/2019 dual chamber [employer] pacer             Bradycardia ICD-10-CM: R00.1  ICD-9-CM: 427.89         Transaminitis ICD-10-CM: R74.01  ICD-9-CM: 790.4         Hypertensive urgency ICD-10-CM: I16.0  ICD-9-CM: 401.9         Second degree AV block, Mobitz type I ICD-10-CM: I44.1  ICD-9-CM: 426.13     Overview Signed 3/21/2019  6:14 PM by Addy Lara MD     Added automatically from request for surgery [id number]             Stage 3 chronic kidney disease ([facility] ICD-10-CM: N18.30  ICD-9-CM: 646. 3         Rotator cuff arthropathy of both shoulders ICD-10-CM: M12.811, M12.812  ICD-9-CM: 716.81         Cognitive impairment ICD-10-CM: R41.89  ICD-9-CM: 982.8         Systolic murmur EDV-54-QV: R01.1  ICD-9-CM: 785.2         Essential hypertension ICD-10-CM: I10  ICD-9-CM: 401.9         Gout ICD-10-CM: M10.9  ICD-9-CM: 274.9         Pure hypercholesterolemia ICD-10-CM: E78.00  ICD-9-CM: 272.0         History of prostate cancer ICD-10-CM: Z85.46  ICD-9-CM: V10.46         Chronic constipation ICD-10-CM: K59.09  ICD-9-CM: 564.00         Dysuria ICD-10-CM: R30.0  ICD-9-CM: 526. 1         Weight loss ICD-10-CM: R63.4  ICD-9-CM: 783.21 Adrenal mass ([facility] ICD-10-CM: E27.8  ICD-9-CM: 255.8     Overview Addendum 12/17/2018  9:57 AM by Soledad Bermudez MD     Stable/non-functioning adenoma on imaging                     Past Medical History:      has a past medical history of Cancer [facility]), Chronic systolic heart failure ([facility] (12/7/2020), Constipation, Gout, Hyperlipemia, Hypertension, Pacemaker (2019), Stroke ([facility] Thrombocytopenia ([facility] (3/19/2019), and TIA (transient ischemic attack) (2013). Past Surgical History:      has a past surgical history that includes hx urological; hx prostatectomy; pr ins new/rplcmt prm pm w/transv eltrd atrial&vent (Right, 3/22/2019); pr ins new/rplcmt prm pacemakr w/trans eltrd atrial (N/A, 10/18/2019); and place percut gastrostomy tube (11/30/2020). Home Medications:     Prior to Admission medications    Medication Sig Start Date End Date Taking? Authorizing Provider   levETIRAcetam (KEPPRA) 750 mg tablet Take 1 Tab by mouth every twelve (12) hours. 12/11/20   Kareem Escobar NP   aspirin delayed-release 81 mg tablet Take 1 Tab by mouth daily. 7/13/20   Violet Solano MD   polyethylene glycol [facility]) 17 gram/dose powder Take 17 g by mouth daily as needed for Constipation. 7/13/20   Violet Solano MD   balsam peru-castor oiL (VENELEX) ointment Apply  to affected area three (3) times daily. 7/9/20   Fredo Sequeira MD   famotidine (PEPCID) 20 mg tablet Take 1 Tab by mouth every evening. 7/9/20   Fredo Sequeira MD   acetaminophen (TYLENOL) 325 mg tablet Take 650 mg by mouth every six (6) hours as needed for Pain.     Provider, Historical       Allergies/Social/Family History:     No Known Allergies   Social History     Tobacco Use    Smoking status: Never Smoker    Smokeless tobacco: Never Used   Substance Use Topics    Alcohol use: No      Family History   Problem Relation Age of Onset    No Known Problems Mother     No Known Problems Father        Review of Systems:     Review of systems not obtained due to patient factors. Objective:   Vital Signs:  Visit Vitals  /84   Pulse (!) 113   Temp (!) 100.9 °F (38.3 °C)   Resp 18   SpO2 96%        Temp (24hrs), Av.9 °F (38.3 °C), Min:100.9 °F (38.3 °C), Max:100.9 °F (38.3 °C)           Intake/Output:   No intake or output data in the 24 hours ending 21 0031    Physical Exam:    General:  mild distress, appears stated age, intubated and sedated  Eye:  conjunctivae/corneas clear. Pupils bilaterally reactive and round  Neurologic:  WD to pain in all ext. Does not follow commands  Lymphatic:  Cervical, supraclavicular, and axillary nodes normal.   Neck:  normal and no erythema or exudates noted. Lungs: Diminished in bilateral bases, mild tachypnea. Moderate oral secretions   Heart: Paced rhythm, regular, tachycardia  Abdomen:  soft, non-tender. Bowel sounds normal. No masses,  no organomegaly  Cardiovascular:  Regular rate and rhythm, S1S2 present, without murmur or extra heart sounds and pedal pulses normal Generalized edema  Skin:  no rash or abnormalities    LABS AND  DATA: Personally reviewed  Recent Labs     21   WBC 11.8*   HGB 8.3*   HCT 26.1*        Recent Labs     21   *   K 4.4   CL 96*   CO2 31   BUN 52*   CREA 1.03   *   CA 9.2     Recent Labs     21   *   TP 6.9   ALB 1.9*   GLOB 5.0*     No results for input(s): INR, PTP, APTT, INREXT in the last 72 hours.    Recent Labs     21   PHI 7.40   PCO2I 46.4*   PO2I 104*   FIO2I 100     Recent Labs     21   TROIQ 0.36*       Hemodynamics:   PAP:   CO:     Wedge:   CI:     CVP:    SVR:       PVR:       Ventilator Settings:  Mode Rate Tidal Volume Pressure FiO2 PEEP   Pressure control, Assist control        100 % (due to pt desats) 8 cm H20 (due to pt desats)     Peak airway pressure: 26 cm H2O    Minute ventilation: 9.53 l/min        MEDS: Reviewed    Imaging:    CXR Results  (Last 48 hours) 07/23/21 2136  XR CHEST PORT Final result    Impression:  Moderate pulmonary edema with right pleural effusion       Narrative:  EXAM: XR CHEST PORT       INDICATION: Unresponsiveness       COMPARISON: 1/6/2021       FINDINGS: A portable AP radiograph of the chest was obtained at 2129 hours. The   patient is on a cardiac monitor. The endotracheal tube terminates below the   thoracic inlet. NG tube extends into the stomach. A dual-lead pacemaker is   present. There is moderate pulmonary edema with a right pleural effusion. CT Results  (Last 48 hours)      None          ECHO:  Pending    Multidisciplinary Rounds Completed:  No    ABCDEF Bundle/Checklist Completed:  Yes    SPECIAL EQUIPMENT  None    DISPOSITION  Stay in ICU    CRITICAL CARE CONSULTANT NOTE  I had a face to face encounter with the patient, reviewed and interpreted patient data including clinical events, labs, images, vital signs, I/O's, and examined patient. I have discussed the case and the plan and management of the patient's care with the consulting services, the bedside nurses and the respiratory therapist.      NOTE OF PERSONAL INVOLVEMENT IN CARE   This patient has a high probability of imminent, clinically significant deterioration, which requires the highest level of preparedness to intervene urgently. I participated in the decision-making and personally managed or directed the management of the following life and organ supporting interventions that required my frequent assessment to treat or prevent imminent deterioration. I personally spent 60 minutes of critical care time. This is time spent at this critically ill patient's bedside actively involved in patient care as well as the coordination of care and discussions with the patient's family. This does not include any procedural time which has been billed separately.     Miguel Woodward AGACNP-BC     [address] [facility]

## 2021-07-24 NOTE — ED PROVIDER NOTES
22-year-old male history of prostate cancer, CHF, constipation, hypertension, stroke, TIA presents to the emergency department by EMS as transfer from [address] [place] [address] for respiratory distress. Received a call from [facility] who told me the patient was on trach collar at baseline and has now been intubated for aspiration concern for aspiration pneumonia. He has been progressively hypoxic. Is unclear what his baseline is. I am told that he was discharged today from [facility] after a stay there for abnormal labs including elevated LFTs, SOULEYMANE. Patient arrives without a tracheostomy. He does have a PEG tube. He is intubated and unable to provide any history. The history is provided by the EMS personnel and medical records. Respiratory Distress  This is a new problem.         Past Medical History:   Diagnosis Date    Cancer [facility])     prostate    Chronic systolic heart failure ([facility] [address] 12/7/2020    Constipation     Gout     Hyperlipemia     Hypertension     Pacemaker 2019    Stroke ([facility] [address]     Thrombocytopenia ([facility] [address] 3/19/2019    TIA (transient ischemic attack) 2013       Past Surgical History:   Procedure Laterality Date    HX PROSTATECTOMY      HX UROLOGICAL      prostate removed    PLACE PERCUT GASTROSTOMY TUBE  11/30/2020         NM INS NEW/RPLCMT PRM PACEMAKR W/TRANS ELTRD ATRIAL N/A 10/18/2019    Insert Ppm Single Atrial performed by Samantha Andrews MD at [address], [facility] CATH LAB    NM INS NEW/RPLCMT PRM PM W/TRANSV ELTRD ATRIAL&VENT Right 3/22/2019    INSERT PPM DUAL performed by Samantha Andrews MD at [address] CATH LAB         Family History:   Problem Relation Age of Onset    No Known Problems Mother     No Known Problems Father        Social History     Socioeconomic History    Marital status:      Spouse name: Not on file    Number of children: Not on file    Years of education: Not on file    Highest education level: Not on file   Occupational History    Not on file   Tobacco Use    Smoking status: Never Smoker    Smokeless tobacco: Never Used   Substance and Sexual Activity    Alcohol use: No    Drug use: No    Sexual activity: Never   Other Topics Concern    Not on file   Social History Narrative    Not on file     Social Determinants of Health     Financial Resource Strain:     Difficulty of Paying Living Expenses:    Food Insecurity:     Worried About Running Out of Food in the Last Year:     [address] in the Last Year:    Transportation Needs:     Lack of Transportation (Medical):  Lack of Transportation (Non-Medical):    Physical Activity:     Days of Exercise per Week:     Minutes of Exercise per Session:    Stress:     Feeling of Stress :    Social Connections:     Frequency of Communication with Friends and Family:     Frequency of Social Gatherings with Friends and Family:     Attends [employer] Services:     Active Member of Clubs or Organizations:     Attends Club or Organization Meetings:     Marital Status:    Intimate Partner Violence:     Fear of Current or Ex-Partner:     Emotionally Abused:     Physically Abused:     Sexually Abused: ALLERGIES: Patient has no known allergies. Review of Systems   Unable to perform ROS: Intubated       Vitals:    07/23/21 2100 07/23/21 2101 07/23/21 2102 07/23/21 2108   BP: 96/61      Pulse: (!) 109 (!) 107 (!) 104 (!) 114   Resp: 26 27 19 14   SpO2:   (!) 88% 97%            Physical Exam  Vitals and nursing note reviewed. Constitutional:       General: He is in acute distress. Appearance: He is well-developed. He is ill-appearing. He is not toxic-appearing or diaphoretic. HENT:      Head: Normocephalic and atraumatic. Nose: Nose normal.      Mouth/Throat:      Mouth: Mucous membranes are dry. Pharynx: Oropharynx is clear. Eyes:      Extraocular Movements: Extraocular movements intact.       Conjunctiva/sclera: Conjunctivae normal.      Pupils: Pupils are equal, round, and reactive to light. Cardiovascular:      Rate and Rhythm: Regular rhythm. Tachycardia present. Pulses: Normal pulses. Pulmonary:      Effort: No respiratory distress. Breath sounds: Normal breath sounds. No wheezing. Chest:      Chest wall: No mass or tenderness. Abdominal:      General: There is distension. Palpations: Abdomen is soft. Tenderness: There is no abdominal tenderness. There is no guarding or rebound. Comments: PEG in place   Musculoskeletal:         General: No swelling, tenderness, deformity or signs of injury. Normal range of motion. Cervical back: Normal range of motion and neck supple. No rigidity. No muscular tenderness. Right lower leg: No tenderness. No edema. Left lower leg: No tenderness. No edema. Skin:     General: Skin is warm and dry. Capillary Refill: Capillary refill takes less than 2 seconds. MDM  Number of Diagnoses or Management Options     Amount and/or Complexity of Data Reviewed  Clinical lab tests: reviewed  Tests in the radiology section of CPT®: reviewed  Tests in the medicine section of CPT®: reviewed  Decide to obtain previous medical records or to obtain history from someone other than the patient: yes    Risk of Complications, Morbidity, and/or Mortality  General comments: 9:53 PM  I have spent 40 minutes of critical care time involved in lab review, consultations with specialist, family decision-making, and documentation. During this entire length of time I was immediately available to the patient. Critical Care: The reason for providing this level of medical care for this critically ill patient was due a critical illness that impaired one or more vital organ systems such that there was a high probability of imminent or life threatening deterioration in the patients condition.  This care involved high complexity decision making to assess, manipulate, and support vital system functions, to treat this degreee vital organ system failure and to prevent further life threatening deterioration of the patients condition. Procedures          ED EKG interpretation:  Rhythm: V paced at a rate of approximately 115. ST segment:  No concerning ST elevations or depressions. This EKG was interpreted by Jordan Schulz MD,ED Provider. IMPRESSION:  No diagnosis found.    - Broad Spectrum Antibiotics ordered: Azithromycin  - Repeat lactic acid ordered for time 2315  - Re-assessment performed at time 9:58 PM  and clinical condition stable. - Hypotension or Lactic Acidosis present (SBP<90, MAP<65, Lactate >4): YES IVF:  Not given due to concerns for volume overload  - Persistent Hypotension despite IVF resuscitation: NO  Vasopressors: Not indicated due to septic shock not present    Plan:  Admit to ICU    Total critical care time spent exclusive of procedures:  35 minutes    Trudy Cortez MD     Repeat Sepsis focused physical exam at time 9:59 PM  Vital signs   Visit Vitals  BP 96/61   Pulse (!) 114   Resp 14   SpO2 97%       Cardiopulmonary exam  Tachycardic rate with regular rhythm, normal cardiac and pulmonary auscultation, normal pulmonary exam    Capillary refill  Less than 2 seconds    Peripheral pulse evaluation  Strong and equal x4    Skin exam  Warm, dry, normal color, well-perfused        Trudy Cortez MD            Patient has severe symptomatic CHF with hypoxia necessitating intubation today in the setting of suspected aspiration pneumonia/pneumonitis. I am concerned that excessive fluids will be detrimental to his pulmonary status given his decreased ejection fraction. Instead of 30 cc/kg I have ordered a 1 L bolus of normal saline to be given immediately. Perfect Serve Consult for Admission  10:05 PM    ED Room Number: ER15/15  Patient Name and age:  Hanny Calderón 80 y.o.  male  Working Diagnosis:   1.  Sepsis with acute hypoxic respiratory failure without septic shock, due to unspecified organism ([facility] [address]    2. NSTEMI (non-ST elevated myocardial infarction) ([facility] [address]        COVID-19 Suspicion:  no  Sepsis present:  yes  Reassessment needed: no  Code Status:  Full Code  Readmission: no  Isolation Requirements:  no  Recommended Level of Care:  ICU  Department:[facility] Adult ED - 21   Other: Patient discharged today from [place] [address] to [facility].  Concern for aspiration event while at [facility] necessitating intubation while there. Reportedly severe EF of 10%.

## 2021-07-24 NOTE — CONSULTS
Cardiology Consult    Patient: Dianna Diaz MRN: [id number]  SSN: [id number]    YOB: 1933  Age: 80 y.o. Sex: male       Subjective:      Date of  Admission: 7/23/2021     Admission type: Emergency    Dianna Diaz is a 80 y.o. male admitted for Aspiration into respiratory tract [T17.908A]; Acute respiratory failure with hypoxia ([facility] [address] [J96.01]. Reason: systolic CHF, cardiomyopathy  Referring Dr. Nikki Lawrence  Primary cardiologist: Dr. Lazaro Baker  Pt with h/o pacemaker, NSVT, h/o CVA, alzheimers dementia, cardiomyopathy, transferred from [address] [place] [address] for respiratory failure/aspiration pneumonitis. Per records, had admit at [facility] 1 month ago for respiratory failure found there to have severe aortic stenosis and EF 10-15%, s/p PEG. Currently on ventilator so history taken from care team and records.   Primary Care Provider: Jonathan Duke MD  Past Medical History:   Diagnosis Date    Cancer [facility])     prostate    Chronic systolic heart failure ([facility] [address] 12/7/2020    Constipation     Gout     Hyperlipemia     Hypertension     Pacemaker 2019    Stroke ([facility] [address]     Thrombocytopenia ([facility] [address] 3/19/2019    TIA (transient ischemic attack) 2013      Past Surgical History:   Procedure Laterality Date    HX PROSTATECTOMY      HX UROLOGICAL      prostate removed    PLACE PERCUT GASTROSTOMY TUBE  11/30/2020         UT INS NEW/RPLCMT PRM PACEMAKR W/TRANS ELTRD ATRIAL N/A 10/18/2019    Insert Ppm Single Atrial performed by Ashlee Rush MD at [address] [place] [address], [facility] CATH LAB    UT INS NEW/RPLCMT PRM PM W/TRANSV ELTRD ATRIAL&VENT Right 3/22/2019    INSERT PPM DUAL performed by Ashlee Rush MD at [place] [address], [facility]  CATH LAB     Family History   Problem Relation Age of Onset    No Known Problems Mother     No Known Problems Father       Social History     Tobacco Use    Smoking status: Never Smoker    Smokeless tobacco: Never Used   Substance Use Topics    Alcohol use: No      Current Facility-Administered Medications Medication Dose Route Frequency    NOREPINephrine (LEVOPHED) 8 mg in 5% dextrose 250mL (32 mcg/mL) infusion  0.5-20 mcg/min IntraVENous TITRATE    sodium chloride (NS) flush 5-40 mL  5-40 mL IntraVENous Q8H    sodium chloride (NS) flush 5-40 mL  5-40 mL IntraVENous PRN    acetaminophen (TYLENOL) tablet 650 mg  650 mg Oral Q6H PRN    Or    acetaminophen (TYLENOL) suppository 650 mg  650 mg Rectal Q6H PRN    ondansetron (ZOFRAN ODT) tablet 4 mg  4 mg Oral Q8H PRN    Or    ondansetron (ZOFRAN) injection 4 mg  4 mg IntraVENous Q6H PRN    [Held by provider] enoxaparin (LOVENOX) injection 40 mg  40 mg SubCUTAneous DAILY    sennosides (SENOKOT) 8.8 mg/5 mL syrup 8.8 mg  5 mL Oral BID PRN    chlorhexidine (ORAL CARE KIT) 0.12 % mouthwash 15 mL  15 mL Oral Q12H    Vancomycin Pharmacy Dosing   Other Rx Dosing/Monitoring    [START ON 7/25/2021] vancomycin (VANCOCIN) 1,000 mg in 0.9% sodium chloride 250 mL (VIAL-MATE)  1,000 mg IntraVENous Q24H    lansoprazole (PREVACID) 3 mg/mL oral suspension 30 mg  30 mg Oral ACB    lacosamide (VIMPAT) tablet 100 mg  100 mg Oral Q12H    levETIRAcetam (KEPPRA) oral solution 500 mg  500 mg Oral BID    allopurinoL (ZYLOPRIM) tablet 100 mg  100 mg Oral DAILY    aspirin chewable tablet 81 mg  81 mg Oral DAILY    bumetanide (BUMEX) tablet 1 mg  1 mg Oral BID    propofol (DIPRIVAN) 10 mg/mL infusion  0-50 mcg/kg/min (Order-Specific) IntraVENous TITRATE    sodium chloride (NS) flush 5-10 mL  5-10 mL IntraVENous PRN    fentaNYL (PF) 1,500 mcg/30 mL (50 mcg/mL) infusion  0-200 mcg/hr IntraVENous TITRATE    piperacillin-tazobactam (ZOSYN) 3.375 g in 0.9% sodium chloride (MBP/ADV) 100 mL MBP  3.375 g IntraVENous Q8H        No Known Allergies     Review of Systems:  Review of systems not obtained due to patient factors.        Subjective:     Visit Vitals  BP (!) 127/58   Pulse 68   Temp 99 °F (37.2 °C)   Resp 18   Ht 5' 6\" (1.676 m)   Wt 164 lb 10.9 oz (74.7 kg)   SpO2 98%   BMI 26.58 kg/m²        Physical Exam:  Visit Vitals  BP (!) 127/58   Pulse 68   Temp 99 °F (37.2 °C)   Resp 18   Ht 5' 6\" (1.676 m)   Wt 164 lb 10.9 oz (74.7 kg)   SpO2 98%   BMI 26.58 kg/m²     General Appearance:  Vent, sedated   Ears/Nose/Mouth/Throat:   Hearing grossly normal.         Neck: Supple. Chest:   Scattered rhonchi, vent   Cardiovascular:  Regular paced, S1, S2 normal, III/VI systolic murmur. Abdomen:   Soft, non-tender, bowel sounds are active. Extremities: No edema bilaterally. Skin: Warm and dry. Cardiographics:  Telemetry: V paced  ECG: V paced  Echocardiogram: pending    Data Reviewed: All lab results for the last 24 hours reviewed. Assessment:         Hospital Problems  Date Reviewed: 12/17/2020        Codes Class Noted POA    Acute respiratory failure with hypoxia [facility]) ICD-10-CM: J96.01  ICD-9-CM: 518.81  7/23/2021 Unknown        Aspiration into respiratory tract ICD-10-CM: A52.991K  ICD-9-CM: 934.9  7/23/2021 Unknown               Plan:     Respiratory failure/aspiration, septic shock. On vent. Per critical care team.  Chronic systolic CHF, EF 37-84% reported in past. Unable to assess NYHA pt on vent. Echo pending and will review when completed. BP too low for GDT (BB,ACE-I, etc). Optimize supportive care as is already being done. AS. Echo pending. Pacemaker.

## 2021-07-24 NOTE — PROGRESS NOTES
2045: pt arrives to unit already intubated, 7.5 24@ lip. Placed immediately on vent, settings documented on flowsheet. Will obtain ABG when pt settled    2121: ABG completed, results uploaded and shown to Dr Zakia Ford. Settings changed and documented in flowsheet    2343: pt requires frequent suctioning, as when secretions pile up, pt desats rapidly. Once suctioned (yellow-brown secretions), pt sats 98% (on . Will continue to monitor. 2353: increased FiO2 to 100% and PEEP to +8 due to pt desats. Every attempt to wean results in desats despite frequent suctioning.

## 2021-07-24 NOTE — ED TRIAGE NOTES
PT arrives via EMS from [facility] after being intubated on site. PT was discharged from [facility] today. Pt arrives with peg tute, ET tube at 24 at the lip 7.5 tube, ng tube 73 at the nose, epps and unresponsive.   Pt was given etomidate and versed for intubation and 50 mg of ketamine on arrival.

## 2021-07-24 NOTE — PROGRESS NOTES
0730: Bedside and Verbal shift change report given to Nina Llanos RN (oncoming nurse) by Alo Silva RN (offgoing nurse). Report included the following information SBAR, Kardex, Intake/Output, MAR, Accordion, Recent Results, Med Rec Status, Cardiac Rhythm NSR, Alarm Parameters  and Dual Neuro Assessment.     0945Crjim Hauser with MD regarding sedation; turn off Propofol and wean off fentanyl as tolerated; see if we can attempt SBT later on if tolerated    1100: Consent obtained via phone from pts daughter Nancy Cespedes for R thoracentesis     1300: RT, RN and MD to bedside to attempt SBT; pt was not pulling volumes and breathing at a low rate; pt returned to previous vent settings; will attempt again once off sedation longer

## 2021-07-24 NOTE — PROGRESS NOTES
[facility]    ICU TEAM History and Physical    Name: Maura Alejandre   : 3/9/1933   MRN: [id number]   Date: 2021      Progress Note: 2021      Reason for ICU Admission: Acute Hypoxic Respiratory Failure    HPI:  Maura Alejandre is a 80 y.o. male with history of prostate cancer, CHF, constipation, hypertension, stroke, TIA, and seizures who presents to the emergency department by EMS as transfer from [address] [place] [address] for respiratory distress. Patient had initially been a transfer from [facility] after a hospitalization from  [facility]. Was there for altered mental status hypoxia was treated for hypoxic respiratory failure bilateral pleural effusions acute systolic CHF secondary to severe aortic stenosis hyponatremia hypokalemia elevated LFTs malnutrition and possible UTI was treated with ceftriaxone and Vanco.  Patient already had a PEG for dysphagia and protein calorie malnutrition prior to [facility] admission. He was at [address] [place] [address] from [phone]. While at [address] [place] [address] today patient had an episode of vomiting and aspirated was initially started on high flow nasal cannula however was still hypoxic thus got intubated by [address] [place] [address] staff. Patient was then transferred to the ED for further care. Per family patient's decline started last year while he was having seizures/TIA patient became more encephalopathic and started to refuse eating and thus got a PEG for nutritional reasons. was staying at assisted living facility and had to be re-hospitalized after his PEG malfunctioned. During that time he did have an infection, daughter could not exactly say where the infection was, but she thought it was an abdominal infection and since then he started having worsening heart failure and decreased heart function, and failure to thrive. Per records EF was 10 to 15%. Per daughter, patient has never had a tracheostomy.  She states patient is awake at times, will open eyes, but does not track, does not follow commands, and does not speak. They wish patient to remain full code at this time. While in ED patient was placed on mechanical ventilator, ABG was done which was consistent with hypoxic respiratory failure. Patient was also given 1 L of IV fluids for fluid resuscitation due to sepsis. Initially blood pressure was okay but once patient was started on sedation blood pressure dropped. Patient also had episodes of hypoxia due to vent asynchrony. Once PEEP was increased for hypoxia patient's pressure dropped. Started patient on Levophed via peripheral in ED. If hypotension worsens will need central line. Started on Zosyn. Updated the family at bedside, daughter and and son. Consent for central line and blood was  Received. Patient to transfer to ICU for continued care. Subjective:   Overnight Events:   7/24/2021    POD:  * No surgery found *    S/P:   Thoracentesis - 7/24 - ~ 1300 cc removed    Assessment:     ICU Problems:  1. Acute Hypoxic Respiratory Failure  2. Acute on Chronic Systolic Heart Failure  3. Septic Shock  4. Cardiogenic Shock  5. Aspiration Pneumonitis  6. Pleural Effusion - RIGHT-sided  7. GERD  8. Protein Calorie Malnutrition  9. Dysphagia s/p PEG  10. Gout  11. Hx Prostate CA  12. Hx Seizure D/O  13. Hx CVA/TIA - with Residual Aphasia  14. Hx HTN  15. Hx Severe Aortic Stenosis/ Mod to severe mitral valve regurgitation  16. Hx Severe Pulmonary HTN  17. Hx Pacermaker    ICU Comprehensive Plan of Care:     Plans for this Shift:     1. ECHO Pending  2. IR for Thoro  3. Appreciate Cardiology  4. Trend troponins and lactic acid  5. SBP Goal of: > 90 mmHg  6. MAP Goal of: > 65 mmHg  7. Norepinephrine - For above SBP/MAP goals  8. Albumin q6  9. Hold off on Bumex  10. Dobutamine   11. Transfusion Trigger (Hgb): <7 g/dL  12. Respiratory Goals:  a. Chlorhexidine   b. Optimize PEEP/Ventilation/Oxygenation  c. Goal Tidal Volume 6 cc/kg based on IBW  d. Aim for lung protective ventilation  e.  Head of bed > 30 degrees  13. Pulmonary toilet: Duo-Nebs   14. SpO2 Goal: > 92%   15. Keep K>4; Mg>2   16. PT/OT: NA   17. Discussed Plan of Care/Code Status: Full Code - discussed with daughter/son  18. Appreciate [facility]  19. Discussed Care Plan with Bedside RN  20. Documentation of Current Medications  21.  Rest of Plan Below:    F - Feeding:  No may start in am if no vomiting  A - Analgesia: Fentanyl  S - Sedation: Propofol  T - DVT Prophylaxis: Lovenox   H - Head of Bed: > 30 Degrees  U - Ulcer Prophylaxis: prevacid   G - Glycemic Control: Insulin q 6 hrs accu checks SSI   S - Spontaneous Breathing Trial: Pending  B - Bowel Regimen: Senna  I - Indwelling Catheter:   Tubes: ETT and PEG Tube  Lines: Peripheral IV  Drains: Avalos Catheter  D - De-escalation of Antibiotics: Vancomycin  Zosyn    Active Problem List:     Problem List  Date Reviewed: 12/17/2020        Codes Class    Acute respiratory failure with hypoxia (HCC) ICD-10-CM: J96.01  ICD-9-CM: 518.81         Aspiration into respiratory tract ICD-10-CM: T17.908A  ICD-9-CM: 934.9         Hypokalemia ICD-10-CM: E87.6  ICD-9-CM: 276.8         Hypernatremia ICD-10-CM: E87.0  ICD-9-CM: 276.0         Moderate protein malnutrition (HCC) ICD-10-CM: E44.0  ICD-9-CM: 263.0         Failure to thrive (0-17) ICD-10-CM: R62.51  ICD-9-CM: 783.41         Bacteremia ICD-10-CM: R78.81  ICD-9-CM: 790.7         UTI (urinary tract infection) ICD-10-CM: N39.0  ICD-9-CM: 599.0         Dementia (HCC) ICD-10-CM: F03.90  ICD-9-CM: 294.20         Bedridden ICD-10-CM: Z74.01  ICD-9-CM: V49.84         Pressure ulcer ICD-10-CM: L89.90  ICD-9-CM: 707.00, 707.20         S/P percutaneous endoscopic gastrostomy (PEG) tube placement ([facility] ICD-10-CM: Z93.1  ICD-9-CM: V44.1         Dehydration ICD-10-CM: E86.0  ICD-9-CM: 276.51         Lactic acidosis ICD-10-CM: E87.2  ICD-9-CM: 276.2         Sepsis ([facility] ICD-10-CM: A41.9  ICD-9-CM: 038.9, 995.91         SOULEYMANE (acute kidney injury) ([facility] ICD-10-CM: N17.9  ICD-9-CM: 352. 9         Chronic systolic heart failure (HCC) ICD-10-CM: I50.22  ICD-9-CM: 428.22         Altered mental status ICD-10-CM: R41.82  ICD-9-CM: 780.97         Post-ictal state ([facility] ICD-10-CM: R56.9  ICD-9-CM: 780.39         Seizure ([facility] ICD-10-CM: R56.9  ICD-9-CM: 780.39         Atrial pacemaker lead displacement ICD-10-CM: T82.120A  ICD-9-CM: 996.01         Pacemaker ICD-10-CM: Z95.0  ICD-9-CM: V45.01     Overview Signed 3/22/2019  5:08 PM by Osman Swift MD     3/22/2019 dual chamber Medtronic pacer             Bradycardia ICD-10-CM: R00.1  ICD-9-CM: 427.89         Transaminitis ICD-10-CM: R74.01  ICD-9-CM: 790.4         Hypertensive urgency ICD-10-CM: I16.0  ICD-9-CM: 401.9         Second degree AV block, Mobitz type I ICD-10-CM: I44.1  ICD-9-CM: 426.13     Overview Signed 3/21/2019  6:14 PM by Osman Swift MD     Added automatically from request for surgery [id number]             Stage 3 chronic kidney disease ([facility] ICD-10-CM: N18.30  ICD-9-CM: 812. 3         Rotator cuff arthropathy of both shoulders ICD-10-CM: M12.811, M12.812  ICD-9-CM: 716.81         Cognitive impairment ICD-10-CM: R41.89  ICD-9-CM: 715.4         Systolic murmur IEL-89-LP: R01.1  ICD-9-CM: 785.2         Essential hypertension ICD-10-CM: I10  ICD-9-CM: 401.9         Gout ICD-10-CM: M10.9  ICD-9-CM: 274.9         Pure hypercholesterolemia ICD-10-CM: E78.00  ICD-9-CM: 272.0         History of prostate cancer ICD-10-CM: Z85.46  ICD-9-CM: V10.46         Chronic constipation ICD-10-CM: K59.09  ICD-9-CM: 564.00         Dysuria ICD-10-CM: R30.0  ICD-9-CM: 813. 1         Weight loss ICD-10-CM: R63.4  ICD-9-CM: 783.21         Adrenal mass ([facility] ICD-10-CM: E27.8  ICD-9-CM: 255.8     Overview Addendum 12/17/2018  9:57 AM by Arti Ortega MD     Stable/non-functioning adenoma on imaging                   Past Medical History:      has a past medical history of Cancer [facility]), Chronic systolic heart failure ([facility] (2020), Constipation, Gout, Hyperlipemia, Hypertension, Pacemaker (), Stroke [facility]), Thrombocytopenia ([facility] (3/19/2019), and TIA (transient ischemic attack) (). Past Surgical History:      has a past surgical history that includes hx urological; hx prostatectomy; pr ins new/rplcmt prm pm w/transv eltrd atrial&vent (Right, 3/22/2019); pr ins new/rplcmt prm pacemakr w/trans eltrd atrial (N/A, 10/18/2019); and place percut gastrostomy tube (2020). Home Medications:     Prior to Admission medications    Medication Sig Start Date End Date Taking? Authorizing Provider   levETIRAcetam (KEPPRA) 750 mg tablet Take 1 Tab by mouth every twelve (12) hours. 20   Cheyenne Jin NP   aspirin delayed-release 81 mg tablet Take 1 Tab by mouth daily. 20   Cedrick Hansen MD   polyethylene glycol [facility]) 17 gram/dose powder Take 17 g by mouth daily as needed for Constipation. 20   Cedrick Hansen MD   balsam peru-castor oiL (VENELEX) ointment Apply  to affected area three (3) times daily. 20   Heather Marvin MD   famotidine (PEPCID) 20 mg tablet Take 1 Tab by mouth every evening. 20   Heather Marvin MD   acetaminophen (TYLENOL) 325 mg tablet Take 650 mg by mouth every six (6) hours as needed for Pain. Provider, Historical       Allergies/Social/Family History:     No Known Allergies   Social History     Tobacco Use    Smoking status: Never Smoker    Smokeless tobacco: Never Used   Substance Use Topics    Alcohol use: No      Family History   Problem Relation Age of Onset    No Known Problems Mother     No Known Problems Father        Review of Systems:     Review of systems not obtained due to patient factors.     Objective:   Vital Signs:  Visit Vitals  BP (!) 121/57   Pulse 72   Temp (!) 100.7 °F (38.2 °C)   Resp 18   Ht 5' 6\" (1.676 m)   Wt 74.7 kg (164 lb 10.9 oz)   SpO2 99%   BMI 26.58 kg/m²      O2 Device: Endotracheal tube Temp (24hrs), Av.7 °F (38.2 °C), Min:100.1 °F (37.8 °C), Max:101.1 °F (38.4 °C)           Intake/Output:     Intake/Output Summary (Last 24 hours) at 7/24/2021 0920  Last data filed at 7/24/2021 0700  Gross per 24 hour   Intake 821.76 ml   Output 165 ml   Net 656.76 ml       Physical Exam:    General:  mild distress, appears stated age, intubated and sedated  Eye:  conjunctivae/corneas clear. Pupils bilaterally reactive and round  Neurologic:  WD to pain in all ext. Does not follow commands  Lymphatic:  Cervical, supraclavicular, and axillary nodes normal.   Neck:  normal and no erythema or exudates noted. Lungs: Diminished in bilateral bases, mild tachypnea. Moderate oral secretions   Heart: Paced rhythm, regular, tachycardia  Abdomen:  soft, non-tender.  Bowel sounds normal. No masses,  no organomegaly  Cardiovascular:  Regular rate and rhythm, S1S2 present, without murmur or extra heart sounds and pedal pulses normal Generalized edema  Skin:  no rash or abnormalities    LABS AND  DATA: Personally reviewed  Recent Labs     07/24/21 0444 07/23/21 2111   WBC 12.7* 11.8*   HGB 7.2* 8.3*   HCT 22.6* 26.1*    238     Recent Labs     07/24/21 0444 07/23/21 2111    134*   K 3.8 4.4   CL 97 96*   CO2 32 31   BUN 53* 52*   CREA 0.97 1.03   GLU 85 131*   CA 9.0 9.2   MG 2.5*  --    PHOS 1.8*  --      Recent Labs     07/24/21 0444 07/23/21 2111    147*   TP 6.0* 6.9   ALB 1.7* 1.9*   GLOB 4.3* 5.0*     Recent Labs     07/24/21 0444   INR 1.1   PTP 11.3*      Recent Labs     07/23/21 2117   PHI 7.40   PCO2I 46.4*   PO2I 104*   FIO2I 100     Recent Labs     07/24/21 0444 07/23/21 2111   TROIQ 0.57* 0.36*       Hemodynamics:   PAP:   CO:     Wedge:   CI:     CVP:    SVR:       PVR:       Ventilator Settings:  Mode Rate Tidal Volume Pressure FiO2 PEEP   Assist control, Volume control        50 % (weaned) 8 cm H20     Peak airway pressure: 29 cm H2O    Minute ventilation: 11.7 l/min        MEDS: Reviewed    Imaging:    CXR Results (Last 48 hours)               07/24/21 0421  XR CHEST PORT Final result    Impression:  Improved aeration the right lung base with decreased effusion. Diffuse interstitial and airspace opacities with increase in the left midlung   zone. Narrative:  EXAM: XR CHEST PORT       INDICATION: central line placement       COMPARISON: 7/23/2021       FINDINGS: A portable AP radiograph of the chest was obtained at 413 hours. Tubes   and lines are stable. . Improved aeration right lung base with decreased right   effusion. Diffuse interstitial and airspace opacities are again noted with some   increase in the left midlung zone. .       Right IJ central venous catheter without evidence of pneumothorax. The cardiac   and mediastinal contours and pulmonary vascularity are normal.  The bones and   soft tissues are grossly within normal limits. 07/23/21 2136  XR CHEST PORT Final result    Impression:  Moderate pulmonary edema with right pleural effusion       Narrative:  EXAM: XR CHEST PORT       INDICATION: Unresponsiveness       COMPARISON: 1/6/2021       FINDINGS: A portable AP radiograph of the chest was obtained at 2129 hours. The   patient is on a cardiac monitor. The endotracheal tube terminates below the   thoracic inlet. NG tube extends into the stomach. A dual-lead pacemaker is   present. There is moderate pulmonary edema with a right pleural effusion. CT Results  (Last 48 hours)    None        ECHO:  Pending    Multidisciplinary Rounds Completed:  No    ABCDEF Bundle/Checklist Completed:  Yes    SPECIAL EQUIPMENT  None    DISPOSITION  Stay in ICU    CRITICAL CARE CONSULTANT NOTE  I had a face to face encounter with the patient, reviewed and interpreted patient data including clinical events, labs, images, vital signs, I/O's, and examined patient.   I have discussed the case and the plan and management of the patient's care with the consulting services, the bedside nurses and the respiratory therapist.      NOTE OF PERSONAL INVOLVEMENT IN CARE   This patient has a high probability of imminent, clinically significant deterioration, which requires the highest level of preparedness to intervene urgently. I participated in the decision-making and personally managed or directed the management of the following life and organ supporting interventions that required my frequent assessment to treat or prevent imminent deterioration. I personally spent 90 minutes of critical care time. This is time spent at this critically ill patient's bedside actively involved in patient care as well as the coordination of care and discussions with the patient's family. This does not include any procedural time which has been billed separately.     Gab Matthews, DO   Staff Intensivist/Anesthesiologist  Critical Care Medicine

## 2021-07-24 NOTE — PROGRESS NOTES
Pharmacist Note - Vancomycin Dosing    Consult provided for this 80 y.o. male for indication of asp PNA, sepsis. Antibiotic regimen(s): Zosyn and Vancomycin  Patient on vancomycin PTA? NO     Recent Labs     211   WBC 11.8*   CREA 1.03   BUN 52*     Frequency of BMP: daily  Height: 167.6 cm  Weight: 74.7 kg  Est CrCl: 44 ml/min  Temp (24hrs), Av.7 °F (38.2 °C), Min:100.1 °F (37.8 °C), Max:101.1 °F (38.4 °C)    Cultures: blood      MRSA Swab ordered (if applicable)? YES    The plan below is expected to result in a target range of AUC/JOSE M 400-600    Therapy will be initiated with a loading dose of 2000 mg IV x 1 to be followed by a maintenance dose of 1000 mg IV every 24 hours. Pharmacy to follow patient daily and order levels / make dose adjustments as appropriate. *Vancomycin has been dosed used Bayesian kinetics software to target an AUC/JOSE M of 400-600, which provides adequate exposure for an assumed infection due to MRSA with an JOSE M of 1 or less while reducing the risk of nephrotoxicity as seen with traditional trough based dosing goals.

## 2021-07-24 NOTE — PROCEDURES
[facility]      Procedure Note - Central Venous Access:   Performed by JOSE Baez  Diagnosis: Shock, Acute respiratory failure with hypoxia   Insertion Date: 07/24/21  Time:4:00 AM  Obtained Consent? yes; informed   Procedure Location:  ICU. Immediately prior to the procedure, the patient was reevaluated and found suitable for the planned procedure and any planned medications. Immediately prior to the procedure a time out was called to verify the correct patient, procedure, equipment, staff, and marking as appropriate. Central line Bundle:  Full sterile barrier precautions used. 7-Step Sterility Protocol followed. (cap, mask sterile gown, sterile gloves, large sterile sheet, hand hygiene, 2% chlorhexidine for cutaneous antisepsis)  5 mL 1% Lidocaine placed at insertion site. Patient positioned in Trendelenburg?yes   The site was prepped with ChloraPrep x2 and Sterile draping. Catheter inserted into a new site. Using Seldinger technique a Arrow Quad Lumen CVC was placed in the Right, Internal Jugular Vein via direct cannulation with x1 number of attempts for Monitoring, Blood Drawing and IV Access. Ultrasound Guidance was utilized. There was good dark, non-pulsatile blood return in all ports. Femoral Site? no. If Yes, reason femoral site was chosen: NA  Catheter secured. Biopatch in place? yes. Sterile Bio-occlusive dressing placed. The following complications were encountered: None. A follow-up chest x-ray was ordered post procedure. The procedure was tolerated well.             JOSE Baez  Critical Care Medicine  [facility]

## 2021-07-24 NOTE — PROGRESS NOTES
07/24/21 1338   Weaning Parameters   Spontaneous Breathing Trial Complete Yes   Resp Rate Observed 8.9   Ve 3.9      RSBI 45   patient returned to previous ventilation  Nurse and MD at bedside

## 2021-07-24 NOTE — ROUTINE PROCESS
TRANSFER - OUT REPORT:    Verbal report given to ICU RN(name) on Jony Weaver  being transferred to 7S(unit) for routine progression of care       Report consisted of patients Situation, Background, Assessment and   Recommendations(SBAR). Information from the following report(s) SBAR, ED Summary, [facility] and Recent Results was reviewed with the receiving nurse. Lines:   Peripheral IV Anterior; Left Forearm (Active)        Opportunity for questions and clarification was provided.       Patient transported with:   Registered Nurse & Respiratory Therapist

## 2021-07-25 ENCOUNTER — APPOINTMENT (OUTPATIENT)
Dept: GENERAL RADIOLOGY | Age: 86
DRG: 004 | End: 2021-07-25
Attending: ANESTHESIOLOGY
Payer: MEDICARE

## 2021-07-25 LAB
ALBUMIN SERPL-MCNC: 2.1 G/DL (ref 3.5–5)
ALBUMIN/GLOB SERPL: 0.5 {RATIO} (ref 1.1–2.2)
ALP SERPL-CCNC: 87 U/L (ref 45–117)
ALT SERPL-CCNC: 74 U/L (ref 12–78)
ANION GAP SERPL CALC-SCNC: 7 MMOL/L (ref 5–15)
ANION GAP SERPL CALC-SCNC: 8 MMOL/L (ref 5–15)
APPEARANCE UR: ABNORMAL
AST SERPL-CCNC: 32 U/L (ref 15–37)
BACTERIA SPEC CULT: NORMAL
BACTERIA SPEC CULT: NORMAL
BACTERIA URNS QL MICRO: ABNORMAL /HPF
BILIRUB SERPL-MCNC: 1 MG/DL (ref 0.2–1)
BILIRUB UR QL: NEGATIVE
BNP SERPL-MCNC: ABNORMAL PG/ML
BUN SERPL-MCNC: 54 MG/DL (ref 6–20)
BUN SERPL-MCNC: 55 MG/DL (ref 6–20)
BUN/CREAT SERPL: 41 (ref 12–20)
BUN/CREAT SERPL: 45 (ref 12–20)
CALCIUM SERPL-MCNC: 8.9 MG/DL (ref 8.5–10.1)
CALCIUM SERPL-MCNC: 9.2 MG/DL (ref 8.5–10.1)
CHLORIDE SERPL-SCNC: 97 MMOL/L (ref 97–108)
CHLORIDE SERPL-SCNC: 98 MMOL/L (ref 97–108)
CO2 SERPL-SCNC: 30 MMOL/L (ref 21–32)
CO2 SERPL-SCNC: 31 MMOL/L (ref 21–32)
COLOR UR: ABNORMAL
CREAT SERPL-MCNC: 1.21 MG/DL (ref 0.7–1.3)
CREAT SERPL-MCNC: 1.35 MG/DL (ref 0.7–1.3)
EPITH CASTS URNS QL MICRO: ABNORMAL /LPF
ERYTHROCYTE [DISTWIDTH] IN BLOOD BY AUTOMATED COUNT: 18.8 % (ref 11.5–14.5)
ERYTHROCYTE [DISTWIDTH] IN BLOOD BY AUTOMATED COUNT: 19.5 % (ref 11.5–14.5)
ERYTHROCYTE [DISTWIDTH] IN BLOOD BY AUTOMATED COUNT: 19.8 % (ref 11.5–14.5)
GLOBULIN SER CALC-MCNC: 4 G/DL (ref 2–4)
GLUCOSE SERPL-MCNC: 81 MG/DL (ref 65–100)
GLUCOSE SERPL-MCNC: 88 MG/DL (ref 65–100)
GLUCOSE UR STRIP.AUTO-MCNC: NEGATIVE MG/DL
HCT VFR BLD AUTO: 18.6 % (ref 36.6–50.3)
HCT VFR BLD AUTO: 20.9 % (ref 36.6–50.3)
HCT VFR BLD AUTO: 23 % (ref 36.6–50.3)
HGB BLD-MCNC: 6 G/DL (ref 12.1–17)
HGB BLD-MCNC: 6.7 G/DL (ref 12.1–17)
HGB BLD-MCNC: 7.5 G/DL (ref 12.1–17)
HGB UR QL STRIP: ABNORMAL
HISTORY CHECKED?,CKHIST: NORMAL
HISTORY CHECKED?,CKHIST: NORMAL
KETONES UR QL STRIP.AUTO: NEGATIVE MG/DL
LEUKOCYTE ESTERASE UR QL STRIP.AUTO: ABNORMAL
MAGNESIUM SERPL-MCNC: 2.6 MG/DL (ref 1.6–2.4)
MAGNESIUM SERPL-MCNC: 2.6 MG/DL (ref 1.6–2.4)
MCH RBC QN AUTO: 29 PG (ref 26–34)
MCH RBC QN AUTO: 29.1 PG (ref 26–34)
MCH RBC QN AUTO: 30.2 PG (ref 26–34)
MCHC RBC AUTO-ENTMCNC: 32.1 G/DL (ref 30–36.5)
MCHC RBC AUTO-ENTMCNC: 32.3 G/DL (ref 30–36.5)
MCHC RBC AUTO-ENTMCNC: 32.6 G/DL (ref 30–36.5)
MCV RBC AUTO: 88.8 FL (ref 80–99)
MCV RBC AUTO: 90.9 FL (ref 80–99)
MCV RBC AUTO: 93.5 FL (ref 80–99)
NITRITE UR QL STRIP.AUTO: NEGATIVE
NRBC # BLD: 0 K/UL (ref 0–0.01)
NRBC # BLD: 0 K/UL (ref 0–0.01)
NRBC # BLD: 0.02 K/UL (ref 0–0.01)
NRBC BLD-RTO: 0 PER 100 WBC
NRBC BLD-RTO: 0 PER 100 WBC
NRBC BLD-RTO: 0.1 PER 100 WBC
PH UR STRIP: 8 [PH] (ref 5–8)
PHOSPHATE SERPL-MCNC: 2 MG/DL (ref 2.6–4.7)
PLATELET # BLD AUTO: 135 K/UL (ref 150–400)
PLATELET # BLD AUTO: 146 K/UL (ref 150–400)
PLATELET # BLD AUTO: 173 K/UL (ref 150–400)
PMV BLD AUTO: 10.2 FL (ref 8.9–12.9)
PMV BLD AUTO: 9.4 FL (ref 8.9–12.9)
PMV BLD AUTO: 9.9 FL (ref 8.9–12.9)
POTASSIUM SERPL-SCNC: 3.1 MMOL/L (ref 3.5–5.1)
POTASSIUM SERPL-SCNC: 4 MMOL/L (ref 3.5–5.1)
PROCALCITONIN SERPL-MCNC: 43.14 NG/ML
PROT SERPL-MCNC: 6.1 G/DL (ref 6.4–8.2)
PROT UR STRIP-MCNC: 100 MG/DL
RBC # BLD AUTO: 1.99 M/UL (ref 4.1–5.7)
RBC # BLD AUTO: 2.3 M/UL (ref 4.1–5.7)
RBC # BLD AUTO: 2.59 M/UL (ref 4.1–5.7)
RBC #/AREA URNS HPF: ABNORMAL /HPF (ref 0–5)
SERVICE CMNT-IMP: NORMAL
SODIUM SERPL-SCNC: 135 MMOL/L (ref 136–145)
SODIUM SERPL-SCNC: 136 MMOL/L (ref 136–145)
SP GR UR REFRACTOMETRY: 1.02 (ref 1–1.03)
UA: UC IF INDICATED,UAUC: ABNORMAL
UROBILINOGEN UR QL STRIP.AUTO: 0.2 EU/DL (ref 0.2–1)
VANCOMYCIN SERPL-MCNC: 29 UG/ML
WBC # BLD AUTO: 20.5 K/UL (ref 4.1–11.1)
WBC # BLD AUTO: 20.7 K/UL (ref 4.1–11.1)
WBC # BLD AUTO: 21.9 K/UL (ref 4.1–11.1)
WBC URNS QL MICRO: ABNORMAL /HPF (ref 0–4)

## 2021-07-25 PROCEDURE — 80202 ASSAY OF VANCOMYCIN: CPT

## 2021-07-25 PROCEDURE — 74011000250 HC RX REV CODE- 250

## 2021-07-25 PROCEDURE — 84100 ASSAY OF PHOSPHORUS: CPT

## 2021-07-25 PROCEDURE — 74011000258 HC RX REV CODE- 258: Performed by: ANESTHESIOLOGY

## 2021-07-25 PROCEDURE — P9016 RBC LEUKOCYTES REDUCED: HCPCS

## 2021-07-25 PROCEDURE — 36430 TRANSFUSION BLD/BLD COMPNT: CPT

## 2021-07-25 PROCEDURE — 87077 CULTURE AEROBIC IDENTIFY: CPT

## 2021-07-25 PROCEDURE — 71045 X-RAY EXAM CHEST 1 VIEW: CPT

## 2021-07-25 PROCEDURE — 80053 COMPREHEN METABOLIC PANEL: CPT

## 2021-07-25 PROCEDURE — 74011250636 HC RX REV CODE- 250/636: Performed by: NURSE PRACTITIONER

## 2021-07-25 PROCEDURE — 87070 CULTURE OTHR SPECIMN AEROBIC: CPT

## 2021-07-25 PROCEDURE — 77030020291 HC FLEXSEAL FMS BMS -C

## 2021-07-25 PROCEDURE — 99232 SBSQ HOSP IP/OBS MODERATE 35: CPT | Performed by: INTERNAL MEDICINE

## 2021-07-25 PROCEDURE — 74011250637 HC RX REV CODE- 250/637: Performed by: NURSE PRACTITIONER

## 2021-07-25 PROCEDURE — 74011000258 HC RX REV CODE- 258: Performed by: NURSE PRACTITIONER

## 2021-07-25 PROCEDURE — 36592 COLLECT BLOOD FROM PICC: CPT

## 2021-07-25 PROCEDURE — 87186 SC STD MICRODIL/AGAR DIL: CPT

## 2021-07-25 PROCEDURE — 36591 DRAW BLOOD OFF VENOUS DEVICE: CPT

## 2021-07-25 PROCEDURE — 85027 COMPLETE CBC AUTOMATED: CPT

## 2021-07-25 PROCEDURE — 81001 URINALYSIS AUTO W/SCOPE: CPT

## 2021-07-25 PROCEDURE — 77030018798 HC PMP KT ENTRL FED COVD -A

## 2021-07-25 PROCEDURE — 83880 ASSAY OF NATRIURETIC PEPTIDE: CPT

## 2021-07-25 PROCEDURE — 74011250636 HC RX REV CODE- 250/636: Performed by: ANESTHESIOLOGY

## 2021-07-25 PROCEDURE — 2709999900 HC NON-CHARGEABLE SUPPLY

## 2021-07-25 PROCEDURE — 87449 NOS EACH ORGANISM AG IA: CPT

## 2021-07-25 PROCEDURE — 74011250637 HC RX REV CODE- 250/637: Performed by: ANESTHESIOLOGY

## 2021-07-25 PROCEDURE — 83735 ASSAY OF MAGNESIUM: CPT

## 2021-07-25 PROCEDURE — 74011250636 HC RX REV CODE- 250/636

## 2021-07-25 PROCEDURE — 65610000006 HC RM INTENSIVE CARE

## 2021-07-25 PROCEDURE — P9047 ALBUMIN (HUMAN), 25%, 50ML: HCPCS | Performed by: ANESTHESIOLOGY

## 2021-07-25 PROCEDURE — 84145 PROCALCITONIN (PCT): CPT

## 2021-07-25 RX ORDER — ROCURONIUM BROMIDE 10 MG/ML
INJECTION, SOLUTION INTRAVENOUS
Status: COMPLETED
Start: 2021-07-25 | End: 2021-07-25

## 2021-07-25 RX ORDER — DOBUTAMINE HYDROCHLORIDE 200 MG/100ML
2.5 INJECTION INTRAVENOUS
Status: DISCONTINUED | OUTPATIENT
Start: 2021-07-25 | End: 2021-07-25

## 2021-07-25 RX ORDER — ROCURONIUM BROMIDE 10 MG/ML
50 INJECTION, SOLUTION INTRAVENOUS
Status: COMPLETED | OUTPATIENT
Start: 2021-07-25 | End: 2021-07-25

## 2021-07-25 RX ORDER — PROPOFOL 10 MG/ML
50 INJECTION, EMULSION INTRAVENOUS
Status: COMPLETED | OUTPATIENT
Start: 2021-07-25 | End: 2021-07-25

## 2021-07-25 RX ORDER — DOBUTAMINE HYDROCHLORIDE 200 MG/100ML
2.5 INJECTION INTRAVENOUS CONTINUOUS
Status: DISCONTINUED | OUTPATIENT
Start: 2021-07-25 | End: 2021-08-02

## 2021-07-25 RX ORDER — BALSAM PERU/CASTOR OIL
OINTMENT (GRAM) TOPICAL 2 TIMES DAILY
Status: DISCONTINUED | OUTPATIENT
Start: 2021-07-25 | End: 2021-08-11 | Stop reason: HOSPADM

## 2021-07-25 RX ORDER — PROPOFOL 10 MG/ML
INJECTION, EMULSION INTRAVENOUS
Status: COMPLETED
Start: 2021-07-25 | End: 2021-07-25

## 2021-07-25 RX ORDER — SODIUM CHLORIDE 9 MG/ML
250 INJECTION, SOLUTION INTRAVENOUS AS NEEDED
Status: DISCONTINUED | OUTPATIENT
Start: 2021-07-25 | End: 2021-08-11 | Stop reason: HOSPADM

## 2021-07-25 RX ADMIN — PROPOFOL 50 MG: 10 INJECTION, EMULSION INTRAVENOUS at 18:06

## 2021-07-25 RX ADMIN — LEVETIRACETAM 500 MG: 100 SOLUTION ORAL at 17:13

## 2021-07-25 RX ADMIN — PIPERACILLIN AND TAZOBACTAM 3.38 G: 3; .375 INJECTION, POWDER, LYOPHILIZED, FOR SOLUTION INTRAVENOUS at 05:19

## 2021-07-25 RX ADMIN — ROCURONIUM BROMIDE 50 MG: 10 INJECTION INTRAVENOUS at 18:06

## 2021-07-25 RX ADMIN — Medication: at 17:13

## 2021-07-25 RX ADMIN — CHLORHEXIDINE GLUCONATE 15 ML: 0.12 RINSE ORAL at 01:00

## 2021-07-25 RX ADMIN — ALLOPURINOL 100 MG: 100 TABLET ORAL at 09:21

## 2021-07-25 RX ADMIN — ALBUMIN (HUMAN) 12.5 G: 0.25 INJECTION, SOLUTION INTRAVENOUS at 11:27

## 2021-07-25 RX ADMIN — LEVETIRACETAM 500 MG: 100 SOLUTION ORAL at 09:21

## 2021-07-25 RX ADMIN — MIDODRINE HYDROCHLORIDE 10 MG: 5 TABLET ORAL at 13:08

## 2021-07-25 RX ADMIN — POTASSIUM BICARBONATE 40 MEQ: 782 TABLET, EFFERVESCENT ORAL at 09:21

## 2021-07-25 RX ADMIN — LANSOPRAZOLE 30 MG: KIT at 09:22

## 2021-07-25 RX ADMIN — PIPERACILLIN AND TAZOBACTAM 3.38 G: 3; .375 INJECTION, POWDER, LYOPHILIZED, FOR SOLUTION INTRAVENOUS at 20:18

## 2021-07-25 RX ADMIN — POTASSIUM BICARBONATE 40 MEQ: 782 TABLET, EFFERVESCENT ORAL at 11:30

## 2021-07-25 RX ADMIN — Medication 10 ML: at 13:08

## 2021-07-25 RX ADMIN — MIDODRINE HYDROCHLORIDE 10 MG: 5 TABLET ORAL at 21:05

## 2021-07-25 RX ADMIN — PIPERACILLIN AND TAZOBACTAM 3.38 G: 3; .375 INJECTION, POWDER, LYOPHILIZED, FOR SOLUTION INTRAVENOUS at 11:30

## 2021-07-25 RX ADMIN — ALBUMIN (HUMAN) 12.5 G: 0.25 INJECTION, SOLUTION INTRAVENOUS at 05:19

## 2021-07-25 RX ADMIN — LACOSAMIDE 100 MG: 50 TABLET, FILM COATED ORAL at 21:05

## 2021-07-25 RX ADMIN — Medication 10 ML: at 05:19

## 2021-07-25 RX ADMIN — ALBUMIN (HUMAN) 12.5 G: 0.25 INJECTION, SOLUTION INTRAVENOUS at 00:14

## 2021-07-25 RX ADMIN — ALBUMIN (HUMAN) 12.5 G: 0.25 INJECTION, SOLUTION INTRAVENOUS at 17:13

## 2021-07-25 RX ADMIN — DOBUTAMINE HYDROCHLORIDE 2.5 MCG/KG/MIN: 200 INJECTION INTRAVENOUS at 11:33

## 2021-07-25 RX ADMIN — MIDODRINE HYDROCHLORIDE 10 MG: 5 TABLET ORAL at 05:19

## 2021-07-25 RX ADMIN — VANCOMYCIN HYDROCHLORIDE 500 MG: 500 INJECTION, POWDER, LYOPHILIZED, FOR SOLUTION INTRAVENOUS at 05:37

## 2021-07-25 RX ADMIN — CHLORHEXIDINE GLUCONATE 15 ML: 0.12 RINSE ORAL at 13:00

## 2021-07-25 RX ADMIN — LACOSAMIDE 100 MG: 50 TABLET, FILM COATED ORAL at 09:21

## 2021-07-25 RX ADMIN — ROCURONIUM BROMIDE 50 MG: 10 INJECTION, SOLUTION INTRAVENOUS at 18:06

## 2021-07-25 RX ADMIN — Medication 10 ML: at 21:05

## 2021-07-25 RX ADMIN — ASPIRIN 81 MG: 81 TABLET, CHEWABLE ORAL at 09:21

## 2021-07-25 NOTE — PROGRESS NOTES
[facility]    ICU TEAM History and Physical    Name: No Villeda   : 3/9/1933   MRN: [id number]   Date: 2021      Progress Note: 2021      Reason for ICU Admission: Acute Hypoxic Respiratory Failure    HPI:  No Villeda is a 80 y.o. male with history of prostate cancer, CHF, constipation, hypertension, stroke, TIA, and seizures who presents to the emergency department by EMS as transfer from [address] [place] [address] for respiratory distress. Patient had initially been a transfer from [facility] after a hospitalization from  [facility]. Was there for altered mental status hypoxia was treated for hypoxic respiratory failure bilateral pleural effusions acute systolic CHF secondary to severe aortic stenosis hyponatremia hypokalemia elevated LFTs malnutrition and possible UTI was treated with ceftriaxone and Vanco.  Patient already had a PEG for dysphagia and protein calorie malnutrition prior to [facility] admission. He was at [address] [place] [address] from [phone]. While at [address] [place] [address] today patient had an episode of vomiting and aspirated was initially started on high flow nasal cannula however was still hypoxic thus got intubated by [address] [place] [address] staff. Patient was then transferred to the ED for further care. Per family patient's decline started last year while he was having seizures/TIA patient became more encephalopathic and started to refuse eating and thus got a PEG for nutritional reasons. was staying at assisted living facility and had to be re-hospitalized after his PEG malfunctioned. During that time he did have an infection, daughter could not exactly say where the infection was, but she thought it was an abdominal infection and since then he started having worsening heart failure and decreased heart function, and failure to thrive. Per records EF was 10 to 15%. Per daughter, patient has never had a tracheostomy.  She states patient is awake at times, will open eyes, but does not track, does not follow commands, and does not speak. They wish patient to remain full code at this time. While in ED patient was placed on mechanical ventilator, ABG was done which was consistent with hypoxic respiratory failure. Patient was also given 1 L of IV fluids for fluid resuscitation due to sepsis. Initially blood pressure was okay but once patient was started on sedation blood pressure dropped. Patient also had episodes of hypoxia due to vent asynchrony. Once PEEP was increased for hypoxia patient's pressure dropped. Started patient on Levophed via peripheral in ED. If hypotension worsens will need central line. Started on Zosyn. Updated the family at bedside, daughter and and son. Consent for central line and blood was  Received. Patient to transfer to ICU for continued care. Subjective:   Overnight Events:   7/25 -- Getting PRBC now; started on Dobutamine    POD:  * No surgery found *    S/P:   Thoracentesis - 7/24 - ~ 1300 cc removed    Assessment:     ICU Problems:  1. Acute Hypoxic Respiratory Failure  2. Acute on Chronic Systolic Heart Failure  3. Septic Shock  4. Cardiogenic Shock  5. Aspiration Pneumonitis  6. Pleural Effusion - RIGHT-sided  7. GERD  8. Protein Calorie Malnutrition  9. Dysphagia s/p PEG  10. Gout  11. Hx Prostate CA  12. Hx Seizure D/O  13. Hx CVA/TIA - with Residual Aphasia  14. Hx HTN  15. Hx Severe Aortic Stenosis/ Mod to severe mitral valve regurgitation  16. Hx Severe Pulmonary HTN  17. Hx Pacermaker    ICU Comprehensive Plan of Care:     Plans for this Shift:     1. Start Dobutamine at 2.5 mcg/min/hr  2. Trend NT BNP  3. Appreciate Cardiology  4. Diuresis as able  5. SBP Goal of: > 90 mmHg  6. MAP Goal of: > 65 mmHg  7. Albumin q6  8. Transfusion Trigger (Hgb): <7 g/dL  9. Respiratory Goals:  a. Chlorhexidine   b. Optimize PEEP/Ventilation/Oxygenation  c. Goal Tidal Volume 6 cc/kg based on IBW  d. Aim for lung protective ventilation  e. Head of bed > 30 degrees  10.  Pulmonary toilet: Duo-Nebs   11. SpO2 Goal: > 92%   12. Keep K>4; Mg>2   13. PT/OT: NA   14. Discussed Plan of Care/Code Status: Full Code - discussed with daughter/son  15. Appreciate [facility]  16. Discussed Care Plan with Bedside RN  17. Documentation of Current Medications  18. Rest of Plan Below:    F - Feeding:  Yes  A - Analgesia: Fentanyl  S - Sedation: Propofol  T - DVT Prophylaxis: Lovenox   H - Head of Bed: > 30 Degrees  U - Ulcer Prophylaxis: prevacid   G - Glycemic Control: Insulin q 6 hrs accu checks SSI   S - Spontaneous Breathing Trial: Pending  B - Bowel Regimen: Senna  I - Indwelling Catheter:   Tubes: ETT and PEG Tube  Lines: Peripheral IV  Drains: Avalos Catheter  D - De-escalation of Antibiotics:  Vancomycin  Zosyn    Active Problem List:     Problem List  Date Reviewed: 12/17/2020        Codes Class    Acute respiratory failure with hypoxia (HCC) ICD-10-CM: J96.01  ICD-9-CM: 518.81         Aspiration into respiratory tract ICD-10-CM: T17.908A  ICD-9-CM: 934.9         Hypokalemia ICD-10-CM: E87.6  ICD-9-CM: 276.8         Hypernatremia ICD-10-CM: E87.0  ICD-9-CM: 276.0         Moderate protein malnutrition (HCC) ICD-10-CM: E44.0  ICD-9-CM: 263.0         Failure to thrive (0-17) ICD-10-CM: R62.51  ICD-9-CM: 783.41         Bacteremia ICD-10-CM: R78.81  ICD-9-CM: 790.7         UTI (urinary tract infection) ICD-10-CM: N39.0  ICD-9-CM: 599.0         Dementia (HCC) ICD-10-CM: F03.90  ICD-9-CM: 294.20         Bedridden ICD-10-CM: Z74.01  ICD-9-CM: V49.84         Pressure ulcer ICD-10-CM: L89.90  ICD-9-CM: 707.00, 707.20         S/P percutaneous endoscopic gastrostomy (PEG) tube placement ([facility] ICD-10-CM: Z93.1  ICD-9-CM: V44.1         Dehydration ICD-10-CM: E86.0  ICD-9-CM: 276.51         Lactic acidosis ICD-10-CM: E87.2  ICD-9-CM: 276.2         Sepsis ([facility] ICD-10-CM: A41.9  ICD-9-CM: 038.9, 995.91         SOULEYMANE (acute kidney injury) ([facility] ICD-10-CM: N17.9  ICD-9-CM: 095. 9         Chronic systolic heart failure ([facility] ICD-10-CM: I50.22  ICD-9-CM: 428.22         Altered mental status ICD-10-CM: R41.82  ICD-9-CM: 780.97         Post-ictal state ([facility] ICD-10-CM: R56.9  ICD-9-CM: 780.39         Seizure ([facility] ICD-10-CM: R56.9  ICD-9-CM: 780.39         Atrial pacemaker lead displacement ICD-10-CM: T82.120A  ICD-9-CM: 996.01         Pacemaker ICD-10-CM: Z95.0  ICD-9-CM: V45.01     Overview Signed 3/22/2019  5:08 PM by Marcus Espinoza MD     3/22/2019 dual chamber Medtronic pacer             Bradycardia ICD-10-CM: R00.1  ICD-9-CM: 427.89         Transaminitis ICD-10-CM: R74.01  ICD-9-CM: 790.4         Hypertensive urgency ICD-10-CM: I16.0  ICD-9-CM: 401.9         Second degree AV block, Mobitz type I ICD-10-CM: I44.1  ICD-9-CM: 426.13     Overview Signed 3/21/2019  6:14 PM by Marcus Espinoza MD     Added automatically from request for surgery [id number]             Stage 3 chronic kidney disease ([facility] ICD-10-CM: N18.30  ICD-9-CM: 571. 3         Rotator cuff arthropathy of both shoulders ICD-10-CM: M12.811, M12.812  ICD-9-CM: 716.81         Cognitive impairment ICD-10-CM: R41.89  ICD-9-CM: 104.9         Systolic murmur [facility]-: R01.1  ICD-9-CM: 785.2         Essential hypertension ICD-10-CM: I10  ICD-9-CM: 401.9         Gout ICD-10-CM: M10.9  ICD-9-CM: 274.9         Pure hypercholesterolemia ICD-10-CM: E78.00  ICD-9-CM: 272.0         History of prostate cancer ICD-10-CM: Z85.46  ICD-9-CM: V10.46         Chronic constipation ICD-10-CM: K59.09  ICD-9-CM: 564.00         Dysuria ICD-10-CM: R30.0  ICD-9-CM: 707. 1         Weight loss ICD-10-CM: R63.4  ICD-9-CM: 783.21         Adrenal mass (HCC) ICD-10-CM: E27.8  ICD-9-CM: 255.8     Overview Addendum 12/17/2018  9:57 AM by Nga Garcia MD     Stable/non-functioning adenoma on imaging                   Past Medical History:      has a past medical history of Cancer [facility]), Chronic systolic heart failure ([facility] (12/7/2020), Constipation, Gout, Hyperlipemia, Hypertension, Pacemaker (2019), Stroke [facility]), Thrombocytopenia ([address] (3/19/2019), and TIA (transient ischemic attack) (). Past Surgical History:      has a past surgical history that includes hx urological; hx prostatectomy; pr ins new/rplcmt prm pm w/transv eltrd atrial&vent (Right, 3/22/2019); pr ins new/rplcmt prm pacemakr w/trans eltrd atrial (N/A, 10/18/2019); and place percut gastrostomy tube (2020). Home Medications:     Prior to Admission medications    Medication Sig Start Date End Date Taking? Authorizing Provider   levETIRAcetam (KEPPRA) 750 mg tablet Take 1 Tab by mouth every twelve (12) hours. 20   Andrey Calabrese NP   aspirin delayed-release 81 mg tablet Take 1 Tab by mouth daily. 20   Jeff Yanez MD   polyethylene glycol [facility]) 17 gram/dose powder Take 17 g by mouth daily as needed for Constipation. 20   Jeff Yanez MD   balsam peru-castor oiL (VENELEX) ointment Apply  to affected area three (3) times daily. 20   Brennen Mcgee MD   famotidine (PEPCID) 20 mg tablet Take 1 Tab by mouth every evening. 20   Brennen Mcgee MD   acetaminophen (TYLENOL) 325 mg tablet Take 650 mg by mouth every six (6) hours as needed for Pain. Provider, Historical       Allergies/Social/Family History:     No Known Allergies   Social History     Tobacco Use    Smoking status: Never Smoker    Smokeless tobacco: Never Used   Substance Use Topics    Alcohol use: No      Family History   Problem Relation Age of Onset    No Known Problems Mother     No Known Problems Father        Review of Systems:     Review of systems not obtained due to patient factors.     Objective:   Vital Signs:  Visit Vitals  /74 (BP 1 Location: Left arm, BP Patient Position: At rest)   Pulse 75   Temp 99.8 °F (37.7 °C)   Resp 19   Ht 5' 6\" (1.676 m)   Wt 74.4 kg (164 lb)   SpO2 99%   BMI 26.47 kg/m²      O2 Device: Endotracheal tube Temp (24hrs), Av.9 °F (37.2 °C), Min:98.3 °F (36.8 °C), Max:99.8 °F (37.7 °C)           Intake/Output:     Intake/Output Summary (Last 24 hours) at 7/25/2021 1032  Last data filed at 7/25/2021 1000  Gross per 24 hour   Intake 776.13 ml   Output 690 ml   Net 86.13 ml       Physical Exam:    General:  mild distress, appears stated age, intubated and sedated  Eye:  conjunctivae/corneas clear. Pupils bilaterally reactive and round  Neurologic:  WD to pain in all ext. Does not follow commands  Lymphatic:  Cervical, supraclavicular, and axillary nodes normal.   Neck:  normal and no erythema or exudates noted. Lungs: Diminished in bilateral bases, mild tachypnea. Moderate oral secretions   Heart: Paced rhythm, regular, tachycardia  Abdomen:  soft, non-tender.  Bowel sounds normal. No masses,  no organomegaly  Cardiovascular:  Regular rate and rhythm, S1S2 present, without murmur or extra heart sounds and pedal pulses normal Generalized edema  Skin:  no rash or abnormalities    LABS AND  DATA: Personally reviewed  Recent Labs     07/25/21  0333 07/24/21 0444   WBC 21.9* 12.7*   HGB 6.0* 7.2*   HCT 18.6* 22.6*    223     Recent Labs     07/25/21  0333 07/24/21  0444   * 136   K 3.1* 3.8   CL 97 97   CO2 30 32   BUN 54* 53*   CREA 1.21 0.97   GLU 88 85   CA 9.2 9.0   MG 2.6* 2.5*   PHOS 2.0* 1.8*     Recent Labs     07/25/21  0333 07/24/21 0444   AP 87 116   TP 6.1* 6.0*   ALB 2.1* 1.7*   GLOB 4.0 4.3*     Recent Labs     07/24/21  0444   INR 1.1   PTP 11.3*      Recent Labs     07/23/21  2117   PHI 7.40   PCO2I 46.4*   PO2I 104*   FIO2I 100     Recent Labs     07/24/21  1111 07/24/21 0444   TROIQ 0.55* 0.57*       Hemodynamics:   PAP:   CO:     Wedge:   CI:     CVP:    SVR:       PVR:       Ventilator Settings:  Mode Rate Tidal Volume Pressure FiO2 PEEP   Assist control, Pressure control        30 % 5 cm H20     Peak airway pressure: 26 cm H2O    Minute ventilation: 11.8 l/min        MEDS: Reviewed    Imaging:    CXR Results  (Last 48 hours)               07/24/21 1317  XR CHEST PORT Final result    Impression:  Diminished right-sided pleural effusion. Otherwise no change. Narrative:  Clinical history: Thoracentesis   INDICATION:   Thoracentesis   COMPARISON: 7/24/2021       FINDINGS:   AP portable upright view of the chest demonstrates a stable  cardiopericardial   silhouette. Initial right-sided effusion. .Mild interstitial and parenchymal   opacity on the left is not changed. .ET tube is unchanged. Patient is on a   cardiac monitor. Cardiac pacer. . Patient is on a cardiac monitor. 07/24/21 0421  XR CHEST PORT Final result    Impression:  Improved aeration the right lung base with decreased effusion. Diffuse interstitial and airspace opacities with increase in the left midlung   zone. Narrative:  EXAM: XR CHEST PORT       INDICATION: central line placement       COMPARISON: 7/23/2021       FINDINGS: A portable AP radiograph of the chest was obtained at 413 hours. Tubes   and lines are stable. . Improved aeration right lung base with decreased right   effusion. Diffuse interstitial and airspace opacities are again noted with some   increase in the left midlung zone. .       Right IJ central venous catheter without evidence of pneumothorax. The cardiac   and mediastinal contours and pulmonary vascularity are normal.  The bones and   soft tissues are grossly within normal limits. 07/23/21 2136  XR CHEST PORT Final result    Impression:  Moderate pulmonary edema with right pleural effusion       Narrative:  EXAM: XR CHEST PORT       INDICATION: Unresponsiveness       COMPARISON: 1/6/2021       FINDINGS: A portable AP radiograph of the chest was obtained at 2129 hours. The   patient is on a cardiac monitor. The endotracheal tube terminates below the   thoracic inlet. NG tube extends into the stomach. A dual-lead pacemaker is   present. There is moderate pulmonary edema with a right pleural effusion.                   CT Results  (Last 48 hours)    None        ECHO (7/24/2021):    Result status: Final result   · LV: Severely reduced systolic function. Estimated LVEF is 30 - 35%. · RV: Pacer/ICD present. · AV: Moderate aortic valve stenosis is present. Aortic valve mean gradient is 18.1 mmHg. · PA: Mild to moderate pulmonary hypertension. Pulmonary arterial systolic pressure is 50 mmHg. Multidisciplinary Rounds Completed: Yes    ABCDEF Bundle/Checklist Completed:  Yes    SPECIAL EQUIPMENT  None    DISPOSITION  Stay in ICU    CRITICAL CARE CONSULTANT NOTE  I had a face to face encounter with the patient, reviewed and interpreted patient data including clinical events, labs, images, vital signs, I/O's, and examined patient. I have discussed the case and the plan and management of the patient's care with the consulting services, the bedside nurses and the respiratory therapist.      NOTE OF PERSONAL INVOLVEMENT IN CARE   This patient has a high probability of imminent, clinically significant deterioration, which requires the highest level of preparedness to intervene urgently. I participated in the decision-making and personally managed or directed the management of the following life and organ supporting interventions that required my frequent assessment to treat or prevent imminent deterioration. I personally spent 90 minutes of critical care time. This is time spent at this critically ill patient's bedside actively involved in patient care as well as the coordination of care and discussions with the patient's family. This does not include any procedural time which has been billed separately.     Frances Hale DO   Staff Intensivist/Anesthesiologist  Critical Care Medicine

## 2021-07-25 NOTE — PROCEDURES
[facility]    Procedure Note - Intubation:   Performed by DO Jarrod Douglass   Staff Anesthesiologist/Intensivist    [facility] Catheter -- With Guidance with [facility] guidance    Diagnosis: Acute Hypoxic Respiratory Failure & ETT Malfunction/Cuff Leak  Obtained Consent? no; emergent   Procedure Location:  ICU. Immediately prior to the procedure, the patient was reevaluated and found suitable for the planned procedure and any planned medications. Immediately prior to the procedure a time out was called to verify the correct patient, procedure, equipment, staff, and marking as appropriate. Medications given were propofol and rocuronium (Zemuron). A number 7.5 cuffed   ETT was placed to 24 cm at the teeth. Placement was evaluated by noting bilateral, symmetric breath sounds and good end-tidal CO2 detector color change . Attempts required: 1. Complications: none. The procedure was tolerated well.

## 2021-07-25 NOTE — PROGRESS NOTES
1930:Bedside and Verbal shift change report given to Annette Najjar, RN (oncoming nurse) by Gely Keys RN (offgoing nurse). Report included the following information SBAR, Kardex, Intake/Output, MAR, Accordion, Recent Results, Med Rec Status and Cardiac Rhythm V-paced.

## 2021-07-25 NOTE — PROGRESS NOTES
Bedside shift change report given to Delmy Gaitan RN (oncoming nurse) by Kenneth Abel RN (offgoing nurse). Report included the following information SBAR, Kardex, ED Summary, Procedure Summary, Intake/Output, MAR, Recent Results, Med Rec Status, Cardiac Rhythm V paced, Alarm Parameters , Quality Measures and Dual Neuro Assessment. 0930: Dr. Lindsey Brown notified of pt's hemoglobin, orders received for 1 unit of PRBC's.     1011: 1 unit of PRBC's started, consent in chart. 1400: 1 unit of PRBC's completed, pt tolerated well. Discussed decreased urine output with Dr. Lindsey Brown orders received for labs. 1700: 1 unit of PRBC's started. 1800: Pt noted to have air leak around ETT. Dr. Lindsey Brown at bedside, orders received for 50mg of propofol and 50mg of rocuronium for tube exchange, Kre RT at bedside for tube exchange. 7.5 ETT placed at 22 at the teeth. 1900: 1 unit of PRBC's completed, pt tolerated well. Bedside shift change report given to Emma Ni RN (oncoming nurse) by Delmy Gaitan RN (offgoing nurse). Report included the following information SBAR, Kardex, ED Summary, Procedure Summary, Intake/Output, MAR, Recent Results, Med Rec Status, Cardiac Rhythm V Paced, Alarm Parameters , Quality Measures and Dual Neuro Assessment. no

## 2021-07-25 NOTE — PROGRESS NOTES
Pharmacist Note - Vancomycin Dosing  Therapy day 2  Indication: aspiration pneumonia   Current regimen: 500 mg IV Q12H    Recent Labs     07/25/21  0333 07/24/21  0444 07/23/21  2111   WBC 21.9* 12.7* 11.8*   CREA 1.21 0.97 1.03   BUN 54* 53* 52*       A random vancomycin level of 29 mcg/mL was obtained and from this level, the patient's AUC24 is calculated to be 696 with the current regimen. Goal target range AUC/JOSE M 400-600      Plan: Discontinue scheduled doses of vancomycin and repeat a random level with morning labs tomorrow. Pharmacy will continue to monitor this patient daily for changes in clinical status and renal function. *Random vancomycin levels are used to calculate AUC/JOSE M, this level should not be interpreted as a trough. Vancomycin has been dosed using Bayesian kinetics software to target an AUC24:JOSE M of 400-600, which provides adequate exposure for as assumed infection due to MRSA with an JOSE M of 1 or less while reducing the risk of nephrotoxicity as seen with traditional trough based dosing goals.

## 2021-07-25 NOTE — PROGRESS NOTES
Cardiology Progress Note      7/25/2021 8:17 AM    Admit Date: 7/23/2021    Admit Diagnosis: Aspiration into respiratory tract [T17.908A]; Acute respiratory failure with hypoxia ([address] [J96.01]      Subjective:     No Villeda remains on vent, sedated. Echo with EF 35% (reported lower per records) with moderate aortic stenosis, mean gradient of 18 mmHg.     Visit Vitals  BP (!) 105/47   Pulse 72   Temp 99.2 °F (37.3 °C)   Resp 18   Ht 5' 6\" (1.676 m)   Wt 164 lb (74.4 kg)   SpO2 97%   BMI 26.47 kg/m²     Current Facility-Administered Medications   Medication Dose Route Frequency    DOBUTamine (DOBUTREX) 500 mg/250 mL (2,000 mcg/mL) infusion  2.5 mcg/kg/min IntraVENous TITRATE    potassium bicarb-citric acid (EFFER-K) tablet 40 mEq  40 mEq Oral Q6H    NOREPINephrine (LEVOPHED) 8 mg in 5% dextrose 250mL (32 mcg/mL) infusion  0.5-20 mcg/min IntraVENous TITRATE    sodium chloride (NS) flush 5-40 mL  5-40 mL IntraVENous Q8H    sodium chloride (NS) flush 5-40 mL  5-40 mL IntraVENous PRN    acetaminophen (TYLENOL) tablet 650 mg  650 mg Oral Q6H PRN    Or    acetaminophen (TYLENOL) suppository 650 mg  650 mg Rectal Q6H PRN    ondansetron (ZOFRAN ODT) tablet 4 mg  4 mg Oral Q8H PRN    Or    ondansetron (ZOFRAN) injection 4 mg  4 mg IntraVENous Q6H PRN    [Held by provider] enoxaparin (LOVENOX) injection 40 mg  40 mg SubCUTAneous DAILY    sennosides (SENOKOT) 8.8 mg/5 mL syrup 8.8 mg  5 mL Oral BID PRN    chlorhexidine (ORAL CARE KIT) 0.12 % mouthwash 15 mL  15 mL Oral Q12H    Vancomycin Pharmacy Dosing   Other Rx Dosing/Monitoring    lansoprazole (PREVACID) 3 mg/mL oral suspension 30 mg  30 mg Oral ACB    lacosamide (VIMPAT) tablet 100 mg  100 mg Oral Q12H    levETIRAcetam (KEPPRA) oral solution 500 mg  500 mg Oral BID    allopurinoL (ZYLOPRIM) tablet 100 mg  100 mg Oral DAILY    aspirin chewable tablet 81 mg  81 mg Oral DAILY    [Held by provider] bumetanide (BUMEX) tablet 1 mg  1 mg Oral BID    midodrine (PROAMATINE) tablet 10 mg  10 mg Oral Q8H    albumin human 25% (BUMINATE) solution 12.5 g  12.5 g IntraVENous Q6H    vancomycin (VANCOCIN) 500 mg in 0.9% sodium chloride (MBP/ADV) 100 mL MBP  500 mg IntraVENous Q12H    propofol (DIPRIVAN) 10 mg/mL infusion  0-50 mcg/kg/min (Order-Specific) IntraVENous TITRATE    sodium chloride (NS) flush 5-10 mL  5-10 mL IntraVENous PRN    fentaNYL (PF) 1,500 mcg/30 mL (50 mcg/mL) infusion  0-200 mcg/hr IntraVENous TITRATE    piperacillin-tazobactam (ZOSYN) 3.375 g in 0.9% sodium chloride (MBP/ADV) 100 mL MBP  3.375 g IntraVENous Q8H         Objective:      Physical Exam:  Visit Vitals  BP (!) 105/47   Pulse 72   Temp 99.2 °F (37.3 °C)   Resp 18   Ht 5' 6\" (1.676 m)   Wt 164 lb (74.4 kg)   SpO2 97%   BMI 26.47 kg/m²     General Appearance:  Vent, sedated           Neck: No JVD   Chest:   Lungs clear to auscultation bilaterally. Cardiovascular:  Regular paced, S1, S2 normal, III/VI systolic murmur LUSB. Abdomen:   Soft, non-tender, bowel sounds are active. Extremities: No edema bilaterally. Skin: Warm and dry.                Data Review:   Labs:    Recent Results (from the past 24 hour(s))   LACTIC ACID    Collection Time: 07/24/21 11:11 AM   Result Value Ref Range    Lactic acid 2.1 (HH) 0.4 - 2.0 MMOL/L   TROPONIN I    Collection Time: 07/24/21 11:11 AM   Result Value Ref Range    Troponin-I, Qt. 0.55 (H) <0.05 ng/mL   PROCALCITONIN    Collection Time: 07/24/21 11:11 AM   Result Value Ref Range    Procalcitonin 35.22 ng/mL   NT-PRO BNP    Collection Time: 07/24/21 11:11 AM   Result Value Ref Range    NT pro-BNP >35,000 (H) <450 PG/ML   ECHO ADULT COMPLETE    Collection Time: 07/24/21  2:41 PM   Result Value Ref Range    BP EF 46.6 (A) 55.0 - 100.0 percent    LV Ejection Fraction MOD 2C 42 percent    LV Ejection Fraction MOD 4C 49 percent    LV ED Vol A2C 146.93 mL    LV ED Vol A4C 169.57 mL    LV ED Vol .69 (A) 67.0 - 155.0 mL    LV ES Vol A2C 85.94 mL    LV ES Vol A4C 86.59 mL    LV ES Vol BP 86.34 (A) 22.0 - 58.0 mL    LVOT Peak Gradient 2.57 mmHg    LVOT Peak Velocity 80.11 cm/s    LVOT VTI 13.01 cm    RVIDd 4.48 cm    RVSP 65.11 mmHg    LA Volume 86.90 18.0 - 58.0 mL    LA Area 4C 27.12 cm2    LA Vol 2C 76.21 (A) 18.00 - 58.00 mL    LA Vol 4C 80.22 (A) 18.00 - 58.00 mL    Est. RA Pressure 15.00 mmHg    AoV PG 32.28 mmHg    Aortic Valve Systolic Mean Gradient 77.29 mmHg    Aortic Valve Systolic Peak Velocity 811.77 cm/s    AoV VTI 56.57 cm    MV A Jose 97.52 cm/s    Mitral Valve E Wave Deceleration Time 175.08 ms    MV E Jose 86.92 cm/s    E/E' ratio (averaged) 23.29     E/E' lateral 25.12     E/E' septal 21.46     LV E' Lateral Velocity 3.46 cm/s    LV E' Septal Velocity 4.05 cm/s    Mitral Valve Pressure Half-time 50.77 ms    MVA (PHT) 4.33 cm2    Pulmonic Valve Systolic Peak Instantaneous Gradient 3.71 mmHg    Pulmonic Valve Max Velocity 96.28 cm/s    Triscuspid Valve Regurgitation Peak Gradient 50.11 mmHg    TR Max Velocity 353.94 cm/s    MV E/A 0.89     LVES Vol Index BP 46.9 mL/m2    LVED Vol Index BP 87.9 mL/m2    LA Vol Index 47.23 16.00 - 28.00 ml/m2    LA Vol Index 41.42 16.00 - 28.00 ml/m2    LA Vol Index 43.60 16.00 - 28.00 ml/m2    LVED Vol Index A4C 92.2 mL/m2    LVED Vol Index A2C 79.9 mL/m2    LVES Vol Index A4C 47.1 mL/m2    LVES Vol Index A2C 46.7 mL/m2   LACTIC ACID    Collection Time: 07/24/21  5:52 PM   Result Value Ref Range    Lactic acid 1.7 0.4 - 2.0 MMOL/L   METABOLIC PANEL, COMPREHENSIVE    Collection Time: 07/25/21  3:33 AM   Result Value Ref Range    Sodium 135 (L) 136 - 145 mmol/L    Potassium 3.1 (L) 3.5 - 5.1 mmol/L    Chloride 97 97 - 108 mmol/L    CO2 30 21 - 32 mmol/L    Anion gap 8 5 - 15 mmol/L    Glucose 88 65 - 100 mg/dL    BUN 54 (H) 6 - 20 MG/DL    Creatinine 1.21 0.70 - 1.30 MG/DL    BUN/Creatinine ratio 45 (H) 12 - 20      GFR est AA >60 >60 ml/min/1.73m2    GFR est non-AA 57 (L) >60 ml/min/1.73m2    Calcium 9.2 8.5 - 10.1 MG/DL    Bilirubin, total 1.0 0.2 - 1.0 MG/DL    ALT (SGPT) 74 12 - 78 U/L    AST (SGOT) 32 15 - 37 U/L    Alk. phosphatase 87 45 - 117 U/L    Protein, total 6.1 (L) 6.4 - 8.2 g/dL    Albumin 2.1 (L) 3.5 - 5.0 g/dL    Globulin 4.0 2.0 - 4.0 g/dL    A-G Ratio 0.5 (L) 1.1 - 2.2     CBC W/O DIFF    Collection Time: 07/25/21  3:33 AM   Result Value Ref Range    WBC 21.9 (H) 4.1 - 11.1 K/uL    RBC 1.99 (L) 4.10 - 5.70 M/uL    HGB 6.0 (L) 12.1 - 17.0 g/dL    HCT 18.6 (L) 36.6 - 50.3 %    MCV 93.5 80.0 - 99.0 FL    MCH 30.2 26.0 - 34.0 PG    MCHC 32.3 30.0 - 36.5 g/dL    RDW 19.5 (H) 11.5 - 14.5 %    PLATELET 179 151 - 990 K/uL    MPV 10.2 8.9 - 12.9 FL    NRBC 0.1 (H) 0  WBC    ABSOLUTE NRBC 0.02 (H) 0.00 - 0.01 K/uL   PHOSPHORUS    Collection Time: 07/25/21  3:33 AM   Result Value Ref Range    Phosphorus 2.0 (L) 2.6 - 4.7 MG/DL   MAGNESIUM    Collection Time: 07/25/21  3:33 AM   Result Value Ref Range    Magnesium 2.6 (H) 1.6 - 2.4 mg/dL   PROCALCITONIN    Collection Time: 07/25/21  3:33 AM   Result Value Ref Range    Procalcitonin 43.14 ng/mL   NT-PRO BNP    Collection Time: 07/25/21  3:33 AM   Result Value Ref Range    NT pro-BNP >35,000 (H) <450 PG/ML   VANCOMYCIN, RANDOM    Collection Time: 07/25/21  3:33 AM   Result Value Ref Range    Vancomycin, random 29.0 UG/ML       Telemetry: V paced    Assessment:     Active Problems:    Acute respiratory failure with hypoxia (HCC) (7/23/2021)      Aspiration into respiratory tract (7/23/2021)        Plan:     Respiratory failure/aspiration, septic shock. On vent. Per critical care team.  Chronic systolic CHF, EF 35% by echo yesterday (10-15% reported in past). Unable to assess NYHA pt on vent. Echo also showed moderate aortic stenosis with mean gradient of 18mmhg. BP too low for GDT (BB,ACE-I, etc). Optimize supportive care as is already being done. Moderate aortic stenosis. Pacemaker.

## 2021-07-26 LAB
ABO + RH BLD: NORMAL
ALBUMIN SERPL-MCNC: 2.4 G/DL (ref 3.5–5)
ALBUMIN/GLOB SERPL: 0.6 {RATIO} (ref 1.1–2.2)
ALP SERPL-CCNC: 102 U/L (ref 45–117)
ALT SERPL-CCNC: 58 U/L (ref 12–78)
ANION GAP SERPL CALC-SCNC: 7 MMOL/L (ref 5–15)
AST SERPL-CCNC: 22 U/L (ref 15–37)
BACTERIA SPEC CULT: ABNORMAL
BACTERIA SPEC CULT: ABNORMAL
BILIRUB SERPL-MCNC: 1.3 MG/DL (ref 0.2–1)
BLD PROD TYP BPU: NORMAL
BLD PROD TYP BPU: NORMAL
BLOOD GROUP ANTIBODIES SERPL: NORMAL
BNP SERPL-MCNC: ABNORMAL PG/ML
BPU ID: NORMAL
BPU ID: NORMAL
BUN SERPL-MCNC: 55 MG/DL (ref 6–20)
BUN/CREAT SERPL: 37 (ref 12–20)
CALCIUM SERPL-MCNC: 9.1 MG/DL (ref 8.5–10.1)
CHLORIDE SERPL-SCNC: 98 MMOL/L (ref 97–108)
CO2 SERPL-SCNC: 30 MMOL/L (ref 21–32)
CREAT SERPL-MCNC: 1.48 MG/DL (ref 0.7–1.3)
CROSSMATCH RESULT,%XM: NORMAL
CROSSMATCH RESULT,%XM: NORMAL
ERYTHROCYTE [DISTWIDTH] IN BLOOD BY AUTOMATED COUNT: 19.1 % (ref 11.5–14.5)
GLOBULIN SER CALC-MCNC: 3.8 G/DL (ref 2–4)
GLUCOSE SERPL-MCNC: 192 MG/DL (ref 65–100)
GRAM STN SPEC: ABNORMAL
GRAM STN SPEC: ABNORMAL
HCT VFR BLD AUTO: 22.7 % (ref 36.6–50.3)
HGB BLD-MCNC: 7.4 G/DL (ref 12.1–17)
MAGNESIUM SERPL-MCNC: 2.8 MG/DL (ref 1.6–2.4)
MCH RBC QN AUTO: 28.9 PG (ref 26–34)
MCHC RBC AUTO-ENTMCNC: 32.6 G/DL (ref 30–36.5)
MCV RBC AUTO: 88.7 FL (ref 80–99)
NRBC # BLD: 0 K/UL (ref 0–0.01)
NRBC BLD-RTO: 0 PER 100 WBC
PHOSPHATE SERPL-MCNC: 2.2 MG/DL (ref 2.6–4.7)
PLATELET # BLD AUTO: 120 K/UL (ref 150–400)
PMV BLD AUTO: 9.4 FL (ref 8.9–12.9)
POTASSIUM SERPL-SCNC: 3.4 MMOL/L (ref 3.5–5.1)
PROCALCITONIN SERPL-MCNC: 33.87 NG/ML
PROT SERPL-MCNC: 6.2 G/DL (ref 6.4–8.2)
RBC # BLD AUTO: 2.56 M/UL (ref 4.1–5.7)
SERVICE CMNT-IMP: ABNORMAL
SODIUM SERPL-SCNC: 135 MMOL/L (ref 136–145)
SPECIMEN EXP DATE BLD: NORMAL
STATUS OF UNIT,%ST: NORMAL
STATUS OF UNIT,%ST: NORMAL
UNIT DIVISION, %UDIV: 0
UNIT DIVISION, %UDIV: 0
VANCOMYCIN SERPL-MCNC: 28.3 UG/ML
WBC # BLD AUTO: 18.3 K/UL (ref 4.1–11.1)

## 2021-07-26 PROCEDURE — 74011000258 HC RX REV CODE- 258: Performed by: NURSE PRACTITIONER

## 2021-07-26 PROCEDURE — 74011250636 HC RX REV CODE- 250/636: Performed by: INTERNAL MEDICINE

## 2021-07-26 PROCEDURE — 83880 ASSAY OF NATRIURETIC PEPTIDE: CPT

## 2021-07-26 PROCEDURE — P9047 ALBUMIN (HUMAN), 25%, 50ML: HCPCS | Performed by: ANESTHESIOLOGY

## 2021-07-26 PROCEDURE — 94003 VENT MGMT INPAT SUBQ DAY: CPT

## 2021-07-26 PROCEDURE — 99222 1ST HOSP IP/OBS MODERATE 55: CPT | Performed by: INTERNAL MEDICINE

## 2021-07-26 PROCEDURE — 74011250637 HC RX REV CODE- 250/637: Performed by: ANESTHESIOLOGY

## 2021-07-26 PROCEDURE — 80202 ASSAY OF VANCOMYCIN: CPT

## 2021-07-26 PROCEDURE — 80053 COMPREHEN METABOLIC PANEL: CPT

## 2021-07-26 PROCEDURE — 99233 SBSQ HOSP IP/OBS HIGH 50: CPT | Performed by: INTERNAL MEDICINE

## 2021-07-26 PROCEDURE — 84100 ASSAY OF PHOSPHORUS: CPT

## 2021-07-26 PROCEDURE — 74011250636 HC RX REV CODE- 250/636: Performed by: NURSE PRACTITIONER

## 2021-07-26 PROCEDURE — 74011250636 HC RX REV CODE- 250/636: Performed by: ANESTHESIOLOGY

## 2021-07-26 PROCEDURE — 74011250637 HC RX REV CODE- 250/637: Performed by: NURSE PRACTITIONER

## 2021-07-26 PROCEDURE — 74011000250 HC RX REV CODE- 250: Performed by: NURSE PRACTITIONER

## 2021-07-26 PROCEDURE — 84145 PROCALCITONIN (PCT): CPT

## 2021-07-26 PROCEDURE — 65610000006 HC RM INTENSIVE CARE

## 2021-07-26 PROCEDURE — 36415 COLL VENOUS BLD VENIPUNCTURE: CPT

## 2021-07-26 PROCEDURE — 85027 COMPLETE CBC AUTOMATED: CPT

## 2021-07-26 PROCEDURE — 74011000250 HC RX REV CODE- 250: Performed by: INTERNAL MEDICINE

## 2021-07-26 PROCEDURE — 83735 ASSAY OF MAGNESIUM: CPT

## 2021-07-26 RX ORDER — BUMETANIDE 0.25 MG/ML
1 INJECTION INTRAMUSCULAR; INTRAVENOUS ONCE
Status: COMPLETED | OUTPATIENT
Start: 2021-07-26 | End: 2021-07-26

## 2021-07-26 RX ORDER — WATER FOR INJECTION,STERILE
VIAL (ML) INJECTION
Status: DISPENSED
Start: 2021-07-26 | End: 2021-07-27

## 2021-07-26 RX ADMIN — Medication: at 09:00

## 2021-07-26 RX ADMIN — ALBUMIN (HUMAN) 12.5 G: 0.25 INJECTION, SOLUTION INTRAVENOUS at 12:59

## 2021-07-26 RX ADMIN — Medication: at 18:03

## 2021-07-26 RX ADMIN — LANSOPRAZOLE 30 MG: KIT at 06:46

## 2021-07-26 RX ADMIN — LEVETIRACETAM 500 MG: 100 SOLUTION ORAL at 08:52

## 2021-07-26 RX ADMIN — PIPERACILLIN AND TAZOBACTAM 3.38 G: 3; .375 INJECTION, POWDER, LYOPHILIZED, FOR SOLUTION INTRAVENOUS at 17:00

## 2021-07-26 RX ADMIN — ALLOPURINOL 100 MG: 100 TABLET ORAL at 08:52

## 2021-07-26 RX ADMIN — MIDODRINE HYDROCHLORIDE 10 MG: 5 TABLET ORAL at 13:00

## 2021-07-26 RX ADMIN — Medication 10 ML: at 16:18

## 2021-07-26 RX ADMIN — Medication 10 ML: at 21:13

## 2021-07-26 RX ADMIN — ALBUMIN (HUMAN) 12.5 G: 0.25 INJECTION, SOLUTION INTRAVENOUS at 18:03

## 2021-07-26 RX ADMIN — CHLORHEXIDINE GLUCONATE 15 ML: 0.12 RINSE ORAL at 00:03

## 2021-07-26 RX ADMIN — CHLOROTHIAZIDE SODIUM 250 MG: 500 INJECTION, POWDER, LYOPHILIZED, FOR SOLUTION INTRAVENOUS at 16:16

## 2021-07-26 RX ADMIN — PIPERACILLIN AND TAZOBACTAM 3.38 G: 3; .375 INJECTION, POWDER, LYOPHILIZED, FOR SOLUTION INTRAVENOUS at 04:23

## 2021-07-26 RX ADMIN — Medication 10 ML: at 06:40

## 2021-07-26 RX ADMIN — LACOSAMIDE 100 MG: 50 TABLET, FILM COATED ORAL at 08:52

## 2021-07-26 RX ADMIN — ACETAZOLAMIDE SODIUM 250 MG: 500 INJECTION, POWDER, LYOPHILIZED, FOR SOLUTION INTRAVENOUS at 16:03

## 2021-07-26 RX ADMIN — POTASSIUM PHOSPHATE, MONOBASIC AND POTASSIUM PHOSPHATE, DIBASIC: 224; 236 INJECTION, SOLUTION, CONCENTRATE INTRAVENOUS at 06:40

## 2021-07-26 RX ADMIN — CHLORHEXIDINE GLUCONATE 15 ML: 0.12 RINSE ORAL at 13:16

## 2021-07-26 RX ADMIN — ALBUMIN (HUMAN) 12.5 G: 0.25 INJECTION, SOLUTION INTRAVENOUS at 00:52

## 2021-07-26 RX ADMIN — DOBUTAMINE HYDROCHLORIDE 5 MCG/KG/MIN: 200 INJECTION INTRAVENOUS at 13:16

## 2021-07-26 RX ADMIN — BUMETANIDE 1 MG: 0.25 INJECTION, SOLUTION INTRAMUSCULAR; INTRAVENOUS at 16:08

## 2021-07-26 RX ADMIN — LEVETIRACETAM 500 MG: 100 SOLUTION ORAL at 18:03

## 2021-07-26 RX ADMIN — ASPIRIN 81 MG: 81 TABLET, CHEWABLE ORAL at 08:52

## 2021-07-26 RX ADMIN — MIDODRINE HYDROCHLORIDE 10 MG: 5 TABLET ORAL at 06:46

## 2021-07-26 RX ADMIN — ALBUMIN (HUMAN) 12.5 G: 0.25 INJECTION, SOLUTION INTRAVENOUS at 06:43

## 2021-07-26 RX ADMIN — LACOSAMIDE 100 MG: 50 TABLET, FILM COATED ORAL at 21:13

## 2021-07-26 NOTE — PROGRESS NOTES
0730: Bedside and Verbal shift change report given to [address] Roman Jasper (oncoming nurse) by Mariusz Nails RN (offgoing nurse). Report included the following information SBAR, Kardex, ED Summary, Procedure Summary, Intake/Output, MAR, Recent Results, Cardiac Rhythm paced and Alarm Parameters . 1400: Pt's Avalos leaking large amount. Replaced Avalos with new one, no leaking noted. 1930: Bedside and Verbal shift change report given to [id number] Madi Schultz (oncoming nurse) by Janene Chang RN/Linda RN (offgoing nurse). Report included the following information SBAR, Kardex, ED Summary, Procedure Summary, Intake/Output, MAR, Recent Results, Cardiac Rhythm paced and Alarm Parameters .

## 2021-07-26 NOTE — PROGRESS NOTES
Reviewed chart for transitions of care, and discussed in rounds. CM spoke with daughter (MPOA) Alyssia San  to explain role and offer support. Patient is intubated   Baseline:   ADLs/IDALS:Needa total assistance  Previous Home Health: [address] [facility]  ER Contact: Car San [phone] Son Kike Talley [phone]  Patient had been residing at [place] [address]. Patient needs assistance with ADL's and IADL's      Patient is bedbound. Patient will need BLS transport at discharge. Reason for Admission:   Acute respiratory failure                    RUR Score:   24%               PCP: First and Last name:   Violet Solano MD     Name of Practice:    Are you a current patient: Yes/No:    Approximate date of last visit: prior to 11/20   Can you participate in a virtual visit if needed: No    Do you (patient/family) have any concerns for transition/discharge? Will need new placement. Plan for utilizing home health:   NA    Current Advanced Directive/Advance Care Plan:  Full Code      Healthcare Decision Maker:   Click here to complete [address] including selection of the Healthcare Decision Maker Relationship (ie \"Primary\")            Primary Decision Maker (Active): Alex Frances - Daughter - [phone]    Secondary Decision Maker: Renzo Romo - [phone]    Patient presented to the ED form [facility] for possible aspiration pna requiring intubation. Per daughter patient was at [facility] and was discharged to [facility] to manage his CHF. Prior to [facility] patient was in [address] [place] [address] at [place] [address].. Per daughter patient is unable to return there as the facility is unable to meet his needs. Daughter would like for patient to return to [facility]. Daughter is considering [facility] once patient is discharged form [facility]. Patient is bedbound,does not follow commands. has a PEG tube but not trached.    Per previous CM notes a UAI was completed in January 2021. Referral made to [facility] via [facility]. Dell Peraza RN,Care Management  Care Management Interventions  PCP Verified by CM:  Yes Mima Theodore )  Mode of Transport at Discharge: BLS  Transition of Care Consult (CM Consult): [address]: Yes  Discharge Durable Medical Equipment: No  Physical Therapy Consult: No  Occupational Therapy Consult: No  Speech Therapy Consult: No  Current Support Network:  (MPOA daughter Renetta Card [phone])  Confirm Follow Up Transport:  (Will need BLS transport)  Discharge Location  Discharge Placement: [address] [facility])

## 2021-07-26 NOTE — WOUND CARE
WOCN Note:     New consult for sacrum. Chart shows:  Admitted for respiratory failure  History of CHF, prostate cancer, CVA, seizures  Admitted from [address] [facility] [address]:   Intubated and turned dependently onto right with help or RN. Avalos and Flexi Seal; cleaned him of small mucoid stool around Flexi. Surface: antony COMPA mattress    Heels intact and without erythema; offloaded with pillow. Hardened scar tissue to left posterior heel. 1. POA deep tissue injury to bilateral buttocks  Entire field of tissue compromise is ~ 9 x 4 cm with scattered linear splint indicative of moisture damage as well as 3 open areas with some yellow glaze. Cleaned and covered with sacral foam dressing    Wound Recommendations:    Buttocks:  venelex twice daily; cover with sacral foam and lift to apply venelex and assess. Change foam as needed. PI Prevention:  Turn/reposition approximately every 2 hours  Offload heels with heels hanging off end of pillow at all times while in bed. Sacral Foam dressing: lift to assess regularly; change as needed. Discontinue if incontinence is frequently soiling dressing. Air mattress: Use only flat sheet and one incontinence pad. Please call [facility] @ [phone] for bed to be picked up at discharge.      Transition of Care: Plan to follow weekly and as needed while admitted to hospital.     ROD Cao, RN, [facility] [place]  Certified Wound, Ostomy, Continence Nurse  office [phone]  Available via [address] [place] [address]

## 2021-07-26 NOTE — PROGRESS NOTES
[facility]    ICU TEAM Progress Note    Name: No Villeda   : 3/9/1933   MRN: [id number]   Date: 2021      I  Subjective:   Progress Note: 2021      Reason for ICU Admission: Acute hypoxic respiratory failure    Interval history: From [facility]  No Villeda is a 80 y.o. male with history of prostate cancer, CHF, constipation, hypertension, stroke, TIA, and seizures who presents to the emergency department by EMS as transfer from [facility] for respiratory distress. Patient had initially been a transfer from [facility] after a hospitalization from  [facility]. Was there for altered mental status hypoxia was treated for hypoxic respiratory failure bilateral pleural effusions acute systolic CHF secondary to severe aortic stenosis hyponatremia hypokalemia elevated LFTs malnutrition and possible UTI was treated with ceftriaxone and Vanco.  Patient already had a PEG for dysphagia and protein calorie malnutrition prior to [facility] admission. He was at [facility] from [phone]. While at [facility] today patient had an episode of vomiting and aspirated was initially started on high flow nasal cannula however was still hypoxic thus got intubated by [facility] staff. Patient was then transferred to the ED for further care. Per family patient's decline started last year while he was having seizures/TIA patient became more encephalopathic and started to refuse eating and thus got a PEG for nutritional reasons. was staying at assisted living facility and had to be re-hospitalized after his PEG malfunctioned. During that time he did have an infection, daughter could not exactly say where the infection was, but she thought it was an abdominal infection and since then he started having worsening heart failure and decreased heart function, and failure to thrive. Per records EF was 10 to 15%. Per daughter, patient has never had a tracheostomy.  She states patient is awake at times, will open eyes, but does not track, does not follow commands, and does not speak. They wish patient to remain full code at this time.     While in ED patient was placed on mechanical ventilator, ABG was done which was consistent with hypoxic respiratory failure. Patient was also given 1 L of IV fluids for fluid resuscitation due to sepsis. Initially blood pressure was okay but once patient was started on sedation blood pressure dropped. Patient also had episodes of hypoxia due to vent asynchrony. Once PEEP was increased for hypoxia patient's pressure dropped. Started patient on Levophed via peripheral in ED. If hypotension worsens will need central line. Started on Zosyn. Updated the family at bedside, daughter and and son. Consent for central line and blood was  Received. Patient to transfer to ICU for continued care. Overnight Events:   7/26: No acute event, blood pressure somewhat better but urine output decreasing.   Still on dobutamine at 5.  7/25 -- Getting PRBC now; started on Dobutamine  Active Problem List:     Problem List  Date Reviewed: 12/17/2020        Codes Class    Acute respiratory failure with hypoxia [facility]) ICD-10-CM: J96.01  ICD-9-CM: 518.81         Aspiration into respiratory tract ICD-10-CM: T17.908A  ICD-9-CM: 934.9         Hypokalemia ICD-10-CM: E87.6  ICD-9-CM: 276.8         Hypernatremia ICD-10-CM: E87.0  ICD-9-CM: 276.0         Moderate protein malnutrition (HCC) ICD-10-CM: E44.0  ICD-9-CM: 263.0         Failure to thrive (0-17) ICD-10-CM: R62.51  ICD-9-CM: 783.41         Bacteremia ICD-10-CM: R78.81  ICD-9-CM: 790.7         UTI (urinary tract infection) ICD-10-CM: N39.0  ICD-9-CM: 599.0         Dementia (HCC) ICD-10-CM: F03.90  ICD-9-CM: 294.20         Bedridden ICD-10-CM: Z74.01  ICD-9-CM: V49.84         Pressure ulcer ICD-10-CM: L89.90  ICD-9-CM: 707.00, 707.20         S/P percutaneous endoscopic gastrostomy (PEG) tube placement ([address] ICD-10-CM: Z93.1  ICD-9-CM: V44.1         Dehydration ICD-10-CM: E86.0  ICD-9-CM: 276.51         Lactic acidosis ICD-10-CM: E87.2  ICD-9-CM: 276.2         Sepsis ([facility] ICD-10-CM: A41.9  ICD-9-CM: 038.9, 995.91         SOULEYMANE (acute kidney injury) ([facility] ICD-10-CM: N17.9  ICD-9-CM: 708. 9         Chronic systolic heart failure (HCC) ICD-10-CM: I50.22  ICD-9-CM: 428.22         Altered mental status ICD-10-CM: R41.82  ICD-9-CM: 780.97         Post-ictal state ([facility] ICD-10-CM: R56.9  ICD-9-CM: 780.39         Seizure ([facility] ICD-10-CM: R56.9  ICD-9-CM: 780.39         Atrial pacemaker lead displacement ICD-10-CM: T82.120A  ICD-9-CM: 996.01         Pacemaker ICD-10-CM: Z95.0  ICD-9-CM: V45.01     Overview Signed 3/22/2019  5:08 PM by Jackelin Yoo MD     3/22/2019 dual chamber Medtronic pacer             Bradycardia ICD-10-CM: R00.1  ICD-9-CM: 427.89         Transaminitis ICD-10-CM: R74.01  ICD-9-CM: 790.4         Hypertensive urgency ICD-10-CM: I16.0  ICD-9-CM: 401.9         Second degree AV block, Mobitz type I ICD-10-CM: I44.1  ICD-9-CM: 426.13     Overview Signed 3/21/2019  6:14 PM by Jackelin Yoo MD     Added automatically from request for surgery [id number]             Stage 3 chronic kidney disease ([facility] ICD-10-CM: N18.30  ICD-9-CM: 635. 3         Rotator cuff arthropathy of both shoulders ICD-10-CM: M12.811, M12.812  ICD-9-CM: 716.81         Cognitive impairment ICD-10-CM: R41.89  ICD-9-CM: 198.2         Systolic murmur [id number]: R01.1  ICD-9-CM: 785.2         Essential hypertension ICD-10-CM: I10  ICD-9-CM: 401.9         Gout ICD-10-CM: M10.9  ICD-9-CM: 274.9         Pure hypercholesterolemia ICD-10-CM: E78.00  ICD-9-CM: 272.0         History of prostate cancer ICD-10-CM: Z85.46  ICD-9-CM: V10.46         Chronic constipation ICD-10-CM: K59.09  ICD-9-CM: 564.00         Dysuria ICD-10-CM: R30.0  ICD-9-CM: 065. 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 Past Surgical History:      has a past surgical history that includes hx urological; hx prostatectomy; pr ins new/rplcmt prm pm w/transv eltrd atrial&vent (Right, 3/22/2019); pr ins new/rplcmt prm pacemakr w/trans eltrd atrial (N/A, 10/18/2019); and place percut gastrostomy tube (11/30/2020). Home Medications:     Prior to Admission medications    Medication Sig Start Date End Date Taking? Authorizing Provider   levETIRAcetam (KEPPRA) 750 mg tablet Take 1 Tab by mouth every twelve (12) hours. 12/11/20   Fredo Valadez NP   aspirin delayed-release 81 mg tablet Take 1 Tab by mouth daily. 7/13/20   Martina Wilson MD   polyethylene glycol [facility]) 17 gram/dose powder Take 17 g by mouth daily as needed for Constipation. 7/13/20   Martina Wilson MD   balsam peru-castor oiL (VENELEX) ointment Apply  to affected area three (3) times daily. 7/9/20   Sally Burrows MD   famotidine (PEPCID) 20 mg tablet Take 1 Tab by mouth every evening. 7/9/20   Sally Burrows MD   acetaminophen (TYLENOL) 325 mg tablet Take 650 mg by mouth every six (6) hours as needed for Pain.     Provider, Historical       Allergies/Social/Family History:     No Known Allergies   Social History     Tobacco Use    Smoking status: Never Smoker    Smokeless tobacco: Never Used   Substance Use Topics    Alcohol use: No      Family History   Problem Relation Age of Onset    No Known Problems Mother     No Known Problems Father        Review of Systems:     Not able to obtain due to patient medical condition    Objective:   Vital Signs:  Visit Vitals  BP (!) 143/78   Pulse 88   Temp 99 °F (37.2 °C)   Resp 17   Ht 5' 6\" (1.676 m)   Wt 75.5 kg (166 lb 7.2 oz)   SpO2 96%   BMI 26.87 kg/m²      O2 Device: Endotracheal tube Temp (24hrs), Av.3 °F (36.8 °C), Min:97.6 °F (36.4 °C), Max:99.7 °F (37.6 °C)           Intake/Output:     Intake/Output Summary (Last 24 hours) at 2021 1336  Last data filed at 2021 1200  Gross per 24 hour   Intake 2228.94 ml   Output 331 ml   Net 1897.94 ml       Physical Exam:    General:  mild distress, appears stated age, intubated   Eye:  conjunctivae/corneas clear. Pupils bilaterally reactive and round  Neurologic:  WD to pain in all ext. Does not follow commands  Lymphatic:  Cervical, supraclavicular, and axillary nodes normal.   Neck:  normal and no erythema or exudates noted. Lungs: Diminished in bilateral bases, mild tachypnea. Moderate oral secretions   Heart: Paced rhythm, regular, tachycardia  Abdomen:  soft, non-tender. Bowel sounds normal. No masses,  no organomegaly  Cardiovascular:  Regular rate and rhythm, S1S2 present, without murmur or extra heart sounds and pedal pulses normal Generalized edema  Skin:  no rash or abnormalities    LABS AND  DATA: Personally reviewed  Recent Labs     21  2104   WBC 18.3* 20.5*   HGB 7.4* 7.5*   HCT 22.7* 23.0*   * 135*     Recent Labs     21  1418 21  0333 21  0333   * 136   < > 135*   K 3.4* 4.0   < > 3.1*   CL 98 98   < > 97   CO2 30 31   < > 30   BUN 55* 55*   < > 54*   CREA 1.48* 1.35*   < > 1.21   * 81   < > 88   CA 9.1 8.9   < > 9.2   MG 2.8* 2.6*   < > 2.6*   PHOS 2.2*  --   --  2.0*    < > = values in this interval not displayed.      Recent Labs     21  0333    87   TP 6.2* 6.1*   ALB 2.4* 2.1*   GLOB 3.8 4.0     Recent Labs     21  044   INR 1.1   PTP 11.3*      Recent Labs     21  2117   PHI 7.40   PCO2I 46.4*   PO2I 104*   FIO2I 100     Recent Labs     21  1111 21   TROIQ 0.55* 0.57*       Hemodynamics:   PAP:   CO:     Wedge:   CI:     CVP:    SVR:       PVR:       Ventilator Settings:  Mode Rate Tidal Volume Pressure FiO2 PEEP   Assist control, Pressure control        30 % 5 cm H20     Peak airway pressure: 27 cm H2O    Minute ventilation: 12 l/min        MEDS: Reviewed    Chest X-Ray:  CXR Results  (Last 48 hours)               07/25/21 1839  XR CHEST PORT Final result    Impression:  1. Lines and tubes as above. 2. Interstitial pulmonary edema and small bilateral pleural effusions again   demonstrated. Narrative:  EXAM: XR CHEST PORT       INDICATION: ETT placement       COMPARISON: 7/24/2021       FINDINGS: 2 portable AP radiographs of the chest were obtained at 1826 and 1827   hours. Endotracheal tube is again shown terminating at the thoracic inlet. A   right IJ line is again noted terminating in the superior vena cava. Dual-lead   right axillary pacemaker is again demonstrate. Mild-moderate interstitial port   edema is again demonstrated with small bilateral pleural effusions again shown. No pneumothorax is demonstrated. Allowing for differences in rotation of the   films, cardiac and mediastinal contours are stable. ECHO:  EF 30% with moderate aortic stenosis        Assessment and Plan:   1. Acute Hypoxic Respiratory Failure  2. Acute on Chronic Systolic Heart Failure  3. Septic Shock  4. Cardiogenic Shock  5. Aspiration Pneumonitis  6. Pleural Effusion - RIGHT-sided  7. GERD  8. Protein Calorie Malnutrition  9. Dysphagia s/p PEG  10. Gout  11. Hx Prostate CA  12. Hx Seizure D/O  13. Hx CVA/TIA - with Residual Aphasia  14. Hx HTN  15. Hx Severe Aortic Stenosis/ Mod to severe mitral valve regurgitation  16. Hx Severe Pulmonary HTN  17. Hx Pacermaker     ICU Comprehensive Plan of Care:      Plans for this Shift:      1. Continue dobutamine at 5 mcg/min/hr  2. Trend NT BNP  3. Appreciate Cardiology  4.  Giving has improved blood pressure I will give him 1 dose of Bumex Diuril and acetazolamide, monitor urine output and renal function. 5. SBP Goal of: > 90 mmHg  6. MAP Goal of: > 65 mmHg  7. Albumin q6  8. Transfusion Trigger (Hgb): <7 g/dL  9. Respiratory Goals:  a. Chlorhexidine   b. Optimize PEEP/Ventilation/Oxygenation  c. Goal Tidal Volume 6 cc/kg based on IBW  d. Aim for lung protective ventilation  e. Head of bed > 30 degrees  10. Pulmonary toilet: Duo-Nebs   11. SpO2 Goal: > 92%   12. Keep K>4; Mg>2   13. PT/OT: NA   14. Discussed Plan of Care/Code Status: Full Code - discussed with daughter/son  15. Appreciate Consultants Input Cardiology  16. Discussed Care Plan with Bedside RN  17. Documentation of Current Medications  18. Rest of Plan Below:     F - Feeding:  Yes  A - Analgesia: Fentanyl  S - Sedation: Propofol  T - DVT Prophylaxis: Lovenox   H - Head of Bed: > 30 Degrees  U - Ulcer Prophylaxis: prevacid   G - Glycemic Control: Insulin q 6 hrs accu checks SSI   S - Spontaneous Breathing Trial: Pending  B - Bowel Regimen: Senna  I - Indwelling Catheter:              Tubes: ETT and PEG Tube  Lines: Peripheral IV  Drains: Avalos Catheter  D - De-escalation of Antibiotics:  Vancomycin  Zosyn  Overall prognosis is guarded. Appreciate palliative care team.    DISPOSITION  Stay in ICU    CRITICAL CARE CONSULTANT NOTE  I had a face to face encounter with the patient, reviewed and interpreted patient data including clinical events, labs, images, vital signs, I/O's, and examined patient. I have discussed the case and the plan and management of the patient's care with the consulting services, the bedside nurses and the respiratory therapist.      NOTE OF PERSONAL INVOLVEMENT IN CARE   This patient has a high probability of imminent, clinically significant deterioration, which requires the highest level of preparedness to intervene urgently.  I participated in the decision-making and personally managed or directed the management of the following life and organ supporting interventions that required my frequent assessment to treat or prevent imminent deterioration. I personally spent 40 minutes of critical care time. This is time spent at this critically ill patient's bedside actively involved in patient care as well as the coordination of care and discussions with the patient's family. This does not include any procedural time which has been billed separately. Axel Parra M.D.   Staff Intensivist/Pulmonologist  [facility]  7/26/2021

## 2021-07-26 NOTE — PROGRESS NOTES
Day #3 of Vancomycin  Indication:  aspiration pneumonia  Current regimen:  500 mg IV Q12H  Abx regimen:  vancomycin + Zosyn     Recent Labs     21  0424 21  2104 21  1418 21  0333 21  0333   WBC 18.3* 20.5* 20.7*   < > 21.9*   CREA 1.48*  --  1.35*  --  1.21   BUN 55*  --  55*  --  54*    < > = values in this interval not displayed. Est CrCl: 30-35 ml/min; UO: 0.2 ml/kg/hr  Temp (24hrs), Av.8 °F (37.1 °C), Min:97.6 °F (36.4 °C), Max:99.8 °F (37.7 °C)    Cultures:    blood x 2 - NGTD   respiratory - GPR, preliminary    MRSA screen - negative     Goal target range Trough 10-15 mcg/mL    Recent level history:  Date/Time Dose & Interval Measured Level (mcg/mL) Associated AUC/JOSE M    500 mg IV Q12H 29  @ 0333 696    On hold 28.3 @ 0424 (23 hrs p dose)                                 Plan: Continue to hold. Scr continues to rise. BUN/Scr ratio is high. Will target trough 10-15 vs AUC. Repeat random level tomorrow morning.

## 2021-07-26 NOTE — CONSULTS
Palliative Medicine Consult  [place]: [phone] ([phone])    Patient Name: Dianna Diaz  YOB: 1933    Date of Initial Consult: 7/26/21  Reason for Consult: care decisions  Requesting Provider: Dr. Rhiannon Marshall  Primary Care Physician: Jonathan Duke MD     SUMMARY:   Dianna Diaz is a 80 y.o. with a medical history including chronic debility, HFrEF, HTN, h/o CVA, seizure disorder and a PEG for long term nutrition who was recently hospitalized at [facility] for decompensated heart failure and respiratory failure. He was discharged on 7/23 to [address] [place] [address] and did not at that time require a tracheostomy or vent support, however decompensated the same day prompting intubation and transfer to [facility]. He was admitted to the ICU on 7/23/2021 with a diagnosis of acute respiratory failure due to aspiration pneumonitis. Current medical issues leading to Palliative Medicine involvement include: progressive decline, poor prognosis for functional recovery. Psychosocial Hx- . Resident of [address] for unknown time period. . He has three children; a daughter and two sons who all reside locally. PALLIATIVE DIAGNOSES:   1. Unresponsive state  2. Acute hypoxic respiratory failure- intubated in ED   3. NICM- LVEF 30-35% 7/24  4. Moderate AS  5. H/o CVA  6. PEG tube  7. Bedbound        PLAN:   1. Pt intubated in the ICU, unresponsive to stimuli despite no sedatives, requiring dobutamine and diuresis for decompensated HF. Urine output is down. Weak cough per RN. No SBT this am.    2. Pt's three children are [facility] / healthcare decision makers. 3. Palliative LCSW Francois Dubois and I reached out to daughter Valerio Craig to discuss goals of care considering his frail state leading up to admission and guarded prognosis for recovery off the vent. Valerio Craig was aware our team was consulted but was hopeful to meet in person with her two brothers.   It is difficult for them to get away during the day, made a tentative plan for a family meeting tomorrow 4:30 pm.  I did provide her with a brief medical update on his current status. 4. Thank you for the opportunity to participate in the care of [place]  5. Communicated plan of care with: Palliative [facility] Team, Dr. Saige Gonzalez / TREATMENT PREFERENCES:     GOALS OF CARE:  Patient/Health Care Proxy Stated Goals: Other (comment) (unknown pending family mtg)    TREATMENT PREFERENCES:   Code Status: Full Code    Advance Care Planning:  [x] The [facility] Interdisciplinary Team has updated the ACP Navigator with Health Care Decision Maker and Patient Capacity      Primary Decision Maker (Active): [place] - Daughter - [phone]    Secondary Decision Maker: Torito Gomez - [phone]     Advance Care Planning 11/18/2020   Patient's [address] is: -   Primary Decision Maker Name -   Confirm Advance Directive None   Patient Would Like to Complete Advance Directive -       Medical Interventions: Full interventions     Artificially Administered Nutrition: Feeding tube long-term, if indicated     Other:    As far as possible, the palliative care team has discussed with patient / health care proxy about goals of care / treatment preferences for patient. HISTORY:     History obtained from: EMR    CHIEF COMPLAINT: n/a    HPI/SUBJECTIVE:    The patient is:   [] Verbal and participatory  [x] Non-participatory due to:   Intubated / unresponsive    Clinical Pain Assessment (nonverbal scale for severity on nonverbal patients):   Clinical Pain Assessment  Severity: 0     Activity (Movement): Lying quietly, normal position    Duration: for how long has pt been experiencing pain (e.g., 2 days, 1 month, years)  Frequency: how often pain is an issue (e.g., several times per day, once every few days, constant)     FUNCTIONAL ASSESSMENT:     Palliative Performance Scale (PPS):  PPS: 10 PSYCHOSOCIAL/SPIRITUAL SCREENING:     Palliative IDT has assessed this patient for cultural preferences / practices and a referral made as appropriate to needs (Cultural Services, Patient Advocacy, Ethics, etc.)    Any spiritual / [employer] concerns:  [] Yes /  [x] No    Caregiver Burnout:  [] Yes /  [] No /  [x] No Caregiver Present      Anticipatory grief assessment:   [x] Normal  / [] Maladaptive       ESAS Anxiety:      ESAS Depression:          REVIEW OF SYSTEMS:     Positive and pertinent negative findings in ROS are noted above in HPI. The following systems were [x] reviewed / [] unable to be reviewed as noted in HPI  Other findings are noted below. Systems: constitutional, ears/nose/mouth/throat, respiratory, gastrointestinal, genitourinary, musculoskeletal, integumentary, neurologic, psychiatric, endocrine. Positive findings noted below. Modified ESAS Completed by: provider           Pain: 0           Dyspnea: 0 (on vent)           Stool Occurrence(s): 2        PHYSICAL EXAM:     From RN flowsheet:  Wt Readings from Last 3 Encounters:   07/25/21 166 lb 7.2 oz (75.5 kg)   01/06/21 182 lb 8.7 oz (82.8 kg)   12/08/20 182 lb 8.7 oz (82.8 kg)     Blood pressure (!) 140/66, pulse 85, temperature 99.1 °F (37.3 °C), resp. rate 18, height 5' 6\" (1.676 m), weight 166 lb 7.2 oz (75.5 kg), SpO2 95 %.     Pain Scale 1: Adult Nonverbal Pain Scale  Pain Intensity 1: 0                 Last bowel movement, if known:     Elderly male intubated  No response to sternal rub  Abd:  PEG tube with enteral feeds running  : indwelling epps, FMS in place  Resting left hand tremor appreciated       HISTORY:     Active Problems:    Acute respiratory failure with hypoxia ([address] (7/23/2021)      Aspiration into respiratory tract (7/23/2021)      Past Medical History:   Diagnosis Date    Cancer [facility])     prostate    Chronic systolic heart failure ([facility] 12/7/2020    Constipation     Gout     Hyperlipemia     Hypertension     Pacemaker 2019    [facility]     Thrombocytopenia ([facility] 3/19/2019    TIA (transient ischemic attack) 2013      Past Surgical History:   Procedure Laterality Date    HX PROSTATECTOMY      HX UROLOGICAL      prostate removed    PLACE PERCUT GASTROSTOMY TUBE  11/30/2020         NC INS NEW/RPLCMT PRM PACEMAKR W/TRANS ELTRD ATRIAL N/A 10/18/2019    Insert Ppm Single Atrial performed by Renu Rangel MD at [address] CATH LAB    NC INS NEW/RPLCMT PRM PM W/TRANSV ELTRD ATRIAL&VENT Right 3/22/2019    INSERT PPM DUAL performed by Renu Rangel MD at [address] CATH LAB      Family History   Problem Relation Age of Onset    No Known Problems Mother     No Known Problems Father       History reviewed, no pertinent family history.   Social History     Tobacco Use    Smoking status: Never Smoker    Smokeless tobacco: Never Used   Substance Use Topics    Alcohol use: No     No Known Allergies   Current Facility-Administered Medications   Medication Dose Route Frequency    [START ON 7/27/2021] vancomycin random level with am labs on 7/27 @ 04:00   Other ONCE    sterile water (preservative free) injection        0.9% sodium chloride infusion 250 mL  250 mL IntraVENous PRN    DOBUTamine (DOBUTREX) 500 mg/250 mL (2,000 mcg/mL) infusion  5 mcg/kg/min IntraVENous CONTINUOUS    balsam peru-castor oiL (VENELEX) ointment   Topical BID    sodium chloride (NS) flush 5-40 mL  5-40 mL IntraVENous Q8H    sodium chloride (NS) flush 5-40 mL  5-40 mL IntraVENous PRN    acetaminophen (TYLENOL) tablet 650 mg  650 mg Oral Q6H PRN    Or    acetaminophen (TYLENOL) suppository 650 mg  650 mg Rectal Q6H PRN    ondansetron (ZOFRAN ODT) tablet 4 mg  4 mg Oral Q8H PRN    Or    ondansetron (ZOFRAN) injection 4 mg  4 mg IntraVENous Q6H PRN    [Held by provider] enoxaparin (LOVENOX) injection 40 mg  40 mg SubCUTAneous DAILY    sennosides (SENOKOT) 8.8 mg/5 mL syrup 8.8 mg  5 mL Oral BID PRN    chlorhexidine (ORAL CARE KIT) 0.12 % mouthwash 15 mL  15 mL Oral Q12H    Vancomycin Pharmacy Dosing   Other Rx Dosing/Monitoring    lansoprazole (PREVACID) 3 mg/mL oral suspension 30 mg  30 mg Oral ACB    lacosamide (VIMPAT) tablet 100 mg  100 mg Oral Q12H    levETIRAcetam (KEPPRA) oral solution 500 mg  500 mg Oral BID    allopurinoL (ZYLOPRIM) tablet 100 mg  100 mg Oral DAILY    aspirin chewable tablet 81 mg  81 mg Oral DAILY    [Held by provider] bumetanide (BUMEX) tablet 1 mg  1 mg Oral BID    midodrine (PROAMATINE) tablet 10 mg  10 mg Oral Q8H    albumin human 25% (BUMINATE) solution 12.5 g  12.5 g IntraVENous Q6H    sodium chloride (NS) flush 5-10 mL  5-10 mL IntraVENous PRN    piperacillin-tazobactam (ZOSYN) 3.375 g in 0.9% sodium chloride (MBP/ADV) 100 mL MBP  3.375 g IntraVENous Q8H          LAB AND IMAGING FINDINGS:     Lab Results   Component Value Date/Time    WBC 18.3 (H) 07/26/2021 04:24 AM    HGB 7.4 (L) 07/26/2021 04:24 AM    PLATELET 254 (L) 33/68/9056 04:24 AM     Lab Results   Component Value Date/Time    Sodium 135 (L) 07/26/2021 04:24 AM    Potassium 3.4 (L) 07/26/2021 04:24 AM    Chloride 98 07/26/2021 04:24 AM    CO2 30 07/26/2021 04:24 AM    BUN 55 (H) 07/26/2021 04:24 AM    Creatinine 1.48 (H) 07/26/2021 04:24 AM    Calcium 9.1 07/26/2021 04:24 AM    Magnesium 2.8 (H) 07/26/2021 04:24 AM    Phosphorus 2.2 (L) 07/26/2021 04:24 AM      Lab Results   Component Value Date/Time    Alk.  phosphatase 102 07/26/2021 04:24 AM    Protein, total 6.2 (L) 07/26/2021 04:24 AM    Albumin 2.4 (L) 07/26/2021 04:24 AM    Globulin 3.8 07/26/2021 04:24 AM     Lab Results   Component Value Date/Time    INR 1.1 07/24/2021 04:44 AM    Prothrombin time 11.3 (H) 07/24/2021 04:44 AM    aPTT 30.1 06/30/2020 12:18 PM      Lab Results   Component Value Date/Time    Iron 24 (L) 08/14/2019 05:05 PM    TIBC 189 (L) 08/14/2019 05:05 PM    Iron % saturation 13 (L) 08/14/2019 05:05 PM    Ferritin 212 08/14/2019 05:05 PM      No results found for: PH, PCO2, PO2  No components found for: [place]   Lab Results   Component Value Date/Time    CK 75 11/17/2020 04:24 PM                Total time:   Counseling / coordination time, spent as noted above:   > 50% counseling / coordination?:     Prolonged service was provided for  []30 min   []75 min in face to face time in the presence of the patient, spent as noted above. Time Start:   Time End:   Note: this can only be billed with 94205 (initial) or 31007 (follow up). If multiple start / stop times, list each separately.

## 2021-07-26 NOTE — PROGRESS NOTES
1930: Bedside and Verbal shift change report given to [address] (oncoming nurse) by Gabrielle Manzano RN (offgoing nurse). Report included the following information SBAR, Kardex, ED Summary, Procedure Summary, Intake/Output, MAR, Recent Results, Cardiac Rhythm Paced and Alarm Parameters . 2000Clare Wadsworth NP at bedside discussing TFs with pt's daughter. Pt's daughter reports that pt has never had a TF rate above 50 mL/hr and is concerned that his current goal is 65 mL/hr. Orders received from Funmi Narayanan NP to adjust goal to 50 mL/hr.    0730: Bedside and Verbal shift change report given to [facility] (oncoming nurse) by Terry Caro RN (offgoing nurse). Report included the following information SBAR, Kardex, ED Summary, Procedure Summary, Intake/Output, MAR, Recent Results, Cardiac Rhythm Paced and Alarm Parameters .

## 2021-07-26 NOTE — PROGRESS NOTES
Palliative Medicine  [place]: [phone] ([phone])  [facility]: [phone] ([phone])    The SW called the patient's daughter Bridgette Valencia, along with Palliative Medicine MD Dr. Jimmy Sandoval-       JOSEFINA introduced the role of Palliative Medicine, Provider provided update on patient's current status. The patient's daughter would like to meet in-person for family meeting. Plan for tentative family meeting on 7/27 at 4:30 p.m. with the patient's children- Farzana Sailors, and Macrina Sensor. The patient has durable POA on file, however, statues are not clear in designating a specific healthcare agent. Brandon Puga will call Palliative Medicine team to confirm that her siblings will be available at this time.         Jigna Humphreys, LCSW

## 2021-07-26 NOTE — PROGRESS NOTES
Cardiac Electrophysiology Hospital Progress Note     Subjective:      Felisa Stark is a 80 y.o. patient who is seen for management of acute on chronic CHF. He was admitted from [facility] on 07/23/2021 with respiratory distress. Had an episode of vomiting, aspirated, initially on high flow nasal cannula, then intubated by [facility] staff due to persistent hypoxia. At [facility], ABG consistent with hypoxic respiratory failure. BP initially ok, but then dropped with sedation, started Levophed but now on dobutamine 5 mcg/kg/min IV instead. Leukocytosis, NT pro BNP >37619, mildly hypokalemic & hyponatremic today. Hgb 8.3 on admit, decreased to 6 on 07/25/2021. He did receive transfusion of PRBCs. Thrombocytopenic today, plt 120 (238 on admit). Lactic acid 4.6 on admit, normalized on 07/24/2021. Paired blood cultures without growth to date. Sputum culture showed few gram negative rods. Now treated with Zosyn & vancomycin. Initial CXR showed moderate pulmonary edema with right pleural effusion. Underwent right thoracentesis yielding 1350 ml clear yellow fluid. Most recent CXR on 07/25/2021 showed interstitial pulmonary edema & small bilat pleural effusions. NICM, LVEF 30-35% during current admission, also with mod AS (mean grad 18.1 mmHg). Unable to determine NYHA class due to intubated/sedated status. BP now normal on dobutamine. AS-. Afebrile today. Previous:  Admitted at [address] [place] [address] 06/30/2021-07/07/2021 for AMS, hypoxic respiratory failure, acute on chronic systolic CHF secondary to severe AS, hyponatremia, hypokalemica, & possible UTI. Discharged to [facility]. Decline started 2020 with seizures/TIA, encephalopathy, PEG due to refusal to eat. 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 NSVT noted on pacemaker 10/09/2020. Denies family history of early CAD, arrhythmia, or valve disease.     Hx of abdominal and thoracic aortic atherosclerosis on CTs     Hx of right adrenal gland lipid rich adenoma 6.1 cm     Diagnosed with Alzheimer's dementia January 2019.  He had seen Dr. Jarett Lowe (neuro) before        Problem List  Date Reviewed: 12/17/2020          Codes Class Noted    Acute respiratory failure with hypoxia [facility]) ICD-10-CM: J96.01  ICD-9-CM: 518.81  7/23/2021        Aspiration into respiratory tract ICD-10-CM: T17.908A  ICD-9-CM: 934.9  7/23/2021        Hypokalemia ICD-10-CM: E87.6  ICD-9-CM: 276.8  1/12/2021        Hypernatremia ICD-10-CM: E87.0  ICD-9-CM: 276.0  1/12/2021        Moderate protein malnutrition (HCC) ICD-10-CM: E44.0  ICD-9-CM: 263.0  1/12/2021        Failure to thrive (0-17) ICD-10-CM: R62.51  ICD-9-CM: 783.41  1/9/2021        Bacteremia ICD-10-CM: R78.81  ICD-9-CM: 790.7  1/9/2021        UTI (urinary tract infection) ICD-10-CM: N39.0  ICD-9-CM: 599.0  1/9/2021        Dementia ([facility] ICD-10-CM: F03.90  ICD-9-CM: 294.20  1/9/2021        Bedridden ICD-10-CM: Z74.01  ICD-9-CM: V49.84  1/9/2021        Pressure ulcer ICD-10-CM: L89.90  ICD-9-CM: 707.00, 707.20  1/9/2021        S/P percutaneous endoscopic gastrostomy (PEG) tube placement [facility]) ICD-10-CM: Z93.1  ICD-9-CM: V44.1  1/9/2021        Dehydration ICD-10-CM: E86.0  ICD-9-CM: 276.51  1/6/2021        Lactic acidosis ICD-10-CM: E87.2  ICD-9-CM: 276.2  1/6/2021        Sepsis ([facility] ICD-10-CM: A41.9  ICD-9-CM: 038.9, 995.91  1/6/2021        SOULEYMANE (acute kidney injury) ([facility] ICD-10-CM: N17.9  ICD-9-CM: 584.9  1/6/2021        Chronic systolic heart failure ([facility] ICD-10-CM: I50.22  ICD-9-CM: 428.22  12/7/2020        Altered mental status ICD-10-CM: R41.82  ICD-9-CM: 780.97  11/18/2020        Post-ictal state ([facility] ICD-10-CM: R56.9  ICD-9-CM: 780.39  11/18/2020        Seizure ([facility] ICD-10-CM: R56.9  ICD-9-CM: 780.39  11/17/2020        Atrial pacemaker lead displacement ICD-10-CM: T82.120A  ICD-9-CM: 996.01  10/18/2019        Pacemaker ICD-10-CM: Z95.0  ICD-9-CM: V45.01  3/22/2019    Overview Signed 3/22/2019  5:08 PM by Adelso Pantoja, MD     3/22/2019 dual chamber Medtronic pacer             Bradycardia ICD-10-CM: R00.1  ICD-9-CM: 427.89  3/19/2019        Transaminitis ICD-10-CM: R74.01  ICD-9-CM: 790.4  3/19/2019        Hypertensive urgency ICD-10-CM: I16.0  ICD-9-CM: 401.9  3/19/2019        Second degree AV block, Mobitz type I ICD-10-CM: I44.1  ICD-9-CM: 426.13  3/19/2019    Overview Signed 3/21/2019  6:14 PM by Verenice Minaya MD     Added automatically from request for surgery [id number]             Stage 3 chronic kidney disease ([facility] ICD-10-CM: N18.30  ICD-9-CM: 585.3  3/15/2019        Rotator cuff arthropathy of both shoulders ICD-10-CM: M12.811, M12.812  ICD-9-CM: 716.81  3/15/2019        Cognitive impairment ICD-10-CM: R41.89  ICD-9-CM: 294.9  4/4/6672        Systolic murmur IXJ-26-CD: R01.1  ICD-9-CM: 785.2  12/30/2018        Essential hypertension ICD-10-CM: I10  ICD-9-CM: 401.9  12/17/2018        Gout ICD-10-CM: M10.9  ICD-9-CM: 274.9  12/17/2018        Pure hypercholesterolemia ICD-10-CM: E78.00  ICD-9-CM: 272.0  12/17/2018        History of prostate cancer ICD-10-CM: Z85.46  ICD-9-CM: V10.46  12/17/2018        Chronic constipation ICD-10-CM: K59.09  ICD-9-CM: 564.00  12/17/2018        Dysuria ICD-10-CM: R30.0  ICD-9-CM: 788.1  8/8/2017        Weight loss ICD-10-CM: R63.4  ICD-9-CM: 783.21  8/8/2017        Adrenal mass ([facility] ICD-10-CM: E27.8  ICD-9-CM: 255.8  7/18/2017    Overview Addendum 12/17/2018  9:57 AM by Trinidad Norton MD     Stable/non-functioning adenoma on imaging                     Current Facility-Administered Medications   Medication Dose Route Frequency Provider Last Rate Last Admin    potassium phosphate 30 mmol in 0.9% sodium chloride 250 mL infusion   IntraVENous Hai Marr NP 65 mL/hr at 07/26/21 0640 New Bag at 07/26/21 0640    0.9% sodium chloride infusion 250 mL  250 mL IntraVENous PRN Krisitan Pritchett,         DOBUTamine (DOBUTREX) 500 mg/250 mL (2,000 mcg/mL) infusion  5 mcg/kg/min IntraVENous CONTINUOUS Kristian Pritchett DO 11.2 mL/hr at 07/25/21 1553 5 mcg/kg/min at 07/25/21 1553    balsam peru-castor oiL (VENELEX) ointment   Topical BID Kristian Pritchett DO   Given at 07/25/21 1713    sodium chloride (NS) flush 5-40 mL  5-40 mL IntraVENous Q8H Carlos [place] R, NP-C   10 mL at 07/26/21 0640    sodium chloride (NS) flush 5-40 mL  5-40 mL IntraVENous PRN Louanna Mash, NP-C        acetaminophen (TYLENOL) tablet 650 mg  650 mg Oral Q6H PRN Louanna Mash, NP-C        Or    acetaminophen (TYLENOL) suppository 650 mg  650 mg Rectal Q6H PRN Louanna Mash, NP-C        ondansetron (ZOFRAN ODT) tablet 4 mg  4 mg Oral Q8H PRN Carlos [place] R, NP-C        Or    ondansetron (ZOFRAN) injection 4 mg  4 mg IntraVENous Q6H PRN Louanna Mash, NP-C        [Held by provider] enoxaparin (LOVENOX) injection 40 mg  40 mg SubCUTAneous DAILY Louanna Mash, NP-C        sennosides (SENOKOT) 8.8 mg/5 mL syrup 8.8 mg  5 mL Oral BID PRN Louanna Mash, NP-C        chlorhexidine (ORAL CARE KIT) 0.12 % mouthwash 15 mL  15 mL Oral Q12H Carlos [place] R, NP-C   15 mL at 07/26/21 0003    Vancomycin Pharmacy Dosing   Other Rx Dosing/Monitoring Carlos [place] R, NP-C        lansoprazole (PREVACID) 3 mg/mL oral suspension 30 mg  30 mg Oral ACB Carlos [place] R, NP-C   30 mg at 07/26/21 0646    lacosamide (VIMPAT) tablet 100 mg  100 mg Oral Q12H Carlos [place] R, NP-C   100 mg at 07/25/21 2105    levETIRAcetam (KEPPRA) oral solution 500 mg  500 mg Oral BID Louanna Mash, NP-C   500 mg at 07/25/21 1713    allopurinoL (ZYLOPRIM) tablet 100 mg  100 mg Oral DAILY Carlos [place] R, NP-C   100 mg at 07/25/21 2121    aspirin chewable tablet 81 mg  81 mg Oral DAILY Sean Saavedra NP-C   81 mg at 07/25/21 0084    [Held by provider] bumetanide (BUMEX) tablet 1 mg  1 mg Oral BID Carlos EL NP-C   1 mg at 07/24/21 0839    midodrine (PROAMATINE) tablet 10 mg  10 mg Oral Q8H Kristian Pritchett DO   10 mg at 07/26/21 0646    albumin human 25% (BUMINATE) solution 12.5 g  12.5 g IntraVENous Q6H Kristian Pritchett DO   12.5 g at 07/26/21 6947    sodium chloride (NS) flush 5-10 mL  5-10 mL IntraVENous PRN Pritesh Henriquez MD        piperacillin-tazobactam (ZOSYN) 3.375 g in 0.9% sodium chloride (MBP/ADV) 100 mL MBP  3.375 g IntraVENous Q8H Rip Loud R, NP-C 25 mL/hr at 07/26/21 0423 3.375 g at 07/26/21 0423     No Known Allergies  Past Medical History:   Diagnosis Date    Cancer [facility])     prostate    Chronic systolic heart failure ([facility] 12/7/2020    Constipation     Gout     Hyperlipemia     Hypertension     Pacemaker 2019    Stroke ([facility]     Thrombocytopenia ([facility] 3/19/2019    TIA (transient ischemic attack) 2013     Past Surgical History:   Procedure Laterality Date    HX PROSTATECTOMY      HX UROLOGICAL      prostate removed    PLACE PERCUT GASTROSTOMY TUBE  11/30/2020         MO INS NEW/RPLCMT PRM PACEMAKR W/TRANS ELTRD ATRIAL N/A 10/18/2019    Insert Ppm Single Atrial performed by Renu Rangel MD at [address] [facility] CATH LAB    MO INS NEW/RPLCMT PRM PM W/TRANSV ELTRD ATRIAL&VENT Right 3/22/2019    INSERT PPM DUAL performed by Renu Rangel MD at [address] CATH LAB     Family History   Problem Relation Age of Onset    No Known Problems Mother     No Known Problems Father      Social History     Tobacco Use    Smoking status: Never Smoker    Smokeless tobacco: Never Used   Substance Use Topics    Alcohol use: No        Review of Systems: Unable to obtain due to intubated status. Objective:     Visit Vitals  /69   Pulse 88   Temp 99.1 °F (37.3 °C)   Resp 18   Ht 5' 6\" (1.676 m)   Wt 166 lb 7.2 oz (75.5 kg)   SpO2 96%   BMI 26.87 kg/m²      Physical Exam:   Constitutional: Well-developed and well-nourished. No respiratory distress. Head: Normocephalic and atraumatic. Eyes: closed  ENT: Hearing grossly normal.  ET tube in place. Neck: Supple. No JVD present. Cardiovascular: Normal rate, regular rhythm. Exam reveals no gallop and no friction rub. 3/6 systolic murmur LUSB. Pulmonary/Chest: Effort normal and breath sounds normal. No wheezes. Abdominal: Soft. PEG tube. Genitourinary: Avalos catheter & rectal tube. Musculoskeletal: Grossly symmetrical.  Vasc/lymphatic: No edema. Neurological: sedated and intubated   Skin: Right chest pacemaker site well healed. Assessment/Plan:     Imaging/Studies:  Echo (07/24/2021): LVEF 54-22%, LV diastolic dysfunction. Mod LA dilation. Mod AS (mean grad 18.1 mmHg, peak 32.2 mmHg). Mild TR. NICM: Acute on chronic systolic CHF. LVEF 30-35% on current admission (reported 10-15% in the past), known moderate AS. Unable to assess NYHA status due to intubated state. BP too low for GDMT. Continue diuresis with Bumex. He has been declining for some time, but should he get through his current sepsis & respiratory failure & stabilize in the future, may potentially consider upgrade of current dual chamber pacemaker to biventricular pacemaker +/- ICD. (He is chronically RV paced.)    Medtronic dual chamber pacemaker (DOI 03/22/2019, new RA lead 10/18/2019): Implanted for 2nd degree AVB. Last check in 10/2020 showed proper lead & generator function. Generator longevity estimated 10.5 yrs. RA 28%, RV >99%; pacer dependent. Since 07/05/2020, 4 episodes NSVT (6-21 beats) noted. AS: Moderate, mean grad 18.1 mmHg, peak 32.2 mmHg. Will not pursue valve replacement at this time. SHANNON Salas  [facility] [place]  07/26/21      Addendum from EP attending: This patient was seen and examined by me in a face-to-face visit today. I reviewed the medical record including lab results, imaging and other provider notes. I confirmed the history as described above. I spoke to the patient, obtained history and examined the patient.  I discussed assessments and plans in details with nurse practitioner. Below are my treatment plans and recommendations. He is intubated and critically ill on dobutamine, neosynephrine and albumin this am  Vital signs are stable  Exam shows      Constitutional: well-developed and well-nourished   Head: Normocephalic and atraumatic. Eyes: closed  Neck: central line, ET tube  Cardiovascular: Normal rate, regular rhythm and 3/6 systolic murmur heard. Pulmonary/Chest: rhonchi  Abdominal: Soft, PEG  Musculoskeletal: no edema SCD on  Neurological: intubated  Skin: warm     Assessment and Plan: he is critically ill with infection being treated so it is not the right time to discuss with him or his family about upgrading the device for CHF management  Would continue with dobutamine inotrope and antibiotic IV  Bumex was held but bp is good now so can resume for diuresis. BNP > 35, 000  Continue to follow         Thank you for involving me in this patient's care and please call with further concerns or questions. Enrrique Bell M.D.   Electrophysiology/Cardiology  [facility]  [address], [facility] [address] [place] [address] Kathy [address] [place] [address]  [phone]

## 2021-07-27 LAB
ALBUMIN SERPL-MCNC: 2.9 G/DL (ref 3.5–5)
ALBUMIN/GLOB SERPL: 0.7 {RATIO} (ref 1.1–2.2)
ALP SERPL-CCNC: 95 U/L (ref 45–117)
ALT SERPL-CCNC: 46 U/L (ref 12–78)
ANION GAP SERPL CALC-SCNC: 9 MMOL/L (ref 5–15)
ARTERIAL PATENCY WRIST A: POSITIVE
AST SERPL-CCNC: 17 U/L (ref 15–37)
BASE EXCESS BLD CALC-SCNC: 6 MMOL/L
BDY SITE: ABNORMAL
BILIRUB SERPL-MCNC: 1.3 MG/DL (ref 0.2–1)
BNP SERPL-MCNC: ABNORMAL PG/ML
BUN SERPL-MCNC: 56 MG/DL (ref 6–20)
BUN/CREAT SERPL: 39 (ref 12–20)
CALCIUM SERPL-MCNC: 9.7 MG/DL (ref 8.5–10.1)
CHLORIDE SERPL-SCNC: 98 MMOL/L (ref 97–108)
CO2 SERPL-SCNC: 31 MMOL/L (ref 21–32)
CREAT SERPL-MCNC: 1.42 MG/DL (ref 0.7–1.3)
ERYTHROCYTE [DISTWIDTH] IN BLOOD BY AUTOMATED COUNT: 19 % (ref 11.5–14.5)
GAS FLOW.O2 O2 DELIVERY SYS: ABNORMAL L/MIN
GAS FLOW.O2 SETTING OXYMISER: 18 BPM
GLOBULIN SER CALC-MCNC: 4.1 G/DL (ref 2–4)
GLUCOSE SERPL-MCNC: 142 MG/DL (ref 65–100)
HCO3 BLD-SCNC: 29.2 MMOL/L (ref 22–26)
HCT VFR BLD AUTO: 24.2 % (ref 36.6–50.3)
HGB BLD-MCNC: 7.8 G/DL (ref 12.1–17)
L PNEUMO1 AG UR QL IA: NEGATIVE
MAGNESIUM SERPL-MCNC: 2.8 MG/DL (ref 1.6–2.4)
MCH RBC QN AUTO: 29.1 PG (ref 26–34)
MCHC RBC AUTO-ENTMCNC: 32.2 G/DL (ref 30–36.5)
MCV RBC AUTO: 90.3 FL (ref 80–99)
NRBC # BLD: 0 K/UL (ref 0–0.01)
NRBC BLD-RTO: 0 PER 100 WBC
O2/TOTAL GAS SETTING VFR VENT: 30 %
PCO2 BLD: 35.6 MMHG (ref 35–45)
PEEP RESPIRATORY: 5 CMH2O
PH BLD: 7.52 [PH] (ref 7.35–7.45)
PHOSPHATE SERPL-MCNC: 3.3 MG/DL (ref 2.6–4.7)
PIP ISTAT,IPIP: 16
PLATELET # BLD AUTO: 110 K/UL (ref 150–400)
PMV BLD AUTO: 10.1 FL (ref 8.9–12.9)
PO2 BLD: 90 MMHG (ref 80–100)
POTASSIUM SERPL-SCNC: 3.2 MMOL/L (ref 3.5–5.1)
PROCALCITONIN SERPL-MCNC: 23.57 NG/ML
PROT SERPL-MCNC: 7 G/DL (ref 6.4–8.2)
RBC # BLD AUTO: 2.68 M/UL (ref 4.1–5.7)
SAO2 % BLD: 97.9 % (ref 92–97)
SODIUM SERPL-SCNC: 138 MMOL/L (ref 136–145)
SPECIMEN SOURCE: NORMAL
SPECIMEN TYPE: ABNORMAL
VANCOMYCIN SERPL-MCNC: 23.6 UG/ML
VENTILATION MODE VENT: ABNORMAL
WBC # BLD AUTO: 15.9 K/UL (ref 4.1–11.1)

## 2021-07-27 PROCEDURE — 74011250637 HC RX REV CODE- 250/637: Performed by: NURSE PRACTITIONER

## 2021-07-27 PROCEDURE — 82803 BLOOD GASES ANY COMBINATION: CPT

## 2021-07-27 PROCEDURE — 74011250636 HC RX REV CODE- 250/636: Performed by: INTERNAL MEDICINE

## 2021-07-27 PROCEDURE — 80053 COMPREHEN METABOLIC PANEL: CPT

## 2021-07-27 PROCEDURE — 74011000258 HC RX REV CODE- 258: Performed by: NURSE PRACTITIONER

## 2021-07-27 PROCEDURE — 99233 SBSQ HOSP IP/OBS HIGH 50: CPT | Performed by: INTERNAL MEDICINE

## 2021-07-27 PROCEDURE — 84100 ASSAY OF PHOSPHORUS: CPT

## 2021-07-27 PROCEDURE — 65610000006 HC RM INTENSIVE CARE

## 2021-07-27 PROCEDURE — 74011250636 HC RX REV CODE- 250/636: Performed by: NURSE PRACTITIONER

## 2021-07-27 PROCEDURE — 80202 ASSAY OF VANCOMYCIN: CPT

## 2021-07-27 PROCEDURE — 36415 COLL VENOUS BLD VENIPUNCTURE: CPT

## 2021-07-27 PROCEDURE — 84145 PROCALCITONIN (PCT): CPT

## 2021-07-27 PROCEDURE — 99356 PR PROLONGED SVC I/P OR OBS SETTING 1ST HOUR: CPT | Performed by: INTERNAL MEDICINE

## 2021-07-27 PROCEDURE — 83735 ASSAY OF MAGNESIUM: CPT

## 2021-07-27 PROCEDURE — 74011250637 HC RX REV CODE- 250/637: Performed by: INTERNAL MEDICINE

## 2021-07-27 PROCEDURE — 74011000250 HC RX REV CODE- 250: Performed by: INTERNAL MEDICINE

## 2021-07-27 PROCEDURE — 36600 WITHDRAWAL OF ARTERIAL BLOOD: CPT

## 2021-07-27 PROCEDURE — 74011250636 HC RX REV CODE- 250/636: Performed by: ANESTHESIOLOGY

## 2021-07-27 PROCEDURE — 83880 ASSAY OF NATRIURETIC PEPTIDE: CPT

## 2021-07-27 PROCEDURE — P9047 ALBUMIN (HUMAN), 25%, 50ML: HCPCS | Performed by: ANESTHESIOLOGY

## 2021-07-27 PROCEDURE — 74011000250 HC RX REV CODE- 250: Performed by: NURSE PRACTITIONER

## 2021-07-27 PROCEDURE — 85027 COMPLETE CBC AUTOMATED: CPT

## 2021-07-27 PROCEDURE — 94003 VENT MGMT INPAT SUBQ DAY: CPT

## 2021-07-27 PROCEDURE — 74011250637 HC RX REV CODE- 250/637: Performed by: ANESTHESIOLOGY

## 2021-07-27 RX ORDER — BUMETANIDE 0.25 MG/ML
2 INJECTION INTRAMUSCULAR; INTRAVENOUS ONCE
Status: COMPLETED | OUTPATIENT
Start: 2021-07-27 | End: 2021-07-27

## 2021-07-27 RX ORDER — BUMETANIDE 0.25 MG/ML
1 INJECTION INTRAMUSCULAR; INTRAVENOUS ONCE
Status: COMPLETED | OUTPATIENT
Start: 2021-07-27 | End: 2021-07-27

## 2021-07-27 RX ORDER — HEPARIN SODIUM 5000 [USP'U]/ML
5000 INJECTION, SOLUTION INTRAVENOUS; SUBCUTANEOUS EVERY 12 HOURS
Status: DISCONTINUED | OUTPATIENT
Start: 2021-07-27 | End: 2021-08-11 | Stop reason: HOSPADM

## 2021-07-27 RX ADMIN — ALLOPURINOL 100 MG: 100 TABLET ORAL at 09:43

## 2021-07-27 RX ADMIN — DOBUTAMINE HYDROCHLORIDE 5 MCG/KG/MIN: 200 INJECTION INTRAVENOUS at 14:16

## 2021-07-27 RX ADMIN — LANSOPRAZOLE 30 MG: KIT at 07:06

## 2021-07-27 RX ADMIN — LEVETIRACETAM 500 MG: 100 SOLUTION ORAL at 17:47

## 2021-07-27 RX ADMIN — LACOSAMIDE 100 MG: 50 TABLET, FILM COATED ORAL at 20:45

## 2021-07-27 RX ADMIN — CHLOROTHIAZIDE SODIUM 250 MG: 500 INJECTION, POWDER, LYOPHILIZED, FOR SOLUTION INTRAVENOUS at 10:53

## 2021-07-27 RX ADMIN — Medication 10 ML: at 06:52

## 2021-07-27 RX ADMIN — ALBUMIN (HUMAN) 12.5 G: 0.25 INJECTION, SOLUTION INTRAVENOUS at 06:47

## 2021-07-27 RX ADMIN — Medication 40 ML: at 22:00

## 2021-07-27 RX ADMIN — LEVETIRACETAM 500 MG: 100 SOLUTION ORAL at 09:44

## 2021-07-27 RX ADMIN — LACOSAMIDE 100 MG: 50 TABLET, FILM COATED ORAL at 09:43

## 2021-07-27 RX ADMIN — PIPERACILLIN AND TAZOBACTAM 3.38 G: 3; .375 INJECTION, POWDER, LYOPHILIZED, FOR SOLUTION INTRAVENOUS at 09:43

## 2021-07-27 RX ADMIN — BUMETANIDE 1 MG: 0.25 INJECTION, SOLUTION INTRAMUSCULAR; INTRAVENOUS at 10:57

## 2021-07-27 RX ADMIN — ALBUMIN (HUMAN) 12.5 G: 0.25 INJECTION, SOLUTION INTRAVENOUS at 12:43

## 2021-07-27 RX ADMIN — BUMETANIDE 2 MG: 0.25 INJECTION, SOLUTION INTRAMUSCULAR; INTRAVENOUS at 06:46

## 2021-07-27 RX ADMIN — HEPARIN SODIUM 5000 UNITS: 5000 INJECTION INTRAVENOUS; SUBCUTANEOUS at 23:19

## 2021-07-27 RX ADMIN — POTASSIUM BICARBONATE 40 MEQ: 782 TABLET, EFFERVESCENT ORAL at 12:45

## 2021-07-27 RX ADMIN — ACETAMINOPHEN 650 MG: 325 TABLET ORAL at 17:49

## 2021-07-27 RX ADMIN — Medication 10 ML: at 14:15

## 2021-07-27 RX ADMIN — HEPARIN SODIUM 5000 UNITS: 5000 INJECTION INTRAVENOUS; SUBCUTANEOUS at 10:53

## 2021-07-27 RX ADMIN — PIPERACILLIN AND TAZOBACTAM 3.38 G: 3; .375 INJECTION, POWDER, LYOPHILIZED, FOR SOLUTION INTRAVENOUS at 01:42

## 2021-07-27 RX ADMIN — ALBUMIN (HUMAN) 12.5 G: 0.25 INJECTION, SOLUTION INTRAVENOUS at 17:47

## 2021-07-27 RX ADMIN — ALBUMIN (HUMAN) 12.5 G: 0.25 INJECTION, SOLUTION INTRAVENOUS at 00:16

## 2021-07-27 RX ADMIN — PIPERACILLIN AND TAZOBACTAM 3.38 G: 3; .375 INJECTION, POWDER, LYOPHILIZED, FOR SOLUTION INTRAVENOUS at 17:47

## 2021-07-27 RX ADMIN — ASPIRIN 81 MG: 81 TABLET, CHEWABLE ORAL at 09:44

## 2021-07-27 RX ADMIN — POTASSIUM BICARBONATE 20 MEQ: 782 TABLET, EFFERVESCENT ORAL at 06:46

## 2021-07-27 RX ADMIN — CHLORHEXIDINE GLUCONATE 15 ML: 0.12 RINSE ORAL at 14:15

## 2021-07-27 RX ADMIN — Medication: at 09:41

## 2021-07-27 RX ADMIN — ACETAZOLAMIDE SODIUM 250 MG: 500 INJECTION, POWDER, LYOPHILIZED, FOR SOLUTION INTRAVENOUS at 10:55

## 2021-07-27 RX ADMIN — Medication: at 19:47

## 2021-07-27 NOTE — PROGRESS NOTES
Palliative Medicine Consult  [place]: [phone] ([phone])    Patient Name: Samantha Rogers  YOB: 1933    Date of Initial Consult: 7/26/21  Reason for Consult: care decisions  Requesting Provider: Dr. Michael Casas  Primary Care Physician: Saulo Higginbotham MD     SUMMARY:   Samantha Rogers is a 80 y.o. with a medical history including chronic debility, HFrEF, HTN, h/o CVA, seizure disorder and a PEG for long term nutrition who was recently hospitalized at [facility] for decompensated heart failure and respiratory failure. He was discharged on 7/23 to [facility] and did not at that time require a tracheostomy or vent support, however decompensated the same day prompting intubation and transfer to [facility]. He was admitted to the ICU on 7/23/2021 with a diagnosis of acute respiratory failure due to aspiration pneumonitis. Current medical issues leading to Palliative Medicine involvement include: progressive decline, poor prognosis for functional recovery. Psychosocial Hx- [place]. Resident of [facility] and [address] [place] [address] for unknown time period. . He has three children; a daughter and two sons who all reside locally. PALLIATIVE DIAGNOSES:   1. Unresponsive state  2. Acute hypoxic respiratory failure- intubated in ED   3. NICM- LVEF 30-35% 7/24  4. Moderate AS  5. H/o CVA  6. PEG tube  7. Bedbound        PLAN:   1. Pt remains intubated in ICU, requiring dobutamine for decompensated HF, apnea on SBT this am and unresponsive on exam despite no sedation. He is tolerating his TFs at 45/hr. 2. Pt's three children are [facility] / healthcare decision makers. 3. Family meeting:  Palliative LCSW Lizzie Coffey and I held a family meeting at 16:30 today. Met with all three of Mr. Jadiel Rudd children jointly. Performed life review. They shared about his decline in health over the past year which required a transition from [facility] to L[facility] at [facility] in January due to failure to thrive.   His children agreed his decline was progressive but he did have a more acute change after developing seizures last Fall. This time has been challenging giving the lack of visitation privileges during the pandemic, they have not spent time with him other than Zoom calls. Discussed his current clinical condition and I was ashleigh that given his frailty and minimally responsive state; I do not anticipate functional recovery for him. They all agreed that the goal for him was longevity and they request the ongoing use of mechanical ventilation for their Father, they do not feel comfortable removing this support compassionately at any time moving forward. They trust in God to take him when it's his time and do not want to intervene in this process. Reviewed code status making the medical recommendation to protect from cardiac resuscitation as this is not expected to work in his frail state. Patient's son had gone through this experience himself. All agreed that at this time given his frailty and poor health; these efforts would not be in his best interest.  Code status changed to partial- no CPR/shock/ACLS medications, but ongoing intubation. 4. Thank you for the opportunity to participate in the care of Aleena Orosco   5. Family was provided with our contact information in case goals shift at a later point or questions arise. Our team will otherwise follow peripherally. 6. Communicated plan of care with: Palliative IDT, [facility] Team, including bedside RN Jackelin Graves.        GOALS OF CARE / TREATMENT PREFERENCES:     GOALS OF CARE:  Patient/Health Care Proxy Stated Goals: Prolong life    TREATMENT PREFERENCES:   Code Status: Partial Code    Advance Care Planning:  [x] [facility] Interdisciplinary Team has updated the ACP Navigator with Health Care Decision Maker and Patient Capacity      Primary Decision Maker (Active): [place] - Daughter - [phone]    Secondary Decision Maker: Tamera Quiñonez - Son - [phone]     Advance Care Planning 11/18/2020   Patient's Healthcare Decision Maker is: -   Primary Decision Maker Name -   Confirm Advance Directive None   Patient Would Like to Complete Advance Directive -       Medical Interventions: Limited additional interventions (partial code)     Artificially Administered Nutrition: Feeding tube long-term, if indicated     Other:    As far as possible, the palliative care team has discussed with patient / health care proxy about goals of care / treatment preferences for patient. HISTORY:     History obtained from: EMR    CHIEF COMPLAINT: n/a    HPI/SUBJECTIVE:    The patient is:   [] Verbal and participatory  [x] Non-participatory due to: Intubated / unresponsive    Clinical Pain Assessment (nonverbal scale for severity on nonverbal patients):   Clinical Pain Assessment  Severity: 0     Activity (Movement): Lying quietly, normal position    Duration: for how long has pt been experiencing pain (e.g., 2 days, 1 month, years)  Frequency: how often pain is an issue (e.g., several times per day, once every few days, constant)     FUNCTIONAL ASSESSMENT:     Palliative Performance Scale (PPS):  PPS: 10       PSYCHOSOCIAL/SPIRITUAL SCREENING:     Palliative IDT has assessed this patient for cultural preferences / practices and a referral made as appropriate to needs (Cultural Services, Patient Advocacy, Ethics, etc.)    Any spiritual / Mormonism concerns:  [] Yes /  [x] No    Caregiver Burnout:  [] Yes /  [] No /  [x] No Caregiver Present      Anticipatory grief assessment:   [x] Normal  / [] Maladaptive       ESAS Anxiety:      ESAS Depression:          REVIEW OF SYSTEMS:     Positive and pertinent negative findings in ROS are noted above in HPI. The following systems were [x] reviewed / [] unable to be reviewed as noted in HPI  Other findings are noted below.   Systems: constitutional, ears/nose/mouth/throat, respiratory, gastrointestinal, genitourinary, musculoskeletal, integumentary, neurologic, psychiatric, endocrine. Positive findings noted below. Modified ESAS Completed by: provider           Pain: 0           Dyspnea: 0 (on vent)           Stool Occurrence(s): 2        PHYSICAL EXAM:     From RN flowsheet:  Wt Readings from Last 3 Encounters:   07/27/21 165 lb 2 oz (74.9 kg)   01/06/21 182 lb 8.7 oz (82.8 kg)   12/08/20 182 lb 8.7 oz (82.8 kg)     Blood pressure 136/84, pulse 97, temperature 98.8 °F (37.1 °C), resp. rate 20, height 5' 6\" (1.676 m), weight 165 lb 2 oz (74.9 kg), SpO2 96 %. Pain Scale 1: Adult Nonverbal Pain Scale  Pain Intensity 1: 0                 Last bowel movement, if known:     Elderly male intubated, respirations synchronous with vent  No response to sternal rub  Abd:  PEG tube with enteral feeds running  : indwelling epps  Resting left hand tremor appreciated       HISTORY:     Active Problems:    Acute respiratory failure with hypoxia ([address] (7/23/2021)      Aspiration into respiratory tract (7/23/2021)      Past Medical History:   Diagnosis Date    Cancer [facility])     prostate    Chronic systolic heart failure ([address] 12/7/2020    Constipation     Gout     Hyperlipemia     Hypertension     Pacemaker 2019    Stroke ([address]     Thrombocytopenia ([address] 3/19/2019    TIA (transient ischemic attack) 2013      Past Surgical History:   Procedure Laterality Date    HX PROSTATECTOMY      HX UROLOGICAL      prostate removed    PLACE PERCUT GASTROSTOMY TUBE  11/30/2020         [place] INS NEW/RPLCMT PRM PACEMAKR W/TRANS ELTRD ATRIAL N/A 10/18/2019    Insert Ppm Single Atrial performed by Gurinder Au MD at [address] CATH LAB    [place] INS NEW/RPLCMT PRM PM W/TRANSV ELTRD ATRIAL&VENT Right 3/22/2019    INSERT PPM DUAL performed by Gurinder Au MD at [address] CATH LAB      Family History   Problem Relation Age of Onset    No Known Problems Mother     No Known Problems Father       History reviewed, no pertinent family history.   Social History Tobacco Use    Smoking status: Never Smoker    Smokeless tobacco: Never Used   Substance Use Topics    Alcohol use: No     No Known Allergies   Current Facility-Administered Medications   Medication Dose Route Frequency    heparin (porcine) injection 5,000 Units  5,000 Units SubCUTAneous Q12H    0.9% sodium chloride infusion 250 mL  250 mL IntraVENous PRN    DOBUTamine (DOBUTREX) 500 mg/250 mL (2,000 mcg/mL) infusion  5 mcg/kg/min IntraVENous CONTINUOUS    balsam peru-castor oiL (VENELEX) ointment   Topical BID    sodium chloride (NS) flush 5-40 mL  5-40 mL IntraVENous Q8H    sodium chloride (NS) flush 5-40 mL  5-40 mL IntraVENous PRN    acetaminophen (TYLENOL) tablet 650 mg  650 mg Oral Q6H PRN    Or    acetaminophen (TYLENOL) suppository 650 mg  650 mg Rectal Q6H PRN    ondansetron (ZOFRAN ODT) tablet 4 mg  4 mg Oral Q8H PRN    Or    ondansetron (ZOFRAN) injection 4 mg  4 mg IntraVENous Q6H PRN    sennosides (SENOKOT) 8.8 mg/5 mL syrup 8.8 mg  5 mL Oral BID PRN    chlorhexidine (ORAL CARE KIT) 0.12 % mouthwash 15 mL  15 mL Oral Q12H    lansoprazole (PREVACID) 3 mg/mL oral suspension 30 mg  30 mg Oral ACB    lacosamide (VIMPAT) tablet 100 mg  100 mg Oral Q12H    levETIRAcetam (KEPPRA) oral solution 500 mg  500 mg Oral BID    allopurinoL (ZYLOPRIM) tablet 100 mg  100 mg Oral DAILY    aspirin chewable tablet 81 mg  81 mg Oral DAILY    [Held by provider] bumetanide (BUMEX) tablet 1 mg  1 mg Oral BID    midodrine (PROAMATINE) tablet 10 mg  10 mg Oral Q8H    albumin human 25% (BUMINATE) solution 12.5 g  12.5 g IntraVENous Q6H    sodium chloride (NS) flush 5-10 mL  5-10 mL IntraVENous PRN    piperacillin-tazobactam (ZOSYN) 3.375 g in 0.9% sodium chloride (MBP/ADV) 100 mL MBP  3.375 g IntraVENous Q8H          LAB AND IMAGING FINDINGS:     Lab Results   Component Value Date/Time    WBC 15.9 (H) 07/27/2021 04:31 AM    HGB 7.8 (L) 07/27/2021 04:31 AM    PLATELET 527 (L) 01/69/2815 04:31 AM Lab Results   Component Value Date/Time    Sodium 138 07/27/2021 04:31 AM    Potassium 3.2 (L) 07/27/2021 04:31 AM    Chloride 98 07/27/2021 04:31 AM    CO2 31 07/27/2021 04:31 AM    BUN 56 (H) 07/27/2021 04:31 AM    Creatinine 1.42 (H) 07/27/2021 04:31 AM    Calcium 9.7 07/27/2021 04:31 AM    Magnesium 2.8 (H) 07/27/2021 04:31 AM    Phosphorus 3.3 07/27/2021 04:31 AM      Lab Results   Component Value Date/Time    Alk. phosphatase 95 07/27/2021 04:31 AM    Protein, total 7.0 07/27/2021 04:31 AM    Albumin 2.9 (L) 07/27/2021 04:31 AM    Globulin 4.1 (H) 07/27/2021 04:31 AM     Lab Results   Component Value Date/Time    INR 1.1 07/24/2021 04:44 AM    Prothrombin time 11.3 (H) 07/24/2021 04:44 AM    aPTT 30.1 06/30/2020 12:18 PM      Lab Results   Component Value Date/Time    Iron 24 (L) 08/14/2019 05:05 PM    TIBC 189 (L) 08/14/2019 05:05 PM    Iron % saturation 13 (L) 08/14/2019 05:05 PM    Ferritin 212 08/14/2019 05:05 PM      No results found for: PH, PCO2, PO2  No components found for: [place]   Lab Results   Component Value Date/Time    CK 75 11/17/2020 04:24 PM                Total time: 75m  Counseling / coordination time, spent as noted above: 75m  > 50% counseling / coordination?: y    Prolonged service was provided for  []30 min   []75 min in face to face time in the presence of the patient, spent as noted above. Time Start: 16:30  Time End: 17:45  Note: this can only be billed with 32925 (initial) or [phone] (follow up). If multiple start / stop times, list each separately.

## 2021-07-27 NOTE — PROGRESS NOTES
[place] [phone] (COPE)  [facility] [phone] (COPE)        GOALS OF CARE: PARTIAL CODE, family wishes to prolong life as long as possible, continue full restorative measures. Family is agreeable for PARTIAL CODE, continue intubation and ventilation. TREATMENT PREFERENCES:   Code Status: Partial Code     Advance Care Planning:    Primary Decision Maker ([address]):   Relationship to patient:  [] Named in a scanned document   [x] Legal Next of Kin  [] [id number] Meeting Documentation    Participants: Dr. Zakia Fuller, patient's children Darya Jimenez, and Cam Carr. Psychosocial, Spiritual History: The patient has three children, Ruthy Quintanilla, Cam Carr., and Jessica Astudillo. The family also endorses that the patient has grandchildren. 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 The family shared during meeting that they lost their mother, patient . The patient also served the [employer] in the [employer], was stationed in [place]. The family has a strong jing with God. The family shared a common belief that God will take the patient Joo Carr it is his time. \" The patent's family does not want to interfere with God's timing, goal is to prolong the patient's life, and family is not comfortable with intervening with His timing. Discussion: The SW and Palliative Medicine MD, Dr. Zakia Fuller, met with the patient's children Gilberto Mott, and Jessica Astudillo in order to provide updates, support, and goals of care. The family shared history of the patient's gradual decline, originally patient was fairly functional with assistance and conversant, lived at [address], then had seizure activity which led to his placement at [address] [place] [address], and prolonged hospitalizations resulting in PEG tube placement and [facility]. Due to COVID-19 restrictions, it has been very difficult for the family because they have not gotten to have in-person visits with their father over the past year. The provider provided overall medical picture and updates, family has an understanding of the patient's current condition. The patient's children discussed that they would like to prolong the patient's life as long as possible- no matter what that looks like, I.e. prolonged ventilation, etc.  The family endorses that they are \"pro-life\" and want to have the knowledge that they did everything they could for their father, and do not want to take his life. SW provided reflective listening and emotional support, supportive presence during meeting. The family is aware that the patient may require prolonged ventilation, including LTAC placement. Code Status was discussed during meeting, all three children are agreeable for Partial Code, with continuation of intubation and ventilation. See Dr. Quang Hanson note. Outcome / Plan: Lalitha Ceja, See Dr. Quang Hanson note. Family's goal is to prolong life. Follow Up: Palliative Medicine will follow peripherally, goals are clear for full measures. Family has contact information for Palliative Medicine and are aware that Palliative is here for support as any additional questions or concerns arise.        Jose Salazar LCSW

## 2021-07-27 NOTE — PROGRESS NOTES
[facility]    ICU TEAM Progress Note    Name: Emily Santos   : 3/9/1933   MRN: [id number]   Date: 2021      I  Subjective:   Progress Note: 2021      Reason for ICU Admission: Acute hypoxic respiratory failure     Interval history: [facility] [place]  Zhanna Svetlana a 80 y. o. male with history of prostate cancer, CHF, constipation, hypertension, stroke, TIA, and seizures who presents to the emergency department by EMS as transfer from [facility] for respiratory distress.  Patient had initially been a transfer from [facility] after a hospitalization from  [facility].  Was there for altered mental status hypoxia was treated for hypoxic respiratory failure bilateral pleural effusions acute systolic CHF secondary to severe aortic stenosis hyponatremia hypokalemia elevated LFTs malnutrition and possible UTI was treated with ceftriaxone and Chyna Jacobs already had a PEG for dysphagia and protein calorie malnutrition prior to [facility] admission. He was at [facility] from [place] [id number].  While at [facility] today patient had an episode of vomiting and aspirated was initially started on high flow nasal cannula however was still hypoxic thus got intubated by [facility] staff. Orlando Peña was then transferred to the ED for further care.  Per family patient's decline started last year while he was having seizures/TIA patient became more encephalopathic and started to refuse eating and thus got a PEG for nutritional reasons. was staying at assisted living facility and had to be re-hospitalized after his PEG malfunctioned.  During that time he did have an infection, daughter could not exactly say where the infection was, but she thought it was an abdominal infection and since then he started having worsening heart failure and decreased heart function, and failure to thrive. Per records EF was 10 to 15%.  Per daughter, patient has never had a tracheostomy.  She states patient is awake at times, will open eyes, but does not track, does not follow commands, and does not speak. They wish patient to remain full code at this time.     While in ED patient was placed on mechanical ventilator, ABG was done which was consistent with hypoxic respiratory failure.  Patient was also given 1 L of IV fluids for fluid resuscitation due to sepsis. Initially blood pressure was okay but once patient was started on sedation blood pressure dropped.  Patient also had episodes of hypoxia due to vent asynchrony. Once PEEP was increased for hypoxia patient's pressure dropped. Started patient on Levophed via peripheral in ED. If hypotension worsens will need central line. Started on Zosyn. Updated the family at bedside, daughter and and son. Consent for central line and blood was  Received.  Patient to transfer to ICU for continued care. Overnight Events:   7/27: No acute event. Good diuresis. Still poorly responsive. Remains on dobutamine. Blood pressure maintained. No witnessed seizure. 7/26: No acute event, blood pressure somewhat better but urine output decreasing.   Still on dobutamine at 5.  7/25 -- Getting PRBC now; started on Dobutamine      Active Problem List:     Problem List  Date Reviewed: 12/17/2020        Codes Class    Acute respiratory failure with hypoxia [facility]) ICD-10-CM: J96.01  ICD-9-CM: 518.81         Aspiration into respiratory tract ICD-10-CM: T17.908A  ICD-9-CM: 934.9         Hypokalemia ICD-10-CM: E87.6  ICD-9-CM: 276.8         Hypernatremia ICD-10-CM: E87.0  ICD-9-CM: 276.0         Moderate protein malnutrition (HCC) ICD-10-CM: E44.0  ICD-9-CM: 263.0         Failure to thrive (0-17) ICD-10-CM: R62.51  ICD-9-CM: 783.41         Bacteremia ICD-10-CM: R78.81  ICD-9-CM: 790.7         UTI (urinary tract infection) ICD-10-CM: N39.0  ICD-9-CM: 599.0         Dementia (HCC) ICD-10-CM: F03.90  ICD-9-CM: 294.20         Bedridden ICD-10-CM: Z74.01  ICD-9-CM: V49.84         Pressure ulcer ICD-10-CM: L89.90  ICD-9-CM: 707.00, 707.20         S/P percutaneous endoscopic gastrostomy (PEG) tube placement (HCC) ICD-10-CM: Z93.1  ICD-9-CM: V44.1         Dehydration ICD-10-CM: E86.0  ICD-9-CM: 276.51         Lactic acidosis ICD-10-CM: E87.2  ICD-9-CM: 276.2         Sepsis ([facility] ICD-10-CM: A41.9  ICD-9-CM: 038.9, 995.91         SOULEYMANE (acute kidney injury) ([facility] ICD-10-CM: N17.9  ICD-9-CM: 428. 9         Chronic systolic heart failure (HCC) ICD-10-CM: I50.22  ICD-9-CM: 428.22         Altered mental status ICD-10-CM: R41.82  ICD-9-CM: 780.97         Post-ictal state ([facility] ICD-10-CM: R56.9  ICD-9-CM: 780.39         Seizure ([facility] ICD-10-CM: R56.9  ICD-9-CM: 780.39         Atrial pacemaker lead displacement ICD-10-CM: T82.120A  ICD-9-CM: 996.01         Pacemaker ICD-10-CM: Z95.0  ICD-9-CM: V45.01     Overview Signed 3/22/2019  5:08 PM by Jessi Sandhu MD     3/22/2019 dual chamber Medtronic pacer             Bradycardia ICD-10-CM: R00.1  ICD-9-CM: 427.89         Transaminitis ICD-10-CM: R74.01  ICD-9-CM: 790.4         Hypertensive urgency ICD-10-CM: I16.0  ICD-9-CM: 401.9         Second degree AV block, Mobitz type I ICD-10-CM: I44.1  ICD-9-CM: 426.13     Overview Signed 3/21/2019  6:14 PM by Jessi Sandhu MD     Added automatically from request for surgery [id number]             Stage 3 chronic kidney disease ([facility] ICD-10-CM: N18.30  ICD-9-CM: 973. 3         Rotator cuff arthropathy of both shoulders ICD-10-CM: M12.811, M12.812  ICD-9-CM: 716.81         Cognitive impairment ICD-10-CM: R41.89  ICD-9-CM: 818.7         Systolic murmur BDQ-42-KK: R01.1  ICD-9-CM: 785.2         Essential hypertension ICD-10-CM: I10  ICD-9-CM: 401.9         Gout ICD-10-CM: M10.9  ICD-9-CM: 274.9         Pure hypercholesterolemia ICD-10-CM: E78.00  ICD-9-CM: 272.0         History of prostate cancer ICD-10-CM: Z85.46  ICD-9-CM: V10.46         Chronic constipation ICD-10-CM: K59.09  ICD-9-CM: 564.00         Dysuria ICD-10-CM: R30.0  ICD-9-CM: 984. 1         Weight loss ICD-10-CM: R63.4  ICD-9-CM: 783.21         Adrenal mass ([facility] ICD-10-CM: E27.8  ICD-9-CM: 255.8     Overview Addendum 12/17/2018  9:57 AM by Georgia Rodriguez MD     Stable/non-functioning adenoma on imaging                   Past Medical History:      has a past medical history of Cancer [facility]), Chronic systolic heart failure ([facility] (12/7/2020), Constipation, Gout, Hyperlipemia, Hypertension, Pacemaker (2019), Stroke ([facility] Thrombocytopenia ([facility] (3/19/2019), and TIA (transient ischemic attack) (2013). Past Surgical History:      has a past surgical history that includes hx urological; hx prostatectomy; pr ins new/rplcmt prm pm w/transv eltrd atrial&vent (Right, 3/22/2019); pr ins new/rplcmt prm pacemakr w/trans eltrd atrial (N/A, 10/18/2019); and place percut gastrostomy tube (11/30/2020). Home Medications:     Prior to Admission medications    Medication Sig Start Date End Date Taking? Authorizing Provider   levETIRAcetam (KEPPRA) 750 mg tablet Take 1 Tab by mouth every twelve (12) hours. 12/11/20   Jack Gonsales NP   aspirin delayed-release 81 mg tablet Take 1 Tab by mouth daily. 7/13/20   Dipak Lanier MD   polyethylene glycol [facility]) 17 gram/dose powder Take 17 g by mouth daily as needed for Constipation. 7/13/20   Dipak Lanier MD   balsam peru-castor oiL (VENELEX) ointment Apply  to affected area three (3) times daily. 7/9/20   Jolyn Olszewski, MD   famotidine (PEPCID) 20 mg tablet Take 1 Tab by mouth every evening. 7/9/20   Jolyn Olszewski, MD   acetaminophen (TYLENOL) 325 mg tablet Take 650 mg by mouth every six (6) hours as needed for Pain.     Provider, Historical       Allergies/Social/Family History:     No Known Allergies   Social History     Tobacco Use    Smoking status: Never Smoker    Smokeless tobacco: Never Used   Substance Use Topics    Alcohol use: No      Family History   Problem Relation Age of Onset    No Known Problems Mother     No Known Problems Father        Review of Systems:         Objective:   Vital Signs:  Visit Vitals  BP (!) 140/62   Pulse 80   Temp 98.7 °F (37.1 °C)   Resp 18   Ht 5' 6\" (1.676 m)   Wt 74.9 kg (165 lb 2 oz)   SpO2 95%   BMI 26.65 kg/m²      O2 Device: Endotracheal tube Temp (24hrs), Av.6 °F (37 °C), Min:97.9 °F (36.6 °C), Max:99.1 °F (37.3 °C)           Intake/Output:     Intake/Output Summary (Last 24 hours) at 2021 2303  Last data filed at 2021 0800  Gross per 24 hour   Intake 1681 ml   Output 2460 ml   Net -779 ml       Physical Exam:    General:  mild distress, appears stated age, intubated   Eye:  conjunctivae/corneas clear. Pupils bilaterally reactive and round  Neurologic:  WD to pain in all ext. Does not follow commands  Lymphatic:  Cervical, supraclavicular, and axillary nodes normal.   Neck:  normal and no erythema or exudates noted. Lungs: Diminished in bilateral bases, mild tachypnea.  Moderate oral secretions   Heart: Paced rhythm, regular, tachycardia  Abdomen:  soft, non-tender. Bowel sounds normal. No masses,  no organomegaly  Cardiovascular:  Regular rate and rhythm, S1S2 present, without murmur or extra heart sounds and pedal pulses normal Generalized edema  Skin:  no rash or abnormalities    LABS AND  DATA: Personally reviewed  Recent Labs     21   WBC 15.9* 18.3*   HGB 7.8* 7.4*   HCT 24.2* 22.7*   * 120*     Recent Labs     21    135*   K 3.2* 3.4*   CL 98 98   CO2 31 30   BUN 56* 55*   CREA 1.42* 1.48*   * 192*   CA 9.7 9.1   MG 2.8* 2.8*   PHOS 3.3 2.2*     Recent Labs     21   AP 95 102   TP 7.0 6.2*   ALB 2.9* 2.4*   GLOB 4.1* 3.8     No results for input(s): INR, PTP, APTT, INREXT in the last 72 hours.    Recent Labs     21  0444   PHI 7.52*   PCO2I 35.6   PO2I 90   FIO2I 30     Recent Labs     21  1111   TROIQ 0.55*       Hemodynamics:   PAP:   CO:     Wedge:   CI:     CVP:    SVR:       PVR: Ventilator Settings:  Mode Rate Tidal Volume Pressure FiO2 PEEP   Assist control, Pressure control        30 % 5 cm H20     Peak airway pressure: 21 cm H2O    Minute ventilation: 9.35 l/min        MEDS: Reviewed    Chest X-Ray:  CXR Results  (Last 48 hours)               07/25/21 1839  XR CHEST PORT Final result    Impression:  1. Lines and tubes as above. 2. Interstitial pulmonary edema and small bilateral pleural effusions again   demonstrated. Narrative:  EXAM: XR CHEST PORT       INDICATION: ETT placement       COMPARISON: 7/24/2021       FINDINGS: 2 portable AP radiographs of the chest were obtained at 1826 and 1827   hours. Endotracheal tube is again shown terminating at the thoracic inlet. A   right IJ line is again noted terminating in the superior vena cava. Dual-lead   right axillary pacemaker is again demonstrate. Mild-moderate interstitial port   edema is again demonstrated with small bilateral pleural effusions again shown. No pneumothorax is demonstrated. Allowing for differences in rotation of the   films, cardiac and mediastinal contours are stable. ECHO:  EF 30% with moderate aortic stenosis    Assessment and Plan:   1. Acute Hypoxic Respiratory Failure  2. Acute on Chronic Systolic Heart Failure  3. Septic Shock  4. Cardiogenic Shock  5. Aspiration Pneumonitis  6. Pleural Effusion - RIGHT-sided  7. GERD  8. Protein Calorie Malnutrition  9. Dysphagia s/p PEG  10. Gout  11. Hx Prostate CA  12. Hx Seizure D/O  13. Hx CVA/TIA - with Residual Aphasia  14. Hx HTN  15. Hx Severe Aortic Stenosis/ Mod to severe mitral valve regurgitation  16. Hx Severe Pulmonary HTN  17. Hx Pacermaker     ICU Comprehensive Plan of Care:      Plans for this Shift:      1. Continue dobutamine at 5 mcg/min/hr  2. Trend NT BNP  3. Appreciate Cardiology  4.  Giving has improved blood pressure I will give him 1 dose of Bumex Diuril and acetazolamide, monitor urine output and renal function. 5. SBP Goal of: > 90 mmHg  6. MAP Goal of: > 65 mmHg  7. Albumin q6  8. Transfusion Trigger (Hgb): <7 g/dL  9. Respiratory Goals:  a. Chlorhexidine   b. Optimize PEEP/Ventilation/Oxygenation  c. Goal Tidal Volume 6 cc/kg based on IBW  d. Aim for lung protective ventilation  e. Head of bed > 30 degrees  10. Pulmonary toilet: Duo-Nebs   11. SpO2 Goal: > 92%   12. Keep K>4; Mg>2   13. PT/OT: NA   14. Discussed Plan of Care/Code Status: Full Code - discussed with daughter/son  15. Appreciate Consultants Input Cardiology  16. Discussed Care Plan with Bedside RN  17. Documentation of Current Medications  18. Rest of Plan Below:     F - Feeding:  Yes  A - Analgesia: Fentanyl  S - Sedation: Propofol  T - DVT Prophylaxis: Lovenox   H - Head of Bed: > 30 Degrees  U - Ulcer Prophylaxis: prevacid   G - Glycemic Control: Insulin q 6 hrs accu checks SSI   S - Spontaneous Breathing Trial: Pending  B - Bowel Regimen: Senna  I - Indwelling Catheter:              IDYXN: ETT and PEG Tube  Lines: Peripheral IV  Drains: Avalos Catheter  D - De-escalation of Antibiotics:  Vancomycin  Zosyn    Today we will give another round of diuretics with Bumex Diuril and acetazolamide. Monitor closely for electrolyte imbalance and renal function. BNP remains significantly elevated. Patient remains poorly responsive. He has deficit at baseline but really not clear what this accurately was he able to do. Am repeating CT head. Appreciate palliative care effort, I believe it would be reasonable to change to comfort care if this is consistent with his wishes and family wishes. DISPOSITION  Stay in ICU    CRITICAL CARE CONSULTANT NOTE  I had a face to face encounter with the patient, reviewed and interpreted patient data including clinical events, labs, images, vital signs, I/O's, and examined patient.   I have discussed the case and the plan and management of the patient's care with the consulting services, the bedside nurses and the respiratory therapist.      NOTE OF PERSONAL INVOLVEMENT IN CARE   This patient has a high probability of imminent, clinically significant deterioration, which requires the highest level of preparedness to intervene urgently. I participated in the decision-making and personally managed or directed the management of the following life and organ supporting interventions that required my frequent assessment to treat or prevent imminent deterioration. I personally spent 40 minutes of critical care time. This is time spent at this critically ill patient's bedside actively involved in patient care as well as the coordination of care and discussions with the patient's family. This does not include any procedural time which has been billed separately. Mor Villarreal M.D.   Staff Intensivist/Pulmonologist  [facility]  7/27/2021

## 2021-07-27 NOTE — PROGRESS NOTES
Cardiac Electrophysiology Hospital Progress Note     Subjective:      Stephy Wright is a 80 y.o. patient who is seen for management of acute on chronic CHF. Interval History:  Restarted Bumex yesterday, net output approx 1L. Fewer crackles today. Continues dobutamine 5 mcg/kg/min IV gtt, BP elevated this morning. Remains on vent. NT pro BNP remains >93181. Cr stable, hypokalemic (3.2). Supplementing with potassium IV. HPI:  He was admitted from [facility] on 07/23/2021 with respiratory distress. Had an episode of vomiting, aspirated, initially on high flow nasal cannula, then intubated by [facility] staff due to persistent hypoxia. At [facility], ABG consistent with hypoxic respiratory failure. BP initially ok, but then dropped with sedation, started Levophed but now on dobutamine 5 mcg/kg/min IV instead. Leukocytosis, NT pro BNP >53467, mildly hypokalemic & hyponatremic today. Hgb 8.3 on admit, decreased to 6 on 07/25/2021. He did receive transfusion of PRBCs. Thrombocytopenic today, plt 120 (238 on admit). Lactic acid 4.6 on admit, normalized on 07/24/2021. Paired blood cultures without growth to date. Sputum culture showed few gram negative rods. Now treated with Zosyn & vancomycin. Initial CXR showed moderate pulmonary edema with right pleural effusion. Underwent right thoracentesis yielding 1350 ml clear yellow fluid. Most recent CXR on 07/25/2021 showed interstitial pulmonary edema & small bilat pleural effusions. NICM, LVEF 30-35% during current admission, also with mod AS (mean grad 18.1 mmHg). Unable to determine NYHA class due to intubated status. Previous:  Admitted at [facility] 06/30/2021-07/07/2021 for AMS, hypoxic respiratory failure, acute on chronic systolic CHF secondary to severe AS, hyponatremia, hypokalemic, & possible UTI. Discharged to [facility]. Decline started 2020 with seizures/TIA, encephalopathy, PEG due to refusal to eat.      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 NSVT noted on pacemaker 10/09/2020. Denies family history of early CAD, arrhythmia, or valve disease. Hx of abdominal and thoracic aortic atherosclerosis on CTs     Hx of right adrenal gland lipid rich adenoma 6.1 cm     Diagnosed with Alzheimer's dementia January 2019.  He had seen Dr. Amee Howard (neuro) before        Problem List  Date Reviewed: 12/17/2020          Codes Class Noted    Acute respiratory failure with hypoxia [facility]) ICD-10-CM: J96.01  ICD-9-CM: 518.81  7/23/2021        Aspiration into respiratory tract ICD-10-CM: T17.908A  ICD-9-CM: 934.9  7/23/2021        Hypokalemia ICD-10-CM: E87.6  ICD-9-CM: 276.8  1/12/2021        Hypernatremia ICD-10-CM: E87.0  ICD-9-CM: 276.0  1/12/2021        Moderate protein malnutrition (HCC) ICD-10-CM: E44.0  ICD-9-CM: 263.0  1/12/2021        Failure to thrive (0-17) ICD-10-CM: R62.51  ICD-9-CM: 783.41  1/9/2021        Bacteremia ICD-10-CM: R78.81  ICD-9-CM: 790.7  1/9/2021        UTI (urinary tract infection) ICD-10-CM: N39.0  ICD-9-CM: 599.0  1/9/2021        Dementia ([facility] ICD-10-CM: F03.90  ICD-9-CM: 294.20  1/9/2021        Bedridden ICD-10-CM: Z74.01  ICD-9-CM: V49.84  1/9/2021        Pressure ulcer ICD-10-CM: L89.90  ICD-9-CM: 707.00, 707.20  1/9/2021        S/P percutaneous endoscopic gastrostomy (PEG) tube placement ([facility] ICD-10-CM: Z93.1  ICD-9-CM: V44.1  1/9/2021        Dehydration ICD-10-CM: E86.0  ICD-9-CM: 276.51  1/6/2021        Lactic acidosis ICD-10-CM: E87.2  ICD-9-CM: 276.2  1/6/2021        Sepsis ([facility] ICD-10-CM: A41.9  ICD-9-CM: 038.9, 995.91  1/6/2021        SOULEYMANE (acute kidney injury) ([facility] ICD-10-CM: N17.9  ICD-9-CM: 584.9  1/6/2021        Chronic systolic heart failure ([facility] ICD-10-CM: I50.22  ICD-9-CM: 428.22  12/7/2020        Altered mental status ICD-10-CM: R41.82  ICD-9-CM: 780.97  11/18/2020        Post-ictal state ([facility] ICD-10-CM: R56.9  ICD-9-CM: 780.39  11/18/2020        Seizure ([facility] ICD-10-CM: R56.9  ICD-9-CM: 408.23 11/17/2020        Atrial pacemaker lead displacement ICD-10-CM: T82.120A  ICD-9-CM: 996.01  10/18/2019        Pacemaker ICD-10-CM: Z95.0  ICD-9-CM: V45.01  3/22/2019    Overview Signed 3/22/2019  5:08 PM by Marcus Espinoza MD     3/22/2019 dual chamber Medtronic pacer             Bradycardia ICD-10-CM: R00.1  ICD-9-CM: 427.89  3/19/2019        Transaminitis ICD-10-CM: R74.01  ICD-9-CM: 790.4  3/19/2019        Hypertensive urgency ICD-10-CM: I16.0  ICD-9-CM: 401.9  3/19/2019        Second degree AV block, Mobitz type I ICD-10-CM: I44.1  ICD-9-CM: 426.13  3/19/2019    Overview Signed 3/21/2019  6:14 PM by Marcus Espinoza MD     Added automatically from request for surgery [id number]             Stage 3 chronic kidney disease ([facility] ICD-10-CM: N18.30  ICD-9-CM: 585.3  3/15/2019        Rotator cuff arthropathy of both shoulders ICD-10-CM: M12.811, M12.812  ICD-9-CM: 716.81  3/15/2019        Cognitive impairment ICD-10-CM: R41.89  ICD-9-CM: 294.9  3/4/3559        Systolic murmur JMU-51-LV: R01.1  ICD-9-CM: 785.2  12/30/2018        Essential hypertension ICD-10-CM: I10  ICD-9-CM: 401.9  12/17/2018        Gout ICD-10-CM: M10.9  ICD-9-CM: 274.9  12/17/2018        Pure hypercholesterolemia ICD-10-CM: E78.00  ICD-9-CM: 272.0  12/17/2018        History of prostate cancer ICD-10-CM: Z85.46  ICD-9-CM: V10.46  12/17/2018        Chronic constipation ICD-10-CM: K59.09  ICD-9-CM: 564.00  12/17/2018        Dysuria ICD-10-CM: R30.0  ICD-9-CM: 788.1  8/8/2017        Weight loss ICD-10-CM: R63.4  ICD-9-CM: 783.21  8/8/2017        Adrenal mass ([facility] ICD-10-CM: E27.8  ICD-9-CM: 255.8  7/18/2017    Overview Addendum 12/17/2018  9:57 AM by Nga Garcia MD     Stable/non-functioning adenoma on imaging                     Current Facility-Administered Medications   Medication Dose Route Frequency Provider Last Rate Last Admin    vancomycin random level with am labs on 7/27 @ 04:00   Other ONCE Nay EL, NP-C        0.9% sodium chloride infusion 250 mL  250 mL IntraVENous PRN Kristian Pritchett DO        DOBUTamine (DOBUTREX) 500 mg/250 mL (2,000 mcg/mL) infusion  5 mcg/kg/min IntraVENous CONTINUOUS Kristian Pritchett DO 11.2 mL/hr at 07/26/21 1316 5 mcg/kg/min at 07/26/21 1316    balsam peru-castor oiL (VENELEX) ointment   Topical BID Kristian Pritchett DO   Given at 07/26/21 1803    sodium chloride (NS) flush 5-40 mL  5-40 mL IntraVENous Q8H Lewis EL NP-C   10 mL at 07/27/21 6751    sodium chloride (NS) flush 5-40 mL  5-40 mL IntraVENous PRN Lewis EL NP-C        acetaminophen (TYLENOL) tablet 650 mg  650 mg Oral Q6H PRN Lewis EL NP-C        Or    acetaminophen (TYLENOL) suppository 650 mg  650 mg Rectal Q6H PRN Lewis EL NP-C        ondansetron (ZOFRAN ODT) tablet 4 mg  4 mg Oral Q8H PRN Lewis EL NP-C        Or    ondansetron (ZOFRAN) injection 4 mg  4 mg IntraVENous Q6H PRN JOSE William        [Held by provider] enoxaparin (LOVENOX) injection 40 mg  40 mg SubCUTAneous DAILY Lewis EL NP-C        sennosides (SENOKOT) 8.8 mg/5 mL syrup 8.8 mg  5 mL Oral BID PRN Lewis EL NP-C        chlorhexidine (ORAL CARE KIT) 0.12 % mouthwash 15 mL  15 mL Oral Q12H Lewis EL NP-C   15 mL at 07/26/21 1316    Vancomycin Pharmacy Dosing   Other Rx Dosing/Monitoring Lewis EL NP-C        lansoprazole (PREVACID) 3 mg/mL oral suspension 30 mg  30 mg Oral ACB Lewis EL NP-C   30 mg at 07/27/21 0706    lacosamide (VIMPAT) tablet 100 mg  100 mg Oral Q12H Lewis EL NP-C   100 mg at 07/26/21 2113    levETIRAcetam (KEPPRA) oral solution 500 mg  500 mg Oral BID Barby Bitters, NP-C   500 mg at 07/26/21 1803    allopurinoL (ZYLOPRIM) tablet 100 mg  100 mg Oral DAILY Lewis EL NP-C   100 mg at 07/26/21 5867    aspirin chewable tablet 81 mg  81 mg Oral DAILY Barby Oconnell, NP-C   81 mg at 07/26/21 9658    [Held by provider] bumetanide (BUMEX) tablet 1 mg  1 mg Oral BID Nidia EL NP-C   1 mg at 07/24/21 4633    midodrine (PROAMATINE) tablet 10 mg  10 mg Oral Q8H Kristian Pritchett, DO   10 mg at 07/26/21 1300    albumin human 25% (BUMINATE) solution 12.5 g  12.5 g IntraVENous Q6H RatKristian espinosa A, DO   12.5 g at 07/27/21 9102    sodium chloride (NS) flush 5-10 mL  5-10 mL IntraVENous PRN Fletcher Ferguson MD        piperacillin-tazobactam (ZOSYN) 3.375 g in 0.9% sodium chloride (MBP/ADV) 100 mL MBP  3.375 g IntraVENous Q8H Nidia EL NP-C 25 mL/hr at 07/27/21 0142 3.375 g at 07/27/21 0142     No Known Allergies  Past Medical History:   Diagnosis Date    Cancer [facility])     prostate    Chronic systolic heart failure ([facility] 12/7/2020    Constipation     Gout     Hyperlipemia     Hypertension     Pacemaker 2019    Stroke ([facility]     Thrombocytopenia ([facility] 3/19/2019    TIA (transient ischemic attack) 2013     Past Surgical History:   Procedure Laterality Date    HX PROSTATECTOMY      HX UROLOGICAL      prostate removed    PLACE PERCUT GASTROSTOMY TUBE  11/30/2020         IA INS NEW/RPLCMT PRM PACEMAKR W/TRANS ELTRD ATRIAL N/A 10/18/2019    Insert Ppm Single Atrial performed by Duane Michel MD at [address] CATH LAB    IA INS NEW/RPLCMT PRM PM W/TRANSV ELTRD ATRIAL&VENT Right 3/22/2019    INSERT PPM DUAL performed by Duane Michel MD at [address] CATH LAB     Family History   Problem Relation Age of Onset    No Known Problems Mother     No Known Problems Father      Social History     Tobacco Use    Smoking status: Never Smoker    Smokeless tobacco: Never Used   Substance Use Topics    Alcohol use: No        Review of Systems: Unable to obtain due to intubated status. Objective:     Visit Vitals  BP (!) 160/76   Pulse 84   Temp 97.9 °F (36.6 °C)   Resp 18   Ht 5' 6\" (1.676 m)   Wt 166 lb 7.2 oz (75.5 kg)   SpO2 97%   BMI 26.87 kg/m²      Physical Exam:   Constitutional: Well-developed and well-nourished. No respiratory distress. Head: Normocephalic and atraumatic. Eyes: Closed. ENT: Hearing grossly normal.  ET tube in place. Neck: Supple. No JVD present. Cardiovascular: Normal rate, regular rhythm. Exam reveals no gallop and no friction rub. 3/6 systolic murmur LUSB. Pulmonary/Chest: Effort normal and crackles improved since yesterday. Abdominal: Soft. PEG tube. Genitourinary: Avalos catheter & rectal tube. Musculoskeletal: Grossly symmetrical.  Vasc/lymphatic: No edema. Neurological: Doesn't respond to verbal or mild physical stimulus. Skin: Right chest pacemaker site well healed. Assessment/Plan:     Imaging/Studies:  Echo (07/24/2021): LVEF 88-77%, LV diastolic dysfunction. Mod LA dilation. Mod AS (mean grad 18.1 mmHg, peak 32.2 mmHg). Mild TR. NICM: Acute on chronic systolic CHF. LVEF 30-35% on current admission (reported 10-15% in the past), known moderate AS. Unable to assess NYHA status due to intubated state. BP too low for GDMT. Continue diuresis with Bumex. He has been declining for some time, but should he get through his current sepsis & respiratory failure & stabilize in the future, may potentially consider upgrade of current dual chamber pacemaker to biventricular pacemaker +/- ICD. (He is chronically RV paced.)    Medtronic dual chamber pacemaker (DOI 03/22/2019, new RA lead 10/18/2019): Implanted for 2nd degree AVB. Last check in 10/2020 showed proper lead & generator function. Generator longevity estimated 10.5 yrs. RA 28%, RV >99%; pacer dependent. Since 07/05/2020, 4 episodes NSVT (6-21 beats) noted. Hypokalemia: K 3.2, monitor closely with diuresis. Already receiving supplement. AS: Moderate, mean grad 18.1 mmHg, peak 32.2 mmHg. Will not pursue valve replacement at this time. SHANNON Bryan  [facility] [place]  07/27/21    Addendum from EP attending: This patient was seen and examined by me in a face-to-face visit today.  I reviewed the medical record including lab results, imaging and other provider notes. I confirmed the history as described above. I spoke to the patient, obtained history and examined the patient. I discussed assessments and plans in details with nurse practitioner. Below are my treatment plans and recommendations. He is intubated and not opening eyes or able to communicate  No sedative seen  Vital signs are stable  Exam shows     Nursing note and vitals reviewed. Constitutional No distress. Head: Normocephalic and atraumatic. Eyes: closed  Neck: Neck supple. No JVD present. Cardiovascular: Normal rate, regular rhythm and normal heart sounds. Exam reveals no gallop and no friction rub. 2/6 systolic murmur heard. Pulmonary/Chest: no rhonchi today. No wheezes. Abdominal: PEG  Musculoskeletal: no edema and SCD on  Neurological: intubated   Skin: Skin is warm and dry, not diaphoretic. Assessment and Plan:   Continue with dobutamine and diuretic  Continue IV antibiotic  Wean off ventilator if possible but family is going to discuss with palliative care team today  Supportive care until more is instructed by family visit  Future cardiac procedure BIV upgrade will depend on his progress and prognosis    Thank you for involving me in this patient's care and please call with further concerns or questions. Michelle Nobles M.D.   Electrophysiology/Cardiology  [facility] [place]  [address] [facility], [place], [place] Chavez [address] [place] [address]  [phone]

## 2021-07-27 NOTE — PROGRESS NOTES
Pharmacist Note - Vancomycin Dosing  Therapy day 4   Indication: aspiration pneumonia  Current regimen: dosing by levels    Recent Labs     07/27/21  0431 07/26/21  0424 07/25/21  2104 07/25/21  1418   WBC  --  18.3* 20.5* 20.7*   CREA 1.42* 1.48*  --  1.35*   BUN 56* 55*  --  55*       A random vancomycin level of 23.6 mcg/mL was obtained ~ 47 hrs post dose)  Goal target range Trough 10-15 mcg/mL      Plan: Continue to hold   Pharmacy will continue to monitor this patient daily for changes in clinical status and renal function.

## 2021-07-27 NOTE — PROGRESS NOTES
Comprehensive Nutrition Assessment    Type and Reason for Visit: Initial, Consult    Nutrition Recommendations/Plan:        Modify tube feeding: Jevity 1.5 @ 45 ml/hr with 1 packet Prosource daily and 50 ml water flush q 4 hr    Remove FMS if output remains low    Nutrition Assessment:    Pt admitted d/t Acute respiratory failure with hypoxia. PMHx: Prostate Cancer, CHF, HTN, CVA, PCM, Seizure D/O, Severe Pulmonary HTN. Transferred from [facility] where pt aspirated after vomiting then intubated. Septic and cardiogenic shock. Acute on chronic systolic heart failure-EF 10-15%. (R) pleural effusion tapped-1300 ml removed yesterday. WOCN following for DTIon B/L buttocks. Being diuresed prn. Palliative Care consulted-family meeting planned today to discuss [address]. Mr Berkley Messer has tolerated enteral feeding well thus far-unclear why pt vomited at [facility]. TF as ordered to advance to 65 ml/hr but remains at 50 ml/hr per daughter's request (pt has never had feeding run >50 ml/hr). Pt exhibits muscle wasting in upper body (see below)-meets criteria for moderate malnutrition. Difficult to determine actual weight loss in past d/t chronic heart failure. Potassium replaced today. Recommended feeding goal to meet pt's needs: Jevity 1.5 @ 45 ml/hr with 1 packet Prosource daily and 50 ml water flush q 4 hr. This will provide 990 ml, 1550 calories, 78 gm protein and 1110 ml free water (tube feeding/flush). Adjust flush prn since pt being diuresed. FMS in place-only 125 ml output yesterday (but noted pt had 6 BM's the day before). If output remains low recommend removing FMS. Malnutrition Assessment:  Malnutrition Status:   Moderate malnutrition    Context:  Acute illness     Findings of the 6 clinical characteristics of malnutrition:   Energy Intake:  Unable to assess  Weight Loss:  No significant weight loss     Body Fat Loss:  1 - Mild body fat loss, Buccal region   Muscle Mass Loss:  7 - Moderate muscle mass loss, Clavicles (pectoralis & deltoids), Scapula (trapezius), Temples (temporalis)  Fluid Accumulation:  1 - Mild, Extremities   Strength:  Not performed     Nutritionally Significant Medications: Albumin, Prevacid, Effer-K    Estimated Daily Nutrient Needs:  Energy (kcal): 8[address] [facility] 2003b); Weight Used for Energy Requirements: Current  Protein (g): 75-90 (1.0-1.2g/kg); Weight Used for Protein Requirements: Current  Fluid (ml/day):  ; Method Used for Fluid Requirements: 1 ml/kcal    Nutrition Related Findings:       BM: 7/27, FMS  Edema: 2+ BUE  Wounds:  Deep tissue injury       Current Nutrition Therapies:   Diet: NPO  Tube Feeding: Jevity 1.5 @ 50 ml/hhr with 30 ml water flush q 4 hr    Anthropometric Measures:  · Height:  5' 6\" (167.6 cm)  · Current Body Wt:  74.9 kg (165 lb 2 oz)   · Admission Body Wt:  164 lb 10.9 oz     · Ideal Body Wt:  142:  116.3 %   · BMI Categories:  Overweight (BMI 25.0-29. 9)     Wt Readings from Last 10 Encounters:   07/27/21 74.9 kg (165 lb 2 oz)   01/06/21 82.8 kg (182 lb 8.7 oz)   12/08/20 82.8 kg (182 lb 8.7 oz)   11/19/20 77.3 kg (170 lb 6.7 oz)   07/09/20 77.3 kg (170 lb 6.7 oz)   07/02/20 76.9 kg (169 lb 8.5 oz)   01/30/20 78.5 kg (173 lb)   10/18/19 82.6 kg (182 lb)   10/11/19 84.6 kg (186 lb 6.4 oz)   08/14/19 83.1 kg (183 lb 3.2 oz)       Nutrition Diagnosis:   · Inadequate oral intake related to impaired respiratory function as evidenced by NPO or clear liquid status due to medical condition, intubation. Nutrition Interventions:   Food and/or Nutrient Delivery: Modify tube feeding  Nutrition Education and Counseling: No recommendations at this time  Coordination of Nutrition Care: Continue to monitor while inpatient, Interdisciplinary rounds    Goals:  EN to meet 90% estimated needs x 5-7 days.        Nutrition Monitoring and Evaluation:   Behavioral-Environmental Outcomes: None identified  Food/Nutrient Intake Outcomes: Enteral nutrition intake/tolerance  Physical Signs/Symptoms Outcomes: Biochemical data, GI status, Weight, Fluid status or edema, Nutrition focused physical findings    Discharge Planning:    Enteral nutrition     Will Palafox RD CNSC  Contact: [employer]

## 2021-07-27 NOTE — PROGRESS NOTES
Bedside and Verbal shift change report given to [facility] (oncoming nurse) by Liza Rivers RN (offgoing nurse). Report included the following information SBAR, Kardex, Intake/Output and Cardiac Rhythm SR.     Bedside and Verbal shift change report given to [address] [place] [address] (oncoming nurse) by Russel (offgoing nurse). Report included the following information SBAR, Kardex, Intake/Output and Cardiac Rhythm SR w BBB.

## 2021-07-28 ENCOUNTER — APPOINTMENT (OUTPATIENT)
Dept: CT IMAGING | Age: 86
DRG: 004 | End: 2021-07-28
Attending: INTERNAL MEDICINE
Payer: MEDICARE

## 2021-07-28 LAB
ALBUMIN SERPL-MCNC: 3.3 G/DL (ref 3.5–5)
ALBUMIN/GLOB SERPL: 0.8 {RATIO} (ref 1.1–2.2)
ALP SERPL-CCNC: 94 U/L (ref 45–117)
ALT SERPL-CCNC: 41 U/L (ref 12–78)
ANION GAP SERPL CALC-SCNC: 5 MMOL/L (ref 5–15)
AST SERPL-CCNC: 13 U/L (ref 15–37)
BACTERIA SPEC CULT: ABNORMAL
BILIRUB SERPL-MCNC: 1 MG/DL (ref 0.2–1)
BNP SERPL-MCNC: ABNORMAL PG/ML
BUN SERPL-MCNC: 54 MG/DL (ref 6–20)
BUN/CREAT SERPL: 37 (ref 12–20)
CALCIUM SERPL-MCNC: 9.8 MG/DL (ref 8.5–10.1)
CHLORIDE SERPL-SCNC: 97 MMOL/L (ref 97–108)
CO2 SERPL-SCNC: 34 MMOL/L (ref 21–32)
CREAT SERPL-MCNC: 1.45 MG/DL (ref 0.7–1.3)
ERYTHROCYTE [DISTWIDTH] IN BLOOD BY AUTOMATED COUNT: 18.6 % (ref 11.5–14.5)
GLOBULIN SER CALC-MCNC: 3.9 G/DL (ref 2–4)
GLUCOSE SERPL-MCNC: 147 MG/DL (ref 65–100)
GRAM STN SPEC: ABNORMAL
HCT VFR BLD AUTO: 24 % (ref 36.6–50.3)
HGB BLD-MCNC: 7.6 G/DL (ref 12.1–17)
MAGNESIUM SERPL-MCNC: 2.7 MG/DL (ref 1.6–2.4)
MCH RBC QN AUTO: 29.2 PG (ref 26–34)
MCHC RBC AUTO-ENTMCNC: 31.7 G/DL (ref 30–36.5)
MCV RBC AUTO: 92.3 FL (ref 80–99)
NRBC # BLD: 0 K/UL (ref 0–0.01)
NRBC BLD-RTO: 0 PER 100 WBC
PHOSPHATE SERPL-MCNC: 3 MG/DL (ref 2.6–4.7)
PLATELET # BLD AUTO: 100 K/UL (ref 150–400)
PMV BLD AUTO: 9.2 FL (ref 8.9–12.9)
POTASSIUM SERPL-SCNC: 3.4 MMOL/L (ref 3.5–5.1)
PROCALCITONIN SERPL-MCNC: 15.51 NG/ML
PROT SERPL-MCNC: 7.2 G/DL (ref 6.4–8.2)
RBC # BLD AUTO: 2.6 M/UL (ref 4.1–5.7)
SERVICE CMNT-IMP: ABNORMAL
SODIUM SERPL-SCNC: 136 MMOL/L (ref 136–145)
WBC # BLD AUTO: 11.1 K/UL (ref 4.1–11.1)

## 2021-07-28 PROCEDURE — 74011250636 HC RX REV CODE- 250/636: Performed by: ANESTHESIOLOGY

## 2021-07-28 PROCEDURE — 84145 PROCALCITONIN (PCT): CPT

## 2021-07-28 PROCEDURE — 74011250636 HC RX REV CODE- 250/636: Performed by: INTERNAL MEDICINE

## 2021-07-28 PROCEDURE — 84100 ASSAY OF PHOSPHORUS: CPT

## 2021-07-28 PROCEDURE — 70450 CT HEAD/BRAIN W/O DYE: CPT

## 2021-07-28 PROCEDURE — 74011250637 HC RX REV CODE- 250/637: Performed by: NURSE PRACTITIONER

## 2021-07-28 PROCEDURE — 74011250636 HC RX REV CODE- 250/636: Performed by: NURSE PRACTITIONER

## 2021-07-28 PROCEDURE — 74011000250 HC RX REV CODE- 250: Performed by: INTERNAL MEDICINE

## 2021-07-28 PROCEDURE — 94003 VENT MGMT INPAT SUBQ DAY: CPT

## 2021-07-28 PROCEDURE — 36591 DRAW BLOOD OFF VENOUS DEVICE: CPT

## 2021-07-28 PROCEDURE — 83735 ASSAY OF MAGNESIUM: CPT

## 2021-07-28 PROCEDURE — 83880 ASSAY OF NATRIURETIC PEPTIDE: CPT

## 2021-07-28 PROCEDURE — P9047 ALBUMIN (HUMAN), 25%, 50ML: HCPCS | Performed by: ANESTHESIOLOGY

## 2021-07-28 PROCEDURE — 80053 COMPREHEN METABOLIC PANEL: CPT

## 2021-07-28 PROCEDURE — 65610000006 HC RM INTENSIVE CARE

## 2021-07-28 PROCEDURE — 85027 COMPLETE CBC AUTOMATED: CPT

## 2021-07-28 PROCEDURE — 74011250637 HC RX REV CODE- 250/637: Performed by: INTERNAL MEDICINE

## 2021-07-28 PROCEDURE — 74011000258 HC RX REV CODE- 258: Performed by: NURSE PRACTITIONER

## 2021-07-28 RX ORDER — BUMETANIDE 0.25 MG/ML
0.5 INJECTION INTRAMUSCULAR; INTRAVENOUS ONCE
Status: COMPLETED | OUTPATIENT
Start: 2021-07-28 | End: 2021-07-28

## 2021-07-28 RX ADMIN — Medication 40 ML: at 06:00

## 2021-07-28 RX ADMIN — PIPERACILLIN AND TAZOBACTAM 3.38 G: 3; .375 INJECTION, POWDER, LYOPHILIZED, FOR SOLUTION INTRAVENOUS at 01:04

## 2021-07-28 RX ADMIN — CHLORHEXIDINE GLUCONATE 15 ML: 0.12 RINSE ORAL at 12:46

## 2021-07-28 RX ADMIN — Medication: at 17:58

## 2021-07-28 RX ADMIN — HEPARIN SODIUM 5000 UNITS: 5000 INJECTION INTRAVENOUS; SUBCUTANEOUS at 09:28

## 2021-07-28 RX ADMIN — LEVETIRACETAM 500 MG: 100 SOLUTION ORAL at 09:27

## 2021-07-28 RX ADMIN — ACETAMINOPHEN 650 MG: 325 TABLET ORAL at 00:50

## 2021-07-28 RX ADMIN — ACETAZOLAMIDE SODIUM 250 MG: 500 INJECTION, POWDER, LYOPHILIZED, FOR SOLUTION INTRAVENOUS at 12:48

## 2021-07-28 RX ADMIN — Medication: at 09:28

## 2021-07-28 RX ADMIN — ACETAMINOPHEN 650 MG: 325 TABLET ORAL at 16:38

## 2021-07-28 RX ADMIN — PIPERACILLIN AND TAZOBACTAM 3.38 G: 3; .375 INJECTION, POWDER, LYOPHILIZED, FOR SOLUTION INTRAVENOUS at 09:27

## 2021-07-28 RX ADMIN — ASPIRIN 81 MG: 81 TABLET, CHEWABLE ORAL at 09:27

## 2021-07-28 RX ADMIN — LEVETIRACETAM 500 MG: 100 SOLUTION ORAL at 17:58

## 2021-07-28 RX ADMIN — LACOSAMIDE 100 MG: 50 TABLET, FILM COATED ORAL at 20:49

## 2021-07-28 RX ADMIN — CHLORHEXIDINE GLUCONATE 15 ML: 0.12 RINSE ORAL at 01:00

## 2021-07-28 RX ADMIN — LANSOPRAZOLE 30 MG: KIT at 06:37

## 2021-07-28 RX ADMIN — CHLOROTHIAZIDE SODIUM 250 MG: 500 INJECTION, POWDER, LYOPHILIZED, FOR SOLUTION INTRAVENOUS at 12:48

## 2021-07-28 RX ADMIN — BUMETANIDE 0.5 MG: 0.25 INJECTION, SOLUTION INTRAMUSCULAR; INTRAVENOUS at 12:49

## 2021-07-28 RX ADMIN — ALBUMIN (HUMAN) 12.5 G: 0.25 INJECTION, SOLUTION INTRAVENOUS at 00:10

## 2021-07-28 RX ADMIN — PIPERACILLIN AND TAZOBACTAM 3.38 G: 3; .375 INJECTION, POWDER, LYOPHILIZED, FOR SOLUTION INTRAVENOUS at 17:58

## 2021-07-28 RX ADMIN — LACOSAMIDE 100 MG: 50 TABLET, FILM COATED ORAL at 09:27

## 2021-07-28 RX ADMIN — Medication 40 ML: at 22:00

## 2021-07-28 RX ADMIN — Medication 10 ML: at 16:38

## 2021-07-28 RX ADMIN — HEPARIN SODIUM 5000 UNITS: 5000 INJECTION INTRAVENOUS; SUBCUTANEOUS at 23:29

## 2021-07-28 RX ADMIN — DOBUTAMINE HYDROCHLORIDE 2.5 MCG/KG/MIN: 200 INJECTION INTRAVENOUS at 19:05

## 2021-07-28 RX ADMIN — ALLOPURINOL 100 MG: 100 TABLET ORAL at 09:27

## 2021-07-28 RX ADMIN — POTASSIUM BICARBONATE 40 MEQ: 782 TABLET, EFFERVESCENT ORAL at 12:47

## 2021-07-28 NOTE — PROGRESS NOTES
[facility]    ICU TEAM Progress Note    Name: Vidal Johns   : 3/9/1933   MRN: [id number]   Date: 2021      I  Subjective:   Progress Note: 2021      Reason for ICU Admission: Acute hypoxic respiratory failure     Interval history: From [facility] a 80 y. o. male with history of prostate cancer, CHF, constipation, hypertension, stroke, TIA, and seizures who presents to the emergency department by EMS as transfer from [facility] for respiratory distress.  Patient had initially been a transfer from [facility] after a hospitalization from  [facility].  Was there for altered mental status hypoxia was treated for hypoxic respiratory failure bilateral pleural effusions acute systolic CHF secondary to severe aortic stenosis hyponatremia hypokalemia elevated LFTs malnutrition and possible UTI was treated with ceftriaxone and Yulia Langston already had a PEG for dysphagia and protein calorie malnutrition prior to [facility] admission. He was at [facility] from [place] [phone].  While at [facility] today patient had an episode of vomiting and aspirated was initially started on high flow nasal cannula however was still hypoxic thus got intubated by [facility] staff. Gaurang [place] was then transferred to the ED for further care.  Per family patient's decline started last year while he was having seizures/TIA patient became more encephalopathic and started to refuse eating and thus got a PEG for nutritional reasons. was staying at assisted living facility and had to be re-hospitalized after his PEG malfunctioned.  During that time he did have an infection, daughter could not exactly say where the infection was, but she thought it was an abdominal infection and since then he started having worsening heart failure and decreased heart function, and failure to thrive. Per records EF was 10 to 15%.  Per daughter, patient has never had a tracheostomy.  She states patient is awake at times, will open eyes, but does not track, does not follow commands, and does not speak. They wish patient to remain full code at this time.     While in ED patient was placed on mechanical ventilator, ABG was done which was consistent with hypoxic respiratory failure.  Patient was also given 1 L of IV fluids for fluid resuscitation due to sepsis. Initially blood pressure was okay but once patient was started on sedation blood pressure dropped.  Patient also had episodes of hypoxia due to vent asynchrony. Once PEEP was increased for hypoxia patient's pressure dropped. Started patient on Levophed via peripheral in ED. If hypotension worsens will need central line. Started on Zosyn. Updated the family at bedside, daughter and and son. Consent for central line and blood was  Received.  Patient to transfer to ICU for continued care. Overnight Events:   7/28: No acute event, no much neurological recovery. No witnessed seizure, blood pressure on the higher side. Diuresing well  7/27: No acute event. Good diuresis. Still poorly responsive. Remains on dobutamine. Blood pressure maintained. No witnessed seizure.   7/26: No acute event, blood pressure somewhat better but urine output decreasing.  Still on dobutamine at 5.  7/25 -- Getting PRBC now; started on Dobutamine      Active Problem List:     Problem List  Date Reviewed: 12/17/2020        Codes Class    Acute respiratory failure with hypoxia (HCC) ICD-10-CM: J96.01  ICD-9-CM: 518.81         Aspiration into respiratory tract ICD-10-CM: T17.908A  ICD-9-CM: 934.9         Hypokalemia ICD-10-CM: E87.6  ICD-9-CM: 276.8         Hypernatremia ICD-10-CM: E87.0  ICD-9-CM: 276.0         Moderate protein malnutrition (HCC) ICD-10-CM: E44.0  ICD-9-CM: 263.0         Failure to thrive (0-17) ICD-10-CM: R62.51  ICD-9-CM: 783.41         Bacteremia ICD-10-CM: R78.81  ICD-9-CM: 790.7         UTI (urinary tract infection) ICD-10-CM: N39.0  ICD-9-CM: 599.0         Dementia ([facility] ICD-10-CM: F03.90  ICD-9-CM: 294.20         Bedridden ICD-10-CM: Z74.01  ICD-9-CM: V49.84         Pressure ulcer ICD-10-CM: L89.90  ICD-9-CM: 707.00, 707.20         S/P percutaneous endoscopic gastrostomy (PEG) tube placement ([facility] ICD-10-CM: Z93.1  ICD-9-CM: V44.1         Dehydration ICD-10-CM: E86.0  ICD-9-CM: 276.51         Lactic acidosis ICD-10-CM: E87.2  ICD-9-CM: 276.2         Sepsis ([facility] ICD-10-CM: A41.9  ICD-9-CM: 038.9, 995.91         SOULEYMANE (acute kidney injury) ([facility] ICD-10-CM: N17.9  ICD-9-CM: 580. 9         Chronic systolic heart failure (HCC) ICD-10-CM: I50.22  ICD-9-CM: 428.22         Altered mental status ICD-10-CM: R41.82  ICD-9-CM: 780.97         Post-ictal state ([facility] ICD-10-CM: R56.9  ICD-9-CM: 780.39         Seizure ([facility] ICD-10-CM: R56.9  ICD-9-CM: 780.39         Atrial pacemaker lead displacement ICD-10-CM: T82.120A  ICD-9-CM: 996.01         Pacemaker ICD-10-CM: Z95.0  ICD-9-CM: V45.01     Overview Signed 3/22/2019  5:08 PM by Pam Sampson MD     3/22/2019 dual chamber Medtronic pacer             Bradycardia ICD-10-CM: R00.1  ICD-9-CM: 427.89         Transaminitis ICD-10-CM: R74.01  ICD-9-CM: 790.4         Hypertensive urgency ICD-10-CM: I16.0  ICD-9-CM: 401.9         Second degree AV block, Mobitz type I ICD-10-CM: I44.1  ICD-9-CM: 426.13     Overview Signed 3/21/2019  6:14 PM by Pam Sampson MD     Added automatically from request for surgery [id number]             Stage 3 chronic kidney disease ([facility] ICD-10-CM: N18.30  ICD-9-CM: 207. 3         Rotator cuff arthropathy of both shoulders ICD-10-CM: M12.811, M12.812  ICD-9-CM: 716.81         Cognitive impairment ICD-10-CM: R41.89  ICD-9-CM: 944.8         Systolic murmur [facility]-06-DA: R01.1  ICD-9-CM: 785.2         Essential hypertension ICD-10-CM: I10  ICD-9-CM: 401.9         Gout ICD-10-CM: M10.9  ICD-9-CM: 274.9         Pure hypercholesterolemia ICD-10-CM: E78.00  ICD-9-CM: 272.0         History of prostate cancer ICD-10-CM: Z85.46  ICD-9-CM: V10.46 Chronic constipation ICD-10-CM: K59.09  ICD-9-CM: 564.00         Dysuria ICD-10-CM: R30.0  ICD-9-CM: 429. 1         Weight loss ICD-10-CM: R63.4  ICD-9-CM: 783.21         Adrenal mass ([facility] ICD-10-CM: E27.8  ICD-9-CM: 255.8     Overview Addendum 12/17/2018  9:57 AM by Mendy Reveles MD     Stable/non-functioning adenoma on imaging                   Past Medical History:      has a past medical history of Cancer [facility]), Chronic systolic heart failure ([facility] (12/7/2020), Constipation, Gout, Hyperlipemia, Hypertension, Pacemaker (2019), Stroke ([facility] Thrombocytopenia ([email or web address] (3/19/2019), and TIA (transient ischemic attack) (2013). Past Surgical History:      has a past surgical history that includes hx urological; hx prostatectomy; pr ins new/rplcmt prm pm w/transv eltrd atrial&vent (Right, 3/22/2019); pr ins new/rplcmt prm pacemakr w/trans eltrd atrial (N/A, 10/18/2019); and place percut gastrostomy tube (11/30/2020). Home Medications:     Prior to Admission medications    Medication Sig Start Date End Date Taking? Authorizing Provider   levETIRAcetam (KEPPRA) 750 mg tablet Take 1 Tab by mouth every twelve (12) hours. 12/11/20   Maral Jack NP   aspirin delayed-release 81 mg tablet Take 1 Tab by mouth daily. 7/13/20   Kenia Lira MD   polyethylene glycol [facility]) 17 gram/dose powder Take 17 g by mouth daily as needed for Constipation. 7/13/20   Kenia Lira MD   balsam peru-castor oiL (VENELEX) ointment Apply  to affected area three (3) times daily. 7/9/20   Meghan Barnes MD   famotidine (PEPCID) 20 mg tablet Take 1 Tab by mouth every evening. 7/9/20   Meghan Barnes MD   acetaminophen (TYLENOL) 325 mg tablet Take 650 mg by mouth every six (6) hours as needed for Pain.     Provider, Historical       Allergies/Social/Family History:     No Known Allergies   Social History     Tobacco Use    Smoking status: Never Smoker    Smokeless tobacco: Never Used   Substance Use Topics    Alcohol use: No      Family History   Problem Relation Age of Onset    No Known Problems Mother     No Known Problems Father        Review of Systems:   Not able to obtain due to patient medical condition    Objective:   Vital Signs:  Visit Vitals  BP (!) 153/73   Pulse 90   Temp 99.1 °F (37.3 °C)   Resp 15   Ht 5' 6\" (1.676 m)   Wt 74 kg (163 lb 2.3 oz)   SpO2 100%   BMI 26.33 kg/m²      O2 Device: Ventilator Temp (24hrs), Av.5 °F (36.9 °C), Min:97.9 °F (36.6 °C), Max:99.1 °F (37.3 °C)           Intake/Output:     Intake/Output Summary (Last 24 hours) at 2021 1009  Last data filed at 2021 0700  Gross per 24 hour   Intake 1319.2 ml   Output 2835 ml   Net -1515.8 ml       Physical Exam:    General:  mild distress, appears stated age, intubated   Eye:  conjunctivae/corneas clear. Pupils bilaterally reactive and round  Neurologic:  WD to pain in all ext. Does not follow commands  Lymphatic:  Cervical, supraclavicular, and axillary nodes normal.   Neck:  normal and no erythema or exudates noted. Lungs: Diminished in bilateral bases, mild tachypnea.  Moderate oral secretions   Heart: Paced rhythm, regular, tachycardia  Abdomen:  soft, non-tender. Bowel sounds normal. No masses,  no organomegaly  Cardiovascular:  Regular rate and rhythm, S1S2 present, without murmur or extra heart sounds and pedal pulses normal Generalized edema  Skin:  no rash or abnormalities    LABS AND  DATA: Personally reviewed  Recent Labs     21   WBC 11.1 15.9*   HGB 7.6* 7.8*   HCT 24.0* 24.2*   * 110*     Recent Labs     21    138   K 3.4* 3.2*   CL 97 98   CO2 34* 31   BUN 54* 56*   CREA 1.45* 1.42*   * 142*   CA 9.8 9.7   MG 2.7* 2.8*   PHOS 3.0 3.3     Recent Labs     21   AP 94 95   TP 7.2 7.0   ALB 3.3* 2.9*   GLOB 3.9 4.1*     No results for input(s): INR, PTP, APTT, INREXT in the last 72 hours.    Recent Labs     21  0444   PHI 7.52*   PCO2I 35.6   PO2I 90   FIO2I 30     No results for input(s): CPK, CKMB, TROIQ, BNPP in the last 72 hours. Hemodynamics:   PAP:   CO:     Wedge:   CI:     CVP:    SVR:       PVR:       Ventilator Settings:  Mode Rate Tidal Volume Pressure FiO2 PEEP   Assist control, Pressure control        30 % 5 cm H20     Peak airway pressure: 21 cm H2O    Minute ventilation: 6.7 l/min        MEDS: Reviewed    Chest X-Ray:  CXR Results  (Last 48 hours)    None          Assessment and Plan:   1. Acute Hypoxic Respiratory Failure  2. Acute on Chronic Systolic Heart Failure  3. Septic Shock  4. Cardiogenic Shock  5. Aspiration Pneumonitis  6. Pleural Effusion - RIGHT-sided  7. GERD  8. Protein Calorie Malnutrition  9. Dysphagia s/p PEG  10. Gout  11. Hx Prostate CA  12. Hx Seizure D/O  13. Hx CVA/TIA - with Residual Aphasia  14. Hx HTN  15. Hx Severe Aortic Stenosis/ Mod to severe mitral valve regurgitation  16. Hx Severe Pulmonary HTN  17. Hx Pacermaker  Plans for this Shift:   Overall improvement in hemodynamic but no improvement in neurological status. CT showed severe chronic changes. Had a family meeting with palliative care on July 27 and patient CODE STATUS now is DNR. However they would like to continue with medical management including mechanical ventilation at least for this time. I will discontinue midodrine, discontinue albumin, decrease dobutamine to 2.5 and again I will give him 1 dose of Bumex/Diuril and acetazolamide today. Continue on Zosyn, vancomycin discontinued on the 27th. Patient failed SBT this morning given his low tidal volume and tachypnea while on pressure support of 10/5. Also his neurological status hindered her progress.     18. Continue dobutamine at 2.5 mcg/min/hr  19. [address] [facility]  20. Giving has improved blood pressure discontinue midodrine, albumin. Give another one-time round of diuretics. 21. SBP Goal of: > 90 mmHg  22. MAP Goal of: > 65 mmHg  23.  Transfusion Trigger (Hgb): <7 g/dL  24. Respiratory Goals:   a. Chlorhexidine   b. Optimize PEEP/Ventilation/Oxygenation  c. Goal Tidal Volume 6 cc/kg based on IBW  d. Aim for lung protective ventilation  e. Head of bed > 30 degrees  25. Pulmonary toilet: Duo-Nebs   26. SpO2 Goal: > 92%   27. Keep K>4; Mg>2   28. PT/OT: NA   29. Discussed Plan of Care/Code Status: Full Code - discussed with daughter/son  30. Discussed Care Plan with Bedside RN  31. Documentation of Current Medications  32. Rest of Plan Below:     F - Feeding:  Yes  A - Analgesia: Fentanyl  S - Sedation: Propofol  T - DVT Prophylaxis: Subcu heparin every 12  H - Head of Bed: > 30 Degrees  U - Ulcer Prophylaxis: prevacid   G - Glycemic Control: Insulin q 6 hrs accu checks SSI   S - Spontaneous Breathing Trial: Pending  B - Bowel Regimen: Senna  I - Indwelling Catheter:              FJGFN: ETT and PEG Tube  Lines: Peripheral IV  Drains: Avalos Catheter  D - De-escalation of Antibiotics:  Zosyn    DISPOSITION  Stay in ICU    CRITICAL CARE CONSULTANT NOTE  I had a face to face encounter with the patient, reviewed and interpreted patient data including clinical events, labs, images, vital signs, I/O's, and examined patient. I have discussed the case and the plan and management of the patient's care with the consulting services, the bedside nurses and the respiratory therapist.      NOTE OF PERSONAL INVOLVEMENT IN CARE   This patient has a high probability of imminent, clinically significant deterioration, which requires the highest level of preparedness to intervene urgently. I participated in the decision-making and personally managed or directed the management of the following life and organ supporting interventions that required my frequent assessment to treat or prevent imminent deterioration. I personally spent 40 minutes of critical care time.   This is time spent at this critically ill patient's bedside actively involved in patient care as well as the coordination of care and discussions with the patient's family. This does not include any procedural time which has been billed separately. Palmira Mcbride M.D.   Staff Intensivist/Pulmonologist  [facility]  7/28/2021

## 2021-07-28 NOTE — PROGRESS NOTES
Bedside shift change report received from [facility]. Report included the following information SBAR, Kardex, Procedure Summary, Intake/Output, MAR and Recent Results. 0345: H CT complete.

## 2021-07-28 NOTE — PROGRESS NOTES
Bedside and Verbal shift change report given to [address] (oncoming nurse) by Gene Ramon  (offgoing nurse). Report included the following information SBAR, Kardex, Intake/Output and Cardiac Rhythm SR. Shift Summary    Dobutamine weaned to 2.5 mcg/kg/min as ordered. Patient tolerated change. BP and HR remain WDL. Turned and reposition as ordered. Tolerating tube feeding at present. Daughter at bedside. Update given    Bedside and Verbal shift change report given to Gene Ramon (oncoming nurse) by [address] (offgoing nurse).  Report included the following information SBAR, Kardex, Intake/Output and Cardiac Rhythm SR.

## 2021-07-29 LAB
ALBUMIN SERPL-MCNC: 2.6 G/DL (ref 3.5–5)
ALBUMIN/GLOB SERPL: 0.7 {RATIO} (ref 1.1–2.2)
ALP SERPL-CCNC: 101 U/L (ref 45–117)
ALT SERPL-CCNC: 35 U/L (ref 12–78)
ANION GAP SERPL CALC-SCNC: 8 MMOL/L (ref 5–15)
AST SERPL-CCNC: 16 U/L (ref 15–37)
BACTERIA SPEC CULT: NORMAL
BACTERIA SPEC CULT: NORMAL
BILIRUB SERPL-MCNC: 0.7 MG/DL (ref 0.2–1)
BNP SERPL-MCNC: ABNORMAL PG/ML
BUN SERPL-MCNC: 54 MG/DL (ref 6–20)
BUN/CREAT SERPL: 39 (ref 12–20)
CALCIUM SERPL-MCNC: 9.4 MG/DL (ref 8.5–10.1)
CHLORIDE SERPL-SCNC: 98 MMOL/L (ref 97–108)
CO2 SERPL-SCNC: 32 MMOL/L (ref 21–32)
CREAT SERPL-MCNC: 1.38 MG/DL (ref 0.7–1.3)
ERYTHROCYTE [DISTWIDTH] IN BLOOD BY AUTOMATED COUNT: 18.1 % (ref 11.5–14.5)
GLOBULIN SER CALC-MCNC: 4 G/DL (ref 2–4)
GLUCOSE BLD STRIP.AUTO-MCNC: 164 MG/DL (ref 65–117)
GLUCOSE BLD STRIP.AUTO-MCNC: 168 MG/DL (ref 65–117)
GLUCOSE BLD STRIP.AUTO-MCNC: 170 MG/DL (ref 65–117)
GLUCOSE SERPL-MCNC: 186 MG/DL (ref 65–100)
HCT VFR BLD AUTO: 24.4 % (ref 36.6–50.3)
HGB BLD-MCNC: 7.4 G/DL (ref 12.1–17)
MAGNESIUM SERPL-MCNC: 2.7 MG/DL (ref 1.6–2.4)
MCH RBC QN AUTO: 28.6 PG (ref 26–34)
MCHC RBC AUTO-ENTMCNC: 30.3 G/DL (ref 30–36.5)
MCV RBC AUTO: 94.2 FL (ref 80–99)
NRBC # BLD: 0 K/UL (ref 0–0.01)
NRBC BLD-RTO: 0 PER 100 WBC
PHOSPHATE SERPL-MCNC: 2.3 MG/DL (ref 2.6–4.7)
PLATELET # BLD AUTO: 98 K/UL (ref 150–400)
PMV BLD AUTO: 9.8 FL (ref 8.9–12.9)
POTASSIUM SERPL-SCNC: 3.3 MMOL/L (ref 3.5–5.1)
PROCALCITONIN SERPL-MCNC: 7.38 NG/ML
PROT SERPL-MCNC: 6.6 G/DL (ref 6.4–8.2)
RBC # BLD AUTO: 2.59 M/UL (ref 4.1–5.7)
SERVICE CMNT-IMP: ABNORMAL
SERVICE CMNT-IMP: NORMAL
SERVICE CMNT-IMP: NORMAL
SODIUM SERPL-SCNC: 138 MMOL/L (ref 136–145)
WBC # BLD AUTO: 10.7 K/UL (ref 4.1–11.1)

## 2021-07-29 PROCEDURE — 85027 COMPLETE CBC AUTOMATED: CPT

## 2021-07-29 PROCEDURE — 74011250637 HC RX REV CODE- 250/637: Performed by: NURSE PRACTITIONER

## 2021-07-29 PROCEDURE — 36591 DRAW BLOOD OFF VENOUS DEVICE: CPT

## 2021-07-29 PROCEDURE — 82962 GLUCOSE BLOOD TEST: CPT

## 2021-07-29 PROCEDURE — 65610000006 HC RM INTENSIVE CARE

## 2021-07-29 PROCEDURE — 74011000258 HC RX REV CODE- 258: Performed by: NURSE PRACTITIONER

## 2021-07-29 PROCEDURE — 83880 ASSAY OF NATRIURETIC PEPTIDE: CPT

## 2021-07-29 PROCEDURE — 74011250636 HC RX REV CODE- 250/636: Performed by: HEALTH CARE PROVIDER

## 2021-07-29 PROCEDURE — 83735 ASSAY OF MAGNESIUM: CPT

## 2021-07-29 PROCEDURE — 74011636637 HC RX REV CODE- 636/637: Performed by: HEALTH CARE PROVIDER

## 2021-07-29 PROCEDURE — 94003 VENT MGMT INPAT SUBQ DAY: CPT

## 2021-07-29 PROCEDURE — 74011250636 HC RX REV CODE- 250/636: Performed by: INTERNAL MEDICINE

## 2021-07-29 PROCEDURE — 74011250637 HC RX REV CODE- 250/637: Performed by: HEALTH CARE PROVIDER

## 2021-07-29 PROCEDURE — 80053 COMPREHEN METABOLIC PANEL: CPT

## 2021-07-29 PROCEDURE — 74011000258 HC RX REV CODE- 258: Performed by: HEALTH CARE PROVIDER

## 2021-07-29 PROCEDURE — 84100 ASSAY OF PHOSPHORUS: CPT

## 2021-07-29 PROCEDURE — 84145 PROCALCITONIN (PCT): CPT

## 2021-07-29 PROCEDURE — 74011250636 HC RX REV CODE- 250/636: Performed by: NURSE PRACTITIONER

## 2021-07-29 RX ORDER — INSULIN LISPRO 100 [IU]/ML
INJECTION, SOLUTION INTRAVENOUS; SUBCUTANEOUS EVERY 6 HOURS
Status: DISCONTINUED | OUTPATIENT
Start: 2021-07-29 | End: 2021-08-11 | Stop reason: HOSPADM

## 2021-07-29 RX ORDER — MAGNESIUM SULFATE 100 %
4 CRYSTALS MISCELLANEOUS AS NEEDED
Status: DISCONTINUED | OUTPATIENT
Start: 2021-07-29 | End: 2021-08-11 | Stop reason: HOSPADM

## 2021-07-29 RX ORDER — DEXTROSE 50 % IN WATER (D50W) INTRAVENOUS SYRINGE
25-50 AS NEEDED
Status: DISCONTINUED | OUTPATIENT
Start: 2021-07-29 | End: 2021-08-11 | Stop reason: HOSPADM

## 2021-07-29 RX ADMIN — ASPIRIN 81 MG: 81 TABLET, CHEWABLE ORAL at 09:33

## 2021-07-29 RX ADMIN — INSULIN LISPRO 2 UNITS: 100 INJECTION, SOLUTION INTRAVENOUS; SUBCUTANEOUS at 17:36

## 2021-07-29 RX ADMIN — INSULIN LISPRO 2 UNITS: 100 INJECTION, SOLUTION INTRAVENOUS; SUBCUTANEOUS at 23:57

## 2021-07-29 RX ADMIN — PIPERACILLIN AND TAZOBACTAM 3.38 G: 3; .375 INJECTION, POWDER, LYOPHILIZED, FOR SOLUTION INTRAVENOUS at 01:21

## 2021-07-29 RX ADMIN — HEPARIN SODIUM 5000 UNITS: 5000 INJECTION INTRAVENOUS; SUBCUTANEOUS at 23:52

## 2021-07-29 RX ADMIN — Medication 10 ML: at 14:07

## 2021-07-29 RX ADMIN — CHLORHEXIDINE GLUCONATE 15 ML: 0.12 RINSE ORAL at 12:31

## 2021-07-29 RX ADMIN — POTASSIUM BICARBONATE 40 MEQ: 782 TABLET, EFFERVESCENT ORAL at 12:35

## 2021-07-29 RX ADMIN — Medication 40 ML: at 06:00

## 2021-07-29 RX ADMIN — Medication: at 17:36

## 2021-07-29 RX ADMIN — PIPERACILLIN AND TAZOBACTAM 3.38 G: 3; .375 INJECTION, POWDER, LYOPHILIZED, FOR SOLUTION INTRAVENOUS at 09:33

## 2021-07-29 RX ADMIN — CHLORHEXIDINE GLUCONATE 15 ML: 0.12 RINSE ORAL at 00:16

## 2021-07-29 RX ADMIN — LACOSAMIDE 100 MG: 50 TABLET, FILM COATED ORAL at 20:18

## 2021-07-29 RX ADMIN — LEVETIRACETAM 500 MG: 100 SOLUTION ORAL at 09:33

## 2021-07-29 RX ADMIN — Medication: at 09:34

## 2021-07-29 RX ADMIN — ACETAMINOPHEN 650 MG: 325 TABLET ORAL at 20:18

## 2021-07-29 RX ADMIN — HEPARIN SODIUM 5000 UNITS: 5000 INJECTION INTRAVENOUS; SUBCUTANEOUS at 09:35

## 2021-07-29 RX ADMIN — LANSOPRAZOLE 30 MG: KIT at 07:09

## 2021-07-29 RX ADMIN — ALLOPURINOL 100 MG: 100 TABLET ORAL at 10:15

## 2021-07-29 RX ADMIN — Medication 40 ML: at 22:00

## 2021-07-29 RX ADMIN — PIPERACILLIN AND TAZOBACTAM 3.38 G: 3; .375 INJECTION, POWDER, LYOPHILIZED, FOR SOLUTION INTRAVENOUS at 16:28

## 2021-07-29 RX ADMIN — INSULIN LISPRO 2 UNITS: 100 INJECTION, SOLUTION INTRAVENOUS; SUBCUTANEOUS at 12:34

## 2021-07-29 RX ADMIN — ACETAMINOPHEN 650 MG: 325 TABLET ORAL at 01:21

## 2021-07-29 RX ADMIN — LEVETIRACETAM 500 MG: 100 SOLUTION ORAL at 17:35

## 2021-07-29 RX ADMIN — LACOSAMIDE 100 MG: 50 TABLET, FILM COATED ORAL at 09:33

## 2021-07-29 NOTE — PROGRESS NOTES
730-Bedside and Verbal shift change report given to Sol Ibarra (oncoming nurse) by [facility] (offgoing nurse). Report included the following information SBAR, Kardex, ED Summary, Procedure Summary, Intake/Output, MAR, Accordion, Recent Results, Med Rec Status, Cardiac Rhythm paced, Alarm Parameters , Quality Measures and Dual Neuro Assessment. 1930-Bedside and Verbal shift change report given to [facility] by Sol Ibarra (offgoing nurse). Report included the following information SBAR, Kardex, ED Summary, Procedure Summary, Intake/Output, MAR, Accordion, Recent Results, Med Rec Status, Cardiac Rhythm paced, SR, Alarm Parameters , Pre Procedure Checklist and Quality Measures.

## 2021-07-29 NOTE — PROGRESS NOTES
[facility]    ICU TEAM Progress Note    Name: Cristofer Macdonald   : 3/9/1933   MRN: [id number]   Date: 2021      I  Subjective:   Progress Note: 2021      Reason for ICU Admission: Acute hypoxic respiratory failure     Interval history: From [facility] a 80 y. o. male with history of prostate cancer, CHF, constipation, hypertension, stroke, TIA, and seizures who presents to the emergency department by EMS as transfer from [facility] for respiratory distress.  Patient had initially been a transfer from [facility] after a hospitalization from  [facility].  Was there for altered mental status hypoxia was treated for hypoxic respiratory failure bilateral pleural effusions acute systolic CHF secondary to severe aortic stenosis hyponatremia hypokalemia elevated LFTs malnutrition and possible UTI was treated with ceftriaxone and Enrique Newsome already had a PEG for dysphagia and protein calorie malnutrition prior to [facility] admission. He was at [facility] from [place] [phone].  While at [facility] today patient had an episode of vomiting and aspirated was initially started on high flow nasal cannula however was still hypoxic thus got intubated by [facility] staff. John Krueger was then transferred to the ED for further care.  Per family patient's decline started last year while he was having seizures/TIA patient became more encephalopathic and started to refuse eating and thus got a PEG for nutritional reasons. was staying at assisted living facility and had to be re-hospitalized after his PEG malfunctioned.  During that time he did have an infection, daughter could not exactly say where the infection was, but she thought it was an abdominal infection and since then he started having worsening heart failure and decreased heart function, and failure to thrive. Per records EF was 10 to 15%.  Per daughter, patient has never had a tracheostomy.  She states patient is awake at times, will open eyes, but does not track, does not follow commands, and does not speak. They wish patient to remain full code at this time.     While in ED patient was placed on mechanical ventilator, ABG was done which was consistent with hypoxic respiratory failure.  Patient was also given 1 L of IV fluids for fluid resuscitation due to sepsis. Initially blood pressure was okay but once patient was started on sedation blood pressure dropped.  Patient also had episodes of hypoxia due to vent asynchrony. Once PEEP was increased for hypoxia patient's pressure dropped. Started patient on Levophed via peripheral in ED. If hypotension worsens will need central line. Started on Zosyn. Updated the family at bedside, daughter and and son. Consent for central line and blood was  Received.  Patient to transfer to ICU for continued care. Overnight Events:  7/29 No acute overnight events   7/28: No acute event, no much neurological recovery. No witnessed seizure, blood pressure on the higher side. Diuresing well  7/27: No acute event. Good diuresis. Still poorly responsive. Remains on dobutamine. Blood pressure maintained. No witnessed seizure.   7/26: No acute event, blood pressure somewhat better but urine output decreasing.  Still on dobutamine at 5.  7/25 -- Getting PRBC now; started on Dobutamine      Active Problem List:     Problem List  Date Reviewed: 12/17/2020        Codes Class    Acute respiratory failure with hypoxia (HCC) ICD-10-CM: J96.01  ICD-9-CM: 518.81         Aspiration into respiratory tract ICD-10-CM: T17.908A  ICD-9-CM: 934.9         Hypokalemia ICD-10-CM: E87.6  ICD-9-CM: 276.8         Hypernatremia ICD-10-CM: E87.0  ICD-9-CM: 276.0         Moderate protein malnutrition (HCC) ICD-10-CM: E44.0  ICD-9-CM: 263.0         Failure to thrive (0-17) ICD-10-CM: R62.51  ICD-9-CM: 783.41         Bacteremia ICD-10-CM: R78.81  ICD-9-CM: 790.7         UTI (urinary tract infection) ICD-10-CM: N39.0  ICD-9-CM: 599.0         Dementia ([facility] ICD-10-CM: F03.90  ICD-9-CM: 294.20         Bedridden ICD-10-CM: Z74.01  ICD-9-CM: V49.84         Pressure ulcer ICD-10-CM: L89.90  ICD-9-CM: 707.00, 707.20         S/P percutaneous endoscopic gastrostomy (PEG) tube placement ([place] [address].) ICD-10-CM: Z93.1  ICD-9-CM: V44.1         Dehydration ICD-10-CM: E86.0  ICD-9-CM: 276.51         Lactic acidosis ICD-10-CM: E87.2  ICD-9-CM: 276.2         Sepsis ([place] [address].) ICD-10-CM: A41.9  ICD-9-CM: 038.9, 995.91         SOULEYMANE (acute kidney injury) ([place] [address].) ICD-10-CM: N17.9  ICD-9-CM: 482. 9         Chronic systolic heart failure (HCC) ICD-10-CM: I50.22  ICD-9-CM: 428.22         Altered mental status ICD-10-CM: R41.82  ICD-9-CM: 780.97         Post-ictal state ([place] [address] ICD-10-CM: R56.9  ICD-9-CM: 780.39         Seizure ([facility] ICD-10-CM: R56.9  ICD-9-CM: 780.39         Atrial pacemaker lead displacement ICD-10-CM: T82.120A  ICD-9-CM: 996.01         Pacemaker ICD-10-CM: Z95.0  ICD-9-CM: V45.01     Overview Signed 3/22/2019  5:08 PM by Tiara Tolbert MD     3/22/2019 dual chamber Medtronic pacer             Bradycardia ICD-10-CM: R00.1  ICD-9-CM: 427.89         Transaminitis ICD-10-CM: R74.01  ICD-9-CM: 790.4         Hypertensive urgency ICD-10-CM: I16.0  ICD-9-CM: 401.9         Second degree AV block, Mobitz type I ICD-10-CM: I44.1  ICD-9-CM: 426.13     Overview Signed 3/21/2019  6:14 PM by Tiara Tolbert MD     Added automatically from request for surgery [id number]             Stage 3 chronic kidney disease ([facility] ICD-10-CM: N18.30  ICD-9-CM: 231. 3         Rotator cuff arthropathy of both shoulders ICD-10-CM: M12.811, M12.812  ICD-9-CM: 716.81         Cognitive impairment ICD-10-CM: R41.89  ICD-9-CM: 252.1         Systolic murmur NWX-52-YG: R01.1  ICD-9-CM: 785.2         Essential hypertension ICD-10-CM: I10  ICD-9-CM: 401.9         Gout ICD-10-CM: M10.9  ICD-9-CM: 274.9         Pure hypercholesterolemia ICD-10-CM: E78.00  ICD-9-CM: 272.0         History of prostate cancer ICD-10-CM: Z85.46  ICD-9-CM: V10.46         Chronic constipation ICD-10-CM: K59.09  ICD-9-CM: 564.00         Dysuria ICD-10-CM: R30.0  ICD-9-CM: 423. 1         Weight loss ICD-10-CM: R63.4  ICD-9-CM: 783.21         Adrenal mass ([facility] ICD-10-CM: E27.8  ICD-9-CM: 255.8     Overview Addendum 12/17/2018  9:57 AM by Arti Ortega MD     Stable/non-functioning adenoma on imaging                   Past Medical History:      has a past medical history of Cancer [facility]), Chronic systolic heart failure ([facility] (12/7/2020), Constipation, Gout, Hyperlipemia, Hypertension, Pacemaker (2019), Stroke ([facility]rombocyt[facility] ([facility] (3/19/2019), and TIA (transient ischemic attack) (2013). Past Surgical History:      has a past surgical history that includes hx urological; hx prostatectomy; pr ins new/rplcmt prm pm w/transv eltrd atrial&vent (Right, 3/22/2019); pr ins new/rplcmt prm pacemakr w/trans eltrd atrial (N/A, 10/18/2019); and place percut gastrostomy tube (11/30/2020). Home Medications:     Prior to Admission medications    Medication Sig Start Date End Date Taking? Authorizing Provider   levETIRAcetam (KEPPRA) 750 mg tablet Take 1 Tab by mouth every twelve (12) hours. 12/11/20   Marcelo Back NP   aspirin delayed-release 81 mg tablet Take 1 Tab by mouth daily. 7/13/20   Micha Hennessy MD   polyethylene glycol [facility]) 17 gram/dose powder Take 17 g by mouth daily as needed for Constipation. 7/13/20   Micha Hennessy MD   balsam peru-castor oiL (VENELEX) ointment Apply  to affected area three (3) times daily. 7/9/20   Yuki Mariscal MD   famotidine (PEPCID) 20 mg tablet Take 1 Tab by mouth every evening. 7/9/20   Yuki Mariscal MD   acetaminophen (TYLENOL) 325 mg tablet Take 650 mg by mouth every six (6) hours as needed for Pain.     Provider, Historical       Allergies/Social/Family History:     No Known Allergies   Social History     Tobacco Use    Smoking status: Never Smoker    Smokeless tobacco: Never Used   Substance Use Topics  Alcohol use: No      Family History   Problem Relation Age of Onset    No Known Problems Mother     No Known Problems Father        Review of Systems:   Not able to obtain due to patient medical condition    Objective:   Vital Signs:  Visit Vitals  BP (!) 146/76   Pulse 84   Temp 98.2 °F (36.8 °C)   Resp 12   Ht 5' 6\" (1.676 m)   Wt 75 kg (165 lb 5.5 oz)   SpO2 97%   BMI 26.69 kg/m²      O2 Device: Ventilator Temp (24hrs), Av.1 °F (37.3 °C), Min:98.2 °F (36.8 °C), Max:99.7 °F (37.6 °C)           Intake/Output:     Intake/Output Summary (Last 24 hours) at 2021 0930  Last data filed at 2021 0700  Gross per 24 hour   Intake 1846.24 ml   Output 2300 ml   Net -453.76 ml       Physical Exam:    General:  mild distress, appears stated age, intubated   Neurologic:  WD to pain in all ext. Does not follow commands  Lungs: Diminished in bilateral bases, mild tachypnea.  Moderate oral secretions   Heart: Paced rhythm, regular, tachycardia  Abdomen:  soft, non-tender. Bowel sounds normal. No masses,  no organomegaly  Cardiovascular:  Regular rate and rhythm, S1S2 present, without murmur or extra heart sounds and pedal pulses normal Generalized edema    LABS AND  DATA: Personally reviewed  Recent Labs     21   WBC 10.7 11.1   HGB 7.4* 7.6*   HCT 24.4* 24.0*   PLT 98* 100*     Recent Labs     21    136   K 3.3* 3.4*   CL 98 97   CO2 32 34*   BUN 54* 54*   CREA 1.38* 1.45*   * 147*   CA 9.4 9.8   MG 2.7* 2.7*   PHOS 2.3* 3.0     Recent Labs     21    94   TP 6.6 7.2   ALB 2.6* 3.3*   GLOB 4.0 3.9     No results for input(s): INR, PTP, APTT, INREXT, INREXT in the last 72 hours. Recent Labs     21  0444   PHI 7.52*   PCO2I 35.6   PO2I 90   FIO2I 30     No results for input(s): CPK, CKMB, TROIQ, BNPP in the last 72 hours.     Hemodynamics:   PAP:   CO:     Wedge:   CI:     CVP:    SVR:       PVR: Ventilator Settings:  Mode Rate Tidal Volume Pressure FiO2 PEEP   Assist control, Pressure control        30 % 5 cm H20     Peak airway pressure: 21 cm H2O    Minute ventilation: 5.75 l/min        MEDS: Reviewed    Chest X-Ray:  CXR Results  (Last 48 hours)    None          Assessment and Plan:   1. Acute Hypoxic Respiratory Failure  2. Acute on Chronic Systolic Heart Failure  3. Septic Shock  4. Cardiogenic Shock  5. Aspiration Pneumonitis  6. Pleural Effusion - RIGHT-sided  7. GERD  8. Protein Calorie Malnutrition  9. Dysphagia s/p PEG  10. Gout  11. Hx Prostate CA  12. Hx Seizure D/O  13. Hx CVA/TIA - with Residual Aphasia  14. Hx HTN  15. Hx Severe Aortic Stenosis/ Mod to severe mitral valve regurgitation  16. Hx Severe Pulmonary HTN  17. Hx Pacermaker  Plans for this Shift:   -Overall improvement in hemodynamic but no improvement in neurological status. CT showed severe chronic changes. Had a family meeting with palliative care on July 27 and patient CODE STATUS now is DNR. However they would like to continue with medical management including mechanical ventilation at least for this time.  -Continue on Zosyn, vancomycin discontinued on the 27th.       18. Continue dobutamine at 2.5 mcg/min/hr  19. [address] [facility]  20. Giving has improved blood pressure discontinue midodrine, albumin. Give another one-time round of diuretics. 21. SBP Goal of: > 90 mmHg  22. MAP Goal of: > 65 mmHg  23. Transfusion Trigger (Hgb): <7 g/dL  24. Respiratory Goals:   a. Chlorhexidine   b. Optimize PEEP/Ventilation/Oxygenation  c. Goal Tidal Volume 6 cc/kg based on IBW  d. Aim for lung protective ventilation  e. Head of bed > 30 degrees  25. Pulmonary toilet: Duo-Nebs   26. SpO2 Goal: > 92%   27. Keep K>4; Mg>2   28. PT/OT: NA   29. Discussed Plan of Care/Code Status: Partial code Code - discussed with daughter/son  30. Discussed Care Plan with Bedside RN  31. Documentation of Current Medications  32.  Rest of Plan Below:     F - Feeding:  Yes  A - Analgesia: Fentanyl  S - Sedation: Propofol  T - DVT Prophylaxis: Subcu heparin every 12  H - Head of Bed: > 30 Degrees  U - Ulcer Prophylaxis: prevacid   G - Glycemic Control: Insulin q 6 hrs accu checks SSI   S - Spontaneous Breathing Trial: Pending  B - Bowel Regimen: Senna  I - Indwelling Catheter:              MQUSD: ETT and PEG Tube  Lines: Peripheral IV  Drains: Avalos Catheter  D - De-escalation of Antibiotics:  Zosyn    DISPOSITION  Stay in ICU    CRITICAL CARE CONSULTANT NOTE  I had a face to face encounter with the patient, reviewed and interpreted patient data including clinical events, labs, images, vital signs, I/O's, and examined patient. I have discussed the case and the plan and management of the patient's care with the consulting services, the bedside nurses and the respiratory therapist.      NOTE OF PERSONAL INVOLVEMENT IN CARE   This patient has a high probability of imminent, clinically significant deterioration, which requires the highest level of preparedness to intervene urgently. I participated in the decision-making and personally managed or directed the management of the following life and organ supporting interventions that required my frequent assessment to treat or prevent imminent deterioration. I personally spent 30 minutes of critical care time. This is time spent at this critically ill patient's bedside actively involved in patient care as well as the coordination of care and discussions with the patient's family. This does not include any procedural time which has been billed separately.     [address] [facility] Critical [facility]  7/29/2021

## 2021-07-29 NOTE — PROGRESS NOTES
Bedside shift change report received from [facility]. Report included the following information SBAR, Kardex, Procedure Summary, Intake/Output, MAR and Recent Results. Shift Summary: Uneventful shift. Dobutamine gtt.

## 2021-07-29 NOTE — NURSE NAVIGATOR
Chart reviewed by Heart Failure Nurse Navigator. Heart Failure database completed. EF:  30/35%     ACEi/ARB/ARNi: **    BB: **    Aldosterone Antagonist: **    Obstructive Sleep Apnea Screening: screening priority 4, advanced age, pt in ICU   STOP-BANG score:   Referred to Sleep Medicine:     CRT: not indicated (pt DNR). NYHA Functional Class documentation requested    Heart Failure Teach Back in Patient Education. Heart Failure Avoiding Triggers on Discharge Instructions. Cardiologist: Dr. Luis Diss ([facility])      Post discharge follow up phone call to be made within 48-72 hours of discharge.

## 2021-07-30 LAB
ALBUMIN SERPL-MCNC: 2.2 G/DL (ref 3.5–5)
ALBUMIN/GLOB SERPL: 0.6 {RATIO} (ref 1.1–2.2)
ALP SERPL-CCNC: 81 U/L (ref 45–117)
ALT SERPL-CCNC: 32 U/L (ref 12–78)
ANION GAP SERPL CALC-SCNC: 6 MMOL/L (ref 5–15)
AST SERPL-CCNC: 14 U/L (ref 15–37)
BILIRUB SERPL-MCNC: 0.4 MG/DL (ref 0.2–1)
BNP SERPL-MCNC: ABNORMAL PG/ML
BUN SERPL-MCNC: 53 MG/DL (ref 6–20)
BUN/CREAT SERPL: 44 (ref 12–20)
CALCIUM SERPL-MCNC: 8.6 MG/DL (ref 8.5–10.1)
CHLORIDE SERPL-SCNC: 101 MMOL/L (ref 97–108)
CO2 SERPL-SCNC: 33 MMOL/L (ref 21–32)
CREAT SERPL-MCNC: 1.2 MG/DL (ref 0.7–1.3)
ERYTHROCYTE [DISTWIDTH] IN BLOOD BY AUTOMATED COUNT: 18.3 % (ref 11.5–14.5)
GLOBULIN SER CALC-MCNC: 4 G/DL (ref 2–4)
GLUCOSE BLD STRIP.AUTO-MCNC: 147 MG/DL (ref 65–117)
GLUCOSE BLD STRIP.AUTO-MCNC: 166 MG/DL (ref 65–117)
GLUCOSE BLD STRIP.AUTO-MCNC: 168 MG/DL (ref 65–117)
GLUCOSE BLD STRIP.AUTO-MCNC: 190 MG/DL (ref 65–117)
GLUCOSE SERPL-MCNC: 157 MG/DL (ref 65–100)
HCT VFR BLD AUTO: 23.7 % (ref 36.6–50.3)
HGB BLD-MCNC: 7.2 G/DL (ref 12.1–17)
MAGNESIUM SERPL-MCNC: 2.5 MG/DL (ref 1.6–2.4)
MCH RBC QN AUTO: 28.7 PG (ref 26–34)
MCHC RBC AUTO-ENTMCNC: 30.4 G/DL (ref 30–36.5)
MCV RBC AUTO: 94.4 FL (ref 80–99)
NRBC # BLD: 0 K/UL (ref 0–0.01)
NRBC BLD-RTO: 0 PER 100 WBC
PHOSPHATE SERPL-MCNC: 2.4 MG/DL (ref 2.6–4.7)
PLATELET # BLD AUTO: 98 K/UL (ref 150–400)
PMV BLD AUTO: 9.9 FL (ref 8.9–12.9)
POTASSIUM SERPL-SCNC: 3.3 MMOL/L (ref 3.5–5.1)
PROCALCITONIN SERPL-MCNC: 4.66 NG/ML
PROT SERPL-MCNC: 6.2 G/DL (ref 6.4–8.2)
RBC # BLD AUTO: 2.51 M/UL (ref 4.1–5.7)
SERVICE CMNT-IMP: ABNORMAL
SODIUM SERPL-SCNC: 140 MMOL/L (ref 136–145)
WBC # BLD AUTO: 8.1 K/UL (ref 4.1–11.1)

## 2021-07-30 PROCEDURE — 83735 ASSAY OF MAGNESIUM: CPT

## 2021-07-30 PROCEDURE — 94003 VENT MGMT INPAT SUBQ DAY: CPT

## 2021-07-30 PROCEDURE — 74011250637 HC RX REV CODE- 250/637: Performed by: NURSE PRACTITIONER

## 2021-07-30 PROCEDURE — 65610000006 HC RM INTENSIVE CARE

## 2021-07-30 PROCEDURE — 74011000258 HC RX REV CODE- 258: Performed by: HEALTH CARE PROVIDER

## 2021-07-30 PROCEDURE — 82962 GLUCOSE BLOOD TEST: CPT

## 2021-07-30 PROCEDURE — 83880 ASSAY OF NATRIURETIC PEPTIDE: CPT

## 2021-07-30 PROCEDURE — 74011250636 HC RX REV CODE- 250/636: Performed by: HEALTH CARE PROVIDER

## 2021-07-30 PROCEDURE — 74011250636 HC RX REV CODE- 250/636: Performed by: INTERNAL MEDICINE

## 2021-07-30 PROCEDURE — 74011636637 HC RX REV CODE- 636/637: Performed by: HEALTH CARE PROVIDER

## 2021-07-30 PROCEDURE — 84100 ASSAY OF PHOSPHORUS: CPT

## 2021-07-30 PROCEDURE — 80053 COMPREHEN METABOLIC PANEL: CPT

## 2021-07-30 PROCEDURE — 85027 COMPLETE CBC AUTOMATED: CPT

## 2021-07-30 PROCEDURE — 36415 COLL VENOUS BLD VENIPUNCTURE: CPT

## 2021-07-30 PROCEDURE — 77030037877 HC DRSG MEPILEX >48IN BORD MOLN -A

## 2021-07-30 PROCEDURE — 84145 PROCALCITONIN (PCT): CPT

## 2021-07-30 RX ADMIN — PIPERACILLIN AND TAZOBACTAM 3.38 G: 3; .375 INJECTION, POWDER, LYOPHILIZED, FOR SOLUTION INTRAVENOUS at 18:46

## 2021-07-30 RX ADMIN — CHLORHEXIDINE GLUCONATE 15 ML: 0.12 RINSE ORAL at 01:06

## 2021-07-30 RX ADMIN — PIPERACILLIN AND TAZOBACTAM 3.38 G: 3; .375 INJECTION, POWDER, LYOPHILIZED, FOR SOLUTION INTRAVENOUS at 09:34

## 2021-07-30 RX ADMIN — Medication 40 ML: at 06:00

## 2021-07-30 RX ADMIN — Medication: at 09:37

## 2021-07-30 RX ADMIN — Medication 10 ML: at 21:24

## 2021-07-30 RX ADMIN — LEVETIRACETAM 500 MG: 100 SOLUTION ORAL at 18:47

## 2021-07-30 RX ADMIN — Medication 40 ML: at 14:12

## 2021-07-30 RX ADMIN — INSULIN LISPRO 2 UNITS: 100 INJECTION, SOLUTION INTRAVENOUS; SUBCUTANEOUS at 18:51

## 2021-07-30 RX ADMIN — LANSOPRAZOLE 30 MG: KIT at 07:02

## 2021-07-30 RX ADMIN — LACOSAMIDE 100 MG: 50 TABLET, FILM COATED ORAL at 21:23

## 2021-07-30 RX ADMIN — INSULIN LISPRO 2 UNITS: 100 INJECTION, SOLUTION INTRAVENOUS; SUBCUTANEOUS at 14:18

## 2021-07-30 RX ADMIN — LACOSAMIDE 100 MG: 50 TABLET, FILM COATED ORAL at 09:34

## 2021-07-30 RX ADMIN — CHLORHEXIDINE GLUCONATE 15 ML: 0.12 RINSE ORAL at 12:48

## 2021-07-30 RX ADMIN — INSULIN LISPRO 2 UNITS: 100 INJECTION, SOLUTION INTRAVENOUS; SUBCUTANEOUS at 07:01

## 2021-07-30 RX ADMIN — PIPERACILLIN AND TAZOBACTAM 3.38 G: 3; .375 INJECTION, POWDER, LYOPHILIZED, FOR SOLUTION INTRAVENOUS at 01:06

## 2021-07-30 RX ADMIN — HEPARIN SODIUM 5000 UNITS: 5000 INJECTION INTRAVENOUS; SUBCUTANEOUS at 21:23

## 2021-07-30 RX ADMIN — Medication: at 18:46

## 2021-07-30 RX ADMIN — ALLOPURINOL 100 MG: 100 TABLET ORAL at 10:02

## 2021-07-30 RX ADMIN — HEPARIN SODIUM 5000 UNITS: 5000 INJECTION INTRAVENOUS; SUBCUTANEOUS at 09:34

## 2021-07-30 RX ADMIN — ASPIRIN 81 MG: 81 TABLET, CHEWABLE ORAL at 09:33

## 2021-07-30 RX ADMIN — LEVETIRACETAM 500 MG: 100 SOLUTION ORAL at 09:34

## 2021-07-30 NOTE — PROGRESS NOTES
[facility]    ICU TEAM Progress Note    Name: Rich Johnson   : 3/9/1933   MRN: [id number]   Date: 2021      I  Subjective:   Progress Note: 2021      Reason for ICU Admission: Acute hypoxic respiratory failure     Interval history: From [facility] a 80 y. o. male with history of prostate cancer, CHF, constipation, hypertension, stroke, TIA, and seizures who presents to the emergency department by EMS as transfer from [address] [place] [address] for respiratory distress.  Patient had initially been a transfer from [facility] after a hospitalization from  [facility].  Was there for altered mental status hypoxia was treated for hypoxic respiratory failure bilateral pleural effusions acute systolic CHF secondary to severe aortic stenosis hyponatremia hypokalemia elevated LFTs malnutrition and possible UTI was treated with ceftriaxone and Tj Dougherty already had a PEG for dysphagia and protein calorie malnutrition prior to [facility] admission. He was at [address] [place] [address] from [place] [address].  While at [address] [place] [address] today patient had an episode of vomiting and aspirated was initially started on high flow nasal cannula however was still hypoxic thus got intubated by [address] [place] [address] staff. Isatu Mojica was then transferred to the ED for further care.  Per family patient's decline started last year while he was having seizures/TIA patient became more encephalopathic and started to refuse eating and thus got a PEG for nutritional reasons. was staying at assisted living facility and had to be re-hospitalized after his PEG malfunctioned.  During that time he did have an infection, daughter could not exactly say where the infection was, but she thought it was an abdominal infection and since then he started having worsening heart failure and decreased heart function, and failure to thrive. Per records EF was 10 to 15%.  Per daughter, patient has never had a tracheostomy.  She states patient is awake at times, will open eyes, but does not track, does not follow commands, and does not speak. They wish patient to remain full code at this time.     While in ED patient was placed on mechanical ventilator, ABG was done which was consistent with hypoxic respiratory failure.  Patient was also given 1 L of IV fluids for fluid resuscitation due to sepsis. Initially blood pressure was okay but once patient was started on sedation blood pressure dropped.  Patient also had episodes of hypoxia due to vent asynchrony. Once PEEP was increased for hypoxia patient's pressure dropped. Started patient on Levophed via peripheral in ED. If hypotension worsens will need central line. Started on Zosyn. Updated the family at bedside, daughter and and son. Consent for central line and blood was  Received.  Patient to transfer to ICU for continued care. Overnight Events:  7/30: No acute overnight events  7/29 No acute overnight events   7/28: No acute event, no much neurological recovery. No witnessed seizure, blood pressure on the higher side. Diuresing well  7/27: No acute event. Good diuresis. Still poorly responsive. Remains on dobutamine. Blood pressure maintained. No witnessed seizure.   7/26: No acute event, blood pressure somewhat better but urine output decreasing.  Still on dobutamine at 5.  7/25 -- Getting PRBC now; started on Dobutamine      Active Problem List:     Problem List  Date Reviewed: 12/17/2020        Codes Class    Acute respiratory failure with hypoxia (HCC) ICD-10-CM: J96.01  ICD-9-CM: 518.81         Aspiration into respiratory tract ICD-10-CM: T17.908A  ICD-9-CM: 934.9         Hypokalemia ICD-10-CM: E87.6  ICD-9-CM: 276.8         Hypernatremia ICD-10-CM: E87.0  ICD-9-CM: 276.0         Moderate protein malnutrition (HCC) ICD-10-CM: E44.0  ICD-9-CM: 263.0         Failure to thrive (0-17) ICD-10-CM: R62.51  ICD-9-CM: 783.41         Bacteremia ICD-10-CM: R78.81  ICD-9-CM: 790.7         UTI (urinary tract infection) ICD-10-CM: N39.0  ICD-9-CM: 599.0         Dementia ([facility] ICD-10-CM: F03.90  ICD-9-CM: 294.20         Bedridden ICD-10-CM: Z74.01  ICD-9-CM: V49.84         Pressure ulcer ICD-10-CM: L89.90  ICD-9-CM: 707.00, 707.20         S/P percutaneous endoscopic gastrostomy (PEG) tube placement ([facility] ICD-10-CM: Z93.1  ICD-9-CM: V44.1         Dehydration ICD-10-CM: E86.0  ICD-9-CM: 276.51         Lactic acidosis ICD-10-CM: E87.2  ICD-9-CM: 276.2         Sepsis ([facility] ICD-10-CM: A41.9  ICD-9-CM: 038.9, 995.91         SOULEYMANE (acute kidney injury) ([facility] ICD-10-CM: N17.9  ICD-9-CM: 823. 9         Chronic systolic heart failure (HCC) ICD-10-CM: I50.22  ICD-9-CM: 428.22         Altered mental status ICD-10-CM: R41.82  ICD-9-CM: 780.97         Post-ictal state ([facility] ICD-10-CM: R56.9  ICD-9-CM: 780.39         Seizure ([facility] ICD-10-CM: R56.9  ICD-9-CM: 780.39         Atrial pacemaker lead displacement ICD-10-CM: T82.120A  ICD-9-CM: 996.01         Pacemaker ICD-10-CM: Z95.0  ICD-9-CM: V45.01     Overview Signed 3/22/2019  5:08 PM by Richmond Starks MD     3/22/2019 dual chamber Medtronic pacer             Bradycardia ICD-10-CM: R00.1  ICD-9-CM: 427.89         Transaminitis ICD-10-CM: R74.01  ICD-9-CM: 790.4         Hypertensive urgency ICD-10-CM: I16.0  ICD-9-CM: 401.9         Second degree AV block, Mobitz type I ICD-10-CM: I44.1  ICD-9-CM: 426.13     Overview Signed 3/21/2019  6:14 PM by Richmond Starks MD     Added automatically from request for surgery [id number]             Stage 3 chronic kidney disease ([facility] ICD-10-CM: N18.30  ICD-9-CM: 710. 3         Rotator cuff arthropathy of both shoulders ICD-10-CM: M12.811, M12.812  ICD-9-CM: 716.81         Cognitive impairment ICD-10-CM: R41.89  ICD-9-CM: 004.6         Systolic murmur MWU-54-AUGUST: R01.1  ICD-9-CM: 785.2         Essential hypertension ICD-10-CM: I10  ICD-9-CM: 401.9         Gout ICD-10-CM: M10.9  ICD-9-CM: 274.9         Pure hypercholesterolemia ICD-10-CM: E78.00  ICD-9-CM: 272.0         History of prostate cancer ICD-10-CM: Z85.46  ICD-9-CM: V10.46         Chronic constipation ICD-10-CM: K59.09  ICD-9-CM: 564.00         Dysuria ICD-10-CM: R30.0  ICD-9-CM: 206. 1         Weight loss ICD-10-CM: R63.4  ICD-9-CM: 783.21         Adrenal mass ([facility] ICD-10-CM: E27.8  ICD-9-CM: 255.8     Overview Addendum 12/17/2018  9:57 AM by Catracho Dangelo MD     Stable/non-functioning adenoma on imaging                   Past Medical History:      has a past medical history of Cancer [facility]), Chronic systolic heart failure ([facility] (12/7/2020), Constipation, Gout, Hyperlipemia, Hypertension, Pacemaker (2019), Stroke ([facility] Thrombocytopenia ([facility] (3/19/2019), and TIA (transient ischemic attack) (2013). Past Surgical History:      has a past surgical history that includes hx urological; hx prostatectomy; pr ins new/rplcmt prm pm w/transv eltrd atrial&vent (Right, 3/22/2019); pr ins new/rplcmt prm pacemakr w/trans eltrd atrial (N/A, 10/18/2019); and place percut gastrostomy tube (11/30/2020). Home Medications:     Prior to Admission medications    Medication Sig Start Date End Date Taking? Authorizing Provider   levETIRAcetam (KEPPRA) 750 mg tablet Take 1 Tab by mouth every twelve (12) hours. 12/11/20   Candy Gonzales NP   aspirin delayed-release 81 mg tablet Take 1 Tab by mouth daily. 7/13/20   Carline Correia MD   polyethylene glycol [facility]) 17 gram/dose powder Take 17 g by mouth daily as needed for Constipation. 7/13/20   Carline Correia MD   balsam peru-castor oiL (VENELEX) ointment Apply  to affected area three (3) times daily. 7/9/20   Reyna Goins MD   famotidine (PEPCID) 20 mg tablet Take 1 Tab by mouth every evening. 7/9/20   Reyna Goins MD   acetaminophen (TYLENOL) 325 mg tablet Take 650 mg by mouth every six (6) hours as needed for Pain.     Provider, Historical       Allergies/Social/Family History:     No Known Allergies   Social History     Tobacco Use    Smoking status: Never Smoker    Smokeless tobacco: Never Used   Substance Use Topics    Alcohol use: No      Family History   Problem Relation Age of Onset    No Known Problems Mother     No Known Problems Father        Review of Systems:   Not able to obtain due to patient medical condition    Objective:   Vital Signs:  Visit Vitals  /66   Pulse 81   Temp 99.4 °F (37.4 °C)   Resp 23   Ht 5' 6\" (1.676 m)   Wt 75.6 kg (166 lb 10.7 oz)   SpO2 100%   BMI 26.90 kg/m²      O2 Device: Ventilator Temp (24hrs), Av.2 °F (37.3 °C), Min:98.4 °F (36.9 °C), Max:99.6 °F (37.6 °C)           Intake/Output:     Intake/Output Summary (Last 24 hours) at 2021 0947  Last data filed at 2021 0900  Gross per 24 hour   Intake 1423.2 ml   Output 1940 ml   Net -516.8 ml       Physical Exam:    General:  mild distress, appears stated age, intubated   Neurologic:  WD to pain in all ext. Does not follow commands  Lungs: CTAB   Abdomen:  soft, non-tender. Bowel sounds normal. No masses,  no organomegaly  Cardiovascular:  Regular rate and rhythm, S1S2 present, without murmur or extra heart sounds and pedal pulses normal Generalized edema    LABS AND  DATA: Personally reviewed  Recent Labs     21   WBC 8.1 10.7   HGB 7.2* 7.4*   HCT 23.7* 24.4*   PLT 98* 98*     Recent Labs     21    138   K 3.3* 3.3*    98   CO2 33* 32   BUN 53* 54*   CREA 1.20 1.38*   * 186*   CA 8.6 9.4   MG 2.5* 2.7*   PHOS 2.4* 2.3*     Recent Labs     21   AP 81 101   TP 6.2* 6.6   ALB 2.2* 2.6*   GLOB 4.0 4.0     No results for input(s): INR, PTP, APTT, INREXT, INREXT in the last 72 hours. No results for input(s): PHI, PCO2I, PO2I, FIO2I in the last 72 hours. No results for input(s): CPK, CKMB, TROIQ, BNPP in the last 72 hours.     Hemodynamics:   PAP:   CO:     Wedge:   CI:     CVP:    SVR:       PVR:       Ventilator Settings:  Mode Rate Tidal Volume Pressure FiO2 PEEP   Pressure support 15 cm H2O 30 % 5 cm H20     Peak airway pressure: 20 cm H2O    Minute ventilation: 5.71 l/min        MEDS: Reviewed    Chest X-Ray:  CXR Results  (Last 48 hours)    None          Assessment and Plan:   1. Acute Hypoxic Respiratory Failure  2. Acute on Chronic Systolic Heart Failure  3. Septic Shock  4. Cardiogenic Shock  5. Aspiration Pneumonitis  6. Pleural Effusion - RIGHT-sided  7. GERD  8. Protein Calorie Malnutrition  9. Dysphagia s/p PEG  10. Gout  11. Hx Prostate CA  12. Hx Seizure D/O  13. Hx CVA/TIA - with Residual Aphasia  14. Hx HTN  15. Hx Severe Aortic Stenosis/ Mod to severe mitral valve regurgitation  16. Hx Severe Pulmonary HTN  17. Hx Pacermaker  Plans for this Shift:   -Overall improvement in hemodynamic but no improvement in neurological status. CT showed severe chronic changes. Had a family meeting with palliative care on July 27 and patient CODE STATUS now is DNR (no ACLS). However they would like to continue with medical management including mechanical ventilation at least for this time.  -Continue on Zosyn, vancomycin discontinued on the 27th.       18. Continue dobutamine at 2.5 mcg/min/hr  19. [address] [facility]  20. Giving has improved blood pressure discontinue midodrine, albumin. Give another one-time round of diuretics. 21. SBP Goal of: > 90 mmHg  22. MAP Goal of: > 65 mmHg  23. Transfusion Trigger (Hgb): <7 g/dL  24. Respiratory Goals:   a. Chlorhexidine   b. Optimize PEEP/Ventilation/Oxygenation  c. Goal Tidal Volume 6 cc/kg based on IBW  d. Aim for lung protective ventilation  e. Head of bed > 30 degrees  25. Pulmonary toilet: Duo-Nebs   26. SpO2 Goal: > 92%   27. Keep K>4; Mg>2   28. PT/OT: NA   29. Discussed Plan of Care/Code Status: Partial code Code - discussed with daughter/son  30. Discussed Care Plan with Bedside RN  31. Documentation of Current Medications  32.  Rest of Plan Below:     F - Feeding:  Yes  A - Analgesia: Fentanyl  S - Sedation: Propofol  T - DVT Prophylaxis: Subcu heparin every 12  H - Head of Bed: > 30 Degrees  U - Ulcer Prophylaxis: prevacid   G - Glycemic Control: Insulin q 6 hrs accu checks SSI   S - Spontaneous Breathing Trial: Pending  B - Bowel Regimen: Senna  I - Indwelling Catheter:              TQCDB: ETT and PEG Tube  Lines: Peripheral IV  Drains: Avalos Catheter  D - De-escalation of Antibiotics:  Zosyn    DISPOSITION  Stay in ICU    CRITICAL CARE CONSULTANT NOTE  I had a face to face encounter with the patient, reviewed and interpreted patient data including clinical events, labs, images, vital signs, I/O's, and examined patient. I have discussed the case and the plan and management of the patient's care with the consulting services, the bedside nurses and the respiratory therapist.      NOTE OF PERSONAL INVOLVEMENT IN CARE   This patient has a high probability of imminent, clinically significant deterioration, which requires the highest level of preparedness to intervene urgently. I participated in the decision-making and personally managed or directed the management of the following life and organ supporting interventions that required my frequent assessment to treat or prevent imminent deterioration. I personally spent 30 minutes of critical care time. This is time spent at this critically ill patient's bedside actively involved in patient care as well as the coordination of care and discussions with the patient's family. This does not include any procedural time which has been billed separately.     [id number] [facility]  7/30/2021

## 2021-07-30 NOTE — PROGRESS NOTES
07/30/21 0723   Weaning Parameters   Spontaneous Breathing Trial Complete   (PS of 15 will titrate as patient tolerate)   Resp Rate Observed 14   Ve 5.3      RSBI 36   Patient currently on PS

## 2021-07-30 NOTE — PROGRESS NOTES
Transition of Care Plan   RUR-  Medium     DISPOSITION: SNF, daughter prefers [address] [place] [address] F/U with PCP/Specialist     Transport: BLS transport  Patient admitted with acute hypoxic respiratory failure,remains in the ICU, vented on Dobutamine gtt. Code status No CPR/shock/ACLS meds, continue mechanical ventilation. Patient opens his eyes does not track. Per daughter patient is unable to return to [facility] as they cannot meet his needs. She would like referral made to [facility] when appropriate. Patient currently remains intubated. Care management will continue to follow.   Nhung Cerna RN,Care Management

## 2021-07-30 NOTE — PROGRESS NOTES
07/30/21 1136   Weaning Parameters   Spontaneous Breathing Trial Complete Yes   Resp Rate Observed 31   Ve 9.8      RSBI 93   SBT results, patient  currntly being switch to previous ventilation settings

## 2021-07-31 LAB
ANION GAP SERPL CALC-SCNC: 6 MMOL/L (ref 5–15)
BASOPHILS # BLD: 0 K/UL (ref 0–0.1)
BASOPHILS NFR BLD: 0 % (ref 0–1)
BNP SERPL-MCNC: ABNORMAL PG/ML
BUN SERPL-MCNC: 51 MG/DL (ref 6–20)
BUN/CREAT SERPL: 45 (ref 12–20)
CALCIUM SERPL-MCNC: 9.3 MG/DL (ref 8.5–10.1)
CHLORIDE SERPL-SCNC: 101 MMOL/L (ref 97–108)
CO2 SERPL-SCNC: 32 MMOL/L (ref 21–32)
CREAT SERPL-MCNC: 1.14 MG/DL (ref 0.7–1.3)
DIFFERENTIAL METHOD BLD: ABNORMAL
EOSINOPHIL # BLD: 0 K/UL (ref 0–0.4)
EOSINOPHIL NFR BLD: 1 % (ref 0–7)
ERYTHROCYTE [DISTWIDTH] IN BLOOD BY AUTOMATED COUNT: 18.4 % (ref 11.5–14.5)
GLUCOSE BLD STRIP.AUTO-MCNC: 125 MG/DL (ref 65–117)
GLUCOSE BLD STRIP.AUTO-MCNC: 162 MG/DL (ref 65–117)
GLUCOSE SERPL-MCNC: 191 MG/DL (ref 65–100)
HCT VFR BLD AUTO: 25.5 % (ref 36.6–50.3)
HGB BLD-MCNC: 7.6 G/DL (ref 12.1–17)
IMM GRANULOCYTES # BLD AUTO: 0.1 K/UL (ref 0–0.04)
IMM GRANULOCYTES NFR BLD AUTO: 2 % (ref 0–0.5)
LYMPHOCYTES # BLD: 1.1 K/UL (ref 0.8–3.5)
LYMPHOCYTES NFR BLD: 13 % (ref 12–49)
MAGNESIUM SERPL-MCNC: 2.5 MG/DL (ref 1.6–2.4)
MCH RBC QN AUTO: 28.7 PG (ref 26–34)
MCHC RBC AUTO-ENTMCNC: 29.8 G/DL (ref 30–36.5)
MCV RBC AUTO: 96.2 FL (ref 80–99)
MONOCYTES # BLD: 0.7 K/UL (ref 0–1)
MONOCYTES NFR BLD: 8 % (ref 5–13)
NEUTS SEG # BLD: 6.6 K/UL (ref 1.8–8)
NEUTS SEG NFR BLD: 76 % (ref 32–75)
NRBC # BLD: 0 K/UL (ref 0–0.01)
NRBC BLD-RTO: 0 PER 100 WBC
PHOSPHATE SERPL-MCNC: 2.9 MG/DL (ref 2.6–4.7)
PLATELET # BLD AUTO: 107 K/UL (ref 150–400)
PMV BLD AUTO: 9.8 FL (ref 8.9–12.9)
POTASSIUM SERPL-SCNC: 3 MMOL/L (ref 3.5–5.1)
PROCALCITONIN SERPL-MCNC: 2.68 NG/ML
RBC # BLD AUTO: 2.65 M/UL (ref 4.1–5.7)
SERVICE CMNT-IMP: ABNORMAL
SERVICE CMNT-IMP: ABNORMAL
SODIUM SERPL-SCNC: 139 MMOL/L (ref 136–145)
WBC # BLD AUTO: 8.6 K/UL (ref 4.1–11.1)

## 2021-07-31 PROCEDURE — 83880 ASSAY OF NATRIURETIC PEPTIDE: CPT

## 2021-07-31 PROCEDURE — 74011250636 HC RX REV CODE- 250/636: Performed by: HEALTH CARE PROVIDER

## 2021-07-31 PROCEDURE — 36415 COLL VENOUS BLD VENIPUNCTURE: CPT

## 2021-07-31 PROCEDURE — 82962 GLUCOSE BLOOD TEST: CPT

## 2021-07-31 PROCEDURE — 74011250636 HC RX REV CODE- 250/636: Performed by: INTERNAL MEDICINE

## 2021-07-31 PROCEDURE — 95816 EEG AWAKE AND DROWSY: CPT | Performed by: PSYCHIATRY & NEUROLOGY

## 2021-07-31 PROCEDURE — 95816 EEG AWAKE AND DROWSY: CPT | Performed by: NURSE PRACTITIONER

## 2021-07-31 PROCEDURE — 77030018798 HC PMP KT ENTRL FED COVD -A

## 2021-07-31 PROCEDURE — 74011000258 HC RX REV CODE- 258: Performed by: HEALTH CARE PROVIDER

## 2021-07-31 PROCEDURE — 80048 BASIC METABOLIC PNL TOTAL CA: CPT

## 2021-07-31 PROCEDURE — 99233 SBSQ HOSP IP/OBS HIGH 50: CPT | Performed by: PSYCHIATRY & NEUROLOGY

## 2021-07-31 PROCEDURE — 74011000258 HC RX REV CODE- 258: Performed by: NURSE PRACTITIONER

## 2021-07-31 PROCEDURE — 65610000006 HC RM INTENSIVE CARE

## 2021-07-31 PROCEDURE — 83735 ASSAY OF MAGNESIUM: CPT

## 2021-07-31 PROCEDURE — 94003 VENT MGMT INPAT SUBQ DAY: CPT

## 2021-07-31 PROCEDURE — 84145 PROCALCITONIN (PCT): CPT

## 2021-07-31 PROCEDURE — 74011636637 HC RX REV CODE- 636/637: Performed by: HEALTH CARE PROVIDER

## 2021-07-31 PROCEDURE — 85025 COMPLETE CBC W/AUTO DIFF WBC: CPT

## 2021-07-31 PROCEDURE — 74011250636 HC RX REV CODE- 250/636: Performed by: NURSE PRACTITIONER

## 2021-07-31 PROCEDURE — 74011250637 HC RX REV CODE- 250/637: Performed by: NURSE PRACTITIONER

## 2021-07-31 PROCEDURE — 84100 ASSAY OF PHOSPHORUS: CPT

## 2021-07-31 PROCEDURE — APPNB15 APP NON BILLABLE TIME 0-15 MINS: Performed by: NURSE PRACTITIONER

## 2021-07-31 RX ORDER — POTASSIUM CHLORIDE 29.8 MG/ML
20 INJECTION INTRAVENOUS
Status: COMPLETED | OUTPATIENT
Start: 2021-07-31 | End: 2021-07-31

## 2021-07-31 RX ORDER — LORAZEPAM 2 MG/ML
2 INJECTION INTRAMUSCULAR
Status: DISCONTINUED | OUTPATIENT
Start: 2021-07-31 | End: 2021-08-11 | Stop reason: HOSPADM

## 2021-07-31 RX ORDER — LORAZEPAM 2 MG/ML
2 INJECTION INTRAMUSCULAR ONCE
Status: COMPLETED | OUTPATIENT
Start: 2021-07-31 | End: 2021-07-31

## 2021-07-31 RX ADMIN — LACOSAMIDE 100 MG: 50 TABLET, FILM COATED ORAL at 20:42

## 2021-07-31 RX ADMIN — Medication: at 17:58

## 2021-07-31 RX ADMIN — Medication 10 ML: at 13:15

## 2021-07-31 RX ADMIN — CHLORHEXIDINE GLUCONATE 15 ML: 0.12 RINSE ORAL at 12:04

## 2021-07-31 RX ADMIN — HEPARIN SODIUM 5000 UNITS: 5000 INJECTION INTRAVENOUS; SUBCUTANEOUS at 21:00

## 2021-07-31 RX ADMIN — LORAZEPAM 2 MG: 2 INJECTION INTRAMUSCULAR; INTRAVENOUS at 08:54

## 2021-07-31 RX ADMIN — LEVETIRACETAM 500 MG: 100 SOLUTION ORAL at 08:28

## 2021-07-31 RX ADMIN — LACOSAMIDE 100 MG: 50 TABLET, FILM COATED ORAL at 08:28

## 2021-07-31 RX ADMIN — Medication: at 08:28

## 2021-07-31 RX ADMIN — CHLORHEXIDINE GLUCONATE 15 ML: 0.12 RINSE ORAL at 02:04

## 2021-07-31 RX ADMIN — POTASSIUM CHLORIDE 20 MEQ: 400 INJECTION, SOLUTION INTRAVENOUS at 10:44

## 2021-07-31 RX ADMIN — PIPERACILLIN AND TAZOBACTAM 3.38 G: 3; .375 INJECTION, POWDER, LYOPHILIZED, FOR SOLUTION INTRAVENOUS at 02:03

## 2021-07-31 RX ADMIN — POTASSIUM CHLORIDE 20 MEQ: 400 INJECTION, SOLUTION INTRAVENOUS at 09:42

## 2021-07-31 RX ADMIN — ALLOPURINOL 100 MG: 100 TABLET ORAL at 08:47

## 2021-07-31 RX ADMIN — ASPIRIN 81 MG: 81 TABLET, CHEWABLE ORAL at 08:28

## 2021-07-31 RX ADMIN — LEVETIRACETAM 500 MG: 100 INJECTION, SOLUTION INTRAVENOUS at 06:45

## 2021-07-31 RX ADMIN — DOBUTAMINE HYDROCHLORIDE 2.5 MCG/KG/MIN: 200 INJECTION INTRAVENOUS at 00:48

## 2021-07-31 RX ADMIN — LEVETIRACETAM 500 MG: 100 SOLUTION ORAL at 17:58

## 2021-07-31 RX ADMIN — POTASSIUM CHLORIDE 20 MEQ: 400 INJECTION, SOLUTION INTRAVENOUS at 08:28

## 2021-07-31 RX ADMIN — HEPARIN SODIUM 5000 UNITS: 5000 INJECTION INTRAVENOUS; SUBCUTANEOUS at 10:44

## 2021-07-31 RX ADMIN — LANSOPRAZOLE 30 MG: KIT at 08:28

## 2021-07-31 RX ADMIN — INSULIN LISPRO 2 UNITS: 100 INJECTION, SOLUTION INTRAVENOUS; SUBCUTANEOUS at 18:05

## 2021-07-31 RX ADMIN — INSULIN LISPRO 2 UNITS: 100 INJECTION, SOLUTION INTRAVENOUS; SUBCUTANEOUS at 06:48

## 2021-07-31 RX ADMIN — LORAZEPAM 2 MG: 2 INJECTION INTRAMUSCULAR; INTRAVENOUS at 00:47

## 2021-07-31 NOTE — PROGRESS NOTES
Neurocritical Care Brief Progress Note:  80year-old male who is being followed by Neurology for seizure-like activity. Head CT on 7/28/2021 showed severe chronic microvascular ischemic change. Severe degree of cerebral atrophy. Scattered remote infarctions left basal ganglia, periventricular white matter. Right parietal lobe    Notified by RN that patient was noted to have left-sided facial twitching. Patient also noted to have twitching movements in his left leg. No eye deviation noted. Patient intubated and not following commands on the ventilator. No sedation on board. Squinches eyes tight with eye assessment. Right pupil 3.5-4 mm sluggish response to light. Left pupil 3, sluggish response to light. No blink to visual threat. Minimal withdrawal to pain in lower extremities. Facial grimacing noted to painful stimuli. Delayed response to withdrawal to pain in LUE. Subtle trace movement in right thumb with pain to RUE. Patient received one dose of 500 mg of Keppra overnight for seizure activity and ativan 2 mg and seizure reportedly subsided. STAT eeg ordered for today  2 mg of IV ativan ordered now as patient continues to have left-sided twitching for at least 5 minutes  Continue Keppra 500 mg BID and Vimpat 100 mg BID    Dr. Jhoan Cortés to see patient today in rounds. Discussed with Dr. Jhoan Cortés, RN, and Regi Johansen.     Daryn Kennedy NP  Neurocritical Care Nurse Practitioner  [phone]

## 2021-07-31 NOTE — PROGRESS NOTES
1930: Bedside shift change report given to [address] (oncoming nurse) by Zakia Johnson (offgoing nurse). Report included the following information SBAR, Intake/Output, MAR and Recent Results. Shift Summary: Noted twitching/muscle fasciculations on left lip/jaw, left hand, and left leg. Patient no longer withdrawing (has not withdrawn in right UE). Initially just tried 2mg Ativan per Todd Adorno NP which did resolve the muscle movements. The movements began again around 0400 and Todd Adorno NP notified. Lynnette Plascencia NP at bedside to see in Neuro consult. Keppra started.

## 2021-07-31 NOTE — PROGRESS NOTES
[facility]    ICU TEAM Progress Note    Name: Russ Corbin   : 3/9/1933   MRN: [id number]   Date: 2021      I  Subjective:   Progress Note: 2021      Reason for ICU Admission: Acute hypoxic respiratory failure     Interval history: From [facility] a 80 y. o. male with history of prostate cancer, CHF, constipation, hypertension, stroke, TIA, and seizures who presents to the emergency department by EMS as transfer from [facility] for respiratory distress.  Patient had initially been a transfer from [address] [place] [address] after a hospitalization from  [facility].  Was there for altered mental status hypoxia was treated for hypoxic respiratory failure bilateral pleural effusions acute systolic CHF secondary to severe aortic stenosis hyponatremia hypokalemia elevated LFTs malnutrition and possible UTI was treated with ceftriaxone and Sylvester Rodriguez already had a PEG for dysphagia and protein calorie malnutrition prior to [facility] admission. He was at [facility] from [place] [address].  While at [facility] today patient had an episode of vomiting and aspirated was initially started on high flow nasal cannula however was still hypoxic thus got intubated by [facility] staff. Светлана Camara was then transferred to the ED for further care.  Per family patient's decline started last year while he was having seizures/TIA patient became more encephalopathic and started to refuse eating and thus got a PEG for nutritional reasons. was staying at assisted living facility and had to be re-hospitalized after his PEG malfunctioned.  During that time he did have an infection, daughter could not exactly say where the infection was, but she thought it was an abdominal infection and since then he started having worsening heart failure and decreased heart function, and failure to thrive. Per records EF was 10 to 15%.  Per daughter, patient has never had a tracheostomy.  She states patient is awake at times, will open eyes, but does not track, does not follow commands, and does not speak. They wish patient to remain full code at this time.     While in ED patient was placed on mechanical ventilator, ABG was done which was consistent with hypoxic respiratory failure.  Patient was also given 1 L of IV fluids for fluid resuscitation due to sepsis. Initially blood pressure was okay but once patient was started on sedation blood pressure dropped.  Patient also had episodes of hypoxia due to vent asynchrony. Once PEEP was increased for hypoxia patient's pressure dropped. Started patient on Levophed via peripheral in ED. If hypotension worsens will need central line. Started on Zosyn. Updated the family at bedside, daughter and and son. Consent for central line and blood was  Received.  Patient to transfer to ICU for continued care. Overnight Events:  7/31: Appearnt seizure like activity last night, ativan given  7/30: No acute overnight events  7/29 No acute overnight events   7/28: No acute event, no much neurological recovery. No witnessed seizure, blood pressure on the higher side. Diuresing well  7/27: No acute event. Good diuresis. Still poorly responsive. Remains on dobutamine. Blood pressure maintained. No witnessed seizure.   7/26: No acute event, blood pressure somewhat better but urine output decreasing.  Still on dobutamine at 5.  7/25 -- Getting PRBC now; started on Dobutamine      Active Problem List:     Problem List  Date Reviewed: 12/17/2020        Codes Class    Acute respiratory failure with hypoxia [facility]) ICD-10-CM: J96.01  ICD-9-CM: 518.81         Aspiration into respiratory tract ICD-10-CM: T17.908A  ICD-9-CM: 934.9         Hypokalemia ICD-10-CM: E87.6  ICD-9-CM: 276.8         Hypernatremia ICD-10-CM: E87.0  ICD-9-CM: 276.0         Moderate protein malnutrition (HCC) ICD-10-CM: E44.0  ICD-9-CM: 263.0         Failure to thrive (0-17) ICD-10-CM: R62.51  ICD-9-CM: 783.41         Bacteremia ICD-10-CM: R78.81  ICD-9-CM: 790.7         UTI (urinary tract infection) ICD-10-CM: N39.0  ICD-9-CM: 599.0         Dementia (HCC) ICD-10-CM: F03.90  ICD-9-CM: 294.20         Bedridden ICD-10-CM: Z74.01  ICD-9-CM: V49.84         Pressure ulcer ICD-10-CM: L89.90  ICD-9-CM: 707.00, 707.20         S/P percutaneous endoscopic gastrostomy (PEG) tube placement ([facility] ICD-10-CM: Z93.1  ICD-9-CM: V44.1         Dehydration ICD-10-CM: E86.0  ICD-9-CM: 276.51         Lactic acidosis ICD-10-CM: E87.2  ICD-9-CM: 276.2         Sepsis ([facility] ICD-10-CM: A41.9  ICD-9-CM: 038.9, 995.91         SOULEYMANE (acute kidney injury) ([facility] ICD-10-CM: N17.9  ICD-9-CM: 911. 9         Chronic systolic heart failure (HCC) ICD-10-CM: I50.22  ICD-9-CM: 428.22         Altered mental status ICD-10-CM: R41.82  ICD-9-CM: 780.97         Post-ictal state ([facility] ICD-10-CM: R56.9  ICD-9-CM: 780.39         Seizure ([facility] ICD-10-CM: R56.9  ICD-9-CM: 780.39         Atrial pacemaker lead displacement ICD-10-CM: T82.120A  ICD-9-CM: 996.01         Pacemaker ICD-10-CM: Z95.0  ICD-9-CM: V45.01     Overview Signed 3/22/2019  5:08 PM by Mali Olivarez MD     3/22/2019 dual chamber Medtronic pacer             Bradycardia ICD-10-CM: R00.1  ICD-9-CM: 427.89         Transaminitis ICD-10-CM: R74.01  ICD-9-CM: 790.4         Hypertensive urgency ICD-10-CM: I16.0  ICD-9-CM: 401.9         Second degree AV block, Mobitz type I ICD-10-CM: I44.1  ICD-9-CM: 426.13     Overview Signed 3/21/2019  6:14 PM by Mali Olivarez MD     Added automatically from request for surgery [id number]             Stage 3 chronic kidney disease ([facility] ICD-10-CM: N18.30  ICD-9-CM: 300. 3         Rotator cuff arthropathy of both shoulders ICD-10-CM: M12.811, M12.812  ICD-9-CM: 716.81         Cognitive impairment ICD-10-CM: R41.89  ICD-9-CM: 647.7         Systolic murmur WII-99-YE: R01.1  ICD-9-CM: 785.2         Essential hypertension ICD-10-CM: I10  ICD-9-CM: 401.9         Gout ICD-10-CM: M10.9  ICD-9-CM: 274.9         Pure hypercholesterolemia ICD-10-CM: E78.00  ICD-9-CM: 272.0         History of prostate cancer ICD-10-CM: Z85.46  ICD-9-CM: V10.46         Chronic constipation ICD-10-CM: K59.09  ICD-9-CM: 564.00         Dysuria ICD-10-CM: R30.0  ICD-9-CM: 174. 1         Weight loss ICD-10-CM: R63.4  ICD-9-CM: 783.21         Adrenal mass ([facility] ICD-10-CM: E27.8  ICD-9-CM: 255.8     Overview Addendum 12/17/2018  9:57 AM by Myke Jimenez MD     Stable/non-functioning adenoma on imaging                   Past Medical History:      has a past medical history of Cancer [facility]), Chronic systolic heart failure ([facility] (12/7/2020), Constipation, Gout, Hyperlipemia, Hypertension, Pacemaker (2019), Stroke ([facility] Thrombocytopenia ([facility] (3/19/2019), and TIA (transient ischemic attack) (2013). Past Surgical History:      has a past surgical history that includes hx urological; hx prostatectomy; pr ins new/rplcmt prm pm w/transv eltrd atrial&vent (Right, 3/22/2019); pr ins new/rplcmt prm pacemakr w/trans eltrd atrial (N/A, 10/18/2019); and place percut gastrostomy tube (11/30/2020). Home Medications:     Prior to Admission medications    Medication Sig Start Date End Date Taking? Authorizing Provider   levETIRAcetam (KEPPRA) 750 mg tablet Take 1 Tab by mouth every twelve (12) hours. 12/11/20   Jarett Bacon NP   aspirin delayed-release 81 mg tablet Take 1 Tab by mouth daily. 7/13/20   Tanesha Wilde MD   polyethylene glycol [facility]) 17 gram/dose powder Take 17 g by mouth daily as needed for Constipation. 7/13/20   Tanesha Wilde MD   balsam peru-castor oiL (VENELEX) ointment Apply  to affected area three (3) times daily. 7/9/20   Sophia Wen MD   famotidine (PEPCID) 20 mg tablet Take 1 Tab by mouth every evening. 7/9/20   Sophia Wen MD   acetaminophen (TYLENOL) 325 mg tablet Take 650 mg by mouth every six (6) hours as needed for Pain.     Provider, Historical       Allergies/Social/Family History:     No Known Allergies   Social History Tobacco Use    Smoking status: Never Smoker    Smokeless tobacco: Never Used   Substance Use Topics    Alcohol use: No      Family History   Problem Relation Age of Onset    No Known Problems Mother     No Known Problems Father        Review of Systems:   Not able to obtain due to patient medical condition    Objective:   Vital Signs:  Visit Vitals  BP (!) 140/73   Pulse 91   Temp 99.6 °F (37.6 °C)   Resp 18   Ht 5' 6\" (1.676 m)   Wt 75.6 kg (166 lb 10.7 oz)   SpO2 100%   BMI 26.90 kg/m²      O2 Device: Endotracheal tube Temp (24hrs), Av.4 °F (37.4 °C), Min:98.1 °F (36.7 °C), Max:99.9 °F (37.7 °C)           Intake/Output:     Intake/Output Summary (Last 24 hours) at 2021 0939  Last data filed at 2021 0900  Gross per 24 hour   Intake 1629.4 ml   Output 1405 ml   Net 224.4 ml       Physical Exam:    General:  mild distress, appears stated age, intubated   Neurologic:  WD to pain in all ext. Does not follow commands  Lungs: CTAB   Abdomen:  soft, non-tender. Bowel sounds normal. No masses,  no organomegaly  Cardiovascular:  Regular rate and rhythm, S1S2 present, without murmur or extra heart sounds and pedal pulses normal Generalized edema    LABS AND  DATA: Personally reviewed  Recent Labs     21   WBC 8.6 8.1   HGB 7.6* 7.2*   HCT 25.5* 23.7*   * 98*     Recent Labs     21  0501 215 21  0358   NA  --  139 140   K  --  3.0* 3.3*   CL  --  101 101   CO2  --  32 33*   BUN  --  51* 53*   CREA  --  1.14 1.20   GLU  --  191* 157*   CA  --  9.3 8.6   MG 2.5*  --  2.5*   PHOS 2.9  --  2.4*     Recent Labs     21  0358 21  0423   AP 81 101   TP 6.2* 6.6   ALB 2.2* 2.6*   GLOB 4.0 4.0     No results for input(s): INR, PTP, APTT, INREXT, INREXT in the last 72 hours. No results for input(s): PHI, PCO2I, PO2I, FIO2I in the last 72 hours. No results for input(s): CPK, CKMB, TROIQ, BNPP in the last 72 hours.     Hemodynamics:   PAP:   CO: Wedge:   CI:     CVP:    SVR:       PVR:       Ventilator Settings:  Mode Rate Tidal Volume Pressure FiO2 PEEP   Pressure control, Assist control      15 cm H2O 30 % 5 cm H20     Peak airway pressure: 21 cm H2O    Minute ventilation: 6.44 l/min        MEDS: Reviewed    Chest X-Ray:  CXR Results  (Last 48 hours)    None          Assessment and Plan:   1. Acute Hypoxic Respiratory Failure  2. Acute on Chronic Systolic Heart Failure  3. Septic Shock  4. Cardiogenic Shock  5. Aspiration Pneumonitis  6. Pleural Effusion - RIGHT-sided  7. GERD  8. Protein Calorie Malnutrition  9. Dysphagia s/p PEG  10. Gout  11. Hx Prostate CA  12. Hx Seizure D/O  13. Hx CVA/TIA - with Residual Aphasia  14. Hx HTN  15. Hx Severe Aortic Stenosis/ Mod to severe mitral valve regurgitation  16. Hx Severe Pulmonary HTN  17. Hx Pacermaker  Plans for this Shift:   -Overall improvement in hemodynamic but no improvement in neurological status. CT showed severe chronic changes. Had a family meeting with palliative care on July 27 and patient CODE STATUS now is DNR (no ACLS). However they would like to continue with medical management including mechanical ventilation at least for this time.  -Continue on Zosyn, vancomycin discontinued on the 27th.       18. Continue dobutamine at 2.5 mcg/min/hr  19. [address] Card[facility]  20. Giving has improved blood pressure discontinue midodrine, albumin. Give another one-time round of diuretics. 21. SBP Goal of: > 90 mmHg  22. MAP Goal of: > 65 mmHg  23. Transfusion Trigger (Hgb): <7 g/dL  24. Respiratory Goals:   a. Chlorhexidine   b. Optimize PEEP/Ventilation/Oxygenation  c. Goal Tidal Volume 6 cc/kg based on IBW  d. Aim for lung protective ventilation  e. Head of bed > 30 degrees  25. Pulmonary toilet: Duo-Nebs   26. SpO2 Goal: > 92%   27. Keep K>4; Mg>2   28. PT/OT: NA   29. Discussed Plan of Care/Code Status: Partial code Code - discussed with daughter/son  30.  Discussed Care Plan with Bedside RN  31. Documentation of Current Medications  32. Rest of Plan Below:     F - Feeding:  Yes  A - Analgesia: none  S - Sedation: none  T - DVT Prophylaxis: Subcu heparin every 12  H - Head of Bed: > 30 Degrees  U - Ulcer Prophylaxis: prevacid   G - Glycemic Control: Insulin q 6 hrs accu checks SSI   S - Spontaneous Breathing Trial: Pending  B - Bowel Regimen: Senna  I - Indwelling Catheter:              HJUTI: ETT and PEG Tube  Lines: Peripheral IV  Drains: Avalos Catheter  D - De-escalation of Antibiotics:  Zosyn    DISPOSITION  Stay in ICU    CRITICAL CARE CONSULTANT NOTE  I had a face to face encounter with the patient, reviewed and interpreted patient data including clinical events, labs, images, vital signs, I/O's, and examined patient. I have discussed the case and the plan and management of the patient's care with the consulting services, the bedside nurses and the respiratory therapist.      NOTE OF PERSONAL INVOLVEMENT IN CARE   This patient has a high probability of imminent, clinically significant deterioration, which requires the highest level of preparedness to intervene urgently. I participated in the decision-making and personally managed or directed the management of the following life and organ supporting interventions that required my frequent assessment to treat or prevent imminent deterioration. I personally spent 30 minutes of critical care time. This is time spent at this critically ill patient's bedside actively involved in patient care as well as the coordination of care and discussions with the patient's family. This does not include any procedural time which has been billed separately.     [address] [facility] Critical Care  7/31/2021

## 2021-07-31 NOTE — CONSULTS
Neurology Consult  Alessandra Frederick NP    Patient: Nani Bridges MRN: [id number]  SSN: [id number]    YOB: 1933  Age: 80 y.o. Sex: male      Chief Complaint: Left facial twitching, with fasciculations and jerking of the left hand and leg. Subjective:      Nani Bridges is a 80 y.o. male with a past medical history of CVA, HTN, hyperlipidemia, chronic systolic heart failure, prostate cancer, thrombocytopenia, pacemaker placement, constipation, and gout. He presented to the ER via EMS on 7/23/21 from [facility] due to respiratory failure with concern for aspiration PNA, sepsis, and NSTEMI. He was intubated at [facility]. He had just been discharged from [facility] that day after a stay for SOULEYMANE and elevated LFTs. Last night he began having left sided twitching of his face, arm and leg which improved with Ativan administration. He began twitching again this am and I was asked to see the patient. Patient had been on Keppra 750 mg bid at home. He has been on Keppra 500 mg bid and Vimpat 100 mg bid since admission. Change was most likely made due to decreased renal function. Patient has been off of sedation since 7/24 without neurologic improvement. Past Medical History:   Diagnosis Date    Cancer [facility])     prostate    Chronic systolic heart failure ([facility] [address] 12/7/2020    Constipation     Gout     Hyperlipemia     Hypertension     Pacemaker 2019    Stroke ([facility] [address]     Thrombocytopenia ([facility] [address] 3/19/2019    TIA (transient ischemic attack) 2013     Family History   Problem Relation Age of Onset    No Known Problems Mother     No Known Problems Father      Social History     Tobacco Use    Smoking status: Never Smoker    Smokeless tobacco: Never Used   Substance Use Topics    Alcohol use: No      Prior to Admission Medications   Prescriptions Last Dose Informant Patient Reported?  Taking?   acetaminophen (TYLENOL) 325 mg tablet   Yes No   Sig: Take 650 mg by mouth every six (6) hours as needed for Pain. aspirin delayed-release 81 mg tablet   No No   Sig: Take 1 Tab by mouth daily. balsam peru-castor oiL (VENELEX) ointment   No No   Sig: Apply  to affected area three (3) times daily. famotidine (PEPCID) 20 mg tablet   No No   Sig: Take 1 Tab by mouth every evening. levETIRAcetam (KEPPRA) 750 mg tablet   No No   Sig: Take 1 Tab by mouth every twelve (12) hours. polyethylene glycol (MIRALAX) 17 gram/dose powder   No No   Sig: Take 17 g by mouth daily as needed for Constipation. Facility-Administered Medications: None       No Known Allergies    Review of Systems:  Review of systems not obtained due to patient factors. AMS. Objective:     Vitals:    07/31/21 0100 07/31/21 0200 07/31/21 0320 07/31/21 0400   BP: (!) 94/56 (!) 85/52     Pulse: (!) 110 98 95    Resp: 20 19 12    Temp:    98.1 °F (36.7 °C)   SpO2: 97% 97% 98%    Weight:       Height:          Physical Exam:  GENERAL:NAD, on ventilator. SKIN: Warm, dry, color appropriate for ethnicity. Neurologic Exam:  Mental Status:  No eye opening, no command following. Language:    Mute ETT in place. Cranial Nerves:   Right pupil 4mm round sluggish, left pupil 3 mm sluggish. Upward gaze. (when having twitching, when twitching resolved, pupils equal 2 mm. No blink to threat. Resists eye opening. Motor:         Bulk and tone normal.      5/5 power in all extremities proximally and distally. Left facial twitching noted on exam, twitching noted in left hand and jerking noted of left foot. Sensation:    Withdraws from pain in bilateral lower extremities and left upper. (did not withdraw from noxious stimuli during episodes of twitching.      Babinski's  Mute    Labs:  Lab Results   Component Value Date/Time    WBC 8.1 07/30/2021 03:58 AM    HGB 7.2 (L) 07/30/2021 03:58 AM    HCT 23.7 (L) 07/30/2021 03:58 AM    PLATELET 98 (L) 74/61/5704 03:58 AM    MCV 94.4 07/30/2021 03:58 AM      Lab Results   Component Value Date/Time    Sodium 140 07/30/2021 03:58 AM    Potassium 3.3 (L) 07/30/2021 03:58 AM    Chloride 101 07/30/2021 03:58 AM    CO2 33 (H) 07/30/2021 03:58 AM    Anion gap 6 07/30/2021 03:58 AM    Glucose 157 (H) 07/30/2021 03:58 AM    BUN 53 (H) 07/30/2021 03:58 AM    Creatinine 1.20 07/30/2021 03:58 AM    BUN/Creatinine ratio 44 (H) 07/30/2021 03:58 AM    GFR est AA >60 07/30/2021 03:58 AM    GFR est non-AA 57 (L) 07/30/2021 03:58 AM    Calcium 8.6 07/30/2021 03:58 AM     Lab Results   Component Value Date/Time    CK 75 11/17/2020 04:24 PM    Troponin-I, Qt. 0.55 (H) 07/24/2021 11:11 AM       Imaging:  CT Results (maximum last 3): Results from [place] encounter on 07/23/21    CT HEAD WO CONT    Narrative  CLINICAL HISTORY: encephalopathy  INDICATION: encephalopathy  COMPARISON: 121. CT dose reduction was achieved through use of a standardized protocol tailored  for this examination and automatic exposure control for dose modulation. TECHNIQUE: Serial axial images with a collimation of 5 mm were obtained from the  skull base through the vertex  FINDINGS:  There is sulcal and ventricular prominence. Confluent periventricular and  scattered foci of hypodensity in the cerebral white matter. There is no evidence  of an acute infarction, hemorrhage, or mass-effect. There is no evidence of  midline shift or hydrocephalus. Posterior fossa structures are unremarkable. No  extra-axial collections are seen. Mastoid air cells are well pneumatized and clear. Scattered remote infarctions left basal ganglia, periventricular white matter. Right parietal lobe. Impression  No acute intracranial process. Imaging findings consistent with severe chronic microvascular ischemic change. There is a severe degree of cerebral atrophy.       Results from [place] encounter on 01/06/21    CT HEAD WO CONT    Narrative  EXAM:  CT HEAD WO CONT  INDICATION:  seizure, patient brought by EMS from [facility] rehabilitation with  progressive altered mental status. TECHNIQUE:  Thin axial images were obtained through the calvarium and saved with standard  and bone window algorithm. Coronal and sagittal reconstructions were generated. CT dose reduction was achieved through use of a standardized protocol tailored  for this examination and automatic exposure control for dose modulation. COMPARISON: CT head 11/23/20 and 11/17/20. FINDINGS:  Again demonstrated is marked generalized prominence of sulci and ventricles  consistent with prominent volume loss/atrophy which is been present on the prior  exams. Also again noted is patchy decreased attenuation in the cerebral white matter  similar to prior exam.  Old left basal ganglia infarct again noted. There is no evidence of acute intracranial hemorrhage or abnormal extra-axial  fluid collections. No findings that would suggest an acute brain infarction. Impression  IMPRESSION: Chronic generalized brain volume loss without significant change  since the prior exam.      Results from [place] encounter on 11/17/20    CT HEAD WO CONT    Narrative  EXAM: CT HEAD WO CONT    INDICATION: stroke    COMPARISON: November 17, 2020. CONTRAST: None. TECHNIQUE: Unenhanced CT of the head was performed using 5 mm images. Brain and  bone windows were generated. Coronal and sagittal reformats. CT dose reduction  was achieved through use of a standardized protocol tailored for this  examination and automatic exposure control for dose modulation. FINDINGS:  The ventricles and sulci are stable in size, shape and configuration. . There is  unchanged diffuse periventricular white matter disease. Old lacunar infarct in  the left basal ganglia is unchanged. Old bilateral occipital infarcts are  unchanged. There is no intracranial hemorrhage, extra-axial collection, or mass  effect. The basilar cisterns are open. No CT evidence of acute infarct.     The bone windows demonstrate no abnormalities. The visualized portions of the  paranasal sinuses and mastoid air cells are clear. Impression  IMPRESSION:  No acute intracranial process. No significant change from the prior study. Assessment:     Hospital Problems  Date Reviewed: 12/17/2020        Codes Class Noted POA    Acute respiratory failure with hypoxia [facility]) ICD-10-CM: J96.01  ICD-9-CM: 518.81  7/23/2021 Unknown        Aspiration into respiratory tract ICD-10-CM: T17.908A  ICD-9-CM: 934.9  7/23/2021 Unknown            Plan:     Seizure   - Obtain EEG   -Give one dose of Keppra 500 mg IV now   - Cont AED regimen: Keppra 500 mg po bid, Vimpat 100 mg po bid   - CT head on 7/28 showed scattered remote infarctions in the left basal ganglia, periventricular  white matter and right parietal lobe. No acute intracranial process. Imaging consistent with severe  chronic microvascular ischemic change. Severe degree of cerebral atrophy. - Ativan for seizure activity lasting longer than 3 minutes. Patient currently not on sedation on  ventilator.   - Avoid fluoroquinolones and 4th generation cephalosporins as can lower seizure threshold   - Seizure precautions      Further recommendations to follow from Dr. Graciela Mcgee. Thank you for this consult and participating in the care of this patient. Signed By: Annia Zurita NP     July 31, 2021        Neurology staff:    I examined the patient at bedside. I discussed the case and agree with the plans and documentation above from AMANDA Greco. Mr. Radha Laguerre is a new patient for me. He is an 59-year-old gentleman who was admitted here on July 23 for acute respiratory failure prompting intubation at his long-term care facility and then transferred here. He has a remarkable medical history consisting of heart failure, stroke, seizures, cardiac arrest history. It seems that he is on Keppra at home at baseline.   His baseline at the long-term care facility reportedly is that he is aphasic following only intermittent commands on room air. Neurology was consulted because of left-sided twitching concerning for seizure. This morning he had continued activity and he was given Ativan with improvement. When I visit with him this morning he is no longer twitching. On exam, he has no sedation on board. He is intubated. Not following commands. He has some spontaneous movement of the left hand and arm intermittently. He withdraws to pain more so on the left. No abnormal movements at the time of my evaluation. 49-year-old gentleman with significant medical issues whom is high risk for seizures. Reportedly his baseline was rather poor to begin with and he seems worse now. Could be postictal, no seizures, new stroke? [place] [facility] He has had significant bilateral infarcts in his past which I was able to review the MRI from last year. Continue Keppra and lacosamide now. EEG now. Will titrate Keppra further after I see the EEG. Head CT with severe atrophy. High risk deteriorate. Following.   [address] [facility], DO  Neurologist  Diplomate, American Board of Psychiatry and Neurology  Board Certified, Adult Neurology and Brain Injury Medicine

## 2021-07-31 NOTE — PROCEDURES
ELECTROENCEPHALOGRAM REPORT     Patient Name: Brooks Mac  : 3/9/1933  Age: 80 y.o. Ordering physician: NP Gwynn Schaumann  Date of EE2021    Interpreting physician: Sobeida Reynolds DO      PROCEDURE: EEG. CLINICAL INDICATION: The patient is a 80 y.o. male who is being evaluated for baseline electro cerebral activities and to rule out seizure focus. DESCRIPTION OF THE RECORD:       Throughout the recording, there was diffuse slowing, but no clear areas of focal spike or spike-and-wave discharges seen. Hyperventilation was not performed. Photic stimulation produced a minimal driving response in the posterior head regions. During drowsiness, the background rhythms attenuate and are replaced with diffuse symmetric theta range activities. Later stages of sleep are not attained. INTERPRETATION:     This is an abnormal electroencephalogram showing diffuse slowing suggestive for global cortical dysfunction which is nonspecific and may be seen in underlying metabolic/infectious/inflammatory/structural processes. No clear focal abnormalities or epileptiform activity was seen. Clinical correlation recommended.       Hanny Bateman DO  Diplomate, American Board of Psychiatry & Neurology (Neurology)

## 2021-07-31 NOTE — PROGRESS NOTES
730-Bedside and Verbal shift change report given to Con Little (oncoming nurse) by Laci Angel (offgoing nurse). Report included the following information SBAR, Kardex, ED Summary, Procedure Summary, Intake/Output, MAR, Accordion, Recent Results, Med Rec Status, Cardiac Rhythm paced, Alarm Parameters , Quality Measures and Dual Neuro Assessment. 12Elvan Juli at bedside, pt having some seizure like activity, new orders to give 2mg ativan now. 915- EEG tech called. 1200- No changes on reassessment. 1320- EEG tech at bedside. [facility] Primary Nurse Kenan Dewey RN and Sharona Ewing RN performed a dual skin assessment on this patient Impairment noted- see wound doc flow sheet  Torito score is 9    1930- Bedside and Verbal shift change report given to Laci Angel (oncoming nurse) by Con Little (offgoing nurse). Report included the following information SBAR, Kardex, ED Summary, Procedure Summary, Intake/Output, MAR, Accordion, Recent Results, Med Rec Status, Cardiac Rhythm paced, Alarm Parameters  and Quality Measures.

## 2021-08-01 LAB
ANION GAP SERPL CALC-SCNC: 5 MMOL/L (ref 5–15)
BASOPHILS # BLD: 0 K/UL (ref 0–0.1)
BASOPHILS NFR BLD: 0 % (ref 0–1)
BNP SERPL-MCNC: ABNORMAL PG/ML
BUN SERPL-MCNC: 52 MG/DL (ref 6–20)
BUN/CREAT SERPL: 49 (ref 12–20)
CALCIUM SERPL-MCNC: 9.4 MG/DL (ref 8.5–10.1)
CHLORIDE SERPL-SCNC: 105 MMOL/L (ref 97–108)
CO2 SERPL-SCNC: 31 MMOL/L (ref 21–32)
CREAT SERPL-MCNC: 1.07 MG/DL (ref 0.7–1.3)
DIFFERENTIAL METHOD BLD: ABNORMAL
EOSINOPHIL # BLD: 0.1 K/UL (ref 0–0.4)
EOSINOPHIL NFR BLD: 1 % (ref 0–7)
ERYTHROCYTE [DISTWIDTH] IN BLOOD BY AUTOMATED COUNT: 18.6 % (ref 11.5–14.5)
GLUCOSE BLD STRIP.AUTO-MCNC: 165 MG/DL (ref 65–117)
GLUCOSE BLD STRIP.AUTO-MCNC: 173 MG/DL (ref 65–117)
GLUCOSE BLD STRIP.AUTO-MCNC: 180 MG/DL (ref 65–117)
GLUCOSE BLD STRIP.AUTO-MCNC: 198 MG/DL (ref 65–117)
GLUCOSE SERPL-MCNC: 193 MG/DL (ref 65–100)
HCT VFR BLD AUTO: 23.6 % (ref 36.6–50.3)
HGB BLD-MCNC: 7.2 G/DL (ref 12.1–17)
IMM GRANULOCYTES # BLD AUTO: 0.1 K/UL (ref 0–0.04)
IMM GRANULOCYTES NFR BLD AUTO: 1 % (ref 0–0.5)
LYMPHOCYTES # BLD: 0.8 K/UL (ref 0.8–3.5)
LYMPHOCYTES NFR BLD: 10 % (ref 12–49)
MAGNESIUM SERPL-MCNC: 2.6 MG/DL (ref 1.6–2.4)
MCH RBC QN AUTO: 29.3 PG (ref 26–34)
MCHC RBC AUTO-ENTMCNC: 30.5 G/DL (ref 30–36.5)
MCV RBC AUTO: 95.9 FL (ref 80–99)
MONOCYTES # BLD: 0.6 K/UL (ref 0–1)
MONOCYTES NFR BLD: 7 % (ref 5–13)
NEUTS SEG # BLD: 6.7 K/UL (ref 1.8–8)
NEUTS SEG NFR BLD: 81 % (ref 32–75)
NRBC # BLD: 0 K/UL (ref 0–0.01)
NRBC BLD-RTO: 0 PER 100 WBC
PHOSPHATE SERPL-MCNC: 2.9 MG/DL (ref 2.6–4.7)
PLATELET # BLD AUTO: 107 K/UL (ref 150–400)
PMV BLD AUTO: 9.5 FL (ref 8.9–12.9)
POTASSIUM SERPL-SCNC: 3.6 MMOL/L (ref 3.5–5.1)
PROCALCITONIN SERPL-MCNC: 1.73 NG/ML
RBC # BLD AUTO: 2.46 M/UL (ref 4.1–5.7)
SERVICE CMNT-IMP: ABNORMAL
SODIUM SERPL-SCNC: 141 MMOL/L (ref 136–145)
WBC # BLD AUTO: 8.3 K/UL (ref 4.1–11.1)

## 2021-08-01 PROCEDURE — 94003 VENT MGMT INPAT SUBQ DAY: CPT

## 2021-08-01 PROCEDURE — 36415 COLL VENOUS BLD VENIPUNCTURE: CPT

## 2021-08-01 PROCEDURE — 85025 COMPLETE CBC W/AUTO DIFF WBC: CPT

## 2021-08-01 PROCEDURE — 74011250637 HC RX REV CODE- 250/637: Performed by: PSYCHIATRY & NEUROLOGY

## 2021-08-01 PROCEDURE — 83880 ASSAY OF NATRIURETIC PEPTIDE: CPT

## 2021-08-01 PROCEDURE — 95714 VEEG EA 12-26 HR UNMNTR: CPT | Performed by: PSYCHIATRY & NEUROLOGY

## 2021-08-01 PROCEDURE — 80048 BASIC METABOLIC PNL TOTAL CA: CPT

## 2021-08-01 PROCEDURE — 84145 PROCALCITONIN (PCT): CPT

## 2021-08-01 PROCEDURE — 77030018798 HC PMP KT ENTRL FED COVD -A

## 2021-08-01 PROCEDURE — 95720 EEG PHY/QHP EA INCR W/VEEG: CPT | Performed by: PSYCHIATRY & NEUROLOGY

## 2021-08-01 PROCEDURE — 94640 AIRWAY INHALATION TREATMENT: CPT

## 2021-08-01 PROCEDURE — 99233 SBSQ HOSP IP/OBS HIGH 50: CPT | Performed by: PSYCHIATRY & NEUROLOGY

## 2021-08-01 PROCEDURE — 74011000250 HC RX REV CODE- 250: Performed by: INTERNAL MEDICINE

## 2021-08-01 PROCEDURE — 83735 ASSAY OF MAGNESIUM: CPT

## 2021-08-01 PROCEDURE — 74011250637 HC RX REV CODE- 250/637: Performed by: NURSE PRACTITIONER

## 2021-08-01 PROCEDURE — 74011000258 HC RX REV CODE- 258: Performed by: PSYCHIATRY & NEUROLOGY

## 2021-08-01 PROCEDURE — 74011250636 HC RX REV CODE- 250/636: Performed by: PSYCHIATRY & NEUROLOGY

## 2021-08-01 PROCEDURE — 74011250636 HC RX REV CODE- 250/636: Performed by: INTERNAL MEDICINE

## 2021-08-01 PROCEDURE — 74011636637 HC RX REV CODE- 636/637: Performed by: HEALTH CARE PROVIDER

## 2021-08-01 PROCEDURE — 84100 ASSAY OF PHOSPHORUS: CPT

## 2021-08-01 PROCEDURE — 65610000006 HC RM INTENSIVE CARE

## 2021-08-01 PROCEDURE — 74011250636 HC RX REV CODE- 250/636: Performed by: NURSE PRACTITIONER

## 2021-08-01 PROCEDURE — 82962 GLUCOSE BLOOD TEST: CPT

## 2021-08-01 PROCEDURE — 74011000250 HC RX REV CODE- 250: Performed by: NURSE PRACTITIONER

## 2021-08-01 RX ORDER — SODIUM CHLORIDE FOR INHALATION 3 %
2 VIAL, NEBULIZER (ML) INHALATION
Status: DISCONTINUED | OUTPATIENT
Start: 2021-08-01 | End: 2021-08-04

## 2021-08-01 RX ORDER — SODIUM CHLORIDE FOR INHALATION 3 %
2 VIAL, NEBULIZER (ML) INHALATION EVERY 6 HOURS
Status: DISCONTINUED | OUTPATIENT
Start: 2021-08-01 | End: 2021-08-01

## 2021-08-01 RX ORDER — IPRATROPIUM BROMIDE AND ALBUTEROL SULFATE 2.5; .5 MG/3ML; MG/3ML
3 SOLUTION RESPIRATORY (INHALATION)
Status: DISCONTINUED | OUTPATIENT
Start: 2021-08-01 | End: 2021-08-04

## 2021-08-01 RX ADMIN — INSULIN LISPRO 2 UNITS: 100 INJECTION, SOLUTION INTRAVENOUS; SUBCUTANEOUS at 07:02

## 2021-08-01 RX ADMIN — ALLOPURINOL 100 MG: 100 TABLET ORAL at 10:38

## 2021-08-01 RX ADMIN — LORAZEPAM 2 MG: 2 INJECTION INTRAMUSCULAR; INTRAVENOUS at 01:13

## 2021-08-01 RX ADMIN — LANSOPRAZOLE 30 MG: KIT at 07:30

## 2021-08-01 RX ADMIN — IPRATROPIUM BROMIDE AND ALBUTEROL SULFATE 3 ML: .5; 3 SOLUTION RESPIRATORY (INHALATION) at 13:02

## 2021-08-01 RX ADMIN — SODIUM CHLORIDE SOLN NEBU 3% 2 ML: 3 NEBU SOLN at 13:05

## 2021-08-01 RX ADMIN — SODIUM CHLORIDE SOLN NEBU 3% 2 ML: 3 NEBU SOLN at 19:25

## 2021-08-01 RX ADMIN — CHLORHEXIDINE GLUCONATE 15 ML: 0.12 RINSE ORAL at 13:00

## 2021-08-01 RX ADMIN — LEVETIRACETAM 1000 MG: 500 SOLUTION ORAL at 18:43

## 2021-08-01 RX ADMIN — INSULIN LISPRO 2 UNITS: 100 INJECTION, SOLUTION INTRAVENOUS; SUBCUTANEOUS at 18:48

## 2021-08-01 RX ADMIN — INSULIN LISPRO 2 UNITS: 100 INJECTION, SOLUTION INTRAVENOUS; SUBCUTANEOUS at 00:05

## 2021-08-01 RX ADMIN — Medication: at 10:46

## 2021-08-01 RX ADMIN — ASPIRIN 81 MG: 81 TABLET, CHEWABLE ORAL at 10:38

## 2021-08-01 RX ADMIN — LEVETIRACETAM 1000 MG: 100 INJECTION, SOLUTION INTRAVENOUS at 14:40

## 2021-08-01 RX ADMIN — Medication: at 21:28

## 2021-08-01 RX ADMIN — LACOSAMIDE 100 MG: 50 TABLET, FILM COATED ORAL at 21:27

## 2021-08-01 RX ADMIN — LEVETIRACETAM 500 MG: 100 SOLUTION ORAL at 10:38

## 2021-08-01 RX ADMIN — IPRATROPIUM BROMIDE AND ALBUTEROL SULFATE 3 ML: .5; 3 SOLUTION RESPIRATORY (INHALATION) at 07:15

## 2021-08-01 RX ADMIN — CHLORHEXIDINE GLUCONATE 15 ML: 0.12 RINSE ORAL at 00:01

## 2021-08-01 RX ADMIN — LACOSAMIDE 100 MG: 50 TABLET, FILM COATED ORAL at 10:38

## 2021-08-01 RX ADMIN — INSULIN LISPRO 2 UNITS: 100 INJECTION, SOLUTION INTRAVENOUS; SUBCUTANEOUS at 14:53

## 2021-08-01 RX ADMIN — IPRATROPIUM BROMIDE AND ALBUTEROL SULFATE 3 ML: .5; 3 SOLUTION RESPIRATORY (INHALATION) at 19:25

## 2021-08-01 RX ADMIN — HEPARIN SODIUM 5000 UNITS: 5000 INJECTION INTRAVENOUS; SUBCUTANEOUS at 21:27

## 2021-08-01 RX ADMIN — Medication 10 ML: at 21:27

## 2021-08-01 RX ADMIN — HEPARIN SODIUM 5000 UNITS: 5000 INJECTION INTRAVENOUS; SUBCUTANEOUS at 10:38

## 2021-08-01 NOTE — PROGRESS NOTES
Neurology Progress Note  Sharona Ferrara NP    Patient: Cristofer Macdonald MRN: [id number]  SSN: [id number]    YOB: 1933  Age: 80 y.o. Sex: male      Chief Complaint: Left facial twitching, with fasciculations and jerking of the left hand and leg. HPI: Cristofer Macdonald is a 80 y.o. male with a past medical history of CVA, HTN, hyperlipidemia, chronic systolic heart failure, prostate cancer, thrombocytopenia, pacemaker placement, constipation, and gout. He presented to the ER via EMS on 7/23/21 from [facility] due to respiratory failure with concern for aspiration PNA, sepsis, and NSTEMI. He was intubated at [facility]. He had just been discharged from [facility] that day after a stay for SOULEYMANE and elevated LFTs. Last night he began having left sided twitching of his face, arm and leg which improved with Ativan administration. He began twitching again this am and I was asked to see the patient. Patient had been on Keppra 750 mg bid at home. He has been on Keppra 500 mg bid and Vimpat 100 mg bid since admission. Change was most likely made due to decreased renal function. Patient has been off of sedation since 7/24 without neurologic improvement. Subjective:    No acute neuro events overnight. Past Medical History:   Diagnosis Date    Cancer [facility])     prostate    Chronic systolic heart failure ([facility] [address] 12/7/2020    Constipation     Gout     Hyperlipemia     Hypertension     Pacemaker 2019    Stroke ([facility] [address]     Thrombocytopenia ([facility] [address] 3/19/2019    TIA (transient ischemic attack) 2013     Family History   Problem Relation Age of Onset    No Known Problems Mother     No Known Problems Father      Social History     Tobacco Use    Smoking status: Never Smoker    Smokeless tobacco: Never Used   Substance Use Topics    Alcohol use: No      Prior to Admission Medications   Prescriptions Last Dose Informant Patient Reported?  Taking?   acetaminophen (TYLENOL) 325 mg tablet   Yes No   Sig: Take 650 mg by mouth every six (6) hours as needed for Pain. aspirin delayed-release 81 mg tablet   No No   Sig: Take 1 Tab by mouth daily. balsam peru-castor oiL (VENELEX) ointment   No No   Sig: Apply  to affected area three (3) times daily. famotidine (PEPCID) 20 mg tablet   No No   Sig: Take 1 Tab by mouth every evening. levETIRAcetam (KEPPRA) 750 mg tablet   No No   Sig: Take 1 Tab by mouth every twelve (12) hours. polyethylene glycol (MIRALAX) 17 gram/dose powder   No No   Sig: Take 17 g by mouth daily as needed for Constipation. Facility-Administered Medications: None       No Known Allergies    Review of Systems:  Review of systems not obtained due to patient factors. AMS. Objective:     Vitals:    07/31/21 2100 07/31/21 2200 07/31/21 2300 08/01/21 0000   BP: 131/68 110/65 (!) 102/50    Pulse: 91 96 88    Resp: 13 27 12    Temp:    99.5 °F (37.5 °C)   SpO2: 100% 100% 100%    Weight:       Height:          Physical Exam:  GENERAL:NAD, on ventilator. SKIN: Warm, dry, color appropriate for ethnicity. Neurologic Exam:  Mental Status:  No eye opening, no command following. Language:    Mute ETT in place. Cranial Nerves:   Right pupil 4mm round sluggish, left pupil 3 mm sluggish. Forward gaze. No blink to threat. Resists eye opening. Motor:         Bulk and tone normal.      5/5 power in all extremities proximally and distally. No involuntary movements noted on exam today. Sensation:    Weak withdrawal from pain in bilateral lower extremities and left upper.   Babinski's  Mute    Labs:  Lab Results   Component Value Date/Time    WBC 8.6 07/31/2021 04:45 AM    HGB 7.6 (L) 07/31/2021 04:45 AM    HCT 25.5 (L) 07/31/2021 04:45 AM    PLATELET 893 (L) 78/00/0702 04:45 AM    MCV 96.2 07/31/2021 04:45 AM      Lab Results   Component Value Date/Time    Sodium 139 07/31/2021 04:45 AM    Potassium 3.0 (L) 07/31/2021 04:45 AM    Chloride 101 07/31/2021 04:45 AM    CO2 32 07/31/2021 04:45 AM    Anion gap 6 07/31/2021 04:45 AM    Glucose 191 (H) 07/31/2021 04:45 AM    BUN 51 (H) 07/31/2021 04:45 AM    Creatinine 1.14 07/31/2021 04:45 AM    BUN/Creatinine ratio 45 (H) 07/31/2021 04:45 AM    GFR est AA >60 07/31/2021 04:45 AM    GFR est non-AA >60 07/31/2021 04:45 AM    Calcium 9.3 07/31/2021 04:45 AM     Lab Results   Component Value Date/Time    CK 75 11/17/2020 04:24 PM    Troponin-I, Qt. 0.55 (H) 07/24/2021 11:11 AM       Imaging:  CT Results (maximum last 3): Results from [place] encounter on 07/23/21    CT HEAD WO CONT    Narrative  CLINICAL HISTORY: encephalopathy  INDICATION: encephalopathy  COMPARISON: 121. CT dose reduction was achieved through use of a standardized protocol tailored  for this examination and automatic exposure control for dose modulation. TECHNIQUE: Serial axial images with a collimation of 5 mm were obtained from the  skull base through the vertex  FINDINGS:  There is sulcal and ventricular prominence. Confluent periventricular and  scattered foci of hypodensity in the cerebral white matter. There is no evidence  of an acute infarction, hemorrhage, or mass-effect. There is no evidence of  midline shift or hydrocephalus. Posterior fossa structures are unremarkable. No  extra-axial collections are seen. Mastoid air cells are well pneumatized and clear. Scattered remote infarctions left basal ganglia, periventricular white matter. Right parietal lobe. Impression  No acute intracranial process. Imaging findings consistent with severe chronic microvascular ischemic change. There is a severe degree of cerebral atrophy. Results from [place] encounter on 01/06/21    CT HEAD WO CONT    Narrative  EXAM:  CT HEAD WO CONT  INDICATION:  seizure, patient brought by EMS from [facility] with  progressive altered mental status.   TECHNIQUE:  Thin axial images were obtained through the calvarium and saved with standard  and bone window algorithm. Coronal and sagittal reconstructions were generated. CT dose reduction was achieved through use of a standardized protocol tailored  for this examination and automatic exposure control for dose modulation. COMPARISON: CT head 11/23/20 and 11/17/20. FINDINGS:  Again demonstrated is marked generalized prominence of sulci and ventricles  consistent with prominent volume loss/atrophy which is been present on the prior  exams. Also again noted is patchy decreased attenuation in the cerebral white matter  similar to prior exam.  Old left basal ganglia infarct again noted. There is no evidence of acute intracranial hemorrhage or abnormal extra-axial  fluid collections. No findings that would suggest an acute brain infarction. Impression  IMPRESSION: Chronic generalized brain volume loss without significant change  since the prior exam.      Results from [place] encounter on 11/17/20    CT HEAD WO CONT    Narrative  EXAM: CT HEAD WO CONT    INDICATION: stroke    COMPARISON: November 17, 2020. CONTRAST: None. TECHNIQUE: Unenhanced CT of the head was performed using 5 mm images. Brain and  bone windows were generated. Coronal and sagittal reformats. CT dose reduction  was achieved through use of a standardized protocol tailored for this  examination and automatic exposure control for dose modulation. FINDINGS:  The ventricles and sulci are stable in size, shape and configuration. . There is  unchanged diffuse periventricular white matter disease. Old lacunar infarct in  the left basal ganglia is unchanged. Old bilateral occipital infarcts are  unchanged. There is no intracranial hemorrhage, extra-axial collection, or mass  effect. The basilar cisterns are open. No CT evidence of acute infarct. The bone windows demonstrate no abnormalities. The visualized portions of the  paranasal sinuses and mastoid air cells are clear.     Impression  IMPRESSION:  No acute intracranial process. No significant change from the prior study. Assessment:     Hospital Problems  Date Reviewed: 12/17/2020        Codes Class Noted POA    Acute respiratory failure with hypoxia [facility]) ICD-10-CM: J96.01  ICD-9-CM: 518.81  7/23/2021 Unknown        Aspiration into respiratory tract ICD-10-CM: Q65.022Q  ICD-9-CM: 934.9  7/23/2021 Unknown            Plan:     Seizure   - EEG - abnormal EEG showing diffuse slowing suggestive for global cortical dysfunction which is nonspecific  and may be seen in underlying metabolic/infectious/inflammatory/structural processes. No clear focal  abnormalities or epileptiform activity seen. -Give one dose of Keppra 500 mg IV now   - Cont AED regimen: Keppra 500 mg po bid, Vimpat 100 mg po bid   - CT head on 7/28 showed scattered remote infarctions in the left basal ganglia, periventricular  white matter  and right parietal lobe. No acute intracranial process. Imaging consistent with severe  chronic  microvascular ischemic change. Severe degree of cerebral atrophy. - Ativan for seizure activity lasting longer than 3 minutes. Patient currently not on sedation on  ventilator.   - Avoid fluoroquinolones and 4th generation cephalosporins as can lower seizure threshold   - Seizure precautions      Further recommendations to follow from Dr. Aldo Gaitan. Thank you for this consult and participating in the care of this patient. Signed By: Rambo Kaplan NP     August 1, 2021        Neurology staff:    I examined the patient at the bedside. I discussed the case and agree with the plans and documentation above from AMANDA Donnelly. Mr. Melvin Laguerre is an 80-year-old gentleman transferred here for respiratory failure. Neurology involved for concern of breakthrough seizures given his history of seizure. Yesterday EEG without seizures but that was post Ativan. Today he seems to be twitching again in the left face and foot. On exam he is obtunded. Not following commands. Intubated.   No sedation. Left jaw twitching and left foot twitching observed by me. 70-year-old gentleman with history of seizure disorder whom I am concerned is having additional breakthrough events. Additional 1 g Keppra now and then increase maintenance 1 g every 12 on schedule. Continue lacosamide 100 mg every 12 as is. Start continuous EEG tonight. I will place the order.   2 [facility],   Neurologist  Diplomate, American Board of Psychiatry and Neurology  Board Certified, Adult Neurology and Brain Injury Medicine

## 2021-08-01 NOTE — PROGRESS NOTES
[facility]    ICU TEAM Progress Note    Name: Russ Corbin   : 3/9/1933   MRN: [id number]   Date: 2021      I  Subjective:   Progress Note: 2021    Reason for ICU Admission: Acute hypoxic respiratory failure     Interval history: From [facility] a 80 y. o. male with history of prostate cancer, CHF, constipation, hypertension, stroke, TIA, and seizures who presents to the emergency department by EMS as transfer from [facility] for respiratory distress.  Patient had initially been a transfer from [facility] after a hospitalization from  [facility].  Was there for altered mental status hypoxia was treated for hypoxic respiratory failure bilateral pleural effusions acute systolic CHF secondary to severe aortic stenosis hyponatremia hypokalemia elevated LFTs malnutrition and possible UTI was treated with ceftriaxone and Sylvester Rodriguez already had a PEG for dysphagia and protein calorie malnutrition prior to [facility] admission. He was at [facility] from [place] [id number].  While at [facility] today patient had an episode of vomiting and aspirated was initially started on high flow nasal cannula however was still hypoxic thus got intubated by [facility] staff. Светлана Camara was then transferred to the ED for further care.  Per family patient's decline started last year while he was having seizures/TIA patient became more encephalopathic and started to refuse eating and thus got a PEG for nutritional reasons. was staying at assisted living facility and had to be re-hospitalized after his PEG malfunctioned.  During that time he did have an infection, daughter could not exactly say where the infection was, but she thought it was an abdominal infection and since then he started having worsening heart failure and decreased heart function, and failure to thrive. Per records EF was 10 to 15%.  Per daughter, patient has never had a tracheostomy.  She states patient is awake at times, will open eyes, but does not track, does not follow commands, and does not speak. They wish patient to remain full code at this time.     While in ED patient was placed on mechanical ventilator, ABG was done which was consistent with hypoxic respiratory failure.  Patient was also given 1 L of IV fluids for fluid resuscitation due to sepsis. Initially blood pressure was okay but once patient was started on sedation blood pressure dropped.  Patient also had episodes of hypoxia due to vent asynchrony. Once PEEP was increased for hypoxia patient's pressure dropped. Started patient on Levophed via peripheral in ED. If hypotension worsens will need central line. Started on Zosyn. Updated the family at bedside, daughter and and son. Consent for central line and blood was  Received.  Patient to transfer to ICU for continued care. Overnight Events:  8/1: On continuous EEG monitoring, no other acute event. Poorly responsive and occasional twitching movement  7/31: Appearnt seizure like activity last night, ativan given  7/30: No acute overnight events  7/29 No acute overnight events   7/28: No acute event, no much neurological recovery. No witnessed seizure, blood pressure on the higher side. Diuresing well  7/27: No acute event.  Good diuresis.  Still poorly responsive.  Remains on dobutamine.  Blood pressure maintained.  No witnessed seizure.   7/26: No acute event, blood pressure somewhat better but urine output decreasing.  Still on dobutamine at 5.  7/25 -- Getting PRBC now; started on Dobutamine      Active Problem List:     Problem List  Date Reviewed: 12/17/2020        Codes Class    Acute respiratory failure with hypoxia [facility]) ICD-10-CM: J96.01  ICD-9-CM: 518.81         Aspiration into respiratory tract ICD-10-CM: T17.908A  ICD-9-CM: 934.9         Hypokalemia ICD-10-CM: E87.6  ICD-9-CM: 276.8         Hypernatremia ICD-10-CM: E87.0  ICD-9-CM: 276.0         Moderate protein malnutrition (HCC) ICD-10-CM: E44.0  ICD-9-CM: 263.0 Failure to thrive (0-17) ICD-10-CM: R62.51  ICD-9-CM: 783.41         Bacteremia ICD-10-CM: R78.81  ICD-9-CM: 790.7         UTI (urinary tract infection) ICD-10-CM: N39.0  ICD-9-CM: 599.0         Dementia (HCC) ICD-10-CM: F03.90  ICD-9-CM: 294.20         Bedridden ICD-10-CM: Z74.01  ICD-9-CM: V49.84         Pressure ulcer ICD-10-CM: L89.90  ICD-9-CM: 707.00, 707.20         S/P percutaneous endoscopic gastrostomy (PEG) tube placement ([facility] ICD-10-CM: Z93.1  ICD-9-CM: V44.1         Dehydration ICD-10-CM: E86.0  ICD-9-CM: 276.51         Lactic acidosis ICD-10-CM: E87.2  ICD-9-CM: 276.2         Sepsis ([facility] ICD-10-CM: A41.9  ICD-9-CM: 038.9, 995.91         SOULEYMANE (acute kidney injury) ([facility] ICD-10-CM: N17.9  ICD-9-CM: 410. 9         Chronic systolic heart failure (HCC) ICD-10-CM: I50.22  ICD-9-CM: 428.22         Altered mental status ICD-10-CM: R41.82  ICD-9-CM: 780.97         Post-ictal state ([facility] ICD-10-CM: R56.9  ICD-9-CM: 780.39         Seizure ([facility] ICD-10-CM: R56.9  ICD-9-CM: 780.39         Atrial pacemaker lead displacement ICD-10-CM: T82.120A  ICD-9-CM: 996.01         Pacemaker ICD-10-CM: Z95.0  ICD-9-CM: V45.01     Overview Signed 3/22/2019  5:08 PM by Liliana Braun MD     3/22/2019 dual chamber Medtronic pacer             Bradycardia ICD-10-CM: R00.1  ICD-9-CM: 427.89         Transaminitis ICD-10-CM: R74.01  ICD-9-CM: 790.4         Hypertensive urgency ICD-10-CM: I16.0  ICD-9-CM: 401.9         Second degree AV block, Mobitz type I ICD-10-CM: I44.1  ICD-9-CM: 426.13     Overview Signed 3/21/2019  6:14 PM by Liliana Braun MD     Added automatically from request for surgery [id number]             Stage 3 chronic kidney disease ([facility] ICD-10-CM: N18.30  ICD-9-CM: 068. 3         Rotator cuff arthropathy of both shoulders ICD-10-CM: M12.811, H58.005  ICD-9-CM: 716.81         Cognitive impairment ICD-10-CM: R41.89  ICD-9-CM: 373.1         Systolic murmur PAQ-13-YZ: R01.1  ICD-9-CM: 785.2         Essential hypertension ICD-10-CM: I10  ICD-9-CM: 401.9         Gout ICD-10-CM: M10.9  ICD-9-CM: 274.9         Pure hypercholesterolemia ICD-10-CM: E78.00  ICD-9-CM: 272.0         History of prostate cancer ICD-10-CM: Z85.46  ICD-9-CM: V10.46         Chronic constipation ICD-10-CM: K59.09  ICD-9-CM: 564.00         Dysuria ICD-10-CM: R30.0  ICD-9-CM: 434. 1         Weight loss ICD-10-CM: R63.4  ICD-9-CM: 783.21         Adrenal mass ([facility] ICD-10-CM: E27.8  ICD-9-CM: 255.8     Overview Addendum 12/17/2018  9:57 AM by Colleen Humphreys MD     Stable/non-functioning adenoma on imaging                   Past Medical History:      has a past medical history of Cancer [facility]), Chronic systolic heart failure ([facility] (12/7/2020), Constipation, Gout, Hyperlipemia, Hypertension, Pacemaker (2019), Stroke ([facility] Thrombocytopenia ([facility] (3/19/2019), and TIA (transient ischemic attack) (2013). Past Surgical History:      has a past surgical history that includes hx urological; hx prostatectomy; pr ins new/rplcmt prm pm w/transv eltrd atrial&vent (Right, 3/22/2019); pr ins new/rplcmt prm pacemakr w/trans eltrd atrial (N/A, 10/18/2019); and place percut gastrostomy tube (11/30/2020). Home Medications:     Prior to Admission medications    Medication Sig Start Date End Date Taking? Authorizing Provider   levETIRAcetam (KEPPRA) 750 mg tablet Take 1 Tab by mouth every twelve (12) hours. 12/11/20   Denise Manriquez NP   aspirin delayed-release 81 mg tablet Take 1 Tab by mouth daily. 7/13/20   Sarah Lane MD   polyethylene glycol [facility]) 17 gram/dose powder Take 17 g by mouth daily as needed for Constipation. 7/13/20   Sarah Highman, MD   balsam peru-castor oiL (VENELEX) ointment Apply  to affected area three (3) times daily. 7/9/20   Russel Yoder MD   famotidine (PEPCID) 20 mg tablet Take 1 Tab by mouth every evening. 7/9/20   Russel Yoder MD   acetaminophen (TYLENOL) 325 mg tablet Take 650 mg by mouth every six (6) hours as needed for Pain. Provider, Historical       Allergies/Social/Family History:     No Known Allergies   Social History     Tobacco Use    Smoking status: Never Smoker    Smokeless tobacco: Never Used   Substance Use Topics    Alcohol use: No      Family History   Problem Relation Age of Onset    No Known Problems Mother     No Known Problems Father        Review of Systems:     Not able to obtain due to patient medical condition    Objective:   Vital Signs:  Visit Vitals  /69   Pulse 96   Temp 98.7 °F (37.1 °C)   Resp 12   Ht 5' 6\" (1.676 m)   Wt 70.9 kg (156 lb 4.9 oz)   SpO2 100%   BMI 25.23 kg/m²      O2 Device: Endotracheal tube, Ventilator Temp (24hrs), Av.2 °F (37.3 °C), Min:98.7 °F (37.1 °C), Max:99.5 °F (37.5 °C)           Intake/Output:     Intake/Output Summary (Last 24 hours) at 2021 1632  Last data filed at 2021 1049  Gross per 24 hour   Intake 844.77 ml   Output 1110 ml   Net -265.23 ml       Physical Exam:    General:  mild distress, appears stated age, intubated   Neurologic: I did not appreciate withdrawal to pain, perhaps occasional facial frowning on sternal rub, noticed occasional facial twitching as well   lungs: CTAB   Abdomen:  soft, non-tender. Bowel sounds normal. No masses,  no organomegaly  Cardiovascular:  Regular rate and rhythm, S1S2 present, without murmur or extra heart sounds and pedal pulses normal Generalized edema    LABS AND  DATA: Personally reviewed  Recent Labs     21  0510 21  0445   WBC 8.3 8.6   HGB 7.2* 7.6*   HCT 23.6* 25.5*   * 107*     Recent Labs     21  0510 21  0501 21  0445 21  0358     --  139  --    K 3.6  --  3.0*  --      --  101  --    CO2 31  --  32  --    BUN 52*  --  51*  --    CREA 1.07  --  1.14  --    *  --  191*  --    CA 9.4  --  9.3  --    MG 2.6* 2.5*  --    < >   PHOS 2.9 2.9  --    < >    < > = values in this interval not displayed.      Recent Labs     21  0358   AP 81   TP 6.2*   ALB 2.2*   GLOB 4.0     No results for input(s): INR, PTP, APTT, INREXT in the last 72 hours. No results for input(s): PHI, PCO2I, PO2I, FIO2I in the last 72 hours. No results for input(s): CPK, CKMB, TROIQ, BNPP in the last 72 hours. Hemodynamics:   PAP:   CO:     Wedge:   CI:     CVP:    SVR:       PVR:       Ventilator Settings:  Mode Rate Tidal Volume Pressure FiO2 PEEP   Assist control, Pressure control      12 cm H2O 30 % 5 cm H20     Peak airway pressure: 23 cm H2O    Minute ventilation: 7.45 l/min        MEDS: Reviewed    Chest X-Ray:  CXR Results  (Last 48 hours)    None            Assessment and Plan:   Seizure:  Encephalopathy:  Advanced dementia:  Appreciate neurology input, currently on continuous EEG monitoring. Remains poorly responsive. Continue current antiepileptic medication. Follow-up on EEG result  Cardiogenic shock:  Ejection fraction of less than 30% at baseline, currently on dobutamine drip. Seems to be euvolemic at this time. Renal function improving. Will diurese as needed. Will attempt to wean dobutamine as tolerated  Septic shock: Resolved  Completed antibiotic course. Off pressors. Procalcitonin continue to trend down    Overall patient does not show meaningful neurological recovery. Afraid this might be a progression of her baseline severe dementia. We are ruling out status epilepticus at this time. Appreciate further neurology input. I discussed this with the daughter and son at bedside. We will need to consider further goals of care tracheostomy versus comfort measure when status epilepticus is ruled out. DISPOSITION  Stay in ICU    CRITICAL CARE CONSULTANT NOTE  I had a face to face encounter with the patient, reviewed and interpreted patient data including clinical events, labs, images, vital signs, I/O's, and examined patient.   I have discussed the case and the plan and management of the patient's care with the consulting services, the bedside nurses and the respiratory therapist.      NOTE OF PERSONAL INVOLVEMENT IN CARE   This patient has a high probability of imminent, clinically significant deterioration, which requires the highest level of preparedness to intervene urgently. I participated in the decision-making and personally managed or directed the management of the following life and organ supporting interventions that required my frequent assessment to treat or prevent imminent deterioration. I personally spent 40 minutes of critical care time. This is time spent at this critically ill patient's bedside actively involved in patient care as well as the coordination of care and discussions with the patient's family. This does not include any procedural time which has been billed separately. Mor Villarreal M.D.   Staff Intensivist/Pulmonologist  [facility]  8/1/2021

## 2021-08-01 NOTE — PROGRESS NOTES
1930: Bedside shift change report given to [address] (oncoming nurse) by Adrianne Jacinto (offgoing nurse). Report included the following information SBAR, Intake/Output, MAR and Recent Results. Shift Summary: Patient with thick secretions. Desated to 60s while plugging. Required multiple attempts by RN and RT to clear ETT. Sharon Pineda NP notified and ordered hypertonic saline and chest PT.     0730: Bedside shift change report given to Flora Contreras (oncoming nurse) by [address] [employer] (offgoing nurse). Report included the following information SBAR, Intake/Output, MAR and Recent Results.

## 2021-08-02 LAB
ANION GAP SERPL CALC-SCNC: 5 MMOL/L (ref 5–15)
BASOPHILS # BLD: 0 K/UL (ref 0–0.1)
BASOPHILS NFR BLD: 0 % (ref 0–1)
BNP SERPL-MCNC: ABNORMAL PG/ML
BUN SERPL-MCNC: 58 MG/DL (ref 6–20)
BUN/CREAT SERPL: 56 (ref 12–20)
CALCIUM SERPL-MCNC: 9.3 MG/DL (ref 8.5–10.1)
CHLORIDE SERPL-SCNC: 106 MMOL/L (ref 97–108)
CO2 SERPL-SCNC: 30 MMOL/L (ref 21–32)
CREAT SERPL-MCNC: 1.03 MG/DL (ref 0.7–1.3)
DIFFERENTIAL METHOD BLD: ABNORMAL
EOSINOPHIL # BLD: 0.1 K/UL (ref 0–0.4)
EOSINOPHIL NFR BLD: 1 % (ref 0–7)
ERYTHROCYTE [DISTWIDTH] IN BLOOD BY AUTOMATED COUNT: 18.9 % (ref 11.5–14.5)
GLUCOSE BLD STRIP.AUTO-MCNC: 129 MG/DL (ref 65–117)
GLUCOSE BLD STRIP.AUTO-MCNC: 153 MG/DL (ref 65–117)
GLUCOSE BLD STRIP.AUTO-MCNC: 187 MG/DL (ref 65–117)
GLUCOSE BLD STRIP.AUTO-MCNC: 193 MG/DL (ref 65–117)
GLUCOSE BLD STRIP.AUTO-MCNC: 203 MG/DL (ref 65–117)
GLUCOSE SERPL-MCNC: 183 MG/DL (ref 65–100)
HCT VFR BLD AUTO: 22.7 % (ref 36.6–50.3)
HGB BLD-MCNC: 7 G/DL (ref 12.1–17)
IMM GRANULOCYTES # BLD AUTO: 0.1 K/UL (ref 0–0.04)
IMM GRANULOCYTES NFR BLD AUTO: 1 % (ref 0–0.5)
LYMPHOCYTES # BLD: 1.3 K/UL (ref 0.8–3.5)
LYMPHOCYTES NFR BLD: 15 % (ref 12–49)
MAGNESIUM SERPL-MCNC: 2.6 MG/DL (ref 1.6–2.4)
MCH RBC QN AUTO: 29.4 PG (ref 26–34)
MCHC RBC AUTO-ENTMCNC: 30.8 G/DL (ref 30–36.5)
MCV RBC AUTO: 95.4 FL (ref 80–99)
MONOCYTES # BLD: 0.5 K/UL (ref 0–1)
MONOCYTES NFR BLD: 6 % (ref 5–13)
NEUTS SEG # BLD: 6.6 K/UL (ref 1.8–8)
NEUTS SEG NFR BLD: 77 % (ref 32–75)
NRBC # BLD: 0 K/UL (ref 0–0.01)
NRBC BLD-RTO: 0 PER 100 WBC
PHOSPHATE SERPL-MCNC: 2.7 MG/DL (ref 2.6–4.7)
PLATELET # BLD AUTO: 126 K/UL (ref 150–400)
PMV BLD AUTO: 9.7 FL (ref 8.9–12.9)
POTASSIUM SERPL-SCNC: 3.3 MMOL/L (ref 3.5–5.1)
RBC # BLD AUTO: 2.38 M/UL (ref 4.1–5.7)
SERVICE CMNT-IMP: ABNORMAL
SODIUM SERPL-SCNC: 141 MMOL/L (ref 136–145)
WBC # BLD AUTO: 8.6 K/UL (ref 4.1–11.1)

## 2021-08-02 PROCEDURE — 77030037878 HC DRSG MEPILEX >48IN BORD MOLN -B

## 2021-08-02 PROCEDURE — 95720 EEG PHY/QHP EA INCR W/VEEG: CPT | Performed by: PSYCHIATRY & NEUROLOGY

## 2021-08-02 PROCEDURE — APPNB15 APP NON BILLABLE TIME 0-15 MINS: Performed by: NURSE PRACTITIONER

## 2021-08-02 PROCEDURE — 74011250637 HC RX REV CODE- 250/637: Performed by: INTERNAL MEDICINE

## 2021-08-02 PROCEDURE — 80048 BASIC METABOLIC PNL TOTAL CA: CPT

## 2021-08-02 PROCEDURE — 74011000250 HC RX REV CODE- 250: Performed by: INTERNAL MEDICINE

## 2021-08-02 PROCEDURE — 74011250637 HC RX REV CODE- 250/637: Performed by: NURSE PRACTITIONER

## 2021-08-02 PROCEDURE — 74011000250 HC RX REV CODE- 250: Performed by: NURSE PRACTITIONER

## 2021-08-02 PROCEDURE — 74011250637 HC RX REV CODE- 250/637: Performed by: PSYCHIATRY & NEUROLOGY

## 2021-08-02 PROCEDURE — 65610000006 HC RM INTENSIVE CARE

## 2021-08-02 PROCEDURE — 74011636637 HC RX REV CODE- 636/637: Performed by: HEALTH CARE PROVIDER

## 2021-08-02 PROCEDURE — 83880 ASSAY OF NATRIURETIC PEPTIDE: CPT

## 2021-08-02 PROCEDURE — 82962 GLUCOSE BLOOD TEST: CPT

## 2021-08-02 PROCEDURE — 74011250636 HC RX REV CODE- 250/636: Performed by: NURSE PRACTITIONER

## 2021-08-02 PROCEDURE — 85025 COMPLETE CBC W/AUTO DIFF WBC: CPT

## 2021-08-02 PROCEDURE — 83735 ASSAY OF MAGNESIUM: CPT

## 2021-08-02 PROCEDURE — 36592 COLLECT BLOOD FROM PICC: CPT

## 2021-08-02 PROCEDURE — 95714 VEEG EA 12-26 HR UNMNTR: CPT | Performed by: NURSE PRACTITIONER

## 2021-08-02 PROCEDURE — 99232 SBSQ HOSP IP/OBS MODERATE 35: CPT | Performed by: PSYCHIATRY & NEUROLOGY

## 2021-08-02 PROCEDURE — 74011000258 HC RX REV CODE- 258: Performed by: NURSE PRACTITIONER

## 2021-08-02 PROCEDURE — 36415 COLL VENOUS BLD VENIPUNCTURE: CPT

## 2021-08-02 PROCEDURE — 74011250636 HC RX REV CODE- 250/636: Performed by: INTERNAL MEDICINE

## 2021-08-02 PROCEDURE — 94640 AIRWAY INHALATION TREATMENT: CPT

## 2021-08-02 PROCEDURE — 84100 ASSAY OF PHOSPHORUS: CPT

## 2021-08-02 PROCEDURE — 94003 VENT MGMT INPAT SUBQ DAY: CPT

## 2021-08-02 RX ORDER — PROPOFOL 10 MG/ML
0-50 VIAL (ML) INTRAVENOUS
Status: DISCONTINUED | OUTPATIENT
Start: 2021-08-02 | End: 2021-08-03

## 2021-08-02 RX ADMIN — LACOSAMIDE 100 MG: 50 TABLET, FILM COATED ORAL at 09:58

## 2021-08-02 RX ADMIN — HEPARIN SODIUM 5000 UNITS: 5000 INJECTION INTRAVENOUS; SUBCUTANEOUS at 21:14

## 2021-08-02 RX ADMIN — Medication 10 ML: at 13:17

## 2021-08-02 RX ADMIN — POTASSIUM BICARBONATE 40 MEQ: 782 TABLET, EFFERVESCENT ORAL at 12:42

## 2021-08-02 RX ADMIN — CHLORHEXIDINE GLUCONATE 15 ML: 0.12 RINSE ORAL at 00:09

## 2021-08-02 RX ADMIN — INSULIN LISPRO 2 UNITS: 100 INJECTION, SOLUTION INTRAVENOUS; SUBCUTANEOUS at 05:46

## 2021-08-02 RX ADMIN — ASPIRIN 81 MG: 81 TABLET, CHEWABLE ORAL at 09:58

## 2021-08-02 RX ADMIN — LEVETIRACETAM 1500 MG: 100 INJECTION, SOLUTION INTRAVENOUS at 15:40

## 2021-08-02 RX ADMIN — CHLORHEXIDINE GLUCONATE 15 ML: 0.12 RINSE ORAL at 12:42

## 2021-08-02 RX ADMIN — SODIUM CHLORIDE SOLN NEBU 3% 2 ML: 3 NEBU SOLN at 15:05

## 2021-08-02 RX ADMIN — LACOSAMIDE 100 MG: 50 TABLET, FILM COATED ORAL at 21:14

## 2021-08-02 RX ADMIN — SODIUM CHLORIDE SOLN NEBU 3% 2 ML: 3 NEBU SOLN at 07:39

## 2021-08-02 RX ADMIN — SODIUM CHLORIDE SOLN NEBU 3% 2 ML: 3 NEBU SOLN at 01:05

## 2021-08-02 RX ADMIN — Medication: at 10:01

## 2021-08-02 RX ADMIN — ALLOPURINOL 100 MG: 100 TABLET ORAL at 09:58

## 2021-08-02 RX ADMIN — INSULIN LISPRO 2 UNITS: 100 INJECTION, SOLUTION INTRAVENOUS; SUBCUTANEOUS at 01:16

## 2021-08-02 RX ADMIN — Medication: at 17:57

## 2021-08-02 RX ADMIN — LANSOPRAZOLE 30 MG: KIT at 09:59

## 2021-08-02 RX ADMIN — IPRATROPIUM BROMIDE AND ALBUTEROL SULFATE 3 ML: .5; 3 SOLUTION RESPIRATORY (INHALATION) at 19:55

## 2021-08-02 RX ADMIN — HEPARIN SODIUM 5000 UNITS: 5000 INJECTION INTRAVENOUS; SUBCUTANEOUS at 09:59

## 2021-08-02 RX ADMIN — IPRATROPIUM BROMIDE AND ALBUTEROL SULFATE 3 ML: .5; 3 SOLUTION RESPIRATORY (INHALATION) at 07:38

## 2021-08-02 RX ADMIN — SODIUM CHLORIDE SOLN NEBU 3% 2 ML: 3 NEBU SOLN at 20:13

## 2021-08-02 RX ADMIN — Medication 10 ML: at 05:39

## 2021-08-02 RX ADMIN — LEVETIRACETAM 1000 MG: 500 SOLUTION ORAL at 09:58

## 2021-08-02 RX ADMIN — Medication 10 ML: at 21:15

## 2021-08-02 RX ADMIN — IPRATROPIUM BROMIDE AND ALBUTEROL SULFATE 3 ML: .5; 3 SOLUTION RESPIRATORY (INHALATION) at 15:05

## 2021-08-02 RX ADMIN — IPRATROPIUM BROMIDE AND ALBUTEROL SULFATE 3 ML: .5; 3 SOLUTION RESPIRATORY (INHALATION) at 01:05

## 2021-08-02 RX ADMIN — INSULIN LISPRO 3 UNITS: 100 INJECTION, SOLUTION INTRAVENOUS; SUBCUTANEOUS at 12:41

## 2021-08-02 NOTE — PROGRESS NOTES
0730 Bedside and Verbal shift change report given to AUGUSTO Tracy (oncoming nurse) by Doreen Ma RN (offgoing nurse). Report included the following information SBAR, Kardex, ED Summary, Procedure Summary, Intake/Output, MAR, Recent Results and Cardiac Rhythm Ventricular paced . 1400 Bedside and Verbal shift change report given to Tiffany Burns RN (oncoming nurse) by AUGUSTO Tracy (offgoing nurse). Report included the following information SBAR, Kardex, ED Summary, Procedure Summary, Intake/Output, MAR and Recent Results.

## 2021-08-02 NOTE — PROGRESS NOTES
[facility]    ICU TEAM Progress Note    Name: Adamaris Martinez   : 3/9/1933   MRN: [id number]   Date: 2021      I  Subjective:   Progress Note: 2021    Reason for ICU Admission: Acute hypoxic respiratory failure     Interval history: From [facility] a 80 y. o. male with history of prostate cancer, CHF, constipation, hypertension, stroke, TIA, and seizures who presents to the emergency department by EMS as transfer from [facility] for respiratory distress.  Patient had initially been a transfer from [facility] after a hospitalization from  [facility].  Was there for altered mental status hypoxia was treated for hypoxic respiratory failure bilateral pleural effusions acute systolic CHF secondary to severe aortic stenosis hyponatremia hypokalemia elevated LFTs malnutrition and possible UTI was treated with ceftriaxone and Jerel Dey already had a PEG for dysphagia and protein calorie malnutrition prior to [facility] admission. He was at [facility] from [place] [id number].  While at [facility] today patient had an episode of vomiting and aspirated was initially started on high flow nasal cannula however was still hypoxic thus got intubated by [facility] staff. Wilbert Garcia was then transferred to the ED for further care.  Per family patient's decline started last year while he was having seizures/TIA patient became more encephalopathic and started to refuse eating and thus got a PEG for nutritional reasons. was staying at assisted living facility and had to be re-hospitalized after his PEG malfunctioned.  During that time he did have an infection, daughter could not exactly say where the infection was, but she thought it was an abdominal infection and since then he started having worsening heart failure and decreased heart function, and failure to thrive. Per records EF was 10 to 15%.  Per daughter, patient has never had a tracheostomy.  She states patient is awake at times, will open eyes, but does not track, does not follow commands, and does not speak. They wish patient to remain full code at this time.     While in ED patient was placed on mechanical ventilator, ABG was done which was consistent with hypoxic respiratory failure.  Patient was also given 1 L of IV fluids for fluid resuscitation due to sepsis. Initially blood pressure was okay but once patient was started on sedation blood pressure dropped.  Patient also had episodes of hypoxia due to vent asynchrony. Once PEEP was increased for hypoxia patient's pressure dropped. Started patient on Levophed via peripheral in ED. If hypotension worsens will need central line. Started on Zosyn. Updated the family at bedside, daughter and and son. Consent for central line and blood was  Received.  Patient to transfer to ICU for continued care. Overnight Events:  8/2: No acute event, pending EEG report, no neurological recovery, occasional twitching movement in his face but seems to be better than before. Dobutamine drip off.  8/1: On continuous EEG monitoring, no other acute event. Poorly responsive and occasional twitching movement  7/31: Appearnt seizure like activity last night, ativan given  7/30: No acute overnight events  7/29 No acute overnight events   7/28: No acute event, no much neurological recovery.  No witnessed seizure, blood pressure on the higher side.  Diuresing well  7/27: No acute event.  Good diuresis.  Still poorly responsive.  Remains on dobutamine.  Blood pressure maintained.  No witnessed seizure.   7/26: No acute event, blood pressure somewhat better but urine output decreasing.  Still on dobutamine at 5.  7/25 -- Getting PRBC now; started on Dobutamine      Active Problem List:     Problem List  Date Reviewed: 12/17/2020        Codes Class    Acute respiratory failure with hypoxia (HCC) ICD-10-CM: J96.01  ICD-9-CM: 518.81         Aspiration into respiratory tract ICD-10-CM: T17.908A  ICD-9-CM: 934.9         Hypokalemia ICD-10-CM: E87.6  ICD-9-CM: 276.8         Hypernatremia ICD-10-CM: E87.0  ICD-9-CM: 276.0         Moderate protein malnutrition (HCC) ICD-10-CM: E44.0  ICD-9-CM: 263.0         Failure to thrive (0-17) ICD-10-CM: R62.51  ICD-9-CM: 783.41         Bacteremia ICD-10-CM: R78.81  ICD-9-CM: 790.7         UTI (urinary tract infection) ICD-10-CM: N39.0  ICD-9-CM: 599.0         Dementia (HCC) ICD-10-CM: F03.90  ICD-9-CM: 294.20         Bedridden ICD-10-CM: Z74.01  ICD-9-CM: V49.84         Pressure ulcer ICD-10-CM: L89.90  ICD-9-CM: 707.00, 707.20         S/P percutaneous endoscopic gastrostomy (PEG) tube placement ([facility] ICD-10-CM: Z93.1  ICD-9-CM: V44.1         Dehydration ICD-10-CM: E86.0  ICD-9-CM: 276.51         Lactic acidosis ICD-10-CM: E87.2  ICD-9-CM: 276.2         Sepsis ([facility] ICD-10-CM: A41.9  ICD-9-CM: 038.9, 995.91         SOULEYMANE (acute kidney injury) ([facility] ICD-10-CM: N17.9  ICD-9-CM: 555. 9         Chronic systolic heart failure (HCC) ICD-10-CM: I50.22  ICD-9-CM: 428.22         Altered mental status ICD-10-CM: R41.82  ICD-9-CM: 780.97         Post-ictal state ([facility] ICD-10-CM: R56.9  ICD-9-CM: 780.39         Seizure ([facility] ICD-10-CM: R56.9  ICD-9-CM: 780.39         Atrial pacemaker lead displacement ICD-10-CM: T82.120A  ICD-9-CM: 996.01         Pacemaker ICD-10-CM: Z95.0  ICD-9-CM: V45.01     Overview Signed 3/22/2019  5:08 PM by Osman Swift MD     3/22/2019 dual chamber Medtronic pacer             Bradycardia ICD-10-CM: R00.1  ICD-9-CM: 427.89         Transaminitis ICD-10-CM: R74.01  ICD-9-CM: 790.4         Hypertensive urgency ICD-10-CM: I16.0  ICD-9-CM: 401.9         Second degree AV block, Mobitz type I ICD-10-CM: I44.1  ICD-9-CM: 426.13     Overview Signed 3/21/2019  6:14 PM by Osman Swift MD     Added automatically from request for surgery [id number]             Stage 3 chronic kidney disease ([facility] ICD-10-CM: N18.30  ICD-9-CM: 045. 3         Rotator cuff arthropathy of both shoulders ICD-10-CM: M12.811, O21.560  ICD-9-CM: 716.81         Cognitive impairment ICD-10-CM: R41.89  ICD-9-CM: 586.2         Systolic murmur CPS-46-VT: R01.1  ICD-9-CM: 785.2         Essential hypertension ICD-10-CM: I10  ICD-9-CM: 401.9         Gout ICD-10-CM: M10.9  ICD-9-CM: 274.9         Pure hypercholesterolemia ICD-10-CM: E78.00  ICD-9-CM: 272.0         History of prostate cancer ICD-10-CM: Z85.46  ICD-9-CM: V10.46         Chronic constipation ICD-10-CM: K59.09  ICD-9-CM: 564.00         Dysuria ICD-10-CM: R30.0  ICD-9-CM: 788. 1         Weight loss ICD-10-CM: R63.4  ICD-9-CM: 783.21         Adrenal mass ([facility] ICD-10-CM: E27.8  ICD-9-CM: 255.8     Overview Addendum 12/17/2018  9:57 AM by Shan Uriarte MD     Stable/non-functioning adenoma on imaging                   Past Medical History:      has a past medical history of Cancer [facility]), Chronic systolic heart failure ([facility] (12/7/2020), Constipation, Gout, Hyperlipemia, Hypertension, Pacemaker (2019), Stroke ([facility] Thrombocytopenia ([facility] (3/19/2019), and TIA (transient ischemic attack) (2013). Past Surgical History:      has a past surgical history that includes hx urological; hx prostatectomy; pr ins new/rplcmt prm pm w/transv eltrd atrial&vent (Right, 3/22/2019); pr ins new/rplcmt prm pacemakr w/trans eltrd atrial (N/A, 10/18/2019); and place percut gastrostomy tube (11/30/2020). Home Medications:     Prior to Admission medications    Medication Sig Start Date End Date Taking? Authorizing Provider   levETIRAcetam (KEPPRA) 750 mg tablet Take 1 Tab by mouth every twelve (12) hours. 12/11/20   Chau Willard, AMANDA   aspirin delayed-release 81 mg tablet Take 1 Tab by mouth daily. 7/13/20   Shabnam Marcelo MD   polyethylene glycol [facility]) 17 gram/dose powder Take 17 g by mouth daily as needed for Constipation. 7/13/20   Shabnam Marcelo MD   balsam peru-castor oiL (VENELEX) ointment Apply  to affected area three (3) times daily.  7/9/20   Luke Brown MD   famotidine (PEPCID) 20 mg tablet Take 1 Tab by mouth every evening. 20   Paulette Cain MD   acetaminophen (TYLENOL) 325 mg tablet Take 650 mg by mouth every six (6) hours as needed for Pain. Provider, Historical       Allergies/Social/Family History:     No Known Allergies   Social History     Tobacco Use    Smoking status: Never Smoker    Smokeless tobacco: Never Used   Substance Use Topics    Alcohol use: No      Family History   Problem Relation Age of Onset    No Known Problems Mother     No Known Problems Father        Review of Systems:     Not able to obtain due to patient medical condition    Objective:   Vital Signs:  Visit Vitals  BP (!) 107/59   Pulse 82   Temp 100 °F (37.8 °C)   Resp 12   Ht 5' 6\" (1.676 m)   Wt 70.9 kg (156 lb 4.9 oz)   SpO2 97%   BMI 25.23 kg/m²      O2 Device: Ventilator Temp (24hrs), Av.9 °F (37.7 °C), Min:99.6 °F (37.6 °C), Max:100.5 °F (38.1 °C)           Intake/Output:     Intake/Output Summary (Last 24 hours) at 2021 1530  Last data filed at 2021 1400  Gross per 24 hour   Intake 1090 ml   Output 1410 ml   Net -320 ml       Physical Exam:    General: No distress, appears stated age, intubated   Neurologic: I did not appreciate withdrawal to pain, perhaps occasional facial frowning on sternal rub, noticed occasional facial twitching as well   lungs: CTAB   Abdomen:  soft, non-tender. Bowel sounds normal. No masses,  no organomegaly  Cardiovascular:  Regular rate and rhythm, S1S2 present, without murmur or extra heart sounds and pedal pulses normal Generalized edema    LABS AND  DATA: Personally reviewed  Recent Labs     21  0542 21  0510   WBC 8.6 8.3   HGB 7.0* 7.2*   HCT 22.7* 23.6*   * 107*     Recent Labs     21  0540 21  0510    141   K 3.3* 3.6    105   CO2 30 31   BUN 58* 52*   CREA 1.03 1.07   * 193*   CA 9.3 9.4   MG 2.6* 2.6*   PHOS 2.7 2.9     No results for input(s): AP, TBIL, TP, ALB, GLOB, AML, LPSE in the last 72 hours.     No lab exists for component: SGOT, GPT, AMYP  No results for input(s): INR, PTP, APTT, INREXT in the last 72 hours. No results for input(s): PHI, PCO2I, PO2I, FIO2I in the last 72 hours. No results for input(s): CPK, CKMB, TROIQ, BNPP in the last 72 hours. Hemodynamics:   PAP:   CO:     Wedge:   CI:     CVP:    SVR:       PVR:       Ventilator Settings:  Mode Rate Tidal Volume Pressure FiO2 PEEP   Pressure control      12 cm H2O 30 % 5 cm H20     Peak airway pressure: 23 cm H2O    Minute ventilation: 8.08 l/min        MEDS: Reviewed    Chest X-Ray:  CXR Results  (Last 48 hours)    None            Assessment and Plan:   Seizure:  Encephalopathy:  Advanced dementia:  Appreciate neurology input, pending EEG report. Remains poorly responsive. Continue current antiepileptic medication. Follow-up on EEG result. Appreciate further neurology input dobutamine drip discontinued  Cardiogenic shock:  Ejection fraction of less than 30% at baseline, dobutamine drip weaned off. Seems to be euvolemic at this time. Renal function improving. Will diurese as needed. Septic shock: Resolved  Completed antibiotic course. Off pressors. Procalcitonin continue to trend down    Overall no improvement in his neurological status. I discussed this with his family on August 1. I discussed with him the need to consider tracheostomy versus comfort measure. I would appreciate further input from our palliative care team.    DISPOSITION  Stay in ICU    CRITICAL CARE CONSULTANT NOTE  I had a face to face encounter with the patient, reviewed and interpreted patient data including clinical events, labs, images, vital signs, I/O's, and examined patient.   I have discussed the case and the plan and management of the patient's care with the consulting services, the bedside nurses and the respiratory therapist.      NOTE OF PERSONAL INVOLVEMENT IN CARE   This patient has a high probability of imminent, clinically significant deterioration, which requires the highest level of preparedness to intervene urgently. I participated in the decision-making and personally managed or directed the management of the following life and organ supporting interventions that required my frequent assessment to treat or prevent imminent deterioration. I personally spent 40 minutes of critical care time. This is time spent at this critically ill patient's bedside actively involved in patient care as well as the coordination of care and discussions with the patient's family. This does not include any procedural time which has been billed separately. Luli Bansal M.D.   Staff Intensivist/Pulmonologist  [facility]  8/2/2021

## 2021-08-02 NOTE — PROGRESS NOTES
Bedside shift change report given to Byron Olson RN (oncoming nurse) by Kristin Ahumada RN (offgoing nurse). Report included the following information SBAR, Kardex, ED Summary, Procedure Summary, Intake/Output, MAR, Recent Results, Med Rec Status, Cardiac Rhythm paced, Alarm Parameters , Quality Measures and Dual Neuro Assessment. 1440: Pt noted to have rhythmic tic with eyes and eyelids that was not noted from previous shift, Dr. Jim Colon and Callum Sutton NP notified. Pt still has jaw twitching. 1540: EEG tech at bedside setting up 24hr EEG.     2109: Orders received for MRI, once 24hr EEG is complete. Bedside shift change report given to Loreto Engle RN (oncoming nurse) by Kristin Ahumada RN (offgoing nurse). Report included the following information SBAR, Kardex, ED Summary, Procedure Summary, Intake/Output, MAR, Recent Results, Med Rec Status, Cardiac Rhythm paced, Alarm Parameters , Quality Measures and Dual Neuro Assessment.

## 2021-08-02 NOTE — PROGRESS NOTES
Neurology Progress Note  Cleve Blanca NP    Patient: Emily Santos MRN: [id number]  SSN: [id number]    YOB: 1933  Age: 80 y.o. Sex: male        HPI: Emily Santos is a 80 y.o. male with a past medical history of CVA, HTN, hyperlipidemia, chronic systolic heart failure, prostate cancer, thrombocytopenia, pacemaker placement, constipation, and gout. He presented to the ER via EMS on 7/23/21 from [address] [place] [address] due to respiratory failure with concern for aspiration PNA, sepsis, and NSTEMI. He was intubated at [address] [place] [address]. He had just been discharged from [facility] that day after a stay for SOU[place] and elevated LFTs. Last night he began having left sided twitching of his face, arm and leg which improved with Ativan administration. He began twitching again this am and I was asked to see the patient. Patient had been on Keppra 750 mg bid at home. He has been on Keppra 500 mg bid and Vimpat 100 mg bid since admission. Change was most likely made due to decreased renal function. Patient has been off of sedation since 7/24 without neurologic improvement. Subjective:    No acute neuro events overnight. The patient continued to have intermittent twitches in all extremities as well as the facial was tagged on the continuous EEG monitor. Patient is on Keppra 1000 mg twice daily and Vimpat 200 mg twice daily. Patient is not sedated remains intubated and unable to follow commands.       Past Medical History:   Diagnosis Date    Cancer [facility])     prostate    Chronic systolic heart failure ([facility] 12/7/2020    Constipation     Gout     Hyperlipemia     Hypertension     Pacemaker 2019    Stroke ([facility]     [facility] 3[facility]19[facility]    TIA (transient ischemic attack) 2013     Family History   Problem Relation Age of Onset    No Known Problems Mother     No Known Problems Father      Social History     Tobacco Use    Smoking status: Never Smoker    Smokeless tobacco: Never Used   Substance Use Topics    Alcohol use: No      Prior to Admission Medications   Prescriptions Last Dose Informant Patient Reported? Taking?   acetaminophen (TYLENOL) 325 mg tablet   Yes No   Sig: Take 650 mg by mouth every six (6) hours as needed for Pain. aspirin delayed-release 81 mg tablet   No No   Sig: Take 1 Tab by mouth daily. balsam peru-castor oiL (VENELEX) ointment   No No   Sig: Apply  to affected area three (3) times daily. famotidine (PEPCID) 20 mg tablet   No No   Sig: Take 1 Tab by mouth every evening. levETIRAcetam (KEPPRA) 750 mg tablet   No No   Sig: Take 1 Tab by mouth every twelve (12) hours. polyethylene glycol (MIRALAX) 17 gram/dose powder   No No   Sig: Take 17 g by mouth daily as needed for Constipation. Facility-Administered Medications: None       No Known Allergies    Review of Systems:  Review of systems not obtained due to patient factors. AMS. Objective:     Vitals:    08/02/21 0301 08/02/21 0400 08/02/21 0500 08/02/21 0600   BP:  122/62 119/60    Pulse: 85 80 79 78   Resp: 12 12 14 12   Temp:  99.7 °F (37.6 °C)     SpO2: 98% 100% 100% 100%   Weight:       Height:          Physical Exam:  GENERAL:NAD, on ventilator. SKIN: Warm, dry, color appropriate for ethnicity. Neurologic Exam:  Mental Status:  No eye opening, no command following. Language:    Mute ETT in place. Cranial Nerves:   Right pupil 4mm round sluggish, left pupil 3 mm sluggish. Forward gaze. No blink to threat. Resists eye opening. Motor:         Bulk and tone normal.      5/5 power in all extremities proximally and distally. No involuntary movements noted on exam today. Sensation:    Weak withdrawal from pain in bilateral lower extremities and left upper.   Babinski's  Mute    Labs:  Lab Results   Component Value Date/Time    WBC 8.3 08/01/2021 05:10 AM    HGB 7.2 (L) 08/01/2021 05:10 AM    HCT 23.6 (L) 08/01/2021 05:10 AM    PLATELET 482 (L) 27/69/6750 05:10 AM    MCV 95.9 08/01/2021 05:10 AM      Lab Results   Component Value Date/Time    Sodium 141 08/01/2021 05:10 AM    Potassium 3.6 08/01/2021 05:10 AM    Chloride 105 08/01/2021 05:10 AM    CO2 31 08/01/2021 05:10 AM    Anion gap 5 08/01/2021 05:10 AM    Glucose 193 (H) 08/01/2021 05:10 AM    BUN 52 (H) 08/01/2021 05:10 AM    Creatinine 1.07 08/01/2021 05:10 AM    BUN/Creatinine ratio 49 (H) 08/01/2021 05:10 AM    GFR est AA >60 08/01/2021 05:10 AM    GFR est non-AA >60 08/01/2021 05:10 AM    Calcium 9.4 08/01/2021 05:10 AM     Lab Results   Component Value Date/Time    CK 75 11/17/2020 04:24 PM    Troponin-I, Qt. 0.55 (H) 07/24/2021 11:11 AM       Imaging:  CT Results (maximum last 3): Results from [place] encounter on 07/23/21    CT HEAD WO CONT    Narrative  CLINICAL HISTORY: encephalopathy  INDICATION: encephalopathy  COMPARISON: 121. CT dose reduction was achieved through use of a standardized protocol tailored  for this examination and automatic exposure control for dose modulation. TECHNIQUE: Serial axial images with a collimation of 5 mm were obtained from the  skull base through the vertex  FINDINGS:  There is sulcal and ventricular prominence. Confluent periventricular and  scattered foci of hypodensity in the cerebral white matter. There is no evidence  of an acute infarction, hemorrhage, or mass-effect. There is no evidence of  midline shift or hydrocephalus. Posterior fossa structures are unremarkable. No  extra-axial collections are seen. Mastoid air cells are well pneumatized and clear. Scattered remote infarctions left basal ganglia, periventricular white matter. Right parietal lobe. Impression  No acute intracranial process. Imaging findings consistent with severe chronic microvascular ischemic change. There is a severe degree of cerebral atrophy.       Results from [place] encounter on 01/06/21    CT HEAD WO CONT    Narrative  EXAM:  CT HEAD WO CONT  INDICATION:  seizure, patient brought by EMS from [facility] port rehabilitation with  progressive altered mental status. TECHNIQUE:  Thin axial images were obtained through the calvarium and saved with standard  and bone window algorithm. Coronal and sagittal reconstructions were generated. CT dose reduction was achieved through use of a standardized protocol tailored  for this examination and automatic exposure control for dose modulation. COMPARISON: CT head 11/23/20 and 11/17/20. FINDINGS:  Again demonstrated is marked generalized prominence of sulci and ventricles  consistent with prominent volume loss/atrophy which is been present on the prior  exams. Also again noted is patchy decreased attenuation in the cerebral white matter  similar to prior exam.  Old left basal ganglia infarct again noted. There is no evidence of acute intracranial hemorrhage or abnormal extra-axial  fluid collections. No findings that would suggest an acute brain infarction. Impression  IMPRESSION: Chronic generalized brain volume loss without significant change  since the prior exam.      Results from [place] encounter on 11/17/20    CT HEAD WO CONT    Narrative  EXAM: CT HEAD WO CONT    INDICATION: stroke    COMPARISON: November 17, 2020. CONTRAST: None. TECHNIQUE: Unenhanced CT of the head was performed using 5 mm images. Brain and  bone windows were generated. Coronal and sagittal reformats. CT dose reduction  was achieved through use of a standardized protocol tailored for this  examination and automatic exposure control for dose modulation. FINDINGS:  The ventricles and sulci are stable in size, shape and configuration. . There is  unchanged diffuse periventricular white matter disease. Old lacunar infarct in  the left basal ganglia is unchanged. Old bilateral occipital infarcts are  unchanged. There is no intracranial hemorrhage, extra-axial collection, or mass  effect. The basilar cisterns are open. No CT evidence of acute infarct.     The bone windows demonstrate no abnormalities. The visualized portions of the  paranasal sinuses and mastoid air cells are clear. Impression  IMPRESSION:  No acute intracranial process. No significant change from the prior study. Assessment:     Hospital Problems  Date Reviewed: 12/17/2020        Codes Class Noted POA    Acute respiratory failure with hypoxia [facility]) ICD-10-CM: J96.01  ICD-9-CM: 518.81  7/23/2021 Unknown        Aspiration into respiratory tract ICD-10-CM: T17.908A  ICD-9-CM: 934.9  7/23/2021 Unknown            Plan:     Seizure Episode   -Initial routine EEG showing No clear focal abnormalities or epileptiform activity seen.    -Patient was placed on continuous EEG due to continuous twitching AED was adjusted  -Continue on Keppra 1000 mg twice daily and Vimpat 200 mg twice daily   - CT head on 7/28 showed scattered remote infarctions in the left basal ganglia, periventricular white matter and right parietal lobe. No acute intracranial process.    -As needed Ativan for seizure activity lasting longer than 3 minutes. Patient currently not on sedation; on ventilator.   - Avoid fluoroquinolones and 4th generation cephalosporins as can lower seizure threshold   - Seizure precautions  -Patient continuously had twitching overnight and was on continuous EEG which I tagged; will await for read and adjust AED if needed    Metabolic/infectious derangements  -Patient with low H&H 7.0/22.7 low platelet level of 631  -Low potassium level repleted  -BUN continue to trend up to 52-58              -Continue to monitor              -Urinalysis dated 7/25/2021 showed 1+ bacteria; unsure if patient was treated. -Will defer management to intensivist   -Please treat any metabolic/infectious derangements as this can decrease seizure threshold     Further recommendations to follow from Dr. Vern Villeda. Thank you for this consult and participating in the care of this patient.   Signed By: Danette Cantrell NP     August 2, 2021        Neurology staff:    I examined the patient at the bedside. I discussed the case and agree with the plans and documentation above from NP Juan Carlos Brown. Mr. 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 Hospitalization concerning for breakthrough seizures. Overnight EEG completed and pending review. He is on escalated Keppra and lacosamide. No acute changes. On exam, he is off sedation. He grimaces to eye opening. Not following commands. Remains intubated. He has some spontaneous movement of the left hand which is not rhythmic. No twitching observed. 59-year-old gentleman with seizure disorder. Awaiting EEG interpretation. He seems more stabilized today. Continue the anticonvulsants. MRI should be done.   Following  Naaman Press, DO  Neurologist  Diplomate, American Board of Psychiatry and Neurology  Board Certified, Adult Neurology and Brain Injury Medicine

## 2021-08-02 NOTE — PROGRESS NOTES
1930: Bedside and Verbal shift change report given to UZIEL Jones, RN (oncoming nurse) by ALMAZ Hunter RN (offgoing nurse). Report included the following information SBAR, Kardex, Intake/Output, MAR, Accordion, Recent Results, Cardiac Rhythm NSR, Alarm Parameters  and Dual Neuro Assessment. 0730: Bedside and Verbal shift change report given to DONATO Garzon RN (oncoming nurse) by Leticia Jones RN (offgoing nurse). Report included the following information SBAR, Kardex, Intake/Output, MAR, Accordion, Recent Results, Cardiac Rhythm NSR, Alarm Parameters  and Dual Neuro Assessment.

## 2021-08-02 NOTE — PROGRESS NOTES
Notified by RN that patient was noted to have rhythmic tics with his eyelids. Patient seen and examined. He remains intubated and on no sedation. No command following. No eye-opening. Pupils equal and round but sluggish response to light. No withdrawal to pain in right upper extremity and right lower extremity. Some subtle withdrawal to pain in left upper extremity and left lower extremity. Some spontaneous movement in LUE. Noted rhythmic movement when opening patient's eyes. Persistent Left facial/jaw twitching noted. Stat 24-hour EEG ordered. Changed Keppra from oral solution to IV. Will increase Keppra to 1500 mg every 12 hours. Continue with Vimpat 100 mg every 12 hours. Okay to start low dose propofol if needed    1540: Patient appears to have spontaneously stopped twitching movements with no intervention. EEG tech at bedside. Plan discussed with Dr. Melva Hadley, and RN. Daria Ty, [facility]  Neurocritical care NP    Addendum: 80 patient's sister and brother at bedside. I updated them on the events that occurred today and plan of care. They informed me that after patient had his stroke last year and seizures that he essentially had some cognitive dysfunction but was able to move all of his extremities and did not have any weakness. His daughter Samaria Hernandes reported that she noticed that from being recently at [facility] and then going to [facility] he has had some weakness on his right side, but more in the right arm. I am informed that patient presented this way since admission. Will obtain an MRI of Brain without contrast after EEG to assess for any new ischemia in the setting of no improvement in neurological condition and recurrence of seizure-like activity. I updated the patient's brother and sister.     Discussed with Dr. Jaymie Cavanaugh, RN, and Lisa Oates, [facility]  Neurocritical care NP

## 2021-08-03 LAB
ANION GAP SERPL CALC-SCNC: 7 MMOL/L (ref 5–15)
BASOPHILS # BLD: 0 K/UL (ref 0–0.1)
BASOPHILS NFR BLD: 0 % (ref 0–1)
BNP SERPL-MCNC: ABNORMAL PG/ML
BUN SERPL-MCNC: 56 MG/DL (ref 6–20)
BUN/CREAT SERPL: 66 (ref 12–20)
CALCIUM SERPL-MCNC: 9.2 MG/DL (ref 8.5–10.1)
CHLORIDE SERPL-SCNC: 108 MMOL/L (ref 97–108)
CO2 SERPL-SCNC: 29 MMOL/L (ref 21–32)
CREAT SERPL-MCNC: 0.85 MG/DL (ref 0.7–1.3)
DIFFERENTIAL METHOD BLD: ABNORMAL
EOSINOPHIL # BLD: 0.1 K/UL (ref 0–0.4)
EOSINOPHIL NFR BLD: 1 % (ref 0–7)
ERYTHROCYTE [DISTWIDTH] IN BLOOD BY AUTOMATED COUNT: 19 % (ref 11.5–14.5)
GLUCOSE BLD STRIP.AUTO-MCNC: 181 MG/DL (ref 65–117)
GLUCOSE BLD STRIP.AUTO-MCNC: 193 MG/DL (ref 65–117)
GLUCOSE BLD STRIP.AUTO-MCNC: 194 MG/DL (ref 65–117)
GLUCOSE SERPL-MCNC: 182 MG/DL (ref 65–100)
HCT VFR BLD AUTO: 21.6 % (ref 36.6–50.3)
HGB BLD-MCNC: 6.7 G/DL (ref 12.1–17)
HISTORY CHECKED?,CKHIST: NORMAL
IMM GRANULOCYTES # BLD AUTO: 0.1 K/UL (ref 0–0.04)
IMM GRANULOCYTES NFR BLD AUTO: 1 % (ref 0–0.5)
LYMPHOCYTES # BLD: 1.3 K/UL (ref 0.8–3.5)
LYMPHOCYTES NFR BLD: 16 % (ref 12–49)
MAGNESIUM SERPL-MCNC: 2.6 MG/DL (ref 1.6–2.4)
MCH RBC QN AUTO: 29.3 PG (ref 26–34)
MCHC RBC AUTO-ENTMCNC: 31 G/DL (ref 30–36.5)
MCV RBC AUTO: 94.3 FL (ref 80–99)
MONOCYTES # BLD: 0.5 K/UL (ref 0–1)
MONOCYTES NFR BLD: 6 % (ref 5–13)
NEUTS SEG # BLD: 6.4 K/UL (ref 1.8–8)
NEUTS SEG NFR BLD: 76 % (ref 32–75)
NRBC # BLD: 0 K/UL (ref 0–0.01)
NRBC BLD-RTO: 0 PER 100 WBC
PHOSPHATE SERPL-MCNC: 3 MG/DL (ref 2.6–4.7)
PLATELET # BLD AUTO: 140 K/UL (ref 150–400)
PMV BLD AUTO: 10.1 FL (ref 8.9–12.9)
POTASSIUM SERPL-SCNC: 3.9 MMOL/L (ref 3.5–5.1)
RBC # BLD AUTO: 2.29 M/UL (ref 4.1–5.7)
RBC MORPH BLD: ABNORMAL
SERVICE CMNT-IMP: ABNORMAL
SODIUM SERPL-SCNC: 144 MMOL/L (ref 136–145)
WBC # BLD AUTO: 8.4 K/UL (ref 4.1–11.1)

## 2021-08-03 PROCEDURE — 74011250636 HC RX REV CODE- 250/636: Performed by: NURSE PRACTITIONER

## 2021-08-03 PROCEDURE — 85025 COMPLETE CBC W/AUTO DIFF WBC: CPT

## 2021-08-03 PROCEDURE — 94003 VENT MGMT INPAT SUBQ DAY: CPT

## 2021-08-03 PROCEDURE — 83880 ASSAY OF NATRIURETIC PEPTIDE: CPT

## 2021-08-03 PROCEDURE — 74011000250 HC RX REV CODE- 250: Performed by: INTERNAL MEDICINE

## 2021-08-03 PROCEDURE — 2709999900 HC NON-CHARGEABLE SUPPLY

## 2021-08-03 PROCEDURE — 86901 BLOOD TYPING SEROLOGIC RH(D): CPT

## 2021-08-03 PROCEDURE — 83735 ASSAY OF MAGNESIUM: CPT

## 2021-08-03 PROCEDURE — 77030018798 HC PMP KT ENTRL FED COVD -A

## 2021-08-03 PROCEDURE — 74011000258 HC RX REV CODE- 258: Performed by: NURSE PRACTITIONER

## 2021-08-03 PROCEDURE — 74011250637 HC RX REV CODE- 250/637: Performed by: ANESTHESIOLOGY

## 2021-08-03 PROCEDURE — 36430 TRANSFUSION BLD/BLD COMPNT: CPT

## 2021-08-03 PROCEDURE — 99232 SBSQ HOSP IP/OBS MODERATE 35: CPT | Performed by: PSYCHIATRY & NEUROLOGY

## 2021-08-03 PROCEDURE — 36591 DRAW BLOOD OFF VENOUS DEVICE: CPT

## 2021-08-03 PROCEDURE — P9016 RBC LEUKOCYTES REDUCED: HCPCS

## 2021-08-03 PROCEDURE — 84100 ASSAY OF PHOSPHORUS: CPT

## 2021-08-03 PROCEDURE — 74011000250 HC RX REV CODE- 250: Performed by: NURSE PRACTITIONER

## 2021-08-03 PROCEDURE — 65610000006 HC RM INTENSIVE CARE

## 2021-08-03 PROCEDURE — P9040 RBC LEUKOREDUCED IRRADIATED: HCPCS

## 2021-08-03 PROCEDURE — 94640 AIRWAY INHALATION TREATMENT: CPT

## 2021-08-03 PROCEDURE — 74011250636 HC RX REV CODE- 250/636: Performed by: INTERNAL MEDICINE

## 2021-08-03 PROCEDURE — 80048 BASIC METABOLIC PNL TOTAL CA: CPT

## 2021-08-03 PROCEDURE — 74011636637 HC RX REV CODE- 636/637: Performed by: HEALTH CARE PROVIDER

## 2021-08-03 PROCEDURE — 82962 GLUCOSE BLOOD TEST: CPT

## 2021-08-03 PROCEDURE — 86923 COMPATIBILITY TEST ELECTRIC: CPT

## 2021-08-03 PROCEDURE — 74011250637 HC RX REV CODE- 250/637: Performed by: NURSE PRACTITIONER

## 2021-08-03 RX ORDER — DEXMEDETOMIDINE HYDROCHLORIDE 4 UG/ML
.1-1.5 INJECTION, SOLUTION INTRAVENOUS
Status: DISCONTINUED | OUTPATIENT
Start: 2021-08-03 | End: 2021-08-11 | Stop reason: HOSPADM

## 2021-08-03 RX ORDER — FENTANYL CITRATE 50 UG/ML
50 INJECTION, SOLUTION INTRAMUSCULAR; INTRAVENOUS
Status: DISCONTINUED | OUTPATIENT
Start: 2021-08-03 | End: 2021-08-11 | Stop reason: HOSPADM

## 2021-08-03 RX ORDER — SODIUM CHLORIDE 9 MG/ML
250 INJECTION, SOLUTION INTRAVENOUS AS NEEDED
Status: DISCONTINUED | OUTPATIENT
Start: 2021-08-03 | End: 2021-08-11 | Stop reason: HOSPADM

## 2021-08-03 RX ADMIN — Medication: at 17:35

## 2021-08-03 RX ADMIN — HEPARIN SODIUM 5000 UNITS: 5000 INJECTION INTRAVENOUS; SUBCUTANEOUS at 09:48

## 2021-08-03 RX ADMIN — CHLORHEXIDINE GLUCONATE 15 ML: 0.12 RINSE ORAL at 12:37

## 2021-08-03 RX ADMIN — LEVETIRACETAM 1500 MG: 100 INJECTION, SOLUTION INTRAVENOUS at 15:20

## 2021-08-03 RX ADMIN — Medication 10 ML: at 21:04

## 2021-08-03 RX ADMIN — SODIUM CHLORIDE SOLN NEBU 3% 2 ML: 3 NEBU SOLN at 18:25

## 2021-08-03 RX ADMIN — Medication 10 ML: at 05:15

## 2021-08-03 RX ADMIN — HEPARIN SODIUM 5000 UNITS: 5000 INJECTION INTRAVENOUS; SUBCUTANEOUS at 22:29

## 2021-08-03 RX ADMIN — CHLORHEXIDINE GLUCONATE 15 ML: 0.12 RINSE ORAL at 01:00

## 2021-08-03 RX ADMIN — LANSOPRAZOLE 30 MG: KIT at 09:48

## 2021-08-03 RX ADMIN — LEVETIRACETAM 1500 MG: 100 INJECTION, SOLUTION INTRAVENOUS at 05:15

## 2021-08-03 RX ADMIN — SODIUM CHLORIDE SOLN NEBU 3% 2 ML: 3 NEBU SOLN at 01:00

## 2021-08-03 RX ADMIN — FENTANYL CITRATE 50 MCG: 50 INJECTION, SOLUTION INTRAMUSCULAR; INTRAVENOUS at 20:56

## 2021-08-03 RX ADMIN — INSULIN LISPRO 2 UNITS: 100 INJECTION, SOLUTION INTRAVENOUS; SUBCUTANEOUS at 00:00

## 2021-08-03 RX ADMIN — INSULIN LISPRO 2 UNITS: 100 INJECTION, SOLUTION INTRAVENOUS; SUBCUTANEOUS at 06:46

## 2021-08-03 RX ADMIN — LACOSAMIDE 100 MG: 50 TABLET, FILM COATED ORAL at 09:48

## 2021-08-03 RX ADMIN — Medication 10 ML: at 15:21

## 2021-08-03 RX ADMIN — IPRATROPIUM BROMIDE AND ALBUTEROL SULFATE 3 ML: .5; 3 SOLUTION RESPIRATORY (INHALATION) at 01:00

## 2021-08-03 RX ADMIN — IPRATROPIUM BROMIDE AND ALBUTEROL SULFATE 3 ML: .5; 3 SOLUTION RESPIRATORY (INHALATION) at 07:44

## 2021-08-03 RX ADMIN — INSULIN LISPRO 2 UNITS: 100 INJECTION, SOLUTION INTRAVENOUS; SUBCUTANEOUS at 12:37

## 2021-08-03 RX ADMIN — INSULIN LISPRO 2 UNITS: 100 INJECTION, SOLUTION INTRAVENOUS; SUBCUTANEOUS at 17:27

## 2021-08-03 RX ADMIN — Medication: at 09:49

## 2021-08-03 RX ADMIN — IPRATROPIUM BROMIDE AND ALBUTEROL SULFATE 3 ML: .5; 3 SOLUTION RESPIRATORY (INHALATION) at 18:24

## 2021-08-03 RX ADMIN — SODIUM CHLORIDE SOLN NEBU 3% 2 ML: 3 NEBU SOLN at 07:44

## 2021-08-03 RX ADMIN — LACOSAMIDE 100 MG: 50 TABLET, FILM COATED ORAL at 20:50

## 2021-08-03 RX ADMIN — ALLOPURINOL 100 MG: 100 TABLET ORAL at 09:48

## 2021-08-03 RX ADMIN — ASPIRIN 81 MG: 81 TABLET, CHEWABLE ORAL at 09:48

## 2021-08-03 NOTE — PROGRESS NOTES
[facility]    ICU TEAM Progress Note    Name: Cristofer Macdonald   : 3/9/1933   MRN: [id number]   Date: 8/3/2021      I  Subjective:   Progress Note: 8/3/2021      Reason for ICU Admission: Acute hypoxic respiratory failure     Interval history: From [facility] a 80 y. o. male with history of prostate cancer, CHF, constipation, hypertension, stroke, TIA, and seizures who presents to the emergency department by EMS as transfer from [address] [place] [address] for respiratory distress.  Patient had initially been a transfer from [facility] after a hospitalization from  [facility].  Was there for altered mental status hypoxia was treated for hypoxic respiratory failure bilateral pleural effusions acute systolic CHF secondary to severe aortic stenosis hyponatremia hypokalemia elevated LFTs malnutrition and possible UTI was treated with ceftriaxone and Enrique Newsome already had a PEG for dysphagia and protein calorie malnutrition prior to [facility] admission. He was at [address] [place] [address] from [place] [address].  While at [address] [place] [address] today patient had an episode of vomiting and aspirated was initially started on high flow nasal cannula however was still hypoxic thus got intubated by [address] [place] [address] staff. John Krueger was then transferred to the ED for further care.  Per family patient's decline started last year while he was having seizures/TIA patient became more encephalopathic and started to refuse eating and thus got a PEG for nutritional reasons. was staying at assisted living facility and had to be re-hospitalized after his PEG malfunctioned.  During that time he did have an infection, daughter could not exactly say where the infection was, but she thought it was an abdominal infection and since then he started having worsening heart failure and decreased heart function, and failure to thrive. Per records EF was 10 to 15%.  Per daughter, patient has never had a tracheostomy.  She states patient is awake at times, will open eyes, but does not track, does not follow commands, and does not speak. They wish patient to remain full code at this time.     While in ED patient was placed on mechanical ventilator, ABG was done which was consistent with hypoxic respiratory failure.  Patient was also given 1 L of IV fluids for fluid resuscitation due to sepsis. Initially blood pressure was okay but once patient was started on sedation blood pressure dropped.  Patient also had episodes of hypoxia due to vent asynchrony. Once PEEP was increased for hypoxia patient's pressure dropped. Started patient on Levophed via peripheral in ED. If hypotension worsens will need central line. Started on Zosyn. Updated the family at bedside, daughter and and son. Consent for central line and blood was  Received.  Patient to transfer to ICU for continued care. Overnight Events:  8/3: No acute event, remained poorly responsive, remained on EEG overnight. Still having occasional facial tremors. Hemodynamically remained stable  8/2: No acute event, pending EEG report, no neurological recovery, occasional twitching movement in his face but seems to be better than before. Dobutamine drip off.  8/1: On continuous EEG monitoring, no other acute event.  Poorly responsive and occasional twitching movement  7/31: Appearnt seizure like activity last night, ativan given  7/30: No acute overnight events  7/29 No acute overnight events   7/28: No acute event, no much neurological recovery.  No witnessed seizure, blood pressure on the higher side.  Diuresing well  7/27: No acute event.  Good diuresis.  Still poorly responsive.  Remains on dobutamine.  Blood pressure maintained.  No witnessed seizure.   7/26: No acute event, blood pressure somewhat better but urine output decreasing.  Still on dobutamine at 5.  7/25 -- Getting PRBC now; started on Dobutamine      Active Problem List:     Problem List  Date Reviewed: 12/17/2020        Codes Class    Acute respiratory failure with hypoxia [facility]) ICD-10-CM: J96.01  ICD-9-CM: 518.81         Aspiration into respiratory tract ICD-10-CM: T17.908A  ICD-9-CM: 934.9         Hypokalemia ICD-10-CM: E87.6  ICD-9-CM: 276.8         Hypernatremia ICD-10-CM: E87.0  ICD-9-CM: 276.0         Moderate protein malnutrition (HCC) ICD-10-CM: E44.0  ICD-9-CM: 263.0         Failure to thrive (0-17) ICD-10-CM: R62.51  ICD-9-CM: 783.41         Bacteremia ICD-10-CM: R78.81  ICD-9-CM: 790.7         UTI (urinary tract infection) ICD-10-CM: N39.0  ICD-9-CM: 599.0         Dementia (HCC) ICD-10-CM: F03.90  ICD-9-CM: 294.20         Bedridden ICD-10-CM: Z74.01  ICD-9-CM: V49.84         Pressure ulcer ICD-10-CM: L89.90  ICD-9-CM: 707.00, 707.20         S/P percutaneous endoscopic gastrostomy (PEG) tube placement ([facility] ICD-10-CM: Z93.1  ICD-9-CM: V44.1         Dehydration ICD-10-CM: E86.0  ICD-9-CM: 276.51         Lactic acidosis ICD-10-CM: E87.2  ICD-9-CM: 276.2         Sepsis ([facility] ICD-10-CM: A41.9  ICD-9-CM: 038.9, 995.91         SOULEYMANE (acute kidney injury) ([facility] ICD-10-CM: N17.9  ICD-9-CM: 188. 9         Chronic systolic heart failure (HCC) ICD-10-CM: I50.22  ICD-9-CM: 428.22         Altered mental status ICD-10-CM: R41.82  ICD-9-CM: 780.97         Post-ictal state ([facility] ICD-10-CM: R56.9  ICD-9-CM: 780.39         Seizure ([facility] ICD-10-CM: R56.9  ICD-9-CM: 780.39         Atrial pacemaker lead displacement ICD-10-CM: T82.120A  ICD-9-CM: 996.01         Pacemaker ICD-10-CM: Z95.0  ICD-9-CM: V45.01     Overview Signed 3/22/2019  5:08 PM by Jyoti Alejandro MD     3/22/2019 dual chamber Medtronic pacer             Bradycardia ICD-10-CM: R00.1  ICD-9-CM: 427.89         Transaminitis ICD-10-CM: R74.01  ICD-9-CM: 790.4         Hypertensive urgency ICD-10-CM: I16.0  ICD-9-CM: 401.9         Second degree AV block, Mobitz type I ICD-10-CM: I44.1  ICD-9-CM: 426.13     Overview Signed 3/21/2019  6:14 PM by Jyoti Alejandro MD     Added automatically from request for surgery [id number]             Stage 3 chronic kidney disease ([facility] ICD-10-CM: N18.30  ICD-9-CM: 952. 3         Rotator cuff arthropathy of both shoulders ICD-10-CM: M12.811, M12.812  ICD-9-CM: 716.81         Cognitive impairment ICD-10-CM: R41.89  ICD-9-CM: 241.4         Systolic murmur [facility]-LF: R01.1  ICD-9-CM: 785.2         Essential hypertension ICD-10-CM: I10  ICD-9-CM: 401.9         Gout ICD-10-CM: M10.9  ICD-9-CM: 274.9         Pure hypercholesterolemia ICD-10-CM: E78.00  ICD-9-CM: 272.0         History of prostate cancer ICD-10-CM: Z85.46  ICD-9-CM: V10.46         Chronic constipation ICD-10-CM: K59.09  ICD-9-CM: 564.00         Dysuria ICD-10-CM: R30.0  ICD-9-CM: 278. 1         Weight loss ICD-10-CM: R63.4  ICD-9-CM: 783.21         Adrenal mass ([facility] ICD-10-CM: E27.8  ICD-9-CM: 255.8     Overview Addendum 12/17/2018  9:57 AM by Arti Ortega MD     Stable/non-functioning adenoma on imaging                   Past Medical History:      has a past medical history of Cancer [facility]), Chronic systolic heart failure ([facility] (12/7/2020), Constipation, Gout, Hyperlipemia, Hypertension, Pacemaker (2019), Stroke ([facility] Thrombocytopenia ([facility] (3/19/2019), and TIA (transient ischemic attack) (2013). Past Surgical History:      has a past surgical history that includes hx urological; hx prostatectomy; pr ins new/rplcmt prm pm w/transv eltrd atrial&vent (Right, 3/22/2019); pr ins new/rplcmt prm pacemakr w/trans eltrd atrial (N/A, 10/18/2019); and place percut gastrostomy tube (11/30/2020). Home Medications:     Prior to Admission medications    Medication Sig Start Date End Date Taking? Authorizing Provider   levETIRAcetam (KEPPRA) 750 mg tablet Take 1 Tab by mouth every twelve (12) hours. 12/11/20   Marcelo Back NP   aspirin delayed-release 81 mg tablet Take 1 Tab by mouth daily. 7/13/20   Micha Hennessy MD   polyethylene glycol [facility]) 17 gram/dose powder Take 17 g by mouth daily as needed for Constipation.  7/13/20   Micha Hennessy MD balsam peru-castor oiL (VENELEX) ointment Apply  to affected area three (3) times daily. 20   Thi Velazquez MD   famotidine (PEPCID) 20 mg tablet Take 1 Tab by mouth every evening. 20   Thi Velazquez MD   acetaminophen (TYLENOL) 325 mg tablet Take 650 mg by mouth every six (6) hours as needed for Pain. Provider, Historical       Allergies/Social/Family History:     No Known Allergies   Social History     Tobacco Use    Smoking status: Never Smoker    Smokeless tobacco: Never Used   Substance Use Topics    Alcohol use: No      Family History   Problem Relation Age of Onset    No Known Problems Mother     No Known Problems Father        Review of Systems:     Not able to obtain due to patient medical condition    Objective:   Vital Signs:  Visit Vitals  /65   Pulse 80   Temp 99.4 °F (37.4 °C)   Resp 12   Ht 5' 6\" (1.676 m)   Wt 75.4 kg (166 lb 3.6 oz)   SpO2 100%   BMI 26.83 kg/m²      O2 Device: Ventilator Temp (24hrs), Av.7 °F (37.6 °C), Min:99.4 °F (37.4 °C), Max:100 °F (37.8 °C)           Intake/Output:     Intake/Output Summary (Last 24 hours) at 8/3/2021 1106  Last data filed at 8/3/2021 0700  Gross per 24 hour   Intake 1120 ml   Output 1240 ml   Net -120 ml       Physical Exam:  General: No distress, appears stated age, intubated   Neurologic: Moves left arm to painful stimuli, minimal movement associated with tremor. , occasional facial frowning on sternal rub, noticed occasional facial twitching as well   lungs: CTAB   Abdomen:  soft, non-tender.  Bowel sounds normal. No masses,  no organomegaly  Cardiovascular:  Regular rate and rhythm, S1S2 present, without murmur or extra heart sounds and pedal pulses normal Generalized edema    LABS AND  DATA: Personally reviewed  Recent Labs     21  0359 21  0542   WBC 8.4 8.6   HGB 6.7* 7.0*   HCT 21.6* 22.7*   * 126*     Recent Labs     21  0359 21  0540    141   K 3.9 3.3*    106   CO2 29 30   BUN 56* 58*   CREA 0.85 1.03   * 183*   CA 9.2 9.3   MG 2.6* 2.6*   PHOS 3.0 2.7     No results for input(s): AP, TBIL, TP, ALB, GLOB, AML, LPSE in the last 72 hours. No lab exists for component: SGOT, GPT, AMYP  No results for input(s): INR, PTP, APTT, INREXT in the last 72 hours. No results for input(s): PHI, PCO2I, PO2I, FIO2I in the last 72 hours. No results for input(s): CPK, CKMB, TROIQ, BNPP in the last 72 hours. Hemodynamics:   PAP:   CO:     Wedge:   CI:     CVP:    SVR:       PVR:       Ventilator Settings:  Mode Rate Tidal Volume Pressure FiO2 PEEP   Assist control, Pressure control      12 cm H2O 30 % 5 cm H20     Peak airway pressure: 24 cm H2O    Minute ventilation: 7.21 l/min        MEDS: Reviewed    Chest X-Ray:  CXR Results  (Last 48 hours)    None          Assessment and Plan:   Seizure:  Encephalopathy:  Advanced dementia:  Appreciate neurology input, pending EEG report.  Remains poorly responsive.  Continue current antiepileptic medication.  Follow-up on EEG result. Appreciate further neurology input   Cardiogenic shock:  Ejection fraction of less than 30% at baseline, dobutamine drip weaned off.  Seems to be euvolemic at this time.  Renal function improving.  Will diurese as needed. Septic shock: Resolved  Completed antibiotic course.  Off pressors.  Procalcitonin continue to trend down    Transfuse 1 unit of packed RBC today for hemoglobin below seven. No evidence of active  significant bleed. DISPOSITION  Stay in ICU    CRITICAL CARE CONSULTANT NOTE  I had a face to face encounter with the patient, reviewed and interpreted patient data including clinical events, labs, images, vital signs, I/O's, and examined patient.   I have discussed the case and the plan and management of the patient's care with the consulting services, the bedside nurses and the respiratory therapist.      NOTE OF PERSONAL INVOLVEMENT IN CARE   This patient has a high probability of imminent, clinically significant deterioration, which requires the highest level of preparedness to intervene urgently. I participated in the decision-making and personally managed or directed the management of the following life and organ supporting interventions that required my frequent assessment to treat or prevent imminent deterioration. I personally spent 40 minutes of critical care time. This is time spent at this critically ill patient's bedside actively involved in patient care as well as the coordination of care and discussions with the patient's family. This does not include any procedural time which has been billed separately. Nicko Forbes M.D.   Staff Intensivist/Pulmonologist  [facility]  8/3/2021

## 2021-08-03 NOTE — PROGRESS NOTES
TRANSITION OF CARE  RUR--26%  Disposition--Return to [facility] versus SNF rehab (daughter would prefer [facility] if and when appropriate. Per daughter, [facility] not [facility]. Transport--[facility]    Patient's primary family contact: Daughter Kizzy Jin [phone] BOO IHWLHEYZ Elina Isha [phone]    CM reviewed case with treatment team during ICU Interdisciplinary Rounds. Patient continues medically unstable for discharge.

## 2021-08-03 NOTE — PROCEDURES
[address]   EEG    Name:  Martine Humphrey  MR#:  [id number]  :  1933  ACCOUNT #:  [de-identified]  DATE OF SERVICE:  2021    REQUESTING PROVIDER:  Bertha Taylor NP    HISTORY:  The patient is an 43-year-old male with history of stroke, who is being evaluated for recent status epilepticus. DESCRIPTION:  This is a continuous EEG with video monitoring performed on 2021. The recording starts at 4:27 p.m. and ends at 8:35 a.m. on 2021. The dominant posterior background rhythm consists of medium-voltage rhythms in the 6-7 Hz range out of the posterior head region. There is overall slowing in the left central temporal region when compared to the right. There is an occasional to rare sharp wave discharge seen in the left posterior temporal parietal region. Drowsiness and sleep architecture were not clearly seen except for generalized attenuation of the background frequencies. The patient remained intubated and unresponsive during the monitoring, and no clinical seizure activity was noted. EEG SUMMARY:  Abnormal EEG due to  1. Mild to moderate slowing of the background rhythms, left more than right. 2.  Occasional to rare sharp wave discharge in the left posterior temporal parietal region. CLINICAL INTERPRETATION:  This EEG is suggestive of mild generalized encephalopathic process, nonspecific in type. In addition, there is focal slowing along with an occasional to rare epileptiform potential in the left posterior temporal parietal region. However, no electrographic or clinical seizure was noted. Overall, this represents a significant improvement compared to previous EEGs. Please correlate clinically.       Abiel Murphy MD      [id number]  D:  2021 17:12  T:  2021 18:59  JOB #:  [id number]

## 2021-08-03 NOTE — PROGRESS NOTES
Neurocritical Care Progress Note  Elaina Wilknis PA-C  Neurocritical Care Physician Assistant  [phone]    Admit Date: 7/23/2021    LOS: 11    Daily Progress Note: 08/03/21     Overnight Events:  Patient had multiple episodes of facial twitching concerning for seizure yesterday, but did not have them overnight; EEG in place, exam remains stable    Objective:     Visit Vitals  /60   Pulse 90   Temp 99.5 °F (37.5 °C)   Resp 20   Ht 5' 6\" (1.676 m)   Wt 74.3 kg (163 lb 12.8 oz)   SpO2 99%   BMI 26.44 kg/m²       Physical Exam:  Elderly male, comatose  Lungs: Intubated, not sedated,  Heart: RRR, no murmurs, rubs, gallops  Skin warm and dry, cap refill: <2 seconds  No bleeding or hematoma noted     Neuro Exam:   Comatose   Not following commands  Pupils: right: 4 mm left: 3 mm   Motor exam, strength:   RUE: flaccid LUE: weak withdrawal  RLE: f;accod LLE: weak triple flex  Pronator drift: not assessed  Ricky's: negative  Clonus: negative  Babinski: mute bilaterally     Labs:  Recent Results (from the past 12 hour(s))   GLUCOSE, POC    Collection Time: 08/02/21  5:52 PM   Result Value Ref Range    Glucose (POC) 129 (H) 65 - 117 mg/dL    Performed by [address] [place] [address], POC    Collection Time: 08/02/21 11:11 PM   Result Value Ref Range    Glucose (POC) 187 (H) 65 - 117 mg/dL    Performed by Nerissa Munguia           Imaging:  No results found. Assessment:   Principal Problem:  Seizures;  Altered mental status, coma    Active Problems:      Past Medical History:   Diagnosis Date    Cancer [facility])     prostate    Chronic systolic heart failure ([facility] 12/7/2020    Constipation     Gout     Hyperlipemia     Hypertension     Pacemaker 2019    Stroke ([facility]     Thrombocytopenia ([facility] 3/19/2019    TIA (transient ischemic attack) 2013      Plan:   Continue EEG for now, monitor for seizure activity  Continue anticonvulsive therapies: keppra 1000 mg bid; vimpat 200 mg bid  Ativan as needed to break active seizure  Continue supportive care  Consider Palliative Care Consult for goals of care discussions    Further recommendations pending full evaluation by Dr. Perla Harvey by:  Augusto León PA-C  Neurocritical Care Physician Assistant  08/03/21   1:36 AM       Neurology staff:    I examined the patient at bedside. I agree with the plans and documentation above from [facility]. Mr. Deandre Morris is an 59-year-old gentleman who presented here with acute respiratory failure prompting intubation from long-term care. Hospitalization has been remarkable for possible seizure activity. Prolonged EEG yesterday interpreted with some discharges but no seizures. Late yesterday began to have persistent twitching so we replaced EEG back on board. On exam there is no sedation. He actively resists eye opening with grimace. I am unable to open his eyes without rearranging the EEGs. He has good grimace strength. He does not follow commands. When I activate the left arm or leg he has noticeable tremor that is reproducible with stimulation. No abnormal movements. Right side paretic. 59-year-old gentleman with history of stroke and seizure disorder who is EEG yesterday did show some discharges but no seizures so I do believe the potential for breakthrough is high. We will evaluate the EEG from yesterday. Continue the current anticonvulsants. May consider adding a third agent but will wait for the EEG results. Continue current management. If MRI can be done that will be helpful.   Following  [address], DO  Neurologist  Diplomate, American Board of Psychiatry and Neurology  Board Certified, Adult Neurology and Brain Injury Medicine

## 2021-08-03 NOTE — WOUND CARE
WOCN Note:     Follow-up visit for sacrum.     Chart shows:  Admitted for respiratory failure  History of CHF, prostate cancer, CVA, seizures  Admitted from [facility] 32:   Intubated and turned dependently onto right with help of RN. Avalos and Flexi Seal; cleaned him of small mucoid stool around Flexi. Surface: antony COMPA mattress     Heels intact and without erythema; offloaded with pillow. Hardened scar tissue to left posterior heel is unchanged.     1. POA deep tissue injury to bilateral buttocks  Resolving and drying. Now just dry flaky desquamation with blanching pinkness     Wound Recommendations:    Buttocks:  venelex twice daily; cover with sacral foam and lift to apply venelex and assess. Change foam as needed.      PI Prevention:  Turn/reposition approximately every 2 hours  Offload heels with heels hanging off end of pillow at all times while in bed. Sacral Foam dressing: lift to assess regularly; change as needed. Discontinue if incontinence is frequently soiling dressing. Air mattress: Use only flat sheet and one incontinence pad. Please call [facility] @ [phone] for bed to be picked up at discharge.      No wound care needs - will sign off.      Neil Lennox, CAMMYN, RN, [facility]  Certified Wound, Ostomy, Continence Nurse  office [phone]  Available via [employer]

## 2021-08-03 NOTE — PROCEDURES
[address]   EEG    Name:  Myles Khan  MR#:  [id number]  :  1933  ACCOUNT #:  [de-identified]  DATE OF SERVICE:  2021      REQUESTING PHYSICIAN:  Magda Ren DO    HISTORY:  The patient is an 51-year-old male with history of previous strokes, who is being evaluated for left-sided twitching of his face, arm, and leg. DESCRIPTION:  This is an 18-channel prolonged EEG with video monitoring performed on 2021. The recording starts at 02:29 p.m. and ends at 07:26 a.m. on 2021. The dominant posterior background rhythm consists of medium voltage rhythms in the 6-7 Hz frequency range out of the posterior head region. Independent slowing was seen in both frontal and central regions. There was an occasional sharp wave discharge seen in the left parietal occipital region and an occasional sharp wave discharge was also noted in the right temporal region. Drowsiness was characterized by generalized slowing. No clinical seizure-like activity was noted on the video and he remained intubated throughout the recording. EEG SUMMARY:  Abnormal EEG due to:  1. Mild slowing of the background rhythms. 2.  Occasional sharp wave discharge in the left parietal occipital region and right temporal region, independent of each other. CLINICAL INTERPRETATION:  This EEG is suggestive of mild generalized encephalopathic process, nonspecific in type. In addition, there were focal epileptiform potentials seen in the left parietal occipital and right temporal region. However, there was no electrographic or clinical seizure activity noted. Please correlate clinically and with imaging.         Ludivina Lin MD      [id number]  D:  2021 16:21  T:  2021 1:05  JOB #:  [id number]

## 2021-08-03 NOTE — PROGRESS NOTES
2330: Bedside shift change report given to [address] [place] [address] (oncoming nurse) by Timothy Clement (offgoing nurse). Report included the following information SBAR, Kardex, Intake/Output, MAR, Recent Results, Cardiac Rhythm Ventricular paced, Alarm Parameters  and Dual Neuro Assessment. Uneventful shift, continues to have twitching of L jaw, LUE and LLE on continuous EEG. Twitching decreased after morning dose of IV keppra. VSS.    0730: Bedside shift change report given to Meagan Moraes (oncoming nurse) by [address] [place] [address] (offgoing nurse). Report included the following information SBAR, Kardex, Intake/Output, MAR, Recent Results, Cardiac Rhythm NSR and Alarm Parameters .

## 2021-08-03 NOTE — PROGRESS NOTES
0730: Bedside and Verbal shift change report given to Renuka Pan RN (oncoming nurse) by Alyssia Griffiths RN (offgoing nurse). Report included the following information SBAR, Kardex, Intake/Output, MAR, Accordion, Recent Results, Med Rec Status, Cardiac Rhythm V Paced, Alarm Parameters  and Dual Neuro Assessment. 1030: ICU multidisciplinary rounds lead by Dr. Rodney Deluca (Intensivist): The following were reviewed and discussed: current labs,  recent imaging, lines/drains, review of body systems, nutrition, cultures, mobility, length of ICU stay. The plan of care for the day is as follows  Transfuse 1U PRBC (written note by RN)     [phone]: Primary Nurse Mario William and Avni Boles RN performed a dual skin assessment on this patient Impairment noted- see wound doc flow sheet  Torito score is 12    1930: Bedside and Verbal shift change report given to Valerio Rutledge RN (oncoming nurse) by Renuka Pan RN (offgoing nurse). Report included the following information SBAR, Kardex, Intake/Output, MAR, Accordion, Recent Results, Med Rec Status, Cardiac Rhythm V Paced, Alarm Parameters  and Dual Neuro Assessment.

## 2021-08-04 LAB
ABO + RH BLD: NORMAL
ANION GAP SERPL CALC-SCNC: 1 MMOL/L (ref 5–15)
BASOPHILS # BLD: 0 K/UL (ref 0–0.1)
BASOPHILS NFR BLD: 0 % (ref 0–1)
BLD PROD TYP BPU: NORMAL
BLOOD GROUP ANTIBODIES SERPL: NORMAL
BPU ID: NORMAL
BUN SERPL-MCNC: 56 MG/DL (ref 6–20)
BUN/CREAT SERPL: 62 (ref 12–20)
CALCIUM SERPL-MCNC: 9.4 MG/DL (ref 8.5–10.1)
CHLORIDE SERPL-SCNC: 109 MMOL/L (ref 97–108)
CO2 SERPL-SCNC: 31 MMOL/L (ref 21–32)
CREAT SERPL-MCNC: 0.9 MG/DL (ref 0.7–1.3)
CROSSMATCH RESULT,%XM: NORMAL
DIFFERENTIAL METHOD BLD: ABNORMAL
EOSINOPHIL # BLD: 0.1 K/UL (ref 0–0.4)
EOSINOPHIL NFR BLD: 1 % (ref 0–7)
ERYTHROCYTE [DISTWIDTH] IN BLOOD BY AUTOMATED COUNT: 18 % (ref 11.5–14.5)
GLUCOSE BLD STRIP.AUTO-MCNC: 192 MG/DL (ref 65–117)
GLUCOSE BLD STRIP.AUTO-MCNC: 207 MG/DL (ref 65–117)
GLUCOSE BLD STRIP.AUTO-MCNC: 213 MG/DL (ref 65–117)
GLUCOSE BLD STRIP.AUTO-MCNC: 215 MG/DL (ref 65–117)
GLUCOSE SERPL-MCNC: 186 MG/DL (ref 65–100)
HCT VFR BLD AUTO: 24.9 % (ref 36.6–50.3)
HGB BLD-MCNC: 7.8 G/DL (ref 12.1–17)
IMM GRANULOCYTES # BLD AUTO: 0.1 K/UL (ref 0–0.04)
IMM GRANULOCYTES NFR BLD AUTO: 1 % (ref 0–0.5)
LYMPHOCYTES # BLD: 1.3 K/UL (ref 0.8–3.5)
LYMPHOCYTES NFR BLD: 13 % (ref 12–49)
MCH RBC QN AUTO: 29.3 PG (ref 26–34)
MCHC RBC AUTO-ENTMCNC: 31.3 G/DL (ref 30–36.5)
MCV RBC AUTO: 93.6 FL (ref 80–99)
MONOCYTES # BLD: 0.6 K/UL (ref 0–1)
MONOCYTES NFR BLD: 5 % (ref 5–13)
NEUTS SEG # BLD: 8.2 K/UL (ref 1.8–8)
NEUTS SEG NFR BLD: 80 % (ref 32–75)
NRBC # BLD: 0 K/UL (ref 0–0.01)
NRBC BLD-RTO: 0 PER 100 WBC
PLATELET # BLD AUTO: 150 K/UL (ref 150–400)
PMV BLD AUTO: 9.6 FL (ref 8.9–12.9)
POTASSIUM SERPL-SCNC: 3.7 MMOL/L (ref 3.5–5.1)
RBC # BLD AUTO: 2.66 M/UL (ref 4.1–5.7)
SERVICE CMNT-IMP: ABNORMAL
SODIUM SERPL-SCNC: 141 MMOL/L (ref 136–145)
SPECIMEN EXP DATE BLD: NORMAL
STATUS OF UNIT,%ST: NORMAL
UNIT DIVISION, %UDIV: 0
WBC # BLD AUTO: 10.3 K/UL (ref 4.1–11.1)

## 2021-08-04 PROCEDURE — 74011250637 HC RX REV CODE- 250/637: Performed by: NURSE PRACTITIONER

## 2021-08-04 PROCEDURE — 80048 BASIC METABOLIC PNL TOTAL CA: CPT

## 2021-08-04 PROCEDURE — 74011000250 HC RX REV CODE- 250: Performed by: INTERNAL MEDICINE

## 2021-08-04 PROCEDURE — 74011636637 HC RX REV CODE- 636/637: Performed by: INTERNAL MEDICINE

## 2021-08-04 PROCEDURE — 65610000006 HC RM INTENSIVE CARE

## 2021-08-04 PROCEDURE — 99232 SBSQ HOSP IP/OBS MODERATE 35: CPT | Performed by: PSYCHIATRY & NEUROLOGY

## 2021-08-04 PROCEDURE — 74011000258 HC RX REV CODE- 258: Performed by: NURSE PRACTITIONER

## 2021-08-04 PROCEDURE — 74011000250 HC RX REV CODE- 250: Performed by: NURSE PRACTITIONER

## 2021-08-04 PROCEDURE — 94003 VENT MGMT INPAT SUBQ DAY: CPT

## 2021-08-04 PROCEDURE — 74011636637 HC RX REV CODE- 636/637: Performed by: HEALTH CARE PROVIDER

## 2021-08-04 PROCEDURE — 74011250636 HC RX REV CODE- 250/636: Performed by: INTERNAL MEDICINE

## 2021-08-04 PROCEDURE — 94640 AIRWAY INHALATION TREATMENT: CPT

## 2021-08-04 PROCEDURE — 85025 COMPLETE CBC W/AUTO DIFF WBC: CPT

## 2021-08-04 PROCEDURE — 36415 COLL VENOUS BLD VENIPUNCTURE: CPT

## 2021-08-04 PROCEDURE — 74011250636 HC RX REV CODE- 250/636: Performed by: NURSE PRACTITIONER

## 2021-08-04 RX ORDER — INSULIN GLARGINE 100 [IU]/ML
6 INJECTION, SOLUTION SUBCUTANEOUS DAILY
Status: DISCONTINUED | OUTPATIENT
Start: 2021-08-04 | End: 2021-08-05

## 2021-08-04 RX ORDER — IPRATROPIUM BROMIDE AND ALBUTEROL SULFATE 2.5; .5 MG/3ML; MG/3ML
3 SOLUTION RESPIRATORY (INHALATION)
Status: DISCONTINUED | OUTPATIENT
Start: 2021-08-04 | End: 2021-08-11 | Stop reason: HOSPADM

## 2021-08-04 RX ADMIN — HEPARIN SODIUM 5000 UNITS: 5000 INJECTION INTRAVENOUS; SUBCUTANEOUS at 21:27

## 2021-08-04 RX ADMIN — HEPARIN SODIUM 5000 UNITS: 5000 INJECTION INTRAVENOUS; SUBCUTANEOUS at 09:44

## 2021-08-04 RX ADMIN — LACOSAMIDE 100 MG: 50 TABLET, FILM COATED ORAL at 20:03

## 2021-08-04 RX ADMIN — ALLOPURINOL 100 MG: 100 TABLET ORAL at 08:37

## 2021-08-04 RX ADMIN — LEVETIRACETAM 1500 MG: 100 INJECTION, SOLUTION INTRAVENOUS at 18:07

## 2021-08-04 RX ADMIN — CHLORHEXIDINE GLUCONATE 15 ML: 0.12 RINSE ORAL at 00:01

## 2021-08-04 RX ADMIN — INSULIN LISPRO 2 UNITS: 100 INJECTION, SOLUTION INTRAVENOUS; SUBCUTANEOUS at 18:11

## 2021-08-04 RX ADMIN — INSULIN GLARGINE 6 UNITS: 100 INJECTION, SOLUTION SUBCUTANEOUS at 12:20

## 2021-08-04 RX ADMIN — INSULIN LISPRO 3 UNITS: 100 INJECTION, SOLUTION INTRAVENOUS; SUBCUTANEOUS at 06:32

## 2021-08-04 RX ADMIN — ASPIRIN 81 MG: 81 TABLET, CHEWABLE ORAL at 08:45

## 2021-08-04 RX ADMIN — IPRATROPIUM BROMIDE AND ALBUTEROL SULFATE 3 ML: .5; 3 SOLUTION RESPIRATORY (INHALATION) at 01:05

## 2021-08-04 RX ADMIN — LEVETIRACETAM 1500 MG: 100 INJECTION, SOLUTION INTRAVENOUS at 03:41

## 2021-08-04 RX ADMIN — IPRATROPIUM BROMIDE AND ALBUTEROL SULFATE 3 ML: .5; 3 SOLUTION RESPIRATORY (INHALATION) at 07:30

## 2021-08-04 RX ADMIN — Medication: at 18:08

## 2021-08-04 RX ADMIN — INSULIN LISPRO 3 UNITS: 100 INJECTION, SOLUTION INTRAVENOUS; SUBCUTANEOUS at 12:20

## 2021-08-04 RX ADMIN — ACETAMINOPHEN 650 MG: 325 TABLET ORAL at 20:03

## 2021-08-04 RX ADMIN — INSULIN LISPRO 3 UNITS: 100 INJECTION, SOLUTION INTRAVENOUS; SUBCUTANEOUS at 00:01

## 2021-08-04 RX ADMIN — Medication 10 ML: at 06:00

## 2021-08-04 RX ADMIN — CHLORHEXIDINE GLUCONATE 15 ML: 0.12 RINSE ORAL at 12:21

## 2021-08-04 RX ADMIN — SODIUM CHLORIDE SOLN NEBU 3% 2 ML: 3 NEBU SOLN at 01:05

## 2021-08-04 RX ADMIN — SODIUM CHLORIDE SOLN NEBU 3% 2 ML: 3 NEBU SOLN at 07:30

## 2021-08-04 RX ADMIN — LANSOPRAZOLE 30 MG: KIT at 08:45

## 2021-08-04 RX ADMIN — Medication 10 ML: at 21:27

## 2021-08-04 RX ADMIN — LACOSAMIDE 100 MG: 50 TABLET, FILM COATED ORAL at 08:38

## 2021-08-04 RX ADMIN — Medication: at 08:45

## 2021-08-04 NOTE — PROGRESS NOTES
08/04/21 0732   Weaning Parameters   Spontaneous Breathing Trial Complete No (Comments)   Resp Rate Observed 8.7   Ve 5.4      SBT Results  Patient experienced periods of apnea during trail. Returned patient to previous vent settings at this time. RN Notified.

## 2021-08-04 NOTE — PROGRESS NOTES
Cardiology order form received from Dr. Bernarda Snellen. Will try to see if [employer] rep can come tomorrow.

## 2021-08-04 NOTE — PROGRESS NOTES
Problem: Ventilator Management  Goal: *Adequate oxygenation and ventilation  Outcome: Progressing Towards Goal  Goal: *Patient maintains clear airway/free of aspiration  Outcome: Progressing Towards Goal  Goal: *Absence of infection signs and symptoms  8/4/2021 0122 by Irais Barfield RN  Outcome: Progressing Towards Goal  8/4/2021 0121 by Irais Barfield RN  Outcome: Progressing Towards Goal  Goal: *Normal spontaneous ventilation  8/4/2021 0122 by Irais Barfield RN  Outcome: Progressing Towards Goal  8/4/2021 0121 by Irais Barfield RN  Outcome: Progressing Towards Goal     Problem: Patient Education: Go to Patient Education Activity  Goal: Patient/Family Education  Outcome: Progressing Towards Goal     Problem: Pressure Injury - Risk of  Goal: *Prevention of pressure injury  Description: Document Torito Scale and appropriate interventions in the flowsheet. 8/4/2021 0122 by Irais Barfield RN  Outcome: Progressing Towards Goal  Note: Pressure Injury Interventions:  Sensory Interventions: Assess changes in LOC, Assess need for specialty bed, Check visual cues for pain, Discuss PT/OT consult with provider, Float heels, Keep linens dry and wrinkle-free, Monitor skin under medical devices, Turn and reposition approx. every two hours (pillows and wedges if needed)    Moisture Interventions: Absorbent underpads, Apply protective barrier, creams and emollients, Check for incontinence Q2 hours and as needed, Internal/External fecal devices, Internal/External urinary devices    Activity Interventions: Assess need for specialty bed, Pressure redistribution bed/mattress(bed type)    Mobility Interventions: Assess need for specialty bed, HOB 30 degrees or less, Pressure redistribution bed/mattress (bed type), Turn and reposition approx.  every two hours(pillow and wedges)    Nutrition Interventions: Document food/fluid/supplement intake    Friction and Shear Interventions: Apply protective barrier, creams and emollients, Foam dressings/transparent film/skin sealants, HOB 30 degrees or less, Lift team/patient mobility team             8/4/2021 0121 by Cyndie Rose RN  Outcome: Progressing Towards Goal  Note: Pressure Injury Interventions:  Sensory Interventions: Assess changes in LOC, Assess need for specialty bed, Check visual cues for pain, Discuss PT/OT consult with provider, Float heels, Keep linens dry and wrinkle-free, Monitor skin under medical devices, Turn and reposition approx. every two hours (pillows and wedges if needed)    Moisture Interventions: Absorbent underpads, Apply protective barrier, creams and emollients, Check for incontinence Q2 hours and as needed, Internal/External fecal devices, Internal/External urinary devices    Activity Interventions: Assess need for specialty bed, Pressure redistribution bed/mattress(bed type)    Mobility Interventions: Assess need for specialty bed, HOB 30 degrees or less, Pressure redistribution bed/mattress (bed type), Turn and reposition approx. every two hours(pillow and wedges)    Nutrition Interventions: Document food/fluid/supplement intake    Friction and Shear Interventions: Apply protective barrier, creams and emollients, Foam dressings/transparent film/skin sealants, HOB 30 degrees or less, Lift team/patient mobility team                Problem: Patient Education: Go to Patient Education Activity  Goal: Patient/Family Education  8/4/2021 0122 by Cyndie Rose RN  Outcome: Progressing Towards Goal  8/4/2021 0121 by Cyndie Rose RN  Outcome: Progressing Towards Goal     Problem: Falls - Risk of  Goal: *Absence of Falls  Description: Document Rollene McDaniels Fall Risk and appropriate interventions in the flowsheet.   Outcome: Progressing Towards Goal  Note: Fall Risk Interventions:  Mobility Interventions: Bed/chair exit alarm, Communicate number of staff needed for ambulation/transfer    Mentation Interventions: Adequate sleep, hydration, pain control, Bed/chair exit alarm, Door open when patient unattended, Evaluate medications/consider consulting pharmacy, More frequent rounding    Medication Interventions: Bed/chair exit alarm, Evaluate medications/consider consulting pharmacy    Elimination Interventions: Bed/chair exit alarm, Call light in reach, Toileting schedule/hourly rounds              Problem: Patient Education: Go to Patient Education Activity  Goal: Patient/Family Education  Outcome: Progressing Towards Goal     Problem: Breathing Pattern - Ineffective  Goal: *Absence of hypoxia  Outcome: Progressing Towards Goal  Goal: *Use of effective breathing techniques  Outcome: Progressing Towards Goal  Goal: *PALLIATIVE CARE:  Alleviation of Dyspnea  Outcome: Progressing Towards Goal     Problem: Patient Education: Go to Patient Education Activity  Goal: Patient/Family Education  Outcome: Progressing Towards Goal

## 2021-08-04 NOTE — PROGRESS NOTES
[facility]    ICU TEAM Progress Note    Name: Brooks Mac   : 3/9/1933   MRN: [id number]   Date: 2021      I  Subjective:   Progress Note: 2021      Reason for ICU Admission: Acute hypoxic respiratory failure     Interval history: From [facility] a 80 y. o. male with history of prostate cancer, CHF, constipation, hypertension, stroke, TIA, and seizures who presents to the emergency department by EMS as transfer from [facility] for respiratory distress.  Patient had initially been a transfer from [facility] after a hospitalization from  [facility].  Was there for altered mental status hypoxia was treated for hypoxic respiratory failure bilateral pleural effusions acute systolic CHF secondary to severe aortic stenosis hyponatremia hypokalemia elevated LFTs malnutrition and possible UTI was treated with ceftriaxone and Sheree Leblanc already had a PEG for dysphagia and protein calorie malnutrition prior to [facility] admission. He was at [facility] from [place] [phone].  While at [facility] today patient had an episode of vomiting and aspirated was initially started on high flow nasal cannula however was still hypoxic thus got intubated by [facility] staff. Óscar Yanez was then transferred to the ED for further care.  Per family patient's decline started last year while he was having seizures/TIA patient became more encephalopathic and started to refuse eating and thus got a PEG for nutritional reasons. was staying at assisted living facility and had to be re-hospitalized after his PEG malfunctioned.  During that time he did have an infection, daughter could not exactly say where the infection was, but she thought it was an abdominal infection and since then he started having worsening heart failure and decreased heart function, and failure to thrive. Per records EF was 10 to 15%.  Per daughter, patient has never had a tracheostomy.  She states patient is awake at times, will open eyes, but does not track, does not follow commands, and does not speak. They wish patient to remain full code at this time.     While in ED patient was placed on mechanical ventilator, ABG was done which was consistent with hypoxic respiratory failure.  Patient was also given 1 L of IV fluids for fluid resuscitation due to sepsis. Initially blood pressure was okay but once patient was started on sedation blood pressure dropped.  Patient also had episodes of hypoxia due to vent asynchrony. Once PEEP was increased for hypoxia patient's pressure dropped. Started patient on Levophed via peripheral in ED. If hypotension worsens will need central line. Started on Zosyn. Updated the family at bedside, daughter and and son. Consent for central line and blood was  Received.  Patient to transfer to ICU for continued care. Overnight Events:  8/4: No acute event, no fever, hemodynamically stable, no changes in neurological status. 8/3: No acute event, remained poorly responsive, remained on EEG overnight. Still having occasional facial tremors. Hemodynamically remained stable  8/2: No acute event, pending EEG report, no neurological recovery, occasional twitching movement in his face but seems to be better than before.  Dobutamine drip off.  8/1: On continuous EEG monitoring, no other acute event.  Poorly responsive and occasional twitching movement  7/31: Appearnt seizure like activity last night, ativan given  7/30: No acute overnight events  7/29 No acute overnight events   7/28: No acute event, no much neurological recovery.  No witnessed seizure, blood pressure on the higher side.  Diuresing well  7/27: No acute event.  Good diuresis.  Still poorly responsive.  Remains on dobutamine.  Blood pressure maintained.  No witnessed seizure.   7/26: No acute event, blood pressure somewhat better but urine output decreasing.  Still on dobutamine at 5.  7/25 -- Getting PRBC now; started on Dobutamine      Active Problem List:     Problem List  Date Reviewed: 12/17/2020        Codes Class    Acute respiratory failure with hypoxia [facility]) ICD-10-CM: J96.01  ICD-9-CM: 518.81         Aspiration into respiratory tract ICD-10-CM: T17.908A  ICD-9-CM: 934.9         Hypokalemia ICD-10-CM: E87.6  ICD-9-CM: 276.8         Hypernatremia ICD-10-CM: E87.0  ICD-9-CM: 276.0         Moderate protein malnutrition (HCC) ICD-10-CM: E44.0  ICD-9-CM: 263.0         Failure to thrive (0-17) ICD-10-CM: R62.51  ICD-9-CM: 783.41         Bacteremia ICD-10-CM: R78.81  ICD-9-CM: 790.7         UTI (urinary tract infection) ICD-10-CM: N39.0  ICD-9-CM: 599.0         Dementia (HCC) ICD-10-CM: F03.90  ICD-9-CM: 294.20         Bedridden ICD-10-CM: Z74.01  ICD-9-CM: V49.84         Pressure ulcer ICD-10-CM: L89.90  ICD-9-CM: 707.00, 707.20         S/P percutaneous endoscopic gastrostomy (PEG) tube placement ([facility] ICD-10-CM: Z93.1  ICD-9-CM: V44.1         Dehydration ICD-10-CM: E86.0  ICD-9-CM: 276.51         Lactic acidosis ICD-10-CM: E87.2  ICD-9-CM: 276.2         Sepsis ([facility] ICD-10-CM: A41.9  ICD-9-CM: 038.9, 995.91         SOULEYMANE (acute kidney injury) ([facility] ICD-10-CM: N17.9  ICD-9-CM: 123. 9         Chronic systolic heart failure (HCC) ICD-10-CM: I50.22  ICD-9-CM: 428.22         Altered mental status ICD-10-CM: R41.82  ICD-9-CM: 780.97         Post-ictal state ([facility] ICD-10-CM: R56.9  ICD-9-CM: 780.39         Seizure ([address] ICD-10-CM: R56.9  ICD-9-CM: 780.39         Atrial pacemaker lead displacement ICD-10-CM: T82.120A  ICD-9-CM: 996.01         Pacemaker ICD-10-CM: Z95.0  ICD-9-CM: V45.01     Overview Signed 3/22/2019  5:08 PM by Jorge Mosquera MD     3/22/2019 dual chamber [employer] pacer             Bradycardia ICD-10-CM: R00.1  ICD-9-CM: 427.89         Transaminitis ICD-10-CM: R74.01  ICD-9-CM: 790.4         Hypertensive urgency ICD-10-CM: I16.0  ICD-9-CM: 401.9         Second degree AV block, Mobitz type I ICD-10-CM: I44.1  ICD-9-CM: 426.13     Overview Signed 3/21/2019  6:14 PM by Margaux Hollis Yenni Hdz MD     Added automatically from request for surgery [id number]             Stage 3 chronic kidney disease ([facility] ICD-10-CM: N18.30  ICD-9-CM: 908. 3         Rotator cuff arthropathy of both shoulders ICD-10-CM: M12.811, M12.812  ICD-9-CM: 716.81         Cognitive impairment ICD-10-CM: R41.89  ICD-9-CM: 727.8         Systolic murmur AEX-44-HQ: R01.1  ICD-9-CM: 785.2         Essential hypertension ICD-10-CM: I10  ICD-9-CM: 401.9         Gout ICD-10-CM: M10.9  ICD-9-CM: 274.9         Pure hypercholesterolemia ICD-10-CM: E78.00  ICD-9-CM: 272.0         History of prostate cancer ICD-10-CM: Z85.46  ICD-9-CM: V10.46         Chronic constipation ICD-10-CM: K59.09  ICD-9-CM: 564.00         Dysuria ICD-10-CM: R30.0  ICD-9-CM: 846. 1         Weight loss ICD-10-CM: R63.4  ICD-9-CM: 783.21         Adrenal mass ([facility] ICD-10-CM: E27.8  ICD-9-CM: 255.8     Overview Addendum 12/17/2018  9:57 AM by Mendy Reveles MD     Stable/non-functioning adenoma on imaging                   Past Medical History:      has a past medical history of Cancer [facility]), Chronic systolic heart failure ([facility] (12/7/2020), Constipation, Gout, Hyperlipemia, Hypertension, Pacemaker (2019), Stroke ([facility] Thrombocytopenia ([facility] (3/19/2019), and TIA (transient ischemic attack) (2013). Past Surgical History:      has a past surgical history that includes hx urological; hx prostatectomy; pr ins new/rplcmt prm pm w/transv eltrd atrial&vent (Right, 3/22/2019); pr ins new/rplcmt prm pacemakr w/trans eltrd atrial (N/A, 10/18/2019); and place percut gastrostomy tube (11/30/2020). Home Medications:     Prior to Admission medications    Medication Sig Start Date End Date Taking? Authorizing Provider   levETIRAcetam (KEPPRA) 750 mg tablet Take 1 Tab by mouth every twelve (12) hours. 12/11/20   Maral Jack NP   aspirin delayed-release 81 mg tablet Take 1 Tab by mouth daily.  7/13/20   Kenia Lira MD   polyethylene glycol [facility]) 17 gram/dose powder Take 17 g by mouth daily as needed for Constipation. 20   Jeff Jewels, MD   balsam peru-castor oiL (VENELEX) ointment Apply  to affected area three (3) times daily. 20   Brennen Mcgee MD   famotidine (PEPCID) 20 mg tablet Take 1 Tab by mouth every evening. 20   Brennen Mcgee MD   acetaminophen (TYLENOL) 325 mg tablet Take 650 mg by mouth every six (6) hours as needed for Pain. Provider, Historical       Allergies/Social/Family History:     No Known Allergies   Social History     Tobacco Use    Smoking status: Never Smoker    Smokeless tobacco: Never Used   Substance Use Topics    Alcohol use: No      Family History   Problem Relation Age of Onset    No Known Problems Mother     No Known Problems Father        Review of Systems:     Not able to obtain due to patient medical condition    Objective:   Vital Signs:  Visit Vitals  BP (!) 142/85 (BP 1 Location: Left upper arm, BP Patient Position: At rest)   Pulse 88   Temp 99.7 °F (37.6 °C)   Resp 12   Ht 5' 6\" (1.676 m)   Wt 72.1 kg (158 lb 15.2 oz)   SpO2 100%   BMI 25.66 kg/m²      O2 Device: Endotracheal tube Temp (24hrs), Av.9 °F (37.7 °C), Min:99.1 °F (37.3 °C), Max:100.3 °F (37.9 °C)           Intake/Output:     Intake/Output Summary (Last 24 hours) at 2021 1559  Last data filed at 2021 0800  Gross per 24 hour   Intake 1650 ml   Output 1090 ml   Net 560 ml       Physical Exam:  General: No distress, appears stated age, intubated   Neurologic: Moves left arm to painful stimuli, minimal movement associated with tremor. , occasional facial frowning on sternal rub, did not appreciate twitching today  lungs: CTAB   Abdomen:  soft, non-tender.  Bowel sounds normal. No masses,  no organomegaly  Cardiovascular:  Regular rate and rhythm, S1S2 present, without murmur or extra heart sounds and pedal pulses normal , no lower limb edema    LABS AND  DATA: Personally reviewed  Recent Labs     21  0359 21  0359   WBC 10.3 8. 4   HGB 7.8* 6.7*   HCT 24.9* 21.6*    140*     Recent Labs     08/04/21  0359 08/03/21  0359 08/02/21  0540 08/02/21  0540    144   < > 141   K 3.7 3.9   < > 3.3*   * 108   < > 106   CO2 31 29   < > 30   BUN 56* 56*   < > 58*   CREA 0.90 0.85   < > 1.03   * 182*   < > 183*   CA 9.4 9.2   < > 9.3   MG  --  2.6*  --  2.6*   PHOS  --  3.0  --  2.7    < > = values in this interval not displayed. No results for input(s): AP, TBIL, TP, ALB, GLOB, AML, LPSE in the last 72 hours. No lab exists for component: SGOT, GPT, AMYP  No results for input(s): INR, PTP, APTT, INREXT in the last 72 hours. No results for input(s): PHI, PCO2I, PO2I, FIO2I in the last 72 hours. No results for input(s): CPK, CKMB, TROIQ, BNPP in the last 72 hours. Hemodynamics:   PAP:   CO:     Wedge:   CI:     CVP:    SVR:       PVR:       Ventilator Settings:  Mode Rate Tidal Volume Pressure FiO2 PEEP   Spontaneous      5 cm H2O 30 % 5 cm H20     Peak airway pressure: 27 cm H2O    Minute ventilation: 7.86 l/min        MEDS: Reviewed    Chest X-Ray:  CXR Results  (Last 48 hours)    None      EEG:  \"This EEG is suggestive of mild generalized encephalopathic process, nonspecific in type. In addition, there is focal slowing along with an occasional to rare epileptiform potential in the left posterior temporal parietal region. However, no electrographic or clinical seizure was noted. Overall, this represents a significant improvement compared to previous EEGs. Please correlate clinically\"    Assessment and Plan:   Seizure:  Encephalopathy:  Advanced dementia:  Appreciate neurology input, EEG report noted as above.  Remains poorly responsive.  Continue current antiepileptic medication. Appreciate further neurology input   Cardiogenic shock:  Ejection fraction of less than 30% at baseline, dobutamine drip weaned off.  Seems to be euvolemic at this time.  Renal function improving.  Will diurese as needed. Septic shock: Resolved  Completed antibiotic course.  Off pressors.  Procalcitonin continue to trend down    Giving the patient severe brain atrophy on CAT scan and abnormal baseline is unlikely he will demonstrate meaningful neurological recovery to enjoy reasonable quality of life. I will discuss this with his family regarding goals of care. I will advise against long-term tracheostomy however I respect patient's family wishes.     DISPOSITION  Stay in ICU    CRITICAL CARE CONSULTANT NOTE  I had a face to face encounter with the patient, reviewed and interpreted patient data including clinical events, labs, images, vital signs, I/O's, and examined patient. I have discussed the case and the plan and management of the patient's care with the consulting services, the bedside nurses and the respiratory therapist.      NOTE OF PERSONAL INVOLVEMENT IN CARE   This patient has a high probability of imminent, clinically significant deterioration, which requires the highest level of preparedness to intervene urgently. I participated in the decision-making and personally managed or directed the management of the following life and organ supporting interventions that required my frequent assessment to treat or prevent imminent deterioration. I personally spent 40 minutes of critical care time. This is time spent at this critically ill patient's bedside actively involved in patient care as well as the coordination of care and discussions with the patient's family. This does not include any procedural time which has been billed separately. Tracie Christiansen M.D.   Staff Intensivist/Pulmonologist  [facility]  2021

## 2021-08-04 NOTE — PROGRESS NOTES
0730: Bedside and Verbal shift change report given to John Bustos RN (oncoming nurse) by West Calzada RN (offgoing nurse). Report included the following information SBAR, Kardex, Intake/Output, MAR, Accordion, Recent Results, Med Rec Status, Cardiac Rhythm V Paced, Alarm Parameters  and Dual Neuro Assessment. 1130: ICU multidisciplinary rounds lead by Dr. Maria Dolores Cardenas (Intensivist): The following were reviewed and discussed: current labs,  recent imaging, lines/drains, review of body systems, nutrition, cultures, mobility, length of ICU stay.  The plan of care for the day is as follows  Lantus added for glycemic control; plan to discuss continued goals of care with family (written note by RN)

## 2021-08-04 NOTE — PROGRESS NOTES
Neurocritical Care Progress Note  Tamsen Holter  Neurocritical Care Physician Assistant  [phone]    Admit Date: 7/23/2021    LOS: 12    Daily Progress Note: 08/04/21     Overnight Events:  No acute events overnight; ongoing facial twitching;     Objective:     Visit Vitals  /64   Pulse 80   Temp 99.4 °F (37.4 °C)   Resp 14   Ht 5' 6\" (1.676 m)   Wt 72.1 kg (158 lb 15.2 oz)   SpO2 100%   BMI 25.66 kg/m²       Physical Exam:  Elderly male, comatose  Lungs: Intubated, not sedated,  Heart: RRR, no murmurs, rubs, gallops  Skin warm and dry, cap refill: <2 seconds  No bleeding or hematoma noted     Neuro Exam:   Comatose, strong facial grimace  Not following commands  Pupils: right: 4 mm left: 3 mm   Motor exam, strength:   RUE: flaccid LUE: weak withdrawal  RLE: flaccod LLE: weak triple flex  Pronator drift: not assessed  Ricky's: negative  Clonus: negative  Babinski: mute bilaterally     Labs:  Recent Results (from the past 12 hour(s))   GLUCOSE, POC    Collection Time: 08/03/21  5:22 PM   Result Value Ref Range    Glucose (POC) 193 (H) 65 - 117 mg/dL    Performed by Ricco Trejo    GLUCOSE, POC    Collection Time: 08/03/21 11:53 PM   Result Value Ref Range    Glucose (POC) 207 (H) 65 - 117 mg/dL    Performed by STEPH Fonseca    CBC WITH AUTOMATED DIFF    Collection Time: 08/04/21  3:59 AM   Result Value Ref Range    WBC 10.3 4.1 - 11.1 K/uL    RBC 2.66 (L) 4.10 - 5.70 M/uL    HGB 7.8 (L) 12.1 - 17.0 g/dL    HCT 24.9 (L) 36.6 - 50.3 %    MCV 93.6 80.0 - 99.0 FL    MCH 29.3 26.0 - 34.0 PG    MCHC 31.3 30.0 - 36.5 g/dL    RDW 18.0 (H) 11.5 - 14.5 %    PLATELET 395 643 - 769 K/uL    MPV 9.6 8.9 - 12.9 FL    NRBC 0.0 0  WBC    ABSOLUTE NRBC 0.00 0.00 - 0.01 K/uL    NEUTROPHILS 80 (H) 32 - 75 %    LYMPHOCYTES 13 12 - 49 %    MONOCYTES 5 5 - 13 %    EOSINOPHILS 1 0 - 7 %    BASOPHILS 0 0 - 1 %    IMMATURE GRANULOCYTES 1 (H) 0.0 - 0.5 %    ABS. NEUTROPHILS 8.2 (H) 1.8 - 8.0 K/UL    ABS. LYMPHOCYTES 1.3 0.8 - 3.5 K/UL    ABS. MONOCYTES 0.6 0.0 - 1.0 K/UL    ABS. EOSINOPHILS 0.1 0.0 - 0.4 K/UL    ABS. BASOPHILS 0.0 0.0 - 0.1 K/UL    ABS. IMM. GRANS. 0.1 (H) 0.00 - 0.04 K/UL    DF AUTOMATED     METABOLIC PANEL, BASIC    Collection Time: 08/04/21  3:59 AM   Result Value Ref Range    Sodium 141 136 - 145 mmol/L    Potassium 3.7 3.5 - 5.1 mmol/L    Chloride 109 (H) 97 - 108 mmol/L    CO2 31 21 - 32 mmol/L    Anion gap 1 (L) 5 - 15 mmol/L    Glucose 186 (H) 65 - 100 mg/dL    BUN 56 (H) 6 - 20 MG/DL    Creatinine 0.90 0.70 - 1.30 MG/DL    BUN/Creatinine ratio 62 (H) 12 - 20      GFR est AA >60 >60 ml/min/1.73m2    GFR est non-AA >60 >60 ml/min/1.73m2    Calcium 9.4 8.5 - 10.1 MG/DL      Imaging:  No results found. Assessment:   Principal Problem:  Seizures; Altered mental status, coma    Active Problems:  Past Medical History:   Diagnosis Date    Cancer [facility])     prostate    Chronic systolic heart failure ([facility] 12/7/2020    Constipation     Gout     Hyperlipemia     Hypertension     Pacemaker 2019    Stroke ([facility]     Thrombocytopenia ([facility] 3/19/2019    TIA (transient ischemic attack) 2013      Plan:   Continue medical management per Primary Team  Continue anticonvulsive therapies: keppra 1000 mg bid; vimpat 200 mg bid  Ativan as needed to break active seizure  Continue supportive care  Consider Palliative Care Consult for goals of care discussions    Further recommendations pending full evaluation by Dr. Charito Chavez by:  Foreign Galeana PA-C  Neurocritical Care Physician Assistant  08/04/21   1:36 AM           Neurology staff:    I examined the patient at bedside. I discussed the case and agree with the plans above from [facility]. Mr. Ana María Guillermo is an 19-year-old gentleman admitted here for respiratory failure intubated outside of the hospital.  Prolonged EEG again completed with overall improvement. No seizures.   He does have the rare occasional discharge suggesting tendency for seizures but he is stable from that perspective. He still has some twitching of the left side. MRI not done but head CT with severe atrophy. On exam there is no sedation. He actively resists eye opening with great intent. He does squeeze with the left hand intermittently but does not follow commands for me. Tremor of the left side noted with activity. 49-year-old gentleman who remains intubated after respiratory failure. He has history of stroke and seizure disorder remotely. Difficult to know what acute issues may have occurred at the time of respiratory distress (anoxia? Stroke?). He is severely obtunded for multiple reasons and I think it is unlikely he would regain his baseline he had prior to coming here. Family is going to need to make decisions about care whether or not they would want a PEG and trach however I do not think any of those options will necessarily increase the quality of life. Stay on the anticonvulsants. I do think he has risk for seizure, but symptoms and EEG is better. Shaking of the left side is not seizure. We will follow peripherally. Please call if needed.   [address] [facility], DO  Neurologist  Diplomate, American Board of Psychiatry and Neurology  Board Certified, Adult Neurology and Brain Injury Medicine

## 2021-08-04 NOTE — PROGRESS NOTES
1930: Bedside shift change report given to Howard Contreras (oncoming nurse) by Geremias Comer (offgoing nurse). Report included the following information SBAR, Procedure Summary, Intake/Output, MAR, Recent Results, Cardiac Rhythm V paced and Alarm Parameters . 2100: Patient's respiratory rate between 35-40, , patient grimacing and coughing around ETT. Patient suctioned for moderate amount of secretions in ETT but still tachypnic. Reported assessment to NP Sydni who placed orders for PRN fentanyl and if that did not make patient comfortable she placed orders to start precedex gtt. After PRN fentanyl patient visibly more comfortable, respiratory rate now 16 and no longer grimacing. 0730: Bedside shift change report given to Geremias Comer (oncoming nurse) by Hansel Allan RN (offgoing nurse). Report included the following information SBAR, Intake/Output, MAR, Recent Results, Cardiac Rhythm V paced and Alarm Parameters . SHIFT SUMMARY  Q4 neuro checks, patient does not follow commands, opens eyes to pain and withdraws in all 4 extremities. Left sided extremities do have a slight tremor and Left face and jaw showed occasional twitching. Pupils 4, sluggish equal and reactive. Avalos maintained UO overnight total was 500ml. Tube feeds maintained overnight, patient tolerated well. Patient remain intubated overnight, no continuous sedation on board, patient received 1 dose of PRN IVP fentanyl 50 mcgs. Temperature max of 100, temperature subsided independently.

## 2021-08-04 NOTE — PROGRESS NOTES
1930: Bedside shift change report given to Howard Contreras (oncoming nurse) by Selam Grove (offgoing nurse). Report included the following information SBAR, Intake/Output, MAR, Recent Results, Cardiac Rhythm V paced and Alarm Parameters . 0730: Bedside shift change report given to Selma Grove (oncoming nurse) by Jessi Desouza RN (offgoing nurse). Report included the following information SBAR, Intake/Output, MAR, Recent Results, Cardiac Rhythm V paced and Alarm Parameters . SHIFT SUMMARY  Q4 neuro checks, patient does not follow commands, opens eyes to pain and withdraws in all 4 extremities, weaker on right side. Left sided extremities do have a slight tremor and Left face and jaw showed occasional twitching. Pupils 4, sluggish equal and reactive. Patient remained intubated overnight, no continuous sedation on board. Gave PRN tylenol 650mg once for mild grimacing at start of shift. Patient tolerated tube feeds well. Patient voided total of 300 plus one unmeasureable occurrence. Patient's output from [facility] slowed down, only 25 ml out overnight, abdomen is more firm, reported to AMANDA Troy.

## 2021-08-05 ENCOUNTER — APPOINTMENT (OUTPATIENT)
Dept: MRI IMAGING | Age: 86
DRG: 004 | End: 2021-08-05
Attending: NURSE PRACTITIONER
Payer: MEDICARE

## 2021-08-05 LAB
ANION GAP SERPL CALC-SCNC: 3 MMOL/L (ref 5–15)
ARTERIAL PATENCY WRIST A: POSITIVE
BASE EXCESS BLD CALC-SCNC: 4.2 MMOL/L
BASOPHILS # BLD: 0 K/UL (ref 0–0.1)
BASOPHILS NFR BLD: 0 % (ref 0–1)
BDY SITE: ABNORMAL
BUN SERPL-MCNC: 58 MG/DL (ref 6–20)
BUN/CREAT SERPL: 67 (ref 12–20)
CA-I BLD-SCNC: 1.35 MMOL/L (ref 1.12–1.32)
CALCIUM SERPL-MCNC: 9.3 MG/DL (ref 8.5–10.1)
CHLORIDE SERPL-SCNC: 110 MMOL/L (ref 97–108)
CO2 SERPL-SCNC: 30 MMOL/L (ref 21–32)
CREAT SERPL-MCNC: 0.87 MG/DL (ref 0.7–1.3)
DIFFERENTIAL METHOD BLD: ABNORMAL
EOSINOPHIL # BLD: 0.1 K/UL (ref 0–0.4)
EOSINOPHIL NFR BLD: 1 % (ref 0–7)
ERYTHROCYTE [DISTWIDTH] IN BLOOD BY AUTOMATED COUNT: 17.8 % (ref 11.5–14.5)
GAS FLOW.O2 O2 DELIVERY SYS: ABNORMAL L/MIN
GAS FLOW.O2 SETTING OXYMISER: 12 BPM
GLUCOSE BLD STRIP.AUTO-MCNC: 139 MG/DL (ref 65–117)
GLUCOSE BLD STRIP.AUTO-MCNC: 150 MG/DL (ref 65–117)
GLUCOSE BLD STRIP.AUTO-MCNC: 170 MG/DL (ref 65–117)
GLUCOSE BLD STRIP.AUTO-MCNC: 186 MG/DL (ref 65–117)
GLUCOSE BLD STRIP.AUTO-MCNC: 198 MG/DL (ref 65–117)
GLUCOSE SERPL-MCNC: 180 MG/DL (ref 65–100)
HCO3 BLD-SCNC: 28.4 MMOL/L (ref 22–26)
HCT VFR BLD AUTO: 23.4 % (ref 36.6–50.3)
HGB BLD-MCNC: 7.3 G/DL (ref 12.1–17)
IMM GRANULOCYTES # BLD AUTO: 0.1 K/UL (ref 0–0.04)
IMM GRANULOCYTES NFR BLD AUTO: 1 % (ref 0–0.5)
INSPIRATION.DURATION SETTING TIME VENT: 1.25 SEC
LYMPHOCYTES # BLD: 1.4 K/UL (ref 0.8–3.5)
LYMPHOCYTES NFR BLD: 14 % (ref 12–49)
MCH RBC QN AUTO: 29.7 PG (ref 26–34)
MCHC RBC AUTO-ENTMCNC: 31.2 G/DL (ref 30–36.5)
MCV RBC AUTO: 95.1 FL (ref 80–99)
MONOCYTES # BLD: 0.5 K/UL (ref 0–1)
MONOCYTES NFR BLD: 5 % (ref 5–13)
NEUTS SEG # BLD: 7.8 K/UL (ref 1.8–8)
NEUTS SEG NFR BLD: 79 % (ref 32–75)
NRBC # BLD: 0 K/UL (ref 0–0.01)
NRBC BLD-RTO: 0 PER 100 WBC
O2/TOTAL GAS SETTING VFR VENT: 30 %
PAW @ MEAN EXP FLOW ON VENT: 9.7 CMH2O
PCO2 BLD: 40.3 MMHG (ref 35–45)
PEEP RESPIRATORY: 5 CMH2O
PH BLD: 7.46 [PH] (ref 7.35–7.45)
PIP ISTAT,IPIP: 16
PLATELET # BLD AUTO: 161 K/UL (ref 150–400)
PMV BLD AUTO: 9.7 FL (ref 8.9–12.9)
PO2 BLD: 65 MMHG (ref 80–100)
POTASSIUM SERPL-SCNC: 3.5 MMOL/L (ref 3.5–5.1)
RBC # BLD AUTO: 2.46 M/UL (ref 4.1–5.7)
SAO2 % BLD: 93.3 % (ref 92–97)
SERVICE CMNT-IMP: ABNORMAL
SODIUM SERPL-SCNC: 143 MMOL/L (ref 136–145)
SPECIMEN TYPE: ABNORMAL
VENTILATION MODE VENT: ABNORMAL
WBC # BLD AUTO: 10 K/UL (ref 4.1–11.1)

## 2021-08-05 PROCEDURE — 85025 COMPLETE CBC W/AUTO DIFF WBC: CPT

## 2021-08-05 PROCEDURE — 74011250636 HC RX REV CODE- 250/636: Performed by: NURSE PRACTITIONER

## 2021-08-05 PROCEDURE — 99232 SBSQ HOSP IP/OBS MODERATE 35: CPT | Performed by: INTERNAL MEDICINE

## 2021-08-05 PROCEDURE — 36600 WITHDRAWAL OF ARTERIAL BLOOD: CPT

## 2021-08-05 PROCEDURE — 82803 BLOOD GASES ANY COMBINATION: CPT

## 2021-08-05 PROCEDURE — 74011636637 HC RX REV CODE- 636/637: Performed by: HEALTH CARE PROVIDER

## 2021-08-05 PROCEDURE — 94003 VENT MGMT INPAT SUBQ DAY: CPT

## 2021-08-05 PROCEDURE — 82962 GLUCOSE BLOOD TEST: CPT

## 2021-08-05 PROCEDURE — 74011636637 HC RX REV CODE- 636/637: Performed by: INTERNAL MEDICINE

## 2021-08-05 PROCEDURE — 51798 US URINE CAPACITY MEASURE: CPT

## 2021-08-05 PROCEDURE — 74011250637 HC RX REV CODE- 250/637: Performed by: NURSE PRACTITIONER

## 2021-08-05 PROCEDURE — 80048 BASIC METABOLIC PNL TOTAL CA: CPT

## 2021-08-05 PROCEDURE — 74011250636 HC RX REV CODE- 250/636: Performed by: INTERNAL MEDICINE

## 2021-08-05 PROCEDURE — 65610000006 HC RM INTENSIVE CARE

## 2021-08-05 PROCEDURE — 70551 MRI BRAIN STEM W/O DYE: CPT

## 2021-08-05 PROCEDURE — 74011000258 HC RX REV CODE- 258: Performed by: NURSE PRACTITIONER

## 2021-08-05 PROCEDURE — 36415 COLL VENOUS BLD VENIPUNCTURE: CPT

## 2021-08-05 RX ORDER — INSULIN GLARGINE 100 [IU]/ML
6 INJECTION, SOLUTION SUBCUTANEOUS ONCE
Status: COMPLETED | OUTPATIENT
Start: 2021-08-05 | End: 2021-08-05

## 2021-08-05 RX ORDER — INSULIN GLARGINE 100 [IU]/ML
12 INJECTION, SOLUTION SUBCUTANEOUS DAILY
Status: DISCONTINUED | OUTPATIENT
Start: 2021-08-06 | End: 2021-08-11 | Stop reason: HOSPADM

## 2021-08-05 RX ADMIN — INSULIN LISPRO 2 UNITS: 100 INJECTION, SOLUTION INTRAVENOUS; SUBCUTANEOUS at 23:34

## 2021-08-05 RX ADMIN — ASPIRIN 81 MG: 81 TABLET, CHEWABLE ORAL at 08:52

## 2021-08-05 RX ADMIN — FENTANYL CITRATE 50 MCG: 50 INJECTION, SOLUTION INTRAMUSCULAR; INTRAVENOUS at 13:35

## 2021-08-05 RX ADMIN — INSULIN GLARGINE 6 UNITS: 100 INJECTION, SOLUTION SUBCUTANEOUS at 08:52

## 2021-08-05 RX ADMIN — FENTANYL CITRATE 50 MCG: 50 INJECTION, SOLUTION INTRAMUSCULAR; INTRAVENOUS at 20:06

## 2021-08-05 RX ADMIN — INSULIN LISPRO 2 UNITS: 100 INJECTION, SOLUTION INTRAVENOUS; SUBCUTANEOUS at 00:15

## 2021-08-05 RX ADMIN — HEPARIN SODIUM 5000 UNITS: 5000 INJECTION INTRAVENOUS; SUBCUTANEOUS at 09:01

## 2021-08-05 RX ADMIN — LEVETIRACETAM 1500 MG: 100 INJECTION, SOLUTION INTRAVENOUS at 03:59

## 2021-08-05 RX ADMIN — Medication: at 08:53

## 2021-08-05 RX ADMIN — INSULIN LISPRO 2 UNITS: 100 INJECTION, SOLUTION INTRAVENOUS; SUBCUTANEOUS at 06:01

## 2021-08-05 RX ADMIN — HEPARIN SODIUM 5000 UNITS: 5000 INJECTION INTRAVENOUS; SUBCUTANEOUS at 21:53

## 2021-08-05 RX ADMIN — LACOSAMIDE 100 MG: 50 TABLET, FILM COATED ORAL at 08:52

## 2021-08-05 RX ADMIN — LEVETIRACETAM 1500 MG: 100 INJECTION, SOLUTION INTRAVENOUS at 16:22

## 2021-08-05 RX ADMIN — Medication 10 ML: at 21:53

## 2021-08-05 RX ADMIN — ALLOPURINOL 100 MG: 100 TABLET ORAL at 08:52

## 2021-08-05 RX ADMIN — LACOSAMIDE 100 MG: 50 TABLET, FILM COATED ORAL at 20:06

## 2021-08-05 RX ADMIN — CHLORHEXIDINE GLUCONATE 15 ML: 0.12 RINSE ORAL at 00:15

## 2021-08-05 RX ADMIN — Medication 10 ML: at 15:26

## 2021-08-05 RX ADMIN — CHLORHEXIDINE GLUCONATE 15 ML: 0.12 RINSE ORAL at 12:56

## 2021-08-05 RX ADMIN — LANSOPRAZOLE 30 MG: KIT at 08:52

## 2021-08-05 RX ADMIN — Medication: at 17:20

## 2021-08-05 RX ADMIN — INSULIN GLARGINE 6 UNITS: 100 INJECTION, SOLUTION SUBCUTANEOUS at 12:10

## 2021-08-05 RX ADMIN — INSULIN LISPRO 2 UNITS: 100 INJECTION, SOLUTION INTRAVENOUS; SUBCUTANEOUS at 12:56

## 2021-08-05 RX ADMIN — Medication 10 ML: at 06:00

## 2021-08-05 NOTE — PROGRESS NOTES
[facility]    ICU TEAM Progress Note    Name: Jake Caal   : 3/9/1933   MRN: [id number]   Date: 2021      I  Subjective:   Progress Note: 2021      Reason for ICU Admission: Acute hypoxic respiratory failure     Interval history: From [facility] Single a 80 y. o. male with history of prostate cancer, CHF, constipation, hypertension, stroke, TIA, and seizures who presents to the emergency department by EMS as transfer from [address] [place] [address] for respiratory distress.  Patient had initially been a transfer from [facility] after a hospitalization from  [facility].  Was there for altered mental status hypoxia was treated for hypoxic respiratory failure bilateral pleural effusions acute systolic CHF secondary to severe aortic stenosis hyponatremia hypokalemia elevated LFTs malnutrition and possible UTI was treated with ceftriaxone and Angélica Ta already had a PEG for dysphagia and protein calorie malnutrition prior to [facility] admission. He was at [address] [place] [address] from [place] [id number].  While at [address] [place] [address] today patient had an episode of vomiting and aspirated was initially started on high flow nasal cannula however was still hypoxic thus got intubated by [address] [place] [address] staff. Marilin Matute was then transferred to the ED for further care.  Per family patient's decline started last year while he was having seizures/TIA patient became more encephalopathic and started to refuse eating and thus got a PEG for nutritional reasons. was staying at assisted living facility and had to be re-hospitalized after his PEG malfunctioned.  During that time he did have an infection, daughter could not exactly say where the infection was, but she thought it was an abdominal infection and since then he started having worsening heart failure and decreased heart function, and failure to thrive. Per records EF was 10 to 15%.  Per daughter, patient has never had a tracheostomy.  She states patient is awake at times, will open eyes, but does not track, does not follow commands, and does not speak. They wish patient to remain full code at this time.     While in ED patient was placed on mechanical ventilator, ABG was done which was consistent with hypoxic respiratory failure.  Patient was also given 1 L of IV fluids for fluid resuscitation due to sepsis. Initially blood pressure was okay but once patient was started on sedation blood pressure dropped.  Patient also had episodes of hypoxia due to vent asynchrony. Once PEEP was increased for hypoxia patient's pressure dropped. Started patient on Levophed via peripheral in ED. If hypotension worsens will need central line. Started on Zosyn. Updated the family at bedside, daughter and and son. Consent for central line and blood was  Received.  Patient to transfer to ICU for continued care. Overnight Events:  8/5: No changes  8/4: No acute event, no fever, hemodynamically stable, no changes in neurological status. 8/3: No acute event, remained poorly responsive, remained on EEG overnight.  Still having occasional facial tremors.  Hemodynamically remained stable  8/2: No acute event, pending EEG report, no neurological recovery, occasional twitching movement in his face but seems to be better than before.  Dobutamine drip off.  8/1: On continuous EEG monitoring, no other acute event.  Poorly responsive and occasional twitching movement  7/31: Appearnt seizure like activity last night, ativan given  7/30: No acute overnight events  7/29 No acute overnight events   7/28: No acute event, no much neurological recovery.  No witnessed seizure, blood pressure on the higher side.  Diuresing well  7/27: No acute event.  Good diuresis.  Still poorly responsive.  Remains on dobutamine.  Blood pressure maintained.  No witnessed seizure.   7/26: No acute event, blood pressure somewhat better but urine output decreasing.  Still on dobutamine at 5.  7/25 -- Getting PRBC now; started on Dobutamine      Active Problem List:     Problem List  Date Reviewed: 12/17/2020        Codes Class    Acute respiratory failure with hypoxia [facility]) ICD-10-CM: J96.01  ICD-9-CM: 518.81         Aspiration into respiratory tract ICD-10-CM: T17.908A  ICD-9-CM: 934.9         Hypokalemia ICD-10-CM: E87.6  ICD-9-CM: 276.8         Hypernatremia ICD-10-CM: E87.0  ICD-9-CM: 276.0         Moderate protein malnutrition (HCC) ICD-10-CM: E44.0  ICD-9-CM: 263.0         Failure to thrive (0-17) ICD-10-CM: R62.51  ICD-9-CM: 783.41         Bacteremia ICD-10-CM: R78.81  ICD-9-CM: 790.7         UTI (urinary tract infection) ICD-10-CM: N39.0  ICD-9-CM: 599.0         Dementia (HCC) ICD-10-CM: F03.90  ICD-9-CM: 294.20         Bedridden ICD-10-CM: Z74.01  ICD-9-CM: V49.84         Pressure ulcer ICD-10-CM: L89.90  ICD-9-CM: 707.00, 707.20         S/P percutaneous endoscopic gastrostomy (PEG) tube placement ([facility] ICD-10-CM: Z93.1  ICD-9-CM: V44.1         Dehydration ICD-10-CM: E86.0  ICD-9-CM: 276.51         Lactic acidosis ICD-10-CM: E87.2  ICD-9-CM: 276.2         Sepsis ([facility] ICD-10-CM: A41.9  ICD-9-CM: 038.9, 995.91         SOULEYMANE (acute kidney injury) ([facility] ICD-10-CM: N17.9  ICD-9-CM: 463. 9         Chronic systolic heart failure (HCC) ICD-10-CM: I50.22  ICD-9-CM: 428.22         Altered mental status ICD-10-CM: R41.82  ICD-9-CM: 780.97         Post-ictal state ([facility] ICD-10-CM: R56.9  ICD-9-CM: 780.39         Seizure ([address] ICD-10-CM: R56.9  ICD-9-CM: 780.39         Atrial pacemaker lead displacement ICD-10-CM: T82.120A  ICD-9-CM: 996.01         Pacemaker ICD-10-CM: Z95.0  ICD-9-CM: V45.01     Overview Signed 3/22/2019  5:08 PM by Jeane Taylor MD     3/22/2019 dual chamber Medtronic pacer             Bradycardia ICD-10-CM: R00.1  ICD-9-CM: 427.89         Transaminitis ICD-10-CM: R74.01  ICD-9-CM: 790.4         Hypertensive urgency ICD-10-CM: I16.0  ICD-9-CM: 401.9         Second degree AV block, Mobitz type I ICD-10-CM: I44.1  ICD-9-CM: 426.13     Overview Signed 3/21/2019 6:14 PM by Nereyda Pete MD     Added automatically from request for surgery [id number]             Stage 3 chronic kidney disease ([email or web address] ICD-10-CM: N18.30  ICD-9-CM: 229. 3         Rotator cuff arthropathy of both shoulders ICD-10-CM: M12.811, M12.812  ICD-9-CM: 716.81         Cognitive impairment ICD-10-CM: R41.89  ICD-9-CM: 365.7         Systolic murmur DPY-13-HP: R01.1  ICD-9-CM: 785.2         Essential hypertension ICD-10-CM: I10  ICD-9-CM: 401.9         Gout ICD-10-CM: M10.9  ICD-9-CM: 274.9         Pure hypercholesterolemia ICD-10-CM: E78.00  ICD-9-CM: 272.0         History of prostate cancer ICD-10-CM: Z85.46  ICD-9-CM: V10.46         Chronic constipation ICD-10-CM: K59.09  ICD-9-CM: 564.00         Dysuria ICD-10-CM: R30.0  ICD-9-CM: 243. 1         Weight loss ICD-10-CM: R63.4  ICD-9-CM: 783.21         Adrenal mass ([email or web address] ICD-10-CM: E27.8  ICD-9-CM: 255.8     Overview Addendum 12/17/2018  9:57 AM by Joanie Major MD     Stable/non-functioning adenoma on imaging                   Past Medical History:      has a past medical history of Cancer [facility]), [facility] ([facility] (12/7/2020), Constipation, Gout, Hyperlipemia, Hypertension, Pacemaker (2019), Stroke ([facility] Thrombocytopenia ([facility] (3/19/2019), and TIA (transient ischemic attack) (2013). Past Surgical History:      has a past surgical history that includes hx urological; hx prostatectomy; pr ins new/rplcmt prm pm w/transv eltrd atrial&vent (Right, 3/22/2019); pr ins new/rplcmt prm pacemakr w/trans eltrd atrial (N/A, 10/18/2019); and place percut gastrostomy tube (11/30/2020). Home Medications:     Prior to Admission medications    Medication Sig Start Date End Date Taking? Authorizing Provider   levETIRAcetam (KEPPRA) 750 mg tablet Take 1 Tab by mouth every twelve (12) hours. 12/11/20   James Frost NP   aspirin delayed-release 81 mg tablet Take 1 Tab by mouth daily.  7/13/20   Coy Velazquez MD   polyethylene glycol [facility]) 17 gram/dose powder Take 17 g by mouth daily as needed for Constipation. 20   Eric Shames, MD   balsam peru-castor oiL (VENELEX) ointment Apply  to affected area three (3) times daily. 20   Jaci Walden MD   famotidine (PEPCID) 20 mg tablet Take 1 Tab by mouth every evening. 20   Jaci Walden MD   acetaminophen (TYLENOL) 325 mg tablet Take 650 mg by mouth every six (6) hours as needed for Pain. Provider, Historical       Allergies/Social/Family History:     No Known Allergies   Social History     Tobacco Use    Smoking status: Never Smoker    Smokeless tobacco: Never Used   Substance Use Topics    Alcohol use: No      Family History   Problem Relation Age of Onset    No Known Problems Mother     No Known Problems Father        Review of Systems:     Not able to obtain due to patient medical condition    Objective:   Vital Signs:  Visit Vitals  /72 (BP 1 Location: Left upper arm, BP Patient Position: At rest)   Pulse 80   Temp 98.7 °F (37.1 °C)   Resp 12   Ht 5' 6\" (1.676 m)   Wt 73 kg (160 lb 15 oz)   SpO2 100%   BMI 25.98 kg/m²      O2 Device: Ventilator, Endotracheal tube Temp (24hrs), Av °F (37.2 °C), Min:98.2 °F (36.8 °C), Max:100.2 °F (37.9 °C)           Intake/Output:     Intake/Output Summary (Last 24 hours) at 2021 1332  Last data filed at 2021 0800  Gross per 24 hour   Intake 1160 ml   Output 705 ml   Net 455 ml       Physical Exam:    General: No distress, appears stated age, intubated   Neurologic: Moves left arm to painful stimuli, minimal movement associated with tremor. , occasional facial frowning on sternal rub, did not appreciate twitching today  lungs: CTAB   Abdomen:  soft, non-tender.  Bowel sounds normal. No masses,  no organomegaly  Cardiovascular:  Regular rate and rhythm, S1S2 present, without murmur or extra heart sounds and pedal pulses normal , no lower limb edema    LABS AND  DATA: Personally reviewed  Recent Labs     21  0409 08/04/21 0359   WBC 10.0 10.3   HGB 7.3* 7.8*   HCT 23.4* 24.9*    150     Recent Labs     08/05/21 0405 08/04/21 0359 08/03/21 0359 08/03/21 0359    141   < > 144   K 3.5 3.7   < > 3.9   * 109*   < > 108   CO2 30 31   < > 29   BUN 58* 56*   < > 56*   CREA 0.87 0.90   < > 0.85   * 186*   < > 182*   CA 9.3 9.4   < > 9.2   MG  --   --   --  2.6*   PHOS  --   --   --  3.0    < > = values in this interval not displayed. No results for input(s): AP, TBIL, TP, ALB, GLOB, AML, LPSE in the last 72 hours. No lab exists for component: SGOT, GPT, AMYP  No results for input(s): INR, PTP, APTT, INREXT in the last 72 hours. Recent Labs     08/05/21 0407   PHI 7.46*   PCO2I 40.3   PO2I 65*   FIO2I 30     No results for input(s): CPK, CKMB, TROIQ, BNPP in the last 72 hours. Hemodynamics:   PAP:   CO:     Wedge:   CI:     CVP:    SVR:       PVR:       Ventilator Settings:  Mode Rate Tidal Volume Pressure FiO2 PEEP   Assist control, Pressure control      5 cm H2O 35 % 5 cm H20     Peak airway pressure: 23 cm H2O    Minute ventilation: 5.56 l/min        MEDS: Reviewed    Chest X-Ray:  CXR Results  (Last 48 hours)    None            Assessment and Plan:   Seizure:  Encephalopathy:  Advanced dementia:  Appreciate neurology input, EEG report noted as above.  Remains poorly responsive.  Continue current antiepileptic medication. Appreciate further neurology input pending MRI today  Cardiogenic shock:  Ejection fraction of less than 30% at baseline, dobutamine drip weaned off.  Seems to be euvolemic at this time.  Renal function improving.  Will diurese as needed. Septic shock: Resolved  Completed antibiotic course.  Off pressors.  Procalcitonin continue to trend down    I had a lengthy discussion with the patient daughter at bedside on August 4. I discussed the lack of improvement in his neurological status.   I discussed option of tracheostomy and long-term facility versus comfort measures. Daughter stated that she will discuss this with the rest of her family and will come up with a decision soon. DISPOSITION  Stay in ICU    CRITICAL CARE CONSULTANT NOTE  I had a face to face encounter with the patient, reviewed and interpreted patient data including clinical events, labs, images, vital signs, I/O's, and examined patient. I have discussed the case and the plan and management of the patient's care with the consulting services, the bedside nurses and the respiratory therapist.      NOTE OF PERSONAL INVOLVEMENT IN CARE   This patient has a high probability of imminent, clinically significant deterioration, which requires the highest level of preparedness to intervene urgently. I participated in the decision-making and personally managed or directed the management of the following life and organ supporting interventions that required my frequent assessment to treat or prevent imminent deterioration. I personally spent 40 minutes of critical care time. This is time spent at this critically ill patient's bedside actively involved in patient care as well as the coordination of care and discussions with the patient's family. This does not include any procedural time which has been billed separately. Kee Adames M.D.   Staff Intensivist/Pulmonologist  [facility]  8/5/2021

## 2021-08-05 NOTE — PROGRESS NOTES
1930: Bedside shift change report given to [address] (oncoming nurse) by Kerrie Doss (offgoing nurse). Report included the following information SBAR, Intake/Output, MAR, Recent Results, Cardiac Rhythm V paced and Alarm Parameters . 2000: 50mcg of IV fent given for pain as shown by hypertension, tachypnea, and frequent grimacing. 0730: Bedside shift change report given to [facility] (oncoming nurse) by Roberts Lombard RN (offgoing nurse). Report included the following information SBAR, Intake/Output, MAR, Recent Results, Cardiac Rhythm V paced and Alarm Parameters . SHIFT SUMMARY  Q4 neuro checks, patient does not follow commands, opens eyes to pain and withdraws in all 4 extremities, notably weaker withdraw on right side. Left sided extremities do have a slight tremor and Left face and jaw showed occasional twitching. Pupils 4, sluggish equal and reactive. Patient tolerated tube feeds well. Patient had total of 400ml urine out overnight. Patient remained intubated with no sedation overnight, tolerated fine.

## 2021-08-05 NOTE — PROGRESS NOTES
0730: Bedside and Verbal shift change report given to Campbell Clarke RN (oncoming nurse) by Maggie Garcia RN (offgoing nurse). Report included the following information SBAR, Kardex, Intake/Output, MAR, Accordion, Recent Results, Med Rec Status, Cardiac Rhythm VPaced, Alarm Parameters  and Dual Neuro Assessment.      1900: Pts family met and discussed steps moving forward; decided they would like to go ahead with tracheostomy; notified MD

## 2021-08-05 NOTE — PROGRESS NOTES
Problem: Ventilator Management  Goal: *Adequate oxygenation and ventilation  Outcome: Progressing Towards Goal  Goal: *Patient maintains clear airway/free of aspiration  Outcome: Progressing Towards Goal  Goal: *Absence of infection signs and symptoms  Outcome: Progressing Towards Goal  Goal: *Normal spontaneous ventilation  Outcome: Progressing Towards Goal     Problem: Pressure Injury - Risk of  Goal: *Prevention of pressure injury  Description: Document Torito Scale and appropriate interventions in the flowsheet. Outcome: Progressing Towards Goal  Note: Pressure Injury Interventions:  Sensory Interventions: Assess changes in LOC, Check visual cues for pain, Discuss PT/OT consult with provider, Float heels, Keep linens dry and wrinkle-free, Minimize linen layers, Monitor skin under medical devices, Pressure redistribution bed/mattress (bed type), Turn and reposition approx. every two hours (pillows and wedges if needed)    Moisture Interventions: Absorbent underpads, Apply protective barrier, creams and emollients, Check for incontinence Q2 hours and as needed, Internal/External urinary devices, Internal/External fecal devices, Maintain skin hydration (lotion/cream)    Activity Interventions: Pressure redistribution bed/mattress(bed type), Increase time out of bed    Mobility Interventions: Assess need for specialty bed, Float heels, Pressure redistribution bed/mattress (bed type), PT/OT evaluation, Turn and reposition approx.  every two hours(pillow and wedges)    Nutrition Interventions: Document food/fluid/supplement intake    Friction and Shear Interventions: Apply protective barrier, creams and emollients, Foam dressings/transparent film/skin sealants, Lift sheet, HOB 30 degrees or less, Lift team/patient mobility team                Problem: Patient Education: Go to Patient Education Activity  Goal: Patient/Family Education  Outcome: Progressing Towards Goal     Problem: Falls - Risk of  Goal: *Absence of Falls  Description: Document Elisabet Vaughan Fall Risk and appropriate interventions in the flowsheet.   Outcome: Progressing Towards Goal  Note: Fall Risk Interventions:  Mobility Interventions: Bed/chair exit alarm, Communicate number of staff needed for ambulation/transfer, Strengthening exercises (ROM-active/passive)    Mentation Interventions: Adequate sleep, hydration, pain control, Bed/chair exit alarm, Evaluate medications/consider consulting pharmacy, More frequent rounding, Reorient patient    Medication Interventions: Bed/chair exit alarm, Evaluate medications/consider consulting pharmacy    Elimination Interventions: Bed/chair exit alarm, Call light in reach, Patient to call for help with toileting needs              Problem: Patient Education: Go to Patient Education Activity  Goal: Patient/Family Education  Outcome: Progressing Towards Goal     Problem: Breathing Pattern - Ineffective  Goal: *Absence of hypoxia  Outcome: Progressing Towards Goal  Goal: *Use of effective breathing techniques  Outcome: Progressing Towards Goal  Goal: *PALLIATIVE CARE:  Alleviation of Dyspnea  Outcome: Progressing Towards Goal     Problem: Seizure Disorder (Adult)  Goal: *STG: Remains free of seizure activity  Outcome: Progressing Towards Goal  Goal: *STG: Maintains lab values within therapeutic range  Outcome: Progressing Towards Goal  Goal: *STG/LTG: Complies with medication therapy  Outcome: Progressing Towards Goal  Goal: *STG: Remains free of injury during seizure activity  Outcome: Progressing Towards Goal  Goal: *STG: Remains safe in hospital  Outcome: Progressing Towards Goal  Goal: Interventions  Outcome: Progressing Towards Goal     Problem: Patient Education: Go to Patient Education Activity  Goal: Patient/Family Education  Outcome: Progressing Towards Goal

## 2021-08-05 NOTE — PROGRESS NOTES
Comprehensive Nutrition Assessment    Type and Reason for Visit: Reassess    Nutrition Recommendations/Plan:      Increase FWF to 120 ml q 4 hr    Nutrition Assessment:    Pt admitted d/t Acute respiratory failure with hypoxia. PMHx: Prostate Cancer, CHF, HTN, CVA, PCM, Seizure D/O, Severe Pulmonary HTN. Transferred from [facility] where pt aspirated after vomiting then intubated. Septic and cardiogenic shock-resolved. . Acute on chronic systolic heart failure-EF 10-15%. (R) pleural effusion tapped-1300 ml removed 7/26. WOCN following for DTI on B/L buttock-resolving. Being diuresed prn. Neurology consulted-?breakthrough seizures, none seen on EEG. MRI today. Palliative Care met with family-pt made a partial code. Mr Trisha Peacock is tolerating enteral feeding well. FMS in place with 100-300 ml output per day. Tube feeding as ordered provides 990 ml, 1550 calories, 78 gm protein, 1110 ml free water and 21 gm fiber per day to meet estimated needs. Will continue to monitor stool output-may need to consider change to a non-fiber formula. Sodium overall trending up in the past week. Will increase water flush to better meet fluid needs. Recommend 120 ml water flush q 4 hr to provide a total of 1530 ml free water (tube feeding/flush) per day. Malnutrition Assessment:  Malnutrition Status:   Moderate malnutrition    Context:  Acute illness     Findings of the 6 clinical characteristics of malnutrition:   Energy Intake:  Unable to assess  Weight Loss:  No significant weight loss     Body Fat Loss:  1 - Mild body fat loss, Buccal region   Muscle Mass Loss:  7 - Moderate muscle mass loss, Clavicles (pectoralis & deltoids), Scapula (trapezius), Temples (temporalis)  Fluid Accumulation:  1 - Mild, Extremities   Strength:  Not performed     Nutritionally Significant Medications:   Lantus, Humalog, Prevacid    Estimated Daily Nutrient Needs:  Energy (kcal): 2265 (2003b); Weight Used for Energy Requirements: Current (73 kg)  Protein (g): 75-90 (1.0-1.2g/kg); Weight Used for Protein Requirements: Current  Fluid (ml/day):  ; Method Used for Fluid Requirements: 1 ml/kcal    Nutrition Related Findings:       BM: 8/5  Edema: Trace BLE, LUE, 3+ pitting RUE  Wounds:  Deep tissue injury       Current Nutrition Therapies:   Diet: NPO  Tube Feeding: Jevity 1.5 @ 45 ml/hr with 1 packet Prosource daily and 50 ml water flush q 4 hr  Additional Caloric Sources:  None    Anthropometric Measures:  · Height:  5' 6\" (167.6 cm)  · Current Body Wt:  73 kg (160 lb 15 oz)   · Admission Body Wt:  164 lb 10.9 oz      · Ideal Body Wt:  142:  113.3 %   · BMI Categories:  Overweight (BMI 25.0-29. 9)     Wt Readings from Last 10 Encounters:   08/05/21 73 kg (160 lb 15 oz)   01/06/21 82.8 kg (182 lb 8.7 oz)   12/08/20 82.8 kg (182 lb 8.7 oz)   11/19/20 77.3 kg (170 lb 6.7 oz)   07/09/20 77.3 kg (170 lb 6.7 oz)   07/02/20 76.9 kg (169 lb 8.5 oz)   01/30/20 78.5 kg (173 lb)   10/18/19 82.6 kg (182 lb)   10/11/19 84.6 kg (186 lb 6.4 oz)   08/14/19 83.1 kg (183 lb 3.2 oz)       Nutrition Diagnosis:   · Inadequate oral intake related to impaired respiratory function as evidenced by intubation, nutrition support-enteral nutrition. Nutrition Interventions:   Food and/or Nutrient Delivery: Continue tube feeding  Nutrition Education and Counseling: No recommendations at this time  Coordination of Nutrition Care: Continue to monitor while inpatient, Interdisciplinary rounds    Goals:  EN to meet 90% estimated needs x 5-7 days.        Nutrition Monitoring and Evaluation:   Behavioral-Environmental Outcomes: None identified  Food/Nutrient Intake Outcomes: Enteral nutrition intake/tolerance  Physical Signs/Symptoms Outcomes: Biochemical data, GI status, Weight, Fluid status or edema, Nutrition focused physical findings    Discharge Planning:    Enteral nutrition     Alberto Willard RD CNSC  Contact: [employer]

## 2021-08-05 NOTE — PROGRESS NOTES
Spoke with Keyla Slaughter his nurse to coordinate being in MRI at 1:30p today. She will get with respiratory therapist to let them know. She will bring Fentanyl for pt's twitching also.

## 2021-08-06 LAB
ANION GAP SERPL CALC-SCNC: 4 MMOL/L (ref 5–15)
BASOPHILS # BLD: 0 K/UL (ref 0–0.1)
BASOPHILS NFR BLD: 0 % (ref 0–1)
BUN SERPL-MCNC: 58 MG/DL (ref 6–20)
BUN/CREAT SERPL: 76 (ref 12–20)
CALCIUM SERPL-MCNC: 9.6 MG/DL (ref 8.5–10.1)
CHLORIDE SERPL-SCNC: 110 MMOL/L (ref 97–108)
CO2 SERPL-SCNC: 29 MMOL/L (ref 21–32)
CREAT SERPL-MCNC: 0.76 MG/DL (ref 0.7–1.3)
DIFFERENTIAL METHOD BLD: ABNORMAL
EOSINOPHIL # BLD: 0.1 K/UL (ref 0–0.4)
EOSINOPHIL NFR BLD: 1 % (ref 0–7)
ERYTHROCYTE [DISTWIDTH] IN BLOOD BY AUTOMATED COUNT: 18 % (ref 11.5–14.5)
GLUCOSE BLD STRIP.AUTO-MCNC: 115 MG/DL (ref 65–117)
GLUCOSE BLD STRIP.AUTO-MCNC: 160 MG/DL (ref 65–117)
GLUCOSE BLD STRIP.AUTO-MCNC: 170 MG/DL (ref 65–117)
GLUCOSE SERPL-MCNC: 137 MG/DL (ref 65–100)
HCT VFR BLD AUTO: 23.9 % (ref 36.6–50.3)
HGB BLD-MCNC: 7.4 G/DL (ref 12.1–17)
IMM GRANULOCYTES # BLD AUTO: 0.1 K/UL (ref 0–0.04)
IMM GRANULOCYTES NFR BLD AUTO: 1 % (ref 0–0.5)
LYMPHOCYTES # BLD: 1.5 K/UL (ref 0.8–3.5)
LYMPHOCYTES NFR BLD: 14 % (ref 12–49)
MCH RBC QN AUTO: 29.8 PG (ref 26–34)
MCHC RBC AUTO-ENTMCNC: 31 G/DL (ref 30–36.5)
MCV RBC AUTO: 96.4 FL (ref 80–99)
MONOCYTES # BLD: 0.6 K/UL (ref 0–1)
MONOCYTES NFR BLD: 6 % (ref 5–13)
NEUTS SEG # BLD: 8 K/UL (ref 1.8–8)
NEUTS SEG NFR BLD: 78 % (ref 32–75)
NRBC # BLD: 0 K/UL (ref 0–0.01)
NRBC BLD-RTO: 0 PER 100 WBC
PLATELET # BLD AUTO: 189 K/UL (ref 150–400)
PMV BLD AUTO: 10.1 FL (ref 8.9–12.9)
POTASSIUM SERPL-SCNC: 3.5 MMOL/L (ref 3.5–5.1)
RBC # BLD AUTO: 2.48 M/UL (ref 4.1–5.7)
SERVICE CMNT-IMP: ABNORMAL
SERVICE CMNT-IMP: ABNORMAL
SERVICE CMNT-IMP: NORMAL
SODIUM SERPL-SCNC: 143 MMOL/L (ref 136–145)
WBC # BLD AUTO: 10.2 K/UL (ref 4.1–11.1)

## 2021-08-06 PROCEDURE — 82962 GLUCOSE BLOOD TEST: CPT

## 2021-08-06 PROCEDURE — 74011250636 HC RX REV CODE- 250/636: Performed by: NURSE PRACTITIONER

## 2021-08-06 PROCEDURE — 85025 COMPLETE CBC W/AUTO DIFF WBC: CPT

## 2021-08-06 PROCEDURE — 80048 BASIC METABOLIC PNL TOTAL CA: CPT

## 2021-08-06 PROCEDURE — 65610000006 HC RM INTENSIVE CARE

## 2021-08-06 PROCEDURE — 74011250637 HC RX REV CODE- 250/637: Performed by: NURSE PRACTITIONER

## 2021-08-06 PROCEDURE — 74011000258 HC RX REV CODE- 258: Performed by: NURSE PRACTITIONER

## 2021-08-06 PROCEDURE — 99221 1ST HOSP IP/OBS SF/LOW 40: CPT | Performed by: PHYSICIAN ASSISTANT

## 2021-08-06 PROCEDURE — 74011636637 HC RX REV CODE- 636/637: Performed by: INTERNAL MEDICINE

## 2021-08-06 PROCEDURE — 94003 VENT MGMT INPAT SUBQ DAY: CPT

## 2021-08-06 PROCEDURE — 36415 COLL VENOUS BLD VENIPUNCTURE: CPT

## 2021-08-06 PROCEDURE — 74011636637 HC RX REV CODE- 636/637: Performed by: HEALTH CARE PROVIDER

## 2021-08-06 PROCEDURE — 77030018798 HC PMP KT ENTRL FED COVD -A

## 2021-08-06 PROCEDURE — 74011250636 HC RX REV CODE- 250/636: Performed by: INTERNAL MEDICINE

## 2021-08-06 RX ADMIN — FENTANYL CITRATE 50 MCG: 50 INJECTION, SOLUTION INTRAMUSCULAR; INTRAVENOUS at 19:01

## 2021-08-06 RX ADMIN — ALLOPURINOL 100 MG: 100 TABLET ORAL at 08:43

## 2021-08-06 RX ADMIN — LACOSAMIDE 100 MG: 50 TABLET, FILM COATED ORAL at 08:43

## 2021-08-06 RX ADMIN — LANSOPRAZOLE 30 MG: KIT at 08:43

## 2021-08-06 RX ADMIN — Medication: at 08:44

## 2021-08-06 RX ADMIN — ASPIRIN 81 MG: 81 TABLET, CHEWABLE ORAL at 08:43

## 2021-08-06 RX ADMIN — INSULIN LISPRO 2 UNITS: 100 INJECTION, SOLUTION INTRAVENOUS; SUBCUTANEOUS at 06:01

## 2021-08-06 RX ADMIN — LEVETIRACETAM 1500 MG: 100 INJECTION, SOLUTION INTRAVENOUS at 03:43

## 2021-08-06 RX ADMIN — INSULIN LISPRO 2 UNITS: 100 INJECTION, SOLUTION INTRAVENOUS; SUBCUTANEOUS at 11:31

## 2021-08-06 RX ADMIN — HEPARIN SODIUM 5000 UNITS: 5000 INJECTION INTRAVENOUS; SUBCUTANEOUS at 10:03

## 2021-08-06 RX ADMIN — CHLORHEXIDINE GLUCONATE 15 ML: 0.12 RINSE ORAL at 00:05

## 2021-08-06 RX ADMIN — INSULIN GLARGINE 12 UNITS: 100 INJECTION, SOLUTION SUBCUTANEOUS at 08:44

## 2021-08-06 RX ADMIN — Medication 10 ML: at 21:31

## 2021-08-06 RX ADMIN — LEVETIRACETAM 1500 MG: 100 INJECTION, SOLUTION INTRAVENOUS at 15:41

## 2021-08-06 RX ADMIN — Medication: at 17:27

## 2021-08-06 RX ADMIN — Medication 10 ML: at 06:02

## 2021-08-06 RX ADMIN — LACOSAMIDE 100 MG: 50 TABLET, FILM COATED ORAL at 20:22

## 2021-08-06 RX ADMIN — Medication 10 ML: at 13:54

## 2021-08-06 RX ADMIN — CHLORHEXIDINE GLUCONATE 15 ML: 0.12 RINSE ORAL at 13:54

## 2021-08-06 RX ADMIN — HEPARIN SODIUM 5000 UNITS: 5000 INJECTION INTRAVENOUS; SUBCUTANEOUS at 21:31

## 2021-08-06 NOTE — PROGRESS NOTES
Problem: Ventilator Management  Goal: *Adequate oxygenation and ventilation  Outcome: Progressing Towards Goal  Goal: *Patient maintains clear airway/free of aspiration  Outcome: Progressing Towards Goal  Goal: *Absence of infection signs and symptoms  Outcome: Progressing Towards Goal  Goal: *Normal spontaneous ventilation  Outcome: Progressing Towards Goal     Problem: Patient Education: Go to Patient Education Activity  Goal: Patient/Family Education  Outcome: Progressing Towards Goal     Problem: Pressure Injury - Risk of  Goal: *Prevention of pressure injury  Description: Document Torito Scale and appropriate interventions in the flowsheet. Outcome: Progressing Towards Goal  Note: Pressure Injury Interventions:  Sensory Interventions: Assess changes in LOC, Check visual cues for pain, Discuss PT/OT consult with provider, Float heels, Keep linens dry and wrinkle-free, Minimize linen layers, Monitor skin under medical devices, Turn and reposition approx. every two hours (pillows and wedges if needed)    Moisture Interventions: Absorbent underpads, Apply protective barrier, creams and emollients, Check for incontinence Q2 hours and as needed, Internal/External urinary devices, Maintain skin hydration (lotion/cream)    Activity Interventions: Assess need for specialty bed, Pressure redistribution bed/mattress(bed type), PT/OT evaluation    Mobility Interventions: Assess need for specialty bed, Float heels, HOB 30 degrees or less, PT/OT evaluation, Turn and reposition approx.  every two hours(pillow and wedges)    Nutrition Interventions: Document food/fluid/supplement intake    Friction and Shear Interventions: Apply protective barrier, creams and emollients, Foam dressings/transparent film/skin sealants, HOB 30 degrees or less, Lift sheet, Lift team/patient mobility team                Problem: Patient Education: Go to Patient Education Activity  Goal: Patient/Family Education  Outcome: Progressing Towards Goal     Problem: Falls - Risk of  Goal: *Absence of Falls  Description: Document Hood Nielsen Fall Risk and appropriate interventions in the flowsheet.   Outcome: Progressing Towards Goal  Note: Fall Risk Interventions:  Mobility Interventions: Bed/chair exit alarm, Communicate number of staff needed for ambulation/transfer, Patient to call before getting OOB    Mentation Interventions: Adequate sleep, hydration, pain control, Bed/chair exit alarm, Door open when patient unattended, Evaluate medications/consider consulting pharmacy, More frequent rounding, Reorient patient    Medication Interventions: Bed/chair exit alarm, Evaluate medications/consider consulting pharmacy    Elimination Interventions: Bed/chair exit alarm, Toilet paper/wipes in reach, Toileting schedule/hourly rounds              Problem: Patient Education: Go to Patient Education Activity  Goal: Patient/Family Education  Outcome: Progressing Towards Goal     Problem: Seizure Disorder (Adult)  Goal: *STG: Remains free of seizure activity  Outcome: Progressing Towards Goal  Goal: *STG: Maintains lab values within therapeutic range  Outcome: Progressing Towards Goal  Goal: *STG/LTG: Complies with medication therapy  Outcome: Progressing Towards Goal  Goal: *STG: Remains free of injury during seizure activity  Outcome: Progressing Towards Goal  Goal: *STG: Remains safe in hospital  Outcome: Progressing Towards Goal  Goal: Interventions  Outcome: Progressing Towards Goal

## 2021-08-06 NOTE — CONSULTS
Thoracic Surgery Consultation    Admit Date: 7/23/2021  Reason for Consultation: Tracheostomy Placement    HPI:  Leisa Mohs is a 80 y.o. male with extensive PMH of prostate cancer, CHF, constipation, hypertension, stroke, TIA, seizures & as noted below who we are asked to see in Thoracic Surgery consultation by Dr. Maxene Hammans for a Tracheostomy placement. Patient has been intubated since 7/23/2021 and failed multiple SBTs. Current vent settings PEEP 5, FiO2 30%    Per chart review, he presented to the [facility] ED by EMS as transfer from [facility] for respiratory distress.  Patient had initially been hospitalized at [facility] from 6/30/2021-7/22/2021 and discharged to [facility] was there for AMS hypoxia was treated for hypoxic respiratory failure bilateral pleural effusions acute systolic CHF secondary to severe aortic stenosis hyponatremia hypokalemia elevated LFTs malnutrition and possible UTI was treated with ceftriaxone and Adrian Feliz already had a PEG for dysphagia and protein calorie malnutrition prior to [facility] admission. He was at [facility] from 7/22- 7/23.  While at [facility] today patient had an episode of vomiting and aspirated was initially started on high flow nasal cannula however was still hypoxic thus got intubated by [facility] staff. Jaswinder Pinon was then transferred to the ED for further care.    Per family patient's decline started last year while he was having seizures/TIA patient became more encephalopathic and started to refuse eating and thus got a PEG for nutritional reasons. was staying at assisted living facility and had to be re-hospitalized after his PEG malfunctioned.  During that time he did have an infection, daughter could not exactly say where the infection was, but she thought it was an abdominal infection and since then he started having worsening heart failure and decreased heart function, and failure to thrive.  Per records EF was 10 to 15%.  Per daughter, patient has never had a tracheostomy. She states patient is awake at times, will open eyes, but does not track, does not follow commands, and does not speak. They wish patient to remain full code at this time.       Patient Active Problem List    Diagnosis Date Noted    Acute respiratory failure with hypoxia ([address] 07/23/2021    Aspiration into respiratory tract 07/23/2021    Hypokalemia 01/12/2021    Hypernatremia 01/12/2021    Moderate protein malnutrition ([address] 01/12/2021    Failure to thrive (0-17) 01/09/2021    Bacteremia 01/09/2021    UTI (urinary tract infection) 01/09/2021    Dementia ([address] 01/09/2021    Bedridden 01/09/2021    Pressure ulcer 01/09/2021    S/P percutaneous endoscopic gastrostomy (PEG) tube placement ([address] 01/09/2021    Dehydration 01/06/2021    Lactic acidosis 01/06/2021    Sepsis ([address] 01/06/2021    SOULEYMANE (acute kidney injury) ([address] 01/06/2021    Chronic systolic heart failure ([address] 12/07/2020    Altered mental status 11/18/2020    Post-ictal state ([address] 11/18/2020    Seizure ([address] 11/17/2020    Atrial pacemaker lead displacement 10/18/2019    Pacemaker 03/22/2019    Bradycardia 03/19/2019    Transaminitis 03/19/2019    Hypertensive urgency 03/19/2019    Second degree AV block, Mobitz type I 03/19/2019    Stage 3 chronic kidney disease ([address] 03/15/2019    Rotator cuff arthropathy of both shoulders 03/15/2019    Cognitive impairment 83/60/2625    Systolic murmur 88/08/7775    Essential hypertension 12/17/2018    Gout 12/17/2018    Pure hypercholesterolemia 12/17/2018    History of prostate cancer 12/17/2018    Chronic constipation 12/17/2018    Dysuria 08/08/2017    Weight loss 08/08/2017    Adrenal mass ([address] 07/18/2017     Past Medical History:   Diagnosis Date    Cancer [facility])     prostate    Chronic systolic heart failure ([address] 12/7/2020    Constipation     Gout     Hyperlipemia     Hypertension     Pacemaker 2019    Stroke ([facility]     Thrombocytopenia ([facility] 3/19/2019    TIA (transient ischemic attack) 2013      Past Surgical History:   Procedure Laterality Date    HX PROSTATECTOMY      HX UROLOGICAL      prostate removed    PLACE PERCUT GASTROSTOMY TUBE  11/30/2020         AR INS NEW/RPLCMT PRM PACEMAKR W/TRANS ELTRD ATRIAL N/A 10/18/2019    Insert Ppm Single Atrial performed by Shi Ibarra MD at [address] CATH LAB    AR INS NEW/RPLCMT PRM PM W/TRANSV ELTRD ATRIAL&VENT Right 3/22/2019    INSERT PPM DUAL performed by Shi Ibarra MD at [address] CATH LAB      Social History     Tobacco Use    Smoking status: Never Smoker    Smokeless tobacco: Never Used   Substance Use Topics    Alcohol use: No      Family History   Problem Relation Age of Onset    No Known Problems Mother     No Known Problems Father       Prior to Admission medications    Medication Sig Start Date End Date Taking? Authorizing Provider   levETIRAcetam (KEPPRA) 750 mg tablet Take 1 Tab by mouth every twelve (12) hours. 12/11/20   Fredo Valadez NP   aspirin delayed-release 81 mg tablet Take 1 Tab by mouth daily. 7/13/20   Martina Wilson MD   polyethylene glycol [facility]) 17 gram/dose powder Take 17 g by mouth daily as needed for Constipation. 7/13/20   Martina Wilson MD   balsam peru-castor oiL (VENELEX) ointment Apply  to affected area three (3) times daily. 7/9/20   Sally Burrows MD   famotidine (PEPCID) 20 mg tablet Take 1 Tab by mouth every evening. 7/9/20   Sally Burrows MD   acetaminophen (TYLENOL) 325 mg tablet Take 650 mg by mouth every six (6) hours as needed for Pain. Provider, Historical     No Known Allergies       Subjective:     Review of Systems:    A comprehensive review of systems was negative except for that written in the History of Present Illness. Objective:     Blood pressure 131/66, pulse 95, temperature 97.9 °F (36.6 °C), resp. rate 24, height 5' 6\" (1.676 m), weight 164 lb 0.4 oz (74.4 kg), SpO2 100 %.   Recent Results (from the past 24 hour(s))   GLUCOSE, POC    Collection Time: 08/05/21 11:35 AM   Result Value Ref Range    Glucose (POC) 170 (H) 65 - 117 mg/dL    Performed by Jaqueline Umanzor    GLUCOSE, POC    Collection Time: 08/05/21  5:20 PM   Result Value Ref Range    Glucose (POC) 139 (H) 65 - 117 mg/dL    Performed by Jaqueline Umanzor    GLUCOSE, POC    Collection Time: 08/05/21 11:30 PM   Result Value Ref Range    Glucose (POC) 150 (H) 65 - 117 mg/dL    Performed by [facility]    CBC WITH AUTOMATED DIFF    Collection Time: 08/06/21  3:42 AM   Result Value Ref Range    WBC 10.2 4.1 - 11.1 K/uL    RBC 2.48 (L) 4.10 - 5.70 M/uL    HGB 7.4 (L) 12.1 - 17.0 g/dL    HCT 23.9 (L) 36.6 - 50.3 %    MCV 96.4 80.0 - 99.0 FL    MCH 29.8 26.0 - 34.0 PG    MCHC 31.0 30.0 - 36.5 g/dL    RDW 18.0 (H) 11.5 - 14.5 %    PLATELET 492 219 - 237 K/uL    MPV 10.1 8.9 - 12.9 FL    NRBC 0.0 0  WBC    ABSOLUTE NRBC 0.00 0.00 - 0.01 K/uL    NEUTROPHILS 78 (H) 32 - 75 %    LYMPHOCYTES 14 12 - 49 %    MONOCYTES 6 5 - 13 %    EOSINOPHILS 1 0 - 7 %    BASOPHILS 0 0 - 1 %    IMMATURE GRANULOCYTES 1 (H) 0.0 - 0.5 %    ABS. NEUTROPHILS 8.0 1.8 - 8.0 K/UL    ABS. LYMPHOCYTES 1.5 0.8 - 3.5 K/UL    ABS. MONOCYTES 0.6 0.0 - 1.0 K/UL    ABS. EOSINOPHILS 0.1 0.0 - 0.4 K/UL    ABS. BASOPHILS 0.0 0.0 - 0.1 K/UL    ABS. IMM.  GRANS. 0.1 (H) 0.00 - 0.04 K/UL    DF AUTOMATED     METABOLIC PANEL, BASIC    Collection Time: 08/06/21  3:42 AM   Result Value Ref Range    Sodium 143 136 - 145 mmol/L    Potassium 3.5 3.5 - 5.1 mmol/L    Chloride 110 (H) 97 - 108 mmol/L    CO2 29 21 - 32 mmol/L    Anion gap 4 (L) 5 - 15 mmol/L    Glucose 137 (H) 65 - 100 mg/dL    BUN 58 (H) 6 - 20 MG/DL    Creatinine 0.76 0.70 - 1.30 MG/DL    BUN/Creatinine ratio 76 (H) 12 - 20      GFR est AA >60 >60 ml/min/1.73m2    GFR est non-AA >60 >60 ml/min/1.73m2    Calcium 9.6 8.5 - 10.1 MG/DL   GLUCOSE, POC    Collection Time: 08/06/21  5:58 AM   Result Value Ref Range    Glucose (POC) 170 (H) 65 - 117 mg/dL Performed by Alysa Sanabria      _____________________  Physical Exam:     General:  Alert, cooperative, no distress, appears stated age. Eyes:   Sclera clear. Throat: + ET tube   Neck: Supple, symmetrical, trachea midline & palpable   Lungs:   No resp distres. On Vent   Heart:  RRR. Abdomen:   Soft, non-tender. +PEG tube   Extremities: Extremities normal, atraumatic, no cyanosis or edema. Skin: Skin w/d/i           Assessment:   Active Problems:    Acute respiratory failure with hypoxia ([address] (7/23/2021)      Aspiration into respiratory tract (7/23/2021)            Plan:     Tracheostomy placement by Dr. Gómez Jimenez on Mon 8/9/2021  Consent obtained from daughter  Pls hold tube feeds & anticoagulation 0015 Mon 8/9/2021     Thank you for inlcuding us in the care of your patient.     Signed By: BECKI Tolliver     August 6, 2021

## 2021-08-06 NOTE — PROGRESS NOTES
[facility]    ICU TEAM Progress Note    Name: Lili Dexter   : 3/9/1933   MRN: [id number]   Date: 2021      I  Subjective:   Progress Note: 2021      Reason for ICU Admission: Acute hypoxic respiratory failure     Interval history: From [facility] a 80 y. o. male with history of prostate cancer, CHF, constipation, hypertension, stroke, TIA, and seizures who presents to the emergency department by EMS as transfer from [facility] for respiratory distress.  Patient had initially been a transfer from [facility] after a hospitalization from  [facility].  Was there for altered mental status hypoxia was treated for hypoxic respiratory failure bilateral pleural effusions acute systolic CHF secondary to severe aortic stenosis hyponatremia hypokalemia elevated LFTs malnutrition and possible UTI was treated with ceftriaxone and Farzana Juaniser already had a PEG for dysphagia and protein calorie malnutrition prior to [facility] admission. He was at [facility] from [place] [id number].  While at [facility] today patient had an episode of vomiting and aspirated was initially started on high flow nasal cannula however was still hypoxic thus got intubated by [facility] staff. Andres Plaza was then transferred to the ED for further care.  Per family patient's decline started last year while he was having seizures/TIA patient became more encephalopathic and started to refuse eating and thus got a PEG for nutritional reasons. was staying at assisted living facility and had to be re-hospitalized after his PEG malfunctioned.  During that time he did have an infection, daughter could not exactly say where the infection was, but she thought it was an abdominal infection and since then he started having worsening heart failure and decreased heart function, and failure to thrive. Per records EF was 10 to 15%.  Per daughter, patient has never had a tracheostomy.  She states patient is awake at times, will open eyes, but does not track, does not follow commands, and does not speak. They wish patient to remain full code at this time.     While in ED patient was placed on mechanical ventilator, ABG was done which was consistent with hypoxic respiratory failure.  Patient was also given 1 L of IV fluids for fluid resuscitation due to sepsis. Initially blood pressure was okay but once patient was started on sedation blood pressure dropped.  Patient also had episodes of hypoxia due to vent asynchrony. Once PEEP was increased for hypoxia patient's pressure dropped. Started patient on Levophed via peripheral in ED. If hypotension worsens will need central line. Started on Zosyn. Updated the family at bedside, daughter and and son. Consent for central line and blood was  Received.  Patient to transfer to ICU for continued care. Overnight Events:  8/6: No acute overnight events. 8/5: No changes  8/4: No acute event, no fever, hemodynamically stable, no changes in neurological status. 8/3: No acute event, remained poorly responsive, remained on EEG overnight.  Still having occasional facial tremors.  Hemodynamically remained stable  8/2: No acute event, pending EEG report, no neurological recovery, occasional twitching movement in his face but seems to be better than before.  Dobutamine drip off.  8/1: On continuous EEG monitoring, no other acute event.  Poorly responsive and occasional twitching movement  7/31: Appearentt seizure like activity last night, ativan given  7/30: No acute overnight events  7/29 No acute overnight events   7/28: No acute event, no much neurological recovery.  No witnessed seizure, blood pressure on the higher side.  Diuresing well  7/27: No acute event.  Good diuresis.  Still poorly responsive.  Remains on dobutamine.  Blood pressure maintained.  No witnessed seizure.   7/26: No acute event, blood pressure somewhat better but urine output decreasing.  Still on dobutamine at 5.  7/25 -- Getting PRBC now; started on Dobutamine      Active Problem List:     Problem List  Date Reviewed: 12/17/2020        Codes Class    Acute respiratory failure with hypoxia [facility]) ICD-10-CM: J96.01  ICD-9-CM: 518.81         Aspiration into respiratory tract ICD-10-CM: T17.908A  ICD-9-CM: 934.9         Hypokalemia ICD-10-CM: E87.6  ICD-9-CM: 276.8         Hypernatremia ICD-10-CM: E87.0  ICD-9-CM: 276.0         Moderate protein malnutrition (HCC) ICD-10-CM: E44.0  ICD-9-CM: 263.0         Failure to thrive (0-17) ICD-10-CM: R62.51  ICD-9-CM: 783.41         Bacteremia ICD-10-CM: R78.81  ICD-9-CM: 790.7         UTI (urinary tract infection) ICD-10-CM: N39.0  ICD-9-CM: 599.0         Dementia (HCC) ICD-10-CM: F03.90  ICD-9-CM: 294.20         Bedridden ICD-10-CM: Z74.01  ICD-9-CM: V49.84         Pressure ulcer ICD-10-CM: L89.90  ICD-9-CM: 707.00, 707.20         S/P percutaneous endoscopic gastrostomy (PEG) tube placement ([facility] ICD-10-CM: Z93.1  ICD-9-CM: V44.1         Dehydration ICD-10-CM: E86.0  ICD-9-CM: 276.51         Lactic acidosis ICD-10-CM: E87.2  ICD-9-CM: 276.2         Sepsis ([facility] ICD-10-CM: A41.9  ICD-9-CM: 038.9, 995.91         SOULEYMANE (acute kidney injury) ([facility] ICD-10-CM: N17.9  ICD-9-CM: 634. 9         Chronic systolic heart failure (HCC) ICD-10-CM: I50.22  ICD-9-CM: 428.22         Altered mental status ICD-10-CM: R41.82  ICD-9-CM: 780.97         Post-ictal state ([address] ICD-10-CM: R56.9  ICD-9-CM: 780.39         Seizure ([facility] ICD-10-CM: R56.9  ICD-9-CM: 780.39         Atrial pacemaker lead displacement ICD-10-CM: T82.120A  ICD-9-CM: 996.01         Pacemaker ICD-10-CM: Z95.0  ICD-9-CM: V45.01     Overview Signed 3/22/2019  5:08 PM by Marylen Skill, MD     3/22/2019 dual chamber Medtronic pacer             Bradycardia ICD-10-CM: R00.1  ICD-9-CM: 427.89         Transaminitis ICD-10-CM: R74.01  ICD-9-CM: 790.4         Hypertensive urgency ICD-10-CM: I16.0  ICD-9-CM: 401.9         Second degree AV block, Mobitz type I ICD-10-CM: I44.1  ICD-9-CM: 426.13     Overview Signed 3/21/2019  6:14 PM by Mali Olivarez MD     Added automatically from request for surgery [id number]             Stage 3 chronic kidney disease ([facility] ICD-10-CM: N18.30  ICD-9-CM: 877. 3         Rotator cuff arthropathy of both shoulders ICD-10-CM: M12.811, M12.812  ICD-9-CM: 716.81         Cognitive impairment ICD-10-CM: R41.89  ICD-9-CM: 542.8         Systolic murmur YZM-80-YA: R01.1  ICD-9-CM: 785.2         Essential hypertension ICD-10-CM: I10  ICD-9-CM: 401.9         Gout ICD-10-CM: M10.9  ICD-9-CM: 274.9         Pure hypercholesterolemia ICD-10-CM: E78.00  ICD-9-CM: 272.0         History of prostate cancer ICD-10-CM: Z85.46  ICD-9-CM: V10.46         Chronic constipation ICD-10-CM: K59.09  ICD-9-CM: 564.00         Dysuria ICD-10-CM: R30.0  ICD-9-CM: 335. 1         Weight loss ICD-10-CM: R63.4  ICD-9-CM: 783.21         Adrenal mass ([facility] ICD-10-CM: E27.8  ICD-9-CM: 255.8     Overview Addendum 12/17/2018  9:57 AM by Tati Dong MD     Stable/non-functioning adenoma on imaging                   Past Medical History:      has a past medical history of Cancer [facility]), Chronic systolic heart failure ([facility] (12/7/2020), Constipation, Gout, Hyperlipemia, Hypertension, Pacemaker (2019), Stroke ([facility] Thrombocytopenia ([facility] (3/19/2019), and TIA (transient ischemic attack) (2013). Past Surgical History:      has a past surgical history that includes hx urological; hx prostatectomy; pr ins new/rplcmt prm pm w/transv eltrd atrial&vent (Right, 3/22/2019); pr ins new/rplcmt prm pacemakr w/trans eltrd atrial (N/A, 10/18/2019); and place percut gastrostomy tube (11/30/2020). Home Medications:     Prior to Admission medications    Medication Sig Start Date End Date Taking? Authorizing Provider   levETIRAcetam (KEPPRA) 750 mg tablet Take 1 Tab by mouth every twelve (12) hours. 12/11/20   Mychal Aguayo NP   aspirin delayed-release 81 mg tablet Take 1 Tab by mouth daily.  7/13/20   Juan Luis Jin, Hernan Harris MD   polyethylene glycol (MIRALAX) 17 gram/dose powder Take 17 g by mouth daily as needed for Constipation. 20   Renetta Stable, MD   balsam peru-castor oiL (VENELEX) ointment Apply  to affected area three (3) times daily. 20   Elijah Tariq MD   famotidine (PEPCID) 20 mg tablet Take 1 Tab by mouth every evening. 20   Elijah Tariq MD   acetaminophen (TYLENOL) 325 mg tablet Take 650 mg by mouth every six (6) hours as needed for Pain. Provider, Historical       Allergies/Social/Family History:     No Known Allergies   Social History     Tobacco Use    Smoking status: Never Smoker    Smokeless tobacco: Never Used   Substance Use Topics    Alcohol use: No      Family History   Problem Relation Age of Onset    No Known Problems Mother     No Known Problems Father        Review of Systems:     Not able to obtain due to patient medical condition    Objective:   Vital Signs:  Visit Vitals  BP (!) 159/85   Pulse (!) 101   Temp 98.8 °F (37.1 °C)   Resp 23   Ht 5' 6\" (1.676 m)   Wt 74.4 kg (164 lb 0.4 oz)   SpO2 100%   BMI 26.47 kg/m²      O2 Device: Endotracheal tube, Ventilator Temp (24hrs), Av.8 °F (37.1 °C), Min:97.9 °F (36.6 °C), Max:99.7 °F (37.6 °C)           Intake/Output:     Intake/Output Summary (Last 24 hours) at 2021 1001  Last data filed at 2021 0900  Gross per 24 hour   Intake 1590 ml   Output 625 ml   Net 965 ml       Physical Exam:    General: No distress, appears stated age, intubated   Neurologic: No response to stimuli  Lungs: CTAB   Cardiovascular:  Regular rate and rhythm, S1S2 present, without murmur or extra heart sounds and pedal pulses normal , no lower limb edema  Abdomen:  soft, non-tender.  Bowel sounds normal. No masses,  no organomegaly      LABS AND  DATA: Personally reviewed  Recent Labs     21  0342 21  0405   WBC 10.2 10.0   HGB 7.4* 7.3*   HCT 23.9* 23.4*    161     Recent Labs     21  0342 21  0405    192 K 3.5 3.5   * 110*   CO2 29 30   BUN 58* 58*   CREA 0.76 0.87   * 180*   CA 9.6 9.3     No results for input(s): AP, TBIL, TP, ALB, GLOB, AML, LPSE in the last 72 hours. No lab exists for component: SGOT, GPT, AMYP  No results for input(s): INR, PTP, APTT, INREXT, INREXT in the last 72 hours. Recent Labs     08/05/21  0407   PHI 7.46*   PCO2I 40.3   PO2I 65*   FIO2I 30     No results for input(s): CPK, CKMB, TROIQ, BNPP in the last 72 hours. Hemodynamics:   PAP:   CO:     Wedge:   CI:     CVP:    SVR:       PVR:       Ventilator Settings:  Mode Rate Tidal Volume Pressure FiO2 PEEP   Assist control, Pressure control      5 cm H2O 30 % 5 cm H20     Peak airway pressure: 23 cm H2O    Minute ventilation: 7.08 l/min        MEDS: Reviewed    Chest X-Ray:  CXR Results  (Last 48 hours)    None            Assessment and Plan:   Seizure:  Encephalopathy:  Advanced dementia:  Appreciate neurology input, EEG report noted as above.  Remains poorly responsive.  Continue current antiepileptic medication. Appreciate further neurology input. MRI yesterday w/o acute findings. Cardiogenic shock:  Ejection fraction of less than 30% at baseline, dobutamine drip weaned off.  Seems to be euvolemic at this time.  Renal function improving.  Will diurese as needed. Septic shock: Resolved  Completed antibiotic course.  Off pressors.  Procalcitonin continue to trend down  Respiratory failure- Trach planned for [address]  Stay in ICU    CRITICAL CARE CONSULTANT NOTE  I had a face to face encounter with the patient, reviewed and interpreted patient data including clinical events, labs, images, vital signs, I/O's, and examined patient.   I have discussed the case and the plan and management of the patient's care with the consulting services, the bedside nurses and the respiratory therapist.      NOTE OF PERSONAL INVOLVEMENT IN CARE   This patient has a high probability of imminent, clinically significant deterioration, which requires the highest level of preparedness to intervene urgently. I participated in the decision-making and personally managed or directed the management of the following life and organ supporting interventions that required my frequent assessment to treat or prevent imminent deterioration. I personally spent 30 minutes of critical care time. This is time spent at this critically ill patient's bedside actively involved in patient care as well as the coordination of care and discussions with the patient's family. This does not include any procedural time which has been billed separately.     [facility] Critical Care  8/6/2021

## 2021-08-06 NOTE — PROGRESS NOTES
0730- Bedside shift change report given to Lucero Rodriguez RN (oncoming nurse) by Elise Carter RN (offgoing nurse). Report included the following information SBAR, Kardex, ED Summary, Intake/Output, MAR, Accordion, Recent Results, Med Rec Status, Cardiac Rhythm v-paced , Alarm Parameters  and Dual Neuro Assessment. SHIFT SUMMARY- Uneventful shift, patient remains on ventilator. No sedation, no purposeful movements or interaction, plan for trach on Monday.

## 2021-08-06 NOTE — PROGRESS NOTES
TRANSITION OF CARE:  CRM participated in [facility] rounds this am.  The current plan per daughter to see if there is a bed at [facility] vs [facility] early next week.

## 2021-08-07 LAB
ANION GAP SERPL CALC-SCNC: 4 MMOL/L (ref 5–15)
BASOPHILS # BLD: 0 K/UL (ref 0–0.1)
BASOPHILS NFR BLD: 0 % (ref 0–1)
BUN SERPL-MCNC: 58 MG/DL (ref 6–20)
BUN/CREAT SERPL: 67 (ref 12–20)
CALCIUM SERPL-MCNC: 9.7 MG/DL (ref 8.5–10.1)
CHLORIDE SERPL-SCNC: 107 MMOL/L (ref 97–108)
CO2 SERPL-SCNC: 31 MMOL/L (ref 21–32)
CREAT SERPL-MCNC: 0.86 MG/DL (ref 0.7–1.3)
DIFFERENTIAL METHOD BLD: ABNORMAL
EOSINOPHIL # BLD: 0 K/UL (ref 0–0.4)
EOSINOPHIL NFR BLD: 0 % (ref 0–7)
ERYTHROCYTE [DISTWIDTH] IN BLOOD BY AUTOMATED COUNT: 18.1 % (ref 11.5–14.5)
GLUCOSE BLD STRIP.AUTO-MCNC: 101 MG/DL (ref 65–117)
GLUCOSE BLD STRIP.AUTO-MCNC: 152 MG/DL (ref 65–117)
GLUCOSE BLD STRIP.AUTO-MCNC: 167 MG/DL (ref 65–117)
GLUCOSE BLD STRIP.AUTO-MCNC: 175 MG/DL (ref 65–117)
GLUCOSE BLD STRIP.AUTO-MCNC: 214 MG/DL (ref 65–117)
GLUCOSE SERPL-MCNC: 102 MG/DL (ref 65–100)
HCT VFR BLD AUTO: 24.8 % (ref 36.6–50.3)
HGB BLD-MCNC: 7.5 G/DL (ref 12.1–17)
IMM GRANULOCYTES # BLD AUTO: 0.1 K/UL (ref 0–0.04)
IMM GRANULOCYTES NFR BLD AUTO: 1 % (ref 0–0.5)
LYMPHOCYTES # BLD: 1.3 K/UL (ref 0.8–3.5)
LYMPHOCYTES NFR BLD: 12 % (ref 12–49)
MCH RBC QN AUTO: 29.4 PG (ref 26–34)
MCHC RBC AUTO-ENTMCNC: 30.2 G/DL (ref 30–36.5)
MCV RBC AUTO: 97.3 FL (ref 80–99)
MONOCYTES # BLD: 0.5 K/UL (ref 0–1)
MONOCYTES NFR BLD: 5 % (ref 5–13)
NEUTS SEG # BLD: 8.6 K/UL (ref 1.8–8)
NEUTS SEG NFR BLD: 82 % (ref 32–75)
NRBC # BLD: 0 K/UL (ref 0–0.01)
NRBC BLD-RTO: 0 PER 100 WBC
PLATELET # BLD AUTO: 216 K/UL (ref 150–400)
PMV BLD AUTO: 10 FL (ref 8.9–12.9)
POTASSIUM SERPL-SCNC: 3.9 MMOL/L (ref 3.5–5.1)
RBC # BLD AUTO: 2.55 M/UL (ref 4.1–5.7)
SERVICE CMNT-IMP: ABNORMAL
SERVICE CMNT-IMP: NORMAL
SODIUM SERPL-SCNC: 142 MMOL/L (ref 136–145)
WBC # BLD AUTO: 10.6 K/UL (ref 4.1–11.1)

## 2021-08-07 PROCEDURE — 74011250636 HC RX REV CODE- 250/636: Performed by: NURSE PRACTITIONER

## 2021-08-07 PROCEDURE — 93005 ELECTROCARDIOGRAM TRACING: CPT

## 2021-08-07 PROCEDURE — 80048 BASIC METABOLIC PNL TOTAL CA: CPT

## 2021-08-07 PROCEDURE — 94003 VENT MGMT INPAT SUBQ DAY: CPT

## 2021-08-07 PROCEDURE — 36415 COLL VENOUS BLD VENIPUNCTURE: CPT

## 2021-08-07 PROCEDURE — 74011250637 HC RX REV CODE- 250/637: Performed by: NURSE PRACTITIONER

## 2021-08-07 PROCEDURE — 74011636637 HC RX REV CODE- 636/637: Performed by: HEALTH CARE PROVIDER

## 2021-08-07 PROCEDURE — 82962 GLUCOSE BLOOD TEST: CPT

## 2021-08-07 PROCEDURE — 85025 COMPLETE CBC W/AUTO DIFF WBC: CPT

## 2021-08-07 PROCEDURE — 74011636637 HC RX REV CODE- 636/637: Performed by: INTERNAL MEDICINE

## 2021-08-07 PROCEDURE — 65610000006 HC RM INTENSIVE CARE

## 2021-08-07 PROCEDURE — 74011250636 HC RX REV CODE- 250/636: Performed by: INTERNAL MEDICINE

## 2021-08-07 PROCEDURE — 74011000258 HC RX REV CODE- 258: Performed by: NURSE PRACTITIONER

## 2021-08-07 RX ADMIN — HEPARIN SODIUM 5000 UNITS: 5000 INJECTION INTRAVENOUS; SUBCUTANEOUS at 09:00

## 2021-08-07 RX ADMIN — Medication 10 ML: at 16:08

## 2021-08-07 RX ADMIN — LACOSAMIDE 100 MG: 50 TABLET, FILM COATED ORAL at 09:00

## 2021-08-07 RX ADMIN — LACOSAMIDE 100 MG: 50 TABLET, FILM COATED ORAL at 20:22

## 2021-08-07 RX ADMIN — INSULIN GLARGINE 12 UNITS: 100 INJECTION, SOLUTION SUBCUTANEOUS at 09:00

## 2021-08-07 RX ADMIN — ALLOPURINOL 100 MG: 100 TABLET ORAL at 09:00

## 2021-08-07 RX ADMIN — INSULIN LISPRO 2 UNITS: 100 INJECTION, SOLUTION INTRAVENOUS; SUBCUTANEOUS at 17:50

## 2021-08-07 RX ADMIN — ASPIRIN 81 MG: 81 TABLET, CHEWABLE ORAL at 09:00

## 2021-08-07 RX ADMIN — Medication: at 09:01

## 2021-08-07 RX ADMIN — FENTANYL CITRATE 50 MCG: 50 INJECTION, SOLUTION INTRAMUSCULAR; INTRAVENOUS at 23:37

## 2021-08-07 RX ADMIN — HEPARIN SODIUM 5000 UNITS: 5000 INJECTION INTRAVENOUS; SUBCUTANEOUS at 21:18

## 2021-08-07 RX ADMIN — CHLORHEXIDINE GLUCONATE 15 ML: 0.12 RINSE ORAL at 13:00

## 2021-08-07 RX ADMIN — Medication: at 17:46

## 2021-08-07 RX ADMIN — LANSOPRAZOLE 30 MG: KIT at 09:03

## 2021-08-07 RX ADMIN — INSULIN LISPRO 2 UNITS: 100 INJECTION, SOLUTION INTRAVENOUS; SUBCUTANEOUS at 00:05

## 2021-08-07 RX ADMIN — LEVETIRACETAM 1500 MG: 100 INJECTION, SOLUTION INTRAVENOUS at 04:08

## 2021-08-07 RX ADMIN — CHLORHEXIDINE GLUCONATE 15 ML: 0.12 RINSE ORAL at 01:00

## 2021-08-07 RX ADMIN — LEVETIRACETAM 1500 MG: 100 INJECTION, SOLUTION INTRAVENOUS at 16:08

## 2021-08-07 RX ADMIN — Medication 10 ML: at 05:59

## 2021-08-07 RX ADMIN — FENTANYL CITRATE 50 MCG: 50 INJECTION, SOLUTION INTRAMUSCULAR; INTRAVENOUS at 10:47

## 2021-08-07 RX ADMIN — INSULIN LISPRO 2 UNITS: 100 INJECTION, SOLUTION INTRAVENOUS; SUBCUTANEOUS at 23:34

## 2021-08-07 RX ADMIN — INSULIN LISPRO 3 UNITS: 100 INJECTION, SOLUTION INTRAVENOUS; SUBCUTANEOUS at 12:25

## 2021-08-07 RX ADMIN — Medication 10 ML: at 21:18

## 2021-08-07 NOTE — PROGRESS NOTES
[facility]    ICU TEAM Progress Note    Name: Stephy Wright   : 3/9/1933   MRN: [id number]   Date: 2021      I  Subjective:   Progress Note: 2021      Reason for ICU Admission: Acute hypoxic respiratory failure     Interval history: From [facility] a 80 y. o. male with history of prostate cancer, CHF, constipation, hypertension, stroke, TIA, and seizures who presents to the emergency department by EMS as transfer from [facility] for respiratory distress.  Patient had initially been a transfer from [facility] after a hospitalization from  [facility].  Was there for altered mental status hypoxia was treated for hypoxic respiratory failure bilateral pleural effusions acute systolic CHF secondary to severe aortic stenosis hyponatremia hypokalemia elevated LFTs malnutrition and possible UTI was treated with ceftriaxone and Jose Elias Nelson already had a PEG for dysphagia and protein calorie malnutrition prior to [facility] admission. He was at [facility] from [place] [phone].  While at [facility] today patient had an episode of vomiting and aspirated was initially started on high flow nasal cannula however was still hypoxic thus got intubated by [facility] staff. Jase Moscoso was then transferred to the ED for further care.  Per family patient's decline started last year while he was having seizures/TIA patient became more encephalopathic and started to refuse eating and thus got a PEG for nutritional reasons. was staying at assisted living facility and had to be re-hospitalized after his PEG malfunctioned.  During that time he did have an infection, daughter could not exactly say where the infection was, but she thought it was an abdominal infection and since then he started having worsening heart failure and decreased heart function, and failure to thrive. Per records EF was 10 to 15%.  Per daughter, patient has never had a tracheostomy.  She states patient is awake at times, will open eyes, but does not track, does not follow commands, and does not speak. They wish patient to remain full code at this time.     While in ED patient was placed on mechanical ventilator, ABG was done which was consistent with hypoxic respiratory failure.  Patient was also given 1 L of IV fluids for fluid resuscitation due to sepsis. Initially blood pressure was okay but once patient was started on sedation blood pressure dropped.  Patient also had episodes of hypoxia due to vent asynchrony. Once PEEP was increased for hypoxia patient's pressure dropped. Started patient on Levophed via peripheral in ED. If hypotension worsens will need central line. Started on Zosyn. Updated the family at bedside, daughter and and son. Consent for central line and blood was  Received.  Patient to transfer to ICU for continued care. Overnight Events:  8/7: No acute overnight events  8/6: No acute overnight events. 8/5: No changes  8/4: No acute event, no fever, hemodynamically stable, no changes in neurological status. 8/3: No acute event, remained poorly responsive, remained on EEG overnight.  Still having occasional facial tremors.  Hemodynamically remained stable  8/2: No acute event, pending EEG report, no neurological recovery, occasional twitching movement in his face but seems to be better than before.  Dobutamine drip off.  8/1: On continuous EEG monitoring, no other acute event.  Poorly responsive and occasional twitching movement  7/31: Appearentt seizure like activity last night, ativan given  7/30: No acute overnight events  7/29 No acute overnight events   7/28: No acute event, no much neurological recovery.  No witnessed seizure, blood pressure on the higher side.  Diuresing well  7/27: No acute event.  Good diuresis.  Still poorly responsive.  Remains on dobutamine.  Blood pressure maintained.  No witnessed seizure.   7/26: No acute event, blood pressure somewhat better but urine output decreasing.  Still on dobutamine at 5.  7/25 -- Getting PRBC now; started on Dobutamine      Active Problem List:     Problem List  Date Reviewed: 12/17/2020        Codes Class    Acute respiratory failure with hypoxia [facility]) ICD-10-CM: J96.01  ICD-9-CM: 518.81         Aspiration into respiratory tract ICD-10-CM: T17.908A  ICD-9-CM: 934.9         Hypokalemia ICD-10-CM: E87.6  ICD-9-CM: 276.8         Hypernatremia ICD-10-CM: E87.0  ICD-9-CM: 276.0         Moderate protein malnutrition (HCC) ICD-10-CM: E44.0  ICD-9-CM: 263.0         Failure to thrive (0-17) ICD-10-CM: R62.51  ICD-9-CM: 783.41         Bacteremia ICD-10-CM: R78.81  ICD-9-CM: 790.7         UTI (urinary tract infection) ICD-10-CM: N39.0  ICD-9-CM: 599.0         Dementia (HCC) ICD-10-CM: F03.90  ICD-9-CM: 294.20         Bedridden ICD-10-CM: Z74.01  ICD-9-CM: V49.84         Pressure ulcer ICD-10-CM: L89.90  ICD-9-CM: 707.00, 707.20         S/P percutaneous endoscopic gastrostomy (PEG) tube placement ([facility] ICD-10-CM: Z93.1  ICD-9-CM: V44.1         Dehydration ICD-10-CM: E86.0  ICD-9-CM: 276.51         Lactic acidosis ICD-10-CM: E87.2  ICD-9-CM: 276.2         Sepsis ([facility] ICD-10-CM: A41.9  ICD-9-CM: 038.9, 995.91         SOULEYMANE (acute kidney injury) ([facility] ICD-10-CM: N17.9  ICD-9-CM: 833. 9         Chronic systolic heart failure (HCC) ICD-10-CM: I50.22  ICD-9-CM: 428.22         Altered mental status ICD-10-CM: R41.82  ICD-9-CM: 780.97         Post-ictal state ([facility] ICD-10-CM: R56.9  ICD-9-CM: 780.39         Seizure ([facility] ICD-10-CM: R56.9  ICD-9-CM: 780.39         Atrial pacemaker lead displacement ICD-10-CM: T82.120A  ICD-9-CM: 996.01         Pacemaker ICD-10-CM: Z95.0  ICD-9-CM: V45.01     Overview Signed 3/22/2019  5:08 PM by Renu Rangel MD     3/22/2019 dual chamber Medtronic pacer             Bradycardia ICD-10-CM: R00.1  ICD-9-CM: 427.89         Transaminitis ICD-10-CM: R74.01  ICD-9-CM: 790.4         Hypertensive urgency ICD-10-CM: I16.0  ICD-9-CM: 401.9         Second degree AV block, Mobitz type I ICD-10-CM: I44.1  ICD-9-CM: 426.13     Overview Signed 3/21/2019  6:14 PM by Lopez Richard MD     Added automatically from request for surgery [id number]             Stage 3 chronic kidney disease ([facility] ICD-10-CM: N18.30  ICD-9-CM: 531. 3         Rotator cuff arthropathy of both shoulders ICD-10-CM: M12.811, M12.812  ICD-9-CM: 716.81         Cognitive impairment ICD-10-CM: R41.89  ICD-9-CM: 554.1         Systolic murmur POV-01-SD: R01.1  ICD-9-CM: 785.2         Essential hypertension ICD-10-CM: I10  ICD-9-CM: 401.9         Gout ICD-10-CM: M10.9  ICD-9-CM: 274.9         Pure hypercholesterolemia ICD-10-CM: E78.00  ICD-9-CM: 272.0         History of prostate cancer ICD-10-CM: Z85.46  ICD-9-CM: V10.46         Chronic constipation ICD-10-CM: K59.09  ICD-9-CM: 564.00         Dysuria ICD-10-CM: R30.0  ICD-9-CM: 933. 1         Weight loss ICD-10-CM: R63.4  ICD-9-CM: 783.21         Adrenal mass ([facility] ICD-10-CM: E27.8  ICD-9-CM: 255.8     Overview Addendum 12/17/2018  9:57 AM by Lindsay Fernandez MD     Stable/non-functioning adenoma on imaging                   Past Medical History:      has a past medical history of Cancer [facility]), Chronic systolic heart failure ([facility] (12/7/2020), Constipation, Gout, Hyperlipemia, Hypertension, Pacemaker (2019), Stroke ([facility] Thrombocytopenia ([facility] (3/19/2019), and TIA (transient ischemic attack) (2013). Past Surgical History:      has a past surgical history that includes hx urological; hx prostatectomy; pr ins new/rplcmt prm pm w/transv eltrd atrial&vent (Right, 3/22/2019); pr ins new/rplcmt prm pacemakr w/trans eltrd atrial (N/A, 10/18/2019); and place percut gastrostomy tube (11/30/2020). Home Medications:     Prior to Admission medications    Medication Sig Start Date End Date Taking? Authorizing Provider   levETIRAcetam (KEPPRA) 750 mg tablet Take 1 Tab by mouth every twelve (12) hours.  12/11/20   Renee Medellin NP   aspirin delayed-release 81 mg tablet Take 1 Tab by mouth daily. 20   Carline Correia MD   polyethylene glycol [facility]) 17 gram/dose powder Take 17 g by mouth daily as needed for Constipation. 20   Carline Correia MD   balsam peru-castor oiL (VENELEX) ointment Apply  to affected area three (3) times daily. 20   Reyna Goins MD   famotidine (PEPCID) 20 mg tablet Take 1 Tab by mouth every evening. 20   Reyna Goins MD   acetaminophen (TYLENOL) 325 mg tablet Take 650 mg by mouth every six (6) hours as needed for Pain. Provider, Historical       Allergies/Social/Family History:     No Known Allergies   Social History     Tobacco Use    Smoking status: Never Smoker    Smokeless tobacco: Never Used   Substance Use Topics    Alcohol use: No      Family History   Problem Relation Age of Onset    No Known Problems Mother     No Known Problems Father        Review of Systems:     Not able to obtain due to patient medical condition    Objective:   Vital Signs:  Visit Vitals  BP (!) 169/88   Pulse (!) 111   Temp 98.7 °F (37.1 °C)   Resp 18   Ht 5' 6\" (1.676 m)   Wt 75.8 kg (167 lb 1.7 oz)   SpO2 96%   BMI 26.97 kg/m²      O2 Device: Endotracheal tube, Ventilator Temp (24hrs), Av.1 °F (37.3 °C), Min:97.9 °F (36.6 °C), Max:100.1 °F (37.8 °C)           Intake/Output:     Intake/Output Summary (Last 24 hours) at 2021 1001  Last data filed at 2021 0900  Gross per 24 hour   Intake 1970 ml   Output 750 ml   Net 1220 ml       Physical Exam:    General: No distress, appears stated age, intubated   Neurologic: No response to stimuli  Lungs: CTAB   Cardiovascular:  Regular rate and rhythm, S1S2 present, without murmur or extra heart sounds and pedal pulses normal , no lower limb edema  Abdomen:  soft, non-tender.  Bowel sounds normal. No masses,  no organomegaly      LABS AND  DATA: Personally reviewed  Recent Labs     21  0402 21  0342   WBC 10.6 10.2   HGB 7.5* 7.4*   HCT 24.8* 23.9*    189     Recent Labs     21  0402 08/06/21  0342    143   K 3.9 3.5    110*   CO2 31 29   BUN 58* 58*   CREA 0.86 0.76   * 137*   CA 9.7 9.6     No results for input(s): AP, TBIL, TP, ALB, GLOB, AML, LPSE in the last 72 hours. No lab exists for component: SGOT, GPT, AMYP  No results for input(s): INR, PTP, APTT, INREXT, INREXT in the last 72 hours. Recent Labs     08/05/21  0407   PHI 7.46*   PCO2I 40.3   PO2I 65*   FIO2I 30     No results for input(s): CPK, CKMB, TROIQ, BNPP in the last 72 hours. Hemodynamics:   PAP:   CO:     Wedge:   CI:     CVP:    SVR:       PVR:       Ventilator Settings:  Mode Rate Tidal Volume Pressure FiO2 PEEP   Assist control, Pressure control      5 cm H2O 30 % 5 cm H20     Peak airway pressure: 23 cm H2O    Minute ventilation: 6.01 l/min        MEDS: Reviewed    Chest X-Ray:  CXR Results  (Last 48 hours)    None            Assessment and Plan:   Seizure:  Encephalopathy:  Advanced dementia:  Appreciate neurology input, EEG report noted as above.  Remains poorly responsive.  Continue current antiepileptic medication. Appreciate further neurology input. MRI yesterday w/o acute findings. Cardiogenic shock:  Ejection fraction of less than 30% at baseline, dobutamine drip weaned off.  Seems to be euvolemic at this time.  Renal function improving.  Will diurese as needed. Septic shock: Resolved  Completed antibiotic course.  Off pressors.  Procalcitonin continue to trend down  Respiratory failure- Trach planned for [address]  Stay in ICU    CRITICAL CARE CONSULTANT NOTE  I had a face to face encounter with the patient, reviewed and interpreted patient data including clinical events, labs, images, vital signs, I/O's, and examined patient.   I have discussed the case and the plan and management of the patient's care with the consulting services, the bedside nurses and the respiratory therapist.      NOTE OF PERSONAL INVOLVEMENT IN CARE   This patient has a high probability of imminent, clinically significant deterioration, which requires the highest level of preparedness to intervene urgently. I participated in the decision-making and personally managed or directed the management of the following life and organ supporting interventions that required my frequent assessment to treat or prevent imminent deterioration. I personally spent 30 minutes of critical care time. This is time spent at this critically ill patient's bedside actively involved in patient care as well as the coordination of care and discussions with the patient's family. This does not include any procedural time which has been billed separately.     [id number] [facility] Critical Care  8/7/2021

## 2021-08-07 NOTE — PROGRESS NOTES
0730: Bedside shift change report given to Valeria Remy RN (oncoming nurse) by Santiago Samano (offgoing nurse). Report included the following information SBAR, Kardex, ED Summary, Procedure Summary, Intake/Output, MAR, Accordion, Recent Results and Dual Neuro Assessment.

## 2021-08-08 LAB
ANION GAP SERPL CALC-SCNC: 6 MMOL/L (ref 5–15)
ATRIAL RATE: 118 BPM
BASOPHILS # BLD: 0 K/UL (ref 0–0.1)
BASOPHILS NFR BLD: 0 % (ref 0–1)
BUN SERPL-MCNC: 63 MG/DL (ref 6–20)
BUN/CREAT SERPL: 59 (ref 12–20)
CALCIUM SERPL-MCNC: 9.5 MG/DL (ref 8.5–10.1)
CALCULATED R AXIS, ECG10: -84 DEGREES
CALCULATED T AXIS, ECG11: 83 DEGREES
CHLORIDE SERPL-SCNC: 107 MMOL/L (ref 97–108)
CO2 SERPL-SCNC: 30 MMOL/L (ref 21–32)
CREAT SERPL-MCNC: 1.06 MG/DL (ref 0.7–1.3)
DIAGNOSIS, 93000: NORMAL
DIFFERENTIAL METHOD BLD: ABNORMAL
EOSINOPHIL # BLD: 0 K/UL (ref 0–0.4)
EOSINOPHIL NFR BLD: 0 % (ref 0–7)
ERYTHROCYTE [DISTWIDTH] IN BLOOD BY AUTOMATED COUNT: 18.4 % (ref 11.5–14.5)
GLUCOSE BLD STRIP.AUTO-MCNC: 136 MG/DL (ref 65–117)
GLUCOSE BLD STRIP.AUTO-MCNC: 176 MG/DL (ref 65–117)
GLUCOSE BLD STRIP.AUTO-MCNC: 207 MG/DL (ref 65–117)
GLUCOSE BLD STRIP.AUTO-MCNC: 219 MG/DL (ref 65–117)
GLUCOSE SERPL-MCNC: 187 MG/DL (ref 65–100)
HCT VFR BLD AUTO: 23.8 % (ref 36.6–50.3)
HGB BLD-MCNC: 7.5 G/DL (ref 12.1–17)
IMM GRANULOCYTES # BLD AUTO: 0.1 K/UL (ref 0–0.04)
IMM GRANULOCYTES NFR BLD AUTO: 1 % (ref 0–0.5)
LYMPHOCYTES # BLD: 2.2 K/UL (ref 0.8–3.5)
LYMPHOCYTES NFR BLD: 16 % (ref 12–49)
MCH RBC QN AUTO: 29.9 PG (ref 26–34)
MCHC RBC AUTO-ENTMCNC: 31.5 G/DL (ref 30–36.5)
MCV RBC AUTO: 94.8 FL (ref 80–99)
MONOCYTES # BLD: 0.8 K/UL (ref 0–1)
MONOCYTES NFR BLD: 6 % (ref 5–13)
NEUTS SEG # BLD: 10.2 K/UL (ref 1.8–8)
NEUTS SEG NFR BLD: 77 % (ref 32–75)
NRBC # BLD: 0 K/UL (ref 0–0.01)
NRBC BLD-RTO: 0 PER 100 WBC
P-R INTERVAL, ECG05: 130 MS
PLATELET # BLD AUTO: 230 K/UL (ref 150–400)
PMV BLD AUTO: 9.6 FL (ref 8.9–12.9)
POTASSIUM SERPL-SCNC: 3.9 MMOL/L (ref 3.5–5.1)
Q-T INTERVAL, ECG07: 390 MS
QRS DURATION, ECG06: 174 MS
QTC CALCULATION (BEZET), ECG08: 532 MS
RBC # BLD AUTO: 2.51 M/UL (ref 4.1–5.7)
SERVICE CMNT-IMP: ABNORMAL
SODIUM SERPL-SCNC: 143 MMOL/L (ref 136–145)
VENTRICULAR RATE, ECG03: 112 BPM
WBC # BLD AUTO: 13.4 K/UL (ref 4.1–11.1)

## 2021-08-08 PROCEDURE — 36415 COLL VENOUS BLD VENIPUNCTURE: CPT

## 2021-08-08 PROCEDURE — 74011250636 HC RX REV CODE- 250/636: Performed by: INTERNAL MEDICINE

## 2021-08-08 PROCEDURE — 65610000006 HC RM INTENSIVE CARE

## 2021-08-08 PROCEDURE — 74011636637 HC RX REV CODE- 636/637: Performed by: INTERNAL MEDICINE

## 2021-08-08 PROCEDURE — 74011250636 HC RX REV CODE- 250/636: Performed by: NURSE PRACTITIONER

## 2021-08-08 PROCEDURE — 80048 BASIC METABOLIC PNL TOTAL CA: CPT

## 2021-08-08 PROCEDURE — 74011000258 HC RX REV CODE- 258: Performed by: NURSE PRACTITIONER

## 2021-08-08 PROCEDURE — 74011250637 HC RX REV CODE- 250/637: Performed by: NURSE PRACTITIONER

## 2021-08-08 PROCEDURE — 82962 GLUCOSE BLOOD TEST: CPT

## 2021-08-08 PROCEDURE — 74011636637 HC RX REV CODE- 636/637: Performed by: HEALTH CARE PROVIDER

## 2021-08-08 PROCEDURE — 94003 VENT MGMT INPAT SUBQ DAY: CPT

## 2021-08-08 PROCEDURE — 85025 COMPLETE CBC W/AUTO DIFF WBC: CPT

## 2021-08-08 RX ADMIN — ASPIRIN 81 MG: 81 TABLET, CHEWABLE ORAL at 08:21

## 2021-08-08 RX ADMIN — INSULIN LISPRO 3 UNITS: 100 INJECTION, SOLUTION INTRAVENOUS; SUBCUTANEOUS at 23:12

## 2021-08-08 RX ADMIN — CHLORHEXIDINE GLUCONATE 15 ML: 0.12 RINSE ORAL at 01:00

## 2021-08-08 RX ADMIN — ACETAMINOPHEN 650 MG: 325 TABLET ORAL at 12:07

## 2021-08-08 RX ADMIN — LEVETIRACETAM 1500 MG: 100 INJECTION, SOLUTION INTRAVENOUS at 17:29

## 2021-08-08 RX ADMIN — INSULIN GLARGINE 12 UNITS: 100 INJECTION, SOLUTION SUBCUTANEOUS at 08:22

## 2021-08-08 RX ADMIN — HEPARIN SODIUM 5000 UNITS: 5000 INJECTION INTRAVENOUS; SUBCUTANEOUS at 10:37

## 2021-08-08 RX ADMIN — LACOSAMIDE 100 MG: 50 TABLET, FILM COATED ORAL at 08:21

## 2021-08-08 RX ADMIN — LANSOPRAZOLE 30 MG: KIT at 08:22

## 2021-08-08 RX ADMIN — LACOSAMIDE 100 MG: 50 TABLET, FILM COATED ORAL at 20:13

## 2021-08-08 RX ADMIN — LEVETIRACETAM 1500 MG: 100 INJECTION, SOLUTION INTRAVENOUS at 04:50

## 2021-08-08 RX ADMIN — INSULIN LISPRO 2 UNITS: 100 INJECTION, SOLUTION INTRAVENOUS; SUBCUTANEOUS at 05:48

## 2021-08-08 RX ADMIN — ALLOPURINOL 100 MG: 100 TABLET ORAL at 08:21

## 2021-08-08 RX ADMIN — INSULIN LISPRO 3 UNITS: 100 INJECTION, SOLUTION INTRAVENOUS; SUBCUTANEOUS at 17:29

## 2021-08-08 RX ADMIN — CHLORHEXIDINE GLUCONATE 15 ML: 0.12 RINSE ORAL at 13:00

## 2021-08-08 RX ADMIN — Medication: at 17:24

## 2021-08-08 RX ADMIN — Medication 10 ML: at 13:33

## 2021-08-08 RX ADMIN — Medication: at 08:22

## 2021-08-08 RX ADMIN — Medication 10 ML: at 06:00

## 2021-08-08 RX ADMIN — Medication 10 ML: at 22:00

## 2021-08-08 NOTE — PROGRESS NOTES
[facility]    ICU TEAM Progress Note    Name: Aleena Orosco   : 3/9/1933   MRN: [id number]   Date: 2021      I  Subjective:   Progress Note: 2021      Reason for ICU Admission: Acute hypoxic respiratory failure     Interval history: From [facility] a 80 y. o. male with history of prostate cancer, CHF, constipation, hypertension, stroke, TIA, and seizures who presents to the emergency department by EMS as transfer from [facility] for respiratory distress.  Patient had initially been a transfer from [facility] after a hospitalization from  [facility].  Was there for altered mental status hypoxia was treated for hypoxic respiratory failure bilateral pleural effusions acute systolic CHF secondary to severe aortic stenosis hyponatremia hypokalemia elevated LFTs malnutrition and possible UTI was treated with ceftriaxone and Skipper Spotted already had a PEG for dysphagia and protein calorie malnutrition prior to [facility] admission. He was at [facility] from [place] [phone].  While at [facility] today patient had an episode of vomiting and aspirated was initially started on high flow nasal cannula however was still hypoxic thus got intubated by [facility] staff. John Harley was then transferred to the ED for further care.  Per family patient's decline started last year while he was having seizures/TIA patient became more encephalopathic and started to refuse eating and thus got a PEG for nutritional reasons. was staying at assisted living facility and had to be re-hospitalized after his PEG malfunctioned.  During that time he did have an infection, daughter could not exactly say where the infection was, but she thought it was an abdominal infection and since then he started having worsening heart failure and decreased heart function, and failure to thrive. Per records EF was 10 to 15%.  Per daughter, patient has never had a tracheostomy.  She states patient is awake at times, will open eyes, but does not track, does not follow commands, and does not speak. They wish patient to remain full code at this time.     While in ED patient was placed on mechanical ventilator, ABG was done which was consistent with hypoxic respiratory failure.  Patient was also given 1 L of IV fluids for fluid resuscitation due to sepsis. Initially blood pressure was okay but once patient was started on sedation blood pressure dropped.  Patient also had episodes of hypoxia due to vent asynchrony. Once PEEP was increased for hypoxia patient's pressure dropped. Started patient on Levophed via peripheral in ED. If hypotension worsens will need central line. Started on Zosyn. Updated the family at bedside, daughter and and son. Consent for central line and blood was  Received.  Patient to transfer to ICU for continued care. Overnight Events:  8/8: No acute events overnight  8/7: No acute overnight events  8/6: No acute overnight events. 8/5: No changes  8/4: No acute event, no fever, hemodynamically stable, no changes in neurological status. 8/3: No acute event, remained poorly responsive, remained on EEG overnight.  Still having occasional facial tremors.  Hemodynamically remained stable  8/2: No acute event, pending EEG report, no neurological recovery, occasional twitching movement in his face but seems to be better than before.  Dobutamine drip off.  8/1: On continuous EEG monitoring, no other acute event.  Poorly responsive and occasional twitching movement  7/31: Appearentt seizure like activity last night, ativan given  7/30: No acute overnight events  7/29 No acute overnight events   7/28: No acute event, no much neurological recovery.  No witnessed seizure, blood pressure on the higher side.  Diuresing well  7/27: No acute event.  Good diuresis.  Still poorly responsive.  Remains on dobutamine.  Blood pressure maintained.  No witnessed seizure.   7/26: No acute event, blood pressure somewhat better but urine output decreasing.  Still on dobutamine at 5.  7/25 -- Getting PRBC now; started on Dobutamine      Active Problem List:     Problem List  Date Reviewed: 12/17/2020        Codes Class    Acute respiratory failure with hypoxia [facility]) ICD-10-CM: J96.01  ICD-9-CM: 518.81         Aspiration into respiratory tract ICD-10-CM: T17.908A  ICD-9-CM: 934.9         Hypokalemia ICD-10-CM: E87.6  ICD-9-CM: 276.8         Hypernatremia ICD-10-CM: E87.0  ICD-9-CM: 276.0         Moderate protein malnutrition (HCC) ICD-10-CM: E44.0  ICD-9-CM: 263.0         Failure to thrive (0-17) ICD-10-CM: R62.51  ICD-9-CM: 783.41         Bacteremia ICD-10-CM: R78.81  ICD-9-CM: 790.7         UTI (urinary tract infection) ICD-10-CM: N39.0  ICD-9-CM: 599.0         Dementia (HCC) ICD-10-CM: F03.90  ICD-9-CM: 294.20         Bedridden ICD-10-CM: Z74.01  ICD-9-CM: V49.84         Pressure ulcer ICD-10-CM: L89.90  ICD-9-CM: 707.00, 707.20         S/P percutaneous endoscopic gastrostomy (PEG) tube placement ([facility] ICD-10-CM: Z93.1  ICD-9-CM: V44.1         Dehydration ICD-10-CM: E86.0  ICD-9-CM: 276.51         Lactic acidosis ICD-10-CM: E87.2  ICD-9-CM: 276.2         Sepsis ([facility] ICD-10-CM: A41.9  ICD-9-CM: 038.9, 995.91         SOULEYMANE (acute kidney injury) ([email or web address] ICD-10-CM: N17.9  ICD-9-CM: 604. 9         Chronic systolic heart failure (HCC) ICD-10-CM: I50.22  ICD-9-CM: 428.22         Altered mental status ICD-10-CM: R41.82  ICD-9-CM: 780.97         Post-ictal state ([facility] ICD-10-CM: R56.9  ICD-9-CM: 780.39         Seizure ([facility] ICD-10-CM: R56.9  ICD-9-CM: 780.39         Atrial pacemaker lead displacement ICD-10-CM: T82.120A  ICD-9-CM: 996.01         Pacemaker ICD-10-CM: Z95.0  ICD-9-CM: V45.01     Overview Signed 3/22/2019  5:08 PM by Adrienne Jolly MD     3/22/2019 dual chamber Medtronic pacer             Bradycardia ICD-10-CM: R00.1  ICD-9-CM: 427.89         Transaminitis ICD-10-CM: R74.01  ICD-9-CM: 790.4         Hypertensive urgency ICD-10-CM: I16.0  ICD-9-CM: 401.9 Second degree AV block, Mobitz type I ICD-10-CM: I44.1  ICD-9-CM: 426.13     Overview Signed 3/21/2019  6:14 PM by Adrienne Jolly MD     Added automatically from request for surgery [id number]             Stage 3 chronic kidney disease ([facility] ICD-10-CM: N18.30  ICD-9-CM: 388. 3         Rotator cuff arthropathy of both shoulders ICD-10-CM: M12.811, M12.812  ICD-9-CM: 716.81         Cognitive impairment ICD-10-CM: R41.89  ICD-9-CM: 174.6         Systolic murmur NRJ-38-PN: R01.1  ICD-9-CM: 785.2         Essential hypertension ICD-10-CM: I10  ICD-9-CM: 401.9         Gout ICD-10-CM: M10.9  ICD-9-CM: 274.9         Pure hypercholesterolemia ICD-10-CM: E78.00  ICD-9-CM: 272.0         History of prostate cancer ICD-10-CM: Z85.46  ICD-9-CM: V10.46         Chronic constipation ICD-10-CM: K59.09  ICD-9-CM: 564.00         Dysuria ICD-10-CM: R30.0  ICD-9-CM: 424. 1         Weight loss ICD-10-CM: R63.4  ICD-9-CM: 783.21         Adrenal mass ([facility] ICD-10-CM: E27.8  ICD-9-CM: 255.8     Overview Addendum 12/17/2018  9:57 AM by Barry Silva MD     Stable/non-functioning adenoma on imaging                   Past Medical History:      has a past medical history of Cancer [facility]), Chronic systolic heart failure ([facility] (12/7/2020), Constipation, Gout, Hyperlipemia, Hypertension, Pacemaker (2019), Stroke ([facility] Thrombocytopenia ([facility] (3/19/2019), and TIA (transient ischemic attack) (2013). Past Surgical History:      has a past surgical history that includes hx urological; hx prostatectomy; pr ins new/rplcmt prm pm w/transv eltrd atrial&vent (Right, 3/22/2019); pr ins new/rplcmt prm pacemakr w/trans eltrd atrial (N/A, 10/18/2019); and place percut gastrostomy tube (11/30/2020). Home Medications:     Prior to Admission medications    Medication Sig Start Date End Date Taking? Authorizing Provider   levETIRAcetam (KEPPRA) 750 mg tablet Take 1 Tab by mouth every twelve (12) hours.  12/11/20   Maria T Wadsworth NP   aspirin delayed-release 81 mg tablet Take 1 Tab by mouth daily. 20   Micha Hennessy MD   polyethylene glycol [facility]) 17 gram/dose powder Take 17 g by mouth daily as needed for Constipation. 20   Genetta Hennessy, MD   balsam peru-castor oiL (VENELEX) ointment Apply  to affected area three (3) times daily. 20   Yuki Mariscal MD   famotidine (PEPCID) 20 mg tablet Take 1 Tab by mouth every evening. 20   Yuki Mariscal MD   acetaminophen (TYLENOL) 325 mg tablet Take 650 mg by mouth every six (6) hours as needed for Pain. Provider, Historical       Allergies/Social/Family History:     No Known Allergies   Social History     Tobacco Use    Smoking status: Never Smoker    Smokeless tobacco: Never Used   Substance Use Topics    Alcohol use: No      Family History   Problem Relation Age of Onset    No Known Problems Mother     No Known Problems Father        Review of Systems:     Not able to obtain due to patient medical condition    Objective:   Vital Signs:  Visit Vitals  BP (!) 163/90 (BP 1 Location: Right upper arm, BP Patient Position: At rest)   Pulse (!) 104   Temp 99.4 °F (37.4 °C)   Resp 12   Ht 5' 6\" (1.676 m)   Wt 78 kg (171 lb 15.3 oz)   SpO2 100%   BMI 27.75 kg/m²      O2 Device: Endotracheal tube, Ventilator Temp (24hrs), Av.6 °F (37.6 °C), Min:99 °F (37.2 °C), Max:100.6 °F (38.1 °C)           Intake/Output:     Intake/Output Summary (Last 24 hours) at 2021 0913  Last data filed at 2021 0800  Gross per 24 hour   Intake 1915 ml   Output 840 ml   Net 1075 ml       Physical Exam:    General: No distress, appears stated age, intubated   Neurologic: No response to stimuli  Lungs: CTAB   Cardiovascular:  Regular rate and rhythm, S1S2 present, without murmur or extra heart sounds and pedal pulses normal , no lower limb edema  Abdomen:  soft, non-tender.  Bowel sounds normal. No masses,  no organomegaly      LABS AND  DATA: Personally reviewed  Recent Labs     21  7704 08/07/21  0402   WBC 13.4* 10.6   HGB 7.5* 7.5*   HCT 23.8* 24.8*    216     Recent Labs     08/08/21  0308 08/07/21  0402    142   K 3.9 3.9    107   CO2 30 31   BUN 63* 58*   CREA 1.06 0.86   * 102*   CA 9.5 9.7     No results for input(s): AP, TBIL, TP, ALB, GLOB, AML, LPSE in the last 72 hours. No lab exists for component: SGOT, GPT, AMYP  No results for input(s): INR, PTP, APTT, INREXT, INREXT in the last 72 hours. No results for input(s): PHI, PCO2I, PO2I, FIO2I in the last 72 hours. No results for input(s): CPK, CKMB, TROIQ, BNPP in the last 72 hours. Hemodynamics:   PAP:   CO:     Wedge:   CI:     CVP:    SVR:       PVR:       Ventilator Settings:  Mode Rate Tidal Volume Pressure FiO2 PEEP   Assist control, Pressure control      5 cm H2O 30 % 5 cm H20     Peak airway pressure: 23 cm H2O    Minute ventilation: 6.77 l/min        MEDS: Reviewed    Chest X-Ray:  CXR Results  (Last 48 hours)    None            Assessment and Plan:   Seizure:  Encephalopathy:  Advanced dementia:  Appreciate neurology input, EEG report noted as above.  Remains poorly responsive.  Continue current antiepileptic medication. Appreciate further neurology input. MRI yesterday w/o acute findings. Cardiogenic shock:  Ejection fraction of less than 30% at baseline, dobutamine drip weaned off.  Seems to be euvolemic at this time.  Renal function improving.  Will diurese as needed. Septic shock: Resolved  Completed antibiotic course.  Off pressors.  Procalcitonin continue to trend down  Respiratory failure- Trach planned for tomorrow- hold heparin and TF at midnight      DISPOSITION  Stay in ICU    CRITICAL CARE CONSULTANT NOTE  I had a face to face encounter with the patient, reviewed and interpreted patient data including clinical events, labs, images, vital signs, I/O's, and examined patient.   I have discussed the case and the plan and management of the patient's care with the consulting services, the bedside nurses and the respiratory therapist.      NOTE OF PERSONAL INVOLVEMENT IN CARE   This patient has a high probability of imminent, clinically significant deterioration, which requires the highest level of preparedness to intervene urgently. I participated in the decision-making and personally managed or directed the management of the following life and organ supporting interventions that required my frequent assessment to treat or prevent imminent deterioration. I personally spent 30 minutes of critical care time. This is time spent at this critically ill patient's bedside actively involved in patient care as well as the coordination of care and discussions with the patient's family. This does not include any procedural time which has been billed separately.     [facility]  8/8/2021

## 2021-08-08 NOTE — PROGRESS NOTES
0730: Bedside shift change report given to Mikael Cornell RN (oncoming nurse) by Russ Coleman (offgoing nurse). Report included the following information SBAR, Kardex, ED Summary, Procedure Summary, Intake/Output, MAR, Accordion, Recent Results and Dual Neuro Assessment.

## 2021-08-08 NOTE — PROGRESS NOTES
1930 Bedside and Verbal shift change report given to JERICA Sands RN (oncoming nurse) by Catie Hill RN (offgoing nurse). Report included the following information SBAR, Kardex, Intake/Output, MAR, Recent Results, Cardiac Rhythm V paced, Alarm Parameters  and Dual Neuro Assessment. 0730 Bedside and Verbal shift change report given to Catie Hill RN (oncoming nurse) by Linda Batres RN (offgoing nurse). Report included the following information SBAR, Kardex, Intake/Output, MAR, Recent Results, Cardiac Rhythm V paced and Alarm Parameters .

## 2021-08-09 ENCOUNTER — ANESTHESIA (OUTPATIENT)
Dept: ICU | Age: 86
DRG: 004 | End: 2021-08-09
Payer: MEDICARE

## 2021-08-09 ENCOUNTER — ANESTHESIA EVENT (OUTPATIENT)
Dept: ICU | Age: 86
DRG: 004 | End: 2021-08-09
Payer: MEDICARE

## 2021-08-09 LAB
ANION GAP SERPL CALC-SCNC: 3 MMOL/L (ref 5–15)
BUN SERPL-MCNC: 66 MG/DL (ref 6–20)
BUN/CREAT SERPL: 68 (ref 12–20)
CALCIUM SERPL-MCNC: 9.3 MG/DL (ref 8.5–10.1)
CHLORIDE SERPL-SCNC: 107 MMOL/L (ref 97–108)
CO2 SERPL-SCNC: 31 MMOL/L (ref 21–32)
CREAT SERPL-MCNC: 0.97 MG/DL (ref 0.7–1.3)
ERYTHROCYTE [DISTWIDTH] IN BLOOD BY AUTOMATED COUNT: 18.3 % (ref 11.5–14.5)
GLUCOSE BLD STRIP.AUTO-MCNC: 112 MG/DL (ref 65–117)
GLUCOSE BLD STRIP.AUTO-MCNC: 114 MG/DL (ref 65–117)
GLUCOSE BLD STRIP.AUTO-MCNC: 124 MG/DL (ref 65–117)
GLUCOSE BLD STRIP.AUTO-MCNC: 126 MG/DL (ref 65–117)
GLUCOSE SERPL-MCNC: 137 MG/DL (ref 65–100)
HCT VFR BLD AUTO: 23.7 % (ref 36.6–50.3)
HGB BLD-MCNC: 7.3 G/DL (ref 12.1–17)
MAGNESIUM SERPL-MCNC: 2.6 MG/DL (ref 1.6–2.4)
MCH RBC QN AUTO: 29.4 PG (ref 26–34)
MCHC RBC AUTO-ENTMCNC: 30.8 G/DL (ref 30–36.5)
MCV RBC AUTO: 95.6 FL (ref 80–99)
NRBC # BLD: 0 K/UL (ref 0–0.01)
NRBC BLD-RTO: 0 PER 100 WBC
PHOSPHATE SERPL-MCNC: 2.9 MG/DL (ref 2.6–4.7)
PLATELET # BLD AUTO: 241 K/UL (ref 150–400)
PMV BLD AUTO: 9.7 FL (ref 8.9–12.9)
POTASSIUM SERPL-SCNC: 4 MMOL/L (ref 3.5–5.1)
RBC # BLD AUTO: 2.48 M/UL (ref 4.1–5.7)
SERVICE CMNT-IMP: ABNORMAL
SERVICE CMNT-IMP: ABNORMAL
SERVICE CMNT-IMP: NORMAL
SERVICE CMNT-IMP: NORMAL
SODIUM SERPL-SCNC: 141 MMOL/L (ref 136–145)
WBC # BLD AUTO: 11.5 K/UL (ref 4.1–11.1)

## 2021-08-09 PROCEDURE — 80048 BASIC METABOLIC PNL TOTAL CA: CPT

## 2021-08-09 PROCEDURE — 74011000250 HC RX REV CODE- 250: Performed by: NURSE ANESTHETIST, CERTIFIED REGISTERED

## 2021-08-09 PROCEDURE — 0BJ08ZZ INSPECTION OF TRACHEOBRONCHIAL TREE, VIA NATURAL OR ARTIFICIAL OPENING ENDOSCOPIC: ICD-10-PCS | Performed by: THORACIC SURGERY (CARDIOTHORACIC VASCULAR SURGERY)

## 2021-08-09 PROCEDURE — 84100 ASSAY OF PHOSPHORUS: CPT

## 2021-08-09 PROCEDURE — 83735 ASSAY OF MAGNESIUM: CPT

## 2021-08-09 PROCEDURE — C1894 INTRO/SHEATH, NON-LASER: HCPCS

## 2021-08-09 PROCEDURE — 36415 COLL VENOUS BLD VENIPUNCTURE: CPT

## 2021-08-09 PROCEDURE — 74011250637 HC RX REV CODE- 250/637: Performed by: NURSE PRACTITIONER

## 2021-08-09 PROCEDURE — 74011250636 HC RX REV CODE- 250/636: Performed by: NURSE ANESTHETIST, CERTIFIED REGISTERED

## 2021-08-09 PROCEDURE — 74011250636 HC RX REV CODE- 250/636: Performed by: NURSE PRACTITIONER

## 2021-08-09 PROCEDURE — 77030008793 HC TU TRACH CUF COVD -B

## 2021-08-09 PROCEDURE — 94003 VENT MGMT INPAT SUBQ DAY: CPT

## 2021-08-09 PROCEDURE — 31600 PLANNED TRACHEOSTOMY: CPT | Performed by: THORACIC SURGERY (CARDIOTHORACIC VASCULAR SURGERY)

## 2021-08-09 PROCEDURE — 0B21XFZ CHANGE TRACHEOSTOMY DEVICE IN TRACHEA, EXTERNAL APPROACH: ICD-10-PCS | Performed by: THORACIC SURGERY (CARDIOTHORACIC VASCULAR SURGERY)

## 2021-08-09 PROCEDURE — 65610000006 HC RM INTENSIVE CARE

## 2021-08-09 PROCEDURE — 74011000258 HC RX REV CODE- 258: Performed by: NURSE PRACTITIONER

## 2021-08-09 PROCEDURE — 74011250636 HC RX REV CODE- 250/636: Performed by: INTERNAL MEDICINE

## 2021-08-09 PROCEDURE — 85027 COMPLETE CBC AUTOMATED: CPT

## 2021-08-09 PROCEDURE — 82962 GLUCOSE BLOOD TEST: CPT

## 2021-08-09 PROCEDURE — 74011250637 HC RX REV CODE- 250/637: Performed by: HEALTH CARE PROVIDER

## 2021-08-09 PROCEDURE — 74011250636 HC RX REV CODE- 250/636: Performed by: HEALTH CARE PROVIDER

## 2021-08-09 RX ORDER — SODIUM CHLORIDE 9 MG/ML
75 INJECTION, SOLUTION INTRAVENOUS CONTINUOUS
Status: DISCONTINUED | OUTPATIENT
Start: 2021-08-09 | End: 2021-08-09

## 2021-08-09 RX ORDER — EPHEDRINE SULFATE/0.9% NACL/PF 50 MG/5 ML
SYRINGE (ML) INTRAVENOUS AS NEEDED
Status: DISCONTINUED | OUTPATIENT
Start: 2021-08-09 | End: 2021-08-09 | Stop reason: HOSPADM

## 2021-08-09 RX ORDER — PHENYLEPHRINE HCL IN 0.9% NACL 0.4MG/10ML
SYRINGE (ML) INTRAVENOUS AS NEEDED
Status: DISCONTINUED | OUTPATIENT
Start: 2021-08-09 | End: 2021-08-09 | Stop reason: HOSPADM

## 2021-08-09 RX ORDER — ROCURONIUM BROMIDE 10 MG/ML
INJECTION, SOLUTION INTRAVENOUS AS NEEDED
Status: DISCONTINUED | OUTPATIENT
Start: 2021-08-09 | End: 2021-08-09 | Stop reason: HOSPADM

## 2021-08-09 RX ORDER — AMOXICILLIN 250 MG
1 CAPSULE ORAL DAILY
Status: DISCONTINUED | OUTPATIENT
Start: 2021-08-09 | End: 2021-08-11 | Stop reason: HOSPADM

## 2021-08-09 RX ORDER — PROPOFOL 10 MG/ML
INJECTION, EMULSION INTRAVENOUS AS NEEDED
Status: DISCONTINUED | OUTPATIENT
Start: 2021-08-09 | End: 2021-08-09 | Stop reason: HOSPADM

## 2021-08-09 RX ADMIN — SUGAMMADEX 400 MG: 100 INJECTION, SOLUTION INTRAVENOUS at 13:21

## 2021-08-09 RX ADMIN — Medication: at 18:12

## 2021-08-09 RX ADMIN — ROCURONIUM BROMIDE 20 MG: 10 SOLUTION INTRAVENOUS at 13:08

## 2021-08-09 RX ADMIN — Medication 10 ML: at 14:03

## 2021-08-09 RX ADMIN — LEVETIRACETAM 1500 MG: 100 INJECTION, SOLUTION INTRAVENOUS at 03:57

## 2021-08-09 RX ADMIN — CHLORHEXIDINE GLUCONATE 15 ML: 0.12 RINSE ORAL at 12:47

## 2021-08-09 RX ADMIN — ALLOPURINOL 100 MG: 100 TABLET ORAL at 09:09

## 2021-08-09 RX ADMIN — Medication 10 ML: at 06:00

## 2021-08-09 RX ADMIN — PROPOFOL 20 MG: 10 INJECTION, EMULSION INTRAVENOUS at 13:21

## 2021-08-09 RX ADMIN — FENTANYL CITRATE 50 MCG: 50 INJECTION, SOLUTION INTRAMUSCULAR; INTRAVENOUS at 22:01

## 2021-08-09 RX ADMIN — Medication 80 MCG: at 13:16

## 2021-08-09 RX ADMIN — ROCURONIUM BROMIDE 20 MG: 10 SOLUTION INTRAVENOUS at 13:05

## 2021-08-09 RX ADMIN — HEPARIN SODIUM 5000 UNITS: 5000 INJECTION INTRAVENOUS; SUBCUTANEOUS at 22:01

## 2021-08-09 RX ADMIN — Medication: at 09:10

## 2021-08-09 RX ADMIN — LACOSAMIDE 100 MG: 50 TABLET, FILM COATED ORAL at 20:43

## 2021-08-09 RX ADMIN — PROPOFOL 20 MG: 10 INJECTION, EMULSION INTRAVENOUS at 13:11

## 2021-08-09 RX ADMIN — ROCURONIUM BROMIDE 10 MG: 10 SOLUTION INTRAVENOUS at 13:12

## 2021-08-09 RX ADMIN — PROPOFOL 20 MG: 10 INJECTION, EMULSION INTRAVENOUS at 13:17

## 2021-08-09 RX ADMIN — Medication 10 ML: at 22:01

## 2021-08-09 RX ADMIN — CHLORHEXIDINE GLUCONATE 15 ML: 0.12 RINSE ORAL at 01:00

## 2021-08-09 RX ADMIN — Medication 80 MCG: at 13:25

## 2021-08-09 RX ADMIN — Medication 40 MCG: at 13:07

## 2021-08-09 RX ADMIN — PROPOFOL 20 MG: 10 INJECTION, EMULSION INTRAVENOUS at 13:19

## 2021-08-09 RX ADMIN — PROPOFOL 20 MG: 10 INJECTION, EMULSION INTRAVENOUS at 13:12

## 2021-08-09 RX ADMIN — Medication 80 MCG: at 13:21

## 2021-08-09 RX ADMIN — FENTANYL CITRATE 50 MCG: 50 INJECTION, SOLUTION INTRAMUSCULAR; INTRAVENOUS at 11:06

## 2021-08-09 RX ADMIN — SODIUM CHLORIDE 75 ML/HR: 9 INJECTION, SOLUTION INTRAVENOUS at 09:09

## 2021-08-09 RX ADMIN — ASPIRIN 81 MG: 81 TABLET, CHEWABLE ORAL at 09:09

## 2021-08-09 RX ADMIN — Medication 80 MCG: at 13:12

## 2021-08-09 RX ADMIN — PROPOFOL 10 MG: 10 INJECTION, EMULSION INTRAVENOUS at 13:07

## 2021-08-09 RX ADMIN — PROPOFOL 20 MG: 10 INJECTION, EMULSION INTRAVENOUS at 13:10

## 2021-08-09 RX ADMIN — LEVETIRACETAM 1500 MG: 100 INJECTION, SOLUTION INTRAVENOUS at 15:16

## 2021-08-09 RX ADMIN — LANSOPRAZOLE 30 MG: KIT at 09:09

## 2021-08-09 RX ADMIN — PROPOFOL 30 MG: 10 INJECTION, EMULSION INTRAVENOUS at 13:05

## 2021-08-09 RX ADMIN — LACOSAMIDE 100 MG: 50 TABLET, FILM COATED ORAL at 09:09

## 2021-08-09 RX ADMIN — DOCUSATE SODIUM 50 MG AND SENNOSIDES 8.6 MG 1 TABLET: 8.6; 5 TABLET, FILM COATED ORAL at 13:57

## 2021-08-09 RX ADMIN — Medication 10 ML: at 04:59

## 2021-08-09 RX ADMIN — PROPOFOL 20 MG: 10 INJECTION, EMULSION INTRAVENOUS at 13:09

## 2021-08-09 RX ADMIN — PROPOFOL 20 MG: 10 INJECTION, EMULSION INTRAVENOUS at 13:13

## 2021-08-09 RX ADMIN — Medication 10 MG: at 13:32

## 2021-08-09 RX ADMIN — PROPOFOL 50 MG: 10 INJECTION, EMULSION INTRAVENOUS at 13:15

## 2021-08-09 NOTE — OP NOTES
[address]   OPERATIVE REPORT    Name:  Fred Garcia  MR#:  [id number]  :  1933  ACCOUNT #:  [de-identified]  DATE OF SERVICE:  2021      CLINICAL SERVICE:  Thoracic Surgery. ATTENDING SURGEON:  Stan Hancock MD    OPERATIONS PERFORMED:  1. Percutaneous dilational tracheostomy. 2.  Flexible bronchoscopy. PREOPERATIVE DIAGNOSIS:  Acute hypoxic respiratory failure. POSTOPERATIVE DIAGNOSIS:  Acute hypoxic respiratory failure. FIRST ASSISTANT:  BECKI Aldana    SPECIMENS SENT:  None. DRAINS AND TUBES:  A 6 Shiley was left within the anterior neck. ANESTHESIA:  General.    ESTIMATED BLOOD LOSS:  For this case was minimal.    COMPLICATIONS:  None. INDICATIONS FOR PROCEDURE:  The patient is an 49-year-old gentleman with a prolonged ICU admission for respiratory failure and prolonged intubation. We are asked by ICU team to place a tracheostomy for long-term vent management. PROCEDURE IN DETAIL:  After informed consent was obtained and placed on the chart, the patient was placed supine on her ICU bed. Neck was fully extended. The patient had very limited neck mobility due to cervical spine issues. The patient's anterior neck and chest were prepped and draped in sterile fashion. Time-out was performed. Flexible bronchoscopy was performed. Distal trachea and noam were normal in appearance. The right and left tracheobronchial trees were normal in appearance. There was thin mucus debris. ET tube was withdrawn to 17 cm. Approximately 5 mL of 1% lidocaine was used as local anesthetic. Under bronchoscopic guidance, a finder needle was fed into the anterior trachea. This was then swapped for an introducer needle. Guidewire was fed down the right mainstem bronchus under bronchoscopic guidance. Introducer needle was withdrawn. Approximately, 1.5 cm vertical incision was made.     Under bronchoscopic guidance, a 14-Citizen of [place] dilator was then passed over the guidewire. The serial blue Rhino dilator was then passed multiple times until the track was smooth. A #6 Shiley over 26-Bengali dilator was then passed over the guidewire. On bronchoscopic guidance, the tracheostomy was seated in place. Dilator and guidewire were withdrawn. The bronchoscope was then introduced through the tracheostomy tube and the noam was easily visualized without excessive rundown of blood. Cuff was inflated. Inner cannula was placed. Colen Grumbling was connected to the vent with good vent mechanics. The flanges were then tacked to the anterior neck using Prolene suture. All surgical counts were correct x2 at the end of this case. There were no immediate complications identified at the end of this case. Dr. Rich Whitfield was present and scrubbed throughout the entire procedure.     BECKI Blanchard was instrumental in completion of this operation as she performed the entire bronchoscopic portion of this exam.        Thais Duarte MD      [id number]  D:  08/09/2021 15:55  T:  08/09/2021 16:40  JOB #:  [id number]

## 2021-08-09 NOTE — PROGRESS NOTES
0730: Bedside and Verbal shift change report given to Viky RN  (oncoming nurse) by Luis Cat RN  (offgoing nurse). Report included the following information SBAR, Kardex, Intake/Output, MAR, Recent Results and Cardiac Rhythm v paced.

## 2021-08-09 NOTE — PROGRESS NOTES
Transition of Care Plan   RUR-  High Risk   DISPOSITION: SNF VS [facility]   F/U with PCP/Specialist     Transport: [facility]  Patient is s/p trach placement today. CM spoke with Radha Islas at [facility] and she will need to check if they can accommodate a patient with a new trach. Referral sent through [facility]. Care management will continue to follow.    Holly Shepherd RN,Care Management

## 2021-08-09 NOTE — BRIEF OP NOTE
Brief Postoperative Note    Patient: Aleena Orosco  YOB: 1933  MRN: [id number]    Date of Procedure: Aug 09, 2021    Pre-Op Diagnosis: Acute hypoxic respiratory failure    Post-Op Diagnosis: Same as preoperative diagnosis.       Surgery:  1- Percutaneous dilational tracheostomy  2: Flexible bronchoscopy    Surgical Assistant: Jennifer Kenney    Surgeon: Ravi Bower    Anesthesia: Gen    Estimated Blood Loss (mL): Minimal    Complications: None    Specimens: * Cannot find log *     Implants: * No surgical log found *      Findings: 6 Shiley successfully placed    Electronically Signed by Garret Jimenez MD on 8/9/2021 at 1:28 PM

## 2021-08-09 NOTE — PROGRESS NOTES
1930: Bedside shift change report given to Howard Contreras (oncoming nurse) by Meagan Vargas (offgoing nurse). Report included the following information SBAR, Procedure Summary, Intake/Output, MAR, Cardiac Rhythm V paced and Alarm Parameters . 0730: Bedside shift change report given to [address] (oncoming nurse) by Vale Flores RN (offgoing nurse). Report included the following information SBAR, Intake/Output, MAR, Recent Results, Cardiac Rhythm V paced and Alarm Parameters . SHIFT SUMMARY  Q4 neuros. Patient follows no commands, with draws in all 4 extremities although significantly weaker on right side. Left sided extremities tremor and patient still has left sided facial twitch. Patient refused to open eyes for eye exam. New trach had minimal sanguineous drainage/oozing overnight. Patient tolerated tube feeds, remained at 45ml/hr. Patient treated for pain with PRN IV fentanyl 50mcg twice, pain successfully controlled both times. Urine output adequate overnight, patient had total of 320ml out. Spoke with patient. Hip pain is more significant than back pain at this time. Will refer to ortho.

## 2021-08-09 NOTE — ANESTHESIA POSTPROCEDURE EVALUATION
Post-Anesthesia Evaluation and Assessment    Patient: Felisa Stark MRN: [id number]  SSN: [id number]    YOB: 1933  Age: 80 y.o. Sex: male      I have evaluated the patient and they are stable and ready for discharge from the PACU. Cardiovascular Function/Vital Signs  Visit Vitals  BP 96/61   Pulse 82   Temp 37.2 °C (99 °F)   Resp 17   Ht 5' 6\" (1.676 m)   Wt 76.4 kg (168 lb 6.9 oz)   SpO2 100%   BMI 27.19 kg/m²       Patient is status post * No anesthesia type entered * anesthesia for * No procedures listed *. Nausea/Vomiting: None    Postoperative hydration reviewed and adequate. Pain:  Pain Scale 1: Adult Nonverbal Pain Scale (08/09/21 0800)  Pain Intensity 1: 0 (08/09/21 0800)   Managed    Neurological Status:   Neuro  Neurologic State: Eyes do not open to any stimulus (08/09/21 0800)  Orientation Level: Unable to verbalize (08/09/21 0800)  Cognition: No command following (08/09/21 0800)  Speech: Intubated (08/09/21 0800)  Assessment L Pupil: Round;Sluggish (08/08/21 2000)  Size L Pupil (mm): 4 (08/08/21 2000)  Assessment R Pupil: Round;Sluggish (08/08/21 2000)  Size R Pupil (mm): 4 (08/08/21 2000)  LUE Motor Response: Weak;Withdraws (08/09/21 0800)  LLE Motor Response: Weak;Withdraws (08/09/21 0800)  RUE Motor Response: Weak;Withdraws (08/09/21 0800)  RLE Motor Response: Weak;Withdraws (08/09/21 0800)   At baseline    Mental Status, Level of Consciousness: Alert and  oriented to person, place, and time    Pulmonary Status:   O2 Device:  (tracheostomy #6 perc) (08/09/21 1325)   Adequate oxygenation and airway patent    Complications related to anesthesia: None    Post-anesthesia assessment completed. No concerns    Signed By: Rahel Higginbotham MD     August 9, 2021              * No procedures listed *.     MAC    <BSHSIANPOST>    INITIAL Post-op Vital signs:   Vitals Value Taken Time   /85 08/09/21 1352   Temp     Pulse 94 08/09/21 1352   Resp 17 08/09/21 1352   SpO2 100 % 08/09/21 1351   Vitals shown include unvalidated device data.

## 2021-08-09 NOTE — ANESTHESIA PREPROCEDURE EVALUATION
Relevant Problems   No relevant active problems       Anesthetic History   No history of anesthetic complications            Review of Systems / Medical History  Patient summary reviewed, nursing notes reviewed and pertinent labs reviewed    Pulmonary  Within defined limits                 Neuro/Psych     seizures  CVA  TIA    Comments:  Altered mental status   Hx Cognitive impairment    Cardiovascular    Hypertension        Dysrhythmias   Pacemaker and hyperlipidemia    Exercise tolerance: <4 METS  Comments: Hx Bradycardia   Second degree AV block, Mobitz type I   Pacemaker  Echo 7/5/20: Estimated left ventricular ejection fraction is 40 - 45%       GI/Hepatic/Renal  Within defined limits              Endo/Other        Cancer     Other Findings   Comments: Hx prostate ca  Hx Thrombocytopenia   Hx Gout   Dysuria   Weight loss           Physical Exam    Airway    TM Distance: 4 - 6 cm      Intubated  Comments: Unable to assess airway as patient is uncooperative Cardiovascular  Regular rate and rhythm,  S1 and S2 normal,  no murmur, click, rub, or gallop  Rhythm: regular  Rate: normal         Dental      Comments: Unable to assess as patient is uncooperative   Pulmonary      Decreased breath sounds: bilateral           Abdominal  GI exam deferred       Other Findings            Anesthetic Plan    ASA: 3  Anesthesia type: MAC          Induction: Intravenous  Anesthetic plan and risks discussed with: Family

## 2021-08-10 LAB
ANION GAP SERPL CALC-SCNC: 5 MMOL/L (ref 5–15)
BUN SERPL-MCNC: 59 MG/DL (ref 6–20)
BUN/CREAT SERPL: 60 (ref 12–20)
CALCIUM SERPL-MCNC: 9.9 MG/DL (ref 8.5–10.1)
CHLORIDE SERPL-SCNC: 109 MMOL/L (ref 97–108)
CO2 SERPL-SCNC: 27 MMOL/L (ref 21–32)
CREAT SERPL-MCNC: 0.98 MG/DL (ref 0.7–1.3)
ERYTHROCYTE [DISTWIDTH] IN BLOOD BY AUTOMATED COUNT: 18.4 % (ref 11.5–14.5)
GLUCOSE BLD STRIP.AUTO-MCNC: 164 MG/DL (ref 65–117)
GLUCOSE BLD STRIP.AUTO-MCNC: 179 MG/DL (ref 65–117)
GLUCOSE BLD STRIP.AUTO-MCNC: 180 MG/DL (ref 65–117)
GLUCOSE BLD STRIP.AUTO-MCNC: 198 MG/DL (ref 65–117)
GLUCOSE BLD STRIP.AUTO-MCNC: 230 MG/DL (ref 65–117)
GLUCOSE SERPL-MCNC: 178 MG/DL (ref 65–100)
HCT VFR BLD AUTO: 28.4 % (ref 36.6–50.3)
HGB BLD-MCNC: 8.7 G/DL (ref 12.1–17)
MAGNESIUM SERPL-MCNC: 2.8 MG/DL (ref 1.6–2.4)
MCH RBC QN AUTO: 29.6 PG (ref 26–34)
MCHC RBC AUTO-ENTMCNC: 30.6 G/DL (ref 30–36.5)
MCV RBC AUTO: 96.6 FL (ref 80–99)
NRBC # BLD: 0 K/UL (ref 0–0.01)
NRBC BLD-RTO: 0 PER 100 WBC
PLATELET # BLD AUTO: 290 K/UL (ref 150–400)
PMV BLD AUTO: 10 FL (ref 8.9–12.9)
POTASSIUM SERPL-SCNC: 4.5 MMOL/L (ref 3.5–5.1)
RBC # BLD AUTO: 2.94 M/UL (ref 4.1–5.7)
SERVICE CMNT-IMP: ABNORMAL
SODIUM SERPL-SCNC: 141 MMOL/L (ref 136–145)
WBC # BLD AUTO: 12.5 K/UL (ref 4.1–11.1)

## 2021-08-10 PROCEDURE — 82962 GLUCOSE BLOOD TEST: CPT

## 2021-08-10 PROCEDURE — 74011250637 HC RX REV CODE- 250/637: Performed by: NURSE PRACTITIONER

## 2021-08-10 PROCEDURE — 74011250636 HC RX REV CODE- 250/636: Performed by: INTERNAL MEDICINE

## 2021-08-10 PROCEDURE — 94003 VENT MGMT INPAT SUBQ DAY: CPT

## 2021-08-10 PROCEDURE — 74011250637 HC RX REV CODE- 250/637: Performed by: HEALTH CARE PROVIDER

## 2021-08-10 PROCEDURE — 99024 POSTOP FOLLOW-UP VISIT: CPT | Performed by: PHYSICIAN ASSISTANT

## 2021-08-10 PROCEDURE — 74011636637 HC RX REV CODE- 636/637: Performed by: INTERNAL MEDICINE

## 2021-08-10 PROCEDURE — 85027 COMPLETE CBC AUTOMATED: CPT

## 2021-08-10 PROCEDURE — 83735 ASSAY OF MAGNESIUM: CPT

## 2021-08-10 PROCEDURE — 74011250636 HC RX REV CODE- 250/636: Performed by: NURSE PRACTITIONER

## 2021-08-10 PROCEDURE — 80048 BASIC METABOLIC PNL TOTAL CA: CPT

## 2021-08-10 PROCEDURE — 74011000258 HC RX REV CODE- 258: Performed by: NURSE PRACTITIONER

## 2021-08-10 PROCEDURE — 36415 COLL VENOUS BLD VENIPUNCTURE: CPT

## 2021-08-10 PROCEDURE — 65610000006 HC RM INTENSIVE CARE

## 2021-08-10 PROCEDURE — 74011636637 HC RX REV CODE- 636/637: Performed by: HEALTH CARE PROVIDER

## 2021-08-10 RX ORDER — HYDRALAZINE HYDROCHLORIDE 20 MG/ML
10 INJECTION INTRAMUSCULAR; INTRAVENOUS EVERY 4 HOURS
Status: DISCONTINUED | OUTPATIENT
Start: 2021-08-11 | End: 2021-08-10

## 2021-08-10 RX ORDER — HYDRALAZINE HYDROCHLORIDE 20 MG/ML
10 INJECTION INTRAMUSCULAR; INTRAVENOUS
Status: DISCONTINUED | OUTPATIENT
Start: 2021-08-10 | End: 2021-08-11 | Stop reason: HOSPADM

## 2021-08-10 RX ORDER — HYDRALAZINE HYDROCHLORIDE 20 MG/ML
10 INJECTION INTRAMUSCULAR; INTRAVENOUS ONCE
Status: COMPLETED | OUTPATIENT
Start: 2021-08-11 | End: 2021-08-10

## 2021-08-10 RX ADMIN — HEPARIN SODIUM 5000 UNITS: 5000 INJECTION INTRAVENOUS; SUBCUTANEOUS at 21:59

## 2021-08-10 RX ADMIN — FENTANYL CITRATE 50 MCG: 50 INJECTION, SOLUTION INTRAMUSCULAR; INTRAVENOUS at 03:08

## 2021-08-10 RX ADMIN — LACOSAMIDE 100 MG: 50 TABLET, FILM COATED ORAL at 20:55

## 2021-08-10 RX ADMIN — LEVETIRACETAM 1500 MG: 100 INJECTION, SOLUTION INTRAVENOUS at 02:35

## 2021-08-10 RX ADMIN — LACOSAMIDE 100 MG: 50 TABLET, FILM COATED ORAL at 08:58

## 2021-08-10 RX ADMIN — Medication 10 ML: at 13:23

## 2021-08-10 RX ADMIN — INSULIN LISPRO 2 UNITS: 100 INJECTION, SOLUTION INTRAVENOUS; SUBCUTANEOUS at 17:13

## 2021-08-10 RX ADMIN — CHLORHEXIDINE GLUCONATE 15 ML: 0.12 RINSE ORAL at 12:09

## 2021-08-10 RX ADMIN — INSULIN LISPRO 2 UNITS: 100 INJECTION, SOLUTION INTRAVENOUS; SUBCUTANEOUS at 00:26

## 2021-08-10 RX ADMIN — Medication: at 08:56

## 2021-08-10 RX ADMIN — DOCUSATE SODIUM 50 MG AND SENNOSIDES 8.6 MG 1 TABLET: 8.6; 5 TABLET, FILM COATED ORAL at 08:58

## 2021-08-10 RX ADMIN — Medication 10 ML: at 22:00

## 2021-08-10 RX ADMIN — FENTANYL CITRATE 50 MCG: 50 INJECTION, SOLUTION INTRAMUSCULAR; INTRAVENOUS at 12:05

## 2021-08-10 RX ADMIN — CHLORHEXIDINE GLUCONATE 15 ML: 0.12 RINSE ORAL at 00:27

## 2021-08-10 RX ADMIN — Medication 10 ML: at 06:16

## 2021-08-10 RX ADMIN — INSULIN GLARGINE 12 UNITS: 100 INJECTION, SOLUTION SUBCUTANEOUS at 08:56

## 2021-08-10 RX ADMIN — ASPIRIN 81 MG: 81 TABLET, CHEWABLE ORAL at 08:58

## 2021-08-10 RX ADMIN — HYDRALAZINE HYDROCHLORIDE 10 MG: 20 INJECTION, SOLUTION INTRAMUSCULAR; INTRAVENOUS at 23:21

## 2021-08-10 RX ADMIN — INSULIN LISPRO 2 UNITS: 100 INJECTION, SOLUTION INTRAVENOUS; SUBCUTANEOUS at 06:15

## 2021-08-10 RX ADMIN — FENTANYL CITRATE 50 MCG: 50 INJECTION, SOLUTION INTRAMUSCULAR; INTRAVENOUS at 22:04

## 2021-08-10 RX ADMIN — INSULIN LISPRO 2 UNITS: 100 INJECTION, SOLUTION INTRAVENOUS; SUBCUTANEOUS at 12:05

## 2021-08-10 RX ADMIN — LEVETIRACETAM 1500 MG: 100 INJECTION, SOLUTION INTRAVENOUS at 14:43

## 2021-08-10 RX ADMIN — FENTANYL CITRATE 50 MCG: 50 INJECTION, SOLUTION INTRAMUSCULAR; INTRAVENOUS at 09:03

## 2021-08-10 RX ADMIN — LANSOPRAZOLE 30 MG: KIT at 08:58

## 2021-08-10 RX ADMIN — ALLOPURINOL 100 MG: 100 TABLET ORAL at 08:58

## 2021-08-10 RX ADMIN — Medication: at 17:07

## 2021-08-10 RX ADMIN — HEPARIN SODIUM 5000 UNITS: 5000 INJECTION INTRAVENOUS; SUBCUTANEOUS at 08:58

## 2021-08-10 RX ADMIN — FENTANYL CITRATE 50 MCG: 50 INJECTION, SOLUTION INTRAMUSCULAR; INTRAVENOUS at 16:02

## 2021-08-10 NOTE — PROGRESS NOTES
Problem: Ventilator Management  Goal: *Adequate oxygenation and ventilation  8/10/2021 0345 by Anderson Joaquin RN  Outcome: Progressing Towards Goal  8/10/2021 0344 by Anderson Joaquin RN  Outcome: Progressing Towards Goal  Goal: *Patient maintains clear airway/free of aspiration  8/10/2021 0345 by Anderson Joaquin RN  Outcome: Progressing Towards Goal  8/10/2021 0344 by Anderson Joaquin RN  Outcome: Progressing Towards Goal  Goal: *Absence of infection signs and symptoms  8/10/2021 0345 by Anderson Joaquin RN  Outcome: Progressing Towards Goal  8/10/2021 0344 by Anderson Joaquin RN  Outcome: Progressing Towards Goal  Goal: *Normal spontaneous ventilation  8/10/2021 0345 by Anderson Joaquin RN  Outcome: Progressing Towards Goal  8/10/2021 0344 by Anderson Joaquin RN  Outcome: Progressing Towards Goal     Problem: Patient Education: Go to Patient Education Activity  Goal: Patient/Family Education  8/10/2021 0345 by Anderson Joaquin RN  Outcome: Progressing Towards Goal  8/10/2021 0344 by Anderson Joaquin RN  Outcome: Progressing Towards Goal     Problem: Pressure Injury - Risk of  Goal: *Prevention of pressure injury  Description: Document Torito Scale and appropriate interventions in the flowsheet. 8/10/2021 0345 by Anderson Joaquin RN  Outcome: Progressing Towards Goal  Note: Pressure Injury Interventions:  Sensory Interventions: Assess changes in LOC, Discuss PT/OT consult with provider, Float heels, Keep linens dry and wrinkle-free, Minimize linen layers, Monitor skin under medical devices, Pressure redistribution bed/mattress (bed type), Turn and reposition approx.  every two hours (pillows and wedges if needed)    Moisture Interventions: Absorbent underpads, Apply protective barrier, creams and emollients, Check for incontinence Q2 hours and as needed, Internal/External urinary devices, Maintain skin hydration (lotion/cream)    Activity Interventions: Pressure redistribution bed/mattress(bed type), Assess need for specialty bed, PT/OT evaluation    Mobility Interventions: Assess need for specialty bed, HOB 30 degrees or less, Turn and reposition approx. every two hours(pillow and wedges)    Nutrition Interventions: Document food/fluid/supplement intake    Friction and Shear Interventions: Apply protective barrier, creams and emollients, HOB 30 degrees or less, Lift sheet, Lift team/patient mobility team, Transferring/repositioning devices             8/10/2021 0344 by Estelle Riley RN  Outcome: Progressing Towards Goal  Note: Pressure Injury Interventions:  Sensory Interventions: Assess changes in LOC, Discuss PT/OT consult with provider, Float heels, Keep linens dry and wrinkle-free, Minimize linen layers, Monitor skin under medical devices, Pressure redistribution bed/mattress (bed type), Turn and reposition approx. every two hours (pillows and wedges if needed)    Moisture Interventions: Absorbent underpads, Apply protective barrier, creams and emollients, Check for incontinence Q2 hours and as needed, Internal/External urinary devices, Maintain skin hydration (lotion/cream)    Activity Interventions: Pressure redistribution bed/mattress(bed type), Assess need for specialty bed, PT/OT evaluation    Mobility Interventions: Assess need for specialty bed, HOB 30 degrees or less, Turn and reposition approx.  every two hours(pillow and wedges)    Nutrition Interventions: Document food/fluid/supplement intake    Friction and Shear Interventions: Apply protective barrier, creams and emollients, HOB 30 degrees or less, Lift sheet, Lift team/patient mobility team, Transferring/repositioning devices                Problem: Patient Education: Go to Patient Education Activity  Goal: Patient/Family Education  8/10/2021 0345 by Estelle Riley RN  Outcome: Progressing Towards Goal  8/10/2021 0344 by Estelle Riley RN  Outcome: Progressing Towards Goal     Problem: Seizure Disorder (Adult)  Goal: *STG: Remains free of seizure activity  8/10/2021 0345 by Bryn Hines Litten RN  Outcome: Progressing Towards Goal  8/10/2021 0344 by Cyndie Rose, RN  Outcome: Progressing Towards Goal  Goal: *STG: Maintains lab values within therapeutic range  8/10/2021 0345 by Cyndie Rose, RN  Outcome: Progressing Towards Goal  8/10/2021 0344 by Cyndie Rose, RN  Outcome: Progressing Towards Goal  Goal: *STG/LTG: Complies with medication therapy  8/10/2021 0345 by Cyndie Rose, RN  Outcome: Progressing Towards Goal  8/10/2021 0344 by Cyndie Rose, RN  Outcome: Progressing Towards Goal  Goal: *STG: Remains free of injury during seizure activity  8/10/2021 0345 by Cyndie Rose, RN  Outcome: Progressing Towards Goal  8/10/2021 0344 by Cyndie Rose, RN  Outcome: Progressing Towards Goal  Goal: *STG: Remains safe in hospital  8/10/2021 0345 by Cyndie Rose, RN  Outcome: Progressing Towards Goal  8/10/2021 0344 by Cyndie Rose RN  Outcome: Progressing Towards Goal  Goal: Interventions  8/10/2021 0345 by Cyndie Rose RN  Outcome: Progressing Towards Goal  8/10/2021 0344 by Cyndie Rose RN  Outcome: Progressing Towards Goal     Problem: Patient Education: Go to Patient Education Activity  Goal: Patient/Family Education  Outcome: Progressing Towards Goal

## 2021-08-10 NOTE — PROGRESS NOTES
1600-Bedside and Verbal shift change report given to Magdi Moura (oncoming nurse) by Torrey Escobedo (offgoing nurse). Report included the following information SBAR, Kardex, ED Summary, Procedure Summary, Intake/Output, MAR, Accordion, Recent Results, Med Rec Status, Cardiac Rhythm paced, Alarm Parameters , Pre Procedure Checklist, Procedure Verification, Quality Measures and Dual Neuro Assessment. 1930-Bedside and Verbal shift change report given to Yuki Dai (oncoming nurse) by Magdi Moura (offgoing nurse). Report included the following information SBAR, Kardex, ED Summary, Procedure Summary, Intake/Output, MAR, Accordion, Recent Results, Med Rec Status, Cardiac Rhythm paced, Alarm Parameters , Pre Procedure Checklist, Procedure Verification, Quality Measures and Dual Neuro Assessment.

## 2021-08-10 NOTE — PROGRESS NOTES
[address] [facility]  [address], [address], [address]  [phone]  ____________________________________________________________     Patient identified. Patient had percutaneous tracheostomy placed by Dr. Idania Iyer yesterday 8/9/2021. Trach site clean & dry. Current vent settings FiO2 60%, PEEP 5  Will remove trach sutures on Mon 8/16/2021.     Signed By: BECKI Harris     August 10, 2021

## 2021-08-10 NOTE — PROGRESS NOTES
Transition of Care Plan   RUR-High Risks    DISPOSITION: LTACH   F/U with PCP/Specialist     Transport: [facility]   Patient is s/p trach placement. Per intensivist patient will be a long wean from the vent. CM spoke with patient's daughter Cornel Richter and she is in agreement for him to be discharged to [facility]. CM notified [facility] liaison. Will await acceptance and bed availability.  Holly Shepherd RN, Care Management

## 2021-08-11 VITALS
BODY MASS INDEX: 28.2 KG/M2 | HEIGHT: 66 IN | RESPIRATION RATE: 16 BRPM | WEIGHT: 175.49 LBS | SYSTOLIC BLOOD PRESSURE: 148 MMHG | OXYGEN SATURATION: 95 % | TEMPERATURE: 99.6 F | DIASTOLIC BLOOD PRESSURE: 75 MMHG | HEART RATE: 115 BPM

## 2021-08-11 LAB
ABO + RH BLD: NORMAL
ANION GAP SERPL CALC-SCNC: 3 MMOL/L (ref 5–15)
BASOPHILS # BLD: 0 K/UL (ref 0–0.1)
BASOPHILS # BLD: 0.1 K/UL (ref 0–0.1)
BASOPHILS NFR BLD: 0 % (ref 0–1)
BASOPHILS NFR BLD: 0 % (ref 0–1)
BLOOD GROUP ANTIBODIES SERPL: NORMAL
BUN SERPL-MCNC: 67 MG/DL (ref 6–20)
BUN/CREAT SERPL: 64 (ref 12–20)
CALCIUM SERPL-MCNC: 9.3 MG/DL (ref 8.5–10.1)
CHLORIDE SERPL-SCNC: 107 MMOL/L (ref 97–108)
CO2 SERPL-SCNC: 31 MMOL/L (ref 21–32)
CREAT SERPL-MCNC: 1.05 MG/DL (ref 0.7–1.3)
DIFFERENTIAL METHOD BLD: ABNORMAL
DIFFERENTIAL METHOD BLD: ABNORMAL
EOSINOPHIL # BLD: 0 K/UL (ref 0–0.4)
EOSINOPHIL # BLD: 0 K/UL (ref 0–0.4)
EOSINOPHIL NFR BLD: 0 % (ref 0–7)
EOSINOPHIL NFR BLD: 0 % (ref 0–7)
ERYTHROCYTE [DISTWIDTH] IN BLOOD BY AUTOMATED COUNT: 18.6 % (ref 11.5–14.5)
ERYTHROCYTE [DISTWIDTH] IN BLOOD BY AUTOMATED COUNT: 18.6 % (ref 11.5–14.5)
GLUCOSE BLD STRIP.AUTO-MCNC: 198 MG/DL (ref 65–117)
GLUCOSE BLD STRIP.AUTO-MCNC: 230 MG/DL (ref 65–117)
GLUCOSE BLD STRIP.AUTO-MCNC: 256 MG/DL (ref 65–117)
GLUCOSE SERPL-MCNC: 265 MG/DL (ref 65–100)
HCT VFR BLD AUTO: 21.6 % (ref 36.6–50.3)
HCT VFR BLD AUTO: 24.6 % (ref 36.6–50.3)
HGB BLD-MCNC: 6.6 G/DL (ref 12.1–17)
HGB BLD-MCNC: 7.5 G/DL (ref 12.1–17)
IMM GRANULOCYTES # BLD AUTO: 0.2 K/UL (ref 0–0.04)
IMM GRANULOCYTES # BLD AUTO: 0.3 K/UL (ref 0–0.04)
IMM GRANULOCYTES NFR BLD AUTO: 2 % (ref 0–0.5)
IMM GRANULOCYTES NFR BLD AUTO: 2 % (ref 0–0.5)
LYMPHOCYTES # BLD: 0.7 K/UL (ref 0.8–3.5)
LYMPHOCYTES # BLD: 1.8 K/UL (ref 0.8–3.5)
LYMPHOCYTES NFR BLD: 12 % (ref 12–49)
LYMPHOCYTES NFR BLD: 6 % (ref 12–49)
MCH RBC QN AUTO: 29.5 PG (ref 26–34)
MCH RBC QN AUTO: 29.5 PG (ref 26–34)
MCHC RBC AUTO-ENTMCNC: 30.5 G/DL (ref 30–36.5)
MCHC RBC AUTO-ENTMCNC: 30.6 G/DL (ref 30–36.5)
MCV RBC AUTO: 96.4 FL (ref 80–99)
MCV RBC AUTO: 96.9 FL (ref 80–99)
MONOCYTES # BLD: 0.7 K/UL (ref 0–1)
MONOCYTES # BLD: 1 K/UL (ref 0–1)
MONOCYTES NFR BLD: 6 % (ref 5–13)
MONOCYTES NFR BLD: 7 % (ref 5–13)
NEUTS SEG # BLD: 11.8 K/UL (ref 1.8–8)
NEUTS SEG # BLD: 9.5 K/UL (ref 1.8–8)
NEUTS SEG NFR BLD: 79 % (ref 32–75)
NEUTS SEG NFR BLD: 86 % (ref 32–75)
NRBC # BLD: 0.03 K/UL (ref 0–0.01)
NRBC # BLD: 0.03 K/UL (ref 0–0.01)
NRBC BLD-RTO: 0.2 PER 100 WBC
NRBC BLD-RTO: 0.3 PER 100 WBC
PLATELET # BLD AUTO: 244 K/UL (ref 150–400)
PLATELET # BLD AUTO: 295 K/UL (ref 150–400)
PMV BLD AUTO: 10.3 FL (ref 8.9–12.9)
PMV BLD AUTO: 9.4 FL (ref 8.9–12.9)
POTASSIUM SERPL-SCNC: 3.8 MMOL/L (ref 3.5–5.1)
RBC # BLD AUTO: 2.24 M/UL (ref 4.1–5.7)
RBC # BLD AUTO: 2.54 M/UL (ref 4.1–5.7)
RBC MORPH BLD: ABNORMAL
SERVICE CMNT-IMP: ABNORMAL
SODIUM SERPL-SCNC: 141 MMOL/L (ref 136–145)
SPECIMEN EXP DATE BLD: NORMAL
WBC # BLD AUTO: 11.1 K/UL (ref 4.1–11.1)
WBC # BLD AUTO: 14.9 K/UL (ref 4.1–11.1)

## 2021-08-11 PROCEDURE — 36415 COLL VENOUS BLD VENIPUNCTURE: CPT

## 2021-08-11 PROCEDURE — 80048 BASIC METABOLIC PNL TOTAL CA: CPT

## 2021-08-11 PROCEDURE — 74011250637 HC RX REV CODE- 250/637: Performed by: HEALTH CARE PROVIDER

## 2021-08-11 PROCEDURE — 74011250636 HC RX REV CODE- 250/636: Performed by: NURSE PRACTITIONER

## 2021-08-11 PROCEDURE — 85025 COMPLETE CBC W/AUTO DIFF WBC: CPT

## 2021-08-11 PROCEDURE — 74011250637 HC RX REV CODE- 250/637: Performed by: NURSE PRACTITIONER

## 2021-08-11 PROCEDURE — 94003 VENT MGMT INPAT SUBQ DAY: CPT

## 2021-08-11 PROCEDURE — 86901 BLOOD TYPING SEROLOGIC RH(D): CPT

## 2021-08-11 PROCEDURE — 74011000258 HC RX REV CODE- 258: Performed by: NURSE PRACTITIONER

## 2021-08-11 PROCEDURE — 74011636637 HC RX REV CODE- 636/637: Performed by: HEALTH CARE PROVIDER

## 2021-08-11 PROCEDURE — 74011636637 HC RX REV CODE- 636/637: Performed by: INTERNAL MEDICINE

## 2021-08-11 PROCEDURE — 74011250636 HC RX REV CODE- 250/636: Performed by: INTERNAL MEDICINE

## 2021-08-11 PROCEDURE — 82962 GLUCOSE BLOOD TEST: CPT

## 2021-08-11 RX ADMIN — FENTANYL CITRATE 50 MCG: 50 INJECTION, SOLUTION INTRAMUSCULAR; INTRAVENOUS at 09:16

## 2021-08-11 RX ADMIN — ASPIRIN 81 MG: 81 TABLET, CHEWABLE ORAL at 08:36

## 2021-08-11 RX ADMIN — ACETAMINOPHEN 650 MG: 325 TABLET ORAL at 00:07

## 2021-08-11 RX ADMIN — LEVETIRACETAM 1500 MG: 100 INJECTION, SOLUTION INTRAVENOUS at 14:32

## 2021-08-11 RX ADMIN — DOCUSATE SODIUM 50 MG AND SENNOSIDES 8.6 MG 1 TABLET: 8.6; 5 TABLET, FILM COATED ORAL at 08:36

## 2021-08-11 RX ADMIN — HEPARIN SODIUM 5000 UNITS: 5000 INJECTION INTRAVENOUS; SUBCUTANEOUS at 10:59

## 2021-08-11 RX ADMIN — HYDRALAZINE HYDROCHLORIDE 10 MG: 20 INJECTION, SOLUTION INTRAMUSCULAR; INTRAVENOUS at 09:16

## 2021-08-11 RX ADMIN — LEVETIRACETAM 1500 MG: 100 INJECTION, SOLUTION INTRAVENOUS at 03:00

## 2021-08-11 RX ADMIN — Medication 10 ML: at 14:32

## 2021-08-11 RX ADMIN — INSULIN LISPRO 3 UNITS: 100 INJECTION, SOLUTION INTRAVENOUS; SUBCUTANEOUS at 12:08

## 2021-08-11 RX ADMIN — Medication: at 08:36

## 2021-08-11 RX ADMIN — Medication 10 ML: at 05:57

## 2021-08-11 RX ADMIN — ALLOPURINOL 100 MG: 100 TABLET ORAL at 08:36

## 2021-08-11 RX ADMIN — INSULIN GLARGINE 12 UNITS: 100 INJECTION, SOLUTION SUBCUTANEOUS at 08:35

## 2021-08-11 RX ADMIN — LACOSAMIDE 100 MG: 50 TABLET, FILM COATED ORAL at 08:36

## 2021-08-11 RX ADMIN — LANSOPRAZOLE 30 MG: KIT at 08:44

## 2021-08-11 RX ADMIN — CHLORHEXIDINE GLUCONATE 15 ML: 0.12 RINSE ORAL at 00:07

## 2021-08-11 RX ADMIN — Medication: at 17:29

## 2021-08-11 RX ADMIN — INSULIN LISPRO 2 UNITS: 100 INJECTION, SOLUTION INTRAVENOUS; SUBCUTANEOUS at 17:29

## 2021-08-11 RX ADMIN — ACETAMINOPHEN 650 MG: 325 TABLET ORAL at 08:44

## 2021-08-11 RX ADMIN — INSULIN LISPRO 5 UNITS: 100 INJECTION, SOLUTION INTRAVENOUS; SUBCUTANEOUS at 06:00

## 2021-08-11 RX ADMIN — CHLORHEXIDINE GLUCONATE 15 ML: 0.12 RINSE ORAL at 12:04

## 2021-08-11 NOTE — DISCHARGE SUMMARY
[facility]                                                                                         Discharge Summary     Patient: Lili Dexter       MRN: [id number]       YOB: 1933       Age: 80 y.o. Date of admission:  7/23/2021    Date of discharge:  8/11/2021    Primary care provider:  Melody Whittington MD     Admitting provider:  Priscilla Mcclelland MD    Discharging provider(s): Jakob Casanova, DO - Staff [address]     Consultations  · IP CONSULT TO CARDIOLOGY  · IP CONSULT TO PALLIATIVE CARE - PROVIDER  · IP CONSULT TO NEUROLOGY  · IP CONSULT TO THORACIC SURGERY    Procedures  · Tracheostomy  · PEG    Discharge destination: [facility]. The patient is stable for discharge. Admission diagnosis  · Aspiration into respiratory tract [T17.908A]  · Acute respiratory failure with hypoxia (HCC) [J96.01]    Please refer to the admission history and physical for details on the presenting problem. Final discharge diagnoses and brief hospital course    As above. Please see progress note dated 8/11/2021 for hospital course. Care per [facility] physician    Physical examination at discharge  Visit Vitals  BP (!) 148/75   Pulse (!) 115   Temp 99.6 °F (37.6 °C)   Resp 16   Ht 5' 6\" (1.676 m)   Wt 79.6 kg (175 lb 7.8 oz)   SpO2 95%   BMI 28.32 kg/m²     Please see progress note dated 8/11/2021     Recent Labs     08/11/21  0556 08/11/21  0410 08/10/21  0319   WBC 14.9* 11.1 12.5*   HGB 7.5* 6.6* 8.7*   HCT 24.6* 21.6* 28.4*    244 290     Recent Labs     08/11/21  0410 08/10/21  0319 08/09/21  0257    141 141   K 3.8 4.5 4.0    109* 107   CO2 31 27 31   BUN 67* 59* 66*   CREA 1.05 0.98 0.97   * 178* 137*   CA 9.3 9.9 9.3   MG  --  2.8* 2.6*   PHOS  --   --  2.9     No results for input(s): AP, TBIL, TP, ALB, GLOB, GGT, AML, LPSE in the last 72 hours.     No lab exists for component: SGOT, GPT, AMYP, HLPSE  No results for input(s): INR, PTP, APTT, INREXT in the last 72 hours. No results for input(s): FE, TIBC, PSAT, FERR in the last 72 hours. No results for input(s): PH, PCO2, PO2 in the last 72 hours. No results for input(s): CPK, CKMB in the last 72 hours.     No lab exists for component: TROPONINI  No components found for: [place]    Pertinent imaging studies:      ---------------------------------    Chronic Diagnoses:    Problem List as of 8/11/2021 Date Reviewed: 12/17/2020        Codes Class Noted - Resolved    Acute respiratory failure with hypoxia ([facility] ICD-10-CM: J96.01  ICD-9-CM: 518.81  7/23/2021 - Present        Aspiration into respiratory tract ICD-10-CM: T17.908A  ICD-9-CM: 934.9  7/23/2021 - Present        Hypokalemia ICD-10-CM: E87.6  ICD-9-CM: 276.8  1/12/2021 - Present        Hypernatremia ICD-10-CM: E87.0  ICD-9-CM: 276.0  1/12/2021 - Present        Moderate protein malnutrition ([facility] ICD-10-CM: E44.0  ICD-9-CM: 263.0  1/12/2021 - Present        Failure to thrive (0-17) ICD-10-CM: R62.51  ICD-9-CM: 783.41  1/9/2021 - Present        Bacteremia ICD-10-CM: R78.81  ICD-9-CM: 790.7  1/9/2021 - Present        UTI (urinary tract infection) ICD-10-CM: N39.0  ICD-9-CM: 599.0  1/9/2021 - Present        Dementia ([facility] ICD-10-CM: F03.90  ICD-9-CM: 294.20  1/9/2021 - Present        Bedridden ICD-10-CM: Z74.01  ICD-9-CM: V49.84  1/9/2021 - Present        Pressure ulcer ICD-10-CM: L89.90  ICD-9-CM: 707.00, 707.20  1/9/2021 - Present        S/P percutaneous endoscopic gastrostomy (PEG) tube placement [facility]) ICD-10-CM: Z93.1  ICD-9-CM: V44.1  1/9/2021 - Present        Dehydration ICD-10-CM: E86.0  ICD-9-CM: 276.51  1/6/2021 - Present        Lactic acidosis ICD-10-CM: E87.2  ICD-9-CM: 276.2  1/6/2021 - Present        Sepsis ([facility] ICD-10-CM: A41.9  ICD-9-CM: 038.9, 995.91  1/6/2021 - Present        SOULEYMANE (acute kidney injury) ([facility] ICD-10-CM: N17.9  ICD-9-CM: 584.9  1/6/2021 - Present        Chronic systolic heart failure ([facility] ICD-10-CM: I50.22  ICD-9-CM: 428.22  12/7/2020 - Present        Altered mental status ICD-10-CM: R41.82  ICD-9-CM: 780.97  11/18/2020 - Present        Post-ictal state ([facility] ICD-10-CM: R56.9  ICD-9-CM: 780.39  11/18/2020 - Present        Seizure ([facility] ICD-10-CM: R56.9  ICD-9-CM: 780.39  11/17/2020 - Present        Atrial pacemaker lead displacement ICD-10-CM: T82.120A  ICD-9-CM: 996.01  10/18/2019 - Present        Pacemaker ICD-10-CM: Z95.0  ICD-9-CM: V45.01  3/22/2019 - Present    Overview Signed 3/22/2019  5:08 PM by Richmond Starks MD     3/22/2019 dual chamber Medtronic pacer             Bradycardia ICD-10-CM: R00.1  ICD-9-CM: 427.89  3/19/2019 - Present        Transaminitis ICD-10-CM: R74.01  ICD-9-CM: 790.4  3/19/2019 - Present        Hypertensive urgency ICD-10-CM: I16.0  ICD-9-CM: 401.9  3/19/2019 - Present        Second degree AV block, Mobitz type I ICD-10-CM: I44.1  ICD-9-CM: 426.13  3/19/2019 - Present    Overview Signed 3/21/2019  6:14 PM by Richmond Starks MD     Added automatically from request for surgery [id number]             Stage 3 chronic kidney disease ([facility] ICD-10-CM: N18.30  ICD-9-CM: 585.3  3/15/2019 - Present        Rotator cuff arthropathy of both shoulders ICD-10-CM: M12.811, M12.812  ICD-9-CM: 716.81  3/15/2019 - Present        Cognitive impairment ICD-10-CM: R41.89  ICD-9-CM: 294.9  1/8/2019 - Present        Systolic murmur JOHN-30-NT: R01.1  ICD-9-CM: 785.2  12/30/2018 - Present        Essential hypertension ICD-10-CM: I10  ICD-9-CM: 401.9  12/17/2018 - Present        Gout ICD-10-CM: M10.9  ICD-9-CM: 274.9  12/17/2018 - Present        Pure hypercholesterolemia ICD-10-CM: E78.00  ICD-9-CM: 272.0  12/17/2018 - Present        History of prostate cancer ICD-10-CM: Z85.46  ICD-9-CM: V10.46  12/17/2018 - Present        Chronic constipation ICD-10-CM: K59.09  ICD-9-CM: 564.00  12/17/2018 - Present        Dysuria ICD-10-CM: R30.0  ICD-9-CM: 788.1  8/8/2017 - Present        Weight loss ICD-10-CM: R63.4  ICD-9-CM: 783.21  8/8/2017 - Present        Adrenal mass ([facility] ICD-10-CM: E27.8  ICD-9-CM: 255.8  7/18/2017 - Present    Overview Addendum 12/17/2018  9:57 AM by Nga Garcia MD     Stable/non-functioning adenoma on imaging             RESOLVED: Acute CVA (cerebrovascular accident) [facility]) ICD-10-CM: I63.9  ICD-9-CM: 434.91  6/30/2020 - 7/9/2020        RESOLVED: Thrombocytopenia ([facility] ICD-10-CM: D69.6  ICD-9-CM: 287.5  3/19/2019 - 7/13/2020        RESOLVED: Acute pyelonephritis ICD-10-CM: N10  ICD-9-CM: 590.10  7/18/2017 - 7/21/2017              Time spent on discharge related activities today greater than 30 minutes.       Signed:      Alejandra Graves DO   Staff [facility]    8/11/2021   5:47 PM      Attending        Cc: John Salgado MD

## 2021-08-11 NOTE — PROGRESS NOTES
730-Bedside and Verbal shift change report given to [facility] (oncoming nurse) by Jesús Curiel (offgoing nurse). Report included the following information SBAR, Kardex, ED Summary, Procedure Summary, Intake/Output, MAR, Accordion, Recent Results, Med Rec Status, Cardiac Rhythm paced, Alarm Parameters , Pre Procedure Checklist, Quality Measures and Dual Neuro Assessment. 1644- TRANSFER - OUT REPORT:    Verbal report given to Balbir(name) on Maura Alejandre  being transferred to [facility](unit) for routine progression of care       Report consisted of patients Situation, Background, Assessment and   Recommendations(SBAR). Information from the following report(s) SBAR, Kardex, ED Summary, Procedure Summary, Intake/Output, MAR, Accordion, Recent Results, Med Rec Status, Cardiac Rhythm paced, Alarm Parameters , Pre Procedure Checklist, Procedure Verification, Quality Measures and Dual Neuro Assessment was reviewed with the receiving nurse. Lines:   Peripheral IV 08/07/21 Left;Proximal Arm (Active)   Site Assessment Clean, dry, & intact 08/11/21 0800   Phlebitis Assessment 0 08/11/21 0800   Infiltration Assessment 0 08/11/21 0800   Dressing Status Clean, dry, & intact 08/11/21 0800   Dressing Type Transparent;Tape 08/11/21 0800   Hub Color/Line Status Infusing;Pink 08/11/21 0800   Action Taken Open ports on tubing capped 08/11/21 0800   Alcohol Cap Used Yes 08/11/21 0800        Opportunity for questions and clarification was provided. Patient transported with:   Monitor  O2 @ vent liters  Registered Nurse  Tech    1800- [facility] at bedside, pt transferred to [address] [place] [address] [place]. Daughter at bedside.

## 2021-08-11 NOTE — PROGRESS NOTES
Transition of Care Plan   RUR-  High Risks    DISPOSITION: [facility] Transport: [facility] with vent  Received notification from Óscar Tellez that there is a bed for patient today. CM notified patient's daughter and she is in agreement. Patient will be going to room 218 and report can be called to [phone]. Dr Gabino Biswas is the accepting MD. Transport set up with [facility] for 5:30 pm. Discharge packet given to patient's nurse. Per daughter patient has a LTC policy through [employer] # [[email or web address]], notified [facility] liaison.    Raghavendra Santizo RN,Care Management

## 2021-08-11 NOTE — PROGRESS NOTES
[facility]    ICU TEAM Progress Note    Name: Rhoda Dodson   : 3/9/1933   MRN: [id number]   Date: 2021      I  Subjective:   Progress Note: 2021      Reason for ICU Admission: Acute hypoxic respiratory failure     Interval history: From hospitals  Tenisha Koehler a 80 y. o. male with history of prostate cancer, CHF, constipation, hypertension, stroke, TIA, and seizures who presents to the emergency department by EMS as transfer from [address] [place] [address] for respiratory distress.  Patient had initially been a transfer from [facility] after a hospitalization from  [address] [place] [address].  Was there for altered mental status hypoxia was treated for hypoxic respiratory failure bilateral pleural effusions acute systolic CHF secondary to severe aortic stenosis hyponatremia hypokalemia elevated LFTs malnutrition and possible UTI was treated with ceftriaxone and Susie Martinez already had a PEG for dysphagia and protein calorie malnutrition prior to [facility] admission. He was at [address] [place] [address] from [place] [address].  While at [address] [place] [address] today patient had an episode of vomiting and aspirated was initially started on high flow nasal cannula however was still hypoxic thus got intubated by [address] [place] [address] staff. Mort Mosquero was then transferred to the ED for further care.  Per family patient's decline started last year while he was having seizures/TIA patient became more encephalopathic and started to refuse eating and thus got a PEG for nutritional reasons. was staying at assisted living facility and had to be re-hospitalized after his PEG malfunctioned.  During that time he did have an infection, daughter could not exactly say where the infection was, but she thought it was an abdominal infection and since then he started having worsening heart failure and decreased heart function, and failure to thrive. Per records EF was 10 to 15%.  Per daughter, patient has never had a tracheostomy.  She states patient is awake at times, will open eyes, but does not track, does not follow commands, and does not speak. They wish patient to remain full code at this time.     While in ED patient was placed on mechanical ventilator, ABG was done which was consistent with hypoxic respiratory failure.  Patient was also given 1 L of IV fluids for fluid resuscitation due to sepsis. Initially blood pressure was okay but once patient was started on sedation blood pressure dropped.  Patient also had episodes of hypoxia due to vent asynchrony. Once PEEP was increased for hypoxia patient's pressure dropped. Started patient on Levophed via peripheral in ED. If hypotension worsens will need central line. Started on Zosyn. Updated the family at bedside, daughter and and son. Consent for central line and blood was  Received.  Patient to transfer to ICU for continued care. Overnight Events:  8/11: No acute overnight events  8/10: No acute events, trach yesterday. 8/9: No acute overnight events  8/8: No acute overnight events  8/7: No acute overnight events  8/6: No acute overnight events. 8/5: No changes  8/4: No acute event, no fever, hemodynamically stable, no changes in neurological status.   8/3: No acute event, remained poorly responsive, remained on EEG overnight.  Still having occasional facial tremors.  Hemodynamically remained stable  8/2: No acute event, pending EEG report, no neurological recovery, occasional twitching movement in his face but seems to be better than before.  Dobutamine drip off.  8/1: On continuous EEG monitoring, no other acute event.  Poorly responsive and occasional twitching movement  7/31: Appearentt seizure like activity last night, ativan given  7/30: No acute overnight events  7/29 No acute overnight events   7/28: No acute event, no much neurological recovery.  No witnessed seizure, blood pressure on the higher side.  Diuresing well  7/27: No acute event.  Good diuresis.  Still poorly responsive.  Remains on dobutamine.  Blood pressure maintained.  No witnessed seizure. 7/26: No acute event, blood pressure somewhat better but urine output decreasing.  Still on dobutamine at 5.  7/25 -- Getting PRBC now; started on Dobutamine      Active Problem List:     Problem List  Date Reviewed: 12/17/2020        Codes Class    Acute respiratory failure with hypoxia [facility]) ICD-10-CM: J96.01  ICD-9-CM: 518.81         Aspiration into respiratory tract ICD-10-CM: T17.908A  ICD-9-CM: 934.9         Hypokalemia ICD-10-CM: E87.6  ICD-9-CM: 276.8         Hypernatremia ICD-10-CM: E87.0  ICD-9-CM: 276.0         Moderate protein malnutrition (HCC) ICD-10-CM: E44.0  ICD-9-CM: 263.0         Failure to thrive (0-17) ICD-10-CM: R62.51  ICD-9-CM: 783.41         Bacteremia ICD-10-CM: R78.81  ICD-9-CM: 790.7         UTI (urinary tract infection) ICD-10-CM: N39.0  ICD-9-CM: 599.0         Dementia ([facility] ICD-10-CM: F03.90  ICD-9-CM: 294.20         Bedridden ICD-10-CM: Z74.01  ICD-9-CM: V49.84         Pressure ulcer ICD-10-CM: L89.90  ICD-9-CM: 707.00, 707.20         S/P percutaneous endoscopic gastrostomy (PEG) tube placement ([facility] ICD-10-CM: Z93.1  ICD-9-CM: V44.1         Dehydration ICD-10-CM: E86.0  ICD-9-CM: 276.51         Lactic acidosis ICD-10-CM: E87.2  ICD-9-CM: 276.2         Sepsis ([facility] ICD-10-CM: A41.9  ICD-9-CM: 038.9, 995.91         SOULEYMANE (acute kidney injury) ([facility] ICD-10-CM: N17.9  ICD-9-CM: 220. 9         Chronic systolic heart failure (HCC) ICD-10-CM: I50.22  ICD-9-CM: 428.22         Altered mental status ICD-10-CM: R41.82  ICD-9-CM: 780.97         Post-ictal state ([facility] ICD-10-CM: R56.9  ICD-9-CM: 780.39         Seizure ([facility] ICD-10-CM: R56.9  ICD-9-CM: 780.39         Atrial pacemaker lead displacement ICD-10-CM: T82.120A  ICD-9-CM: 996.01         Pacemaker ICD-10-CM: Z95.0  ICD-9-CM: V45.01     Overview Signed 3/22/2019  5:08 PM by Marylen Skill, MD     3/22/2019 dual chamber Medtronic pacer             Bradycardia ICD-10-CM: R00.1  ICD-9-CM: 427.89 Transaminitis ICD-10-CM: R74.01  ICD-9-CM: 790.4         Hypertensive urgency ICD-10-CM: I16.0  ICD-9-CM: 401.9         Second degree AV block, Mobitz type I ICD-10-CM: I44.1  ICD-9-CM: 426.13     Overview Signed 3/21/2019  6:14 PM by Jessi Sandhu MD     Added automatically from request for surgery [id number]             Stage 3 chronic kidney disease ([facility] ICD-10-CM: N18.30  ICD-9-CM: 864. 3         Rotator cuff arthropathy of both shoulders ICD-10-CM: M12.811, M12.812  ICD-9-CM: 716.81         Cognitive impairment ICD-10-CM: R41.89  ICD-9-CM: 445.9         Systolic murmur KBY-76-YZ: R01.1  ICD-9-CM: 785.2         Essential hypertension ICD-10-CM: I10  ICD-9-CM: 401.9         Gout ICD-10-CM: M10.9  ICD-9-CM: 274.9         Pure hypercholesterolemia ICD-10-CM: E78.00  ICD-9-CM: 272.0         History of prostate cancer ICD-10-CM: Z85.46  ICD-9-CM: V10.46         Chronic constipation ICD-10-CM: K59.09  ICD-9-CM: 564.00         Dysuria ICD-10-CM: R30.0  ICD-9-CM: 731. 1         Weight loss ICD-10-CM: R63.4  ICD-9-CM: 783.21         Adrenal mass ([facility] ICD-10-CM: E27.8  ICD-9-CM: 255.8     Overview Addendum 12/17/2018  9:57 AM by Mendy Reveles MD     Stable/non-functioning adenoma on imaging                   Past Medical History:      has a past medical history of Cancer [facility]), Chronic systolic heart failure ([facility] (12/7/2020), Constipation, Gout, Hyperlipemia, Hypertension, Pacemaker (2019), Stroke ([facility] Thrombocytopenia ([facility] (3/19/2019), and TIA (transient ischemic attack) (2013). Past Surgical History:      has a past surgical history that includes hx urological; hx prostatectomy; pr ins new/rplcmt prm pm w/transv eltrd atrial&vent (Right, 3/22/2019); pr ins new/rplcmt prm pacemakr w/trans eltrd atrial (N/A, 10/18/2019); and place percut gastrostomy tube (11/30/2020). Home Medications:     Prior to Admission medications    Medication Sig Start Date End Date Taking?  Authorizing Provider   levETIRAcetam (KEPPRA) 750 mg tablet Take 1 Tab by mouth every twelve (12) hours. 20   Timoteo Justin NP   aspirin delayed-release 81 mg tablet Take 1 Tab by mouth daily. 20   Arlyn Salmon MD   polyethylene glycol [facility]) 17 gram/dose powder Take 17 g by mouth daily as needed for Constipation. 20   Thresa Viky, MD   balsam peru-castor oiL (VENELEX) ointment Apply  to affected area three (3) times daily. 20   Alisha Wilkins MD   famotidine (PEPCID) 20 mg tablet Take 1 Tab by mouth every evening. 20   Alisha Wilkins MD   acetaminophen (TYLENOL) 325 mg tablet Take 650 mg by mouth every six (6) hours as needed for Pain. Provider, Historical       Allergies/Social/Family History:     No Known Allergies   Social History     Tobacco Use    Smoking status: Never Smoker    Smokeless tobacco: Never Used   Substance Use Topics    Alcohol use: No      Family History   Problem Relation Age of Onset    No Known Problems Mother     No Known Problems Father        Review of Systems:     Not able to obtain due to patient medical condition    Objective:   Vital Signs:  Visit Vitals  BP (!) 180/92   Pulse (!) 128   Temp 97.5 °F (36.4 °C)   Resp 18   Ht 5' 6\" (1.676 m)   Wt 79.6 kg (175 lb 7.8 oz)   SpO2 100%   BMI 28.32 kg/m²      O2 Device: Tracheostomy, Ventilator Temp (24hrs), Av.2 °F (37.3 °C), Min:97.5 °F (36.4 °C), Max:100.7 °F (38.2 °C)           Intake/Output:     Intake/Output Summary (Last 24 hours) at 2021 7099  Last data filed at 2021 0700  Gross per 24 hour   Intake 1150 ml   Output 865 ml   Net 285 ml       Physical Exam:    General: No distress, appears stated age, trach  Neurologic: No response to stimuli  Lungs: CTAB   Cardiovascular:  Regular rate and rhythm, S1S2 present, without murmur or extra heart sounds and pedal pulses normal , no lower limb edema  Abdomen:  soft, non-tender.  Bowel sounds normal. No masses,  no organomegaly      LABS AND  DATA: Personally reviewed  Recent Labs     08/11/21  0556 08/11/21  0410   WBC 14.9* 11.1   HGB 7.5* 6.6*   HCT 24.6* 21.6*    244     Recent Labs     08/11/21  0410 08/10/21  0319 08/09/21  0257 08/09/21  0257    141   < > 141   K 3.8 4.5   < > 4.0    109*   < > 107   CO2 31 27   < > 31   BUN 67* 59*   < > 66*   CREA 1.05 0.98   < > 0.97   * 178*   < > 137*   CA 9.3 9.9   < > 9.3   MG  --  2.8*  --  2.6*   PHOS  --   --   --  2.9    < > = values in this interval not displayed. No results for input(s): AP, TBIL, TP, ALB, GLOB, AML, LPSE in the last 72 hours. No lab exists for component: SGOT, GPT, AMYP  No results for input(s): INR, PTP, APTT, INREXT, INREXT in the last 72 hours. No results for input(s): PHI, PCO2I, PO2I, FIO2I in the last 72 hours. No results for input(s): CPK, CKMB, TROIQ, BNPP in the last 72 hours. Hemodynamics:   PAP:   CO:     Wedge:   CI:     CVP:    SVR:       PVR:       Ventilator Settings:  Mode Rate Tidal Volume Pressure FiO2 PEEP   Pressure control      5 cm H2O 30 % 5 cm H20     Peak airway pressure: 23 cm H2O    Minute ventilation: 7.46 l/min        MEDS: Reviewed    Chest X-Ray:  CXR Results  (Last 48 hours)    None            Assessment and Plan:   Seizure:  Encephalopathy:  Advanced dementia:  Appreciate neurology input, EEG report noted as above.  Remains poorly responsive.  Continue current antiepileptic medication. Appreciate further neurology input. MRI yesterday w/o acute findings. Cardiogenic shock:  Ejection fraction of less than 30% at baseline, dobutamine drip weaned off.  Seems to be euvolemic at this time.  Renal function improving.  Will diurese as needed.    Septic shock: Resolved  Completed antibiotic course.  Off pressors.  Procalcitonin continue to trend down  Respiratory failure- trach     Patient stable for transfer to [place] [address]  Stay in ICU until transfer    [facility]  I had a face to face encounter with the patient, reviewed and interpreted patient data including clinical events, labs, images, vital signs, I/O's, and examined patient. I have discussed the case and the plan and management of the patient's care with the consulting services, the bedside nurses and the respiratory therapist.      NOTE OF PERSONAL INVOLVEMENT IN CARE   This patient has a high probability of imminent, clinically significant deterioration, which requires the highest level of preparedness to intervene urgently. I participated in the decision-making and personally managed or directed the management of the following life and organ supporting interventions that required my frequent assessment to treat or prevent imminent deterioration. I personally spent 30 minutes of critical care time. This is time spent at this critically ill patient's bedside actively involved in patient care as well as the coordination of care and discussions with the patient's family. This does not include any procedural time which has been billed separately.     [facility]  8/11/2021

## 2021-08-11 NOTE — PROGRESS NOTES
1930: Bedside and Verbal shift change report given to [address] (oncoming nurse) by Papo Montano RN (offgoing nurse). Report included the following information SBAR, Kardex, ED Summary, Procedure Summary, MAR, Recent Results, and Cardiac Rhythm ventricular paced .

## 2021-08-12 ENCOUNTER — PATIENT OUTREACH (OUTPATIENT)
Dept: CASE MANAGEMENT | Age: 86
End: 2021-08-12

## 2021-09-07 ENCOUNTER — APPOINTMENT (OUTPATIENT)
Dept: CT IMAGING | Age: 86
DRG: 444 | End: 2021-09-07
Attending: EMERGENCY MEDICINE
Payer: MEDICARE

## 2021-09-07 ENCOUNTER — HOSPITAL ENCOUNTER (INPATIENT)
Age: 86
LOS: 7 days | Discharge: NURSING, REHAB OR LONG-TERM CARE | DRG: 444 | End: 2021-09-14
Attending: EMERGENCY MEDICINE | Admitting: ANESTHESIOLOGY
Payer: MEDICARE

## 2021-09-07 ENCOUNTER — APPOINTMENT (OUTPATIENT)
Dept: ULTRASOUND IMAGING | Age: 86
DRG: 444 | End: 2021-09-07
Attending: EMERGENCY MEDICINE
Payer: MEDICARE

## 2021-09-07 DIAGNOSIS — K82.8 GALLBLADDER SLUDGE: ICD-10-CM

## 2021-09-07 DIAGNOSIS — R79.89 ELEVATED LFTS: Primary | ICD-10-CM

## 2021-09-07 PROBLEM — R10.9 ABDOMINAL PAIN: Status: ACTIVE | Noted: 2021-09-07

## 2021-09-07 LAB
ALBUMIN SERPL-MCNC: 1.3 G/DL (ref 3.5–5)
ALBUMIN/GLOB SERPL: 0.3 {RATIO} (ref 1.1–2.2)
ALP SERPL-CCNC: 264 U/L (ref 45–117)
ALT SERPL-CCNC: 1482 U/L (ref 12–78)
ANION GAP SERPL CALC-SCNC: 6 MMOL/L (ref 5–15)
ARTERIAL PATENCY WRIST A: POSITIVE
AST SERPL-CCNC: 975 U/L (ref 15–37)
BASE EXCESS BLD CALC-SCNC: 9.1 MMOL/L
BASOPHILS # BLD: 0 K/UL (ref 0–0.1)
BASOPHILS NFR BLD: 0 % (ref 0–1)
BDY SITE: ABNORMAL
BILIRUB SERPL-MCNC: 0.3 MG/DL (ref 0.2–1)
BUN SERPL-MCNC: 110 MG/DL (ref 6–20)
BUN/CREAT SERPL: 116 (ref 12–20)
CALCIUM SERPL-MCNC: 8.8 MG/DL (ref 8.5–10.1)
CHLORIDE SERPL-SCNC: 113 MMOL/L (ref 97–108)
CO2 SERPL-SCNC: 30 MMOL/L (ref 21–32)
COMMENT, HOLDF: NORMAL
CREAT SERPL-MCNC: 0.95 MG/DL (ref 0.7–1.3)
DIFFERENTIAL METHOD BLD: ABNORMAL
EOSINOPHIL # BLD: 0 K/UL (ref 0–0.4)
EOSINOPHIL NFR BLD: 0 % (ref 0–7)
ERYTHROCYTE [DISTWIDTH] IN BLOOD BY AUTOMATED COUNT: 17.1 % (ref 11.5–14.5)
GAS FLOW.O2 O2 DELIVERY SYS: ABNORMAL L/MIN
GAS FLOW.O2 SETTING OXYMISER: 12 BPM
GLOBULIN SER CALC-MCNC: 4.7 G/DL (ref 2–4)
GLUCOSE SERPL-MCNC: 150 MG/DL (ref 65–100)
HCO3 BLD-SCNC: 32.8 MMOL/L (ref 22–26)
HCT VFR BLD AUTO: 25 % (ref 36.6–50.3)
HGB BLD-MCNC: 7.7 G/DL (ref 12.1–17)
IMM GRANULOCYTES # BLD AUTO: 0 K/UL
IMM GRANULOCYTES NFR BLD AUTO: 0 %
LACTATE SERPL-SCNC: 1.6 MMOL/L (ref 0.4–2)
LIPASE SERPL-CCNC: 106 U/L (ref 73–393)
LYMPHOCYTES # BLD: 1.4 K/UL (ref 0.8–3.5)
LYMPHOCYTES NFR BLD: 16 % (ref 12–49)
MCH RBC QN AUTO: 30.6 PG (ref 26–34)
MCHC RBC AUTO-ENTMCNC: 30.8 G/DL (ref 30–36.5)
MCV RBC AUTO: 99.2 FL (ref 80–99)
MONOCYTES # BLD: 0.4 K/UL (ref 0–1)
MONOCYTES NFR BLD: 4 % (ref 5–13)
NEUTS BAND NFR BLD MANUAL: 1 % (ref 0–6)
NEUTS SEG # BLD: 7.2 K/UL (ref 1.8–8)
NEUTS SEG NFR BLD: 79 % (ref 32–75)
NRBC # BLD: 0 K/UL (ref 0–0.01)
NRBC BLD-RTO: 0 PER 100 WBC
O2/TOTAL GAS SETTING VFR VENT: 30 %
PCO2 BLD: 40.6 MMHG (ref 35–45)
PEEP RESPIRATORY: 8 CMH2O
PH BLD: 7.52 [PH] (ref 7.35–7.45)
PLATELET # BLD AUTO: 133 K/UL (ref 150–400)
PMV BLD AUTO: 11.6 FL (ref 8.9–12.9)
PO2 BLD: 71 MMHG (ref 80–100)
POTASSIUM SERPL-SCNC: 3.5 MMOL/L (ref 3.5–5.1)
PROT SERPL-MCNC: 6 G/DL (ref 6.4–8.2)
RBC # BLD AUTO: 2.52 M/UL (ref 4.1–5.7)
RBC MORPH BLD: ABNORMAL
SAMPLES BEING HELD,HOLD: NORMAL
SAO2 % BLD: 95.5 % (ref 92–97)
SODIUM SERPL-SCNC: 149 MMOL/L (ref 136–145)
SPECIMEN TYPE: ABNORMAL
VENTILATION MODE VENT: ABNORMAL
VT SETTING VENT: 500 ML
WBC # BLD AUTO: 9 K/UL (ref 4.1–11.1)

## 2021-09-07 PROCEDURE — 87040 BLOOD CULTURE FOR BACTERIA: CPT

## 2021-09-07 PROCEDURE — 5A1955Z RESPIRATORY VENTILATION, GREATER THAN 96 CONSECUTIVE HOURS: ICD-10-PCS | Performed by: NURSE PRACTITIONER

## 2021-09-07 PROCEDURE — 94002 VENT MGMT INPAT INIT DAY: CPT

## 2021-09-07 PROCEDURE — 74177 CT ABD & PELVIS W/CONTRAST: CPT

## 2021-09-07 PROCEDURE — 74011000636 HC RX REV CODE- 636: Performed by: EMERGENCY MEDICINE

## 2021-09-07 PROCEDURE — 77030008793 HC TU TRACH CUF COVD -B

## 2021-09-07 PROCEDURE — 83690 ASSAY OF LIPASE: CPT

## 2021-09-07 PROCEDURE — 99285 EMERGENCY DEPT VISIT HI MDM: CPT

## 2021-09-07 PROCEDURE — 65270000029 HC RM PRIVATE

## 2021-09-07 PROCEDURE — 80053 COMPREHEN METABOLIC PANEL: CPT

## 2021-09-07 PROCEDURE — 76705 ECHO EXAM OF ABDOMEN: CPT

## 2021-09-07 PROCEDURE — 74011250636 HC RX REV CODE- 250/636: Performed by: EMERGENCY MEDICINE

## 2021-09-07 PROCEDURE — 96366 THER/PROPH/DIAG IV INF ADDON: CPT

## 2021-09-07 PROCEDURE — 99221 1ST HOSP IP/OBS SF/LOW 40: CPT | Performed by: SURGERY

## 2021-09-07 PROCEDURE — 96365 THER/PROPH/DIAG IV INF INIT: CPT

## 2021-09-07 PROCEDURE — 74011000258 HC RX REV CODE- 258: Performed by: EMERGENCY MEDICINE

## 2021-09-07 PROCEDURE — 77030006998

## 2021-09-07 PROCEDURE — 36415 COLL VENOUS BLD VENIPUNCTURE: CPT

## 2021-09-07 PROCEDURE — 82803 BLOOD GASES ANY COMBINATION: CPT

## 2021-09-07 PROCEDURE — 83605 ASSAY OF LACTIC ACID: CPT

## 2021-09-07 PROCEDURE — 85025 COMPLETE CBC W/AUTO DIFF WBC: CPT

## 2021-09-07 PROCEDURE — 36600 WITHDRAWAL OF ARTERIAL BLOOD: CPT

## 2021-09-07 RX ADMIN — PIPERACILLIN AND TAZOBACTAM 3.38 G: 3; .375 INJECTION, POWDER, LYOPHILIZED, FOR SOLUTION INTRAVENOUS at 20:59

## 2021-09-07 RX ADMIN — IOPAMIDOL 100 ML: 755 INJECTION, SOLUTION INTRAVENOUS at 22:27

## 2021-09-07 NOTE — PROGRESS NOTES
I was requested to admit this patient, I went to evaluate the patient, patient is on a ventilator, patient will not be able to be managed on the floor as patient is on a ventilator, spoke with Dr. Erin Allen, hospitalist service consult will be canceled, care transferred to Dr. Erin Allen

## 2021-09-07 NOTE — ED TRIAGE NOTES
Pt arrived via EMS from [facility] with CC of gallbladder issues.  Pt has a trach and is on a ventilator

## 2021-09-07 NOTE — ED NOTES
Per nuclear medicine no pain meds, no peg feeds until hida scan tomorrow. Dr Maty Lane and primary nurse notified.

## 2021-09-07 NOTE — ED PROVIDER NOTES
This is an 69-year-old male with a history of prostate cancer, hypertension, hyperlipidemia and CVA. He has had a history of thrombocytopenia and chronic systolic heart failure. He is currently a patient at [facility] on a ventilator and is a DNR. He was sent here today because there was concern for cholecystitis. The patient is nonverbal.  An ultrasound was done at [address] [place] [address] and it was noted to be possibly consistent with acute cholecystitis. The patient was sent here for a HIDA scan to rule out acute gallbladder disease and to have surgical consult. It was parenthetically noted that the patient is growing fungi in his peritoneal fluid. He is on no medication for that.            Past Medical History:   Diagnosis Date    Cancer [facility])     prostate    Chronic systolic heart failure ([facility] 12/7/2020    Constipation     Gout     Hyperlipemia     Hypertension     Pacemaker 2019    Stroke ([facility]     Thrombocytopenia ([facility] 3/19/2019    TIA (transient ischemic attack) 2013       Past Surgical History:   Procedure Laterality Date    HX PROSTATECTOMY      HX UROLOGICAL      prostate removed    PLACE PERCUT GASTROSTOMY TUBE  11/30/2020         [place] INS NEW/RPLCMT PRM PACEMAKR W/TRANS ELTRD ATRIAL N/A 10/18/2019    Insert Ppm Single Atrial performed by Renu Rangel MD at [address] CATH LAB    [place] INS NEW/RPLCMT PRM PM W/TRANSV ELTRD ATRIAL&VENT Right 3/22/2019    INSERT PPM DUAL performed by Renu Rangel MD at [address] CATH LAB         Family History:   Problem Relation Age of Onset    No Known Problems Mother     No Known Problems Father        Social History     Socioeconomic History    Marital status:      Spouse name: Not on file    Number of children: Not on file    Years of education: Not on file    Highest education level: Not on file   Occupational History    Not on file   Tobacco Use    Smoking status: Never Smoker    Smokeless tobacco: Never Used   Substance and Sexual Activity  Alcohol use: No    Drug use: No    Sexual activity: Never   Other Topics Concern    Not on file   Social History Narrative    Not on file     Social Determinants of Health     Financial Resource Strain:     Difficulty of Paying Living Expenses:    Food Insecurity:     Worried About Running Out of Food in the Last Year:     [address] in the Last Year:    Transportation Needs:     Lack of Transportation (Medical):  Lack of Transportation (Non-Medical):    Physical Activity:     Days of Exercise per Week:     Minutes of Exercise per Session:    Stress:     Feeling of Stress :    Social Connections:     Frequency of Communication with Friends and Family:     Frequency of Social Gatherings with Friends and Family:     Attends [employer] Services:     Active Member of Clubs or Organizations:     Attends Club or Organization Meetings:     Marital Status:    Intimate Partner Violence:     Fear of Current or Ex-Partner:     Emotionally Abused:     Physically Abused:     Sexually Abused: ALLERGIES: Patient has no known allergies. Review of Systems   Unable to perform ROS: Intubated       There were no vitals filed for this visit. Physical Exam  Constitutional:       General: He is not in acute distress. Appearance: He is ill-appearing ( Appears chronically ill.). He is not toxic-appearing or diaphoretic. Comments: This is an 70-year-old male who is unresponsive to verbal stimuli. He resists eye opening and response to painful stimulus. Vital signs are as noted. Patient is intubated and on a ventilator. HENT:      Head: Normocephalic and atraumatic. Right Ear: External ear normal.      Left Ear: External ear normal.      Nose: Nose normal. No congestion or rhinorrhea. Mouth/Throat:      Mouth: Mucous membranes are moist.   Eyes:      General: No scleral icterus. Right eye: No discharge.       Conjunctiva/sclera: Conjunctivae normal. Pupils: Pupils are equal, round, and reactive to light. Cardiovascular:      Rate and Rhythm: Normal rate and regular rhythm. Heart sounds: Normal heart sounds. No murmur heard. Pulmonary:      Effort: Pulmonary effort is normal. No respiratory distress. Breath sounds: Rhonchi and rales present. No wheezing. Comments: Patient is intubated on the ventilator. There are few coarse rhonchi noted bilaterally. No definitive consolidation noted. There are occasional Rales present bilaterally. No wheezing. Abdominal:      General: Abdomen is flat. There is no distension. Tenderness: There is no abdominal tenderness. There is no guarding. Musculoskeletal:         General: No swelling or deformity. Cervical back: Normal range of motion. No rigidity. Right lower leg: No edema. Left lower leg: No edema. Lymphadenopathy:      Cervical: No cervical adenopathy. Skin:     General: Skin is warm and dry. Capillary Refill: Capillary refill takes 2 to 3 seconds. Coloration: Skin is not jaundiced. Neurological:      Cranial Nerves: No cranial nerve deficit. Comments: Patient is unresponsive to verbal stimulus but will respond to painful stimulus. Psychiatric:      Comments: Unable to determine due to mental status.           MDM  Number of Diagnoses or Management Options  Elevated LFTs: new and requires workup  Gallbladder sludge: new and requires workup     Amount and/or Complexity of Data Reviewed  Clinical lab tests: reviewed and ordered  Tests in the radiology section of CPT®: ordered and reviewed  Decide to obtain previous medical records or to obtain history from someone other than the patient: yes  Review and summarize past medical records: yes  Discuss the patient with other providers: yes    Risk of Complications, Morbidity, and/or Mortality  Presenting problems: high  Diagnostic procedures: high  Management options: high    Patient Progress  Patient progress: stable         Procedures    This is an 66-year-old male who presents from [facility] for evaluation of possible gallbladder disease. An ultrasound done there suggest cholecystic fluid and other findings of cholecystitis. Patient is not running any fever or had any nausea or vomiting. He was sent here for HIDA scan and surgical consultation. He is a DNR and parenthetically is noted to have fungi in his peritoneal fluid. The patient is nonverbal.    2:04 PM  I am being told now that nuclear medicine does not have the medium for the scan. The scan cannot be done until tomorrow. The hospital did not send the ultrasound films with the patient when he was transferred to [address] [place] [address]. We will repeat his ultrasound and if there is sufficient concern about gallbladder disease we will asked surgery to see. If the patient needs an acute scan he will need to be transferred. Awaiting labs. 3:29 PM  Labs are still pending at this time. I have spoken with the ultrasound tech. The gallbladder is about 8 x 4 x 4 cm. There is a dense amount of sludge in the gallbladder. The wall of the gallbladder is thickened and there is only minimal fluid surrounding the gallbladder. I have spoken with general surgery and he will see the patient. The patient's white count is normal and the hemoglobin is 7.7. Still awaiting liver functions and CMP.    4:00 PM  The patient has marked elevation in AST with elevation in his other liver enzymes. Bilirubin appears to be normal.  Again, white count is normal.  We will give a dose of antibiotic and surgical consultation was placed. 4:21 PM  I have spoken with Dr. Alyssa Joyner about this patient's findings on ultrasound. He would like a CT and has asked medicine to see. We will ask the hospitalist to admit and Dr. Alyssa Joyner will see the.  Cannot rule out cholecystitis.     Perfect [facility] Consult for Admission  4:23 PM    ED Room Number: 25/25   Patient Name and age:  Gail James [place] 80 y.o.  male  Working Diagnosis:   1.  Elevated LFTs    2. Gallbladder sludge        COVID-19 Suspicion:  no  Sepsis present:  no  Reassessment needed: no  Code Status:  Do Not Resuscitate  Readmission: no  Isolation Requirements:  no  Recommended Level of Care:  med/surg  Department:[facility] Adult ED - 21   Other:  Surgery Aware    Total critical care time spent exclusive of procedures:  60 minutes

## 2021-09-07 NOTE — ED PROVIDER NOTES
This is an 27-year-old male with a history of prostate cancer, hypertension, hyperlipidemia and CVA. He has had a history of thrombocytopenia and chronic systolic heart failure. He is currently a patient at [facility] on a ventilator and is a DNR. He was sent here today because there was concern for cholecystitis. The patient is nonverbal.  An ultrasound was done at [address] [place] [address] and it was noted to be possibly consistent with acute cholecystitis. The patient was sent here for a HIDA scan to rule out acute gallbladder disease and to have a surgical consult. It was parenthetically noted that the patient is growing fungi in his peritoneal fluid. He is on no medications for that.            Past Medical History:   Diagnosis Date    Cancer [facility])     prostate    Chronic systolic heart failure ([facility] 12/7/2020    Constipation     Gout     Hyperlipemia     Hypertension     Pacemaker 2019    Stroke ([facility]     Thrombocytopenia ([facility] 3/19/2019    TIA (transient ischemic attack) 2013       Past Surgical History:   Procedure Laterality Date    HX PROSTATECTOMY      HX UROLOGICAL      prostate removed    PLACE PERCUT GASTROSTOMY TUBE  11/30/2020         [place] PR[place] PACEMAKR W/TRANS ELTRD ATRIAL N/A 10/18/2019    Insert Ppm Single Atrial performed by Kina Oakes MD at [address] CATH LAB    [place] PR[place] PM W/TRANSV ELTRD ATRIAL&VENT Right 3/22/2019    INSERT PPM DUAL performed by Kina Oakes MD at [address]  CATH LAB         Family History:   Problem Relation Age of Onset    No Known Problems Mother     No Known Problems Father        Social History     Socioeconomic History    Marital status:      Spouse name: Not on file    Number of children: Not on file    Years of education: Not on file    Highest education level: Not on file   Occupational History    Not on file   Tobacco Use    Smoking status: Never Smoker    Smokeless tobacco: Never Used   Substance and Sexual Activity    Alcohol use: No    Drug use: No    Sexual activity: Never   Other Topics Concern    Not on file   Social History Narrative    Not on file     Social Determinants of Health     Financial Resource Strain:     Difficulty of Paying Living Expenses:    Food Insecurity:     Worried About Running Out of Food in the Last Year:     [address] in the Last Year:    Transportation Needs:     Lack of Transportation (Medical):  Lack of Transportation (Non-Medical):    Physical Activity:     Days of Exercise per Week:     Minutes of Exercise per Session:    Stress:     Feeling of Stress :    Social Connections:     Frequency of Communication with Friends and Family:     Frequency of Social Gatherings with Friends and Family:     Attends Gnosticist Services:     Active Member of Clubs or Organizations:     Attends Club or Organization Meetings:     Marital Status:    Intimate Partner Violence:     Fear of Current or Ex-Partner:     Emotionally Abused:     Physically Abused:     Sexually Abused: ALLERGIES: Patient has no known allergies. Review of Systems   Unable to perform ROS: Intubated       There were no vitals filed for this visit. Physical Exam  Vitals and nursing note reviewed. Constitutional:       General: He is not in acute distress. Appearance: He is well-developed. He is ill-appearing ( Appears chronically ill). He is not toxic-appearing or diaphoretic. Comments: This is a 26-year-old male who is unresponsive to verbal stimuli. He resists eye opening and response to painful stimulus. Vital signs are as noted. HENT:      Head: Normocephalic and atraumatic. Nose: Nose normal.      Mouth/Throat:      Mouth: Mucous membranes are moist.   Eyes:      General: No scleral icterus. Conjunctiva/sclera: Conjunctivae normal.      Pupils: Pupils are equal, round, and reactive to light. Neck:      Thyroid: No thyromegaly. Vascular: No JVD. Trachea: No tracheal deviation. Comments: No carotid bruits noted. Cardiovascular:      Rate and Rhythm: Normal rate and regular rhythm. Heart sounds: Normal heart sounds. No murmur heard. No friction rub. No gallop. Pulmonary:      Effort: Pulmonary effort is normal. No respiratory distress. Breath sounds: Rhonchi ( #Few coarse rhonchi noted bilaterally. No definitive consolidation noted.) and rales ( Occasional) present. No wheezing. Chest:      Chest wall: No tenderness. Abdominal:      General: Abdomen is flat. There is no distension. Palpations: Abdomen is soft. There is no mass. Tenderness: There is no abdominal tenderness. There is no guarding or rebound. Musculoskeletal:         General: No swelling, tenderness or deformity. Normal range of motion. Cervical back: Normal range of motion and neck supple. Right lower leg: No edema. Left lower leg: No edema. Lymphadenopathy:      Cervical: No cervical adenopathy. Skin:     General: Skin is warm and dry. Capillary Refill: Capillary refill takes 2 to 3 seconds. Coloration: Skin is not jaundiced or pale. Findings: No erythema or rash. Neurological:      Mental Status: He is oriented to person, place, and time. Deep Tendon Reflexes: Reflexes are normal and symmetric.       Comments: Patient is unresponsive to verbal stimulus will respond to painful stimulus   Psychiatric:      Comments: Unable to determine due to mental status          MDM  Number of Diagnoses or Management Options     Amount and/or Complexity of Data Reviewed  Clinical lab tests: ordered and reviewed  Tests in the radiology section of CPT®: ordered and reviewed  Decide to obtain previous medical records or to obtain history from someone other than the patient: yes  Review and summarize past medical records: yes  Discuss the patient with other providers: yes    Risk of Complications, Morbidity, and/or Mortality  Presenting problems: high  Diagnostic procedures: high  Management options: high    Patient Progress  Patient progress: stable         Procedures  This is an 59-year-old male who presents from [facility] for evaluation of possible gallbladder disease. An ultrasound done there suggest.  Cholecystic fluid and other findings of cholecystitis. The patient is not running any fever or vomiting. He was sent here for HIDA scan and surgical consultation. He is a DNR and parenthetically is noted to have fungi and he is peritoneal fluid. Patient is nonverbal    2:04 PM I am being told now that nuclear medicine does not have the medium for the scan. Scan cannot be done until tomorrow. The hospital did not send the ultrasound films with the patient. We will repeat his ultrasound and if there is sufficient concern about gallbladder disease we will asked surgery to see. If the patient needs an acute scan he will need to be transferred. Awaiting labs. 3:29 PM labs are still pending at this time. I have spoken with the ultrasound tech. The gallbladder is about 8 x 4 x 4 cm. There is a dense amount of sludge in the gallbladder. The wall of the gallbladder is thickened and there is only minimal fluid surrounding the gallbladder. I have spoken with general surgery and he will see the patient. The patient's white count is normal.  Hemoglobin is 7.7. Still awaiting a liver function studies and CMP.  4:00 PM patient has marked elevation in AST with elevation in his other liver enzymes. Bilirubin appears to be normal.  Again, white count is normal.  We will give a dose of antibiotic and surgical consult is placed. 4:21 PM I have spoken with Dr. Elizabeth Jeong about this patient's findings on ultrasound. He would like a CT and is asked medicine to see. We will asked the hospitalist to admit and he will see. Cannot rule out Cholecystitis.  Direct bilirubin is elevated and there is concern for obstructive process. Perfect Serve Consult for Admission  4:23 PM    ED Room Number: ER25/25  Patient Name and age:  Salvador Clifton 80 y.o.  male  Working Diagnosis:   1.  Elevated LFTs    2. Gallbladder sludge        COVID-19 Suspicion:  no  Sepsis present:  no  Reassessment needed: no  Code Status:  Do Not Resuscitate  Readmission: no  Isolation Requirements:  no  Recommended Level of Care:  med/surg  Department:[facility] Adult ED - 21   Other:  Surgery aware    Total critical care time spent exclusive of procedures:  60 minutes

## 2021-09-07 NOTE — Clinical Note
Status[de-identified] INPATIENT [101]   Type of Bed: Surgical [18]   Inpatient Hospitalization Certified Necessary for the Following Reasons: 9.  Other (further clarification in H&P documentation)   Admitting Diagnosis: Abdominal pain [[id number]]   Admitting Physician: Alexx Christianson [[id number]]   Attending Physician: Marilou Oneal   Estimated Length of Stay: 3-4 Midnights   Discharge Plan[de-identified] Home with Office Follow-up

## 2021-09-08 ENCOUNTER — APPOINTMENT (OUTPATIENT)
Dept: NUCLEAR MEDICINE | Age: 86
DRG: 444 | End: 2021-09-08
Attending: EMERGENCY MEDICINE
Payer: MEDICARE

## 2021-09-08 PROBLEM — J96.10 CHRONIC RESPIRATORY FAILURE (HCC): Status: ACTIVE | Noted: 2021-09-08

## 2021-09-08 LAB
ALBUMIN SERPL-MCNC: 1.3 G/DL (ref 3.5–5)
ALBUMIN/GLOB SERPL: 0.3 {RATIO} (ref 1.1–2.2)
ALP SERPL-CCNC: 183 U/L (ref 45–117)
ALT SERPL-CCNC: 1062 U/L (ref 12–78)
ANION GAP SERPL CALC-SCNC: 0 MMOL/L (ref 5–15)
AST SERPL-CCNC: 489 U/L (ref 15–37)
BILIRUB DIRECT SERPL-MCNC: 0.2 MG/DL (ref 0–0.2)
BILIRUB SERPL-MCNC: 0.3 MG/DL (ref 0.2–1)
BUN SERPL-MCNC: 108 MG/DL (ref 6–20)
BUN/CREAT SERPL: 113 (ref 12–20)
CALCIUM SERPL-MCNC: 8.6 MG/DL (ref 8.5–10.1)
CHLORIDE SERPL-SCNC: 117 MMOL/L (ref 97–108)
CO2 SERPL-SCNC: 33 MMOL/L (ref 21–32)
CREAT SERPL-MCNC: 0.96 MG/DL (ref 0.7–1.3)
ERYTHROCYTE [DISTWIDTH] IN BLOOD BY AUTOMATED COUNT: 17.3 % (ref 11.5–14.5)
GLOBULIN SER CALC-MCNC: 4.9 G/DL (ref 2–4)
GLUCOSE SERPL-MCNC: 102 MG/DL (ref 65–100)
HCT VFR BLD AUTO: 24.4 % (ref 36.6–50.3)
HGB BLD-MCNC: 7.5 G/DL (ref 12.1–17)
MAGNESIUM SERPL-MCNC: 3 MG/DL (ref 1.6–2.4)
MCH RBC QN AUTO: 30.1 PG (ref 26–34)
MCHC RBC AUTO-ENTMCNC: 30.7 G/DL (ref 30–36.5)
MCV RBC AUTO: 98 FL (ref 80–99)
NRBC # BLD: 0 K/UL (ref 0–0.01)
NRBC BLD-RTO: 0 PER 100 WBC
PHOSPHATE SERPL-MCNC: 2 MG/DL (ref 2.6–4.7)
PLATELET # BLD AUTO: 143 K/UL (ref 150–400)
PMV BLD AUTO: 11.4 FL (ref 8.9–12.9)
POTASSIUM SERPL-SCNC: 3.7 MMOL/L (ref 3.5–5.1)
PROT SERPL-MCNC: 6.2 G/DL (ref 6.4–8.2)
RBC # BLD AUTO: 2.49 M/UL (ref 4.1–5.7)
SODIUM SERPL-SCNC: 150 MMOL/L (ref 136–145)
WBC # BLD AUTO: 9 K/UL (ref 4.1–11.1)

## 2021-09-08 PROCEDURE — 74011250636 HC RX REV CODE- 250/636: Performed by: NURSE PRACTITIONER

## 2021-09-08 PROCEDURE — 85027 COMPLETE CBC AUTOMATED: CPT

## 2021-09-08 PROCEDURE — 96374 THER/PROPH/DIAG INJ IV PUSH: CPT

## 2021-09-08 PROCEDURE — 74011000250 HC RX REV CODE- 250: Performed by: NURSE PRACTITIONER

## 2021-09-08 PROCEDURE — 65610000006 HC RM INTENSIVE CARE

## 2021-09-08 PROCEDURE — 74011000258 HC RX REV CODE- 258: Performed by: NURSE PRACTITIONER

## 2021-09-08 PROCEDURE — 80076 HEPATIC FUNCTION PANEL: CPT

## 2021-09-08 PROCEDURE — 36415 COLL VENOUS BLD VENIPUNCTURE: CPT

## 2021-09-08 PROCEDURE — 94003 VENT MGMT INPAT SUBQ DAY: CPT

## 2021-09-08 PROCEDURE — 80048 BASIC METABOLIC PNL TOTAL CA: CPT

## 2021-09-08 PROCEDURE — 74011250637 HC RX REV CODE- 250/637: Performed by: NURSE PRACTITIONER

## 2021-09-08 PROCEDURE — 83735 ASSAY OF MAGNESIUM: CPT

## 2021-09-08 PROCEDURE — C9254 INJECTION, LACOSAMIDE: HCPCS | Performed by: NURSE PRACTITIONER

## 2021-09-08 PROCEDURE — 99231 SBSQ HOSP IP/OBS SF/LOW 25: CPT | Performed by: SURGERY

## 2021-09-08 PROCEDURE — 84100 ASSAY OF PHOSPHORUS: CPT

## 2021-09-08 PROCEDURE — A9537 TC99M MEBROFENIN: HCPCS

## 2021-09-08 RX ORDER — KIT FOR THE PREPARATION OF TECHNETIUM TC 99M MEBROFENIN 45 MG/10ML
5.5 INJECTION, POWDER, LYOPHILIZED, FOR SOLUTION INTRAVENOUS
Status: COMPLETED | OUTPATIENT
Start: 2021-09-08 | End: 2021-09-08

## 2021-09-08 RX ORDER — HEPARIN SODIUM 5000 [USP'U]/ML
5000 INJECTION, SOLUTION INTRAVENOUS; SUBCUTANEOUS EVERY 12 HOURS
COMMUNITY

## 2021-09-08 RX ORDER — ALLOPURINOL 100 MG/1
100 TABLET ORAL DAILY
COMMUNITY

## 2021-09-08 RX ORDER — SODIUM CHLORIDE 0.9 % (FLUSH) 0.9 %
5-40 SYRINGE (ML) INJECTION EVERY 8 HOURS
Status: DISCONTINUED | OUTPATIENT
Start: 2021-09-08 | End: 2021-09-14 | Stop reason: HOSPADM

## 2021-09-08 RX ORDER — HYDRALAZINE HYDROCHLORIDE 50 MG/1
25 TABLET, FILM COATED ORAL 3 TIMES DAILY
Status: DISCONTINUED | OUTPATIENT
Start: 2021-09-08 | End: 2021-09-11

## 2021-09-08 RX ORDER — FAMOTIDINE 20 MG/1
20 TABLET, FILM COATED ORAL DAILY
COMMUNITY

## 2021-09-08 RX ORDER — ONDANSETRON 2 MG/ML
4 INJECTION INTRAMUSCULAR; INTRAVENOUS
Status: DISCONTINUED | OUTPATIENT
Start: 2021-09-08 | End: 2021-09-14 | Stop reason: HOSPADM

## 2021-09-08 RX ORDER — POLYETHYLENE GLYCOL 3350 17 G/17G
17 POWDER, FOR SOLUTION ORAL DAILY PRN
Status: DISCONTINUED | OUTPATIENT
Start: 2021-09-08 | End: 2021-09-08 | Stop reason: SDUPTHER

## 2021-09-08 RX ORDER — HYDRALAZINE HYDROCHLORIDE 25 MG/1
25 TABLET, FILM COATED ORAL 3 TIMES DAILY
COMMUNITY

## 2021-09-08 RX ORDER — METRONIDAZOLE 500 MG/100ML
500 INJECTION, SOLUTION INTRAVENOUS EVERY 8 HOURS
Status: DISCONTINUED | OUTPATIENT
Start: 2021-09-08 | End: 2021-09-08

## 2021-09-08 RX ORDER — IPRATROPIUM BROMIDE AND ALBUTEROL SULFATE 2.5; .5 MG/3ML; MG/3ML
3 SOLUTION RESPIRATORY (INHALATION)
Status: DISCONTINUED | OUTPATIENT
Start: 2021-09-08 | End: 2021-09-14 | Stop reason: HOSPADM

## 2021-09-08 RX ORDER — ARGININE/GLUTAMINE/CALCIUM BMB 7G-7G-1.5G
1 POWDER IN PACKET (EA) ORAL 2 TIMES DAILY
COMMUNITY

## 2021-09-08 RX ORDER — LEVETIRACETAM 15 MG/ML
1500 INJECTION INTRAVASCULAR EVERY 12 HOURS
COMMUNITY
End: 2021-09-14

## 2021-09-08 RX ORDER — LACOSAMIDE 100 MG/1
100 TABLET ORAL EVERY 12 HOURS
COMMUNITY

## 2021-09-08 RX ORDER — CHLORHEXIDINE GLUCONATE 1.2 MG/ML
15 RINSE ORAL
COMMUNITY

## 2021-09-08 RX ORDER — ACETAMINOPHEN 325 MG/1
650 TABLET ORAL
Status: DISCONTINUED | OUTPATIENT
Start: 2021-09-08 | End: 2021-09-14 | Stop reason: HOSPADM

## 2021-09-08 RX ORDER — ALLOPURINOL 100 MG/1
100 TABLET ORAL DAILY
Status: DISCONTINUED | OUTPATIENT
Start: 2021-09-09 | End: 2021-09-14 | Stop reason: HOSPADM

## 2021-09-08 RX ORDER — HYDRALAZINE HYDROCHLORIDE 20 MG/ML
10 INJECTION INTRAMUSCULAR; INTRAVENOUS
COMMUNITY

## 2021-09-08 RX ORDER — IPRATROPIUM BROMIDE AND ALBUTEROL SULFATE 2.5; .5 MG/3ML; MG/3ML
3 SOLUTION RESPIRATORY (INHALATION) 2 TIMES DAILY
COMMUNITY

## 2021-09-08 RX ORDER — ACETAMINOPHEN 650 MG/1
650 SUPPOSITORY RECTAL
Status: DISCONTINUED | OUTPATIENT
Start: 2021-09-08 | End: 2021-09-14 | Stop reason: HOSPADM

## 2021-09-08 RX ORDER — ONDANSETRON 4 MG/1
4 TABLET, ORALLY DISINTEGRATING ORAL
Status: DISCONTINUED | OUTPATIENT
Start: 2021-09-08 | End: 2021-09-14 | Stop reason: HOSPADM

## 2021-09-08 RX ORDER — ALBUTEROL SULFATE 0.83 MG/ML
2.5 SOLUTION RESPIRATORY (INHALATION)
COMMUNITY

## 2021-09-08 RX ORDER — DEXTROSE MONOHYDRATE 50 MG/ML
50 INJECTION, SOLUTION INTRAVENOUS CONTINUOUS
COMMUNITY
End: 2021-09-14

## 2021-09-08 RX ORDER — CARVEDILOL 6.25 MG/1
25 TABLET ORAL 2 TIMES DAILY
Status: DISCONTINUED | OUTPATIENT
Start: 2021-09-08 | End: 2021-09-14 | Stop reason: HOSPADM

## 2021-09-08 RX ORDER — CHLORHEXIDINE GLUCONATE 0.12 MG/ML
15 RINSE ORAL EVERY 12 HOURS
Status: DISCONTINUED | OUTPATIENT
Start: 2021-09-08 | End: 2021-09-14 | Stop reason: HOSPADM

## 2021-09-08 RX ORDER — POLYETHYLENE GLYCOL 3350 17 G/17G
17 POWDER, FOR SOLUTION ORAL
Status: DISCONTINUED | OUTPATIENT
Start: 2021-09-08 | End: 2021-09-14 | Stop reason: HOSPADM

## 2021-09-08 RX ORDER — SODIUM CHLORIDE 0.9 % (FLUSH) 0.9 %
5-40 SYRINGE (ML) INJECTION AS NEEDED
Status: DISCONTINUED | OUTPATIENT
Start: 2021-09-08 | End: 2021-09-14 | Stop reason: HOSPADM

## 2021-09-08 RX ORDER — CARVEDILOL 12.5 MG/1
25 TABLET ORAL 2 TIMES DAILY
Status: ON HOLD | COMMUNITY
End: 2021-09-14 | Stop reason: SDUPTHER

## 2021-09-08 RX ADMIN — FAMOTIDINE 20 MG: 10 INJECTION, SOLUTION INTRAVENOUS at 14:49

## 2021-09-08 RX ADMIN — Medication 10 ML: at 21:31

## 2021-09-08 RX ADMIN — MEROPENEM 500 MG: 500 INJECTION, POWDER, FOR SOLUTION INTRAVENOUS at 14:47

## 2021-09-08 RX ADMIN — MEROPENEM 500 MG: 500 INJECTION, POWDER, FOR SOLUTION INTRAVENOUS at 21:30

## 2021-09-08 RX ADMIN — POTASSIUM PHOSPHATE, MONOBASIC POTASSIUM PHOSPHATE, DIBASIC: 224; 236 INJECTION, SOLUTION, CONCENTRATE INTRAVENOUS at 14:49

## 2021-09-08 RX ADMIN — HYDRALAZINE HYDROCHLORIDE 25 MG: 50 TABLET, FILM COATED ORAL at 21:30

## 2021-09-08 RX ADMIN — CEFEPIME HYDROCHLORIDE 2 G: 2 INJECTION, POWDER, FOR SOLUTION INTRAVENOUS at 05:00

## 2021-09-08 RX ADMIN — METRONIDAZOLE 500 MG: 500 INJECTION, SOLUTION INTRAVENOUS at 06:33

## 2021-09-08 RX ADMIN — CARVEDILOL 25 MG: 12.5 TABLET, FILM COATED ORAL at 18:40

## 2021-09-08 RX ADMIN — CEFEPIME HYDROCHLORIDE 2 G: 2 INJECTION, POWDER, FOR SOLUTION INTRAVENOUS at 13:43

## 2021-09-08 RX ADMIN — SODIUM CHLORIDE 100 MG: 9 INJECTION, SOLUTION INTRAVENOUS at 14:36

## 2021-09-08 RX ADMIN — SODIUM CHLORIDE 100 MG: 9 INJECTION, SOLUTION INTRAVENOUS at 21:32

## 2021-09-08 RX ADMIN — LEVETIRACETAM 1500 MG: 100 INJECTION, SOLUTION INTRAVENOUS at 14:48

## 2021-09-08 RX ADMIN — Medication 10 ML: at 15:16

## 2021-09-08 RX ADMIN — SODIUM CHLORIDE 100 MG: 9 INJECTION, SOLUTION INTRAVENOUS at 16:35

## 2021-09-08 RX ADMIN — KIT FOR THE PREPARATION OF TECHNETIUM TC 99M MEBROFENIN 5.5 MILLICURIE: 45 INJECTION, POWDER, LYOPHILIZED, FOR SOLUTION INTRAVENOUS at 11:35

## 2021-09-08 NOTE — ED NOTES
Bedside shift change report given to Alisa Laguerre (oncoming nurse) by Conrado Castillo (offgoing nurse). Report included the following information SBAR, Kardex, ED Summary and Recent Results.

## 2021-09-08 NOTE — H&P
[facility]    ICU TEAM H&P    Name: Emily Santos   : 3/9/1933   MRN: [id number]   Date: 2021      Assessment:     ICU Problems:    1. 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 Cholecystitis  3. Thrombocytopenia  4. Systolic Heart Failure  5. Possible fungal infection to peritoneal fluid per chart      Imaging:  CT ABD PELV W CONT   Final Result      1. No cholecystitis on CT. 2. Large left and moderate right pleural effusions. Body wall edema. Consider   fluid overload or hypoproteinemia. US ABD LTD   Final Result      1. Gallbladder distention with large volume of intraluminal sludge and mild wall   thickening. Correlate for acute cholecystitis. 2. No biliary ductal dilatation. 3. Hepatic steatosis is suspected. 4. Increased echogenicity of the right kidney suggesting medical renal   parenchymal disease. Correlate clinically. 5. Small ascites. 6. Increased size of a right adrenal mass. Correlate clinically and follow-up as   appropriate. NM HEPATOBILIARY DUCT SCAN    (Results Pending)         ICU Comprehensive Plan of Care:     Plans for this Shift:     1. Admit to ICU  2. HIDA scan in AM  3. General surgery Consulted  4. Palliative care consult  5. Start antibiotics, cefepime and flagyl  6. Trach care  7. Wean vent as tolerated for trach collar trials  8. Possible cholecystostomy tube if aggressive measures want to be taken  9. Trend labs  10. SBP Goal of: > 90 mmHg  11. MAP Goal of: > 65 mmHg  12. None - For above SBP/MAP goals  13. IVFs:   14. Transfusion Trigger (Hgb): <7 g/dL  15. Respiratory Goals:  a. Chlorhexidine   b. Optimize PEEP/Ventilation/Oxygenation  c. Goal Tidal Volume 6 cc/kg based on IBW  d. Aim for lung protective ventilation  e. Head of bed > 30 degrees  f. Aggressive bronchopulmonary hygiene  16. Pulmonary toilet: Duo-Nebs   17. SpO2 Goal: > 92%  18. Keep K>4; Mg>2   19.  PT/OT: PT consulted and on board, OT consulted and on board and Speech therapy consulted and on board   20. Goals of Care Discussion with family Yes   24. Plan of Care/Code Status: Full Code  22. Appreciate Consultants Input   23. Discussed Care Plan with Bedside RN  24. Documentation of Current Medications  25. Rest of Plan Below:    F - Feeding:  Pending   A - Analgesia: Fentanyl  S - Sedation: None  T - DVT Prophylaxis: SCD's or Sequential Compression Device   H - Head of Bed: > 30 Degrees  U - Ulcer Prophylaxis: Not at this time   G - Glycemic Control: Insulin  S - Spontaneous Breathing Trial: Pending  B - Bowel Regimen: MiraLax  I - Indwelling Catheter:   Tubes: Tracheostomy and PEG Tube  Lines: Peripheral IV  Drains: Avalos Catheter  D - De-escalation of Antibiotics: Cefepime  metronidazole    Subjective:   Progress Note: 9/8/2021      Reason for ICU Admission: Chronic respiratory failure       HPI: This is an 30-year-old male with past medical history of prostate cancer, hypertension, hyperlipidemia, CVA, thrombocytopenia, systolic heart failure who is currently a patient at [facility] with chronic respiratory failure with tracheostomy and ventilator dependence. Patient is nonverbal at baseline. Per the chart the patient had abdominal ultrasound done at [facility] which showed possible acute cholecystitis, the patient was sent to [facility] for a HIDA scan to rule out any gallbladder disease. CT scan of the abdomen and pelvis did reveal concerns for gallbladder disease, however surgery did not feel that the patient was good candidate to take to the operating room. Interventional radiology consultation has been placed for possible cholecystostomy tube placement tomorrow. Patient has been started on empiric antibiotics at this time. Given multiple comorbidities palliative care consultation is also in place.       POD:  * No surgery found *    S/P:       Active Problem List:     Problem List  Date Reviewed: 12/17/2020        Codes Class Abdominal pain ICD-10-CM: R10.9  ICD-9-CM: 789.00         Acute respiratory failure with hypoxia (HCC) ICD-10-CM: J96.01  ICD-9-CM: 518.81         Aspiration into respiratory tract ICD-10-CM: T17.908A  ICD-9-CM: 934.9         Hypokalemia ICD-10-CM: E87.6  ICD-9-CM: 276.8         Hypernatremia ICD-10-CM: E87.0  ICD-9-CM: 276.0         Moderate protein malnutrition (HCC) ICD-10-CM: E44.0  ICD-9-CM: 263.0         Failure to thrive (0-17) ICD-10-CM: R62.51  ICD-9-CM: 783.41         Bacteremia ICD-10-CM: R78.81  ICD-9-CM: 790.7         UTI (urinary tract infection) ICD-10-CM: N39.0  ICD-9-CM: 599.0         Dementia (HCC) ICD-10-CM: F03.90  ICD-9-CM: 294.20         Bedridden ICD-10-CM: Z74.01  ICD-9-CM: V49.84         Pressure ulcer ICD-10-CM: L89.90  ICD-9-CM: 707.00, 707.20         S/P percutaneous endoscopic gastrostomy (PEG) tube placement ([facility] ICD-10-CM: Z93.1  ICD-9-CM: V44.1         Dehydration ICD-10-CM: E86.0  ICD-9-CM: 276.51         Lactic acidosis ICD-10-CM: E87.2  ICD-9-CM: 276.2         Sepsis ([facility] ICD-10-CM: A41.9  ICD-9-CM: 038.9, 995.91         SOULEYMANE (acute kidney injury) ([facility] ICD-10-CM: N17.9  ICD-9-CM: 695. 9         Chronic systolic heart failure (HCC) ICD-10-CM: I50.22  ICD-9-CM: 428.22         Altered mental status ICD-10-CM: R41.82  ICD-9-CM: 780.97         Post-ictal state ([address] ICD-10-CM: R56.9  ICD-9-CM: 780.39         Seizure ([address] ICD-10-CM: R56.9  ICD-9-CM: 780.39         Atrial pacemaker lead displacement ICD-10-CM: T82.120A  ICD-9-CM: 996.01         Pacemaker ICD-10-CM: Z95.0  ICD-9-CM: V45.01     Overview Signed 3/22/2019  5:08 PM by Verenice Minaya MD     3/22/2019 dual chamber Medtronic pacer             Bradycardia ICD-10-CM: R00.1  ICD-9-CM: 427.89         Transaminitis ICD-10-CM: R74.01  ICD-9-CM: 790.4         Hypertensive urgency ICD-10-CM: I16.0  ICD-9-CM: 401.9         Second degree AV block, Mobitz type I ICD-10-CM: I44.1  ICD-9-CM: 426.13     Overview Signed 3/21/2019  6:14 PM by Mali Olivarez MD     Added automatically from request for surgery [id number]             Stage 3 chronic kidney disease ([facility] ICD-10-CM: N18.30  ICD-9-CM: 555. 3         Rotator cuff arthropathy of both shoulders ICD-10-CM: M12.811, M12.812  ICD-9-CM: 716.81         Cognitive impairment ICD-10-CM: R41.89  ICD-9-CM: 203.4         Systolic murmur AXK-73-YB: R01.1  ICD-9-CM: 785.2         Essential hypertension ICD-10-CM: I10  ICD-9-CM: 401.9         Gout ICD-10-CM: M10.9  ICD-9-CM: 274.9         Pure hypercholesterolemia ICD-10-CM: E78.00  ICD-9-CM: 272.0         History of prostate cancer ICD-10-CM: Z85.46  ICD-9-CM: V10.46         Chronic constipation ICD-10-CM: K59.09  ICD-9-CM: 564.00         Dysuria ICD-10-CM: R30.0  ICD-9-CM: 418. 1         Weight loss ICD-10-CM: R63.4  ICD-9-CM: 783.21         Adrenal mass ([facility] ICD-10-CM: E27.8  ICD-9-CM: 255.8     Overview Addendum 12/17/2018  9:57 AM by Tati Dong MD     Stable/non-functioning adenoma on imaging                   Past Medical History:      has a past medical history of Cancer [facility]), Chronic systolic heart failure ([facility] (12/7/2020), Constipation, Gout, Hyperlipemia, Hypertension, Pacemaker (2019), Stroke ([facility] Thrombocytopenia ([facility] (3/19/2019), and TIA (transient ischemic attack) (2013). Past Surgical History:      has a past surgical history that includes hx urological; hx prostatectomy; pr ins new/rplcmt prm pm w/transv eltrd atrial&vent (Right, 3/22/2019); pr ins new/rplcmt prm pacemakr w/trans eltrd atrial (N/A, 10/18/2019); and place percut gastrostomy tube (11/30/2020). Home Medications:     Prior to Admission medications    Medication Sig Start Date End Date Taking? Authorizing Provider   levETIRAcetam (KEPPRA) 750 mg tablet Take 1 Tab by mouth every twelve (12) hours. 12/11/20   Mychal Aguayo NP   aspirin delayed-release 81 mg tablet Take 1 Tab by mouth daily.  7/13/20   Eric Garza MD   polyethylene glycol [facility]) 17 gram/dose powder Take 17 g by mouth daily as needed for Constipation. 20   Dorothy Weaver MD   balsam peru-castor oiL (VENELEX) ointment Apply  to affected area three (3) times daily. 20   Thi Velazquez MD   famotidine (PEPCID) 20 mg tablet Take 1 Tab by mouth every evening. 20   Thi Velazquez MD   acetaminophen (TYLENOL) 325 mg tablet Take 650 mg by mouth every six (6) hours as needed for Pain. Provider, Historical       Allergies/Social/Family History:     No Known Allergies   Social History     Tobacco Use    Smoking status: Never Smoker    Smokeless tobacco: Never Used   Substance Use Topics    Alcohol use: No      Family History   Problem Relation Age of Onset    No Known Problems Mother     No Known Problems Father        Review of Systems:     Review of systems not obtained due to patient factors. Objective:   Vital Signs:  Visit Vitals  BP (!) 124/55   Pulse 60   Temp 98.2 °F (36.8 °C)   Resp 12   SpO2 100%      O2 Device: Tracheostomy, Ventilator Temp (24hrs), Av.2 °F (36.8 °C), Min:98.2 °F (36.8 °C), Max:98.2 °F (36.8 °C)           Intake/Output:   No intake or output data in the 24 hours ending 21 0501    Physical Exam:    General:  Sedated and on the ventilator. No acute distress. Eyes:  Sclera anicteric. Pupils equal, round, reactive to light. Mouth/Throat: Orotracheal tube in place. Neck: Supple. Lungs:   Clear to auscultation bilaterally, good effort. Cardiovascular:  Regular rate and rhythm, no murmur, click, rub, or gallop. Abdomen:   Soft, non-tender, bowel sounds normal, non-distended. Extremities: No cyanosis or edema. Skin: No acute rash or lesions. Lymph Nodes: Cervical and supraclavicular normal.   Musculoskeletal:  No swelling or deformity. Lines/Devices:  Intact, no erythema, drainage, or tenderness. Psychiatric: Sedated and appears comfortable on ventilator.          LABS AND  DATA: Personally reviewed  Recent Labs     21  1453   WBC 9. 0   HGB 7.7*   HCT 25.0*   *     Recent Labs     09/07/21  1453   *   K 3.5   *   CO2 30   *   CREA 0.95   *   CA 8.8     Recent Labs     09/07/21  1453   *   TP 6.0*   ALB 1.3*   GLOB 4.7*   LPSE 106     No results for input(s): INR, PTP, APTT, INREXT in the last 72 hours. Recent Labs     09/07/21  1348   PHI 7.52*   PCO2I 40.6   PO2I 71*   FIO2I 30     No results for input(s): CPK, CKMB, TROIQ, BNPP in the last 72 hours. Hemodynamics:   PAP:   CO:     Wedge:   CI:     CVP:    SVR:       PVR:       Ventilator Settings:  Mode Rate Tidal Volume Pressure FiO2 PEEP   Assist control, Volume control   500 ml    30 % 8 cm H20     Peak airway pressure: 23 cm H2O    Minute ventilation: 6.03 l/min        MEDS: Reviewed    Chest X-Ray:  CXR Results  (Last 48 hours)    None            ECHO:  Pending    Multidisciplinary Rounds Completed:  Pending    ABCDEF Bundle/Checklist Completed:  Yes    SPECIAL EQUIPMENT  None    DISPOSITION  Stay in ICU    CRITICAL CARE CONSULTANT NOTE  I had a face to face encounter with the patient, reviewed and interpreted patient data including clinical events, labs, images, vital signs, I/O's, and examined patient. I have discussed the case and the plan and management of the patient's care with the consulting services, the bedside nurses and the respiratory therapist.      NOTE OF PERSONAL INVOLVEMENT IN CARE   This patient has a high probability of imminent, clinically significant deterioration, which requires the highest level of preparedness to intervene urgently. I participated in the decision-making and personally managed or directed the management of the following life and organ supporting interventions that required my frequent assessment to treat or prevent imminent deterioration. I personally spent 90 minutes of critical care time.   This is time spent at this critically ill patient's bedside actively involved in patient care as well as the coordination of care and discussions with the patient's family. This does not include any procedural time which has been billed separately.       Lillian Suero [facility]     Critical Care Medicine  [facility]

## 2021-09-08 NOTE — CONSULTS
[address] NOTE  [facility]  [phone] NP in-hospital cell phone M-F until 4:30  After 5pm or on weekends, please call  for physician on call        NAME:  Samantha Rogers   :   3/9/1933   MRN:   [id number]       Referring Physician: Allan Rose Date: 2021 4:37 PM    Chief Complaint: PEG malfunction     History of Present Illness:  Patient is a 80 y.o. who is seen in consultation at the request of Bennett Ritter for PEG malfunction. Medical history as listed below includes vent dependence. He presented from [facility] for possible fungal infection in peritoneal fluid/cholecystitis. His PEG was clogged. I have reviewed the emergency room note, hospital admission note, notes by all other clinicians who have seen the patient during this hospitalization to date. I have reviewed the problem list and the reason for this hospitalization. I have reviewed the allergies and the medications the patient was taking at home prior to this hospitalization.     PMH:  Past Medical History:   Diagnosis Date    Cancer [facility])     prostate    Chronic systolic heart failure ([facility] 2020    Constipation     Gout     Hyperlipemia     Hypertension     Pacemaker     Stroke ([facility]     Thrombocytopenia ([facility] 3/19/2019    TIA (transient ischemic attack)        PSH:  Past Surgical History:   Procedure Laterality Date    HX PROSTATECTOMY      HX UROLOGICAL      prostate removed    PLACE PERCUT GASTROSTOMY TUBE  2020         NM INS NEW/RPLCMT PRM PACEMAKR W/TRANS ELTRD ATRIAL N/A 10/18/2019    Insert Ppm Single Atrial performed by Jeane Taylor MD at [address] CATH LAB    NM INS NEW/RPLCMT PRM PM W/TRANSV ELTRD ATRIAL&VENT Right 3/22/2019    INSERT PPM DUAL performed by Jeane Taylor MD at [address]  CATH LAB       Allergies:  No Known Allergies    Home Medications:  Prior to Admission Medications   Prescriptions Last Dose Informant Patient Reported? Taking?   0.9% sodium chloride solp 50 mL with meropenem 1 gram solr   Yes Yes   Si g by IntraVENous route every eight (8) hours. Arginine-Glutamine-Calcium HMB (Britton) 7-7-1.5 gram pwpk   Yes Yes   Sig: Take 1 Packet by mouth two (2) times a day. albuterol (PROVENTIL VENTOLIN) 2.5 mg /3 mL (0.083 %) nebu   Yes Yes   Si.5 mg by Nebulization route three (3) times daily as needed for Other (Respiratory issues). albuterol-ipratropium (DUO-NEB) 2.5 mg-0.5 mg/3 ml nebu   Yes Yes   Sig: 3 mL by Nebulization route two (2) times a day. allopurinoL (ZYLOPRIM) 100 mg tablet   Yes Yes   Sig: Take 100 mg by mouth daily. balsam peru-castor oiL (VENELEX) ointment   No Yes   Sig: Apply  to affected area three (3) times daily. carvediloL (COREG) 12.5 mg tablet   Yes Yes   Sig: Take 25 mg by mouth two (2) times a day. chlorhexidine (PERIDEX) 0.12 % solution   Yes Yes   Sig: 15 mL by Swish and Spit route every twelve (12) hours as needed for Other (mouth care). dextrose 5% solution   Yes Yes   Si mL/hr by IntraVENous route continuous. famotidine (PEPCID) 20 mg tablet   Yes Yes   Sig: Take 20 mg by mouth daily. heparin sodium,porcine (heparin, porcine,) 5,000 unit/mL injection   Yes Yes   Si,000 Units by SubCUTAneous route every twelve (12) hours. hydrALAZINE (APRESOLINE) 20 mg/mL injection   Yes Yes   Sig: 10 mg by IntraVENous route every six (6) hours as needed for Other (SBP >160 mmHg). hydrALAZINE (APRESOLINE) 25 mg tablet   Yes Yes   Sig: Take 25 mg by mouth three (3) times daily. insulin regular (NOVOLIN R, HUMULIN R) 100 unit/mL injection   Yes Yes   Sig: by SubCUTAneous route every six (6) hours as needed (sliding scale insulin).  For BG below 70, give 0 units + 1 amp D50  For BG 70 to 200, give 0 units  For  to 250, give 3 units  For  to 300, give 6 units  For  to 350, give 8 units  For  to 400, give 10 units,  For BG greater than 400, give 12 units   iron sucrose complex (IRON SUCROSE IV)   Yes Yes   Si mg by IntraVENous route every fourty-eight (48) hours. lacosamide (Vimpat) 100 mg tab tablet   Yes Yes   Sig: Take 100 mg by mouth every twelve (12) hours. levETIRAcetam in NaCl, iso-os, 1,500 mg/100 mL pgbk IVPB premix   Yes Yes   Si,500 mg by IntraVENous route every twelve (12) hours every twelve (12) hours. micafungin sodium (MICAFUNGIN IV)   Yes Yes   Si mg by IntraVENous route every twenty-four (24) hours. polyethylene glycol (MIRALAX) 17 gram/dose powder   No Yes   Sig: Take 17 g by mouth daily as needed for Constipation.       Facility-Administered Medications: None       Hospital Medications:  Current Facility-Administered Medications   Medication Dose Route Frequency    sodium chloride (NS) flush 5-40 mL  5-40 mL IntraVENous Q8H    sodium chloride (NS) flush 5-40 mL  5-40 mL IntraVENous PRN    acetaminophen (TYLENOL) tablet 650 mg  650 mg Oral Q6H PRN    Or    acetaminophen (TYLENOL) suppository 650 mg  650 mg Rectal Q6H PRN    ondansetron (ZOFRAN ODT) tablet 4 mg  4 mg Oral Q8H PRN    Or    ondansetron (ZOFRAN) injection 4 mg  4 mg IntraVENous Q6H PRN    potassium phosphate 15 mmol in 0.9% sodium chloride 250 mL infusion   IntraVENous ONCE    levETIRAcetam (KEPPRA) 1,500 mg in 0.9% sodium chloride 100 mL IVPB  1,500 mg IntraVENous Q12H    lacosamide (VIMPAT) 100 mg in 0.9% sodium chloride 100 mL IVPB  100 mg IntraVENous Q12H    famotidine (PF) (PEPCID) 20 mg in 0.9% sodium chloride 10 mL injection  20 mg IntraVENous DAILY    chlorhexidine (ORAL CARE KIT) 0.12 % mouthwash 15 mL  15 mL Oral Q12H    meropenem (MERREM) 500 mg in 0.9% sodium chloride (MBP/ADV) 50 mL MBP  0.5 g IntraVENous Q6H    albuterol-ipratropium (DUO-NEB) 2.5 MG-0.5 MG/3 ML  3 mL Nebulization Q6H PRN    [START ON 2021] allopurinoL (ZYLOPRIM) tablet 100 mg 100 mg Per G Tube DAILY    carvediloL (COREG) tablet 25 mg  25 mg Oral BID    hydrALAZINE (APRESOLINE) tablet 25 mg  25 mg Oral TID    polyethylene glycol (MIRALAX) packet 17 g  17 g Oral DAILY PRN    anidulafungin (ERAXIS) 100 mg in 0.9% sodium chloride 130 mL IVPB  100 mg IntraVENous Q24H       Social History:  Social History     Tobacco Use    Smoking status: Never Smoker    Smokeless tobacco: Never Used   Substance Use Topics    Alcohol use: No       Family History:  Family History   Problem Relation Age of Onset    No Known Problems Mother     No Known Problems Father        Review of Systems:  Unable to obtain due to patient condition    Objective:     Patient Vitals for the past 8 hrs:   BP Temp Pulse Resp SpO2 Height Weight   09/08/21 1533   60 12 100 %     09/08/21 1400 (!) 112/55  60 12 100 %     09/08/21 1348       82.7 kg (182 lb 5.1 oz)   09/08/21 1308      5' 6\" (1.676 m)    09/08/21 1306 (!) 135/58 98.3 °F (36.8 °C) 61 12 98 %     09/08/21 1200 (!) 149/67  (!) 59 16 96 %     09/08/21 0900 (!) 113/53  60 12 97 %       09/08 0701 - 09/08 1900  In: 200 [I.V.:200]  Out: -   No intake/output data recorded.     EXAM:     CONST:  No acute distress   NEURO:  sedated   HEENT: trach   LUNGS: vent   CARD:   Regular rate   ABD:  soft, no apparent tenderness, no rebound, no masses, PEG with dried drainage - initially difficult to flush after irrigation now flushes easily   EXT:  warm   PSYCH: ANGEL LUIS     Data Review     Recent Labs     09/08/21  0625 09/07/21  1453   WBC 9.0 9.0   HGB 7.5* 7.7*   HCT 24.4* 25.0*   * 133*     Recent Labs     09/08/21  0625 09/08/21  0500 09/07/21  1453   *  --  149*   K 3.7  --  3.5   *  --  113*   CO2 33*  --  30   *  --  110*   CREA 0.96  --  0.95   *  --  150*   PHOS  --  2.0*  --    CA 8.6  --  8.8     Recent Labs     09/08/21  1014 09/07/21  1453   * 264*   TP 6.2* 6.0*   ALB 1.3* 1.3*   GLOB 4.9* 4.7* LPSE  --  106     No results for input(s): INR, PTP, APTT, INREXT in the last 72 hours. Assessment:     · Feeding difficulties: PEG clogged, irrigated to clear  · Elevated LFT's  · Anemia   · Uremia  · Respiratory failure/vent dependence   · Adrenal mass     Patient Active Problem List   Diagnosis Code    Adrenal mass ([facility] E27.8    Dysuria R30.0    Weight loss R63.4    Essential hypertension I10    Gout M10.9    Pure hypercholesterolemia E78.00    History of prostate cancer Z85.46    Chronic constipation H09.98    Systolic murmur U58.0    Cognitive impairment R41.89    Stage 3 chronic kidney disease (HCC) N18.30    Rotator cuff arthropathy of both shoulders M12.811, M12.812    Bradycardia R00.1    Transaminitis R74.01    Hypertensive urgency I16.0    Second degree AV block, Mobitz type I I44.1    Pacemaker Z95.0    Atrial pacemaker lead displacement T82.120A    Seizure ([facility]) R56.9    Altered mental status R41.82    Post-ictal state ([facility] [address] R56.9    Chronic systolic heart failure ([facility]) I50.22    Dehydration E86.0    Lactic acidosis E87.2    Sepsis ([facility]) A41.9    [facility]LEYMANE [facility] N17.9    Failure to thrive (0-17) R62.51    Bacteremia R78.81    UTI (urinary tract infection) N39.0    Dementia ([facility]) F03.90    Bedridden Z74.01    Pressure ulcer L89.90    S/P percutaneous endoscopic gastrostomy (PEG) tube placement ([facility]) Z93.1    Hypokalemia E87.6    Hypernatremia E87.0    Moderate protein malnutrition ([facility]) E44.0    Acute respiratory failure with hypoxia ([facility]) J96.01    Aspiration into respiratory tract T17.908A    Abdominal pain R10.9    Chronic respiratory failure ([facility]) J96.10     Plan:       · Tube feeds per RD  · Plan for palliative care consult  · Patient discussed with Dr. Mariusz Batres  · Thank you for allowing me to participate in care of Rich Johnson     Signed By: Ibis Colvin NP     9/8/2021  4:37 PM       GI Attending: Agree with above plan.  PEG tube is now functional after irrigation. Please call back again as needed. Roly Connell MD

## 2021-09-08 NOTE — PROGRESS NOTES
[facility]        Subjective     No acute events overnight. Patient remains afebrile. Abdomen/pelvis CT scan unremarkable last night. Objective     Patient Vitals for the past 24 hrs:   Temp Pulse Resp BP SpO2   09/08/21 0817  62 12  100 %   09/08/21 0800 98.3 °F (36.8 °C) 60 12 (!) 129/57 100 %   09/08/21 0700  60 12 (!) 124/55 100 %   09/08/21 0600  60 12 (!) 128/54 100 %   09/08/21 0515  60 12  100 %   09/08/21 0500  62 12 127/60    09/08/21 0400  60 12 (!) 122/56 100 %   09/08/21 0358  60 12  100 %   09/08/21 0300  60 12 (!) 124/55 100 %   09/08/21 0200  62 12 (!) 122/58    09/08/21 0100  60 12 (!) 123/55    09/08/21 0030  60 12  100 %   09/08/21 0000  60 12 (!) 121/53 100 %   09/07/21 2300  62 12 (!) 128/53 100 %   09/07/21 2242  68 13  100 %   09/07/21 1945  68 12  97 %   09/07/21 1800  61 12 (!) 103/55 97 %   09/07/21 1700  63 12 (!) 117/58 97 %   09/07/21 1600  63 12 (!) 107/58 98 %   09/07/21 1500  72 12 125/66 98 %   09/07/21 1400  63 12 108/61 97 %   09/07/21 1346 98.2 °F (36.8 °C) 70 12 127/71 96 %           PE  GEN -unresponsive  Pulm - on vent with tracheostomy  CV - RRR  Abd - soft, nondistended. No tenderness noted on exam.  No palpable masses or organomegaly.       Labs  Recent Results (from the past 24 hour(s))   POC G3 - PUL    Collection Time: 09/07/21  1:48 PM   Result Value Ref Range    FIO2 (POC) 30 %    pH (POC) 7.52 (H) 7.35 - 7.45      pCO2 (POC) 40.6 35.0 - 45.0 MMHG    pO2 (POC) 71 (L) 80 - 100 MMHG    HCO3 (POC) 32.8 (H) 22 - 26 MMOL/L    sO2 (POC) 95.5 92 - 97 %    Base excess (POC) 9.1 mmol/L    Site RIGHT BRACHIAL      Device: ADULT VENT      Mode ASSIST CONTROL      Tidal volume 500 ml    Set Rate 12 bpm    PEEP/CPAP (POC) 8 cmH2O    Allens test (POC) Positive      Specimen type (POC) ARTERIAL     CBC WITH AUTOMATED DIFF    Collection Time: 09/07/21  2:53 PM   Result Value Ref Range    WBC 9.0 4.1 - 11.1 K/uL    RBC 2.52 (L) 4.10 - 5.70 M/uL    HGB 7.7 (L) 12.1 - 17.0 g/dL    HCT 25.0 (L) 36.6 - 50.3 %    MCV 99.2 (H) 80.0 - 99.0 FL    MCH 30.6 26.0 - 34.0 PG    MCHC 30.8 30.0 - 36.5 g/dL    RDW 17.1 (H) 11.5 - 14.5 %    PLATELET 440 (L) 464 - 400 K/uL    MPV 11.6 8.9 - 12.9 FL    NRBC 0.0 0  WBC    ABSOLUTE NRBC 0.00 0.00 - 0.01 K/uL    NEUTROPHILS 79 (H) 32 - 75 %    BAND NEUTROPHILS 1 0 - 6 %    LYMPHOCYTES 16 12 - 49 %    MONOCYTES 4 (L) 5 - 13 %    EOSINOPHILS 0 0 - 7 %    BASOPHILS 0 0 - 1 %    IMMATURE GRANULOCYTES 0 %    ABS. NEUTROPHILS 7.2 1.8 - 8.0 K/UL    ABS. LYMPHOCYTES 1.4 0.8 - 3.5 K/UL    ABS. MONOCYTES 0.4 0.0 - 1.0 K/UL    ABS. EOSINOPHILS 0.0 0.0 - 0.4 K/UL    ABS. BASOPHILS 0.0 0.0 - 0.1 K/UL    ABS. IMM. GRANS. 0.0 K/UL    DF MANUAL      RBC COMMENTS ANISOCYTOSIS  1+       METABOLIC PANEL, COMPREHENSIVE    Collection Time: 09/07/21  2:53 PM   Result Value Ref Range    Sodium 149 (H) 136 - 145 mmol/L    Potassium 3.5 3.5 - 5.1 mmol/L    Chloride 113 (H) 97 - 108 mmol/L    CO2 30 21 - 32 mmol/L    Anion gap 6 5 - 15 mmol/L    Glucose 150 (H) 65 - 100 mg/dL     (H) 6 - 20 MG/DL    Creatinine 0.95 0.70 - 1.30 MG/DL    BUN/Creatinine ratio 116 (H) 12 - 20      GFR est AA >60 >60 ml/min/1.73m2    GFR est non-AA >60 >60 ml/min/1.73m2    Calcium 8.8 8.5 - 10.1 MG/DL    Bilirubin, total 0.3 0.2 - 1.0 MG/DL    ALT (SGPT) 1,482 (H) 12 - 78 U/L    AST (SGOT) 975 (H) 15 - 37 U/L    Alk.  phosphatase 264 (H) 45 - 117 U/L    Protein, total 6.0 (L) 6.4 - 8.2 g/dL    Albumin 1.3 (L) 3.5 - 5.0 g/dL    Globulin 4.7 (H) 2.0 - 4.0 g/dL    A-G Ratio 0.3 (L) 1.1 - 2.2     LIPASE    Collection Time: 09/07/21  2:53 PM   Result Value Ref Range    Lipase 106 73 - 393 U/L   LACTIC ACID    Collection Time: 09/07/21  2:53 PM   Result Value Ref Range    Lactic acid 1.6 0.4 - 2.0 MMOL/L   SAMPLES BEING HELD    Collection Time: 09/07/21  2:53 PM   Result Value Ref Range    SAMPLES BEING HELD 1RED, 1BLU     COMMENT        Add-on orders for these samples will be processed based on acceptable specimen integrity and analyte stability, which may vary by analyte. CULTURE, BLOOD, PAIRED    Collection Time: 09/07/21  8:19 PM    Specimen: Blood   Result Value Ref Range    Special Requests: NO SPECIAL REQUESTS      Culture result: NO GROWTH AFTER 12 HOURS     MAGNESIUM    Collection Time: 09/08/21  5:00 AM   Result Value Ref Range    Magnesium 3.0 (H) 1.6 - 2.4 mg/dL   PHOSPHORUS    Collection Time: 09/08/21  5:00 AM   Result Value Ref Range    Phosphorus 2.0 (L) 2.6 - 4.7 MG/DL   CBC W/O DIFF    Collection Time: 09/08/21  6:25 AM   Result Value Ref Range    WBC 9.0 4.1 - 11.1 K/uL    RBC 2.49 (L) 4.10 - 5.70 M/uL    HGB 7.5 (L) 12.1 - 17.0 g/dL    HCT 24.4 (L) 36.6 - 50.3 %    MCV 98.0 80.0 - 99.0 FL    MCH 30.1 26.0 - 34.0 PG    MCHC 30.7 30.0 - 36.5 g/dL    RDW 17.3 (H) 11.5 - 14.5 %    PLATELET 036 (L) 962 - 400 K/uL    MPV 11.4 8.9 - 12.9 FL    NRBC 0.0 0  WBC    ABSOLUTE NRBC 0.00 0.00 - 9.81 K/uL   METABOLIC PANEL, BASIC    Collection Time: 09/08/21  6:25 AM   Result Value Ref Range    Sodium 150 (H) 136 - 145 mmol/L    Potassium 3.7 3.5 - 5.1 mmol/L    Chloride 117 (H) 97 - 108 mmol/L    CO2 33 (H) 21 - 32 mmol/L    Anion gap 0 (L) 5 - 15 mmol/L    Glucose 102 (H) 65 - 100 mg/dL     (H) 6 - 20 MG/DL    Creatinine 0.96 0.70 - 1.30 MG/DL    BUN/Creatinine ratio 113 (H) 12 - 20      GFR est AA >60 >60 ml/min/1.73m2    GFR est non-AA >60 >60 ml/min/1.73m2    Calcium 8.6 8.5 - 10.1 MG/DL       Assessment     Samantha Doherty is a 80 y. o.yr old male with respiratory failure referred for concern of possible cholecystitis. Plan     The patient's CT scan was unremarkable aside from a right adrenal mass. He is not a candidate for adrenalectomy.   I recommend rechecking LFTs today and consider work-up for hepatitis given that this is a rise in transaminases rather than obstructive symptoms causing elevated bilirubin and alkaline phosphatase. HIDA scan ordered by primary team.  If this is positive for cholecystitis, he is not a candidate for surgery. In that case, could consider cholecystostomy tube if aggressive treatment is pursued. Again I would recommend a palliative care consult and involvement given there appeared to be no long-term plans and goals for recovery at this point.     Shady Chaudhary MD  9/8/2021  9:41 AM

## 2021-09-08 NOTE — PROGRESS NOTES
1130: TRANSFER - IN REPORT:    Verbal report received from Aman Chase, formerly [facility] (name) on Leisa Mohs  being received from ED(unit) for routine progression of care      Report consisted of patients Situation, Background, Assessment and   Recommendations(SBAR). Information from the following report(s) SBAR, Kardex, ED Summary, Intake/Output, MAR, Recent Results and Cardiac Rhythm A/V Paced was reviewed with the receiving nurse. Opportunity for questions and clarification was provided. Assessment completed upon patients arrival to unit and care assumed.      1300: Primary Nurse Maggie Caceres and Kunal Lawrence RN performed a dual skin assessment on this patient Impairment noted- see wound doc flow sheet  Torito score is 10

## 2021-09-08 NOTE — CONSULTS
[facility] Consultation for: Possible cholecystitis    Requesting Physician: Dr. Jovel Chilkat (ER)    History of Present Illness:      No Villeda is a 80 y.o. male who was transferred from [facility] to the ER due to concern for cholecystitis. The patient was recently admitted with acute respiratory failure secondary to aspiration and ultimately required tracheostomy with long-term vent plans. The patient was discharged to [facility] recently. Per the patient's family member he has not made any significant improvement on the vent and has not been interactive. The patient had an ultrasound done at [facility] given a report of fungal growth in his peritoneal fluid. This apparently was stated to show possible acute cholecystitis and the patient was sent here for a HIDA scan to further assess. Per the ER, a HIDA scan is not feasible given his current vent status and lack of contrast medium. The patient had a repeat ultrasound here that showed a large volume of intraluminal sludge and mild wall thickening. No biliary ductal dilatation was noted. Hepatic steatosis was identified. There was a large right adrenal mass that had increased in size, now up to 5.3 cm.     Past Medical History:   Diagnosis Date    Cancer [facility])     prostate    Chronic systolic heart failure ([facility] [address] 12/7/2020    Constipation     Gout     Hyperlipemia     Hypertension     Pacemaker 2019    Stroke ([facility] [address]     Thrombocytopenia ([facility] [address] 3/19/2019    TIA (transient ischemic attack) 2013       Past Surgical History:   Procedure Laterality Date    HX PROSTATECTOMY      HX UROLOGICAL      prostate removed    PLACE PERCUT GASTROSTOMY TUBE  11/30/2020         MA INS NEW/RPLCMT PRM PACEMAKR W/TRANS ELTRD ATRIAL N/A 10/18/2019    Insert Ppm Single Atrial performed by William Wheeler MD at [place] [id number], [facility] CATH LAB    MA INS NEW/RPLCMT PRM PM W/TRANSV ELTRD ATRIAL&VENT Right 3/22/2019    INSERT PPM DUAL performed by William Wheeler MD at [facility] CARDIAC CATH LAB       Social History     Socioeconomic History    Marital status:      Spouse name: Not on file    Number of children: Not on file    Years of education: Not on file    Highest education level: Not on file   Occupational History    Not on file   Tobacco Use    Smoking status: Never Smoker    Smokeless tobacco: Never Used   Substance and Sexual Activity    Alcohol use: No    Drug use: No    Sexual activity: Never   Other Topics Concern    Not on file   Social History Narrative    Not on file     Social Determinants of Health     Financial Resource Strain:     Difficulty of Paying Living Expenses:    Food Insecurity:     Worried About Running Out of Food in the Last Year:     [address] in the Last Year:    Transportation Needs:     Lack of Transportation (Medical):      Lack of Transportation (Non-Medical):    Physical Activity:     Days of Exercise per Week:     Minutes of Exercise per Session:    Stress:     Feeling of Stress :    Social Connections:     Frequency of Communication with Friends and Family:     Frequency of Social Gatherings with Friends and Family:     Attends Shinto Services:     Active Member of Clubs or Organizations:     Attends Club or Organization Meetings:     Marital Status:    Intimate Partner Violence:     Fear of Current or Ex-Partner:     Emotionally Abused:     Physically Abused:     Sexually Abused:        Family History   Problem Relation Age of Onset    No Known Problems Mother     No Known Problems Father          Current Facility-Administered Medications:     piperacillin-tazobactam (ZOSYN) 3.375 g in 0.9% sodium chloride (MBP/ADV) 100 mL MBP, 3.375 g, IntraVENous, NOW, Nelly Holder MD    iopamidoL (ISOVUE-370) 76 % injection 100 mL, 100 mL, IntraVENous, RAD ONCE, Nelly Holder MD    iopamidoL (ISOVUE-370) 76 % injection, , , ,     Current Outpatient Medications:     levETIRAcetam (KEPPRA) 750 mg tablet, Take 1 Tab by mouth every twelve (12) hours. , Disp: 60 Tab, Rfl: 0    aspirin delayed-release 81 mg tablet, Take 1 Tab by mouth daily. , Disp: 90 Tab, Rfl: 3    polyethylene glycol (MIRALAX) 17 gram/dose powder, Take 17 g by mouth daily as needed for Constipation. , Disp: 235 g, Rfl: 1    balsam peru-castor oiL (VENELEX) ointment, Apply  to affected area three (3) times daily. , Disp: 1 Tube, Rfl: 0    famotidine (PEPCID) 20 mg tablet, Take 1 Tab by mouth every evening., Disp: 30 Tab, Rfl: 0    acetaminophen (TYLENOL) 325 mg tablet, Take 650 mg by mouth every six (6) hours as needed for Pain., Disp: , Rfl:     No Known Allergies    ROS   Unable to obtain, patient on ventilator. Physical Exam:     Visit Vitals  BP (!) 103/55   Pulse 68   Temp 98.2 °F (36.8 °C)   Resp 12   SpO2 97%       General - unresponsive  HEENT - NC/AT, tracheostomy  Pulm - coarse bilaterally  CV - RRR  Abd -soft, nondistended. Unable to assess tenderness. No palpable masses. Ext - warm, well perfused, no edema  Lymphatics -no inguinal adenopathy.   Skin - supple and no rashes  Psychiatric - unresponsive    Labs  Recent Results (from the past 24 hour(s))   POC G3 - PUL    Collection Time: 09/07/21  1:48 PM   Result Value Ref Range    FIO2 (POC) 30 %    pH (POC) 7.52 (H) 7.35 - 7.45      pCO2 (POC) 40.6 35.0 - 45.0 MMHG    pO2 (POC) 71 (L) 80 - 100 MMHG    HCO3 (POC) 32.8 (H) 22 - 26 MMOL/L    sO2 (POC) 95.5 92 - 97 %    Base excess (POC) 9.1 mmol/L    Site RIGHT BRACHIAL      Device: ADULT VENT      Mode ASSIST CONTROL      Tidal volume 500 ml    Set Rate 12 bpm    PEEP/CPAP (POC) 8 cmH2O    Allens test (POC) Positive      Specimen type (POC) ARTERIAL     CBC WITH AUTOMATED DIFF    Collection Time: 09/07/21  2:53 PM   Result Value Ref Range    WBC 9.0 4.1 - 11.1 K/uL    RBC 2.52 (L) 4.10 - 5.70 M/uL    HGB 7.7 (L) 12.1 - 17.0 g/dL    HCT 25.0 (L) 36.6 - 50.3 %    MCV 99.2 (H) 80.0 - 99.0 FL    MCH 30.6 26.0 - 34.0 PG    MCHC 30.8 30.0 - 36.5 g/dL    RDW 17.1 (H) 11.5 - 14.5 %    PLATELET 645 (L) 837 - 400 K/uL    MPV 11.6 8.9 - 12.9 FL    NRBC 0.0 0  WBC    ABSOLUTE NRBC 0.00 0.00 - 0.01 K/uL    NEUTROPHILS 79 (H) 32 - 75 %    BAND NEUTROPHILS 1 0 - 6 %    LYMPHOCYTES 16 12 - 49 %    MONOCYTES 4 (L) 5 - 13 %    EOSINOPHILS 0 0 - 7 %    BASOPHILS 0 0 - 1 %    IMMATURE GRANULOCYTES 0 %    ABS. NEUTROPHILS 7.2 1.8 - 8.0 K/UL    ABS. LYMPHOCYTES 1.4 0.8 - 3.5 K/UL    ABS. MONOCYTES 0.4 0.0 - 1.0 K/UL    ABS. EOSINOPHILS 0.0 0.0 - 0.4 K/UL    ABS. BASOPHILS 0.0 0.0 - 0.1 K/UL    ABS. IMM. GRANS. 0.0 K/UL    DF MANUAL      RBC COMMENTS ANISOCYTOSIS  1+       METABOLIC PANEL, COMPREHENSIVE    Collection Time: 09/07/21  2:53 PM   Result Value Ref Range    Sodium 149 (H) 136 - 145 mmol/L    Potassium 3.5 3.5 - 5.1 mmol/L    Chloride 113 (H) 97 - 108 mmol/L    CO2 30 21 - 32 mmol/L    Anion gap 6 5 - 15 mmol/L    Glucose 150 (H) 65 - 100 mg/dL     (H) 6 - 20 MG/DL    Creatinine 0.95 0.70 - 1.30 MG/DL    BUN/Creatinine ratio 116 (H) 12 - 20      GFR est AA >60 >60 ml/min/1.73m2    GFR est non-AA >60 >60 ml/min/1.73m2    Calcium 8.8 8.5 - 10.1 MG/DL    Bilirubin, total 0.3 0.2 - 1.0 MG/DL    ALT (SGPT) 1,482 (H) 12 - 78 U/L    AST (SGOT) 975 (H) 15 - 37 U/L    Alk. phosphatase 264 (H) 45 - 117 U/L    Protein, total 6.0 (L) 6.4 - 8.2 g/dL    Albumin 1.3 (L) 3.5 - 5.0 g/dL    Globulin 4.7 (H) 2.0 - 4.0 g/dL    A-G Ratio 0.3 (L) 1.1 - 2.2     LIPASE    Collection Time: 09/07/21  2:53 PM   Result Value Ref Range    Lipase 106 73 - 393 U/L   LACTIC ACID    Collection Time: 09/07/21  2:53 PM   Result Value Ref Range    Lactic acid 1.6 0.4 - 2.0 MMOL/L   SAMPLES BEING HELD    Collection Time: 09/07/21  2:53 PM   Result Value Ref Range    SAMPLES BEING HELD 1RED, 1BLU     COMMENT        Add-on orders for these samples will be processed based on acceptable specimen integrity and analyte stability, which may vary by analyte.          Imaging  US Results (most recent):  Results from [place] encounter on 09/07/21    US ABD LTD    Narrative  ULTRASOUND OF THE RIGHT UPPER QUADRANT    INDICATION: rule out gb disease    COMPARISON: 14 months ago abdominal CT, 6/6/2021 ultrasound. TECHNIQUE:  Sonography of the right upper quadrant was performed. FINDINGS:    GALLBLADDER: The gallbladder is distended, new since the prior study, with a  large amount of intraluminal debris suggesting sludge. Upper normal gallbladder  wall thickness. COMMON DUCT: 0.4 cm in diameter. The duct is normal caliber. LIVER: Increased echogenicity. Tiny hepatic cyst 0.7 cm, no follow-up needed. .  No focal hepatic solid mass or intrahepatic biliary dilatation is shown. PANCREAS: The visualized portions of the pancreas are normal.  RIGHT KIDNEY: 12.4 cm in length. No hydronephrosis, shadowing calculus, or solid  contour-deforming renal mass. Right renal cyst 1.8 cm, no follow-up needed. .  Increased echogenicity. RIGHT ADRENAL GLAND: Adrenal mass 5.3 x 4.6 x 3.8 cm, compared to 4.6 x 4.1 x  3.4 cm on the prior study. Small free fluid in the right upper quadrant. Impression  1. Gallbladder distention with large volume of intraluminal sludge and mild wall  thickening. Correlate for acute cholecystitis. 2. No biliary ductal dilatation. 3. Hepatic steatosis is suspected. 4. Increased echogenicity of the right kidney suggesting medical renal  parenchymal disease. Correlate clinically. 5. Small ascites. 6. Increased size of a right adrenal mass. Correlate clinically and follow-up as  appropriate. I have personally reviewed all of the pertinent images     Assessment:     Donya White is a 80 y.o. male referred for possible acute cholecystitis. He remains on a ventilator with no signs of significant improvement since his discharge. Recommendations:     1.    I think it would be reasonable to get Palliative Care involved for discussions regarding goals of care planning. I reviewed this some with the patient's family who was at bedside who reiterated at this point they still want everything done. I discussed with the ER team that a CT would be reasonable to further evaluate the abdomen given inability to get a reliable exam from the patient and the abnormal findings on his U/S. The enlarging adrenal mass is certainly suspicious but he would not be a candidate for adrenalectomy even if this were a malignancy. Additionally he is not a candidate for any invasive abdominal surgical procedure given his overall clinical situation. If there is significant concern for cholecystitis, I would recommend treatment of this with a cholecystostomy tube and antibiotics if aggressive treatment is pursued.       Star Magdaleno MD  9/7/2021  8:42 PM

## 2021-09-08 NOTE — PROGRESS NOTES
Order noted for evalutation of current midline and replacement if necessary. Evaluated midline and noted that a portion of a plastic cap broken off in hub of old midline, able to remove cap piece, however, unable to aspirate blood or flush. Decision made to replace midline. 1800:  Returned to pts bedside to place new midline. Right arm grossly edematous (upper and lower). Spoke to MARYLIN Sanabria, NP and dopplers to be ordered. Evaluated left arm, however there is a 20 g IV in the right cephalic vein at ~ 10 cm (from ac) and a 20 g IV in the brachial at ~ 8 cm from ac. Unable to place midline appropriately with these sites in place. Spoke to primary nurse James Mckeon, who would like to keep current IVs intact, and decision made to wait until doppler completed on upper arms bilaterally, will reevaluate for midline placement post doppler study results.

## 2021-09-08 NOTE — PROGRESS NOTES
TRANSFER - OUT REPORT:    Verbal report given to [employer] (name) on No Villeda  being transferred to ICU(unit) for routine progression of care       Report consisted of patients Situation, Background, Assessment and   Recommendations(SBAR). Information from the following report(s) SBAR, Kardex, ED Summary, [facility] and Cardiac Rhythm AV paced was reviewed with the receiving nurse. Lines:   Peripheral IV 09/07/21 Left Antecubital (Active)        Opportunity for questions and clarification was provided.       Patient transported with:   Monitor  Registered Nurse  Tech

## 2021-09-08 NOTE — PROGRESS NOTES
Admission Medication Reconciliation:    Information obtained from:  Paperwork from [address] [place] [address]:  NO    Comments/Recommendations: Updated PTA meds/reviewed patient's allergies. 1)  The patient's PTA medication list was reviewed and compared to the paperwork sent over from [facility].    2)  Medication changes (since last review): Added  - D5W  - Micafungin (started 21)  - Meropenem (started 9/3/21)  - Iron sucrose  - Hydralazine PO  - Hydralazine IV PRN  - Carvedilol  - Heparin SQ  - Britton packets  - Chlorhexidine for mouth care  - Albuterol neb PRN  - Duonebs  - Allopurinol  - Lacosamide  - Insulin regular SSI    Adjusted  - Levetiracetam changed from 750 mg PO BID to 1500 mg IV Q12h    Removed  - Acetaminophen  - Aspirin     ¹[employer] pharmacy benefit data reflects medications filled and processed through the patient's insurance, however   this data does NOT capture whether the medication was picked up or is currently being taken by the patient. Allergies:  Patient has no known allergies. Significant PMH/Disease States:   Past Medical History:   Diagnosis Date    Cancer [facility])     prostate    Chronic systolic heart failure ([facility] [address] 2020    Constipation     Gout     Hyperlipemia     Hypertension     Pacemaker 2019    Stroke [facility])     Thrombocytopenia ([facility] [address] 3/19/2019    TIA (transient ischemic attack)      Chief Complaint for this Admission:    Chief Complaint   Patient presents with    Gallbladder Disease     Prior to Admission Medications:   Prior to Admission Medications   Prescriptions Last Dose Informant Taking?   0.9% sodium chloride solp 50 mL with meropenem 1 gram solr   Yes   Si g by IntraVENous route every eight (8) hours. Arginine-Glutamine-Calcium HMB (Britton) 7-7-1.5 gram pwpk   Yes   Sig: Take 1 Packet by mouth two (2) times a day.    albuterol (PROVENTIL VENTOLIN) 2.5 mg /3 mL (0.083 %) nebu   Yes   Si.5 mg by Nebulization route three (3) times daily as needed for Other (Respiratory issues). albuterol-ipratropium (DUO-NEB) 2.5 mg-0.5 mg/3 ml nebu   Yes   Sig: 3 mL by Nebulization route two (2) times a day. allopurinoL (ZYLOPRIM) 100 mg tablet   Yes   Sig: Take 100 mg by mouth daily. balsam peru-castor oiL (VENELEX) ointment   Yes   Sig: Apply  to affected area three (3) times daily. carvediloL (COREG) 12.5 mg tablet   Yes   Sig: Take 25 mg by mouth two (2) times a day. chlorhexidine (PERIDEX) 0.12 % solution   Yes   Sig: 15 mL by Swish and Spit route every twelve (12) hours as needed for Other (mouth care). dextrose 5% solution   Yes   Si mL/hr by IntraVENous route continuous. famotidine (PEPCID) 20 mg tablet   Yes   Sig: Take 20 mg by mouth daily. heparin sodium,porcine (heparin, porcine,) 5,000 unit/mL injection   Yes   Si,000 Units by SubCUTAneous route every twelve (12) hours. hydrALAZINE (APRESOLINE) 20 mg/mL injection   Yes   Sig: 10 mg by IntraVENous route every six (6) hours as needed for Other (SBP >160 mmHg). hydrALAZINE (APRESOLINE) 25 mg tablet   Yes   Sig: Take 25 mg by mouth three (3) times daily. insulin regular (NOVOLIN R, HUMULIN R) 100 unit/mL injection   Yes   Sig: by SubCUTAneous route every six (6) hours as needed (sliding scale insulin). For BG below 70, give 0 units + 1 amp D50  For BG 70 to 200, give 0 units  For  to 250, give 3 units  For  to 300, give 6 units  For  to 350, give 8 units  For  to 400, give 10 units,  For BG greater than 400, give 12 units   iron sucrose complex (IRON SUCROSE IV)   Yes   Si mg by IntraVENous route every fourty-eight (48) hours. lacosamide (Vimpat) 100 mg tab tablet   Yes   Sig: Take 100 mg by mouth every twelve (12) hours. levETIRAcetam in NaCl, iso-os, 1,500 mg/100 mL pgbk IVPB premix   Yes   Si,500 mg by IntraVENous route every twelve (12) hours every twelve (12) hours.    micafungin sodium (MICAFUNGIN IV)   Yes   Si mg by IntraVENous route every twenty-four (24) hours. polyethylene glycol (MIRALAX) 17 gram/dose powder   Yes   Sig: Take 17 g by mouth daily as needed for Constipation. Facility-Administered Medications: None     Please contact the main inpatient pharmacy with any questions or concerns at [phone] and we will direct you to the clinical pharmacist covering this patient's care while in-house.    Becky Turner, DAMIOND

## 2021-09-08 NOTE — PROGRESS NOTES
Please note, patients trach site is red & raw. Also, patient has the original Perc trach. Please consider changing the trach to a #6 DCT while in house.     Thank you  RT

## 2021-09-08 NOTE — PROGRESS NOTES
[facility]    ICU TEAM H&P    Name: Kevyn Fatima   : 3/9/1933   MRN: [id number]   Date: 2021      Assessment:     ICU Problems:  1. 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 Possible Cholecystitis - imaging pending  3. Elevated LFTs  4. Thrombocytopenia  5. Systolic Heart Failure  6. Possible fungal infection to peritoneal fluid per chart - on abx and antifungal prior to this admission. 7. Hypernatremia - likely due to dehydration. 8. PEG malfunction     ICU Comprehensive Plan of Care:     Plans for this Shift:   1. Admit to ICU  2. HIDA scan pending  3. General surgery Consulted - no surgical recs  4. If Cholyecystits IR for Perc drain. - None on CT HIDA scan pending  5. Palliative care recommended. 6. Antibiotics and Antifungal adjusted, continue PTA regimen. DC cefepime and flagyl  7. Trach care per ICU policy  8. Wean vent as tolerated for trach collar trials  9. Trend labs - LFTs  10. GI consulted for clogged PEG  11. SBP Goal of: > 90 mmHg  12. MAP Goal of: > 65 mmHg  13. None - For above SBP/MAP goals  14. IVFs: None . FWF started once PEG patent  15. Transfusion Trigger (Hgb): <7 g/dL  16. Respiratory Goals:  a. Chlorhexidine   b. Optimize PEEP/Ventilation/Oxygenation  c. Goal Tidal Volume 6 cc/kg based on IBW  d. Aim for lung protective ventilation  e. Head of bed > 30 degrees  f. Aggressive bronchopulmonary hygiene  17. Pulmonary toilet: Duo-Nebs   18. SpO2 Goal: > 92%  19. Keep K>4; Mg>2   20. PT/OT: PT consulted and on board, OT consulted and on board and Speech therapy consulted and on board   21. Goals of Care Discussion with family Yes   25. Plan of Care/Code Status: Full Code  23. Appreciate Consultants Input   24. Discussed Care Plan with Bedside RN  25. Documentation of Current Medications  26.  Rest of Plan Below:    F - Feeding:  Yes TF per PEG   A - Analgesia: Fentanyl PRN  S - Sedation: None  T - DVT Prophylaxis: SCD's or Sequential Compression Device   H - Head of Bed: > 30 Degrees  U - Ulcer Prophylaxis: Not at this time   G - Glycemic Control: Insulin   PRN  S - Spontaneous Breathing Trial: Yes in am  B - Bowel Regimen: MiraLax  I - Indwelling Catheter:   Tubes: Tracheostomy and PEG Tube  Lines: Peripheral IV  Drains: Avalos Catheter  D - De-escalation of Antibiotics: Meropenem  Eraxis    Subjective:   Progress Note: 9/8/2021      Reason for ICU Admission: Vent dependence for Chronic respiratory failure. HPI: This is  Lili Dexter  Is an 80-year-old male with past medical history of prostate cancer, hypertension, hyperlipidemia, CVA, thrombocytopenia, systolic heart failure who is currently a patient at [facility] with chronic respiratory failure with tracheostomy and ventilator dependence. Patient is nonverbal at baseline. Per the chart the patient had abdominal ultrasound done at [facility] which showed possible acute cholecystitis, the patient was sent to [facility] for a HIDA scan to rule out any gallbladder disease. CT scan of the abdomen and pelvis did reveal concerns for gallbladder disease, however surgery did not feel that the patient was good candidate to take to the operating room. HIDA scan pending. Patient started on empiric antibiotics at this time. Given multiple comorbidities palliative care consultation is also in place. Transfer to ICU for continued care. Overnight events:  No acute events. No evidence of cholecystitis per CT or HIDA  Abx and antifungal adjusted.    Rehydration restart FWF for hypernatremia  PEG de-cloged by GI - restart TF    POD:  * No surgery found *    S/P:       Active Problem List:     Problem List  Date Reviewed: 12/17/2020        Codes Class    Chronic respiratory failure ([facility] ICD-10-CM: J96.10  ICD-9-CM: 518.83         Abdominal pain ICD-10-CM: R10.9  ICD-9-CM: 789.00         Acute respiratory failure with hypoxia ([facility] ICD-10-CM: J96.01  ICD-9-CM: 518.81 Aspiration into respiratory tract ICD-10-CM: T17.908A  ICD-9-CM: 934.9         Hypokalemia ICD-10-CM: E87.6  ICD-9-CM: 276.8         Hypernatremia ICD-10-CM: E87.0  ICD-9-CM: 276.0         Moderate protein malnutrition (HCC) ICD-10-CM: E44.0  ICD-9-CM: 263.0         Failure to thrive (0-17) ICD-10-CM: R62.51  ICD-9-CM: 783.41         Bacteremia ICD-10-CM: R78.81  ICD-9-CM: 790.7         UTI (urinary tract infection) ICD-10-CM: N39.0  ICD-9-CM: 599.0         Dementia (HCC) ICD-10-CM: F03.90  ICD-9-CM: 294.20         Bedridden ICD-10-CM: Z74.01  ICD-9-CM: V49.84         Pressure ulcer ICD-10-CM: L89.90  ICD-9-CM: 707.00, 707.20         S/P percutaneous endoscopic gastrostomy (PEG) tube placement ([facility] ICD-10-CM: Z93.1  ICD-9-CM: V44.1         Dehydration ICD-10-CM: E86.0  ICD-9-CM: 276.51         Lactic acidosis ICD-10-CM: E87.2  ICD-9-CM: 276.2         Sepsis ([facility] ICD-10-CM: A41.9  ICD-9-CM: 038.9, 995.91         SOULEYMANE (acute kidney injury) ([facility] ICD-10-CM: N17.9  ICD-9-CM: 439. 9         Chronic systolic heart failure (HCC) ICD-10-CM: I50.22  ICD-9-CM: 428.22         Altered mental status ICD-10-CM: R41.82  ICD-9-CM: 780.97         Post-ictal state ([facility] ICD-10-CM: R56.9  ICD-9-CM: 780.39         Seizure ([facility] ICD-10-CM: R56.9  ICD-9-CM: 780.39         Atrial pacemaker lead displacement ICD-10-CM: T82.120A  ICD-9-CM: 996.01         Pacemaker ICD-10-CM: Z95.0  ICD-9-CM: V45.01     Overview Signed 3/22/2019  5:08 PM by Verenice Renee MD     3/22/2019 dual chamber Medtronic pacer             Bradycardia ICD-10-CM: R00.1  ICD-9-CM: 427.89         Transaminitis ICD-10-CM: R74.01  ICD-9-CM: 790.4         Hypertensive urgency ICD-10-CM: I16.0  ICD-9-CM: 401.9         Second degree AV block, Mobitz type I ICD-10-CM: I44.1  ICD-9-CM: 426.13     Overview Signed 3/21/2019  6:14 PM by Verenice Renee MD     Added automatically from request for surgery [id number]             Stage 3 chronic kidney disease ([facility] ICD-10-CM: N18.30  ICD-9-CM: 064. 3         Rotator cuff arthropathy of both shoulders ICD-10-CM: M12.811, M12.812  ICD-9-CM: 716.81         Cognitive impairment ICD-10-CM: R41.89  ICD-9-CM: 099.3         Systolic murmur HAYDEN-04-XI: R01.1  ICD-9-CM: 785.2         Essential hypertension ICD-10-CM: I10  ICD-9-CM: 401.9         Gout ICD-10-CM: M10.9  ICD-9-CM: 274.9         Pure hypercholesterolemia ICD-10-CM: E78.00  ICD-9-CM: 272.0         History of prostate cancer ICD-10-CM: Z85.46  ICD-9-CM: V10.46         Chronic constipation ICD-10-CM: K59.09  ICD-9-CM: 564.00         Dysuria ICD-10-CM: R30.0  ICD-9-CM: 698. 1         Weight loss ICD-10-CM: R63.4  ICD-9-CM: 783.21         Adrenal mass ([facility] ICD-10-CM: E27.8  ICD-9-CM: 255.8     Overview Addendum 12/17/2018  9:57 AM by Jefe Eisenberg MD     Stable/non-functioning adenoma on imaging                   Past Medical History:      has a past medical history of Cancer [facility]), Chronic systolic heart failure ([facility] (12/7/2020), Constipation, Gout, Hyperlipemia, Hypertension, Pacemaker (2019), Stroke ([facility] Thrombocytopenia ([facility] (3/19/2019), and TIA (transient ischemic attack) (2013). Past Surgical History:      has a past surgical history that includes hx urological; hx prostatectomy; pr ins new/rplcmt prm pm w/transv eltrd atrial&vent (Right, 3/22/2019); pr ins new/rplcmt prm pacemakr w/trans eltrd atrial (N/A, 10/18/2019); and place percut gastrostomy tube (11/30/2020). Home Medications:     Prior to Admission medications    Medication Sig Start Date End Date Taking? Authorizing Provider   levETIRAcetam (KEPPRA) 750 mg tablet Take 1 Tab by mouth every twelve (12) hours. 12/11/20   Pippa Santacruz NP   aspirin delayed-release 81 mg tablet Take 1 Tab by mouth daily. 7/13/20   Drew Cortés MD   polyethylene glycol [facility]) 17 gram/dose powder Take 17 g by mouth daily as needed for Constipation.  7/13/20   Drew Cortés MD   [facility]am [facility]) ointment Apply  to affected area three (3) times daily. 20   Dane Hernandez MD   famotidine (PEPCID) 20 mg tablet Take 1 Tab by mouth every evening. 20   Dane Hernandez MD   acetaminophen (TYLENOL) 325 mg tablet Take 650 mg by mouth every six (6) hours as needed for Pain. Provider, Historical       Allergies/Social/Family History:     No Known Allergies   Social History     Tobacco Use    Smoking status: Never Smoker    Smokeless tobacco: Never Used   Substance Use Topics    Alcohol use: No      Family History   Problem Relation Age of Onset    No Known Problems Mother     No Known Problems Father        Review of Systems:     Review of systems not obtained due to patient factors. Objective:   Vital Signs:  Visit Vitals  BP (!) 124/55   Pulse 60   Temp 98.2 °F (36.8 °C)   Resp 12   SpO2 100%      O2 Device: Ventilator, Tracheostomy Temp (24hrs), Av.2 °F (36.8 °C), Min:98.2 °F (36.8 °C), Max:98.2 °F (36.8 °C)           Intake/Output:   No intake or output data in the 24 hours ending 21 0734    Physical Exam:    General:  Sedated and on the ventilator. No acute distress. Eyes:  Sclera anicteric. Pupils equal, round, reactive to light. Mouth/Throat: Orotracheal tube in place. Neck: Supple. Lungs:   Clear to auscultation bilaterally, good effort. Cardiovascular:  Regular rate and rhythm, no murmur, click, rub, or gallop. Abdomen:   Soft, non-tender, bowel sounds normal, non-distended. Extremities: No cyanosis or edema. Skin: No acute rash or lesions. Lymph Nodes: Cervical and supraclavicular normal.   Musculoskeletal:  No swelling or deformity. Lines/Devices:  Intact, no erythema, drainage, or tenderness. Psychiatric: Sedated and appears comfortable on ventilator.          LABS AND  DATA: Personally reviewed  Recent Labs     21  0621  1453   WBC 9.0 9.0   HGB 7.5* 7.7*   HCT 24.4* 25.0*   * 133*     Recent Labs     21  0625 21  0500 09/07/21  1453   *  --  149*   K 3.7  --  3.5   *  --  113*   CO2 33*  --  30   *  --  110*   CREA 0.96  --  0.95   *  --  150*   CA 8.6  --  8.8   MG  --  3.0*  --    PHOS  --  2.0*  --      Recent Labs     09/07/21  1453   *   TP 6.0*   ALB 1.3*   GLOB 4.7*   LPSE 106     No results for input(s): INR, PTP, APTT, INREXT, INREXT in the last 72 hours. Recent Labs     09/07/21  1348   PHI 7.52*   PCO2I 40.6   PO2I 71*   FIO2I 30     No results for input(s): CPK, CKMB, TROIQ, BNPP in the last 72 hours. Hemodynamics:   PAP:   CO:     Wedge:   CI:     CVP:    SVR:       PVR:       Ventilator Settings:  Mode Rate Tidal Volume Pressure FiO2 PEEP   Assist control, Volume control   500 ml    30 % 8 cm H20     Peak airway pressure: 21 cm H2O    Minute ventilation: 6.2 l/min        MEDS: Reviewed    Chest X-Ray:  CXR Results  (Last 48 hours)    None          Imaging:  CT ABD PELV W CONT   Final Result      1. No cholecystitis on CT. 2. Large left and moderate right pleural effusions. Body wall edema. Consider   fluid overload or hypoproteinemia. US ABD LTD   Final Result      1. Gallbladder distention with large volume of intraluminal sludge and mild wall   thickening. Correlate for acute cholecystitis. 2. No biliary ductal dilatation. 3. Hepatic steatosis is suspected. 4. Increased echogenicity of the right kidney suggesting medical renal   parenchymal disease. Correlate clinically. 5. Small ascites. 6. Increased size of a right adrenal mass. Correlate clinically and follow-up as   appropriate.       NM HEPATOBILIARY DUCT SCAN    (Results Pending)     ECHO:  Pending    Multidisciplinary Rounds Completed:  Pending    ABCDEF Bundle/Checklist Completed:  Yes    SPECIAL EQUIPMENT  Mechanical ventilation    DISPOSITION  Stay in ICU    CRITICAL CARE CONSULTANT NOTE  I had a face to face encounter with the patient, reviewed and interpreted patient data including clinical events, labs, images, vital signs, I/O's, and examined patient. I have discussed the case and the plan and management of the patient's care with the consulting services, the bedside nurses and the respiratory therapist.      NOTE OF PERSONAL INVOLVEMENT IN CARE   This patient has a high probability of imminent, clinically significant deterioration, which requires the highest level of preparedness to intervene urgently. I participated in the decision-making and personally managed or directed the management of the following life and organ supporting interventions that required my frequent assessment to treat or prevent imminent deterioration. I personally spent 35 minutes of critical care time. This is time spent at this critically ill patient's bedside actively involved in patient care as well as the coordination of care and discussions with the patient's family. This does not include any procedural time which has been billed separately.     Ananth Elaine AGACNP-BC     [address] [facility]

## 2021-09-09 ENCOUNTER — APPOINTMENT (OUTPATIENT)
Dept: VASCULAR SURGERY | Age: 86
DRG: 444 | End: 2021-09-09
Attending: HEALTH CARE PROVIDER
Payer: MEDICARE

## 2021-09-09 LAB
ALBUMIN SERPL-MCNC: 1.4 G/DL (ref 3.5–5)
ALBUMIN/GLOB SERPL: 0.3 {RATIO} (ref 1.1–2.2)
ALP SERPL-CCNC: 158 U/L (ref 45–117)
ALT SERPL-CCNC: 842 U/L (ref 12–78)
ANION GAP SERPL CALC-SCNC: 7 MMOL/L (ref 5–15)
AST SERPL-CCNC: 259 U/L (ref 15–37)
BILIRUB DIRECT SERPL-MCNC: 0.2 MG/DL (ref 0–0.2)
BILIRUB SERPL-MCNC: 0.5 MG/DL (ref 0.2–1)
BUN SERPL-MCNC: 103 MG/DL (ref 6–20)
BUN/CREAT SERPL: 104 (ref 12–20)
CALCIUM SERPL-MCNC: 9.1 MG/DL (ref 8.5–10.1)
CHLORIDE SERPL-SCNC: 119 MMOL/L (ref 97–108)
CO2 SERPL-SCNC: 24 MMOL/L (ref 21–32)
CREAT SERPL-MCNC: 0.99 MG/DL (ref 0.7–1.3)
ERYTHROCYTE [DISTWIDTH] IN BLOOD BY AUTOMATED COUNT: 17.4 % (ref 11.5–14.5)
GLOBULIN SER CALC-MCNC: 4.8 G/DL (ref 2–4)
GLUCOSE SERPL-MCNC: 94 MG/DL (ref 65–100)
HCT VFR BLD AUTO: 27.4 % (ref 36.6–50.3)
HGB BLD-MCNC: 8.6 G/DL (ref 12.1–17)
LACTATE SERPL-SCNC: 0.9 MMOL/L (ref 0.4–2)
MAGNESIUM SERPL-MCNC: 3 MG/DL (ref 1.6–2.4)
MCH RBC QN AUTO: 30.4 PG (ref 26–34)
MCHC RBC AUTO-ENTMCNC: 31.4 G/DL (ref 30–36.5)
MCV RBC AUTO: 96.8 FL (ref 80–99)
NRBC # BLD: 0 K/UL (ref 0–0.01)
NRBC BLD-RTO: 0 PER 100 WBC
PHOSPHATE SERPL-MCNC: 3 MG/DL (ref 2.6–4.7)
PLATELET # BLD AUTO: 147 K/UL (ref 150–400)
PMV BLD AUTO: 11.2 FL (ref 8.9–12.9)
POTASSIUM SERPL-SCNC: 3.5 MMOL/L (ref 3.5–5.1)
PROCALCITONIN SERPL-MCNC: 0.21 NG/ML
PROT SERPL-MCNC: 6.2 G/DL (ref 6.4–8.2)
RBC # BLD AUTO: 2.83 M/UL (ref 4.1–5.7)
SODIUM SERPL-SCNC: 150 MMOL/L (ref 136–145)
WBC # BLD AUTO: 8.6 K/UL (ref 4.1–11.1)

## 2021-09-09 PROCEDURE — 84145 PROCALCITONIN (PCT): CPT

## 2021-09-09 PROCEDURE — 84100 ASSAY OF PHOSPHORUS: CPT

## 2021-09-09 PROCEDURE — 74011250637 HC RX REV CODE- 250/637: Performed by: HEALTH CARE PROVIDER

## 2021-09-09 PROCEDURE — 93970 EXTREMITY STUDY: CPT

## 2021-09-09 PROCEDURE — 85027 COMPLETE CBC AUTOMATED: CPT

## 2021-09-09 PROCEDURE — 74011000250 HC RX REV CODE- 250: Performed by: NURSE PRACTITIONER

## 2021-09-09 PROCEDURE — 83735 ASSAY OF MAGNESIUM: CPT

## 2021-09-09 PROCEDURE — 74011250637 HC RX REV CODE- 250/637: Performed by: NURSE PRACTITIONER

## 2021-09-09 PROCEDURE — 74011250636 HC RX REV CODE- 250/636: Performed by: NURSE PRACTITIONER

## 2021-09-09 PROCEDURE — 80076 HEPATIC FUNCTION PANEL: CPT

## 2021-09-09 PROCEDURE — C9254 INJECTION, LACOSAMIDE: HCPCS | Performed by: NURSE PRACTITIONER

## 2021-09-09 PROCEDURE — 83605 ASSAY OF LACTIC ACID: CPT

## 2021-09-09 PROCEDURE — 65610000006 HC RM INTENSIVE CARE

## 2021-09-09 PROCEDURE — 80048 BASIC METABOLIC PNL TOTAL CA: CPT

## 2021-09-09 PROCEDURE — 74011000258 HC RX REV CODE- 258: Performed by: NURSE PRACTITIONER

## 2021-09-09 PROCEDURE — 94003 VENT MGMT INPAT SUBQ DAY: CPT

## 2021-09-09 PROCEDURE — 36415 COLL VENOUS BLD VENIPUNCTURE: CPT

## 2021-09-09 RX ORDER — BALSAM PERU/CASTOR OIL
OINTMENT (GRAM) TOPICAL 2 TIMES DAILY
Status: DISCONTINUED | OUTPATIENT
Start: 2021-09-09 | End: 2021-09-14 | Stop reason: HOSPADM

## 2021-09-09 RX ORDER — FUROSEMIDE 10 MG/ML
20 INJECTION INTRAMUSCULAR; INTRAVENOUS ONCE
Status: COMPLETED | OUTPATIENT
Start: 2021-09-09 | End: 2021-09-09

## 2021-09-09 RX ORDER — HEPARIN SODIUM 5000 [USP'U]/ML
5000 INJECTION, SOLUTION INTRAVENOUS; SUBCUTANEOUS EVERY 8 HOURS
Status: DISCONTINUED | OUTPATIENT
Start: 2021-09-09 | End: 2021-09-14 | Stop reason: HOSPADM

## 2021-09-09 RX ORDER — HYDRALAZINE HYDROCHLORIDE 20 MG/ML
10 INJECTION INTRAMUSCULAR; INTRAVENOUS
Status: DISCONTINUED | OUTPATIENT
Start: 2021-09-09 | End: 2021-09-14 | Stop reason: HOSPADM

## 2021-09-09 RX ADMIN — Medication 10 ML: at 23:12

## 2021-09-09 RX ADMIN — CHLORHEXIDINE GLUCONATE 15 ML: 0.12 RINSE ORAL at 09:00

## 2021-09-09 RX ADMIN — CARVEDILOL 25 MG: 12.5 TABLET, FILM COATED ORAL at 10:48

## 2021-09-09 RX ADMIN — SODIUM CHLORIDE 100 MG: 9 INJECTION, SOLUTION INTRAVENOUS at 02:05

## 2021-09-09 RX ADMIN — CARVEDILOL 25 MG: 12.5 TABLET, FILM COATED ORAL at 18:26

## 2021-09-09 RX ADMIN — Medication 10 ML: at 06:30

## 2021-09-09 RX ADMIN — HEPARIN SODIUM 5000 UNITS: 5000 INJECTION INTRAVENOUS; SUBCUTANEOUS at 18:30

## 2021-09-09 RX ADMIN — HEPARIN SODIUM 5000 UNITS: 5000 INJECTION INTRAVENOUS; SUBCUTANEOUS at 13:38

## 2021-09-09 RX ADMIN — LEVETIRACETAM 1500 MG: 100 INJECTION, SOLUTION INTRAVENOUS at 13:37

## 2021-09-09 RX ADMIN — Medication: at 18:34

## 2021-09-09 RX ADMIN — FAMOTIDINE 20 MG: 10 INJECTION, SOLUTION INTRAVENOUS at 10:48

## 2021-09-09 RX ADMIN — SODIUM CHLORIDE 100 MG: 9 INJECTION, SOLUTION INTRAVENOUS at 13:39

## 2021-09-09 RX ADMIN — POTASSIUM BICARBONATE 20 MEQ: 782 TABLET, EFFERVESCENT ORAL at 10:48

## 2021-09-09 RX ADMIN — HYDRALAZINE HYDROCHLORIDE 25 MG: 50 TABLET, FILM COATED ORAL at 10:48

## 2021-09-09 RX ADMIN — LEVETIRACETAM 1500 MG: 100 INJECTION, SOLUTION INTRAVENOUS at 02:05

## 2021-09-09 RX ADMIN — ALLOPURINOL 100 MG: 100 TABLET ORAL at 10:48

## 2021-09-09 RX ADMIN — Medication 10 ML: at 13:39

## 2021-09-09 RX ADMIN — MEROPENEM 500 MG: 500 INJECTION, POWDER, FOR SOLUTION INTRAVENOUS at 03:24

## 2021-09-09 RX ADMIN — FUROSEMIDE 20 MG: 10 INJECTION, SOLUTION INTRAMUSCULAR; INTRAVENOUS at 18:26

## 2021-09-09 RX ADMIN — HYDRALAZINE HYDROCHLORIDE 25 MG: 50 TABLET, FILM COATED ORAL at 16:36

## 2021-09-09 RX ADMIN — CHLORHEXIDINE GLUCONATE 15 ML: 0.12 RINSE ORAL at 20:44

## 2021-09-09 RX ADMIN — MEROPENEM 500 MG: 500 INJECTION, POWDER, FOR SOLUTION INTRAVENOUS at 10:48

## 2021-09-09 RX ADMIN — POTASSIUM BICARBONATE 20 MEQ: 782 TABLET, EFFERVESCENT ORAL at 18:25

## 2021-09-09 NOTE — WOUND CARE
WOCN Note:     New consult for unstageable wound. Chart shows:  Admitted for cholecystitis which has been ruled out by CT; ascites  History of heart failure  Admitted from [facility]    Assessment:   Intubated via trach. Requires full assists in repositioning onto right with help of RNSue. Has a Avalos. Surface: antony COMPA mattress    right heel intact and without erythema. Left heel has scar tissue and thick crust within puckered scar. Heels offloaded with pillows. 1. POA skin tear to left anterior thigh  2 x 0.7 x 0.1 cm  Pink base  no exudate  Foam cover dressing     2. POA skin tear to left elbow  Drying and no open area noted. Foam dressing to protect    3. POA sacral and bilateral buttock deep tissue injury  7 x 8 cm  Mixed field of non-blanching moist red & non-blanching intact purple/burgundy; rugged texture overall  Small amount of serosanguinous exudate  Sacral foam to cover until Venelex is up from pharmacy    Wound Recommendations:    Sacrum: venelex twice daily; cover with sacral foam dressing and change foam as needed  Skin tears: foam cover dressing changed every 3 days or as needed. PI Prevention:  Turn/reposition approximately every 2 hours  Offload heels with heels hanging off end of pillow at all times while in bed. Sacral Foam dressing: lift to assess regularly; change as needed. Discontinue if incontinence is frequently soiling dressing. Air mattress: Use only flat sheet and one incontinence pad. Please call Celestine @ [phone] for bed to be picked up at discharge. Discussed with RNSue.     Transition of Care: Plan to follow weekly and as needed while admitted to hospital.      CAMMY CaoN, RN, [facility]  Certified Wound, Ostomy, Continence Nurse  office [phone]  Available via [facility]

## 2021-09-09 NOTE — PROGRESS NOTES
Reason for Readmission:   For Krystina Arellanot scan and gen surg evaluation     RUR Score/Risk Level:    27%     PCP: First and Last name:  Seen at [address] [place] [address]   Name of Practice:    Are you a current patient: Yes/No:    Approximate date of last visit:    Can you participate in a virtual visit with your PCP:     Is a Care Conference indicated: No      Did you attend your follow up appointment (s): If not, why not:NA         Resources/supports as identified by patient/family: Car Heredia [phone]          Top Challenges facing patient (as identified by patient/family and CM): Finances/Medication cost?   Insurance : Medicare A,B with a supplement    Transportation    ALS with vent    Support system or lack thereof? Son and daughter    Living arrangements? NA      Self-care/ADLs/Cognition? Complete care          Current Advanced Directive/Advance Care Plan: On file           Plan for utilizing home health:   NA             Transition of Care Plan:    Based on readmission, the patient's previous Plan of Care   has been evaluated and/or modified. The current Transition of Care Plan is:    Return tp Susya    Patient presented to the ED from [address] [place] [address]  With possible  Cholecystits,Per notes US at [address] [place] [address] showed possible acute cholecystitis. Surgery consulted and felt patient was not a candidate for surgery. Plan will be for patient to return to [address] [place] [address]. Neil Plunkett RN,Care Management  Care Management Interventions  PCP Verified by CM:  Yes (Seen at [address] [place] [address])  Transition of Care Consult (CM Consult): LTAC Adenike Duenas Signup: Yes  Discharge Durable Medical Equipment: No  Physical Therapy Consult: No  Occupational Therapy Consult: No  Speech Therapy Consult: No  Support Systems: Child(kin) (daughter Belia Heredia [phone], Son Marvin Card [phone])  Confirm Follow Up Transport:  (will need ALS with vent transport)

## 2021-09-09 NOTE — PROGRESS NOTES
Comprehensive Nutrition Assessment    Type and Reason for Visit: Initial, Consult    Nutrition Recommendations/Plan:      Start bowel regimen if no results after starting EN    Increase tube feeding to goal: Osmolite 1.2 @ 55 ml/hr with 1 packet Prosource daily and 150 ml water flush q 4 hr    Nutrition Assessment:    Pt admitted from [facility] d/t Abdominal pain. PMHx: VDRF, HTN, HLD, CVA, Systolic heart failure. No cholecystitis per CT scan. Hepatitis-LFT's improving. PEG tube-GI unclogged yesterday. Palliative Care consulted. Familiar with patient from recent admission-sorry to see him back so soon. Mr Diana Swift tolerated EN well during previous admission-feeding resumed today. 11.5 kg weight gain in the past month per weight trend in EHR. Question accuracy. Continues to have some edema but not significantly different/worse from previous admission. Hypernatremia on admission; no improvement today d/t delayed start of EN. Increased water flush to 150 ml q 4 hr (900 ml total FW per day). Potassium replaced today. Recommended goal: Osmolite 1.2 @ 55 ml/hr with 1 packet Prosource daily. This will provide 1210 ml, 1510 calories. 97 gm protein and 1950 ml free water (tube feeding/flush) per day to meet estimated needs. Last BM PTA. Consider adding bowel regimen if no results after starting EN. Malnutrition Assessment:  Malnutrition Status:   Moderate malnutrition    Context:  Acute illness     Findings of the 6 clinical characteristics of malnutrition:   Energy Intake:  Unable to assess  Weight Loss:  Unable to assess     Body Fat Loss:  1 - Mild body fat loss, Triceps, Buccal region   Muscle Mass Loss:  1 - Mild muscle mass loss, Clavicles (pectoralis & deltoids)  Fluid Accumulation:  1 - Mild, Extremities   Strength:  Not performed     Nutritionally Significant Medications:   Pepcid, Miralax prn, Effer-K    Estimated Daily Nutrient Needs:  Energy (kcal): 1450; Weight Used for Energy Requirements: Current (84.5 kg)  Protein (g): 98 (1.5g/kg IBW or 1.1g/kg); Weight Used for Protein Requirements: Current  Fluid (ml/day):  ; Method Used for Fluid Requirements: 1 ml/kcal    Nutrition Related Findings:       BM: PTA  Edema: NP BUE, BLE  Wounds:  Deep tissue injury       Current Nutrition Therapies:   Diet: NPO  Tube Feeding: Osmolite 1.2 @ 40 ml/h riwth 150 ml water flush q 4 hr  Additional Caloric Sources:   None    Anthropometric Measures:  · Height:  5' 6\" (167.6 cm)  · Current Body Wt:  84.5 kg (186 lb 4.6 oz)     · Ideal Body Wt:  142:  131.2 %    · BMI Categories:  Obese class 1 (BMI 30.0-34. 9)     Wt Readings from Last 10 Encounters:   09/09/21 84.5 kg (186 lb 4.6 oz)   08/11/21 79.6 kg (175 lb 7.8 oz)   01/06/21 82.8 kg (182 lb 8.7 oz)   12/08/20 82.8 kg (182 lb 8.7 oz)   11/19/20 77.3 kg (170 lb 6.7 oz)   07/09/20 77.3 kg (170 lb 6.7 oz)   07/02/20 76.9 kg (169 lb 8.5 oz)   01/30/20 78.5 kg (173 lb)   10/18/19 82.6 kg (182 lb)   10/11/19 84.6 kg (186 lb 6.4 oz)       Nutrition Diagnosis:   · Inadequate protein-energy intake related to altered GI function as evidenced by NPO or clear liquid status due to medical condition. Nutrition Interventions:   Food and/or Nutrient Delivery: Start tube feeding  Nutrition Education and Counseling: No recommendations at this time  Coordination of Nutrition Care: Continue to monitor while inpatient    Goals: Tolerate EN at goal in next 1-2 days. Nutrition Monitoring and Evaluation:   Behavioral-Environmental Outcomes: None identified  Food/Nutrient Intake Outcomes: Enteral nutrition intake/tolerance  Physical Signs/Symptoms Outcomes: Biochemical data, Weight, GI status, Fluid status or edema    Discharge Planning:     Too soon to determine     Cleave PETRA Nayak CNSC  Contact: [employer]

## 2021-09-09 NOTE — PROGRESS NOTES
HIDA results noted showing normal filling and function of gallbladder. Will sign off. Please call with further questions.      Brenda Valle MD  9/9/2021  8:21 AM

## 2021-09-09 NOTE — PROGRESS NOTES
[facility]    ICU TEAM Progress Note    Name: Charl Holter   : 3/9/1933   MRN: [id number]   Date: 2021      Assessment:     ICU Problems:  1. 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 Elevated LFTs - improving with hydration  3. Thrombocytopenia - improving  4. Systolic Heart Failure - not in exacerbation  5. Possible fungal infection to peritoneal fluid per chart - on abx and antifungal prior to this admission - Hold  6. Hypernatremia - likely due to dehydration. FWF restarted  7. Incidental Pleural effusions on CT     ICU Comprehensive Plan of Care:     Plans for this Shift:   1. HIDA scan result- Patent cystic and common bile ducts. 2. General surgery Consulted - no surgical recs  3. No evidence of Cholyecystits on CTA abd  4. Palliative care recommended. 5. DC Antibiotics and Antifungals - monitor off no WBCs or fever or lactic acidosis   6. Trach care per ICU policy  7. Wean vent as tolerated for trach collar trials  8. Trend labs - LFTs  9. PEG now patent -start tube feeds with FWF  10. SBP Goal of: > 90 mmHg  11. MAP Goal of: > 65 mmHg  12. None - For above SBP/MAP goals  13. IVFs: None   14. Transfusion Trigger (Hgb): <7 g/dL  15. Respiratory Goals:  a. Optimize PEEP/Ventilation/Oxygenation  b. Goal Tidal Volume 6 cc/kg based on IBW  c. Aim for lung protective ventilation  d. Head of bed > 30 degrees  e. Aggressive bronchopulmonary hygiene  16. Pulmonary toilet: Duo-Nebs   17. SpO2 Goal: > 92%  18. Keep K>4; Mg>2   19. PT/OT: PT consulted and on board, OT consulted and on board and Speech therapy consulted and on board   20. Goals of Care Discussion with family Yes   24. Plan of Care/Code Status: Full Code  22. Appreciate Consultants Input   23. Discussed Care Plan with Bedside RN  24. Documentation of Current Medications  25.  Rest of Plan Below:    F - Feeding:  Yes TF per PEG   A - Analgesia: Fentanyl PRN  S - Sedation: None  T - DVT Prophylaxis: SCD's or Sequential Compression Device   H - Head of Bed: > 30 Degrees  U - Ulcer Prophylaxis: Not at this time   G - Glycemic Control: Insulin   PRN  S - Spontaneous Breathing Trial: Yes in am  B - Bowel Regimen: MiraLax  I - Indwelling Catheter:   Tubes: Tracheostomy and PEG Tube  Lines: Peripheral IV  Drains: Avalos Catheter  D - De-escalation of Antibiotics: Meropenem  Eraxis    Subjective:   Progress Note: 9/9/2021      Reason for ICU Admission: Vent dependence for Chronic respiratory failure. HPI: This is  Jony Weaver  Is an 20-year-old male with past medical history of prostate cancer, hypertension, hyperlipidemia, CVA, thrombocytopenia, systolic heart failure who is currently a patient at [facility] with chronic respiratory failure with tracheostomy and ventilator dependence. Patient is nonverbal at baseline. Per the chart the patient had abdominal ultrasound done at [facility] which showed possible acute cholecystitis, the patient was sent to [facility] for a HIDA scan to rule out any gallbladder disease. CT scan of the abdomen and pelvis did reveal concerns for gallbladder disease, however surgery did not feel that the patient was good candidate to take to the operating room. HIDA scan pending. Patient started on empiric antibiotics at this time. Given multiple comorbidities palliative care consultation is also in place. Transfer to ICU for continued care. Overnight events:  No acute events. No evidence of cholecystitis per CT or HIDA  Abx and antifungal  Held - monitor off.    Rehydration restart FWF for hypernatremia  PEG de-cloged by GI - restart TF.    POD:  * No surgery found *    S/P:       Active Problem List:     Problem List  Date Reviewed: 12/17/2020        Codes Class    Chronic respiratory failure ([facility] ICD-10-CM: J96.10  ICD-9-CM: 518.83         Abdominal pain ICD-10-CM: R10.9  ICD-9-CM: 789.00         Acute respiratory failure with hypoxia ([facility] ICD-10-CM: J96.01  ICD-9-CM: 518.81         Aspiration into respiratory tract ICD-10-CM: T17.908A  ICD-9-CM: 934.9         Hypokalemia ICD-10-CM: E87.6  ICD-9-CM: 276.8         Hypernatremia ICD-10-CM: E87.0  ICD-9-CM: 276.0         Moderate protein malnutrition (HCC) ICD-10-CM: E44.0  ICD-9-CM: 263.0         Failure to thrive (0-17) ICD-10-CM: R62.51  ICD-9-CM: 783.41         Bacteremia ICD-10-CM: R78.81  ICD-9-CM: 790.7         UTI (urinary tract infection) ICD-10-CM: N39.0  ICD-9-CM: 599.0         Dementia (HCC) ICD-10-CM: F03.90  ICD-9-CM: 294.20         Bedridden ICD-10-CM: Z74.01  ICD-9-CM: V49.84         Pressure ulcer ICD-10-CM: L89.90  ICD-9-CM: 707.00, 707.20         S/P percutaneous endoscopic gastrostomy (PEG) tube placement ([facility] ICD-10-CM: Z93.1  ICD-9-CM: V44.1         Dehydration ICD-10-CM: E86.0  ICD-9-CM: 276.51         Lactic acidosis ICD-10-CM: E87.2  ICD-9-CM: 276.2         Sepsis ([facility] ICD-10-CM: A41.9  ICD-9-CM: 038.9, 995.91         SOULEYMANE (acute kidney injury) ([facility] ICD-10-CM: N17.9  ICD-9-CM: 991. 9         Chronic systolic heart failure (HCC) ICD-10-CM: I50.22  ICD-9-CM: 428.22         Altered mental status ICD-10-CM: R41.82  ICD-9-CM: 780.97         Post-ictal state ([facility] ICD-10-CM: R56.9  ICD-9-CM: 780.39         Seizure ([facility] ICD-10-CM: R56.9  ICD-9-CM: 780.39         Atrial pacemaker lead displacement ICD-10-CM: T82.120A  ICD-9-CM: 996.01         Pacemaker ICD-10-CM: Z95.0  ICD-9-CM: V45.01     Overview Signed 3/22/2019  5:08 PM by Marylen Skill, MD     3/22/2019 dual chamber Medtronic pacer             Bradycardia ICD-10-CM: R00.1  ICD-9-CM: 427.89         Transaminitis ICD-10-CM: R74.01  ICD-9-CM: 790.4         Hypertensive urgency ICD-10-CM: I16.0  ICD-9-CM: 401.9         Second degree AV block, Mobitz type I ICD-10-CM: I44.1  ICD-9-CM: 426.13     Overview Signed 3/21/2019  6:14 PM by Marylen Skill, MD     Added automatically from request for surgery [id number]             Stage 3 chronic kidney disease ([facility] ICD-10-CM: N18.30  ICD-9-CM: 626. 3         Rotator cuff arthropathy of both shoulders ICD-10-CM: M12.811, M12.812  ICD-9-CM: 716.81         Cognitive impairment ICD-10-CM: R41.89  ICD-9-CM: 561.3         Systolic murmur AJP-00-KV: R01.1  ICD-9-CM: 785.2         Essential hypertension ICD-10-CM: I10  ICD-9-CM: 401.9         Gout ICD-10-CM: M10.9  ICD-9-CM: 274.9         Pure hypercholesterolemia ICD-10-CM: E78.00  ICD-9-CM: 272.0         History of prostate cancer ICD-10-CM: Z85.46  ICD-9-CM: V10.46         Chronic constipation ICD-10-CM: K59.09  ICD-9-CM: 564.00         Dysuria ICD-10-CM: R30.0  ICD-9-CM: 208. 1         Weight loss ICD-10-CM: R63.4  ICD-9-CM: 783.21         Adrenal mass ([facility].) ICD-10-CM: E27.8  ICD-9-CM: 255.8     Overview Addendum 12/17/2018  9:57 AM by Leonel Alvarez MD     Stable/non-functioning adenoma on imaging                   Past Medical History:      has a past medical history of Cancer [facility]), Chronic systolic heart failure ([facility].) (12/7/2020), Constipation, Gout, Hyperlipemia, Hypertension, Pacemaker (2019), Stroke ([facility] 75.), Thrombocytopenia ([facility] (3/19/2019), and TIA (transient ischemic attack) (2013). Past Surgical History:      has a past surgical history that includes hx urological; hx prostatectomy; pr ins new/rplcmt prm pm w/transv eltrd atrial&vent (Right, 3/22/2019); pr ins new/rplcmt prm pacemakr w/trans eltrd atrial (N/A, 10/18/2019); and place percut gastrostomy tube (11/30/2020). Home Medications:     Prior to Admission medications    Medication Sig Start Date End Date Taking? Authorizing Provider   dextrose 5% solution 50 mL/hr by IntraVENous route continuous. Yes Provider, Historical   micafungin sodium (MICAFUNGIN IV) 100 mg by IntraVENous route every twenty-four (24) hours. 9/6/21 9/13/21 Yes Provider, Historical   iron sucrose complex (IRON SUCROSE IV) 100 mg by IntraVENous route every fourty-eight (48) hours.  9/5/21 9/25/21 Yes Provider, Historical hydrALAZINE (APRESOLINE) 25 mg tablet Take 25 mg by mouth three (3) times daily. Yes Provider, Historical   0.9% sodium chloride solp 50 mL with meropenem 1 gram solr 1 g by IntraVENous route every eight (8) hours. 9/3/21 9/10/21 Yes Provider, Historical   carvediloL (COREG) 12.5 mg tablet Take 25 mg by mouth two (2) times a day. Yes Provider, Historical   hydrALAZINE (APRESOLINE) 20 mg/mL injection 10 mg by IntraVENous route every six (6) hours as needed for Other (SBP >160 mmHg). Yes Provider, Historical   heparin sodium,porcine (heparin, porcine,) 5,000 unit/mL injection 5,000 Units by SubCUTAneous route every twelve (12) hours. Yes Provider, Historical   chlorhexidine (PERIDEX) 0.12 % solution 15 mL by Swish and Spit route every twelve (12) hours as needed for Other (mouth care). Yes Provider, Historical   Arginine-Glutamine-Calcium HMB (Britton) 7-7-1.5 gram pwpk Take 1 Packet by mouth two (2) times a day. Yes Provider, Historical   albuterol (PROVENTIL VENTOLIN) 2.5 mg /3 mL (0.083 %) nebu 2.5 mg by Nebulization route three (3) times daily as needed for Other (Respiratory issues). Yes Provider, Historical   albuterol-ipratropium (DUO-NEB) 2.5 mg-0.5 mg/3 ml nebu 3 mL by Nebulization route two (2) times a day. Yes Provider, Historical   levETIRAcetam in NaCl, iso-os, 1,500 mg/100 mL pgbk IVPB premix 1,500 mg by IntraVENous route every twelve (12) hours every twelve (12) hours. Yes Provider, Historical   allopurinoL (ZYLOPRIM) 100 mg tablet Take 100 mg by mouth daily. Yes Provider, Historical   lacosamide (Vimpat) 100 mg tab tablet Take 100 mg by mouth every twelve (12) hours. Yes Provider, Historical   insulin regular (NOVOLIN R, HUMULIN R) 100 unit/mL injection by SubCUTAneous route every six (6) hours as needed (sliding scale insulin).  For BG below 70, give 0 units + 1 amp D50  For BG 70 to 200, give 0 units  For  to 250, give 3 units  For  to 300, give 6 units  For  to 350, give 8 units  For  to 400, give 10 units,  For BG greater than 400, give 12 units   Yes Provider, Historical   famotidine (PEPCID) 20 mg tablet Take 20 mg by mouth daily. Yes Provider, Historical   polyethylene glycol (MIRALAX) 17 gram/dose powder Take 17 g by mouth daily as needed for Constipation. 20  Yes Aleshia Mikes, MD   balsam peru-castor oiL (VENELEX) ointment Apply  to affected area three (3) times daily. 20  Yes Anisha Juan MD       Allergies/Social/Family History:     No Known Allergies   Social History     Tobacco Use    Smoking status: Never Smoker    Smokeless tobacco: Never Used   Substance Use Topics    Alcohol use: No      Family History   Problem Relation Age of Onset    No Known Problems Mother     No Known Problems Father        Review of Systems:     Review of systems not obtained due to patient factors. Objective:   Vital Signs:  Visit Vitals  BP (!) 145/63   Pulse 60   Temp (!) 96.5 °F (35.8 °C)   Resp 12   Ht 5' 6\" (1.676 m) Comment: From documentation on 21   Wt 84.5 kg (186 lb 4.6 oz)   SpO2 100%   BMI 30.07 kg/m²      O2 Device: Tracheostomy, Ventilator Temp (24hrs), Av.4 °F (36.3 °C), Min:96.5 °F (35.8 °C), Max:98.3 °F (36.8 °C)           Intake/Output:     Intake/Output Summary (Last 24 hours) at 2021 0816  Last data filed at 2021 0600  Gross per 24 hour   Intake 1385.2 ml   Output 1575 ml   Net -189.8 ml       Physical Exam:  General:  Sedated and on the ventilator. No acute distress. Eyes:  Sclera anicteric. Pupils equal, round, reactive to light. Mouth/Throat: Orotracheal tube in place. Neck: Supple. Lungs:   Clear to auscultation bilaterally, good effort. Cardiovascular:  Regular rate and rhythm, no murmur, click, rub, or gallop. Abdomen:   Soft, non-tender, bowel sounds normal, non-distended. Extremities: No cyanosis or edema. Skin: No acute rash or lesions.    Lymph Nodes: Cervical and supraclavicular normal. Musculoskeletal:  No swelling or deformity. Lines/Devices:  Intact, no erythema, drainage, or tenderness. Psychiatric: Sedated and appears comfortable on ventilator. LABS AND  DATA: Personally reviewed  Recent Labs     09/09/21  0614 09/08/21  0625   WBC 8.6 9.0   HGB 8.6* 7.5*   HCT 27.4* 24.4*   * 143*     Recent Labs     09/09/21  0531 09/08/21  0625 09/08/21  0500 09/07/21  1453   * 150*  --    < >   K 3.5 3.7  --    < >   * 117*  --    < >   CO2 24 33*  --    < >   * 108*  --    < >   CREA 0.99 0.96  --    < >   GLU 94 102*  --    < >   CA 9.1 8.6  --    < >   MG  --   --  3.0*  --    PHOS  --   --  2.0*  --     < > = values in this interval not displayed. Recent Labs     09/08/21  1014 09/07/21  1453   * 264*   TP 6.2* 6.0*   ALB 1.3* 1.3*   GLOB 4.9* 4.7*   LPSE  --  106     No results for input(s): INR, PTP, APTT, INREXT, INREXT in the last 72 hours. Recent Labs     09/07/21  1348   PHI 7.52*   PCO2I 40.6   PO2I 71*   FIO2I 30     No results for input(s): CPK, CKMB, TROIQ, BNPP in the last 72 hours. Hemodynamics:   PAP:   CO:     Wedge:   CI:     CVP:    SVR:       PVR:       Ventilator Settings:  Mode Rate Tidal Volume Pressure FiO2 PEEP   Assist control, Volume control   500 ml    30 % 5 cm H20 (weaned)     Peak airway pressure: 22 cm H2O    Minute ventilation: 6.52 l/min        MEDS: Reviewed    Chest X-Ray:  CXR Results  (Last 48 hours)    None          Imaging:  CT ABD PELV W CONT   Final Result      1. No cholecystitis on CT. 2. Large left and moderate right pleural effusions. Body wall edema. Consider   fluid overload or hypoproteinemia. US ABD LTD   Final Result      1. Gallbladder distention with large volume of intraluminal sludge and mild wall   thickening. Correlate for acute cholecystitis. 2. No biliary ductal dilatation. 3. Hepatic steatosis is suspected.    4. Increased echogenicity of the right kidney suggesting medical renal parenchymal disease. Correlate clinically. 5. Small ascites. 6. Increased size of a right adrenal mass. Correlate clinically and follow-up as   appropriate. NM HEPATOBILIARY DUCT SCAN    (Results Pending)     ECHO:  Pending    Multidisciplinary Rounds Completed: Yes    ABCDEF Bundle/Checklist Completed:  Yes    SPECIAL EQUIPMENT  Mechanical ventilation/Trach    DISPOSITION  Transfer to LTAC in am     CRITICAL CARE CONSULTANT NOTE  I had a face to face encounter with the patient, reviewed and interpreted patient data including clinical events, labs, images, vital signs, I/O's, and examined patient. I have discussed the case and the plan and management of the patient's care with the consulting services, the bedside nurses and the respiratory therapist.      NOTE OF PERSONAL INVOLVEMENT IN CARE   This patient has a high probability of imminent, clinically significant deterioration, which requires the highest level of preparedness to intervene urgently. I participated in the decision-making and personally managed or directed the management of the following life and organ supporting interventions that required my frequent assessment to treat or prevent imminent deterioration. I personally spent 35 minutes of critical care time. This is time spent at this critically ill patient's bedside actively involved in patient care as well as the coordination of care and discussions with the patient's family. This does not include any procedural time which has been billed separately.     Jaren Granados [facility]     [address] [facility]

## 2021-09-09 NOTE — PROGRESS NOTES
1930: Bedside and Verbal shift change report given to Salome Lee RN (oncoming nurse) by [employer] (offgoing nurse). Report included the following information SBAR, Kardex, ED Summary, OR Summary, Procedure Summary, Intake/Output, MAR, Accordion, Recent Results, Med Rec Status and Cardiac Rhythm AV paced. Assumed care of patient. Patient moans and grimaces to pain. No command following, BUE flaccid/floppy, BLE faccid/floppy. trached and vented. PEG clamped.

## 2021-09-10 LAB
ALBUMIN SERPL-MCNC: 1.2 G/DL (ref 3.5–5)
ALBUMIN/GLOB SERPL: 0.3 {RATIO} (ref 1.1–2.2)
ALP SERPL-CCNC: 154 U/L (ref 45–117)
ALT SERPL-CCNC: 477 U/L (ref 12–78)
ANION GAP SERPL CALC-SCNC: 7 MMOL/L (ref 5–15)
ANION GAP SERPL CALC-SCNC: 7 MMOL/L (ref 5–15)
AST SERPL-CCNC: 121 U/L (ref 15–37)
BILIRUB SERPL-MCNC: 0.3 MG/DL (ref 0.2–1)
BUN SERPL-MCNC: 92 MG/DL (ref 6–20)
BUN SERPL-MCNC: 95 MG/DL (ref 6–20)
BUN/CREAT SERPL: 84 (ref 12–20)
BUN/CREAT SERPL: 98 (ref 12–20)
CALCIUM SERPL-MCNC: 8.5 MG/DL (ref 8.5–10.1)
CALCIUM SERPL-MCNC: 8.6 MG/DL (ref 8.5–10.1)
CHLORIDE SERPL-SCNC: 118 MMOL/L (ref 97–108)
CHLORIDE SERPL-SCNC: 119 MMOL/L (ref 97–108)
CO2 SERPL-SCNC: 26 MMOL/L (ref 21–32)
CO2 SERPL-SCNC: 28 MMOL/L (ref 21–32)
CREAT SERPL-MCNC: 0.97 MG/DL (ref 0.7–1.3)
CREAT SERPL-MCNC: 1.09 MG/DL (ref 0.7–1.3)
ERYTHROCYTE [DISTWIDTH] IN BLOOD BY AUTOMATED COUNT: 17.8 % (ref 11.5–14.5)
GLOBULIN SER CALC-MCNC: 4.6 G/DL (ref 2–4)
GLUCOSE SERPL-MCNC: 160 MG/DL (ref 65–100)
GLUCOSE SERPL-MCNC: 169 MG/DL (ref 65–100)
HCT VFR BLD AUTO: 27.4 % (ref 36.6–50.3)
HGB BLD-MCNC: 8.6 G/DL (ref 12.1–17)
IRON SATN MFR SERPL: 50 % (ref 20–50)
IRON SERPL-MCNC: 61 UG/DL (ref 35–150)
MCH RBC QN AUTO: 30.5 PG (ref 26–34)
MCHC RBC AUTO-ENTMCNC: 31.4 G/DL (ref 30–36.5)
MCV RBC AUTO: 97.2 FL (ref 80–99)
NRBC # BLD: 0 K/UL (ref 0–0.01)
NRBC BLD-RTO: 0 PER 100 WBC
PLATELET # BLD AUTO: 155 K/UL (ref 150–400)
PMV BLD AUTO: 11.9 FL (ref 8.9–12.9)
POTASSIUM SERPL-SCNC: 3.4 MMOL/L (ref 3.5–5.1)
POTASSIUM SERPL-SCNC: 3.5 MMOL/L (ref 3.5–5.1)
PROCALCITONIN SERPL-MCNC: 0.2 NG/ML
PROT SERPL-MCNC: 5.8 G/DL (ref 6.4–8.2)
RBC # BLD AUTO: 2.82 M/UL (ref 4.1–5.7)
SODIUM SERPL-SCNC: 151 MMOL/L (ref 136–145)
SODIUM SERPL-SCNC: 154 MMOL/L (ref 136–145)
TIBC SERPL-MCNC: 122 UG/DL (ref 250–450)
WBC # BLD AUTO: 8.5 K/UL (ref 4.1–11.1)

## 2021-09-10 PROCEDURE — 74011250637 HC RX REV CODE- 250/637: Performed by: NURSE PRACTITIONER

## 2021-09-10 PROCEDURE — 36415 COLL VENOUS BLD VENIPUNCTURE: CPT

## 2021-09-10 PROCEDURE — 85027 COMPLETE CBC AUTOMATED: CPT

## 2021-09-10 PROCEDURE — 74011000250 HC RX REV CODE- 250: Performed by: NURSE PRACTITIONER

## 2021-09-10 PROCEDURE — 83540 ASSAY OF IRON: CPT

## 2021-09-10 PROCEDURE — 94003 VENT MGMT INPAT SUBQ DAY: CPT

## 2021-09-10 PROCEDURE — 80053 COMPREHEN METABOLIC PANEL: CPT

## 2021-09-10 PROCEDURE — 74011250636 HC RX REV CODE- 250/636: Performed by: NURSE PRACTITIONER

## 2021-09-10 PROCEDURE — 74011000258 HC RX REV CODE- 258: Performed by: NURSE PRACTITIONER

## 2021-09-10 PROCEDURE — C9254 INJECTION, LACOSAMIDE: HCPCS | Performed by: NURSE PRACTITIONER

## 2021-09-10 PROCEDURE — 74011250637 HC RX REV CODE- 250/637: Performed by: HEALTH CARE PROVIDER

## 2021-09-10 PROCEDURE — 65610000006 HC RM INTENSIVE CARE

## 2021-09-10 PROCEDURE — P9045 ALBUMIN (HUMAN), 5%, 250 ML: HCPCS | Performed by: NURSE PRACTITIONER

## 2021-09-10 PROCEDURE — 84145 PROCALCITONIN (PCT): CPT

## 2021-09-10 RX ORDER — FAMOTIDINE 40 MG/5ML
20 POWDER, FOR SUSPENSION ORAL DAILY
Status: DISCONTINUED | OUTPATIENT
Start: 2021-09-11 | End: 2021-09-14 | Stop reason: HOSPADM

## 2021-09-10 RX ORDER — ALBUMIN HUMAN 50 G/1000ML
25 SOLUTION INTRAVENOUS ONCE
Status: COMPLETED | OUTPATIENT
Start: 2021-09-10 | End: 2021-09-10

## 2021-09-10 RX ORDER — LACOSAMIDE 50 MG/1
100 TABLET ORAL EVERY 12 HOURS
Status: DISCONTINUED | OUTPATIENT
Start: 2021-09-10 | End: 2021-09-14 | Stop reason: HOSPADM

## 2021-09-10 RX ADMIN — CHLORHEXIDINE GLUCONATE 15 ML: 0.12 RINSE ORAL at 22:30

## 2021-09-10 RX ADMIN — Medication 20 ML: at 23:00

## 2021-09-10 RX ADMIN — ALBUMIN (HUMAN) 25 G: 12.5 INJECTION, SOLUTION INTRAVENOUS at 10:06

## 2021-09-10 RX ADMIN — HEPARIN SODIUM 5000 UNITS: 5000 INJECTION INTRAVENOUS; SUBCUTANEOUS at 20:12

## 2021-09-10 RX ADMIN — LEVETIRACETAM 1500 MG: 100 INJECTION, SOLUTION INTRAVENOUS at 14:09

## 2021-09-10 RX ADMIN — HEPARIN SODIUM 5000 UNITS: 5000 INJECTION INTRAVENOUS; SUBCUTANEOUS at 03:19

## 2021-09-10 RX ADMIN — Medication: at 10:08

## 2021-09-10 RX ADMIN — SODIUM CHLORIDE 100 MG: 9 INJECTION, SOLUTION INTRAVENOUS at 02:07

## 2021-09-10 RX ADMIN — SODIUM CHLORIDE 100 MG: 9 INJECTION, SOLUTION INTRAVENOUS at 14:28

## 2021-09-10 RX ADMIN — POTASSIUM BICARBONATE 40 MEQ: 782 TABLET, EFFERVESCENT ORAL at 17:37

## 2021-09-10 RX ADMIN — LEVETIRACETAM 1500 MG: 500 SOLUTION ORAL at 20:15

## 2021-09-10 RX ADMIN — CHLORHEXIDINE GLUCONATE 15 ML: 0.12 RINSE ORAL at 10:07

## 2021-09-10 RX ADMIN — Medication 10 ML: at 06:00

## 2021-09-10 RX ADMIN — HYDRALAZINE HYDROCHLORIDE 25 MG: 50 TABLET, FILM COATED ORAL at 10:06

## 2021-09-10 RX ADMIN — LACOSAMIDE 100 MG: 50 TABLET, FILM COATED ORAL at 20:15

## 2021-09-10 RX ADMIN — ALLOPURINOL 100 MG: 100 TABLET ORAL at 10:07

## 2021-09-10 RX ADMIN — LEVETIRACETAM 1500 MG: 100 INJECTION, SOLUTION INTRAVENOUS at 03:19

## 2021-09-10 RX ADMIN — POTASSIUM BICARBONATE 40 MEQ: 782 TABLET, EFFERVESCENT ORAL at 10:06

## 2021-09-10 RX ADMIN — CARVEDILOL 25 MG: 12.5 TABLET, FILM COATED ORAL at 10:07

## 2021-09-10 RX ADMIN — FAMOTIDINE 20 MG: 10 INJECTION, SOLUTION INTRAVENOUS at 10:06

## 2021-09-10 RX ADMIN — Medication: at 17:38

## 2021-09-10 RX ADMIN — HEPARIN SODIUM 5000 UNITS: 5000 INJECTION INTRAVENOUS; SUBCUTANEOUS at 12:17

## 2021-09-10 RX ADMIN — Medication 10 ML: at 14:28

## 2021-09-10 NOTE — PROGRESS NOTES
[facility]    ICU TEAM Progress Note    Name: Kevyn Fatima   : 3/9/1933   MRN: [id number]   Date: 9/10/2021      Assessment:     ICU Problems:  1. Chronic Respiratory Failure with trach and chronic ventilator dependence  2. Elevated LFTs - improved  3. Thrombocytopenia - improving  4. Systolic Heart Failure - not in exacerbation  5. Possible fungal infection to peritoneal fluid per chart - on abx and antifungal prior to this admission - Hold  6. Hypernatremia - FWF increased  7. Incidental Pleural effusions on CT     ICU Comprehensive Plan of Care:     Plans for this Shift:   1. HIDA scan result- Patent cystic and common bile ducts. 2. General surgery Consulted - no surgical recs  3. No evidence of Cholyecystits on CTA abd  4. Palliative care recommended. 5. DC Antibiotics and Antifungals - monitor off. No WBCs or fever or lactic acidosis   6. Trach care per ICU policy. 7. Wean vent as tolerated for trach collar trials  8. Trend labs - LFTs improved  9. PEG now patent - tube feeds with FWF  10. SBP Goal of: > 90 mmHg  11. MAP Goal of: > 65 mmHg  12. None - For above SBP/MAP goals  13. IVFs: None   14. Transfusion Trigger (Hgb): <7 g/dL  15. Respiratory Goals:  a. Optimize PEEP/Ventilation/Oxygenation  b. Goal Tidal Volume 6 cc/kg based on IBW  c. Aim for lung protective ventilation  d. Head of bed > 30 degrees  e. Aggressive bronchopulmonary hygiene  16. Pulmonary toilet: Duo-Nebs   17. SpO2 Goal: > 92%  18. Keep K>4; Mg>2   19. PT/OT: PT consulted and on board, OT consulted and on board and Speech therapy consulted and on board   20. Goals of Care Discussion with family Yes   24. Plan of Care/Code Status: Full Code  22. Appreciate Consultants Input   23. Discussed Care Plan with Bedside RN  24. Documentation of Current Medications  25.  Rest of Plan Below:    F - Feeding:  Yes TF per PEG   A - Analgesia: Fentanyl PRN  S - Sedation: None  T - DVT Prophylaxis: SCD's or Sequential Compression Device   H - Head of Bed: > 30 Degrees  U - Ulcer Prophylaxis: Pepcid (famotidine)   G - Glycemic Control: SSI PRN  S - Spontaneous Breathing Trial: Yes was tolerated   B - Bowel Regimen: MiraLax  I - Indwelling Catheter:   Tubes: Tracheostomy and PEG Tube  Lines: Peripheral IV  Drains: Avalos Catheter  D - De-escalation of Antibiotics: None    Subjective:   Progress Note: 9/10/2021      Reason for ICU Admission: Vent dependence for Chronic respiratory failure. HPI:   Felisa Stark  Is an 80-year-old male with past medical history of prostate cancer, hypertension, hyperlipidemia, CVA, thrombocytopenia, systolic heart failure who is currently a patient at [facility] with chronic respiratory failure with tracheostomy and ventilator dependence. Patient is nonverbal at baseline. Per the chart the patient had abdominal ultrasound done at [address] [place] [address] which showed possible acute cholecystitis, the patient was sent to [facility] for a HIDA scan to rule out any gallbladder disease. CT scan of the abdomen and pelvis did reveal concerns for gallbladder disease, however surgery did not feel that the patient was good candidate to take to the operating room. HIDA scan pending. Patient started on empiric antibiotics at this time. Given multiple comorbidities palliative care consultation is also in place. Transferred to ICU for continued care. Overnight events:  No acute events. No evidence of cholecystitis per CT or HIDA  Abx and antifungal  Held - monitor off. Rehydration restart FWF for hypernatremia  PEG de-cloged by GI - restart TF. Plan for transfer to [facility] - case management consulted.     POD:  * No surgery found *    S/P:       Active Problem List:     Problem List  Date Reviewed: 12/17/2020        Codes Class    Chronic respiratory failure ([facility] ICD-10-CM: J96.10  ICD-9-CM: 518.83         Abdominal pain ICD-10-CM: R10.9  ICD-9-CM: 789.00         Acute respiratory failure with hypoxia ([facility] ICD-10-CM: J96.01  ICD-9-CM: 518.81         Aspiration into respiratory tract ICD-10-CM: T17.908A  ICD-9-CM: 934.9         Hypokalemia ICD-10-CM: E87.6  ICD-9-CM: 276.8         Hypernatremia ICD-10-CM: E87.0  ICD-9-CM: 276.0         Moderate protein malnutrition (HCC) ICD-10-CM: E44.0  ICD-9-CM: 263.0         Failure to thrive (0-17) ICD-10-CM: R62.51  ICD-9-CM: 783.41         Bacteremia ICD-10-CM: R78.81  ICD-9-CM: 790.7         UTI (urinary tract infection) ICD-10-CM: N39.0  ICD-9-CM: 599.0         Dementia (HCC) ICD-10-CM: F03.90  ICD-9-CM: 294.20         Bedridden ICD-10-CM: Z74.01  ICD-9-CM: V49.84         Pressure ulcer ICD-10-CM: L89.90  ICD-9-CM: 707.00, 707.20         S/P percutaneous endoscopic gastrostomy (PEG) tube placement ([facility] ICD-10-CM: Z93.1  ICD-9-CM: V44.1         Dehydration ICD-10-CM: E86.0  ICD-9-CM: 276.51         Lactic acidosis ICD-10-CM: E87.2  ICD-9-CM: 276.2         Sepsis ([facility] ICD-10-CM: A41.9  ICD-9-CM: 038.9, 995.91         SOULEYMANE (acute kidney injury) ([facility] ICD-10-CM: N17.9  ICD-9-CM: 762. 9         Chronic systolic heart failure (HCC) ICD-10-CM: I50.22  ICD-9-CM: 428.22         Altered mental status ICD-10-CM: R41.82  ICD-9-CM: 780.97         Post-ictal state ([facility] ICD-10-CM: R56.9  ICD-9-CM: 780.39         Seizure ([facility] ICD-10-CM: R56.9  ICD-9-CM: 780.39         Atrial pacemaker lead displacement ICD-10-CM: T82.120A  ICD-9-CM: 996.01         Pacemaker ICD-10-CM: Z95.0  ICD-9-CM: V45.01     Overview Signed 3/22/2019  5:08 PM by Bia Nguyễn MD     3/22/2019 dual chamber Medtronic pacer             Bradycardia ICD-10-CM: R00.1  ICD-9-CM: 427.89         Transaminitis ICD-10-CM: R74.01  ICD-9-CM: 790.4         Hypertensive urgency ICD-10-CM: I16.0  ICD-9-CM: 401.9         Second degree AV block, Mobitz type I ICD-10-CM: I44.1  ICD-9-CM: 426.13     Overview Signed 3/21/2019  6:14 PM by Bia Nguyễn MD     Added automatically from request for surgery [id number]             Stage 3 chronic kidney disease ([facility] ICD-10-CM: N18.30  ICD-9-CM: 965. 3         Rotator cuff arthropathy of both shoulders ICD-10-CM: M12.811, M12.812  ICD-9-CM: 716.81         Cognitive impairment ICD-10-CM: R41.89  ICD-9-CM: 421.4         Systolic murmur [facility]-06-XP: R01.1  ICD-9-CM: 785.2         Essential hypertension ICD-10-CM: I10  ICD-9-CM: 401.9         Gout ICD-10-CM: M10.9  ICD-9-CM: 274.9         Pure hypercholesterolemia ICD-10-CM: E78.00  ICD-9-CM: 272.0         History of prostate cancer ICD-10-CM: Z85.46  ICD-9-CM: V10.46         Chronic constipation ICD-10-CM: K59.09  ICD-9-CM: 564.00         Dysuria ICD-10-CM: R30.0  ICD-9-CM: 782. 1         Weight loss ICD-10-CM: R63.4  ICD-9-CM: 783.21         Adrenal mass ([facility] ICD-10-CM: E27.8  ICD-9-CM: 255.8     Overview Addendum 12/17/2018  9:57 AM by Migeul Devine MD     Stable/non-functioning adenoma on imaging                   Past Medical History:      has a past medical history of Cancer [facility]), Chronic systolic heart failure ([facility] (12/7/2020), Constipation, Gout, Hyperlipemia, Hypertension, Pacemaker (2019), Stroke ([facility] Thrombocytopenia ([facility] (3/19/2019), and TIA (transient ischemic attack) (2013). Past Surgical History:      has a past surgical history that includes hx urological; hx prostatectomy; pr ins new/rplcmt prm pm w/transv eltrd atrial&vent (Right, 3/22/2019); pr ins new/rplcmt prm pacemakr w/trans eltrd atrial (N/A, 10/18/2019); and place percut gastrostomy tube (11/30/2020). Home Medications:     Prior to Admission medications    Medication Sig Start Date End Date Taking? Authorizing Provider   dextrose 5% solution 50 mL/hr by IntraVENous route continuous. Yes Provider, Historical   micafungin sodium (MICAFUNGIN IV) 100 mg by IntraVENous route every twenty-four (24) hours. 9/6/21 9/13/21 Yes Provider, Historical   iron sucrose complex (IRON SUCROSE IV) 100 mg by IntraVENous route every fourty-eight (48) hours.  9/5/21 9/25/21 Yes Provider, Historical   hydrALAZINE (APRESOLINE) 25 mg tablet Take 25 mg by mouth three (3) times daily. Yes Provider, Historical   0.9% sodium chloride solp 50 mL with meropenem 1 gram solr 1 g by IntraVENous route every eight (8) hours. 9/3/21 9/10/21 Yes Provider, Historical   carvediloL (COREG) 12.5 mg tablet Take 25 mg by mouth two (2) times a day. Yes Provider, Historical   hydrALAZINE (APRESOLINE) 20 mg/mL injection 10 mg by IntraVENous route every six (6) hours as needed for Other (SBP >160 mmHg). Yes Provider, Historical   heparin sodium,porcine (heparin, porcine,) 5,000 unit/mL injection 5,000 Units by SubCUTAneous route every twelve (12) hours. Yes Provider, Historical   chlorhexidine (PERIDEX) 0.12 % solution 15 mL by Swish and Spit route every twelve (12) hours as needed for Other (mouth care). Yes Provider, Historical   Arginine-Glutamine-Calcium HMB (Britton) 7-7-1.5 gram pwpk Take 1 Packet by mouth two (2) times a day. Yes Provider, Historical   albuterol (PROVENTIL VENTOLIN) 2.5 mg /3 mL (0.083 %) nebu 2.5 mg by Nebulization route three (3) times daily as needed for Other (Respiratory issues). Yes Provider, Historical   albuterol-ipratropium (DUO-NEB) 2.5 mg-0.5 mg/3 ml nebu 3 mL by Nebulization route two (2) times a day. Yes Provider, Historical   levETIRAcetam in NaCl, iso-os, 1,500 mg/100 mL pgbk IVPB premix 1,500 mg by IntraVENous route every twelve (12) hours every twelve (12) hours. Yes Provider, Historical   allopurinoL (ZYLOPRIM) 100 mg tablet Take 100 mg by mouth daily. Yes Provider, Historical   lacosamide (Vimpat) 100 mg tab tablet Take 100 mg by mouth every twelve (12) hours. Yes Provider, Historical   insulin regular (NOVOLIN R, HUMULIN R) 100 unit/mL injection by SubCUTAneous route every six (6) hours as needed (sliding scale insulin).  For BG below 70, give 0 units + 1 amp D50  For BG 70 to 200, give 0 units  For  to 250, give 3 units  For  to 300, give 6 units  For  to 350, give 8 units  For  to 400, give 10 units,  For BG greater than 400, give 12 units   Yes Provider, Historical   famotidine (PEPCID) 20 mg tablet Take 20 mg by mouth daily. Yes Provider, Historical   polyethylene glycol (MIRALAX) 17 gram/dose powder Take 17 g by mouth daily as needed for Constipation. 20  Yes Beau Velazquez, MD   balsam peru-castor oiL (VENELEX) ointment Apply  to affected area three (3) times daily. 20  Yes Corry Carranza MD       Allergies/Social/Family History:     No Known Allergies   Social History     Tobacco Use    Smoking status: Never Smoker    Smokeless tobacco: Never Used   Substance Use Topics    Alcohol use: No      Family History   Problem Relation Age of Onset    No Known Problems Mother     No Known Problems Father        Review of Systems:     Review of systems not obtained due to patient factors. Objective:   Vital Signs:  Visit Vitals  BP (!) 111/53   Pulse 60   Temp 97.3 °F (36.3 °C)   Resp 12   Ht 5' 6\" (1.676 m)   Wt 84.1 kg (185 lb 6.5 oz)   SpO2 99%   BMI 29.93 kg/m²      O2 Device: Tracheostomy, Ventilator Temp (24hrs), Av.4 °F (36.3 °C), Min:97 °F (36.1 °C), Max:97.6 °F (36.4 °C)           Intake/Output:     Intake/Output Summary (Last 24 hours) at 9/10/2021 1448  Last data filed at 9/10/2021 1428  Gross per 24 hour   Intake 2330 ml   Output 1095 ml   Net 1235 ml       Physical Exam:  General:  Sedated and on the ventilator. No acute distress. Eyes:  Sclera anicteric. Pupils equal, round, reactive to light. Mouth/Throat: Orotracheal tube in place. Neck: Supple. Lungs:   Clear to auscultation bilaterally, good effort. Cardiovascular:  Regular rate and rhythm, no murmur, click, rub, or gallop. Abdomen:   Soft, non-tender, bowel sounds normal, non-distended. Extremities: No cyanosis or edema. Skin: No acute rash or lesions.    Lymph Nodes: Cervical and supraclavicular normal.   Musculoskeletal:  No swelling or deformity. Lines/Devices:  Intact, no erythema, drainage, or tenderness. Psychiatric: Sedated and appears comfortable on ventilator. LABS AND  DATA: Personally reviewed  Recent Labs     09/10/21  0441 09/09/21  0614   WBC 8.5 8.6   HGB 8.6* 8.6*   HCT 27.4* 27.4*    147*     Recent Labs     09/10/21  0441 09/09/21  0531 09/08/21  0625 09/08/21  0500   * 150*   < >  --    K 3.4* 3.5   < >  --    * 119*   < >  --    CO2 28 24   < >  --    BUN 95* 103*   < >  --    CREA 0.97 0.99   < >  --    * 94   < >  --    CA 8.6 9.1   < >  --    MG  --  3.0*  --  3.0*   PHOS  --  3.0  --  2.0*    < > = values in this interval not displayed. Recent Labs     09/10/21  0441 09/09/21  0531 09/08/21  1014 09/07/21  1453   * 158*   < > 264*   TP 5.8* 6.2*   < > 6.0*   ALB 1.2* 1.4*   < > 1.3*   GLOB 4.6* 4.8*   < > 4.7*   LPSE  --   --   --  106    < > = values in this interval not displayed. No results for input(s): INR, PTP, APTT, INREXT, INREXT in the last 72 hours. No results for input(s): PHI, PCO2I, PO2I, FIO2I in the last 72 hours. No results for input(s): CPK, CKMB, TROIQ, BNPP in the last 72 hours. Hemodynamics:   PAP:   CO:     Wedge:   CI:     CVP:    SVR:       PVR:       Ventilator Settings:  Mode Rate Tidal Volume Pressure FiO2 PEEP   Assist control, Volume control   500 ml    30 % 5 cm H20     Peak airway pressure: 22 cm H2O    Minute ventilation: 6.23 l/min        MEDS: Reviewed    Chest X-Ray:  CXR Results  (Last 48 hours)    None          Imaging:  CT ABD PELV W CONT   Final Result      1. No cholecystitis on CT. 2. Large left and moderate right pleural effusions. Body wall edema. Consider   fluid overload or hypoproteinemia. US ABD LTD   Final Result      1. Gallbladder distention with large volume of intraluminal sludge and mild wall   thickening. Correlate for acute cholecystitis. 2. No biliary ductal dilatation.    3. Hepatic steatosis is suspected. 4. Increased echogenicity of the right kidney suggesting medical renal   parenchymal disease. Correlate clinically. 5. Small ascites. 6. Increased size of a right adrenal mass. Correlate clinically and follow-up as   appropriate. NM HEPATOBILIARY DUCT SCAN    (Results Pending)     ECHO:  Pending    Multidisciplinary Rounds Completed: Yes    ABCDEF Bundle/Checklist Completed:  Yes    SPECIAL EQUIPMENT  Mechanical ventilation/Trach    DISPOSITION  Transfer to LTAC in am     CRITICAL CARE CONSULTANT NOTE  I had a face to face encounter with the patient, reviewed and interpreted patient data including clinical events, labs, images, vital signs, I/O's, and examined patient. I have discussed the case and the plan and management of the patient's care with the consulting services, the bedside nurses and the respiratory therapist.      NOTE OF PERSONAL INVOLVEMENT IN CARE   This patient has a high probability of imminent, clinically significant deterioration, which requires the highest level of preparedness to intervene urgently. I participated in the decision-making and personally managed or directed the management of the following life and organ supporting interventions that required my frequent assessment to treat or prevent imminent deterioration. I personally spent 35 minutes of critical care time. This is time spent at this critically ill patient's bedside actively involved in patient care as well as the coordination of care and discussions with the patient's family. This does not include any procedural time which has been billed separately.     [facility]     [address] [facility]

## 2021-09-10 NOTE — PROGRESS NOTES
0730: Bedside and Verbal shift change report given to Viky RN (oncoming nurse) by Rachel Maradiaga RN (offgoing nurse). Report included the following information SBAR, Kardex, Intake/Output, MAR, Recent Results and Cardiac Rhythm paced.

## 2021-09-11 PROCEDURE — 94003 VENT MGMT INPAT SUBQ DAY: CPT

## 2021-09-11 PROCEDURE — 74011250636 HC RX REV CODE- 250/636: Performed by: NURSE PRACTITIONER

## 2021-09-11 PROCEDURE — 65610000006 HC RM INTENSIVE CARE

## 2021-09-11 PROCEDURE — 74011250637 HC RX REV CODE- 250/637: Performed by: HEALTH CARE PROVIDER

## 2021-09-11 PROCEDURE — 74011250637 HC RX REV CODE- 250/637: Performed by: NURSE PRACTITIONER

## 2021-09-11 RX ADMIN — LACOSAMIDE 100 MG: 50 TABLET, FILM COATED ORAL at 20:38

## 2021-09-11 RX ADMIN — Medication: at 08:40

## 2021-09-11 RX ADMIN — Medication 10 ML: at 13:52

## 2021-09-11 RX ADMIN — HEPARIN SODIUM 5000 UNITS: 5000 INJECTION INTRAVENOUS; SUBCUTANEOUS at 05:38

## 2021-09-11 RX ADMIN — LEVETIRACETAM 1500 MG: 500 SOLUTION ORAL at 08:36

## 2021-09-11 RX ADMIN — CHLORHEXIDINE GLUCONATE 15 ML: 0.12 RINSE ORAL at 08:38

## 2021-09-11 RX ADMIN — HEPARIN SODIUM 5000 UNITS: 5000 INJECTION INTRAVENOUS; SUBCUTANEOUS at 12:03

## 2021-09-11 RX ADMIN — CARVEDILOL 25 MG: 12.5 TABLET, FILM COATED ORAL at 17:49

## 2021-09-11 RX ADMIN — CARVEDILOL 25 MG: 12.5 TABLET, FILM COATED ORAL at 08:36

## 2021-09-11 RX ADMIN — CHLORHEXIDINE GLUCONATE 15 ML: 0.12 RINSE ORAL at 20:37

## 2021-09-11 RX ADMIN — LACOSAMIDE 100 MG: 50 TABLET, FILM COATED ORAL at 08:37

## 2021-09-11 RX ADMIN — Medication 10 ML: at 22:00

## 2021-09-11 RX ADMIN — HEPARIN SODIUM 5000 UNITS: 5000 INJECTION INTRAVENOUS; SUBCUTANEOUS at 20:37

## 2021-09-11 RX ADMIN — Medication 20 ML: at 05:39

## 2021-09-11 RX ADMIN — FAMOTIDINE 20 MG: 40 POWDER, FOR SUSPENSION ORAL at 08:37

## 2021-09-11 RX ADMIN — Medication: at 17:49

## 2021-09-11 RX ADMIN — LEVETIRACETAM 1500 MG: 500 SOLUTION ORAL at 20:37

## 2021-09-11 RX ADMIN — ALLOPURINOL 100 MG: 100 TABLET ORAL at 08:37

## 2021-09-11 RX ADMIN — ACETAMINOPHEN 650 MG: 325 TABLET ORAL at 18:38

## 2021-09-11 NOTE — PROGRESS NOTES
1930-bedside report received from Sai  using sbar format      0730-bedside report given to RN using sbar format

## 2021-09-11 NOTE — PROGRESS NOTES
Bedside shift change report given to Yvette Salas RN  (oncoming nurse) by Oleg Conner RN  (offgoing nurse). Report included the following information SBAR, Intake/Output, MAR, Cardiac Rhythm AV paced  and Dual Neuro Assessment. 1600: Temp 96.4, will place warming blanket. Bedside shift change report given to Genna Chin RN  (oncoming nurse) by Yvette Salas RN  (offgoing nurse). Report included the following information SBAR, Procedure Summary, MAR, Recent Results, Cardiac Rhythm AV paced  and Dual Neuro Assessment.

## 2021-09-11 NOTE — PROGRESS NOTES
[facility]    ICU TEAM Progress Note    Name: Kevyn Fatima   : 3/9/1933   MRN: [id number]   Date: 2021      Assessment:     ICU Problems:  1. Chronic Respiratory Failure with trach and chronic ventilator dependence  2. Elevated LFTs - improved  3. Thrombocytopenia - improving  4. Systolic Heart Failure - not in exacerbation  5. Possible fungal infection to peritoneal fluid per chart - on abx and antifungal prior to this admission - Hold  6. Hypernatremia - FWF increased  7. Incidental Pleural effusions on CT     ICU Comprehensive Plan of Care:     Plans for this Shift:   1. HIDA scan result- Patent cystic and common bile ducts. 2. General surgery Consulted - no surgical recs  3. No evidence of Cholyecystits on CTA abd  4. Palliative care recommended. 5. DC Antibiotics and Antifungals - monitor off. No WBCs or fever or lactic acidosis   6. Hold hydralizine as pt hypotensive overnight   7. Trach care per ICU policy. 8. Wean vent as tolerated for trach collar trials  9. Trend labs - LFTs improved  10. PEG now patent - tube feeds with FWF  11. SBP Goal of: > 90 mmHg  12. MAP Goal of: > 65 mmHg  13. None - For above SBP/MAP goals  14. IVFs: None   15. Transfusion Trigger (Hgb): <7 g/dL  16. Respiratory Goals:  a. Optimize PEEP/Ventilation/Oxygenation  b. Goal Tidal Volume 6 cc/kg based on IBW  c. Aim for lung protective ventilation  d. Head of bed > 30 degrees  e. Aggressive bronchopulmonary hygiene  17. Pulmonary toilet: Duo-Nebs   18. SpO2 Goal: > 92%  19. Keep K>4; Mg>2   20. PT/OT: PT consulted and on board, OT consulted and on board and Speech therapy consulted and on board   21. Goals of Care Discussion with family Yes   25. Plan of Care/Code Status: Full Code  23. Appreciate Consultants Input   24. Discussed Care Plan with Bedside RN  25. Documentation of Current Medications  26.  Rest of Plan Below:    F - Feeding:  Yes TF per PEG   A - Analgesia: Fentanyl PRN  S - Sedation: None  T - DVT Prophylaxis: SCD's or Sequential Compression Device   H - Head of Bed: > 30 Degrees  U - Ulcer Prophylaxis: Pepcid (famotidine)   G - Glycemic Control: SSI PRN  S - Spontaneous Breathing Trial: Yes was tolerated   B - Bowel Regimen: MiraLax  I - Indwelling Catheter:   Tubes: Tracheostomy and PEG Tube  Lines: Peripheral IV  Drains: Vaalos Catheter  D - De-escalation of Antibiotics: None    Subjective:   Progress Note: 9/11/2021      Reason for ICU Admission: Vent dependence for Chronic respiratory failure. HPI:   Stephy Wright  Is an 30-year-old male with past medical history of prostate cancer, hypertension, hyperlipidemia, CVA, thrombocytopenia, systolic heart failure who is currently a patient at [facility] with chronic respiratory failure with tracheostomy and ventilator dependence. Patient is nonverbal at baseline. Per the chart the patient had abdominal ultrasound done at [facility] which showed possible acute cholecystitis, the patient was sent to [facility] for a HIDA scan to rule out any gallbladder disease. CT scan of the abdomen and pelvis did reveal concerns for gallbladder disease, however surgery did not feel that the patient was good candidate to take to the operating room. HIDA scan pending. Patient started on empiric antibiotics at this time. Given multiple comorbidities palliative care consultation is also in place. Transferred to ICU for continued care. Overnight events:  No acute events. Abx and antifungal  Held - monitor off. Plan for transfer to [facility] - case management consulted.     POD:  * No surgery found *    S/P:       Active Problem List:     Problem List  Date Reviewed: 12/17/2020        Codes Class    Chronic respiratory failure ([facility] ICD-10-CM: J96.10  ICD-9-CM: 518.83         Abdominal pain ICD-10-CM: R10.9  ICD-9-CM: 789.00         Acute respiratory failure with hypoxia ([facility] ICD-10-CM: J96.01  ICD-9-CM: 518.81         Aspiration into respiratory tract ICD-10-CM: T17.908A  ICD-9-CM: 934.9         Hypokalemia ICD-10-CM: E87.6  ICD-9-CM: 276.8         Hypernatremia ICD-10-CM: E87.0  ICD-9-CM: 276.0         Moderate protein malnutrition (HCC) ICD-10-CM: E44.0  ICD-9-CM: 263.0         Failure to thrive (0-17) ICD-10-CM: R62.51  ICD-9-CM: 783.41         Bacteremia ICD-10-CM: R78.81  ICD-9-CM: 790.7         UTI (urinary tract infection) ICD-10-CM: N39.0  ICD-9-CM: 599.0         Dementia (HCC) ICD-10-CM: F03.90  ICD-9-CM: 294.20         Bedridden ICD-10-CM: Z74.01  ICD-9-CM: V49.84         Pressure ulcer ICD-10-CM: L89.90  ICD-9-CM: 707.00, 707.20         S/P percutaneous endoscopic gastrostomy (PEG) tube placement ([facility] ICD-10-CM: Z93.1  ICD-9-CM: V44.1         Dehydration ICD-10-CM: E86.0  ICD-9-CM: 276.51         Lactic acidosis ICD-10-CM: E87.2  ICD-9-CM: 276.2         Sepsis ([facility] ICD-10-CM: A41.9  ICD-9-CM: 038.9, 995.91         SOULEYMANE (acute kidney injury) ([facility] ICD-10-CM: N17.9  ICD-9-CM: 895. 9         Chronic systolic heart failure (HCC) ICD-10-CM: I50.22  ICD-9-CM: 428.22         Altered mental status ICD-10-CM: R41.82  ICD-9-CM: 780.97         Post-ictal state ([facility] ICD-10-CM: R56.9  ICD-9-CM: 780.39         Seizure ([facility] ICD-10-CM: R56.9  ICD-9-CM: 780.39         Atrial pacemaker lead displacement ICD-10-CM: T82.120A  ICD-9-CM: 996.01         Pacemaker ICD-10-CM: Z95.0  ICD-9-CM: V45.01     Overview Signed 3/22/2019  5:08 PM by Adelso Pantoja MD     3/22/2019 dual chamber Medtronic pacer             Bradycardia ICD-10-CM: R00.1  ICD-9-CM: 427.89         Transaminitis ICD-10-CM: R74.01  ICD-9-CM: 790.4         Hypertensive urgency ICD-10-CM: I16.0  ICD-9-CM: 401.9         Second degree AV block, Mobitz type I ICD-10-CM: I44.1  ICD-9-CM: 426.13     Overview Signed 3/21/2019  6:14 PM by Adelso Pantoja MD     Added automatically from request for surgery [id number]             Stage 3 chronic kidney disease ([facility] ICD-10-CM: N18.30  ICD-9-CM: 663. 3         Rotator cuff arthropathy of both shoulders ICD-10-CM: M12.811, M12.812  ICD-9-CM: 716.81         Cognitive impairment ICD-10-CM: R41.89  ICD-9-CM: 179.0         Systolic murmur SUQ-25-: R01.1  ICD-9-CM: 785.2         Essential hypertension ICD-10-CM: I10  ICD-9-CM: 401.9         Gout ICD-10-CM: M10.9  ICD-9-CM: 274.9         Pure hypercholesterolemia ICD-10-CM: E78.00  ICD-9-CM: 272.0         History of prostate cancer ICD-10-CM: Z85.46  ICD-9-CM: V10.46         Chronic constipation ICD-10-CM: K59.09  ICD-9-CM: 564.00         Dysuria ICD-10-CM: R30.0  ICD-9-CM: 423. 1         Weight loss ICD-10-CM: R63.4  ICD-9-CM: 783.21         Adrenal mass ([facility] ICD-10-CM: E27.8  ICD-9-CM: 255.8     Overview Addendum 12/17/2018  9:57 AM by Dirk Forbes MD     Stable/non-functioning adenoma on imaging                   Past Medical History:      has a past medical history of [facility]), Chronic systolic heart failure ([facility] (12/7/2020), Constipation, Gout, Hyperlipemia, Hypertension, Pacemaker (2019), Stroke ([facility] Thrombocytopenia ([facility] (3/19/2019), and TIA (transient ischemic attack) (2013). Past Surgical History:      has a past surgical history that includes hx urological; hx prostatectomy; pr ins new/rplcmt prm pm w/transv eltrd atrial&vent (Right, 3/22/2019); pr ins new/rplcmt prm pacemakr w/trans eltrd atrial (N/A, 10/18/2019); and place percut gastrostomy tube (11/30/2020). Home Medications:     Prior to Admission medications    Medication Sig Start Date End Date Taking? Authorizing Provider   dextrose 5% solution 50 mL/hr by IntraVENous route continuous. Yes Provider, Historical   micafungin sodium (MICAFUNGIN IV) 100 mg by IntraVENous route every twenty-four (24) hours. 9/6/21 9/13/21 Yes Provider, Historical   iron sucrose complex (IRON SUCROSE IV) 100 mg by IntraVENous route every fourty-eight (48) hours.  9/5/21 9/25/21 Yes Provider, Historical   hydrALAZINE (APRESOLINE) 25 mg tablet Take 25 mg by mouth three (3) times daily. Yes Provider, Historical   0.9% sodium chloride solp 50 mL with meropenem 1 gram solr 1 g by IntraVENous route every eight (8) hours. 9/3/21 9/10/21 Yes Provider, Historical   carvediloL (COREG) 12.5 mg tablet Take 25 mg by mouth two (2) times a day. Yes Provider, Historical   hydrALAZINE (APRESOLINE) 20 mg/mL injection 10 mg by IntraVENous route every six (6) hours as needed for Other (SBP >160 mmHg). Yes Provider, Historical   heparin sodium,porcine (heparin, porcine,) 5,000 unit/mL injection 5,000 Units by SubCUTAneous route every twelve (12) hours. Yes Provider, Historical   chlorhexidine (PERIDEX) 0.12 % solution 15 mL by Swish and Spit route every twelve (12) hours as needed for Other (mouth care). Yes Provider, Historical   Arginine-Glutamine-Calcium HMB (Britton) 7-7-1.5 gram pwpk Take 1 Packet by mouth two (2) times a day. Yes Provider, Historical   albuterol (PROVENTIL VENTOLIN) 2.5 mg /3 mL (0.083 %) nebu 2.5 mg by Nebulization route three (3) times daily as needed for Other (Respiratory issues). Yes Provider, Historical   albuterol-ipratropium (DUO-NEB) 2.5 mg-0.5 mg/3 ml nebu 3 mL by Nebulization route two (2) times a day. Yes Provider, Historical   levETIRAcetam in NaCl, iso-os, 1,500 mg/100 mL pgbk IVPB premix 1,500 mg by IntraVENous route every twelve (12) hours every twelve (12) hours. Yes Provider, Historical   allopurinoL (ZYLOPRIM) 100 mg tablet Take 100 mg by mouth daily. Yes Provider, Historical   lacosamide (Vimpat) 100 mg tab tablet Take 100 mg by mouth every twelve (12) hours. Yes Provider, Historical   insulin regular (NOVOLIN R, HUMULIN R) 100 unit/mL injection by SubCUTAneous route every six (6) hours as needed (sliding scale insulin).  For BG below 70, give 0 units + 1 amp D50  For BG 70 to 200, give 0 units  For  to 250, give 3 units  For  to 300, give 6 units  For  to 350, give 8 units  For  to 400, give 10 units,  For BG greater than 400, give 12 units   Yes Provider, Historical   famotidine (PEPCID) 20 mg tablet Take 20 mg by mouth daily. Yes Provider, Historical   polyethylene glycol (MIRALAX) 17 gram/dose powder Take 17 g by mouth daily as needed for Constipation. 20  Yes Felicitas Usama, MD   balsam peru-castor oiL (VENELEX) ointment Apply  to affected area three (3) times daily. 20  Yes Melissa Mondragon MD       Allergies/Social/Family History:     No Known Allergies   Social History     Tobacco Use    Smoking status: Never Smoker    Smokeless tobacco: Never Used   Substance Use Topics    Alcohol use: No      Family History   Problem Relation Age of Onset    No Known Problems Mother     No Known Problems Father        Review of Systems:     Review of systems not obtained due to patient factors. Objective:   Vital Signs:  Visit Vitals  BP (!) 99/47   Pulse 60   Temp 97 °F (36.1 °C)   Resp 12   Ht 5' 6\" (1.676 m)   Wt 89.3 kg (196 lb 13.9 oz)   SpO2 99%   BMI 31.78 kg/m²      O2 Device: Ventilator, Tracheostomy Temp (24hrs), Av.3 °F (36.3 °C), Min:97 °F (36.1 °C), Max:97.5 °F (36.4 °C)           Intake/Output:     Intake/Output Summary (Last 24 hours) at 2021 1138  Last data filed at 2021 1100  Gross per 24 hour   Intake 3130 ml   Output 1085 ml   Net 2045 ml       Physical Exam:  General:  Sedated and on the ventilator. No acute distress. Eyes:  Sclera anicteric. Pupils equal, round, reactive to light. Mouth/Throat: Orotracheal tube in place. Neck: Supple. Lungs:   Clear to auscultation bilaterally, good effort. Cardiovascular:  Regular rate and rhythm, no murmur, click, rub, or gallop. Abdomen:   Soft, non-tender, bowel sounds normal, non-distended. Extremities: No cyanosis or edema. Skin: No acute rash or lesions. Lymph Nodes: Cervical and supraclavicular normal.   Musculoskeletal:  No swelling or deformity. Lines/Devices:  Intact, no erythema, drainage, or tenderness. Psychiatric: Sedated and appears comfortable on ventilator. LABS AND  DATA: Personally reviewed  Recent Labs     09/10/21  0441 09/09/21  0614   WBC 8.5 8.6   HGB 8.6* 8.6*   HCT 27.4* 27.4*    147*     Recent Labs     09/10/21  1410 09/10/21  0441 09/09/21  0531 09/09/21  0531   * 154*   < > 150*   K 3.5 3.4*   < > 3.5   * 119*   < > 119*   CO2 26 28   < > 24   BUN 92* 95*   < > 103*   CREA 1.09 0.97   < > 0.99   * 160*   < > 94   CA 8.5 8.6   < > 9.1   MG  --   --   --  3.0*   PHOS  --   --   --  3.0    < > = values in this interval not displayed. Recent Labs     09/10/21  0441 09/09/21  0531   * 158*   TP 5.8* 6.2*   ALB 1.2* 1.4*   GLOB 4.6* 4.8*     No results for input(s): INR, PTP, APTT, INREXT, INREXT in the last 72 hours. No results for input(s): PHI, PCO2I, PO2I, FIO2I in the last 72 hours. No results for input(s): CPK, CKMB, TROIQ, BNPP in the last 72 hours. Hemodynamics:   PAP:   CO:     Wedge:   CI:     CVP:    SVR:       PVR:       Ventilator Settings:  Mode Rate Tidal Volume Pressure FiO2 PEEP   Assist control, Volume control   500 ml    30 % 5 cm H20     Peak airway pressure: 19 cm H2O    Minute ventilation: 6.16 l/min        MEDS: Reviewed    Chest X-Ray:  CXR Results  (Last 48 hours)    None          Imaging:  CT ABD PELV W CONT   Final Result      1. No cholecystitis on CT. 2. Large left and moderate right pleural effusions. Body wall edema. Consider   fluid overload or hypoproteinemia. US ABD LTD   Final Result      1. Gallbladder distention with large volume of intraluminal sludge and mild wall   thickening. Correlate for acute cholecystitis. 2. No biliary ductal dilatation. 3. Hepatic steatosis is suspected. 4. Increased echogenicity of the right kidney suggesting medical renal   parenchymal disease. Correlate clinically. 5. Small ascites. 6. Increased size of a right adrenal mass.  Correlate clinically and follow-up as appropriate. NM HEPATOBILIARY DUCT SCAN    (Results Pending)     ECHO:  Pending    Multidisciplinary Rounds Completed: Yes    ABCDEF Bundle/Checklist Completed:  Yes    SPECIAL EQUIPMENT  Mechanical ventilation/Trach    DISPOSITION  Transfer to LTAC in am     CRITICAL CARE CONSULTANT NOTE  I had a face to face encounter with the patient, reviewed and interpreted patient data including clinical events, labs, images, vital signs, I/O's, and examined patient. I have discussed the case and the plan and management of the patient's care with the consulting services, the bedside nurses and the respiratory therapist.      NOTE OF PERSONAL INVOLVEMENT IN CARE   This patient has a high probability of imminent, clinically significant deterioration, which requires the highest level of preparedness to intervene urgently. I participated in the decision-making and personally managed or directed the management of the following life and organ supporting interventions that required my frequent assessment to treat or prevent imminent deterioration. I personally spent 30 minutes of critical care time. This is time spent at this critically ill patient's bedside actively involved in patient care as well as the coordination of care and discussions with the patient's family. This does not include any procedural time which has been billed separately.     Padmaja Robert, AMANDA      Critical Care Medicine  [facility]

## 2021-09-11 NOTE — PROGRESS NOTES
Physician Progress Note      Sarmad Youssef  CSN #:                  [id number]  :                       3/9/1933  ADMIT DATE:       2021 1:15 PM  [address] [place] DATE:  RESPONDING  PROVIDER #:        Frank Matos DO          QUERY TEXT:    Dear Attending,    Patient admitted with chronic respiratory failure for eval of gallbladder with concern for cholecystitis, and PEG malfunction . Per  WOCN, noted to also have pressure ulcer. If possible, please document in progress notes and discharge summary the location, present on admission status and stage of the pressure ulcer: The medical record reflects the following:  Risk Factors: advanced age, admitted from [facility], trach and PEG present  Clinical Indicators:  WOCN- POA sacral and bilateral buttock deep tissue injury  Treatment: WOCN consult, venelex twice daily; cover with sacral foam dressing and change foam as needed, frequent turn and repositioning, monitoring    Stage 1:  Non-blanchable erythema of intact skin  Stage 2:  Abrasion, Blister, Partial-thickness skin loss, with exposed dermis  Stage 3:  Full-thickness skin loss with damage or necrosis of subcutaneous tissue  Stage 4:  Full-thickness skin & soft tissue loss through to underlying muscle, tendon or bone  Unstageable: Obscured full-thickness skin & tissue loss      Thank you,    Brittani Rubio, RN  [facility]  [phone]  Options provided:  -- Deep tissue injury, meaning pressure ulcer, of sacrum and bilateral buttock present on admission  -- Deep tissue injury, meaning contusion, of sacrum and bilateral buttock present on admission  -- Other - I will add my own diagnosis  -- Disagree - Not applicable / Not valid  -- Disagree - Clinically unable to determine / Unknown  -- Refer to Clinical Documentation Reviewer    PROVIDER RESPONSE TEXT:    This patient has a deep tissue injury, meaning pressure ulcer, of the sacrum and bilateral buttock which was present on admission.     Query created by: Saul Mercedes on 9/10/2021 12:46 PM      QUERY TEXT:    Dear Attending,    Patient admitted with chronic respiratory failure for eval of gallbladder with concern for cholecystitis, and PEG malfunction, noted to have RD consult on 9/9. If possible, please document in progress notes and discharge summary if you are evaluating and /or treating any of the following: The medical record reflects the following:  Risk Factors: advanced age, admitted from [facility], trach and PEG present  Clinical Indicators: 9/9 RD- Malnutrition Status: Moderate malnutrition  Context:  Acute illness  Findings of the 6 clinical characteristics of malnutrition:  Body Fat Loss:  1 - Mild body fat loss, Triceps, Buccal region  Muscle Mass Loss:  1 - Mild muscle mass loss, Clavicles (pectoralis & deltoids)  Fluid Accumulation:  1 - Mild, Extremities  Treatment: RD consult, TF, monitoring    ASPEN Criteria:  https://aspenjournals. onlinelibrary. alex. com/doi/full/10.1177/9057823585295708      Thank you,    Serjio Sol RN  [facility]  [phone]  Options provided:  -- Protein calorie malnutrition mild  -- Protein calorie malnutrition moderate  -- Other - I will add my own diagnosis  -- Disagree - Not applicable / Not valid  -- Disagree - Clinically unable to determine / Unknown  -- Refer to Clinical Documentation Reviewer    PROVIDER RESPONSE TEXT:    This patient has moderate protein calorie malnutrition.     Query created by: Saul Mercedes on 9/10/2021 12:48 PM      Electronically signed by:  Jorge Rogers DO 9/11/2021 10:16 AM

## 2021-09-12 ENCOUNTER — APPOINTMENT (OUTPATIENT)
Dept: GENERAL RADIOLOGY | Age: 86
DRG: 444 | End: 2021-09-12
Attending: NURSE PRACTITIONER
Payer: MEDICARE

## 2021-09-12 LAB
B PERT DNA SPEC QL NAA+PROBE: NOT DETECTED
BACTERIA SPEC CULT: NORMAL
BORDETELLA PARAPERTUSSIS PCR, BORPAR: NOT DETECTED
C PNEUM DNA SPEC QL NAA+PROBE: NOT DETECTED
FLUAV H1 2009 PAND RNA SPEC QL NAA+PROBE: NOT DETECTED
FLUAV H1 RNA SPEC QL NAA+PROBE: NOT DETECTED
FLUAV H3 RNA SPEC QL NAA+PROBE: NOT DETECTED
FLUAV SUBTYP SPEC NAA+PROBE: NOT DETECTED
FLUBV RNA SPEC QL NAA+PROBE: NOT DETECTED
HADV DNA SPEC QL NAA+PROBE: NOT DETECTED
HCOV 229E RNA SPEC QL NAA+PROBE: NOT DETECTED
HCOV HKU1 RNA SPEC QL NAA+PROBE: NOT DETECTED
HCOV NL63 RNA SPEC QL NAA+PROBE: NOT DETECTED
HCOV OC43 RNA SPEC QL NAA+PROBE: NOT DETECTED
HMPV RNA SPEC QL NAA+PROBE: NOT DETECTED
HPIV1 RNA SPEC QL NAA+PROBE: NOT DETECTED
HPIV2 RNA SPEC QL NAA+PROBE: NOT DETECTED
HPIV3 RNA SPEC QL NAA+PROBE: NOT DETECTED
HPIV4 RNA SPEC QL NAA+PROBE: NOT DETECTED
M PNEUMO DNA SPEC QL NAA+PROBE: NOT DETECTED
RSV RNA SPEC QL NAA+PROBE: NOT DETECTED
RV+EV RNA SPEC QL NAA+PROBE: NOT DETECTED
SARS-COV-2 PCR, COVPCR: NOT DETECTED
SERVICE CMNT-IMP: NORMAL

## 2021-09-12 PROCEDURE — 74011000636 HC RX REV CODE- 636: Performed by: NURSE PRACTITIONER

## 2021-09-12 PROCEDURE — 74011250636 HC RX REV CODE- 250/636: Performed by: NURSE PRACTITIONER

## 2021-09-12 PROCEDURE — 65610000003 HC RM ICU SURGICAL

## 2021-09-12 PROCEDURE — 0202U NFCT DS 22 TRGT SARS-COV-2: CPT

## 2021-09-12 PROCEDURE — 74011250637 HC RX REV CODE- 250/637: Performed by: NURSE PRACTITIONER

## 2021-09-12 PROCEDURE — 77030037877 HC DRSG MEPILEX >48IN BORD MOLN -A

## 2021-09-12 PROCEDURE — 94003 VENT MGMT INPAT SUBQ DAY: CPT

## 2021-09-12 PROCEDURE — 77030037878 HC DRSG MEPILEX >48IN BORD MOLN -B

## 2021-09-12 PROCEDURE — 74018 RADEX ABDOMEN 1 VIEW: CPT

## 2021-09-12 RX ADMIN — Medication 10 ML: at 22:00

## 2021-09-12 RX ADMIN — CHLORHEXIDINE GLUCONATE 15 ML: 0.12 RINSE ORAL at 08:16

## 2021-09-12 RX ADMIN — ALLOPURINOL 100 MG: 100 TABLET ORAL at 08:16

## 2021-09-12 RX ADMIN — LEVETIRACETAM 1500 MG: 500 SOLUTION ORAL at 20:52

## 2021-09-12 RX ADMIN — DIATRIZOATE MEGLUMINE AND DIATRIZOATE SODIUM 30 ML: 660; 100 LIQUID ORAL; RECTAL at 16:22

## 2021-09-12 RX ADMIN — Medication 10 ML: at 15:10

## 2021-09-12 RX ADMIN — Medication: at 17:20

## 2021-09-12 RX ADMIN — LACOSAMIDE 100 MG: 50 TABLET, FILM COATED ORAL at 20:50

## 2021-09-12 RX ADMIN — Medication: at 08:16

## 2021-09-12 RX ADMIN — LACOSAMIDE 100 MG: 50 TABLET, FILM COATED ORAL at 08:16

## 2021-09-12 RX ADMIN — HEPARIN SODIUM 5000 UNITS: 5000 INJECTION INTRAVENOUS; SUBCUTANEOUS at 04:06

## 2021-09-12 RX ADMIN — CARVEDILOL 25 MG: 12.5 TABLET, FILM COATED ORAL at 17:19

## 2021-09-12 RX ADMIN — LEVETIRACETAM 1500 MG: 500 SOLUTION ORAL at 08:16

## 2021-09-12 RX ADMIN — HEPARIN SODIUM 5000 UNITS: 5000 INJECTION INTRAVENOUS; SUBCUTANEOUS at 13:00

## 2021-09-12 RX ADMIN — FAMOTIDINE 20 MG: 40 POWDER, FOR SUSPENSION ORAL at 08:16

## 2021-09-12 RX ADMIN — CHLORHEXIDINE GLUCONATE 15 ML: 0.12 RINSE ORAL at 21:00

## 2021-09-12 RX ADMIN — HEPARIN SODIUM 5000 UNITS: 5000 INJECTION INTRAVENOUS; SUBCUTANEOUS at 20:50

## 2021-09-12 RX ADMIN — CARVEDILOL 25 MG: 12.5 TABLET, FILM COATED ORAL at 08:16

## 2021-09-12 RX ADMIN — Medication 10 ML: at 06:00

## 2021-09-12 NOTE — PROGRESS NOTES
[facility]    ICU TEAM Progress Note    Name: Leisa Mohs   : 3/9/1933   MRN: [id number]   Date: 2021      Assessment:     ICU Problems:  1. Chronic Respiratory Failure with trach and chronic ventilator dependence  2. Elevated LFTs - improved  3. Thrombocytopenia - improving  4. Systolic Heart Failure - not in exacerbation  5. Possible fungal infection to peritoneal fluid per chart - on abx and antifungal prior to this admission - Hold  6. Hypernatremia - FWF increased  7. Incidental Pleural effusions on CT     ICU Comprehensive Plan of Care:     Plans for this Shift:   1. Pt with new leak from PEG, Gastrogafin ordered with fu KUB this am pending   2. HIDA scan result- Patent cystic and common bile ducts. 3. General surgery Consulted - no surgical recs  4. No evidence of Cholyecystits on CTA abd  5. Palliative care recommended. 6. DC Antibiotics and Antifungals - monitor off. No WBCs or fever or lactic acidosis   7. Hold hydralizine as pt hypotensive overnight   8. Trach care per ICU policy. 9. Wean vent as tolerated for trach collar trials  10. Trend labs - LFTs improved  11. PEG now patent - tube feeds with FWF  12. SBP Goal of: > 90 mmHg  13. MAP Goal of: > 65 mmHg  14. None - For above SBP/MAP goals  15. IVFs: None   16. Transfusion Trigger (Hgb): <7 g/dL  17. Respiratory Goals:  a. Optimize PEEP/Ventilation/Oxygenation  b. Goal Tidal Volume 6 cc/kg based on IBW  c. Aim for lung protective ventilation  d. Head of bed > 30 degrees  e. Aggressive bronchopulmonary hygiene  18. Pulmonary toilet: Duo-Nebs   19. SpO2 Goal: > 92%  20. Keep K>4; Mg>2   21. PT/OT: PT consulted and on board, OT consulted and on board and Speech therapy consulted and on board   22. Goals of Care Discussion with family Yes   21. Plan of Care/Code Status: Full Code  24. Appreciate Consultants Input   25. Discussed Care Plan with Bedside RN  26. Documentation of Current Medications  27.  Rest of Plan Below:    F - Feeding:  Yes TF per PEG   A - Analgesia: Fentanyl PRN  S - Sedation: None  T - DVT Prophylaxis: SCD's or Sequential Compression Device   H - Head of Bed: > 30 Degrees  U - Ulcer Prophylaxis: Pepcid (famotidine)   G - Glycemic Control: SSI PRN  S - Spontaneous Breathing Trial: Yes was tolerated   B - Bowel Regimen: MiraLax  I - Indwelling Catheter:   Tubes: Tracheostomy and PEG Tube  Lines: Peripheral IV  Drains: Avalos Catheter  D - De-escalation of Antibiotics: None    Subjective:   Progress Note: 9/12/2021      Reason for ICU Admission: Vent dependence for Chronic respiratory failure. HPI:   Tristin More  Is an 59-year-old male with past medical history of prostate cancer, hypertension, hyperlipidemia, CVA, thrombocytopenia, systolic heart failure who is currently a patient at [facility] with chronic respiratory failure with tracheostomy and ventilator dependence. Patient is nonverbal at baseline. Per the chart the patient had abdominal ultrasound done at [facility] which showed possible acute cholecystitis, the patient was sent to [facility] for a HIDA scan to rule out any gallbladder disease. CT scan of the abdomen and pelvis did reveal concerns for gallbladder disease, however surgery did not feel that the patient was good candidate to take to the operating room. HIDA scan pending. Patient started on empiric antibiotics at this time. Given multiple comorbidities palliative care consultation is also in place. Transferred to ICU for continued care. Overnight events:  PEG tube leaking at site, spoke with radiologist, Gastrogaffin ordered will administer and then have KUB immediately after, still waiting for Gastrogaffin from pharmacy will call radiology once on floor    PT to be laterally transferred to CCU, will need to fu radiology once medication available       Abx and antifungal  Held - monitor off. Plan for transfer to [facility] - case management consulted.     POD:  * No surgery found *    S/P:       Active Problem List:     Problem List  Date Reviewed: 12/17/2020        Codes Class    Chronic respiratory failure ([facility] ICD-10-CM: J96.10  ICD-9-CM: 518.83         Abdominal pain ICD-10-CM: R10.9  ICD-9-CM: 789.00         Acute respiratory failure with hypoxia (HCC) ICD-10-CM: J96.01  ICD-9-CM: 518.81         Aspiration into respiratory tract ICD-10-CM: T17.908A  ICD-9-CM: 934.9         Hypokalemia ICD-10-CM: E87.6  ICD-9-CM: 276.8         Hypernatremia ICD-10-CM: E87.0  ICD-9-CM: 276.0         Moderate protein malnutrition (HCC) ICD-10-CM: E44.0  ICD-9-CM: 263.0         Failure to thrive (0-17) ICD-10-CM: R62.51  ICD-9-CM: 783.41         Bacteremia ICD-10-CM: R78.81  ICD-9-CM: 790.7         UTI (urinary tract infection) ICD-10-CM: N39.0  ICD-9-CM: 599.0         Dementia (HCC) ICD-10-CM: F03.90  ICD-9-CM: 294.20         Bedridden ICD-10-CM: Z74.01  ICD-9-CM: V49.84         Pressure ulcer ICD-10-CM: L89.90  ICD-9-CM: 707.00, 707.20         S/P percutaneous endoscopic gastrostomy (PEG) tube placement ([facility] ICD-10-CM: Z93.1  ICD-9-CM: V44.1         Dehydration ICD-10-CM: E86.0  ICD-9-CM: 276.51         Lactic acidosis ICD-10-CM: E87.2  ICD-9-CM: 276.2         Sepsis ([facility] ICD-10-CM: A41.9  ICD-9-CM: 038.9, 995.91         SOULEYMANE (acute kidney injury) ([facility] ICD-10-CM: N17.9  ICD-9-CM: 634. 9         Chronic systolic heart failure (HCC) ICD-10-CM: I50.22  ICD-9-CM: 428.22         Altered mental status ICD-10-CM: R41.82  ICD-9-CM: 780.97         Post-ictal state ([facility] ICD-10-CM: R56.9  ICD-9-CM: 780.39         Seizure ([facility] ICD-10-CM: R56.9  ICD-9-CM: 780.39         Atrial pacemaker lead displacement ICD-10-CM: T82.120A  ICD-9-CM: 996.01         Pacemaker ICD-10-CM: Z95.0  ICD-9-CM: V45.01     Overview Signed 3/22/2019  5:08 PM by Tiara Tolbert MD     3/22/2019 dual chamber Medtronic pacer             Bradycardia ICD-10-CM: R00.1  ICD-9-CM: 427.89         Transaminitis ICD-10-CM: R74.01  ICD-9-CM: 790.4         Hypertensive urgency ICD-10-CM: I16.0  ICD-9-CM: 401.9         Second degree AV block, Mobitz type I ICD-10-CM: I44.1  ICD-9-CM: 426.13     Overview Signed 3/21/2019  6:14 PM by Adrienne Jolly MD     Added automatically from request for surgery [id number]             Stage 3 chronic kidney disease ([facility] ICD-10-CM: N18.30  ICD-9-CM: 871. 3         Rotator cuff arthropathy of both shoulders ICD-10-CM: M12.811, M12.812  ICD-9-CM: 716.81         Cognitive impairment ICD-10-CM: R41.89  ICD-9-CM: 858.1         Systolic murmur [address]: R01.1  ICD-9-CM: 785.2         Essential hypertension ICD-10-CM: I10  ICD-9-CM: 401.9         Gout ICD-10-CM: M10.9  ICD-9-CM: 274.9         Pure hypercholesterolemia ICD-10-CM: E78.00  ICD-9-CM: 272.0         History of prostate cancer ICD-10-CM: Z85.46  ICD-9-CM: V10.46         Chronic constipation ICD-10-CM: K59.09  ICD-9-CM: 564.00         Dysuria ICD-10-CM: R30.0  ICD-9-CM: 334. 1         Weight loss ICD-10-CM: R63.4  ICD-9-CM: 783.21         Adrenal mass ([facility] ICD-10-CM: E27.8  ICD-9-CM: 255.8     Overview Addendum 12/17/2018  9:57 AM by Barry Silva MD     Stable/non-functioning adenoma on imaging                   Past Medical History:      has a past medical history of Cancer [facility]), Chronic systolic heart failure ([facility] (12/7/2020), Constipation, Gout, Hyperlipemia, Hypertension, Pacemaker (2019), Stroke ([facility] Thrombocytopenia ([facility] (3/19/2019), and TIA (transient ischemic attack) (2013). Past Surgical History:      has a past surgical history that includes hx urological; hx prostatectomy; pr ins new/rplcmt prm pm w/transv eltrd atrial&vent (Right, 3/22/2019); pr ins new/rplcmt prm pacemakr w/trans eltrd atrial (N/A, 10/18/2019); and place percut gastrostomy tube (11/30/2020). Home Medications:     Prior to Admission medications    Medication Sig Start Date End Date Taking?  Authorizing Provider   dextrose 5% solution 50 mL/hr by IntraVENous route continuous. Yes Provider, Historical   micafungin sodium (MICAFUNGIN IV) 100 mg by IntraVENous route every twenty-four (24) hours. 9/6/21 9/13/21 Yes Provider, Historical   iron sucrose complex (IRON SUCROSE IV) 100 mg by IntraVENous route every fourty-eight (48) hours. 9/5/21 9/25/21 Yes Provider, Historical   hydrALAZINE (APRESOLINE) 25 mg tablet Take 25 mg by mouth three (3) times daily. Yes Provider, Historical   carvediloL (COREG) 12.5 mg tablet Take 25 mg by mouth two (2) times a day. Yes Provider, Historical   hydrALAZINE (APRESOLINE) 20 mg/mL injection 10 mg by IntraVENous route every six (6) hours as needed for Other (SBP >160 mmHg). Yes Provider, Historical   heparin sodium,porcine (heparin, porcine,) 5,000 unit/mL injection 5,000 Units by SubCUTAneous route every twelve (12) hours. Yes Provider, Historical   chlorhexidine (PERIDEX) 0.12 % solution 15 mL by Swish and Spit route every twelve (12) hours as needed for Other (mouth care). Yes Provider, Historical   Arginine-Glutamine-Calcium HMB (Britton) 7-7-1.5 gram pwpk Take 1 Packet by mouth two (2) times a day. Yes Provider, Historical   albuterol (PROVENTIL VENTOLIN) 2.5 mg /3 mL (0.083 %) nebu 2.5 mg by Nebulization route three (3) times daily as needed for Other (Respiratory issues). Yes Provider, Historical   albuterol-ipratropium (DUO-NEB) 2.5 mg-0.5 mg/3 ml nebu 3 mL by Nebulization route two (2) times a day. Yes Provider, Historical   levETIRAcetam in NaCl, iso-os, 1,500 mg/100 mL pgbk IVPB premix 1,500 mg by IntraVENous route every twelve (12) hours every twelve (12) hours. Yes Provider, Historical   allopurinoL (ZYLOPRIM) 100 mg tablet Take 100 mg by mouth daily. Yes Provider, Historical   lacosamide (Vimpat) 100 mg tab tablet Take 100 mg by mouth every twelve (12) hours.    Yes Provider, Historical   insulin regular (NOVOLIN R, HUMULIN R) 100 unit/mL injection by SubCUTAneous route every six (6) hours as needed (sliding scale insulin). For BG below 70, give 0 units + 1 amp D50  For BG 70 to 200, give 0 units  For  to 250, give 3 units  For  to 300, give 6 units  For  to 350, give 8 units  For  to 400, give 10 units,  For BG greater than 400, give 12 units   Yes Provider, Historical   famotidine (PEPCID) 20 mg tablet Take 20 mg by mouth daily. Yes Provider, Historical   polyethylene glycol (MIRALAX) 17 gram/dose powder Take 17 g by mouth daily as needed for Constipation. 20  Yes Melody Sabal, MD   balsam peru-castor oiL (VENELEX) ointment Apply  to affected area three (3) times daily. 20  Yes Collette Madison MD       Allergies/Social/Family History:     No Known Allergies   Social History     Tobacco Use    Smoking status: Never Smoker    Smokeless tobacco: Never Used   Substance Use Topics    Alcohol use: No      Family History   Problem Relation Age of Onset    No Known Problems Mother     No Known Problems Father        Review of Systems:     Review of systems not obtained due to patient factors. Objective:   Vital Signs:  Visit Vitals  BP (!) 105/48 (BP 1 Location: Right upper arm, BP Patient Position: At rest)   Pulse 63   Temp 97.9 °F (36.6 °C)   Resp 12   Ht 5' 6\" (1.676 m)   Wt 89.3 kg (196 lb 13.9 oz)   SpO2 99%   BMI 31.78 kg/m²      O2 Device: Tracheostomy, Ventilator Temp (24hrs), Av.7 °F (36.5 °C), Min:96.4 °F (35.8 °C), Max:98.8 °F (37.1 °C)           Intake/Output:     Intake/Output Summary (Last 24 hours) at 2021 0911  Last data filed at 2021 0815  Gross per 24 hour   Intake 2930 ml   Output 1440 ml   Net 1490 ml       Physical Exam:  General:  Sedated and on the ventilator. No acute distress. Eyes:  Sclera anicteric. Pupils equal, round, reactive to light. Mouth/Throat: Orotracheal tube in place. Neck: Supple. Lungs:   Clear to auscultation bilaterally, good effort. Cardiovascular:  Regular rate and rhythm, no murmur, click, rub, or gallop. Abdomen:   Soft, non-tender, bowel sounds normal, non-distended. Extremities: No cyanosis or edema. Skin: No acute rash or lesions. Lymph Nodes: Cervical and supraclavicular normal.   Musculoskeletal:  No swelling or deformity. Lines/Devices:  Intact, no erythema, drainage, or tenderness. Psychiatric: Sedated and appears comfortable on ventilator. LABS AND  DATA: Personally reviewed  Recent Labs     09/10/21  0441   WBC 8.5   HGB 8.6*   HCT 27.4*        Recent Labs     09/10/21  1410 09/10/21  0441   * 154*   K 3.5 3.4*   * 119*   CO2 26 28   BUN 92* 95*   CREA 1.09 0.97   * 160*   CA 8.5 8.6     Recent Labs     09/10/21  0441   *   TP 5.8*   ALB 1.2*   GLOB 4.6*     No results for input(s): INR, PTP, APTT, INREXT, INREXT in the last 72 hours. No results for input(s): PHI, PCO2I, PO2I, FIO2I in the last 72 hours. No results for input(s): CPK, CKMB, TROIQ, BNPP in the last 72 hours. Hemodynamics:   PAP:   CO:     Wedge:   CI:     CVP:    SVR:       PVR:       Ventilator Settings:  Mode Rate Tidal Volume Pressure FiO2 PEEP   Volume control, Assist control   500 ml    30 % 5 cm H20     Peak airway pressure: 18 cm H2O    Minute ventilation: 6.2 l/min        MEDS: Reviewed    Chest X-Ray:  CXR Results  (Last 48 hours)    None          Imaging:  CT ABD PELV W CONT   Final Result      1. No cholecystitis on CT. 2. Large left and moderate right pleural effusions. Body wall edema. Consider   fluid overload or hypoproteinemia. US ABD LTD   Final Result      1. Gallbladder distention with large volume of intraluminal sludge and mild wall   thickening. Correlate for acute cholecystitis. 2. No biliary ductal dilatation. 3. Hepatic steatosis is suspected. 4. Increased echogenicity of the right kidney suggesting medical renal   parenchymal disease. Correlate clinically. 5. Small ascites. 6. Increased size of a right adrenal mass.  Correlate clinically and follow-up as   appropriate. NM HEPATOBILIARY DUCT SCAN    (Results Pending)     ECHO:  Pending    Multidisciplinary Rounds Completed: Yes    ABCDEF Bundle/Checklist Completed:  Yes    SPECIAL EQUIPMENT  Mechanical ventilation/Trach    DISPOSITION  Transfer to LTAC in am     CRITICAL CARE CONSULTANT NOTE  I had a face to face encounter with the patient, reviewed and interpreted patient data including clinical events, labs, images, vital signs, I/O's, and examined patient. I have discussed the case and the plan and management of the patient's care with the consulting services, the bedside nurses and the respiratory therapist.      NOTE OF PERSONAL INVOLVEMENT IN CARE   This patient has a high probability of imminent, clinically significant deterioration, which requires the highest level of preparedness to intervene urgently. I participated in the decision-making and personally managed or directed the management of the following life and organ supporting interventions that required my frequent assessment to treat or prevent imminent deterioration. I personally spent 50 minutes of critical care time. This is time spent at this critically ill patient's bedside actively involved in patient care as well as the coordination of care and discussions with the patient's family. This does not include any procedural time which has been billed separately.     Erik Jenkins NP      Critical Care Medicine  [facility]

## 2021-09-12 NOTE — PROGRESS NOTES
Bedside shift change report given to Randal Snow RN  (oncoming nurse) by Ember Butterfield RN  (offgoing nurse). Report included the following information SBAR, Intake/Output, MAR, Recent Results, Cardiac Rhythm AV paced , Alarm Parameters  and Dual Neuro Assessment. 0815: PEG slightly pulled out from the abdomen,  tube leaking around site while medications are being administered. Kristine Cat NP informed. KUB with contrast ordered, TF stopped. TRANSFER - OUT REPORT:    Verbal report given to Florence CASAS (name) on Hanny Calderón  being transferred to CVICU(unit) for routine progression of care       Report consisted of patients Situation, Background, Assessment and   Recommendations(SBAR). Information from the following report(s) SBAR, Intake/Output, MAR, Cardiac Rhythm AV Paced , Alarm Parameters  and Dual Neuro Assessment was reviewed with the receiving nurse. Lines:   Peripheral IV 09/07/21 Left Antecubital (Active)   Site Assessment Clean, dry, & intact 09/12/21 0800   Phlebitis Assessment 0 09/12/21 0800   Infiltration Assessment 0 09/12/21 0800   Dressing Status Clean, dry, & intact 09/12/21 0800   Dressing Type Tape;Transparent 09/12/21 0800   Hub Color/Line Status Pink;Capped 09/12/21 0800   Action Taken Open ports on tubing capped 09/12/21 0800   Alcohol Cap Used Yes 09/12/21 0800       Peripheral IV 09/08/21 Left;Proximal;Upper Arm (Active)   Site Assessment Clean, dry, & intact 09/12/21 0800   Phlebitis Assessment 0 09/12/21 0800   Infiltration Assessment 0 09/12/21 0800   Dressing Status Clean, dry, & intact 09/12/21 0800   Dressing Type Tape;Transparent 09/12/21 0800   Hub Color/Line Status Pink;Capped;Flushed 09/12/21 0800   Action Taken Open ports on tubing capped 09/12/21 0800   Alcohol Cap Used Yes 09/12/21 0800        Opportunity for questions and clarification was provided.       Patient transported with:   Registered Nurse

## 2021-09-12 NOTE — PROGRESS NOTES
1630:  Bedside and Verbal shift change report given to Wesley Sargent RN (oncoming nurse) by Elina Hope RN (offgoing nurse). Report included the following information SBAR, Kardex, ED Summary, OR Summary, Procedure Summary, Intake/Output, MAR, Recent Results and Cardiac Rhythm Paced. 1700: PCR sent per unit policy    Per Dr. Contreras Acevedo, peg tube in good placement. Tube feeds restarted per verbal order. 20cc/hour, advance q4h 15cc until goal of 55cc per hour. 250cc q4h water flush    1915:  Text paged [facility] intensivist regarding labs. Have not been drawn since 9/10. Per provider, please wait until morning to draw complete labs. 2000:  Bedside and Verbal shift change report given to AUGUSTO DORADO (oncoming nurse) by Wesley Sargent RN (offgoing nurse). Report included the following information SBAR, Kardex, ED Summary, OR Summary, Procedure Summary, Intake/Output, MAR, Recent Results and Cardiac Rhythm Paced.

## 2021-09-12 NOTE — PROGRESS NOTES
1035 Pt received from ICU [address] [facility] Casimiro Murray already took report earlier. Assessment done. Pt arrive in ICU bed trached and connected to ventilator. Pt only squeezes his eyes more shut when his name is called. No command following. 1400 Pt turned and cleaned up from stool new foam applied to sacral area pink skin noted Scrotum is so swollen and tight  Placed in sling. 3;30: Bedside and Verbal shift change report given to [address] (oncoming nurse) by Caridad Lopez RN (offgoing nurse). Report included the following information SBAR and Cardiac Rhythm V pacing.

## 2021-09-13 ENCOUNTER — PATIENT OUTREACH (OUTPATIENT)
Dept: CASE MANAGEMENT | Age: 86
End: 2021-09-13

## 2021-09-13 LAB — SARS-COV-2, COV2: NORMAL

## 2021-09-13 PROCEDURE — 94003 VENT MGMT INPAT SUBQ DAY: CPT

## 2021-09-13 PROCEDURE — 74011250637 HC RX REV CODE- 250/637: Performed by: NURSE PRACTITIONER

## 2021-09-13 PROCEDURE — U0005 INFEC AGEN DETEC AMPLI PROBE: HCPCS

## 2021-09-13 PROCEDURE — 65610000003 HC RM ICU SURGICAL

## 2021-09-13 PROCEDURE — 74011250636 HC RX REV CODE- 250/636: Performed by: NURSE PRACTITIONER

## 2021-09-13 RX ADMIN — FAMOTIDINE 20 MG: 40 POWDER, FOR SUSPENSION ORAL at 08:25

## 2021-09-13 RX ADMIN — HEPARIN SODIUM 5000 UNITS: 5000 INJECTION INTRAVENOUS; SUBCUTANEOUS at 20:43

## 2021-09-13 RX ADMIN — LEVETIRACETAM 1500 MG: 500 SOLUTION ORAL at 08:25

## 2021-09-13 RX ADMIN — CHLORHEXIDINE GLUCONATE 15 ML: 0.12 RINSE ORAL at 20:46

## 2021-09-13 RX ADMIN — Medication 10 ML: at 22:00

## 2021-09-13 RX ADMIN — Medication 10 ML: at 08:26

## 2021-09-13 RX ADMIN — HEPARIN SODIUM 5000 UNITS: 5000 INJECTION INTRAVENOUS; SUBCUTANEOUS at 04:18

## 2021-09-13 RX ADMIN — Medication 10 ML: at 16:20

## 2021-09-13 RX ADMIN — ALLOPURINOL 100 MG: 100 TABLET ORAL at 08:22

## 2021-09-13 RX ADMIN — CHLORHEXIDINE GLUCONATE 15 ML: 0.12 RINSE ORAL at 08:23

## 2021-09-13 RX ADMIN — LACOSAMIDE 100 MG: 50 TABLET, FILM COATED ORAL at 20:43

## 2021-09-13 RX ADMIN — CARVEDILOL 25 MG: 12.5 TABLET, FILM COATED ORAL at 08:22

## 2021-09-13 RX ADMIN — LACOSAMIDE 100 MG: 50 TABLET, FILM COATED ORAL at 08:22

## 2021-09-13 RX ADMIN — CARVEDILOL 25 MG: 12.5 TABLET, FILM COATED ORAL at 17:35

## 2021-09-13 RX ADMIN — HEPARIN SODIUM 5000 UNITS: 5000 INJECTION INTRAVENOUS; SUBCUTANEOUS at 13:44

## 2021-09-13 RX ADMIN — Medication: at 08:22

## 2021-09-13 RX ADMIN — LEVETIRACETAM 1500 MG: 500 SOLUTION ORAL at 20:43

## 2021-09-13 RX ADMIN — Medication: at 17:35

## 2021-09-13 NOTE — PROGRESS NOTES
2000: Received report from [place], [facility].     2100: Increased TF to 35ml/hr per order. 0500: Increased TF to 50 ml/hr per order. 0800: Bedside and Verbal shift change report given to AUGUSTO Austin (oncoming nurse) by [facility], RN (offgoing nurse). Report included the following information SBAR, Intake/Output, MAR and Cardiac Rhythm V paced.

## 2021-09-13 NOTE — PROGRESS NOTES
[facility]    ICU TEAM Progress Note    Name: Dianna Diaz   : 3/9/1933   MRN: [id number]   Date: 2021      Assessment/Plan:     1. Chronic hypoxic respiratory failure  a. S/p trach/PEG  b. Move towards PSV and trach collar trials  2. Chronic SHF, EF 30-35  a. Quiescent, CPM  3. Elevated LFTs  a. Coming down, monitor  4. HyperNa  a. Continue FWFs  5. Thrombocytopenia  6. Pleural effusions  a. monitor  7. Possible fungal peritonitis  a. Treated  b. Monitor off abx/antifungals  c. HIDA scan ok, no surgical intervention - surgery s/o  8. COVID:   9. SBP Goal of: > 90 mmHg  10. MAP Goal of: > 65 mmHg  11. IVFs: prn  12. Transfusion Trigger (Hgb): <7 g/dL  13. Respiratory Goals:  a. Chlorhexidine   b. Optimize PEEP/Ventilation/Oxygenation  c. Head of bed > 30 degrees  d. Aggressive bronchopulmonary hygiene  14. Pulmonary toilet: Duo-Nebs   15. SpO2 Goal: > 92%  16. Keep K>4; Mg>2   17. PT/OT: PT consulted and on board, OT consulted and on board and Speech therapy consulted and on board   18. Discussed Plan of Care/Code Status: Full Code  19. Discussed Care Plan with Bedside RN  20. Documentation of Current Medications  21. Further as below:    F - Feeding:  Yes TFs  A - Analgesia: Acetaminophen  S - Sedation: N/A  T - DVT Prophylaxis: Heparin   H - Head of Bed: > 30 Degrees  U - Ulcer Prophylaxis: Pepcid (famotidine)   G - Glycemic Control: Insulin  S - Spontaneous Breathing Trial: Yes  B - Bowel Regimen: None needed at this time  I - Indwelling Catheter:   Tubes: Tracheostomy and PEG Tube  Lines: Peripheral IV  Drains: Avalos Catheter  D - De-escalation of Antibiotics: as above    Multidisciplinary Rounds Completed:   Yes    ABCDEF Bundle/Checklist Completed:  Yes    SPECIAL EQUIPMENT  None    DISPOSITION  Transfer to [facility] 19 - ok to transfer when bed available    Subjective:   Progress Note: 2021      Reason for ICU Admission: VDRF     HPI: Dianna Diaz  Is an 59-year-old male with past medical history of prostate cancer, hypertension, hyperlipidemia, CVA, thrombocytopenia, systolic heart failure who is currently a patient at [facility] with chronic respiratory failure with tracheostomy and ventilator dependence. Patient is nonverbal at baseline. Per the chart the patient had abdominal ultrasound done at [address] [place] [address] which showed possible acute cholecystitis, the patient was sent to [address] [place] [address] for a HIDA scan to rule out any gallbladder disease. CT scan of the abdomen and pelvis did reveal concerns for gallbladder disease, however surgery did not feel that the patient was good candidate to take to the operating room. HIDA scan pending. Patient started on empiric antibiotics at this time. Given multiple comorbidities palliative care consultation is also in place. Transferred to ICU for continued care. Overnight Events: Lateral'd to CVICU from ICU. No major o/n events reported. PEG ok by gastrogaffin study. TFs restarted. On minimal vent support.        POD:  * No surgery found *    S/P:       Active Problem List:     Problem List  Date Reviewed: 12/17/2020        Codes Class    Chronic respiratory failure ([facility] ICD-10-CM: J96.10  ICD-9-CM: 518.83         Abdominal pain ICD-10-CM: R10.9  ICD-9-CM: 789.00         Acute respiratory failure with hypoxia ([facility] ICD-10-CM: J96.01  ICD-9-CM: 518.81         Aspiration into respiratory tract ICD-10-CM: T17.908A  ICD-9-CM: 934.9         Hypokalemia ICD-10-CM: E87.6  ICD-9-CM: 276.8         Hypernatremia ICD-10-CM: E87.0  ICD-9-CM: 276.0         Moderate protein malnutrition (HCC) ICD-10-CM: E44.0  ICD-9-CM: 263.0         Failure to thrive (0-17) ICD-10-CM: R62.51  ICD-9-CM: 783.41         Bacteremia ICD-10-CM: R78.81  ICD-9-CM: 790.7         UTI (urinary tract infection) ICD-10-CM: N39.0  ICD-9-CM: 599.0         Dementia (HCC) ICD-10-CM: F03.90  ICD-9-CM: 294.20         Bedridden ICD-10-CM: Z74.01  ICD-9-CM: V49.84         Pressure ulcer ICD-10-CM: L89.90  ICD-9-CM: 707.00, 707.20         S/P percutaneous endoscopic gastrostomy (PEG) tube placement (HCC) ICD-10-CM: Z93.1  ICD-9-CM: V44.1         Dehydration ICD-10-CM: E86.0  ICD-9-CM: 276.51         Lactic acidosis ICD-10-CM: E87.2  ICD-9-CM: 276.2         Sepsis ([facility] ICD-10-CM: A41.9  ICD-9-CM: 038.9, 995.91         SOULEYMANE (acute kidney injury) ([facility] ICD-10-CM: N17.9  ICD-9-CM: 791. 9         Chronic systolic heart failure (HCC) ICD-10-CM: I50.22  ICD-9-CM: 428.22         Altered mental status ICD-10-CM: R41.82  ICD-9-CM: 780.97         Post-ictal state ([facility] ICD-10-CM: R56.9  ICD-9-CM: 780.39         Seizure ([facility] ICD-10-CM: R56.9  ICD-9-CM: 780.39         Atrial pacemaker lead displacement ICD-10-CM: T82.120A  ICD-9-CM: 996.01         Pacemaker ICD-10-CM: Z95.0  ICD-9-CM: V45.01     Overview Signed 3/22/2019  5:08 PM by Aaron Aguiar MD     3/22/2019 dual chamber Medtronic pacer             Bradycardia ICD-10-CM: R00.1  ICD-9-CM: 427.89         Transaminitis ICD-10-CM: R74.01  ICD-9-CM: 790.4         Hypertensive urgency ICD-10-CM: I16.0  ICD-9-CM: 401.9         Second degree AV block, Mobitz type I ICD-10-CM: I44.1  ICD-9-CM: 426.13     Overview Signed 3/21/2019  6:14 PM by Aaron Aguiar MD     Added automatically from request for surgery [id number]             Stage 3 chronic kidney disease ([facility] ICD-10-CM: N18.30  ICD-9-CM: 627. 3         Rotator cuff arthropathy of both shoulders ICD-10-CM: M12.811, M12.812  ICD-9-CM: 716.81         Cognitive impairment ICD-10-CM: R41.89  ICD-9-CM: 871.3         Systolic murmur NUS-14-MN: R01.1  ICD-9-CM: 785.2         Essential hypertension ICD-10-CM: I10  ICD-9-CM: 401.9         Gout ICD-10-CM: M10.9  ICD-9-CM: 274.9         Pure hypercholesterolemia ICD-10-CM: E78.00  ICD-9-CM: 272.0         History of prostate cancer ICD-10-CM: Z85.46  ICD-9-CM: V10.46         Chronic constipation ICD-10-CM: K59.09  ICD-9-CM: 564.00         Dysuria ICD-10-CM: R30.0  ICD-9-CM: 788.1 Weight loss ICD-10-CM: R63.4  ICD-9-CM: 783.21         Adrenal mass ([facility] ICD-10-CM: E27.8  ICD-9-CM: 255.8     Overview Addendum 12/17/2018  9:57 AM by Miguel Devine MD     Stable/non-functioning adenoma on imaging                   Past Medical History:      has a past medical history of Cancer [facility]), Chronic systolic heart failure ([facility] (12/7/2020), Constipation, Gout, Hyperlipemia, Hypertension, Pacemaker (2019), Stroke ([facility] Thrombocytopenia ([facility] (3/19/2019), and TIA (transient ischemic attack) (2013). Past Surgical History:      has a past surgical history that includes hx urological; hx prostatectomy; pr ins new/rplcmt prm pm w/transv eltrd atrial&vent (Right, 3/22/2019); pr ins new/rplcmt prm pacemakr w/trans eltrd atrial (N/A, 10/18/2019); and place percut gastrostomy tube (11/30/2020). Home Medications:     Prior to Admission medications    Medication Sig Start Date End Date Taking? Authorizing Provider   dextrose 5% solution 50 mL/hr by IntraVENous route continuous. Yes Provider, Historical   micafungin sodium (MICAFUNGIN IV) 100 mg by IntraVENous route every twenty-four (24) hours. 9/6/21 9/13/21 Yes Provider, Historical   iron sucrose complex (IRON SUCROSE IV) 100 mg by IntraVENous route every fourty-eight (48) hours. 9/5/21 9/25/21 Yes Provider, Historical   hydrALAZINE (APRESOLINE) 25 mg tablet Take 25 mg by mouth three (3) times daily. Yes Provider, Historical   carvediloL (COREG) 12.5 mg tablet Take 25 mg by mouth two (2) times a day. Yes Provider, Historical   hydrALAZINE (APRESOLINE) 20 mg/mL injection 10 mg by IntraVENous route every six (6) hours as needed for Other (SBP >160 mmHg). Yes Provider, Historical   heparin sodium,porcine (heparin, porcine,) 5,000 unit/mL injection 5,000 Units by SubCUTAneous route every twelve (12) hours.    Yes Provider, Historical   chlorhexidine (PERIDEX) 0.12 % solution 15 mL by Swish and Spit route every twelve (12) hours as needed for Other (mouth care). Yes Provider, Historical   Arginine-Glutamine-Calcium HMB (Britton) 7-7-1.5 gram pwpk Take 1 Packet by mouth two (2) times a day. Yes Provider, Historical   albuterol (PROVENTIL VENTOLIN) 2.5 mg /3 mL (0.083 %) nebu 2.5 mg by Nebulization route three (3) times daily as needed for Other (Respiratory issues). Yes Provider, Historical   albuterol-ipratropium (DUO-NEB) 2.5 mg-0.5 mg/3 ml nebu 3 mL by Nebulization route two (2) times a day. Yes Provider, Historical   levETIRAcetam in NaCl, iso-os, 1,500 mg/100 mL pgbk IVPB premix 1,500 mg by IntraVENous route every twelve (12) hours every twelve (12) hours. Yes Provider, Historical   allopurinoL (ZYLOPRIM) 100 mg tablet Take 100 mg by mouth daily. Yes Provider, Historical   lacosamide (Vimpat) 100 mg tab tablet Take 100 mg by mouth every twelve (12) hours. Yes Provider, Historical   insulin regular (NOVOLIN R, HUMULIN R) 100 unit/mL injection by SubCUTAneous route every six (6) hours as needed (sliding scale insulin). For BG below 70, give 0 units + 1 amp D50  For BG 70 to 200, give 0 units  For  to 250, give 3 units  For  to 300, give 6 units  For  to 350, give 8 units  For  to 400, give 10 units,  For BG greater than 400, give 12 units   Yes Provider, Historical   famotidine (PEPCID) 20 mg tablet Take 20 mg by mouth daily. Yes Provider, Historical   polyethylene glycol (MIRALAX) 17 gram/dose powder Take 17 g by mouth daily as needed for Constipation. 7/13/20  Yes Melody Sabal, MD   balsam peru-castor oiL (VENELEX) ointment Apply  to affected area three (3) times daily.  7/9/20  Yes Collette Madison MD       Allergies/Social/Family History:     No Known Allergies   Social History     Tobacco Use    Smoking status: Never Smoker    Smokeless tobacco: Never Used   Substance Use Topics    Alcohol use: No      Family History   Problem Relation Age of Onset    No Known Problems Mother     No Known Problems Father Review of Systems:     Review of systems not obtained due to patient factors. Objective:   Vital Signs:  Visit Vitals  /65 (BP 1 Location: Right upper arm, BP Patient Position: At rest)   Pulse 64   Temp 97.7 °F (36.5 °C)   Resp 12   Ht 5' 6\" (1.676 m)   Wt 89.4 kg (197 lb 1.5 oz)   SpO2 99%   BMI 31.81 kg/m²      O2 Device: Tracheostomy, Ventilator Temp (24hrs), Av.1 °F (36.7 °C), Min:97.5 °F (36.4 °C), Max:98.7 °F (37.1 °C)           Intake/Output:     Intake/Output Summary (Last 24 hours) at 2021 0813  Last data filed at 2021 0700  Gross per 24 hour   Intake 1910 ml   Output 1185 ml   Net 725 ml       Physical Exam:    General:  NAD, well noursished, well developed, appears stated age   Eyes:  Sclera anicteric. Pupils equally round and reactive to light. Mouth/Throat: Mucous membranes normal, oral pharynx clear   Neck: Supple, trach ok   Lungs:   Clear to auscultation bilaterally, good effort   CV:  Regular rate and rhythm,no murmur, click, rub or gallop   Abdomen:   Soft, non-tender. bowel sounds normal. non-distended, PEG ok   Extremities: No cyanosis or edema   Skin: Skin color, texture, turgor normal. no acute rash or lesions   Lymph nodes: Cervical and supraclavicular normal   Musculoskeletal: No swelling or deformity   Lines/Devices:  Intact, no erythema, drainage or tenderness   Neuro/Psych: Somnolent, not interactive, SANTIAGO, no commands       LABS AND  DATA: Personally reviewed  No results for input(s): WBC, HGB, HCT, PLT, HGBEXT, HCTEXT, PLTEXT in the last 72 hours. Recent Labs     09/10/21  1410   *   K 3.5   *   CO2 26   BUN 92*   CREA 1.09   *   CA 8.5     No results for input(s): AP, TBIL, TP, ALB, GLOB, AML, LPSE in the last 72 hours. No lab exists for component: SGOT, GPT, AMYP  No results for input(s): INR, PTP, APTT, INREXT in the last 72 hours. No results for input(s): PHI, PCO2I, PO2I, FIO2I in the last 72 hours.   No results for input(s): CPK, CKMB, TROIQ, BNPP in the last 72 hours. Hemodynamics:   PAP:   CO:     Wedge:   CI:     CVP:    SVR:       PVR:       Ventilator Settings:  Mode Rate Tidal Volume Pressure FiO2 PEEP   Assist control, Volume control   500 ml    30 % 5 cm H20     Peak airway pressure: 20 cm H2O    Minute ventilation: 6.16 l/min        MEDS: Reviewed    Chest X-Ray:  CXR Results  (Last 48 hours)    None          Images:   Reviewed    B/L UE Duplex 9/9  · Limited study showing no evidence of right or left upper extremity vein thrombosis in the visualized vein segments. ECHO: 7/24  Result status: Final result   · LV: Severely reduced systolic function. Estimated LVEF is 30 - 35%. · RV: Pacer/ICD present. · AV: Moderate aortic valve stenosis is present. Aortic valve mean gradient is 18.1 mmHg. · PA: Mild to moderate pulmonary hypertension. Pulmonary arterial systolic pressure is 50 mmHg. CRITICAL CARE CONSULTANT NOTE  I had a face to face encounter with the patient, reviewed and interpreted patient data including clinical events, labs, images, vital signs, I/O's, and examined patient. I have discussed the case and the plan and management of the patient's care with the consulting services, the bedside nurses and the respiratory therapist.      NOTE OF PERSONAL INVOLVEMENT IN CARE   This patient has a high probability of imminent, clinically significant deterioration, which requires the highest level of preparedness to intervene urgently. I participated in the decision-making and personally managed or directed the management of the following life and organ supporting interventions that required my frequent assessment to treat or prevent imminent deterioration. I personally spent 31 minutes of critical care time. This is time spent at this critically ill patient's bedside actively involved in patient care as well as the coordination of care and discussions with the patient's family.   This does not include any procedural time which has been billed separately.     [address] [facility]  9/13/2021

## 2021-09-13 NOTE — PROGRESS NOTES
0800:  Bedside and Verbal shift change report given to Jose De Jesus Campbell RN (oncoming nurse) by Karol Brito RN (offgoing nurse). Report included the following information SBAR, Kardex, ED Summary, OR Summary, Procedure Summary, Intake/Output, MAR, Recent Results and Cardiac Rhythm V Paced. .   1320:  Multiple attempts by multiple CVICU nurses to obtain stick for labs. Unsuccessful. Notified intensivist. OK to hold off per Intensivisit    2000:  Bedside and Verbal shift change report given to Karol Brito [place] (offgoing nurse) from [facility]. Report included the following information SBAR, Kardex, ED Summary, OR Summary, Procedure Summary, Intake/Output, MAR, Recent Results and Cardiac Rhythm V Paced.

## 2021-09-13 NOTE — PROGRESS NOTES
Transitions of Care:    RUR - 27%    Disposition: Return to [facility]- medically ready - waiting on a bed to become available    Transportation:  ALS - Will place on Will call with [facility]    Contact: Peace Combs [phone]    CHRIS spoke with Anat Herron with [facility] [phone] and she will know this afternoon if they will have a bed available for patient. Will await response from [facility] at this time.  MICHELET Chung

## 2021-09-14 VITALS
SYSTOLIC BLOOD PRESSURE: 121 MMHG | HEART RATE: 60 BPM | RESPIRATION RATE: 12 BRPM | DIASTOLIC BLOOD PRESSURE: 62 MMHG | HEIGHT: 66 IN | TEMPERATURE: 96.9 F | WEIGHT: 198.19 LBS | OXYGEN SATURATION: 100 % | BODY MASS INDEX: 31.85 KG/M2

## 2021-09-14 PROBLEM — R10.9 ABDOMINAL PAIN: Status: RESOLVED | Noted: 2021-09-07 | Resolved: 2021-09-14

## 2021-09-14 LAB
SARS-COV-2, XPLCVT: NOT DETECTED
SOURCE, COVRS: NORMAL

## 2021-09-14 PROCEDURE — 74011250637 HC RX REV CODE- 250/637: Performed by: NURSE PRACTITIONER

## 2021-09-14 PROCEDURE — 74011250636 HC RX REV CODE- 250/636: Performed by: NURSE PRACTITIONER

## 2021-09-14 PROCEDURE — 94003 VENT MGMT INPAT SUBQ DAY: CPT

## 2021-09-14 RX ORDER — CARVEDILOL 25 MG/1
25 TABLET ORAL 2 TIMES DAILY
Qty: 60 TABLET | Refills: 0 | Status: SHIPPED | COMMUNITY
Start: 2021-09-14

## 2021-09-14 RX ADMIN — ALLOPURINOL 100 MG: 100 TABLET ORAL at 08:47

## 2021-09-14 RX ADMIN — LACOSAMIDE 100 MG: 50 TABLET, FILM COATED ORAL at 08:47

## 2021-09-14 RX ADMIN — Medication: at 06:01

## 2021-09-14 RX ADMIN — HEPARIN SODIUM 5000 UNITS: 5000 INJECTION INTRAVENOUS; SUBCUTANEOUS at 12:17

## 2021-09-14 RX ADMIN — Medication: at 08:48

## 2021-09-14 RX ADMIN — FAMOTIDINE 20 MG: 40 POWDER, FOR SUSPENSION ORAL at 08:47

## 2021-09-14 RX ADMIN — LEVETIRACETAM 1500 MG: 500 SOLUTION ORAL at 08:47

## 2021-09-14 RX ADMIN — HEPARIN SODIUM 5000 UNITS: 5000 INJECTION INTRAVENOUS; SUBCUTANEOUS at 04:00

## 2021-09-14 RX ADMIN — CARVEDILOL 25 MG: 12.5 TABLET, FILM COATED ORAL at 08:53

## 2021-09-14 RX ADMIN — Medication 10 ML: at 06:01

## 2021-09-14 RX ADMIN — CARVEDILOL 25 MG: 12.5 TABLET, FILM COATED ORAL at 17:28

## 2021-09-14 RX ADMIN — CHLORHEXIDINE GLUCONATE 15 ML: 0.12 RINSE ORAL at 08:48

## 2021-09-14 RX ADMIN — Medication 10 ML: at 13:06

## 2021-09-14 RX ADMIN — Medication: at 17:35

## 2021-09-14 NOTE — PROGRESS NOTES
Spiritual Care Assessment/Progress Note  [facility]      NAME: Dianna Diaz      MRN: [id number]  AGE: 80 y.o. SEX: male  Latter day Affiliation: Anabaptism   Language: English     9/14/2021     Total Time (in minutes): 10     Spiritual Assessment begun in [facility] 4 CV INTNSV CARE through conversation with:         []Patient        [] Family    [] Friend(s)        Reason for Consult: Initial/Spiritual assessment, patient floor     Spiritual beliefs: (Please include comment if needed)     [] Identifies with a jing tradition:         [] Supported by a jing community:            [] Claims no spiritual orientation:           [] Seeking spiritual identity:                [] Adheres to an individual form of spirituality:           [x] Not able to assess:                           Identified resources for coping:      [] Prayer                               [] Music                  [] Guided Imagery     [] Family/friends                 [] Pet visits     [] Devotional reading                         [x] Unknown     [] Other:                                               Interventions offered during this visit: (See comments for more details)                Plan of Care:     [] Support spiritual and/or cultural needs    [] Support AMD and/or advance care planning process      [] Support grieving process   [] Coordinate Rites and/or Rituals    [] Coordination with community clergy   [] No spiritual needs identified at this time   [] Detailed Plan of Care below (See Comments)  [] Make referral to Music Therapy  [] Make referral to Pet Therapy     [] Make referral to Addiction services  [] Make referral to [facility]  [] Make referral to Spiritual Care Partner  [] No future visits requested        [x] Follow up upon further referrals     Comments:  visit for routine follow up. Patient resting in bed, asleep. Appears comfortable. Did not awaken to verbal greeting.   Decided not to disturb so he can continue to rest.  Please contact spiritual care for further referral or consult.     Rev.  Maribel Cedillo MDiv, [facility], [facility]   paging service: [phone])

## 2021-09-14 NOTE — PROGRESS NOTES
Transitions of Care:    RU r- 21%    Disposition: Return to [facility] once bed is available    Transport: ALS - call placed to [facility] and will  patient between 1830 and 1900  time    Contact: Janice Greco [phone]    CM spoke with Kay Aly at [facility] - will let CM know when a bed is available for transfer. MICHELET Barrera     4 pm CHRIS spoke with Kay Aly and they can accept patient today after 6 pm - CM called [facility] and scheduled for  at 1830 when calling [facility] back may be pushed back to 1900 - report to be called to [phone] - rm# [phone] with Dr. Chance Tsai as accepting MD. Will need EMTALA, kardex and discharge instructions to go with patient. CM will make nursing aware.  MICHELET Barrera

## 2021-09-14 NOTE — PROGRESS NOTES
[facility]    ICU TEAM Progress Note    Name: Jony Weaver   : 3/9/1933   MRN: [id number]   Date: 2021      Assessment/Plan:     1. Chronic hypoxic respiratory failure  a. S/p trach/PEG  b. Move towards PSV and trach collar trials  c. Unable to get daily labs d/t lack of venous access, may need to perform fem stick to get labs  2. Chronic SHF, EF 30-35  a. Quiescent, CPM  3. Elevated LFTs  a. Coming down, monitor  4. HyperNa  a. Continue FWFs  5. Thrombocytopenia  6. Pleural effusions  a. monitor  7. Possible fungal peritonitis  a. Treated  b. Monitor off abx/antifungals  c. HIDA scan ok, no surgical intervention - surgery s/o  8. COVID:   9. SBP Goal of: > 90 mmHg  10. MAP Goal of: > 65 mmHg  11. IVFs: prn  12. Transfusion Trigger (Hgb): <7 g/dL  13. Respiratory Goals:  a. Chlorhexidine   b. Optimize PEEP/Ventilation/Oxygenation  c. Head of bed > 30 degrees  d. Aggressive bronchopulmonary hygiene  14. Pulmonary toilet: Duo-Nebs   15. SpO2 Goal: > 92%  16. Keep K>4; Mg>2   17. PT/OT: PT consulted and on board, OT consulted and on board and Speech therapy consulted and on board   18. Discussed Plan of Care/Code Status: Full Code  19. Discussed Care Plan with Bedside RN  20. Documentation of Current Medications  21. Further as below:    F - Feeding:  Yes TFs  A - Analgesia: Acetaminophen  S - Sedation: N/A  T - DVT Prophylaxis: Heparin   H - Head of Bed: > 30 Degrees  U - Ulcer Prophylaxis: Pepcid (famotidine)   G - Glycemic Control: Insulin  S - Spontaneous Breathing Trial: Yes  B - Bowel Regimen: None needed at this time  I - Indwelling Catheter:   Tubes: Tracheostomy and PEG Tube  Lines: Peripheral IV  Drains: Avalos Catheter  D - De-escalation of Antibiotics: as above    Multidisciplinary Rounds Completed:   Yes    ABCDEF Bundle/Checklist Completed:  Yes    SPECIAL EQUIPMENT  None    DISPOSITION  Transfer to [facility] 19 - ok to transfer when bed available    Subjective:   Progress Note: 2021 Reason for ICU Admission: VDRF     HPI: Stephy Wright  Is an 61-year-old male with past medical history of prostate cancer, hypertension, hyperlipidemia, CVA, thrombocytopenia, systolic heart failure who is currently a patient at [facility] with chronic respiratory failure with tracheostomy and ventilator dependence. Patient is nonverbal at baseline. Per the chart the patient had abdominal ultrasound done at [facility] which showed possible acute cholecystitis, the patient was sent to [facility] for a HIDA scan to rule out any gallbladder disease. CT scan of the abdomen and pelvis did reveal concerns for gallbladder disease, however surgery did not feel that the patient was good candidate to take to the operating room. HIDA scan pending. Patient started on empiric antibiotics at this time. Given multiple comorbidities palliative care consultation is also in place. Transferred to ICU for continued care. Overnight Events: No major o/n events reported. Spencer TFs ok. On minimal vent support. Unable to get labs.        POD:  * No surgery found *    S/P:       Active Problem List:     Problem List  Date Reviewed: 12/17/2020        Codes Class    Chronic respiratory failure ([facility].) ICD-10-CM: J96.10  ICD-9-CM: 518.83         Abdominal pain ICD-10-CM: R10.9  ICD-9-CM: 789.00         Acute respiratory failure with hypoxia ([facility].) ICD-10-CM: J96.01  ICD-9-CM: 518.81         Aspiration into respiratory tract ICD-10-CM: T17.908A  ICD-9-CM: 934.9         Hypokalemia ICD-10-CM: E87.6  ICD-9-CM: 276.8         Hypernatremia ICD-10-CM: E87.0  ICD-9-CM: 276.0         Moderate protein malnutrition (HCC) ICD-10-CM: E44.0  ICD-9-CM: 263.0         Failure to thrive (0-17) ICD-10-CM: R62.51  ICD-9-CM: 783.41         Bacteremia ICD-10-CM: R78.81  ICD-9-CM: 790.7         UTI (urinary tract infection) ICD-10-CM: N39.0  ICD-9-CM: 599.0         Dementia ([facility].) ICD-10-CM: F03.90  ICD-9-CM: 294.20         Bedridden ICD-10-CM: Z74.01  ICD-9-CM: V49.84         Pressure ulcer ICD-10-CM: L89.90  ICD-9-CM: 707.00, 707.20         S/P percutaneous endoscopic gastrostomy (PEG) tube placement ([facility].) ICD-10-CM: Z93.1  ICD-9-CM: V44.1         Dehydration ICD-10-CM: E86.0  ICD-9-CM: 276.51         Lactic acidosis ICD-10-CM: E87.2  ICD-9-CM: 276.2         Sepsis ([facility] ICD-10-CM: A41.9  ICD-9-CM: 038.9, 995.91         SOULEYMANE (acute kidney injury) ([facility].) ICD-10-CM: N17.9  ICD-9-CM: 923. 9         Chronic systolic heart failure (HCC) ICD-10-CM: I50.22  ICD-9-CM: 428.22         Altered mental status ICD-10-CM: R41.82  ICD-9-CM: 780.97         Post-ictal state ([facility] ICD-10-CM: R56.9  ICD-9-CM: 780.39         Seizure ([facility].) ICD-10-CM: R56.9  ICD-9-CM: 780.39         Atrial pacemaker lead displacement ICD-10-CM: T82.120A  ICD-9-CM: 996.01         Pacemaker ICD-10-CM: Z95.0  ICD-9-CM: V45.01     Overview Signed 3/22/2019  5:08 PM by Kina Oakes MD     3/22/2019 dual chamber Medtronic pacer             Bradycardia ICD-10-CM: R00.1  ICD-9-CM: 427.89         Transaminitis ICD-10-CM: R74.01  ICD-9-CM: 790.4         Hypertensive urgency ICD-10-CM: I16.0  ICD-9-CM: 401.9         Second degree AV block, Mobitz type I ICD-10-CM: I44.1  ICD-9-CM: 426.13     Overview Signed 3/21/2019  6:14 PM by Kina Oakes MD     Added automatically from request for surgery [id number]             Stage 3 chronic kidney disease ([facility] ICD-10-CM: N18.30  ICD-9-CM: 786. 3         Rotator cuff arthropathy of both shoulders ICD-10-CM: M12.811, M12.812  ICD-9-CM: 716.81         Cognitive impairment ICD-10-CM: R41.89  ICD-9-CM: 015.9         Systolic murmur ONW-08-OB: R01.1  ICD-9-CM: 785.2         Essential hypertension ICD-10-CM: I10  ICD-9-CM: 401.9         Gout ICD-10-CM: M10.9  ICD-9-CM: 274.9         Pure hypercholesterolemia ICD-10-CM: E78.00  ICD-9-CM: 272.0         History of prostate cancer ICD-10-CM: Z85.46  ICD-9-CM: V10.46         Chronic constipation ICD-10-CM: K59.09  ICD-9-CM: 564.00         Dysuria ICD-10-CM: R30.0  ICD-9-CM: 221. 1         Weight loss ICD-10-CM: R63.4  ICD-9-CM: 783.21         Adrenal mass ([facility] ICD-10-CM: E27.8  ICD-9-CM: 255.8     Overview Addendum 12/17/2018  9:57 AM by Naina Schmidt MD     Stable/non-functioning adenoma on imaging                   Past Medical History:      has a past medical history of Cancer [facility]), Chronic systolic heart failure ([facility] (12/7/2020), Constipation, Gout, Hyperlipemia, Hypertension, Pacemaker (2019), Stroke ([facility] Thrombocytopenia ([facility] (3/19/2019), and TIA (transient ischemic attack) (2013). Past Surgical History:      has a past surgical history that includes hx urological; hx prostatectomy; pr ins new/rplcmt prm pm w/transv eltrd atrial&vent (Right, 3/22/2019); pr ins new/rplcmt prm pacemakr w/trans eltrd atrial (N/A, 10/18/2019); and place percut gastrostomy tube (11/30/2020). Home Medications:     Prior to Admission medications    Medication Sig Start Date End Date Taking? Authorizing Provider   dextrose 5% solution 50 mL/hr by IntraVENous route continuous. Yes Provider, Historical   micafungin sodium (MICAFUNGIN IV) 100 mg by IntraVENous route every twenty-four (24) hours. 9/6/21 9/13/21 Yes Provider, Historical   iron sucrose complex (IRON SUCROSE IV) 100 mg by IntraVENous route every fourty-eight (48) hours. 9/5/21 9/25/21 Yes Provider, Historical   hydrALAZINE (APRESOLINE) 25 mg tablet Take 25 mg by mouth three (3) times daily. Yes Provider, Historical   carvediloL (COREG) 12.5 mg tablet Take 25 mg by mouth two (2) times a day. Yes Provider, Historical   hydrALAZINE (APRESOLINE) 20 mg/mL injection 10 mg by IntraVENous route every six (6) hours as needed for Other (SBP >160 mmHg). Yes Provider, Historical   heparin sodium,porcine (heparin, porcine,) 5,000 unit/mL injection 5,000 Units by SubCUTAneous route every twelve (12) hours.    Yes Provider, Historical   chlorhexidine (PERIDEX) 0.12 % solution 15 mL by Swish and Spit route every twelve (12) hours as needed for Other (mouth care). Yes Provider, Historical   Arginine-Glutamine-Calcium HMB (Britton) 7-7-1.5 gram pwpk Take 1 Packet by mouth two (2) times a day. Yes Provider, Historical   albuterol (PROVENTIL VENTOLIN) 2.5 mg /3 mL (0.083 %) nebu 2.5 mg by Nebulization route three (3) times daily as needed for Other (Respiratory issues). Yes Provider, Historical   albuterol-ipratropium (DUO-NEB) 2.5 mg-0.5 mg/3 ml nebu 3 mL by Nebulization route two (2) times a day. Yes Provider, Historical   levETIRAcetam in NaCl, iso-os, 1,500 mg/100 mL pgbk IVPB premix 1,500 mg by IntraVENous route every twelve (12) hours every twelve (12) hours. Yes Provider, Historical   allopurinoL (ZYLOPRIM) 100 mg tablet Take 100 mg by mouth daily. Yes Provider, Historical   lacosamide (Vimpat) 100 mg tab tablet Take 100 mg by mouth every twelve (12) hours. Yes Provider, Historical   insulin regular (NOVOLIN R, HUMULIN R) 100 unit/mL injection by SubCUTAneous route every six (6) hours as needed (sliding scale insulin). For BG below 70, give 0 units + 1 amp D50  For BG 70 to 200, give 0 units  For  to 250, give 3 units  For  to 300, give 6 units  For  to 350, give 8 units  For  to 400, give 10 units,  For BG greater than 400, give 12 units   Yes Provider, Historical   famotidine (PEPCID) 20 mg tablet Take 20 mg by mouth daily. Yes Provider, Historical   polyethylene glycol (MIRALAX) 17 gram/dose powder Take 17 g by mouth daily as needed for Constipation. 7/13/20  Yes Sharon Burkett MD   balsam peru-castor oiL (VENELEX) ointment Apply  to affected area three (3) times daily.  7/9/20  Yes Billie Rasheed MD       Allergies/Social/Family History:     No Known Allergies   Social History     Tobacco Use    Smoking status: Never Smoker    Smokeless tobacco: Never Used   Substance Use Topics    Alcohol use: No      Family History   Problem Relation Age of Onset    No Known Problems Mother     No Known Problems Father        Review of Systems:     Review of systems not obtained due to patient factors. Objective:   Vital Signs:  Visit Vitals  BP (!) 96/58 (BP 1 Location: Right arm, BP Patient Position: At rest)   Pulse 60   Temp 97.6 °F (36.4 °C)   Resp 12   Ht 5' 6\" (1.676 m)   Wt 89.9 kg (198 lb 3.1 oz)   SpO2 98%   BMI 31.99 kg/m²      O2 Device: Tracheostomy, Ventilator Temp (24hrs), Av.9 °F (36.6 °C), Min:97.2 °F (36.2 °C), Max:99.3 °F (37.4 °C)           Intake/Output:     Intake/Output Summary (Last 24 hours) at 2021 4616  Last data filed at 2021 0400  Gross per 24 hour   Intake 2045 ml   Output 1235 ml   Net 810 ml       Physical Exam:    General:  NAD, well noursished, well developed, appears stated age   Eyes:  Sclera anicteric. Pupils equally round and reactive to light. Mouth/Throat: Mucous membranes normal, oral pharynx clear   Neck: Supple, trach ok   Lungs:   Clear to auscultation bilaterally, good effort   CV:  Regular rate and rhythm,no murmur, click, rub or gallop   Abdomen:   Soft, non-tender. bowel sounds normal. non-distended, PEG ok   Extremities: No cyanosis or edema   Skin: Skin color, texture, turgor normal. no acute rash or lesions   Lymph nodes: Cervical and supraclavicular normal   Musculoskeletal: No swelling or deformity   Lines/Devices:  Intact, no erythema, drainage or tenderness   Neuro/Psych: Somnolent, not interactive, SANTIAGO, no commands       LABS AND  DATA: Personally reviewed  No results for input(s): WBC, HGB, HCT, PLT, HGBEXT, HCTEXT, PLTEXT, HGBEXT, HCTEXT, PLTEXT in the last 72 hours. No results for input(s): NA, K, CL, CO2, BUN, CREA, GLU, CA, MG, PHOS in the last 72 hours. No results for input(s): AP, TBIL, TP, ALB, GLOB, AML, LPSE in the last 72 hours. No lab exists for component: SGOT, GPT, AMYP  No results for input(s): INR, PTP, APTT, INREXT, INREXT in the last 72 hours.    No results for input(s): PHI, PCO2I, PO2I, FIO2I in the last 72 hours. No results for input(s): CPK, CKMB, TROIQ, BNPP in the last 72 hours. Hemodynamics:   PAP:   CO:     Wedge:   CI:     CVP:    SVR:       PVR:       Ventilator Settings:  Mode Rate Tidal Volume Pressure FiO2 PEEP   Assist control   500 ml    30 % 5 cm H20     Peak airway pressure: 19 cm H2O    Minute ventilation: 5.69 l/min        MEDS: Reviewed    Chest X-Ray:  CXR Results  (Last 48 hours)    None          Images:   Reviewed    B/L UE Duplex 9/9  · Limited study showing no evidence of right or left upper extremity vein thrombosis in the visualized vein segments. ECHO: 7/24  Result status: Final result   · LV: Severely reduced systolic function. Estimated LVEF is 30 - 35%. · RV: Pacer/ICD present. · AV: Moderate aortic valve stenosis is present. Aortic valve mean gradient is 18.1 mmHg. · PA: Mild to moderate pulmonary hypertension. Pulmonary arterial systolic pressure is 50 mmHg. CRITICAL CARE CONSULTANT NOTE  I had a face to face encounter with the patient, reviewed and interpreted patient data including clinical events, labs, images, vital signs, I/O's, and examined patient. I have discussed the case and the plan and management of the patient's care with the consulting services, the bedside nurses and the respiratory therapist.      NOTE OF PERSONAL INVOLVEMENT IN CARE   This patient has a high probability of imminent, clinically significant deterioration, which requires the highest level of preparedness to intervene urgently. I participated in the decision-making and personally managed or directed the management of the following life and organ supporting interventions that required my frequent assessment to treat or prevent imminent deterioration. I personally spent 31 minutes of critical care time.   This is time spent at this critically ill patient's bedside actively involved in patient care as well as the coordination of care and discussions with the patient's family. This does not include any procedural time which has been billed separately.     [address] [facility]  9/14/2021

## 2021-09-14 NOTE — DISCHARGE SUMMARY
[facility]                                                                                         Discharge Summary     Patient: Stephy Wright       MRN: [id number]       YOB: 1933       Age: 80 y.o. Date of admission:  9/7/2021    Date of discharge:  9/14/2021    Primary care provider:  Tamika Morales MD     Admitting provider:  Flori Gill MD    Discharging provider(s): Dora Arce DO - Staff [address]     Consultations  · IP CONSULT TO GENERAL SURGERY  · IP CONSULT TO GASTROENTEROLOGY    Procedures  · none    Discharge destination: [facility] via [address] ambulance. The patient is stable for discharge. Admission diagnosis  · Abdominal pain [R10.9]  · Chronic respiratory failure ([facility] [J96.10]    Please refer to the admission history and physical for details on the presenting problem. Final discharge diagnoses and brief hospital course    Chronic respiratory failure s/p trach w/ ventilator dependence  Cholecystitis  Cognitive impairment with nonverbal baseline  Thombocytopenia  Chronic systolic heart failure  Possible fungal peritoneal fluid infection per chart  H/o CKD3  H/o PPM        Stephy Wright is an 30-year-old male with past medical history of prostate cancer, hypertension, hyperlipidemia, CVA, thrombocytopenia, systolic heart failure who is currently a patient at [facility] with chronic respiratory failure with tracheostomy and ventilator dependence. Patient is nonverbal at baseline. Per the chart the patient had abdominal ultrasound done at [facility] which showed possible acute cholecystitis, the patient was sent to [facility] for a HIDA scan to rule out any gallbladder disease. CT scan of the abdomen and pelvis did reveal concerns for gallbladder disease, however surgery did not feel that the patient was good candidate to take to the operating room. HIDA scan pending.  Patient started on empiric antibiotics at this time. Given multiple comorbidities palliative care consultation is also in place. Transferred to ICU for continued care. CT a/p, surgical c/s obtained. CT was negative, felt not to require surgical intervention. HIDA scan confirmed this. Surgery signed off. LFTs improved. He had been treated w/ merropenem and eraxis empirically. He completed this course and was monitored off abx/antifungals w/ no signs/symptoms of return of infection. Procalcitonin remains low. WBC improved. Hypernatremia improved with free water flushes. LFTs remain slightly elevated, but continue to improve. PEG tube thought to be dislodged. Gastrograffin study confirmed in good position. Tolerated TFs via PEG well since. He was deemed stable to transfer back to [facility]. Follow up instructions per accepting facility    Physical examination at discharge  Visit Vitals  /63   Pulse 60   Temp (!) 96.6 °F (35.9 °C)   Resp 12   Ht 5' 6\" (1.676 m)   Wt 89.9 kg (198 lb 3.1 oz)   SpO2 99%   BMI 31.99 kg/m²     See daily progress note for exam findings. No results for input(s): WBC, HGB, HCT, PLT, HGBEXT, HCTEXT, PLTEXT in the last 72 hours. No results for input(s): NA, K, CL, CO2, BUN, CREA, GLU, CA, MG, PHOS, URICA in the last 72 hours. No results for input(s): AP, TBIL, TP, ALB, GLOB, GGT, AML, LPSE in the last 72 hours. No lab exists for component: SGOT, GPT, AMYP, HLPSE  No results for input(s): INR, PTP, APTT, INREXT in the last 72 hours. No results for input(s): FE, TIBC, PSAT, FERR in the last 72 hours. No results for input(s): PH, PCO2, PO2 in the last 72 hours. No results for input(s): CPK, CKMB in the last 72 hours. No lab exists for component: TROPONINI  No components found for: [place]    Pertinent imaging studies:    US abd limited 9/7  IMPRESSION     1. Gallbladder distention with large volume of intraluminal sludge and mild wall  thickening. Correlate for acute cholecystitis.   2. No biliary ductal dilatation. 3. Hepatic steatosis is suspected. 4. Increased echogenicity of the right kidney suggesting medical renal  parenchymal disease. Correlate clinically. 5. Small ascites. 6. Increased size of a right adrenal mass. Correlate clinically and follow-up as  Appropriate. CT a/p w/ con 9/7  IMPRESSION     1. No cholecystitis on CT. 2. Large left and moderate right pleural effusions. Body wall edema. Consider  fluid overload or hypoproteinemia. HIDA 9/8  FINDINGS:  The initial images demonstrate prompt hepatic tracer uptake. The common bile  duct is visualized at 12 minutes. The gallbladder is visualized at 12 minutes. It demonstrates progressive filling. The duodenum is visualized at 16 minutes. IMPRESSION  Patent cystic and common bile ducts. UE Duplex 9/9  · Limited study showing no evidence of right or left upper extremity vein thrombosis in the visualized vein segments.     KBU/Gastrograffin 9/12  IMPRESSION  Satisfactory intragastric position of PEG tube.      ---------------------------------    Chronic Diagnoses:    Problem List as of 9/14/2021 Date Reviewed: 12/17/2020        Codes Class Noted - Resolved    Chronic respiratory failure ([facility] ICD-10-CM: J96.10  ICD-9-CM: 518.83  9/8/2021 - Present        Abdominal pain ICD-10-CM: R10.9  ICD-9-CM: 789.00  9/7/2021 - Present        Acute respiratory failure with hypoxia (HCC) ICD-10-CM: J96.01  ICD-9-CM: 518.81  7/23/2021 - Present        Aspiration into respiratory tract ICD-10-CM: T17.908A  ICD-9-CM: 934.9  7/23/2021 - Present        Hypokalemia ICD-10-CM: E87.6  ICD-9-CM: 276.8  1/12/2021 - Present        Hypernatremia ICD-10-CM: E87.0  ICD-9-CM: 276.0  1/12/2021 - Present        Moderate protein malnutrition ([facility] ICD-10-CM: E44.0  ICD-9-CM: 263.0  1/12/2021 - Present        Failure to thrive (0-17) ICD-10-CM: R62.51  ICD-9-CM: 783.41  1/9/2021 - Present        Bacteremia ICD-10-CM: R78.81  ICD-9-CM: 790.7  1/9/2021 - Present UTI (urinary tract infection) ICD-10-CM: N39.0  ICD-9-CM: 599.0  1/9/2021 - Present        Dementia ([facility] ICD-10-CM: F03.90  ICD-9-CM: 294.20  1/9/2021 - Present        Bedridden ICD-10-CM: Z74.01  ICD-9-CM: V49.84  1/9/2021 - Present        Pressure ulcer ICD-10-CM: L89.90  ICD-9-CM: 707.00, 707.20  1/9/2021 - Present        S/P percutaneous endoscopic gastrostomy (PEG) tube placement [facility]) ICD-10-CM: Z93.1  ICD-9-CM: V44.1  1/9/2021 - Present        Dehydration ICD-10-CM: E86.0  ICD-9-CM: 276.51  1/6/2021 - Present        Lactic acidosis ICD-10-CM: E87.2  ICD-9-CM: 276.2  1/6/2021 - Present        Sepsis ([facility] ICD-10-CM: A41.9  ICD-9-CM: 038.9, 995.91  1/6/2021 - Present        SOULEYMANE (acute kidney injury) ([facility] ICD-10-CM: N17.9  ICD-9-CM: 584.9  1/6/2021 - Present        Chronic systolic heart failure (HCC) ICD-10-CM: I50.22  ICD-9-CM: 428.22  12/7/2020 - Present        Altered mental status ICD-10-CM: R41.82  ICD-9-CM: 780.97  11/18/2020 - Present        Post-ictal state ([facility] ICD-10-CM: R56.9  ICD-9-CM: 780.39  11/18/2020 - Present        Seizure ([facility] ICD-10-CM: R56.9  ICD-9-CM: 780.39  11/17/2020 - Present        Atrial pacemaker lead displacement ICD-10-CM: T82.120A  ICD-9-CM: 996.01  10/18/2019 - Present        Pacemaker ICD-10-CM: Z95.0  ICD-9-CM: V45.01  3/22/2019 - Present    Overview Signed 3/22/2019  5:08 PM by Florencio Narayanan MD     3/22/2019 dual chamber Medtronic pacer             Bradycardia ICD-10-CM: R00.1  ICD-9-CM: 427.89  3/19/2019 - Present        Transaminitis ICD-10-CM: R74.01  ICD-9-CM: 790.4  3/19/2019 - Present        Hypertensive urgency ICD-10-CM: I16.0  ICD-9-CM: 401.9  3/19/2019 - Present        Second degree AV block, Mobitz type I ICD-10-CM: I44.1  ICD-9-CM: 426.13  3/19/2019 - Present    Overview Signed 3/21/2019  6:14 PM by Florencio Narayanan MD     Added automatically from request for surgery [id number]             Stage 3 chronic kidney disease ([facility] ICD-10-CM: N18.30  ICD-9-CM: 585.3 3/15/2019 - Present        Rotator cuff arthropathy of both shoulders ICD-10-CM: M12.811, M12.812  ICD-9-CM: 716.81  3/15/2019 - Present        Cognitive impairment ICD-10-CM: R41.89  ICD-9-CM: 294.9  1/8/2019 - Present        Systolic murmur G-57-VA: R01.1  ICD-9-CM: 785.2  12/30/2018 - Present        Essential hypertension ICD-10-CM: I10  ICD-9-CM: 401.9  12/17/2018 - Present        Gout ICD-10-CM: M10.9  ICD-9-CM: 274.9  12/17/2018 - Present        Pure hypercholesterolemia ICD-10-CM: E78.00  ICD-9-CM: 272.0  12/17/2018 - Present        History of prostate cancer ICD-10-CM: Z85.46  ICD-9-CM: V10.46  12/17/2018 - Present        Chronic constipation ICD-10-CM: K59.09  ICD-9-CM: 564.00  12/17/2018 - Present        Dysuria ICD-10-CM: R30.0  ICD-9-CM: 788.1  8/8/2017 - Present        Weight loss ICD-10-CM: R63.4  ICD-9-CM: 783.21  8/8/2017 - Present        Adrenal mass ([facility] ICD-10-CM: E27.8  ICD-9-CM: 255.8  7/18/2017 - Present    Overview Addendum 12/17/2018  9:57 AM by Tati Dong MD     Stable/non-functioning adenoma on imaging             RESOLVED: Acute CVA (cerebrovascular accident) ([facility] ICD-10-CM: I63.9  ICD-9-CM: 434.91  6/30/2020 - 7/9/2020        RESOLVED: Thrombocytopenia ([facility] ICD-10-CM: D69.6  ICD-9-CM: 287.5  3/19/2019 - 7/13/2020        RESOLVED: Acute pyelonephritis ICD-10-CM: N10  ICD-9-CM: 590.10  7/18/2017 - 7/21/2017              Time spent on discharge related activities today greater than 30 minutes.       Signed:      Almaz Dee DO   Staff [facility]    9/14/2021   4:10 PM     Attending        Cc: Eric Garza MD

## 2021-09-14 NOTE — PROGRESS NOTES
0730: Bedside and Verbal shift change report given to Adithya Yanez RN (oncoming nurse) by Karol Brito RN (offgoing nurse). Report included the following information SBAR, ED Summary, OR Summary, Procedure Summary, Intake/Output, Recent Results and Cardiac Rhythm Vpaced. 1800: Pt w/ uneventful shift; trach care completed; updated family on Pt condition    1800:   TRANSFER - OUT REPORT:    Verbal report given to Marbella Stein RN at [facility] (name) on Jony Weaver  being transferred to [facility] (unit) for routine progression of care       Report consisted of patients Situation, Background, Assessment and   Recommendations(SBAR). Information from the following report(s) SBAR, ED Summary, OR Summary, MAR, Recent Results and Cardiac Rhythm AV paced was reviewed with the receiving nurse. Lines:   Peripheral IV 09/08/21 Left;Proximal;Upper Arm (Active)   Site Assessment Clean, dry, & intact 09/14/21 0800   Phlebitis Assessment 0 09/14/21 0800   Infiltration Assessment 0 09/14/21 0800   Dressing Status Clean, dry, & intact 09/14/21 0800   Dressing Type Transparent;Tape 09/14/21 0800   Hub Color/Line Status Pink;Flushed;Capped 09/14/21 0800   Action Taken Open ports on tubing capped 09/14/21 0800   Alcohol Cap Used Yes 09/14/21 0800        Opportunity for questions and clarification was provided. Patient transported with:   Monitor  O2 @ 30% FiO2 on Vent liters  Registered Nurse    1930: [facility] at bedside to transport Pt.

## 2021-09-14 NOTE — PROGRESS NOTES
2000: Received report from [facility], RN.     0400: Unable to obtain labs, multiple attempts by RN. MD aware. 0800: Bedside and Verbal shift change report given to Jennifer Mathis RN and Corbin Ramires RN (oncoming nurse) by [facility] (offgoing nurse). Report included the following information SBAR, ED Summary, Intake/Output, MAR, Med Rec Status and Cardiac Rhythm AV paced .

## 2021-09-15 ENCOUNTER — PATIENT OUTREACH (OUTPATIENT)
Dept: CASE MANAGEMENT | Age: 86
End: 2021-09-15

## 2021-09-15 NOTE — PROGRESS NOTES
Transition of care outreach postponed for 14 days due to patient's discharge to SNF.   readmit 9/7-9/14/21 [facility] chronic resp fx d/c VIBRA f/u 14d

## 2021-10-05 ENCOUNTER — PATIENT OUTREACH (OUTPATIENT)
Dept: CASE MANAGEMENT | Age: 86
End: 2021-10-05

## 2021-10-06 ENCOUNTER — PATIENT OUTREACH (OUTPATIENT)
Dept: CASE MANAGEMENT | Age: 86
End: 2021-10-06

## 2021-10-06 NOTE — PROGRESS NOTES
Return call from patients daughter patient was discharged from [facility] to [facility] on 10/1/21 on a ventilator  Will follow up 30d

## 2021-11-10 ENCOUNTER — PATIENT OUTREACH (OUTPATIENT)
Dept: CASE MANAGEMENT | Age: 86
End: 2021-11-10

## 2022-03-18 PROBLEM — E78.00 PURE HYPERCHOLESTEROLEMIA: Status: ACTIVE | Noted: 2018-12-17

## 2022-03-18 PROBLEM — R56.9 SEIZURE (HCC): Status: ACTIVE | Noted: 2020-11-17

## 2022-03-18 PROBLEM — R00.1 BRADYCARDIA: Status: ACTIVE | Noted: 2019-03-19

## 2022-03-18 PROBLEM — E27.8 ADRENAL MASS (HCC): Status: ACTIVE | Noted: 2017-07-18

## 2022-03-18 PROBLEM — R41.89 COGNITIVE IMPAIRMENT: Status: ACTIVE | Noted: 2019-01-08

## 2022-03-18 PROBLEM — J96.10 CHRONIC RESPIRATORY FAILURE (HCC): Status: ACTIVE | Noted: 2021-09-08

## 2022-03-18 PROBLEM — Z95.0 PACEMAKER: Status: ACTIVE | Noted: 2019-03-22

## 2022-03-18 PROBLEM — I16.0 HYPERTENSIVE URGENCY: Status: ACTIVE | Noted: 2019-03-19

## 2022-03-18 PROBLEM — E44.0 MODERATE PROTEIN MALNUTRITION (HCC): Status: ACTIVE | Noted: 2021-01-12

## 2022-03-19 PROBLEM — R63.4 WEIGHT LOSS: Status: ACTIVE | Noted: 2017-08-08

## 2022-03-19 PROBLEM — E87.20 LACTIC ACIDOSIS: Status: ACTIVE | Noted: 2021-01-06

## 2022-03-19 PROBLEM — M10.9 GOUT: Status: ACTIVE | Noted: 2018-12-17

## 2022-03-19 PROBLEM — N39.0 UTI (URINARY TRACT INFECTION): Status: ACTIVE | Noted: 2021-01-09

## 2022-03-19 PROBLEM — I44.1 SECOND DEGREE AV BLOCK, MOBITZ TYPE I: Status: ACTIVE | Noted: 2019-03-19

## 2022-03-19 PROBLEM — T17.908A ASPIRATION INTO RESPIRATORY TRACT: Status: ACTIVE | Noted: 2021-07-23

## 2022-03-19 PROBLEM — R30.0 DYSURIA: Status: ACTIVE | Noted: 2017-08-08

## 2022-03-19 PROBLEM — R01.1 SYSTOLIC MURMUR: Status: ACTIVE | Noted: 2018-12-30

## 2022-03-19 PROBLEM — K59.09 CHRONIC CONSTIPATION: Status: ACTIVE | Noted: 2018-12-17

## 2022-03-19 PROBLEM — J96.01 ACUTE RESPIRATORY FAILURE WITH HYPOXIA (HCC): Status: ACTIVE | Noted: 2021-07-23

## 2022-03-19 PROBLEM — F03.90 DEMENTIA (HCC): Status: ACTIVE | Noted: 2021-01-09

## 2022-03-19 PROBLEM — Z74.01 BEDRIDDEN: Status: ACTIVE | Noted: 2021-01-09

## 2022-03-19 PROBLEM — R56.9 POST-ICTAL STATE (HCC): Status: ACTIVE | Noted: 2020-11-18

## 2022-03-19 PROBLEM — M12.812 ROTATOR CUFF ARTHROPATHY OF BOTH SHOULDERS: Status: ACTIVE | Noted: 2019-03-15

## 2022-03-19 PROBLEM — E87.6 HYPOKALEMIA: Status: ACTIVE | Noted: 2021-01-12

## 2022-03-19 PROBLEM — N18.30 STAGE 3 CHRONIC KIDNEY DISEASE (HCC): Status: ACTIVE | Noted: 2019-03-15

## 2022-03-19 PROBLEM — R41.82 ALTERED MENTAL STATUS: Status: ACTIVE | Noted: 2020-11-18

## 2022-03-19 PROBLEM — I50.22 CHRONIC SYSTOLIC HEART FAILURE (HCC): Status: ACTIVE | Noted: 2020-12-07

## 2022-03-19 PROBLEM — A41.9 SEPSIS (HCC): Status: ACTIVE | Noted: 2021-01-06

## 2022-03-19 PROBLEM — R74.01 TRANSAMINITIS: Status: ACTIVE | Noted: 2019-03-19

## 2022-03-19 PROBLEM — E87.0 HYPERNATREMIA: Status: ACTIVE | Noted: 2021-01-12

## 2022-03-19 PROBLEM — M12.811 ROTATOR CUFF ARTHROPATHY OF BOTH SHOULDERS: Status: ACTIVE | Noted: 2019-03-15

## 2022-03-19 PROBLEM — Z85.46 HISTORY OF PROSTATE CANCER: Status: ACTIVE | Noted: 2018-12-17

## 2022-03-19 PROBLEM — I10 ESSENTIAL HYPERTENSION: Status: ACTIVE | Noted: 2018-12-17

## 2022-03-20 PROBLEM — N17.9 AKI (ACUTE KIDNEY INJURY) (HCC): Status: ACTIVE | Noted: 2021-01-06

## 2022-03-20 PROBLEM — E86.0 DEHYDRATION: Status: ACTIVE | Noted: 2021-01-06

## 2022-03-20 PROBLEM — Z93.1 S/P PERCUTANEOUS ENDOSCOPIC GASTROSTOMY (PEG) TUBE PLACEMENT (HCC): Status: ACTIVE | Noted: 2021-01-09

## 2022-03-20 PROBLEM — L89.90 PRESSURE ULCER: Status: ACTIVE | Noted: 2021-01-09

## 2022-03-20 PROBLEM — T82.120A ATRIAL PACEMAKER LEAD DISPLACEMENT: Status: ACTIVE | Noted: 2019-10-18

## 2022-03-20 PROBLEM — R78.81 BACTEREMIA: Status: ACTIVE | Noted: 2021-01-09

## 2022-08-11 NOTE — PROGRESS NOTES
[facility]    ICU TEAM Progress Note    Name: María Brown   : 3/9/1933   MRN: [id number]   Date: 8/10/2021      I  Subjective:   Progress Note: 8/10/2021      Reason for ICU Admission: Acute hypoxic respiratory failure     Interval history: From [facility] a 80 y. o. male with history of prostate cancer, CHF, constipation, hypertension, stroke, TIA, and seizures who presents to the emergency department by EMS as transfer from [facility] for respiratory distress.  Patient had initially been a transfer from [facility] [place] [place] after a hospitalization from  [facility].  Was there for altered mental status hypoxia was treated for hypoxic respiratory failure bilateral pleural effusions acute systolic CHF secondary to severe aortic stenosis hyponatremia hypokalemia elevated LFTs malnutrition and possible UTI was treated with ceftriaxone and Rambo Stroud already had a PEG for dysphagia and protein calorie malnutrition prior to [facility] admission. He was at [facility] from [place] [id number].  While at [facility] today patient had an episode of vomiting and aspirated was initially started on high flow nasal cannula however was still hypoxic thus got intubated by [facility] staff. John Staley was then transferred to the ED for further care.  Per family patient's decline started last year while he was having seizures/TIA patient became more encephalopathic and started to refuse eating and thus got a PEG for nutritional reasons. was staying at assisted living facility and had to be re-hospitalized after his PEG malfunctioned.  During that time he did have an infection, daughter could not exactly say where the infection was, but she thought it was an abdominal infection and since then he started having worsening heart failure and decreased heart function, and failure to thrive. Per records EF was 10 to 15%.  Per daughter, patient has never had a tracheostomy.  She states patient is awake at times, will open eyes, but does not track, does not follow commands, and does not speak. They wish patient to remain full code at this time.     While in ED patient was placed on mechanical ventilator, ABG was done which was consistent with hypoxic respiratory failure.  Patient was also given 1 L of IV fluids for fluid resuscitation due to sepsis. Initially blood pressure was okay but once patient was started on sedation blood pressure dropped.  Patient also had episodes of hypoxia due to vent asynchrony. Once PEEP was increased for hypoxia patient's pressure dropped. Started patient on Levophed via peripheral in ED. If hypotension worsens will need central line. Started on Zosyn. Updated the family at bedside, daughter and and son. Consent for central line and blood was  Received.  Patient to transfer to ICU for continued care. Overnight Events:  8/10: No acute events, trach yesterday. 8/9: No acute overnight events  8/8: No acute overnight events  8/7: No acute overnight events  8/6: No acute overnight events. 8/5: No changes  8/4: No acute event, no fever, hemodynamically stable, no changes in neurological status.   8/3: No acute event, remained poorly responsive, remained on EEG overnight.  Still having occasional facial tremors.  Hemodynamically remained stable  8/2: No acute event, pending EEG report, no neurological recovery, occasional twitching movement in his face but seems to be better than before.  Dobutamine drip off.  8/1: On continuous EEG monitoring, no other acute event.  Poorly responsive and occasional twitching movement  7/31: Appearentt seizure like activity last night, ativan given  7/30: No acute overnight events  7/29 No acute overnight events   7/28: No acute event, no much neurological recovery.  No witnessed seizure, blood pressure on the higher side.  Diuresing well  7/27: No acute event.  Good diuresis.  Still poorly responsive.  Remains on dobutamine.  Blood pressure maintained.  No witnessed seizure. 7/26: No acute event, blood pressure somewhat better but urine output decreasing.  Still on dobutamine at 5.  7/25 -- Getting PRBC now; started on Dobutamine      Active Problem List:     Problem List  Date Reviewed: 12/17/2020        Codes Class    Acute respiratory failure with hypoxia [facility]) ICD-10-CM: J96.01  ICD-9-CM: 518.81         Aspiration into respiratory tract ICD-10-CM: T17.908A  ICD-9-CM: 934.9         Hypokalemia ICD-10-CM: E87.6  ICD-9-CM: 276.8         Hypernatremia ICD-10-CM: E87.0  ICD-9-CM: 276.0         Moderate protein malnutrition (HCC) ICD-10-CM: E44.0  ICD-9-CM: 263.0         Failure to thrive (0-17) ICD-10-CM: R62.51  ICD-9-CM: 783.41         Bacteremia ICD-10-CM: R78.81  ICD-9-CM: 790.7         UTI (urinary tract infection) ICD-10-CM: N39.0  ICD-9-CM: 599.0         Dementia ([facility] ICD-10-CM: F03.90  ICD-9-CM: 294.20         Bedridden ICD-10-CM: Z74.01  ICD-9-CM: V49.84         Pressure ulcer ICD-10-CM: L89.90  ICD-9-CM: 707.00, 707.20         S/P percutaneous endoscopic gastrostomy (PEG) tube placement ([facility] ICD-10-CM: Z93.1  ICD-9-CM: V44.1         Dehydration ICD-10-CM: E86.0  ICD-9-CM: 276.51         Lactic acidosis ICD-10-CM: E87.2  ICD-9-CM: 276.2         Sepsis ([facility] ICD-10-CM: A41.9  ICD-9-CM: 038.9, 995.91         SOULEYMANE (acute kidney injury) ([facility] ICD-10-CM: N17.9  ICD-9-CM: 257. 9         Chronic systolic heart failure (HCC) ICD-10-CM: I50.22  ICD-9-CM: 428.22         Altered mental status ICD-10-CM: R41.82  ICD-9-CM: 780.97         Post-ictal state ([facility] ICD-10-CM: R56.9  ICD-9-CM: 780.39         Seizure ([facility] ICD-10-CM: R56.9  ICD-9-CM: 780.39         Atrial pacemaker lead displacement ICD-10-CM: T82.120A  ICD-9-CM: 996.01         Pacemaker ICD-10-CM: Z95.0  ICD-9-CM: V45.01     Overview Signed 3/22/2019  5:08 PM by Osman Swift MD     3/22/2019 dual chamber Medtronic pacer             Bradycardia ICD-10-CM: R00.1  ICD-9-CM: 427.89         Transaminitis ICD-10-CM: R74.01  ICD-9-CM: 790.4         Hypertensive urgency ICD-10-CM: I16.0  ICD-9-CM: 401.9         Second degree AV block, Mobitz type I ICD-10-CM: I44.1  ICD-9-CM: 426.13     Overview Signed 3/21/2019  6:14 PM by Renu Rangel MD     Added automatically from request for surgery [id number]             Stage 3 chronic kidney disease ([facility] ICD-10-CM: N18.30  ICD-9-CM: 808. 3         Rotator cuff arthropathy of both shoulders ICD-10-CM: M12.811, M12.812  ICD-9-CM: 716.81         Cognitive impairment ICD-10-CM: R41.89  ICD-9-CM: 643.7         Systolic murmur VERNA-88-RP: R01.1  ICD-9-CM: 785.2         Essential hypertension ICD-10-CM: I10  ICD-9-CM: 401.9         Gout ICD-10-CM: M10.9  ICD-9-CM: 274.9         Pure hypercholesterolemia ICD-10-CM: E78.00  ICD-9-CM: 272.0         History of prostate cancer ICD-10-CM: Z85.46  ICD-9-CM: V10.46         Chronic constipation ICD-10-CM: K59.09  ICD-9-CM: 564.00         Dysuria ICD-10-CM: R30.0  ICD-9-CM: 855. 1         Weight loss ICD-10-CM: R63.4  ICD-9-CM: 783.21         Adrenal mass ([facility] ICD-10-CM: E27.8  ICD-9-CM: 255.8     Overview Addendum 12/17/2018  9:57 AM by Shan Uriarte MD     Stable/non-functioning adenoma on imaging                   Past Medical History:      has a past medical history of Cancer [facility]), Chronic systolic heart failure ([facility] (12/7/2020), Constipation, Gout, Hyperlipemia, Hypertension, Pacemaker (2019), Stroke ([facility] Thrombocytopenia ([facility] (3/19/2019), and TIA (transient ischemic attack) (2013). Past Surgical History:      has a past surgical history that includes hx urological; hx prostatectomy; pr ins new/rplcmt prm pm w/transv eltrd atrial&vent (Right, 3/22/2019); pr ins new/rplcmt prm pacemakr w/trans eltrd atrial (N/A, 10/18/2019); and place percut gastrostomy tube (11/30/2020). Home Medications:     Prior to Admission medications    Medication Sig Start Date End Date Taking?  Authorizing Provider   levETIRAcetam (KEPPRA) 750 mg tablet Take 1 Tab by mouth every twelve (12) hours. 20   Humphrey Krabbe, NP   aspirin delayed-release 81 mg tablet Take 1 Tab by mouth daily. 20   Melody Whittington MD   polyethylene glycol [facility]) 17 gram/dose powder Take 17 g by mouth daily as needed for Constipation. 20   Melody Whittington MD   balsam peru-castor oiL (VENELEX) ointment Apply  to affected area three (3) times daily. 20   Collette Madison MD   famotidine (PEPCID) 20 mg tablet Take 1 Tab by mouth every evening. 20   Collette Madison MD   acetaminophen (TYLENOL) 325 mg tablet Take 650 mg by mouth every six (6) hours as needed for Pain. Provider, Historical       Allergies/Social/Family History:     No Known Allergies   Social History     Tobacco Use    Smoking status: Never Smoker    Smokeless tobacco: Never Used   Substance Use Topics    Alcohol use: No      Family History   Problem Relation Age of Onset    No Known Problems Mother     No Known Problems Father        Review of Systems:     Not able to obtain due to patient medical condition    Objective:   Vital Signs:  Visit Vitals  /70   Pulse 93   Temp 98.7 °F (37.1 °C)   Resp (!) 5   Ht 5' 6\" (1.676 m)   Wt 75.8 kg (167 lb 1.7 oz)   SpO2 100%   BMI 26.97 kg/m²      O2 Device: Ventimask, Tracheostomy Temp (24hrs), Av.2 °F (37.3 °C), Min:98.7 °F (37.1 °C), Max:99.9 °F (37.7 °C)           Intake/Output:     Intake/Output Summary (Last 24 hours) at 8/10/2021 1056  Last data filed at 8/10/2021 1000  Gross per 24 hour   Intake 2485 ml   Output 745 ml   Net 1740 ml       Physical Exam:    General: No distress, appears stated age, trach  Neurologic: No response to stimuli  Lungs: CTAB   Cardiovascular:  Regular rate and rhythm, S1S2 present, without murmur or extra heart sounds and pedal pulses normal , no lower limb edema  Abdomen:  soft, non-tender.  Bowel sounds normal. No masses,  no organomegaly      LABS AND  DATA: Personally reviewed  Recent Labs     08/10/21  4474 08/09/21  0257   WBC 12.5* 11.5*   HGB 8.7* 7.3*   HCT 28.4* 23.7*    241     Recent Labs     08/10/21  0319 08/09/21  0257    141   K 4.5 4.0   * 107   CO2 27 31   BUN 59* 66*   CREA 0.98 0.97   * 137*   CA 9.9 9.3   MG 2.8* 2.6*   PHOS  --  2.9     No results for input(s): AP, TBIL, TP, ALB, GLOB, AML, LPSE in the last 72 hours. No lab exists for component: SGOT, GPT, AMYP  No results for input(s): INR, PTP, APTT, INREXT, INREXT in the last 72 hours. No results for input(s): PHI, PCO2I, PO2I, FIO2I in the last 72 hours. No results for input(s): CPK, CKMB, TROIQ, BNPP in the last 72 hours. Hemodynamics:   PAP:   CO:     Wedge:   CI:     CVP:    SVR:       PVR:       Ventilator Settings:  Mode Rate Tidal Volume Pressure FiO2 PEEP   Pressure control, Assist control      5 cm H2O 30 % 5 cm H20     Peak airway pressure: 23 cm H2O    Minute ventilation: 7.02 l/min        MEDS: Reviewed    Chest X-Ray:  CXR Results  (Last 48 hours)    None            Assessment and Plan:   Seizure:  Encephalopathy:  Advanced dementia:  Appreciate neurology input, EEG report noted as above.  Remains poorly responsive.  Continue current antiepileptic medication. Appreciate further neurology input. MRI yesterday w/o acute findings. Cardiogenic shock:  Ejection fraction of less than 30% at baseline, dobutamine drip weaned off.  Seems to be euvolemic at this time.  Renal function improving.  Will diurese as needed. Septic shock: Resolved  Completed antibiotic course.  Off pressors.  Procalcitonin continue to trend down  Respiratory failure- trach     Patient stable for transfer to [facility]  Stay in ICU    CRITICAL CARE CONSULTANT NOTE  I had a face to face encounter with the patient, reviewed and interpreted patient data including clinical events, labs, images, vital signs, I/O's, and examined patient.   I have discussed the case and the plan and management of the patient's care with the consulting services, the bedside nurses and the respiratory therapist.      NOTE OF PERSONAL INVOLVEMENT IN CARE   This patient has a high probability of imminent, clinically significant deterioration, which requires the highest level of preparedness to intervene urgently. I participated in the decision-making and personally managed or directed the management of the following life and organ supporting interventions that required my frequent assessment to treat or prevent imminent deterioration. I personally spent 30 minutes of critical care time. This is time spent at this critically ill patient's bedside actively involved in patient care as well as the coordination of care and discussions with the patient's family. This does not include any procedural time which has been billed separately.     [facility] Critical [facility]  8/10/2021 Yes - the patient is able to be screened

## 2022-08-19 NOTE — PROGRESS NOTES
Reason for Admission:   Acute CVA                  RUR Score:     RUR not assigned at time of assessment/ RRAT 19             PCP: First and Last name:  Dr. Kamila Hernandez   Name of Practice:    Are you a current patient: Yes/No: Yes   Approximate date of last visit: 10/11//2019   Can you participate in a virtual visit if needed:     Do you (patient/family) have any concerns for transition/discharge? No concerns expressed at this time               Plan for utilizing home health:   Previous HH utilized in 2019 (daughter unable to remember name)    Current Advanced Directive/Advance Care Plan:  No AMD. Patient has General Durable Power of  on file. Transition of Care Plan:    Patient admitted for acute CVA. History HTN, HLD, prostate, and TIA. ARIELLE plan to be determined. EMR reviewed. Met w/patient and daughter/POA Radha Teran [phone] introduced to role of CM. Patient is alert and history provided by daughter. Elijah Wang has resided at [facility] ([address])  since June 2019.  is Helen Or [phone]. Patient utilizes a RW for ambulation. No transitional care needs verbalized at this time. Noted [facility] as previous facility. Daughter states will not consider facility if needed . Case Management will follow for needs. [facility] Medicare A/B and [employer]  Are insurance providers. Care Management Interventions  PCP Verified by CM:  Yes  Last Visit to PCP: 10/11/19  Palliative Care Criteria Met (RRAT>21 & CHF Dx)?: No  Transition of Care Consult (CM Consult): Discharge Planning  MyChart Signup: No  Discharge Durable Medical Equipment: No  Health Maintenance Reviewed: Yes  Physical Therapy Consult: Yes  Occupational Therapy Consult: Yes  Speech Therapy Consult: Yes  Current Support Network: [address] Provided?: No  Discharge Location  Discharge Placement: (TBD) Female

## 2023-01-18 NOTE — Clinical Note
A venogram was performed on the right subclavian. Injected with single hand injection. Injection volume  = 10 mL. Sorry about all the messages today.  Patient recently got set up with home health. Daughter states it isn't [facility]. I placed a subsequent home health order for lab draw (CBC/cmp). Can you contact the home health agency to get the blood drawn?  Thanks so much!

## 2023-05-12 RX ORDER — POLYETHYLENE GLYCOL 3350 17 G/17G
17 POWDER, FOR SOLUTION ORAL DAILY PRN
COMMUNITY
Start: 2020-07-13

## 2023-05-12 RX ORDER — HYDRALAZINE HYDROCHLORIDE 25 MG/1
TABLET, FILM COATED ORAL 3 TIMES DAILY
COMMUNITY

## 2023-05-12 RX ORDER — FAMOTIDINE 20 MG/1
20 TABLET, FILM COATED ORAL DAILY
COMMUNITY

## 2023-05-12 RX ORDER — CHLORHEXIDINE GLUCONATE 0.12 MG/ML
RINSE ORAL
COMMUNITY

## 2023-05-12 RX ORDER — LACOSAMIDE 100 MG/1
TABLET ORAL EVERY 12 HOURS
COMMUNITY

## 2023-05-12 RX ORDER — CARVEDILOL 25 MG/1
TABLET ORAL 2 TIMES DAILY
COMMUNITY
Start: 2021-09-14

## 2023-05-12 RX ORDER — ALLOPURINOL 100 MG/1
100 TABLET ORAL DAILY
COMMUNITY

## 2023-05-12 RX ORDER — IPRATROPIUM BROMIDE AND ALBUTEROL SULFATE 2.5; .5 MG/3ML; MG/3ML
SOLUTION RESPIRATORY (INHALATION) 2 TIMES DAILY
COMMUNITY

## 2023-05-12 RX ORDER — HYDRALAZINE HYDROCHLORIDE 20 MG/ML
INJECTION INTRAMUSCULAR; INTRAVENOUS EVERY 6 HOURS PRN
COMMUNITY

## 2023-05-12 RX ORDER — ALBUTEROL SULFATE 2.5 MG/3ML
SOLUTION RESPIRATORY (INHALATION) 3 TIMES DAILY PRN
COMMUNITY

## 2023-05-12 RX ORDER — HEPARIN SODIUM 5000 [USP'U]/ML
INJECTION, SOLUTION INTRAVENOUS; SUBCUTANEOUS EVERY 12 HOURS
COMMUNITY

## 2024-04-03 NOTE — PROGRESS NOTES
[facility]    ICU TEAM Progress Note    Name: Marlys Lynn   : 3/9/1933   MRN: [id number]   Date: 2021      I  Subjective:   Progress Note: 2021      Reason for ICU Admission: Acute hypoxic respiratory failure     Interval history: From [facility] a 80 y. o. male with history of prostate cancer, CHF, constipation, hypertension, stroke, TIA, and seizures who presents to the emergency department by EMS as transfer from [address] [place] [address] for respiratory distress.  Patient had initially been a transfer from [facility] after a hospitalization from  [facility].  Was there for altered mental status hypoxia was treated for hypoxic respiratory failure bilateral pleural effusions acute systolic CHF secondary to severe aortic stenosis hyponatremia hypokalemia elevated LFTs malnutrition and possible UTI was treated with ceftriaxone and Chanel Ware already had a PEG for dysphagia and protein calorie malnutrition prior to [facility] admission. He was at [address] [place] [address] from [place] [id number].  While at [address] [place] [address] today patient had an episode of vomiting and aspirated was initially started on high flow nasal cannula however was still hypoxic thus got intubated by [address] [place] [address] staff. Julianna Cooper was then transferred to the ED for further care.  Per family patient's decline started last year while he was having seizures/TIA patient became more encephalopathic and started to refuse eating and thus got a PEG for nutritional reasons. was staying at assisted living facility and had to be re-hospitalized after his PEG malfunctioned.  During that time he did have an infection, daughter could not exactly say where the infection was, but she thought it was an abdominal infection and since then he started having worsening heart failure and decreased heart function, and failure to thrive. Per records EF was 10 to 15%.  Per daughter, patient has never had a tracheostomy.  She states patient is awake at times, will open eyes, but does not track, does not follow commands, and does not speak. They wish patient to remain full code at this time.     While in ED patient was placed on mechanical ventilator, ABG was done which was consistent with hypoxic respiratory failure.  Patient was also given 1 L of IV fluids for fluid resuscitation due to sepsis. Initially blood pressure was okay but once patient was started on sedation blood pressure dropped.  Patient also had episodes of hypoxia due to vent asynchrony. Once PEEP was increased for hypoxia patient's pressure dropped. Started patient on Levophed via peripheral in ED. If hypotension worsens will need central line. Started on Zosyn. Updated the family at bedside, daughter and and son. Consent for central line and blood was  Received.  Patient to transfer to ICU for continued care. Overnight Events:  8/9: No acute overnight events  8/8: No acute overnight events  8/7: No acute overnight events  8/6: No acute overnight events. 8/5: No changes  8/4: No acute event, no fever, hemodynamically stable, no changes in neurological status. 8/3: No acute event, remained poorly responsive, remained on EEG overnight.  Still having occasional facial tremors.  Hemodynamically remained stable  8/2: No acute event, pending EEG report, no neurological recovery, occasional twitching movement in his face but seems to be better than before.  Dobutamine drip off.  8/1: On continuous EEG monitoring, no other acute event.  Poorly responsive and occasional twitching movement  7/31: Appearentt seizure like activity last night, ativan given  7/30: No acute overnight events  7/29 No acute overnight events   7/28: No acute event, no much neurological recovery.  No witnessed seizure, blood pressure on the higher side.  Diuresing well  7/27: No acute event.  Good diuresis.  Still poorly responsive.  Remains on dobutamine.  Blood pressure maintained.  No witnessed seizure.   7/26: No acute event, blood pressure somewhat better but urine output decreasing.  Still on dobutamine at 5.  7/25 -- Getting PRBC now; started on Dobutamine      Active Problem List:     Problem List  Date Reviewed: 12/17/2020        Codes Class    Acute respiratory failure with hypoxia [facility]) ICD-10-CM: J96.01  ICD-9-CM: 518.81         Aspiration into respiratory tract ICD-10-CM: T17.908A  ICD-9-CM: 934.9         Hypokalemia ICD-10-CM: E87.6  ICD-9-CM: 276.8         Hypernatremia ICD-10-CM: E87.0  ICD-9-CM: 276.0         Moderate protein malnutrition (HCC) ICD-10-CM: E44.0  ICD-9-CM: 263.0         Failure to thrive (0-17) ICD-10-CM: R62.51  ICD-9-CM: 783.41         Bacteremia ICD-10-CM: R78.81  ICD-9-CM: 790.7         UTI (urinary tract infection) ICD-10-CM: N39.0  ICD-9-CM: 599.0         Dementia (HCC) ICD-10-CM: F03.90  ICD-9-CM: 294.20         Bedridden ICD-10-CM: Z74.01  ICD-9-CM: V49.84         Pressure ulcer ICD-10-CM: L89.90  ICD-9-CM: 707.00, 707.20         S/P percutaneous endoscopic gastrostomy (PEG) tube placement ([facility] ICD-10-CM: Z93.1  ICD-9-CM: V44.1         Dehydration ICD-10-CM: E86.0  ICD-9-CM: 276.51         Lactic acidosis ICD-10-CM: E87.2  ICD-9-CM: 276.2         Sepsis ([facility] ICD-10-CM: A41.9  ICD-9-CM: 038.9, 995.91         SOULEYMANE (acute kidney injury) ([facility] ICD-10-CM: N17.9  ICD-9-CM: 072. 9         Chronic systolic heart failure (HCC) ICD-10-CM: I50.22  ICD-9-CM: 428.22         Altered mental status ICD-10-CM: R41.82  ICD-9-CM: 780.97         Post-ictal state ([facility] ICD-10-CM: R56.9  ICD-9-CM: 780.39         Seizure ([facility] ICD-10-CM: R56.9  ICD-9-CM: 780.39         Atrial pacemaker lead displacement ICD-10-CM: T82.120A  ICD-9-CM: 996.01         Pacemaker ICD-10-CM: Z95.0  ICD-9-CM: V45.01     Overview Signed 3/22/2019  5:08 PM by Verenice Renee MD     3/22/2019 dual chamber Medtronic pacer             Bradycardia ICD-10-CM: R00.1  ICD-9-CM: 427.89         Transaminitis ICD-10-CM: R74.01  ICD-9-CM: 790.4         Hypertensive urgency ICD-10-CM: I16.0  ICD-9-CM: 401.9         Second degree AV block, Mobitz type I ICD-10-CM: I44.1  ICD-9-CM: 426.13     Overview Signed 3/21/2019  6:14 PM by Florencio Narayanan MD     Added automatically from request for surgery [id number]             Stage 3 chronic kidney disease ([facility] ICD-10-CM: N18.30  ICD-9-CM: 531. 3         Rotator cuff arthropathy of both shoulders ICD-10-CM: M12.811, M12.812  ICD-9-CM: 716.81         Cognitive impairment ICD-10-CM: R41.89  ICD-9-CM: 445.7         Systolic murmur [facility]-MR: R01.1  ICD-9-CM: 785.2         Essential hypertension ICD-10-CM: I10  ICD-9-CM: 401.9         Gout ICD-10-CM: M10.9  ICD-9-CM: 274.9         Pure hypercholesterolemia ICD-10-CM: E78.00  ICD-9-CM: 272.0         History of prostate cancer ICD-10-CM: Z85.46  ICD-9-CM: V10.46         Chronic constipation ICD-10-CM: K59.09  ICD-9-CM: 564.00         Dysuria ICD-10-CM: R30.0  ICD-9-CM: 346. 1         Weight loss ICD-10-CM: R63.4  ICD-9-CM: 783.21         Adrenal mass ([facility] ICD-10-CM: E27.8  ICD-9-CM: 255.8     Overview Addendum 12/17/2018  9:57 AM by Carolina Grace MD     Stable/non-functioning adenoma on imaging                   Past Medical History:      has a past medical history of Cancer [facility]), Chronic systolic heart failure ([facility] (12/7/2020), Constipation, Gout, Hyperlipemia, Hypertension, Pacemaker (2019), Stroke ([facility] Thrombocytopenia ([facility] (3/19/2019), and TIA (transient ischemic attack) (2013). Past Surgical History:      has a past surgical history that includes hx urological; hx prostatectomy; pr ins new/rplcmt prm pm w/transv eltrd atrial&vent (Right, 3/22/2019); pr ins new/rplcmt prm pacemakr w/trans eltrd atrial (N/A, 10/18/2019); and place percut gastrostomy tube (11/30/2020). Home Medications:     Prior to Admission medications    Medication Sig Start Date End Date Taking? Authorizing Provider   levETIRAcetam (KEPPRA) 750 mg tablet Take 1 Tab by mouth every twelve (12) hours.  12/11/20   Rafiq Bearden, NP   aspirin delayed-release 81 mg tablet Take 1 Tab by mouth daily. 20   Tray Rider MD   polyethylene glycol [facility]) 17 gram/dose powder Take 17 g by mouth daily as needed for Constipation. 20   Tray Rider MD   balsam peru-castor oiL (VENELEX) ointment Apply  to affected area three (3) times daily. 20   Star Rodriguez MD   famotidine (PEPCID) 20 mg tablet Take 1 Tab by mouth every evening. 20   Star Rodriguez MD   acetaminophen (TYLENOL) 325 mg tablet Take 650 mg by mouth every six (6) hours as needed for Pain. Provider, Historical       Allergies/Social/Family History:     No Known Allergies   Social History     Tobacco Use    Smoking status: Never Smoker    Smokeless tobacco: Never Used   Substance Use Topics    Alcohol use: No      Family History   Problem Relation Age of Onset    No Known Problems Mother     No Known Problems Father        Review of Systems:     Not able to obtain due to patient medical condition    Objective:   Vital Signs:  Visit Vitals  BP (!) 140/79   Pulse 99   Temp 99 °F (37.2 °C)   Resp 12   Ht 5' 6\" (1.676 m)   Wt 76.4 kg (168 lb 6.9 oz)   SpO2 100%   BMI 27.19 kg/m²      O2 Device: Endotracheal tube, Ventilator Temp (24hrs), Av.5 °F (36.9 °C), Min:97.6 °F (36.4 °C), Max:100.6 °F (38.1 °C)           Intake/Output:     Intake/Output Summary (Last 24 hours) at 2021 1138  Last data filed at 2021 1100  Gross per 24 hour   Intake 1398.75 ml   Output 790 ml   Net 608.75 ml       Physical Exam:    General: No distress, appears stated age, intubated   Neurologic: No response to stimuli  Lungs: CTAB   Cardiovascular:  Regular rate and rhythm, S1S2 present, without murmur or extra heart sounds and pedal pulses normal , no lower limb edema  Abdomen:  soft, non-tender.  Bowel sounds normal. No masses,  no organomegaly      LABS AND  DATA: Personally reviewed  Recent Labs     21  0257 21  0308   WBC 11.5* 13.4*   HGB 7.3* 7.5*   HCT 23.7* 23.8*    230     Recent Labs     08/09/21  0257 08/08/21  0308    143   K 4.0 3.9    107   CO2 31 30   BUN 66* 63*   CREA 0.97 1.06   * 187*   CA 9.3 9.5   MG 2.6*  --    PHOS 2.9  --      No results for input(s): AP, TBIL, TP, ALB, GLOB, AML, LPSE in the last 72 hours. No lab exists for component: SGOT, GPT, AMYP  No results for input(s): INR, PTP, APTT, INREXT, INREXT in the last 72 hours. No results for input(s): PHI, PCO2I, PO2I, FIO2I in the last 72 hours. No results for input(s): CPK, CKMB, TROIQ, BNPP in the last 72 hours. Hemodynamics:   PAP:   CO:     Wedge:   CI:     CVP:    SVR:       PVR:       Ventilator Settings:  Mode Rate Tidal Volume Pressure FiO2 PEEP   Assist control, Pressure control      5 cm H2O 30 % 5 cm H20     Peak airway pressure: 22 cm H2O    Minute ventilation: 6.56 l/min        MEDS: Reviewed    Chest X-Ray:  CXR Results  (Last 48 hours)    None            Assessment and Plan:   Seizure:  Encephalopathy:  Advanced dementia:  Appreciate neurology input, EEG report noted as above.  Remains poorly responsive.  Continue current antiepileptic medication. Appreciate further neurology input. MRI yesterday w/o acute findings. Cardiogenic shock:  Ejection fraction of less than 30% at baseline, dobutamine drip weaned off.  Seems to be euvolemic at this time.  Renal function improving.  Will diurese as needed. Septic shock: Resolved  Completed antibiotic course.  Off pressors.  Procalcitonin continue to trend down  Respiratory failure- Trach planned for today      DISPOSITION  Stay in ICU    CRITICAL CARE CONSULTANT NOTE  I had a face to face encounter with the patient, reviewed and interpreted patient data including clinical events, labs, images, vital signs, I/O's, and examined patient.   I have discussed the case and the plan and management of the patient's care with the consulting services, the bedside nurses and the respiratory therapist. NOTE OF PERSONAL INVOLVEMENT IN CARE   This patient has a high probability of imminent, clinically significant deterioration, which requires the highest level of preparedness to intervene urgently. I participated in the decision-making and personally managed or directed the management of the following life and organ supporting interventions that required my frequent assessment to treat or prevent imminent deterioration. I personally spent 30 minutes of critical care time. This is time spent at this critically ill patient's bedside actively involved in patient care as well as the coordination of care and discussions with the patient's family. This does not include any procedural time which has been billed separately.     [id number] Critical Care  8/9/2021 Spoke with daughter Donald, Patient lives in a private house, 4 steps to enter and lives with spouse and daughter. At baseline she only ambulates very short distances with RW. She owns a shower chair.

## (undated) DEVICE — PACEMAKER PACK: Brand: MEDLINE INDUSTRIES, INC.

## (undated) DEVICE — Z DISCONTINUED PER MEDLINE LINE GAS SAMPLING O2/CO2 LNG AD 13 FT NSL W/ TBNG FILTERLINE

## (undated) DEVICE — PENCIL ES L3M BTTN SWCH S STL HEX LOK BLDE ELECTRD HOLSTER

## (undated) DEVICE — SYR 3ML LL TIP 1/10ML GRAD --

## (undated) DEVICE — Device: Brand: PADPRO

## (undated) DEVICE — CABLE PACE ALGTR CLP SAF 12FT --

## (undated) DEVICE — SUTURE VCRL SZ 2-0 L36IN ABSRB UD L40MM CT 1/2 CIR J957H

## (undated) DEVICE — INTRO SHTH 7FR 13X20CM -- TEARAWAY

## (undated) DEVICE — BRACE ORTH CLOSURE XL AD BK SHLDR BLK QUIKDRAW PRO

## (undated) DEVICE — Device

## (undated) DEVICE — TAPE,CLOTH/SILK,CURAD,3"X10YD,LF,40/CS: Brand: CURAD

## (undated) DEVICE — INTRO SHTH 9FR 13X20CM -- USE ITEM# 341577

## (undated) DEVICE — SLEEVE PRGM HD CVR PACE STRL --

## (undated) DEVICE — REM POLYHESIVE ADULT PATIENT RETURN ELECTRODE: Brand: VALLEYLAB

## (undated) DEVICE — SUTURE ETHBND EXCEL SZ 2 L30IN NONABSORBABLE GRN L40MM V-37 MX69G

## (undated) DEVICE — SYR 10ML LUER LOK 1/5ML GRAD --

## (undated) DEVICE — BLOCK BITE ENDOSCP AD 21 MM W/ DIL BLU LF DISP

## (undated) DEVICE — STERILE POLYISOPRENE POWDER-FREE SURGICAL GLOVES: Brand: PROTEXIS

## (undated) DEVICE — SET ADMIN 16ML TBNG L100IN 2 Y INJ SITE IV PIGGY BK DISP

## (undated) DEVICE — BULB SYRINGE, IRRIGATION WITH PROTECTIVE CAP, 60 CC, INDIVIDUALLY WRAPPED: Brand: DOVER

## (undated) DEVICE — NEEDLE HYPO 18GA L1.5IN PNK S STL HUB POLYPR SHLD REG BVL

## (undated) DEVICE — YANKAUER,TAPERED BULBOUS TIP,W/O VENT: Brand: MEDLINE

## (undated) DEVICE — TELFA NON-ADHERENT PADS PREPAK: Brand: TELFA

## (undated) DEVICE — 1200 GUARD II KIT W/5MM TUBE W/O VAC TUBE: Brand: GUARDIAN

## (undated) DEVICE — CATH IV AUTOGRD BC PNK 20GA 25 -- INSYTE

## (undated) DEVICE — SUTURE VCRL SZ 2-0 L27IN ABSRB UD L26MM SH 1/2 CIR J417H

## (undated) DEVICE — PLASMABLADE PS210-030S 3.0S LOCK: Brand: PLASMABLADE™

## (undated) DEVICE — MICROPUNCTURE INTRODUCER SET SILHOUETTE TRANSITIONLESS WITH STAINLESS STEEL WIRE GUIDE: Brand: MICROPUNCTURE

## (undated) DEVICE — DRESSING,STRATASORB,COMP,ISLAND,6"X7.5": Brand: MEDLINE

## (undated) DEVICE — AGENT HEMSTAT 3GM PURIFIED PLNT STARCH PWD ABSRB ARISTA AH

## (undated) DEVICE — SOLIDIFIER FLD 2OZ 1500CC N DISINF IN BTL DISP SAFESORB

## (undated) DEVICE — SLING ORTHOPEDIC PCH UNIV 19.5X9 IN 2-39 IN ARM W/ FOAM STRP

## (undated) DEVICE — CANNULA NSL ORAL AD FOR CAPNOFLEX CO2 O2 AIRLFE

## (undated) DEVICE — BASIN EMSIS 16OZ GRAPHITE PLAS KID SHP MOLD GRAD FOR ORAL

## (undated) DEVICE — KENDALL RADIOLUCENT FOAM MONITORING ELECTRODE RECTANGULAR SHAPE: Brand: KENDALL

## (undated) DEVICE — ELECTRODE,RADIOTRANSLUCENT,FOAM,5PK: Brand: MEDLINE

## (undated) DEVICE — STIFFEN MICRO-INTRODUCER KIT: Brand: STIFFEN MICRO-INTRODUCER KIT

## (undated) DEVICE — TOWEL 4 PLY TISS 19X30 SUE WHT

## (undated) DEVICE — 3M™ IOBAN™ 2 ANTIMICROBIAL INCISE DRAPE 6650EZ: Brand: IOBAN™ 2

## (undated) DEVICE — NEONATAL-ADULT SPO2 SENSOR: Brand: NELLCOR

## (undated) DEVICE — 3M™ TEGADERM™ TRANSPARENT FILM DRESSING FRAME STYLE, 1626W, 4 IN X 4-3/4 IN (10 CM X 12 CM), 50/CT 4CT/CASE: Brand: 3M™ TEGADERM™

## (undated) DEVICE — GUIDEWIRE VASC L80CM DIA0.018IN TIP L5CM 15DEG ANG NIT

## (undated) DEVICE — 3M™ IOBAN™ 2 ANTIMICROBIAL INCISE DRAPE 6640EZ: Brand: IOBAN™ 2

## (undated) DEVICE — 3M™ STERI-STRIP™ REINFORCED ADHESIVE SKIN CLOSURES, R1546, 1/4 IN X 4 IN (6 MM X 100 MM), 10 STRIPS/ENVELOPE: Brand: 3M™ STERI-STRIP™